# Patient Record
Sex: FEMALE | Race: WHITE | NOT HISPANIC OR LATINO | Employment: OTHER | URBAN - METROPOLITAN AREA
[De-identification: names, ages, dates, MRNs, and addresses within clinical notes are randomized per-mention and may not be internally consistent; named-entity substitution may affect disease eponyms.]

---

## 2023-03-07 ENCOUNTER — TELEPHONE (OUTPATIENT)
Dept: PRIMARY CARE | Facility: CLINIC | Age: 43
End: 2023-03-07
Payer: COMMERCIAL

## 2023-03-07 DIAGNOSIS — R51.9 NONINTRACTABLE HEADACHE, UNSPECIFIED CHRONICITY PATTERN, UNSPECIFIED HEADACHE TYPE: ICD-10-CM

## 2023-03-07 DIAGNOSIS — E78.00 PURE HYPERCHOLESTEROLEMIA: Primary | ICD-10-CM

## 2023-03-07 DIAGNOSIS — E78.00 PURE HYPERCHOLESTEROLEMIA: ICD-10-CM

## 2023-03-07 RX ORDER — ATORVASTATIN CALCIUM 20 MG/1
20 TABLET, FILM COATED ORAL DAILY
COMMUNITY
End: 2023-03-07 | Stop reason: SDUPTHER

## 2023-03-07 RX ORDER — ATORVASTATIN CALCIUM 20 MG/1
20 TABLET, FILM COATED ORAL DAILY
Qty: 30 TABLET | Refills: 5 | Status: SHIPPED | OUTPATIENT
Start: 2023-03-07 | End: 2023-03-07 | Stop reason: SDUPTHER

## 2023-03-07 RX ORDER — BUDESONIDE AND FORMOTEROL FUMARATE DIHYDRATE 160; 4.5 UG/1; UG/1
2 AEROSOL RESPIRATORY (INHALATION)
COMMUNITY
Start: 2022-08-05 | End: 2023-05-01 | Stop reason: ALTCHOICE

## 2023-03-07 RX ORDER — ALBUTEROL SULFATE 90 UG/1
2 AEROSOL, METERED RESPIRATORY (INHALATION)
COMMUNITY
End: 2023-05-08 | Stop reason: SDUPTHER

## 2023-03-07 RX ORDER — ATORVASTATIN CALCIUM 20 MG/1
20 TABLET, FILM COATED ORAL DAILY
Qty: 30 TABLET | Refills: 5 | Status: SHIPPED | OUTPATIENT
Start: 2023-03-07 | End: 2023-09-18 | Stop reason: SDUPTHER

## 2023-03-07 NOTE — TELEPHONE ENCOUNTER
PATIENT REQUESTING REFILL ON ATORVASTATIN 20MG  SEND TO [facility]

## 2023-03-10 DIAGNOSIS — R51.9 NONINTRACTABLE HEADACHE, UNSPECIFIED CHRONICITY PATTERN, UNSPECIFIED HEADACHE TYPE: ICD-10-CM

## 2023-03-10 RX ORDER — TOPIRAMATE 25 MG/1
50 TABLET ORAL DAILY
COMMUNITY
Start: 2020-08-23 | End: 2023-03-10 | Stop reason: SDUPTHER

## 2023-03-10 RX ORDER — TOPIRAMATE 25 MG/1
50 TABLET ORAL DAILY
Qty: 90 TABLET | Refills: 3 | Status: SHIPPED | OUTPATIENT
Start: 2023-03-10 | End: 2023-03-13 | Stop reason: SDUPTHER

## 2023-03-13 RX ORDER — TOPIRAMATE 25 MG/1
50 TABLET ORAL DAILY
Qty: 180 TABLET | Refills: 3 | Status: SHIPPED | OUTPATIENT
Start: 2023-03-13 | End: 2024-01-14 | Stop reason: HOSPADM

## 2023-03-20 ENCOUNTER — TELEPHONE (OUTPATIENT)
Dept: PRIMARY CARE | Facility: CLINIC | Age: 43
End: 2023-03-20
Payer: COMMERCIAL

## 2023-03-20 ENCOUNTER — PATIENT OUTREACH (OUTPATIENT)
Dept: PRIMARY CARE | Facility: CLINIC | Age: 43
End: 2023-03-20
Payer: COMMERCIAL

## 2023-03-20 DIAGNOSIS — J20.9 ACUTE BRONCHITIS, UNSPECIFIED ORGANISM: ICD-10-CM

## 2023-03-20 DIAGNOSIS — J44.1 COPD EXACERBATION (MULTI): ICD-10-CM

## 2023-03-20 DIAGNOSIS — J44.9 CHRONIC OBSTRUCTIVE PULMONARY DISEASE, UNSPECIFIED COPD TYPE (MULTI): ICD-10-CM

## 2023-03-20 DIAGNOSIS — J43.9 PULMONARY EMPHYSEMA, UNSPECIFIED EMPHYSEMA TYPE (MULTI): Primary | ICD-10-CM

## 2023-03-20 DIAGNOSIS — J44.1 COPD EXACERBATION (MULTI): Primary | ICD-10-CM

## 2023-03-20 RX ORDER — IPRATROPIUM BROMIDE AND ALBUTEROL SULFATE 2.5; .5 MG/3ML; MG/3ML
3 SOLUTION RESPIRATORY (INHALATION) EVERY 6 HOURS PRN
COMMUNITY
Start: 2023-03-17 | End: 2023-05-24 | Stop reason: SDUPTHER

## 2023-03-20 RX ORDER — AMOXICILLIN AND CLAVULANATE POTASSIUM 875; 125 MG/1; MG/1
875 TABLET, FILM COATED ORAL 2 TIMES DAILY
Qty: 12 TABLET | Refills: 0 | COMMUNITY
Start: 2023-03-17 | End: 2023-03-23

## 2023-03-20 RX ORDER — BENZONATATE 100 MG/1
CAPSULE ORAL 3 TIMES DAILY PRN
COMMUNITY
Start: 2023-03-17 | End: 2023-04-15

## 2023-03-20 RX ORDER — PREDNISONE 10 MG/1
TABLET ORAL
COMMUNITY
Start: 2023-03-17 | End: 2023-03-28

## 2023-03-20 RX ORDER — DOXYCYCLINE HYCLATE 100 MG
TABLET ORAL EVERY 12 HOURS
COMMUNITY
Start: 2023-03-17 | End: 2023-07-27 | Stop reason: ALTCHOICE

## 2023-03-20 NOTE — TELEPHONE ENCOUNTER
PATIENT CALLED REQUESTING A PULMONOLOGIST REFERRAL.  NEEDS TO BE IN THE [place] AREA.    PLEASE ADVISE

## 2023-03-20 NOTE — PROGRESS NOTES
Discharge Facility[facility]  Discharge Diagnosis: COPD exacerbation  Admission Date: 3/14/23  Discharge Date: 3/17/23    PCP appt: 3/30/23    Engagement  Call Start Time: 1454 (3/20/2023  2:53 PM)    Medications  Medications reviewed with patient/caregiver?: Yes (new meds only) (3/20/2023  2:53 PM)  Is the patient having any side effects they believe may be caused by any medication additions or changes?: (!) Yes (3/20/2023  2:53 PM)  Does the patient have all medications ordered at discharge?: Yes (3/20/2023  2:53 PM)  Care Management Interventions: No intervention needed (3/20/2023  2:53 PM)  Is the patient taking all medications as directed (includes completed medication regime)?: Yes (3/20/2023  2:53 PM)  Care Management Interventions: Nurse provided patient education (3/20/2023  2:53 PM)    Appointments  Care Management Interventions: Verified appointment date/time/provider (3/20/2023  2:53 PM)    Self Management  Has home health visited the patient within 72 hours of discharge?: Not applicable (3/20/2023  2:53 PM)  What Durable Medical Equipment (DME) was ordered?: [employer] (3/20/2023  2:53 PM)  Has all Durable Medical Equipment (DME) been delivered?: Yes (3/20/2023  2:53 PM)    Patient Teaching  Does the patient have access to their discharge instructions?: Yes (3/20/2023  2:53 PM)  Care Management Interventions: Reviewed instructions with patient (3/20/2023  2:53 PM)  What is the patient's perception of their health status since discharge?: Improving (3/20/2023  2:53 PM)    Wrap Up  Call End Time: 1457 (3/20/2023  2:53 PM)

## 2023-03-22 PROBLEM — K43.9 VENTRAL HERNIA WITHOUT OBSTRUCTION OR GANGRENE: Status: ACTIVE | Noted: 2023-03-22

## 2023-03-22 PROBLEM — R06.02 SOB (SHORTNESS OF BREATH) ON EXERTION: Status: ACTIVE | Noted: 2023-03-22

## 2023-03-22 PROBLEM — J44.9 COPD (CHRONIC OBSTRUCTIVE PULMONARY DISEASE) (MULTI): Status: ACTIVE | Noted: 2023-03-22

## 2023-03-22 PROBLEM — E11.9 TYPE 2 DIABETES MELLITUS WITHOUT COMPLICATION, WITHOUT LONG-TERM CURRENT USE OF INSULIN (MULTI): Status: ACTIVE | Noted: 2023-03-22

## 2023-03-22 PROBLEM — R39.9 UTI SYMPTOMS: Status: ACTIVE | Noted: 2023-03-22

## 2023-03-22 PROBLEM — K21.9 ACID REFLUX: Status: ACTIVE | Noted: 2023-03-22

## 2023-03-22 PROBLEM — D17.20 LIPOMA OF FOREARM: Status: ACTIVE | Noted: 2023-03-22

## 2023-03-22 PROBLEM — D25.9 UTERINE FIBROID: Status: ACTIVE | Noted: 2023-03-22

## 2023-03-22 PROBLEM — D27.9 OVARIAN TERATOMA: Status: ACTIVE | Noted: 2023-03-22

## 2023-03-22 PROBLEM — R39.15 URINARY URGENCY: Status: ACTIVE | Noted: 2023-03-22

## 2023-03-22 PROBLEM — T81.49XA POSTOPERATIVE WOUND INFECTION: Status: ACTIVE | Noted: 2023-03-22

## 2023-03-22 PROBLEM — E78.5 HYPERLIPIDEMIA: Status: ACTIVE | Noted: 2023-03-22

## 2023-03-22 PROBLEM — F20.9 SCHIZOPHRENIA (MULTI): Status: ACTIVE | Noted: 2023-03-22

## 2023-03-22 PROBLEM — I10 HYPERTENSION: Status: ACTIVE | Noted: 2023-03-22

## 2023-03-22 PROBLEM — F12.10 MARIJUANA ABUSE: Status: ACTIVE | Noted: 2023-03-22

## 2023-03-22 PROBLEM — E03.9 HYPOTHYROIDISM: Status: ACTIVE | Noted: 2023-03-22

## 2023-03-22 PROBLEM — R09.02 HYPOXIA: Status: ACTIVE | Noted: 2023-03-22

## 2023-03-22 PROBLEM — N20.0 MULTIPLE RENAL CALCULI: Status: ACTIVE | Noted: 2023-03-22

## 2023-03-22 PROBLEM — R51.9 HEADACHE: Status: ACTIVE | Noted: 2023-03-22

## 2023-03-22 PROBLEM — R71.8 ELEVATED MCV: Status: ACTIVE | Noted: 2023-03-22

## 2023-03-22 PROBLEM — R05.9 COUGH: Status: ACTIVE | Noted: 2023-03-22

## 2023-03-22 RX ORDER — PALIPERIDONE PALMITATE 234 MG/1.5ML
INJECTION INTRAMUSCULAR
COMMUNITY
Start: 2021-04-13 | End: 2023-08-18 | Stop reason: ALTCHOICE

## 2023-03-22 RX ORDER — PANTOPRAZOLE SODIUM 40 MG/1
1 TABLET, DELAYED RELEASE ORAL DAILY
COMMUNITY
Start: 2021-07-20 | End: 2023-03-28 | Stop reason: SDUPTHER

## 2023-03-22 RX ORDER — BENZTROPINE MESYLATE 2 MG/1
2 TABLET ORAL DAILY
COMMUNITY
End: 2023-05-01

## 2023-03-22 RX ORDER — BUSPIRONE HYDROCHLORIDE 30 MG/1
30 TABLET ORAL 2 TIMES DAILY
COMMUNITY
End: 2024-01-14 | Stop reason: HOSPADM

## 2023-03-22 RX ORDER — LEVOTHYROXINE SODIUM 150 UG/1
1 TABLET ORAL DAILY
COMMUNITY
Start: 2018-11-09 | End: 2023-03-28 | Stop reason: SDUPTHER

## 2023-03-28 ENCOUNTER — OFFICE VISIT (OUTPATIENT)
Dept: PRIMARY CARE | Facility: CLINIC | Age: 43
End: 2023-03-28
Payer: COMMERCIAL

## 2023-03-28 VITALS
RESPIRATION RATE: 16 BRPM | WEIGHT: 179.6 LBS | DIASTOLIC BLOOD PRESSURE: 62 MMHG | BODY MASS INDEX: 33.05 KG/M2 | HEART RATE: 80 BPM | TEMPERATURE: 98.6 F | HEIGHT: 62 IN | SYSTOLIC BLOOD PRESSURE: 90 MMHG | OXYGEN SATURATION: 94 %

## 2023-03-28 DIAGNOSIS — E11.9 TYPE 2 DIABETES MELLITUS WITHOUT COMPLICATION, WITHOUT LONG-TERM CURRENT USE OF INSULIN (MULTI): ICD-10-CM

## 2023-03-28 DIAGNOSIS — J44.1 CHRONIC OBSTRUCTIVE PULMONARY DISEASE WITH ACUTE EXACERBATION (MULTI): ICD-10-CM

## 2023-03-28 DIAGNOSIS — E03.9 HYPOTHYROIDISM, UNSPECIFIED TYPE: ICD-10-CM

## 2023-03-28 DIAGNOSIS — K21.9 GASTROESOPHAGEAL REFLUX DISEASE, UNSPECIFIED WHETHER ESOPHAGITIS PRESENT: ICD-10-CM

## 2023-03-28 DIAGNOSIS — Z09 HOSPITAL DISCHARGE FOLLOW-UP: Primary | ICD-10-CM

## 2023-03-28 DIAGNOSIS — F20.3 UNDIFFERENTIATED SCHIZOPHRENIA (MULTI): ICD-10-CM

## 2023-03-28 DIAGNOSIS — E66.09 CLASS 1 OBESITY DUE TO EXCESS CALORIES WITHOUT SERIOUS COMORBIDITY WITH BODY MASS INDEX (BMI) OF 32.0 TO 32.9 IN ADULT: ICD-10-CM

## 2023-03-28 DIAGNOSIS — R00.0 TACHYCARDIA: ICD-10-CM

## 2023-03-28 PROBLEM — E66.811 CLASS 1 OBESITY DUE TO EXCESS CALORIES WITHOUT SERIOUS COMORBIDITY WITH BODY MASS INDEX (BMI) OF 32.0 TO 32.9 IN ADULT: Status: ACTIVE | Noted: 2023-03-28

## 2023-03-28 PROCEDURE — 3044F HG A1C LEVEL LT 7.0%: CPT | Performed by: FAMILY MEDICINE

## 2023-03-28 PROCEDURE — 99495 TRANSJ CARE MGMT MOD F2F 14D: CPT | Performed by: FAMILY MEDICINE

## 2023-03-28 PROCEDURE — 3078F DIAST BP <80 MM HG: CPT | Performed by: FAMILY MEDICINE

## 2023-03-28 PROCEDURE — 1036F TOBACCO NON-USER: CPT | Performed by: FAMILY MEDICINE

## 2023-03-28 PROCEDURE — 3074F SYST BP LT 130 MM HG: CPT | Performed by: FAMILY MEDICINE

## 2023-03-28 RX ORDER — METOPROLOL TARTRATE 25 MG/1
25 TABLET, FILM COATED ORAL 2 TIMES DAILY
Qty: 60 TABLET | Refills: 5 | Status: SHIPPED | OUTPATIENT
Start: 2023-03-28 | End: 2023-05-24 | Stop reason: SDUPTHER

## 2023-03-28 RX ORDER — LEVOTHYROXINE SODIUM 150 UG/1
150 TABLET ORAL DAILY
Qty: 90 TABLET | Refills: 1 | Status: SHIPPED | OUTPATIENT
Start: 2023-03-28 | End: 2024-03-27 | Stop reason: SDUPTHER

## 2023-03-28 RX ORDER — PANTOPRAZOLE SODIUM 40 MG/1
40 TABLET, DELAYED RELEASE ORAL DAILY
Qty: 90 TABLET | Refills: 1 | Status: SHIPPED | OUTPATIENT
Start: 2023-03-28 | End: 2023-12-07 | Stop reason: SDUPTHER

## 2023-03-28 ASSESSMENT — PATIENT HEALTH QUESTIONNAIRE - PHQ9
1. LITTLE INTEREST OR PLEASURE IN DOING THINGS: NOT AT ALL
2. FEELING DOWN, DEPRESSED OR HOPELESS: NOT AT ALL
SUM OF ALL RESPONSES TO PHQ9 QUESTIONS 1 & 2: 0

## 2023-03-28 ASSESSMENT — LIFESTYLE VARIABLES
AUDIT-C TOTAL SCORE: 0
HOW OFTEN DO YOU HAVE A DRINK CONTAINING ALCOHOL: NEVER
HOW MANY STANDARD DRINKS CONTAINING ALCOHOL DO YOU HAVE ON A TYPICAL DAY: PATIENT DOES NOT DRINK
HOW OFTEN DO YOU HAVE SIX OR MORE DRINKS ON ONE OCCASION: NEVER
SKIP TO QUESTIONS 9-10: 1

## 2023-03-28 ASSESSMENT — PAIN SCALES - GENERAL: PAINLEVEL: 0-NO PAIN

## 2023-03-28 NOTE — PATIENT INSTRUCTIONS
Patient will continue on a diabetic, low-cholesterol diet and weight reduction. Exercise as tolerated. She will continue medications as prescribed. Follow-up in 3 month(s) otherwise as needed.      Patient was given refill(s) on:   Levothyroxine Sodium 150 mcg, TAKE 1 TAB BY MOUTH DAILY  Pantoprazole Sodium 40 mg, TAKE 1 TAB BY MOUTH DAILY   Metoprolol Tartrate 25 mg, TAKE 1 TAB BY MOUTH TWICE DAILY     Rx(s) sent to pharmacy.     Refer patient to Dr. Winkler for further evaluation and treatment of COPD.     Encouraged patient to drink plenty of fluids.

## 2023-03-28 NOTE — PROGRESS NOTES
Subjective   Patient ID: Stephenie Mccray is a 42 y.o. female who presents for Puncture Wound. I last saw the patient on 8/17/2022.     HPI   Patient was admitted into  from 3/14-3/17 for pneumonia. Patient was put on insulin, antibiotic, and prednisone. She is taking the insulin to keep her BS lower due to the Prednisone. She states that she has not had to inject insulin in the past 2 days.     Patient had a wound where the IV was inserted. She used bacitracin and amoxicillin to help heal it since it was rubbed raw by the adhesive. Denies pain. She did notice drainage before, but it is not currently draining.     She also needs a referral to a pulmonologist to care for her COPD.     Review of Systems  Except positives as noted in the CC & HPI      Constitutional: Denies fevers, chills, night sweats, fatigue, weight changes, change in appetite    Eyes: Denies blurry vision, double vision    ENT: Denies otalgia, trouble hearing, tinnitus, vertigo, nasal congestion, rhinorrhea, sore throat    Neck: Denies swelling, masses    Cardiovascular: Denies chest pain, palpitations, edema, orthopnea, syncope    Respiratory: Denies dyspnea, cough, wheezing, postural nocturnal dyspnea    Gastrointestinal: Denies abdominal pain, nausea, vomiting, diarrhea, constipation, melena, hematochezia    Genitourinary: Denies dysuria, hematuria, frequency, urgency    Musculoskeletal: Denies back pain, neck pain, arthralgias, myalgias    Integumentary: Denies skin rashes, masses    Neurological: Denies dizziness, headaches, confusion, limb weakness, paresthesias, syncope, convulsions    Psychiatric: Denies depression, anxiety, homicidal ideations, suicidal ideations, sleep disturbances    Endocrine: Denies polyphagia, polydipsia, polyuria, weakness, hair thinning, heat intolerance, cold intolerance, weight changes    Heme/Lymph: Denies easy bruising, easy bleeding, swollen glands     Objective   BP 90/62 (BP Location: Right arm, Patient  "Position: Sitting)   Pulse 80   Temp 37 °C (98.6 °F) (Temporal)   Resp 16   Ht 1.575 m (5' 2\")   Wt 81.5 kg (179 lb 9.6 oz)   SpO2 94%   BMI 32.85 kg/m²     Physical Exam  90/62 on recheck of BP in the right arm.     Gen. Appearance - well-developed, well-nourished, 42 y.o., White female in no acute distress.     Skin - warm, pink and dry without rash or concerning lesions. 6 mm area of erythema in the right antecubital fossa without drainage, tenderness, or induration. It appears to be healing.     Mental Status - alert and oriented times 3. Normal mood and affect appropriate to mood.     Neck - supple without lymphadenopathy. Carotid pulses are normal without bruits. Thyroid is normal in midline without nodules.    Chest - lungs are clear to auscultation without rales, rhonchi or wheezes.    Heart - regular, rate, and rhythm without murmurs, rubs or gallops.     Abdomen - soft, obese, protuberant, nontender, nondistended. No masses, hepatomegaly or splenomegaly is noted. No rebound, rigidity or guarding is noted. Bowel sounds are normoactive. Ecchymotic area of the left mid abdomen measuring 6 cm around.     Extremities - no cyanosis, clubbing or edema. Pedal pulses are 2+ normal at the dorsalis pedis and posterior tibial pulses bilaterally.     Neurological - cranial nerves II through XII are grossly intact. Motor strength 5/5 at all fours.     Assessment/Plan   1. Hospital discharge follow-up        2. Type 2 diabetes mellitus without complication, without long-term current use of insulin (CMS/[facility])  Follow Up In [facility] - PCP      3. Hypothyroidism, unspecified type  levothyroxine (Synthroid, Levoxyl) 150 mcg tablet    Follow Up In [facility] - PCP      4. Gastroesophageal reflux disease, unspecified whether esophagitis present  pantoprazole (ProtoNix) 40 mg EC tablet    Follow Up In [facility] - PCP      5. Chronic obstructive pulmonary disease with acute exacerbation " (CMS/[facility])  Follow Up In [facility] - PCP      6. Tachycardia  metoprolol tartrate (Lopressor) 25 mg tablet      7. Class 1 obesity due to excess calories without serious comorbidity with body mass index (BMI) of 32.0 to 32.9 in adult        Patient will continue on a diabetic, low-cholesterol diet and weight reduction. Exercise as tolerated. She will continue medications as prescribed. Follow-up in 3 month(s) otherwise as needed.      Patient was given refill(s) on:   Levothyroxine Sodium 150 mcg, TAKE 1 TAB BY MOUTH DAILY  Pantoprazole Sodium 40 mg, TAKE 1 TAB BY MOUTH DAILY   Metoprolol Tartrate 25 mg, TAKE 1 TAB BY MOUTH TWICE DAILY     Rx(s) sent to pharmacy.     Refer patient to Dr. Winkler for further evaluation and treatment of COPD.     Encouraged patient to drink plenty of fluids.    Scribe Attestation  By signing my name below, IRama Scribe   attest that this documentation has been prepared under the direction and in the presence of Delta Yuen MD.

## 2023-03-30 ENCOUNTER — APPOINTMENT (OUTPATIENT)
Dept: PRIMARY CARE | Facility: CLINIC | Age: 43
End: 2023-03-30
Payer: COMMERCIAL

## 2023-03-31 ENCOUNTER — TELEPHONE (OUTPATIENT)
Dept: PRIMARY CARE | Facility: CLINIC | Age: 43
End: 2023-03-31
Payer: COMMERCIAL

## 2023-03-31 NOTE — TELEPHONE ENCOUNTER
Denise from [facility] called to verify Stephenie as a BB patient and wanted me to leave her contact# [phone] and if BB thinks Stephenie were to need a , dietician and/or behavior health specialist! There are free resources available to her!     MIL

## 2023-04-14 ENCOUNTER — PATIENT OUTREACH (OUTPATIENT)
Dept: PRIMARY CARE | Facility: CLINIC | Age: 43
End: 2023-04-14
Payer: COMMERCIAL

## 2023-04-14 ENCOUNTER — DOCUMENTATION (OUTPATIENT)
Dept: PRIMARY CARE | Facility: CLINIC | Age: 43
End: 2023-04-14
Payer: COMMERCIAL

## 2023-04-14 RX ORDER — CLONAZEPAM 1 MG/1
TABLET ORAL 2 TIMES DAILY PRN
COMMUNITY

## 2023-04-14 RX ORDER — CITALOPRAM 40 MG/1
50 TABLET, FILM COATED ORAL DAILY
COMMUNITY
End: 2024-02-06 | Stop reason: ALTCHOICE

## 2023-04-14 RX ORDER — HYDROXYZINE PAMOATE 50 MG/1
50 CAPSULE ORAL 3 TIMES DAILY PRN
COMMUNITY

## 2023-04-14 RX ORDER — PREDNISONE 20 MG/1
40 TABLET ORAL DAILY
COMMUNITY
Start: 2023-04-13 | End: 2023-08-18 | Stop reason: ALTCHOICE

## 2023-04-14 RX ORDER — INSULIN LISPRO 100 [IU]/ML
4 INJECTION, SOLUTION INTRAVENOUS; SUBCUTANEOUS
COMMUNITY
Start: 2023-04-13 | End: 2024-04-08

## 2023-04-14 NOTE — PROGRESS NOTES
Discharge Facility: [facility]  Discharge Diagnosis: COPD exacerbation, Acute on chronic respiratory failure, acute on chronic respiratory failure with hypoxemia, acute bronchitis, long term current use of insulin  Admission Date: 4/10/2023  Discharge Date: 4/13/2023    PCP appt: 4/26/2023    Engagement  Call Start Time: 1500 (4/14/2023  3:02 PM)    Medications  Medications reviewed with patient/caregiver?: Yes (4/14/2023  3:02 PM)  Is the patient having any side effects they believe may be caused by any medication additions or changes?: No (4/14/2023  3:02 PM)  Does the patient have all medications ordered at discharge?: Yes (4/14/2023  3:02 PM)  Care Management Interventions: No intervention needed (4/14/2023  3:02 PM)  Is the patient taking all medications as directed (includes completed medication regime)?: Yes (4/14/2023  3:02 PM)    Appointments  Does the patient have a primary care provider?: Yes (4/14/2023  3:02 PM)  Care Management Interventions: Verified appointment date/time/provider (Assisted in making a follow up appt with PCP) (4/14/2023  3:02 PM)  Has the patient kept scheduled appointments due by today?: Yes (4/14/2023  3:02 PM)  Care Management Interventions: Advised patient to keep appointment (4/14/2023  3:02 PM)    Self Management  What is the home health agency?: Denies need (4/14/2023  3:02 PM)    Patient Teaching  Does the patient have access to their discharge instructions?: Yes (4/14/2023  3:02 PM)  Care Management Interventions: Reviewed instructions with patient (4/14/2023  3:02 PM)  What is the patient's perception of their health status since discharge?: Improving (4/14/2023  3:02 PM)  Is the patient/caregiver able to teach back the hierarchy of who to call/visit for symptoms/problems? PCP, Specialist, Home Health nurse, Urgent Care, ED, 911: Yes (4/14/2023  3:02 PM)

## 2023-04-23 DIAGNOSIS — J43.1 PANLOBULAR EMPHYSEMA (MULTI): Primary | ICD-10-CM

## 2023-04-28 ENCOUNTER — PATIENT OUTREACH (OUTPATIENT)
Dept: PRIMARY CARE | Facility: CLINIC | Age: 43
End: 2023-04-28
Payer: COMMERCIAL

## 2023-04-28 NOTE — PROGRESS NOTES
Call regarding appt. with PCP on 4/26/2023 after hospitalization.  At time of outreach call the patient feels as if their condition has improved since last visit.  Reviewed the PCP appointment with the pt and addressed any questions or concerns.

## 2023-05-01 ENCOUNTER — TELEMEDICINE (OUTPATIENT)
Dept: PHARMACY | Facility: HOSPITAL | Age: 43
End: 2023-05-01
Payer: COMMERCIAL

## 2023-05-01 DIAGNOSIS — J43.1 PANLOBULAR EMPHYSEMA (MULTI): ICD-10-CM

## 2023-05-01 RX ORDER — FLUTICASONE FUROATE, UMECLIDINIUM BROMIDE AND VILANTEROL TRIFENATATE 100; 62.5; 25 UG/1; UG/1; UG/1
1 POWDER RESPIRATORY (INHALATION) DAILY
Qty: 28 EACH | Refills: 11 | Status: SHIPPED | OUTPATIENT
Start: 2023-05-01 | End: 2023-05-24

## 2023-05-01 RX ORDER — PRAZOSIN HYDROCHLORIDE 2 MG/1
2 CAPSULE ORAL NIGHTLY
Status: ON HOLD | COMMUNITY
End: 2023-12-29 | Stop reason: WASHOUT

## 2023-05-01 NOTE — ASSESSMENT & PLAN NOTE
Plan:  Initiate Trelegy 100-62.5-25 mcg/ inh; 1 puff every day  Discontinue Symbicort 160-4.5 mcg/ing   Continue all other medications as prescribed  Follow up in 1 week after patient has received medication/seen pulminology

## 2023-05-01 NOTE — PROGRESS NOTES
Pharmacy Post-Discharge Visit  Stephenie Mccray is a 42 y.o. female was referred to Clinical Pharmacy Team to complete a post-discharge medication optimization and monitoring visit.  The patient was referred for their COPD medication management.    Admission Date: 4/10/23  Discharge Date: 4/13/23    Referring Provider: Delta Yuen MD    Subjective   Allergies   Allergen Reactions    Sumatriptan Other     Flushing    Levofloxacin Rash       [facility] [place] - [facility] [address]   [address] [place] [address]  Phone: [phone] Fax: [phone]      Social History     Social History Narrative    Not on file        Notable Medication changes following discharge:  Start: Prednisone 5 mg; 4 tabs every day for 3 days, then 2 tabs every day for 3 days, then 1 tab every day for 3 days, then stop  Stop: none  Change: none    COPD ASSESSMENT    Rescue Inhaler Use:  -How many times per week do you use your rescue inhale? Twice daily, uses it scheduled. Did not specify if she needs it as a rescue    Inhaler Technique:  -How do you use your rescue inhaler?  --Patient takes it scheduled    -How do you use your maintenance inhaler?  --Patient takes two puffs BID of her Symbicort     Secondary Prevention (vaccines):  -Influenza: Date [Unknown]  -PCV13: Date [Unknown]  -PPSV23: Date [Unknown]    Sx Management:  -Increased cough? no  -Increased sputum production? no  -Increased SOB? no    Patient claims they are currently stable    Exacerbation Hx:  -When was your last hospitalization for an exacerbation? 4/10/23  -When was the last time you were treated with antibiotics and/or steroids? 4/10/         *Insert smartphrase [copdplat], [strokeplat], and/or [chfplat] if managing instead of HPI form*    Review of Systems    Objective     There were no vitals taken for this visit.     LAB  Lab Results   Component Value Date    BILITOT 0.3 04/10/2023    CALCIUM 8.7 04/11/2023    CO2 31  04/11/2023     04/11/2023    CREATININE 0.70 04/11/2023    GLUCOSE 164 (H) 04/11/2023    ALKPHOS 69 04/10/2023    K 4.5 04/11/2023    PROT 6.2 (L) 04/10/2023     04/11/2023    AST 10 04/10/2023    ALT 14 04/10/2023    BUN 16 04/11/2023    ANIONGAP 9 (L) 04/11/2023    MG 1.80 04/11/2023    PHOS 3.5 04/11/2023    ALBUMIN 3.8 04/11/2023    GFRF >90 04/11/2023     Lab Results   Component Value Date    TRIG 178 (H) 02/14/2023    CHOL 172 02/14/2023    HDL 50.4 02/14/2023     Lab Results   Component Value Date    HGBA1C 6.7 (A) 03/13/2023         Current Outpatient Medications on File Prior to Visit   Medication Sig Dispense Refill    albuterol 90 mcg/actuation inhaler Inhale 2 puffs 4 times a day. Patient taking 2 puffs QAM and 2 puffs at bedtime      busPIRone (Buspar) 30 mg tablet Take 1 tablet (30 mg) by mouth 2 times a day.      citalopram (CeleXA) 40 mg tablet Take by mouth.      clonazePAM (KlonoPIN) 1 mg tablet Take by mouth 2 times a day as needed.      hydrOXYzine pamoate (Vistaril) 50 mg capsule Take by mouth 3 times a day as needed.      insulin lispro (HumaLOG) 100 unit/mL injection 1 Dose.      Invega Sustenna 234 mg/1.5 mL syringe 1.5 milliliter(s) intramuscular once a month      levothyroxine (Synthroid, Levoxyl) 150 mcg tablet Take 1 tablet (150 mcg) by mouth once daily. 90 tablet 1    metoprolol tartrate (Lopressor) 25 mg tablet Take 1 tablet (25 mg) by mouth in the morning and 1 tablet (25 mg) before bedtime. 60 tablet 5    pantoprazole (ProtoNix) 40 mg EC tablet Take 1 tablet (40 mg) by mouth once daily. 90 tablet 1    prazosin (Minipress) 2 mg capsule Take 1 capsule (2 mg) by mouth once daily at bedtime.      Symbicort 160-4.5 mcg/actuation inhaler Inhale 2 puffs 2 times a day. RINSE MOUTH AFTER USE      topiramate (Topamax) 25 mg tablet Take 2 tablets (50 mg) by mouth once daily. 180 tablet 3    atorvastatin (Lipitor) 20 mg tablet Take 1 tablet (20 mg) by mouth once daily. 30 tablet 5     doxycycline (Vibra-Tabs) 100 mg tablet Take by mouth every 12 hours.      ipratropium-albuteroL (Duo-Neb) 0.5-2.5 mg/3 mL nebulizer solution Inhale 3 mL every 6 hours if needed.      predniSONE (Deltasone) 5 mg tablet Take by mouth.      [DISCONTINUED] benztropine (Cogentin) 2 mg tablet Take 1 tablet (2 mg) by mouth once daily.       No current facility-administered medications on file prior to visit.        DRUG INTERATIONS  - None specific to note    Current Medications:  Albuterol 90 mcg/act; 2 puffs Q6h PRN - patient taking 2 puffs BID  Ipratropium/albuterol 0.5-2.5 mg/3 mL; 3 mL Q6h PRN - patient taking 3 mL BID  Budesonide/formoterol 160-4.5 mcg/act; 2 puffs BID    Patient uses oxygen while sleeping. Currently running at 3L.     Patient referred to pharmacy by platinum plan discharge for medication maintenance. Patient has had two recent COPD exacerbations which required hospitalization. No updates to patient's COPD medications were made. Due to persistent exacerbations despite dual long-acting bronchodilator therapy, will initiate Trelegy and discontinue Symbicort. Patient stated they are seeing a pulmonologist in 1 week, but could not remember the date of appointment or the pulmonologist they are seeing. Plan to follow up with patient in 1 week to ensure patient has received medication and is tolerating and to follow up on new pulmonologist appointment, depending on circumstance may offer [facility] if patient needs assistance with copay.       Assessment/Plan   Problem List Items Addressed This Visit          Respiratory    COPD (chronic obstructive pulmonary disease) (CMS/[facility])     Plan:  Initiate Trelegy 100-62.5-25 mcg/ inh; 1 puff every day  Discontinue Symbicort 160-4.5 mcg/ing   Continue all other medications as prescribed  Follow up in 1 week after patient has received medication/seen pulminology              Frank Mclain, PharmD   PGY-1 Pharmacy Resident  [email or web address]   P: [phone]        Verbal consent to manage patient's drug therapy was obtained from the patient. They were informed they may decline to participate or withdraw from participation in pharmacy services at any time.

## 2023-05-08 ENCOUNTER — TELEMEDICINE (OUTPATIENT)
Dept: PHARMACY | Facility: HOSPITAL | Age: 43
End: 2023-05-08
Payer: COMMERCIAL

## 2023-05-08 DIAGNOSIS — J44.1 CHRONIC OBSTRUCTIVE PULMONARY DISEASE WITH ACUTE EXACERBATION (MULTI): Primary | ICD-10-CM

## 2023-05-08 RX ORDER — ALBUTEROL SULFATE 90 UG/1
2 AEROSOL, METERED RESPIRATORY (INHALATION) EVERY 4 HOURS PRN
Qty: 18 G | Refills: 11 | Status: SHIPPED | OUTPATIENT
Start: 2023-05-08 | End: 2023-12-31 | Stop reason: HOSPADM

## 2023-05-08 NOTE — PROGRESS NOTES
I reviewed the progress note and agree with the resident’s findings and plans as written. Case discussed with resident.    Steve Mccullough, JacquelynD

## 2023-05-08 NOTE — ASSESSMENT & PLAN NOTE
Patient referred to pharmacy by platinum plan discharge for medication maintenance. Patient has had two recent COPD exacerbations which required hospitalization. Initiated Trelegy at last telephone pharmacy visit, however patient has not picked up prescription yet due to it being sent to her unpreffered pharmacy, patient will call and have script transferred to preferred pharmacy. Patient also needs refill on her rescue inhaler, will send. Plan to follow up in 1 week to assess patient after she has received and started her new medication.    PLAN  Initiate Trelegy 100-62.5-25 mcg  Continue all other medications as prescribed  Follow up with pharmacy in 1 week to assess efficacy/tolerance.

## 2023-05-08 NOTE — PROGRESS NOTES
Pharmacy Post-Discharge Visit  Stephenie Mccray is a 42 y.o. female was referred to Clinical Pharmacy Team to complete a post-discharge medication optimization and monitoring visit.  The patient was referred for their COPD medication management.    Admission Date: 4/10/23  Discharge Date: 4/13/23    Referring Provider: Delta Yuen MD    Subjective   Allergies   Allergen Reactions    Sumatriptan Other     Flushing    Levofloxacin Rash       [id number] - [place], [place] - [address]  [address]   [facility] [address]  Phone: [phone] Fax: [phone]    [id number] - [place], [place] - [address]  [address]  [facility] [address]  Phone: [phone] Fax: [phone]      Social History     Social History Narrative    Not on file        Notable Medication changes following discharge:  Start: Prednisone 5 mg; 4 tabs every day for 3 days, then 2 tabs every day for 3 days, then 1 tab every day for 3 days, then stop  Stop: none  Change: none    COPD ASSESSMENT    Rescue Inhaler Use:  -How many times per week do you use your rescue inhale? Twice daily, uses it scheduled. Did not specify if she needs it as a rescue    Inhaler Technique:  -How do you use your rescue inhaler?  --Patient takes it scheduled    -How do you use your maintenance inhaler?  --Patient takes two puffs BID of her Symbicort     Secondary Prevention (vaccines):  -Influenza: Date [Unknown]  -PCV13: Date [Unknown]  -PPSV23: Date [Unknown]    Sx Management:  -Increased cough? no  -Increased sputum production? no  -Increased SOB? no    Patient claims they are currently stable    Exacerbation Hx:  -When was your last hospitalization for an exacerbation? 4/10/23  -When was the last time you were treated with antibiotics and/or steroids? 4/10/         *Insert smartphrase [copdplat], [strokeplat], and/or [chfplat] if managing instead of HPI form*    Review of Systems    Objective     There were no vitals taken for  this visit.     LAB  Lab Results   Component Value Date    BILITOT 0.3 04/10/2023    CALCIUM 8.7 04/11/2023    CO2 31 04/11/2023     04/11/2023    CREATININE 0.70 04/11/2023    GLUCOSE 164 (H) 04/11/2023    ALKPHOS 69 04/10/2023    K 4.5 04/11/2023    PROT 6.2 (L) 04/10/2023     04/11/2023    AST 10 04/10/2023    ALT 14 04/10/2023    BUN 16 04/11/2023    ANIONGAP 9 (L) 04/11/2023    MG 1.80 04/11/2023    PHOS 3.5 04/11/2023    ALBUMIN 3.8 04/11/2023    GFRF >90 04/11/2023     Lab Results   Component Value Date    TRIG 178 (H) 02/14/2023    CHOL 172 02/14/2023    HDL 50.4 02/14/2023     Lab Results   Component Value Date    HGBA1C 6.7 (A) 03/13/2023         Current Outpatient Medications on File Prior to Visit   Medication Sig Dispense Refill    albuterol 90 mcg/actuation inhaler Inhale 2 puffs 4 times a day. Patient taking 2 puffs QAM and 2 puffs at bedtime      atorvastatin (Lipitor) 20 mg tablet Take 1 tablet (20 mg) by mouth once daily. 30 tablet 5    busPIRone (Buspar) 30 mg tablet Take 1 tablet (30 mg) by mouth 2 times a day.      citalopram (CeleXA) 40 mg tablet Take by mouth.      clonazePAM (KlonoPIN) 1 mg tablet Take by mouth 2 times a day as needed.      doxycycline (Vibra-Tabs) 100 mg tablet Take by mouth every 12 hours.      fluticasone-umeclidin-vilanter (Trelegy Ellipta) 100-62.5-25 mcg blister with device Inhale 1 puff once daily. 28 each 11    hydrOXYzine pamoate (Vistaril) 50 mg capsule Take by mouth 3 times a day as needed.      insulin lispro (HumaLOG) 100 unit/mL injection 1 Dose.      Invega Sustenna 234 mg/1.5 mL syringe 1.5 milliliter(s) intramuscular once a month      ipratropium-albuteroL (Duo-Neb) 0.5-2.5 mg/3 mL nebulizer solution Inhale 3 mL every 6 hours if needed.      levothyroxine (Synthroid, Levoxyl) 150 mcg tablet Take 1 tablet (150 mcg) by mouth once daily. 90 tablet 1    metoprolol tartrate (Lopressor) 25 mg tablet Take 1 tablet (25 mg) by mouth in the morning and 1  tablet (25 mg) before bedtime. 60 tablet 5    pantoprazole (ProtoNix) 40 mg EC tablet Take 1 tablet (40 mg) by mouth once daily. 90 tablet 1    prazosin (Minipress) 2 mg capsule Take 1 capsule (2 mg) by mouth once daily at bedtime.      predniSONE (Deltasone) 5 mg tablet Take by mouth.      topiramate (Topamax) 25 mg tablet Take 2 tablets (50 mg) by mouth once daily. 180 tablet 3     No current facility-administered medications on file prior to visit.        DRUG INTERATIONS  - None specific to note    Current Medications:  Albuterol 90 mcg/act; 2 puffs Q6h PRN - patient taking 2 puffs BID  Ipratropium/albuterol 0.5-2.5 mg/3 mL; 3 mL Q6h PRN - patient taking 3 mL BID  Budesonide/formoterol 160-4.5 mcg/act; 2 puffs BID    Patient uses oxygen while sleeping. Currently running at 3L.       Assessment/Plan   Problem List Items Addressed This Visit          Respiratory    COPD (chronic obstructive pulmonary disease) (CMS/[facility]) - Primary     Patient referred to pharmacy by platinum plan discharge for medication maintenance. Patient has had two recent COPD exacerbations which required hospitalization. Initiated Trelegy at last telephone pharmacy visit, however patient has not picked up prescription yet due to it being sent to her unpreffered pharmacy, patient will call and have script transferred to preferred pharmacy. Patient also needs refill on her rescue inhaler, will send. Plan to follow up in 1 week to assess patient after she has received and started her new medication.    PLAN  Initiate Trelegy 100-62.5-25 mcg  Continue all other medications as prescribed  Follow up with pharmacy in 1 week to assess efficacy/tolerance.             Frank Mclain, PharmD   PGY-1 Pharmacy Resident  [email or web address]   P: [phone]       Verbal consent to manage patient's drug therapy was obtained from the patient. They were informed they may decline to participate or withdraw from participation in pharmacy services at  any time.

## 2023-05-16 ENCOUNTER — DOCUMENTATION (OUTPATIENT)
Dept: PRIMARY CARE | Facility: CLINIC | Age: 43
End: 2023-05-16
Payer: COMMERCIAL

## 2023-05-16 RX ORDER — FLUTICASONE FUROATE AND VILANTEROL 200; 25 UG/1; UG/1
POWDER RESPIRATORY (INHALATION) 2 TIMES DAILY
COMMUNITY
Start: 2023-05-15 | End: 2023-05-24 | Stop reason: SINTOL

## 2023-05-16 RX ORDER — BUDESONIDE AND FORMOTEROL FUMARATE DIHYDRATE 160; 4.5 UG/1; UG/1
AEROSOL RESPIRATORY (INHALATION) 2 TIMES DAILY
COMMUNITY
Start: 2023-07-07 | End: 2023-07-27 | Stop reason: ALTCHOICE

## 2023-05-16 NOTE — PROGRESS NOTES
Discharge Facility: [facility]  Discharge Diagnosis: Acute on chronic respiratory failure with hypoxemia, COPD (chronic obstructive pulmonary disease), Diabetes mellitus  Admission Date: 12-May-2023  Discharge Date:  15-May-2023    PCP Appointment Date: 5/24/2023  Specialist Appointment Date: TBD  Hospital Encounter and Summary: Linked   See discharge assessment below for further details  Engagement  Call Start Time: 1110 (5/16/2023 11:15 AM)    Medications  Medications reviewed with patient/caregiver?: Yes (5/16/2023 11:15 AM)  Is the patient having any side effects they believe may be caused by any medication additions or changes?: No (5/16/2023 11:15 AM)  Does the patient have all medications ordered at discharge?: Yes (5/16/2023 11:15 AM)  Care Management Interventions: No intervention needed (5/16/2023 11:15 AM)  Prescription Comments: see med list-breo ellipta added (5/16/2023 11:15 AM)  Is the patient taking all medications as directed (includes completed medication regime)?: Yes (5/16/2023 11:15 AM)    Appointments  Does the patient have a primary care provider?: Yes (5/16/2023 11:15 AM)  Care Management Interventions: Verified appointment date/time/provider (5/16/2023 11:15 AM)  Has the patient kept scheduled appointments due by today?: Yes (5/16/2023 11:15 AM)  Care Management Interventions: Advised patient to keep appointment (5/16/2023 11:15 AM)    Self Management  What is the home health agency?: denies need (5/16/2023 11:15 AM)  What Durable Medical Equipment (DME) was ordered?: home oxygen (5/16/2023 11:15 AM)  Has all Durable Medical Equipment (DME) been delivered?: Yes (5/16/2023 11:15 AM)    Patient Teaching  Does the patient have access to their discharge instructions?: Yes (5/16/2023 11:15 AM)  Care Management Interventions: Reviewed instructions with patient (5/16/2023 11:15 AM)  What is the patient's perception of their health status since discharge?: Improving (5/16/2023 11:15  "AM)  Is the patient/caregiver able to teach back the hierarchy of who to call/visit for symptoms/problems? PCP, Specialist, Home Health nurse, Urgent Care, ED, 911: Yes (5/16/2023 11:15 AM)  Patient/Caregiver Education Comments: Patient states her throat is \"so raw\" since her recent hospitalization due to intubation. Patient states she is not having excessive SOB at this opal or wheezling. States she is only hoarse due to throat. (5/16/2023 11:15 AM)        "

## 2023-05-23 NOTE — PROGRESS NOTES
Subjective   Patient ID: Stephenie Mccray is a 43 y.o. female who presents for ER Follow-up. I last saw the patient on 03/28/2023.     HPI   Patient believes that Trelegy made her have an allergic reaction. Patient had some swelling of the face and eyes. Patient was also hypoxic on arrival to ER on 5/12 with O2 sat of 80. Denies swelling of lips, tongue and throat.     She was in the ER on 5/21. Patient states that she feels better now but now she has a blood clot in her right arm and a URI. Patient has 1 more day left of steroids and has finished antibiotics this morning.     Patient would like breathing treatment medicine.    Her BS was 230 yesterday. She cannot remember what it was today. She is using a sliding scale for her insulin lispro with each meal.     Review of Systems  Except positives as noted in the CC & HPI      Constitutional: Denies fevers, chills, night sweats, fatigue, weight changes, change in appetite    Eyes: Denies blurry vision, double vision    ENT: Denies otalgia, trouble hearing, tinnitus, vertigo, nasal congestion, rhinorrhea, sore throat    Neck: Denies swelling, masses    Cardiovascular: Denies chest pain, palpitations, edema, orthopnea, syncope    Respiratory: Denies cough, wheezing, postural nocturnal dyspnea    Gastrointestinal: Denies abdominal pain, nausea, vomiting, diarrhea, constipation, melena, hematochezia    Genitourinary: Denies dysuria, hematuria, frequency, urgency    Musculoskeletal: Denies back pain, neck pain, arthralgias, myalgias    Integumentary: Denies skin lesions, rashes, masses    Neurological: Denies dizziness, headaches, confusion, limb weakness, paresthesias, syncope, convulsions    Psychiatric: Denies depression, anxiety, homicidal ideations, suicidal ideations, sleep disturbances    Endocrine: Denies polyphagia, polydipsia, polyuria, weakness, hair thinning, heat intolerance, cold intolerance, weight changes    Heme/Lymph: Denies easy bruising, easy  "bleeding    Objective   /80 (BP Location: Right arm, Patient Position: Sitting)   Pulse 74   Temp 36.3 °C (97.3 °F) (Temporal)   Resp 16   Ht 1.575 m (5' 2\")   Wt 83.3 kg (183 lb 9.6 oz)   SpO2 96%   BMI 33.58 kg/m²     Physical Exam  138/80 on recheck of BP in the right arm.     Gen. Appearance - well-developed, well-nourished, 43 y.o., White female in no acute distress.     Skin - warm, pink and dry without rash or concerning lesions.     Mental Status - alert and oriented times 3. Normal mood and affect appropriate to mood.     Mouth - pharynx is pink without exudates. Dentition is normal appearing. Tongue and uvula move in the midline.     Neck - supple without lymphadenopathy. Carotid pulses are normal without bruits. Thyroid is normal in midline without nodules.    Chest - lungs are clear to auscultation without rales, rhonchi or wheezes.    Heart - regular, rate, and rhythm without murmurs, rubs or gallops.     Abdomen - soft, obese, protuberant, nontender, nondistended. No masses, hepatomegaly or splenomegaly is noted. No rebound, rigidity or guarding is noted. Bowel sounds are normoactive.     Extremities - no cyanosis, clubbing or edema. Pedal pulses are 2+ normal at the dorsalis pedis and posterior tibial pulses bilaterally. Right arm reveals a serpiginous induration along the medial forearm with minimal tenderness to palpation. No erythema or edema noted.     Neurological - cranial nerves II through XII are grossly intact. Motor strength 5/5 at all fours.     Assessment/Plan   1. Acute respiratory failure with hypoxia (CMS/HCC)        2. Superficial thrombophlebitis of right upper extremity        3. Primary hypertension        4. Type 2 diabetes mellitus without complication, with long-term current use of insulin (CMS/HCC)        5. Chronic obstructive pulmonary disease with acute exacerbation (CMS/HCC)  ipratropium-albuteroL (Duo-Neb) 0.5-2.5 mg/3 mL nebulizer solution    fluticasone " propion-salmeteroL (Advair Diskus) 250-50 mcg/dose diskus inhaler      6. Tachycardia  metoprolol tartrate (Lopressor) 25 mg tablet      7. Class 1 obesity due to excess calories without serious comorbidity with body mass index (BMI) of 33.0 to 33.9 in adult        Patient will continue on a diabetic, low-cholesterol diet and weight reduction. Exercise as tolerated. She will continue medications as prescribed. Follow-up on 6/28/2023, otherwise as needed.      Patient was started on:   Advair 250-50 mcg/dose inhaler, INHALE 1 PUFF TWICE DAILY, RINSE MOUTH WITH WATER AND SPIT INTO SINK AFTER EACH USE.     Patient was given refill(s) on:   Ipratropium-Albuterol 0.5-2.5 mg/3 ml, INHALE CONTENTS OF ONE VIAL VIA NEBULIZER DAILY AS NEEDED  Metoprolol Tartrate 25 mg, TAKE 1 TAB BY MOUTH TWICE DAILY     Rx(s) sent to pharmacy.     Consider starting patient on Lantus depending on her blood sugars and A1c results. Patient reluctant to begin at this time, she is concerned about her BS going too low.       Scribe Attestation  By signing my name below, IRama Scribe   attest that this documentation has been prepared under the direction and in the presence of Delta Yuen MD.

## 2023-05-24 ENCOUNTER — OFFICE VISIT (OUTPATIENT)
Dept: PRIMARY CARE | Facility: CLINIC | Age: 43
End: 2023-05-24
Payer: COMMERCIAL

## 2023-05-24 VITALS
DIASTOLIC BLOOD PRESSURE: 80 MMHG | HEIGHT: 62 IN | BODY MASS INDEX: 33.79 KG/M2 | SYSTOLIC BLOOD PRESSURE: 138 MMHG | OXYGEN SATURATION: 96 % | WEIGHT: 183.6 LBS | TEMPERATURE: 97.3 F | RESPIRATION RATE: 16 BRPM | HEART RATE: 74 BPM

## 2023-05-24 DIAGNOSIS — Z79.4 TYPE 2 DIABETES MELLITUS WITHOUT COMPLICATION, WITH LONG-TERM CURRENT USE OF INSULIN (MULTI): ICD-10-CM

## 2023-05-24 DIAGNOSIS — J44.1 CHRONIC OBSTRUCTIVE PULMONARY DISEASE WITH ACUTE EXACERBATION (MULTI): ICD-10-CM

## 2023-05-24 DIAGNOSIS — J96.01 ACUTE RESPIRATORY FAILURE WITH HYPOXIA (MULTI): Primary | ICD-10-CM

## 2023-05-24 DIAGNOSIS — I10 PRIMARY HYPERTENSION: ICD-10-CM

## 2023-05-24 DIAGNOSIS — E11.9 TYPE 2 DIABETES MELLITUS WITHOUT COMPLICATION, WITH LONG-TERM CURRENT USE OF INSULIN (MULTI): ICD-10-CM

## 2023-05-24 DIAGNOSIS — I80.8 SUPERFICIAL THROMBOPHLEBITIS OF RIGHT UPPER EXTREMITY: ICD-10-CM

## 2023-05-24 DIAGNOSIS — R00.0 TACHYCARDIA: ICD-10-CM

## 2023-05-24 DIAGNOSIS — E66.09 CLASS 1 OBESITY DUE TO EXCESS CALORIES WITHOUT SERIOUS COMORBIDITY WITH BODY MASS INDEX (BMI) OF 33.0 TO 33.9 IN ADULT: ICD-10-CM

## 2023-05-24 PROBLEM — I80.9 SUPERFICIAL THROMBOPHLEBITIS: Status: ACTIVE | Noted: 2023-05-24

## 2023-05-24 PROBLEM — J96.00 RESPIRATORY FAILURE, ACUTE (MULTI): Status: ACTIVE | Noted: 2023-05-24

## 2023-05-24 PROCEDURE — 3079F DIAST BP 80-89 MM HG: CPT | Performed by: FAMILY MEDICINE

## 2023-05-24 PROCEDURE — 3075F SYST BP GE 130 - 139MM HG: CPT | Performed by: FAMILY MEDICINE

## 2023-05-24 PROCEDURE — 99495 TRANSJ CARE MGMT MOD F2F 14D: CPT | Performed by: FAMILY MEDICINE

## 2023-05-24 PROCEDURE — 3051F HG A1C>EQUAL 7.0%<8.0%: CPT | Performed by: FAMILY MEDICINE

## 2023-05-24 PROCEDURE — 1036F TOBACCO NON-USER: CPT | Performed by: FAMILY MEDICINE

## 2023-05-24 RX ORDER — FLUTICASONE PROPIONATE AND SALMETEROL 250; 50 UG/1; UG/1
1 POWDER RESPIRATORY (INHALATION)
Qty: 60 EACH | Refills: 11 | Status: SHIPPED | OUTPATIENT
Start: 2023-05-24 | End: 2023-07-27 | Stop reason: SDUPTHER

## 2023-05-24 RX ORDER — IPRATROPIUM BROMIDE AND ALBUTEROL SULFATE 2.5; .5 MG/3ML; MG/3ML
3 SOLUTION RESPIRATORY (INHALATION) EVERY 6 HOURS PRN
Qty: 180 ML | Refills: 1 | Status: SHIPPED | OUTPATIENT
Start: 2023-05-24 | End: 2024-01-02

## 2023-05-24 RX ORDER — METOPROLOL TARTRATE 25 MG/1
25 TABLET, FILM COATED ORAL 2 TIMES DAILY
Qty: 60 TABLET | Refills: 5 | Status: SHIPPED | OUTPATIENT
Start: 2023-05-24 | End: 2023-08-18 | Stop reason: SDUPTHER

## 2023-05-24 ASSESSMENT — LIFESTYLE VARIABLES
HOW OFTEN DO YOU HAVE SIX OR MORE DRINKS ON ONE OCCASION: NEVER
SKIP TO QUESTIONS 9-10: 1
AUDIT-C TOTAL SCORE: 0
HOW OFTEN DO YOU HAVE A DRINK CONTAINING ALCOHOL: NEVER
HOW MANY STANDARD DRINKS CONTAINING ALCOHOL DO YOU HAVE ON A TYPICAL DAY: PATIENT DOES NOT DRINK

## 2023-05-24 NOTE — PATIENT INSTRUCTIONS
Patient will continue on a diabetic, low-cholesterol diet and weight reduction. Exercise as tolerated. She will continue medications as prescribed. Follow-up on 6/28/2023, otherwise as needed.      Patient was started on:   Advair 250-50 mcg/dose inhaler, INHALE 1 PUFF TWICE DAILY, RINSE MOUTH WITH WATER AND SPIT INTO SINK AFTER EACH USE.     Patient was given refill(s) on:   Ipratropium-Albuterol 0.5-2.5 mg/3 ml, INHALE CONTENTS OF ONE VIAL VIA NEBULIZER DAILY AS NEEDED  Metoprolol Tartrate 25 mg, TAKE 1 TAB BY MOUTH TWICE DAILY     Rx(s) sent to pharmacy.     Consider starting patient on Lantus depending on her blood sugars and A1c results.

## 2023-05-26 ENCOUNTER — PATIENT OUTREACH (OUTPATIENT)
Dept: PRIMARY CARE | Facility: CLINIC | Age: 43
End: 2023-05-26
Payer: COMMERCIAL

## 2023-05-26 NOTE — PROGRESS NOTES
Call regarding appt. with PCP on 5/24/2023 after hospitalization.  At time of outreach call the patient feels as if their condition has returned to baseline since last visit. States she was able to get her medications and has no questions or concerns related to these.  Reviewed the PCP appointment with the pt and addressed any questions or concerns.

## 2023-06-08 ENCOUNTER — TELEPHONE (OUTPATIENT)
Dept: PRIMARY CARE | Facility: CLINIC | Age: 43
End: 2023-06-08
Payer: COMMERCIAL

## 2023-06-08 NOTE — TELEPHONE ENCOUNTER
Stephenie called in to request BB send her in a steroid and antibiotic into [facility] in [facility] for her. stated that her oxygen is in the 80s to 70s.    Please advise

## 2023-06-15 ENCOUNTER — PATIENT OUTREACH (OUTPATIENT)
Dept: PRIMARY CARE | Facility: CLINIC | Age: 43
End: 2023-06-15
Payer: COMMERCIAL

## 2023-06-15 NOTE — PROGRESS NOTES
Discharge Facility:  [facility]  Discharge Diagnosis: Acute respiratory failure with hypoxia and hypercapnia  Admission Date: 6/8/23  Discharge Date: 6/14/23    PCP Appointment Date: 6/28/23  Specialist Appointment Date:   Hospital Encounter and Summary: Linked   See discharge assessment below for further details    2 attempts were made to reach patient to assess needs.   No return call as of this note.

## 2023-06-26 ENCOUNTER — TELEPHONE (OUTPATIENT)
Dept: PRIMARY CARE | Facility: CLINIC | Age: 43
End: 2023-06-26

## 2023-06-26 NOTE — TELEPHONE ENCOUNTER
Patient called stating her blood sugar is 471 and does not know how much insulin to give herself.   Please advise

## 2023-06-27 NOTE — PROGRESS NOTES
"Subjective   Patient ID: Stephenie Mccray is a 43 y.o. female who presents for Follow-up, Hypertension, Hypothyroidism, COPD, Diabetes, GERD, Schizophrenia, and Medicare Annual Wellness Visit Subsequent. I last saw the patient on 5/24/2023.     HPI   Stephenie Mccray is a 43 y.o. female presenting today for follow-up after being discharged from the hospital 14 days ago. The main problem requiring admission was Acute respiratory failure with hypoxia and hypercapnia. The discharge summary and/or Transitional Care Management documentation was reviewed. Medication reconciliation was performed as indicated via the \"[place] as Reviewed\" timestamp.     Stephenie Mccray was contacted by Transitional Care Management services two days after her discharge. This encounter and supporting documentation was reviewed.    The complexity of medical decision making for this patient's transitional care is moderate.    She notes that her BS is all over the place with 400 being the highest. Her BS tends to be higher in the afternoon.     She notes that she has 1 steroid pill left from the hospital. Her breathing has improved since being started on the steroids. She was given a steroid injection into her hand by Dr. Budinsky yesterday, for tendinitis.     Review of Systems  Except positives as noted in the CC & HPI      Constitutional: Denies fevers, chills, night sweats, fatigue, weight changes, change in appetite    Eyes: Denies blurry vision, double vision    ENT: Denies otalgia, trouble hearing, tinnitus, vertigo, nasal congestion, rhinorrhea, sore throat    Neck: Denies swelling, masses    Cardiovascular: Denies chest pain, palpitations, edema, orthopnea, syncope    Respiratory: Denies dyspnea, cough, wheezing, postural nocturnal dyspnea    Gastrointestinal: Denies abdominal pain, nausea, vomiting, diarrhea, constipation, melena, hematochezia    Genitourinary: Denies dysuria, hematuria, frequency, urgency    Musculoskeletal: " "Denies back pain, neck pain, arthralgias, myalgias    Integumentary: Denies skin lesions, rashes, masses    Neurological: Denies dizziness, headaches, confusion, limb weakness, paresthesias, syncope, convulsions    Psychiatric: Denies depression, anxiety, homicidal ideations, suicidal ideations, sleep disturbances    Endocrine: Denies polyphagia, polydipsia, polyuria, weakness, hair thinning, heat intolerance, cold intolerance, weight changes    Heme/Lymph: Denies easy bruising, easy bleeding, swollen glands     Objective   BP 90/58 (BP Location: Right arm, Patient Position: Sitting)   Pulse 94   Temp 37.1 °C (98.7 °F) (Temporal)   Resp 16   Ht 1.575 m (5' 2\")   Wt 78.9 kg (174 lb)   SpO2 93%   BMI 31.83 kg/m²     Physical Exam  90/58 on recheck of BP in the right arm.     Gen. Appearance - well-developed, well-nourished, 43 y.o., White female in no acute distress.     Skin - warm, pink and dry without rash or concerning lesions. Slight erythema of the forehead, below the hairline, right of midline.     Mental Status - alert and oriented times 3. Normal mood and affect appropriate to mood.     Neck - supple without lymphadenopathy. Carotid pulses are normal without bruits. Thyroid is normal in midline without nodules.    Chest - lungs are clear to auscultation without rales, rhonchi or wheezes.    Heart - regular, rate, and rhythm without murmurs, rubs or gallops.     Extremities - no cyanosis, clubbing or edema. Pedal pulses are 2+ normal at the dorsalis pedis and posterior tibial pulses bilaterally. Left hand reveals an area of linear induration and swelling along the ulnar aspect of the dorsum of her hand. It is tender to palpation. She is wearing a wrist brace.     Neurological - cranial nerves II through XII are grossly intact. Motor strength 5/5 at all fours.     Assessment/Plan   1. Hospital discharge follow-up        2. Acute respiratory failure with hypoxia and hypercapnia (CMS/HCC)        3. Type 2 " "diabetes mellitus without complication, with long-term current use of insulin (CMS/[facility])  insulin glargine (Lantus Solostar U-100 Insulin) 100 unit/mL (3 mL) pen    pen needle, diabetic 31 gauge x 5/16\" needle    lancets 30 gauge misc      4. Primary hypertension        5. Superficial burn of forehead, initial encounter        6. Acquired hypothyroidism        7. Chronic obstructive pulmonary disease with acute exacerbation (CMS/[facility])  Follow Up In [facility] - PCP      8. Mixed hyperlipidemia        9. Gastroesophageal reflux disease, unspecified whether esophagitis present  Follow Up In [facility] - PCP      10. Class 1 obesity due to excess calories without serious comorbidity with body mass index (BMI) of 31.0 to 31.9 in adult        Patient will continue on a diabetic, low-cholesterol diet and weight reduction. Exercise as tolerated. She will continue medications as prescribed. Follow-up in 1 month(s) otherwise as needed.      Patient was started on:   Lantus SoloStar Pen 100 unit/ml, INJECT 10 UNITS IN THE MORNING    Patient was given refill(s) on:   Lancets, TEST THREE TIMES DAILY  Pen Needles, USE AS DIRECTED    Rx(s) sent to pharmacy.     Advised patient to bring in her blood sugar log to her next visit.       Scribe Attestation  By signing my name below, IRama Scribe   attest that this documentation has been prepared under the direction and in the presence of Delta Yuen MD.   "

## 2023-06-28 ENCOUNTER — OFFICE VISIT (OUTPATIENT)
Dept: PRIMARY CARE | Facility: CLINIC | Age: 43
End: 2023-06-28
Payer: COMMERCIAL

## 2023-06-28 VITALS
OXYGEN SATURATION: 93 % | TEMPERATURE: 98.7 F | RESPIRATION RATE: 16 BRPM | SYSTOLIC BLOOD PRESSURE: 90 MMHG | WEIGHT: 174 LBS | HEIGHT: 62 IN | HEART RATE: 94 BPM | DIASTOLIC BLOOD PRESSURE: 58 MMHG | BODY MASS INDEX: 32.02 KG/M2

## 2023-06-28 DIAGNOSIS — Z09 HOSPITAL DISCHARGE FOLLOW-UP: Primary | ICD-10-CM

## 2023-06-28 DIAGNOSIS — Z79.4 TYPE 2 DIABETES MELLITUS WITHOUT COMPLICATION, WITH LONG-TERM CURRENT USE OF INSULIN (MULTI): ICD-10-CM

## 2023-06-28 DIAGNOSIS — E03.9 ACQUIRED HYPOTHYROIDISM: ICD-10-CM

## 2023-06-28 DIAGNOSIS — K21.9 GASTROESOPHAGEAL REFLUX DISEASE, UNSPECIFIED WHETHER ESOPHAGITIS PRESENT: ICD-10-CM

## 2023-06-28 DIAGNOSIS — T20.16XA SUPERFICIAL BURN OF FOREHEAD, INITIAL ENCOUNTER: ICD-10-CM

## 2023-06-28 DIAGNOSIS — J96.01 ACUTE RESPIRATORY FAILURE WITH HYPOXIA AND HYPERCAPNIA (MULTI): ICD-10-CM

## 2023-06-28 DIAGNOSIS — J44.1 CHRONIC OBSTRUCTIVE PULMONARY DISEASE WITH ACUTE EXACERBATION (MULTI): ICD-10-CM

## 2023-06-28 DIAGNOSIS — J96.02 ACUTE RESPIRATORY FAILURE WITH HYPOXIA AND HYPERCAPNIA (MULTI): ICD-10-CM

## 2023-06-28 DIAGNOSIS — E66.09 CLASS 1 OBESITY DUE TO EXCESS CALORIES WITHOUT SERIOUS COMORBIDITY WITH BODY MASS INDEX (BMI) OF 31.0 TO 31.9 IN ADULT: ICD-10-CM

## 2023-06-28 DIAGNOSIS — E78.2 MIXED HYPERLIPIDEMIA: ICD-10-CM

## 2023-06-28 DIAGNOSIS — I10 PRIMARY HYPERTENSION: ICD-10-CM

## 2023-06-28 DIAGNOSIS — E11.9 TYPE 2 DIABETES MELLITUS WITHOUT COMPLICATION, WITH LONG-TERM CURRENT USE OF INSULIN (MULTI): ICD-10-CM

## 2023-06-28 PROBLEM — R39.9 UTI SYMPTOMS: Status: RESOLVED | Noted: 2023-03-22 | Resolved: 2023-06-28

## 2023-06-28 PROBLEM — T81.49XA POSTOPERATIVE WOUND INFECTION: Status: RESOLVED | Noted: 2023-03-22 | Resolved: 2023-06-28

## 2023-06-28 PROBLEM — E83.42 HYPOMAGNESEMIA: Status: ACTIVE | Noted: 2023-06-28

## 2023-06-28 PROCEDURE — 1036F TOBACCO NON-USER: CPT | Performed by: FAMILY MEDICINE

## 2023-06-28 PROCEDURE — G0439 PPPS, SUBSEQ VISIT: HCPCS | Performed by: FAMILY MEDICINE

## 2023-06-28 PROCEDURE — 3074F SYST BP LT 130 MM HG: CPT | Performed by: FAMILY MEDICINE

## 2023-06-28 PROCEDURE — 3078F DIAST BP <80 MM HG: CPT | Performed by: FAMILY MEDICINE

## 2023-06-28 PROCEDURE — 3008F BODY MASS INDEX DOCD: CPT | Performed by: FAMILY MEDICINE

## 2023-06-28 PROCEDURE — 99495 TRANSJ CARE MGMT MOD F2F 14D: CPT | Performed by: FAMILY MEDICINE

## 2023-06-28 PROCEDURE — 3051F HG A1C>EQUAL 7.0%<8.0%: CPT | Performed by: FAMILY MEDICINE

## 2023-06-28 RX ORDER — PEN NEEDLE, DIABETIC 30 GX3/16"
NEEDLE, DISPOSABLE MISCELLANEOUS
Qty: 100 EACH | Refills: 11 | Status: SHIPPED | OUTPATIENT
Start: 2023-06-28 | End: 2023-10-16

## 2023-06-28 RX ORDER — INSULIN GLARGINE 100 [IU]/ML
10 INJECTION, SOLUTION SUBCUTANEOUS EVERY MORNING
Qty: 3 ML | Refills: 2 | Status: SHIPPED | OUTPATIENT
Start: 2023-06-28 | End: 2023-09-26

## 2023-06-28 RX ORDER — BLOOD-GLUCOSE CONTROL, NORMAL
100 EACH MISCELLANEOUS 3 TIMES DAILY
Qty: 100 EACH | Refills: 5 | Status: SHIPPED | OUTPATIENT
Start: 2023-06-28 | End: 2023-12-25

## 2023-06-28 ASSESSMENT — PATIENT HEALTH QUESTIONNAIRE - PHQ9
2. FEELING DOWN, DEPRESSED OR HOPELESS: NOT AT ALL
SUM OF ALL RESPONSES TO PHQ9 QUESTIONS 1 AND 2: 0
1. LITTLE INTEREST OR PLEASURE IN DOING THINGS: NOT AT ALL

## 2023-06-28 ASSESSMENT — ACTIVITIES OF DAILY LIVING (ADL)
DRESSING: INDEPENDENT
BATHING: INDEPENDENT
TAKING_MEDICATION: INDEPENDENT
MANAGING_FINANCES: INDEPENDENT
GROCERY_SHOPPING: INDEPENDENT
DOING_HOUSEWORK: INDEPENDENT

## 2023-06-28 NOTE — PATIENT INSTRUCTIONS
Patient will continue on a diabetic, low-cholesterol diet and weight reduction. Exercise as tolerated. She will continue medications as prescribed. Follow-up in 1 month(s) otherwise as needed.      Patient was started on:   Lantus SoloStar Pen 100 unit/ml, INJECT 10 UNITS IN THE MORNING    Patient was given refill(s) on:   Lancets, TEST THREE TIMES DAILY  Pen Needles, USE AS DIRECTED    Rx(s) sent to pharmacy.     Advised patient to bring in her blood sugar log to her next visit.

## 2023-07-05 ENCOUNTER — TELEMEDICINE (OUTPATIENT)
Dept: PHARMACY | Facility: HOSPITAL | Age: 43
End: 2023-07-05
Payer: COMMERCIAL

## 2023-07-05 DIAGNOSIS — J44.1 CHRONIC OBSTRUCTIVE PULMONARY DISEASE WITH ACUTE EXACERBATION (MULTI): Primary | ICD-10-CM

## 2023-07-05 RX ORDER — BUDESONIDE, GLYCOPYRROLATE, AND FORMOTEROL FUMARATE 160; 9; 4.8 UG/1; UG/1; UG/1
2 AEROSOL, METERED RESPIRATORY (INHALATION)
Qty: 10.7 G | Refills: 3 | Status: SHIPPED | OUTPATIENT
Start: 2023-07-05 | End: 2023-07-27 | Stop reason: ALTCHOICE

## 2023-07-05 NOTE — ASSESSMENT & PLAN NOTE
Start Breztri 160-9-4.8 mcg/Act 2 puffs BID. Pt has multiple COPD exacerbation in the past couple of months. A triple therapy combination will benefit the patient's COPD and will reduce COPD exacerbation. Pt had severe headache on Trelegy, will try Breztri  Prescription for Breztri sent to patient's preferred pharmacy  Stop Advair  Continue all other medications:  Albuterol 90 mcg/act; 2 puffs q6h prn  Ipratropium/albuterol 0.5-2.5 mg/3mL; 3 mL q6hrs prn  Educate pt on Breztri use and to call with any questions/concerns.  FUV in 1 month to assess tolerance

## 2023-07-05 NOTE — PROGRESS NOTES
Pharmacy Post-Discharge Visit  Stephenie Mccray is a 43 y.o. female was referred to Clinical Pharmacy Team to complete a post-discharge medication optimization and monitoring visit.  The patient was referred for their No chief complaint on file..    Admission Date: 6/8/2023  Discharge Date: 6/14/2023    Referring Provider: Delta Yuen MD    Subjective   Allergies   Allergen Reactions    Fluticasone-Umeclidin-Vilanter Other    Sumatriptan Other     Flushing    Levofloxacin Rash       [id number] - [place], [place] - [address]  [address]   [facility] [address]  Phone: [phone] Fax: [phone]    [id number] - [place], [place] - [address]  [address]  [facility] [address]  Phone: [phone] Fax: [phone]      Social History     Social History Narrative    Not on file        HPI  COPD ASSESSMENT    Rescue Inhaler Use:  -How many times per week do you use your rescue inhale? 4 days a week, 3 times a day  -Nebulizer: 7 days a week, twice a day    Inhaler Technique:  -How do you use your rescue inhaler?  --Proper technique of inhaler use.    -How do you use your maintenance inhaler?  --Proper technique of inhaler use.     Sx Management:  -Increased cough? yes  -Increased sputum production? no  -Increased SOB? no 4/10    Exacerbation Hx:  -When was your last hospitalization for an exacerbation? 6/14/203  -When was the last time you were treated with antibiotics and/or steroids? Yes    -Pt has bad headache to Trelegy and then stopped    Review of Systems    Objective     There were no vitals taken for this visit.     LAB  Lab Results   Component Value Date    BILITOT 0.1 06/08/2023    CALCIUM 9.5 06/14/2023    CO2 34 (H) 06/14/2023     06/14/2023    CREATININE 0.81 06/14/2023    GLUCOSE 241 (H) 06/14/2023    ALKPHOS 71 06/08/2023    K 3.5 06/14/2023    PROT 6.0 (L) 06/08/2023     06/14/2023    AST 16 06/08/2023    ALT 15 06/08/2023    BUN 28 (H) 06/14/2023     ANIONGAP 9 (L) 06/14/2023    MG 1.98 06/13/2023    PHOS 4.4 06/13/2023    ALBUMIN 3.6 06/08/2023    GFRF >90 06/14/2023     Lab Results   Component Value Date    TRIG 98 06/09/2023    CHOL 166 05/13/2023    HDL 42.2 05/13/2023     Lab Results   Component Value Date    HGBA1C 7.3 (A) 05/13/2023         Current Outpatient Medications on File Prior to Visit   Medication Sig Dispense Refill    albuterol 90 mcg/actuation inhaler Inhale 2 puffs every 4 hours if needed for wheezing or shortness of breath. 18 g 11    atorvastatin (Lipitor) 20 mg tablet Take 1 tablet (20 mg) by mouth once daily. 30 tablet 5    [START ON 7/7/2023] budesonide-formoteroL (Symbicort) 160-4.5 mcg/actuation inhaler Inhale twice a day. Do not start before July 7, 2023.      busPIRone (Buspar) 30 mg tablet Take 1 tablet (30 mg) by mouth 2 times a day.      citalopram (CeleXA) 40 mg tablet Take by mouth.      clonazePAM (KlonoPIN) 1 mg tablet Take by mouth 2 times a day as needed.      doxycycline (Vibra-Tabs) 100 mg tablet Take by mouth every 12 hours.      fluticasone propion-salmeteroL (Advair Diskus) 250-50 mcg/dose diskus inhaler Inhale 1 puff 2 times a day. Rinse mouth with water after use to reduce aftertaste and incidence of candidiasis. Do not swallow. 60 each 11    hydrOXYzine pamoate (Vistaril) 50 mg capsule Take by mouth 3 times a day as needed.      insulin glargine (Lantus Solostar U-100 Insulin) 100 unit/mL (3 mL) pen Inject 10 Units under the skin once daily in the morning. Take as directed per insulin instructions. 3 mL 2    insulin lispro (HumaLOG) 100 unit/mL injection 1 Dose.      Invega Sustenna 234 mg/1.5 mL syringe 1.5 milliliter(s) intramuscular once a month      ipratropium-albuteroL (Duo-Neb) 0.5-2.5 mg/3 mL nebulizer solution Take 3 mL by nebulization every 6 hours if needed for shortness of breath or wheezing. 180 mL 1    lancets 30 gauge misc 100 each 3 times a day. 100 each 5    levothyroxine (Synthroid, Levoxyl) 150  "mcg tablet Take 1 tablet (150 mcg) by mouth once daily. 90 tablet 1    metoprolol tartrate (Lopressor) 25 mg tablet Take 1 tablet (25 mg) by mouth 2 times a day. 60 tablet 5    pantoprazole (ProtoNix) 40 mg EC tablet Take 1 tablet (40 mg) by mouth once daily. 90 tablet 1    pen needle, diabetic 31 gauge x 5/16\" needle Use to inject 4 times daily as directed. 100 each 11    prazosin (Minipress) 2 mg capsule Take 1 capsule (2 mg) by mouth once daily at bedtime.      predniSONE (Deltasone) 5 mg tablet Take by mouth.      topiramate (Topamax) 25 mg tablet Take 2 tablets (50 mg) by mouth once daily. 180 tablet 3     No current facility-administered medications on file prior to visit.        HISTORICAL PHARMACOTHERAPY  -Trelegy - bad headache      Assessment/Plan   Problem List Items Addressed This Visit       COPD (chronic obstructive pulmonary disease) (CMS/[facility]) - Primary     Start Breztri 160-9-4.8 mcg/Act 2 puffs BID. Pt has multiple COPD exacerbation in the past couple of months. A triple therapy combination will benefit the patient's COPD and will reduce COPD exacerbation. Pt had severe headache on Trelegy, will try Breztri  Prescription for Breztri sent to patient's preferred pharmacy  Stop Advair  Continue all other medications:  Albuterol 90 mcg/act; 2 puffs q6h prn  Ipratropium/albuterol 0.5-2.5 mg/3mL; 3 mL q6hrs prn  Educate pt on Breztri use and to call with any questions/concerns.  FUV in 1 month to assess tolerance            Continue all meds under the continuation of care with the referring provider and clinical pharmacy team.    Anshu Vogt PharmD     Verbal consent to manage patient's drug therapy was obtained from [the patient and/or an individual authorized to act on behalf of a patient]. They were informed they may decline to participate or withdraw from participation in pharmacy services at any time.  "

## 2023-07-06 ENCOUNTER — PATIENT OUTREACH (OUTPATIENT)
Dept: PRIMARY CARE | Facility: CLINIC | Age: 43
End: 2023-07-06
Payer: COMMERCIAL

## 2023-07-06 NOTE — PROGRESS NOTES
Unable to reach patient for call back after patient's follow up appointment with PCP.   TRENTM with call back number for patient to call if needed   If no voicemail available call attempts x 2 were made to contact the patient to assist with any questions or concerns patient may have.

## 2023-07-17 ENCOUNTER — PATIENT OUTREACH (OUTPATIENT)
Dept: PRIMARY CARE | Facility: CLINIC | Age: 43
End: 2023-07-17
Payer: COMMERCIAL

## 2023-07-17 RX ORDER — INSULIN ASPART 100 [IU]/ML
INJECTION, SOLUTION INTRAVENOUS; SUBCUTANEOUS
COMMUNITY
Start: 2023-07-15 | End: 2023-07-27 | Stop reason: ALTCHOICE

## 2023-07-17 RX ORDER — BACITRACIN 500 [USP'U]/G
OINTMENT TOPICAL
COMMUNITY
Start: 2023-07-05 | End: 2023-07-27 | Stop reason: ALTCHOICE

## 2023-07-17 RX ORDER — RIVAROXABAN 15 MG-20MG
KIT ORAL 2 TIMES DAILY
COMMUNITY
Start: 2023-07-15 | End: 2023-10-16 | Stop reason: SDUPTHER

## 2023-07-17 NOTE — PROGRESS NOTES
Discharge Facility: [facility]  Discharge Diagnosis: Acute on chronic hypoxic hypercapnic respiratory failure; COPD exacerbation; Acute pulmonary embolism; Recurrent pulmonary embolism  Admission Date: 7/10/2023  Discharge Date: 7/15/2023    PCP Appointment Date: 7/27/2023  Specialist Appointment Date: 7/21 with Dr. Regalado; 7/27 with Dr. Villalba  Hospital Encounter and Summary: not available at this time   See discharge assessment below for further details  Engagement  Call Start Time: 1309 (7/17/2023  1:16 PM)    Medications  Medications reviewed with patient/caregiver?: Yes (new meds/changes discussed) (7/17/2023  1:16 PM)  Is the patient having any side effects they believe may be caused by any medication additions or changes?: No (7/17/2023  1:16 PM)  Does the patient have all medications ordered at discharge?: Yes (7/17/2023  1:16 PM)  Care Management Interventions: No intervention needed (7/17/2023  1:16 PM)  Prescription Comments: see med list (new meds: bacitracin; doxycycline; prednisone; tylenol; novolog; xaralto) (7/17/2023  1:16 PM)  Is the patient taking all medications as directed (includes completed medication regime)?: Yes (7/17/2023  1:16 PM)  Care Management Interventions: Provided patient education (7/17/2023  1:16 PM)    Appointments  Does the patient have a primary care provider?: Yes (7/17/2023  1:16 PM)  Care Management Interventions: Verified appointment date/time/provider (7/17/2023  1:16 PM)  Has the patient kept scheduled appointments due by today?: Yes (7/17/2023  1:16 PM)  Care Management Interventions: Advised patient to keep appointment (7/17/2023  1:16 PM)    Self Management  What is the home health agency?: denies need (7/17/2023  1:16 PM)  What Durable Medical Equipment (DME) was ordered?: Home oxygen 3LNC at night (7/17/2023  1:16 PM)  Has all Durable Medical Equipment (DME) been delivered?: Yes (7/17/2023  1:16 PM)    Patient Teaching  Does the patient have access  "to their discharge instructions?: Yes (7/17/2023  1:16 PM)  Care Management Interventions: Reviewed instructions with patient (7/17/2023  1:16 PM)  What is the patient's perception of their health status since discharge?: Returned to baseline/stable (7/17/2023  1:16 PM)  Is the patient/caregiver able to teach back the hierarchy of who to call/visit for symptoms/problems? PCP, Specialist, Home Health nurse, Urgent Care, ED, 911: Yes (7/17/2023  1:16 PM)  Patient/Caregiver Education Comments: Patient states she is doing \"much better\" and is back to baseline with her breathing. States she was also bit on the thumb recently and is currently using bacitracin to treat topically. Educated on s/sx to watch for for worsening infection. (7/17/2023  1:16 PM)        "

## 2023-07-26 NOTE — PROGRESS NOTES
"Subjective   Patient ID: Stephenie Mccray is a 43 y.o. female who presents for Hospital Follow-up (SANT[facility]). I last saw the patient on 6/28/2023.    HPI   Stephenie Mccray is a 43 y.o. female presenting today for follow-up after being discharged from the hospital 12 days ago. The main problem requiring admission was SOB. The discharge summary and/or Transitional Care Management documentation was reviewed. Medication reconciliation was performed as indicated via the \"Tavares as Reviewed\" timestamp.     Stephenie Mccray was contacted by Transitional Care Management services two days after her discharge. This encounter and supporting documentation was reviewed.    The complexity of medical decision making for this patient's transitional care is moderate.    Patient is scheduled w/ Hematology on August 25th. Patient has no medical complaints today.     She states that she believes something is wrong with her stomach. She has been gaining weight, but not eating very much. She believes she may be pregnant because she felt a movement in her stomach. It has been 10 years since her last menstrual cycle.     Review of Systems  Except positives as noted in the CC & HPI      Constitutional: Denies fevers, chills, night sweats, fatigue, weight changes, change in appetite    Eyes: Denies blurry vision, double vision    ENT: Denies otalgia, trouble hearing, tinnitus, vertigo, nasal congestion, rhinorrhea, sore throat    Neck: Denies swelling, masses    Cardiovascular: Denies chest pain, palpitations, edema, orthopnea, syncope    Respiratory: Denies dyspnea, cough, wheezing, postural nocturnal dyspnea    Gastrointestinal: Denies abdominal pain, nausea, vomiting, diarrhea, constipation, melena, hematochezia    Genitourinary: Denies dysuria, hematuria, frequency, urgency    Musculoskeletal: Denies back pain, neck pain, arthralgias, myalgias    Integumentary: Denies skin lesions, rashes, masses    Neurological: Denies dizziness, " "headaches, confusion, limb weakness, paresthesias, syncope, convulsions    Psychiatric: Denies depression, anxiety, homicidal ideations, suicidal ideations, sleep disturbances    Endocrine: Denies polyphagia, polydipsia, polyuria, weakness, hair thinning, heat intolerance, cold intolerance, weight changes    Heme/Lymph: Denies easy bruising, easy bleeding, swollen glands     Objective   /71 (BP Location: Left arm, Patient Position: Sitting)   Pulse 82   Temp 36.3 °C (97.3 °F) (Temporal)   Resp 16   Ht 1.575 m (5' 2\")   Wt 82.6 kg (182 lb)   SpO2 94%   BMI 33.29 kg/m²     Physical Exam  Gen. Appearance - well-developed, well-nourished, 43 y.o., White female in no acute distress.     Skin - warm, pink and dry without rash or concerning lesions.    Mental Status - alert and oriented times 3. Normal mood and affect appropriate to mood.     Neck - supple without lymphadenopathy. Carotid pulses are normal without bruits. Thyroid is normal in midline without nodules.    Chest - lungs are clear to auscultation without rales, rhonchi or wheezes.    Heart - regular, rate, and rhythm without murmurs, rubs or gallops.     Abdomen - soft, obese, protuberant, nontender, nondistended. No masses, hepatomegaly or splenomegaly is noted. No rebound, rigidity or guarding is noted. Bowel sounds are normoactive. Prominence of intrabdominal fat in the mid abdomen.     Extremities - no cyanosis, clubbing. Trace ankle edema, bilaterally. Pedal pulses are 2+ normal at the dorsalis pedis and posterior tibial pulses bilaterally.     Neurological - cranial nerves II through XII are grossly intact. Motor strength 5/5 at all fours.     Results  Reviewed echocardiogram results from 7/14/23.      Assessment/Plan   1. Hospital discharge follow-up        2. Acute pulmonary embolism, unspecified pulmonary embolism type, unspecified whether acute cor pulmonale present (CMS/HCC)        3. Acute on chronic respiratory failure with hypoxia " and hypercapnia (CMS/HCC)        4. Chronic obstructive pulmonary disease with acute exacerbation (CMS/HCC)  fluticasone propion-salmeteroL (Advair Diskus) 250-50 mcg/dose diskus inhaler    Follow Up In [facility] - PCP - Established      5. Type 2 diabetes mellitus without complication, with long-term current use of insulin (CMS/HCC)  Follow Up In [facility] - PCP - Established    Follow Up In [facility] - PCP - Established      6. Primary hypertension  Follow Up In [facility] PCP - Established    CBC and Auto Differential    Follow Up In [facility] PCP - [facility]      7. Acquired hypothyroidism  TSH with reflex to Free T4 if abnormal    Follow Up In [facility] PCP - [facility]      8. Class 1 obesity due to excess calories with serious comorbidity and body mass index (BMI) of 33.0 to 33.9 in adult        9. Weight gain  HCG, quantitative, pregnancy      10. Elevated MCV  Folate    Vitamin B12      Patient will continue on a diabetic, low-cholesterol diet and weight reduction. Exercise as tolerated. She will continue medications as prescribed. Follow-up in 3 month(s) otherwise as needed.      Will obtain CBC, HCG, TSH quantitative,  today. Will call patient with results when available.     Patient was given refill(s) on:   Advair 250-50 mcg, INHALE 1 PUFF TWICE DAILY. RINSE MOUTH WITH WATER AND SPIT INTO SINK AFTER EACH USE.     Rx(s) sent to pharmacy.     Patient is to follow up with hematology as scheduled on 8/25.     Continue Xarelto per instructions.       Scribe Attestation  By signing my name below, IRama Scribe   attest that this documentation has been prepared under the direction and in the presence of Delta Yuen MD.

## 2023-07-27 ENCOUNTER — OFFICE VISIT (OUTPATIENT)
Dept: PRIMARY CARE | Facility: CLINIC | Age: 43
End: 2023-07-27
Payer: COMMERCIAL

## 2023-07-27 ENCOUNTER — LAB (OUTPATIENT)
Dept: LAB | Facility: LAB | Age: 43
End: 2023-07-27
Payer: COMMERCIAL

## 2023-07-27 ENCOUNTER — TELEPHONE (OUTPATIENT)
Dept: PRIMARY CARE | Facility: CLINIC | Age: 43
End: 2023-07-27

## 2023-07-27 VITALS
SYSTOLIC BLOOD PRESSURE: 109 MMHG | WEIGHT: 182 LBS | TEMPERATURE: 97.3 F | HEIGHT: 62 IN | DIASTOLIC BLOOD PRESSURE: 71 MMHG | RESPIRATION RATE: 16 BRPM | BODY MASS INDEX: 33.49 KG/M2 | HEART RATE: 82 BPM | OXYGEN SATURATION: 94 %

## 2023-07-27 DIAGNOSIS — E11.9 TYPE 2 DIABETES MELLITUS WITHOUT COMPLICATION, WITH LONG-TERM CURRENT USE OF INSULIN (MULTI): ICD-10-CM

## 2023-07-27 DIAGNOSIS — Z79.4 TYPE 2 DIABETES MELLITUS WITHOUT COMPLICATION, WITH LONG-TERM CURRENT USE OF INSULIN (MULTI): ICD-10-CM

## 2023-07-27 DIAGNOSIS — I10 PRIMARY HYPERTENSION: ICD-10-CM

## 2023-07-27 DIAGNOSIS — Z09 HOSPITAL DISCHARGE FOLLOW-UP: Primary | ICD-10-CM

## 2023-07-27 DIAGNOSIS — R71.8 ELEVATED MCV: ICD-10-CM

## 2023-07-27 DIAGNOSIS — E03.9 ACQUIRED HYPOTHYROIDISM: ICD-10-CM

## 2023-07-27 DIAGNOSIS — R63.5 WEIGHT GAIN: ICD-10-CM

## 2023-07-27 DIAGNOSIS — J44.1 CHRONIC OBSTRUCTIVE PULMONARY DISEASE WITH ACUTE EXACERBATION (MULTI): ICD-10-CM

## 2023-07-27 DIAGNOSIS — J96.22 ACUTE ON CHRONIC RESPIRATORY FAILURE WITH HYPOXIA AND HYPERCAPNIA (MULTI): ICD-10-CM

## 2023-07-27 DIAGNOSIS — I26.99 ACUTE PULMONARY EMBOLISM, UNSPECIFIED PULMONARY EMBOLISM TYPE, UNSPECIFIED WHETHER ACUTE COR PULMONALE PRESENT (MULTI): ICD-10-CM

## 2023-07-27 DIAGNOSIS — E66.09 CLASS 1 OBESITY DUE TO EXCESS CALORIES WITH SERIOUS COMORBIDITY AND BODY MASS INDEX (BMI) OF 33.0 TO 33.9 IN ADULT: ICD-10-CM

## 2023-07-27 DIAGNOSIS — J96.21 ACUTE ON CHRONIC RESPIRATORY FAILURE WITH HYPOXIA AND HYPERCAPNIA (MULTI): ICD-10-CM

## 2023-07-27 LAB
BAND NEUTROPHILS (10*3/UL) BLOOD MANUAL COUNT - WAM: 0.33 X10E9/L (ref 0–0.7)
BASOPHILS (10*3/UL) IN BLOOD BY MANUAL COUNT - WAM: 0 X10E9/L (ref 0–0.1)
BASOPHILS/100 LEUKOCYTES IN BLOOD BY MANUAL COUNT - WAM: 0 %
CHORIOGONADOTROPIN (MIU/ML) IN SER/PLAS: <2 MIU/ML
EOSINOPHILS (10*3/UL) IN BLOOD BY MANUAL COUNT - WAM: 0 X10E9/L (ref 0–0.7)
EOSINOPHILS/100 LEUKOCYTES IN BLOOD BY MANUAL COUNT - WAM: 0 % (ref 0–6)
ERYTHROCYTE DISTRIBUTION WIDTH (RATIO) BY AUTOMATED COUNT: 15.2 % (ref 11.5–14.5)
ERYTHROCYTE MEAN CORPUSCULAR HEMOGLOBIN CONCENTRATION (G/DL) BY AUTOMATED: 29.6 G/DL (ref 32–36)
ERYTHROCYTE MEAN CORPUSCULAR VOLUME (FL) BY AUTOMATED COUNT: 108 FL (ref 80–100)
ERYTHROCYTES (10*6/UL) IN BLOOD BY AUTOMATED COUNT: 3.92 X10E12/L (ref 4–5.2)
HEMATOCRIT (%) IN BLOOD BY AUTOMATED COUNT: 42.3 % (ref 36–46)
HEMOGLOBIN (G/DL) IN BLOOD: 12.5 G/DL (ref 12–16)
IMMATURE GRANULOCYTES/100 LEUKOCYTES IN BLOOD BY AUTOMATED COUNT: 5.7 % (ref 0–0.9)
LEUKOCYTES (10*3/UL) IN BLOOD BY AUTOMATED COUNT: 11 X10E9/L (ref 4.4–11.3)
LYMPHOCYTES (10*3/UL) IN BLOOD BY MANUAL COUNT - WAM: 1.65 X10E9/L (ref 1.2–4.8)
LYMPHOCYTES/100 LEUKOCYTES IN BLOOD BY MANUAL COUNT - WAM: 15 % (ref 13–44)
MANUAL DIFFERENTIAL Y/N: ABNORMAL
METAMYELOCYTES (10*3/UL) IN BLOOD BY MANUAL COUNT - WAM: 0.66 X10E9/L (ref 0–0)
METAMYELOCYTES/100 LEUKOCYTES IN BLOOD BY MANUAL COUNT - WAM: 6 % (ref 0–0)
MONOCYTES (10*3/UL) IN BLOOD BY MANUAL COUNT - WAM: 0.11 X10E9/L (ref 0.1–1)
MONOCYTES/100 LEUKOCYTES IN BLOOD BY MANUAL COUNT - WAM: 1 % (ref 2–10)
MYELOCYTES (10*3/UL) IN BLOOD BY MANUAL COUNT - WAM: 0.22 X10E9/L (ref 0–0)
MYELOCYTES/100 LEUKOCYTES IN BLOOD BY MANUAL COUNT - WAM: 2 % (ref 0–0)
NEUTROPHILS (SEGS+BANDS) (10*3/UL) MANUAL COUNT - WAM: 8.36 X10E9/L (ref 1.2–7.7)
NEUTROPHILS BAND FORM/100 LEUKOCYTES IN BLOOD BY MANUAL COUNT - WAM: 3 % (ref 0–5)
NRBC (PER 100 WBCS) BY AUTOMATED COUNT: 0.2 /100 WBC (ref 0–0)
PLATELETS (10*3/UL) IN BLOOD AUTOMATED COUNT: 145 X10E9/L (ref 150–450)
RBC MORPHOLOGY IN BLOOD: NORMAL
SEGMENTED NEUTROPHILS (10*3/UL) BLOOD MANUAL - WAM: 8.03 X10E9/L (ref 1.2–7)
SEGMENTED NEUTROPHILS/100 LEUKOCYTES BY MANUAL COUNT -: 73 % (ref 40–80)
THYROTROPIN (MIU/L) IN SER/PLAS BY DETECTION LIMIT <= 0.05 MIU/L: 0.59 MIU/L (ref 0.44–3.98)

## 2023-07-27 PROCEDURE — 3052F HG A1C>EQUAL 8.0%<EQUAL 9.0%: CPT | Performed by: FAMILY MEDICINE

## 2023-07-27 PROCEDURE — 84702 CHORIONIC GONADOTROPIN TEST: CPT

## 2023-07-27 PROCEDURE — 36415 COLL VENOUS BLD VENIPUNCTURE: CPT

## 2023-07-27 PROCEDURE — 99495 TRANSJ CARE MGMT MOD F2F 14D: CPT | Performed by: FAMILY MEDICINE

## 2023-07-27 PROCEDURE — 3008F BODY MASS INDEX DOCD: CPT | Performed by: FAMILY MEDICINE

## 2023-07-27 PROCEDURE — 3074F SYST BP LT 130 MM HG: CPT | Performed by: FAMILY MEDICINE

## 2023-07-27 PROCEDURE — 85025 COMPLETE CBC W/AUTO DIFF WBC: CPT

## 2023-07-27 PROCEDURE — 1036F TOBACCO NON-USER: CPT | Performed by: FAMILY MEDICINE

## 2023-07-27 PROCEDURE — 84443 ASSAY THYROID STIM HORMONE: CPT

## 2023-07-27 PROCEDURE — 3078F DIAST BP <80 MM HG: CPT | Performed by: FAMILY MEDICINE

## 2023-07-27 RX ORDER — ISOPROPYL ALCOHOL 70 ML/100ML
SWAB TOPICAL
Qty: 100 EACH | Refills: 3 | Status: SHIPPED | OUTPATIENT
Start: 2023-07-27

## 2023-07-27 RX ORDER — BENZTROPINE MESYLATE 2 MG/1
2 TABLET ORAL DAILY
Status: ON HOLD | COMMUNITY
End: 2023-12-19

## 2023-07-27 RX ORDER — FLUTICASONE PROPIONATE AND SALMETEROL 250; 50 UG/1; UG/1
1 POWDER RESPIRATORY (INHALATION)
Qty: 60 EACH | Refills: 11 | Status: SHIPPED | OUTPATIENT
Start: 2023-07-27 | End: 2023-08-18 | Stop reason: ALTCHOICE

## 2023-07-27 NOTE — TELEPHONE ENCOUNTER
Patient was here to see Dr Yuen forgot to mention that she wanted to have Alcohol wipes ordered for her and sent to [facility] on [address].

## 2023-07-27 NOTE — PATIENT INSTRUCTIONS
Patient will continue on a diabetic, low-cholesterol diet and weight reduction. Exercise as tolerated. She will continue medications as prescribed. Follow-up in 3 month(s) otherwise as needed.      Will obtain CBC, HCG, TSH quantitative,  today. Will call patient with results when available.     Patient was given refill(s) on:   Advair 250-50 mcg, INHALE 1 PUFF TWICE DAILY. RINSE MOUTH WITH WATER AND SPIT INTO SINK AFTER EACH USE.     Rx(s) sent to pharmacy.     Patient is to follow up with hematology as scheduled on 8/25.

## 2023-07-28 ENCOUNTER — APPOINTMENT (OUTPATIENT)
Dept: PRIMARY CARE | Facility: CLINIC | Age: 43
End: 2023-07-28
Payer: COMMERCIAL

## 2023-07-28 NOTE — RESULT ENCOUNTER NOTE
Please call the patient regarding her abnormal result.  Red blood cell count is improved.  MCV is high.  Recommend vitamin B12 and folic acid level for further evaluation.  Thyroid function is normal.  Pregnancy test is negative.

## 2023-08-02 ENCOUNTER — PATIENT OUTREACH (OUTPATIENT)
Dept: PRIMARY CARE | Facility: CLINIC | Age: 43
End: 2023-08-02

## 2023-08-02 ENCOUNTER — TELEMEDICINE (OUTPATIENT)
Dept: PHARMACY | Facility: HOSPITAL | Age: 43
End: 2023-08-02
Payer: COMMERCIAL

## 2023-08-02 DIAGNOSIS — J44.1 CHRONIC OBSTRUCTIVE PULMONARY DISEASE WITH ACUTE EXACERBATION (MULTI): Primary | ICD-10-CM

## 2023-08-02 RX ORDER — BUDESONIDE, GLYCOPYRROLATE, AND FORMOTEROL FUMARATE 160; 9; 4.8 UG/1; UG/1; UG/1
AEROSOL, METERED RESPIRATORY (INHALATION) 2 TIMES DAILY
Status: ON HOLD | COMMUNITY
End: 2023-12-29

## 2023-08-02 NOTE — ASSESSMENT & PLAN NOTE
Continue Breztri per patient reported restarted by her pulmonologist.   Pt to call and report should there be any question or concerns regarding her Breztri.  FUV in 4 weeks to assess symptoms and tolerances.

## 2023-08-02 NOTE — PROGRESS NOTES
Pharmacy Post-Discharge Visit  Stephenie Mccray is a 43 y.o. female was referred to Clinical Pharmacy Team to complete a post-discharge medication optimization and monitoring visit.  The patient was referred for their COPD    Admission Date: 7/10/2023  Discharge Date: 7/15/2023    Referring Provider: Delta Yuen MD    Subjective   Allergies   Allergen Reactions    Fluticasone-Umeclidin-Vilanter Other     Headaches    Sumatriptan Other     Flushing    Levofloxacin Rash       [id number] - [place], [place] - [address]  [address]   [facility] [address]  Phone: [phone] Fax: [phone]    [id number] - [facility] [place] - [address]  [address]  [facility] [address]  Phone: [phone] Fax: [phone]      Social History     Social History Narrative    Not on file        Notable Medication changes following discharge:  Start: Xarelto  Stop: none  Change: none    HPI    COPD ASSESSMENT    Rescue Inhaler Use:  -How many times per week do you use your rescue inhale?   Reported needing albuterol and nebulizer every single days and about 2 times a day    Inhaler Technique:  -How do you use your rescue inhaler?  --Proper technique from last visit.    -How do you use your maintenance inhaler?  --Using two times a day daily.     Sx Management:  -Increased cough? yes  -Increased sputum production? no  -Increased SOB? yes 4/10    Exacerbation Hx:  -When was your last hospitalization for an exacerbation? 7/8/23  -When was the last time you were treated with antibiotics and/or steroids? 7/14/23    Pt reported pulmonologist restarted her on Breztri. Pt reported tolerating Breztri with no issue.        Review of Systems    Objective     There were no vitals taken for this visit.     LAB  Lab Results   Component Value Date    BILITOT 0.3 07/10/2023    CALCIUM 9.7 07/15/2023    CO2 38 (H) 07/15/2023     07/15/2023    CREATININE 0.79 07/15/2023    GLUCOSE 175 (H) 07/15/2023     ALKPHOS 76 07/10/2023    K 4.1 07/15/2023    PROT 5.9 (L) 07/10/2023     07/15/2023    AST 36 07/10/2023    ALT 66 (H) 07/10/2023    BUN 21 07/15/2023    ANIONGAP 10 07/15/2023    MG 1.73 07/14/2023    PHOS 4.4 06/13/2023    ALBUMIN 3.8 07/10/2023    GFRF >90 07/15/2023     Lab Results   Component Value Date    TRIG 98 06/09/2023    CHOL 166 05/13/2023    HDL 42.2 05/13/2023     Lab Results   Component Value Date    HGBA1C 8.6 (A) 07/11/2023         Current Outpatient Medications on File Prior to Visit   Medication Sig Dispense Refill    budesonide-glycopyr-formoterol (Breztri Aerosphere) 160-9-4.8 mcg/actuation HFA aerosol inhaler Inhale 2 times a day.      albuterol 90 mcg/actuation inhaler Inhale 2 puffs every 4 hours if needed for wheezing or shortness of breath. 18 g 11    alcohol swabs (Alcohol Prep Pads) pads, medicated Use 3 times daily prn 100 each 3    atorvastatin (Lipitor) 20 mg tablet Take 1 tablet (20 mg) by mouth once daily. 30 tablet 5    benztropine (Cogentin) 2 mg tablet Take 1 tablet (2 mg) by mouth once daily.      busPIRone (Buspar) 30 mg tablet Take 1 tablet (30 mg) by mouth 2 times a day.      citalopram (CeleXA) 40 mg tablet Take by mouth.      clonazePAM (KlonoPIN) 1 mg tablet Take by mouth 2 times a day as needed.      fluticasone propion-salmeteroL (Advair Diskus) 250-50 mcg/dose diskus inhaler Inhale 1 puff 2 times a day. Rinse mouth with water after use to reduce aftertaste and incidence of candidiasis. Do not swallow. (Patient not taking: Reported on 8/2/2023) 60 each 11    hydrOXYzine pamoate (Vistaril) 50 mg capsule Take by mouth 3 times a day as needed.      insulin glargine (Lantus Solostar U-100 Insulin) 100 unit/mL (3 mL) pen Inject 10 Units under the skin once daily in the morning. Take as directed per insulin instructions. 3 mL 2    insulin lispro (HumaLOG) 100 unit/mL injection 1 Dose.      Invega Sustenna 234 mg/1.5 mL syringe 1.5 milliliter(s) intramuscular once a month  "     ipratropium-albuteroL (Duo-Neb) 0.5-2.5 mg/3 mL nebulizer solution Take 3 mL by nebulization every 6 hours if needed for shortness of breath or wheezing. 180 mL 1    lancets 30 gauge misc 100 each 3 times a day. 100 each 5    levothyroxine (Synthroid, Levoxyl) 150 mcg tablet Take 1 tablet (150 mcg) by mouth once daily. 90 tablet 1    metoprolol tartrate (Lopressor) 25 mg tablet Take 1 tablet (25 mg) by mouth 2 times a day. 60 tablet 5    pantoprazole (ProtoNix) 40 mg EC tablet Take 1 tablet (40 mg) by mouth once daily. 90 tablet 1    pen needle, diabetic 31 gauge x 5/16\" needle Use to inject 4 times daily as directed. 100 each 11    prazosin (Minipress) 2 mg capsule Take 1 capsule (2 mg) by mouth once daily at bedtime.      predniSONE (Deltasone) 20 mg tablet Take 2 tablets (40 mg) by mouth once daily. 2 tab(s) orally once a day      rivaroxaban (Xarelto DVT-PE Treat 30d Start) 15 mg (42)- 20 mg (9) tablets,dose pack Take by mouth twice a day. Take 15 mg twice daily for 21 days then 20 mg once daily after that      topiramate (Topamax) 25 mg tablet Take 2 tablets (50 mg) by mouth once daily. 180 tablet 3    [DISCONTINUED] bacitracin 500 unit/gram ointment APPLY OINTMENT EXTERNALLY TO AFFECTED AREA TWICE DAILY      [DISCONTINUED] budesonide-formoteroL (Symbicort) 160-4.5 mcg/actuation inhaler Inhale twice a day. Do not start before July 7, 2023.      [DISCONTINUED] budesonide-glycopyr-formoterol (Breztri Aerosphere) 160-9-4.8 mcg/actuation HFA aerosol inhaler Inhale 2 puffs 2 times a day. 10.7 g 3    [DISCONTINUED] doxycycline (Vibra-Tabs) 100 mg tablet Take by mouth every 12 hours.      [DISCONTINUED] fluticasone propion-salmeteroL (Advair Diskus) 250-50 mcg/dose diskus inhaler Inhale 1 puff 2 times a day. Rinse mouth with water after use to reduce aftertaste and incidence of candidiasis. Do not swallow. 60 each 11    [DISCONTINUED] NovoLOG FlexPen U-100 Insulin 100 unit/mL (3 mL) pen unit(s) injectable 3 times " a day (with meals) -Use as Directed 3 times a day (with meals) if Blood Glucose is between 71 - 150 0 unit(s), 2 unit(s) if 151 - 200, 4 unit(s) if  201 - 250, 6 unit(s) if  251 - 300, 8 unit(s) if  301 - 350, 10  unit(s) if  351 - 400       No current facility-administered medications on file prior to visit.        DRUG INTERATIONS  - No drug interaction was found at this visit.    Assessment/Plan   Problem List Items Addressed This Visit       COPD (chronic obstructive pulmonary disease) (CMS/[facility]) - Primary     Continue Breztri per patient reported restarted by her pulmonologist.   Pt to call and report should there be any question or concerns regarding her Breztri.  FUV in 4 weeks to assess symptoms and tolerances.            Continue all meds under the continuation of care with the referring provider and clinical pharmacy team.    Anshu Vogt PharmD     Verbal consent to manage patient's drug therapy was obtained from [the patient and/or an individual authorized to act on behalf of a patient]. They were informed they may decline to participate or withdraw from participation in pharmacy services at any time.

## 2023-08-11 ENCOUNTER — DOCUMENTATION (OUTPATIENT)
Dept: PRIMARY CARE | Facility: CLINIC | Age: 43
End: 2023-08-11
Payer: COMMERCIAL

## 2023-08-17 NOTE — PROGRESS NOTES
"Subjective   Patient ID: Stephenie Mccray is a 43 y.o. female who presents for Hospital Follow-up. I last saw patient on 7/27/23.     HPI   Stephenie Mccray is a 43 y.o. female presenting today for follow-up after being discharged from the hospital 8 days ago. The main problem requiring admission was Hypoxia. The discharge summary and/or Transitional Care Management documentation was reviewed. Medication reconciliation was performed as indicated via the \"T[place]res as Reviewed\" timestamp.     Stephenie Mccray was contacted by Transitional Care Management services two days after her discharge. This encounter and supporting documentation was reviewed.    ATB Doxycycline 100 mg and Breztri. Patient notes she was not discharged w/ portable O2, but does admit, after further questioning, that she does have portable oxygen at home along with her oxygen concentrator.     Patient states she sleeps with a Bipap machine. She is unaware what pressure her machine is set at.     She admits that has been more fatigued.     Review of Systems  Except positives as noted in the CC & HPI      Constitutional: Denies fevers, chills, night sweats, weight changes, change in appetite    Eyes: Denies blurry vision, double vision    ENT: Denies otalgia, trouble hearing, tinnitus, vertigo, nasal congestion, rhinorrhea, sore throat    Neck: Denies swelling, masses    Cardiovascular: Denies chest pain, palpitations, edema, orthopnea, syncope    Respiratory: Denies cough, wheezing, postural nocturnal dyspnea    Gastrointestinal: Denies abdominal pain, nausea, vomiting, diarrhea, constipation, melena, hematochezia    Genitourinary: Denies dysuria, hematuria, frequency, urgency    Musculoskeletal: Denies back pain, neck pain, arthralgias, myalgias    Integumentary: Denies skin lesions, rashes, masses    Neurological: Denies dizziness, headaches, confusion, limb weakness, paresthesias, syncope, convulsions    Psychiatric: Denies depression, anxiety, " "homicidal ideations, suicidal ideations, sleep disturbances    Endocrine: Denies polyphagia, polydipsia, polyuria, weakness, hair thinning, heat intolerance, cold intolerance, weight changes    Heme/Lymph: Denies easy bruising, easy bleeding, swollen glands     Objective   BP 90/58 (BP Location: Right arm, Patient Position: Sitting)   Pulse 79   Temp 36.3 °C (97.3 °F) (Temporal)   Resp 18   Ht 1.575 m (5' 2\")   Wt 86.2 kg (190 lb)   SpO2 93% Comment: Nasal O2  BMI 34.75 kg/m²     Physical Exam  90/58 on recheck of BP in the right arm. SpO2 recheck was 80%. Patient was placed on oxygen per NC at 3L in the office and SpO2 did increase to 93%. Patient reports that she does have portable oxygen at home.     Gen. Appearance - well-developed, well-nourished, 43 y.o., White female in no acute distress. Patient was mildly lethargic in the office. She admits to not sleeping well last night.     Skin - warm, pink and dry without rash or concerning lesions.    Mental Status - alert and oriented times 3. Normal mood and affect appropriate to mood.     Neck - supple without lymphadenopathy. Carotid pulses are normal without bruits. Thyroid is normal in midline without nodules.    Chest - lungs are clear to auscultation without rales, rhonchi. Faint expiratory wheezes bilaterally.     Heart - regular, rate, and rhythm without murmurs, rubs or gallops.     Abdomen - soft, obese, protuberant, nontender, nondistended. No masses, hepatomegaly or splenomegaly is noted. No rebound, rigidity or guarding is noted. Bowel sounds are normoactive. Prominence of intrabdominal fat in the mid abdomen.     Extremities - no cyanosis, clubbing. 1+ ankle edema, bilaterally. Pedal pulses are 2+ normal at the dorsalis pedis and posterior tibial pulses bilaterally.     Neurological - cranial nerves II through XII are grossly intact. Motor strength 5/5 at all fours.     Assessment/Plan   1. Hospital discharge follow-up        2. Acute " respiratory failure with hypoxia (CMS/HCC)        3. Chronic obstructive pulmonary disease with acute exacerbation (CMS/[facility])        4. Tachycardia  metoprolol tartrate (Lopressor) 25 mg tablet      5. Type 2 diabetes mellitus without complication, with long-term current use of insulin (CMS/[facility])        6. Class 1 obesity due to excess calories with serious comorbidity and body mass index (BMI) of 34.0 to 34.9 in adult        Patient will continue on a diabetic, low-cholesterol diet and weight reduction. Exercise as tolerated. She will continue medications as prescribed. Follow-up on 10/26/2023, otherwise as needed.      Patient was given refill(s) on:   Metoprolol Tartrate 25 mg, TAKE 1 TAB BY MOUTH TWICE DAILY     Rx(s) sent to pharmacy.     Recommend patient go to the ER for oxygen and fluids, she refuses to go. Advised her that she should be on oxygen at all times, due to her low SpO2 level, especially if under 88%. Patient was instructed to go home and start her oxygen at 3L. Discussed the importance of patient having oxygen 24/7.     Patient is to follow up with Dr. Villalba as scheduled.         Scribe Attestation  By signing my name below, IRama Scribe   attest that this documentation has been prepared under the direction and in the presence of Delta Yuen MD.

## 2023-08-18 ENCOUNTER — OFFICE VISIT (OUTPATIENT)
Dept: PRIMARY CARE | Facility: CLINIC | Age: 43
End: 2023-08-18
Payer: COMMERCIAL

## 2023-08-18 VITALS
HEIGHT: 62 IN | RESPIRATION RATE: 18 BRPM | WEIGHT: 190 LBS | BODY MASS INDEX: 34.96 KG/M2 | SYSTOLIC BLOOD PRESSURE: 90 MMHG | TEMPERATURE: 97.3 F | DIASTOLIC BLOOD PRESSURE: 58 MMHG | HEART RATE: 79 BPM | OXYGEN SATURATION: 93 %

## 2023-08-18 DIAGNOSIS — E11.9 TYPE 2 DIABETES MELLITUS WITHOUT COMPLICATION, WITH LONG-TERM CURRENT USE OF INSULIN (MULTI): ICD-10-CM

## 2023-08-18 DIAGNOSIS — J96.01 ACUTE RESPIRATORY FAILURE WITH HYPOXIA (MULTI): ICD-10-CM

## 2023-08-18 DIAGNOSIS — Z79.4 TYPE 2 DIABETES MELLITUS WITHOUT COMPLICATION, WITH LONG-TERM CURRENT USE OF INSULIN (MULTI): ICD-10-CM

## 2023-08-18 DIAGNOSIS — J44.1 CHRONIC OBSTRUCTIVE PULMONARY DISEASE WITH ACUTE EXACERBATION (MULTI): Primary | ICD-10-CM

## 2023-08-18 DIAGNOSIS — J44.1 CHRONIC OBSTRUCTIVE PULMONARY DISEASE WITH ACUTE EXACERBATION (MULTI): ICD-10-CM

## 2023-08-18 DIAGNOSIS — Z09 HOSPITAL DISCHARGE FOLLOW-UP: Primary | ICD-10-CM

## 2023-08-18 DIAGNOSIS — R00.0 TACHYCARDIA: ICD-10-CM

## 2023-08-18 DIAGNOSIS — E66.09 CLASS 1 OBESITY DUE TO EXCESS CALORIES WITH SERIOUS COMORBIDITY AND BODY MASS INDEX (BMI) OF 34.0 TO 34.9 IN ADULT: ICD-10-CM

## 2023-08-18 PROCEDURE — 3078F DIAST BP <80 MM HG: CPT | Performed by: FAMILY MEDICINE

## 2023-08-18 PROCEDURE — 99495 TRANSJ CARE MGMT MOD F2F 14D: CPT | Performed by: FAMILY MEDICINE

## 2023-08-18 PROCEDURE — 3074F SYST BP LT 130 MM HG: CPT | Performed by: FAMILY MEDICINE

## 2023-08-18 PROCEDURE — 3008F BODY MASS INDEX DOCD: CPT | Performed by: FAMILY MEDICINE

## 2023-08-18 PROCEDURE — 1036F TOBACCO NON-USER: CPT | Performed by: FAMILY MEDICINE

## 2023-08-18 PROCEDURE — 3052F HG A1C>EQUAL 8.0%<EQUAL 9.0%: CPT | Performed by: FAMILY MEDICINE

## 2023-08-18 RX ORDER — METOPROLOL TARTRATE 25 MG/1
25 TABLET, FILM COATED ORAL 2 TIMES DAILY
Qty: 60 TABLET | Refills: 5 | Status: SHIPPED | OUTPATIENT
Start: 2023-08-18 | End: 2023-09-15

## 2023-08-18 NOTE — PATIENT INSTRUCTIONS
Patient will continue on a diabetic, low-cholesterol diet and weight reduction. Exercise as tolerated. She will continue medications as prescribed. Follow-up on 10/26/2023, otherwise as needed.      Patient was given refill(s) on:   Metoprolol Tartrate 25 mg, TAKE 1 TAB BY MOUTH TWICE DAILY     Rx(s) sent to pharmacy.     Recommend patient go to the ER for oxygen and fluids. Advised her that she should be on oxygen at all times, due to her low SpO2 level, especially if under 88%.     Patient was placed on oxygen per NC at 2L in the office and SpO2 did increase to 93%.     Patient is to follow up with Dr. Villalba as scheduled.

## 2023-09-12 ENCOUNTER — TELEMEDICINE (OUTPATIENT)
Dept: PHARMACY | Facility: HOSPITAL | Age: 43
End: 2023-09-12
Payer: COMMERCIAL

## 2023-09-12 DIAGNOSIS — Z79.4 TYPE 2 DIABETES MELLITUS WITHOUT COMPLICATION, WITH LONG-TERM CURRENT USE OF INSULIN (MULTI): Primary | ICD-10-CM

## 2023-09-12 DIAGNOSIS — E11.9 TYPE 2 DIABETES MELLITUS WITHOUT COMPLICATION, WITH LONG-TERM CURRENT USE OF INSULIN (MULTI): Primary | ICD-10-CM

## 2023-09-12 DIAGNOSIS — J44.1 CHRONIC OBSTRUCTIVE PULMONARY DISEASE WITH ACUTE EXACERBATION (MULTI): ICD-10-CM

## 2023-09-12 NOTE — ASSESSMENT & PLAN NOTE
Patient's diabetes is currently uncontrolled with most recent A1C of 8.7%. Talk to the patient about starting a GLP-1RA as this would assist in lowering her blood sugars, assist in weigh loss, and possibly relieve her insulin burden. Patient states that her current medications are working and doesn't want to add any new medications. I talked to the patient about her elevated A1C and she states that she wants to test it again because she thought it was elevated due to being on steroids in the past. Patient's last A1C was from July and was on steroids again approximately 1 month ago.

## 2023-09-12 NOTE — ASSESSMENT & PLAN NOTE
"Patient states that her COPD has been better since being discharged from the hospital and on home oxygen. Previous smoker - quit \"awhile\" ago. Patient admits to using her rescue inhaler a couple times per week, mostly have to use it mid-afternoon.   "

## 2023-09-12 NOTE — PROGRESS NOTES
Pharmacy Post-Discharge Visit  Stephenie Mccray is a 43 y.o. female was referred to Clinical Pharmacy Team to complete a post-discharge medication optimization and monitoring visit.  The patient was referred for their Diabetes and COPD.    Admission Date: 8/8/23  Discharge Date: 8/10/23    Referring Provider: Delta Yuen MD    Subjective   Allergies   Allergen Reactions    Fluticasone-Umeclidin-Vilanter Other     Headaches    Sumatriptan Other     Flushing    Levofloxacin Rash       [id number] - [place], [place] - [address]  [address]   [facility] [address]  Phone: [phone] Fax: [phone]    [id number] - [place], [place] - [address]  [address]  [facility] [address]  Phone: [phone] Fax: [phone]      Social History     Social History Narrative    Not on file        Notable Medication changes following discharge:  Start: doxycycline, prednisone  Stop: none  Change: none    Diabetes  She presents for her initial diabetic visit. She has type 2 diabetes mellitus. Her disease course has been fluctuating (Patient states that her a1c should improve from July. However, patient states that it increased because of previous steroids.). There are no hypoglycemic associated symptoms.     COPD ASSESSMENT    Rescue Inhaler Use:  -How many times per week do you use your rescue inhale? Couple times    Inhaler Technique:  -How do you use your rescue inhaler?  --no issues with technique    -How do you use your maintenance inhaler?  --no issues with technique    Sx Management:  -Increased cough? no  -Increased sputum production? no  -Increased SOB? no    Exacerbation Hx:  -When was your last hospitalization for an exacerbation? 8/8/23  -When was the last time you were treated with antibiotics and/or steroids? 8/10/23      Review of Systems   All other systems reviewed and are negative.      Objective     There were no vitals taken for this visit.     LAB  Lab Results    Component Value Date    BILITOT 0.4 08/08/2023    CALCIUM 8.7 08/10/2023    CO2 32 08/10/2023     08/10/2023    CREATININE 0.66 08/10/2023    GLUCOSE 285 (H) 08/10/2023    ALKPHOS 81 08/08/2023    K 4.4 08/10/2023    PROT 6.8 08/08/2023     08/10/2023    AST 22 08/08/2023    ALT 29 08/08/2023    BUN 17 08/10/2023    ANIONGAP 8 (L) 08/10/2023    MG 1.70 08/10/2023    PHOS 4.4 06/13/2023    ALBUMIN 4.2 08/08/2023    GFRF >90 08/10/2023     Lab Results   Component Value Date    TRIG 98 06/09/2023    CHOL 166 05/13/2023    HDL 42.2 05/13/2023     Lab Results   Component Value Date    HGBA1C 8.6 (A) 07/11/2023         Current Outpatient Medications on File Prior to Visit   Medication Sig Dispense Refill    albuterol 90 mcg/actuation inhaler Inhale 2 puffs every 4 hours if needed for wheezing or shortness of breath. 18 g 11    alcohol swabs (Alcohol Prep Pads) pads, medicated Use 3 times daily prn 100 each 3    atorvastatin (Lipitor) 20 mg tablet Take 1 tablet (20 mg) by mouth once daily. 30 tablet 5    benztropine (Cogentin) 2 mg tablet Take 1 tablet (2 mg) by mouth once daily.      budesonide-glycopyr-formoterol (Breztri Aerosphere) 160-9-4.8 mcg/actuation HFA aerosol inhaler Inhale 2 times a day.      busPIRone (Buspar) 30 mg tablet Take 1 tablet (30 mg) by mouth 2 times a day.      citalopram (CeleXA) 40 mg tablet Take by mouth.      clonazePAM (KlonoPIN) 1 mg tablet Take by mouth 2 times a day as needed.      hydrOXYzine pamoate (Vistaril) 50 mg capsule Take by mouth 3 times a day as needed.      insulin glargine (Lantus Solostar U-100 Insulin) 100 unit/mL (3 mL) pen Inject 10 Units under the skin once daily in the morning. Take as directed per insulin instructions. 3 mL 2    insulin lispro (HumaLOG) 100 unit/mL injection 1 Dose.      ipratropium-albuteroL (Duo-Neb) 0.5-2.5 mg/3 mL nebulizer solution Take 3 mL by nebulization every 6 hours if needed for shortness of breath or wheezing. 180 mL 1     "lancets 30 gauge misc 100 each 3 times a day. 100 each 5    levothyroxine (Synthroid, Levoxyl) 150 mcg tablet Take 1 tablet (150 mcg) by mouth once daily. 90 tablet 1    metoprolol tartrate (Lopressor) 25 mg tablet Take 1 tablet (25 mg) by mouth 2 times a day. 60 tablet 5    pantoprazole (ProtoNix) 40 mg EC tablet Take 1 tablet (40 mg) by mouth once daily. 90 tablet 1    pen needle, diabetic 31 gauge x 5/16\" needle Use to inject 4 times daily as directed. 100 each 11    prazosin (Minipress) 2 mg capsule Take 1 capsule (2 mg) by mouth once daily at bedtime.      rivaroxaban (Xarelto DVT-PE Treat 30d Start) 15 mg (42)- 20 mg (9) tablets,dose pack Take by mouth twice a day. Take 15 mg twice daily for 21 days then 20 mg once daily after that      topiramate (Topamax) 25 mg tablet Take 2 tablets (50 mg) by mouth once daily. 180 tablet 3     No current facility-administered medications on file prior to visit.        HISTORICAL PHARMACOTHERAPY  -Trelegy (gave her a headache)    DRUG INTERACTIONS  - No significant drug interactions that would require change in therapy.    Assessment/Plan   Problem List Items Addressed This Visit       COPD (chronic obstructive pulmonary disease) (CMS/[facility])     Patient states that her COPD has been better since being discharged from the hospital and on home oxygen. Previous smoker - quit \"awhile\" ago. Patient admits to using her rescue inhaler a couple times per week, mostly have to use it mid-afternoon.          Type 2 diabetes mellitus without complication, with long-term current use of insulin (CMS/[facility]) - Primary     Patient's diabetes is currently uncontrolled with most recent A1C of 8.7%. Talk to the patient about starting a GLP-1RA as this would assist in lowering her blood sugars, assist in weigh loss, and possibly relieve her insulin burden. Patient states that her current medications are working and doesn't want to add any new medications. I talked to the patient about her elevated " A1C and she states that she wants to test it again because she thought it was elevated due to being on steroids in the past. Patient's last A1C was from July and was on steroids again approximately 1 month ago.         Patient wishes to discontinue seeing the Clinical Pharmacy Team. States that she doesn't want to make any changes to her medications and states that they are working for her. States that she has had 4-5 phone calls with us and that she wishes just to follow with her primary care provider.    Medication recommendations for provider:   - Start Ozempic 0.25mg in patient as she would benefit from the glucose lowering and weight loss benefits.  - Continue current COPD medications as prescribed.     Continue all meds under the continuation of care with the referring provider and clinical pharmacy team.    Belinda Caputo PharmD     Verbal consent to manage patient's drug therapy was obtained from the patient. They were informed they may decline to participate or withdraw from participation in pharmacy services at any time.

## 2023-09-15 DIAGNOSIS — R00.0 TACHYCARDIA: ICD-10-CM

## 2023-09-15 RX ORDER — METOPROLOL TARTRATE 25 MG/1
25 TABLET, FILM COATED ORAL 2 TIMES DAILY
Qty: 60 TABLET | Refills: 2 | Status: SHIPPED | OUTPATIENT
Start: 2023-09-15 | End: 2024-01-14 | Stop reason: HOSPADM

## 2023-09-18 DIAGNOSIS — E78.00 PURE HYPERCHOLESTEROLEMIA: Primary | ICD-10-CM

## 2023-09-18 RX ORDER — ATORVASTATIN CALCIUM 20 MG/1
20 TABLET, FILM COATED ORAL DAILY
Qty: 30 TABLET | Refills: 0 | Status: SHIPPED | OUTPATIENT
Start: 2023-09-18 | End: 2023-12-19

## 2023-09-18 NOTE — TELEPHONE ENCOUNTER
Rx Refill Request Telephone Encounter    Name:  Stephenie Mccray  :  [id number]  Medication Name:  atorvastatin 20mg   Specific Pharmacy location:  [facility]   Date of last appointment:  23  Date of next appointment:  10/26/23  Best number to reach patient:  [phone]

## 2023-10-16 ENCOUNTER — OFFICE VISIT (OUTPATIENT)
Dept: PRIMARY CARE | Facility: CLINIC | Age: 43
End: 2023-10-16
Payer: COMMERCIAL

## 2023-10-16 VITALS
WEIGHT: 177 LBS | HEART RATE: 73 BPM | SYSTOLIC BLOOD PRESSURE: 100 MMHG | OXYGEN SATURATION: 97 % | BODY MASS INDEX: 32.37 KG/M2 | DIASTOLIC BLOOD PRESSURE: 65 MMHG | TEMPERATURE: 97.4 F | RESPIRATION RATE: 16 BRPM

## 2023-10-16 DIAGNOSIS — R19.7 DIARRHEA, UNSPECIFIED TYPE: ICD-10-CM

## 2023-10-16 DIAGNOSIS — R10.12 LEFT UPPER QUADRANT ABDOMINAL PAIN: ICD-10-CM

## 2023-10-16 DIAGNOSIS — E66.9 CLASS 1 OBESITY WITH SERIOUS COMORBIDITY AND BODY MASS INDEX (BMI) OF 32.0 TO 32.9 IN ADULT, UNSPECIFIED OBESITY TYPE: ICD-10-CM

## 2023-10-16 DIAGNOSIS — K52.9 ACUTE GASTROENTERITIS: Primary | ICD-10-CM

## 2023-10-16 DIAGNOSIS — R11.2 NAUSEA AND VOMITING, UNSPECIFIED VOMITING TYPE: ICD-10-CM

## 2023-10-16 PROCEDURE — 3074F SYST BP LT 130 MM HG: CPT | Performed by: NURSE PRACTITIONER

## 2023-10-16 PROCEDURE — 3078F DIAST BP <80 MM HG: CPT | Performed by: NURSE PRACTITIONER

## 2023-10-16 PROCEDURE — 99213 OFFICE O/P EST LOW 20 MIN: CPT | Performed by: NURSE PRACTITIONER

## 2023-10-16 PROCEDURE — 1036F TOBACCO NON-USER: CPT | Performed by: NURSE PRACTITIONER

## 2023-10-16 PROCEDURE — 3008F BODY MASS INDEX DOCD: CPT | Performed by: NURSE PRACTITIONER

## 2023-10-16 PROCEDURE — 3052F HG A1C>EQUAL 8.0%<EQUAL 9.0%: CPT | Performed by: NURSE PRACTITIONER

## 2023-10-16 RX ORDER — LOPERAMIDE HCL 2 MG
2 TABLET ORAL 4 TIMES DAILY PRN
Qty: 20 TABLET | Refills: 0 | Status: SHIPPED | OUTPATIENT
Start: 2023-10-16 | End: 2023-10-21

## 2023-10-16 RX ORDER — BLOOD-GLUCOSE METER
EACH MISCELLANEOUS
COMMUNITY
Start: 2023-10-16

## 2023-10-16 RX ORDER — PALIPERIDONE PALMITATE 234 MG/1.5ML
INJECTION INTRAMUSCULAR
COMMUNITY
Start: 2023-10-09 | End: 2024-03-08 | Stop reason: HOSPADM

## 2023-10-16 RX ORDER — BUPROPION HYDROCHLORIDE 150 MG/1
150 TABLET, EXTENDED RELEASE ORAL DAILY
Status: ON HOLD | COMMUNITY
Start: 2023-08-17 | End: 2023-12-29

## 2023-10-16 RX ORDER — RIVAROXABAN 20 MG/1
TABLET, FILM COATED ORAL
COMMUNITY
Start: 2023-10-16 | End: 2024-01-23 | Stop reason: DRUGHIGH

## 2023-10-16 RX ORDER — ONDANSETRON 8 MG/1
8 TABLET, ORALLY DISINTEGRATING ORAL EVERY 8 HOURS PRN
Qty: 20 TABLET | Refills: 0 | Status: SHIPPED | OUTPATIENT
Start: 2023-10-16 | End: 2023-10-23

## 2023-10-16 RX ORDER — INSULIN ASPART 100 [IU]/ML
14 INJECTION, SOLUTION INTRAVENOUS; SUBCUTANEOUS DAILY
COMMUNITY
End: 2024-01-14 | Stop reason: HOSPADM

## 2023-10-16 RX ORDER — DICYCLOMINE HYDROCHLORIDE 10 MG/1
10 CAPSULE ORAL 4 TIMES DAILY PRN
Qty: 20 CAPSULE | Refills: 0 | Status: SHIPPED | OUTPATIENT
Start: 2023-10-16 | End: 2023-10-21

## 2023-10-16 RX ORDER — PEN NEEDLE, DIABETIC 31 GX5/16"
NEEDLE, DISPOSABLE MISCELLANEOUS
COMMUNITY
Start: 2023-09-10 | End: 2024-02-01 | Stop reason: SDUPTHER

## 2023-10-16 ASSESSMENT — ENCOUNTER SYMPTOMS
OCCASIONAL FEELINGS OF UNSTEADINESS: 0
DEPRESSION: 0
LOSS OF SENSATION IN FEET: 0

## 2023-10-16 ASSESSMENT — PATIENT HEALTH QUESTIONNAIRE - PHQ9
2. FEELING DOWN, DEPRESSED OR HOPELESS: NOT AT ALL
1. LITTLE INTEREST OR PLEASURE IN DOING THINGS: NOT AT ALL
SUM OF ALL RESPONSES TO PHQ9 QUESTIONS 1 AND 2: 0

## 2023-10-16 NOTE — PROGRESS NOTES
Subjective   Patient ID: Stephenie Mccray is a 43 y.o. female who is with complaint of abdominal pain, nausea, vomiting, and diarrhea.     HPI  Patient is a 43 y.o. female who CONSULTED AT [facility] today. Patient is with complaints of abdominal pain, nausea, vomiting, and diarrhea. Patient states condition started about 7 days ago. she described the abdominal pain as crampy, non radiating, located on the left upper and left lower quadrant areas of the abdomen, 5-7 /10, intermittent, no apparent relation to food intake, type of food, nor relieved by food. she states there are no urinary complaints. she also had episodes of vomiting of previously ingested food, 2 x a day for 1 day last week, no blood, no bile, no coffee ground material, with accompanying nausea. she had diarrhea described as watery, 4 x a day for 7 days, brown, no blood, no black tarry material, non foul smelling. she denies any intake of water from pond, stream, nor intake of uncooked food. she denies fever, chills, yellow discoloration of skin / sclerae. she had no recent travel. She mentioned that her mother in law has same symptoms and was taking Flagyl.     Review of Systems  General: no weight loss, generally healthy, no fatigue  Head:  no headaches / sinus pain, no vertigo, no injury  Eyes: no diplopia, no tearing, no pain,   Ears: no change in hearing, no tinnitus, no bleeding, no vertigo  Mouth:  no dental difficulties, no gingival bleeding, no sore throat, no loss of sense of taste  Nose: no congestion, no  discharge, no bleeding, no obstruction, no loss of sense of smell  Neck: no stiffness, no pain, no tenderness, no masses, no bruit  Pulmonary: no dyspnea, no wheezing, no hemoptysis, no cough  Cardiovascular: no chest pain, no palpitations, no syncope, no orthopnea  Gastrointestinal: no change in appetite, no dysphagia, (+) abdominal pains, (+) diarrhea, (+) nausea, (+) emesis, no melena  Genito Urinary: no  dysuria, no urinary urgency, no nocturia, no incontinence, no change in nature of urine  Musculoskeletal: no muscle ache, no joint pain, no limitation of range of motion, no paresthesia, no numbness  Constitutional: no fever, no chills, no night sweats    Objective   Physical Exam  General: ambulatory, in no acute distress  Head: normocephalic, no lesions  Eyes: pink palpebral conjunctiva, anicteric sclerae, PERRLA, EOM's full  Abdomen: flat, NABS, soft, (+) slight direct tenderness on deep palpation of LUQ and LLQ, no rebound tenderness, no mass palpated, SIGNS: no [place], no Rovsings, no Psoas, no Obturator; No CVA tenderness,  Musculoskeletal: no limitation of range of motion, no paralysis, no deformity  Extremities: full and equal peripheral pulses, no edema,    Assessment/Plan   Problem List Items Addressed This Visit    None  Visit Diagnoses         Codes    Acute gastroenteritis    -  Primary K52.9    Relevant Medications    dicyclomine (Bentyl) 10 mg capsule    ondansetron ODT (Zofran-ODT) 8 mg disintegrating tablet    loperamide (Imodium A-D) 2 mg tablet    Other Relevant Orders    Ova/Para + Giardia/Cryptosporidium Antigen    Stool Pathogen Panel, PCR    Occult Blood, Stool    C. difficile, PCR    US abdomen    Diarrhea, unspecified type     R19.7    Relevant Medications    loperamide (Imodium A-D) 2 mg tablet    Other Relevant Orders    Ova/Para + Giardia/Cryptosporidium Antigen    Stool Pathogen Panel, PCR    Occult Blood, Stool    C. difficile, PCR    US abdomen    Left upper quadrant abdominal pain     R10.12    Relevant Medications    dicyclomine (Bentyl) 10 mg capsule    Other Relevant Orders    US abdomen    Nausea and vomiting, unspecified vomiting type     R11.2    Relevant Medications    ondansetron ODT (Zofran-ODT) 8 mg disintegrating tablet    Other Relevant Orders    US abdomen    BMI 32.0-32.9,adult     Z68.32    Class 1 obesity with serious comorbidity and body mass index (BMI) of 32.0 to  32.9 in adult, unspecified obesity type     E66.9, Z68.32        Patient to submit stool exam.  Stool to be examined for:   - ova and parasite   - culture   - clostridium difficile   - occult blood    Instructed patient with regards to freshness of specimen.  Will call patient with results.    patient for ultrasound of abdomen   request given to patient  will call patient with official reading from the radiologist  patient verbalized understanding    DISCHARGE SUMMARY:   Probable diagnosis, differential diagnosis, treatment, treatment options, and probable complications were discussed and explained to patient. Patient to take medication/s associated with this visit. she was educated and advised on low fiber, BRAT diet. Advised to avoid high fiber foods. Advised to increase fluid intake (water and electrolyte drinks) as tolerated, if with no nausea, nor vomiting. Patient to return to clinic if there is worsening or persistence of symptoms. Patient educated on indications for stool examination. Patient verbalized understanding.    Patient to come back in 7 - 10 days if needed for worsening symptoms.

## 2023-10-16 NOTE — PATIENT INSTRUCTIONS
DISCHARGE SUMMARY:   Probable diagnosis, differential diagnosis, treatment, treatment options, and probable complications were discussed and explained to patient. Patient to take medication/s associated with this visit. she was educated and advised on low fiber, BRAT diet. Advised to avoid high fiber foods. Advised to increase fluid intake (water and electrolyte drinks) as tolerated, if with no nausea, nor vomiting. Patient to return to clinic if there is worsening or persistence of symptoms. Patient educated on indications for stool examination. Patient verbalized understanding.    Patient to come back in 7 - 10 days if needed for worsening symptoms.

## 2023-10-16 NOTE — PROGRESS NOTES
Subjective   Patient ID: Stephenie Mccray is a 43 y.o. female who presents for Diarrhea.        Patient's PCP is Delta Yuen MD        Symptoms:  Diarrhea , Vomited 2x--2 days ago, Length of Symptoms: 1 Week   Denies: New medications,    Factors that effect symptoms: Immodium, Pepto Bismol,    --Related Information--    -------------------------------   HPI     Review of Systems    Objective   There were no vitals taken for this visit.    Physical Exam    Assessment/Plan

## 2023-10-17 ENCOUNTER — APPOINTMENT (OUTPATIENT)
Dept: RADIOLOGY | Facility: HOSPITAL | Age: 43
End: 2023-10-17
Payer: COMMERCIAL

## 2023-10-17 ENCOUNTER — LAB (OUTPATIENT)
Dept: LAB | Facility: LAB | Age: 43
End: 2023-10-17
Payer: COMMERCIAL

## 2023-10-17 ENCOUNTER — TELEPHONE (OUTPATIENT)
Dept: PRIMARY CARE | Facility: CLINIC | Age: 43
End: 2023-10-17

## 2023-10-17 DIAGNOSIS — K52.9 ACUTE GASTROENTERITIS: ICD-10-CM

## 2023-10-17 DIAGNOSIS — Z00.00 ROUTINE ADULT HEALTH MAINTENANCE: ICD-10-CM

## 2023-10-17 DIAGNOSIS — R19.7 DIARRHEA, UNSPECIFIED TYPE: ICD-10-CM

## 2023-10-17 DIAGNOSIS — N92.6 IRREGULAR MENSES: Primary | ICD-10-CM

## 2023-10-17 DIAGNOSIS — R71.8 ELEVATED MCV: ICD-10-CM

## 2023-10-17 DIAGNOSIS — N92.6 IRREGULAR MENSES: ICD-10-CM

## 2023-10-17 LAB
B-HCG SERPL-ACNC: <2 MIU/ML
FOLATE SERPL-MCNC: 8.4 NG/ML
VIT B12 SERPL-MCNC: 312 PG/ML (ref 211–911)

## 2023-10-17 PROCEDURE — 82746 ASSAY OF FOLIC ACID SERUM: CPT

## 2023-10-17 PROCEDURE — 36415 COLL VENOUS BLD VENIPUNCTURE: CPT

## 2023-10-17 PROCEDURE — 82607 VITAMIN B-12: CPT

## 2023-10-17 PROCEDURE — 84702 CHORIONIC GONADOTROPIN TEST: CPT

## 2023-10-17 PROCEDURE — 87506 IADNA-DNA/RNA PROBE TQ 6-11: CPT

## 2023-10-17 NOTE — TELEPHONE ENCOUNTER
MONI FROM  CLIENT SERVICES     PT LABS TEST WERE CANCELLED DUE TO NOT ENOUGH STOOL SAMPLES    OCCULT  C.DIFF

## 2023-10-18 LAB

## 2023-10-23 ENCOUNTER — HOSPITAL ENCOUNTER (OUTPATIENT)
Dept: RADIOLOGY | Facility: HOSPITAL | Age: 43
Discharge: HOME | End: 2023-10-23
Payer: COMMERCIAL

## 2023-10-23 DIAGNOSIS — R11.2 NAUSEA AND VOMITING, UNSPECIFIED VOMITING TYPE: ICD-10-CM

## 2023-10-23 DIAGNOSIS — R10.12 LEFT UPPER QUADRANT ABDOMINAL PAIN: ICD-10-CM

## 2023-10-23 DIAGNOSIS — K52.9 ACUTE GASTROENTERITIS: ICD-10-CM

## 2023-10-23 DIAGNOSIS — R19.7 DIARRHEA, UNSPECIFIED TYPE: ICD-10-CM

## 2023-10-23 LAB — O+P STL MICRO: NEGATIVE

## 2023-10-23 PROCEDURE — 76700 US EXAM ABDOM COMPLETE: CPT

## 2023-10-23 PROCEDURE — 76700 US EXAM ABDOM COMPLETE: CPT | Performed by: RADIOLOGY

## 2023-10-24 ENCOUNTER — DOCUMENTATION (OUTPATIENT)
Dept: PRIMARY CARE | Facility: CLINIC | Age: 43
End: 2023-10-24
Payer: COMMERCIAL

## 2023-10-24 NOTE — PROGRESS NOTES
I called and talked to patient. We discussed stool exam results and ultrasound results. Patient states symptoms are getting better. I told her to not miss the appointment with Dr Yuen.

## 2023-10-31 ENCOUNTER — APPOINTMENT (OUTPATIENT)
Dept: RADIOLOGY | Facility: HOSPITAL | Age: 43
End: 2023-10-31
Payer: COMMERCIAL

## 2023-10-31 ENCOUNTER — HOSPITAL ENCOUNTER (EMERGENCY)
Facility: HOSPITAL | Age: 43
Discharge: HOME | End: 2023-10-31
Attending: STUDENT IN AN ORGANIZED HEALTH CARE EDUCATION/TRAINING PROGRAM
Payer: COMMERCIAL

## 2023-10-31 VITALS
WEIGHT: 180 LBS | BODY MASS INDEX: 33.13 KG/M2 | TEMPERATURE: 97.5 F | HEIGHT: 62 IN | RESPIRATION RATE: 18 BRPM | HEART RATE: 92 BPM | OXYGEN SATURATION: 92 % | SYSTOLIC BLOOD PRESSURE: 136 MMHG | DIASTOLIC BLOOD PRESSURE: 79 MMHG

## 2023-10-31 DIAGNOSIS — E87.6 HYPOKALEMIA: ICD-10-CM

## 2023-10-31 DIAGNOSIS — E83.42 HYPOMAGNESEMIA: ICD-10-CM

## 2023-10-31 DIAGNOSIS — U07.1 COVID: ICD-10-CM

## 2023-10-31 DIAGNOSIS — R06.02 SHORTNESS OF BREATH: Primary | ICD-10-CM

## 2023-10-31 LAB
ALBUMIN SERPL BCP-MCNC: 4.1 G/DL (ref 3.4–5)
ALP SERPL-CCNC: 74 U/L (ref 33–110)
ALT SERPL W P-5'-P-CCNC: 10 U/L (ref 7–45)
ANION GAP BLDA CALCULATED.4IONS-SCNC: 4 MMO/L (ref 10–25)
ANION GAP SERPL CALC-SCNC: 12 MMOL/L (ref 10–20)
APPARATUS: ABNORMAL
APTT PPP: 38 SECONDS (ref 27–38)
AST SERPL W P-5'-P-CCNC: 10 U/L (ref 9–39)
B-HCG SERPL-ACNC: 3 MIU/ML
BASE EXCESS BLDA CALC-SCNC: 4.6 MMOL/L (ref -2–3)
BASOPHILS # BLD AUTO: 0.03 X10*3/UL (ref 0–0.1)
BASOPHILS NFR BLD AUTO: 0.6 %
BILIRUB SERPL-MCNC: 0.3 MG/DL (ref 0–1.2)
BNP SERPL-MCNC: 10 PG/ML (ref 0–99)
BODY TEMPERATURE: ABNORMAL
BUN SERPL-MCNC: 13 MG/DL (ref 6–23)
CA-I BLDA-SCNC: 1.14 MMOL/L (ref 1.1–1.33)
CALCIUM SERPL-MCNC: 9.2 MG/DL (ref 8.6–10.3)
CARDIAC TROPONIN I PNL SERPL HS: 3 NG/L (ref 0–13)
CARDIAC TROPONIN I PNL SERPL HS: 3 NG/L (ref 0–13)
CHLORIDE BLDA-SCNC: 106 MMOL/L (ref 98–107)
CHLORIDE SERPL-SCNC: 103 MMOL/L (ref 98–107)
CO2 SERPL-SCNC: 27 MMOL/L (ref 21–32)
CREAT SERPL-MCNC: 1.05 MG/DL (ref 0.5–1.05)
EOSINOPHIL # BLD AUTO: 0 X10*3/UL (ref 0–0.7)
EOSINOPHIL NFR BLD AUTO: 0 %
ERYTHROCYTE [DISTWIDTH] IN BLOOD BY AUTOMATED COUNT: 14.4 % (ref 11.5–14.5)
FLUAV RNA RESP QL NAA+PROBE: NOT DETECTED
FLUBV RNA RESP QL NAA+PROBE: NOT DETECTED
GFR SERPL CREATININE-BSD FRML MDRD: 68 ML/MIN/1.73M*2
GLUCOSE BLDA-MCNC: 132 MG/DL (ref 74–99)
GLUCOSE SERPL-MCNC: 132 MG/DL (ref 74–99)
HCO3 BLDA-SCNC: 29.8 MMOL/L (ref 22–26)
HCT VFR BLD AUTO: 39.6 % (ref 36–46)
HCT VFR BLD EST: 37 % (ref 36–46)
HGB BLD-MCNC: 12.6 G/DL (ref 12–16)
HGB BLDA-MCNC: 12.4 G/DL (ref 12–16)
IMM GRANULOCYTES # BLD AUTO: 0.12 X10*3/UL (ref 0–0.7)
IMM GRANULOCYTES NFR BLD AUTO: 2.3 % (ref 0–0.9)
INHALED O2 CONCENTRATION: 2 %
INR PPP: 1.4 (ref 0.9–1.1)
LACTATE BLDA-SCNC: 0.6 MMOL/L (ref 0.4–2)
LYMPHOCYTES # BLD AUTO: 1.09 X10*3/UL (ref 1.2–4.8)
LYMPHOCYTES NFR BLD AUTO: 21 %
MAGNESIUM SERPL-MCNC: 1.49 MG/DL (ref 1.6–2.4)
MCH RBC QN AUTO: 30.5 PG (ref 26–34)
MCHC RBC AUTO-ENTMCNC: 31.8 G/DL (ref 32–36)
MCV RBC AUTO: 96 FL (ref 80–100)
MONOCYTES # BLD AUTO: 0.48 X10*3/UL (ref 0.1–1)
MONOCYTES NFR BLD AUTO: 9.3 %
NEUTROPHILS # BLD AUTO: 3.46 X10*3/UL (ref 1.2–7.7)
NEUTROPHILS NFR BLD AUTO: 66.8 %
NRBC BLD-RTO: 0 /100 WBCS (ref 0–0)
OXYHGB MFR BLDA: 85.9 % (ref 94–98)
PCO2 BLDA: 46 MM HG (ref 38–42)
PH BLDA: 7.42 PH (ref 7.38–7.42)
PLATELET # BLD AUTO: 137 X10*3/UL (ref 150–450)
PMV BLD AUTO: 9.9 FL (ref 7.5–11.5)
PO2 BLDA: 61 MM HG (ref 85–95)
POTASSIUM BLDA-SCNC: 3 MMOL/L (ref 3.5–5.3)
POTASSIUM SERPL-SCNC: 3.1 MMOL/L (ref 3.5–5.3)
PROT SERPL-MCNC: 7 G/DL (ref 6.4–8.2)
PROTHROMBIN TIME: 15.3 SECONDS (ref 9.8–12.8)
RBC # BLD AUTO: 4.13 X10*6/UL (ref 4–5.2)
SAO2 % BLDA: 94 % (ref 94–100)
SARS-COV-2 RNA RESP QL NAA+PROBE: DETECTED
SODIUM BLDA-SCNC: 137 MMOL/L (ref 136–145)
SODIUM SERPL-SCNC: 139 MMOL/L (ref 136–145)
WBC # BLD AUTO: 5.2 X10*3/UL (ref 4.4–11.3)

## 2023-10-31 PROCEDURE — 2550000001 HC RX 255 CONTRASTS: Performed by: STUDENT IN AN ORGANIZED HEALTH CARE EDUCATION/TRAINING PROGRAM

## 2023-10-31 PROCEDURE — 84702 CHORIONIC GONADOTROPIN TEST: CPT

## 2023-10-31 PROCEDURE — 96361 HYDRATE IV INFUSION ADD-ON: CPT

## 2023-10-31 PROCEDURE — 2500000004 HC RX 250 GENERAL PHARMACY W/ HCPCS (ALT 636 FOR OP/ED)

## 2023-10-31 PROCEDURE — 36600 WITHDRAWAL OF ARTERIAL BLOOD: CPT

## 2023-10-31 PROCEDURE — 99285 EMERGENCY DEPT VISIT HI MDM: CPT | Mod: 25

## 2023-10-31 PROCEDURE — 96375 TX/PRO/DX INJ NEW DRUG ADDON: CPT

## 2023-10-31 PROCEDURE — 96365 THER/PROPH/DIAG IV INF INIT: CPT

## 2023-10-31 PROCEDURE — 36415 COLL VENOUS BLD VENIPUNCTURE: CPT

## 2023-10-31 PROCEDURE — 94640 AIRWAY INHALATION TREATMENT: CPT

## 2023-10-31 PROCEDURE — 85730 THROMBOPLASTIN TIME PARTIAL: CPT

## 2023-10-31 PROCEDURE — 85025 COMPLETE CBC W/AUTO DIFF WBC: CPT

## 2023-10-31 PROCEDURE — 71045 X-RAY EXAM CHEST 1 VIEW: CPT | Mod: FY

## 2023-10-31 PROCEDURE — 71275 CT ANGIOGRAPHY CHEST: CPT | Performed by: STUDENT IN AN ORGANIZED HEALTH CARE EDUCATION/TRAINING PROGRAM

## 2023-10-31 PROCEDURE — 83735 ASSAY OF MAGNESIUM: CPT

## 2023-10-31 PROCEDURE — 71275 CT ANGIOGRAPHY CHEST: CPT | Mod: ME

## 2023-10-31 PROCEDURE — 2500000002 HC RX 250 W HCPCS SELF ADMINISTERED DRUGS (ALT 637 FOR MEDICARE OP, ALT 636 FOR OP/ED)

## 2023-10-31 PROCEDURE — 84484 ASSAY OF TROPONIN QUANT: CPT

## 2023-10-31 PROCEDURE — 87636 SARSCOV2 & INF A&B AMP PRB: CPT | Performed by: STUDENT IN AN ORGANIZED HEALTH CARE EDUCATION/TRAINING PROGRAM

## 2023-10-31 PROCEDURE — 84295 ASSAY OF SERUM SODIUM: CPT

## 2023-10-31 PROCEDURE — 85018 HEMOGLOBIN: CPT

## 2023-10-31 PROCEDURE — 71045 X-RAY EXAM CHEST 1 VIEW: CPT | Performed by: RADIOLOGY

## 2023-10-31 PROCEDURE — 83880 ASSAY OF NATRIURETIC PEPTIDE: CPT

## 2023-10-31 RX ORDER — PREDNISONE 20 MG/1
20 TABLET ORAL 2 TIMES DAILY
Qty: 10 TABLET | Refills: 0 | Status: SHIPPED | OUTPATIENT
Start: 2023-10-31 | End: 2023-11-05

## 2023-10-31 RX ORDER — IPRATROPIUM BROMIDE AND ALBUTEROL SULFATE 2.5; .5 MG/3ML; MG/3ML
3 SOLUTION RESPIRATORY (INHALATION) ONCE
Status: COMPLETED | OUTPATIENT
Start: 2023-10-31 | End: 2023-10-31

## 2023-10-31 RX ORDER — ALBUTEROL SULFATE 0.83 MG/ML
2.5 SOLUTION RESPIRATORY (INHALATION) ONCE
Status: COMPLETED | OUTPATIENT
Start: 2023-10-31 | End: 2023-10-31

## 2023-10-31 RX ORDER — POTASSIUM CHLORIDE 20 MEQ/1
40 TABLET, EXTENDED RELEASE ORAL ONCE
Status: COMPLETED | OUTPATIENT
Start: 2023-10-31 | End: 2023-10-31

## 2023-10-31 RX ORDER — GUAIFENESIN 600 MG/1
1200 TABLET, EXTENDED RELEASE ORAL 2 TIMES DAILY
Qty: 20 TABLET | Refills: 0 | Status: SHIPPED | OUTPATIENT
Start: 2023-10-31 | End: 2023-11-05

## 2023-10-31 RX ORDER — MAGNESIUM SULFATE HEPTAHYDRATE 40 MG/ML
2 INJECTION, SOLUTION INTRAVENOUS ONCE
Status: COMPLETED | OUTPATIENT
Start: 2023-10-31 | End: 2023-10-31

## 2023-10-31 RX ORDER — NIRMATRELVIR AND RITONAVIR 300-100 MG
3 KIT ORAL 2 TIMES DAILY
Qty: 30 TABLET | Refills: 0 | Status: SHIPPED | OUTPATIENT
Start: 2023-10-31 | End: 2023-11-05

## 2023-10-31 RX ORDER — HYDROXYZINE HYDROCHLORIDE 25 MG/1
25 TABLET, FILM COATED ORAL EVERY 6 HOURS PRN
Qty: 12 TABLET | Refills: 0 | Status: SHIPPED | OUTPATIENT
Start: 2023-10-31 | End: 2024-01-14 | Stop reason: HOSPADM

## 2023-10-31 RX ADMIN — IPRATROPIUM BROMIDE AND ALBUTEROL SULFATE 3 ML: 2.5; .5 SOLUTION RESPIRATORY (INHALATION) at 16:16

## 2023-10-31 RX ADMIN — MAGNESIUM SULFATE HEPTAHYDRATE 2 G: 40 INJECTION, SOLUTION INTRAVENOUS at 17:27

## 2023-10-31 RX ADMIN — SODIUM CHLORIDE 1000 ML: 9 INJECTION, SOLUTION INTRAVENOUS at 16:18

## 2023-10-31 RX ADMIN — METHYLPREDNISOLONE SODIUM SUCCINATE 125 MG: 125 INJECTION, POWDER, FOR SOLUTION INTRAMUSCULAR; INTRAVENOUS at 16:16

## 2023-10-31 RX ADMIN — POTASSIUM CHLORIDE 40 MEQ: 1500 TABLET, EXTENDED RELEASE ORAL at 17:06

## 2023-10-31 RX ADMIN — IOHEXOL 75 ML: 350 INJECTION, SOLUTION INTRAVENOUS at 17:37

## 2023-10-31 RX ADMIN — ALBUTEROL SULFATE 2.5 MG: 2.5 SOLUTION RESPIRATORY (INHALATION) at 16:16

## 2023-10-31 ASSESSMENT — PAIN - FUNCTIONAL ASSESSMENT: PAIN_FUNCTIONAL_ASSESSMENT: 0-10

## 2023-10-31 ASSESSMENT — COLUMBIA-SUICIDE SEVERITY RATING SCALE - C-SSRS
6. HAVE YOU EVER DONE ANYTHING, STARTED TO DO ANYTHING, OR PREPARED TO DO ANYTHING TO END YOUR LIFE?: NO
2. HAVE YOU ACTUALLY HAD ANY THOUGHTS OF KILLING YOURSELF?: NO
1. IN THE PAST MONTH, HAVE YOU WISHED YOU WERE DEAD OR WISHED YOU COULD GO TO SLEEP AND NOT WAKE UP?: NO

## 2023-10-31 ASSESSMENT — PAIN SCALES - GENERAL: PAINLEVEL_OUTOF10: 0 - NO PAIN

## 2023-10-31 NOTE — ED PROVIDER NOTES
HPI   Chief Complaint   Patient presents with    Shortness of Breath     SOB since this morning.  States O2 sats 72% this morning.       History provided by: Patient    Limitations to history: None    CC: Shortness of breath and cough    HPI: 43-year-old female presents emergency department to be evaluated for shortness of breath and a cough.  She says that it started this morning.  She said the shortness of breath is seen at all times.  She has a history of COPD and has been taking breathing treatments with little to no relief.  She wears 2 L as needed, was in the 80s on room air.  Also reports a productive yellow/clear cough.  Denies history of heart failure, denies pain or swelling in the extremities.  She was diagnosed with a PE a few months ago and is on Xarelto.  She has been compliant with this.  Denies hemoptysis.  Denies fever and chills.  Denies weakness and fatigue.  States that her  has COVID.  Denies syncopal episodes.  She denies history of ACS, she is not sure when her last stress test was but she is never required stent placement or bypass.  She does continue to smoke a few cigarettes a day.  Denies all other systemic symptoms.    ROS: Negative unless mentioned in HPI    Social Hx: Smoker.  Denies alcohol and drug use.    Physical exam:    Constitutional: Patient is well-nourished and well-developed.  Sitting comfortably in the room and in no distress.  Oriented to person, place, time, and situation.    HEENT: Head is normocephalic, atraumatic. Patient's airway is patent.  Tympanic membranes are clear bilaterally.  Nasal mucosa clear.  Mouth with normal mucosa.  Throat is not erythematous and there are no oropharyngeal exudates, uvula is midline.  No obvious facial deformities.    Eyes: Clear bilaterally.  Pupils are equal round and reactive to light and accommodation.  Extraocular movements intact.      Cardiac: Tachycardic, regular rhythm.  Heart sounds S1, S2.  No murmurs, rubs, or gallops.   PMI nondisplaced.  No JVD.    Respiratory: 88% on room air.  Regular respiratory rate and effort.  Breath sounds are clear and equal bilaterally, no adventitious lung sounds.  Patient is speaking in full sentences and is in no apparent respiratory distress. No use of accessory muscles.      Gastrointestinal: Abdomen is soft, nondistended, and nontender.  There are no obvious deformities.  No rebound tenderness or guarding.  Bowel sounds are normal active.    Genitourinary: No CVA or flank tenderness.    Musculoskeletal: No reproducible tenderness.  No obvious skin or bony deformities.  Patient has equal range of motion in all extremities and no strength deficiencies.  No muscle or joint tenderness. No back or neck tenderness.  Capillary refill less than 3 seconds.  Strong peripheral pulses.  No sensory deficits.    Neurological: Patient is alert and oriented.  No focal deficits.  5/5 strength in all extremities.  Cranial nerves II through XII intact. GCS15.     Skin: Skin is normal color for race and is warm, dry, and intact.  No evidence of trauma.  No lesions, rashes, bruising, jaundice, or masses.    Psych: Appropriate mood and affect.  No apparent risk to self or others.    Heme/lymph: No adenopathy, lymphadenopathy, or splenomegaly    Physical exam is otherwise negative unless stated above or in history of present illness.                          No data recorded                Patient History   Past Medical History:   Diagnosis Date    Encounter for preprocedural cardiovascular examination 11/28/2018    Preop cardiovascular exam    Personal history of other diseases of the nervous system and sense organs     History of sleep apnea    Personal history of other endocrine, nutritional and metabolic disease     History of hypothyroidism    Personal history of other specified conditions 07/28/2021    History of gross hematuria    Personal history of other specified conditions 06/24/2021    History of dysuria     Personal history of other specified conditions     History of tachycardia    Syncope and collapse 2021    Near syncope    Unspecified acute conjunctivitis, right eye 2021    Acute bacterial conjunctivitis of right eye     Past Surgical History:   Procedure Laterality Date    OTHER SURGICAL HISTORY  2018    Ear surgery    OTHER SURGICAL HISTORY  2018     section    OTHER SURGICAL HISTORY  2018    Tonsillectomy    OTHER SURGICAL HISTORY  10/27/2021    Ventral hernia repair    OTHER SURGICAL HISTORY  10/27/2021    Lipoma excision    OTHER SURGICAL HISTORY  10/06/2021    Hernia repair     Family History   Problem Relation Name Age of Onset    Fibromyalgia Father      Hypertension Brother      Thyroid disease Maternal Grandmother      Thyroid disease Paternal Grandmother      Lung cancer Paternal Grandfather      Coronary artery disease Other Grandmother      Social History     Tobacco Use    Smoking status: Never     Passive exposure: Never    Smokeless tobacco: Never   Substance Use Topics    Alcohol use: Never    Drug use: Never       Physical Exam   ED Triage Vitals [10/31/23 1558]   Temp Heart Rate Resp BP   36.4 °C (97.5 °F) (!) 125 20 133/60      SpO2 Temp Source Heart Rate Source Patient Position   (!) 88 % Temporal Monitor --      BP Location FiO2 (%)     -- 21 %       Physical Exam    ED Course & MDM   Diagnoses as of 10/31/23 1842   Shortness of breath   COVID   Hypomagnesemia   Hypokalemia     Patient updated on plan for lab testing, IV insertion, radiology imaging, and medications to be administered while in the ER (if indicated). Patient updated on expected wait times for testing and results. Patient provided my name and told to ask any staff member for questions or concerns if they should arise. Electronic medical record reviewed.     MDM    Upon initial assessment, patient was healthy non-toxic appearing and in no apparent distress.     Patient presented to the  emergency department with the chief complaint of shortness of breath with a cough.breath sounds are clear and equal bilaterally, 88% on room air.  Patient will be placed on 2 L via nasal cannula.  No muffled heart sounds or JVD.  No peripheral edema or erythema, tachycardia.  On arrival to the emergency department, vital signs were significant tachycardia and hypoxia but otherwise unremarkable.  Afebrile.  Blood pressure stable.    Patient given fluids as well as DuoNeb and albuterol breathing treatments.  Work-up initially including EKG, chest x-ray, CTA to rule out PE, COVID PCR, basic blood work, ABG.  Patient's EKG was performed at 1631 interpreted by me.  Left axis deviation.  Sinus tachycardia 103 beats minute.  No ST elevation or depression.  Q waves in lead III.  Nonspecific T wave inversion but no ST elevation or depression.    ABG shows a PCO2 46 and PO2 of 61, pH is neutral.  Original repeat troponin within normal limits.  CBC shows no leukocytosis or anemia.  Patient has a positive for COVID and negative for the flu.  BNP is 10.  CMP shows a hypokalemia 3.4, was given oral potassium.  Magnesium is 1.49, I given IV magnesium supplementation.  Pregnancy test is negative.  CBC shows no leukocytosis or anemia.Patient is a thrombocytopenia its similar to previous.  Chest reveals no acute pulmonic process including pneumonia and CTA reveals no PE.  Patient was able to walk on the ER on her baseline 2 L without becoming hypoxic or visually short of breath.  She feels better and she would like to go home.  I do believe this is reasonable since she is getting better and does not require additional oxygen supplementation and her results and vital signs are stable.  Patient will be discharged with p.o. prednisone and p.o. Mucinex.  I did speak to the pharmacy team and they agree that the patient would be a good candidate for Paxlovid given her complex pulmonary history.  Confirm that she would be able to take this  without given her medication list.  Patient is also requesting something for anxiety, she was sleeping when I went into the room to evaluate her.  I will give her a few doses of p.o. Vistaril to use as needed for anxiety.  She is originally requesting Ativan but I do not feel comfortable with this at this time.  She will follow-up with her primary care provider.  I discussed the importance of rest drinking, drinking plenty of fluids, quarantine.  All questions and concerns addressed.  Reasons to return to ER discussed.  Patient verbalized understanding and agreement with the treatment plan and they remained hemodynamically stable in the ER.    This note was dictated using a speech recognition program.  While an attempt was made at proof-reading to minimize errors, minor errors in transcription may be present    Medical Decision Making      Procedure  Procedures     Larry Kay PA-C  10/31/23 1841       Larry Kay PA-C  10/31/23 1927

## 2023-11-10 ENCOUNTER — HOSPITAL ENCOUNTER (OUTPATIENT)
Dept: CARDIOLOGY | Facility: HOSPITAL | Age: 43
Discharge: HOME | End: 2023-11-10
Payer: COMMERCIAL

## 2023-11-10 PROCEDURE — 93005 ELECTROCARDIOGRAM TRACING: CPT

## 2023-11-21 ENCOUNTER — OFFICE VISIT (OUTPATIENT)
Dept: PRIMARY CARE | Facility: CLINIC | Age: 43
End: 2023-11-21
Payer: COMMERCIAL

## 2023-11-21 ENCOUNTER — LAB (OUTPATIENT)
Dept: LAB | Facility: LAB | Age: 43
End: 2023-11-21
Payer: COMMERCIAL

## 2023-11-21 VITALS
HEART RATE: 92 BPM | DIASTOLIC BLOOD PRESSURE: 76 MMHG | OXYGEN SATURATION: 96 % | RESPIRATION RATE: 16 BRPM | HEIGHT: 62 IN | WEIGHT: 170.4 LBS | SYSTOLIC BLOOD PRESSURE: 111 MMHG | BODY MASS INDEX: 31.36 KG/M2 | TEMPERATURE: 95.7 F

## 2023-11-21 DIAGNOSIS — Z11.59 ENCOUNTER FOR HEPATITIS C SCREENING TEST FOR LOW RISK PATIENT: ICD-10-CM

## 2023-11-21 DIAGNOSIS — E11.9 TYPE 2 DIABETES MELLITUS WITHOUT COMPLICATION, WITH LONG-TERM CURRENT USE OF INSULIN (MULTI): ICD-10-CM

## 2023-11-21 DIAGNOSIS — Z79.4 TYPE 2 DIABETES MELLITUS WITHOUT COMPLICATION, WITH LONG-TERM CURRENT USE OF INSULIN (MULTI): ICD-10-CM

## 2023-11-21 DIAGNOSIS — E03.9 ACQUIRED HYPOTHYROIDISM: ICD-10-CM

## 2023-11-21 DIAGNOSIS — U07.1 COVID-19 VIRUS INFECTION: ICD-10-CM

## 2023-11-21 DIAGNOSIS — I10 PRIMARY HYPERTENSION: ICD-10-CM

## 2023-11-21 DIAGNOSIS — J20.9 ACUTE BRONCHITIS, UNSPECIFIED ORGANISM: Primary | ICD-10-CM

## 2023-11-21 DIAGNOSIS — E66.09 CLASS 1 OBESITY DUE TO EXCESS CALORIES WITH SERIOUS COMORBIDITY AND BODY MASS INDEX (BMI) OF 31.0 TO 31.9 IN ADULT: ICD-10-CM

## 2023-11-21 DIAGNOSIS — E78.2 MIXED HYPERLIPIDEMIA: ICD-10-CM

## 2023-11-21 DIAGNOSIS — J44.1 CHRONIC OBSTRUCTIVE PULMONARY DISEASE WITH ACUTE EXACERBATION (MULTI): ICD-10-CM

## 2023-11-21 LAB
EST. AVERAGE GLUCOSE BLD GHB EST-MCNC: 163 MG/DL
HBA1C MFR BLD: 7.3 %
HCV AB SER QL: NONREACTIVE

## 2023-11-21 PROCEDURE — 3008F BODY MASS INDEX DOCD: CPT | Performed by: FAMILY MEDICINE

## 2023-11-21 PROCEDURE — 86803 HEPATITIS C AB TEST: CPT

## 2023-11-21 PROCEDURE — 83036 HEMOGLOBIN GLYCOSYLATED A1C: CPT

## 2023-11-21 PROCEDURE — 3074F SYST BP LT 130 MM HG: CPT | Performed by: FAMILY MEDICINE

## 2023-11-21 PROCEDURE — 99214 OFFICE O/P EST MOD 30 MIN: CPT | Performed by: FAMILY MEDICINE

## 2023-11-21 PROCEDURE — 3078F DIAST BP <80 MM HG: CPT | Performed by: FAMILY MEDICINE

## 2023-11-21 PROCEDURE — 3052F HG A1C>EQUAL 8.0%<EQUAL 9.0%: CPT | Performed by: FAMILY MEDICINE

## 2023-11-21 PROCEDURE — 36415 COLL VENOUS BLD VENIPUNCTURE: CPT

## 2023-11-21 PROCEDURE — 1036F TOBACCO NON-USER: CPT | Performed by: FAMILY MEDICINE

## 2023-11-21 RX ORDER — DOXYCYCLINE 100 MG/1
100 CAPSULE ORAL 2 TIMES DAILY
Qty: 20 CAPSULE | Refills: 0 | Status: SHIPPED | OUTPATIENT
Start: 2023-11-21 | End: 2023-12-01

## 2023-11-21 RX ORDER — PREDNISONE 20 MG/1
TABLET ORAL
Qty: 15 TABLET | Refills: 0 | Status: SHIPPED | OUTPATIENT
Start: 2023-11-21 | End: 2023-11-30

## 2023-11-21 ASSESSMENT — PATIENT HEALTH QUESTIONNAIRE - PHQ9
1. LITTLE INTEREST OR PLEASURE IN DOING THINGS: NOT AT ALL
10. IF YOU CHECKED OFF ANY PROBLEMS, HOW DIFFICULT HAVE THESE PROBLEMS MADE IT FOR YOU TO DO YOUR WORK, TAKE CARE OF THINGS AT HOME, OR GET ALONG WITH OTHER PEOPLE: VERY DIFFICULT
4. FEELING TIRED OR HAVING LITTLE ENERGY: MORE THAN HALF THE DAYS
5. POOR APPETITE OR OVEREATING: MORE THAN HALF THE DAYS
6. FEELING BAD ABOUT YOURSELF - OR THAT YOU ARE A FAILURE OR HAVE LET YOURSELF OR YOUR FAMILY DOWN: SEVERAL DAYS
2. FEELING DOWN, DEPRESSED OR HOPELESS: NOT AT ALL
7. TROUBLE CONCENTRATING ON THINGS, SUCH AS READING THE NEWSPAPER OR WATCHING TELEVISION: NEARLY EVERY DAY
SUM OF ALL RESPONSES TO PHQ QUESTIONS 1-9: 16
8. MOVING OR SPEAKING SO SLOWLY THAT OTHER PEOPLE COULD HAVE NOTICED. OR THE OPPOSITE, BEING SO FIGETY OR RESTLESS THAT YOU HAVE BEEN MOVING AROUND A LOT MORE THAN USUAL: SEVERAL DAYS
SUM OF ALL RESPONSES TO PHQ9 QUESTIONS 1 & 2: 4
SUM OF ALL RESPONSES TO PHQ9 QUESTIONS 1 & 2: 0
2. FEELING DOWN, DEPRESSED OR HOPELESS: MORE THAN HALF THE DAYS
3. TROUBLE FALLING OR STAYING ASLEEP: NEARLY EVERY DAY
9. THOUGHTS THAT YOU WOULD BE BETTER OFF DEAD, OR OF HURTING YOURSELF: NOT AT ALL
1. LITTLE INTEREST OR PLEASURE IN DOING THINGS: MORE THAN HALF THE DAYS

## 2023-11-21 NOTE — PROGRESS NOTES
"Subjective   Patient ID: Stephenie Mccray is a 43 y.o. female who presents for Hypertension, Diabetes, COPD, and Hypothyroidism.  I last saw the patient on 8/18/2023.     HPI   10/31/23 positive Covid.     Patient states that she still has a cough. She states that she may need antibiotics and steroid pack due to her having COPD. She admits to some phlegm and occasional vomiting.     She has not been wearing her oxygen very often, unless she needs it. The breathing treatments have been working well for her.     She does not check her BS all the time. Her last check was 117.     Review of Systems  Except positives as noted in the CC & HPI      Constitutional: Denies fevers, chills, night sweats, fatigue, weight changes, change in appetite    Eyes: Denies blurry vision, double vision    ENT: Denies otalgia, trouble hearing, tinnitus, vertigo, rhinorrhea, sore throat    Neck: Denies swelling, masses    Cardiovascular: Denies chest pain, palpitations, edema, orthopnea, syncope    Respiratory: Denies dyspnea, wheezing, postural nocturnal dyspnea    Gastrointestinal: Denies abdominal pain, nausea, diarrhea, constipation, melena, hematochezia    Genitourinary: Denies dysuria, hematuria, frequency, urgency    Musculoskeletal: Denies back pain, neck pain, arthralgias, myalgias    Integumentary: Denies skin lesions, rashes, masses    Neurological: Denies dizziness, headaches, confusion, limb weakness, paresthesias, syncope, convulsions    Psychiatric: Denies depression, anxiety, homicidal ideations, suicidal ideations, sleep disturbances    Endocrine: Denies polyphagia, polydipsia, polyuria, weakness, hair thinning, heat intolerance, cold intolerance, weight changes    Heme/Lymph: Denies easy bruising, easy bleeding, swollen glands    Objective   /76   Pulse 92   Temp 35.4 °C (95.7 °F)   Resp 16   Ht 1.575 m (5' 2\")   Wt 77.3 kg (170 lb 6.4 oz)   SpO2 96%   BMI 31.17 kg/m²     Physical Exam  Gen. Appearance - " well-developed, well-nourished, 43 y.o., White female in no acute distress.     Skin - warm, pink and dry without rash or concerning lesions.    Mental Status - alert and oriented times 3. Normal mood and affect appropriate to mood.     Neck - supple without lymphadenopathy. Carotid pulses are normal without bruits. Thyroid is normal in midline without nodules.    Chest - lungs are clear to auscultation without rales, rhonchi or wheezes.     Heart - regular, rate, and rhythm without murmurs, rubs or gallops.     Abdomen - soft, obese, protuberant, nontender, nondistended. No masses, hepatomegaly or splenomegaly is noted. No rebound, rigidity or guarding is noted. Bowel sounds are normoactive. Prominence of intrabdominal fat in the mid abdomen.     Extremities - no cyanosis, clubbing or edema. Pedal pulses are 2+ normal at the dorsalis pedis and posterior tibial pulses bilaterally.     Neurological - cranial nerves II through XII are grossly intact. Motor strength 5/5 at all fours.     Assessment/Plan   1. Acute bronchitis, unspecified organism  doxycycline (Vibramycin) 100 mg capsule      2. COVID-19 virus infection        3. Type 2 diabetes mellitus without complication, with long-term current use of insulin (CMS/HCC)  Follow [facility] - PCP - Established    Hemoglobin A1C    Follow [facility] - PCP - Established      4. Primary hypertension  Follow Up [facility] - PCP - Established    Follow [facility] - PCP - Established      5. Chronic obstructive pulmonary disease with acute exacerbation (CMS/HCC)  Follow [facility] - PCP - Established    predniSONE (Deltasone) 20 mg tablet    Follow [facility] - PCP - Established      6. Acquired hypothyroidism  Follow Up [facility] - PCP - Established    Follow Up [facility] - PCP - Established      7. Mixed hyperlipidemia        8. Class 1 obesity  due to excess calories with serious comorbidity and body mass index (BMI) of 31.0 to 31.9 in adult  Follow Up [facility] - PCP - Established      9. Encounter for hepatitis C screening test for low risk patient  Hepatitis C Antibody      Patient will continue on a diabetic, low-cholesterol diet and weight reduction. Exercise as tolerated. She will continue medications as prescribed. Follow-up in 3 month(s) otherwise as needed.      Will obtain A1c, Hep C today. Will call patient with results when available.     Patient was started on:   Prednisone 20 mg, TAKE 2 TABS IN THE MORNING FOR 5 DAYS THEN 1 TAB IN THE MORNING FOR 5 DAYS   Doxycycline Monohydrate 100 mg, 1 TAB TWICE DAILY FOR 10 DAYS     Rx(s) sent to pharmacy.     Congratulated patient on her weight loss.       Scribe Attestation  By signing my name below, IRama Scribe   attest that this documentation has been prepared under the direction and in the presence of Delta Yuen MD.

## 2023-11-21 NOTE — PATIENT INSTRUCTIONS
Patient will continue on a diabetic, low-cholesterol diet and weight reduction. Exercise as tolerated. She will continue medications as prescribed. Follow-up in 3 month(s) otherwise as needed.      Will obtain A1c, Hep C today. Will call patient with results when available.     Patient was started on:   Prednisone 20 mg, TAKE 2 TABS IN THE MORNING FOR 5 DAYS THEN 1 TAB IN THE MORNING FOR 5 DAYS   Doxycycline Monohydrate 100 mg, 1 TAB TWICE DAILY FOR 10 DAYS     Rx(s) sent to pharmacy.     Congratulated patient on her weight loss.

## 2023-12-07 DIAGNOSIS — K21.9 GASTROESOPHAGEAL REFLUX DISEASE, UNSPECIFIED WHETHER ESOPHAGITIS PRESENT: Primary | ICD-10-CM

## 2023-12-07 RX ORDER — PANTOPRAZOLE SODIUM 40 MG/1
40 TABLET, DELAYED RELEASE ORAL DAILY
Qty: 90 TABLET | Refills: 1 | Status: SHIPPED | OUTPATIENT
Start: 2023-12-07 | End: 2023-12-18 | Stop reason: SDUPTHER

## 2023-12-07 NOTE — TELEPHONE ENCOUNTER
[facility] ambulatory encounter  INTERNAL MEDICINE NURSING HOME VISIT       Jase Anaya is a 63 year old male seen today at [facility] for a routine visit.      ASSESSMENT AND PLAN  (G25.3) Myoclonic jerking  (primary encounter diagnosis)  (R09.02) Hypoxemia  Comment: this has resolved with discontinuation of seroquel and subsequent improvement in patient's level of alertness.  He is no longer needing oxygen and he notes he feels better.  No further notation made of the myoclonic jerking.   Plan: resolved with discontinuation of seroquel and resolution of over sedation    (J44.9) Moderate chronic obstructive pulmonary disease (CMS/[facility])  Comment: clinically stable on maintanence med of anoro ellipta  Plan: continue same    (C34.92) Squamous cell carcinoma of left lung s/p resection 11/2016  Comment: clinically inactive.    Plan: monitored by oncology. Last seen 7/11/2018    (I25.10) Coronary artery disease status post CABG x 1 vessel 3/30/2017  (I10) Essential hypertension  (Z95.2) History of mechanical aortic valve replacement  Comment: stable on warfarin with no bleeding concerns.  BP is improved with lasix and increase in amlodipine.  Now mostly around 120 systolic.  He has no edema.  Feels better overall.  Much less dizzy.  INR today is 1.62 which is decrease from 2.4 on 9/6.  Nursing reports since stopping seroquel he has been more active and eats better.   Plan: continue low dose lasix bid with KCL, amlodipine, metoprolol, ASA, warfarin and statin.  Warfarin dose adjusted.    (N18.4) CKD (chronic kidney disease) stage 4, GFR 15-29 ml/min (CMS/[facility])  Metabolic acidosis  (Q61.3) Polycystic kidney disease  Comment: followed by Dr. Whittington.  Lasix 10 mg bid with low dose kcl added along with bicarb at last visit.  No edema.  Clinically looks better overall.   Labs today show creat stable/improved at 2.57 with normal lytes   Plan:   Continue lasix/kcl, bicarb and f/u with nephrology.     Bipolar Affective  Rx Refill Request Telephone Encounter    Name:  Stephenie Mccray  :  [id number]  Medication Name:  PANTOPRAZOLE 40 MG ; LAST FILL 3/28  Specific Pharmacy location:  [facility]  Date of last appointment:    Date of next appointment:  02/15  Best number to reach patient:  [phone]    PLEASE ADVISE.              disorder  Comment: he is doing better since stopping seroquel.  I did talk with his psychiatrist about concerns re: oversedation and hypoxemia and he agreed with stopping this and he has remained stable from a psych standpoint.  Patient feels he is doing \"better\".  orthostasis symptoms are improved.   Plan: continue current psych meds    (K59.01) Slow transit constipation  Comment: moving bowels fine on current regimen  Plan: continue same    (R41.89) Cognitive impairment  (Z02.71) Issue of incapacity certificate  Comment: stable.   Plan: monitor    He is on flomax and finasteride for BPH and voiding ok.       SUBJECTIVE:  Resident is doing much better since stopping seroquel  More awake, not requiring oxygen, and now he is walking consistently with walker and not using WC any longer.  He feels dizziness if better as well.  He denies pain, dyspnea, constipation, or voiding difficulty.  Nursing and therapy both say he is doing much better as well.       Current Outpatient Prescriptions   Medication Sig Dispense Refill   • docusate sodium-sennosides (SENOKOT S) 50-8.6 MG per tablet Take 1 tablet by mouth 2 times daily.     • amLODIPine (NORVASC) 10 MG tablet Take 1 tablet by mouth daily.     • metoPROLOL tartrate (LOPRESSOR) 100 MG tablet Take 1 tablet by mouth 2 times daily. 60 tablet 5   • sodium bicarbonate 650 MG tablet Take 1 tablet by mouth 3 times daily. 90 tablet 3   • diclofenac (VOLTAREN) 1 % gel Apply topically 2 times daily as needed.     • sorbitol 70 % solution Take 15 mLs by mouth daily as needed.     • polyethylene glycol (GLYCOLAX, MIRALAX) packet Take 17 g by mouth 2 times daily. 14 each 0   • finasteride (PROSCAR) 5 MG tablet Take 1 tablet by mouth daily. 1 tab PO daily 30 tablet 0   • Cholecalciferol 1000 units 126/78   Pulse: 62   Resp: 18   Temp: 98 °F (36.7 °C)        Physical Exam   Constitutional: He is well-developed, well-nourished, and in no distress.   Cardiovascular: Normal rate and regular rhythm.    [place] AVR sound   Pulmonary/Chest: Effort normal and breath sounds normal. He has no wheezes.   Abdominal: Soft.   Musculoskeletal: He exhibits no edema.   Neurological: He is alert.   Skin: Skin is warm and dry.   Psychiatric:   Affect is somewhat flat and he is a little slow to answer questions but this is baseline for him          Hilda Mckeon MD

## 2023-12-12 ENCOUNTER — TELEPHONE (OUTPATIENT)
Dept: PRIMARY CARE | Facility: CLINIC | Age: 43
End: 2023-12-12
Payer: COMMERCIAL

## 2023-12-12 DIAGNOSIS — J44.1 CHRONIC OBSTRUCTIVE PULMONARY DISEASE WITH ACUTE EXACERBATION (MULTI): Primary | ICD-10-CM

## 2023-12-12 DIAGNOSIS — J01.90 ACUTE NON-RECURRENT SINUSITIS, UNSPECIFIED LOCATION: ICD-10-CM

## 2023-12-12 RX ORDER — DOXYCYCLINE 100 MG/1
100 CAPSULE ORAL 2 TIMES DAILY
Qty: 20 CAPSULE | Refills: 0 | Status: SHIPPED | OUTPATIENT
Start: 2023-12-12 | End: 2023-12-31 | Stop reason: HOSPADM

## 2023-12-12 RX ORDER — PREDNISONE 20 MG/1
TABLET ORAL
Qty: 15 TABLET | Refills: 0 | Status: SHIPPED | OUTPATIENT
Start: 2023-12-12 | End: 2023-12-19 | Stop reason: SDUPTHER

## 2023-12-12 NOTE — TELEPHONE ENCOUNTER
PATIENT CALLED STATING SHE IS HAVING A COPD FLARE UP AND IS REQUESTING A STEROID AND ANTIBIOTIC. PATIENT STATED HER OXYGEN KEEPS DIPPING BELOW 76 BUT WILL NOT GO TO THE HOSPITAL.     [facility]/21/[facility]

## 2023-12-13 ENCOUNTER — APPOINTMENT (OUTPATIENT)
Dept: OBSTETRICS AND GYNECOLOGY | Facility: CLINIC | Age: 43
End: 2023-12-13
Payer: COMMERCIAL

## 2023-12-18 DIAGNOSIS — K21.9 GASTROESOPHAGEAL REFLUX DISEASE, UNSPECIFIED WHETHER ESOPHAGITIS PRESENT: ICD-10-CM

## 2023-12-18 RX ORDER — PANTOPRAZOLE SODIUM 40 MG/1
40 TABLET, DELAYED RELEASE ORAL DAILY
Qty: 90 TABLET | Refills: 1 | Status: SHIPPED | OUTPATIENT
Start: 2023-12-18

## 2023-12-19 ENCOUNTER — TELEPHONE (OUTPATIENT)
Dept: PRIMARY CARE | Facility: CLINIC | Age: 43
End: 2023-12-19
Payer: COMMERCIAL

## 2023-12-19 ENCOUNTER — APPOINTMENT (OUTPATIENT)
Dept: CARDIOLOGY | Facility: HOSPITAL | Age: 43
DRG: 189 | End: 2023-12-19
Payer: COMMERCIAL

## 2023-12-19 ENCOUNTER — HOSPITAL ENCOUNTER (INPATIENT)
Facility: HOSPITAL | Age: 43
LOS: 2 days | Discharge: HOME | DRG: 189 | End: 2023-12-21
Attending: EMERGENCY MEDICINE | Admitting: INTERNAL MEDICINE
Payer: COMMERCIAL

## 2023-12-19 ENCOUNTER — APPOINTMENT (OUTPATIENT)
Dept: RADIOLOGY | Facility: HOSPITAL | Age: 43
DRG: 189 | End: 2023-12-19
Payer: COMMERCIAL

## 2023-12-19 DIAGNOSIS — J44.1 CHRONIC OBSTRUCTIVE PULMONARY DISEASE WITH ACUTE EXACERBATION (MULTI): Primary | ICD-10-CM

## 2023-12-19 DIAGNOSIS — J44.1 COPD WITH EXACERBATION (MULTI): ICD-10-CM

## 2023-12-19 DIAGNOSIS — J96.02 ACUTE HYPERCAPNIC RESPIRATORY FAILURE (MULTI): ICD-10-CM

## 2023-12-19 DIAGNOSIS — J44.1 COPD EXACERBATION (MULTI): ICD-10-CM

## 2023-12-19 DIAGNOSIS — J96.21 ACUTE ON CHRONIC RESPIRATORY FAILURE WITH HYPOXIA AND HYPERCAPNIA (MULTI): Primary | ICD-10-CM

## 2023-12-19 DIAGNOSIS — J96.22 ACUTE ON CHRONIC RESPIRATORY FAILURE WITH HYPOXIA AND HYPERCAPNIA (MULTI): Primary | ICD-10-CM

## 2023-12-19 LAB
ALBUMIN SERPL BCP-MCNC: 4.3 G/DL (ref 3.4–5)
ALP SERPL-CCNC: 80 U/L (ref 33–110)
ALT SERPL W P-5'-P-CCNC: 16 U/L (ref 7–45)
ANION GAP BLDA CALCULATED.4IONS-SCNC: 3 MMO/L (ref 10–25)
ANION GAP SERPL CALC-SCNC: 13 MMOL/L (ref 10–20)
APPARATUS: ABNORMAL
APTT PPP: 44 SECONDS (ref 27–38)
ARTERIAL PATENCY WRIST A: POSITIVE
AST SERPL W P-5'-P-CCNC: 14 U/L (ref 9–39)
B-HCG SERPL-ACNC: <2 MIU/ML
BASE EXCESS BLDA CALC-SCNC: 6.7 MMOL/L (ref -2–3)
BASOPHILS # BLD AUTO: 0.06 X10*3/UL (ref 0–0.1)
BASOPHILS NFR BLD AUTO: 0.6 %
BILIRUB SERPL-MCNC: 0.4 MG/DL (ref 0–1.2)
BNP SERPL-MCNC: 17 PG/ML (ref 0–99)
BODY TEMPERATURE: ABNORMAL
BUN SERPL-MCNC: 23 MG/DL (ref 6–23)
CA-I BLDA-SCNC: 1.27 MMOL/L (ref 1.1–1.33)
CALCIUM SERPL-MCNC: 9.4 MG/DL (ref 8.6–10.3)
CARDIAC TROPONIN I PNL SERPL HS: 3 NG/L (ref 0–13)
CARDIAC TROPONIN I PNL SERPL HS: <3 NG/L (ref 0–13)
CHLORIDE BLDA-SCNC: 104 MMOL/L (ref 98–107)
CHLORIDE SERPL-SCNC: 101 MMOL/L (ref 98–107)
CO2 SERPL-SCNC: 30 MMOL/L (ref 21–32)
CREAT SERPL-MCNC: 1.58 MG/DL (ref 0.5–1.05)
EOSINOPHIL # BLD AUTO: 0.02 X10*3/UL (ref 0–0.7)
EOSINOPHIL NFR BLD AUTO: 0.2 %
ERYTHROCYTE [DISTWIDTH] IN BLOOD BY AUTOMATED COUNT: 16.7 % (ref 11.5–14.5)
FLOW: 4 LPM
FLUAV RNA RESP QL NAA+PROBE: NOT DETECTED
FLUBV RNA RESP QL NAA+PROBE: NOT DETECTED
GFR SERPL CREATININE-BSD FRML MDRD: 42 ML/MIN/1.73M*2
GLUCOSE BLDA-MCNC: 100 MG/DL (ref 74–99)
GLUCOSE SERPL-MCNC: 87 MG/DL (ref 74–99)
HCO3 BLDA-SCNC: 34.8 MMOL/L (ref 22–26)
HCT VFR BLD AUTO: 43.8 % (ref 36–46)
HCT VFR BLD EST: 39 % (ref 36–46)
HGB BLD-MCNC: 13.6 G/DL (ref 12–16)
HGB BLDA-MCNC: 13 G/DL (ref 12–16)
HOLD SPECIMEN: NORMAL
IMM GRANULOCYTES # BLD AUTO: 0.29 X10*3/UL (ref 0–0.7)
IMM GRANULOCYTES NFR BLD AUTO: 3.1 % (ref 0–0.9)
INHALED O2 CONCENTRATION: 36 %
INR PPP: 1.7 (ref 0.9–1.1)
LACTATE BLDA-SCNC: 0.6 MMOL/L (ref 0.4–2)
LYMPHOCYTES # BLD AUTO: 3.14 X10*3/UL (ref 1.2–4.8)
LYMPHOCYTES NFR BLD AUTO: 33.8 %
MCH RBC QN AUTO: 30.7 PG (ref 26–34)
MCHC RBC AUTO-ENTMCNC: 31.1 G/DL (ref 32–36)
MCV RBC AUTO: 99 FL (ref 80–100)
MONOCYTES # BLD AUTO: 0.66 X10*3/UL (ref 0.1–1)
MONOCYTES NFR BLD AUTO: 7.1 %
NEUTROPHILS # BLD AUTO: 5.12 X10*3/UL (ref 1.2–7.7)
NEUTROPHILS NFR BLD AUTO: 55.2 %
NRBC BLD-RTO: 0 /100 WBCS (ref 0–0)
OXYHGB MFR BLDA: 89.9 % (ref 94–98)
PCO2 BLDA: 66 MM HG (ref 38–42)
PH BLDA: 7.33 PH (ref 7.38–7.42)
PLATELET # BLD AUTO: 164 X10*3/UL (ref 150–450)
PO2 BLDA: 100 MM HG (ref 85–95)
POTASSIUM BLDA-SCNC: 4.3 MMOL/L (ref 3.5–5.3)
POTASSIUM SERPL-SCNC: 4.6 MMOL/L (ref 3.5–5.3)
PROT SERPL-MCNC: 7 G/DL (ref 6.4–8.2)
PROTHROMBIN TIME: 18.7 SECONDS (ref 9.8–12.8)
RBC # BLD AUTO: 4.43 X10*6/UL (ref 4–5.2)
SAO2 % BLDA: 99 % (ref 94–100)
SARS-COV-2 RNA RESP QL NAA+PROBE: NOT DETECTED
SODIUM BLDA-SCNC: 137 MMOL/L (ref 136–145)
SODIUM SERPL-SCNC: 139 MMOL/L (ref 136–145)
SPECIMEN DRAWN FROM PATIENT: ABNORMAL
WBC # BLD AUTO: 9.3 X10*3/UL (ref 4.4–11.3)

## 2023-12-19 PROCEDURE — 2500000004 HC RX 250 GENERAL PHARMACY W/ HCPCS (ALT 636 FOR OP/ED): Performed by: PHYSICIAN ASSISTANT

## 2023-12-19 PROCEDURE — 85610 PROTHROMBIN TIME: CPT | Performed by: PHYSICIAN ASSISTANT

## 2023-12-19 PROCEDURE — 71045 X-RAY EXAM CHEST 1 VIEW: CPT | Mod: FY

## 2023-12-19 PROCEDURE — 82947 ASSAY GLUCOSE BLOOD QUANT: CPT

## 2023-12-19 PROCEDURE — 93005 ELECTROCARDIOGRAM TRACING: CPT

## 2023-12-19 PROCEDURE — 36600 WITHDRAWAL OF ARTERIAL BLOOD: CPT

## 2023-12-19 PROCEDURE — 94660 CPAP INITIATION&MGMT: CPT

## 2023-12-19 PROCEDURE — 36415 COLL VENOUS BLD VENIPUNCTURE: CPT | Performed by: PHYSICIAN ASSISTANT

## 2023-12-19 PROCEDURE — 83880 ASSAY OF NATRIURETIC PEPTIDE: CPT | Performed by: PHYSICIAN ASSISTANT

## 2023-12-19 PROCEDURE — 84484 ASSAY OF TROPONIN QUANT: CPT | Performed by: PHYSICIAN ASSISTANT

## 2023-12-19 PROCEDURE — 2500000005 HC RX 250 GENERAL PHARMACY W/O HCPCS: Performed by: PHYSICIAN ASSISTANT

## 2023-12-19 PROCEDURE — 85025 COMPLETE CBC W/AUTO DIFF WBC: CPT | Performed by: PHYSICIAN ASSISTANT

## 2023-12-19 PROCEDURE — 84702 CHORIONIC GONADOTROPIN TEST: CPT | Performed by: PHYSICIAN ASSISTANT

## 2023-12-19 PROCEDURE — 87636 SARSCOV2 & INF A&B AMP PRB: CPT | Performed by: PHYSICIAN ASSISTANT

## 2023-12-19 PROCEDURE — 5A09357 ASSISTANCE WITH RESPIRATORY VENTILATION, LESS THAN 24 CONSECUTIVE HOURS, CONTINUOUS POSITIVE AIRWAY PRESSURE: ICD-10-PCS | Performed by: STUDENT IN AN ORGANIZED HEALTH CARE EDUCATION/TRAINING PROGRAM

## 2023-12-19 PROCEDURE — 2020000001 HC ICU ROOM DAILY

## 2023-12-19 PROCEDURE — 80053 COMPREHEN METABOLIC PANEL: CPT | Performed by: PHYSICIAN ASSISTANT

## 2023-12-19 PROCEDURE — 85018 HEMOGLOBIN: CPT

## 2023-12-19 PROCEDURE — 85018 HEMOGLOBIN: CPT | Performed by: PHYSICIAN ASSISTANT

## 2023-12-19 PROCEDURE — 96375 TX/PRO/DX INJ NEW DRUG ADDON: CPT

## 2023-12-19 PROCEDURE — 85730 THROMBOPLASTIN TIME PARTIAL: CPT | Performed by: PHYSICIAN ASSISTANT

## 2023-12-19 PROCEDURE — 99285 EMERGENCY DEPT VISIT HI MDM: CPT | Performed by: EMERGENCY MEDICINE

## 2023-12-19 PROCEDURE — 94760 N-INVAS EAR/PLS OXIMETRY 1: CPT

## 2023-12-19 PROCEDURE — 71045 X-RAY EXAM CHEST 1 VIEW: CPT | Performed by: RADIOLOGY

## 2023-12-19 PROCEDURE — 84132 ASSAY OF SERUM POTASSIUM: CPT | Performed by: PHYSICIAN ASSISTANT

## 2023-12-19 PROCEDURE — 2500000002 HC RX 250 W HCPCS SELF ADMINISTERED DRUGS (ALT 637 FOR MEDICARE OP, ALT 636 FOR OP/ED): Performed by: PHYSICIAN ASSISTANT

## 2023-12-19 PROCEDURE — 94640 AIRWAY INHALATION TREATMENT: CPT

## 2023-12-19 PROCEDURE — 96365 THER/PROPH/DIAG IV INF INIT: CPT

## 2023-12-19 PROCEDURE — 99291 CRITICAL CARE FIRST HOUR: CPT | Mod: MUE | Performed by: PHYSICIAN ASSISTANT

## 2023-12-19 RX ORDER — INSULIN LISPRO 100 [IU]/ML
0-10 INJECTION, SOLUTION INTRAVENOUS; SUBCUTANEOUS
Status: DISCONTINUED | OUTPATIENT
Start: 2023-12-19 | End: 2023-12-21 | Stop reason: HOSPADM

## 2023-12-19 RX ORDER — PANTOPRAZOLE SODIUM 40 MG/1
40 TABLET, DELAYED RELEASE ORAL DAILY
Status: DISCONTINUED | OUTPATIENT
Start: 2023-12-20 | End: 2023-12-21 | Stop reason: HOSPADM

## 2023-12-19 RX ORDER — MAGNESIUM SULFATE HEPTAHYDRATE 40 MG/ML
2 INJECTION, SOLUTION INTRAVENOUS ONCE
Status: COMPLETED | OUTPATIENT
Start: 2023-12-19 | End: 2023-12-19

## 2023-12-19 RX ORDER — DEXTROSE MONOHYDRATE 100 MG/ML
0.3 INJECTION, SOLUTION INTRAVENOUS ONCE AS NEEDED
Status: DISCONTINUED | OUTPATIENT
Start: 2023-12-19 | End: 2023-12-21 | Stop reason: HOSPADM

## 2023-12-19 RX ORDER — BUSPIRONE HYDROCHLORIDE 5 MG/1
30 TABLET ORAL 2 TIMES DAILY
Status: DISCONTINUED | OUTPATIENT
Start: 2023-12-20 | End: 2023-12-21 | Stop reason: HOSPADM

## 2023-12-19 RX ORDER — ATORVASTATIN CALCIUM 20 MG/1
20 TABLET, FILM COATED ORAL NIGHTLY
Status: DISCONTINUED | OUTPATIENT
Start: 2023-12-20 | End: 2023-12-21 | Stop reason: HOSPADM

## 2023-12-19 RX ORDER — PREDNISONE 20 MG/1
40 TABLET ORAL DAILY
Status: DISCONTINUED | OUTPATIENT
Start: 2023-12-21 | End: 2023-12-21 | Stop reason: HOSPADM

## 2023-12-19 RX ORDER — INSULIN GLARGINE 100 [IU]/ML
10 INJECTION, SOLUTION SUBCUTANEOUS EVERY 24 HOURS
Status: DISCONTINUED | OUTPATIENT
Start: 2023-12-19 | End: 2023-12-21 | Stop reason: HOSPADM

## 2023-12-19 RX ORDER — CITALOPRAM 20 MG/1
40 TABLET, FILM COATED ORAL DAILY
Status: DISCONTINUED | OUTPATIENT
Start: 2023-12-20 | End: 2023-12-21 | Stop reason: HOSPADM

## 2023-12-19 RX ORDER — METOPROLOL TARTRATE 25 MG/1
25 TABLET, FILM COATED ORAL 2 TIMES DAILY
Status: DISCONTINUED | OUTPATIENT
Start: 2023-12-20 | End: 2023-12-21 | Stop reason: HOSPADM

## 2023-12-19 RX ORDER — ACETAMINOPHEN 325 MG/1
650 TABLET ORAL EVERY 4 HOURS PRN
Status: DISCONTINUED | OUTPATIENT
Start: 2023-12-19 | End: 2023-12-21 | Stop reason: HOSPADM

## 2023-12-19 RX ORDER — BUPROPION HYDROCHLORIDE 150 MG/1
150 TABLET, EXTENDED RELEASE ORAL DAILY
Status: DISCONTINUED | OUTPATIENT
Start: 2023-12-20 | End: 2023-12-21 | Stop reason: HOSPADM

## 2023-12-19 RX ORDER — IPRATROPIUM BROMIDE AND ALBUTEROL SULFATE 2.5; .5 MG/3ML; MG/3ML
3 SOLUTION RESPIRATORY (INHALATION) ONCE
Status: COMPLETED | OUTPATIENT
Start: 2023-12-19 | End: 2023-12-19

## 2023-12-19 RX ORDER — CLONAZEPAM 1 MG/1
1 TABLET ORAL 2 TIMES DAILY PRN
Status: DISCONTINUED | OUTPATIENT
Start: 2023-12-19 | End: 2023-12-21 | Stop reason: HOSPADM

## 2023-12-19 RX ORDER — FLUTICASONE FUROATE AND VILANTEROL 100; 25 UG/1; UG/1
1 POWDER RESPIRATORY (INHALATION)
Status: DISCONTINUED | OUTPATIENT
Start: 2023-12-20 | End: 2023-12-21 | Stop reason: HOSPADM

## 2023-12-19 RX ORDER — PREDNISONE 20 MG/1
TABLET ORAL
Qty: 15 TABLET | Refills: 0 | Status: SHIPPED | OUTPATIENT
Start: 2023-12-19 | End: 2023-12-21 | Stop reason: HOSPADM

## 2023-12-19 RX ORDER — HYDROXYZINE PAMOATE 25 MG/1
50 CAPSULE ORAL 3 TIMES DAILY PRN
Status: DISCONTINUED | OUTPATIENT
Start: 2023-12-19 | End: 2023-12-21 | Stop reason: HOSPADM

## 2023-12-19 RX ORDER — DOCUSATE SODIUM 100 MG/1
100 CAPSULE, LIQUID FILLED ORAL 2 TIMES DAILY
Status: DISCONTINUED | OUTPATIENT
Start: 2023-12-20 | End: 2023-12-21 | Stop reason: HOSPADM

## 2023-12-19 RX ORDER — TOPIRAMATE 25 MG/1
50 TABLET ORAL DAILY
Status: DISCONTINUED | OUTPATIENT
Start: 2023-12-20 | End: 2023-12-21 | Stop reason: HOSPADM

## 2023-12-19 RX ORDER — BENZTROPINE MESYLATE 1 MG/1
2 TABLET ORAL DAILY
Status: DISCONTINUED | OUTPATIENT
Start: 2023-12-20 | End: 2023-12-21 | Stop reason: HOSPADM

## 2023-12-19 RX ORDER — PREDNISONE 20 MG/1
20 TABLET ORAL DAILY
Status: DISCONTINUED | OUTPATIENT
Start: 2023-12-25 | End: 2023-12-21 | Stop reason: HOSPADM

## 2023-12-19 RX ORDER — LEVOTHYROXINE SODIUM 75 UG/1
150 TABLET ORAL DAILY
Status: DISCONTINUED | OUTPATIENT
Start: 2023-12-20 | End: 2023-12-21 | Stop reason: HOSPADM

## 2023-12-19 RX ORDER — POLYETHYLENE GLYCOL 3350 17 G/17G
17 POWDER, FOR SOLUTION ORAL DAILY
Status: DISCONTINUED | OUTPATIENT
Start: 2023-12-20 | End: 2023-12-21 | Stop reason: HOSPADM

## 2023-12-19 RX ORDER — IPRATROPIUM BROMIDE AND ALBUTEROL SULFATE 2.5; .5 MG/3ML; MG/3ML
3 SOLUTION RESPIRATORY (INHALATION) EVERY 2 HOUR PRN
Status: DISCONTINUED | OUTPATIENT
Start: 2023-12-19 | End: 2023-12-21 | Stop reason: HOSPADM

## 2023-12-19 RX ORDER — IPRATROPIUM BROMIDE AND ALBUTEROL SULFATE 2.5; .5 MG/3ML; MG/3ML
3 SOLUTION RESPIRATORY (INHALATION) EVERY 4 HOURS
Status: DISCONTINUED | OUTPATIENT
Start: 2023-12-19 | End: 2023-12-21 | Stop reason: HOSPADM

## 2023-12-19 RX ORDER — DEXTROSE 50 % IN WATER (D50W) INTRAVENOUS SYRINGE
25
Status: DISCONTINUED | OUTPATIENT
Start: 2023-12-19 | End: 2023-12-21 | Stop reason: HOSPADM

## 2023-12-19 RX ADMIN — MAGNESIUM SULFATE HEPTAHYDRATE 2 G: 40 INJECTION, SOLUTION INTRAVENOUS at 15:15

## 2023-12-19 RX ADMIN — Medication: at 18:02

## 2023-12-19 RX ADMIN — METHYLPREDNISOLONE SODIUM SUCCINATE 125 MG: 125 INJECTION, POWDER, FOR SOLUTION INTRAMUSCULAR; INTRAVENOUS at 16:38

## 2023-12-19 RX ADMIN — Medication: at 17:30

## 2023-12-19 RX ADMIN — IPRATROPIUM BROMIDE AND ALBUTEROL SULFATE 3 ML: 2.5; .5 SOLUTION RESPIRATORY (INHALATION) at 15:15

## 2023-12-19 SDOH — SOCIAL STABILITY: SOCIAL INSECURITY: WERE YOU ABLE TO COMPLETE ALL THE BEHAVIORAL HEALTH SCREENINGS?: YES

## 2023-12-19 SDOH — SOCIAL STABILITY: SOCIAL INSECURITY: DOES ANYONE TRY TO KEEP YOU FROM HAVING/CONTACTING OTHER FRIENDS OR DOING THINGS OUTSIDE YOUR HOME?: NO

## 2023-12-19 SDOH — SOCIAL STABILITY: SOCIAL INSECURITY: HAVE YOU HAD THOUGHTS OF HARMING ANYONE ELSE?: NO

## 2023-12-19 SDOH — SOCIAL STABILITY: SOCIAL INSECURITY: ARE YOU OR HAVE YOU BEEN THREATENED OR ABUSED PHYSICALLY, EMOTIONALLY, OR SEXUALLY BY ANYONE?: NO

## 2023-12-19 SDOH — SOCIAL STABILITY: SOCIAL INSECURITY: HAS ANYONE EVER THREATENED TO HURT YOUR FAMILY OR YOUR PETS?: NO

## 2023-12-19 SDOH — SOCIAL STABILITY: SOCIAL INSECURITY: DO YOU FEEL ANYONE HAS EXPLOITED OR TAKEN ADVANTAGE OF YOU FINANCIALLY OR OF YOUR PERSONAL PROPERTY?: NO

## 2023-12-19 SDOH — SOCIAL STABILITY: SOCIAL INSECURITY: ARE THERE ANY APPARENT SIGNS OF INJURIES/BEHAVIORS THAT COULD BE RELATED TO ABUSE/NEGLECT?: NO

## 2023-12-19 SDOH — SOCIAL STABILITY: SOCIAL INSECURITY: ABUSE: ADULT

## 2023-12-19 SDOH — SOCIAL STABILITY: SOCIAL INSECURITY: DO YOU FEEL UNSAFE GOING BACK TO THE PLACE WHERE YOU ARE LIVING?: NO

## 2023-12-19 ASSESSMENT — ACTIVITIES OF DAILY LIVING (ADL)
LACK_OF_TRANSPORTATION: PATIENT DECLINED
PATIENT'S MEMORY ADEQUATE TO SAFELY COMPLETE DAILY ACTIVITIES?: YES
HEARING - RIGHT EAR: FUNCTIONAL
WALKS IN HOME: INDEPENDENT
HEARING - LEFT EAR: FUNCTIONAL
ADEQUATE_TO_COMPLETE_ADL: YES
TOILETING: NEEDS ASSISTANCE
BATHING: NEEDS ASSISTANCE
GROOMING: INDEPENDENT
JUDGMENT_ADEQUATE_SAFELY_COMPLETE_DAILY_ACTIVITIES: YES
DRESSING YOURSELF: NEEDS ASSISTANCE
FEEDING YOURSELF: INDEPENDENT

## 2023-12-19 ASSESSMENT — COGNITIVE AND FUNCTIONAL STATUS - GENERAL
MOBILITY SCORE: 24
DAILY ACTIVITIY SCORE: 23
PATIENT BASELINE BEDBOUND: NO
TOILETING: A LITTLE

## 2023-12-19 ASSESSMENT — LIFESTYLE VARIABLES
HAVE YOU EVER FELT YOU SHOULD CUT DOWN ON YOUR DRINKING: NO
PRESCIPTION_ABUSE_PAST_12_MONTHS: NO
HOW OFTEN DO YOU HAVE A DRINK CONTAINING ALCOHOL: NEVER
HOW MANY STANDARD DRINKS CONTAINING ALCOHOL DO YOU HAVE ON A TYPICAL DAY: PATIENT DOES NOT DRINK
AUDIT-C TOTAL SCORE: 0
HOW OFTEN DO YOU HAVE A DRINK CONTAINING ALCOHOL: NEVER
SUBSTANCE_ABUSE_PAST_12_MONTHS: YES
HOW MANY STANDARD DRINKS CONTAINING ALCOHOL DO YOU HAVE ON A TYPICAL DAY: PATIENT DOES NOT DRINK
AUDIT-C TOTAL SCORE: 0
SUBSTANCE_ABUSE_PAST_12_MONTHS: YES
HAVE PEOPLE ANNOYED YOU BY CRITICIZING YOUR DRINKING: NO
EVER HAD A DRINK FIRST THING IN THE MORNING TO STEADY YOUR NERVES TO GET RID OF A HANGOVER: NO
AUDIT-C TOTAL SCORE: 0
EVER FELT BAD OR GUILTY ABOUT YOUR DRINKING: NO
HOW MANY STANDARD DRINKS CONTAINING ALCOHOL DO YOU HAVE ON A TYPICAL DAY: PATIENT DOES NOT DRINK
AUDIT-C TOTAL SCORE: 0
HOW OFTEN DO YOU HAVE 6 OR MORE DRINKS ON ONE OCCASION: NEVER
SKIP TO QUESTIONS 9-10: 1
SKIP TO QUESTIONS 9-10: 1
PRESCIPTION_ABUSE_PAST_12_MONTHS: NO
PRESCIPTION_ABUSE_PAST_12_MONTHS: NO
SKIP TO QUESTIONS 9-10: 1
HOW OFTEN DO YOU HAVE A DRINK CONTAINING ALCOHOL: NEVER
AUDIT-C TOTAL SCORE: 0
SUBSTANCE_ABUSE_PAST_12_MONTHS: YES
HOW OFTEN DO YOU HAVE 6 OR MORE DRINKS ON ONE OCCASION: NEVER
AUDIT-C TOTAL SCORE: 0
REASON UNABLE TO ASSESS: NO
HOW OFTEN DO YOU HAVE 6 OR MORE DRINKS ON ONE OCCASION: NEVER

## 2023-12-19 ASSESSMENT — PAIN - FUNCTIONAL ASSESSMENT
PAIN_FUNCTIONAL_ASSESSMENT: 0-10
PAIN_FUNCTIONAL_ASSESSMENT: 0-10

## 2023-12-19 ASSESSMENT — COLUMBIA-SUICIDE SEVERITY RATING SCALE - C-SSRS
6. HAVE YOU EVER DONE ANYTHING, STARTED TO DO ANYTHING, OR PREPARED TO DO ANYTHING TO END YOUR LIFE?: NO
1. IN THE PAST MONTH, HAVE YOU WISHED YOU WERE DEAD OR WISHED YOU COULD GO TO SLEEP AND NOT WAKE UP?: NO
6. HAVE YOU EVER DONE ANYTHING, STARTED TO DO ANYTHING, OR PREPARED TO DO ANYTHING TO END YOUR LIFE?: NO
2. HAVE YOU ACTUALLY HAD ANY THOUGHTS OF KILLING YOURSELF?: NO
2. HAVE YOU ACTUALLY HAD ANY THOUGHTS OF KILLING YOURSELF?: NO
1. IN THE PAST MONTH, HAVE YOU WISHED YOU WERE DEAD OR WISHED YOU COULD GO TO SLEEP AND NOT WAKE UP?: NO

## 2023-12-19 ASSESSMENT — PATIENT HEALTH QUESTIONNAIRE - PHQ9
1. LITTLE INTEREST OR PLEASURE IN DOING THINGS: NOT AT ALL
SUM OF ALL RESPONSES TO PHQ9 QUESTIONS 1 & 2: 0
SUM OF ALL RESPONSES TO PHQ9 QUESTIONS 1 & 2: 0
2. FEELING DOWN, DEPRESSED OR HOPELESS: NOT AT ALL
2. FEELING DOWN, DEPRESSED OR HOPELESS: NOT AT ALL
SUM OF ALL RESPONSES TO PHQ9 QUESTIONS 1 & 2: 0
2. FEELING DOWN, DEPRESSED OR HOPELESS: NOT AT ALL
1. LITTLE INTEREST OR PLEASURE IN DOING THINGS: NOT AT ALL
1. LITTLE INTEREST OR PLEASURE IN DOING THINGS: NOT AT ALL

## 2023-12-19 ASSESSMENT — PAIN DESCRIPTION - LOCATION: LOCATION: SHOULDER

## 2023-12-19 ASSESSMENT — PAIN SCALES - GENERAL
PAINLEVEL_OUTOF10: 7
PAINLEVEL_OUTOF10: 0 - NO PAIN

## 2023-12-19 NOTE — ED PROVIDER NOTES
HPI   Chief Complaint   Patient presents with    Dizziness    Shortness of Breath       43-year-old female presents to the emergency department for complaints of low pulse ox.  Patient states she has a history of COPD and has oxygen at home.  States she is supposed be wearing 4 L at night and as needed during the day.  States she was not really feeling short of breath but has been sick for about a week with cough and cold symptoms.  States she has been bringing up a lot of mucus as well.  She denies any chest pain.  States she has not been using her oxygen because she did not feel that she needed it but she checked her O2 today and it was 79%.  She came to the emergency department at that time.  She has not been on any oxygen yet today.  She states she does smoke cigarettes.  She also takes Xarelto with a history of PEs.  She has not missed any doses.  States when her flulike symptoms began about a week ago, she called her doctor and he called her and steroids and antibiotics which she has now finished.                          No data recorded                Patient History   Past Medical History:   Diagnosis Date    Encounter for preprocedural cardiovascular examination 11/28/2018    Preop cardiovascular exam    Personal history of other diseases of the nervous system and sense organs     History of sleep apnea    Personal history of other endocrine, nutritional and metabolic disease     History of hypothyroidism    Personal history of other specified conditions 07/28/2021    History of gross hematuria    Personal history of other specified conditions 06/24/2021    History of dysuria    Personal history of other specified conditions     History of tachycardia    Syncope and collapse 08/11/2021    Near syncope    Unspecified acute conjunctivitis, right eye 05/12/2021    Acute bacterial conjunctivitis of right eye     Past Surgical History:   Procedure Laterality Date    OTHER SURGICAL HISTORY  11/09/2018    Ear  surgery    OTHER SURGICAL HISTORY  2018     section    OTHER SURGICAL HISTORY  2018    Tonsillectomy    OTHER SURGICAL HISTORY  10/27/2021    Ventral hernia repair    OTHER SURGICAL HISTORY  10/27/2021    Lipoma excision    OTHER SURGICAL HISTORY  10/06/2021    Hernia repair     Family History   Problem Relation Name Age of Onset    Fibromyalgia Father      Hypertension Brother      Thyroid disease Maternal Grandmother      Thyroid disease Paternal Grandmother      Lung cancer Paternal Grandfather      Coronary artery disease Other Grandmother      Social History     Tobacco Use    Smoking status: Never     Passive exposure: Never    Smokeless tobacco: Never   Substance Use Topics    Alcohol use: Never    Drug use: Never       Physical Exam   ED Triage Vitals   Temp Heart Rate Resp BP   23 1417 23 1417 23 1417 23 1417   36.1 °C (97 °F) 93 16 108/56      SpO2 Temp Source Heart Rate Source Patient Position   23 1417 23 1514 23 1436 23 1514   (!) 89 % Temporal Monitor Sitting      BP Location FiO2 (%)     23 1514 --     Right arm        Physical Exam  Vitals and nursing note reviewed.   Constitutional:       General: She is not in acute distress.  HENT:      Head: Atraumatic.      Mouth/Throat:      Mouth: Mucous membranes are moist.      Pharynx: Oropharynx is clear.   Eyes:      Extraocular Movements: Extraocular movements intact.      Conjunctiva/sclera: Conjunctivae normal.      Pupils: Pupils are equal, round, and reactive to light.   Cardiovascular:      Rate and Rhythm: Normal rate and regular rhythm.      Pulses: Normal pulses.   Pulmonary:      Effort: Pulmonary effort is normal. No respiratory distress.      Breath sounds: Decreased breath sounds, wheezing and rhonchi present.      Comments: Bilateral rhonchi and wheezes  Abdominal:      General: There is no distension.      Palpations: Abdomen is soft.      Tenderness: There is no  abdominal tenderness. There is no guarding or rebound.   Musculoskeletal:         General: No deformity.      Cervical back: Neck supple.   Skin:     General: Skin is warm and dry.   Neurological:      Mental Status: She is alert and oriented to person, place, and time. Mental status is at baseline.      Cranial Nerves: No cranial nerve deficit.      Sensory: No sensory deficit.      Motor: No weakness.   Psychiatric:         Mood and Affect: Mood normal.         Behavior: Behavior normal.         ED Course & MDM        Medical Decision Making  43-year-old female with a history of COPD presents to the emergency department for flulike symptoms x 1 week, low pulse ox today and no improvement with antibiotics and steroids at home.  On my exam, patient does not appear to be in acute distress.  Her oxygen is 91% on room air initially.  The nurse put her on 2 L but after some time, patient seemed to decompensate and is now 88% on 2 L.  Instructed the nurse to increase her O2 to 4 L but the ABG will give more information like it if she is hypercapnic.  EKG interpreted by me: Sinus rhythm with a rate of 86 with no ST-T changes  Patient treated with a DuoNeb, Solu-Medrol, magnesium   Labs do not show leukocytosis, left shift, anemia, metabolic disturbance.  Patient has an AILEEN with a creatinine of 1.5 and a GFR 42.  1 month ago, her creatinine was 1.05 and her GFR was 68.  PT/INR is bumped which can occur with Xarelto therapy.  Flu and COVID are negative.  Troponin is not elevated.  Pregnancy test is negative.  BNP is normal.  Chest x-ray shows no acute cardiopulmonary process.  ABG shows a pH of 7.33, pCO2 of 66 and bicarb of 34.8.  This does appear to be worse and decompensated from patient's baseline so we will try BiPAP.   Ariel Whiting will follow.         Procedure  Procedures     Uma Francis PA-C  12/19/23 [id number]

## 2023-12-19 NOTE — PROGRESS NOTES
Emergency Medicine Transition of Care Note.    I received Stephenie Mccray in signout from Uma Francis PA-C.  Please see the previous ED provider note for all HPI, PE and MDM up to the time of signout at 1700. This is in addition to the primary record.    In brief Stephenie Mccray is an 43 y.o. female presenting for   Chief Complaint   Patient presents with    Dizziness    Shortness of Breath     At the time of signout we were awaiting: repeat ABG on BiPAP    Diagnoses as of 12/19/23 1956   COPD exacerbation (CMS/[facility])   Acute hypercapnic respiratory failure (CMS/[facility])       Medical Decision Making  The patient's repeat blood gas shows a pH 7.32 pCO2 of 67 pO2 of 73 which is actually worse from first blood gas which was done at 1620 hrs. pCO2 of 66 pO2 of 100%.  The patient remains on BiPAP.  I paged out to the ICU for admission.  Repeat examination patient remains on BiPAP with diminished breath sounds bilaterally with faint wheezing.  Patient was accepted to the ICU.  Patient remains on BiPAP.  Final diagnoses:   None           Procedure  Procedures    Ariel Whiting PA-C

## 2023-12-19 NOTE — TELEPHONE ENCOUNTER
Patient called in stating she lost her prednisone (Deltasone) 20 mg. She is wondering if Dr. Yuen can resend this to [employer]. Patient stated she is also dizzy, light headed, and her pulseox is at 79. Patient advised to go to ER and she stated OK  Please Advise

## 2023-12-20 LAB
ANION GAP BLDA CALCULATED.4IONS-SCNC: 4 MMO/L (ref 10–25)
ANION GAP SERPL CALC-SCNC: 10 MMOL/L (ref 10–20)
BASE EXCESS BLDA CALC-SCNC: 6.1 MMOL/L (ref -2–3)
BASOPHILS # BLD AUTO: 0.07 X10*3/UL (ref 0–0.1)
BASOPHILS NFR BLD AUTO: 0.5 %
BODY TEMPERATURE: ABNORMAL
BUN SERPL-MCNC: 21 MG/DL (ref 6–23)
CA-I BLDA-SCNC: 1.28 MMOL/L (ref 1.1–1.33)
CALCIUM SERPL-MCNC: 9 MG/DL (ref 8.6–10.3)
CHLORIDE BLDA-SCNC: 102 MMOL/L (ref 98–107)
CHLORIDE SERPL-SCNC: 101 MMOL/L (ref 98–107)
CO2 SERPL-SCNC: 31 MMOL/L (ref 21–32)
CREAT SERPL-MCNC: 0.83 MG/DL (ref 0.5–1.05)
EOSINOPHIL # BLD AUTO: 0.02 X10*3/UL (ref 0–0.7)
EOSINOPHIL NFR BLD AUTO: 0.2 %
ERYTHROCYTE [DISTWIDTH] IN BLOOD BY AUTOMATED COUNT: 16.4 % (ref 11.5–14.5)
GFR SERPL CREATININE-BSD FRML MDRD: 90 ML/MIN/1.73M*2
GLUCOSE BLD MANUAL STRIP-MCNC: 155 MG/DL (ref 74–99)
GLUCOSE BLD MANUAL STRIP-MCNC: 167 MG/DL (ref 74–99)
GLUCOSE BLD MANUAL STRIP-MCNC: 181 MG/DL (ref 74–99)
GLUCOSE BLD MANUAL STRIP-MCNC: 200 MG/DL (ref 74–99)
GLUCOSE BLD MANUAL STRIP-MCNC: 265 MG/DL (ref 74–99)
GLUCOSE BLDA-MCNC: 121 MG/DL (ref 74–99)
GLUCOSE SERPL-MCNC: 179 MG/DL (ref 74–99)
HCO3 BLDA-SCNC: 34.5 MMOL/L (ref 22–26)
HCT VFR BLD AUTO: 42.8 % (ref 36–46)
HCT VFR BLD EST: 40 % (ref 36–46)
HGB BLD-MCNC: 13.2 G/DL (ref 12–16)
HGB BLDA-MCNC: 13.4 G/DL (ref 12–16)
IMM GRANULOCYTES # BLD AUTO: 0.32 X10*3/UL (ref 0–0.7)
IMM GRANULOCYTES NFR BLD AUTO: 2.5 % (ref 0–0.9)
INHALED O2 CONCENTRATION: 60 %
LACTATE BLDA-SCNC: 0.6 MMOL/L (ref 0.4–2)
LYMPHOCYTES # BLD AUTO: 1.11 X10*3/UL (ref 1.2–4.8)
LYMPHOCYTES NFR BLD AUTO: 8.7 %
MAGNESIUM SERPL-MCNC: 1.88 MG/DL (ref 1.6–2.4)
MCH RBC QN AUTO: 30.1 PG (ref 26–34)
MCHC RBC AUTO-ENTMCNC: 30.8 G/DL (ref 32–36)
MCV RBC AUTO: 98 FL (ref 80–100)
MONOCYTES # BLD AUTO: 0.34 X10*3/UL (ref 0.1–1)
MONOCYTES NFR BLD AUTO: 2.7 %
NEUTROPHILS # BLD AUTO: 10.87 X10*3/UL (ref 1.2–7.7)
NEUTROPHILS NFR BLD AUTO: 85.4 %
NRBC BLD-RTO: 0 /100 WBCS (ref 0–0)
OXYHGB MFR BLDA: 89.2 % (ref 94–98)
PCO2 BLDA: 67 MM HG (ref 38–42)
PH BLDA: 7.32 PH (ref 7.38–7.42)
PHOSPHATE SERPL-MCNC: 4.1 MG/DL (ref 2.5–4.9)
PLATELET # BLD AUTO: 160 X10*3/UL (ref 150–450)
PO2 BLDA: 73 MM HG (ref 85–95)
POTASSIUM BLDA-SCNC: 4.9 MMOL/L (ref 3.5–5.3)
POTASSIUM SERPL-SCNC: 4.9 MMOL/L (ref 3.5–5.3)
RBC # BLD AUTO: 4.38 X10*6/UL (ref 4–5.2)
SAO2 % BLDA: 95 % (ref 94–100)
SODIUM BLDA-SCNC: 136 MMOL/L (ref 136–145)
SODIUM SERPL-SCNC: 137 MMOL/L (ref 136–145)
WBC # BLD AUTO: 12.7 X10*3/UL (ref 4.4–11.3)

## 2023-12-20 PROCEDURE — 2500000002 HC RX 250 W HCPCS SELF ADMINISTERED DRUGS (ALT 637 FOR MEDICARE OP, ALT 636 FOR OP/ED)

## 2023-12-20 PROCEDURE — 2500000004 HC RX 250 GENERAL PHARMACY W/ HCPCS (ALT 636 FOR OP/ED)

## 2023-12-20 PROCEDURE — 94640 AIRWAY INHALATION TREATMENT: CPT

## 2023-12-20 PROCEDURE — 99291 CRITICAL CARE FIRST HOUR: CPT

## 2023-12-20 PROCEDURE — 1200000002 HC GENERAL ROOM WITH TELEMETRY DAILY

## 2023-12-20 PROCEDURE — 2500000001 HC RX 250 WO HCPCS SELF ADMINISTERED DRUGS (ALT 637 FOR MEDICARE OP)

## 2023-12-20 PROCEDURE — 80048 BASIC METABOLIC PNL TOTAL CA: CPT

## 2023-12-20 PROCEDURE — 82947 ASSAY GLUCOSE BLOOD QUANT: CPT

## 2023-12-20 PROCEDURE — 36415 COLL VENOUS BLD VENIPUNCTURE: CPT

## 2023-12-20 PROCEDURE — 83735 ASSAY OF MAGNESIUM: CPT

## 2023-12-20 PROCEDURE — 84100 ASSAY OF PHOSPHORUS: CPT

## 2023-12-20 PROCEDURE — 94799 UNLISTED PULMONARY SVC/PX: CPT

## 2023-12-20 PROCEDURE — 85025 COMPLETE CBC W/AUTO DIFF WBC: CPT

## 2023-12-20 PROCEDURE — 94660 CPAP INITIATION&MGMT: CPT

## 2023-12-20 RX ORDER — MAGNESIUM SULFATE HEPTAHYDRATE 40 MG/ML
2 INJECTION, SOLUTION INTRAVENOUS ONCE
Status: COMPLETED | OUTPATIENT
Start: 2023-12-20 | End: 2023-12-20

## 2023-12-20 RX ADMIN — IPRATROPIUM BROMIDE AND ALBUTEROL SULFATE 3 ML: 2.5; .5 SOLUTION RESPIRATORY (INHALATION) at 04:28

## 2023-12-20 RX ADMIN — LEVOTHYROXINE SODIUM 150 MCG: 75 TABLET ORAL at 05:47

## 2023-12-20 RX ADMIN — TOPIRAMATE 50 MG: 25 TABLET ORAL at 08:48

## 2023-12-20 RX ADMIN — INSULIN LISPRO 2 UNITS: 100 INJECTION, SOLUTION INTRAVENOUS; SUBCUTANEOUS at 00:08

## 2023-12-20 RX ADMIN — INSULIN LISPRO 6 UNITS: 100 INJECTION, SOLUTION INTRAVENOUS; SUBCUTANEOUS at 11:41

## 2023-12-20 RX ADMIN — METOPROLOL TARTRATE 25 MG: 25 TABLET, FILM COATED ORAL at 08:48

## 2023-12-20 RX ADMIN — METOPROLOL TARTRATE 25 MG: 25 TABLET, FILM COATED ORAL at 20:03

## 2023-12-20 RX ADMIN — IPRATROPIUM BROMIDE AND ALBUTEROL SULFATE 3 ML: 2.5; .5 SOLUTION RESPIRATORY (INHALATION) at 19:26

## 2023-12-20 RX ADMIN — PANTOPRAZOLE SODIUM 40 MG: 40 TABLET, DELAYED RELEASE ORAL at 08:51

## 2023-12-20 RX ADMIN — CLONAZEPAM 1 MG: 1 TABLET ORAL at 00:08

## 2023-12-20 RX ADMIN — INSULIN LISPRO 2 UNITS: 100 INJECTION, SOLUTION INTRAVENOUS; SUBCUTANEOUS at 20:50

## 2023-12-20 RX ADMIN — MAGNESIUM SULFATE HEPTAHYDRATE 2 G: 40 INJECTION, SOLUTION INTRAVENOUS at 05:49

## 2023-12-20 RX ADMIN — IPRATROPIUM BROMIDE AND ALBUTEROL SULFATE 3 ML: 2.5; .5 SOLUTION RESPIRATORY (INHALATION) at 07:31

## 2023-12-20 RX ADMIN — CLONAZEPAM 1 MG: 1 TABLET ORAL at 20:10

## 2023-12-20 RX ADMIN — INSULIN LISPRO 2 UNITS: 100 INJECTION, SOLUTION INTRAVENOUS; SUBCUTANEOUS at 06:35

## 2023-12-20 RX ADMIN — INSULIN GLARGINE 10 UNITS: 100 INJECTION, SOLUTION SUBCUTANEOUS at 20:50

## 2023-12-20 RX ADMIN — IPRATROPIUM BROMIDE AND ALBUTEROL SULFATE 3 ML: 2.5; .5 SOLUTION RESPIRATORY (INHALATION) at 16:35

## 2023-12-20 RX ADMIN — ATORVASTATIN CALCIUM 20 MG: 20 TABLET, FILM COATED ORAL at 20:03

## 2023-12-20 RX ADMIN — ACETAMINOPHEN 650 MG: 325 TABLET ORAL at 09:04

## 2023-12-20 RX ADMIN — METHYLPREDNISOLONE SODIUM SUCCINATE 40 MG: 40 INJECTION, POWDER, FOR SOLUTION INTRAMUSCULAR; INTRAVENOUS at 05:47

## 2023-12-20 RX ADMIN — IPRATROPIUM BROMIDE AND ALBUTEROL SULFATE 3 ML: 2.5; .5 SOLUTION RESPIRATORY (INHALATION) at 11:46

## 2023-12-20 RX ADMIN — BUSPIRONE HYDROCHLORIDE 30 MG: 5 TABLET ORAL at 20:03

## 2023-12-20 RX ADMIN — HYDROXYZINE PAMOATE 50 MG: 25 CAPSULE ORAL at 17:18

## 2023-12-20 RX ADMIN — CITALOPRAM HYDROBROMIDE 40 MG: 20 TABLET ORAL at 08:48

## 2023-12-20 RX ADMIN — BENZTROPINE MESYLATE 2 MG: 1 TABLET ORAL at 08:47

## 2023-12-20 RX ADMIN — DOCUSATE SODIUM 100 MG: 100 CAPSULE, LIQUID FILLED ORAL at 08:48

## 2023-12-20 RX ADMIN — BUSPIRONE HYDROCHLORIDE 30 MG: 5 TABLET ORAL at 08:47

## 2023-12-20 RX ADMIN — RIVAROXABAN 20 MG: 20 TABLET, FILM COATED ORAL at 17:14

## 2023-12-20 RX ADMIN — SODIUM CHLORIDE, POTASSIUM CHLORIDE, SODIUM LACTATE AND CALCIUM CHLORIDE 1000 ML: 600; 310; 30; 20 INJECTION, SOLUTION INTRAVENOUS at 00:39

## 2023-12-20 RX ADMIN — POLYETHYLENE GLYCOL 3350 17 G: 17 POWDER, FOR SOLUTION ORAL at 08:48

## 2023-12-20 RX ADMIN — DOCUSATE SODIUM 100 MG: 100 CAPSULE, LIQUID FILLED ORAL at 20:03

## 2023-12-20 ASSESSMENT — COGNITIVE AND FUNCTIONAL STATUS - GENERAL
MOBILITY SCORE: 24
DAILY ACTIVITIY SCORE: 24

## 2023-12-20 ASSESSMENT — ENCOUNTER SYMPTOMS
HEMATOLOGIC/LYMPHATIC NEGATIVE: 1
WHEEZING: 1
ALLERGIC/IMMUNOLOGIC NEGATIVE: 1
GASTROINTESTINAL NEGATIVE: 1
WEAKNESS: 1
ACTIVITY CHANGE: 1
CARDIOVASCULAR NEGATIVE: 1
CHEST TIGHTNESS: 1
LIGHT-HEADEDNESS: 1
ARTHRALGIAS: 1
DIZZINESS: 1
ENDOCRINE NEGATIVE: 1
SHORTNESS OF BREATH: 1
FATIGUE: 1
COUGH: 1
EYES NEGATIVE: 1
MYALGIAS: 1

## 2023-12-20 ASSESSMENT — PAIN SCALES - GENERAL
PAINLEVEL_OUTOF10: 3
PAINLEVEL_OUTOF10: 0 - NO PAIN

## 2023-12-20 ASSESSMENT — PAIN DESCRIPTION - LOCATION: LOCATION: HEAD

## 2023-12-20 ASSESSMENT — PAIN - FUNCTIONAL ASSESSMENT
PAIN_FUNCTIONAL_ASSESSMENT: 0-10
PAIN_FUNCTIONAL_ASSESSMENT: 0-10

## 2023-12-20 NOTE — CARE PLAN
The patient's goals for the shift include  breathe better    The clinical goals for the shift include keep Sp02 > 92%    Over the shift, the patient did not make progress toward the following goals. Barriers to progression include patient refusing to wear bipap. . Recommendations to address these barriers include explain benefits of wearing bipap.

## 2023-12-20 NOTE — H&P
[facility] Critical Care Medicine       Date:  12/19/2023  Patient:  Stephenie Mccray  YOB: 1980  MRN:  [id number]   Admit Date:  12/19/2023  ========================================================================================================    Chief Complaint   Patient presents with    Dizziness    Shortness of Breath         History of Present Illness:  Stephenie Mccray is a 43 y.o. year old female patient with Past Medical History of DVT/PE (on Xarelto), COPD (home 4L O2), IDDM 2, hypothyroidism, GERD, hyperlipidemia, obesity,  schizophrenia, uterine fibroid.  Patient is well-known to this facility with frequent admissions for COPD exacerbation and acute on chronic respiratory failure.  Patient presented to [facility] emergency department 12/19 with complaints of shortness of breath and feeling ill for the past week.  Complains of productive cough.  States she has not been using her oxygen at home and continues to smoke heavily.  Oxygen saturation at home was 79%.  Called her PCP last week and was prescribed steroids and antibiotics of which she finished.  Denies any headaches or dizziness.  Denies any chest pain or palpitations.  Denies any nausea, vomiting.     In the emergency department patient continued to be hypoxic on 4 L nasal cannula, ABG was drawn which showed hypercapnia with pCO2 66, pH 7.33.  Patient was given 125 mg Solu-Medrol, DuoNebs, 2 g IV magnesium and placed on BiPAP.  Per the ED provider, no improvement on BiPAP and continued to be hypercapnic with pCO2 67, although no ABG in the chart to show this.  Patient continued to be hypoxic on BiPAP with SpO2 80% on 50% FiO2.  Laboratory workup negative for leukocytosis.  Chest x-ray not concerning for infectious etiology.  Slight AILEEN with creatinine 1.5.  Patient to be admitted to ICU for acute on chronic hypercapnic/hypoxic respiratory failure in the setting of COPD exacerbation.    Interval ICU Events:  12/19: Admitted to ICU for  COPD exacerbation, Acute on Chronic Hypoxic/Hypercapnic Respiratory failure requiring BiPAP.     Medical History:  Past Medical History:   Diagnosis Date    Encounter for preprocedural cardiovascular examination 2018    Preop cardiovascular exam    Personal history of other diseases of the nervous system and sense organs     History of sleep apnea    Personal history of other endocrine, nutritional and metabolic disease     History of hypothyroidism    Personal history of other specified conditions 2021    History of gross hematuria    Personal history of other specified conditions 2021    History of dysuria    Personal history of other specified conditions     History of tachycardia    Syncope and collapse 2021    Near syncope    Unspecified acute conjunctivitis, right eye 2021    Acute bacterial conjunctivitis of right eye     Past Surgical History:   Procedure Laterality Date    OTHER SURGICAL HISTORY  2018    Ear surgery    OTHER SURGICAL HISTORY  2018     section    OTHER SURGICAL HISTORY  2018    Tonsillectomy    OTHER SURGICAL HISTORY  10/27/2021    Ventral hernia repair    OTHER SURGICAL HISTORY  10/27/2021    Lipoma excision    OTHER SURGICAL HISTORY  10/06/2021    Hernia repair     (Not in a hospital admission)    Fluticasone furoate-vilanterol, Fluticasone-umeclidin-vilanter, Sumatriptan, Vortioxetine, and Levofloxacin  Social History     Tobacco Use    Smoking status: Never     Passive exposure: Never    Smokeless tobacco: Never   Substance Use Topics    Alcohol use: Never    Drug use: Never     Family History   Problem Relation Name Age of Onset    Fibromyalgia Father      Hypertension Brother      Thyroid disease Maternal Grandmother      Thyroid disease Paternal Grandmother      Lung cancer Paternal Grandfather      Coronary artery disease Other Grandmother        Hospital Medications:           Current Facility-Administered Medications:      acetaminophen (Tylenol) tablet 650 mg, 650 mg, oral, q4h PRN, [facility]    [START ON 12/20/2023] atorvastatin (Lipitor) tablet 20 mg, 20 mg, oral, Daily, [facility]    [START ON 12/20/2023] benztropine (Cogentin) tablet 2 mg, 2 mg, oral, Daily, Jose  [facility] APRALVARO-CNP    [START ON 12/20/2023] buPROPion SR (Wellbutrin SR) 12 hr tablet 150 mg, 150 mg, oral, Daily, [facility]  [facility]    [START ON 12/20/2023] busPIRone (Buspar) tablet 30 mg, 30 mg, oral, BID, [facility]  [facility]    [START ON 12/20/2023] citalopram (CeleXA) tablet 40 mg, 40 mg, oral, Daily, [facility]    clonazePAM (KlonoPIN) tablet 1 mg, 1 mg, oral, BID PRN, [facility]    dextrose 10 % in water (D10W) infusion, 0.3 g/kg/hr, intravenous, Once PRN, Jose Amaya APRALVARO-CNP    dextrose 50 % injection 25 g, 25 g, intravenous, q15 min PRN, Jose  [facility] APRALVARO-CNP    [START ON 12/20/2023] tiotropium (Spiriva Respimat) 2.5 mcg/actuation inhaler 2 puff, 2 Inhalation, inhalation, Daily **AND** [START ON 12/20/2023] fluticasone furoate-vilanteroL (Breo Ellipta) 100-25 mcg/dose inhaler 1 puff, 1 puff, inhalation, Daily, Jose Amaya APRALVARO-CNP    glucagon (Glucagen) injection 1 mg, 1 mg, intramuscular, q15 min PRN, [facility]    hydrOXYzine pamoate (Vistaril) capsule 50 mg, 50 mg, oral, TID PRN, [facility]    insulin lispro (HumaLOG) injection 0-10 Units, 0-10 Units, subcutaneous, Before meals & nightly, [facility]DE[facility]    ipratropium-albuteroL (Duo-Neb) 0.5-2.5 mg/3 mL nebulizer solution 3 mL, 3 mL, nebulization, q4h, [facility]DE[facility]    ipratropium-albuteroL (Duo-Neb) 0.5-2.5 mg/3 mL nebulizer solution 3 mL, 3 mL, nebulization, q2h PRN, VALDMEAR Servin    lactated Ringer's bolus 1,000 mL, 1,000 mL, intravenous, Once, [facility]    [START ON 12/20/2023] levothyroxine (Synthroid,  "Levoxyl) tablet 150 mcg, 150 mcg, oral, Daily, [facility] APRN-CNP    [START ON 12/20/2023] methylPREDNISolone sod succinate (PF) (SOLU-Medrol) 40 mg/mL injection 40 mg, 40 mg, intravenous, q12h, [facility] APRN-CNP    [START ON 12/20/2023] metoprolol tartrate (Lopressor) tablet 25 mg, 25 mg, oral, BID, Jose M Grothaus, APRN-CNP    oxygen (O2) therapy, , inhalation, Continuous PRN - O2/gases, Uma Francis PA-C, Start at 12/19/23 1802    pantoprazole (ProtoNix) EC tablet 40 mg, 40 mg, oral, Daily, Jose M Grothaus, APRN-CNP    [START ON 12/21/2023] predniSONE (Deltasone) tablet 40 mg, 40 mg, oral, Daily **FOLLOWED BY** [START ON 12/25/2023] predniSONE (Deltasone) tablet 20 mg, 20 mg, oral, Daily, Jose M Grothaus, APRN-CNP    [START ON 12/20/2023] rivaroxaban (Xarelto) tablet 20 mg, 20 mg, oral, Daily with evening meal, [facility]    topiramate (Topamax) tablet 50 mg, 50 mg, oral, Daily, [facility]    Current Outpatient Medications:     albuterol 90 mcg/actuation inhaler, Inhale 2 puffs every 4 hours if needed for wheezing or shortness of breath., Disp: 18 g, Rfl: 11    alcohol swabs (Alcohol Prep Pads) pads, medicated, Use 3 times daily prn, Disp: 100 each, Rfl: 3    atorvastatin (Lipitor) 20 mg tablet, Take 1 tablet (20 mg) by mouth once daily. Dr. Davis covering for Dr. Yuen, Disp: 30 tablet, Rfl: 0    BD Ultra-Fine Mini Pen Needle 31 gauge x 3/16\" needle, USE TO INJECT 4 TIMES DAILY AS DIRECTED, Disp: , Rfl:     benztropine (Cogentin) 2 mg tablet, Take 1 tablet (2 mg) by mouth once daily., Disp: , Rfl:     budesonide-glycopyr-formoterol (Breztri Aerosphere) 160-9-4.8 mcg/actuation HFA aerosol inhaler, Inhale 2 times a day., Disp: , Rfl:     buPROPion SR (Wellbutrin SR) 150 mg 12 hr tablet, Take 1 tablet (150 mg) by mouth once daily., Disp: , Rfl:     busPIRone (Buspar) 30 mg tablet, Take 1 tablet (30 mg) by mouth 2 times a day., Disp: , Rfl:     " citalopram (CeleXA) 40 mg tablet, Take by mouth., Disp: , Rfl:     clonazePAM (KlonoPIN) 1 mg tablet, Take by mouth 2 times a day as needed., Disp: , Rfl:     doxycycline (Vibramycin) 100 mg capsule, Take 1 capsule (100 mg) by mouth 2 times a day for 10 days. Take with at least 8 ounces (large glass) of water, do not lie down for 30 minutes after, Disp: 20 capsule, Rfl: 0    hydrOXYzine HCL (Atarax) 25 mg tablet, Take 1 tablet (25 mg) by mouth every 6 hours if needed for anxiety for up to 3 days., Disp: 12 tablet, Rfl: 0    hydrOXYzine pamoate (Vistaril) 50 mg capsule, Take by mouth 3 times a day as needed., Disp: , Rfl:     insulin aspart (NovoLOG FlexPen U-100 Insulin) 100 unit/mL (3 mL) pen, , Disp: , Rfl:     insulin glargine (Lantus Solostar U-100 Insulin) 100 unit/mL (3 mL) pen, Inject 10 Units under the skin once daily in the morning. Take as directed per insulin instructions., Disp: 3 mL, Rfl: 2    insulin lispro (HumaLOG) 100 unit/mL injection, 1 Dose., Disp: , Rfl:     Invega Sustenna 234 mg/1.5 mL syringe, INJECT 1 syringe INTRAMUSCULARLY EVERY 4 WEEKS, Disp: , Rfl:     ipratropium-albuteroL (Duo-Neb) 0.5-2.5 mg/3 mL nebulizer solution, Take 3 mL by nebulization every 6 hours if needed for shortness of breath or wheezing., Disp: 180 mL, Rfl: 1    lancets 30 gauge misc, 100 each 3 times a day., Disp: 100 each, Rfl: 5    levothyroxine (Synthroid, Levoxyl) 150 mcg tablet, Take 1 tablet (150 mcg) by mouth once daily., Disp: 90 tablet, Rfl: 1    metoprolol tartrate (Lopressor) 25 mg tablet, TAKE 1 TABLET BY MOUTH IN THE MORNING AND 1 AT BEDTIME, Disp: 60 tablet, Rfl: 2    OneTouch Verio test strips strip, , Disp: , Rfl:     pantoprazole (ProtoNix) 40 mg EC tablet, Take 1 tablet (40 mg) by mouth once daily., Disp: 90 tablet, Rfl: 1    prazosin (Minipress) 2 mg capsule, Take 1 capsule (2 mg) by mouth once daily at bedtime., Disp: , Rfl:     predniSONE (Deltasone) 20 mg tablet, Take 2 tablets (40 mg) by mouth  "once daily for 5 days, THEN 1 tablet (20 mg) once daily for 5 days., Disp: 15 tablet, Rfl: 0    topiramate (Topamax) 25 mg tablet, Take 2 tablets (50 mg) by mouth once daily., Disp: 180 tablet, Rfl: 3    Xarelto 20 mg tablet, , Disp: , Rfl:     Review of Systems:  14 point review of systems was completed and negative except for those specially mention in my HPI    Physical Exam:    Heart Rate:  [65-93]   Temp:  [36.1 °C (97 °F)]   Resp:  [16-22]   BP: (108-127)/(56-73)   Height:  [157.5 cm (5' 2\")]   Weight:  [77.1 kg (170 lb)]   SpO2:  [88 %-97 %]     Physical Exam  Constitutional:       General: She is not in acute distress.     Appearance: She is obese. She is not ill-appearing or toxic-appearing.   HENT:      Head: Normocephalic and atraumatic.      Mouth/Throat:      Mouth: Mucous membranes are moist.      Pharynx: Oropharynx is clear.   Eyes:      Extraocular Movements: Extraocular movements intact.      Pupils: Pupils are equal, round, and reactive to light.   Cardiovascular:      Rate and Rhythm: Normal rate and regular rhythm.      Pulses: Normal pulses.      Heart sounds: Normal heart sounds. No murmur heard.     No friction rub. No gallop.   Pulmonary:      Effort: Pulmonary effort is normal.      Breath sounds: Wheezing present.   Abdominal:      General: There is no distension.      Palpations: Abdomen is soft. There is no mass.      Tenderness: There is no abdominal tenderness.   Musculoskeletal:         General: Normal range of motion.      Cervical back: Normal range of motion and neck supple.      Right lower leg: No edema.      Left lower leg: No edema.   Skin:     General: Skin is warm and dry.      Capillary Refill: Capillary refill takes less than 2 seconds.   Neurological:      General: No focal deficit present.      Mental Status: She is alert and oriented to person, place, and time. Mental status is at baseline.         Objective:    I have reviewed all medications, laboratory results, and " imaging pertinent for today's encounter.    FiO2 (%):  [40 %-60 %] 60 %    No intake or output data in the 24 hours ending 12/19/23 2026      Assessment/Plan:    I am currently managing this critically ill patient for the following problems:    Neuro/Psych/Pain Ctrl/Sedation:  #Schizophrenia  #Migraine HA  -Neuro checks, CAM assessment, delirium precautions  -Continue home Citalopram, Klonopin, Cogentin, Topamax, Buspar  -Continue home APAP/butalbita/caffiene as needed for headaches  -PRN Tylenol for headaches    Respiratory/ENT:  #Acute On Chronic Hypercapnic/Hypoxic Respiratory Failure  #COPD with Exacerbation  Follows with Dr. Villalba, non-compliant with 4L O2 at home, continues to smoke, does not wear nocturnal BiPAP consistently  -No signs of infectious etiology, defer antibiotics  -BiPAP support for hypercapnia, ABGs as needed, wean BiPAP for goal pH > 7.3, goal pCO2 < 60, spO2 goal > 90%  -Wean to nasal cannula, will need nocturnal BiPAP  -Duonebs Q4h with PRN dose available  -Solumedrol 40mg BID x 2 doses then transition to prednisone taper:       -Prednisone 40mg x 4 days followed by 20mg x 5 days  -Continue Spiriva and Breo inhalers  -Pulmonary Consult - follows with Dr. Villalba    Cardiovascular:  #HTN, HLD  -Continue home metoprolol  -Continue home statin    #Hx Pulmonary Emboli  Diagnosed July 2023  -Continue home Xarelto    GI:  #GERD  -Continue home PPI    Renal/Volume Status (Intra & Extravascular):  #Acute Kidney Injury  Likely pre-renal in setting of recent illness  -Give 1L LR bolus now  -Strict I/O  -Daily BMP and electrolytes    Endocrine  #IDDM2  Hgb A1c 7.3 on 11/2023  -Hold home 10u lantus tonight, consider resuming tomorrow  -Moderate SSI ACHS    #Hypothyroidism  -Continue home Levothyroxine    Infectious Disease:  No concern for active infection    Heme/Onc:  No active issues  -Daily CBC    OBGYN/MSK:  No active issues, independent at home, no PT/OT needs    Ethics/Code Status:  Full  Code    :  DVT Prophylaxis: Xarelto  GI Prophylaxis: Home PPI  Bowel Regimen: Docusate, Miralax  Diet: Diabetic Diet  CVC: no  Margoth: no  Tran: no  Restraints: no  Dispo: ICU    Critical Care Time:  40    Jose Amaya, APRN-CNP

## 2023-12-20 NOTE — PROGRESS NOTES
[facility] Critical Care Medicine       Date:  12/20/2023  Patient:  Stephenie Mccray  YOB: 1980  MRN:  [id number]   Admit Date:  12/19/2023  ========================================================================================================    Chief Complaint   Patient presents with    Dizziness    Shortness of Breath         History of Present Illness:  Stephenie Mccray is a 43 y.o. year old female patient with Past Medical History of  DVT/PE (on Xarelto), COPD (home 4L O2), IDDM 2, hypothyroidism, GERD, hyperlipidemia, obesity,  schizophrenia, uterine fibroid.  Patient is well-known to this facility with frequent admissions for COPD exacerbation and acute on chronic respiratory failure.  Patient presented to [facility] emergency department 12/19 with complaints of shortness of breath and feeling ill for the past week.  Complains of productive cough.  States she has not been using her oxygen at home and continues to smoke heavily.  Oxygen saturation at home was 79%.  Called her PCP last week and was prescribed steroids. She reports losing the steroids 2-3 days prior to her ed admission as well as not using her home O2.    Patient found to be in acute on chronic mixed hypercapnic, hypoxic respiratory failure. Started on bipap, solumederol, duonebs, and magnesium. Patient continued to be hypoxic on BiPAP with SpO2 80% on 50% FiO2.  Laboratory workup negative for leukocytosis.  Chest x-ray not concerning for infectious etiology.  Slight AILEEN with creatinine 1.5.  Patient to be admitted to ICU for acute on chronic hypercapnic/hypoxic respiratory failure in the setting of COPD exacerbation.       Interval ICU Events:  12/19-Weaned off bipap to 4L NC    Medical History:  Past Medical History:   Diagnosis Date    Arthritis     COPD (chronic obstructive pulmonary disease) (CMS/HCC)     Diabetes mellitus (CMS/HCC)     Disease of thyroid gland     Encounter for preprocedural cardiovascular examination  "2018    Preop cardiovascular exam    History of transfusion     Personal history of other diseases of the nervous system and sense organs     History of sleep apnea    Personal history of other endocrine, nutritional and metabolic disease     History of hypothyroidism    Personal history of other specified conditions 2021    History of gross hematuria    Personal history of other specified conditions 2021    History of dysuria    Personal history of other specified conditions     History of tachycardia    Syncope and collapse 2021    Near syncope    Unspecified acute conjunctivitis, right eye 2021    Acute bacterial conjunctivitis of right eye     Past Surgical History:   Procedure Laterality Date    OTHER SURGICAL HISTORY  2018    Ear surgery    OTHER SURGICAL HISTORY  2018     section    OTHER SURGICAL HISTORY  2018    Tonsillectomy    OTHER SURGICAL HISTORY  10/27/2021    Ventral hernia repair    OTHER SURGICAL HISTORY  10/27/2021    Lipoma excision    OTHER SURGICAL HISTORY  10/06/2021    Hernia repair    TONSILLECTOMY       Medications Prior to Admission   Medication Sig Dispense Refill Last Dose    albuterol 90 mcg/actuation inhaler Inhale 2 puffs every 4 hours if needed for wheezing or shortness of breath. 18 g 11 2023 at 1200    alcohol swabs (Alcohol Prep Pads) pads, medicated Use 3 times daily prn 100 each 3 2023    atorvastatin (Lipitor) 20 mg tablet Take 1 tablet (20 mg) by mouth once daily. Dr. Davis covering for Dr. Yuen 30 tablet 0 2023    BD Ultra-Fine Mini Pen Needle 31 gauge x 3/16\" needle USE TO INJECT 4 TIMES DAILY AS DIRECTED   Unknown    budesonide-glycopyr-formoterol (Breztri Aerosphere) 160-9-4.8 mcg/actuation HFA aerosol inhaler Inhale 2 times a day.       buPROPion SR (Wellbutrin SR) 150 mg 12 hr tablet Take 1 tablet (150 mg) by mouth once daily.       busPIRone (Buspar) 30 mg tablet Take 1 tablet (30 mg) by mouth " 2 times a day.   12/19/2023    citalopram (CeleXA) 40 mg tablet Take by mouth.   12/19/2023    clonazePAM (KlonoPIN) 1 mg tablet Take by mouth 2 times a day as needed.   12/19/2023    doxycycline (Vibramycin) 100 mg capsule Take 1 capsule (100 mg) by mouth 2 times a day for 10 days. Take with at least 8 ounces (large glass) of water, do not lie down for 30 minutes after 20 capsule 0 12/19/2023    hydrOXYzine HCL (Atarax) 25 mg tablet Take 1 tablet (25 mg) by mouth every 6 hours if needed for anxiety for up to 3 days. 12 tablet 0     hydrOXYzine pamoate (Vistaril) 50 mg capsule Take by mouth 3 times a day as needed.   12/19/2023    insulin aspart (NovoLOG FlexPen U-100 Insulin) 100 unit/mL (3 mL) pen    Past Week    insulin glargine (Lantus Solostar U-100 Insulin) 100 unit/mL (3 mL) pen Inject 10 Units under the skin once daily in the morning. Take as directed per insulin instructions. 3 mL 2     insulin lispro (HumaLOG) 100 unit/mL injection 1 Dose.       Invega Sustenna 234 mg/1.5 mL syringe INJECT 1 syringe INTRAMUSCULARLY EVERY 4 WEEKS   Past Month    ipratropium-albuteroL (Duo-Neb) 0.5-2.5 mg/3 mL nebulizer solution Take 3 mL by nebulization every 6 hours if needed for shortness of breath or wheezing. 180 mL 1     lancets 30 gauge misc 100 each 3 times a day. 100 each 5     levothyroxine (Synthroid, Levoxyl) 150 mcg tablet Take 1 tablet (150 mcg) by mouth once daily. 90 tablet 1 12/19/2023    metoprolol tartrate (Lopressor) 25 mg tablet TAKE 1 TABLET BY MOUTH IN THE MORNING AND 1 AT BEDTIME 60 tablet 2 12/19/2023    OneTouch Verio test strips strip        pantoprazole (ProtoNix) 40 mg EC tablet Take 1 tablet (40 mg) by mouth once daily. 90 tablet 1 12/19/2023    prazosin (Minipress) 2 mg capsule Take 1 capsule (2 mg) by mouth once daily at bedtime.   12/18/2023    predniSONE (Deltasone) 20 mg tablet Take 2 tablets (40 mg) by mouth once daily for 5 days, THEN 1 tablet (20 mg) once daily for 5 days. 15 tablet 0  Unknown    topiramate (Topamax) 25 mg tablet Take 2 tablets (50 mg) by mouth once daily. 180 tablet 3     Xarelto 20 mg tablet    12/19/2023     Fluticasone furoate-vilanterol, Fluticasone-umeclidin-vilanter, Sumatriptan, Vortioxetine, and Levofloxacin  Social History     Tobacco Use    Smoking status: Never     Passive exposure: Never    Smokeless tobacco: Never   Substance Use Topics    Alcohol use: Never    Drug use: Never   Patient is a 30 pack year cigarette smoker  Family History   Problem Relation Name Age of Onset    Fibromyalgia Father      Hypertension Brother      Thyroid disease Maternal Grandmother      Thyroid disease Paternal Grandmother      Lung cancer Paternal Grandfather      Coronary artery disease Other Grandmother        Hospital Medications:           Current Facility-Administered Medications:     acetaminophen (Tylenol) tablet 650 mg, 650 mg, oral, q4h PRN, Jose M Grothaus, APRN-CNP, 650 mg at 12/20/23 0904    atorvastatin (Lipitor) tablet 20 mg, 20 mg, oral, Nightly, Jose M Renethaus, APRN-CNP    benztropine (Cogentin) tablet 2 mg, 2 mg, oral, Daily, Jose M Grothaus, APRN-CNP, 2 mg at 12/20/23 0847    buPROPion SR (Wellbutrin SR) 12 hr tablet 150 mg, 150 mg, oral, Daily, Jose M Grothaus, APRN-CNP    busPIRone (Buspar) tablet 30 mg, 30 mg, oral, BID, Jose M Grothaus, APRN-CNP, 30 mg at 12/20/23 0847    citalopram (CeleXA) tablet 40 mg, 40 mg, oral, Daily, Jose M Grothaus, APRN-CNP, 40 mg at 12/20/23 0848    clonazePAM (KlonoPIN) tablet 1 mg, 1 mg, oral, BID PRN, Jose M Grothaus, APRN-CNP, 1 mg at 12/20/23 0008    dextrose 10 % in water (D10W) infusion, 0.3 g/kg/hr, intravenous, Once PRN, Jose Mayaus, APRN-CNP    dextrose 50 % injection 25 g, 25 g, intravenous, q15 min PRN, Jose MOI Mayaus, APRN-CNP    docusate sodium (Colace) capsule 100 mg, 100 mg, oral, BID, Jose Amaya, APRN-CNP, 100 mg at 12/20/23 0848    tiotropium (Spiriva Respimat) 2.5 mcg/actuation inhaler 2  puff, 2 Inhalation, inhalation, Daily **AND** fluticasone furoate-vilanteroL (Breo Ellipta) 100-25 mcg/dose inhaler 1 puff, 1 puff, inhalation, Daily, VALDEMAR Servin    glucagon (Glucagen) injection 1 mg, 1 mg, intramuscular, q15 min PRN, BARTOLOME Servin-CNP    hydrOXYzine pamoate (Vistaril) capsule 50 mg, 50 mg, oral, TID PRN, VALDEMAR Servin    insulin glargine (Lantus) injection 10 Units, 10 Units, subcutaneous, q24h, VALDEMAR Servin    insulin lispro (HumaLOG) injection 0-10 Units, 0-10 Units, subcutaneous, Before meals & nightly, BARTOLOME Servin-CNP, 6 Units at 12/20/23 1141    ipratropium-albuteroL (Duo-Neb) 0.5-2.5 mg/3 mL nebulizer solution 3 mL, 3 mL, nebulization, q4h, BARTOLOME Servin-CNP, 3 mL at 12/20/23 1146    ipratropium-albuteroL (Duo-Neb) 0.5-2.5 mg/3 mL nebulizer solution 3 mL, 3 mL, nebulization, q2h PRN, BARTOLOME Servin-CNP    levothyroxine (Synthroid, Levoxyl) tablet 150 mcg, 150 mcg, oral, Daily, BARTOLOME Servin-CNP, 150 mcg at 12/20/23 0547    metoprolol tartrate (Lopressor) tablet 25 mg, 25 mg, oral, BID, Jose Amaya APRN-CNP, 25 mg at 12/20/23 0848    oxygen (O2) therapy, , inhalation, Continuous PRN - O2/gases, BARTOLOME Servin-CNP, 4 L/min at 12/20/23 1146    pantoprazole (ProtoNix) EC tablet 40 mg, 40 mg, oral, Daily, BARTOLOME Servin-CNP, 40 mg at 12/20/23 0851    polyethylene glycol (Glycolax, Miralax) packet 17 g, 17 g, oral, Daily, VALDEMAR Servin, 17 g at 12/20/23 0848    [START ON 12/21/2023] predniSONE (Deltasone) tablet 40 mg, 40 mg, oral, Daily **FOLLOWED BY** [START ON 12/25/2023] predniSONE (Deltasone) tablet 20 mg, 20 mg, oral, Daily, VALDEMAR Servin    rivaroxaban (Xarelto) tablet 20 mg, 20 mg, oral, Daily with evening meal, VALDEMAR Servin    topiramate (Topamax) tablet 50 mg, 50 mg, oral, Daily, VALDEMAR Servin, 50 mg at 12/20/23  "0848    Review of Systems:  14 point review of systems was completed and negative except for those specially mention in my HPI    Physical Exam:    Heart Rate:  [62-93]   Temp:  [36.1 °C (97 °F)-36.4 °C (97.5 °F)]   Resp:  [9-29]   BP: ()/(54-74)   Height:  [155 cm (5' 1.02\")-157.5 cm (5' 2\")]   Weight:  [77 kg (169 lb 12.1 oz)-77.1 kg (170 lb)]   SpO2:  [88 %-99 %]     Physical Exam  HENT:      Head: Normocephalic and atraumatic.      Nose: Nose normal.      Mouth/Throat:      Mouth: Mucous membranes are moist.      Pharynx: Oropharynx is clear.   Eyes:      Pupils: Pupils are equal, round, and reactive to light.   Cardiovascular:      Rate and Rhythm: Normal rate and regular rhythm.      Pulses: Normal pulses.   Pulmonary:      Effort: Pulmonary effort is normal.   Abdominal:      General: Abdomen is flat.      Palpations: Abdomen is soft.   Musculoskeletal:         General: Normal range of motion.      Cervical back: Normal range of motion.   Skin:     General: Skin is warm and dry.      Capillary Refill: Capillary refill takes less than 2 seconds.   Neurological:      General: No focal deficit present.      Mental Status: She is alert.   Psychiatric:         Mood and Affect: Mood normal.         Objective:    I have reviewed all medications, laboratory results, and imaging pertinent for today's encounter.    FiO2 (%):  [36 %-60 %] 36 %      Intake/Output Summary (Last 24 hours) at 12/20/2023 1213  Last data filed at 12/20/2023 0957  Gross per 24 hour   Intake 1304.17 ml   Output 1325 ml   Net -20.83 ml         Assessment/Plan:    I am currently managing this critically ill patient for the following problems:    Acute on chronic mixed hypoxemic and hypercapnic respiratory failure  Tobacco/Nicotine abuse  COPD exacerbation  Acute Kidney Injury  IDDM2    Neuro/Psych/Pain Ctrl/Sedation:  #Schizophrenia  #Migraine HA  -Neuro checks, CAM assessment, delirium precautions  -Continue home Citalopram, Klonopin, " Cogentin, Topamax, Buspar  -Continue home APAP/butalbita/caffiene as needed for headaches  -PRN Tylenol for headaches    Respiratory/ENT:  #Acute On Chronic Hypercapnic/Hypoxic Respiratory Failure  #COPD with Exacerbation  #Tobacco/Nicotine abuse  Follows with Dr. Villalba, non-compliant with 4L O2 at home, continues to smoke, does not wear nocturnal BiPAP consistently  -No signs of infectious etiology, defer antibiotics  -On home baseline O2 of 4L  -Wean to nasal cannula, will need nocturnal BiPAP  -Duonebs Q4h with PRN dose available  -Solumedrol 40mg BID x 2 doses then transition to prednisone taper:       -Prednisone 40mg x 4 days followed by 20mg x 5 days  -Continue Spiriva and Breo inhalers  -Pulmonary Consult - follows with Dr. Villalba  -Patient teaching provided about necessity for stopping smoking as it is the cause of her repeated admission    Cardiovascular:  #HTN, HLD  -Continue home metoprolol  -Continue home statin    #Hx Pulmonary Emboli  Diagnosed July 2023  -Continue home Xarelto    GI:  #GERD  -Continue home PPI    Renal/Volume Status (Intra & Extravascular):  #Acute Kidney Injury  Likely pre-renal in setting of recent illness  -Given 1L on admission to ICU  -Strict I/O  -Daily BMP and electrolytes    Endocrine  #IDDM2  Hgb A1c 7.3 on 11/2023  -Home dose 10 of lantus at bedtime  -Moderate SSI ACHS    #Hypothyroidism  -Continue home Levothyroxine    Infectious Disease:  No concern for active infection     Heme/Onc:  No active issues  -Daily CBC    OBGYN/MSK:  No active issues, independent at home, no PT/OT needs     Ethics/Code Status:  Full Code     :  DVT Prophylaxis: xeralto  GI Prophylaxis: home ppi  Bowel Regimen: colace miralax  Diet: DM diet  CVC: none  [place]: none  Tran: none  Restraints: none  Dispo: Patient will be transferred to the floor    35 minutes spent in preparing to see patient (I.e. review of medical records), evaluation of diagnostics (I.e. labs, imaging, etc.),  documentation, discussing plan of care with patient/ family/ caregiver, and/ or coordination of care with multidisciplinary team. Time does not include completion of procedure time.      Josesito Cody, APRN-CNP

## 2023-12-20 NOTE — CARE PLAN
The patient's goals for the shift include patient wants to be out of the ICU today.    The clinical goals for the shift include maintain SPO2 >92% throughout shift    Patient is progressing at all goals at this time.

## 2023-12-20 NOTE — CONSULTS
Reason For Consult  Short of breath.  Acute on chronic hypoxic and hypercapnic respiratory failure.    History Of Present Illness  Stephenie Mccray is a 43 y.o. female presenting with shortness of breath cough wheezing chest tightness congestion yellow sputum production about 2 to 3 days duration.  Patient admitted to ICU with acute on chronic hypoxic and hypercapnic respiratory failure treated with BiPAP and 50% oxygen weaned off to 4 L nasal cannula oxygen transferred to the floor..     Past Medical History  She has a past medical history of Arthritis, COPD (chronic obstructive pulmonary disease) (CMS/[facility]), Diabetes mellitus (CMS/[facility]), Disease of thyroid gland, Encounter for preprocedural cardiovascular examination (11/28/2018), History of transfusion, Personal history of other diseases of the nervous system and sense organs, Personal history of other endocrine, nutritional and metabolic disease, Personal history of other specified conditions (07/28/2021), Personal history of other specified conditions (06/24/2021), Personal history of other specified conditions, Syncope and collapse (08/11/2021), and Unspecified acute conjunctivitis, right eye (05/12/2021).  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  Hypothyroidism.  GERD.  Obesity.  Smoking history.  Diabetes.   Surgical History  She has a past surgical history that includes Other surgical history (11/09/2018); Other surgical history (11/09/2018); Other surgical history (11/09/2018); Other surgical history (10/27/2021); Other surgical history (10/27/2021); Other surgical history (10/06/2021); and Tonsillectomy.     Social History  Patient with history of smoking no drug or alcohol abuse.    Family History  Family History   Problem Relation Name Age of Onset    Fibromyalgia Father      Hypertension Brother      Thyroid disease Maternal Grandmother      Thyroid disease Paternal  Grandmother      Lung cancer Paternal Grandfather      Coronary artery disease Other Grandmother         Allergies  Fluticasone furoate-vilanterol, Fluticasone-umeclidin-vilanter, Sumatriptan, Vortioxetine, and Levofloxacin    Review of Systems   Constitutional:  Positive for activity change and fatigue.   HENT:  Positive for congestion.    Eyes: Negative.    Respiratory:  Positive for cough, chest tightness, shortness of breath and wheezing.    Cardiovascular: Negative.    Gastrointestinal: Negative.    Endocrine: Negative.    Genitourinary: Negative.    Musculoskeletal:  Positive for arthralgias and myalgias.   Skin: Negative.    Allergic/Immunologic: Negative.    Neurological:  Positive for dizziness, weakness and light-headedness.   Hematological: Negative.    All other systems reviewed and are negative.        Physical Exam.  Physical Exam  Vitals reviewed.   Constitutional:       Appearance: She is obese.   HENT:      Head: Normocephalic and atraumatic.      Right Ear: Ear canal and external ear normal.      Left Ear: Ear canal and external ear normal.      Nose: Congestion present.      Mouth/Throat:      Mouth: Mucous membranes are dry.   Eyes:      Extraocular Movements: Extraocular movements intact.      Conjunctiva/sclera: Conjunctivae normal.      Pupils: Pupils are equal, round, and reactive to light.   Cardiovascular:      Rate and Rhythm: Normal rate and regular rhythm.      Pulses: Normal pulses.      Heart sounds: Normal heart sounds.   Pulmonary:      Breath sounds: Wheezing, rhonchi and rales present.   Abdominal:      General: Bowel sounds are normal.      Palpations: Abdomen is soft.   Musculoskeletal:      Cervical back: Normal range of motion and neck supple.   Skin:     General: Skin is dry.      Capillary Refill: Capillary refill takes 2 to 3 seconds.   Neurological:      General: No focal deficit present.      Mental Status: She is alert and oriented to person, place, and time. Mental  "status is at baseline.   Psychiatric:         Mood and Affect: Mood normal.         Behavior: Behavior normal.           Last Recorded Vitals  Blood pressure 107/54, pulse 74, temperature 36.4 °C (97.5 °F), resp. rate (!) 28, height 1.55 m (5' 1.02\"), weight 77 kg (169 lb 12.1 oz), SpO2 94 %.    Relevant Results.  Results for orders placed or performed during the hospital encounter of 12/19/23 (from the past 96 hour(s))   CBC and Auto Differential   Result Value Ref Range    WBC 9.3 4.4 - 11.3 x10*3/uL    nRBC 0.0 0.0 - 0.0 /100 WBCs    RBC 4.43 4.00 - 5.20 x10*6/uL    Hemoglobin 13.6 12.0 - 16.0 g/dL    Hematocrit 43.8 36.0 - 46.0 %    MCV 99 80 - 100 fL    MCH 30.7 26.0 - 34.0 pg    MCHC 31.1 (L) 32.0 - 36.0 g/dL    RDW 16.7 (H) 11.5 - 14.5 %    Platelets 164 150 - 450 x10*3/uL    Neutrophils % 55.2 40.0 - 80.0 %    Immature Granulocytes %, Automated 3.1 (H) 0.0 - 0.9 %    Lymphocytes % 33.8 13.0 - 44.0 %    Monocytes % 7.1 2.0 - 10.0 %    Eosinophils % 0.2 0.0 - 6.0 %    Basophils % 0.6 0.0 - 2.0 %    Neutrophils Absolute 5.12 1.20 - 7.70 x10*3/uL    Immature Granulocytes Absolute, Automated 0.29 0.00 - 0.70 x10*3/uL    Lymphocytes Absolute 3.14 1.20 - 4.80 x10*3/uL    Monocytes Absolute 0.66 0.10 - 1.00 x10*3/uL    Eosinophils Absolute 0.02 0.00 - 0.70 x10*3/uL    Basophils Absolute 0.06 0.00 - 0.10 x10*3/uL   Comprehensive Metabolic Panel   Result Value Ref Range    Glucose 87 74 - 99 mg/dL    Sodium 139 136 - 145 mmol/L    Potassium 4.6 3.5 - 5.3 mmol/L    Chloride 101 98 - 107 mmol/L    Bicarbonate 30 21 - 32 mmol/L    Anion Gap 13 10 - 20 mmol/L    Urea Nitrogen 23 6 - 23 mg/dL    Creatinine 1.58 (H) 0.50 - 1.05 mg/dL    eGFR 42 (L) >60 mL/min/1.73m*2    Calcium 9.4 8.6 - 10.3 mg/dL    Albumin 4.3 3.4 - 5.0 g/dL    Alkaline Phosphatase 80 33 - 110 U/L    Total Protein 7.0 6.4 - 8.2 g/dL    AST 14 9 - 39 U/L    Bilirubin, Total 0.4 0.0 - 1.2 mg/dL    ALT 16 7 - 45 U/L   B-Type Natriuretic Peptide   Result " Value Ref Range    BNP 17 0 - 99 pg/mL   hCG, quantitative, pregnancy   Result Value Ref Range    HCG, Beta-Quantitative <2 <5 mIU/mL   Troponin I, High Sensitivity, Initial   Result Value Ref Range    Troponin I, High Sensitivity 3 0 - 13 ng/L   Sars-CoV-2 PCR, Symptomatic   Result Value Ref Range    Coronavirus 2019, PCR Not Detected Not Detected   Influenza A, and B PCR   Result Value Ref Range    Flu A Result Not Detected Not Detected    Flu B Result Not Detected Not Detected   ECG 12 lead   Result Value Ref Range    Ventricular Rate 86 BPM    Atrial Rate 86 BPM    NC Interval 110 ms    QRS Duration 90 ms    QT Interval 354 ms    QTC Calculation(Bazett) 423 ms    P Axis 65 degrees    R Axis -13 degrees    T Axis 13 degrees    QRS Count 14 beats    Q Onset 204 ms    P Onset 149 ms    P Offset 189 ms    T Offset 381 ms    QTC Fredericia 399 ms   aPTT   Result Value Ref Range    aPTT 44 (H) 27 - 38 seconds   Protime-INR   Result Value Ref Range    Protime 18.7 (H) 9.8 - 12.8 seconds    INR 1.7 (H) 0.9 - 1.1   SST TOP   Result Value Ref Range    Extra Tube Hold for add-ons.    BLOOD GAS ARTERIAL FULL PANEL   Result Value Ref Range    POCT pH, Arterial 7.33 (L) 7.38 - 7.42 pH    POCT pCO2, Arterial 66 (H) 38 - 42 mm Hg    POCT pO2, Arterial 100 (H) 85 - 95 mm Hg    POCT SO2, Arterial 99 94 - 100 %    POCT Oxy Hemoglobin, Arterial 89.9 (L) 94.0 - 98.0 %    POCT Hematocrit Calculated, Arterial 39.0 36.0 - 46.0 %    POCT Sodium, Arterial 137 136 - 145 mmol/L    POCT Potassium, Arterial 4.3 3.5 - 5.3 mmol/L    POCT Chloride, Arterial 104 98 - 107 mmol/L    POCT Ionized Calcium, Arterial 1.27 1.10 - 1.33 mmol/L    POCT Glucose, Arterial 100 (H) 74 - 99 mg/dL    POCT Lactate, Arterial 0.6 0.4 - 2.0 mmol/L    POCT Base Excess, Arterial 6.7 (H) -2.0 - 3.0 mmol/L    POCT HCO3 Calculated, Arterial 34.8 (H) 22.0 - 26.0 mmol/L    POCT Hemoglobin, Arterial 13.0 12.0 - 16.0 g/dL    POCT Anion Gap, Arterial 3 (L) 10 - 25 mmo/L     Patient Temperature      FiO2 36 %    Apparatus CANNULA     Flow 4.0 LPM    Site of Arterial Puncture Radial Left     Andrew's Test Positive    Troponin, High Sensitivity, 1 Hour   Result Value Ref Range    Troponin I, High Sensitivity <3 0 - 13 ng/L   BLOOD GAS ARTERIAL FULL PANEL   Result Value Ref Range    POCT pH, Arterial 7.32 (L) 7.38 - 7.42 pH    POCT pCO2, Arterial 67 (H) 38 - 42 mm Hg    POCT pO2, Arterial 73 (L) 85 - 95 mm Hg    POCT SO2, Arterial 95 94 - 100 %    POCT Oxy Hemoglobin, Arterial 89.2 (L) 94.0 - 98.0 %    POCT Hematocrit Calculated, Arterial 40.0 36.0 - 46.0 %    POCT Sodium, Arterial 136 136 - 145 mmol/L    POCT Potassium, Arterial 4.9 3.5 - 5.3 mmol/L    POCT Chloride, Arterial 102 98 - 107 mmol/L    POCT Ionized Calcium, Arterial 1.28 1.10 - 1.33 mmol/L    POCT Glucose, Arterial 121 (H) 74 - 99 mg/dL    POCT Lactate, Arterial 0.6 0.4 - 2.0 mmol/L    POCT Base Excess, Arterial 6.1 (H) -2.0 - 3.0 mmol/L    POCT HCO3 Calculated, Arterial 34.5 (H) 22.0 - 26.0 mmol/L    POCT Hemoglobin, Arterial 13.4 12.0 - 16.0 g/dL    POCT Anion Gap, Arterial 4 (L) 10 - 25 mmo/L    Patient Temperature      FiO2 60 %   POCT GLUCOSE   Result Value Ref Range    POCT Glucose 155 (H) 74 - 99 mg/dL   CBC and Auto Differential   Result Value Ref Range    WBC 12.7 (H) 4.4 - 11.3 x10*3/uL    nRBC 0.0 0.0 - 0.0 /100 WBCs    RBC 4.38 4.00 - 5.20 x10*6/uL    Hemoglobin 13.2 12.0 - 16.0 g/dL    Hematocrit 42.8 36.0 - 46.0 %    MCV 98 80 - 100 fL    MCH 30.1 26.0 - 34.0 pg    MCHC 30.8 (L) 32.0 - 36.0 g/dL    RDW 16.4 (H) 11.5 - 14.5 %    Platelets 160 150 - 450 x10*3/uL    Neutrophils % 85.4 40.0 - 80.0 %    Immature Granulocytes %, Automated 2.5 (H) 0.0 - 0.9 %    Lymphocytes % 8.7 13.0 - 44.0 %    Monocytes % 2.7 2.0 - 10.0 %    Eosinophils % 0.2 0.0 - 6.0 %    Basophils % 0.5 0.0 - 2.0 %    Neutrophils Absolute 10.87 (H) 1.20 - 7.70 x10*3/uL    Immature Granulocytes Absolute, Automated 0.32 0.00 - 0.70 x10*3/uL     Lymphocytes Absolute 1.11 (L) 1.20 - 4.80 x10*3/uL    Monocytes Absolute 0.34 0.10 - 1.00 x10*3/uL    Eosinophils Absolute 0.02 0.00 - 0.70 x10*3/uL    Basophils Absolute 0.07 0.00 - 0.10 x10*3/uL   Basic Metabolic Panel   Result Value Ref Range    Glucose 179 (H) 74 - 99 mg/dL    Sodium 137 136 - 145 mmol/L    Potassium 4.9 3.5 - 5.3 mmol/L    Chloride 101 98 - 107 mmol/L    Bicarbonate 31 21 - 32 mmol/L    Anion Gap 10 10 - 20 mmol/L    Urea Nitrogen 21 6 - 23 mg/dL    Creatinine 0.83 0.50 - 1.05 mg/dL    eGFR 90 >60 mL/min/1.73m*2    Calcium 9.0 8.6 - 10.3 mg/dL   Magnesium   Result Value Ref Range    Magnesium 1.88 1.60 - 2.40 mg/dL   Phosphorus   Result Value Ref Range    Phosphorus 4.1 2.5 - 4.9 mg/dL   POCT GLUCOSE   Result Value Ref Range    POCT Glucose 181 (H) 74 - 99 mg/dL   POCT GLUCOSE   Result Value Ref Range    POCT Glucose 265 (H) 74 - 99 mg/dL   POCT GLUCOSE   Result Value Ref Range    POCT Glucose 167 (H) 74 - 99 mg/dL    XR chest 1 view    Result Date: 12/19/2023  Interpreted By:  Niels Caicedo, STUDY: Chest dated  12/19/2023.   INDICATION: Signs/Symptoms:sob   COMPARISON: Chest dated 10/31/2023.   ACCESSION NUMBER(S): [id number]   ORDERING CLINICIAN: ASHLEY REINA   TECHNIQUE: One view of the chest.   FINDINGS: The lungs are clear.  No pneumothorax or effusion is evident. The cardiomediastinal silhouette is  prominent but similar to the prior exam.The soft tissues are grossly unremarkable.       No acute cardiopulmonary process is evident.   MACRO: None   Signed by: Niels Caicedo 12/19/2023 3:17 PM Dictation workstation:   [id number]    ECG 12 lead    Result Date: 12/19/2023  Sinus rhythm with short FL Possible Inferior infarct (cited on or before 19-DEC-2023) Abnormal ECG When compared with ECG of 19-DEC-2023 14:56, (unconfirmed) QT has shortened See ED provider note for full interpretation and clinical correlation     XR chest 1 view    Result Date: 12/19/2023  Interpreted By:  Marifer  Niels, STUDY: Chest dated  12/19/2023.   INDICATION: Signs/Symptoms:sob   COMPARISON: Chest dated 10/31/2023.   ACCESSION NUMBER(S): [id number]   ORDERING CLINICIAN: ASHLEY REINA   TECHNIQUE: One view of the chest.   FINDINGS: The lungs are clear.  No pneumothorax or effusion is evident. The cardiomediastinal silhouette is  prominent but similar to the prior exam.The soft tissues are grossly unremarkable.       No acute cardiopulmonary process is evident.   MACRO: None   Signed by: Niels Caicedo 12/19/2023 3:17 PM Dictation workstation:   [id number]    ECG 12 lead    Result Date: 12/19/2023  Sinus rhythm with short [place] Possible Inferior infarct (cited on or before 19-DEC-2023) Abnormal ECG When compared with ECG of 19-DEC-2023 14:56, (unconfirmed) QT has shortened See ED provider note for full interpretation and clinical correlation      Current Facility-Administered Medications:     acetaminophen (Tylenol) tablet 650 mg, 650 mg, oral, q4h PRN, Josesito R Kiss, APRN-CNP, 650 mg at 12/20/23 0904    atorvastatin (Lipitor) tablet 20 mg, 20 mg, oral, Nightly, Josesito R Kiss, APRN-CNP    benztropine (Cogentin) tablet 2 mg, 2 mg, oral, Daily, Josesito R Kiss, APRN-CNP, 2 mg at 12/20/23 0847    buPROPion SR (Wellbutrin SR) 12 hr tablet 150 mg, 150 mg, oral, Daily, Josesito R Kiss, APRN-CNP    busPIRone (Buspar) tablet 30 mg, 30 mg, oral, BID, Josesito R Kiss, APRN-CNP, 30 mg at 12/20/23 0847    citalopram (CeleXA) tablet 40 mg, 40 mg, oral, Daily, Josesito R Kiss, APRN-CNP, 40 mg at 12/20/23 0848    clonazePAM (KlonoPIN) tablet 1 mg, 1 mg, oral, BID PRN, Josesito R Kiss, APRN-CNP, 1 mg at 12/20/23 0008    dextrose 10 % in water (D10W) infusion, 0.3 g/kg/hr, intravenous, Once PRN, Josesito R Kiss, APRN-CNP    dextrose 50 % injection 25 g, 25 g, intravenous, q15 min PRN, Josesito Cody APRN-CNP    docusate sodium (Colace) capsule 100 mg, 100 mg, oral, BID, Josesito Cody APRN-CNP, 100 mg at 12/20/23 0848    tiotropium  (Spiriva Respimat) 2.5 mcg/actuation inhaler 2 puff, 2 Inhalation, inhalation, Daily **AND** fluticasone furoate-vilanteroL (Breo Ellipta) 100-25 mcg/dose inhaler 1 puff, 1 puff, inhalation, Daily, Josesito Cody APRN-CNP    glucagon (Glucagen) injection 1 mg, 1 mg, intramuscular, q15 min PRN, Josesito Cody APRN-CNP    hydrOXYzine pamoate (Vistaril) capsule 50 mg, 50 mg, oral, TID PRN, Josesito Cody, APRN-CNP, 50 mg at 12/20/23 1718    insulin glargine (Lantus) injection 10 Units, 10 Units, subcutaneous, q24h, BARTOLOME Morel-CNP    insulin lispro (HumaLOG) injection 0-10 Units, 0-10 Units, subcutaneous, Before meals & nightly, Josesito Cody APRN-CNP, 6 Units at 12/20/23 1141    ipratropium-albuteroL (Duo-Neb) 0.5-2.5 mg/3 mL nebulizer solution 3 mL, 3 mL, nebulization, q4h, Josesito Cody, APRN-CNP, 3 mL at 12/20/23 1635    ipratropium-albuteroL (Duo-Neb) 0.5-2.5 mg/3 mL nebulizer solution 3 mL, 3 mL, nebulization, q2h PRN, Josesito Cody, APRN-CNP    levothyroxine (Synthroid, Levoxyl) tablet 150 mcg, 150 mcg, oral, Daily, Josesito Cody, APRN-CNP, 150 mcg at 12/20/23 0547    metoprolol tartrate (Lopressor) tablet 25 mg, 25 mg, oral, BID, Josesito Cody, APRN-CNP, 25 mg at 12/20/23 0848    oxygen (O2) therapy, , inhalation, Continuous PRN - O2/gases, Josesito Cody, APRN-CNP, 4 L/min at 12/20/23 1633    pantoprazole (ProtoNix) EC tablet 40 mg, 40 mg, oral, Daily, Josesito Cody, APRN-CNP, 40 mg at 12/20/23 0851    polyethylene glycol (Glycolax, Miralax) packet 17 g, 17 g, oral, Daily, Josesito Cody, APRN-CNP, 17 g at 12/20/23 0848    [START ON 12/21/2023] predniSONE (Deltasone) tablet 40 mg, 40 mg, oral, Daily **FOLLOWED BY** [START ON 12/25/2023] predniSONE (Deltasone) tablet 20 mg, 20 mg, oral, Daily, BARTOLOME Morel-CNP    rivaroxaban (Xarelto) tablet 20 mg, 20 mg, oral, Daily with evening meal, BARTOLOME Morel-CNP, 20 mg at 12/20/23 1714    topiramate (Topamax) tablet 50 mg, 50 mg, oral,  Daily, BARTOLOME Morel-CNP, 50 mg at 12/20/23 0848   Assessment/Plan   Acute on chronic hypoxic and hypercapnic respiratory failure patient treated with BiPAP and 50% FiO2 in the ICU transferred back to the floor with oxygen weaned down to 4 L nasal cannula.  COPD exacerbation continue bronchodilators and steroids..  Sleep apnea continue CPAP/BiPAP.  Obesity patient advised reduce weight by diet and exercise.  Smoking cessation discussed with the patient.  History of pulm embolism and DVT patient with Xarelto.  Hypertension.  Dyslipidemia.  Diabetes.  GERD.  Hypothyroid negative.  Anxiety depression.  Schizophrenia.  Acute kidney injury.  DVT prophylaxis patient Xarelto already.  PT OT.  P.o. diet.    Afshin Villalba MD

## 2023-12-20 NOTE — NURSING NOTE
1400 - report called to Gifty GONZALEZ on 11S all questions answered at this time.    1410 - Patient informed of transfer, belongings gathered, all questions answered. Will continue to monitor patient until transport arrives. Patient will be going to [facility]. Safety maintained at this time.

## 2023-12-21 VITALS
DIASTOLIC BLOOD PRESSURE: 57 MMHG | RESPIRATION RATE: 18 BRPM | SYSTOLIC BLOOD PRESSURE: 108 MMHG | HEIGHT: 61 IN | TEMPERATURE: 97 F | BODY MASS INDEX: 31.68 KG/M2 | HEART RATE: 71 BPM | OXYGEN SATURATION: 96 % | WEIGHT: 167.77 LBS

## 2023-12-21 LAB
ANION GAP SERPL CALC-SCNC: 10 MMOL/L (ref 10–20)
BASOPHILS # BLD AUTO: 0.07 X10*3/UL (ref 0–0.1)
BASOPHILS NFR BLD AUTO: 0.6 %
BUN SERPL-MCNC: 19 MG/DL (ref 6–23)
CALCIUM SERPL-MCNC: 9.6 MG/DL (ref 8.6–10.3)
CHLORIDE SERPL-SCNC: 101 MMOL/L (ref 98–107)
CO2 SERPL-SCNC: 34 MMOL/L (ref 21–32)
CREAT SERPL-MCNC: 0.96 MG/DL (ref 0.5–1.05)
EOSINOPHIL # BLD AUTO: 0.02 X10*3/UL (ref 0–0.7)
EOSINOPHIL NFR BLD AUTO: 0.2 %
ERYTHROCYTE [DISTWIDTH] IN BLOOD BY AUTOMATED COUNT: 16.4 % (ref 11.5–14.5)
GFR SERPL CREATININE-BSD FRML MDRD: 76 ML/MIN/1.73M*2
GLUCOSE BLD MANUAL STRIP-MCNC: 110 MG/DL (ref 74–99)
GLUCOSE SERPL-MCNC: 120 MG/DL (ref 74–99)
HCT VFR BLD AUTO: 42.5 % (ref 36–46)
HGB BLD-MCNC: 12.9 G/DL (ref 12–16)
HOLD SPECIMEN: NORMAL
IMM GRANULOCYTES # BLD AUTO: 0.32 X10*3/UL (ref 0–0.7)
IMM GRANULOCYTES NFR BLD AUTO: 2.8 % (ref 0–0.9)
LYMPHOCYTES # BLD AUTO: 3.66 X10*3/UL (ref 1.2–4.8)
LYMPHOCYTES NFR BLD AUTO: 32.2 %
MAGNESIUM SERPL-MCNC: 1.8 MG/DL (ref 1.6–2.4)
MCH RBC QN AUTO: 30.3 PG (ref 26–34)
MCHC RBC AUTO-ENTMCNC: 30.4 G/DL (ref 32–36)
MCV RBC AUTO: 100 FL (ref 80–100)
MONOCYTES # BLD AUTO: 0.78 X10*3/UL (ref 0.1–1)
MONOCYTES NFR BLD AUTO: 6.9 %
NEUTROPHILS # BLD AUTO: 6.5 X10*3/UL (ref 1.2–7.7)
NEUTROPHILS NFR BLD AUTO: 57.3 %
NRBC BLD-RTO: 0 /100 WBCS (ref 0–0)
PHOSPHATE SERPL-MCNC: 3.2 MG/DL (ref 2.5–4.9)
PLATELET # BLD AUTO: 157 X10*3/UL (ref 150–450)
POTASSIUM SERPL-SCNC: 4.3 MMOL/L (ref 3.5–5.3)
RBC # BLD AUTO: 4.26 X10*6/UL (ref 4–5.2)
SODIUM SERPL-SCNC: 141 MMOL/L (ref 136–145)
WBC # BLD AUTO: 11.4 X10*3/UL (ref 4.4–11.3)

## 2023-12-21 PROCEDURE — 94640 AIRWAY INHALATION TREATMENT: CPT

## 2023-12-21 PROCEDURE — 2500000002 HC RX 250 W HCPCS SELF ADMINISTERED DRUGS (ALT 637 FOR MEDICARE OP, ALT 636 FOR OP/ED)

## 2023-12-21 PROCEDURE — 2500000004 HC RX 250 GENERAL PHARMACY W/ HCPCS (ALT 636 FOR OP/ED)

## 2023-12-21 PROCEDURE — 85025 COMPLETE CBC W/AUTO DIFF WBC: CPT

## 2023-12-21 PROCEDURE — 83735 ASSAY OF MAGNESIUM: CPT

## 2023-12-21 PROCEDURE — 84100 ASSAY OF PHOSPHORUS: CPT

## 2023-12-21 PROCEDURE — 82947 ASSAY GLUCOSE BLOOD QUANT: CPT

## 2023-12-21 PROCEDURE — 99239 HOSP IP/OBS DSCHRG MGMT >30: CPT | Performed by: STUDENT IN AN ORGANIZED HEALTH CARE EDUCATION/TRAINING PROGRAM

## 2023-12-21 PROCEDURE — 80048 BASIC METABOLIC PNL TOTAL CA: CPT

## 2023-12-21 PROCEDURE — 2500000001 HC RX 250 WO HCPCS SELF ADMINISTERED DRUGS (ALT 637 FOR MEDICARE OP)

## 2023-12-21 PROCEDURE — 36415 COLL VENOUS BLD VENIPUNCTURE: CPT

## 2023-12-21 RX ORDER — PREDNISONE 10 MG/1
TABLET ORAL
Qty: 30 TABLET | Refills: 0 | Status: SHIPPED | OUTPATIENT
Start: 2023-12-21 | End: 2023-12-31 | Stop reason: HOSPADM

## 2023-12-21 RX ADMIN — CLONAZEPAM 1 MG: 1 TABLET ORAL at 07:57

## 2023-12-21 RX ADMIN — TOPIRAMATE 50 MG: 25 TABLET ORAL at 08:42

## 2023-12-21 RX ADMIN — BENZTROPINE MESYLATE 2 MG: 1 TABLET ORAL at 08:41

## 2023-12-21 RX ADMIN — METOPROLOL TARTRATE 25 MG: 25 TABLET, FILM COATED ORAL at 08:41

## 2023-12-21 RX ADMIN — BUSPIRONE HYDROCHLORIDE 30 MG: 5 TABLET ORAL at 08:42

## 2023-12-21 RX ADMIN — DOCUSATE SODIUM 100 MG: 100 CAPSULE, LIQUID FILLED ORAL at 08:41

## 2023-12-21 RX ADMIN — HYDROXYZINE PAMOATE 50 MG: 25 CAPSULE ORAL at 05:05

## 2023-12-21 RX ADMIN — CITALOPRAM HYDROBROMIDE 40 MG: 20 TABLET ORAL at 08:41

## 2023-12-21 RX ADMIN — IPRATROPIUM BROMIDE AND ALBUTEROL SULFATE 3 ML: 2.5; .5 SOLUTION RESPIRATORY (INHALATION) at 04:09

## 2023-12-21 RX ADMIN — TIOTROPIUM BROMIDE INHALATION SPRAY 2 PUFF: 3.12 SPRAY, METERED RESPIRATORY (INHALATION) at 07:43

## 2023-12-21 RX ADMIN — PREDNISONE 40 MG: 20 TABLET ORAL at 08:41

## 2023-12-21 RX ADMIN — LEVOTHYROXINE SODIUM 150 MCG: 75 TABLET ORAL at 05:05

## 2023-12-21 RX ADMIN — PANTOPRAZOLE SODIUM 40 MG: 40 TABLET, DELAYED RELEASE ORAL at 08:42

## 2023-12-21 RX ADMIN — IPRATROPIUM BROMIDE AND ALBUTEROL SULFATE 3 ML: 2.5; .5 SOLUTION RESPIRATORY (INHALATION) at 07:05

## 2023-12-21 ASSESSMENT — ACTIVITIES OF DAILY LIVING (ADL): LACK_OF_TRANSPORTATION: NO

## 2023-12-21 ASSESSMENT — PAIN SCALES - GENERAL: PAINLEVEL_OUTOF10: 0 - NO PAIN

## 2023-12-21 NOTE — DISCHARGE SUMMARY
"Discharge Diagnosis  COPD with exacerbation (CMS/[facility])    Issues Requiring Follow-Up      Test Results Pending At Discharge  Pending Labs       No current pending labs.            Hospital Course   43-year-old female with past medical history of COPD with chronic hypoxemic respiratory failure with noncompliance, continues to smoke, does not wear nocturnal BiPAP was admitted with acute on chronic hypercapnic and hypoxemic respiratory failure.  Initially she was put on BiPAP and treated in the ICU had been weaned down to base home O2 of 4 L.  With nocturnal BiPAP.  Was treated with systemic steroids, nebulizer breathing treatments, inhalers, pulmonary was consulted.  It seems like she is back to her baseline, nontoxic-appearing, appears comfortable, there was no evidence of pneumonia.  Medically ready to be discharged.  Patient was counseled regarding smoking and being compliant with her BiPAP and oxygen.  She will be discharged home on a tapering course of steroids.    Pertinent Physical Exam At Time of Discharge  Physical Exam  Constitutional:       General: She is not in acute distress.     Appearance: She is not ill-appearing or toxic-appearing.   HENT:      Head: Normocephalic and atraumatic.   Cardiovascular:      Rate and Rhythm: Normal rate and regular rhythm.   Pulmonary:      Effort: Pulmonary effort is normal. No respiratory distress.      Breath sounds: No wheezing.   Abdominal:      General: There is no distension.      Palpations: Abdomen is soft.   Musculoskeletal:         General: No deformity.   Skin:     General: Skin is warm.   Neurological:      General: No focal deficit present.      Mental Status: She is alert.   Psychiatric:         Mood and Affect: Mood normal.         Home Medications     Medication List      CONTINUE taking these medications     alcohol swabs pads, medicated; Commonly known as: Alcohol Prep Pads; Use   3 times daily prn   BD Ultra-Fine Mini Pen Needle 31 gauge x 3/16\" needle; " Generic drug: pen   needle, diabetic   lancets 30 gauge misc; 100 each 3 times a day.     ASK your doctor about these medications     albuterol 90 mcg/actuation inhaler; Inhale 2 puffs every 4 hours if   needed for wheezing or shortness of breath.   atorvastatin 20 mg tablet; Commonly known as: Lipitor; Take 1 tablet (20   mg) by mouth once daily. Dr. Davis covering for Dr. Alpa Sanchez Aerosphere 160-9-4.8 mcg/actuation HFA aerosol inhaler; Generic   drug: budesonide-glycopyr-formoterol   buPROPion  mg 12 hr tablet; Commonly known as: Wellbutrin SR   busPIRone 30 mg tablet; Commonly known as: Buspar   citalopram 40 mg tablet; Commonly known as: CeleXA   clonazePAM 1 mg tablet; Commonly known as: KlonoPIN   doxycycline 100 mg capsule; Commonly known as: Vibramycin; Take 1   capsule (100 mg) by mouth 2 times a day for 10 days. Take with at least 8   ounces (large glass) of water, do not lie down for 30 minutes after   hydrOXYzine HCL 25 mg tablet; Commonly known as: Atarax; Take 1 tablet   (25 mg) by mouth every 6 hours if needed for anxiety for up to 3 days.   hydrOXYzine pamoate 50 mg capsule; Commonly known as: Vistaril   insulin glargine 100 unit/mL (3 mL) pen; Commonly known as: Lantus   Solostar U-100 Insulin; Inject 10 Units under the skin once daily in the   morning. Take as directed per insulin instructions.   insulin lispro 100 unit/mL injection; Commonly known as: HumaLOG   Invega Sustenna 234 mg/1.5 mL syringe; Generic drug: paliperidone   palmitate ER   ipratropium-albuteroL 0.5-2.5 mg/3 mL nebulizer solution; Commonly known   as: Duo-Neb; Take 3 mL by nebulization every 6 hours if needed for   shortness of breath or wheezing.   levothyroxine 150 mcg tablet; Commonly known as: Synthroid, Levoxyl;   Take 1 tablet (150 mcg) by mouth once daily.   metoprolol tartrate 25 mg tablet; Commonly known as: Lopressor; TAKE 1   TABLET BY MOUTH IN THE MORNING AND 1 AT BEDTIME   NovoLOG Flexpen U-100  Insulin 100 unit/mL (3 mL) pen; Generic drug:   insulin aspart   OneTouch Verio test strips strip; Generic drug: blood sugar diagnostic   pantoprazole 40 mg EC tablet; Commonly known as: ProtoNix; Take 1 tablet   (40 mg) by mouth once daily.   prazosin 2 mg capsule; Commonly known as: Minipress   predniSONE 20 mg tablet; Commonly known as: Deltasone; Take 2 tablets   (40 mg) by mouth once daily for 5 days, THEN 1 tablet (20 mg) once daily   for 5 days.; Start taking on: December 19, 2023; Ask about: Which   instructions should I use?   topiramate 25 mg tablet; Commonly known as: Topamax; Take 2 tablets (50   mg) by mouth once daily.   Xarelto 20 mg tablet; Generic drug: rivaroxaban       Outpatient Follow-Up  Future Appointments   Date Time Provider Department Center   1/3/2024  1:30 PM Jesse Jones DO [id number]   2/15/2024 10:45 AM Delta Yuen MD [id number]       Stephen Abdi MD

## 2023-12-21 NOTE — CARE PLAN
Problem: Skin  Goal: Decreased wound size/increased tissue granulation at next dressing change  Outcome: Met  Goal: Participates in plan/prevention/treatment measures  Outcome: Met  Goal: Prevent/manage excess moisture  Outcome: Met  Goal: Prevent/minimize sheer/friction injuries  Outcome: Met  Goal: Promote/optimize nutrition  Outcome: Met  Goal: Promote skin healing  Outcome: Met     Problem: Diabetes  Goal: Achieve decreasing blood glucose levels by end of shift  Outcome: Met  Goal: Increase stability of blood glucose readings by end of shift  Outcome: Met  Goal: Decrease in ketones present in urine by end of shift  Outcome: Met  Goal: Maintain electrolyte levels within acceptable range throughout shift  Outcome: Met  Goal: Maintain glucose levels >70mg/dl to <250mg/dl throughout shift  Outcome: Met  Goal: No changes in neurological exam by end of shift  Outcome: Met  Goal: Learn about and adhere to nutrition recommendations by end of shift  Outcome: Met  Goal: Vital signs within normal range for age by end of shift  Outcome: Met  Goal: Increase self care and/or family involovement by end of shift  Outcome: Met  Goal: Receive DSME education by end of shift  Outcome: Met     Problem: Respiratory  Goal: Clear secretions with interventions this shift  Outcome: Met  Goal: Minimize anxiety/maximize coping throughout shift  Outcome: Met  Goal: Minimal/no exertional discomfort or dyspnea this shift  Outcome: Met  Goal: No signs of respiratory distress (eg. Use of accessory muscles. Peds grunting)  Outcome: Met  Goal: Patent airway maintained this shift  Outcome: Met  Goal: Tolerate pulmonary toileting this shift  Outcome: Met  Goal: Verbalize decreased shortness of breath this shift  Outcome: Met  Goal: Wean oxygen to maintain O2 saturation per order/standard this shift  Outcome: Met  Goal: Increase self care and/or family involvement in next 24 hours  Outcome: Met     Problem: Pain - Adult  Goal: Verbalizes/displays  adequate comfort level or baseline comfort level  Outcome: Met     Problem: Safety - Adult  Goal: Free from fall injury  Outcome: Met     Problem: Discharge Planning  Goal: Discharge to home or other facility with appropriate resources  Outcome: Met     Problem: Chronic Conditions and Co-morbidities  Goal: Patient's chronic conditions and co-morbidity symptoms are monitored and maintained or improved  Outcome: Met     Problem: Fall/Injury  Goal: Not fall by end of shift  Outcome: Met  Goal: Be free from injury by end of the shift  Outcome: Met  Goal: Verbalize understanding of personal risk factors for fall in the hospital  Outcome: Met  Goal: Verbalize understanding of risk factor reduction measures to prevent injury from fall in the home  Outcome: Met  Goal: Use assistive devices by end of the shift  Outcome: Met  Goal: Pace activities to prevent fatigue by end of the shift  Outcome: Met   The patient's goals for the shift include      The clinical goals for the shift include Patients spo2 will be greater than 92% by end of shift.

## 2023-12-21 NOTE — PROGRESS NOTES
12/21/23 0932   Discharge Planning   Living Arrangements Spouse/significant other;Family members   Support Systems Spouse/significant other;Family members   Assistance Needed denies needs   Type of Residence Private residence   Who is requesting discharge planning? Provider   Home or Post Acute Services In home services   Type of Home Care Services DME or oxygen  (oxygen 4L @ Home/ [facility])   Patient expects to be discharged to: Home   Does the patient need discharge transport arranged? No  (family to transport)   Housing Stability   In the last 12 months, was there a time when you were not able to pay the mortgage or rent on time? Pt Declined   Transportation Needs   In the past 12 months, has lack of transportation kept you from medical appointments or from getting medications? no   In the past 12 months, has lack of transportation kept you from meetings, work, or from getting things needed for daily living? No   Patient Choice   Patient / Family choosing to utilize agency / facility established prior to hospitalization Yes     PCP Dr Yuen. Met with pt , transfer from critical care. Denies dc needs. Plan for dc home today. Has DME needed.

## 2023-12-22 ENCOUNTER — PATIENT OUTREACH (OUTPATIENT)
Dept: PRIMARY CARE | Facility: CLINIC | Age: 43
End: 2023-12-22
Payer: COMMERCIAL

## 2023-12-22 ENCOUNTER — TELEPHONE (OUTPATIENT)
Dept: PRIMARY CARE | Facility: CLINIC | Age: 43
End: 2023-12-22
Payer: COMMERCIAL

## 2023-12-22 NOTE — TELEPHONE ENCOUNTER
We received a request for prior authorization on the patient's   pantoprazole (ProtoNix) 40 mg EC tablet  from their pharmacy. Prior authorization was submitted to insurance today. We will await their determination.

## 2023-12-22 NOTE — PROGRESS NOTES
Discharge Facility: [facility]  Discharge Diagnosis: COPD with exacerbation   Admission Date: 12/19/23  Discharge Date: 12/21/23    PCP Appointment Date: TBD - task to office  Specialist Appointment Date:   - Afshin Villalba MD (Pulmonary Disease): D  Hospital Encounter and Summary: Linked   See discharge assessment below for further details    Medications  Medications reviewed with patient/caregiver?: Yes (new/changes only) (12/22/2023  2:55 PM)  Is the patient having any side effects they believe may be caused by any medication additions or changes?: No (12/22/2023  2:55 PM)  Does the patient have all medications ordered at discharge?: Yes (12/22/2023  2:55 PM)  Care Management Interventions: No intervention needed (12/22/2023  2:55 PM)  Prescription Comments: Finish prednisone taper (12/22/2023  2:55 PM)  Is the patient taking all medications as directed (includes completed medication regime)?: Yes (12/22/2023  2:55 PM)  Care Management Interventions: Provided patient education (12/22/2023  2:55 PM)    Appointments  Does the patient have a primary care provider?: Yes (12/22/2023  2:55 PM)  Care Management Interventions: Advised patient to make appointment (12/22/2023  2:55 PM)    Self Management  Has home health visited the patient within 72 hours of discharge?: Not applicable (12/22/2023  2:55 PM)  What Durable Medical Equipment (DME) was ordered?: oxygen- had prior to admission (12/22/2023  2:55 PM)  Has all Durable Medical Equipment (DME) been delivered?: Yes (12/22/2023  2:55 PM)    Patient Teaching  Does the patient have access to their discharge instructions?: Yes (12/22/2023  2:55 PM)  Care Management Interventions: Reviewed instructions with patient (12/22/2023  2:55 PM)  What is the patient's perception of their health status since discharge?: Improving (12/22/2023  2:55 PM)  Is the patient/caregiver able to teach back the hierarchy of who to call/visit for symptoms/problems? PCP, Specialist, Home Health  nurse, Urgent Care, ED, 911: Yes (12/22/2023  2:55 PM)  Patient/Caregiver Education Comments: Spoke with julieth who states she is doing better. States she still has a cough but her breathing is better. Denies questions or concerns at this time. (12/22/2023  2:55 PM)

## 2023-12-27 ENCOUNTER — HOSPITAL ENCOUNTER (EMERGENCY)
Facility: HOSPITAL | Age: 43
Discharge: HOME | End: 2023-12-27
Attending: STUDENT IN AN ORGANIZED HEALTH CARE EDUCATION/TRAINING PROGRAM
Payer: COMMERCIAL

## 2023-12-27 ENCOUNTER — APPOINTMENT (OUTPATIENT)
Dept: CARDIOLOGY | Facility: HOSPITAL | Age: 43
End: 2023-12-27
Payer: COMMERCIAL

## 2023-12-27 ENCOUNTER — APPOINTMENT (OUTPATIENT)
Dept: RADIOLOGY | Facility: HOSPITAL | Age: 43
End: 2023-12-27
Payer: COMMERCIAL

## 2023-12-27 VITALS
WEIGHT: 167 LBS | BODY MASS INDEX: 30.73 KG/M2 | DIASTOLIC BLOOD PRESSURE: 93 MMHG | TEMPERATURE: 96.8 F | HEIGHT: 62 IN | SYSTOLIC BLOOD PRESSURE: 165 MMHG | RESPIRATION RATE: 18 BRPM | HEART RATE: 85 BPM | OXYGEN SATURATION: 95 %

## 2023-12-27 DIAGNOSIS — J10.1 INFLUENZA A: ICD-10-CM

## 2023-12-27 DIAGNOSIS — J44.1 COPD EXACERBATION (MULTI): Primary | ICD-10-CM

## 2023-12-27 LAB
ALBUMIN SERPL BCP-MCNC: 4.4 G/DL (ref 3.4–5)
ALP SERPL-CCNC: 81 U/L (ref 33–110)
ALT SERPL W P-5'-P-CCNC: 13 U/L (ref 7–45)
ANION GAP BLDA CALCULATED.4IONS-SCNC: 6 MMO/L (ref 10–25)
ANION GAP SERPL CALC-SCNC: 17 MMOL/L (ref 10–20)
APPARATUS: ABNORMAL
AST SERPL W P-5'-P-CCNC: 12 U/L (ref 9–39)
BASE EXCESS BLDA CALC-SCNC: 0.5 MMOL/L (ref -2–3)
BASOPHILS # BLD MANUAL: 0 X10*3/UL (ref 0–0.1)
BASOPHILS NFR BLD MANUAL: 0 %
BILIRUB SERPL-MCNC: 0.4 MG/DL (ref 0–1.2)
BNP SERPL-MCNC: 19 PG/ML (ref 0–99)
BODY TEMPERATURE: ABNORMAL
BUN SERPL-MCNC: 29 MG/DL (ref 6–23)
CA-I BLDA-SCNC: 1.27 MMOL/L (ref 1.1–1.33)
CALCIUM SERPL-MCNC: 9.5 MG/DL (ref 8.6–10.3)
CARDIAC TROPONIN I PNL SERPL HS: 3 NG/L (ref 0–13)
CARDIAC TROPONIN I PNL SERPL HS: 3 NG/L (ref 0–13)
CHLORIDE BLDA-SCNC: 104 MMOL/L (ref 98–107)
CHLORIDE SERPL-SCNC: 101 MMOL/L (ref 98–107)
CO2 SERPL-SCNC: 25 MMOL/L (ref 21–32)
CREAT SERPL-MCNC: 1.01 MG/DL (ref 0.5–1.05)
EOSINOPHIL # BLD MANUAL: 0 X10*3/UL (ref 0–0.7)
EOSINOPHIL NFR BLD MANUAL: 0 %
ERYTHROCYTE [DISTWIDTH] IN BLOOD BY AUTOMATED COUNT: 15.9 % (ref 11.5–14.5)
FLUAV RNA RESP QL NAA+PROBE: DETECTED
FLUBV RNA RESP QL NAA+PROBE: NOT DETECTED
GFR SERPL CREATININE-BSD FRML MDRD: 71 ML/MIN/1.73M*2
GLUCOSE BLDA-MCNC: 273 MG/DL (ref 74–99)
GLUCOSE SERPL-MCNC: 232 MG/DL (ref 74–99)
HCO3 BLDA-SCNC: 27 MMOL/L (ref 22–26)
HCT VFR BLD AUTO: 44.5 % (ref 36–46)
HCT VFR BLD EST: 41 % (ref 36–46)
HGB BLD-MCNC: 14 G/DL (ref 12–16)
HGB BLDA-MCNC: 13.7 G/DL (ref 12–16)
HOLD SPECIMEN: NORMAL
IMM GRANULOCYTES # BLD AUTO: 0.61 X10*3/UL (ref 0–0.7)
IMM GRANULOCYTES NFR BLD AUTO: 5.6 % (ref 0–0.9)
INHALED O2 CONCENTRATION: 36 %
LACTATE BLDA-SCNC: 0.7 MMOL/L (ref 0.4–2)
LACTATE SERPL-SCNC: 1.1 MMOL/L (ref 0.4–2)
LYMPHOCYTES # BLD MANUAL: 1.08 X10*3/UL (ref 1.2–4.8)
LYMPHOCYTES NFR BLD MANUAL: 10 %
MCH RBC QN AUTO: 30.2 PG (ref 26–34)
MCHC RBC AUTO-ENTMCNC: 31.5 G/DL (ref 32–36)
MCV RBC AUTO: 96 FL (ref 80–100)
METAMYELOCYTES # BLD MANUAL: 0.11 X10*3/UL
METAMYELOCYTES NFR BLD MANUAL: 1 %
MONOCYTES # BLD MANUAL: 0.43 X10*3/UL (ref 0.1–1)
MONOCYTES NFR BLD MANUAL: 4 %
MYELOCYTES # BLD MANUAL: 0.65 X10*3/UL
MYELOCYTES NFR BLD MANUAL: 6 %
NEUTROPHILS # BLD MANUAL: 8.53 X10*3/UL (ref 1.2–7.7)
NEUTS BAND # BLD MANUAL: 0.43 X10*3/UL (ref 0–0.7)
NEUTS BAND NFR BLD MANUAL: 4 %
NEUTS SEG # BLD MANUAL: 8.1 X10*3/UL (ref 1.2–7)
NEUTS SEG NFR BLD MANUAL: 75 %
NEUTS VAC BLD QL SMEAR: PRESENT
NRBC BLD-RTO: 0 /100 WBCS (ref 0–0)
OXYHGB MFR BLDA: 86 % (ref 94–98)
PCO2 BLDA: 50 MM HG (ref 38–42)
PH BLDA: 7.34 PH (ref 7.38–7.42)
PLATELET # BLD AUTO: 155 X10*3/UL (ref 150–450)
PO2 BLDA: 60 MM HG (ref 85–95)
POTASSIUM BLDA-SCNC: 4.3 MMOL/L (ref 3.5–5.3)
POTASSIUM SERPL-SCNC: 4 MMOL/L (ref 3.5–5.3)
PROT SERPL-MCNC: 7.5 G/DL (ref 6.4–8.2)
RBC # BLD AUTO: 4.63 X10*6/UL (ref 4–5.2)
RBC MORPH BLD: ABNORMAL
RSV RNA RESP QL NAA+PROBE: NOT DETECTED
SAO2 % BLDA: 92 % (ref 94–100)
SARS-COV-2 RNA RESP QL NAA+PROBE: NOT DETECTED
SODIUM BLDA-SCNC: 133 MMOL/L (ref 136–145)
SODIUM SERPL-SCNC: 139 MMOL/L (ref 136–145)
TOTAL CELLS COUNTED BLD: 100
WBC # BLD AUTO: 10.8 X10*3/UL (ref 4.4–11.3)

## 2023-12-27 PROCEDURE — 99285 EMERGENCY DEPT VISIT HI MDM: CPT | Mod: 25

## 2023-12-27 PROCEDURE — 85027 COMPLETE CBC AUTOMATED: CPT | Performed by: PHYSICIAN ASSISTANT

## 2023-12-27 PROCEDURE — 93005 ELECTROCARDIOGRAM TRACING: CPT

## 2023-12-27 PROCEDURE — 80053 COMPREHEN METABOLIC PANEL: CPT | Performed by: PHYSICIAN ASSISTANT

## 2023-12-27 PROCEDURE — 2550000001 HC RX 255 CONTRASTS: Performed by: STUDENT IN AN ORGANIZED HEALTH CARE EDUCATION/TRAINING PROGRAM

## 2023-12-27 PROCEDURE — 84132 ASSAY OF SERUM POTASSIUM: CPT | Performed by: PHYSICIAN ASSISTANT

## 2023-12-27 PROCEDURE — 96375 TX/PRO/DX INJ NEW DRUG ADDON: CPT

## 2023-12-27 PROCEDURE — 36415 COLL VENOUS BLD VENIPUNCTURE: CPT | Performed by: PHYSICIAN ASSISTANT

## 2023-12-27 PROCEDURE — 71045 X-RAY EXAM CHEST 1 VIEW: CPT | Mod: FOREIGN READ | Performed by: RADIOLOGY

## 2023-12-27 PROCEDURE — 83880 ASSAY OF NATRIURETIC PEPTIDE: CPT | Performed by: PHYSICIAN ASSISTANT

## 2023-12-27 PROCEDURE — 87637 SARSCOV2&INF A&B&RSV AMP PRB: CPT | Performed by: PHYSICIAN ASSISTANT

## 2023-12-27 PROCEDURE — 99284 EMERGENCY DEPT VISIT MOD MDM: CPT | Performed by: STUDENT IN AN ORGANIZED HEALTH CARE EDUCATION/TRAINING PROGRAM

## 2023-12-27 PROCEDURE — 2500000004 HC RX 250 GENERAL PHARMACY W/ HCPCS (ALT 636 FOR OP/ED): Performed by: PHYSICIAN ASSISTANT

## 2023-12-27 PROCEDURE — 96365 THER/PROPH/DIAG IV INF INIT: CPT | Mod: 59

## 2023-12-27 PROCEDURE — 2500000002 HC RX 250 W HCPCS SELF ADMINISTERED DRUGS (ALT 637 FOR MEDICARE OP, ALT 636 FOR OP/ED): Performed by: PHYSICIAN ASSISTANT

## 2023-12-27 PROCEDURE — 71045 X-RAY EXAM CHEST 1 VIEW: CPT

## 2023-12-27 PROCEDURE — 71275 CT ANGIOGRAPHY CHEST: CPT | Performed by: RADIOLOGY

## 2023-12-27 PROCEDURE — 85007 BL SMEAR W/DIFF WBC COUNT: CPT | Performed by: PHYSICIAN ASSISTANT

## 2023-12-27 PROCEDURE — 71275 CT ANGIOGRAPHY CHEST: CPT

## 2023-12-27 PROCEDURE — 36600 WITHDRAWAL OF ARTERIAL BLOOD: CPT

## 2023-12-27 PROCEDURE — 83605 ASSAY OF LACTIC ACID: CPT | Performed by: PHYSICIAN ASSISTANT

## 2023-12-27 PROCEDURE — 84484 ASSAY OF TROPONIN QUANT: CPT | Performed by: PHYSICIAN ASSISTANT

## 2023-12-27 RX ORDER — AZITHROMYCIN 250 MG/1
250 TABLET, FILM COATED ORAL DAILY
Qty: 6 TABLET | Refills: 0 | Status: SHIPPED | OUTPATIENT
Start: 2023-12-27 | End: 2024-01-14 | Stop reason: HOSPADM

## 2023-12-27 RX ORDER — MAGNESIUM SULFATE HEPTAHYDRATE 40 MG/ML
2 INJECTION, SOLUTION INTRAVENOUS ONCE
Status: COMPLETED | OUTPATIENT
Start: 2023-12-27 | End: 2023-12-27

## 2023-12-27 RX ORDER — OSELTAMIVIR PHOSPHATE 75 MG/1
75 CAPSULE ORAL ONCE
Status: COMPLETED | OUTPATIENT
Start: 2023-12-27 | End: 2023-12-27

## 2023-12-27 RX ORDER — OSELTAMIVIR PHOSPHATE 75 MG/1
75 CAPSULE ORAL EVERY 12 HOURS
Qty: 10 CAPSULE | Refills: 0 | Status: SHIPPED | OUTPATIENT
Start: 2023-12-27 | End: 2024-01-01

## 2023-12-27 RX ORDER — PREDNISONE 50 MG/1
50 TABLET ORAL DAILY
Qty: 5 TABLET | Refills: 0 | Status: SHIPPED | OUTPATIENT
Start: 2023-12-27 | End: 2024-01-01

## 2023-12-27 RX ORDER — AZITHROMYCIN 250 MG/1
500 TABLET, FILM COATED ORAL ONCE
Status: COMPLETED | OUTPATIENT
Start: 2023-12-27 | End: 2023-12-27

## 2023-12-27 RX ADMIN — AZITHROMYCIN 500 MG: 250 TABLET, FILM COATED ORAL at 22:01

## 2023-12-27 RX ADMIN — METHYLPREDNISOLONE SODIUM SUCCINATE 125 MG: 125 INJECTION, POWDER, FOR SOLUTION INTRAMUSCULAR; INTRAVENOUS at 20:23

## 2023-12-27 RX ADMIN — OSELTAMIVIR PHOSPHATE 75 MG: 75 CAPSULE ORAL at 22:01

## 2023-12-27 RX ADMIN — MAGNESIUM SULFATE HEPTAHYDRATE 2 G: 40 INJECTION, SOLUTION INTRAVENOUS at 20:26

## 2023-12-27 RX ADMIN — IOHEXOL 75 ML: 350 INJECTION, SOLUTION INTRAVENOUS at 21:18

## 2023-12-27 ASSESSMENT — PAIN SCALES - GENERAL
PAINLEVEL_OUTOF10: 0 - NO PAIN

## 2023-12-27 ASSESSMENT — COLUMBIA-SUICIDE SEVERITY RATING SCALE - C-SSRS
1. IN THE PAST MONTH, HAVE YOU WISHED YOU WERE DEAD OR WISHED YOU COULD GO TO SLEEP AND NOT WAKE UP?: NO
2. HAVE YOU ACTUALLY HAD ANY THOUGHTS OF KILLING YOURSELF?: NO
6. HAVE YOU EVER DONE ANYTHING, STARTED TO DO ANYTHING, OR PREPARED TO DO ANYTHING TO END YOUR LIFE?: NO

## 2023-12-27 ASSESSMENT — PAIN - FUNCTIONAL ASSESSMENT: PAIN_FUNCTIONAL_ASSESSMENT: 0-10

## 2023-12-27 ASSESSMENT — LIFESTYLE VARIABLES
EVER FELT BAD OR GUILTY ABOUT YOUR DRINKING: NO
HAVE YOU EVER FELT YOU SHOULD CUT DOWN ON YOUR DRINKING: NO
EVER HAD A DRINK FIRST THING IN THE MORNING TO STEADY YOUR NERVES TO GET RID OF A HANGOVER: NO
REASON UNABLE TO ASSESS: YES
HAVE PEOPLE ANNOYED YOU BY CRITICIZING YOUR DRINKING: NO

## 2023-12-27 NOTE — Clinical Note
Stephenie Mccray was seen and treated in our emergency department on 12/27/2023.  She may return to work on 12/31/2023.       If you have any questions or concerns, please don't hesitate to call.      Tunde Duncan PA-C

## 2023-12-28 NOTE — ED PROVIDER NOTES
HPI   Chief Complaint   Patient presents with    Shortness of Breath     STARTED TODAY, TOOK X1 BREATHING TREATMENT W/O RELIEF.       A 43-year-old female patient comes in the emergency department today with complaints of shortness of breath.  States she has history of COPD.  States for the past 2 days she has had cough, congestion, chills, body aches.  States this preceded the shortness of breath.  States she does have oxygen at home.  Patient took a DuoNeb and an hour prior to arrival.  For this purpose comes in the emergency department today further evaluation.  Denies any associated chest pain.  Denies any sick contacts that she is aware of.                          [place] Coma Scale Score: 15                  Patient History   Past Medical History:   Diagnosis Date    Arthritis     COPD (chronic obstructive pulmonary disease) (CMS/[facility])     Diabetes mellitus (CMS/[facility])     Disease of thyroid gland     Encounter for preprocedural cardiovascular examination 2018    Preop cardiovascular exam    History of transfusion     Personal history of other diseases of the nervous system and sense organs     History of sleep apnea    Personal history of other endocrine, nutritional and metabolic disease     History of hypothyroidism    Personal history of other specified conditions 2021    History of gross hematuria    Personal history of other specified conditions 2021    History of dysuria    Personal history of other specified conditions     History of tachycardia    Syncope and collapse 2021    Near syncope    Unspecified acute conjunctivitis, right eye 2021    Acute bacterial conjunctivitis of right eye     Past Surgical History:   Procedure Laterality Date    OTHER SURGICAL HISTORY  2018    Ear surgery    OTHER SURGICAL HISTORY  2018     section    OTHER SURGICAL HISTORY  2018    Tonsillectomy    OTHER SURGICAL HISTORY  10/27/2021    Ventral hernia repair    OTHER  SURGICAL HISTORY  10/27/2021    Lipoma excision    OTHER SURGICAL HISTORY  10/06/2021    Hernia repair    TONSILLECTOMY       Family History   Problem Relation Name Age of Onset    Fibromyalgia Father      Hypertension Brother      Thyroid disease Maternal Grandmother      Thyroid disease Paternal Grandmother      Lung cancer Paternal Grandfather      Coronary artery disease Other Grandmother      Social History     Tobacco Use    Smoking status: Every Day     Packs/day: 1     Types: Cigarettes     Passive exposure: Never    Smokeless tobacco: Never   Substance Use Topics    Alcohol use: Never    Drug use: Never       Physical Exam   ED Triage Vitals [12/27/23 1944]   Temp Heart Rate Resp BP   36 °C (96.8 °F) (!) 120 20 (!) 142/91      SpO2 Temp Source Heart Rate Source Patient Position   (!) 82 % Temporal Monitor Sitting      BP Location FiO2 (%)     Left arm --       Physical Exam    ED Course & MDM   Diagnoses as of 12/27/23 2154   COPD exacerbation (CMS/HCC)   Influenza A       Medical Decision Making  A 43-year-old female patient comes in the emergency department today with complaints of shortness of breath.  States she has history of COPD.  States for the past 2 days she has had cough, congestion, chills, body aches.  States this preceded the shortness of breath.  States she does have oxygen at home.  Patient took a DuoNeb and an hour prior to arrival.  For this purpose comes in the emergency department today further evaluation.  Denies any associated chest pain.  Denies any sick contacts that she is aware of.    CT PE study ordered, chest x-ray, laboratory studies, EKG.  Ordered patient IV Solu-Medrol IV magnesium.  Patient declined nebulized treatments that she just took some prior to coming to the emergency department.  Rule pulmonary emboli, pneumonia, COVID-19 influenza RSV.  Rule out ACS, arrhythmias, electrolyte abnormalities, significant leukocytosis.    Patient has negative troponin does not have  elevated white blood cell count.  Tested positive for influenza A here in the emergency department negative for COVID-19 RSV.  Patient's CMP relatively within normal limits negative BNP negative lactate.  CT PE study is consistent with groundglass opacities concerning for possible pneumonia but no PE.    Will give patient p.o. Tamiflu, p.o. azithromycin here in the emergency department as well as the same for home as well as p.o. prednisone.  Patient feeling much better.  Will discharge patient home this time.  Patient agrees with this plan expressed full verbal understanding.  All questions were answered.    Historians patient    Diagnosis: Influenza A, COPD exacerbation      Labs Reviewed   CBC WITH AUTO DIFFERENTIAL - Abnormal       Result Value    WBC 10.8      nRBC 0.0      RBC 4.63      Hemoglobin 14.0      Hematocrit 44.5      MCV 96      MCH 30.2      MCHC 31.5 (*)     RDW 15.9 (*)     Platelets 155      Immature Granulocytes %, Automated 5.6 (*)     Immature Granulocytes Absolute, Automated 0.61      Narrative:     The previously reported component Neutrophils % is no longer being reported.  The previously reported component Lymphocytes % is no longer being reported.  The previously reported component Monocytes % is no longer being reported.  The previously   reported component Eosinophils % is no longer being reported.  The previously reported component Basophils % is no longer being reported.  The previously reported component Absolute Neutrophils is no longer being reported.  The previously reported   component Absolute Lymphocytes is no longer being reported.  The previously reported component Absolute Monocytes is no longer being reported.  The previously reported component Absolute Eosinophils is no longer being reported.  The previously reported   component Absolute Basophils is no longer being reported.   COMPREHENSIVE METABOLIC PANEL - Abnormal    Glucose 232 (*)     Sodium 139      Potassium 4.0       Chloride 101      Bicarbonate 25      Anion Gap 17      Urea Nitrogen 29 (*)     Creatinine 1.01      eGFR 71      Calcium 9.5      Albumin 4.4      Alkaline Phosphatase 81      Total Protein 7.5      AST 12      Bilirubin, Total 0.4      ALT 13     SARS-COV-2 AND INFLUENZA A/B PCR - Abnormal    Flu A Result Detected (*)     Flu B Result Not Detected      Coronavirus 2019, PCR Not Detected      Narrative:     This assay has received FDA Emergency Use Authorization (EUA) and  is only authorized for the duration of time that circumstances exist to justify the authorization of the emergency use of in vitro diagnostic tests for the detection of SARS-CoV-2 virus and/or diagnosis of COVID-19 infection under section 564(b)(1) of the Act, 21 U.S.C. 360bbb-3(b)(1). Testing for SARS-CoV-2 is only recommended for patients who meet current clinical and/or epidemiological criteria as defined by federal, state, or local public health directives. This assay is an in vitro diagnostic nucleic acid amplification test for the qualitative detection of SARS-CoV-2, Influenza A, and Influenza B from nasopharyngeal specimens and has been validated for use at [facility]. Negative results do not preclude COVID-19 infections or Influenza A/B infections, and should not be used as the sole basis for diagnosis, treatment, or other management decisions. If Influenza A/B and RSV PCR results are negative, testing for Parainfluenza virus, Adenovirus and Metapneumovirus is routinely performed for [facility] pediatric oncology and intensive care inpatients, and is available on other patients by placing an add-on request.    BLOOD GAS ARTERIAL FULL PANEL - Abnormal    POCT pH, Arterial 7.34 (*)     POCT pCO2, Arterial 50 (*)     POCT pO2, Arterial 60 (*)     POCT SO2, Arterial 92 (*)     POCT Oxy Hemoglobin, Arterial 86.0 (*)     POCT Hematocrit Calculated, Arterial 41.0      POCT Sodium, Arterial 133 (*)     POCT Potassium,  Arterial 4.3      POCT Chloride, Arterial 104      POCT Ionized Calcium, Arterial 1.27      POCT Glucose, Arterial 273 (*)     POCT Lactate, Arterial 0.7      POCT Base Excess, Arterial 0.5      POCT HCO3 Calculated, Arterial 27.0 (*)     POCT Hemoglobin, Arterial 13.7      POCT Anion Gap, Arterial 6 (*)     Patient Temperature        FiO2 36      Apparatus CANNULA     MANUAL DIFFERENTIAL - Abnormal    Neutrophils %, Manual 75.0      Bands %, Manual 4.0      Lymphocytes %, Manual 10.0      Monocytes %, Manual 4.0      Eosinophils %, Manual 0.0      Basophils %, Manual 0.0      Metamyelocytes %, Manual 1.0      Myelocytes %, Manual 6.0      Seg Neutrophils Absolute, Manual 8.10 (*)     Bands Absolute, Manual 0.43      Lymphocytes Absolute, Manual 1.08 (*)     Monocytes Absolute, Manual 0.43      Eosinophils Absolute, Manual 0.00      Basophils Absolute, Manual 0.00      Metamyelocytes Absolute, Manual 0.11      Myelocytes Absolute, Manual 0.65      Total Cells Counted 100      Neutrophils Absolute, Manual 8.53 (*)     RBC Morphology See Below      Vacuolated Neutrophils Present     LACTATE - Normal    Lactate 1.1      Narrative:     Venipuncture immediately after or during the administration of Metamizole may lead to falsely low results. Testing should be performed immediately  prior to Metamizole dosing.   B-TYPE NATRIURETIC PEPTIDE - Normal    BNP 19      Narrative:        <100 pg/mL - Heart failure unlikely  100-299 pg/mL - Intermediate probability of acute heart                  failure exacerbation. Correlate with clinical                  context and patient history.    >=300 pg/mL - Heart Failure likely. Correlate with clinical                  context and patient history.    BNP testing is performed using different testing methodology at [facility] than at other [facility] hospitals. Direct result comparisons should only be made within the same method.      RSV PCR - Normal    RSV PCR Not Detected       Narrative:     This assay is an FDA-cleared, in vitro diagnostic nucleic acid amplification test for the detection of RSV from nasopharyngeal specimens, and has been validated for use at [facility]. Negative results do not preclude RSV infections, and should not be used as the sole basis for diagnosis, treatment, or other management decisions. If Influenza A/B and RSV PCR results are negative, testing for Parainfluenza virus, Adenovirus and Metapneumovirus is routinely performed for pediatric oncology and intensive care inpatients at [facility], and is available on other patients by placing an add-on request.       SERIAL TROPONIN-INITIAL - Normal    Troponin I, High Sensitivity 3      Narrative:     Less than 99th percentile of normal range cutoff-  Female and children under 18 years old <14 ng/L; Male <21 ng/L: Negative  Repeat testing should be performed if clinically indicated.     Female and children under 18 years old 14-50 ng/L; Male 21-50 ng/L:  Consistent with possible cardiac damage and possible increased clinical   risk. Serial measurements may help to assess extent of myocardial damage.     >50 ng/L: Consistent with cardiac damage, increased clinical risk and  myocardial infarction. Serial measurements may help assess extent of   myocardial damage.      NOTE: Children less than 1 year old may have higher baseline troponin   levels and results should be interpreted in conjunction with the overall   clinical context.     NOTE: Troponin I testing is performed using a different   testing methodology at [facility] than at other   [facility]. Direct result comparisons should only   be made within the same method.   SERIAL TROPONIN, 1 HOUR - Normal    Troponin I, High Sensitivity 3      Narrative:     Less than 99th percentile of normal range cutoff-  Female and children under 18 years old <14 ng/L; Male <21 ng/L: Negative  Repeat testing should be performed if clinically  indicated.     Female and children under 18 years old 14-50 ng/L; Male 21-50 ng/L:  Consistent with possible cardiac damage and possible increased clinical   risk. Serial measurements may help to assess extent of myocardial damage.     >50 ng/L: Consistent with cardiac damage, increased clinical risk and  myocardial infarction. Serial measurements may help assess extent of   myocardial damage.      NOTE: Children less than 1 year old may have higher baseline troponin   levels and results should be interpreted in conjunction with the overall   clinical context.     NOTE: Troponin I testing is performed using a different   testing methodology at [facility] than at other   [facility]. Direct result comparisons should only   be made within the same method.   TROPONIN SERIES- (INITIAL, 1 HR)    Narrative:     The following orders were created for panel order Troponin I Series, High Sensitivity (0, 1 HR).  Procedure                               Abnormality         Status                     ---------                               -----------         ------                     Troponin I, High Sensiti...[[id number]]  Normal              Final result               Troponin, High Sensitivi...[[id number]]  Normal              Final result                 Please view results for these tests on the individual orders.        CT angio chest for pulmonary embolism   Final Result   1. No evidence of acute pulmonary embolus.   2. Mild patchy ground-glass airspace consolidations in the upper   lobes bilaterally, concerning for developing pneumonia. Clinical   correlation and continued follow-up is recommended.        MACRO:   None.             Signed by: Sin Benítez 12/27/2023 9:33 PM   Dictation workstation:   [id number]      XR chest 1 view   Final Result   Subtle right basilar opacity concerning for pneumonia.  This appears   slightly progressed since prior study.  Cardiomegaly and emphysematous   changes.    Signed by Jairo Velazquez,           Procedure  ECG 12 lead    Performed by: Tunde Duncan PA-C  Authorized by: Tunde Duncan PA-C    Interpretation:     Interpretation: normal      Details:  My EKG interpretation  Rate:     ECG rate:  98    ECG rate assessment: normal    Rhythm:     Rhythm: sinus rhythm    ST segments:     ST segments:  Normal  T waves:     T waves: normal    ECG 12 lead    Performed by: Tunde Duncan PA-C  Authorized by: Tunde Duncan PA-C    Interpretation:     Interpretation: normal      Details:  My EKG interpretation  Rate:     ECG rate:  90  Rhythm:     Rhythm: sinus rhythm    QRS:     QRS axis:  Left  ST segments:     ST segments:  Normal  T waves:     T waves: normal         Tunde Duncan PA-C  12/27/23 2152       Tunde Duncan PA-C  12/27/23 2154       Tunde Duncan PA-C  12/27/23 2154

## 2023-12-28 NOTE — ED TRIAGE NOTES
PT. ARRIVED VIA PRIVATE CAR, RIDE PROVIDED, TO ED FROM HOME FOR SHORTNESS OF BREATH. PT. STATES FLU LIKE SYMPTOMS X1WK. PT. C/O RUNNY NOSE, COUGH X1WK. PT. STATES SOB STARTED TODAY, B/L LUNG SOUNDS CLEAR, PULSE OX READING 82% ON RA, W/ NON PRODUCTIVE COUGH. PT. DOES HAVE HOME OXYGEN BUT ONLY WEARS IT WHEN NEEDED.

## 2023-12-29 ENCOUNTER — APPOINTMENT (OUTPATIENT)
Dept: CARDIOLOGY | Facility: HOSPITAL | Age: 43
DRG: 193 | End: 2023-12-29
Payer: COMMERCIAL

## 2023-12-29 ENCOUNTER — APPOINTMENT (OUTPATIENT)
Dept: RADIOLOGY | Facility: HOSPITAL | Age: 43
DRG: 193 | End: 2023-12-29
Payer: COMMERCIAL

## 2023-12-29 ENCOUNTER — HOSPITAL ENCOUNTER (INPATIENT)
Facility: HOSPITAL | Age: 43
LOS: 2 days | Discharge: HOME | DRG: 193 | End: 2023-12-31
Attending: EMERGENCY MEDICINE | Admitting: INTERNAL MEDICINE
Payer: COMMERCIAL

## 2023-12-29 DIAGNOSIS — J44.1 COPD WITH EXACERBATION (MULTI): ICD-10-CM

## 2023-12-29 DIAGNOSIS — J01.90 ACUTE NON-RECURRENT SINUSITIS, UNSPECIFIED LOCATION: ICD-10-CM

## 2023-12-29 DIAGNOSIS — R06.02 SHORTNESS OF BREATH: ICD-10-CM

## 2023-12-29 DIAGNOSIS — J18.9 PNEUMONIA OF RIGHT UPPER LOBE DUE TO INFECTIOUS ORGANISM: ICD-10-CM

## 2023-12-29 DIAGNOSIS — J11.1 FLU: ICD-10-CM

## 2023-12-29 DIAGNOSIS — J96.21 ACUTE ON CHRONIC HYPOXIC RESPIRATORY FAILURE (MULTI): Primary | ICD-10-CM

## 2023-12-29 DIAGNOSIS — J44.1 CHRONIC OBSTRUCTIVE PULMONARY DISEASE WITH ACUTE EXACERBATION (MULTI): ICD-10-CM

## 2023-12-29 LAB
ALBUMIN SERPL BCP-MCNC: 4.2 G/DL (ref 3.4–5)
ALP SERPL-CCNC: 78 U/L (ref 33–110)
ALT SERPL W P-5'-P-CCNC: 16 U/L (ref 7–45)
ANION GAP SERPL CALC-SCNC: 12 MMOL/L (ref 10–20)
AST SERPL W P-5'-P-CCNC: 12 U/L (ref 9–39)
ATRIAL RATE: 115 BPM
ATRIAL RATE: 90 BPM
ATRIAL RATE: 98 BPM
BASOPHILS # BLD AUTO: 0.07 X10*3/UL (ref 0–0.1)
BASOPHILS NFR BLD AUTO: 0.5 %
BILIRUB SERPL-MCNC: 0.5 MG/DL (ref 0–1.2)
BNP SERPL-MCNC: 26 PG/ML (ref 0–99)
BUN SERPL-MCNC: 24 MG/DL (ref 6–23)
CALCIUM SERPL-MCNC: 10 MG/DL (ref 8.6–10.3)
CARDIAC TROPONIN I PNL SERPL HS: 3 NG/L (ref 0–13)
CARDIAC TROPONIN I PNL SERPL HS: 5 NG/L (ref 0–13)
CHLORIDE SERPL-SCNC: 100 MMOL/L (ref 98–107)
CO2 SERPL-SCNC: 32 MMOL/L (ref 21–32)
CREAT SERPL-MCNC: 0.98 MG/DL (ref 0.5–1.05)
EOSINOPHIL # BLD AUTO: 0 X10*3/UL (ref 0–0.7)
EOSINOPHIL NFR BLD AUTO: 0 %
ERYTHROCYTE [DISTWIDTH] IN BLOOD BY AUTOMATED COUNT: 15.8 % (ref 11.5–14.5)
GFR SERPL CREATININE-BSD FRML MDRD: 74 ML/MIN/1.73M*2
GLUCOSE BLD MANUAL STRIP-MCNC: 333 MG/DL (ref 74–99)
GLUCOSE SERPL-MCNC: 198 MG/DL (ref 74–99)
HCT VFR BLD AUTO: 42.7 % (ref 36–46)
HGB BLD-MCNC: 13.6 G/DL (ref 12–16)
IMM GRANULOCYTES # BLD AUTO: 0.47 X10*3/UL (ref 0–0.7)
IMM GRANULOCYTES NFR BLD AUTO: 3.6 % (ref 0–0.9)
LACTATE SERPL-SCNC: 1.8 MMOL/L (ref 0.4–2)
LYMPHOCYTES # BLD AUTO: 3.58 X10*3/UL (ref 1.2–4.8)
LYMPHOCYTES NFR BLD AUTO: 27.4 %
MAGNESIUM SERPL-MCNC: 1.57 MG/DL (ref 1.6–2.4)
MCH RBC QN AUTO: 30 PG (ref 26–34)
MCHC RBC AUTO-ENTMCNC: 31.9 G/DL (ref 32–36)
MCV RBC AUTO: 94 FL (ref 80–100)
MONOCYTES # BLD AUTO: 0.64 X10*3/UL (ref 0.1–1)
MONOCYTES NFR BLD AUTO: 4.9 %
MRSA DNA SPEC QL NAA+PROBE: NOT DETECTED
NEUTROPHILS # BLD AUTO: 8.29 X10*3/UL (ref 1.2–7.7)
NEUTROPHILS NFR BLD AUTO: 63.6 %
NRBC BLD-RTO: 0 /100 WBCS (ref 0–0)
P AXIS: 53 DEGREES
P AXIS: 56 DEGREES
P AXIS: 62 DEGREES
P OFFSET: 197 MS
P OFFSET: 207 MS
P OFFSET: 219 MS
P ONSET: 146 MS
P ONSET: 160 MS
P ONSET: 166 MS
PLATELET # BLD AUTO: 150 X10*3/UL (ref 150–450)
POTASSIUM SERPL-SCNC: 3.5 MMOL/L (ref 3.5–5.3)
PR INTERVAL: 120 MS
PR INTERVAL: 128 MS
PR INTERVAL: 130 MS
PROT SERPL-MCNC: 7.5 G/DL (ref 6.4–8.2)
Q ONSET: 211 MS
Q ONSET: 224 MS
Q ONSET: 226 MS
QRS COUNT: 15 BEATS
QRS COUNT: 16 BEATS
QRS COUNT: 19 BEATS
QRS DURATION: 78 MS
QRS DURATION: 84 MS
QRS DURATION: 90 MS
QT INTERVAL: 314 MS
QT INTERVAL: 354 MS
QT INTERVAL: 394 MS
QTC CALCULATION(BAZETT): 434 MS
QTC CALCULATION(BAZETT): 451 MS
QTC CALCULATION(BAZETT): 481 MS
QTC FREDERICIA: 389 MS
QTC FREDERICIA: 417 MS
QTC FREDERICIA: 451 MS
R AXIS: -23 DEGREES
R AXIS: -27 DEGREES
R AXIS: -30 DEGREES
RBC # BLD AUTO: 4.53 X10*6/UL (ref 4–5.2)
SODIUM SERPL-SCNC: 140 MMOL/L (ref 136–145)
T AXIS: -3 DEGREES
T AXIS: 28 DEGREES
T AXIS: 36 DEGREES
T OFFSET: 368 MS
T OFFSET: 403 MS
T OFFSET: 421 MS
VENTRICULAR RATE: 115 BPM
VENTRICULAR RATE: 90 BPM
VENTRICULAR RATE: 98 BPM
WBC # BLD AUTO: 13.1 X10*3/UL (ref 4.4–11.3)

## 2023-12-29 PROCEDURE — 1200000002 HC GENERAL ROOM WITH TELEMETRY DAILY

## 2023-12-29 PROCEDURE — 2500000001 HC RX 250 WO HCPCS SELF ADMINISTERED DRUGS (ALT 637 FOR MEDICARE OP): Performed by: HOME HEALTH

## 2023-12-29 PROCEDURE — 2500000004 HC RX 250 GENERAL PHARMACY W/ HCPCS (ALT 636 FOR OP/ED): Performed by: INTERNAL MEDICINE

## 2023-12-29 PROCEDURE — 36415 COLL VENOUS BLD VENIPUNCTURE: CPT | Performed by: HOME HEALTH

## 2023-12-29 PROCEDURE — 99223 1ST HOSP IP/OBS HIGH 75: CPT | Performed by: INTERNAL MEDICINE

## 2023-12-29 PROCEDURE — 2500000004 HC RX 250 GENERAL PHARMACY W/ HCPCS (ALT 636 FOR OP/ED): Performed by: HOME HEALTH

## 2023-12-29 PROCEDURE — 2500000001 HC RX 250 WO HCPCS SELF ADMINISTERED DRUGS (ALT 637 FOR MEDICARE OP): Performed by: INTERNAL MEDICINE

## 2023-12-29 PROCEDURE — 2500000002 HC RX 250 W HCPCS SELF ADMINISTERED DRUGS (ALT 637 FOR MEDICARE OP, ALT 636 FOR OP/ED): Performed by: HOME HEALTH

## 2023-12-29 PROCEDURE — 99285 EMERGENCY DEPT VISIT HI MDM: CPT | Performed by: EMERGENCY MEDICINE

## 2023-12-29 PROCEDURE — 87040 BLOOD CULTURE FOR BACTERIA: CPT | Mod: ELYLAB | Performed by: HOME HEALTH

## 2023-12-29 PROCEDURE — 82947 ASSAY GLUCOSE BLOOD QUANT: CPT

## 2023-12-29 PROCEDURE — 83880 ASSAY OF NATRIURETIC PEPTIDE: CPT | Performed by: EMERGENCY MEDICINE

## 2023-12-29 PROCEDURE — 83735 ASSAY OF MAGNESIUM: CPT | Performed by: HOME HEALTH

## 2023-12-29 PROCEDURE — 93005 ELECTROCARDIOGRAM TRACING: CPT

## 2023-12-29 PROCEDURE — 84484 ASSAY OF TROPONIN QUANT: CPT | Performed by: HOME HEALTH

## 2023-12-29 PROCEDURE — 96365 THER/PROPH/DIAG IV INF INIT: CPT

## 2023-12-29 PROCEDURE — 94640 AIRWAY INHALATION TREATMENT: CPT

## 2023-12-29 PROCEDURE — 80053 COMPREHEN METABOLIC PANEL: CPT | Performed by: HOME HEALTH

## 2023-12-29 PROCEDURE — 71045 X-RAY EXAM CHEST 1 VIEW: CPT | Mod: FR

## 2023-12-29 PROCEDURE — 71045 X-RAY EXAM CHEST 1 VIEW: CPT | Mod: FOREIGN READ | Performed by: RADIOLOGY

## 2023-12-29 PROCEDURE — 83605 ASSAY OF LACTIC ACID: CPT | Performed by: HOME HEALTH

## 2023-12-29 PROCEDURE — 87641 MR-STAPH DNA AMP PROBE: CPT | Performed by: HOME HEALTH

## 2023-12-29 PROCEDURE — 96375 TX/PRO/DX INJ NEW DRUG ADDON: CPT

## 2023-12-29 PROCEDURE — 85025 COMPLETE CBC W/AUTO DIFF WBC: CPT | Performed by: HOME HEALTH

## 2023-12-29 PROCEDURE — 96361 HYDRATE IV INFUSION ADD-ON: CPT

## 2023-12-29 RX ORDER — MAGNESIUM SULFATE HEPTAHYDRATE 40 MG/ML
2 INJECTION, SOLUTION INTRAVENOUS ONCE
Status: COMPLETED | OUTPATIENT
Start: 2023-12-29 | End: 2023-12-29

## 2023-12-29 RX ORDER — DEXTROSE 50 % IN WATER (D50W) INTRAVENOUS SYRINGE
25
Status: DISCONTINUED | OUTPATIENT
Start: 2023-12-29 | End: 2023-12-31 | Stop reason: HOSPADM

## 2023-12-29 RX ORDER — METOPROLOL TARTRATE 25 MG/1
25 TABLET, FILM COATED ORAL 2 TIMES DAILY
Status: DISCONTINUED | OUTPATIENT
Start: 2023-12-29 | End: 2023-12-31 | Stop reason: HOSPADM

## 2023-12-29 RX ORDER — CLONAZEPAM 1 MG/1
1 TABLET ORAL 2 TIMES DAILY PRN
Status: DISCONTINUED | OUTPATIENT
Start: 2023-12-29 | End: 2023-12-31 | Stop reason: HOSPADM

## 2023-12-29 RX ORDER — LEVOTHYROXINE SODIUM 75 UG/1
150 TABLET ORAL DAILY
Status: DISCONTINUED | OUTPATIENT
Start: 2023-12-30 | End: 2023-12-31 | Stop reason: HOSPADM

## 2023-12-29 RX ORDER — ALBUTEROL SULFATE 0.83 MG/ML
2.5 SOLUTION RESPIRATORY (INHALATION) EVERY 20 MIN
Status: COMPLETED | OUTPATIENT
Start: 2023-12-29 | End: 2023-12-29

## 2023-12-29 RX ORDER — IPRATROPIUM BROMIDE AND ALBUTEROL SULFATE 2.5; .5 MG/3ML; MG/3ML
3 SOLUTION RESPIRATORY (INHALATION)
Status: DISCONTINUED | OUTPATIENT
Start: 2023-12-29 | End: 2023-12-29

## 2023-12-29 RX ORDER — OSELTAMIVIR PHOSPHATE 75 MG/1
75 CAPSULE ORAL EVERY 12 HOURS
Status: DISCONTINUED | OUTPATIENT
Start: 2023-12-29 | End: 2023-12-31 | Stop reason: HOSPADM

## 2023-12-29 RX ORDER — METOPROLOL TARTRATE 25 MG/1
25 TABLET, FILM COATED ORAL ONCE
Status: COMPLETED | OUTPATIENT
Start: 2023-12-29 | End: 2023-12-29

## 2023-12-29 RX ORDER — INSULIN LISPRO 100 [IU]/ML
0-10 INJECTION, SOLUTION INTRAVENOUS; SUBCUTANEOUS
Status: DISCONTINUED | OUTPATIENT
Start: 2023-12-30 | End: 2023-12-31 | Stop reason: HOSPADM

## 2023-12-29 RX ORDER — DEXTROSE MONOHYDRATE 100 MG/ML
0.3 INJECTION, SOLUTION INTRAVENOUS ONCE AS NEEDED
Status: DISCONTINUED | OUTPATIENT
Start: 2023-12-29 | End: 2023-12-31 | Stop reason: HOSPADM

## 2023-12-29 RX ORDER — BUPROPION HYDROCHLORIDE 150 MG/1
150 TABLET, EXTENDED RELEASE ORAL DAILY
Status: DISCONTINUED | OUTPATIENT
Start: 2023-12-30 | End: 2023-12-29

## 2023-12-29 RX ORDER — HYDROXYZINE HYDROCHLORIDE 25 MG/1
50 TABLET, FILM COATED ORAL ONCE
Status: COMPLETED | OUTPATIENT
Start: 2023-12-29 | End: 2023-12-29

## 2023-12-29 RX ORDER — TOPIRAMATE 25 MG/1
50 TABLET ORAL DAILY
Status: DISCONTINUED | OUTPATIENT
Start: 2023-12-30 | End: 2023-12-31 | Stop reason: HOSPADM

## 2023-12-29 RX ORDER — BUSPIRONE HYDROCHLORIDE 5 MG/1
30 TABLET ORAL 2 TIMES DAILY
Status: DISCONTINUED | OUTPATIENT
Start: 2023-12-29 | End: 2023-12-31 | Stop reason: HOSPADM

## 2023-12-29 RX ORDER — CITALOPRAM 20 MG/1
40 TABLET, FILM COATED ORAL DAILY
Status: DISCONTINUED | OUTPATIENT
Start: 2023-12-30 | End: 2023-12-31 | Stop reason: HOSPADM

## 2023-12-29 RX ORDER — INSULIN GLARGINE 100 [IU]/ML
10 INJECTION, SOLUTION SUBCUTANEOUS NIGHTLY
Status: DISCONTINUED | OUTPATIENT
Start: 2023-12-29 | End: 2023-12-31 | Stop reason: HOSPADM

## 2023-12-29 RX ORDER — PANTOPRAZOLE SODIUM 40 MG/1
40 TABLET, DELAYED RELEASE ORAL DAILY
Status: DISCONTINUED | OUTPATIENT
Start: 2023-12-30 | End: 2023-12-31 | Stop reason: HOSPADM

## 2023-12-29 RX ORDER — SODIUM CHLORIDE 9 MG/ML
10 INJECTION, SOLUTION INTRAVENOUS EVERY 8 HOURS
Status: DISCONTINUED | OUTPATIENT
Start: 2023-12-30 | End: 2023-12-31 | Stop reason: HOSPADM

## 2023-12-29 RX ORDER — IPRATROPIUM BROMIDE AND ALBUTEROL SULFATE 2.5; .5 MG/3ML; MG/3ML
3 SOLUTION RESPIRATORY (INHALATION) ONCE
Status: DISCONTINUED | OUTPATIENT
Start: 2023-12-29 | End: 2023-12-29

## 2023-12-29 RX ORDER — SENNOSIDES 8.6 MG/1
2 TABLET ORAL 2 TIMES DAILY
Status: DISCONTINUED | OUTPATIENT
Start: 2023-12-29 | End: 2023-12-31 | Stop reason: HOSPADM

## 2023-12-29 RX ADMIN — CLONAZEPAM 1 MG: 1 TABLET ORAL at 23:27

## 2023-12-29 RX ADMIN — AZITHROMYCIN MONOHYDRATE 500 MG: 500 INJECTION, POWDER, LYOPHILIZED, FOR SOLUTION INTRAVENOUS at 21:29

## 2023-12-29 RX ADMIN — SODIUM CHLORIDE 500 ML: 9 INJECTION, SOLUTION INTRAVENOUS at 19:08

## 2023-12-29 RX ADMIN — ALBUTEROL SULFATE 2.5 MG: 2.5 SOLUTION RESPIRATORY (INHALATION) at 18:21

## 2023-12-29 RX ADMIN — PIPERACILLIN AND TAZOBACTAM 4.5 G: 4; .5 INJECTION, POWDER, FOR SOLUTION INTRAVENOUS at 20:44

## 2023-12-29 RX ADMIN — METHYLPREDNISOLONE SODIUM SUCCINATE 125 MG: 125 INJECTION, POWDER, FOR SOLUTION INTRAMUSCULAR; INTRAVENOUS at 19:07

## 2023-12-29 RX ADMIN — METOPROLOL TARTRATE 25 MG: 25 TABLET, FILM COATED ORAL at 21:58

## 2023-12-29 RX ADMIN — VANCOMYCIN HYDROCHLORIDE 2 G: 10 INJECTION, POWDER, LYOPHILIZED, FOR SOLUTION INTRAVENOUS at 23:27

## 2023-12-29 RX ADMIN — IPRATROPIUM BROMIDE AND ALBUTEROL SULFATE 3 ML: 2.5; .5 SOLUTION RESPIRATORY (INHALATION) at 18:29

## 2023-12-29 RX ADMIN — OSELTAMIVIR PHOSPHATE 75 MG: 75 CAPSULE ORAL at 23:27

## 2023-12-29 RX ADMIN — ALBUTEROL SULFATE 2.5 MG: 2.5 SOLUTION RESPIRATORY (INHALATION) at 18:24

## 2023-12-29 RX ADMIN — ALBUTEROL SULFATE 2.5 MG: 2.5 SOLUTION RESPIRATORY (INHALATION) at 18:22

## 2023-12-29 RX ADMIN — HYDROXYZINE HYDROCHLORIDE 50 MG: 25 TABLET ORAL at 19:06

## 2023-12-29 RX ADMIN — MAGNESIUM SULFATE HEPTAHYDRATE 2 G: 40 INJECTION, SOLUTION INTRAVENOUS at 19:08

## 2023-12-29 RX ADMIN — BUSPIRONE HYDROCHLORIDE 30 MG: 5 TABLET ORAL at 23:27

## 2023-12-29 ASSESSMENT — PAIN - FUNCTIONAL ASSESSMENT: PAIN_FUNCTIONAL_ASSESSMENT: 0-10

## 2023-12-29 ASSESSMENT — PAIN DESCRIPTION - PAIN TYPE: TYPE: ACUTE PAIN

## 2023-12-29 ASSESSMENT — PAIN DESCRIPTION - LOCATION: LOCATION: BACK

## 2023-12-29 ASSESSMENT — PAIN DESCRIPTION - DESCRIPTORS: DESCRIPTORS: ACHING

## 2023-12-29 ASSESSMENT — PAIN DESCRIPTION - FREQUENCY: FREQUENCY: CONSTANT/CONTINUOUS

## 2023-12-29 ASSESSMENT — LIFESTYLE VARIABLES
EVER HAD A DRINK FIRST THING IN THE MORNING TO STEADY YOUR NERVES TO GET RID OF A HANGOVER: NO
HAVE PEOPLE ANNOYED YOU BY CRITICIZING YOUR DRINKING: NO
REASON UNABLE TO ASSESS: NO
EVER FELT BAD OR GUILTY ABOUT YOUR DRINKING: NO
HAVE YOU EVER FELT YOU SHOULD CUT DOWN ON YOUR DRINKING: NO

## 2023-12-29 ASSESSMENT — PAIN SCALES - GENERAL: PAINLEVEL_OUTOF10: 9

## 2023-12-29 NOTE — ED PROVIDER NOTES
HPI   Chief Complaint   Patient presents with    Shortness of Breath     Pt. Diagnosed with flu 2 days ago and now states its getting worse.  States she has COPD and her sats have been in the 70's at home for hours.       Patient is a 43-year-old female presenting to the ED with shortness of breath.  Past medical history significant for COPD, hyperlipidemia, hypertension, pulmonary embolism on Xarelto and history of acute respiratory failure.  Patient states that she was recently seen in the department for shortness of breath and when she was diagnosed with the flu and pneumonia.  States that she is taken 1 day of her antibiotics as well as steroids with no improvement of her symptoms.  States that she feels like she cannot breathe and her chest is extremely tight.  States that she is using her breathing treatments every 5 hours with minimal improvement of her symptoms.  States that she can hear himself wheezing.  No history of MI.  States that she has not missed any doses of her Xarelto recently.  No fever or chills.  No chest pain.  No abdominal pain, nausea, vomiting or diarrhea.  No lower extremity edema.                          No data recorded                Patient History   Past Medical History:   Diagnosis Date    Arthritis     COPD (chronic obstructive pulmonary disease) (CMS/[facility])     Diabetes mellitus (CMS/[facility])     Disease of thyroid gland     Encounter for preprocedural cardiovascular examination 11/28/2018    Preop cardiovascular exam    History of transfusion     Personal history of other diseases of the nervous system and sense organs     History of sleep apnea    Personal history of other endocrine, nutritional and metabolic disease     History of hypothyroidism    Personal history of other specified conditions 07/28/2021    History of gross hematuria    Personal history of other specified conditions 06/24/2021    History of dysuria    Personal history of other specified conditions     History of  tachycardia    Syncope and collapse 2021    Near syncope    Unspecified acute conjunctivitis, right eye 2021    Acute bacterial conjunctivitis of right eye     Past Surgical History:   Procedure Laterality Date    OTHER SURGICAL HISTORY  2018    Ear surgery    OTHER SURGICAL HISTORY  2018     section    OTHER SURGICAL HISTORY  2018    Tonsillectomy    OTHER SURGICAL HISTORY  10/27/2021    Ventral hernia repair    OTHER SURGICAL HISTORY  10/27/2021    Lipoma excision    OTHER SURGICAL HISTORY  10/06/2021    Hernia repair    TONSILLECTOMY       Family History   Problem Relation Name Age of Onset    Fibromyalgia Father      Hypertension Brother      Thyroid disease Maternal Grandmother      Thyroid disease Paternal Grandmother      Lung cancer Paternal Grandfather      Coronary artery disease Other Grandmother      Social History     Tobacco Use    Smoking status: Every Day     Packs/day: 1     Types: Cigarettes     Passive exposure: Never    Smokeless tobacco: Never   Substance Use Topics    Alcohol use: Never    Drug use: Never       Physical Exam   ED Triage Vitals [23 1747]   Temp Heart Rate Resp BP   36 °C (96.8 °F) (!) 140 22 150/82      SpO2 Temp Source Heart Rate Source Patient Position   (!) 81 % Temporal Monitor --      BP Location FiO2 (%)     -- --       Physical Exam  Vitals reviewed.   Constitutional:       Appearance: She is well-developed.      Interventions: She is not intubated.  HENT:      Head: Normocephalic.      Mouth/Throat:      Mouth: Mucous membranes are moist.      Pharynx: Oropharynx is clear.   Eyes:      Extraocular Movements: Extraocular movements intact.      Pupils: Pupils are equal, round, and reactive to light.   Cardiovascular:      Rate and Rhythm: Regular rhythm. Tachycardia present.      Pulses: Normal pulses.      Heart sounds: Normal heart sounds.   Pulmonary:      Effort: Pulmonary effort is normal. No tachypnea or respiratory  distress. She is not intubated.      Breath sounds: No stridor. Decreased breath sounds and wheezing present. No rhonchi or rales.   Musculoskeletal:         General: Normal range of motion.      Cervical back: Normal range of motion.      Right lower leg: No edema.      Left lower leg: No edema.   Skin:     General: Skin is warm.   Neurological:      General: No focal deficit present.      Mental Status: She is alert.   Psychiatric:         Mood and Affect: Mood normal.         Behavior: Behavior normal.       Labs Reviewed   CBC WITH AUTO DIFFERENTIAL - Abnormal       Result Value    WBC 13.1 (*)     nRBC 0.0      RBC 4.53      Hemoglobin 13.6      Hematocrit 42.7      MCV 94      MCH 30.0      MCHC 31.9 (*)     RDW 15.8 (*)     Platelets 150      Neutrophils % 63.6      Immature Granulocytes %, Automated 3.6 (*)     Lymphocytes % 27.4      Monocytes % 4.9      Eosinophils % 0.0      Basophils % 0.5      Neutrophils Absolute 8.29 (*)     Immature Granulocytes Absolute, Automated 0.47      Lymphocytes Absolute 3.58      Monocytes Absolute 0.64      Eosinophils Absolute 0.00      Basophils Absolute 0.07     COMPREHENSIVE METABOLIC PANEL - Abnormal    Glucose 198 (*)     Sodium 140      Potassium 3.5      Chloride 100      Bicarbonate 32      Anion Gap 12      Urea Nitrogen 24 (*)     Creatinine 0.98      eGFR 74      Calcium 10.0      Albumin 4.2      Alkaline Phosphatase 78      Total Protein 7.5      AST 12      Bilirubin, Total 0.5      ALT 16     MAGNESIUM - Abnormal    Magnesium 1.57 (*)    LACTATE - Normal    Lactate 1.8      Narrative:     Venipuncture immediately after or during the administration of Metamizole may lead to falsely low results. Testing should be performed immediately  prior to Metamizole dosing.   SERIAL TROPONIN-INITIAL - Normal    Troponin I, High Sensitivity 5      Narrative:     Less than 99th percentile of normal range cutoff-  Female and children under 18 years old <14 ng/L; Male <21  ng/L: Negative  Repeat testing should be performed if clinically indicated.     Female and children under 18 years old 14-50 ng/L; Male 21-50 ng/L:  Consistent with possible cardiac damage and possible increased clinical   risk. Serial measurements may help to assess extent of myocardial damage.     >50 ng/L: Consistent with cardiac damage, increased clinical risk and  myocardial infarction. Serial measurements may help assess extent of   myocardial damage.      NOTE: Children less than 1 year old may have higher baseline troponin   levels and results should be interpreted in conjunction with the overall   clinical context.     NOTE: Troponin I testing is performed using a different   testing methodology at [facility] than at other   [facility]. Direct result comparisons should only   be made within the same method.   SERIAL TROPONIN, 1 HOUR - Normal    Troponin I, High Sensitivity 3      Narrative:     Less than 99th percentile of normal range cutoff-  Female and children under 18 years old <14 ng/L; Male <21 ng/L: Negative  Repeat testing should be performed if clinically indicated.     Female and children under 18 years old 14-50 ng/L; Male 21-50 ng/L:  Consistent with possible cardiac damage and possible increased clinical   risk. Serial measurements may help to assess extent of myocardial damage.     >50 ng/L: Consistent with cardiac damage, increased clinical risk and  myocardial infarction. Serial measurements may help assess extent of   myocardial damage.      NOTE: Children less than 1 year old may have higher baseline troponin   levels and results should be interpreted in conjunction with the overall   clinical context.     NOTE: Troponin I testing is performed using a different   testing methodology at [facility] than at other   [facility]. Direct result comparisons should only   be made within the same method.   B-TYPE NATRIURETIC PEPTIDE - Normal    BNP 26       Narrative:        <100 pg/mL - Heart failure unlikely  100-299 pg/mL - Intermediate probability of acute heart                  failure exacerbation. Correlate with clinical                  context and patient history.    >=300 pg/mL - Heart Failure likely. Correlate with clinical                  context and patient history.    BNP testing is performed using different testing methodology at [facility] than at other [facility]. Direct result comparisons should only be made within the same method.      BLOOD CULTURE   BLOOD CULTURE   MRSA SURVEILLANCE FOR VANCOMYCIN DE-ESCALATION, PCR   TROPONIN SERIES- (INITIAL, 1 HR)    Narrative:     The following orders were created for panel order Troponin Series, (0, 1 HR).  Procedure                               Abnormality         Status                     ---------                               -----------         ------                     Troponin I, High Sensiti...[[id number]]  Normal              Final result               Troponin, High Sensitivi...[[id number]]  Normal              Final result                 Please view results for these tests on the individual orders.   BLOOD GAS ARTERIAL     XR chest 1 view   Final Result   Mild cardiomegaly with mild pulmonary vascular congestion.   Emphysema.   Signed by Bean Zaidi MD          ED Course & MDM   Diagnoses as of 12/29/23 2203   Acute on chronic hypoxic respiratory failure (CMS/HCC)   Pneumonia of right upper lobe due to infectious organism   Shortness of breath   Flu       Medical Decision Making  independent Historians:  patient    MDM:   43-year-old female presenting to the ED with shortness of breath.  Patient has a known history of COPD, PEs on Xarelto, hyperlipidemia, hypertension and history of acute respiratory failure.  Patient was admitted approximately 2 weeks ago for acute respiratory failure and discharged home.  Patient has been wearing 4 L of oxygen since being discharged.  2  days ago patient was seen in her department due to worsening shortness of breath and states that her breathing treatments were improving.  She was then diagnosed with flu and pneumonia in which she has been taking antibiotics for.  States that her shortness of breath progressively worsening.  States that her oxygen levels were around 70% at home.  Patient states that her chest feels very tight.  She has a nonproductive cough.  States that she also feels very anxious because she feels like she cannot breathe.  No lower extremity edema.  No abdominal pain, nausea, vomiting or diarrhea.  On exam patient is on 4 L of oxygen with a pulse ox of 94%.  She has tachycardia with a heart rate of 128 and hypertension.  Afebrile.  Patient does not appear to be in acute respiratory distress.  Breath sounds auscultated bilateral with adventitious sounds consistent with wheezing and reduced breath sounds.  No lower extremity edema.  No calf pain or calf swelling.  Patient was given Solu-Medrol IV, breathing treatments as well as IV fluids.  Patient is 4 L of nasal cannula was switched to a nonrebreather.  Labs and imaging ordered to further evaluate patient's symptoms.    My interpretation of EKG performed at 1828 suggest sinus tachycardia with a ventricular rate of 115.  T wave inversions in leads V3 V4, V4, V5 and V6.  No ST elevations.  Repeat EKG performed at 2135 suggests sinus tachycardia with a ventricular rate of 119.   no ST elevations.    My interpretation labs show CBC with mild leukocytosis with a white blood cell count 13.1.  Likely elevated due to viral infection versus recent steroid use.  No anemia.  CMP suggest no electrolyte abnormalities, AILEEN or liver injury.  Magnesium is low at 1.57.  Patient is given 2 g of magnesium IV.  Lactate unremarkable.  Troponin x 2 both within normal limits reduce suspicion of ACS.  BMP unremarkable do suspicion of CHF.  Blood cultures pending at this time.  ABG suggest no acute  abnormalities appear to be compensated. Chest x-ray interpreted by radiologist suggest mild cardiomegaly with mild pulmonary vascular congestion.  Emphysema.  I did review the patient's previous CT scan performed on 1227 which showed right upper lobe pneumonia but no PE making PE less likely at this time given that patient is on anticoagulation.  Due to patient's recent hospitalization and worsening respiratory status I will start her on broad-spectrum antibiotics covering for hospital-acquired pneumonia IV vancomycin, IV Zosyn and IV azithromycin.    Patient was reassessed and resting comfortably.  Patient's pulse ox remained elevated on the nonrebreather.  Her heart rate did continue to downtrend but not completely resolved.  Due to patient's worsening respiratory status I discussed that she would benefit hospitalization which she understands agrees this plan.  Patient hospitalist spoke with Dr. Lopez Coats regarding patient's symptoms of diagnostic workup.  Patient was accepted for admission.  Patient will be admitted with a diagnosis of acute on chronic hypoxemia respiratory failure, pneumonia of the right upper lobe, shortness of breath and flu.    DDx/Differential:  COPD, pneumonia, ACS, CHF, PE    Diagnosis: acute on chronic hypoxic respiratory failure, pneumonia of the right upper lobe, shortness of breath, flu        Dictation Disclaimer: Due to voice recognition software, sound alike and misspelled words may be contained in documentation. If you have any questions please contact me.            Procedure  Procedures     Lance Ortega PA-C  12/29/23 [id number]

## 2023-12-30 LAB
ANION GAP SERPL CALC-SCNC: 11 MMOL/L (ref 10–20)
ATRIAL RATE: 119 BPM
BUN SERPL-MCNC: 18 MG/DL (ref 6–23)
CALCIUM SERPL-MCNC: 9.2 MG/DL (ref 8.6–10.3)
CHLORIDE SERPL-SCNC: 102 MMOL/L (ref 98–107)
CO2 SERPL-SCNC: 30 MMOL/L (ref 21–32)
CREAT SERPL-MCNC: 0.75 MG/DL (ref 0.5–1.05)
ERYTHROCYTE [DISTWIDTH] IN BLOOD BY AUTOMATED COUNT: 15.8 % (ref 11.5–14.5)
GFR SERPL CREATININE-BSD FRML MDRD: >90 ML/MIN/1.73M*2
GLUCOSE BLD MANUAL STRIP-MCNC: 152 MG/DL (ref 74–99)
GLUCOSE BLD MANUAL STRIP-MCNC: 183 MG/DL (ref 74–99)
GLUCOSE BLD MANUAL STRIP-MCNC: 228 MG/DL (ref 74–99)
GLUCOSE BLD MANUAL STRIP-MCNC: 238 MG/DL (ref 74–99)
GLUCOSE SERPL-MCNC: 212 MG/DL (ref 74–99)
HCT VFR BLD AUTO: 40.7 % (ref 36–46)
HGB BLD-MCNC: 12.9 G/DL (ref 12–16)
HOLD SPECIMEN: NORMAL
LEGIONELLA AG UR QL: NEGATIVE
MCH RBC QN AUTO: 30.5 PG (ref 26–34)
MCHC RBC AUTO-ENTMCNC: 31.7 G/DL (ref 32–36)
MCV RBC AUTO: 96 FL (ref 80–100)
NRBC BLD-RTO: 0 /100 WBCS (ref 0–0)
P AXIS: 59 DEGREES
P OFFSET: 198 MS
P ONSET: 125 MS
PLATELET # BLD AUTO: 153 X10*3/UL (ref 150–450)
POTASSIUM SERPL-SCNC: 4.3 MMOL/L (ref 3.5–5.3)
PR INTERVAL: 168 MS
PROCALCITONIN SERPL-MCNC: 0.11 NG/ML
Q ONSET: 209 MS
QRS COUNT: 20 BEATS
QRS DURATION: 82 MS
QT INTERVAL: 324 MS
QTC CALCULATION(BAZETT): 455 MS
QTC FREDERICIA: 407 MS
R AXIS: -23 DEGREES
RBC # BLD AUTO: 4.23 X10*6/UL (ref 4–5.2)
S PNEUM AG UR QL: NEGATIVE
SODIUM SERPL-SCNC: 139 MMOL/L (ref 136–145)
T AXIS: 40 DEGREES
T OFFSET: 371 MS
VANCOMYCIN SERPL-MCNC: 18 UG/ML (ref 5–20)
VANCOMYCIN SERPL-MCNC: 35.4 UG/ML (ref 5–20)
VENTRICULAR RATE: 119 BPM
WBC # BLD AUTO: 8.8 X10*3/UL (ref 4.4–11.3)

## 2023-12-30 PROCEDURE — 36415 COLL VENOUS BLD VENIPUNCTURE: CPT | Performed by: STUDENT IN AN ORGANIZED HEALTH CARE EDUCATION/TRAINING PROGRAM

## 2023-12-30 PROCEDURE — 82947 ASSAY GLUCOSE BLOOD QUANT: CPT

## 2023-12-30 PROCEDURE — 94660 CPAP INITIATION&MGMT: CPT

## 2023-12-30 PROCEDURE — 2500000004 HC RX 250 GENERAL PHARMACY W/ HCPCS (ALT 636 FOR OP/ED): Performed by: INTERNAL MEDICINE

## 2023-12-30 PROCEDURE — 99232 SBSQ HOSP IP/OBS MODERATE 35: CPT | Performed by: STUDENT IN AN ORGANIZED HEALTH CARE EDUCATION/TRAINING PROGRAM

## 2023-12-30 PROCEDURE — 84145 PROCALCITONIN (PCT): CPT | Mod: ELYLAB | Performed by: INTERNAL MEDICINE

## 2023-12-30 PROCEDURE — 94640 AIRWAY INHALATION TREATMENT: CPT

## 2023-12-30 PROCEDURE — 36415 COLL VENOUS BLD VENIPUNCTURE: CPT | Performed by: PHARMACIST

## 2023-12-30 PROCEDURE — 85027 COMPLETE CBC AUTOMATED: CPT | Performed by: STUDENT IN AN ORGANIZED HEALTH CARE EDUCATION/TRAINING PROGRAM

## 2023-12-30 PROCEDURE — 87449 NOS EACH ORGANISM AG IA: CPT | Mod: ELYLAB | Performed by: INTERNAL MEDICINE

## 2023-12-30 PROCEDURE — 80202 ASSAY OF VANCOMYCIN: CPT | Performed by: PHARMACIST

## 2023-12-30 PROCEDURE — 80048 BASIC METABOLIC PNL TOTAL CA: CPT | Performed by: STUDENT IN AN ORGANIZED HEALTH CARE EDUCATION/TRAINING PROGRAM

## 2023-12-30 PROCEDURE — 87899 AGENT NOS ASSAY W/OPTIC: CPT | Mod: ELYLAB | Performed by: INTERNAL MEDICINE

## 2023-12-30 PROCEDURE — 2500000001 HC RX 250 WO HCPCS SELF ADMINISTERED DRUGS (ALT 637 FOR MEDICARE OP): Performed by: INTERNAL MEDICINE

## 2023-12-30 PROCEDURE — 2500000002 HC RX 250 W HCPCS SELF ADMINISTERED DRUGS (ALT 637 FOR MEDICARE OP, ALT 636 FOR OP/ED): Performed by: INTERNAL MEDICINE

## 2023-12-30 PROCEDURE — 1200000002 HC GENERAL ROOM WITH TELEMETRY DAILY

## 2023-12-30 RX ORDER — IPRATROPIUM BROMIDE AND ALBUTEROL SULFATE 2.5; .5 MG/3ML; MG/3ML
3 SOLUTION RESPIRATORY (INHALATION) EVERY 4 HOURS PRN
Status: DISCONTINUED | OUTPATIENT
Start: 2023-12-30 | End: 2023-12-31 | Stop reason: HOSPADM

## 2023-12-30 RX ADMIN — PIPERACILLIN SODIUM AND TAZOBACTAM SODIUM 3.38 G: 3; .375 INJECTION, SOLUTION INTRAVENOUS at 02:13

## 2023-12-30 RX ADMIN — BUSPIRONE HYDROCHLORIDE 30 MG: 5 TABLET ORAL at 21:56

## 2023-12-30 RX ADMIN — TOPIRAMATE 50 MG: 25 TABLET ORAL at 08:52

## 2023-12-30 RX ADMIN — INSULIN GLARGINE 10 UNITS: 100 INJECTION, SOLUTION SUBCUTANEOUS at 01:05

## 2023-12-30 RX ADMIN — SODIUM CHLORIDE 10 ML/HR: 9 INJECTION, SOLUTION INTRAVENOUS at 02:14

## 2023-12-30 RX ADMIN — METHYLPREDNISOLONE SODIUM SUCCINATE 40 MG: 40 INJECTION, POWDER, FOR SOLUTION INTRAMUSCULAR; INTRAVENOUS at 18:08

## 2023-12-30 RX ADMIN — STANDARDIZED SENNA CONCENTRATE 17.2 MG: 8.6 TABLET ORAL at 08:52

## 2023-12-30 RX ADMIN — CLONAZEPAM 1 MG: 1 TABLET ORAL at 08:46

## 2023-12-30 RX ADMIN — LEVOTHYROXINE SODIUM 150 MCG: 75 TABLET ORAL at 06:16

## 2023-12-30 RX ADMIN — OSELTAMIVIR PHOSPHATE 75 MG: 75 CAPSULE ORAL at 08:55

## 2023-12-30 RX ADMIN — IPRATROPIUM BROMIDE AND ALBUTEROL SULFATE 3 ML: 2.5; .5 SOLUTION RESPIRATORY (INHALATION) at 08:13

## 2023-12-30 RX ADMIN — PIPERACILLIN SODIUM AND TAZOBACTAM SODIUM 3.38 G: 3; .375 INJECTION, SOLUTION INTRAVENOUS at 08:56

## 2023-12-30 RX ADMIN — RIVAROXABAN 20 MG: 20 TABLET, FILM COATED ORAL at 18:08

## 2023-12-30 RX ADMIN — PIPERACILLIN SODIUM AND TAZOBACTAM SODIUM 3.38 G: 3; .375 INJECTION, SOLUTION INTRAVENOUS at 18:08

## 2023-12-30 RX ADMIN — BUSPIRONE HYDROCHLORIDE 30 MG: 5 TABLET ORAL at 08:52

## 2023-12-30 RX ADMIN — METHYLPREDNISOLONE SODIUM SUCCINATE 40 MG: 40 INJECTION, POWDER, FOR SOLUTION INTRAMUSCULAR; INTRAVENOUS at 06:16

## 2023-12-30 RX ADMIN — STANDARDIZED SENNA CONCENTRATE 17.2 MG: 8.6 TABLET ORAL at 21:45

## 2023-12-30 RX ADMIN — CLONAZEPAM 1 MG: 1 TABLET ORAL at 22:00

## 2023-12-30 RX ADMIN — METOPROLOL TARTRATE 25 MG: 25 TABLET, FILM COATED ORAL at 21:45

## 2023-12-30 RX ADMIN — METOPROLOL TARTRATE 25 MG: 25 TABLET, FILM COATED ORAL at 08:52

## 2023-12-30 RX ADMIN — IPRATROPIUM BROMIDE AND ALBUTEROL SULFATE 3 ML: 2.5; .5 SOLUTION RESPIRATORY (INHALATION) at 16:24

## 2023-12-30 RX ADMIN — INSULIN LISPRO 4 UNITS: 100 INJECTION, SOLUTION INTRAVENOUS; SUBCUTANEOUS at 08:48

## 2023-12-30 RX ADMIN — CITALOPRAM HYDROBROMIDE 40 MG: 20 TABLET ORAL at 08:51

## 2023-12-30 RX ADMIN — OSELTAMIVIR PHOSPHATE 75 MG: 75 CAPSULE ORAL at 21:47

## 2023-12-30 RX ADMIN — INSULIN LISPRO 2 UNITS: 100 INJECTION, SOLUTION INTRAVENOUS; SUBCUTANEOUS at 18:08

## 2023-12-30 RX ADMIN — PANTOPRAZOLE SODIUM 40 MG: 40 TABLET, DELAYED RELEASE ORAL at 06:16

## 2023-12-30 RX ADMIN — INSULIN GLARGINE 10 UNITS: 100 INJECTION, SOLUTION SUBCUTANEOUS at 21:44

## 2023-12-30 RX ADMIN — INSULIN LISPRO 2 UNITS: 100 INJECTION, SOLUTION INTRAVENOUS; SUBCUTANEOUS at 12:41

## 2023-12-30 ASSESSMENT — PAIN - FUNCTIONAL ASSESSMENT: PAIN_FUNCTIONAL_ASSESSMENT: 0-10

## 2023-12-30 ASSESSMENT — COGNITIVE AND FUNCTIONAL STATUS - GENERAL
MOBILITY SCORE: 24
PATIENT BASELINE BEDBOUND: NO
DAILY ACTIVITIY SCORE: 24

## 2023-12-30 ASSESSMENT — ACTIVITIES OF DAILY LIVING (ADL)
HEARING - LEFT EAR: FUNCTIONAL
DRESSING YOURSELF: INDEPENDENT
JUDGMENT_ADEQUATE_SAFELY_COMPLETE_DAILY_ACTIVITIES: YES
HEARING - RIGHT EAR: FUNCTIONAL
PATIENT'S MEMORY ADEQUATE TO SAFELY COMPLETE DAILY ACTIVITIES?: YES
WALKS IN HOME: INDEPENDENT
TOILETING: INDEPENDENT
ADEQUATE_TO_COMPLETE_ADL: YES
GROOMING: INDEPENDENT
FEEDING YOURSELF: INDEPENDENT
BATHING: INDEPENDENT

## 2023-12-30 ASSESSMENT — PAIN SCALES - GENERAL: PAINLEVEL_OUTOF10: 0 - NO PAIN

## 2023-12-30 NOTE — PROGRESS NOTES
"Vancomycin Dosing by Pharmacy- INITIAL    Stephenie Mccray is a 43 y.o. year old female who Pharmacy has been consulted for vancomycin dosing for pneumonia. Based on the patient's indication and renal status this patient will be dosed based on a goal AUC of 400-600.     Renal function is currently stable.    Visit Vitals  /70   Pulse (!) 116   Temp 36.5 °C (97.7 °F)   Resp 26        Lab Results   Component Value Date    CREATININE 0.98 12/29/2023    CREATININE 1.01 12/27/2023    CREATININE 0.96 12/21/2023    CREATININE 0.83 12/20/2023        Patient weight is No results found for: \"PTWEIGHT\"    No results found for: \"CULTURE\"     No intake/output data recorded.  [unfilled]    Lab Results   Component Value Date    PATIENTTEMP  12/27/2023      Comment:      NOTE: Patient Results are Not Corrected for Temperature    PATIENTTEMP  12/19/2023      Comment:      NOTE: Patient Results are Not Corrected for Temperature    PATIENTTEMP  12/19/2023      Comment:      NOTE: Patient Results are Not Corrected for Temperature          Assessment/Plan     Patient has already been given a loading dose of 2000 mg at 2327 on 12/29/23.  Will initiate vancomycin maintenance,  1500 mg every 24 hours, beginning at 2300 on 12/30/23.    This dosing regimen is predicted by [employer] to result in the following pharmacokinetic parameters:  Loading dose: 2000 mg IV 23:27 on 12/29/2023  Regimen: 1500 mg IV every 24 hours.  Start time: 23:00 on 12/30/2023  Exposure target: AUC24 (range)400-600 mg/L.hr   AUC24,ss: 436 mg/L.hr  Probability of AUC24 > 400: 59 %  Ctrough,ss: 11.3 mg/L  Probability of Ctrough,ss > 20: 13 %  Probability of nephrotoxicity (Lodise VITO 2009): 7 %    Follow-up level will be ordered on 12/30/23 with the 1st AM Labs and a second level at 9am unless clinically indicated sooner.  Will continue to monitor renal function daily while on vancomycin and order serum creatinine at least every 48 hours if not already " ordered.  Follow for continued vancomycin needs, clinical response, and signs/symptoms of toxicity.       Brody Moreno, PharmD

## 2023-12-30 NOTE — H&P
Medical Group History and Physical    ASSESSMENT/PLAN:     Acute on chronic respiratory failure with hypoxia  Influenza A  Concern for possible superimposed pneumonia  COPD with acute exacerbation  -Recently seen in ED, diagonsed with flu A and discharged on tamiflu  -worsening symptoms prompting return today  -CXR with some possible vascular congestion, otherwise no consolidation  -given fevers, and general worsening will cover with broad spectrum abx for now, particularly give predilection for MRSA w/ flu  -check procal, antigens; MRSA swab; narrow abx accordingly  -cont with steroids and bronchodilators for her underlying COPD    Sinus tachycardia  -presumably 2/2 hypoxia  -she is already on xarelto and had a negative CTA 2 days ago; suspect PE less likely but if persistent tachycardia or otherwise worsens will consider repeating  -lactated was negative  -hold off on aggressive IVF as CXR already showing some possible fluid    IDDM2  -cont correctional insulin scale  -resume lantus  -follow sugars on higher doses of steroids    HTN  Hypothyroidism  -resume home meds    Hx VTE  -cont xarelto    VTE Prophylaxis: xarelto      HISTORY OF PRESENT ILLNESS:   Chief Complaint: Shortness of breath    History Of Present Illness:    Stephenie Mccray is a 43 y.o. 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  She was recently admitted on the 19th through the 21st for similar and then was seen in the emergency department on the 27th where she was diagnosed with influenza A.  She was discharged from the ED with a course of Tamiflu and azithromycin.  Says that she did not improve and feels like she has been more short of breath  since going home.  She has a productive cough.  Denies any fevers or chills.  Only wearing her home O2 as needed.  She does wear her BiPAP but sometimes at night.  Denies any chest pain.  No palpitations.  Intake has been down because she is been feeling so poorly.  Otherwise denies any specific complaints.       Review of systems: 10 point review of systems is otherwise negative except as mentioned above.    PAST HISTORIES:       Past Medical History:  She has a past medical history of Arthritis, COPD (chronic obstructive pulmonary disease) (CMS/[facility]), Diabetes mellitus (CMS/[facility]), Disease of thyroid gland, Encounter for preprocedural cardiovascular examination (11/28/2018), History of transfusion, Personal history of other diseases of the nervous system and sense organs, Personal history of other endocrine, nutritional and metabolic disease, Personal history of other specified conditions (07/28/2021), Personal history of other specified conditions (06/24/2021), Personal history of other specified conditions, Syncope and collapse (08/11/2021), and Unspecified acute conjunctivitis, right eye (05/12/2021).    Past Surgical History:  She has a past surgical history that includes Other surgical history (11/09/2018); Other surgical history (11/09/2018); Other surgical history (11/09/2018); Other surgical history (10/27/2021); Other surgical history (10/27/2021); Other surgical history (10/06/2021); and Tonsillectomy.      Social History:  She reports that she has been smoking cigarettes. She has been smoking an average of 1 pack per day. She has never been exposed to tobacco smoke. She has never used smokeless tobacco. She reports that she does not drink alcohol and does not use drugs.    Family History:  Family History   Problem Relation Name Age of Onset   • Fibromyalgia Father     • Hypertension Brother     • Thyroid disease Maternal Grandmother     • Thyroid disease Paternal Grandmother     • Lung cancer Paternal  "Grandfather     • Coronary artery disease Other Grandmother         Allergies:  Fluticasone furoate-vilanterol, Fluticasone-umeclidin-vilanter, Sumatriptan, Vortioxetine, and Levofloxacin      OBJECTIVE:       Last Recorded Vitals:  Vitals:    12/29/23 1747 12/29/23 1758 12/29/23 1940 12/29/23 2047   BP: 150/82 128/80 140/82 158/88   BP Location:   Left arm Right arm   Patient Position:    Lying   Pulse: (!) 140 (!) 128 (!) 128 (!) 120   Resp: 22 24 20 18   Temp: 36 °C (96.8 °F)      TempSrc: Temporal      SpO2: (!) 81% 94% (!) 89% 98%   Weight: 75.8 kg (167 lb)      Height: 1.575 m (5' 2\")          Last I/O:  No intake/output data recorded.    Physical Exam  Vitals reviewed.   Constitutional:       Appearance: Normal appearance.   HENT:      Head: Normocephalic and atraumatic.      Nose: Nose normal.      Mouth/Throat:      Mouth: Mucous membranes are moist.      Pharynx: Oropharynx is clear.   Eyes:      Extraocular Movements: Extraocular movements intact.      Conjunctiva/sclera: Conjunctivae normal.      Pupils: Pupils are equal, round, and reactive to light.   Cardiovascular:      Rate and Rhythm: Regular rhythm.      Heart sounds: No murmur heard.     No gallop.   Pulmonary:      Breath sounds: Wheezing and rhonchi present. No rales.      Comments: Tight bilaterally with scant expiratory wheeze; scattered rhonchi  Abdominal:      General: Abdomen is flat. Bowel sounds are normal. There is no distension.      Palpations: Abdomen is soft.      Tenderness: There is no abdominal tenderness. There is no guarding or rebound.   Musculoskeletal:         General: Normal range of motion.   Skin:     General: Skin is warm and dry.   Neurological:      General: No focal deficit present.      Mental Status: She is alert and oriented to person, place, and time.      Cranial Nerves: No cranial nerve deficit.      Sensory: No sensory deficit.      Motor: No weakness.   Psychiatric:         Mood and Affect: Mood normal.        " " Behavior: Behavior normal.           Inpatient Medications:  azithromycin, 500 mg, intravenous, Once  vancomycin, 2 g, intravenous, Once          Outpatient Medications:  Prior to Admission medications    Medication Sig Start Date End Date Taking? Authorizing Provider   albuterol 90 mcg/actuation inhaler Inhale 2 puffs every 4 hours if needed for wheezing or shortness of breath. 5/8/23   Delta Yuen MD   alcohol swabs (Alcohol Prep Pads) pads, medicated Use 3 times daily prn 7/27/23   Delta Yuen MD   atorvastatin (Lipitor) 20 mg tablet Take 1 tablet (20 mg) by mouth once daily. Dr. Davis covering for Dr. Yuen 9/18/23 12/19/23  Eleno Davis MD   azithromycin (Zithromax Z-Navin) 250 mg tablet Take 1 tablet (250 mg) by mouth once daily. Follow Z-navin instructions: 500mg on day #1, then 250mg daily for days #2, 3, 4, and 5. 12/27/23   Tunde Duncan PA-C   BD Ultra-Fine Mini Pen Needle 31 gauge x 3/16\" needle USE TO INJECT 4 TIMES DAILY AS DIRECTED 9/10/23   Historical Provider, MD   budesonide-glycopyr-formoterol (Breztri Aerosphere) 160-9-4.8 mcg/actuation HFA aerosol inhaler Inhale 2 times a day.    Historical Provider, MD   buPROPion SR (Wellbutrin SR) 150 mg 12 hr tablet Take 1 tablet (150 mg) by mouth once daily. 8/17/23   Historical Provider, MD   busPIRone (Buspar) 30 mg tablet Take 1 tablet (30 mg) by mouth 2 times a day.    Historical Provider, MD   citalopram (CeleXA) 40 mg tablet Take by mouth.    Historical Provider, MD   clonazePAM (KlonoPIN) 1 mg tablet Take by mouth 2 times a day as needed.    Historical Provider, MD   doxycycline (Vibramycin) 100 mg capsule Take 1 capsule (100 mg) by mouth 2 times a day for 10 days. Take with at least 8 ounces (large glass) of water, do not lie down for 30 minutes after 12/12/23 12/22/23  Delta Yuen MD   hydrOXYzine HCL (Atarax) 25 mg tablet Take 1 tablet (25 mg) by mouth every 6 hours if needed for anxiety for up to 3 days. 10/31/23 11/3/23  " Larry Kay PA-C   hydrOXYzine pamoate (Vistaril) 50 mg capsule Take by mouth 3 times a day as needed.    Historical Provider, MD   insulin aspart (NovoLOG FlexPen U-100 Insulin) 100 unit/mL (3 mL) pen     Historical Provider, MD   insulin glargine (Lantus Solostar U-100 Insulin) 100 unit/mL (3 mL) pen Inject 10 Units under the skin once daily in the morning. Take as directed per insulin instructions. 6/28/23 9/26/23  Delta Yuen MD   insulin lispro (HumaLOG) 100 unit/mL injection 1 Dose. 4/13/23 7/27/23  Historical Provider, MD   Invega Sustenna 234 mg/1.5 mL syringe INJECT 1 syringe INTRAMUSCULARLY EVERY 4 WEEKS 10/9/23   Historical Provider, MD   ipratropium-albuteroL (Duo-Neb) 0.5-2.5 mg/3 mL nebulizer solution Take 3 mL by nebulization every 6 hours if needed for shortness of breath or wheezing. 5/24/23 7/27/23  Delta Yuen MD   lancets 30 gauge misc 100 each 3 times a day. 6/28/23 12/25/23  Delta Yuen MD   levothyroxine (Synthroid, Levoxyl) 150 mcg tablet Take 1 tablet (150 mcg) by mouth once daily. 3/28/23   Delta Yuen MD   metoprolol tartrate (Lopressor) 25 mg tablet TAKE 1 TABLET BY MOUTH IN THE MORNING AND 1 AT BEDTIME 9/15/23   Delta Yuen MD   OneTouch Verio test strips strip  10/16/23   Historical Provider, MD   oseltamivir (Tamiflu) 75 mg capsule Take 1 capsule (75 mg) by mouth every 12 hours for 5 days. 12/27/23 1/1/24  Tunde Duncan PA-C   pantoprazole (ProtoNix) 40 mg EC tablet Take 1 tablet (40 mg) by mouth once daily. 12/18/23   Delta Yuen MD   prazosin (Minipress) 2 mg capsule Take 1 capsule (2 mg) by mouth once daily at bedtime.    Historical Provider, MD   predniSONE (Deltasone) 10 mg tablet Take 5 tablets (50 mg) by mouth once daily for 2 days, THEN 4 tablets (40 mg) once daily for 2 days, THEN 3 tablets (30 mg) once daily for 2 days, THEN 2 tablets (20 mg) once daily for 2 days, THEN 1 tablet (10 mg) once daily for 2 days. 12/21/23 12/31/23  Stephen  QUINTEN Abdi MD   predniSONE (Deltasone) 50 mg tablet Take 1 tablet (50 mg) by mouth once daily for 5 days. 12/27/23 1/1/24  Tunde Duncan PA-C   topiramate (Topamax) 25 mg tablet Take 2 tablets (50 mg) by mouth once daily. 3/13/23 3/12/24  Delta Yuen MD   Xarelto 20 mg tablet  10/16/23   Historical MD Nellie       LABS AND IMAGING:     Last Labs:  CBC - 12/29/2023:  6:26 PM  13.1 13.6 150    42.7      CMP - 12/29/2023:  6:26 PM  10.0 7.5 12 --- 0.5   3.2 4.2 16 78      PTT - 12/19/2023:  3:49 PM  1.7   18.7 44     Troponin I, High Sensitivity   Date/Time Value Ref Range Status   12/29/2023 08:39 PM 3 0 - 13 ng/L Final   12/29/2023 06:26 PM 5 0 - 13 ng/L Final   12/27/2023 09:02 PM 3 0 - 13 ng/L Final     BNP   Date/Time Value Ref Range Status   12/29/2023 06:26 PM 26 0 - 99 pg/mL Final   12/27/2023 08:07 PM 19 0 - 99 pg/mL Final     Hemoglobin A1C   Date/Time Value Ref Range Status   11/21/2023 12:15 PM 7.3 (H) see below % Final   07/11/2023 03:13 AM 8.6 (A) % Final     Comment:          Diagnosis of Diabetes-Adults   Non-Diabetic: < or = 5.6%   Increased risk for developing diabetes: 5.7-6.4%   Diagnostic of diabetes: > or = 6.5%  .       Monitoring of Diabetes                Age (y)     Therapeutic Goal (%)   Adults:          >18           <7.0   Pediatrics:    13-18           <7.5                   7-12           <8.0                   0- 6            7.5-8.5   American Diabetes Association. Diabetes Care 33(S1), Jan 2010.     05/13/2023 03:26 AM 7.3 (A) % Final     Comment:          Diagnosis of Diabetes-Adults   Non-Diabetic: < or = 5.6%   Increased risk for developing diabetes: 5.7-6.4%   Diagnostic of diabetes: > or = 6.5%  .       Monitoring of Diabetes                Age (y)     Therapeutic Goal (%)   Adults:          >18           <7.0   Pediatrics:    13-18           <7.5                   7-12           <8.0                   0- 6            7.5-8.5   American Diabetes Association. Diabetes  Care 33(S1), Jan 2010.       VLDL   Date/Time Value Ref Range Status   05/13/2023 03:26 AM 26 0 - 40 mg/dL Final   02/14/2023 11:04 AM 36 0 - 40 mg/dL Final   10/26/2022 02:51 PM 13 0 - 40 mg/dL Final

## 2023-12-30 NOTE — PROGRESS NOTES
Stephenie Mccray is a 43 y.o. female on day 1 of admission presenting with Acute on chronic hypoxic respiratory failure (CMS/HCC).      Subjective   Patient is a stable, in no acute distress, no complaint, no SOB  Anticipate discharge in next 24 hours if hemodynamically stable    Objective     Last Recorded Vitals  /87   Pulse 73   Temp 36.7 °C (98.1 °F)   Resp 16   Wt 73.3 kg (161 lb 9.6 oz)   SpO2 91%   Intake/Output last 3 Shifts:    Intake/Output Summary (Last 24 hours) at 12/30/2023 1058  Last data filed at 12/30/2023 0400  Gross per 24 hour   Intake 1090 ml   Output 800 ml   Net 290 ml       Admission Weight  Weight: 75.8 kg (167 lb) (12/29/23 1747)    Daily Weight  12/29/23 : 73.3 kg (161 lb 9.6 oz)    Image Results  ECG 12 lead  Sinus tachycardia  Possible Left atrial enlargement  Inferior infarct (cited on or before 29-DEC-2023)  Abnormal ECG  When compared with ECG of 29-DEC-2023 18:28,  Nonspecific T wave abnormality has replaced inverted T waves in Lateral leads    See ED provider note for full interpretation and clinical correlation      Physical Exam    General: Well-developed obese female, in no acute distress, on oxygen via nasal cannula  HEENT: AT, NC, no JVD, no lymphadenopathy, neck supple  Lungs: Scattered wheezing  Cardiac: Normal S1-S2, no murmur, no gallop  Abdomen: Soft, nontender, no distention, positive bowel sound  Extremities: No deformity, no edema, pulses intact, ROM intact  Neurological: Alert awake oriented x3, sensation intact, clear speech      Assessment/Plan   This patient currently has cardiac telemetry ordered; if you would like to modify or discontinue the telemetry order, click here to go to the orders activity to modify/discontinue the order.    Principal Problem:    Acute on chronic hypoxic respiratory failure (CMS/HCC)    Stephenie Mccray is a 43 y.o. female with a significant past medical history of VTE on Xarelto, chronic respiratory failure with hypoxemia  on 3 L of home O2 (though she only wears as needed), COPD, insulin-dependent type 2 diabetes, hypertension, GERD, hyperlipidemia, obesity, schizophrenia, who was admitted for management of COPD exacerbation and acute hypoxic respiratory failure in the setting of RSV URI.     Concern for possible superimposed pneumonia  Cardiac monitor  Fall precaution  Continue with oxygen  Continue with IV antibiotic and breathing treatment  Follow-up Pro-Michael, MRSA swab  Continue with steroid and bronchodilator  Tachycardia resolved, likely in the setting of COPD exacerbation  Continue with home meds further comorbidities  ISS, hypoglycemia protocol, POCT glucose, DM diet  DVT prophylaxis: Xarelto  Disposition: Home once hemodynamically stable within the next 24 hours    Apoorva Bynum MD

## 2023-12-30 NOTE — CONSULTS
Vancomycin Dosing by Pharmacy- Cessation of Therapy    Consult to pharmacy for vancomycin dosing has been discontinued by the prescriber, pharmacy will sign off at this time.    Please call pharmacy if there are further questions or re-enter a consult if vancomycin is resumed.     Nereyda Mendez, PharmD

## 2023-12-31 VITALS
DIASTOLIC BLOOD PRESSURE: 82 MMHG | HEIGHT: 62 IN | TEMPERATURE: 97.5 F | WEIGHT: 161.6 LBS | OXYGEN SATURATION: 90 % | HEART RATE: 67 BPM | RESPIRATION RATE: 20 BRPM | SYSTOLIC BLOOD PRESSURE: 139 MMHG | BODY MASS INDEX: 29.74 KG/M2

## 2023-12-31 LAB
ANION GAP SERPL CALC-SCNC: 12 MMOL/L (ref 10–20)
BUN SERPL-MCNC: 25 MG/DL (ref 6–23)
CALCIUM SERPL-MCNC: 9.5 MG/DL (ref 8.6–10.3)
CHLORIDE SERPL-SCNC: 105 MMOL/L (ref 98–107)
CO2 SERPL-SCNC: 27 MMOL/L (ref 21–32)
CREAT SERPL-MCNC: 0.98 MG/DL (ref 0.5–1.05)
ERYTHROCYTE [DISTWIDTH] IN BLOOD BY AUTOMATED COUNT: 15.9 % (ref 11.5–14.5)
GFR SERPL CREATININE-BSD FRML MDRD: 74 ML/MIN/1.73M*2
GLUCOSE BLD MANUAL STRIP-MCNC: 122 MG/DL (ref 74–99)
GLUCOSE BLD MANUAL STRIP-MCNC: 170 MG/DL (ref 74–99)
GLUCOSE BLD MANUAL STRIP-MCNC: 250 MG/DL (ref 74–99)
GLUCOSE SERPL-MCNC: 248 MG/DL (ref 74–99)
HCT VFR BLD AUTO: 43.7 % (ref 36–46)
HGB BLD-MCNC: 13.3 G/DL (ref 12–16)
MCH RBC QN AUTO: 30 PG (ref 26–34)
MCHC RBC AUTO-ENTMCNC: 30.4 G/DL (ref 32–36)
MCV RBC AUTO: 99 FL (ref 80–100)
NRBC BLD-RTO: 0 /100 WBCS (ref 0–0)
PLATELET # BLD AUTO: 168 X10*3/UL (ref 150–450)
POTASSIUM SERPL-SCNC: 5.2 MMOL/L (ref 3.5–5.3)
RBC # BLD AUTO: 4.43 X10*6/UL (ref 4–5.2)
SODIUM SERPL-SCNC: 139 MMOL/L (ref 136–145)
VANCOMYCIN SERPL-MCNC: 4.1 UG/ML (ref 5–20)
WBC # BLD AUTO: 14.4 X10*3/UL (ref 4.4–11.3)

## 2023-12-31 PROCEDURE — 2500000004 HC RX 250 GENERAL PHARMACY W/ HCPCS (ALT 636 FOR OP/ED): Performed by: INTERNAL MEDICINE

## 2023-12-31 PROCEDURE — 36415 COLL VENOUS BLD VENIPUNCTURE: CPT | Performed by: STUDENT IN AN ORGANIZED HEALTH CARE EDUCATION/TRAINING PROGRAM

## 2023-12-31 PROCEDURE — 85027 COMPLETE CBC AUTOMATED: CPT | Performed by: STUDENT IN AN ORGANIZED HEALTH CARE EDUCATION/TRAINING PROGRAM

## 2023-12-31 PROCEDURE — 2500000002 HC RX 250 W HCPCS SELF ADMINISTERED DRUGS (ALT 637 FOR MEDICARE OP, ALT 636 FOR OP/ED): Performed by: INTERNAL MEDICINE

## 2023-12-31 PROCEDURE — 94640 AIRWAY INHALATION TREATMENT: CPT

## 2023-12-31 PROCEDURE — 2500000001 HC RX 250 WO HCPCS SELF ADMINISTERED DRUGS (ALT 637 FOR MEDICARE OP)

## 2023-12-31 PROCEDURE — 82947 ASSAY GLUCOSE BLOOD QUANT: CPT

## 2023-12-31 PROCEDURE — 80048 BASIC METABOLIC PNL TOTAL CA: CPT | Performed by: STUDENT IN AN ORGANIZED HEALTH CARE EDUCATION/TRAINING PROGRAM

## 2023-12-31 PROCEDURE — 80202 ASSAY OF VANCOMYCIN: CPT

## 2023-12-31 PROCEDURE — 2500000001 HC RX 250 WO HCPCS SELF ADMINISTERED DRUGS (ALT 637 FOR MEDICARE OP): Performed by: STUDENT IN AN ORGANIZED HEALTH CARE EDUCATION/TRAINING PROGRAM

## 2023-12-31 PROCEDURE — 2500000001 HC RX 250 WO HCPCS SELF ADMINISTERED DRUGS (ALT 637 FOR MEDICARE OP): Performed by: INTERNAL MEDICINE

## 2023-12-31 PROCEDURE — 99239 HOSP IP/OBS DSCHRG MGMT >30: CPT | Performed by: STUDENT IN AN ORGANIZED HEALTH CARE EDUCATION/TRAINING PROGRAM

## 2023-12-31 RX ORDER — PALIPERIDONE 1.5 MG/1
1.5 TABLET, EXTENDED RELEASE ORAL DAILY
COMMUNITY
End: 2024-01-14 | Stop reason: HOSPADM

## 2023-12-31 RX ORDER — BUTALBITAL, ACETAMINOPHEN AND CAFFEINE 50; 325; 40 MG/1; MG/1; MG/1
1 TABLET ORAL EVERY 4 HOURS PRN
Status: DISCONTINUED | OUTPATIENT
Start: 2023-12-31 | End: 2023-12-31 | Stop reason: HOSPADM

## 2023-12-31 RX ORDER — RISPERIDONE 0.25 MG/1
0.25 TABLET ORAL DAILY
Status: DISCONTINUED | OUTPATIENT
Start: 2023-12-31 | End: 2023-12-31 | Stop reason: HOSPADM

## 2023-12-31 RX ORDER — AMOXICILLIN AND CLAVULANATE POTASSIUM 875; 125 MG/1; MG/1
1 TABLET, FILM COATED ORAL 2 TIMES DAILY
Qty: 10 TABLET | Refills: 0 | Status: SHIPPED | OUTPATIENT
Start: 2023-12-31 | End: 2024-01-14 | Stop reason: HOSPADM

## 2023-12-31 RX ORDER — PREDNISONE 20 MG/1
20 TABLET ORAL DAILY
Qty: 5 TABLET | Refills: 0 | Status: SHIPPED | OUTPATIENT
Start: 2023-12-31 | End: 2024-01-14 | Stop reason: HOSPADM

## 2023-12-31 RX ADMIN — CLONAZEPAM 1 MG: 1 TABLET ORAL at 09:03

## 2023-12-31 RX ADMIN — BUTALBITAL, ACETAMINOPHEN AND CAFFEINE 1 TABLET: 50; 325; 40 TABLET ORAL at 16:40

## 2023-12-31 RX ADMIN — CITALOPRAM HYDROBROMIDE 40 MG: 20 TABLET ORAL at 08:52

## 2023-12-31 RX ADMIN — RISPERIDONE 0.25 MG: 0.25 TABLET ORAL at 09:47

## 2023-12-31 RX ADMIN — IPRATROPIUM BROMIDE AND ALBUTEROL SULFATE 3 ML: 2.5; .5 SOLUTION RESPIRATORY (INHALATION) at 04:34

## 2023-12-31 RX ADMIN — SODIUM CHLORIDE 10 ML/HR: 9 INJECTION, SOLUTION INTRAVENOUS at 02:41

## 2023-12-31 RX ADMIN — RIVAROXABAN 20 MG: 20 TABLET, FILM COATED ORAL at 16:40

## 2023-12-31 RX ADMIN — TOPIRAMATE 50 MG: 25 TABLET ORAL at 08:52

## 2023-12-31 RX ADMIN — METHYLPREDNISOLONE SODIUM SUCCINATE 40 MG: 40 INJECTION, POWDER, FOR SOLUTION INTRAMUSCULAR; INTRAVENOUS at 06:17

## 2023-12-31 RX ADMIN — METOPROLOL TARTRATE 25 MG: 25 TABLET, FILM COATED ORAL at 08:52

## 2023-12-31 RX ADMIN — OSELTAMIVIR PHOSPHATE 75 MG: 75 CAPSULE ORAL at 09:02

## 2023-12-31 RX ADMIN — PANTOPRAZOLE SODIUM 40 MG: 40 TABLET, DELAYED RELEASE ORAL at 06:17

## 2023-12-31 RX ADMIN — PIPERACILLIN SODIUM AND TAZOBACTAM SODIUM 3.38 G: 3; .375 INJECTION, SOLUTION INTRAVENOUS at 09:02

## 2023-12-31 RX ADMIN — LEVOTHYROXINE SODIUM 150 MCG: 75 TABLET ORAL at 06:17

## 2023-12-31 RX ADMIN — PIPERACILLIN SODIUM AND TAZOBACTAM SODIUM 3.38 G: 3; .375 INJECTION, SOLUTION INTRAVENOUS at 02:43

## 2023-12-31 RX ADMIN — INSULIN LISPRO 4 UNITS: 100 INJECTION, SOLUTION INTRAVENOUS; SUBCUTANEOUS at 13:14

## 2023-12-31 RX ADMIN — BUTALBITAL, ACETAMINOPHEN AND CAFFEINE 1 TABLET: 50; 325; 40 TABLET ORAL at 10:49

## 2023-12-31 RX ADMIN — INSULIN LISPRO 2 UNITS: 100 INJECTION, SOLUTION INTRAVENOUS; SUBCUTANEOUS at 10:46

## 2023-12-31 RX ADMIN — BUSPIRONE HYDROCHLORIDE 30 MG: 5 TABLET ORAL at 08:53

## 2023-12-31 ASSESSMENT — PAIN SCALES - GENERAL: PAINLEVEL_OUTOF10: 4

## 2023-12-31 NOTE — CARE PLAN
The patient's goals for the shift include Breathing better    The clinical goals for the shift include Patient will have decreased O2 demands throughout shift

## 2023-12-31 NOTE — PROGRESS NOTES
Stephenie Mccray is a 43 y.o. female on day 2 of admission presenting with Acute on chronic hypoxic respiratory failure (CMS/HCC).      Subjective   Patient is complaining of headache, on Oxymizer 10 L, she is on oxygen at home 4 L  We need to improve oxygenation back to baseline which is 4 L at home, we will decrease Oxymizer to 5-6 and evaluate  Anticipate discharge in next 24 hours if hemodynamically stable    Objective     Last Recorded Vitals  /80 (BP Location: Left arm, Patient Position: Lying)   Pulse 67   Temp 36.3 °C (97.3 °F)   Resp 20   Wt 73.3 kg (161 lb 9.6 oz)   SpO2 95%   Intake/Output last 3 Shifts:    Intake/Output Summary (Last 24 hours) at 12/31/2023 1114  Last data filed at 12/31/2023 0454  Gross per 24 hour   Intake 329.17 ml   Output --   Net 329.17 ml       Admission Weight  Weight: 75.8 kg (167 lb) (12/29/23 1747)    Daily Weight  12/29/23 : 73.3 kg (161 lb 9.6 oz)    Image Results  ECG 12 lead  Sinus tachycardia  Possible Left atrial enlargement  Inferior infarct (cited on or before 29-DEC-2023)  Abnormal ECG  When compared with ECG of 29-DEC-2023 18:28,  Nonspecific T wave abnormality has replaced inverted T waves in Lateral leads    See ED provider note for full interpretation and clinical correlation  Confirmed by Cheyanne Burger ([id number]) on 12/30/2023 7:28:58 PM      Physical Exam    General: Well-developed obese female, in no acute distress, on Oxymizer 10 L  HEENT: AT, NC, no JVD, no lymphadenopathy, neck supple  Lungs: Scattered wheezing  Cardiac: Normal S1-S2, no murmur, no gallop  Abdomen: Soft, nontender, no distention, positive bowel sound  Extremities: No deformity, no edema, pulses intact, ROM intact  Neurological: Alert awake oriented x3, sensation intact, clear speech        Assessment/Plan   This patient currently has cardiac telemetry ordered; if you would like to modify or discontinue the telemetry order, click here to go to the orders activity to  modify/discontinue the order.    Principal Problem:    Acute on chronic hypoxic respiratory failure (CMS/[facility])    Stephenie Mccray is a 43 y.o. 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      Concern for possible superimposed pneumonia, procal 0.11  MRSA negative, cw Zosyn, steroid, bronchodilator  Cardiac monitor  Fall precaution  Continue with oxygen therapy  Continue with home meds further comorbidities  ISS, hypoglycemia protocol, POCT glucose, DM diet  DVT prophylaxis: Xarelto  Disposition: Home once hemodynamically stable within the next 24 hours, pt needs to be back to baseline oxygen 4L  No need for a.m. labs    Apoorva Bynum MD

## 2024-01-01 NOTE — DISCHARGE SUMMARY
"Discharge Diagnosis  Acute on chronic hypoxic respiratory failure (CMS/[facility])    Issues Requiring Follow-Up  COPD, outpatient follow up with pulm and PCP     Discharge Meds     Your medication list        START taking these medications        Instructions Last Dose Given Next Dose Due   amoxicillin-pot clavulanate 875-125 mg tablet  Commonly known as: Augmentin      Take 1 tablet (875 mg) by mouth 2 times a day for 5 days.              CHANGE how you take these medications        Instructions Last Dose Given Next Dose Due   ipratropium-albuteroL 0.5-2.5 mg/3 mL nebulizer solution  Commonly known as: Duo-Neb  What changed:   when to take this  additional instructions      Take 3 mL by nebulization every 6 hours if needed for shortness of breath or wheezing.       predniSONE 50 mg tablet  Commonly known as: Deltasone  What changed: Another medication with the same name was changed. Make sure you understand how and when to take each.      Take 1 tablet (50 mg) by mouth once daily for 5 days.       predniSONE 20 mg tablet  Commonly known as: Deltasone  What changed:   medication strength  See the new instructions.      Take 1 tablet (20 mg) by mouth once daily for 5 days.              CONTINUE taking these medications        Instructions Last Dose Given Next Dose Due   alcohol swabs pads, medicated  Commonly known as: Alcohol Prep Pads      Use 3 times daily prn       atorvastatin 20 mg tablet  Commonly known as: Lipitor      Take 1 tablet (20 mg) by mouth once daily. Dr. Davis covering for Dr. Yuen       azithromycin 250 mg tablet  Commonly known as: Zithromax Z-Navin      Take 1 tablet (250 mg) by mouth once daily. Follow Z-navin instructions: 500mg on day #1, then 250mg daily for days #2, 3, 4, and 5.       BD Ultra-Fine Mini Pen Needle 31 gauge x 3/16\" needle  Generic drug: pen needle, diabetic           busPIRone 30 mg tablet  Commonly known as: Buspar           citalopram 40 mg tablet  Commonly known as: CeleXA       "     clonazePAM 1 mg tablet  Commonly known as: KlonoPIN           hydrOXYzine HCL 25 mg tablet  Commonly known as: Atarax      Take 1 tablet (25 mg) by mouth every 6 hours if needed for anxiety for up to 3 days.       hydrOXYzine pamoate 50 mg capsule  Commonly known as: Vistaril           insulin glargine 100 unit/mL (3 mL) pen  Commonly known as: Lantus Solostar U-100 Insulin      Inject 10 Units under the skin once daily in the morning. Take as directed per insulin instructions.       insulin lispro 100 unit/mL injection  Commonly known as: HumaLOG           Invega Sustenna 234 mg/1.5 mL syringe  Generic drug: paliperidone palmitate ER           levothyroxine 150 mcg tablet  Commonly known as: Synthroid, Levoxyl      Take 1 tablet (150 mcg) by mouth once daily.       metoprolol tartrate 25 mg tablet  Commonly known as: Lopressor      TAKE 1 TABLET BY MOUTH IN THE MORNING AND 1 AT BEDTIME       NovoLOG Flexpen U-100 Insulin 100 unit/mL (3 mL) pen  Generic drug: insulin aspart           OneTouch Verio test strips strip  Generic drug: blood sugar diagnostic           oseltamivir 75 mg capsule  Commonly known as: Tamiflu      Take 1 capsule (75 mg) by mouth every 12 hours for 5 days.       paliperidone 1.5 mg 24 hr tablet  Commonly known as: Invega  Notes to patient: Continue home regimen           pantoprazole 40 mg EC tablet  Commonly known as: ProtoNix      Take 1 tablet (40 mg) by mouth once daily.       topiramate 25 mg tablet  Commonly known as: Topamax      Take 2 tablets (50 mg) by mouth once daily.       Xarelto 20 mg tablet  Generic drug: rivaroxaban                  STOP taking these medications      albuterol 90 mcg/actuation inhaler        doxycycline 100 mg capsule  Commonly known as: Vibramycin               ASK your doctor about these medications        Instructions Last Dose Given Next Dose Due   lancets 30 gauge misc  Ask about: Should I take this medication?      100 each 3 times a day.                  Where to Get Your Medications        These medications were sent to [id number] - [place], [place] - [address]  [address] [place] [address]      Phone: [phone]   amoxicillin-pot clavulanate 875-125 mg tablet  predniSONE 20 mg tablet         Test Results Pending At Discharge  Pending Labs       Order Current Status    Blood Culture Preliminary result    Blood Culture Preliminary result            Hospital Course    Stephenie Mccray is a 43 y.o. 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      Concern for possible superimposed pneumonia, procal 0.11  Initially started on Zosyn and vancomycin, MRSA negative, vancomycin was discontinued and was continued on Zosyn, steroid, bronchodilators, cardiac monitor, fall precaution  To that she was placed on Oxymizer 10 L via nasal cannula and slowly she was weaned off to oxygen therapy via nasal cannula and back to baseline which is 4 L at home.  She was saturating 97% on 4 L oxygen via nasal cannula.  Also continued on her home medication for other comorbidities.  ISS, hypoglycemia protocol, POCT glucose, DM diet was also considered.  DVT prophylaxis was provided with Xarelto.    Within the past 24 hours patient has been hemodynamically and medically stable and ready for discharge to home with oxygen 4 L at home.  She will be discharged on oral Augmentin for few more days to complete antibiotic management for possible superimposed pneumonia.  She will continue on the rest of her home medication.  She will also follow-up as outpatient with her primary care and pulmonologist for further recommendation  and medication adjustment.    Pertinent Physical Exam At Time of Discharge  Physical Exam    General: Well-developed obese female, in no acute distress, on oxygen via nasal cannula back to baseline 4 L  HEENT: AT, NC, no JVD, no lymphadenopathy, neck supple  Lungs: Scattered wheezing  Cardiac: Normal S1-S2, no murmur, no gallop  Abdomen: Soft, nontender, no distention, positive bowel sound  Extremities: No deformity, no edema, pulses intact, ROM intact  Neurological: Alert awake oriented x3, sensation intact, clear speech    Outpatient Follow-Up  Future Appointments   Date Time Provider Department Center   1/3/2024  1:30 PM Jesse Jones DO [id number] [place]   1/10/2024  3:45 PM Delta Yuen MD [id number] [place]   2/15/2024 10:45 AM Delta Yuen MD [id number] [place]       Time spent 40 minutes   Apoorva Bynum MD

## 2024-01-02 ENCOUNTER — PATIENT OUTREACH (OUTPATIENT)
Dept: PRIMARY CARE | Facility: CLINIC | Age: 44
End: 2024-01-02
Payer: COMMERCIAL

## 2024-01-02 NOTE — PROGRESS NOTES
Discharge Facility: [facility]  Discharge Diagnosis: Acute on chronic hypoxic respiratory failure   Admission Date: 12/29/23  Discharge Date: 12/31/23    PCP Appointment Date: 1/10/24  Specialist Appointment Date:   - Dr. Villalba: Pulmonology: D  Hospital Encounter and Summary: Linked   See discharge assessment below for further details    Medications  Medications reviewed with patient/caregiver?: Yes (1/2/2024 12:52 PM)  Is the patient having any side effects they believe may be caused by any medication additions or changes?: No (1/2/2024 12:52 PM)  Does the patient have all medications ordered at discharge?: Yes (1/2/2024 12:52 PM)  Care Management Interventions: No intervention needed (1/2/2024 12:52 PM)  Prescription Comments: START: augmentin  CHANGE: duoneb, prednisone  STOP: albuterol, vibramycin (1/2/2024 12:52 PM)  Is the patient taking all medications as directed (includes completed medication regime)?: Yes (1/2/2024 12:52 PM)  Care Management Interventions: Provided patient education (1/2/2024 12:52 PM)    Appointments  Does the patient have a primary care provider?: Yes (1/2/2024 12:52 PM)  Care Management Interventions: Verified appointment date/time/provider (1/2/2024 12:52 PM)  Care Management Interventions: Advised patient to keep appointment (1/2/2024 12:52 PM)    Self Management  Has home health visited the patient within 72 hours of discharge?: Not applicable (1/2/2024 12:52 PM)  What Durable Medical Equipment (DME) was ordered?: oxygen- had prior to admission (12/22/2023  2:55 PM)  Has all Durable Medical Equipment (DME) been delivered?: Yes (12/22/2023  2:55 PM)    Patient Teaching  Does the patient have access to their discharge instructions?: Yes (1/2/2024 12:52 PM)  Care Management Interventions: Reviewed instructions with patient (1/2/2024 12:52 PM)  What is the patient's perception of their health status since discharge?: Improving (1/2/2024 12:52 PM)  Is the patient/caregiver able to teach  back the hierarchy of who to call/visit for symptoms/problems? PCP, Specialist, Home Health nurse, Urgent Care, ED, 911: Yes (1/2/2024 12:52 PM)  Patient/Caregiver Education Comments: Spoke with patient who states she is doing a little better. States she is tired and has RSV but is okay. Denies questions or concerns at this time. Aware to contact Dr. Villalba for follow up. (1/2/2024 12:52 PM)

## 2024-01-03 LAB
BACTERIA BLD CULT: NORMAL
BACTERIA BLD CULT: NORMAL

## 2024-01-04 NOTE — ED PROCEDURE NOTE
Procedure  Critical Care    Performed by: Uma Francis PA-C  Authorized by: Joaquin Kitchen DO    Critical care provider statement:     Critical care time (minutes):  45    Critical care time was exclusive of:  Separately billable procedures and treating other patients and teaching time    Critical care was necessary to treat or prevent imminent or life-threatening deterioration of the following conditions:  Respiratory failure    Critical care was time spent personally by me on the following activities:  Blood draw for specimens, development of treatment plan with patient or surrogate, discussions with consultants, evaluation of patient's response to treatment, examination of patient, pulse oximetry, ordering and review of radiographic studies, ordering and review of laboratory studies, ordering and performing treatments and interventions and re-evaluation of patient's condition    I assumed direction of critical care for this patient from another provider in my specialty: no      Care discussed with: admitting provider                 Uma Francis PA-C  01/04/24 0913

## 2024-01-08 ENCOUNTER — TELEPHONE (OUTPATIENT)
Dept: PRIMARY CARE | Facility: CLINIC | Age: 44
End: 2024-01-08
Payer: COMMERCIAL

## 2024-01-08 DIAGNOSIS — J44.1 CHRONIC OBSTRUCTIVE PULMONARY DISEASE WITH ACUTE EXACERBATION (MULTI): Primary | ICD-10-CM

## 2024-01-08 NOTE — TELEPHONE ENCOUNTER
Stephenie said she's called dr. Kan office four times since August and never hears back from them and she's having a problem with her oxygen and she's been in the hospital for obstructive breathing and wanted to know if she could have a referral to a different pulmonologist since dr. Kan office is unresponsive. Please advise

## 2024-01-09 ENCOUNTER — APPOINTMENT (OUTPATIENT)
Dept: CARDIOLOGY | Facility: HOSPITAL | Age: 44
DRG: 190 | End: 2024-01-09
Payer: COMMERCIAL

## 2024-01-09 ENCOUNTER — HOSPITAL ENCOUNTER (INPATIENT)
Facility: HOSPITAL | Age: 44
LOS: 4 days | Discharge: HOME | DRG: 190 | End: 2024-01-14
Attending: EMERGENCY MEDICINE | Admitting: STUDENT IN AN ORGANIZED HEALTH CARE EDUCATION/TRAINING PROGRAM
Payer: COMMERCIAL

## 2024-01-09 ENCOUNTER — APPOINTMENT (OUTPATIENT)
Dept: RADIOLOGY | Facility: HOSPITAL | Age: 44
DRG: 190 | End: 2024-01-09
Payer: COMMERCIAL

## 2024-01-09 DIAGNOSIS — J96.20 ACUTE ON CHRONIC RESPIRATORY FAILURE, UNSPECIFIED WHETHER WITH HYPOXIA OR HYPERCAPNIA (MULTI): Primary | ICD-10-CM

## 2024-01-09 DIAGNOSIS — J96.01 ACUTE RESPIRATORY FAILURE WITH HYPOXIA AND HYPERCAPNIA (MULTI): ICD-10-CM

## 2024-01-09 DIAGNOSIS — J44.1 COPD WITH EXACERBATION (MULTI): ICD-10-CM

## 2024-01-09 DIAGNOSIS — J44.1 CHRONIC OBSTRUCTIVE PULMONARY DISEASE WITH ACUTE EXACERBATION (MULTI): ICD-10-CM

## 2024-01-09 DIAGNOSIS — J96.21 ACUTE ON CHRONIC HYPOXIC RESPIRATORY FAILURE (MULTI): ICD-10-CM

## 2024-01-09 DIAGNOSIS — J96.21 ACUTE ON CHRONIC RESPIRATORY FAILURE WITH HYPOXIA AND HYPERCAPNIA (MULTI): ICD-10-CM

## 2024-01-09 DIAGNOSIS — Z72.0 NICOTINE ABUSE: ICD-10-CM

## 2024-01-09 DIAGNOSIS — J96.01 ACUTE RESPIRATORY FAILURE WITH HYPOXIA (MULTI): ICD-10-CM

## 2024-01-09 DIAGNOSIS — F20.3 UNDIFFERENTIATED SCHIZOPHRENIA (MULTI): ICD-10-CM

## 2024-01-09 DIAGNOSIS — J96.02 ACUTE RESPIRATORY FAILURE WITH HYPOXIA AND HYPERCAPNIA (MULTI): ICD-10-CM

## 2024-01-09 DIAGNOSIS — I10 PRIMARY HYPERTENSION: ICD-10-CM

## 2024-01-09 DIAGNOSIS — J96.22 ACUTE ON CHRONIC RESPIRATORY FAILURE WITH HYPOXIA AND HYPERCAPNIA (MULTI): ICD-10-CM

## 2024-01-09 LAB
ALBUMIN SERPL BCP-MCNC: 3.6 G/DL (ref 3.4–5)
ALP SERPL-CCNC: 78 U/L (ref 33–110)
ALT SERPL W P-5'-P-CCNC: 18 U/L (ref 7–45)
ANION GAP BLDA CALCULATED.4IONS-SCNC: 4 MMO/L (ref 10–25)
ANION GAP SERPL CALC-SCNC: 11 MMOL/L (ref 10–20)
APPARATUS: ABNORMAL
AST SERPL W P-5'-P-CCNC: 10 U/L (ref 9–39)
B-HCG SERPL-ACNC: <2 MIU/ML
BASE EXCESS BLDA CALC-SCNC: 7.2 MMOL/L (ref -2–3)
BASOPHILS # BLD MANUAL: 0.09 X10*3/UL (ref 0–0.1)
BASOPHILS NFR BLD MANUAL: 1 %
BILIRUB SERPL-MCNC: 0.3 MG/DL (ref 0–1.2)
BODY TEMPERATURE: ABNORMAL
BUN SERPL-MCNC: 22 MG/DL (ref 6–23)
CA-I BLDA-SCNC: 1.26 MMOL/L (ref 1.1–1.33)
CALCIUM SERPL-MCNC: 8.8 MG/DL (ref 8.6–10.3)
CARDIAC TROPONIN I PNL SERPL HS: 3 NG/L (ref 0–13)
CHLORIDE BLDA-SCNC: 104 MMOL/L (ref 98–107)
CHLORIDE SERPL-SCNC: 103 MMOL/L (ref 98–107)
CO2 SERPL-SCNC: 31 MMOL/L (ref 21–32)
CREAT SERPL-MCNC: 0.98 MG/DL (ref 0.5–1.05)
CRITICAL CALL TIME: 2306
CRITICAL CALLED BY: ABNORMAL
CRITICAL CALLED TO: ABNORMAL
CRITICAL READ BACK: ABNORMAL
DACRYOCYTES BLD QL SMEAR: ABNORMAL
EGFRCR SERPLBLD CKD-EPI 2021: 74 ML/MIN/1.73M*2
EOSINOPHIL # BLD MANUAL: 0 X10*3/UL (ref 0–0.7)
EOSINOPHIL NFR BLD MANUAL: 0 %
ERYTHROCYTE [DISTWIDTH] IN BLOOD BY AUTOMATED COUNT: 16 % (ref 11.5–14.5)
FLUAV RNA RESP QL NAA+PROBE: NOT DETECTED
FLUBV RNA RESP QL NAA+PROBE: NOT DETECTED
GLUCOSE BLDA-MCNC: 148 MG/DL (ref 74–99)
GLUCOSE SERPL-MCNC: 149 MG/DL (ref 74–99)
HCO3 BLDA-SCNC: 35.7 MMOL/L (ref 22–26)
HCT VFR BLD AUTO: 39.3 % (ref 36–46)
HCT VFR BLD EST: 36 % (ref 36–46)
HGB BLD-MCNC: 12 G/DL (ref 12–16)
HGB BLDA-MCNC: 12.1 G/DL (ref 12–16)
IMM GRANULOCYTES # BLD AUTO: 0.55 X10*3/UL (ref 0–0.7)
IMM GRANULOCYTES NFR BLD AUTO: 6 % (ref 0–0.9)
INHALED O2 CONCENTRATION: 36 %
LACTATE BLDA-SCNC: 0.7 MMOL/L (ref 0.4–2)
LYMPHOCYTES # BLD MANUAL: 5.06 X10*3/UL (ref 1.2–4.8)
LYMPHOCYTES NFR BLD MANUAL: 55 %
MAGNESIUM SERPL-MCNC: 1.66 MG/DL (ref 1.6–2.4)
MCH RBC QN AUTO: 30.9 PG (ref 26–34)
MCHC RBC AUTO-ENTMCNC: 30.5 G/DL (ref 32–36)
MCV RBC AUTO: 101 FL (ref 80–100)
MONOCYTES # BLD MANUAL: 0.28 X10*3/UL (ref 0.1–1)
MONOCYTES NFR BLD MANUAL: 3 %
NEUTROPHILS # BLD MANUAL: 3.58 X10*3/UL (ref 1.2–7.7)
NEUTS BAND # BLD MANUAL: 0.18 X10*3/UL (ref 0–0.7)
NEUTS BAND NFR BLD MANUAL: 2 %
NEUTS SEG # BLD MANUAL: 3.4 X10*3/UL (ref 1.2–7)
NEUTS SEG NFR BLD MANUAL: 37 %
NRBC BLD-RTO: 1.2 /100 WBCS (ref 0–0)
OVALOCYTES BLD QL SMEAR: ABNORMAL
OXYHGB MFR BLDA: 83.2 % (ref 94–98)
PCO2 BLDA: 71 MM HG (ref 38–42)
PH BLDA: 7.31 PH (ref 7.38–7.42)
PLATELET # BLD AUTO: 150 X10*3/UL (ref 150–450)
PO2 BLDA: 66 MM HG (ref 85–95)
POLYCHROMASIA BLD QL SMEAR: ABNORMAL
POTASSIUM BLDA-SCNC: 4.5 MMOL/L (ref 3.5–5.3)
POTASSIUM SERPL-SCNC: 4.4 MMOL/L (ref 3.5–5.3)
PROT SERPL-MCNC: 6 G/DL (ref 6.4–8.2)
RBC # BLD AUTO: 3.88 X10*6/UL (ref 4–5.2)
RBC MORPH BLD: ABNORMAL
SAO2 % BLDA: 94 % (ref 94–100)
SARS-COV-2 RNA RESP QL NAA+PROBE: NOT DETECTED
SODIUM BLDA-SCNC: 139 MMOL/L (ref 136–145)
SODIUM SERPL-SCNC: 141 MMOL/L (ref 136–145)
TOTAL CELLS COUNTED BLD: 100
VARIANT LYMPHS # BLD MANUAL: 0.18 X10*3/UL (ref 0–0.5)
VARIANT LYMPHS NFR BLD: 2 %
WBC # BLD AUTO: 9.2 X10*3/UL (ref 4.4–11.3)

## 2024-01-09 PROCEDURE — 83880 ASSAY OF NATRIURETIC PEPTIDE: CPT | Performed by: EMERGENCY MEDICINE

## 2024-01-09 PROCEDURE — 71045 X-RAY EXAM CHEST 1 VIEW: CPT | Mod: FOREIGN READ | Performed by: RADIOLOGY

## 2024-01-09 PROCEDURE — 85027 COMPLETE CBC AUTOMATED: CPT | Performed by: STUDENT IN AN ORGANIZED HEALTH CARE EDUCATION/TRAINING PROGRAM

## 2024-01-09 PROCEDURE — 85007 BL SMEAR W/DIFF WBC COUNT: CPT | Performed by: STUDENT IN AN ORGANIZED HEALTH CARE EDUCATION/TRAINING PROGRAM

## 2024-01-09 PROCEDURE — 84702 CHORIONIC GONADOTROPIN TEST: CPT | Performed by: STUDENT IN AN ORGANIZED HEALTH CARE EDUCATION/TRAINING PROGRAM

## 2024-01-09 PROCEDURE — 71045 X-RAY EXAM CHEST 1 VIEW: CPT

## 2024-01-09 PROCEDURE — 2500000004 HC RX 250 GENERAL PHARMACY W/ HCPCS (ALT 636 FOR OP/ED): Performed by: EMERGENCY MEDICINE

## 2024-01-09 PROCEDURE — 96375 TX/PRO/DX INJ NEW DRUG ADDON: CPT

## 2024-01-09 PROCEDURE — 83735 ASSAY OF MAGNESIUM: CPT | Performed by: EMERGENCY MEDICINE

## 2024-01-09 PROCEDURE — 94640 AIRWAY INHALATION TREATMENT: CPT

## 2024-01-09 PROCEDURE — 93005 ELECTROCARDIOGRAM TRACING: CPT

## 2024-01-09 PROCEDURE — 36600 WITHDRAWAL OF ARTERIAL BLOOD: CPT

## 2024-01-09 PROCEDURE — 96365 THER/PROPH/DIAG IV INF INIT: CPT

## 2024-01-09 PROCEDURE — 36415 COLL VENOUS BLD VENIPUNCTURE: CPT | Performed by: STUDENT IN AN ORGANIZED HEALTH CARE EDUCATION/TRAINING PROGRAM

## 2024-01-09 PROCEDURE — 84132 ASSAY OF SERUM POTASSIUM: CPT | Performed by: STUDENT IN AN ORGANIZED HEALTH CARE EDUCATION/TRAINING PROGRAM

## 2024-01-09 PROCEDURE — 84484 ASSAY OF TROPONIN QUANT: CPT | Performed by: STUDENT IN AN ORGANIZED HEALTH CARE EDUCATION/TRAINING PROGRAM

## 2024-01-09 PROCEDURE — 87634 RSV DNA/RNA AMP PROBE: CPT

## 2024-01-09 PROCEDURE — 94660 CPAP INITIATION&MGMT: CPT

## 2024-01-09 PROCEDURE — 84132 ASSAY OF SERUM POTASSIUM: CPT | Performed by: EMERGENCY MEDICINE

## 2024-01-09 PROCEDURE — 2500000002 HC RX 250 W HCPCS SELF ADMINISTERED DRUGS (ALT 637 FOR MEDICARE OP, ALT 636 FOR OP/ED): Performed by: EMERGENCY MEDICINE

## 2024-01-09 PROCEDURE — 87636 SARSCOV2 & INF A&B AMP PRB: CPT | Performed by: EMERGENCY MEDICINE

## 2024-01-09 PROCEDURE — 99285 EMERGENCY DEPT VISIT HI MDM: CPT | Performed by: EMERGENCY MEDICINE

## 2024-01-09 PROCEDURE — 93010 ELECTROCARDIOGRAM REPORT: CPT | Performed by: PHYSICIAN ASSISTANT

## 2024-01-09 RX ORDER — MAGNESIUM SULFATE HEPTAHYDRATE 40 MG/ML
2 INJECTION, SOLUTION INTRAVENOUS ONCE
Status: COMPLETED | OUTPATIENT
Start: 2024-01-09 | End: 2024-01-09

## 2024-01-09 RX ORDER — ALBUTEROL SULFATE 0.83 MG/ML
2.5 SOLUTION RESPIRATORY (INHALATION) ONCE
Status: COMPLETED | OUTPATIENT
Start: 2024-01-09 | End: 2024-01-09

## 2024-01-09 RX ORDER — IPRATROPIUM BROMIDE AND ALBUTEROL SULFATE 2.5; .5 MG/3ML; MG/3ML
3 SOLUTION RESPIRATORY (INHALATION) ONCE
Status: COMPLETED | OUTPATIENT
Start: 2024-01-09 | End: 2024-01-09

## 2024-01-09 RX ADMIN — MAGNESIUM SULFATE HEPTAHYDRATE 2 G: 40 INJECTION, SOLUTION INTRAVENOUS at 22:56

## 2024-01-09 RX ADMIN — ALBUTEROL SULFATE 2.5 MG: 2.5 SOLUTION RESPIRATORY (INHALATION) at 22:54

## 2024-01-09 RX ADMIN — IPRATROPIUM BROMIDE AND ALBUTEROL SULFATE 3 ML: 2.5; .5 SOLUTION RESPIRATORY (INHALATION) at 22:53

## 2024-01-09 RX ADMIN — ALBUTEROL SULFATE 2.5 MG: 2.5 SOLUTION RESPIRATORY (INHALATION) at 22:53

## 2024-01-09 ASSESSMENT — PAIN - FUNCTIONAL ASSESSMENT: PAIN_FUNCTIONAL_ASSESSMENT: 0-10

## 2024-01-09 ASSESSMENT — COLUMBIA-SUICIDE SEVERITY RATING SCALE - C-SSRS
1. IN THE PAST MONTH, HAVE YOU WISHED YOU WERE DEAD OR WISHED YOU COULD GO TO SLEEP AND NOT WAKE UP?: NO
6. HAVE YOU EVER DONE ANYTHING, STARTED TO DO ANYTHING, OR PREPARED TO DO ANYTHING TO END YOUR LIFE?: NO
2. HAVE YOU ACTUALLY HAD ANY THOUGHTS OF KILLING YOURSELF?: NO

## 2024-01-09 ASSESSMENT — LIFESTYLE VARIABLES
HAVE YOU EVER FELT YOU SHOULD CUT DOWN ON YOUR DRINKING: NO
EVER FELT BAD OR GUILTY ABOUT YOUR DRINKING: NO
REASON UNABLE TO ASSESS: NO
EVER HAD A DRINK FIRST THING IN THE MORNING TO STEADY YOUR NERVES TO GET RID OF A HANGOVER: NO
HAVE PEOPLE ANNOYED YOU BY CRITICIZING YOUR DRINKING: NO

## 2024-01-09 ASSESSMENT — PAIN DESCRIPTION - PAIN TYPE: TYPE: ACUTE PAIN

## 2024-01-09 ASSESSMENT — PAIN SCALES - GENERAL: PAINLEVEL_OUTOF10: 0 - NO PAIN

## 2024-01-10 ENCOUNTER — APPOINTMENT (OUTPATIENT)
Dept: RADIOLOGY | Facility: HOSPITAL | Age: 44
DRG: 190 | End: 2024-01-10
Payer: COMMERCIAL

## 2024-01-10 ENCOUNTER — APPOINTMENT (OUTPATIENT)
Dept: PRIMARY CARE | Facility: CLINIC | Age: 44
End: 2024-01-10
Payer: COMMERCIAL

## 2024-01-10 PROBLEM — J96.20 ACUTE ON CHRONIC RESPIRATORY FAILURE, UNSPECIFIED WHETHER WITH HYPOXIA OR HYPERCAPNIA (MULTI): Status: ACTIVE | Noted: 2024-01-10

## 2024-01-10 LAB
ALBUMIN SERPL BCP-MCNC: 3.8 G/DL (ref 3.4–5)
ALP SERPL-CCNC: 88 U/L (ref 33–110)
ALT SERPL W P-5'-P-CCNC: 27 U/L (ref 7–45)
AMPHETAMINES UR QL SCN: ABNORMAL
ANION GAP BLDA CALCULATED.4IONS-SCNC: 1 MMO/L (ref 10–25)
ANION GAP BLDA CALCULATED.4IONS-SCNC: 3 MMO/L (ref 10–25)
ANION GAP BLDA CALCULATED.4IONS-SCNC: 4 MMO/L (ref 10–25)
ANION GAP SERPL CALC-SCNC: 12 MMOL/L (ref 10–20)
APAP SERPL-MCNC: <10 UG/ML
APPEARANCE UR: CLEAR
ARTERIAL PATENCY WRIST A: ABNORMAL
AST SERPL W P-5'-P-CCNC: 16 U/L (ref 9–39)
ATRIAL RATE: 107 BPM
ATRIAL RATE: 86 BPM
BARBITURATES UR QL SCN: ABNORMAL
BASE EXCESS BLDA CALC-SCNC: 10.2 MMOL/L (ref -2–3)
BASE EXCESS BLDA CALC-SCNC: 6.4 MMOL/L (ref -2–3)
BASE EXCESS BLDA CALC-SCNC: 9.8 MMOL/L (ref -2–3)
BASOPHILS # BLD MANUAL: 0 X10*3/UL (ref 0–0.1)
BASOPHILS NFR BLD MANUAL: 0 %
BENZODIAZ UR QL SCN: ABNORMAL
BILIRUB SERPL-MCNC: 0.3 MG/DL (ref 0–1.2)
BILIRUB UR STRIP.AUTO-MCNC: NEGATIVE MG/DL
BNP SERPL-MCNC: 13 PG/ML (ref 0–99)
BODY TEMPERATURE: ABNORMAL
BUN SERPL-MCNC: 19 MG/DL (ref 6–23)
BZE UR QL SCN: ABNORMAL
CA-I BLDA-SCNC: 1.22 MMOL/L (ref 1.1–1.33)
CA-I BLDA-SCNC: 1.23 MMOL/L (ref 1.1–1.33)
CA-I BLDA-SCNC: 1.28 MMOL/L (ref 1.1–1.33)
CALCIUM SERPL-MCNC: 9.2 MG/DL (ref 8.6–10.3)
CANNABINOIDS UR QL SCN: ABNORMAL
CARDIAC TROPONIN I PNL SERPL HS: 4 NG/L (ref 0–13)
CHLORIDE BLDA-SCNC: 100 MMOL/L (ref 98–107)
CHLORIDE BLDA-SCNC: 104 MMOL/L (ref 98–107)
CHLORIDE BLDA-SCNC: 99 MMOL/L (ref 98–107)
CHLORIDE SERPL-SCNC: 100 MMOL/L (ref 98–107)
CHOLEST SERPL-MCNC: 227 MG/DL (ref 0–199)
CHOLESTEROL/HDL RATIO: 5.1
CO2 SERPL-SCNC: 33 MMOL/L (ref 21–32)
COLOR UR: NORMAL
CREAT SERPL-MCNC: 0.95 MG/DL (ref 0.5–1.05)
CRITICAL CALL TIME: 113
CRITICAL CALL TIME: 557
CRITICAL CALLED BY: ABNORMAL
CRITICAL CALLED BY: ABNORMAL
CRITICAL CALLED TO: ABNORMAL
CRITICAL CALLED TO: ABNORMAL
CRITICAL READ BACK: ABNORMAL
CRITICAL READ BACK: ABNORMAL
EGFRCR SERPLBLD CKD-EPI 2021: 77 ML/MIN/1.73M*2
EOSINOPHIL # BLD MANUAL: 0 X10*3/UL (ref 0–0.7)
EOSINOPHIL NFR BLD MANUAL: 0 %
EPAP CMH2O: 5 CM H2O
ERYTHROCYTE [DISTWIDTH] IN BLOOD BY AUTOMATED COUNT: 16.1 % (ref 11.5–14.5)
ETHANOL SERPL-MCNC: <10 MG/DL
FENTANYL+NORFENTANYL UR QL SCN: ABNORMAL
FREQUENCY (BPM): 24 BPM
GLUCOSE BLD MANUAL STRIP-MCNC: 166 MG/DL (ref 74–99)
GLUCOSE BLD MANUAL STRIP-MCNC: 190 MG/DL (ref 74–99)
GLUCOSE BLD MANUAL STRIP-MCNC: 203 MG/DL (ref 74–99)
GLUCOSE BLD MANUAL STRIP-MCNC: 232 MG/DL (ref 74–99)
GLUCOSE BLD MANUAL STRIP-MCNC: 255 MG/DL (ref 74–99)
GLUCOSE BLDA-MCNC: 159 MG/DL (ref 74–99)
GLUCOSE BLDA-MCNC: 248 MG/DL (ref 74–99)
GLUCOSE BLDA-MCNC: 269 MG/DL (ref 74–99)
GLUCOSE SERPL-MCNC: 181 MG/DL (ref 74–99)
GLUCOSE UR STRIP.AUTO-MCNC: NEGATIVE MG/DL
HCO3 BLDA-SCNC: 35.9 MMOL/L (ref 22–26)
HCO3 BLDA-SCNC: 38.1 MMOL/L (ref 22–26)
HCO3 BLDA-SCNC: 38.8 MMOL/L (ref 22–26)
HCT VFR BLD AUTO: 41.8 % (ref 36–46)
HCT VFR BLD EST: 35 % (ref 36–46)
HCT VFR BLD EST: 39 % (ref 36–46)
HCT VFR BLD EST: 39 % (ref 36–46)
HDLC SERPL-MCNC: 44.3 MG/DL
HGB BLD-MCNC: 12.7 G/DL (ref 12–16)
HGB BLDA-MCNC: 11.8 G/DL (ref 12–16)
HGB BLDA-MCNC: 12.9 G/DL (ref 12–16)
HGB BLDA-MCNC: 12.9 G/DL (ref 12–16)
IMM GRANULOCYTES # BLD AUTO: 0.49 X10*3/UL (ref 0–0.7)
IMM GRANULOCYTES NFR BLD AUTO: 5.8 % (ref 0–0.9)
INHALED O2 CONCENTRATION: 65 %
INSPIRATORY TIME: 0.9
IPAP CMH2O: 15 CM H2O
IPAP CMH2O: 20 CM H2O
IPAP CMH2O: 20 CM H2O
KETONES UR STRIP.AUTO-MCNC: NEGATIVE MG/DL
LACTATE BLDA-SCNC: 0.6 MMOL/L (ref 0.4–2)
LACTATE BLDA-SCNC: 1.6 MMOL/L (ref 0.4–2)
LACTATE BLDA-SCNC: 1.7 MMOL/L (ref 0.4–2)
LDLC SERPL CALC-MCNC: 138 MG/DL
LEUKOCYTE ESTERASE UR QL STRIP.AUTO: NEGATIVE
LYMPHOCYTES # BLD MANUAL: 1.93 X10*3/UL (ref 1.2–4.8)
LYMPHOCYTES NFR BLD MANUAL: 23 %
MAGNESIUM SERPL-MCNC: 2.21 MG/DL (ref 1.6–2.4)
MCH RBC QN AUTO: 31.1 PG (ref 26–34)
MCHC RBC AUTO-ENTMCNC: 30.4 G/DL (ref 32–36)
MCV RBC AUTO: 103 FL (ref 80–100)
MONOCYTES # BLD MANUAL: 0.08 X10*3/UL (ref 0.1–1)
MONOCYTES NFR BLD MANUAL: 1 %
MYELOCYTES # BLD MANUAL: 0.25 X10*3/UL
MYELOCYTES NFR BLD MANUAL: 3 %
NEUTROPHILS # BLD MANUAL: 6.05 X10*3/UL (ref 1.2–7.7)
NEUTS BAND # BLD MANUAL: 0.34 X10*3/UL (ref 0–0.7)
NEUTS BAND NFR BLD MANUAL: 4 %
NEUTS SEG # BLD MANUAL: 5.71 X10*3/UL (ref 1.2–7)
NEUTS SEG NFR BLD MANUAL: 68 %
NITRITE UR QL STRIP.AUTO: NEGATIVE
NON HDL CHOLESTEROL: 183 MG/DL (ref 0–149)
NRBC BLD-RTO: 1.1 /100 WBCS (ref 0–0)
OPIATES UR QL SCN: ABNORMAL
OXYCODONE+OXYMORPHONE UR QL SCN: ABNORMAL
OXYHGB MFR BLDA: 87.5 % (ref 94–98)
OXYHGB MFR BLDA: 90 % (ref 94–98)
OXYHGB MFR BLDA: 91.8 % (ref 94–98)
P AXIS: 43 DEGREES
P AXIS: 65 DEGREES
P OFFSET: 189 MS
P OFFSET: 210 MS
P ONSET: 149 MS
P ONSET: 161 MS
PCO2 BLDA: 69 MM HG (ref 38–42)
PCO2 BLDA: 72 MM HG (ref 38–42)
PCO2 BLDA: 80 MM HG (ref 38–42)
PCP UR QL SCN: ABNORMAL
PH BLDA: 7.26 PH (ref 7.38–7.42)
PH BLDA: 7.34 PH (ref 7.38–7.42)
PH BLDA: 7.35 PH (ref 7.38–7.42)
PH UR STRIP.AUTO: 5 [PH]
PHOSPHATE SERPL-MCNC: 4 MG/DL (ref 2.5–4.9)
PLATELET # BLD AUTO: 141 X10*3/UL (ref 150–450)
PO2 BLDA: 69 MM HG (ref 85–95)
PO2 BLDA: 73 MM HG (ref 85–95)
PO2 BLDA: 76 MM HG (ref 85–95)
POLYCHROMASIA BLD QL SMEAR: ABNORMAL
POTASSIUM BLDA-SCNC: 4.2 MMOL/L (ref 3.5–5.3)
POTASSIUM BLDA-SCNC: 4.5 MMOL/L (ref 3.5–5.3)
POTASSIUM BLDA-SCNC: 4.5 MMOL/L (ref 3.5–5.3)
POTASSIUM SERPL-SCNC: 4.1 MMOL/L (ref 3.5–5.3)
PR INTERVAL: 110 MS
PR INTERVAL: 128 MS
PROT SERPL-MCNC: 6.6 G/DL (ref 6.4–8.2)
PROT UR STRIP.AUTO-MCNC: NEGATIVE MG/DL
Q ONSET: 204 MS
Q ONSET: 225 MS
QRS COUNT: 14 BEATS
QRS COUNT: 18 BEATS
QRS DURATION: 88 MS
QRS DURATION: 90 MS
QT INTERVAL: 284 MS
QT INTERVAL: 354 MS
QTC CALCULATION(BAZETT): 379 MS
QTC CALCULATION(BAZETT): 423 MS
QTC FREDERICIA: 344 MS
QTC FREDERICIA: 399 MS
R AXIS: -13 DEGREES
R AXIS: -21 DEGREES
RBC # BLD AUTO: 4.08 X10*6/UL (ref 4–5.2)
RBC # UR STRIP.AUTO: NEGATIVE /UL
RBC MORPH BLD: ABNORMAL
RSV RNA RESP QL NAA+PROBE: NOT DETECTED
SALICYLATES SERPL-MCNC: <3 MG/DL
SAO2 % BLDA: 94 % (ref 94–100)
SAO2 % BLDA: 96 % (ref 94–100)
SAO2 % BLDA: 96 % (ref 94–100)
SODIUM BLDA-SCNC: 136 MMOL/L (ref 136–145)
SODIUM BLDA-SCNC: 137 MMOL/L (ref 136–145)
SODIUM BLDA-SCNC: 138 MMOL/L (ref 136–145)
SODIUM SERPL-SCNC: 141 MMOL/L (ref 136–145)
SP GR UR STRIP.AUTO: 1.01
SPECIMEN DRAWN FROM PATIENT: ABNORMAL
SPONTANEOUS TIDAL VOLUME: 458 ML
T AXIS: 13 DEGREES
T AXIS: 20 DEGREES
T OFFSET: 367 MS
T OFFSET: 381 MS
TOTAL CELLS COUNTED BLD: 100
TOTAL MINUTE VOLUME: 11.3 LITER
TRIGL SERPL-MCNC: 225 MG/DL (ref 0–149)
UROBILINOGEN UR STRIP.AUTO-MCNC: <2 MG/DL
VARIANT LYMPHS # BLD MANUAL: 0.08 X10*3/UL (ref 0–0.5)
VARIANT LYMPHS NFR BLD: 1 %
VENTILATOR MODE: ABNORMAL
VENTRICULAR RATE: 107 BPM
VENTRICULAR RATE: 86 BPM
VLDL: 45 MG/DL (ref 0–40)
WBC # BLD AUTO: 8.4 X10*3/UL (ref 4.4–11.3)

## 2024-01-10 PROCEDURE — 82947 ASSAY GLUCOSE BLOOD QUANT: CPT

## 2024-01-10 PROCEDURE — 2500000001 HC RX 250 WO HCPCS SELF ADMINISTERED DRUGS (ALT 637 FOR MEDICARE OP)

## 2024-01-10 PROCEDURE — 84100 ASSAY OF PHOSPHORUS: CPT

## 2024-01-10 PROCEDURE — 94681 O2 UPTK CO2 OUTP % O2 XTRC: CPT

## 2024-01-10 PROCEDURE — 84132 ASSAY OF SERUM POTASSIUM: CPT

## 2024-01-10 PROCEDURE — 85027 COMPLETE CBC AUTOMATED: CPT

## 2024-01-10 PROCEDURE — 2500000005 HC RX 250 GENERAL PHARMACY W/O HCPCS

## 2024-01-10 PROCEDURE — S4991 NICOTINE PATCH NONLEGEND: HCPCS

## 2024-01-10 PROCEDURE — 80061 LIPID PANEL: CPT

## 2024-01-10 PROCEDURE — 84132 ASSAY OF SERUM POTASSIUM: CPT | Performed by: EMERGENCY MEDICINE

## 2024-01-10 PROCEDURE — 99291 CRITICAL CARE FIRST HOUR: CPT

## 2024-01-10 PROCEDURE — 85007 BL SMEAR W/DIFF WBC COUNT: CPT

## 2024-01-10 PROCEDURE — 2500000004 HC RX 250 GENERAL PHARMACY W/ HCPCS (ALT 636 FOR OP/ED)

## 2024-01-10 PROCEDURE — 94640 AIRWAY INHALATION TREATMENT: CPT

## 2024-01-10 PROCEDURE — 71045 X-RAY EXAM CHEST 1 VIEW: CPT

## 2024-01-10 PROCEDURE — 2500000002 HC RX 250 W HCPCS SELF ADMINISTERED DRUGS (ALT 637 FOR MEDICARE OP, ALT 636 FOR OP/ED)

## 2024-01-10 PROCEDURE — 83735 ASSAY OF MAGNESIUM: CPT

## 2024-01-10 PROCEDURE — 80143 DRUG ASSAY ACETAMINOPHEN: CPT

## 2024-01-10 PROCEDURE — 94660 CPAP INITIATION&MGMT: CPT

## 2024-01-10 PROCEDURE — 71045 X-RAY EXAM CHEST 1 VIEW: CPT | Performed by: RADIOLOGY

## 2024-01-10 PROCEDURE — 81003 URINALYSIS AUTO W/O SCOPE: CPT

## 2024-01-10 PROCEDURE — 2500000002 HC RX 250 W HCPCS SELF ADMINISTERED DRUGS (ALT 637 FOR MEDICARE OP, ALT 636 FOR OP/ED): Performed by: NURSE PRACTITIONER

## 2024-01-10 PROCEDURE — 2500000001 HC RX 250 WO HCPCS SELF ADMINISTERED DRUGS (ALT 637 FOR MEDICARE OP): Performed by: SPECIALIST

## 2024-01-10 PROCEDURE — 36600 WITHDRAWAL OF ARTERIAL BLOOD: CPT

## 2024-01-10 PROCEDURE — 2020000001 HC ICU ROOM DAILY

## 2024-01-10 PROCEDURE — 2500000004 HC RX 250 GENERAL PHARMACY W/ HCPCS (ALT 636 FOR OP/ED): Performed by: EMERGENCY MEDICINE

## 2024-01-10 PROCEDURE — 2500000004 HC RX 250 GENERAL PHARMACY W/ HCPCS (ALT 636 FOR OP/ED): Performed by: NURSE PRACTITIONER

## 2024-01-10 PROCEDURE — 80307 DRUG TEST PRSMV CHEM ANLYZR: CPT

## 2024-01-10 PROCEDURE — 5A09357 ASSISTANCE WITH RESPIRATORY VENTILATION, LESS THAN 24 CONSECUTIVE HOURS, CONTINUOUS POSITIVE AIRWAY PRESSURE: ICD-10-PCS

## 2024-01-10 PROCEDURE — 36415 COLL VENOUS BLD VENIPUNCTURE: CPT

## 2024-01-10 RX ORDER — INSULIN GLARGINE 100 [IU]/ML
10 INJECTION, SOLUTION SUBCUTANEOUS EVERY 24 HOURS
Status: DISCONTINUED | OUTPATIENT
Start: 2024-01-10 | End: 2024-01-14 | Stop reason: HOSPADM

## 2024-01-10 RX ORDER — DOXYCYCLINE HYCLATE 100 MG
100 TABLET ORAL EVERY 12 HOURS SCHEDULED
Status: DISCONTINUED | OUTPATIENT
Start: 2024-01-10 | End: 2024-01-14 | Stop reason: HOSPADM

## 2024-01-10 RX ORDER — FUROSEMIDE 10 MG/ML
40 INJECTION INTRAMUSCULAR; INTRAVENOUS ONCE
Status: COMPLETED | OUTPATIENT
Start: 2024-01-10 | End: 2024-01-10

## 2024-01-10 RX ORDER — CLONAZEPAM 1 MG/1
1 TABLET ORAL 2 TIMES DAILY PRN
Status: DISCONTINUED | OUTPATIENT
Start: 2024-01-10 | End: 2024-01-11

## 2024-01-10 RX ORDER — LEVOTHYROXINE SODIUM 75 UG/1
150 TABLET ORAL DAILY
Status: DISCONTINUED | OUTPATIENT
Start: 2024-01-10 | End: 2024-01-14 | Stop reason: HOSPADM

## 2024-01-10 RX ORDER — LEVOTHYROXINE SODIUM 75 UG/1
150 TABLET ORAL DAILY
Status: DISCONTINUED | OUTPATIENT
Start: 2024-01-10 | End: 2024-01-10

## 2024-01-10 RX ORDER — NICOTINE 7MG/24HR
1 PATCH, TRANSDERMAL 24 HOURS TRANSDERMAL DAILY
Status: DISCONTINUED | OUTPATIENT
Start: 2024-03-06 | End: 2024-01-14 | Stop reason: HOSPADM

## 2024-01-10 RX ORDER — RISPERIDONE 0.25 MG/1
0.25 TABLET ORAL DAILY
Status: DISCONTINUED | OUTPATIENT
Start: 2024-01-10 | End: 2024-01-12

## 2024-01-10 RX ORDER — DEXTROSE MONOHYDRATE 100 MG/ML
0.3 INJECTION, SOLUTION INTRAVENOUS ONCE AS NEEDED
Status: DISCONTINUED | OUTPATIENT
Start: 2024-01-10 | End: 2024-01-14 | Stop reason: HOSPADM

## 2024-01-10 RX ORDER — IBUPROFEN 200 MG
1 TABLET ORAL DAILY
Status: DISCONTINUED | OUTPATIENT
Start: 2024-01-10 | End: 2024-01-14 | Stop reason: HOSPADM

## 2024-01-10 RX ORDER — IBUPROFEN 200 MG
1 TABLET ORAL DAILY
Status: DISCONTINUED | OUTPATIENT
Start: 2024-02-21 | End: 2024-01-14 | Stop reason: HOSPADM

## 2024-01-10 RX ORDER — MAGNESIUM SULFATE HEPTAHYDRATE 40 MG/ML
2 INJECTION, SOLUTION INTRAVENOUS ONCE
Status: COMPLETED | OUTPATIENT
Start: 2024-01-10 | End: 2024-01-10

## 2024-01-10 RX ORDER — BUSPIRONE HYDROCHLORIDE 5 MG/1
30 TABLET ORAL 2 TIMES DAILY
Status: DISCONTINUED | OUTPATIENT
Start: 2024-01-10 | End: 2024-01-11

## 2024-01-10 RX ORDER — FUROSEMIDE 10 MG/ML
20 INJECTION INTRAMUSCULAR; INTRAVENOUS ONCE
Status: COMPLETED | OUTPATIENT
Start: 2024-01-10 | End: 2024-01-10

## 2024-01-10 RX ORDER — DEXTROSE 50 % IN WATER (D50W) INTRAVENOUS SYRINGE
25
Status: DISCONTINUED | OUTPATIENT
Start: 2024-01-10 | End: 2024-01-14 | Stop reason: HOSPADM

## 2024-01-10 RX ORDER — CITALOPRAM 20 MG/1
40 TABLET, FILM COATED ORAL DAILY
Status: DISCONTINUED | OUTPATIENT
Start: 2024-01-10 | End: 2024-01-14 | Stop reason: HOSPADM

## 2024-01-10 RX ORDER — HYDROXYZINE PAMOATE 25 MG/1
25 CAPSULE ORAL 3 TIMES DAILY PRN
Status: DISCONTINUED | OUTPATIENT
Start: 2024-01-10 | End: 2024-01-14 | Stop reason: HOSPADM

## 2024-01-10 RX ORDER — IPRATROPIUM BROMIDE AND ALBUTEROL SULFATE 2.5; .5 MG/3ML; MG/3ML
3 SOLUTION RESPIRATORY (INHALATION) EVERY 4 HOURS PRN
Status: DISCONTINUED | OUTPATIENT
Start: 2024-01-10 | End: 2024-01-14 | Stop reason: HOSPADM

## 2024-01-10 RX ORDER — INSULIN LISPRO 100 [IU]/ML
0-5 INJECTION, SOLUTION INTRAVENOUS; SUBCUTANEOUS EVERY 4 HOURS
Status: DISCONTINUED | OUTPATIENT
Start: 2024-01-10 | End: 2024-01-11

## 2024-01-10 RX ORDER — PANTOPRAZOLE SODIUM 40 MG/1
40 TABLET, DELAYED RELEASE ORAL
Status: DISCONTINUED | OUTPATIENT
Start: 2024-01-11 | End: 2024-01-14 | Stop reason: HOSPADM

## 2024-01-10 RX ORDER — METOPROLOL TARTRATE 25 MG/1
25 TABLET, FILM COATED ORAL 2 TIMES DAILY
Status: DISCONTINUED | OUTPATIENT
Start: 2024-01-10 | End: 2024-01-14 | Stop reason: HOSPADM

## 2024-01-10 RX ORDER — IPRATROPIUM BROMIDE AND ALBUTEROL SULFATE 2.5; .5 MG/3ML; MG/3ML
3 SOLUTION RESPIRATORY (INHALATION)
Status: DISCONTINUED | OUTPATIENT
Start: 2024-01-10 | End: 2024-01-14 | Stop reason: HOSPADM

## 2024-01-10 RX ADMIN — INSULIN GLARGINE 10 UNITS: 100 INJECTION, SOLUTION SUBCUTANEOUS at 10:35

## 2024-01-10 RX ADMIN — RISPERIDONE 0.25 MG: 0.25 TABLET ORAL at 08:23

## 2024-01-10 RX ADMIN — MAGNESIUM SULFATE HEPTAHYDRATE 2 G: 40 INJECTION, SOLUTION INTRAVENOUS at 03:53

## 2024-01-10 RX ADMIN — DOXYCYCLINE HYCLATE 100 MG: 100 TABLET, FILM COATED ORAL at 21:20

## 2024-01-10 RX ADMIN — IPRATROPIUM BROMIDE AND ALBUTEROL SULFATE 3 ML: 2.5; .5 SOLUTION RESPIRATORY (INHALATION) at 03:36

## 2024-01-10 RX ADMIN — INSULIN LISPRO 2 UNITS: 100 INJECTION, SOLUTION INTRAVENOUS; SUBCUTANEOUS at 17:36

## 2024-01-10 RX ADMIN — BUSPIRONE HYDROCHLORIDE 30 MG: 5 TABLET ORAL at 21:20

## 2024-01-10 RX ADMIN — Medication: at 03:37

## 2024-01-10 RX ADMIN — METOPROLOL TARTRATE 25 MG: 25 TABLET, FILM COATED ORAL at 08:23

## 2024-01-10 RX ADMIN — IPRATROPIUM BROMIDE AND ALBUTEROL SULFATE 3 ML: 2.5; .5 SOLUTION RESPIRATORY (INHALATION) at 11:16

## 2024-01-10 RX ADMIN — METHYLPREDNISOLONE SODIUM SUCCINATE 40 MG: 40 INJECTION, POWDER, FOR SOLUTION INTRAMUSCULAR; INTRAVENOUS at 06:50

## 2024-01-10 RX ADMIN — IPRATROPIUM BROMIDE AND ALBUTEROL SULFATE 3 ML: 2.5; .5 SOLUTION RESPIRATORY (INHALATION) at 21:01

## 2024-01-10 RX ADMIN — FUROSEMIDE 40 MG: 10 INJECTION, SOLUTION INTRAMUSCULAR; INTRAVENOUS at 10:35

## 2024-01-10 RX ADMIN — INSULIN LISPRO 3 UNITS: 100 INJECTION, SOLUTION INTRAVENOUS; SUBCUTANEOUS at 11:53

## 2024-01-10 RX ADMIN — IPRATROPIUM BROMIDE AND ALBUTEROL SULFATE 3 ML: 2.5; .5 SOLUTION RESPIRATORY (INHALATION) at 16:24

## 2024-01-10 RX ADMIN — METHYLPREDNISOLONE SODIUM SUCCINATE 125 MG: 125 INJECTION, POWDER, FOR SOLUTION INTRAMUSCULAR; INTRAVENOUS at 02:40

## 2024-01-10 RX ADMIN — INSULIN LISPRO 1 UNITS: 100 INJECTION, SOLUTION INTRAVENOUS; SUBCUTANEOUS at 21:21

## 2024-01-10 RX ADMIN — BUSPIRONE HYDROCHLORIDE 30 MG: 5 TABLET ORAL at 08:23

## 2024-01-10 RX ADMIN — INSULIN LISPRO 1 UNITS: 100 INJECTION, SOLUTION INTRAVENOUS; SUBCUTANEOUS at 04:08

## 2024-01-10 RX ADMIN — RIVAROXABAN 20 MG: 20 TABLET, FILM COATED ORAL at 17:36

## 2024-01-10 RX ADMIN — LEVOTHYROXINE SODIUM 150 MCG: 75 TABLET ORAL at 08:23

## 2024-01-10 RX ADMIN — NICOTINE 1 PATCH: 21 PATCH, EXTENDED RELEASE TRANSDERMAL at 08:24

## 2024-01-10 RX ADMIN — FUROSEMIDE 20 MG: 10 INJECTION, SOLUTION INTRAMUSCULAR; INTRAVENOUS at 02:00

## 2024-01-10 RX ADMIN — CITALOPRAM HYDROBROMIDE 40 MG: 20 TABLET ORAL at 08:23

## 2024-01-10 RX ADMIN — IPRATROPIUM BROMIDE AND ALBUTEROL SULFATE 3 ML: 2.5; .5 SOLUTION RESPIRATORY (INHALATION) at 07:59

## 2024-01-10 RX ADMIN — INSULIN LISPRO 2 UNITS: 100 INJECTION, SOLUTION INTRAVENOUS; SUBCUTANEOUS at 08:23

## 2024-01-10 SDOH — SOCIAL STABILITY: SOCIAL INSECURITY: ABUSE: ADULT

## 2024-01-10 SDOH — SOCIAL STABILITY: SOCIAL INSECURITY: HAS ANYONE EVER THREATENED TO HURT YOUR FAMILY OR YOUR PETS?: NO

## 2024-01-10 SDOH — SOCIAL STABILITY: SOCIAL INSECURITY: HAVE YOU HAD THOUGHTS OF HARMING ANYONE ELSE?: NO

## 2024-01-10 SDOH — SOCIAL STABILITY: SOCIAL INSECURITY: DO YOU FEEL ANYONE HAS EXPLOITED OR TAKEN ADVANTAGE OF YOU FINANCIALLY OR OF YOUR PERSONAL PROPERTY?: NO

## 2024-01-10 SDOH — SOCIAL STABILITY: SOCIAL INSECURITY: DO YOU FEEL UNSAFE GOING BACK TO THE PLACE WHERE YOU ARE LIVING?: NO

## 2024-01-10 SDOH — SOCIAL STABILITY: SOCIAL INSECURITY: WERE YOU ABLE TO COMPLETE ALL THE BEHAVIORAL HEALTH SCREENINGS?: YES

## 2024-01-10 SDOH — SOCIAL STABILITY: SOCIAL INSECURITY: DOES ANYONE TRY TO KEEP YOU FROM HAVING/CONTACTING OTHER FRIENDS OR DOING THINGS OUTSIDE YOUR HOME?: NO

## 2024-01-10 SDOH — SOCIAL STABILITY: SOCIAL INSECURITY: ARE THERE ANY APPARENT SIGNS OF INJURIES/BEHAVIORS THAT COULD BE RELATED TO ABUSE/NEGLECT?: NO

## 2024-01-10 SDOH — SOCIAL STABILITY: SOCIAL INSECURITY: ARE YOU OR HAVE YOU BEEN THREATENED OR ABUSED PHYSICALLY, EMOTIONALLY, OR SEXUALLY BY ANYONE?: NO

## 2024-01-10 ASSESSMENT — ACTIVITIES OF DAILY LIVING (ADL)
BATHING: INDEPENDENT
HEARING - LEFT EAR: FUNCTIONAL
TOILETING: INDEPENDENT
LACK_OF_TRANSPORTATION: NO
JUDGMENT_ADEQUATE_SAFELY_COMPLETE_DAILY_ACTIVITIES: YES
GROOMING: INDEPENDENT
DRESSING YOURSELF: INDEPENDENT
HEARING - RIGHT EAR: FUNCTIONAL
ADEQUATE_TO_COMPLETE_ADL: YES
PATIENT'S MEMORY ADEQUATE TO SAFELY COMPLETE DAILY ACTIVITIES?: YES
FEEDING YOURSELF: INDEPENDENT
WALKS IN HOME: INDEPENDENT

## 2024-01-10 ASSESSMENT — LIFESTYLE VARIABLES
HOW MANY STANDARD DRINKS CONTAINING ALCOHOL DO YOU HAVE ON A TYPICAL DAY: PATIENT DOES NOT DRINK
HOW OFTEN DO YOU HAVE A DRINK CONTAINING ALCOHOL: NEVER
AUDIT-C TOTAL SCORE: 0
AUDIT-C TOTAL SCORE: 0
HOW OFTEN DO YOU HAVE 6 OR MORE DRINKS ON ONE OCCASION: NEVER
SKIP TO QUESTIONS 9-10: 1

## 2024-01-10 ASSESSMENT — PATIENT HEALTH QUESTIONNAIRE - PHQ9
2. FEELING DOWN, DEPRESSED OR HOPELESS: NOT AT ALL
SUM OF ALL RESPONSES TO PHQ9 QUESTIONS 1 & 2: 0
1. LITTLE INTEREST OR PLEASURE IN DOING THINGS: NOT AT ALL

## 2024-01-10 ASSESSMENT — ENCOUNTER SYMPTOMS
HEADACHES: 1
WEAKNESS: 1
ACTIVITY CHANGE: 1
HEMATOLOGIC/LYMPHATIC NEGATIVE: 1
ENDOCRINE NEGATIVE: 1
CONFUSION: 1
ALLERGIC/IMMUNOLOGIC NEGATIVE: 1
MYALGIAS: 1
GASTROINTESTINAL NEGATIVE: 1
APPETITE CHANGE: 1
EYES NEGATIVE: 1
DIZZINESS: 1
LIGHT-HEADEDNESS: 1
FATIGUE: 1
ARTHRALGIAS: 1
SHORTNESS OF BREATH: 1
COUGH: 1
UNEXPECTED WEIGHT CHANGE: 1
SLEEP DISTURBANCE: 1
WHEEZING: 1

## 2024-01-10 ASSESSMENT — COGNITIVE AND FUNCTIONAL STATUS - GENERAL
DAILY ACTIVITIY SCORE: 24
CLIMB 3 TO 5 STEPS WITH RAILING: A LITTLE
WALKING IN HOSPITAL ROOM: A LITTLE
PATIENT BASELINE BEDBOUND: NO
MOBILITY SCORE: 22

## 2024-01-10 ASSESSMENT — PAIN SCALES - GENERAL
PAINLEVEL_OUTOF10: 0 - NO PAIN
PAINLEVEL_OUTOF10: 0 - NO PAIN

## 2024-01-10 ASSESSMENT — PAIN - FUNCTIONAL ASSESSMENT
PAIN_FUNCTIONAL_ASSESSMENT: 0-10
PAIN_FUNCTIONAL_ASSESSMENT: 0-10

## 2024-01-10 NOTE — PROGRESS NOTES
"[facility] Critical Care Medicine       Date:  1/10/2024  Patient:  Stephenie Mccray  YOB: 1980  MRN:  [id number]   Admit Date:  2024  ========================================================================================================    Chief Complaint   Patient presents with    Shortness of Breath         History of Present Illness:  Stephenie Mccray is a 43 y.o. year old female patient with Past Medical History of  COPD (4L@home), DVT/PE (on Xarelto), schizophrenia, migraine headaches, HTN, HLD, current smoker, GERD, IDDM, and hypothyroidism.  She has had frequent admissions for this, the last being . She presented to ED  via EMS for shortness of breath that started to days ago.  It worsened today and she called EMS.  She denies any fevers, chills, or change in sputum. She denies chest pain.     ED COURSE: Vitals: T36.8, , RR18, /67, SpO2 90%. CXR showed \"prominence of pulmonary interstitium similar as compared previous  exam.\" Covid/flu/RSV negative. EKG showed sinus tachycardia with a rate of 107. Troponin, BNP, and lactate WNL. CMP unremarkable. Glucose 149. WBC  Initial ABG on NC showed 7.31/71/66/35.7.  Patient was given 3 duonebs, 2gm IV Mg, placed on BIPAP 15/5 FiO2 65%.  Repeat ABG showed 7.26/80/76/35.9.  IPAP increased to 20.  125mg solumedrol IV and 20mg IV lasix given.  Patient was admitted to ICU for further care.       Interval ICU Events:  1/10: Admitted to ICU on BIPAP.  Repeat ABG   1/10: alert/ oriented, drowsy but easily arousable to minimal stimulation. ABG improved this am.     Medical History:  Past Medical History:   Diagnosis Date    Arthritis     COPD (chronic obstructive pulmonary disease) (CMS/HCC)     Diabetes mellitus (CMS/HCC)     History of transfusion     Hypothyroidism     CHARLIE (obstructive sleep apnea)      Past Surgical History:   Procedure Laterality Date     SECTION, LOW TRANSVERSE      EYE SURGERY      HERNIA REPAIR      " "TONSILLECTOMY      VENTRAL HERNIA REPAIR       Medications Prior to Admission   Medication Sig Dispense Refill Last Dose    alcohol swabs (Alcohol Prep Pads) pads, medicated Use 3 times daily prn 100 each 3 2024    [] amoxicillin-pot clavulanate (Augmentin) 875-125 mg tablet Take 1 tablet (875 mg) by mouth 2 times a day for 5 days. 10 tablet 0     atorvastatin (Lipitor) 20 mg tablet Take 1 tablet (20 mg) by mouth once daily. Dr. Davis covering for Dr. Yuen 30 tablet 0     azithromycin (Zithromax Z-Navin) 250 mg tablet Take 1 tablet (250 mg) by mouth once daily. Follow Z-navin instructions: 500mg on day #1, then 250mg daily for days #2, 3, 4, and 5. 6 tablet 0     BD Ultra-Fine Mini Pen Needle 31 gauge x 3/16\" needle USE TO INJECT 4 TIMES DAILY AS DIRECTED   2024    busPIRone (Buspar) 30 mg tablet Take 1 tablet (30 mg) by mouth 2 times a day.   1/10/2024    citalopram (CeleXA) 40 mg tablet Take 50 mg by mouth once daily. Patient said dose was recently increase   1/10/2024    clonazePAM (KlonoPIN) 1 mg tablet Take by mouth 2 times a day as needed.   1/10/2024    hydrOXYzine HCL (Atarax) 25 mg tablet Take 1 tablet (25 mg) by mouth every 6 hours if needed for anxiety for up to 3 days. (Patient taking differently: Take 1 tablet (25 mg) by mouth every 6 hours if needed for anxiety.) 12 tablet 0     hydrOXYzine pamoate (Vistaril) 50 mg capsule Take by mouth 3 times a day as needed.   Unknown    insulin aspart (NovoLOG FlexPen U-100 Insulin) 100 unit/mL (3 mL) pen 14 Units once daily.   2024    insulin glargine (Lantus Solostar U-100 Insulin) 100 unit/mL (3 mL) pen Inject 10 Units under the skin once daily in the morning. Take as directed per insulin instructions. 3 mL 2     insulin lispro (HumaLOG) 100 unit/mL injection 1 Dose 3 times a day with meals.       Invega Sustenna 234 mg/1.5 mL syringe INJECT 1 syringe INTRAMUSCULARLY EVERY 4 WEEKS   Unknown    ipratropium-albuteroL (Duo-Neb) 0.5-2.5 mg/3 mL " nebulizer solution Take 3 mL by nebulization every 6 hours if needed for shortness of breath or wheezing. (Patient taking differently: Take 3 mL by nebulization every 4 hours. As needed) 180 mL 1     levothyroxine (Synthroid, Levoxyl) 150 mcg tablet Take 1 tablet (150 mcg) by mouth once daily. 90 tablet 1 2024    metoprolol tartrate (Lopressor) 25 mg tablet TAKE 1 TABLET BY MOUTH IN THE MORNING AND 1 AT BEDTIME 60 tablet 2 2024    OneTouch Verio test strips strip    2024    paliperidone (Invega) 1.5 mg 24 hr tablet Take 1 tablet (1.5 mg) by mouth once daily. Do not crush, chew, or split.   2024    pantoprazole (ProtoNix) 40 mg EC tablet Take 1 tablet (40 mg) by mouth once daily. 90 tablet 1 2024    [] predniSONE (Deltasone) 20 mg tablet Take 1 tablet (20 mg) by mouth once daily for 5 days. 5 tablet 0     topiramate (Topamax) 25 mg tablet Take 2 tablets (50 mg) by mouth once daily. 180 tablet 3     Xarelto 20 mg tablet    2024     Fluticasone furoate-vilanterol, Fluticasone-umeclidin-vilanter, Sumatriptan, Vortioxetine, and Levofloxacin  Social History     Tobacco Use    Smoking status: Every Day     Packs/day: 1     Types: Cigarettes     Passive exposure: Never    Smokeless tobacco: Never   Substance Use Topics    Alcohol use: Never    Drug use: Never     Family History   Problem Relation Name Age of Onset    Fibromyalgia Father      Hypertension Brother      Thyroid disease Maternal Grandmother      Thyroid disease Paternal Grandmother      Lung cancer Paternal Grandfather      Coronary artery disease Other Grandmother        Hospital Medications:           Current Facility-Administered Medications:     busPIRone (Buspar) tablet 30 mg, 30 mg, oral, BID, VALDEMAR Garcia, 30 mg at 01/10/24 0823    citalopram (CeleXA) tablet 40 mg, 40 mg, oral, Daily, VALDEMAR Garcia, 40 mg at 01/10/24 0823    clonazePAM (KlonoPIN) tablet 1 mg, 1 mg, oral, BID PRN, Aissatou Gamboa  APRN-CNP    dextrose 10 % in water (D10W) infusion, 0.3 g/kg/hr, intravenous, Once PRN, VALDEMAR Garcia    dextrose 50 % injection 25 g, 25 g, intravenous, q15 min PRN, VALDEMAR Garcia    glucagon (Glucagen) injection 1 mg, 1 mg, intramuscular, q15 min PRN, VALDEMAR Garcia    hydrOXYzine pamoate (Vistaril) capsule 25 mg, 25 mg, oral, TID PRN, VALDEMAR Garcia    insulin lispro (HumaLOG) injection 0-5 Units, 0-5 Units, subcutaneous, q4h, VALDEMAR Garcia, 2 Units at 01/10/24 0823    ipratropium-albuteroL (Duo-Neb) 0.5-2.5 mg/3 mL nebulizer solution 3 mL, 3 mL, nebulization, q4h, VALDEMAR Garcia, 3 mL at 01/10/24 0759    ipratropium-albuteroL (Duo-Neb) 0.5-2.5 mg/3 mL nebulizer solution 3 mL, 3 mL, nebulization, q4h PRN, VALDEMAR Garcia    levothyroxine (Synthroid, Levoxyl) tablet 150 mcg, 150 mcg, oral, Daily, VALDEMAR Garcia, 150 mcg at 01/10/24 0823    methylPREDNISolone sod succinate (PF) (SOLU-Medrol) 40 mg/mL injection 40 mg, 40 mg, intravenous, q8h BLUE, VALDEMAR Garcia, 40 mg at 01/10/24 0650    metoprolol tartrate (Lopressor) tablet 25 mg, 25 mg, oral, BID, BARTOLOME Garcia-CNP, 25 mg at 01/10/24 0823    nicotine (Nicoderm CQ) 21 mg/24 hr patch 1 patch, 1 patch, transdermal, Daily, 1 patch at 01/10/24 0824 **FOLLOWED BY** [START ON 2/21/2024] nicotine (Nicoderm CQ) 14 mg/24 hr patch 1 patch, 1 patch, transdermal, Daily **FOLLOWED BY** [START ON 3/6/2024] nicotine (Nicoderm CQ) 7 mg/24 hr patch 1 patch, 1 patch, transdermal, Daily, VALDEMAR Garcia    oxygen (O2) therapy, , inhalation, Continuous PRN - O2/gases, VALDEMAR Garcia, Start at 01/10/24 0337    risperiDONE (RisperDAL) tablet 0.25 mg, 0.25 mg, oral, Daily, VALDEMAR Garcia, 0.25 mg at 01/10/24 0823    rivaroxaban (Xarelto) tablet 20 mg, 20 mg, oral, Daily with evening meal, VALDEMAR Garcia    Review of Systems:  14 point  "review of systems was completed and negative except for those specially mention in my HPI    Physical Exam:    Heart Rate:  []   Temp:  [36 °C (96.8 °F)-37.2 °C (99 °F)]   Resp:  [14-24]   BP: (106-180)/()   Height:  [157.5 cm (5' 2\")]   Weight:  [72.6 kg (160 lb)-77.8 kg (171 lb 8.3 oz)]   SpO2:  [89 %-97 %]     Physical Exam  Constitutional:       Comments: drowsy, responsive to verbal/ tactile stimulation   HENT:      Head: Normocephalic.      Mouth/Throat:      Mouth: Mucous membranes are moist.   Eyes:      Extraocular Movements: Extraocular movements intact.      Conjunctiva/sclera: Conjunctivae normal.   Cardiovascular:      Rate and Rhythm: Normal rate and regular rhythm.      Pulses: Normal pulses.      Heart sounds: Normal heart sounds.   Pulmonary:      Breath sounds: Examination of the right-middle field reveals decreased breath sounds. Examination of the right-lower field reveals decreased breath sounds. Decreased breath sounds present.      Comments: Mild scattered wheeze t/o  Musculoskeletal:         General: Normal range of motion.      Cervical back: Normal range of motion.   Skin:     General: Skin is warm and dry.      Capillary Refill: Capillary refill takes less than 2 seconds.   Neurological:      General: No focal deficit present.         Objective:    I have reviewed all medications, laboratory results, and imaging pertinent for today's encounter.    FiO2 (%):  [36 %-65 %] 65 %  S RR:  [20-24] 24      Intake/Output Summary (Last 24 hours) at 1/10/2024 0854  Last data filed at 1/10/2024 0600  Gross per 24 hour   Intake 102.92 ml   Output 700 ml   Net -597.08 ml       Recent Imaging  XR chest 1 view   Final Result   Prominence of pulmonary interstitium similar as compared previous   exam.   Signed by Niels Perez DO      XR chest 1 view    (Results Pending)       No echocardiogram results found for the past 14 days    Assessment/Plan:    I am currently managing this critically " ill patient for the following problems:  Acute on chronic hypercapnic respiratory failure  COPD with acute exacerbation  HLD  HTN  IDDM  Hypothyroid  GERD    Neuro/Psych/Pain Ctrl/Sedation:  #Hx Schizophrenia  #Hx Migraine headaches  -Neuro checks per protocol  -CAM ICU assessment  -Delirium precautions  -hold sedating medications for now in setting of drowsiness  -toxicoloy     Respiratory/ENT:  #Acute on chronic hypercapnic respiratory failure  #COPD with acute exacerbation (on 4L/nc at home and supposed to wear BIPAP QHS)  #CHARLIE  #Nicotine dependence   -BIPAP, wean as able, continue at night and as needed  -HFNC  -Duonebs Q4 and PRN  -Solumedrol   -Pulmonology consult to Dr Villalba (patient follows with him)  -Smoking cessation education  -low threshold for initiating antibiotic therapy, monitor for now     Cardiovascular:  #Hx HLD  #Hx HTN  -metoprolol, hold in setting of ?asthma/ COPD  -monitor BP and consider change of antihypertensive medication  -diuresis     GI:  #Hx GERD  -PPI  -if tolerates HFNC may have diabetic diet     Renal/Volume Status (Intra & Extravascular):  -No active issues  -Trend BMP  -Strict I/O       Endocrine  #Hx IDDM  #Hx Hypothyroid  -Q4 accuchecks with SSI while NPO  -lantus  -Hypoglycemia protocol  -levothyroxine     Infectious Disease:  -No concern for infectious process at this time. Pt afebrile, WBC WNL, lactate WNL  -Daily CBC  -Monitor SIRS, initiate antibiotic therapy as needed  -Covid/flu/RSV negative     Heme/Onc:  #Hx DVT/PE  -xarelto     OBGYN/MSK:  -PT/OT eval when able     Ethics/Code Status:  -Full code     :  DVT Prophylaxis: xarelto  GI Prophylaxis: Protonix   Bowel Regimen: none  Diet: NPO while on BiPAP  CVC: no  Marg[place]: no  Tran: no  Restraints: no  Dispo: ICU    Critical Care Time:  45 minutes spent in preparing to see patient (I.e. review of medical records), evaluation of diagnostics (I.e. labs, imaging, etc.), documentation, discussing plan of care  with patient/ family/ caregiver, and/ or coordination of care with multidisciplinary team. Time does not include completion of procedure time.     Plan discussed with Dr. Kina Portillo, APRN-CNP

## 2024-01-10 NOTE — ED PROVIDER NOTES
HPI   Chief Complaint   Patient presents with    Shortness of Breath       Chief complaint shortness of breath  History of present illness this is a 43-year-old  female who has a history of COPD diabetes hypothyroidism obstructive sleep apnea is here with shortness of breath started 2 days ago progressively got worse brought here by squad triaged to room 1 with O2 saturation 93% blood pressure 106/58 pulse 96 temperature 36 8 respiratory rate 17                          No data recorded                Patient History   Past Medical History:   Diagnosis Date    Arthritis     COPD (chronic obstructive pulmonary disease) (CMS/[facility])     Diabetes mellitus (CMS/[facility])     History of transfusion     Hypothyroidism     CHARLIE (obstructive sleep apnea)      Past Surgical History:   Procedure Laterality Date     SECTION, LOW TRANSVERSE      EYE SURGERY      HERNIA REPAIR      TONSILLECTOMY      VENTRAL HERNIA REPAIR       Family History   Problem Relation Name Age of Onset    Fibromyalgia Father      Hypertension Brother      Thyroid disease Maternal Grandmother      Thyroid disease Paternal Grandmother      Lung cancer Paternal Grandfather      Coronary artery disease Other Grandmother      Social History     Tobacco Use    Smoking status: Every Day     Packs/day: 1     Types: Cigarettes     Passive exposure: Never    Smokeless tobacco: Never   Substance Use Topics    Alcohol use: Never    Drug use: Never       Physical Exam   ED Triage Vitals [24 2224]   Temp Heart Rate Resp BP   36.8 °C (98.2 °F) 102 18 116/67      SpO2 Temp src Heart Rate Source Patient Position   90 % -- -- --      BP Location FiO2 (%)     -- --       Physical Exam  Vitals and nursing note reviewed.   Constitutional:       Appearance: She is ill-appearing.   HENT:      Head: Normocephalic and atraumatic.      Mouth/Throat:      Pharynx: Oropharynx is clear.   Eyes:      Extraocular Movements: Extraocular movements intact.      Pupils:  Pupils are equal, round, and reactive to light.   Cardiovascular:      Rate and Rhythm: Normal rate and regular rhythm.   Pulmonary:      Effort: Respiratory distress present.      Breath sounds: Examination of the right-upper field reveals decreased breath sounds and rhonchi. Examination of the left-upper field reveals rhonchi. Examination of the right-middle field reveals decreased breath sounds and rhonchi. Examination of the left-middle field reveals decreased breath sounds and rhonchi. Examination of the right-lower field reveals decreased breath sounds and rhonchi. Examination of the left-lower field reveals decreased breath sounds and rhonchi. Decreased breath sounds and rhonchi present.   Musculoskeletal:      Cervical back: Neck supple.   Skin:     General: Skin is dry.      Capillary Refill: Capillary refill takes 2 to 3 seconds.   Neurological:      General: No focal deficit present.      Mental Status: She is alert.         ED Course & MDM   Diagnoses as of 01/10/24 0205   Acute on chronic respiratory failure, unspecified whether with hypoxia or hypercapnia (CMS/HCC)       Medical Decision Making  Differential diagnosis  COPD exacerbation  CHF  Hypercapnia hypoxia  Acute coronary syndrome  Arrhythmia  COVID infection  Pneumonia  Influenza influenza B  Considering the above differential diagnosis following tests and treatments were considered in order with shared decision making  Cardiac monitor IV BiPAP 2 arterial blood gases  EKG chest x-ray CBC electrolytes BMP troponin    Amount and/or Complexity of Data Reviewed  Labs: ordered. Decision-making details documented in ED Course.     Details: Arterial blood gas #1 pH 7.31 pCO2 of 71 pO2 of 66 on oxygen  Arterial blood gas #2 pH of 7.26 pCO2 of 80 and a pO2 of 76 on BiPAP    White count 9 2 hemoglobin 12 hematocrit 39 platelet count of 156 glucose 149 sodium 141 potassium 4 4 chloride 103 bicarb 31 BUN 22 creatinine 0.98, troponin of 3  Radiology: ordered  and independent interpretation performed.     Details: chest x-ray prominent interstitial findings  ECG/medicine tests: ordered and independent interpretation performed.     Details: EKG revealed normal sinus rhythm with a rate of 107 who is a  Discussion of management or test interpretation with external provider(s): Case discussed with intensivist will be admitted to the ICU spoke to Yoana critical care nurse practitioner    Risk  Decision regarding hospitalization.  Risk Details: Critical care of 30 minutes was spent on this patient      Labs Reviewed   CBC WITH AUTO DIFFERENTIAL - Abnormal       Result Value    WBC 9.2      nRBC 1.2 (*)     RBC 3.88 (*)     Hemoglobin 12.0      Hematocrit 39.3       (*)     MCH 30.9      MCHC 30.5 (*)     RDW 16.0 (*)     Platelets 150      Immature Granulocytes %, Automated 6.0 (*)     Immature Granulocytes Absolute, Automated 0.55      Narrative:     The previously reported component Neutrophils % is no longer being reported.  The previously reported component Lymphocytes % is no longer being reported.  The previously reported component Monocytes % is no longer being reported.  The previously   reported component Eosinophils % is no longer being reported.  The previously reported component Basophils % is no longer being reported.  The previously reported component Absolute Neutrophils is no longer being reported.  The previously reported   component Absolute Lymphocytes is no longer being reported.  The previously reported component Absolute Monocytes is no longer being reported.  The previously reported component Absolute Eosinophils is no longer being reported.  The previously reported   component Absolute Basophils is no longer being reported.   COMPREHENSIVE METABOLIC PANEL - Abnormal    Glucose 149 (*)     Sodium 141      Potassium 4.4      Chloride 103      Bicarbonate 31      Anion Gap 11      Urea Nitrogen 22      Creatinine 0.98      eGFR 74      Calcium 8.8       Albumin 3.6      Alkaline Phosphatase 78      Total Protein 6.0 (*)     AST 10      Bilirubin, Total 0.3      ALT 18     BLOOD GAS ARTERIAL FULL PANEL - Abnormal    POCT pH, Arterial 7.31 (*)     POCT pCO2, Arterial 71 (*)     POCT pO2, Arterial 66 (*)     POCT SO2, Arterial 94      POCT Oxy Hemoglobin, Arterial 83.2 (*)     POCT Hematocrit Calculated, Arterial 36.0      POCT Sodium, Arterial 139      POCT Potassium, Arterial 4.5      POCT Chloride, Arterial 104      POCT Ionized Calcium, Arterial 1.26      POCT Glucose, Arterial 148 (*)     POCT Lactate, Arterial 0.7      POCT Base Excess, Arterial 7.2 (*)     POCT HCO3 Calculated, Arterial 35.7 (*)     POCT Hemoglobin, Arterial 12.1      POCT Anion Gap, Arterial 4 (*)     Patient Temperature        FiO2 36      Apparatus CANNULA      Critical Called By ISAK RRT      Critical Called To DR CORLEY      Critical Call Time 2306.0000      Critical Read Back Y     BLOOD GAS ARTERIAL FULL PANEL - Abnormal    POCT pH, Arterial 7.26 (*)     POCT pCO2, Arterial 80 (*)     POCT pO2, Arterial 76 (*)     POCT SO2, Arterial 96      POCT Oxy Hemoglobin, Arterial 87.5 (*)     POCT Hematocrit Calculated, Arterial 35.0 (*)     POCT Sodium, Arterial 138      POCT Potassium, Arterial 4.5      POCT Chloride, Arterial 104      POCT Ionized Calcium, Arterial 1.28      POCT Glucose, Arterial 159 (*)     POCT Lactate, Arterial 0.6      POCT Base Excess, Arterial 6.4 (*)     POCT HCO3 Calculated, Arterial 35.9 (*)     POCT Hemoglobin, Arterial 11.8 (*)     POCT Anion Gap, Arterial 3 (*)     Patient Temperature        FiO2 65      Ipap CMH2O 15.0      Epap CMH2O 5.0      Critical Called By ISAK RRT      Critical Called To DR CORLEY      Critical Call Time 113.0000      Critical Read Back Y     MANUAL DIFFERENTIAL - Abnormal    Neutrophils %, Manual 37.0      Bands %, Manual 2.0      Lymphocytes %, Manual 55.0      Monocytes %, Manual 3.0      Eosinophils %, Manual 0.0      Basophils %,  Manual 1.0      Atypical Lymphocytes %, Manual 2.0      Seg Neutrophils Absolute, Manual 3.40      Bands Absolute, Manual 0.18      Lymphocytes Absolute, Manual 5.06 (*)     Monocytes Absolute, Manual 0.28      Eosinophils Absolute, Manual 0.00      Basophils Absolute, Manual 0.09      Atypical Lymphs Absolute, Manual 0.18      Total Cells Counted 100      Neutrophils Absolute, Manual 3.58      RBC Morphology See Below      Polychromasia Mild      Ovalocytes Few      Teardrop Cells Few     HUMAN CHORIONIC GONADOTROPIN, SERUM QUANTITATIVE - Normal    HCG, Beta-Quantitative <2      Narrative:      Total HCG measurement is performed using the [employer] Cell[employer] Access   Immunoassay which detects intact HCG and free beta HCG subunit.    This test is not indicated for use as a tumor marker.   HCG testing is performed using a different test methodology at [facility] than other [facility]. Direct result comparison   should only be made within the same method.       SERIAL TROPONIN-INITIAL - Normal    Troponin I, High Sensitivity 3      Narrative:     Less than 99th percentile of normal range cutoff-  Female and children under 18 years old <14 ng/L; Male <21 ng/L: Negative  Repeat testing should be performed if clinically indicated.     Female and children under 18 years old 14-50 ng/L; Male 21-50 ng/L:  Consistent with possible cardiac damage and possible increased clinical   risk. Serial measurements may help to assess extent of myocardial damage.     >50 ng/L: Consistent with cardiac damage, increased clinical risk and  myocardial infarction. Serial measurements may help assess extent of   myocardial damage.      NOTE: Children less than 1 year old may have higher baseline troponin   levels and results should be interpreted in conjunction with the overall   clinical context.     NOTE: Troponin I testing is performed using a different   testing methodology at [facility] than at other    [facility]. Direct result comparisons should only   be made within the same method.   INFLUENZA A AND B PCR - Normal    Flu A Result Not Detected      Flu B Result Not Detected      Narrative:     This assay is an in vitro diagnostic multiplex nucleic acid amplification test for the detection and discrimination of Influenza A & B from nasopharyngeal specimens, and has been validated for use at [facility]. Negative results do not preclude Influenza A/B infections, and should not be used as the sole basis for diagnosis, treatment, or other management decisions. If Influenza A/B and RSV PCR results are negative, testing for Parainfluenza virus, Adenovirus and Metapneumovirus is routinely performed for [facility] pediatric oncology and intensive care inpatients, and is available on other patients by placing an add-on request.   SARS-COV-2 PCR, SYMPTOMATIC - Normal    Coronavirus 2019, PCR Not Detected      Narrative:     This assay has received FDA Emergency Use Authorization (EUA) and is only authorized for the duration of time that circumstances exist to justify the authorization of the emergency use of in vitro diagnostic tests for the detection of SARS-CoV-2 virus and/or diagnosis of COVID-19 infection under section 564(b)(1) of the Act, 21 U.S.C. 360bbb-3(b)(1). This assay is an in vitro diagnostic nucleic acid amplification test for the qualitative detection of SARS-CoV-2 from nasopharyngeal specimens and has been validated for use at [facility]. Negative results do not preclude COVID-19 infections and should not be used as the sole basis for diagnosis, treatment, or other management decisions.     MAGNESIUM - Normal    Magnesium 1.66     SERIAL TROPONIN, 1 HOUR - Normal    Troponin I, High Sensitivity 4      Narrative:     Less than 99th percentile of normal range cutoff-  Female and children under 18 years old <14 ng/L; Male <21 ng/L: Negative  Repeat testing  should be performed if clinically indicated.     Female and children under 18 years old 14-50 ng/L; Male 21-50 ng/L:  Consistent with possible cardiac damage and possible increased clinical   risk. Serial measurements may help to assess extent of myocardial damage.     >50 ng/L: Consistent with cardiac damage, increased clinical risk and  myocardial infarction. Serial measurements may help assess extent of   myocardial damage.      NOTE: Children less than 1 year old may have higher baseline troponin   levels and results should be interpreted in conjunction with the overall   clinical context.     NOTE: Troponin I testing is performed using a different   testing methodology at [facility] than at other   [facility]. Direct result comparisons should only   be made within the same method.   B-TYPE NATRIURETIC PEPTIDE - Normal    BNP 13      Narrative:        <100 pg/mL - Heart failure unlikely  100-299 pg/mL - Intermediate probability of acute heart                  failure exacerbation. Correlate with clinical                  context and patient history.    >=300 pg/mL - Heart Failure likely. Correlate with clinical                  context and patient history.    BNP testing is performed using different testing methodology at [facility] than at other [facility]. Direct result comparisons should only be made within the same method.      TROPONIN SERIES- (INITIAL, 1 HR)    Narrative:     The following orders were created for panel order Troponin I Series, High Sensitivity (0, 1 HR).  Procedure                               Abnormality         Status                     ---------                               -----------         ------                     Troponin I, High Sensiti...[[id number]]  Normal              Final result               Troponin, High Sensitivi...[[id number]]  Normal              Final result                 Please view results for these tests on the  individual orders.        XR chest 1 view   Final Result   Prominence of pulmonary interstitium similar as compared previous   exam.   Signed by Niels Perez DO             Procedure  ECG 12 lead    Performed by: Yahaira Osorio MD  Authorized by: Yahaira Osorio MD    ECG interpreted by ED Physician in the absence of a cardiologist: yes    Interpretation:     Interpretation: normal    Rate:     ECG rate:  107    ECG rate assessment: tachycardic    Rhythm:     Rhythm: sinus tachycardia    QRS:     QRS axis:  Normal  ST segments:     ST segments:  Normal  T waves:     T waves: normal    Q waves:     Abnormal Q-waves: not present         Yahaira Osorio MD  01/10/24 0205

## 2024-01-10 NOTE — CARE PLAN
Problem: Skin  Goal: Decreased wound size/increased tissue granulation at next dressing change  Outcome: Progressing  Goal: Participates in plan/prevention/treatment measures  Outcome: Progressing  Goal: Prevent/manage excess moisture  Outcome: Progressing  Goal: Prevent/minimize sheer/friction injuries  Outcome: Progressing  Goal: Promote/optimize nutrition  Outcome: Progressing  Goal: Promote skin healing  Outcome: Progressing     Problem: Respiratory  Goal: Clear secretions with interventions this shift  Outcome: Progressing  Goal: Minimize anxiety/maximize coping throughout shift  Outcome: Progressing  Goal: Minimal/no exertional discomfort or dyspnea this shift  Outcome: Progressing  Goal: No signs of respiratory distress (eg. Use of accessory muscles. Peds grunting)  Outcome: Progressing  Goal: Patent airway maintained this shift  Outcome: Progressing  Goal: Tolerate mechanical ventilation evidenced by VS/agitation level this shift  Outcome: Progressing  Goal: Tolerate pulmonary toileting this shift  Outcome: Progressing  Goal: Verbalize decreased shortness of breath this shift  Outcome: Progressing  Goal: Wean oxygen to maintain O2 saturation per order/standard this shift  Outcome: Progressing  Goal: Increase self care and/or family involvement in next 24 hours  Outcome: Progressing

## 2024-01-10 NOTE — SIGNIFICANT EVENT
Notified NP of spo2 staying in 86-87% range, new order to increase bpap to 80% and keep spo2 >88%.

## 2024-01-10 NOTE — H&P
"[facility]       Date:  1/10/2024  Patient:  Stephenie Mccray  YOB: 1980  MRN:  [id number]   Admit Date:  2024  ========================================================================================================    Chief Complaint   Patient presents with    Shortness of Breath         History of Present Illness:  Stephenie Mccray is a 43 y.o. year old female patient with Past Medical History of  COPD (4L@home), DVT/PE (on Xarelto), schizophrenia, migraine headaches, HTN, HLD, current smoker, GERD, IDDM, and hypothyroidism.  She has had frequent admissions for this, the last being . She presented to ED  via EMS for shortness of breath that started to days ago.  It worsened today and she called EMS.  She denies any fevers, chills, or change in sputum. She denies chest pain.    ED COURSE: Vitals: T36.8, , RR18, /67, SpO2 90%. CXR showed \"prominence of pulmonary interstitium similar as compared previous  exam.\" Covid/flu/RSV negative. EKG showed sinus tachycardia with a rate of 107. Troponin, BNP, and lactate WNL. CMP unremarkable. Glucose 149. WBC  Initial ABG on NC showed 7.31/71/66/35.7.  Patient was given 3 duonebs, 2gm IV Mg, placed on BIPAP 15/5 FiO2 65%.  Repeat ABG showed 7.26/80/76/35.9.  IPAP increased to 20.  125mg solumedrol IV and 20mg IV lasix given.  Patient was admitted to ICU for further care.      Interval ICU Events:  1/10: Admitted to ICU on BIPAP.  Repeat ABG     Medical History:  Past Medical History:   Diagnosis Date    Arthritis     COPD (chronic obstructive pulmonary disease) (CMS/HCC)     Diabetes mellitus (CMS/HCC)     History of transfusion     Hypothyroidism     CHARLIE (obstructive sleep apnea)      Past Surgical History:   Procedure Laterality Date     SECTION, LOW TRANSVERSE      EYE SURGERY      HERNIA REPAIR      TONSILLECTOMY      VENTRAL HERNIA REPAIR       (Not in a hospital admission)    Fluticasone " "furoate-vilanterol, Fluticasone-umeclidin-vilanter, Sumatriptan, Vortioxetine, and Levofloxacin  Social History     Tobacco Use    Smoking status: Every Day     Packs/day: 1     Types: Cigarettes     Passive exposure: Never    Smokeless tobacco: Never   Substance Use Topics    Alcohol use: Never    Drug use: Never     Family History   Problem Relation Name Age of Onset    Fibromyalgia Father      Hypertension Brother      Thyroid disease Maternal Grandmother      Thyroid disease Paternal Grandmother      Lung cancer Paternal Grandfather      Coronary artery disease Other Grandmother        Hospital Medications:           Current Facility-Administered Medications:     methylPREDNISolone sod succinate (SOLU-Medrol) injection 125 mg, 125 mg, intravenous, Once, Aissatou Gamboa, APRN-CNP    oxygen (O2) therapy, , inhalation, Continuous PRN - O2/gases, Yahaira Osorio MD    Current Outpatient Medications:     alcohol swabs (Alcohol Prep Pads) pads, medicated, Use 3 times daily prn, Disp: 100 each, Rfl: 3    atorvastatin (Lipitor) 20 mg tablet, Take 1 tablet (20 mg) by mouth once daily. Dr. Davis covering for Dr. Yuen, Disp: 30 tablet, Rfl: 0    azithromycin (Zithromax Z-Navin) 250 mg tablet, Take 1 tablet (250 mg) by mouth once daily. Follow Z-navin instructions: 500mg on day #1, then 250mg daily for days #2, 3, 4, and 5., Disp: 6 tablet, Rfl: 0    BD Ultra-Fine Mini Pen Needle 31 gauge x 3/16\" needle, USE TO INJECT 4 TIMES DAILY AS DIRECTED, Disp: , Rfl:     busPIRone (Buspar) 30 mg tablet, Take 1 tablet (30 mg) by mouth 2 times a day., Disp: , Rfl:     citalopram (CeleXA) 40 mg tablet, Take 50 mg by mouth once daily. Patient said dose was recently increase, Disp: , Rfl:     clonazePAM (KlonoPIN) 1 mg tablet, Take by mouth 2 times a day as needed., Disp: , Rfl:     hydrOXYzine HCL (Atarax) 25 mg tablet, Take 1 tablet (25 mg) by mouth every 6 hours if needed for anxiety for up to 3 days. (Patient taking differently: " "Take 1 tablet (25 mg) by mouth every 6 hours if needed for anxiety.), Disp: 12 tablet, Rfl: 0    hydrOXYzine pamoate (Vistaril) 50 mg capsule, Take by mouth 3 times a day as needed., Disp: , Rfl:     insulin aspart (NovoLOG FlexPen U-100 Insulin) 100 unit/mL (3 mL) pen, 14 Units once daily., Disp: , Rfl:     insulin glargine (Lantus Solostar U-100 Insulin) 100 unit/mL (3 mL) pen, Inject 10 Units under the skin once daily in the morning. Take as directed per insulin instructions., Disp: 3 mL, Rfl: 2    insulin lispro (HumaLOG) 100 unit/mL injection, 1 Dose 3 times a day with meals., Disp: , Rfl:     Invega Sustenna 234 mg/1.5 mL syringe, INJECT 1 syringe INTRAMUSCULARLY EVERY 4 WEEKS, Disp: , Rfl:     ipratropium-albuteroL (Duo-Neb) 0.5-2.5 mg/3 mL nebulizer solution, Take 3 mL by nebulization every 6 hours if needed for shortness of breath or wheezing. (Patient taking differently: Take 3 mL by nebulization every 4 hours. As needed), Disp: 180 mL, Rfl: 1    levothyroxine (Synthroid, Levoxyl) 150 mcg tablet, Take 1 tablet (150 mcg) by mouth once daily., Disp: 90 tablet, Rfl: 1    metoprolol tartrate (Lopressor) 25 mg tablet, TAKE 1 TABLET BY MOUTH IN THE MORNING AND 1 AT BEDTIME, Disp: 60 tablet, Rfl: 2    OneTouch Verio test strips strip, , Disp: , Rfl:     paliperidone (Invega) 1.5 mg 24 hr tablet, Take 1 tablet (1.5 mg) by mouth once daily. Do not crush, chew, or split., Disp: , Rfl:     pantoprazole (ProtoNix) 40 mg EC tablet, Take 1 tablet (40 mg) by mouth once daily., Disp: 90 tablet, Rfl: 1    topiramate (Topamax) 25 mg tablet, Take 2 tablets (50 mg) by mouth once daily., Disp: 180 tablet, Rfl: 3    Xarelto 20 mg tablet, , Disp: , Rfl:     Review of Systems:  14 point review of systems was completed and negative except for those specially mention in my HPI    Physical Exam:    Heart Rate:  []   Temp:  [36.8 °C (98.2 °F)]   Resp:  [17-20]   BP: (106-125)/(58-72)   Height:  [157.5 cm (5' 2\")]   Weight:  " [72.6 kg (160 lb)]   SpO2:  [90 %-96 %]     Physical Exam  HENT:      Head: Normocephalic and atraumatic.   Eyes:      Extraocular Movements: Extraocular movements intact.      Pupils: Pupils are equal, round, and reactive to light.   Cardiovascular:      Rate and Rhythm: Regular rhythm. Tachycardia present.      Pulses:           Radial pulses are 2+ on the right side and 2+ on the left side.        Dorsalis pedis pulses are 2+ on the right side and 2+ on the left side.      Heart sounds: S1 normal and S2 normal.   Pulmonary:      Breath sounds: Examination of the right-upper field reveals wheezing. Examination of the left-upper field reveals wheezing. Decreased breath sounds and wheezing present.   Abdominal:      General: Bowel sounds are normal. There is distension.      Palpations: Abdomen is soft.      Tenderness: There is no abdominal tenderness.   Musculoskeletal:      Cervical back: Normal range of motion.      Right lower le+ Pitting Edema present.      Left lower le+ Pitting Edema present.   Skin:     General: Skin is warm and dry.      Capillary Refill: Capillary refill takes 2 to 3 seconds.   Neurological:      Mental Status: She is lethargic.   Psychiatric:         Mood and Affect: Mood normal.         Objective:    I have reviewed all medications, laboratory results, and imaging pertinent for today's encounter.    FiO2 (%):  [36 %-65 %] 65 %  S RR:  [20] 20      Intake/Output Summary (Last 24 hours) at 1/10/2024 0230  Last data filed at 2024 2356  Gross per 24 hour   Intake 50 ml   Output --   Net 50 ml         Assessment/Plan:    I am currently managing this critically ill patient for the following problems:    Neuro/Psych/Pain Ctrl/Sedation:  #Hx Schizophrenia  #Hx Migraine headaches  -Neuro checks per protocol  -CAM ICU assessment  -Delirium precautions  -Holding home medications pending swallow eval    Respiratory/ENT:  #Acute on chronic hypercapnic respiratory failure  #COPD  exacerbation (on 4L/nc at home and supposed to wear BIPAP QHS)  #CHARLIE  #Nicotine dependence   -Continue BIPAP  -Recheck ABGs  -Duonebs Q4 and PRN  -Solumedrol 40mg IV daily  -Pulmonology consult to Dr Villalba (patient follows with him)  -Smoking cessation education    Cardiovascular:  #Hx HLD  #Hx HTN  -Holding home metoprolol pending swallow eval  -PRN IV metoprolol if needed, vitals stable currently, patient in sinus rhythm  -States she does not take statin anymore  -Lipid panel pending    GI:  #Hx GERD  -On protonix at home, IV Protonix while NPO    Renal/Volume Status (Intra & Extravascular):  -No active issues  -Trend BMP  -Strict I/O      Endocrine  #Hx IDDM  #Hx Hypothyroid  -HA1C 11/23 7.3  -Q4 accuchecks with SSI while NPO  -Hold home lantus  -Hypoglycemia protocol  -Home synthroid pending swallow eval    Infectious Disease:  -No concern for infectious process at this time. Pt afebrile, WBC WNL, lactate WNL  -Daily CBC  -Covid/flu/RSV negative    Heme/Onc:  #Hx DVT/PE  -On home xarelto, continue pending swallow eval    OBGYN/MSK:  -PT/OT eval when able    Ethics/Code Status:  -Full code    :  DVT Prophylaxis: Home xarelto if passes swallow eval  GI Prophylaxis: IV Protonix   Bowel Regimen: none  Diet: NPO  CVC: no  [place]: no  Tran: no  Restraints: no  Dispo: ICU    Critical Care Time:  60    BARTOLOME Garcia-CNP

## 2024-01-11 ENCOUNTER — APPOINTMENT (OUTPATIENT)
Dept: RADIOLOGY | Facility: HOSPITAL | Age: 44
DRG: 190 | End: 2024-01-11
Payer: COMMERCIAL

## 2024-01-11 ENCOUNTER — APPOINTMENT (OUTPATIENT)
Dept: CARDIOLOGY | Facility: HOSPITAL | Age: 44
DRG: 190 | End: 2024-01-11
Payer: COMMERCIAL

## 2024-01-11 LAB
ALBUMIN SERPL BCP-MCNC: 3.6 G/DL (ref 3.4–5)
ALP SERPL-CCNC: 80 U/L (ref 33–110)
ALT SERPL W P-5'-P-CCNC: 33 U/L (ref 7–45)
ANION GAP SERPL CALC-SCNC: 12 MMOL/L (ref 10–20)
AORTIC VALVE MEAN GRADIENT: 5
AORTIC VALVE PEAK VELOCITY: 1.49
ARTERIAL PATENCY WRIST A: POSITIVE
AST SERPL W P-5'-P-CCNC: 16 U/L (ref 9–39)
AV PEAK GRADIENT: 8.9
AVA (PEAK VEL): 2.42
AVA (VTI): 2.75
BASOPHILS # BLD AUTO: 0.04 X10*3/UL (ref 0–0.1)
BASOPHILS NFR BLD AUTO: 0.2 %
BILIRUB SERPL-MCNC: 0.4 MG/DL (ref 0–1.2)
BUN SERPL-MCNC: 20 MG/DL (ref 6–23)
CALCIUM SERPL-MCNC: 9.2 MG/DL (ref 8.6–10.3)
CHLORIDE SERPL-SCNC: 99 MMOL/L (ref 98–107)
CO2 SERPL-SCNC: 36 MMOL/L (ref 21–32)
COHGB MFR BLDA: 1.3 %
CREAT SERPL-MCNC: 0.74 MG/DL (ref 0.5–1.05)
DO-HGB MFR BLDA: 0.2 % (ref 0–5)
EGFRCR SERPLBLD CKD-EPI 2021: >90 ML/MIN/1.73M*2
EJECTION FRACTION APICAL 4 CHAMBER: 54.4
EJECTION FRACTION: 53
EOSINOPHIL # BLD AUTO: 0 X10*3/UL (ref 0–0.7)
EOSINOPHIL NFR BLD AUTO: 0 %
ERYTHROCYTE [DISTWIDTH] IN BLOOD BY AUTOMATED COUNT: 16.7 % (ref 11.5–14.5)
GLUCOSE BLD MANUAL STRIP-MCNC: 139 MG/DL (ref 74–99)
GLUCOSE BLD MANUAL STRIP-MCNC: 140 MG/DL (ref 74–99)
GLUCOSE BLD MANUAL STRIP-MCNC: 154 MG/DL (ref 74–99)
GLUCOSE BLD MANUAL STRIP-MCNC: 160 MG/DL (ref 74–99)
GLUCOSE BLD MANUAL STRIP-MCNC: 183 MG/DL (ref 74–99)
GLUCOSE BLD MANUAL STRIP-MCNC: 223 MG/DL (ref 74–99)
GLUCOSE SERPL-MCNC: 116 MG/DL (ref 74–99)
HCT VFR BLD AUTO: 39.4 % (ref 36–46)
HGB BLD-MCNC: 12.1 G/DL (ref 12–16)
HGB BLDA-MCNC: 12.1 G/DL (ref 12–16)
IMM GRANULOCYTES # BLD AUTO: 0.3 X10*3/UL (ref 0–0.7)
IMM GRANULOCYTES NFR BLD AUTO: 1.8 % (ref 0–0.9)
LEFT ATRIUM VOLUME AREA LENGTH INDEX BSA: 14.7
LEFT VENTRICLE INTERNAL DIMENSION DIASTOLE: 4.76 (ref 3.5–6)
LEFT VENTRICULAR OUTFLOW TRACT DIAMETER: 2
LYMPHOCYTES # BLD AUTO: 2.61 X10*3/UL (ref 1.2–4.8)
LYMPHOCYTES NFR BLD AUTO: 16.1 %
MAGNESIUM SERPL-MCNC: 1.95 MG/DL (ref 1.6–2.4)
MCH RBC QN AUTO: 30.9 PG (ref 26–34)
MCHC RBC AUTO-ENTMCNC: 30.7 G/DL (ref 32–36)
MCV RBC AUTO: 101 FL (ref 80–100)
METHGB MFR BLDA: 0.9 % (ref 0–1.5)
MITRAL VALVE E/A RATIO: 1.29
MITRAL VALVE E/E' RATIO: 5.93
MONOCYTES # BLD AUTO: 0.87 X10*3/UL (ref 0.1–1)
MONOCYTES NFR BLD AUTO: 5.4 %
NEUTROPHILS # BLD AUTO: 12.4 X10*3/UL (ref 1.2–7.7)
NEUTROPHILS NFR BLD AUTO: 76.5 %
NRBC BLD-RTO: 0.2 /100 WBCS (ref 0–0)
OXYHGB MFR BLDA: 97.6 % (ref 94–98)
PHOSPHATE SERPL-MCNC: 3.3 MG/DL (ref 2.5–4.9)
PLATELET # BLD AUTO: 147 X10*3/UL (ref 150–450)
POTASSIUM SERPL-SCNC: 4.2 MMOL/L (ref 3.5–5.3)
PROT SERPL-MCNC: 6.1 G/DL (ref 6.4–8.2)
RBC # BLD AUTO: 3.92 X10*6/UL (ref 4–5.2)
RIGHT VENTRICLE FREE WALL PEAK S': 11.4
RIGHT VENTRICLE PEAK SYSTOLIC PRESSURE: 11.4
SODIUM SERPL-SCNC: 143 MMOL/L (ref 136–145)
SPECIMEN DRAWN FROM PATIENT: NORMAL
TRICUSPID ANNULAR PLANE SYSTOLIC EXCURSION: 2.2
WBC # BLD AUTO: 16.2 X10*3/UL (ref 4.4–11.3)

## 2024-01-11 PROCEDURE — 83050 HGB METHEMOGLOBIN QUAN: CPT | Performed by: NURSE PRACTITIONER

## 2024-01-11 PROCEDURE — 94660 CPAP INITIATION&MGMT: CPT

## 2024-01-11 PROCEDURE — 2500000001 HC RX 250 WO HCPCS SELF ADMINISTERED DRUGS (ALT 637 FOR MEDICARE OP): Performed by: SPECIALIST

## 2024-01-11 PROCEDURE — 93306 TTE W/DOPPLER COMPLETE: CPT | Performed by: INTERNAL MEDICINE

## 2024-01-11 PROCEDURE — 84100 ASSAY OF PHOSPHORUS: CPT

## 2024-01-11 PROCEDURE — 99291 CRITICAL CARE FIRST HOUR: CPT | Performed by: NURSE PRACTITIONER

## 2024-01-11 PROCEDURE — 94640 AIRWAY INHALATION TREATMENT: CPT

## 2024-01-11 PROCEDURE — 83735 ASSAY OF MAGNESIUM: CPT | Performed by: NURSE PRACTITIONER

## 2024-01-11 PROCEDURE — 2500000005 HC RX 250 GENERAL PHARMACY W/O HCPCS

## 2024-01-11 PROCEDURE — 2500000001 HC RX 250 WO HCPCS SELF ADMINISTERED DRUGS (ALT 637 FOR MEDICARE OP)

## 2024-01-11 PROCEDURE — 2500000001 HC RX 250 WO HCPCS SELF ADMINISTERED DRUGS (ALT 637 FOR MEDICARE OP): Performed by: NURSE PRACTITIONER

## 2024-01-11 PROCEDURE — 2500000004 HC RX 250 GENERAL PHARMACY W/ HCPCS (ALT 636 FOR OP/ED)

## 2024-01-11 PROCEDURE — 82947 ASSAY GLUCOSE BLOOD QUANT: CPT

## 2024-01-11 PROCEDURE — 2500000001 HC RX 250 WO HCPCS SELF ADMINISTERED DRUGS (ALT 637 FOR MEDICARE OP): Performed by: PSYCHIATRY & NEUROLOGY

## 2024-01-11 PROCEDURE — 2500000002 HC RX 250 W HCPCS SELF ADMINISTERED DRUGS (ALT 637 FOR MEDICARE OP, ALT 636 FOR OP/ED)

## 2024-01-11 PROCEDURE — 2500000004 HC RX 250 GENERAL PHARMACY W/ HCPCS (ALT 636 FOR OP/ED): Performed by: NURSE PRACTITIONER

## 2024-01-11 PROCEDURE — 99221 1ST HOSP IP/OBS SF/LOW 40: CPT | Performed by: PSYCHIATRY & NEUROLOGY

## 2024-01-11 PROCEDURE — 71045 X-RAY EXAM CHEST 1 VIEW: CPT

## 2024-01-11 PROCEDURE — 80053 COMPREHEN METABOLIC PANEL: CPT | Performed by: NURSE PRACTITIONER

## 2024-01-11 PROCEDURE — 36415 COLL VENOUS BLD VENIPUNCTURE: CPT | Performed by: NURSE PRACTITIONER

## 2024-01-11 PROCEDURE — 71045 X-RAY EXAM CHEST 1 VIEW: CPT | Performed by: RADIOLOGY

## 2024-01-11 PROCEDURE — 2500000002 HC RX 250 W HCPCS SELF ADMINISTERED DRUGS (ALT 637 FOR MEDICARE OP, ALT 636 FOR OP/ED): Performed by: NURSE PRACTITIONER

## 2024-01-11 PROCEDURE — 85025 COMPLETE CBC W/AUTO DIFF WBC: CPT | Performed by: NURSE PRACTITIONER

## 2024-01-11 PROCEDURE — S4991 NICOTINE PATCH NONLEGEND: HCPCS

## 2024-01-11 PROCEDURE — 93306 TTE W/DOPPLER COMPLETE: CPT

## 2024-01-11 PROCEDURE — 2020000001 HC ICU ROOM DAILY

## 2024-01-11 PROCEDURE — 2500000001 HC RX 250 WO HCPCS SELF ADMINISTERED DRUGS (ALT 637 FOR MEDICARE OP): Performed by: HOSPITALIST

## 2024-01-11 RX ORDER — ACETAMINOPHEN 500 MG
5 TABLET ORAL NIGHTLY PRN
Status: DISCONTINUED | OUTPATIENT
Start: 2024-01-11 | End: 2024-01-11

## 2024-01-11 RX ORDER — BUSPIRONE HYDROCHLORIDE 5 MG/1
20 TABLET ORAL 3 TIMES DAILY
Status: DISCONTINUED | OUTPATIENT
Start: 2024-01-11 | End: 2024-01-14 | Stop reason: HOSPADM

## 2024-01-11 RX ORDER — CLONAZEPAM 0.5 MG/1
0.5 TABLET ORAL 2 TIMES DAILY PRN
Status: DISCONTINUED | OUTPATIENT
Start: 2024-01-11 | End: 2024-01-14 | Stop reason: HOSPADM

## 2024-01-11 RX ORDER — CLONAZEPAM 0.5 MG/1
0.5 TABLET ORAL 2 TIMES DAILY PRN
Status: DISCONTINUED | OUTPATIENT
Start: 2024-01-11 | End: 2024-01-11

## 2024-01-11 RX ORDER — FUROSEMIDE 10 MG/ML
40 INJECTION INTRAMUSCULAR; INTRAVENOUS ONCE
Status: COMPLETED | OUTPATIENT
Start: 2024-01-11 | End: 2024-01-11

## 2024-01-11 RX ORDER — INSULIN LISPRO 100 [IU]/ML
0-5 INJECTION, SOLUTION INTRAVENOUS; SUBCUTANEOUS
Status: DISCONTINUED | OUTPATIENT
Start: 2024-01-11 | End: 2024-01-14 | Stop reason: HOSPADM

## 2024-01-11 RX ORDER — INSULIN LISPRO 100 [IU]/ML
4 INJECTION, SOLUTION INTRAVENOUS; SUBCUTANEOUS ONCE
Status: COMPLETED | OUTPATIENT
Start: 2024-01-11 | End: 2024-01-11

## 2024-01-11 RX ORDER — PRAZOSIN HYDROCHLORIDE 1 MG/1
2 CAPSULE ORAL NIGHTLY
Status: DISCONTINUED | OUTPATIENT
Start: 2024-01-11 | End: 2024-01-14 | Stop reason: HOSPADM

## 2024-01-11 RX ORDER — CLONAZEPAM 0.5 MG/1
0.5 TABLET ORAL ONCE
Status: COMPLETED | OUTPATIENT
Start: 2024-01-11 | End: 2024-01-11

## 2024-01-11 RX ADMIN — LEVOTHYROXINE SODIUM 150 MCG: 75 TABLET ORAL at 06:12

## 2024-01-11 RX ADMIN — IPRATROPIUM BROMIDE AND ALBUTEROL SULFATE 3 ML: 2.5; .5 SOLUTION RESPIRATORY (INHALATION) at 20:30

## 2024-01-11 RX ADMIN — IPRATROPIUM BROMIDE AND ALBUTEROL SULFATE 3 ML: 2.5; .5 SOLUTION RESPIRATORY (INHALATION) at 07:38

## 2024-01-11 RX ADMIN — PRAZOSIN HYDROCHLORIDE 2 MG: 1 CAPSULE ORAL at 21:04

## 2024-01-11 RX ADMIN — IPRATROPIUM BROMIDE AND ALBUTEROL SULFATE 3 ML: 2.5; .5 SOLUTION RESPIRATORY (INHALATION) at 23:30

## 2024-01-11 RX ADMIN — BUSPIRONE HYDROCHLORIDE 20 MG: 5 TABLET ORAL at 15:14

## 2024-01-11 RX ADMIN — METHYLPREDNISOLONE SODIUM SUCCINATE 40 MG: 40 INJECTION, POWDER, FOR SOLUTION INTRAMUSCULAR; INTRAVENOUS at 06:12

## 2024-01-11 RX ADMIN — HYDROXYZINE PAMOATE 25 MG: 25 CAPSULE ORAL at 11:53

## 2024-01-11 RX ADMIN — DOXYCYCLINE HYCLATE 100 MG: 100 TABLET, FILM COATED ORAL at 08:20

## 2024-01-11 RX ADMIN — Medication 60 L/MIN: at 16:22

## 2024-01-11 RX ADMIN — IPRATROPIUM BROMIDE AND ALBUTEROL SULFATE 3 ML: 2.5; .5 SOLUTION RESPIRATORY (INHALATION) at 16:22

## 2024-01-11 RX ADMIN — PANTOPRAZOLE SODIUM 40 MG: 40 TABLET, DELAYED RELEASE ORAL at 06:12

## 2024-01-11 RX ADMIN — BUSPIRONE HYDROCHLORIDE 20 MG: 5 TABLET ORAL at 20:22

## 2024-01-11 RX ADMIN — IPRATROPIUM BROMIDE AND ALBUTEROL SULFATE 3 ML: 2.5; .5 SOLUTION RESPIRATORY (INHALATION) at 11:30

## 2024-01-11 RX ADMIN — BUSPIRONE HYDROCHLORIDE 30 MG: 5 TABLET ORAL at 08:20

## 2024-01-11 RX ADMIN — CLONAZEPAM 0.5 MG: 0.5 TABLET ORAL at 21:04

## 2024-01-11 RX ADMIN — IPRATROPIUM BROMIDE AND ALBUTEROL SULFATE 3 ML: 2.5; .5 SOLUTION RESPIRATORY (INHALATION) at 04:20

## 2024-01-11 RX ADMIN — INSULIN LISPRO 1 UNITS: 100 INJECTION, SOLUTION INTRAVENOUS; SUBCUTANEOUS at 16:49

## 2024-01-11 RX ADMIN — INSULIN LISPRO 4 UNITS: 100 INJECTION, SOLUTION INTRAVENOUS; SUBCUTANEOUS at 12:17

## 2024-01-11 RX ADMIN — NICOTINE 1 PATCH: 21 PATCH, EXTENDED RELEASE TRANSDERMAL at 08:20

## 2024-01-11 RX ADMIN — INSULIN LISPRO 1 UNITS: 100 INJECTION, SOLUTION INTRAVENOUS; SUBCUTANEOUS at 08:18

## 2024-01-11 RX ADMIN — INSULIN LISPRO 1 UNITS: 100 INJECTION, SOLUTION INTRAVENOUS; SUBCUTANEOUS at 04:31

## 2024-01-11 RX ADMIN — IPRATROPIUM BROMIDE AND ALBUTEROL SULFATE 3 ML: 2.5; .5 SOLUTION RESPIRATORY (INHALATION) at 00:32

## 2024-01-11 RX ADMIN — DOXYCYCLINE HYCLATE 100 MG: 100 TABLET, FILM COATED ORAL at 20:23

## 2024-01-11 RX ADMIN — CITALOPRAM HYDROBROMIDE 40 MG: 20 TABLET ORAL at 08:20

## 2024-01-11 RX ADMIN — RIVAROXABAN 20 MG: 20 TABLET, FILM COATED ORAL at 16:49

## 2024-01-11 RX ADMIN — FUROSEMIDE 40 MG: 10 INJECTION, SOLUTION INTRAMUSCULAR; INTRAVENOUS at 10:38

## 2024-01-11 RX ADMIN — INSULIN GLARGINE 10 UNITS: 100 INJECTION, SOLUTION SUBCUTANEOUS at 08:19

## 2024-01-11 RX ADMIN — CLONAZEPAM 0.5 MG: 0.5 TABLET ORAL at 13:50

## 2024-01-11 ASSESSMENT — PAIN SCALES - GENERAL
PAINLEVEL_OUTOF10: 0 - NO PAIN

## 2024-01-11 ASSESSMENT — PAIN - FUNCTIONAL ASSESSMENT
PAIN_FUNCTIONAL_ASSESSMENT: 0-10

## 2024-01-11 NOTE — CONSULTS
History Of Present Illness  Stephenie Mccray is a 43 y.o. female presenting with history of schizophrenia general anxiety disorder who has been on clonazepam 1 mg twice a day for several years and takes it as prescribed is currently admitted to ICU with respiratory failure and because of the concern of sedation and respiratory suppression sedative medications were put on hold.  Psychiatry was consulted to manage    Stephenie on assessment this afternoon appeared quite anxious and distressed because of her anxiety.  She has been feeling tremulous shaky although denied any auditory or visual hallucinations.  She she denied any significant preoccupation with any paranoia or delusions and did not endorse any delusions.  She is prescribed BuSpar for anxiety along with hydroxyzine but despite these medications she has needed over the years Klonopin 1 mg twice a day.  She did not report any history of seizures or have ever gone through benzodiazepine withdrawal but with her chills and sweating last night and her anxiety which is so high along with her tremulousness and the fact that she is fully alert and oriented the other concern of holding Klonopin would be concern for benzodiazepine withdrawal related seizure.  The risk continue to increase from third day to the seventh day since the last dose of clonazepam.  As stated above on assessment this afternoon patient denied any auditory hallucinations, visual hallucinations, did not endorse any delusions and no objective signs of psychosis evident. No signs of disorganized behavior, disorganized speech. Patient denied any racing thoughts, flight of ideas, severe sleeplessness, unusually elevated or angry mood, unusual impulsivity, unusual spending or impulsive physical relationships. No objective signs of mike or hypomania noticed.       Substance abuse: Patient denied any current alcohol or illicit drug use or abuse.       Medical symptoms: Patient denied any history of  seizures, fainting, dizziness, abnormal thyroid symptoms like unusual weight gain or loss, ambient temperature intolerance.       Past Psych: History of schizophrenia and      Past Substance: Patient denied any drug abuse, treatment  or rehab.       Family Psych: Denied any history of suicide in the family. Family history not pertinent to presenting problem or chief complaint     MSE: Patient was alert, oriented to time, place, person and situation. Patient appears well groomed and clad in climate appropriate clothes. Patient is resigned and guarded on approach. Recent and remote memory within normal limits. Memory registration and recall within normal limits. Attention and concentration within normal limits. Speech normal in rate, rhythm and volume. Good eye contact. Thought process Linear. Intact associations. Good fund of knowledge. Mood upset and affect tensed. Patient did not endorse any delusions. Patient denied any auditory visual hallucinations. Patient has active suicidal  ideations and is not future oriented.  Patient has fair insight, fair judgment and good impulse control.     Musculoskeletal: Normal gait, no Parkinsonism, no Dystonia, no Akathisia, no TD. Psychomotor activity within normal limits.     Diagnosis: Schizophrenia, general anxiety disorder    Assessment and Plan:     Patient is not at imminent risk of harm to self or others and does not need inpatient psychiatric care  One-time dose of clonazepam 0.5 mg and seeing its impact on her breathing and sedation level may be a very viable option as risk of seizures will only go up over the next 4 to 5 days based on 2 mg/day of clonazepam for several years.  Patient did not appear to be in respiratory distress and her vitals were stable  BuSpar has been switched to 20 mg 3 times a day instead of 30 mg twice a day for throughout the day coverage.      .     Past Medical History  Past Medical History:   Diagnosis Date    Arthritis     COPD (chronic  "obstructive pulmonary disease) (CMS/[facility])     Diabetes mellitus (CMS/[facility])     History of transfusion     Hypothyroidism     CHARLIE (obstructive sleep apnea)        Surgical History  Past Surgical History:   Procedure Laterality Date     SECTION, LOW TRANSVERSE      EYE SURGERY      HERNIA REPAIR      TONSILLECTOMY      VENTRAL HERNIA REPAIR          Social History  She reports that she has been smoking cigarettes. She has been smoking an average of 1 pack per day. She has never been exposed to tobacco smoke. She has never used smokeless tobacco. She reports that she does not drink alcohol and does not use drugs.    Family History  Family History   Problem Relation Name Age of Onset    Fibromyalgia Father      Hypertension Brother      Thyroid disease Maternal Grandmother      Thyroid disease Paternal Grandmother      Lung cancer Paternal Grandfather      Coronary artery disease Other Grandmother         Allergies  Fluticasone furoate-vilanterol, Fluticasone-umeclidin-vilanter, Sumatriptan, Vortioxetine, and Levofloxacin     Last Recorded Vitals  Blood pressure 110/68, pulse 110, temperature 36.4 °C (97.5 °F), temperature source Temporal, resp. rate (!) 28, height 1.575 m (5' 2\"), weight 77.8 kg (171 lb 8.3 oz), SpO2 100 %.    Relevant Results  Recent Results (from the past 48 hour(s))   Influenza A, and B PCR    Collection Time: 24 10:37 PM   Result Value Ref Range    Flu A Result Not Detected Not Detected    Flu B Result Not Detected Not Detected   SARS-CoV-2 RT PCR    Collection Time: 24 10:37 PM   Result Value Ref Range    Coronavirus 2019, PCR Not Detected Not Detected   RSV PCR    Collection Time: 24 10:37 PM   Result Value Ref Range    RSV PCR Not Detected Not Detected   CBC and Auto Differential    Collection Time: 24 10:47 PM   Result Value Ref Range    WBC 9.2 4.4 - 11.3 x10*3/uL    nRBC 1.2 (H) 0.0 - 0.0 /100 WBCs    RBC 3.88 (L) 4.00 - 5.20 x10*6/uL    Hemoglobin 12.0 12.0 - " 16.0 g/dL    Hematocrit 39.3 36.0 - 46.0 %     (H) 80 - 100 fL    MCH 30.9 26.0 - 34.0 pg    MCHC 30.5 (L) 32.0 - 36.0 g/dL    RDW 16.0 (H) 11.5 - 14.5 %    Platelets 150 150 - 450 x10*3/uL    Immature Granulocytes %, Automated 6.0 (H) 0.0 - 0.9 %    Immature Granulocytes Absolute, Automated 0.55 0.00 - 0.70 x10*3/uL   Comprehensive Metabolic Panel    Collection Time: 01/09/24 10:47 PM   Result Value Ref Range    Glucose 149 (H) 74 - 99 mg/dL    Sodium 141 136 - 145 mmol/L    Potassium 4.4 3.5 - 5.3 mmol/L    Chloride 103 98 - 107 mmol/L    Bicarbonate 31 21 - 32 mmol/L    Anion Gap 11 10 - 20 mmol/L    Urea Nitrogen 22 6 - 23 mg/dL    Creatinine 0.98 0.50 - 1.05 mg/dL    eGFR 74 >60 mL/min/1.73m*2    Calcium 8.8 8.6 - 10.3 mg/dL    Albumin 3.6 3.4 - 5.0 g/dL    Alkaline Phosphatase 78 33 - 110 U/L    Total Protein 6.0 (L) 6.4 - 8.2 g/dL    AST 10 9 - 39 U/L    Bilirubin, Total 0.3 0.0 - 1.2 mg/dL    ALT 18 7 - 45 U/L   hCG, quantitative, pregnancy    Collection Time: 01/09/24 10:47 PM   Result Value Ref Range    HCG, Beta-Quantitative <2 <5 mIU/mL   Troponin I, High Sensitivity, Initial    Collection Time: 01/09/24 10:47 PM   Result Value Ref Range    Troponin I, High Sensitivity 3 0 - 13 ng/L   Magnesium    Collection Time: 01/09/24 10:47 PM   Result Value Ref Range    Magnesium 1.66 1.60 - 2.40 mg/dL   Manual Differential    Collection Time: 01/09/24 10:47 PM   Result Value Ref Range    Neutrophils %, Manual 37.0 40.0 - 80.0 %    Bands %, Manual 2.0 0.0 - 5.0 %    Lymphocytes %, Manual 55.0 13.0 - 44.0 %    Monocytes %, Manual 3.0 2.0 - 10.0 %    Eosinophils %, Manual 0.0 0.0 - 6.0 %    Basophils %, Manual 1.0 0.0 - 2.0 %    Atypical Lymphocytes %, Manual 2.0 0.0 - 2.0 %    Seg Neutrophils Absolute, Manual 3.40 1.20 - 7.00 x10*3/uL    Bands Absolute, Manual 0.18 0.00 - 0.70 x10*3/uL    Lymphocytes Absolute, Manual 5.06 (H) 1.20 - 4.80 x10*3/uL    Monocytes Absolute, Manual 0.28 0.10 - 1.00 x10*3/uL     Eosinophils Absolute, Manual 0.00 0.00 - 0.70 x10*3/uL    Basophils Absolute, Manual 0.09 0.00 - 0.10 x10*3/uL    Atypical Lymphs Absolute, Manual 0.18 0.00 - 0.50 x10*3/uL    Total Cells Counted 100     Neutrophils Absolute, Manual 3.58 1.20 - 7.70 x10*3/uL    RBC Morphology See Below     Polychromasia Mild     Ovalocytes Few     Teardrop Cells Few    B-type natriuretic peptide    Collection Time: 01/09/24 10:47 PM   Result Value Ref Range    BNP 13 0 - 99 pg/mL   Blood Gas Arterial Full Panel    Collection Time: 01/09/24 10:52 PM   Result Value Ref Range    POCT pH, Arterial 7.31 (L) 7.38 - 7.42 pH    POCT pCO2, Arterial 71 (HH) 38 - 42 mm Hg    POCT pO2, Arterial 66 (L) 85 - 95 mm Hg    POCT SO2, Arterial 94 94 - 100 %    POCT Oxy Hemoglobin, Arterial 83.2 (L) 94.0 - 98.0 %    POCT Hematocrit Calculated, Arterial 36.0 36.0 - 46.0 %    POCT Sodium, Arterial 139 136 - 145 mmol/L    POCT Potassium, Arterial 4.5 3.5 - 5.3 mmol/L    POCT Chloride, Arterial 104 98 - 107 mmol/L    POCT Ionized Calcium, Arterial 1.26 1.10 - 1.33 mmol/L    POCT Glucose, Arterial 148 (H) 74 - 99 mg/dL    POCT Lactate, Arterial 0.7 0.4 - 2.0 mmol/L    POCT Base Excess, Arterial 7.2 (H) -2.0 - 3.0 mmol/L    POCT HCO3 Calculated, Arterial 35.7 (H) 22.0 - 26.0 mmol/L    POCT Hemoglobin, Arterial 12.1 12.0 - 16.0 g/dL    POCT Anion Gap, Arterial 4 (L) 10 - 25 mmo/L    Patient Temperature      FiO2 36 %    Apparatus CANNULA     Critical Called By DFOX RRT     Critical Called To DR CORLEY     Critical Call Time 2306.0000     Critical Read Back Y    ECG 12 lead    Collection Time: 01/09/24 11:10 PM   Result Value Ref Range    Ventricular Rate 107 BPM    Atrial Rate 107 BPM    MT Interval 128 ms    QRS Duration 88 ms    QT Interval 284 ms    QTC Calculation(Bazett) 379 ms    P Axis 43 degrees    R Axis -21 degrees    T Axis 20 degrees    QRS Count 18 beats    Q Onset 225 ms    P Onset 161 ms    P Offset 210 ms    T Offset 367 ms    QTC Fredericia  344 ms   Troponin, High Sensitivity, 1 Hour    Collection Time: 01/09/24 11:47 PM   Result Value Ref Range    Troponin I, High Sensitivity 4 0 - 13 ng/L   Blood Gas Arterial Full Panel    Collection Time: 01/10/24  1:05 AM   Result Value Ref Range    POCT pH, Arterial 7.26 (L) 7.38 - 7.42 pH    POCT pCO2, Arterial 80 (HH) 38 - 42 mm Hg    POCT pO2, Arterial 76 (L) 85 - 95 mm Hg    POCT SO2, Arterial 96 94 - 100 %    POCT Oxy Hemoglobin, Arterial 87.5 (L) 94.0 - 98.0 %    POCT Hematocrit Calculated, Arterial 35.0 (L) 36.0 - 46.0 %    POCT Sodium, Arterial 138 136 - 145 mmol/L    POCT Potassium, Arterial 4.5 3.5 - 5.3 mmol/L    POCT Chloride, Arterial 104 98 - 107 mmol/L    POCT Ionized Calcium, Arterial 1.28 1.10 - 1.33 mmol/L    POCT Glucose, Arterial 159 (H) 74 - 99 mg/dL    POCT Lactate, Arterial 0.6 0.4 - 2.0 mmol/L    POCT Base Excess, Arterial 6.4 (H) -2.0 - 3.0 mmol/L    POCT HCO3 Calculated, Arterial 35.9 (H) 22.0 - 26.0 mmol/L    POCT Hemoglobin, Arterial 11.8 (L) 12.0 - 16.0 g/dL    POCT Anion Gap, Arterial 3 (L) 10 - 25 mmo/L    Patient Temperature      FiO2 65 %    Ipap CMH2O 15.0 cm H2O    Epap CMH2O 5.0 cm H2O    Critical Called By DFOX RRT     Critical Called To DR CORLEY     Critical Call Time 113.0000     Critical Read Back Y    POCT GLUCOSE    Collection Time: 01/10/24  3:52 AM   Result Value Ref Range    POCT Glucose 190 (H) 74 - 99 mg/dL   Magnesium    Collection Time: 01/10/24  4:02 AM   Result Value Ref Range    Magnesium 2.21 1.60 - 2.40 mg/dL   Phosphorus    Collection Time: 01/10/24  4:02 AM   Result Value Ref Range    Phosphorus 4.0 2.5 - 4.9 mg/dL   CBC and Auto Differential    Collection Time: 01/10/24  4:02 AM   Result Value Ref Range    WBC 8.4 4.4 - 11.3 x10*3/uL    nRBC 1.1 (H) 0.0 - 0.0 /100 WBCs    RBC 4.08 4.00 - 5.20 x10*6/uL    Hemoglobin 12.7 12.0 - 16.0 g/dL    Hematocrit 41.8 36.0 - 46.0 %     (H) 80 - 100 fL    MCH 31.1 26.0 - 34.0 pg    MCHC 30.4 (L) 32.0 - 36.0 g/dL     RDW 16.1 (H) 11.5 - 14.5 %    Platelets 141 (L) 150 - 450 x10*3/uL    Immature Granulocytes %, Automated 5.8 (H) 0.0 - 0.9 %    Immature Granulocytes Absolute, Automated 0.49 0.00 - 0.70 x10*3/uL   Comprehensive metabolic panel    Collection Time: 01/10/24  4:02 AM   Result Value Ref Range    Glucose 181 (H) 74 - 99 mg/dL    Sodium 141 136 - 145 mmol/L    Potassium 4.1 3.5 - 5.3 mmol/L    Chloride 100 98 - 107 mmol/L    Bicarbonate 33 (H) 21 - 32 mmol/L    Anion Gap 12 10 - 20 mmol/L    Urea Nitrogen 19 6 - 23 mg/dL    Creatinine 0.95 0.50 - 1.05 mg/dL    eGFR 77 >60 mL/min/1.73m*2    Calcium 9.2 8.6 - 10.3 mg/dL    Albumin 3.8 3.4 - 5.0 g/dL    Alkaline Phosphatase 88 33 - 110 U/L    Total Protein 6.6 6.4 - 8.2 g/dL    AST 16 9 - 39 U/L    Bilirubin, Total 0.3 0.0 - 1.2 mg/dL    ALT 27 7 - 45 U/L   Lipid panel    Collection Time: 01/10/24  4:02 AM   Result Value Ref Range    Cholesterol 227 (H) 0 - 199 mg/dL    HDL-Cholesterol 44.3 mg/dL    Cholesterol/HDL Ratio 5.1     LDL Calculated 138 (H) <=99 mg/dL    VLDL 45 (H) 0 - 40 mg/dL    Triglycerides 225 (H) 0 - 149 mg/dL    Non HDL Cholesterol 183 (H) 0 - 149 mg/dL   Manual Differential    Collection Time: 01/10/24  4:02 AM   Result Value Ref Range    Neutrophils %, Manual 68.0 40.0 - 80.0 %    Bands %, Manual 4.0 0.0 - 5.0 %    Lymphocytes %, Manual 23.0 13.0 - 44.0 %    Monocytes %, Manual 1.0 2.0 - 10.0 %    Eosinophils %, Manual 0.0 0.0 - 6.0 %    Basophils %, Manual 0.0 0.0 - 2.0 %    Atypical Lymphocytes %, Manual 1.0 0.0 - 2.0 %    Myelocytes %, Manual 3.0 0.0 - 0.0 %    Seg Neutrophils Absolute, Manual 5.71 1.20 - 7.00 x10*3/uL    Bands Absolute, Manual 0.34 0.00 - 0.70 x10*3/uL    Lymphocytes Absolute, Manual 1.93 1.20 - 4.80 x10*3/uL    Monocytes Absolute, Manual 0.08 (L) 0.10 - 1.00 x10*3/uL    Eosinophils Absolute, Manual 0.00 0.00 - 0.70 x10*3/uL    Basophils Absolute, Manual 0.00 0.00 - 0.10 x10*3/uL    Atypical Lymphs Absolute, Manual 0.08 0.00 -  0.50 x10*3/uL    Myelocytes Absolute, Manual 0.25 0.00 - 0.00 x10*3/uL    Total Cells Counted 100     Neutrophils Absolute, Manual 6.05 1.20 - 7.70 x10*3/uL    RBC Morphology See Below     Polychromasia Mild    Acute Toxicology Panel, Blood    Collection Time: 01/10/24  4:02 AM   Result Value Ref Range    Acetaminophen <10.0 10.0 - 30.0 ug/mL    Salicylate  <3 4 - 20 mg/dL    Alcohol <10 <=10 mg/dL   Blood Gas Arterial Full Panel    Collection Time: 01/10/24  5:54 AM   Result Value Ref Range    POCT pH, Arterial 7.34 (L) 7.38 - 7.42 pH    POCT pCO2, Arterial 72 (HH) 38 - 42 mm Hg    POCT pO2, Arterial 69 (L) 85 - 95 mm Hg    POCT SO2, Arterial 94 94 - 100 %    POCT Oxy Hemoglobin, Arterial 90.0 (L) 94.0 - 98.0 %    POCT Hematocrit Calculated, Arterial 39.0 36.0 - 46.0 %    POCT Sodium, Arterial 136 136 - 145 mmol/L    POCT Potassium, Arterial 4.2 3.5 - 5.3 mmol/L    POCT Chloride, Arterial 100 98 - 107 mmol/L    POCT Ionized Calcium, Arterial 1.22 1.10 - 1.33 mmol/L    POCT Glucose, Arterial 248 (H) 74 - 99 mg/dL    POCT Lactate, Arterial 1.6 0.4 - 2.0 mmol/L    POCT Base Excess, Arterial 10.2 (H) -2.0 - 3.0 mmol/L    POCT HCO3 Calculated, Arterial 38.8 (H) 22.0 - 26.0 mmol/L    POCT Hemoglobin, Arterial 12.9 12.0 - 16.0 g/dL    POCT Anion Gap, Arterial 1 (L) 10 - 25 mmo/L    Patient Temperature      FiO2 65 %    Ventilator Mode BiPAP     Spontaneous Tidal Volume 458 mL    Total Minute Volume 11.3 Liter    Frequency (BPM) 24 bpm    Inspiratory Time 0.9     Ipap CMH2O 20.0 cm H2O    Epap CMH2O 5.0 cm H2O    Critical Called By DAVID CASTELLON     Critical Called To CARLOS CONTRERAS     Critical Call Time 557.0000     Critical Read Back Y     Site of Arterial Puncture Brachial Left     Andrew's Test     Blood Gas Arterial Full Panel    Collection Time: 01/10/24  8:14 AM   Result Value Ref Range    POCT pH, Arterial 7.35 (L) 7.38 - 7.42 pH    POCT pCO2, Arterial 69 (H) 38 - 42 mm Hg    POCT pO2, Arterial 73 (L) 85 - 95 mm Hg     POCT SO2, Arterial 96 94 - 100 %    POCT Oxy Hemoglobin, Arterial 91.8 (L) 94.0 - 98.0 %    POCT Hematocrit Calculated, Arterial 39.0 36.0 - 46.0 %    POCT Sodium, Arterial 137 136 - 145 mmol/L    POCT Potassium, Arterial 4.5 3.5 - 5.3 mmol/L    POCT Chloride, Arterial 99 98 - 107 mmol/L    POCT Ionized Calcium, Arterial 1.23 1.10 - 1.33 mmol/L    POCT Glucose, Arterial 269 (H) 74 - 99 mg/dL    POCT Lactate, Arterial 1.7 0.4 - 2.0 mmol/L    POCT Base Excess, Arterial 9.8 (H) -2.0 - 3.0 mmol/L    POCT HCO3 Calculated, Arterial 38.1 (H) 22.0 - 26.0 mmol/L    POCT Hemoglobin, Arterial 12.9 12.0 - 16.0 g/dL    POCT Anion Gap, Arterial 4 (L) 10 - 25 mmo/L    Patient Temperature      FiO2 65 %    Ipap CMH2O 20.0 cm H2O    Epap CMH2O 5.0 cm H2O   POCT GLUCOSE    Collection Time: 01/10/24  8:21 AM   Result Value Ref Range    POCT Glucose 232 (H) 74 - 99 mg/dL   POCT GLUCOSE    Collection Time: 01/10/24 10:53 AM   Result Value Ref Range    POCT Glucose 255 (H) 74 - 99 mg/dL   Urinalysis with Reflex Microscopic    Collection Time: 01/10/24 11:55 AM   Result Value Ref Range    Color, Urine Straw Straw, Yellow    Appearance, Urine Clear Clear    Specific Gravity, Urine 1.008 1.005 - 1.035    pH, Urine 5.0 5.0, 5.5, 6.0, 6.5, 7.0, 7.5, 8.0    Protein, Urine NEGATIVE NEGATIVE mg/dL    Glucose, Urine NEGATIVE NEGATIVE mg/dL    Blood, Urine NEGATIVE NEGATIVE    Ketones, Urine NEGATIVE NEGATIVE mg/dL    Bilirubin, Urine NEGATIVE NEGATIVE    Urobilinogen, Urine <2.0 <2.0 mg/dL    Nitrite, Urine NEGATIVE NEGATIVE    Leukocyte Esterase, Urine NEGATIVE NEGATIVE   Drug Screen, Urine    Collection Time: 01/10/24 11:55 AM   Result Value Ref Range    Amphetamine Screen, Urine Presumptive Negative Presumptive Negative    Barbiturate Screen, Urine Presumptive Negative Presumptive Negative    Benzodiazepines Screen, Urine Presumptive Negative Presumptive Negative    Cannabinoid Screen, Urine Presumptive Positive (A) Presumptive Negative     Cocaine Metabolite Screen, Urine Presumptive Negative Presumptive Negative    Fentanyl Screen, Urine Presumptive Negative Presumptive Negative    Opiate Screen, Urine Presumptive Negative Presumptive Negative    Oxycodone Screen, Urine Presumptive Negative Presumptive Negative    PCP Screen, Urine Presumptive Negative Presumptive Negative   POCT GLUCOSE    Collection Time: 01/10/24  3:28 PM   Result Value Ref Range    POCT Glucose 203 (H) 74 - 99 mg/dL   POCT GLUCOSE    Collection Time: 01/10/24  9:09 PM   Result Value Ref Range    POCT Glucose 166 (H) 74 - 99 mg/dL   POCT GLUCOSE    Collection Time: 01/11/24  1:17 AM   Result Value Ref Range    POCT Glucose 139 (H) 74 - 99 mg/dL   CBC and Auto Differential    Collection Time: 01/11/24  4:03 AM   Result Value Ref Range    WBC 16.2 (H) 4.4 - 11.3 x10*3/uL    nRBC 0.2 (H) 0.0 - 0.0 /100 WBCs    RBC 3.92 (L) 4.00 - 5.20 x10*6/uL    Hemoglobin 12.1 12.0 - 16.0 g/dL    Hematocrit 39.4 36.0 - 46.0 %     (H) 80 - 100 fL    MCH 30.9 26.0 - 34.0 pg    MCHC 30.7 (L) 32.0 - 36.0 g/dL    RDW 16.7 (H) 11.5 - 14.5 %    Platelets 147 (L) 150 - 450 x10*3/uL    Neutrophils % 76.5 40.0 - 80.0 %    Immature Granulocytes %, Automated 1.8 (H) 0.0 - 0.9 %    Lymphocytes % 16.1 13.0 - 44.0 %    Monocytes % 5.4 2.0 - 10.0 %    Eosinophils % 0.0 0.0 - 6.0 %    Basophils % 0.2 0.0 - 2.0 %    Neutrophils Absolute 12.40 (H) 1.20 - 7.70 x10*3/uL    Immature Granulocytes Absolute, Automated 0.30 0.00 - 0.70 x10*3/uL    Lymphocytes Absolute 2.61 1.20 - 4.80 x10*3/uL    Monocytes Absolute 0.87 0.10 - 1.00 x10*3/uL    Eosinophils Absolute 0.00 0.00 - 0.70 x10*3/uL    Basophils Absolute 0.04 0.00 - 0.10 x10*3/uL   Comprehensive metabolic panel    Collection Time: 01/11/24  4:03 AM   Result Value Ref Range    Glucose 116 (H) 74 - 99 mg/dL    Sodium 143 136 - 145 mmol/L    Potassium 4.2 3.5 - 5.3 mmol/L    Chloride 99 98 - 107 mmol/L    Bicarbonate 36 (H) 21 - 32 mmol/L    Anion Gap 12 10 - 20  mmol/L    Urea Nitrogen 20 6 - 23 mg/dL    Creatinine 0.74 0.50 - 1.05 mg/dL    eGFR >90 >60 mL/min/1.73m*2    Calcium 9.2 8.6 - 10.3 mg/dL    Albumin 3.6 3.4 - 5.0 g/dL    Alkaline Phosphatase 80 33 - 110 U/L    Total Protein 6.1 (L) 6.4 - 8.2 g/dL    AST 16 9 - 39 U/L    Bilirubin, Total 0.4 0.0 - 1.2 mg/dL    ALT 33 7 - 45 U/L   Magnesium    Collection Time: 01/11/24  4:03 AM   Result Value Ref Range    Magnesium 1.95 1.60 - 2.40 mg/dL   Phosphorus    Collection Time: 01/11/24  4:03 AM   Result Value Ref Range    Phosphorus 3.3 2.5 - 4.9 mg/dL   POCT GLUCOSE    Collection Time: 01/11/24  4:29 AM   Result Value Ref Range    POCT Glucose 154 (H) 74 - 99 mg/dL   POCT GLUCOSE    Collection Time: 01/11/24  8:13 AM   Result Value Ref Range    POCT Glucose 160 (H) 74 - 99 mg/dL   COOX PANEL, ARTERIAL    Collection Time: 01/11/24 10:28 AM   Result Value Ref Range    POCT Hemoglobin, Arterial 12.1 12.0 - 16.0 g/dL    POCT Oxy Hemoglobin, Arterial 97.6 94.0 - 98.0 %    POCT Carboxyhemoglobin, Arterial 1.3 %    POCT Methemoglobin, Arterial 0.9 0.0 - 1.5 %    POCT Deoxy Hemoglobin, Arterial 0.2 0.0 - 5.0 %    Site of Arterial Puncture Radial Left     Andrew's Test Positive    POCT GLUCOSE    Collection Time: 01/11/24 11:52 AM   Result Value Ref Range    POCT Glucose 223 (H) 74 - 99 mg/dL   Transthoracic Echo (TTE) Complete    Collection Time: 01/11/24  1:11 PM   Result Value Ref Range    BSA 1.84 m2        Current Facility-Administered Medications:     busPIRone (Buspar) tablet 20 mg, 20 mg, oral, TID, Sebastian Dimas MD    citalopram (CeleXA) tablet 40 mg, 40 mg, oral, Daily, BARTOLOME Garcia-CNP, 40 mg at 01/11/24 0820    [Held by provider] clonazePAM (KlonoPIN) tablet 1 mg, 1 mg, oral, BID PRN, VALDEMAR Garcia    dextrose 10 % in water (D10W) infusion, 0.3 g/kg/hr, intravenous, Once PRN, VALDEMAR Garcia    dextrose 50 % injection 25 g, 25 g, intravenous, q15 min PRN, VALDEMAR Garcia     doxycycline (Vibra-Tabs) tablet 100 mg, 100 mg, oral, q12h BLUE, VALDEMAR Aedn, 100 mg at 01/11/24 0820    glucagon (Glucagen) injection 1 mg, 1 mg, intramuscular, q15 min PRN, VALDEMAR Garcia    hydrOXYzine pamoate (Vistaril) capsule 25 mg, 25 mg, oral, TID PRN, VALDEMAR Garcia, 25 mg at 01/11/24 1153    insulin glargine (Lantus) injection 10 Units, 10 Units, subcutaneous, q24h, VALDEMAR Aden, 10 Units at 01/11/24 0819    insulin lispro (HumaLOG) injection 0-5 Units, 0-5 Units, subcutaneous, Before meals & nightly, VALDEMAR Aden    ipratropium-albuteroL (Duo-Neb) 0.5-2.5 mg/3 mL nebulizer solution 3 mL, 3 mL, nebulization, q4h, VALDEMAR Garcia, 3 mL at 01/11/24 1130    ipratropium-albuteroL (Duo-Neb) 0.5-2.5 mg/3 mL nebulizer solution 3 mL, 3 mL, nebulization, q4h PRN, VALDEMAR Garcia    levothyroxine (Synthroid, Levoxyl) tablet 150 mcg, 150 mcg, oral, Daily, VALDEMAR Garcia, 150 mcg at 01/11/24 0612    methylPREDNISolone sod succinate (PF) (SOLU-Medrol) 40 mg/mL injection 40 mg, 40 mg, intravenous, q24h, VALDEMAR Aden, 40 mg at 01/11/24 0612    [Held by provider] metoprolol tartrate (Lopressor) tablet 25 mg, 25 mg, oral, BID, VALDEMAR Garcia, 25 mg at 01/10/24 0823    nicotine (Nicoderm CQ) 21 mg/24 hr patch 1 patch, 1 patch, transdermal, Daily, 1 patch at 01/11/24 0820 **FOLLOWED BY** [START ON 2/21/2024] nicotine (Nicoderm CQ) 14 mg/24 hr patch 1 patch, 1 patch, transdermal, Daily **FOLLOWED BY** [START ON 3/6/2024] nicotine (Nicoderm CQ) 7 mg/24 hr patch 1 patch, 1 patch, transdermal, Daily, BARTOLOME Garcia-CNP    oxygen (O2) therapy, , inhalation, Continuous PRN - O2/gases, Renzo Castanon MD, Rate Verify at 01/11/24 0700    oxygen (O2) therapy, , inhalation, Continuous PRN - O2/gases, BARTOLOME Garcia-CNP, Rate Verify at 01/11/24 1100    pantoprazole (ProtoNix) EC tablet 40  mg, 40 mg, oral, Daily before breakfast, VALDEMAR Aden, 40 mg at 01/11/24 0612    perflutren lipid microspheres (Definity) injection 0.5-10 mL of dilution, 0.5-10 mL of dilution, intravenous, Once in imaging, VALDEMAR Aden    [Held by provider] risperiDONE (RisperDAL) tablet 0.25 mg, 0.25 mg, oral, Daily, VALDEMAR Garcia, 0.25 mg at 01/10/24 0823    rivaroxaban (Xarelto) tablet 20 mg, 20 mg, oral, Daily with evening meal, VALDEMAR Garcia, 20 mg at 01/10/24 1736     Psychiatric Risk Assessment  Violence Risk Assessment: none  Acute Risk of Harm to Others is Considered: low   Suicide Risk Assessment: chronic medical illness and severe anxiety  Protective Factors against Suicide: fear of suicide, moral objections to suicide, sense of responsibility toward family, and social support/connectedness  Acute Risk of Harm to Self is Considered: low    Medication Consent: risks, benefits, side effects reviewed for all ordered meds    Sebastian Dimas MDInpatient consult to Psychiatry  Consult performed by: Sebastian Dimas MD  Consult ordered by: VALDEMAR Aden

## 2024-01-11 NOTE — NURSING NOTE
Pt  at bedside, RN noticed bag at bedside.  closed curtain,  RN investigates situation and found pt trying to take own medications from home that  had brought in.  Pt puts medication back in bottle and into bag of multiple medication bottles before RN could identify which medication she was trying to take.  Pt asking to speak with someone regarding medications.  Jose WHITE called to bedside.

## 2024-01-11 NOTE — CONSULTS
"Social Work Note  The LSW participated in the interdisciplinary rounds this date. The critical CNP reports that the her insurance change has effected her O2 supply company who no longer can provide the O2 to the home. The LSW met with the respiratory therapist who contacted the liaison from Medical Services. The LSW and Liaison from [facility] met with the pt. The pt states that her O2 provider for \"a long time\" has been [facility]. The pt does not know if her [employer] or [employer] is now her primary insurance. The pt states that [employer] can no longer provide the O2. The [facility] and [employer] does not accept [employer] if it is the primary insurance. Then [employer] [employer] company which accepts [employer] was contacted at [phone]. The pt agreed to [employer] company verifying her primary insurances as either [employer] or [employer]. Awaiting [employer] [employer] primary insurance verification. A new home O2 company referral would need to be handled as new O2 with needed qualifying testing prior to discharge home.    "

## 2024-01-11 NOTE — CONSULTS
Reason For Consult  Short of breath, respiratory, sleep apnea.    History Of Present Illness  Stephenie Mccray is a 43 y.o. female presenting with shortness of breath cough wheezing chest tightness and congestion orthopnea and PND.  Patient had recurrent admission with similar complaint last admission ..     Past Medical History  She has a past medical history of Arthritis, COPD (chronic obstructive pulmonary disease) (CMS/[facility]), Diabetes mellitus (CMS/[facility]), History of transfusion, Hypothyroidism, and CHARLIE (obstructive sleep apnea).  Hypertension, dyslipidemia, diabetes, congestive require, schizophrenia, history of DVT and PE, migraine..    Surgical History  She has a past surgical history that includes Tonsillectomy; Eye surgery;  section, low transverse; Hernia repair; and Ventral hernia repair.     Social History  She reports that she has been smoking cigarettes. She has been smoking an average of 1 pack per day. She has never been exposed to tobacco smoke. She has never used smokeless tobacco. She reports that she does not drink alcohol and does not use drugs.    Family History  Family History   Problem Relation Name Age of Onset    Fibromyalgia Father      Hypertension Brother      Thyroid disease Maternal Grandmother      Thyroid disease Paternal Grandmother      Lung cancer Paternal Grandfather      Coronary artery disease Other Grandmother         Allergies  Fluticasone furoate-vilanterol, Fluticasone-umeclidin-vilanter, Sumatriptan, Vortioxetine, and Levofloxacin    Review of Systems  Review of Systems   Constitutional:  Positive for activity change, appetite change, fatigue and unexpected weight change.   HENT:  Positive for congestion.    Eyes: Negative.    Respiratory:  Positive for cough, shortness of breath and wheezing.    Gastrointestinal: Negative.    Endocrine: Negative.    Genitourinary: Negative.    Musculoskeletal:  Positive for arthralgias and myalgias.   Skin: Negative.   "  Allergic/Immunologic: Negative.    Neurological:  Positive for dizziness, weakness, light-headedness and headaches.   Hematological: Negative.    Psychiatric/Behavioral:  Positive for confusion and sleep disturbance.    All other systems reviewed and are negative.        Physical Exam  Physical Exam  Vitals reviewed.   Constitutional:       General: She is in acute distress.      Appearance: She is obese.   HENT:      Head: Normocephalic and atraumatic.      Right Ear: External ear normal.      Left Ear: External ear normal.      Mouth/Throat:      Mouth: Mucous membranes are dry.   Eyes:      Extraocular Movements: Extraocular movements intact.      Conjunctiva/sclera: Conjunctivae normal.      Pupils: Pupils are equal, round, and reactive to light.   Cardiovascular:      Rate and Rhythm: Regular rhythm.      Pulses: Normal pulses.      Heart sounds: Normal heart sounds.   Pulmonary:      Effort: Respiratory distress present.      Breath sounds: Wheezing, rhonchi and rales present.   Abdominal:      General: Bowel sounds are normal.      Palpations: Abdomen is soft.   Musculoskeletal:         General: Normal range of motion.      Cervical back: Normal range of motion and neck supple.   Skin:     General: Skin is warm.      Capillary Refill: Capillary refill takes 2 to 3 seconds.   Neurological:      General: No focal deficit present.   Psychiatric:         Mood and Affect: Mood normal.         Behavior: Behavior normal.         Thought Content: Thought content normal.           Last Recorded Vitals  Blood pressure 108/60, pulse 72, temperature 35.5 °C (95.9 °F), temperature source Temporal, resp. rate 23, height 1.575 m (5' 2\"), weight 77.8 kg (171 lb 8.3 oz), SpO2 92 %.    Relevant Results  Results for orders placed or performed during the hospital encounter of 01/09/24 (from the past 96 hour(s))   Influenza A, and B PCR   Result Value Ref Range    Flu A Result Not Detected Not Detected    Flu B Result Not " Detected Not Detected   SARS-CoV-2 RT PCR   Result Value Ref Range    Coronavirus 2019, PCR Not Detected Not Detected   RSV PCR   Result Value Ref Range    RSV PCR Not Detected Not Detected   CBC and Auto Differential   Result Value Ref Range    WBC 9.2 4.4 - 11.3 x10*3/uL    nRBC 1.2 (H) 0.0 - 0.0 /100 WBCs    RBC 3.88 (L) 4.00 - 5.20 x10*6/uL    Hemoglobin 12.0 12.0 - 16.0 g/dL    Hematocrit 39.3 36.0 - 46.0 %     (H) 80 - 100 fL    MCH 30.9 26.0 - 34.0 pg    MCHC 30.5 (L) 32.0 - 36.0 g/dL    RDW 16.0 (H) 11.5 - 14.5 %    Platelets 150 150 - 450 x10*3/uL    Immature Granulocytes %, Automated 6.0 (H) 0.0 - 0.9 %    Immature Granulocytes Absolute, Automated 0.55 0.00 - 0.70 x10*3/uL   Comprehensive Metabolic Panel   Result Value Ref Range    Glucose 149 (H) 74 - 99 mg/dL    Sodium 141 136 - 145 mmol/L    Potassium 4.4 3.5 - 5.3 mmol/L    Chloride 103 98 - 107 mmol/L    Bicarbonate 31 21 - 32 mmol/L    Anion Gap 11 10 - 20 mmol/L    Urea Nitrogen 22 6 - 23 mg/dL    Creatinine 0.98 0.50 - 1.05 mg/dL    eGFR 74 >60 mL/min/1.73m*2    Calcium 8.8 8.6 - 10.3 mg/dL    Albumin 3.6 3.4 - 5.0 g/dL    Alkaline Phosphatase 78 33 - 110 U/L    Total Protein 6.0 (L) 6.4 - 8.2 g/dL    AST 10 9 - 39 U/L    Bilirubin, Total 0.3 0.0 - 1.2 mg/dL    ALT 18 7 - 45 U/L   hCG, quantitative, pregnancy   Result Value Ref Range    HCG, Beta-Quantitative <2 <5 mIU/mL   Troponin I, High Sensitivity, Initial   Result Value Ref Range    Troponin I, High Sensitivity 3 0 - 13 ng/L   Magnesium   Result Value Ref Range    Magnesium 1.66 1.60 - 2.40 mg/dL   Manual Differential   Result Value Ref Range    Neutrophils %, Manual 37.0 40.0 - 80.0 %    Bands %, Manual 2.0 0.0 - 5.0 %    Lymphocytes %, Manual 55.0 13.0 - 44.0 %    Monocytes %, Manual 3.0 2.0 - 10.0 %    Eosinophils %, Manual 0.0 0.0 - 6.0 %    Basophils %, Manual 1.0 0.0 - 2.0 %    Atypical Lymphocytes %, Manual 2.0 0.0 - 2.0 %    Seg Neutrophils Absolute, Manual 3.40 1.20 - 7.00  x10*3/uL    Bands Absolute, Manual 0.18 0.00 - 0.70 x10*3/uL    Lymphocytes Absolute, Manual 5.06 (H) 1.20 - 4.80 x10*3/uL    Monocytes Absolute, Manual 0.28 0.10 - 1.00 x10*3/uL    Eosinophils Absolute, Manual 0.00 0.00 - 0.70 x10*3/uL    Basophils Absolute, Manual 0.09 0.00 - 0.10 x10*3/uL    Atypical Lymphs Absolute, Manual 0.18 0.00 - 0.50 x10*3/uL    Total Cells Counted 100     Neutrophils Absolute, Manual 3.58 1.20 - 7.70 x10*3/uL    RBC Morphology See Below     Polychromasia Mild     Ovalocytes Few     Teardrop Cells Few    B-type natriuretic peptide   Result Value Ref Range    BNP 13 0 - 99 pg/mL   Blood Gas Arterial Full Panel   Result Value Ref Range    POCT pH, Arterial 7.31 (L) 7.38 - 7.42 pH    POCT pCO2, Arterial 71 (HH) 38 - 42 mm Hg    POCT pO2, Arterial 66 (L) 85 - 95 mm Hg    POCT SO2, Arterial 94 94 - 100 %    POCT Oxy Hemoglobin, Arterial 83.2 (L) 94.0 - 98.0 %    POCT Hematocrit Calculated, Arterial 36.0 36.0 - 46.0 %    POCT Sodium, Arterial 139 136 - 145 mmol/L    POCT Potassium, Arterial 4.5 3.5 - 5.3 mmol/L    POCT Chloride, Arterial 104 98 - 107 mmol/L    POCT Ionized Calcium, Arterial 1.26 1.10 - 1.33 mmol/L    POCT Glucose, Arterial 148 (H) 74 - 99 mg/dL    POCT Lactate, Arterial 0.7 0.4 - 2.0 mmol/L    POCT Base Excess, Arterial 7.2 (H) -2.0 - 3.0 mmol/L    POCT HCO3 Calculated, Arterial 35.7 (H) 22.0 - 26.0 mmol/L    POCT Hemoglobin, Arterial 12.1 12.0 - 16.0 g/dL    POCT Anion Gap, Arterial 4 (L) 10 - 25 mmo/L    Patient Temperature      FiO2 36 %    Apparatus CANNULA     Critical Called By DFOX RRT     Critical Called To DR CORLEY     Critical Call Time 2306.0000     Critical Read Back Y    ECG 12 lead   Result Value Ref Range    Ventricular Rate 107 BPM    Atrial Rate 107 BPM    UT Interval 128 ms    QRS Duration 88 ms    QT Interval 284 ms    QTC Calculation(Bazett) 379 ms    P Axis 43 degrees    R Axis -21 degrees    T Axis 20 degrees    QRS Count 18 beats    Q Onset 225 ms    P  Onset 161 ms    P Offset 210 ms    T Offset 367 ms    QTC Fredericia 344 ms   Troponin, High Sensitivity, 1 Hour   Result Value Ref Range    Troponin I, High Sensitivity 4 0 - 13 ng/L   Blood Gas Arterial Full Panel   Result Value Ref Range    POCT pH, Arterial 7.26 (L) 7.38 - 7.42 pH    POCT pCO2, Arterial 80 (HH) 38 - 42 mm Hg    POCT pO2, Arterial 76 (L) 85 - 95 mm Hg    POCT SO2, Arterial 96 94 - 100 %    POCT Oxy Hemoglobin, Arterial 87.5 (L) 94.0 - 98.0 %    POCT Hematocrit Calculated, Arterial 35.0 (L) 36.0 - 46.0 %    POCT Sodium, Arterial 138 136 - 145 mmol/L    POCT Potassium, Arterial 4.5 3.5 - 5.3 mmol/L    POCT Chloride, Arterial 104 98 - 107 mmol/L    POCT Ionized Calcium, Arterial 1.28 1.10 - 1.33 mmol/L    POCT Glucose, Arterial 159 (H) 74 - 99 mg/dL    POCT Lactate, Arterial 0.6 0.4 - 2.0 mmol/L    POCT Base Excess, Arterial 6.4 (H) -2.0 - 3.0 mmol/L    POCT HCO3 Calculated, Arterial 35.9 (H) 22.0 - 26.0 mmol/L    POCT Hemoglobin, Arterial 11.8 (L) 12.0 - 16.0 g/dL    POCT Anion Gap, Arterial 3 (L) 10 - 25 mmo/L    Patient Temperature      FiO2 65 %    Ipap CMH2O 15.0 cm H2O    Epap CMH2O 5.0 cm H2O    Critical Called By DFOX RRT     Critical Called To DR CORLEY     Critical Call Time 113.0000     Critical Read Back Y    POCT GLUCOSE   Result Value Ref Range    POCT Glucose 190 (H) 74 - 99 mg/dL   Magnesium   Result Value Ref Range    Magnesium 2.21 1.60 - 2.40 mg/dL   Phosphorus   Result Value Ref Range    Phosphorus 4.0 2.5 - 4.9 mg/dL   CBC and Auto Differential   Result Value Ref Range    WBC 8.4 4.4 - 11.3 x10*3/uL    nRBC 1.1 (H) 0.0 - 0.0 /100 WBCs    RBC 4.08 4.00 - 5.20 x10*6/uL    Hemoglobin 12.7 12.0 - 16.0 g/dL    Hematocrit 41.8 36.0 - 46.0 %     (H) 80 - 100 fL    MCH 31.1 26.0 - 34.0 pg    MCHC 30.4 (L) 32.0 - 36.0 g/dL    RDW 16.1 (H) 11.5 - 14.5 %    Platelets 141 (L) 150 - 450 x10*3/uL    Immature Granulocytes %, Automated 5.8 (H) 0.0 - 0.9 %    Immature Granulocytes  Absolute, Automated 0.49 0.00 - 0.70 x10*3/uL   Comprehensive metabolic panel   Result Value Ref Range    Glucose 181 (H) 74 - 99 mg/dL    Sodium 141 136 - 145 mmol/L    Potassium 4.1 3.5 - 5.3 mmol/L    Chloride 100 98 - 107 mmol/L    Bicarbonate 33 (H) 21 - 32 mmol/L    Anion Gap 12 10 - 20 mmol/L    Urea Nitrogen 19 6 - 23 mg/dL    Creatinine 0.95 0.50 - 1.05 mg/dL    eGFR 77 >60 mL/min/1.73m*2    Calcium 9.2 8.6 - 10.3 mg/dL    Albumin 3.8 3.4 - 5.0 g/dL    Alkaline Phosphatase 88 33 - 110 U/L    Total Protein 6.6 6.4 - 8.2 g/dL    AST 16 9 - 39 U/L    Bilirubin, Total 0.3 0.0 - 1.2 mg/dL    ALT 27 7 - 45 U/L   Lipid panel   Result Value Ref Range    Cholesterol 227 (H) 0 - 199 mg/dL    HDL-Cholesterol 44.3 mg/dL    Cholesterol/HDL Ratio 5.1     LDL Calculated 138 (H) <=99 mg/dL    VLDL 45 (H) 0 - 40 mg/dL    Triglycerides 225 (H) 0 - 149 mg/dL    Non HDL Cholesterol 183 (H) 0 - 149 mg/dL   Manual Differential   Result Value Ref Range    Neutrophils %, Manual 68.0 40.0 - 80.0 %    Bands %, Manual 4.0 0.0 - 5.0 %    Lymphocytes %, Manual 23.0 13.0 - 44.0 %    Monocytes %, Manual 1.0 2.0 - 10.0 %    Eosinophils %, Manual 0.0 0.0 - 6.0 %    Basophils %, Manual 0.0 0.0 - 2.0 %    Atypical Lymphocytes %, Manual 1.0 0.0 - 2.0 %    Myelocytes %, Manual 3.0 0.0 - 0.0 %    Seg Neutrophils Absolute, Manual 5.71 1.20 - 7.00 x10*3/uL    Bands Absolute, Manual 0.34 0.00 - 0.70 x10*3/uL    Lymphocytes Absolute, Manual 1.93 1.20 - 4.80 x10*3/uL    Monocytes Absolute, Manual 0.08 (L) 0.10 - 1.00 x10*3/uL    Eosinophils Absolute, Manual 0.00 0.00 - 0.70 x10*3/uL    Basophils Absolute, Manual 0.00 0.00 - 0.10 x10*3/uL    Atypical Lymphs Absolute, Manual 0.08 0.00 - 0.50 x10*3/uL    Myelocytes Absolute, Manual 0.25 0.00 - 0.00 x10*3/uL    Total Cells Counted 100     Neutrophils Absolute, Manual 6.05 1.20 - 7.70 x10*3/uL    RBC Morphology See Below     Polychromasia Mild    Acute Toxicology Panel, Blood   Result Value Ref Range     Acetaminophen <10.0 10.0 - 30.0 ug/mL    Salicylate  <3 4 - 20 mg/dL    Alcohol <10 <=10 mg/dL   Blood Gas Arterial Full Panel   Result Value Ref Range    POCT pH, Arterial 7.34 (L) 7.38 - 7.42 pH    POCT pCO2, Arterial 72 (HH) 38 - 42 mm Hg    POCT pO2, Arterial 69 (L) 85 - 95 mm Hg    POCT SO2, Arterial 94 94 - 100 %    POCT Oxy Hemoglobin, Arterial 90.0 (L) 94.0 - 98.0 %    POCT Hematocrit Calculated, Arterial 39.0 36.0 - 46.0 %    POCT Sodium, Arterial 136 136 - 145 mmol/L    POCT Potassium, Arterial 4.2 3.5 - 5.3 mmol/L    POCT Chloride, Arterial 100 98 - 107 mmol/L    POCT Ionized Calcium, Arterial 1.22 1.10 - 1.33 mmol/L    POCT Glucose, Arterial 248 (H) 74 - 99 mg/dL    POCT Lactate, Arterial 1.6 0.4 - 2.0 mmol/L    POCT Base Excess, Arterial 10.2 (H) -2.0 - 3.0 mmol/L    POCT HCO3 Calculated, Arterial 38.8 (H) 22.0 - 26.0 mmol/L    POCT Hemoglobin, Arterial 12.9 12.0 - 16.0 g/dL    POCT Anion Gap, Arterial 1 (L) 10 - 25 mmo/L    Patient Temperature      FiO2 65 %    Ventilator Mode BiPAP     Spontaneous Tidal Volume 458 mL    Total Minute Volume 11.3 Liter    Frequency (BPM) 24 bpm    Inspiratory Time 0.9     Ipap CMH2O 20.0 cm H2O    Epap CMH2O 5.0 cm H2O    Critical Called By DAVID CASTELLON     Critical Called To CARLOS CONTRERAS     Critical Call Time 557.0000     Critical Read Back Y     Site of Arterial Puncture Brachial Left     Andrew's Test     Blood Gas Arterial Full Panel   Result Value Ref Range    POCT pH, Arterial 7.35 (L) 7.38 - 7.42 pH    POCT pCO2, Arterial 69 (H) 38 - 42 mm Hg    POCT pO2, Arterial 73 (L) 85 - 95 mm Hg    POCT SO2, Arterial 96 94 - 100 %    POCT Oxy Hemoglobin, Arterial 91.8 (L) 94.0 - 98.0 %    POCT Hematocrit Calculated, Arterial 39.0 36.0 - 46.0 %    POCT Sodium, Arterial 137 136 - 145 mmol/L    POCT Potassium, Arterial 4.5 3.5 - 5.3 mmol/L    POCT Chloride, Arterial 99 98 - 107 mmol/L    POCT Ionized Calcium, Arterial 1.23 1.10 - 1.33 mmol/L    POCT Glucose, Arterial 269 (H) 74  - 99 mg/dL    POCT Lactate, Arterial 1.7 0.4 - 2.0 mmol/L    POCT Base Excess, Arterial 9.8 (H) -2.0 - 3.0 mmol/L    POCT HCO3 Calculated, Arterial 38.1 (H) 22.0 - 26.0 mmol/L    POCT Hemoglobin, Arterial 12.9 12.0 - 16.0 g/dL    POCT Anion Gap, Arterial 4 (L) 10 - 25 mmo/L    Patient Temperature      FiO2 65 %    Ipap CMH2O 20.0 cm H2O    Epap CMH2O 5.0 cm H2O   POCT GLUCOSE   Result Value Ref Range    POCT Glucose 232 (H) 74 - 99 mg/dL   POCT GLUCOSE   Result Value Ref Range    POCT Glucose 255 (H) 74 - 99 mg/dL   Urinalysis with Reflex Microscopic   Result Value Ref Range    Color, Urine Straw Straw, Yellow    Appearance, Urine Clear Clear    Specific Gravity, Urine 1.008 1.005 - 1.035    pH, Urine 5.0 5.0, 5.5, 6.0, 6.5, 7.0, 7.5, 8.0    Protein, Urine NEGATIVE NEGATIVE mg/dL    Glucose, Urine NEGATIVE NEGATIVE mg/dL    Blood, Urine NEGATIVE NEGATIVE    Ketones, Urine NEGATIVE NEGATIVE mg/dL    Bilirubin, Urine NEGATIVE NEGATIVE    Urobilinogen, Urine <2.0 <2.0 mg/dL    Nitrite, Urine NEGATIVE NEGATIVE    Leukocyte Esterase, Urine NEGATIVE NEGATIVE   Drug Screen, Urine   Result Value Ref Range    Amphetamine Screen, Urine Presumptive Negative Presumptive Negative    Barbiturate Screen, Urine Presumptive Negative Presumptive Negative    Benzodiazepines Screen, Urine Presumptive Negative Presumptive Negative    Cannabinoid Screen, Urine Presumptive Positive (A) Presumptive Negative    Cocaine Metabolite Screen, Urine Presumptive Negative Presumptive Negative    Fentanyl Screen, Urine Presumptive Negative Presumptive Negative    Opiate Screen, Urine Presumptive Negative Presumptive Negative    Oxycodone Screen, Urine Presumptive Negative Presumptive Negative    PCP Screen, Urine Presumptive Negative Presumptive Negative   POCT GLUCOSE   Result Value Ref Range    POCT Glucose 203 (H) 74 - 99 mg/dL    ECG 12 lead    Result Date: 1/10/2024  Sinus rhythm with short FL Possible Inferior infarct (cited on or before  19-DEC-2023) Abnormal ECG When compared with ECG of 19-DEC-2023 14:56, (unconfirmed) QT has shortened See ED provider note for full interpretation and clinical correlation Confirmed by Kim Wall ([id number]) on 1/10/2024 12:36:15 PM    ECG 12 lead    Result Date: 1/10/2024  Sinus tachycardia Inferior infarct (cited on or before 29-DEC-2023) Abnormal ECG When compared with ECG of 29-DEC-2023 21:35, Nonspecific T wave abnormality, improved in Lateral leads See ED provider note for full interpretation and clinical correlation Confirmed by Kim Wall ([id number]) on 1/10/2024 12:25:13 PM    XR chest 1 view    Result Date: 1/10/2024  Interpreted By:  Tavares Ellis, STUDY: XR CHEST 1 VIEW;  1/10/2024 5:38 am   INDICATION: Signs/Symptoms:resp distress.   COMPARISON: Portable chest, 9 January 2024; CT chest with contrast 27 December 2023   ACCESSION NUMBER(S): [id number]   ORDERING CLINICIAN: DERRICK CHAMBERS   TECHNIQUE: Single frontal view of the chest; Portable technique   FINDINGS: Different patient positioning from yesterday   The cardiomediastinal silhouette is unchanged   Right basilar atelectasis   Opacification at left lung base is at least in part due to a prominent cardiophrenic fat pad and suboptimal patient positioning. Difficult to entirely exclude left basilar pneumonia and/or atelectasis   No pneumothorax       As above   MACRO: None   Signed by: Tavares Ellis 1/10/2024 8:54 AM Dictation workstation:   [id number]    XR chest 1 view    Result Date: 1/9/2024  STUDY: Chest Radiograph;  1/9/2024 10:55 PM INDICATION: Shortness of breath. COMPARISON: 12/29/2023 XR chest ACCESSION NUMBER(S): [id number] ORDERING CLINICIAN: PAUL VELÁSQUEZ TECHNIQUE:  Frontal chest was obtained at 22:55 hours. FINDINGS: CARDIOMEDIASTINAL SILHOUETTE: Cardiomediastinal silhouette enlarged..  LUNGS: Lungs reveals prominence of the central pulmonary vasculature and mild interstitial prominence. ABDOMEN: No remarkable upper abdominal  findings.  BONES: No acute osseous changes.    Prominence of pulmonary interstitium similar as compared previous exam. Signed by Niels Perez DO    ECG 12 lead    Result Date: 12/30/2023  Sinus tachycardia Possible Left atrial enlargement Inferior infarct (cited on or before 29-DEC-2023) Abnormal ECG When compared with ECG of 29-DEC-2023 18:28, Nonspecific T wave abnormality has replaced inverted T waves in Lateral leads See ED provider note for full interpretation and clinical correlation Confirmed by Cheyanne Burger ([id number]) on 12/30/2023 7:28:58 PM    ECG 12 lead    Result Date: 12/29/2023  Normal sinus rhythm Possible Left atrial enlargement Borderline ECG When compared with ECG of 19-DEC-2023 14:56, (unconfirmed) Borderline criteria for Inferior infarct are no longer Present See ED provider note for full interpretation and clinical correlation Confirmed by Cheyanne Burger ([id number]) on 12/29/2023 11:25:36 PM    ECG 12 lead    Result Date: 12/29/2023  Normal sinus rhythm Possible Left atrial enlargement Left axis deviation Prolonged QT Abnormal ECG When compared with ECG of 27-DEC-2023 20:16, (unconfirmed) No significant change was found See ED provider note for full interpretation and clinical correlation Confirmed by Cheyanne Burger ([id number]) on 12/29/2023 11:25:05 PM    XR chest 1 view    Result Date: 12/29/2023  STUDY: Chest Radiograph;  12/29/2023 6:26 PM INDICATION: Shortness of breath.  COMPARISON: XR chest 12/27/2023, 12/19/2023.  ACCESSION NUMBER(S): [id number] ORDERING CLINICIAN: RAHUL LUCAS TECHNIQUE:  Frontal chest was obtained at 1825 hours. FINDINGS: CARDIOMEDIASTINAL SILHOUETTE: Heart is mildly enlarged with mild pulmonary vascular congestion.  LUNGS: Lungs are clear. Emphysematous changes noted.  ABDOMEN: No remarkable upper abdominal findings.  BONES: No acute osseous changes.    Mild cardiomegaly with mild pulmonary vascular congestion. Emphysema. Signed by Bean Zaidi MD    ECG 12  lead    Result Date: 12/29/2023  Sinus tachycardia Possible Inferior infarct , age undetermined T wave abnormality, consider lateral ischemia Abnormal ECG When compared with ECG of 27-DEC-2023 21:22, (unconfirmed) Nonspecific T wave abnormality, worse in Inferior leads T wave inversion now evident in Lateral leads See ED provider note for full interpretation and clinical correlation Confirmed by Cheyanne Burger ([id number]) on 12/29/2023 6:38:15 PM    CT angio chest for pulmonary embolism    Result Date: 12/27/2023  Interpreted By:  Sin Benítez, STUDY: CT ANGIO CHEST FOR PULMONARY EMBOLISM; 12/27/2023 9:18 pm   INDICATION: Signs/Symptoms:Shortness of breath, hypoxic, tachycardic.   COMPARISON: CT chest dated 10/31/2023.   ACCESSION NUMBER(S): [id number]   ORDERING CLINICIAN: BROOKE TRACEY   TECHNIQUE: Contiguous axial CT images were obtained through the chest at 2 mm slice thickness following administration of intravenous contrast utilizing pulmonary artery bolus tracking. 75 cc of Omnipaque 350 was administered. The images were then reconstructed in the coronal and sagittal planes. MIP images were created and reviewed.   FINDINGS: POTENTIAL LIMITATIONS OF THE STUDY: None   HEART and VESSELS: There is dense opacification of the pulmonary arterial system. No intraluminal filling defect is seen within the pulmonary arteries to suggest acute pulmonary embolus.   The heart is within normal limits for size. No pericardial effusion is identified.   The thoracic aorta is within normal limits for course and caliber, without evidence of aneurysm.   MEDIASTINUM and RICHARD, LOWER NECK AND AXILLA: Evaluation of the visualized neck base demonstrates no gross mass or lymphadenopathy.   There is no gross axillary, hilar or mediastinal lymphadenopathy identified.   LUNGS and AIRWAYS: The trachea and central airways are grossly patent without evidence of endobronchial lesion.   Mild patchy ground-glass airspace consolidations are seen  in the upper lobes bilaterally, new relative to the prior study, concerning for developing pneumonia. There is no pleural effusion or pneumothorax identified. No discrete pulmonary nodules or masses are seen.   UPPER ABDOMEN: Evaluation of the visualized upper abdomen demonstrates no discrete mass or lymphadenopathy.   CHEST WALL and OSSEOUS STRUCTURES: There is no evidence of acute fracture identified.       1. No evidence of acute pulmonary embolus. 2. Mild patchy ground-glass airspace consolidations in the upper lobes bilaterally, concerning for developing pneumonia. Clinical correlation and continued follow-up is recommended.   MACRO: None.     Signed by: Sin Benítez 12/27/2023 9:33 PM Dictation workstation:   [id number]    XR chest 1 view    Result Date: 12/27/2023  STUDY: Chest Radiograph;  12/27/2023, 8:22PM INDICATION: Shortness of breath. COMPARISON: 12/19/2023 XR Chest ACCESSION NUMBER(S): [id number] ORDERING CLINICIAN: BROOKE TRACEY TECHNIQUE:  Frontal chest was obtained at 20:22 hours. FINDINGS: CARDIOMEDIASTINAL SILHOUETTE: Heart size is mildly enlarged. The mediastinal contours appear stable.  LUNGS: Lungs demonstrate emphysematous changes.  There is prominence of pulmonary interstitium.  There is subtle right basilar opacity.  ABDOMEN: No remarkable upper abdominal findings.  BONES: No acute osseous changes.    Subtle right basilar opacity concerning for pneumonia.  This appears slightly progressed since prior study.  Cardiomegaly and emphysematous changes. Signed by Jairo Velazquez DO    XR chest 1 view    Result Date: 12/19/2023  Interpreted By:  Niels Caicedo, STUDY: Chest dated  12/19/2023.   INDICATION: Signs/Symptoms:sob   COMPARISON: Chest dated 10/31/2023.   ACCESSION NUMBER(S): [id number]   ORDERING CLINICIAN: ASHLEY REINA   TECHNIQUE: One view of the chest.   FINDINGS: The lungs are clear.  No pneumothorax or effusion is evident. The cardiomediastinal silhouette is  prominent  but similar to the prior exam.The soft tissues are grossly unremarkable.       No acute cardiopulmonary process is evident.   MACRO: None   Signed by: Niels Caicedo 12/19/2023 3:17 PM Dictation workstation:   [id number]       Current Facility-Administered Medications:     busPIRone (Buspar) tablet 30 mg, 30 mg, oral, BID, BARTOLOME Garcia-CNP, 30 mg at 01/10/24 0823    citalopram (CeleXA) tablet 40 mg, 40 mg, oral, Daily, BARTOLOME Garcia-CNP, 40 mg at 01/10/24 0823    [Held by provider] clonazePAM (KlonoPIN) tablet 1 mg, 1 mg, oral, BID PRN, ABRTOLOME Garcia-CNP    dextrose 10 % in water (D10W) infusion, 0.3 g/kg/hr, intravenous, Once PRN, BARTOLOME Garcia-CNP    dextrose 50 % injection 25 g, 25 g, intravenous, q15 min PRN, VALDEMAR Garcia    glucagon (Glucagen) injection 1 mg, 1 mg, intramuscular, q15 min PRN, BARTOLOME Garcia-CNP    [Held by provider] hydrOXYzine pamoate (Vistaril) capsule 25 mg, 25 mg, oral, TID PRN, BARTOLOME Garcia-CNP    insulin glargine (Lantus) injection 10 Units, 10 Units, subcutaneous, q24h, Jazmin Portillo, BARTOLOME-CNP, 10 Units at 01/10/24 1035    insulin lispro (HumaLOG) injection 0-5 Units, 0-5 Units, subcutaneous, q4h, BARTOLOME Garcia-CNP, 2 Units at 01/10/24 1736    ipratropium-albuteroL (Duo-Neb) 0.5-2.5 mg/3 mL nebulizer solution 3 mL, 3 mL, nebulization, q4h, BARTOLOME Garcia-CNP, 3 mL at 01/10/24 1624    ipratropium-albuteroL (Duo-Neb) 0.5-2.5 mg/3 mL nebulizer solution 3 mL, 3 mL, nebulization, q4h PRN, VALDEMAR Garcia    levothyroxine (Synthroid, Levoxyl) tablet 150 mcg, 150 mcg, oral, Daily, VALDEMAR Garcia, 150 mcg at 01/10/24 0823    [START ON 1/11/2024] methylPREDNISolone sod succinate (PF) (SOLU-Medrol) 40 mg/mL injection 40 mg, 40 mg, intravenous, q24h, VALDEMAR Aden    [Held by provider] metoprolol tartrate (Lopressor) tablet 25 mg, 25 mg, oral, BID, VALDEMAR Garcia, 25  mg at 01/10/24 0823    nicotine (Nicoderm CQ) 21 mg/24 hr patch 1 patch, 1 patch, transdermal, Daily, 1 patch at 01/10/24 0824 **FOLLOWED BY** [START ON 2/21/2024] nicotine (Nicoderm CQ) 14 mg/24 hr patch 1 patch, 1 patch, transdermal, Daily **FOLLOWED BY** [START ON 3/6/2024] nicotine (Nicoderm CQ) 7 mg/24 hr patch 1 patch, 1 patch, transdermal, Daily, VALDEMAR Garcia    oxygen (O2) therapy, , inhalation, Continuous PRN - O2/gases, VALDEMAR Garcia, Rate Verify at 01/10/24 1400    oxygen (O2) therapy, , inhalation, Continuous PRN - O2/gases, VALDEMAR Aden, Rate Verify at 01/10/24 1800    [START ON 1/11/2024] pantoprazole (ProtoNix) EC tablet 40 mg, 40 mg, oral, Daily before breakfast, VALDEMAR Aden    [Held by provider] risperiDONE (RisperDAL) tablet 0.25 mg, 0.25 mg, oral, Daily, VALDEMAR Garcia, 0.25 mg at 01/10/24 0823    rivaroxaban (Xarelto) tablet 20 mg, 20 mg, oral, Daily with evening meal, VALDEMAR Garcia, 20 mg at 01/10/24 1736   Assessment/Plan   Acute on chronic hypoxic and hypercapnic respiratory failure continue BiPAP and oxygen keeping saturation more than 90%.  COPD exacerbation continue bronchodilators and steroids.  History of sleep apnea patient on BiPAP now.  Obesity patient advised reduce weight by diet and exercise.  Acute bronchitis continue doxycycline.  Smoking cessation discussed with the patient according patient started  History of DVT and PE.  Hypertension.  Dyslipidemia.  History of diabetes.  Anxiety disorder.  Depression and schizophrenia.  Hypothyroid.  GERD.  Hypothyroidism.  Migraine.  DVT prophylaxis.  PT OT.  P.o. diet.                                                            Afshin Villalba MD

## 2024-01-11 NOTE — PROGRESS NOTES
01/11/24 4482   Discharge Planning   Living Arrangements Spouse/significant other   Support Systems Spouse/significant other   Type of Residence Private residence   Home or Post Acute Services In home services   Type of Home Care Services DME or oxygen   Patient expects to be discharged to: HOME   Does the patient need discharge transport arranged? Yes   RoundTrip coordination needed? Yes   Has discharge transport been arranged? No     Pt currently requiring HFNC,  Social work has assisted with speaking to O2 company/ medical services.  Pt will need new o2 eval prior to dc home for new home oxygen set up .  Pt prefers to be discharged home when medically able.

## 2024-01-11 NOTE — PROGRESS NOTES
"[facility] Critical Care Medicine       Date:  1/11/2024  Patient:  Stephenie Mccray  YOB: 1980  MRN:  [id number]   Admit Date:  1/9/2024  ========================================================================================================    Chief Complaint   Patient presents with    Shortness of Breath         History of Present Illness:  Stephenie Mccray is a 43 y.o. year old female patient with Past Medical History of  COPD (4L@home), DVT/PE (on Xarelto), schizophrenia, migraine headaches, HTN, HLD, current smoker, GERD, IDDM, and hypothyroidism.  She has had frequent admissions for this, the last being 12/23. She presented to ED 1/8 via EMS for shortness of breath that started to days ago.  It worsened today and she called EMS.  She denies any fevers, chills, or change in sputum. She denies chest pain.     ED COURSE: Vitals: T36.8, , RR18, /67, SpO2 90%. CXR showed \"prominence of pulmonary interstitium similar as compared previous  exam.\" Covid/flu/RSV negative. EKG showed sinus tachycardia with a rate of 107. Troponin, BNP, and lactate WNL. CMP unremarkable. Glucose 149. WBC  Initial ABG on NC showed 7.31/71/66/35.7.  Patient was given 3 duonebs, 2gm IV Mg, placed on BIPAP 15/5 FiO2 65%.  Repeat ABG showed 7.26/80/76/35.9.  IPAP increased to 20.  125mg solumedrol IV and 20mg IV lasix given.  Patient was admitted to ICU for further care.       Interval ICU Events:  1/10: Admitted to ICU on BIPAP.  Repeat ABG   1/10: alert/ oriented, drowsy but easily arousable to minimal stimulation. ABG improved this am.   1/11: No acute events overnight. Improvement in mentation this am, awake and alert, no drowsiness. On HFNC 60L/90% maintaining SPO2 86-88%, BiPAP 20/5, FiO2 80% with SPO2 96-99%. VSS. Sedative medications were held yesterday. Reports was unable to wear BiPAP for a few days prior to hospitalization because dog chewed cords. With further questioning sounds as though chewed through " "oxygen tubing. Discussed issues with medical compliance with appointments/ calling medical companies for replacement.     Medical History:  Past Medical History:   Diagnosis Date    Arthritis     COPD (chronic obstructive pulmonary disease) (CMS/[facility])     Diabetes mellitus (CMS/[facility])     History of transfusion     Hypothyroidism     CHARLEI (obstructive sleep apnea)      Past Surgical History:   Procedure Laterality Date     SECTION, LOW TRANSVERSE      EYE SURGERY      HERNIA REPAIR      TONSILLECTOMY      VENTRAL HERNIA REPAIR       Medications Prior to Admission   Medication Sig Dispense Refill Last Dose    alcohol swabs (Alcohol Prep Pads) pads, medicated Use 3 times daily prn 100 each 3 2024    [] amoxicillin-pot clavulanate (Augmentin) 875-125 mg tablet Take 1 tablet (875 mg) by mouth 2 times a day for 5 days. 10 tablet 0     atorvastatin (Lipitor) 20 mg tablet Take 1 tablet (20 mg) by mouth once daily. Dr. Davis covering for Dr. Yuen 30 tablet 0     azithromycin (Zithromax Z-Navin) 250 mg tablet Take 1 tablet (250 mg) by mouth once daily. Follow Z-navin instructions: 500mg on day #1, then 250mg daily for days #2, 3, 4, and 5. 6 tablet 0     BD Ultra-Fine Mini Pen Needle 31 gauge x 3/16\" needle USE TO INJECT 4 TIMES DAILY AS DIRECTED   2024    busPIRone (Buspar) 30 mg tablet Take 1 tablet (30 mg) by mouth 2 times a day.   1/10/2024    citalopram (CeleXA) 40 mg tablet Take 50 mg by mouth once daily. Patient said dose was recently increase   1/10/2024    clonazePAM (KlonoPIN) 1 mg tablet Take by mouth 2 times a day as needed.   1/10/2024    hydrOXYzine HCL (Atarax) 25 mg tablet Take 1 tablet (25 mg) by mouth every 6 hours if needed for anxiety for up to 3 days. (Patient taking differently: Take 1 tablet (25 mg) by mouth every 6 hours if needed for anxiety.) 12 tablet 0     hydrOXYzine pamoate (Vistaril) 50 mg capsule Take by mouth 3 times a day as needed.   Unknown    insulin aspart (NovoLOG " FlexPen U-100 Insulin) 100 unit/mL (3 mL) pen 14 Units once daily.   2024    insulin glargine (Lantus Solostar U-100 Insulin) 100 unit/mL (3 mL) pen Inject 10 Units under the skin once daily in the morning. Take as directed per insulin instructions. 3 mL 2     insulin lispro (HumaLOG) 100 unit/mL injection 1 Dose 3 times a day with meals.       Invega Sustenna 234 mg/1.5 mL syringe INJECT 1 syringe INTRAMUSCULARLY EVERY 4 WEEKS   Unknown    ipratropium-albuteroL (Duo-Neb) 0.5-2.5 mg/3 mL nebulizer solution Take 3 mL by nebulization every 6 hours if needed for shortness of breath or wheezing. (Patient taking differently: Take 3 mL by nebulization every 4 hours. As needed) 180 mL 1     levothyroxine (Synthroid, Levoxyl) 150 mcg tablet Take 1 tablet (150 mcg) by mouth once daily. 90 tablet 1 2024    metoprolol tartrate (Lopressor) 25 mg tablet TAKE 1 TABLET BY MOUTH IN THE MORNING AND 1 AT BEDTIME 60 tablet 2 2024    OneTouch Verio test strips strip    2024    paliperidone (Invega) 1.5 mg 24 hr tablet Take 1 tablet (1.5 mg) by mouth once daily. Do not crush, chew, or split.   2024    pantoprazole (ProtoNix) 40 mg EC tablet Take 1 tablet (40 mg) by mouth once daily. 90 tablet 1 2024    [] predniSONE (Deltasone) 20 mg tablet Take 1 tablet (20 mg) by mouth once daily for 5 days. 5 tablet 0     topiramate (Topamax) 25 mg tablet Take 2 tablets (50 mg) by mouth once daily. 180 tablet 3     Xarelto 20 mg tablet    2024     Fluticasone furoate-vilanterol, Fluticasone-umeclidin-vilanter, Sumatriptan, Vortioxetine, and Levofloxacin  Social History     Tobacco Use    Smoking status: Every Day     Packs/day: 1     Types: Cigarettes     Passive exposure: Never    Smokeless tobacco: Never   Substance Use Topics    Alcohol use: Never    Drug use: Never     Family History   Problem Relation Name Age of Onset    Fibromyalgia Father      Hypertension Brother      Thyroid disease Maternal Grandmother       Thyroid disease Paternal Grandmother      Lung cancer Paternal Grandfather      Coronary artery disease Other Grandmother        Hospital Medications:           Current Facility-Administered Medications:     busPIRone (Buspar) tablet 30 mg, 30 mg, oral, BID, VALDEMAR Garcia, 30 mg at 01/11/24 0820    citalopram (CeleXA) tablet 40 mg, 40 mg, oral, Daily, VALDEMAR Garcia, 40 mg at 01/11/24 0820    [Held by provider] clonazePAM (KlonoPIN) tablet 1 mg, 1 mg, oral, BID PRN, VALDEMAR Garcia    dextrose 10 % in water (D10W) infusion, 0.3 g/kg/hr, intravenous, Once PRN, VALDEMAR Garcia    dextrose 50 % injection 25 g, 25 g, intravenous, q15 min PRN, VALDEMAR Garcia    doxycycline (Vibra-Tabs) tablet 100 mg, 100 mg, oral, q12h BLUE, Afshin MARTINS MD, 100 mg at 01/11/24 0820    glucagon (Glucagen) injection 1 mg, 1 mg, intramuscular, q15 min PRN, VALDEMAR Garcia    [Held by provider] hydrOXYzine pamoate (Vistaril) capsule 25 mg, 25 mg, oral, TID PRN, VALDEMAR Garcia    insulin glargine (Lantus) injection 10 Units, 10 Units, subcutaneous, q24h, VALDEMAR Aden, 10 Units at 01/11/24 0819    insulin lispro (HumaLOG) injection 0-5 Units, 0-5 Units, subcutaneous, q4h, VALDEMAR Garcia, 1 Units at 01/11/24 0818    ipratropium-albuteroL (Duo-Neb) 0.5-2.5 mg/3 mL nebulizer solution 3 mL, 3 mL, nebulization, q4h, VALDEMAR Garcia, 3 mL at 01/11/24 0738    ipratropium-albuteroL (Duo-Neb) 0.5-2.5 mg/3 mL nebulizer solution 3 mL, 3 mL, nebulization, q4h PRN, VALDEMAR Garcia    levothyroxine (Synthroid, Levoxyl) tablet 150 mcg, 150 mcg, oral, Daily, VALDEMAR Garcia, 150 mcg at 01/11/24 0612    methylPREDNISolone sod succinate (PF) (SOLU-Medrol) 40 mg/mL injection 40 mg, 40 mg, intravenous, q24h, VALDEMAR Aden, 40 mg at 01/11/24 0612    [Held by provider] metoprolol tartrate (Lopressor) tablet 25  mg, 25 mg, oral, BID, LyricBARTOLOME Garces-CNP, 25 mg at 01/10/24 0823    nicotine (Nicoderm CQ) 21 mg/24 hr patch 1 patch, 1 patch, transdermal, Daily, 1 patch at 01/11/24 0820 **FOLLOWED BY** [START ON 2/21/2024] nicotine (Nicoderm CQ) 14 mg/24 hr patch 1 patch, 1 patch, transdermal, Daily **FOLLOWED BY** [START ON 3/6/2024] nicotine (Nicoderm CQ) 7 mg/24 hr patch 1 patch, 1 patch, transdermal, Daily, VALDEMAR Garcia    oxygen (O2) therapy, , inhalation, Continuous PRN - O2/gases, Renzo Castanon MD, Rate Verify at 01/11/24 0700    oxygen (O2) therapy, , inhalation, Continuous PRN - O2/gases, VALDEMAR Garcia    pantoprazole (ProtoNix) EC tablet 40 mg, 40 mg, oral, Daily before breakfast, Jazmin Portillo, BARTOLOME-CNP, 40 mg at 01/11/24 0612    [Held by provider] risperiDONE (RisperDAL) tablet 0.25 mg, 0.25 mg, oral, Daily, BARTOLOME Garcia-CNP, 0.25 mg at 01/10/24 0823    rivaroxaban (Xarelto) tablet 20 mg, 20 mg, oral, Daily with evening meal, BARTOLOME Garcia-CNP, 20 mg at 01/10/24 1736    Review of Systems:  14 point review of systems was completed and negative except for those specially mention in my HPI    Physical Exam:    Heart Rate:  [68-94]   Temp:  [35.5 °C (95.9 °F)-36.7 °C (98 °F)]   Resp:  [20-37]   BP: ()/(45-70)   Weight:  [77.8 kg (171 lb 8.3 oz)]   SpO2:  [88 %-98 %]     Physical Exam  HENT:      Head: Normocephalic.      Mouth/Throat:      Mouth: Mucous membranes are moist.   Eyes:      Extraocular Movements: Extraocular movements intact.      Conjunctiva/sclera: Conjunctivae normal.   Cardiovascular:      Rate and Rhythm: Normal rate and regular rhythm.      Pulses: Normal pulses.      Heart sounds: Normal heart sounds.   Pulmonary:      Breath sounds: Examination of the right-middle field reveals decreased breath sounds. Examination of the right-lower field reveals decreased breath sounds. Decreased breath sounds and wheezing present.   Abdominal:       General: Bowel sounds are normal.      Palpations: Abdomen is soft.   Musculoskeletal:         General: Normal range of motion.      Cervical back: Normal range of motion.   Skin:     General: Skin is warm and dry.      Capillary Refill: Capillary refill takes less than 2 seconds.   Neurological:      General: No focal deficit present.      Mental Status: She is alert and oriented to person, place, and time.   Psychiatric:         Mood and Affect: Mood normal.         Behavior: Behavior normal.         Objective:    I have reviewed all medications, laboratory results, and imaging pertinent for today's encounter.    FiO2 (%):  [60 %-80 %] 80 %  S RR:  [24] 24      Intake/Output Summary (Last 24 hours) at 1/11/2024 0940  Last data filed at 1/10/2024 2200  Gross per 24 hour   Intake --   Output 725 ml   Net -725 ml         Recent Imaging  XR chest 1 view   Final Result   As above        MACRO:   None        Signed by: Tavares Ellis 1/10/2024 8:54 AM   Dictation workstation:   [id number]      XR chest 1 view   Final Result   Prominence of pulmonary interstitium similar as compared previous   exam.   Signed by Niels Perez, DO          No echocardiogram results found for the past 14 days    Assessment/Plan:    I am currently managing this critically ill patient for the following problems:  Acute on chronic hypercapnic respiratory failure  COPD with acute exacerbation  HLD  HTN  IDDM  Hypothyroid  GERD    Neuro/Psych/Pain Ctrl/Sedation:  #Hx Schizophrenia  #Hx Migraine headaches  -Neuro checks per protocol  -CAM ICU assessment  -Delirium precautions  -hold sedating medications for now in setting of drowsiness, hypercapnia with refractory hypoxia  -consult psychiatry to assist with medication management, current regimen causing increased drowsiness worsening respiratory status  -toxicoloy-+cannabinoids  -vistaril as needed for anxiety  -Ativan as needed for withdrawal symptoms     Respiratory/ENT:  #Acute on chronic  hypercapnic and hypoxic respiratory failure  #COPD with acute exacerbation (on 4L/nc at home and supposed to wear BIPAP QHS)  #CHARLIE  #Nicotine dependence   -BIPAP, wean as able, continue at night and as needed  -HFNC  -Duonebs Q4 and PRN  -Solumedrol   -Pulmonology following  -Smoking cessation education  -doxycycline  -co-ox ABG  -CXR     Cardiovascular:  #Hx HLD  #Hx HTN  -metoprolol, hold in setting of ?asthma/ COPD  -monitor BP and consider change of antihypertensive medication  -diuresis  -Echocardiogram in setting of refractory hypoxia     GI:  #Hx GERD  -PPI  -diabetic diet     Renal/Volume Status (Intra & Extravascular):  -No active issues  -Trend BMP  -Strict I/O     Endocrine  #Hx IDDM  #Hx Hypothyroid  -Q4 accuchecks with SSI while NPO  -lantus  -Hypoglycemia protocol  -levothyroxine     Infectious Disease:  Leukocytosis, ?2/2 steroids  -doxycycline  -Daily CBC  -Monitor SIRS     Heme/Onc:  #Hx DVT/PE  -xarelto     OBGYN/MSK:  -PT/OT eval when able     Ethics/Code Status:  -Full code     :  DVT Prophylaxis: xarelto  GI Prophylaxis: Protonix   Bowel Regimen: none  Diet: diabetic   CVC: no  Margoth: no  Tran: no  Restraints: no  Dispo: ICU    Critical Care Time:  45 minutes spent in preparing to see patient (I.e. review of medical records), evaluation of diagnostics (I.e. labs, imaging, etc.), documentation, discussing plan of care with patient/ family/ caregiver, and/ or coordination of care with multidisciplinary team. Time does not include completion of procedure time.     Plan discussed with Dr. Kina Portillo, APRN-CNP

## 2024-01-11 NOTE — PROGRESS NOTES
"Stephenie Mccray is a 43 y.o. female on day 1 of admission presenting with Acute on chronic respiratory failure, unspecified whether with hypoxia or hypercapnia (CMS/HCC).  presenting with shortness of breath cough wheezing chest tightness and congestion orthopnea and PND.  Patient had recurrent admission with similar complaint last admission 12/23..   Subjective   Patient states she is feeling better.     Objective ..   Patient out of bed on chair on Airvo, high flow oxygen.  Slowly improving.  ROS  Review of Systems.  Constitutional:  Positive for activity change, appetite change, fatigue and unexpected weight change.   HENT:  Positive for congestion.    Eyes: Negative.    Respiratory:  Positive for cough, shortness of breath and wheezing.    Gastrointestinal: Negative.    Endocrine: Negative.    Genitourinary: Negative.    Musculoskeletal:  Positive for arthralgias and myalgias.   Skin: Negative.    Allergic/Immunologic: Negative.    Neurological:  Positive for dizziness, weakness, light-headedness and headaches.   Hematological: Negative.    Psychiatric/Behavioral:  Positive for confusion and sleep disturbance.    All other systems reviewed and are negative.     Vital Signs  Blood pressure 121/58, pulse 96, temperature 35.9 °C (96.6 °F), temperature source Skin, resp. rate 18, height 1.575 m (5' 2\"), weight 77.8 kg (171 lb 8.3 oz), SpO2 95 %.    Physical Exam   Vitals reviewed.   Constitutional:       General: She is in acute distress.      Appearance: She is obese.   HENT:      Head: Normocephalic and atraumatic.      Right Ear: External ear normal.      Left Ear: External ear normal.      Mouth/Throat:      Mouth: Mucous membranes are dry.   Eyes:      Extraocular Movements: Extraocular movements intact.      Conjunctiva/sclera: Conjunctivae normal.      Pupils: Pupils are equal, round, and reactive to light.   Cardiovascular:      Rate and Rhythm: Regular rhythm.      Pulses: Normal pulses.      Heart " sounds: Normal heart sounds.   Pulmonary:      Effort: Respiratory distress present.      Breath sounds: Wheezing, rhonchi and rales present.   Abdominal:      General: Bowel sounds are normal.      Palpations: Abdomen is soft.   Musculoskeletal:         General: Normal range of motion.      Cervical back: Normal range of motion and neck supple.   Skin:     General: Skin is warm.      Capillary Refill: Capillary refill takes 2 to 3 seconds.   Neurological:      General: No focal deficit present.   Psychiatric:         Mood and Affect: Mood normal.         Behavior: Behavior normal.         Thought Content: Thought content normal.         Oxygen Therapy  SpO2: 95 %  Medical Gas Therapy: Supplemental oxygen  O2 Delivery Method: High flow nasal cannula  Vent Mode: Volume control/assist control  FiO2 (%):  [80 %-93 %] 93 %  S RR:  [24] 24    Intake/Output  I/O last 3 completed shifts:  In: 102.9 (1.3 mL/kg) [I.V.:102.9 (1.3 mL/kg)]  Out: 1425 (18.3 mL/kg) [Urine:1425 (0.5 mL/kg/hr)]  Weight: 77.8 kg     Lines and Tubes:  Peripheral IV 01/09/24 20 G Right Antecubital (Active)   Placement Date/Time: 01/09/24 2237   Size (Gauge): 20 G  Orientation: Right  Location: Antecubital  Site Prep: Alcohol  Technique: Anatomical landmarks  Insertion attempts: 2  Patient Tolerance: Age appropriate   Number of days: 1       Peripheral IV 01/10/24 18 G Right;Upper;Ventral Arm (Active)   Placement Date/Time: 01/10/24 1047   Hand Hygiene Completed: Yes  Size (Gauge): 18 G  Orientation: Right;Upper;Ventral  Location: Arm  Site Prep: Alcohol  Insertion attempts: 1  Patient Tolerance: Tolerated well   Number of days: 1       Results for orders placed or performed during the hospital encounter of 01/09/24 (from the past 96 hour(s))   Influenza A, and B PCR   Result Value Ref Range    Flu A Result Not Detected Not Detected    Flu B Result Not Detected Not Detected   SARS-CoV-2 RT PCR   Result Value Ref Range    Coronavirus 2019, PCR Not  Detected Not Detected   RSV PCR   Result Value Ref Range    RSV PCR Not Detected Not Detected   CBC and Auto Differential   Result Value Ref Range    WBC 9.2 4.4 - 11.3 x10*3/uL    nRBC 1.2 (H) 0.0 - 0.0 /100 WBCs    RBC 3.88 (L) 4.00 - 5.20 x10*6/uL    Hemoglobin 12.0 12.0 - 16.0 g/dL    Hematocrit 39.3 36.0 - 46.0 %     (H) 80 - 100 fL    MCH 30.9 26.0 - 34.0 pg    MCHC 30.5 (L) 32.0 - 36.0 g/dL    RDW 16.0 (H) 11.5 - 14.5 %    Platelets 150 150 - 450 x10*3/uL    Immature Granulocytes %, Automated 6.0 (H) 0.0 - 0.9 %    Immature Granulocytes Absolute, Automated 0.55 0.00 - 0.70 x10*3/uL   Comprehensive Metabolic Panel   Result Value Ref Range    Glucose 149 (H) 74 - 99 mg/dL    Sodium 141 136 - 145 mmol/L    Potassium 4.4 3.5 - 5.3 mmol/L    Chloride 103 98 - 107 mmol/L    Bicarbonate 31 21 - 32 mmol/L    Anion Gap 11 10 - 20 mmol/L    Urea Nitrogen 22 6 - 23 mg/dL    Creatinine 0.98 0.50 - 1.05 mg/dL    eGFR 74 >60 mL/min/1.73m*2    Calcium 8.8 8.6 - 10.3 mg/dL    Albumin 3.6 3.4 - 5.0 g/dL    Alkaline Phosphatase 78 33 - 110 U/L    Total Protein 6.0 (L) 6.4 - 8.2 g/dL    AST 10 9 - 39 U/L    Bilirubin, Total 0.3 0.0 - 1.2 mg/dL    ALT 18 7 - 45 U/L   hCG, quantitative, pregnancy   Result Value Ref Range    HCG, Beta-Quantitative <2 <5 mIU/mL   Troponin I, High Sensitivity, Initial   Result Value Ref Range    Troponin I, High Sensitivity 3 0 - 13 ng/L   Magnesium   Result Value Ref Range    Magnesium 1.66 1.60 - 2.40 mg/dL   Manual Differential   Result Value Ref Range    Neutrophils %, Manual 37.0 40.0 - 80.0 %    Bands %, Manual 2.0 0.0 - 5.0 %    Lymphocytes %, Manual 55.0 13.0 - 44.0 %    Monocytes %, Manual 3.0 2.0 - 10.0 %    Eosinophils %, Manual 0.0 0.0 - 6.0 %    Basophils %, Manual 1.0 0.0 - 2.0 %    Atypical Lymphocytes %, Manual 2.0 0.0 - 2.0 %    Seg Neutrophils Absolute, Manual 3.40 1.20 - 7.00 x10*3/uL    Bands Absolute, Manual 0.18 0.00 - 0.70 x10*3/uL    Lymphocytes Absolute, Manual 5.06  (H) 1.20 - 4.80 x10*3/uL    Monocytes Absolute, Manual 0.28 0.10 - 1.00 x10*3/uL    Eosinophils Absolute, Manual 0.00 0.00 - 0.70 x10*3/uL    Basophils Absolute, Manual 0.09 0.00 - 0.10 x10*3/uL    Atypical Lymphs Absolute, Manual 0.18 0.00 - 0.50 x10*3/uL    Total Cells Counted 100     Neutrophils Absolute, Manual 3.58 1.20 - 7.70 x10*3/uL    RBC Morphology See Below     Polychromasia Mild     Ovalocytes Few     Teardrop Cells Few    B-type natriuretic peptide   Result Value Ref Range    BNP 13 0 - 99 pg/mL   Blood Gas Arterial Full Panel   Result Value Ref Range    POCT pH, Arterial 7.31 (L) 7.38 - 7.42 pH    POCT pCO2, Arterial 71 (HH) 38 - 42 mm Hg    POCT pO2, Arterial 66 (L) 85 - 95 mm Hg    POCT SO2, Arterial 94 94 - 100 %    POCT Oxy Hemoglobin, Arterial 83.2 (L) 94.0 - 98.0 %    POCT Hematocrit Calculated, Arterial 36.0 36.0 - 46.0 %    POCT Sodium, Arterial 139 136 - 145 mmol/L    POCT Potassium, Arterial 4.5 3.5 - 5.3 mmol/L    POCT Chloride, Arterial 104 98 - 107 mmol/L    POCT Ionized Calcium, Arterial 1.26 1.10 - 1.33 mmol/L    POCT Glucose, Arterial 148 (H) 74 - 99 mg/dL    POCT Lactate, Arterial 0.7 0.4 - 2.0 mmol/L    POCT Base Excess, Arterial 7.2 (H) -2.0 - 3.0 mmol/L    POCT HCO3 Calculated, Arterial 35.7 (H) 22.0 - 26.0 mmol/L    POCT Hemoglobin, Arterial 12.1 12.0 - 16.0 g/dL    POCT Anion Gap, Arterial 4 (L) 10 - 25 mmo/L    Patient Temperature      FiO2 36 %    Apparatus CANNULA     Critical Called By DFOX RRT     Critical Called To DR CORLEY     Critical Call Time 2306.0000     Critical Read Back Y    ECG 12 lead   Result Value Ref Range    Ventricular Rate 107 BPM    Atrial Rate 107 BPM    DC Interval 128 ms    QRS Duration 88 ms    QT Interval 284 ms    QTC Calculation(Bazett) 379 ms    P Axis 43 degrees    R Axis -21 degrees    T Axis 20 degrees    QRS Count 18 beats    Q Onset 225 ms    P Onset 161 ms    P Offset 210 ms    T Offset 367 ms    QTC Fredericia 344 ms   Troponin, High  Sensitivity, 1 Hour   Result Value Ref Range    Troponin I, High Sensitivity 4 0 - 13 ng/L   Blood Gas Arterial Full Panel   Result Value Ref Range    POCT pH, Arterial 7.26 (L) 7.38 - 7.42 pH    POCT pCO2, Arterial 80 (HH) 38 - 42 mm Hg    POCT pO2, Arterial 76 (L) 85 - 95 mm Hg    POCT SO2, Arterial 96 94 - 100 %    POCT Oxy Hemoglobin, Arterial 87.5 (L) 94.0 - 98.0 %    POCT Hematocrit Calculated, Arterial 35.0 (L) 36.0 - 46.0 %    POCT Sodium, Arterial 138 136 - 145 mmol/L    POCT Potassium, Arterial 4.5 3.5 - 5.3 mmol/L    POCT Chloride, Arterial 104 98 - 107 mmol/L    POCT Ionized Calcium, Arterial 1.28 1.10 - 1.33 mmol/L    POCT Glucose, Arterial 159 (H) 74 - 99 mg/dL    POCT Lactate, Arterial 0.6 0.4 - 2.0 mmol/L    POCT Base Excess, Arterial 6.4 (H) -2.0 - 3.0 mmol/L    POCT HCO3 Calculated, Arterial 35.9 (H) 22.0 - 26.0 mmol/L    POCT Hemoglobin, Arterial 11.8 (L) 12.0 - 16.0 g/dL    POCT Anion Gap, Arterial 3 (L) 10 - 25 mmo/L    Patient Temperature      FiO2 65 %    Ipap CMH2O 15.0 cm H2O    Epap CMH2O 5.0 cm H2O    Critical Called By DFOX RRT     Critical Called To DR CORLEY     Critical Call Time 113.0000     Critical Read Back Y    POCT GLUCOSE   Result Value Ref Range    POCT Glucose 190 (H) 74 - 99 mg/dL   Magnesium   Result Value Ref Range    Magnesium 2.21 1.60 - 2.40 mg/dL   Phosphorus   Result Value Ref Range    Phosphorus 4.0 2.5 - 4.9 mg/dL   CBC and Auto Differential   Result Value Ref Range    WBC 8.4 4.4 - 11.3 x10*3/uL    nRBC 1.1 (H) 0.0 - 0.0 /100 WBCs    RBC 4.08 4.00 - 5.20 x10*6/uL    Hemoglobin 12.7 12.0 - 16.0 g/dL    Hematocrit 41.8 36.0 - 46.0 %     (H) 80 - 100 fL    MCH 31.1 26.0 - 34.0 pg    MCHC 30.4 (L) 32.0 - 36.0 g/dL    RDW 16.1 (H) 11.5 - 14.5 %    Platelets 141 (L) 150 - 450 x10*3/uL    Immature Granulocytes %, Automated 5.8 (H) 0.0 - 0.9 %    Immature Granulocytes Absolute, Automated 0.49 0.00 - 0.70 x10*3/uL   Comprehensive metabolic panel   Result Value Ref  Range    Glucose 181 (H) 74 - 99 mg/dL    Sodium 141 136 - 145 mmol/L    Potassium 4.1 3.5 - 5.3 mmol/L    Chloride 100 98 - 107 mmol/L    Bicarbonate 33 (H) 21 - 32 mmol/L    Anion Gap 12 10 - 20 mmol/L    Urea Nitrogen 19 6 - 23 mg/dL    Creatinine 0.95 0.50 - 1.05 mg/dL    eGFR 77 >60 mL/min/1.73m*2    Calcium 9.2 8.6 - 10.3 mg/dL    Albumin 3.8 3.4 - 5.0 g/dL    Alkaline Phosphatase 88 33 - 110 U/L    Total Protein 6.6 6.4 - 8.2 g/dL    AST 16 9 - 39 U/L    Bilirubin, Total 0.3 0.0 - 1.2 mg/dL    ALT 27 7 - 45 U/L   Lipid panel   Result Value Ref Range    Cholesterol 227 (H) 0 - 199 mg/dL    HDL-Cholesterol 44.3 mg/dL    Cholesterol/HDL Ratio 5.1     LDL Calculated 138 (H) <=99 mg/dL    VLDL 45 (H) 0 - 40 mg/dL    Triglycerides 225 (H) 0 - 149 mg/dL    Non HDL Cholesterol 183 (H) 0 - 149 mg/dL   Manual Differential   Result Value Ref Range    Neutrophils %, Manual 68.0 40.0 - 80.0 %    Bands %, Manual 4.0 0.0 - 5.0 %    Lymphocytes %, Manual 23.0 13.0 - 44.0 %    Monocytes %, Manual 1.0 2.0 - 10.0 %    Eosinophils %, Manual 0.0 0.0 - 6.0 %    Basophils %, Manual 0.0 0.0 - 2.0 %    Atypical Lymphocytes %, Manual 1.0 0.0 - 2.0 %    Myelocytes %, Manual 3.0 0.0 - 0.0 %    Seg Neutrophils Absolute, Manual 5.71 1.20 - 7.00 x10*3/uL    Bands Absolute, Manual 0.34 0.00 - 0.70 x10*3/uL    Lymphocytes Absolute, Manual 1.93 1.20 - 4.80 x10*3/uL    Monocytes Absolute, Manual 0.08 (L) 0.10 - 1.00 x10*3/uL    Eosinophils Absolute, Manual 0.00 0.00 - 0.70 x10*3/uL    Basophils Absolute, Manual 0.00 0.00 - 0.10 x10*3/uL    Atypical Lymphs Absolute, Manual 0.08 0.00 - 0.50 x10*3/uL    Myelocytes Absolute, Manual 0.25 0.00 - 0.00 x10*3/uL    Total Cells Counted 100     Neutrophils Absolute, Manual 6.05 1.20 - 7.70 x10*3/uL    RBC Morphology See Below     Polychromasia Mild    Acute Toxicology Panel, Blood   Result Value Ref Range    Acetaminophen <10.0 10.0 - 30.0 ug/mL    Salicylate  <3 4 - 20 mg/dL    Alcohol <10 <=10 mg/dL    Blood Gas Arterial Full Panel   Result Value Ref Range    POCT pH, Arterial 7.34 (L) 7.38 - 7.42 pH    POCT pCO2, Arterial 72 (HH) 38 - 42 mm Hg    POCT pO2, Arterial 69 (L) 85 - 95 mm Hg    POCT SO2, Arterial 94 94 - 100 %    POCT Oxy Hemoglobin, Arterial 90.0 (L) 94.0 - 98.0 %    POCT Hematocrit Calculated, Arterial 39.0 36.0 - 46.0 %    POCT Sodium, Arterial 136 136 - 145 mmol/L    POCT Potassium, Arterial 4.2 3.5 - 5.3 mmol/L    POCT Chloride, Arterial 100 98 - 107 mmol/L    POCT Ionized Calcium, Arterial 1.22 1.10 - 1.33 mmol/L    POCT Glucose, Arterial 248 (H) 74 - 99 mg/dL    POCT Lactate, Arterial 1.6 0.4 - 2.0 mmol/L    POCT Base Excess, Arterial 10.2 (H) -2.0 - 3.0 mmol/L    POCT HCO3 Calculated, Arterial 38.8 (H) 22.0 - 26.0 mmol/L    POCT Hemoglobin, Arterial 12.9 12.0 - 16.0 g/dL    POCT Anion Gap, Arterial 1 (L) 10 - 25 mmo/L    Patient Temperature      FiO2 65 %    Ventilator Mode BiPAP     Spontaneous Tidal Volume 458 mL    Total Minute Volume 11.3 Liter    Frequency (BPM) 24 bpm    Inspiratory Time 0.9     Ipap CMH2O 20.0 cm H2O    Epap CMH2O 5.0 cm H2O    Critical Called By DAVID CASTELLON     Critical Called To CARLOS CONTRERAS     Critical Call Time 557.0000     Critical Read Back Y     Site of Arterial Puncture Brachial Left     Andrew's Test     Blood Gas Arterial Full Panel   Result Value Ref Range    POCT pH, Arterial 7.35 (L) 7.38 - 7.42 pH    POCT pCO2, Arterial 69 (H) 38 - 42 mm Hg    POCT pO2, Arterial 73 (L) 85 - 95 mm Hg    POCT SO2, Arterial 96 94 - 100 %    POCT Oxy Hemoglobin, Arterial 91.8 (L) 94.0 - 98.0 %    POCT Hematocrit Calculated, Arterial 39.0 36.0 - 46.0 %    POCT Sodium, Arterial 137 136 - 145 mmol/L    POCT Potassium, Arterial 4.5 3.5 - 5.3 mmol/L    POCT Chloride, Arterial 99 98 - 107 mmol/L    POCT Ionized Calcium, Arterial 1.23 1.10 - 1.33 mmol/L    POCT Glucose, Arterial 269 (H) 74 - 99 mg/dL    POCT Lactate, Arterial 1.7 0.4 - 2.0 mmol/L    POCT Base Excess, Arterial 9.8  (H) -2.0 - 3.0 mmol/L    POCT HCO3 Calculated, Arterial 38.1 (H) 22.0 - 26.0 mmol/L    POCT Hemoglobin, Arterial 12.9 12.0 - 16.0 g/dL    POCT Anion Gap, Arterial 4 (L) 10 - 25 mmo/L    Patient Temperature      FiO2 65 %    Ipap CMH2O 20.0 cm H2O    Epap CMH2O 5.0 cm H2O   POCT GLUCOSE   Result Value Ref Range    POCT Glucose 232 (H) 74 - 99 mg/dL   POCT GLUCOSE   Result Value Ref Range    POCT Glucose 255 (H) 74 - 99 mg/dL   Urinalysis with Reflex Microscopic   Result Value Ref Range    Color, Urine Straw Straw, Yellow    Appearance, Urine Clear Clear    Specific Gravity, Urine 1.008 1.005 - 1.035    pH, Urine 5.0 5.0, 5.5, 6.0, 6.5, 7.0, 7.5, 8.0    Protein, Urine NEGATIVE NEGATIVE mg/dL    Glucose, Urine NEGATIVE NEGATIVE mg/dL    Blood, Urine NEGATIVE NEGATIVE    Ketones, Urine NEGATIVE NEGATIVE mg/dL    Bilirubin, Urine NEGATIVE NEGATIVE    Urobilinogen, Urine <2.0 <2.0 mg/dL    Nitrite, Urine NEGATIVE NEGATIVE    Leukocyte Esterase, Urine NEGATIVE NEGATIVE   Drug Screen, Urine   Result Value Ref Range    Amphetamine Screen, Urine Presumptive Negative Presumptive Negative    Barbiturate Screen, Urine Presumptive Negative Presumptive Negative    Benzodiazepines Screen, Urine Presumptive Negative Presumptive Negative    Cannabinoid Screen, Urine Presumptive Positive (A) Presumptive Negative    Cocaine Metabolite Screen, Urine Presumptive Negative Presumptive Negative    Fentanyl Screen, Urine Presumptive Negative Presumptive Negative    Opiate Screen, Urine Presumptive Negative Presumptive Negative    Oxycodone Screen, Urine Presumptive Negative Presumptive Negative    PCP Screen, Urine Presumptive Negative Presumptive Negative   POCT GLUCOSE   Result Value Ref Range    POCT Glucose 203 (H) 74 - 99 mg/dL   POCT GLUCOSE   Result Value Ref Range    POCT Glucose 166 (H) 74 - 99 mg/dL   POCT GLUCOSE   Result Value Ref Range    POCT Glucose 139 (H) 74 - 99 mg/dL   CBC and Auto Differential   Result Value Ref Range     WBC 16.2 (H) 4.4 - 11.3 x10*3/uL    nRBC 0.2 (H) 0.0 - 0.0 /100 WBCs    RBC 3.92 (L) 4.00 - 5.20 x10*6/uL    Hemoglobin 12.1 12.0 - 16.0 g/dL    Hematocrit 39.4 36.0 - 46.0 %     (H) 80 - 100 fL    MCH 30.9 26.0 - 34.0 pg    MCHC 30.7 (L) 32.0 - 36.0 g/dL    RDW 16.7 (H) 11.5 - 14.5 %    Platelets 147 (L) 150 - 450 x10*3/uL    Neutrophils % 76.5 40.0 - 80.0 %    Immature Granulocytes %, Automated 1.8 (H) 0.0 - 0.9 %    Lymphocytes % 16.1 13.0 - 44.0 %    Monocytes % 5.4 2.0 - 10.0 %    Eosinophils % 0.0 0.0 - 6.0 %    Basophils % 0.2 0.0 - 2.0 %    Neutrophils Absolute 12.40 (H) 1.20 - 7.70 x10*3/uL    Immature Granulocytes Absolute, Automated 0.30 0.00 - 0.70 x10*3/uL    Lymphocytes Absolute 2.61 1.20 - 4.80 x10*3/uL    Monocytes Absolute 0.87 0.10 - 1.00 x10*3/uL    Eosinophils Absolute 0.00 0.00 - 0.70 x10*3/uL    Basophils Absolute 0.04 0.00 - 0.10 x10*3/uL   Comprehensive metabolic panel   Result Value Ref Range    Glucose 116 (H) 74 - 99 mg/dL    Sodium 143 136 - 145 mmol/L    Potassium 4.2 3.5 - 5.3 mmol/L    Chloride 99 98 - 107 mmol/L    Bicarbonate 36 (H) 21 - 32 mmol/L    Anion Gap 12 10 - 20 mmol/L    Urea Nitrogen 20 6 - 23 mg/dL    Creatinine 0.74 0.50 - 1.05 mg/dL    eGFR >90 >60 mL/min/1.73m*2    Calcium 9.2 8.6 - 10.3 mg/dL    Albumin 3.6 3.4 - 5.0 g/dL    Alkaline Phosphatase 80 33 - 110 U/L    Total Protein 6.1 (L) 6.4 - 8.2 g/dL    AST 16 9 - 39 U/L    Bilirubin, Total 0.4 0.0 - 1.2 mg/dL    ALT 33 7 - 45 U/L   Magnesium   Result Value Ref Range    Magnesium 1.95 1.60 - 2.40 mg/dL   Phosphorus   Result Value Ref Range    Phosphorus 3.3 2.5 - 4.9 mg/dL   POCT GLUCOSE   Result Value Ref Range    POCT Glucose 154 (H) 74 - 99 mg/dL   POCT GLUCOSE   Result Value Ref Range    POCT Glucose 160 (H) 74 - 99 mg/dL   COOX PANEL, ARTERIAL   Result Value Ref Range    POCT Hemoglobin, Arterial 12.1 12.0 - 16.0 g/dL    POCT Oxy Hemoglobin, Arterial 97.6 94.0 - 98.0 %    POCT Carboxyhemoglobin, Arterial  1.3 %    POCT Methemoglobin, Arterial 0.9 0.0 - 1.5 %    POCT Deoxy Hemoglobin, Arterial 0.2 0.0 - 5.0 %    Site of Arterial Puncture Radial Left     Andrew's Test Positive    POCT GLUCOSE   Result Value Ref Range    POCT Glucose 223 (H) 74 - 99 mg/dL   Transthoracic Echo (TTE) Complete   Result Value Ref Range    AV mn grad 5.0     AV pk tom 1.49     LV biplane EF 53     LVOT diam 2.00     MV E/A ratio 1.29     Tricuspid annular plane systolic excursion 2.2     MV avg E/e' ratio 5.93     LA vol index A/L 14.7     RV free wall pk S' 11.40     RVSP 11.4     LVIDd 4.76     Aortic Valve Area by Continuity of Peak Velocity 2.42     AV pk grad 8.9     Aortic Valve Area by Continuity of VTI 2.75     LV A4C EF 54.4    POCT GLUCOSE   Result Value Ref Range    POCT Glucose 183 (H) 74 - 99 mg/dL      Relevant Results  Recent Results (from the past 24 hour(s))   POCT GLUCOSE    Collection Time: 01/10/24  9:09 PM   Result Value Ref Range    POCT Glucose 166 (H) 74 - 99 mg/dL   POCT GLUCOSE    Collection Time: 01/11/24  1:17 AM   Result Value Ref Range    POCT Glucose 139 (H) 74 - 99 mg/dL   CBC and Auto Differential    Collection Time: 01/11/24  4:03 AM   Result Value Ref Range    WBC 16.2 (H) 4.4 - 11.3 x10*3/uL    nRBC 0.2 (H) 0.0 - 0.0 /100 WBCs    RBC 3.92 (L) 4.00 - 5.20 x10*6/uL    Hemoglobin 12.1 12.0 - 16.0 g/dL    Hematocrit 39.4 36.0 - 46.0 %     (H) 80 - 100 fL    MCH 30.9 26.0 - 34.0 pg    MCHC 30.7 (L) 32.0 - 36.0 g/dL    RDW 16.7 (H) 11.5 - 14.5 %    Platelets 147 (L) 150 - 450 x10*3/uL    Neutrophils % 76.5 40.0 - 80.0 %    Immature Granulocytes %, Automated 1.8 (H) 0.0 - 0.9 %    Lymphocytes % 16.1 13.0 - 44.0 %    Monocytes % 5.4 2.0 - 10.0 %    Eosinophils % 0.0 0.0 - 6.0 %    Basophils % 0.2 0.0 - 2.0 %    Neutrophils Absolute 12.40 (H) 1.20 - 7.70 x10*3/uL    Immature Granulocytes Absolute, Automated 0.30 0.00 - 0.70 x10*3/uL    Lymphocytes Absolute 2.61 1.20 - 4.80 x10*3/uL    Monocytes Absolute 0.87  0.10 - 1.00 x10*3/uL    Eosinophils Absolute 0.00 0.00 - 0.70 x10*3/uL    Basophils Absolute 0.04 0.00 - 0.10 x10*3/uL   Comprehensive metabolic panel    Collection Time: 01/11/24  4:03 AM   Result Value Ref Range    Glucose 116 (H) 74 - 99 mg/dL    Sodium 143 136 - 145 mmol/L    Potassium 4.2 3.5 - 5.3 mmol/L    Chloride 99 98 - 107 mmol/L    Bicarbonate 36 (H) 21 - 32 mmol/L    Anion Gap 12 10 - 20 mmol/L    Urea Nitrogen 20 6 - 23 mg/dL    Creatinine 0.74 0.50 - 1.05 mg/dL    eGFR >90 >60 mL/min/1.73m*2    Calcium 9.2 8.6 - 10.3 mg/dL    Albumin 3.6 3.4 - 5.0 g/dL    Alkaline Phosphatase 80 33 - 110 U/L    Total Protein 6.1 (L) 6.4 - 8.2 g/dL    AST 16 9 - 39 U/L    Bilirubin, Total 0.4 0.0 - 1.2 mg/dL    ALT 33 7 - 45 U/L   Magnesium    Collection Time: 01/11/24  4:03 AM   Result Value Ref Range    Magnesium 1.95 1.60 - 2.40 mg/dL   Phosphorus    Collection Time: 01/11/24  4:03 AM   Result Value Ref Range    Phosphorus 3.3 2.5 - 4.9 mg/dL   POCT GLUCOSE    Collection Time: 01/11/24  4:29 AM   Result Value Ref Range    POCT Glucose 154 (H) 74 - 99 mg/dL   POCT GLUCOSE    Collection Time: 01/11/24  8:13 AM   Result Value Ref Range    POCT Glucose 160 (H) 74 - 99 mg/dL   COOX PANEL, ARTERIAL    Collection Time: 01/11/24 10:28 AM   Result Value Ref Range    POCT Hemoglobin, Arterial 12.1 12.0 - 16.0 g/dL    POCT Oxy Hemoglobin, Arterial 97.6 94.0 - 98.0 %    POCT Carboxyhemoglobin, Arterial 1.3 %    POCT Methemoglobin, Arterial 0.9 0.0 - 1.5 %    POCT Deoxy Hemoglobin, Arterial 0.2 0.0 - 5.0 %    Site of Arterial Puncture Radial Left     Andrew's Test Positive    POCT GLUCOSE    Collection Time: 01/11/24 11:52 AM   Result Value Ref Range    POCT Glucose 223 (H) 74 - 99 mg/dL   Transthoracic Echo (TTE) Complete    Collection Time: 01/11/24  1:11 PM   Result Value Ref Range    AV mn grad 5.0     AV pk tom 1.49     LV biplane EF 53     LVOT diam 2.00     MV E/A ratio 1.29     Tricuspid annular plane systolic excursion  2.2     MV avg E/e' ratio 5.93     LA vol index A/L 14.7     RV free wall pk S' 11.40     RVSP 11.4     LVIDd 4.76     Aortic Valve Area by Continuity of Peak Velocity 2.42     AV pk grad 8.9     Aortic Valve Area by Continuity of VTI 2.75     LV A4C EF 54.4    POCT GLUCOSE    Collection Time: 01/11/24  4:47 PM   Result Value Ref Range    POCT Glucose 183 (H) 74 - 99 mg/dL      Transthoracic Echo (TTE) Complete    Result Date: 1/11/2024          [place] [id number]  Tel [phone] Fax [phone] TRANSTHORACIC ECHOCARDIOGRAM REPORT  Patient Name:      JACOB Nichols Physician:    [id number] Steve Guerin DO Study Date:        1/11/2024            Ordering Provider:    [id number] VINNIE GASCA MRN/PID:           [id number]             Fellow: Accession#:        [id number]         Nurse: Date of Birth/Age: 1980 / 43 years Sonographer:          Lesli Hinson RDCS Gender:            F                    Additional Staff: Height:            157.48 cm            Admit Date:           1/9/2024 Weight:            77.57 kg             Admission Status:     Inpatient -                                                               Routine BSA:               1.79 m2              Department Location:  [facility] Blood Pressure: 113 /55 mmHg Study Type:    TRANSTHORACIC ECHO (TTE) COMPLETE Diagnosis/ICD: Acute respiratory failure with hypoxia-J96.01 Indication:    Dyspnea CPT Codes:     Echo Complete w Full [id number] Patient History: Smoker:            Current. Diabetes:          Yes Pertinent History: HTN, Hyperlipidemia, Chronic Lung Disease, COPD, PE, Previous                    DVT and Dyspnea. Study Detail: The following Echo studies were performed: M-Mode, 2D, Doppler  and               color flow. Technically challenging study due to body habitus.  PHYSICIAN INTERPRETATION: Left Ventricle: Left ventricular systolic function is normal, with an estimated ejection fraction of 55%. There are no regional wall motion abnormalities. The left ventricular cavity size is normal. Spectral Doppler shows a normal pattern of left ventricular diastolic filling. LV Wall Scoring: All segments are normal. Left Atrium: The left atrium is normal in size. Right Ventricle: The right ventricle is normal in size. There is normal right ventricular global systolic function. Right Atrium: The right atrium is normal in size. Aortic Valve: The aortic valve appears structurally normal. The aortic valve appears tricuspid. There is no evidence of aortic valve stenosis. There is no evidence of aortic valve regurgitation. The peak instantaneous gradient of the aortic valve is 8.9 mmHg. The mean gradient of the aortic valve is 5.0 mmHg. Mitral Valve: The mitral valve is normal in structure. There is no evidence of mitral valve stenosis. There is normal mitral valve leaflet mobility. There is trace mitral valve regurgitation. Tricuspid Valve: The tricuspid valve is structurally normal. There is normal tricuspid valve leaflet mobility. There is trace to mild tricuspid regurgitation. Pulmonic Valve: The pulmonic valve is structurally normal. There is no indication of pulmonic valve regurgitation. Pericardium: There is no pericardial effusion noted. Aorta: The aortic root is normal. Pulmonary Artery: The main pulmonary artery is normal in size, and position, with normal bifurcation into the left and right pulmonary arteries. Systemic Veins: The inferior vena cava appears to be of normal size. In comparison to the previous echocardiogram(s): The left ventricular function is unchanged. The left ventricular diastolic function is normal.  CONCLUSIONS:  1. Left ventricular systolic function is normal with a 55% estimated  ejection fraction.  2. There is no evidence of mitral valve stenosis.  3. Trace mitral valve regurgitation.  4. Trace to mild tricuspid regurgitation.  5. Aortic valve stenosis is not present.  6. The main pulmonary artery is normal in size, and position, with normal bifurcation into the left and right pulmonary arteries. QUANTITATIVE DATA SUMMARY: 2D MEASUREMENTS:                          Normal Ranges: Ao Root d:     2.30 cm   (2.0-3.7cm) LAs:           3.40 cm   (2.7-4.0cm) IVSd:          0.87 cm   (0.6-1.1cm) LVPWd:         0.89 cm   (0.6-1.1cm) LVIDd:         4.76 cm   (3.9-5.9cm) LVIDs:         3.13 cm LV Mass Index: 79.0 g/m2 LV % FS        34.2 % LA VOLUME:                               Normal Ranges: LA Vol A4C:        24.0 ml    (22+/-6mL/m2) LA Vol A2C:        28.9 ml LA Vol BP:         26.3 ml LA Vol Index A4C:  13.4ml/m2 LA Vol Index A2C:  16.1 ml/m2 LA Vol Index BP:   14.7 ml/m2 LA Area A4C:       11.4 cm2 LA Area A2C:       12.5 cm2 LA Major Axis A4C: 4.6 cm LA Major Axis A2C: 4.6 cm LA Volume Index:   13.9 ml/m2 RA VOLUME BY A/L METHOD:                               Normal Ranges: RA Vol A4C:        19.8 ml    (8.3-19.5ml) RA Vol Index A4C:  11.1 ml/m2 RA Area A4C:       9.9 cm2 RA Major Axis A4C: 4.2 cm LV SYSTOLIC FUNCTION BY 2D PLANIMETRY (MOD):                     Normal Ranges: EF-A4C View: 54.4 % (>=55%) EF-A2C View: 54.2 % EF-Biplane:  53.4 % LV DIASTOLIC FUNCTION:                        Normal Ranges: MV Peak E:    0.82 m/s (0.7-1.2 m/s) MV Peak A:    0.63 m/s (0.42-0.7 m/s) E/A Ratio:    1.29     (1.0-2.2) MV e'         0.14 m/s (>8.0) MV lateral e' 0.14 m/s MV medial e'  0.09 m/s E/e' Ratio:   5.93     (<8.0) MITRAL VALVE:                 Normal Ranges: MV DT: 127 msec (150-240msec) AORTIC VALVE:                                   Normal Ranges: AoV Vmax:                1.49 m/s (<=1.7m/s) AoV Peak P.9 mmHg (<20mmHg) AoV Mean P.0 mmHg (1.7-11.5mmHg) LVOT Max  Mak:            1.15 m/s (<=1.1m/s) AoV VTI:                 24.80 cm (18-25cm) LVOT VTI:                21.70 cm LVOT Diameter:           2.00 cm  (1.8-2.4cm) AoV Area, VTI:           2.75 cm2 (2.5-5.5cm2) AoV Area,Vmax:           2.42 cm2 (2.5-4.5cm2) AoV Dimensionless Index: 0.87  RIGHT VENTRICLE: RV Basal 3.18 cm RV Mid   2.74 cm RV Major 6.8 cm TAPSE:   21.8 mm RV s'    0.11 m/s TRICUSPID VALVE/RVSP:                             Normal Ranges: Peak TR Velocity: 1.45 m/s RV Syst Pressure: 11.4 mmHg (< 30mmHg) PULMONIC VALVE:                         Normal Ranges: PV Accel Time: 177 msec (>120ms) PV Max Mak:    1.2 m/s  (0.6-0.9m/s) PV Max P.6 mmHg  [id number] Guerin DO Electronically signed on 2024 at 3:08:46 PM  Wall Scoring  ** Final **     XR chest 1 view    Result Date: 2024  Interpreted By:  Tavares Ellis, STUDY: XR CHEST 1 VIEW;  2024 11:30 am   INDICATION: Signs/Symptoms:hypoxia.   COMPARISON: CT chest with contrast 2023; intervening chest radiographs since that time   ACCESSION NUMBER(S): [id number]   ORDERING CLINICIAN: VINNIE GASCA   TECHNIQUE: Single frontal view of the chest; Portable technique   FINDINGS:   The cardiomediastinal silhouette is unchanged   Platelike atelectasis versus potentially true developing infiltrate in the peripheral right mid to basilar lung   Prominent streaky retrocardiac densities at left lung base perhaps slightly increasing over the past few days   Upper lungs clear   No large effusion or pneumothorax       Possibility of developing pneumonias   MACRO: None   Signed by: Tavares Ellis 2024 11:34 AM Dictation workstation:   [id number]    ECG 12 lead    Result Date: 1/10/2024  Sinus rhythm with short ME Possible Inferior infarct (cited on or before 19-DEC-2023) Abnormal ECG When compared with ECG of 19-DEC-2023 14:56, (unconfirmed) QT has shortened See ED provider note for full interpretation and clinical correlation Confirmed  by Kim Wall ([id number]) on 1/10/2024 12:36:15 PM    ECG 12 lead    Result Date: 1/10/2024  Sinus tachycardia Inferior infarct (cited on or before 29-DEC-2023) Abnormal ECG When compared with ECG of 29-DEC-2023 21:35, Nonspecific T wave abnormality, improved in Lateral leads See ED provider note for full interpretation and clinical correlation Confirmed by Kim Wall ([id number]) on 1/10/2024 12:25:13 PM    XR chest 1 view    Result Date: 1/10/2024  Interpreted By:  Tavares Ellis, STUDY: XR CHEST 1 VIEW;  1/10/2024 5:38 am   INDICATION: Signs/Symptoms:resp distress.   COMPARISON: Portable chest, 9 January 2024; CT chest with contrast 27 December 2023   ACCESSION NUMBER(S): [id number]   ORDERING CLINICIAN: DERRICK CHAMBERS   TECHNIQUE: Single frontal view of the chest; Portable technique   FINDINGS: Different patient positioning from yesterday   The cardiomediastinal silhouette is unchanged   Right basilar atelectasis   Opacification at left lung base is at least in part due to a prominent cardiophrenic fat pad and suboptimal patient positioning. Difficult to entirely exclude left basilar pneumonia and/or atelectasis   No pneumothorax       As above   MACRO: None   Signed by: Tavares Ellis 1/10/2024 8:54 AM Dictation workstation:   [id number]    XR chest 1 view    Result Date: 1/9/2024  STUDY: Chest Radiograph;  1/9/2024 10:55 PM INDICATION: Shortness of breath. COMPARISON: 12/29/2023 XR chest ACCESSION NUMBER(S): [id number] ORDERING CLINICIAN: PAUL VELÁSQUEZ TECHNIQUE:  Frontal chest was obtained at 22:55 hours. FINDINGS: CARDIOMEDIASTINAL SILHOUETTE: Cardiomediastinal silhouette enlarged..  LUNGS: Lungs reveals prominence of the central pulmonary vasculature and mild interstitial prominence. ABDOMEN: No remarkable upper abdominal findings.  BONES: No acute osseous changes.    Prominence of pulmonary interstitium similar as compared previous exam. Signed by Niels Perez DO    ECG 12 lead    Result Date:  12/30/2023  Sinus tachycardia Possible Left atrial enlargement Inferior infarct (cited on or before 29-DEC-2023) Abnormal ECG When compared with ECG of 29-DEC-2023 18:28, Nonspecific T wave abnormality has replaced inverted T waves in Lateral leads See ED provider note for full interpretation and clinical correlation Confirmed by Cheyanne Burger ([id number]) on 12/30/2023 7:28:58 PM    ECG 12 lead    Result Date: 12/29/2023  Normal sinus rhythm Possible Left atrial enlargement Borderline ECG When compared with ECG of 19-DEC-2023 14:56, (unconfirmed) Borderline criteria for Inferior infarct are no longer Present See ED provider note for full interpretation and clinical correlation Confirmed by Cheyanne Burger ([id number]) on 12/29/2023 11:25:36 PM    ECG 12 lead    Result Date: 12/29/2023  Normal sinus rhythm Possible Left atrial enlargement Left axis deviation Prolonged QT Abnormal ECG When compared with ECG of 27-DEC-2023 20:16, (unconfirmed) No significant change was found See ED provider note for full interpretation and clinical correlation Confirmed by Cheyanne Burger ([id number]) on 12/29/2023 11:25:05 PM    XR chest 1 view    Result Date: 12/29/2023  STUDY: Chest Radiograph;  12/29/2023 6:26 PM INDICATION: Shortness of breath.  COMPARISON: XR chest 12/27/2023, 12/19/2023.  ACCESSION NUMBER(S): [id number] ORDERING CLINICIAN: RAHUL LUCAS TECHNIQUE:  Frontal chest was obtained at 1825 hours. FINDINGS: CARDIOMEDIASTINAL SILHOUETTE: Heart is mildly enlarged with mild pulmonary vascular congestion.  LUNGS: Lungs are clear. Emphysematous changes noted.  ABDOMEN: No remarkable upper abdominal findings.  BONES: No acute osseous changes.    Mild cardiomegaly with mild pulmonary vascular congestion. Emphysema. Signed by Bean Zaidi MD    ECG 12 lead    Result Date: 12/29/2023  Sinus tachycardia Possible Inferior infarct , age undetermined T wave abnormality, consider lateral ischemia Abnormal ECG When compared with ECG of  27-DEC-2023 21:22, (unconfirmed) Nonspecific T wave abnormality, worse in Inferior leads T wave inversion now evident in Lateral leads See ED provider note for full interpretation and clinical correlation Confirmed by Cheyanne Burger ([id number]) on 12/29/2023 6:38:15 PM    CT angio chest for pulmonary embolism    Result Date: 12/27/2023  Interpreted By:  Sin Benítez, STUDY: CT ANGIO CHEST FOR PULMONARY EMBOLISM; 12/27/2023 9:18 pm   INDICATION: Signs/Symptoms:Shortness of breath, hypoxic, tachycardic.   COMPARISON: CT chest dated 10/31/2023.   ACCESSION NUMBER(S): [id number]   ORDERING CLINICIAN: BROOKE TRACEY   TECHNIQUE: Contiguous axial CT images were obtained through the chest at 2 mm slice thickness following administration of intravenous contrast utilizing pulmonary artery bolus tracking. 75 cc of Omnipaque 350 was administered. The images were then reconstructed in the coronal and sagittal planes. MIP images were created and reviewed.   FINDINGS: POTENTIAL LIMITATIONS OF THE STUDY: None   HEART and VESSELS: There is dense opacification of the pulmonary arterial system. No intraluminal filling defect is seen within the pulmonary arteries to suggest acute pulmonary embolus.   The heart is within normal limits for size. No pericardial effusion is identified.   The thoracic aorta is within normal limits for course and caliber, without evidence of aneurysm.   MEDIASTINUM and RICHARD, LOWER NECK AND AXILLA: Evaluation of the visualized neck base demonstrates no gross mass or lymphadenopathy.   There is no gross axillary, hilar or mediastinal lymphadenopathy identified.   LUNGS and AIRWAYS: The trachea and central airways are grossly patent without evidence of endobronchial lesion.   Mild patchy ground-glass airspace consolidations are seen in the upper lobes bilaterally, new relative to the prior study, concerning for developing pneumonia. There is no pleural effusion or pneumothorax identified. No discrete pulmonary  nodules or masses are seen.   UPPER ABDOMEN: Evaluation of the visualized upper abdomen demonstrates no discrete mass or lymphadenopathy.   CHEST WALL and OSSEOUS STRUCTURES: There is no evidence of acute fracture identified.       1. No evidence of acute pulmonary embolus. 2. Mild patchy ground-glass airspace consolidations in the upper lobes bilaterally, concerning for developing pneumonia. Clinical correlation and continued follow-up is recommended.   MACRO: None.     Signed by: Sin Benítez 12/27/2023 9:33 PM Dictation workstation:   [id number]    XR chest 1 view    Result Date: 12/27/2023  STUDY: Chest Radiograph;  12/27/2023, 8:22PM INDICATION: Shortness of breath. COMPARISON: 12/19/2023 XR Chest ACCESSION NUMBER(S): [id number] ORDERING CLINICIAN: BROOKE TRACEY TECHNIQUE:  Frontal chest was obtained at 20:22 hours. FINDINGS: CARDIOMEDIASTINAL SILHOUETTE: Heart size is mildly enlarged. The mediastinal contours appear stable.  LUNGS: Lungs demonstrate emphysematous changes.  There is prominence of pulmonary interstitium.  There is subtle right basilar opacity.  ABDOMEN: No remarkable upper abdominal findings.  BONES: No acute osseous changes.    Subtle right basilar opacity concerning for pneumonia.  This appears slightly progressed since prior study.  Cardiomegaly and emphysematous changes. Signed by Jairo Velazquez,     XR chest 1 view    Result Date: 12/19/2023  Interpreted By:  Niels Caicedo, STUDY: Chest dated  12/19/2023.   INDICATION: Signs/Symptoms:sob   COMPARISON: Chest dated 10/31/2023.   ACCESSION NUMBER(S): [id number]   ORDERING CLINICIAN: ASHLEY REINA   TECHNIQUE: One view of the chest.   FINDINGS: The lungs are clear.  No pneumothorax or effusion is evident. The cardiomediastinal silhouette is  prominent but similar to the prior exam.The soft tissues are grossly unremarkable.       No acute cardiopulmonary process is evident.   MACRO: None   Signed by: Niels Caicedo 12/19/2023 3:17  PM Dictation workstation:   [id number]    Radiology:  ECG 12 lead    Result Date: 1/10/2024  Sinus tachycardia Inferior infarct (cited on or before 29-DEC-2023) Abnormal ECG When compared with ECG of 29-DEC-2023 21:35, Nonspecific T wave abnormality, improved in Lateral leads See ED provider note for full interpretation and clinical correlation Confirmed by Kim Wall ([id number]) on 1/10/2024 12:25:13 PM    XR chest 1 view    Result Date: 1/10/2024  Interpreted By:  Tavares Ellis, STUDY: XR CHEST 1 VIEW;  1/10/2024 5:38 am   INDICATION: Signs/Symptoms:resp distress.   COMPARISON: Portable chest, 9 January 2024; CT chest with contrast 27 December 2023   ACCESSION NUMBER(S): [id number]   ORDERING CLINICIAN: DERRICK CHAMBERS   TECHNIQUE: Single frontal view of the chest; Portable technique   FINDINGS: Different patient positioning from yesterday   The cardiomediastinal silhouette is unchanged   Right basilar atelectasis   Opacification at left lung base is at least in part due to a prominent cardiophrenic fat pad and suboptimal patient positioning. Difficult to entirely exclude left basilar pneumonia and/or atelectasis   No pneumothorax       As above   MACRO: None   Signed by: Tavares Ellis 1/10/2024 8:54 AM Dictation workstation:   [id number]    XR chest 1 view    Result Date: 1/9/2024  STUDY: Chest Radiograph;  1/9/2024 10:55 PM INDICATION: Shortness of breath. COMPARISON: 12/29/2023 XR chest ACCESSION NUMBER(S): [id number] ORDERING CLINICIAN: PAUL VELÁSQUEZ TECHNIQUE:  Frontal chest was obtained at 22:55 hours. FINDINGS: CARDIOMEDIASTINAL SILHOUETTE: Cardiomediastinal silhouette enlarged..  LUNGS: Lungs reveals prominence of the central pulmonary vasculature and mild interstitial prominence. ABDOMEN: No remarkable upper abdominal findings.  BONES: No acute osseous changes.    Prominence of pulmonary interstitium similar as compared previous exam. Signed by Niels Perez DO     Current  Facility-Administered Medications:     busPIRone (Buspar) tablet 20 mg, 20 mg, oral, TID, Sebastian Dimas MD, 20 mg at 01/11/24 1514    citalopram (CeleXA) tablet 40 mg, 40 mg, oral, Daily, VALDEMAR Garcia, 40 mg at 01/11/24 0820    [Held by provider] clonazePAM (KlonoPIN) tablet 1 mg, 1 mg, oral, BID PRN, VALDEMAR Garcia    dextrose 10 % in water (D10W) infusion, 0.3 g/kg/hr, intravenous, Once PRN, VALDEMAR Garcia    dextrose 50 % injection 25 g, 25 g, intravenous, q15 min PRN, VALDEMAR Garcia    doxycycline (Vibra-Tabs) tablet 100 mg, 100 mg, oral, q12h BLUE, VALDEMAR Aden, 100 mg at 01/11/24 0820    glucagon (Glucagen) injection 1 mg, 1 mg, intramuscular, q15 min PRN, VALDEMAR Garcia    hydrOXYzine pamoate (Vistaril) capsule 25 mg, 25 mg, oral, TID PRN, VALDEMAR Garcia, 25 mg at 01/11/24 1153    insulin glargine (Lantus) injection 10 Units, 10 Units, subcutaneous, q24h, VALDEMAR Aden, 10 Units at 01/11/24 0819    insulin lispro (HumaLOG) injection 0-5 Units, 0-5 Units, subcutaneous, Before meals & nightly, VALDEMRA Aden, 1 Units at 01/11/24 1649    ipratropium-albuteroL (Duo-Neb) 0.5-2.5 mg/3 mL nebulizer solution 3 mL, 3 mL, nebulization, q4h, VALDEMAR Garcia, 3 mL at 01/11/24 1622    ipratropium-albuteroL (Duo-Neb) 0.5-2.5 mg/3 mL nebulizer solution 3 mL, 3 mL, nebulization, q4h PRN, VALDEMAR Garcia    levothyroxine (Synthroid, Levoxyl) tablet 150 mcg, 150 mcg, oral, Daily, VALDEMAR Garcia, 150 mcg at 01/11/24 0612    methylPREDNISolone sod succinate (PF) (SOLU-Medrol) 40 mg/mL injection 40 mg, 40 mg, intravenous, q24h, VALDEMAR Aden, 40 mg at 01/11/24 0612    [Held by provider] metoprolol tartrate (Lopressor) tablet 25 mg, 25 mg, oral, BID, VALDEMAR Garcia, 25 mg at 01/10/24 0823    nicotine (Nicoderm CQ) 21 mg/24 hr patch 1 patch, 1 patch,  transdermal, Daily, 1 patch at 01/11/24 0820 **FOLLOWED BY** [START ON 2/21/2024] nicotine (Nicoderm CQ) 14 mg/24 hr patch 1 patch, 1 patch, transdermal, Daily **FOLLOWED BY** [START ON 3/6/2024] nicotine (Nicoderm CQ) 7 mg/24 hr patch 1 patch, 1 patch, transdermal, Daily, VALDEMAR Garcia    oxygen (O2) therapy, , inhalation, Continuous PRN - O2/gases, Renzo Castanon MD, Rate Verify at 01/11/24 0700    oxygen (O2) therapy, , inhalation, Continuous PRN - O2/gases, VALDEMAR Garcia, 60 L/min at 01/11/24 1622    pantoprazole (ProtoNix) EC tablet 40 mg, 40 mg, oral, Daily before breakfast, VALDEMAR Aden, 40 mg at 01/11/24 0612    perflutren lipid microspheres (Definity) injection 0.5-10 mL of dilution, 0.5-10 mL of dilution, intravenous, Once in imaging, VALDEMAR Aden    [Held by provider] risperiDONE (RisperDAL) tablet 0.25 mg, 0.25 mg, oral, Daily, VALDEMAR Garcia, 0.25 mg at 01/10/24 0823    rivaroxaban (Xarelto) tablet 20 mg, 20 mg, oral, Daily with evening meal, VALDEMAR Garcia, 20 mg at 01/11/24 1649   Principal Problem:    Acute on chronic respiratory failure, unspecified whether with hypoxia or hypercapnia (CMS/[facility])      Assessment/Plan    Acute on chronic hypoxic and hypercapnic respiratory failure continue BiPAP and oxygen keeping saturation more than 90%.  COPD exacerbation continue bronchodilators and steroids.  History of sleep apnea patient on BiPAP now.  Obesity patient advised reduce weight by diet and exercise.  Acute bronchitis continue doxycycline.  Smoking cessation discussed with the patient according patient started  History of DVT and PE.  Hypertension.  Dyslipidemia.  History of diabetes.  Anxiety disorder.  Depression and schizophrenia.  Psych consult was done.  Hypothyroid.  GERD.  Hypothyroidism.  Migraine.  DVT prophylaxis.  PT OT.  P.o. diet.               1/11/2023 patient on high flow oxygen out of bed to chair is  slowly improving.    Afshin Villalba MD

## 2024-01-12 ENCOUNTER — APPOINTMENT (OUTPATIENT)
Dept: CARDIOLOGY | Facility: HOSPITAL | Age: 44
DRG: 190 | End: 2024-01-12
Payer: COMMERCIAL

## 2024-01-12 LAB
ALBUMIN SERPL BCP-MCNC: 3.6 G/DL (ref 3.4–5)
ALP SERPL-CCNC: 73 U/L (ref 33–110)
ALT SERPL W P-5'-P-CCNC: 25 U/L (ref 7–45)
ANION GAP SERPL CALC-SCNC: 10 MMOL/L (ref 10–20)
AST SERPL W P-5'-P-CCNC: 9 U/L (ref 9–39)
BASOPHILS # BLD AUTO: 0.03 X10*3/UL (ref 0–0.1)
BASOPHILS NFR BLD AUTO: 0.3 %
BILIRUB SERPL-MCNC: 0.3 MG/DL (ref 0–1.2)
BUN SERPL-MCNC: 19 MG/DL (ref 6–23)
CALCIUM SERPL-MCNC: 9.4 MG/DL (ref 8.6–10.3)
CHLORIDE SERPL-SCNC: 99 MMOL/L (ref 98–107)
CO2 SERPL-SCNC: 35 MMOL/L (ref 21–32)
CREAT SERPL-MCNC: 0.81 MG/DL (ref 0.5–1.05)
EGFRCR SERPLBLD CKD-EPI 2021: >90 ML/MIN/1.73M*2
EOSINOPHIL # BLD AUTO: 0 X10*3/UL (ref 0–0.7)
EOSINOPHIL NFR BLD AUTO: 0 %
ERYTHROCYTE [DISTWIDTH] IN BLOOD BY AUTOMATED COUNT: 17 % (ref 11.5–14.5)
GLUCOSE BLD MANUAL STRIP-MCNC: 161 MG/DL (ref 74–99)
GLUCOSE BLD MANUAL STRIP-MCNC: 239 MG/DL (ref 74–99)
GLUCOSE BLD MANUAL STRIP-MCNC: 297 MG/DL (ref 74–99)
GLUCOSE SERPL-MCNC: 243 MG/DL (ref 74–99)
HCT VFR BLD AUTO: 38.3 % (ref 36–46)
HGB BLD-MCNC: 11.6 G/DL (ref 12–16)
IMM GRANULOCYTES # BLD AUTO: 0.24 X10*3/UL (ref 0–0.7)
IMM GRANULOCYTES NFR BLD AUTO: 2.8 % (ref 0–0.9)
LYMPHOCYTES # BLD AUTO: 2.33 X10*3/UL (ref 1.2–4.8)
LYMPHOCYTES NFR BLD AUTO: 26.8 %
MAGNESIUM SERPL-MCNC: 1.68 MG/DL (ref 1.6–2.4)
MCH RBC QN AUTO: 30.7 PG (ref 26–34)
MCHC RBC AUTO-ENTMCNC: 30.3 G/DL (ref 32–36)
MCV RBC AUTO: 101 FL (ref 80–100)
MONOCYTES # BLD AUTO: 0.55 X10*3/UL (ref 0.1–1)
MONOCYTES NFR BLD AUTO: 6.3 %
NEUTROPHILS # BLD AUTO: 5.54 X10*3/UL (ref 1.2–7.7)
NEUTROPHILS NFR BLD AUTO: 63.8 %
NRBC BLD-RTO: 0 /100 WBCS (ref 0–0)
PHOSPHATE SERPL-MCNC: 3.2 MG/DL (ref 2.5–4.9)
PLATELET # BLD AUTO: 144 X10*3/UL (ref 150–450)
POTASSIUM SERPL-SCNC: 4.3 MMOL/L (ref 3.5–5.3)
PROT SERPL-MCNC: 6.2 G/DL (ref 6.4–8.2)
RBC # BLD AUTO: 3.78 X10*6/UL (ref 4–5.2)
SODIUM SERPL-SCNC: 140 MMOL/L (ref 136–145)
WBC # BLD AUTO: 8.7 X10*3/UL (ref 4.4–11.3)

## 2024-01-12 PROCEDURE — 82947 ASSAY GLUCOSE BLOOD QUANT: CPT

## 2024-01-12 PROCEDURE — 99232 SBSQ HOSP IP/OBS MODERATE 35: CPT | Performed by: PSYCHIATRY & NEUROLOGY

## 2024-01-12 PROCEDURE — 94640 AIRWAY INHALATION TREATMENT: CPT

## 2024-01-12 PROCEDURE — 93308 TTE F-UP OR LMTD: CPT | Performed by: INTERNAL MEDICINE

## 2024-01-12 PROCEDURE — 99291 CRITICAL CARE FIRST HOUR: CPT | Performed by: NURSE PRACTITIONER

## 2024-01-12 PROCEDURE — 2500000001 HC RX 250 WO HCPCS SELF ADMINISTERED DRUGS (ALT 637 FOR MEDICARE OP): Performed by: NURSE PRACTITIONER

## 2024-01-12 PROCEDURE — 2500000005 HC RX 250 GENERAL PHARMACY W/O HCPCS

## 2024-01-12 PROCEDURE — 2500000002 HC RX 250 W HCPCS SELF ADMINISTERED DRUGS (ALT 637 FOR MEDICARE OP, ALT 636 FOR OP/ED): Performed by: NURSE PRACTITIONER

## 2024-01-12 PROCEDURE — 84100 ASSAY OF PHOSPHORUS: CPT

## 2024-01-12 PROCEDURE — 2500000004 HC RX 250 GENERAL PHARMACY W/ HCPCS (ALT 636 FOR OP/ED): Performed by: NURSE PRACTITIONER

## 2024-01-12 PROCEDURE — 36415 COLL VENOUS BLD VENIPUNCTURE: CPT | Performed by: NURSE PRACTITIONER

## 2024-01-12 PROCEDURE — 2500000004 HC RX 250 GENERAL PHARMACY W/ HCPCS (ALT 636 FOR OP/ED)

## 2024-01-12 PROCEDURE — 85025 COMPLETE CBC W/AUTO DIFF WBC: CPT | Performed by: NURSE PRACTITIONER

## 2024-01-12 PROCEDURE — 2500000001 HC RX 250 WO HCPCS SELF ADMINISTERED DRUGS (ALT 637 FOR MEDICARE OP): Performed by: PSYCHIATRY & NEUROLOGY

## 2024-01-12 PROCEDURE — 2500000001 HC RX 250 WO HCPCS SELF ADMINISTERED DRUGS (ALT 637 FOR MEDICARE OP)

## 2024-01-12 PROCEDURE — 93308 TTE F-UP OR LMTD: CPT

## 2024-01-12 PROCEDURE — 80053 COMPREHEN METABOLIC PANEL: CPT | Performed by: NURSE PRACTITIONER

## 2024-01-12 PROCEDURE — 83735 ASSAY OF MAGNESIUM: CPT | Performed by: NURSE PRACTITIONER

## 2024-01-12 PROCEDURE — 1210000001 HC SEMI-PRIVATE ROOM DAILY

## 2024-01-12 PROCEDURE — 2500000002 HC RX 250 W HCPCS SELF ADMINISTERED DRUGS (ALT 637 FOR MEDICARE OP, ALT 636 FOR OP/ED)

## 2024-01-12 PROCEDURE — S4991 NICOTINE PATCH NONLEGEND: HCPCS

## 2024-01-12 PROCEDURE — 2500000004 HC RX 250 GENERAL PHARMACY W/ HCPCS (ALT 636 FOR OP/ED): Performed by: PSYCHIATRY & NEUROLOGY

## 2024-01-12 RX ORDER — FUROSEMIDE 10 MG/ML
40 INJECTION INTRAMUSCULAR; INTRAVENOUS ONCE
Status: COMPLETED | OUTPATIENT
Start: 2024-01-12 | End: 2024-01-12

## 2024-01-12 RX ORDER — PREDNISONE 20 MG/1
40 TABLET ORAL DAILY
Status: DISCONTINUED | OUTPATIENT
Start: 2024-01-13 | End: 2024-01-14 | Stop reason: HOSPADM

## 2024-01-12 RX ORDER — MAGNESIUM SULFATE HEPTAHYDRATE 40 MG/ML
2 INJECTION, SOLUTION INTRAVENOUS ONCE
Status: COMPLETED | OUTPATIENT
Start: 2024-01-12 | End: 2024-01-12

## 2024-01-12 RX ORDER — PALIPERIDONE 3 MG/1
3 TABLET, EXTENDED RELEASE ORAL DAILY
Status: DISCONTINUED | OUTPATIENT
Start: 2024-01-12 | End: 2024-01-14 | Stop reason: HOSPADM

## 2024-01-12 RX ORDER — CLONAZEPAM 0.5 MG/1
0.5 TABLET ORAL ONCE
Status: COMPLETED | OUTPATIENT
Start: 2024-01-12 | End: 2024-01-12

## 2024-01-12 RX ADMIN — BUSPIRONE HYDROCHLORIDE 20 MG: 5 TABLET ORAL at 20:21

## 2024-01-12 RX ADMIN — BUSPIRONE HYDROCHLORIDE 20 MG: 5 TABLET ORAL at 08:39

## 2024-01-12 RX ADMIN — METHYLPREDNISOLONE SODIUM SUCCINATE 40 MG: 40 INJECTION, POWDER, FOR SOLUTION INTRAMUSCULAR; INTRAVENOUS at 08:47

## 2024-01-12 RX ADMIN — IPRATROPIUM BROMIDE AND ALBUTEROL SULFATE 3 ML: 2.5; .5 SOLUTION RESPIRATORY (INHALATION) at 23:49

## 2024-01-12 RX ADMIN — DOXYCYCLINE HYCLATE 100 MG: 100 TABLET, FILM COATED ORAL at 08:39

## 2024-01-12 RX ADMIN — HYDROXYZINE PAMOATE 25 MG: 25 CAPSULE ORAL at 11:19

## 2024-01-12 RX ADMIN — IPRATROPIUM BROMIDE AND ALBUTEROL SULFATE 3 ML: 2.5; .5 SOLUTION RESPIRATORY (INHALATION) at 03:26

## 2024-01-12 RX ADMIN — LEVOTHYROXINE SODIUM 150 MCG: 75 TABLET ORAL at 07:06

## 2024-01-12 RX ADMIN — Medication 60 L/MIN: at 07:41

## 2024-01-12 RX ADMIN — INSULIN LISPRO 3 UNITS: 100 INJECTION, SOLUTION INTRAVENOUS; SUBCUTANEOUS at 17:04

## 2024-01-12 RX ADMIN — RIVAROXABAN 20 MG: 20 TABLET, FILM COATED ORAL at 17:03

## 2024-01-12 RX ADMIN — MAGNESIUM SULFATE HEPTAHYDRATE 2 G: 40 INJECTION, SOLUTION INTRAVENOUS at 08:46

## 2024-01-12 RX ADMIN — CLONAZEPAM 0.5 MG: 0.5 TABLET ORAL at 08:44

## 2024-01-12 RX ADMIN — INSULIN GLARGINE 10 UNITS: 100 INJECTION, SOLUTION SUBCUTANEOUS at 08:41

## 2024-01-12 RX ADMIN — Medication 45 L/MIN: at 15:46

## 2024-01-12 RX ADMIN — FUROSEMIDE 40 MG: 10 INJECTION, SOLUTION INTRAMUSCULAR; INTRAVENOUS at 11:18

## 2024-01-12 RX ADMIN — IPRATROPIUM BROMIDE AND ALBUTEROL SULFATE 3 ML: 2.5; .5 SOLUTION RESPIRATORY (INHALATION) at 20:46

## 2024-01-12 RX ADMIN — PRAZOSIN HYDROCHLORIDE 2 MG: 1 CAPSULE ORAL at 20:21

## 2024-01-12 RX ADMIN — INSULIN LISPRO 2 UNITS: 100 INJECTION, SOLUTION INTRAVENOUS; SUBCUTANEOUS at 08:41

## 2024-01-12 RX ADMIN — CLONAZEPAM 0.5 MG: 0.5 TABLET ORAL at 20:28

## 2024-01-12 RX ADMIN — PANTOPRAZOLE SODIUM 40 MG: 40 TABLET, DELAYED RELEASE ORAL at 08:47

## 2024-01-12 RX ADMIN — INSULIN LISPRO 2 UNITS: 100 INJECTION, SOLUTION INTRAVENOUS; SUBCUTANEOUS at 20:21

## 2024-01-12 RX ADMIN — Medication 60 L/MIN: at 11:25

## 2024-01-12 RX ADMIN — NICOTINE 1 PATCH: 21 PATCH, EXTENDED RELEASE TRANSDERMAL at 08:39

## 2024-01-12 RX ADMIN — IPRATROPIUM BROMIDE AND ALBUTEROL SULFATE 3 ML: 2.5; .5 SOLUTION RESPIRATORY (INHALATION) at 15:46

## 2024-01-12 RX ADMIN — INSULIN LISPRO 1 UNITS: 100 INJECTION, SOLUTION INTRAVENOUS; SUBCUTANEOUS at 11:54

## 2024-01-12 RX ADMIN — IPRATROPIUM BROMIDE AND ALBUTEROL SULFATE 3 ML: 2.5; .5 SOLUTION RESPIRATORY (INHALATION) at 11:25

## 2024-01-12 RX ADMIN — BUSPIRONE HYDROCHLORIDE 20 MG: 5 TABLET ORAL at 17:03

## 2024-01-12 RX ADMIN — CITALOPRAM HYDROBROMIDE 40 MG: 20 TABLET ORAL at 08:40

## 2024-01-12 RX ADMIN — DOXYCYCLINE HYCLATE 100 MG: 100 TABLET, FILM COATED ORAL at 20:21

## 2024-01-12 RX ADMIN — IPRATROPIUM BROMIDE AND ALBUTEROL SULFATE 3 ML: 2.5; .5 SOLUTION RESPIRATORY (INHALATION) at 07:41

## 2024-01-12 RX ADMIN — PALIPERIDONE 3 MG: 3 TABLET, EXTENDED RELEASE ORAL at 11:16

## 2024-01-12 ASSESSMENT — COGNITIVE AND FUNCTIONAL STATUS - GENERAL
DAILY ACTIVITIY SCORE: 24
DAILY ACTIVITIY SCORE: 24
MOBILITY SCORE: 24

## 2024-01-12 ASSESSMENT — PAIN SCALES - GENERAL
PAINLEVEL_OUTOF10: 0 - NO PAIN

## 2024-01-12 ASSESSMENT — PAIN - FUNCTIONAL ASSESSMENT
PAIN_FUNCTIONAL_ASSESSMENT: 0-10

## 2024-01-12 NOTE — PROGRESS NOTES
Stephenie Mccray is a 43 y.o. female on day 2 of admission has been followed by psychiatry described her mood as better today.  She wants to get back on her Invega and the oral dose that she takes 3 mg.  Will need to find out her last administered dose of Invega Sustenna and it strength.  She is tolerating the clonazepam 0.5 mg without any sedation and is also not experiencing any withdrawal symptoms.  No hallucinations noted.  Patient is tolerating medications well.   Labs Reviewed.  Vitals Reviewed.   Nursing Notes Reviewed.   No EPS, TD, vitals stable.     Yesterday's event was discussed with her and I provided psychoeducation but also validated her emotional state from yesterday and she does not realize that it would not have been safe for her to do what happened yesterday with her home medications being brought in.     MSE: Patient was alert, oriented to time, place, person and situation. Patient appears well groomed and clad in climate appropriate clothes. Patient is cooperative on approach. Recent and remote memory within normal limits. Memory registration and recall within normal limits. Attention and concentration within normal limits. Speech normal in rate, rhythm and volume. Good eye contact. Thought process concrete. Intact associations. Good fund of knowledge. Mood ok and affect constricted. Patient did not endorse any delusions. Patient denied any auditory visual hallucinations. Patient has denied any active suicidal or homicidal ideations and is future oriented.  Patient has fair insight, fair judgment and good impulse control.     Musculoskeletal: Normal gait, no Parkinsonism, no Dystonia, no Akathisia, no TD. Psychomotor activity within normal limits.     Diagnosis: Schizophrenia    Assessment and Plan:     Patient is not at imminent risk of harm to self or others and does not need inpatient psychiatric care.  One-time dose of Klonopin 0.5 mg to be offered and 12 hours of gap is preferably  "provided between 2 doses unless she is experiencing benzodiazepine withdrawal again.  Primary team initiated 0.5 of Klonopin twice daily as needed which would be useful to control such symptoms.  Invega 3 mg initiated and primary team to find out from [facility] when her next Invega Sustenna and what strength is due    Continue with current treatment and medication adjustments. Patient is agreeable with continued medication management and adjustments discussed.         Last Recorded Vitals  /58   Pulse 90   Temp 36 °C (96.8 °F) (Skin)   Resp 19   Ht 1.575 m (5' 2\")   Wt 77.8 kg (171 lb 8.3 oz)   LMP  (LMP Unknown)   SpO2 92%   BMI 31.37 kg/m²      Recent Results (from the past 24 hour(s))   POCT GLUCOSE    Collection Time: 01/11/24  8:13 AM   Result Value Ref Range    POCT Glucose 160 (H) 74 - 99 mg/dL   COOX PANEL, ARTERIAL    Collection Time: 01/11/24 10:28 AM   Result Value Ref Range    POCT Hemoglobin, Arterial 12.1 12.0 - 16.0 g/dL    POCT Oxy Hemoglobin, Arterial 97.6 94.0 - 98.0 %    POCT Carboxyhemoglobin, Arterial 1.3 %    POCT Methemoglobin, Arterial 0.9 0.0 - 1.5 %    POCT Deoxy Hemoglobin, Arterial 0.2 0.0 - 5.0 %    Site of Arterial Puncture Radial Left     Andrew's Test Positive    POCT GLUCOSE    Collection Time: 01/11/24 11:52 AM   Result Value Ref Range    POCT Glucose 223 (H) 74 - 99 mg/dL   Transthoracic Echo (TTE) Complete    Collection Time: 01/11/24  1:11 PM   Result Value Ref Range    AV mn grad 5.0     AV pk tom 1.49     LV biplane EF 53     LVOT diam 2.00     MV E/A ratio 1.29     Tricuspid annular plane systolic excursion 2.2     MV avg E/e' ratio 5.93     LA vol index A/L 14.7     RV free wall pk S' 11.40     RVSP 11.4     LVIDd 4.76     Aortic Valve Area by Continuity of Peak Velocity 2.42     AV pk grad 8.9     Aortic Valve Area by Continuity of VTI 2.75     LV A4C EF 54.4    POCT GLUCOSE    Collection Time: 01/11/24  4:47 PM   Result Value Ref Range    POCT Glucose 183 (H) " 74 - 99 mg/dL   POCT GLUCOSE    Collection Time: 01/11/24  8:21 PM   Result Value Ref Range    POCT Glucose 140 (H) 74 - 99 mg/dL   CBC and Auto Differential    Collection Time: 01/12/24  4:41 AM   Result Value Ref Range    WBC 8.7 4.4 - 11.3 x10*3/uL    nRBC 0.0 0.0 - 0.0 /100 WBCs    RBC 3.78 (L) 4.00 - 5.20 x10*6/uL    Hemoglobin 11.6 (L) 12.0 - 16.0 g/dL    Hematocrit 38.3 36.0 - 46.0 %     (H) 80 - 100 fL    MCH 30.7 26.0 - 34.0 pg    MCHC 30.3 (L) 32.0 - 36.0 g/dL    RDW 17.0 (H) 11.5 - 14.5 %    Platelets 144 (L) 150 - 450 x10*3/uL    Neutrophils % 63.8 40.0 - 80.0 %    Immature Granulocytes %, Automated 2.8 (H) 0.0 - 0.9 %    Lymphocytes % 26.8 13.0 - 44.0 %    Monocytes % 6.3 2.0 - 10.0 %    Eosinophils % 0.0 0.0 - 6.0 %    Basophils % 0.3 0.0 - 2.0 %    Neutrophils Absolute 5.54 1.20 - 7.70 x10*3/uL    Immature Granulocytes Absolute, Automated 0.24 0.00 - 0.70 x10*3/uL    Lymphocytes Absolute 2.33 1.20 - 4.80 x10*3/uL    Monocytes Absolute 0.55 0.10 - 1.00 x10*3/uL    Eosinophils Absolute 0.00 0.00 - 0.70 x10*3/uL    Basophils Absolute 0.03 0.00 - 0.10 x10*3/uL   Comprehensive metabolic panel    Collection Time: 01/12/24  4:41 AM   Result Value Ref Range    Glucose 243 (H) 74 - 99 mg/dL    Sodium 140 136 - 145 mmol/L    Potassium 4.3 3.5 - 5.3 mmol/L    Chloride 99 98 - 107 mmol/L    Bicarbonate 35 (H) 21 - 32 mmol/L    Anion Gap 10 10 - 20 mmol/L    Urea Nitrogen 19 6 - 23 mg/dL    Creatinine 0.81 0.50 - 1.05 mg/dL    eGFR >90 >60 mL/min/1.73m*2    Calcium 9.4 8.6 - 10.3 mg/dL    Albumin 3.6 3.4 - 5.0 g/dL    Alkaline Phosphatase 73 33 - 110 U/L    Total Protein 6.2 (L) 6.4 - 8.2 g/dL    AST 9 9 - 39 U/L    Bilirubin, Total 0.3 0.0 - 1.2 mg/dL    ALT 25 7 - 45 U/L   Magnesium    Collection Time: 01/12/24  4:41 AM   Result Value Ref Range    Magnesium 1.68 1.60 - 2.40 mg/dL   Phosphorus    Collection Time: 01/12/24  4:41 AM   Result Value Ref Range    Phosphorus 3.2 2.5 - 4.9 mg/dL   POCT GLUCOSE     Collection Time: 01/12/24  7:56 AM   Result Value Ref Range    POCT Glucose 161 (H) 74 - 99 mg/dL          Current Facility-Administered Medications:     busPIRone (Buspar) tablet 20 mg, 20 mg, oral, TID, Sebastian Dimas MD, 20 mg at 01/11/24 2022    citalopram (CeleXA) tablet 40 mg, 40 mg, oral, Daily, VALDEMAR Garcia, 40 mg at 01/11/24 0820    clonazePAM (KlonoPIN) tablet 0.5 mg, 0.5 mg, oral, BID PRN, Yoana Madrigal, BARTOLOME-CNP, 0.5 mg at 01/11/24 2104    clonazePAM (KlonoPIN) tablet 0.5 mg, 0.5 mg, oral, Once, Sebastian Dimas MD    dextrose 10 % in water (D10W) infusion, 0.3 g/kg/hr, intravenous, Once PRN, VALDEMAR Garcia    dextrose 50 % injection 25 g, 25 g, intravenous, q15 min PRN, VALDEMAR Garcia    doxycycline (Vibra-Tabs) tablet 100 mg, 100 mg, oral, q12h BLUE, VALDEMAR Aden, 100 mg at 01/11/24 2023    glucagon (Glucagen) injection 1 mg, 1 mg, intramuscular, q15 min PRN, VALDEMAR Garcia    hydrOXYzine pamoate (Vistaril) capsule 25 mg, 25 mg, oral, TID PRN, VALDEMAR Garcia, 25 mg at 01/11/24 1153    insulin glargine (Lantus) injection 10 Units, 10 Units, subcutaneous, q24h, AVLDEMAR Aden, 10 Units at 01/11/24 0819    insulin lispro (HumaLOG) injection 0-5 Units, 0-5 Units, subcutaneous, Before meals & nightly, VALDEMAR Aden, 1 Units at 01/11/24 1649    ipratropium-albuteroL (Duo-Neb) 0.5-2.5 mg/3 mL nebulizer solution 3 mL, 3 mL, nebulization, q4h, VALDEMAR Garcia, 3 mL at 01/12/24 0741    ipratropium-albuteroL (Duo-Neb) 0.5-2.5 mg/3 mL nebulizer solution 3 mL, 3 mL, nebulization, q4h PRN, VALDEMAR Garcia    levothyroxine (Synthroid, Levoxyl) tablet 150 mcg, 150 mcg, oral, Daily, VALDEMAR Garcia, 150 mcg at 01/12/24 0706    magnesium sulfate IV 2 g, 2 g, intravenous, Once, VALDEMAR Aden    methylPREDNISolone sod succinate (PF) (SOLU-Medrol) 40 mg/mL injection 40 mg, 40  mg, intravenous, q24h, BARTOLOME Aden-CNP, 40 mg at 01/11/24 0612    [Held by provider] metoprolol tartrate (Lopressor) tablet 25 mg, 25 mg, oral, BID, VALDEMAR Garcia, 25 mg at 01/10/24 0823    nicotine (Nicoderm CQ) 21 mg/24 hr patch 1 patch, 1 patch, transdermal, Daily, 1 patch at 01/11/24 0820 **FOLLOWED BY** [START ON 2/21/2024] nicotine (Nicoderm CQ) 14 mg/24 hr patch 1 patch, 1 patch, transdermal, Daily **FOLLOWED BY** [START ON 3/6/2024] nicotine (Nicoderm CQ) 7 mg/24 hr patch 1 patch, 1 patch, transdermal, Daily, VALDEMAR Garcia    oxygen (O2) therapy, , inhalation, Continuous PRN - O2/gases, VALDEMAR Garcia, 60 L/min at 01/12/24 0741    paliperidone (Invega) 24 hr tablet 3 mg, 3 mg, oral, Daily, Sebastian Dimas MD    pantoprazole (ProtoNix) EC tablet 40 mg, 40 mg, oral, Daily before breakfast, BARTOLOME Aden-CNP, 40 mg at 01/11/24 0612    perflutren lipid microspheres (Definity) injection 0.5-10 mL of dilution, 0.5-10 mL of dilution, intravenous, Once in imaging, VALDEMAR Aden    prazosin (Minipress) capsule 2 mg, 2 mg, oral, Nightly, Jose Amaya, BARTOLOME-CNP, 2 mg at 01/11/24 2104    [Held by provider] risperiDONE (RisperDAL) tablet 0.25 mg, 0.25 mg, oral, Daily, VALDEMAR Garcia, 0.25 mg at 01/10/24 0823    rivaroxaban (Xarelto) tablet 20 mg, 20 mg, oral, Daily with evening meal, VALDEMAR Garcia, 20 mg at 01/11/24 1649       Sebastian Dimas MD

## 2024-01-12 NOTE — PROGRESS NOTES
"Stephenie Mccray is a 43 y.o. female on day 2 of admission presenting with Acute on chronic respiratory failure, unspecified whether with hypoxia or hypercapnia (CMS/HCC).presenting with shortness of breath cough wheezing chest tightness and congestion orthopnea and PND.  Patient had recurrent admission with similar complaint last admission 12/23..  Patient on high flow oxygen Airvo and improving and is being transferred to the floor from ICU.    Subjective   Patient states she is feeling better.     Objective   Patient at rest on Airvo stable. on exertion complaining of short of breath.  ROS  Review of Systems   Constitutional:  Positive for activity change, appetite change, fatigue and unexpected weight change.   HENT:  Positive for congestion.    Eyes: Negative.    Respiratory:  Positive for cough, shortness of breath and wheezing.    Gastrointestinal: Negative.    Endocrine: Negative.    Genitourinary: Negative.    Musculoskeletal:  Positive for arthralgias and myalgias.   Skin: Negative.    Allergic/Immunologic: Negative.    Neurological:  Positive for dizziness, weakness, light-headedness and headaches.   Hematological: Negative.    Psychiatric/Behavioral:  Positive for confusion and sleep disturbance.    All other systems reviewed and are negative.  Vital Signs  Blood pressure 139/75, pulse 90, temperature 36.6 °C (97.9 °F), resp. rate 15, height 1.575 m (5' 2\"), weight 77.8 kg (171 lb 8.3 oz), SpO2 93 %.    Physical Exam   Vitals reviewed.   Constitutional:       General: She is in acute distress.      Appearance: She is obese.   HENT:      Head: Normocephalic and atraumatic.      Right Ear: External ear normal.      Left Ear: External ear normal.      Mouth/Throat:      Mouth: Mucous membranes are dry.   Eyes:      Extraocular Movements: Extraocular movements intact.      Conjunctiva/sclera: Conjunctivae normal.      Pupils: Pupils are equal, round, and reactive to light.   Cardiovascular:      Rate and " Rhythm: Regular rhythm.      Pulses: Normal pulses.      Heart sounds: Normal heart sounds.   Pulmonary:      Effort: Respiratory distress present.      Breath sounds: Wheezing, rhonchi and rales present.   Abdominal:      General: Bowel sounds are normal.      Palpations: Abdomen is soft.   Musculoskeletal:         General: Normal range of motion.      Cervical back: Normal range of motion and neck supple.   Skin:     General: Skin is warm.      Capillary Refill: Capillary refill takes 2 to 3 seconds.   Neurological:      General: No focal deficit present.   Psychiatric:         Mood and Affect: Mood normal.         Behavior: Behavior normal.         Thought Content: Thought content normal.   Oxygen Therapy  SpO2: 93 %  Medical Gas Therapy: Supplemental oxygen  O2 Delivery Method: High flow nasal cannula  FiO2 (%):  [50 %-93 %] 50 %  S RR:  [24] 24    Intake/Output  I/O last 3 completed shifts:  In: 690 (8.9 mL/kg) [P.O.:690]  Out: 2985 (38.4 mL/kg) [Urine:2985 (1.1 mL/kg/hr)]  Weight: 77.8 kg     Lines and Tubes:  Peripheral IV 01/09/24 20 G Right Antecubital (Active)   Placement Date/Time: 01/09/24 2237   Size (Gauge): 20 G  Orientation: Right  Location: Antecubital  Site Prep: Alcohol  Technique: Anatomical landmarks  Insertion attempts: 2  Patient Tolerance: Age appropriate   Number of days: 2       Peripheral IV 01/10/24 18 G Right;Upper;Ventral Arm (Active)   Placement Date/Time: 01/10/24 1047   Hand Hygiene Completed: Yes  Size (Gauge): 18 G  Orientation: Right;Upper;Ventral  Location: Arm  Site Prep: Alcohol  Insertion attempts: 1  Patient Tolerance: Tolerated well   Number of days: 2       Results for orders placed or performed during the hospital encounter of 01/09/24 (from the past 96 hour(s))   Influenza A, and B PCR   Result Value Ref Range    Flu A Result Not Detected Not Detected    Flu B Result Not Detected Not Detected   SARS-CoV-2 RT PCR   Result Value Ref Range    Coronavirus 2019, PCR Not  Detected Not Detected   RSV PCR   Result Value Ref Range    RSV PCR Not Detected Not Detected   CBC and Auto Differential   Result Value Ref Range    WBC 9.2 4.4 - 11.3 x10*3/uL    nRBC 1.2 (H) 0.0 - 0.0 /100 WBCs    RBC 3.88 (L) 4.00 - 5.20 x10*6/uL    Hemoglobin 12.0 12.0 - 16.0 g/dL    Hematocrit 39.3 36.0 - 46.0 %     (H) 80 - 100 fL    MCH 30.9 26.0 - 34.0 pg    MCHC 30.5 (L) 32.0 - 36.0 g/dL    RDW 16.0 (H) 11.5 - 14.5 %    Platelets 150 150 - 450 x10*3/uL    Immature Granulocytes %, Automated 6.0 (H) 0.0 - 0.9 %    Immature Granulocytes Absolute, Automated 0.55 0.00 - 0.70 x10*3/uL   Comprehensive Metabolic Panel   Result Value Ref Range    Glucose 149 (H) 74 - 99 mg/dL    Sodium 141 136 - 145 mmol/L    Potassium 4.4 3.5 - 5.3 mmol/L    Chloride 103 98 - 107 mmol/L    Bicarbonate 31 21 - 32 mmol/L    Anion Gap 11 10 - 20 mmol/L    Urea Nitrogen 22 6 - 23 mg/dL    Creatinine 0.98 0.50 - 1.05 mg/dL    eGFR 74 >60 mL/min/1.73m*2    Calcium 8.8 8.6 - 10.3 mg/dL    Albumin 3.6 3.4 - 5.0 g/dL    Alkaline Phosphatase 78 33 - 110 U/L    Total Protein 6.0 (L) 6.4 - 8.2 g/dL    AST 10 9 - 39 U/L    Bilirubin, Total 0.3 0.0 - 1.2 mg/dL    ALT 18 7 - 45 U/L   hCG, quantitative, pregnancy   Result Value Ref Range    HCG, Beta-Quantitative <2 <5 mIU/mL   Troponin I, High Sensitivity, Initial   Result Value Ref Range    Troponin I, High Sensitivity 3 0 - 13 ng/L   Magnesium   Result Value Ref Range    Magnesium 1.66 1.60 - 2.40 mg/dL   Manual Differential   Result Value Ref Range    Neutrophils %, Manual 37.0 40.0 - 80.0 %    Bands %, Manual 2.0 0.0 - 5.0 %    Lymphocytes %, Manual 55.0 13.0 - 44.0 %    Monocytes %, Manual 3.0 2.0 - 10.0 %    Eosinophils %, Manual 0.0 0.0 - 6.0 %    Basophils %, Manual 1.0 0.0 - 2.0 %    Atypical Lymphocytes %, Manual 2.0 0.0 - 2.0 %    Seg Neutrophils Absolute, Manual 3.40 1.20 - 7.00 x10*3/uL    Bands Absolute, Manual 0.18 0.00 - 0.70 x10*3/uL    Lymphocytes Absolute, Manual 5.06  (H) 1.20 - 4.80 x10*3/uL    Monocytes Absolute, Manual 0.28 0.10 - 1.00 x10*3/uL    Eosinophils Absolute, Manual 0.00 0.00 - 0.70 x10*3/uL    Basophils Absolute, Manual 0.09 0.00 - 0.10 x10*3/uL    Atypical Lymphs Absolute, Manual 0.18 0.00 - 0.50 x10*3/uL    Total Cells Counted 100     Neutrophils Absolute, Manual 3.58 1.20 - 7.70 x10*3/uL    RBC Morphology See Below     Polychromasia Mild     Ovalocytes Few     Teardrop Cells Few    B-type natriuretic peptide   Result Value Ref Range    BNP 13 0 - 99 pg/mL   Blood Gas Arterial Full Panel   Result Value Ref Range    POCT pH, Arterial 7.31 (L) 7.38 - 7.42 pH    POCT pCO2, Arterial 71 (HH) 38 - 42 mm Hg    POCT pO2, Arterial 66 (L) 85 - 95 mm Hg    POCT SO2, Arterial 94 94 - 100 %    POCT Oxy Hemoglobin, Arterial 83.2 (L) 94.0 - 98.0 %    POCT Hematocrit Calculated, Arterial 36.0 36.0 - 46.0 %    POCT Sodium, Arterial 139 136 - 145 mmol/L    POCT Potassium, Arterial 4.5 3.5 - 5.3 mmol/L    POCT Chloride, Arterial 104 98 - 107 mmol/L    POCT Ionized Calcium, Arterial 1.26 1.10 - 1.33 mmol/L    POCT Glucose, Arterial 148 (H) 74 - 99 mg/dL    POCT Lactate, Arterial 0.7 0.4 - 2.0 mmol/L    POCT Base Excess, Arterial 7.2 (H) -2.0 - 3.0 mmol/L    POCT HCO3 Calculated, Arterial 35.7 (H) 22.0 - 26.0 mmol/L    POCT Hemoglobin, Arterial 12.1 12.0 - 16.0 g/dL    POCT Anion Gap, Arterial 4 (L) 10 - 25 mmo/L    Patient Temperature      FiO2 36 %    Apparatus CANNULA     Critical Called By DFOX RRT     Critical Called To DR CORLEY     Critical Call Time 2306.0000     Critical Read Back Y    ECG 12 lead   Result Value Ref Range    Ventricular Rate 107 BPM    Atrial Rate 107 BPM    NV Interval 128 ms    QRS Duration 88 ms    QT Interval 284 ms    QTC Calculation(Bazett) 379 ms    P Axis 43 degrees    R Axis -21 degrees    T Axis 20 degrees    QRS Count 18 beats    Q Onset 225 ms    P Onset 161 ms    P Offset 210 ms    T Offset 367 ms    QTC Fredericia 344 ms   Troponin, High  Sensitivity, 1 Hour   Result Value Ref Range    Troponin I, High Sensitivity 4 0 - 13 ng/L   Blood Gas Arterial Full Panel   Result Value Ref Range    POCT pH, Arterial 7.26 (L) 7.38 - 7.42 pH    POCT pCO2, Arterial 80 (HH) 38 - 42 mm Hg    POCT pO2, Arterial 76 (L) 85 - 95 mm Hg    POCT SO2, Arterial 96 94 - 100 %    POCT Oxy Hemoglobin, Arterial 87.5 (L) 94.0 - 98.0 %    POCT Hematocrit Calculated, Arterial 35.0 (L) 36.0 - 46.0 %    POCT Sodium, Arterial 138 136 - 145 mmol/L    POCT Potassium, Arterial 4.5 3.5 - 5.3 mmol/L    POCT Chloride, Arterial 104 98 - 107 mmol/L    POCT Ionized Calcium, Arterial 1.28 1.10 - 1.33 mmol/L    POCT Glucose, Arterial 159 (H) 74 - 99 mg/dL    POCT Lactate, Arterial 0.6 0.4 - 2.0 mmol/L    POCT Base Excess, Arterial 6.4 (H) -2.0 - 3.0 mmol/L    POCT HCO3 Calculated, Arterial 35.9 (H) 22.0 - 26.0 mmol/L    POCT Hemoglobin, Arterial 11.8 (L) 12.0 - 16.0 g/dL    POCT Anion Gap, Arterial 3 (L) 10 - 25 mmo/L    Patient Temperature      FiO2 65 %    Ipap CMH2O 15.0 cm H2O    Epap CMH2O 5.0 cm H2O    Critical Called By DFOX RRT     Critical Called To DR CORLEY     Critical Call Time 113.0000     Critical Read Back Y    POCT GLUCOSE   Result Value Ref Range    POCT Glucose 190 (H) 74 - 99 mg/dL   Magnesium   Result Value Ref Range    Magnesium 2.21 1.60 - 2.40 mg/dL   Phosphorus   Result Value Ref Range    Phosphorus 4.0 2.5 - 4.9 mg/dL   CBC and Auto Differential   Result Value Ref Range    WBC 8.4 4.4 - 11.3 x10*3/uL    nRBC 1.1 (H) 0.0 - 0.0 /100 WBCs    RBC 4.08 4.00 - 5.20 x10*6/uL    Hemoglobin 12.7 12.0 - 16.0 g/dL    Hematocrit 41.8 36.0 - 46.0 %     (H) 80 - 100 fL    MCH 31.1 26.0 - 34.0 pg    MCHC 30.4 (L) 32.0 - 36.0 g/dL    RDW 16.1 (H) 11.5 - 14.5 %    Platelets 141 (L) 150 - 450 x10*3/uL    Immature Granulocytes %, Automated 5.8 (H) 0.0 - 0.9 %    Immature Granulocytes Absolute, Automated 0.49 0.00 - 0.70 x10*3/uL   Comprehensive metabolic panel   Result Value Ref  Range    Glucose 181 (H) 74 - 99 mg/dL    Sodium 141 136 - 145 mmol/L    Potassium 4.1 3.5 - 5.3 mmol/L    Chloride 100 98 - 107 mmol/L    Bicarbonate 33 (H) 21 - 32 mmol/L    Anion Gap 12 10 - 20 mmol/L    Urea Nitrogen 19 6 - 23 mg/dL    Creatinine 0.95 0.50 - 1.05 mg/dL    eGFR 77 >60 mL/min/1.73m*2    Calcium 9.2 8.6 - 10.3 mg/dL    Albumin 3.8 3.4 - 5.0 g/dL    Alkaline Phosphatase 88 33 - 110 U/L    Total Protein 6.6 6.4 - 8.2 g/dL    AST 16 9 - 39 U/L    Bilirubin, Total 0.3 0.0 - 1.2 mg/dL    ALT 27 7 - 45 U/L   Lipid panel   Result Value Ref Range    Cholesterol 227 (H) 0 - 199 mg/dL    HDL-Cholesterol 44.3 mg/dL    Cholesterol/HDL Ratio 5.1     LDL Calculated 138 (H) <=99 mg/dL    VLDL 45 (H) 0 - 40 mg/dL    Triglycerides 225 (H) 0 - 149 mg/dL    Non HDL Cholesterol 183 (H) 0 - 149 mg/dL   Manual Differential   Result Value Ref Range    Neutrophils %, Manual 68.0 40.0 - 80.0 %    Bands %, Manual 4.0 0.0 - 5.0 %    Lymphocytes %, Manual 23.0 13.0 - 44.0 %    Monocytes %, Manual 1.0 2.0 - 10.0 %    Eosinophils %, Manual 0.0 0.0 - 6.0 %    Basophils %, Manual 0.0 0.0 - 2.0 %    Atypical Lymphocytes %, Manual 1.0 0.0 - 2.0 %    Myelocytes %, Manual 3.0 0.0 - 0.0 %    Seg Neutrophils Absolute, Manual 5.71 1.20 - 7.00 x10*3/uL    Bands Absolute, Manual 0.34 0.00 - 0.70 x10*3/uL    Lymphocytes Absolute, Manual 1.93 1.20 - 4.80 x10*3/uL    Monocytes Absolute, Manual 0.08 (L) 0.10 - 1.00 x10*3/uL    Eosinophils Absolute, Manual 0.00 0.00 - 0.70 x10*3/uL    Basophils Absolute, Manual 0.00 0.00 - 0.10 x10*3/uL    Atypical Lymphs Absolute, Manual 0.08 0.00 - 0.50 x10*3/uL    Myelocytes Absolute, Manual 0.25 0.00 - 0.00 x10*3/uL    Total Cells Counted 100     Neutrophils Absolute, Manual 6.05 1.20 - 7.70 x10*3/uL    RBC Morphology See Below     Polychromasia Mild    Acute Toxicology Panel, Blood   Result Value Ref Range    Acetaminophen <10.0 10.0 - 30.0 ug/mL    Salicylate  <3 4 - 20 mg/dL    Alcohol <10 <=10 mg/dL    Blood Gas Arterial Full Panel   Result Value Ref Range    POCT pH, Arterial 7.34 (L) 7.38 - 7.42 pH    POCT pCO2, Arterial 72 (HH) 38 - 42 mm Hg    POCT pO2, Arterial 69 (L) 85 - 95 mm Hg    POCT SO2, Arterial 94 94 - 100 %    POCT Oxy Hemoglobin, Arterial 90.0 (L) 94.0 - 98.0 %    POCT Hematocrit Calculated, Arterial 39.0 36.0 - 46.0 %    POCT Sodium, Arterial 136 136 - 145 mmol/L    POCT Potassium, Arterial 4.2 3.5 - 5.3 mmol/L    POCT Chloride, Arterial 100 98 - 107 mmol/L    POCT Ionized Calcium, Arterial 1.22 1.10 - 1.33 mmol/L    POCT Glucose, Arterial 248 (H) 74 - 99 mg/dL    POCT Lactate, Arterial 1.6 0.4 - 2.0 mmol/L    POCT Base Excess, Arterial 10.2 (H) -2.0 - 3.0 mmol/L    POCT HCO3 Calculated, Arterial 38.8 (H) 22.0 - 26.0 mmol/L    POCT Hemoglobin, Arterial 12.9 12.0 - 16.0 g/dL    POCT Anion Gap, Arterial 1 (L) 10 - 25 mmo/L    Patient Temperature      FiO2 65 %    Ventilator Mode BiPAP     Spontaneous Tidal Volume 458 mL    Total Minute Volume 11.3 Liter    Frequency (BPM) 24 bpm    Inspiratory Time 0.9     Ipap CMH2O 20.0 cm H2O    Epap CMH2O 5.0 cm H2O    Critical Called By DAVID CASTELLON     Critical Called To CARLOS CONTRERAS     Critical Call Time 557.0000     Critical Read Back Y     Site of Arterial Puncture Brachial Left     Andrew's Test     Blood Gas Arterial Full Panel   Result Value Ref Range    POCT pH, Arterial 7.35 (L) 7.38 - 7.42 pH    POCT pCO2, Arterial 69 (H) 38 - 42 mm Hg    POCT pO2, Arterial 73 (L) 85 - 95 mm Hg    POCT SO2, Arterial 96 94 - 100 %    POCT Oxy Hemoglobin, Arterial 91.8 (L) 94.0 - 98.0 %    POCT Hematocrit Calculated, Arterial 39.0 36.0 - 46.0 %    POCT Sodium, Arterial 137 136 - 145 mmol/L    POCT Potassium, Arterial 4.5 3.5 - 5.3 mmol/L    POCT Chloride, Arterial 99 98 - 107 mmol/L    POCT Ionized Calcium, Arterial 1.23 1.10 - 1.33 mmol/L    POCT Glucose, Arterial 269 (H) 74 - 99 mg/dL    POCT Lactate, Arterial 1.7 0.4 - 2.0 mmol/L    POCT Base Excess, Arterial 9.8  (H) -2.0 - 3.0 mmol/L    POCT HCO3 Calculated, Arterial 38.1 (H) 22.0 - 26.0 mmol/L    POCT Hemoglobin, Arterial 12.9 12.0 - 16.0 g/dL    POCT Anion Gap, Arterial 4 (L) 10 - 25 mmo/L    Patient Temperature      FiO2 65 %    Ipap CMH2O 20.0 cm H2O    Epap CMH2O 5.0 cm H2O   POCT GLUCOSE   Result Value Ref Range    POCT Glucose 232 (H) 74 - 99 mg/dL   POCT GLUCOSE   Result Value Ref Range    POCT Glucose 255 (H) 74 - 99 mg/dL   Urinalysis with Reflex Microscopic   Result Value Ref Range    Color, Urine Straw Straw, Yellow    Appearance, Urine Clear Clear    Specific Gravity, Urine 1.008 1.005 - 1.035    pH, Urine 5.0 5.0, 5.5, 6.0, 6.5, 7.0, 7.5, 8.0    Protein, Urine NEGATIVE NEGATIVE mg/dL    Glucose, Urine NEGATIVE NEGATIVE mg/dL    Blood, Urine NEGATIVE NEGATIVE    Ketones, Urine NEGATIVE NEGATIVE mg/dL    Bilirubin, Urine NEGATIVE NEGATIVE    Urobilinogen, Urine <2.0 <2.0 mg/dL    Nitrite, Urine NEGATIVE NEGATIVE    Leukocyte Esterase, Urine NEGATIVE NEGATIVE   Drug Screen, Urine   Result Value Ref Range    Amphetamine Screen, Urine Presumptive Negative Presumptive Negative    Barbiturate Screen, Urine Presumptive Negative Presumptive Negative    Benzodiazepines Screen, Urine Presumptive Negative Presumptive Negative    Cannabinoid Screen, Urine Presumptive Positive (A) Presumptive Negative    Cocaine Metabolite Screen, Urine Presumptive Negative Presumptive Negative    Fentanyl Screen, Urine Presumptive Negative Presumptive Negative    Opiate Screen, Urine Presumptive Negative Presumptive Negative    Oxycodone Screen, Urine Presumptive Negative Presumptive Negative    PCP Screen, Urine Presumptive Negative Presumptive Negative   POCT GLUCOSE   Result Value Ref Range    POCT Glucose 203 (H) 74 - 99 mg/dL   POCT GLUCOSE   Result Value Ref Range    POCT Glucose 166 (H) 74 - 99 mg/dL   POCT GLUCOSE   Result Value Ref Range    POCT Glucose 139 (H) 74 - 99 mg/dL   CBC and Auto Differential   Result Value Ref Range     WBC 16.2 (H) 4.4 - 11.3 x10*3/uL    nRBC 0.2 (H) 0.0 - 0.0 /100 WBCs    RBC 3.92 (L) 4.00 - 5.20 x10*6/uL    Hemoglobin 12.1 12.0 - 16.0 g/dL    Hematocrit 39.4 36.0 - 46.0 %     (H) 80 - 100 fL    MCH 30.9 26.0 - 34.0 pg    MCHC 30.7 (L) 32.0 - 36.0 g/dL    RDW 16.7 (H) 11.5 - 14.5 %    Platelets 147 (L) 150 - 450 x10*3/uL    Neutrophils % 76.5 40.0 - 80.0 %    Immature Granulocytes %, Automated 1.8 (H) 0.0 - 0.9 %    Lymphocytes % 16.1 13.0 - 44.0 %    Monocytes % 5.4 2.0 - 10.0 %    Eosinophils % 0.0 0.0 - 6.0 %    Basophils % 0.2 0.0 - 2.0 %    Neutrophils Absolute 12.40 (H) 1.20 - 7.70 x10*3/uL    Immature Granulocytes Absolute, Automated 0.30 0.00 - 0.70 x10*3/uL    Lymphocytes Absolute 2.61 1.20 - 4.80 x10*3/uL    Monocytes Absolute 0.87 0.10 - 1.00 x10*3/uL    Eosinophils Absolute 0.00 0.00 - 0.70 x10*3/uL    Basophils Absolute 0.04 0.00 - 0.10 x10*3/uL   Comprehensive metabolic panel   Result Value Ref Range    Glucose 116 (H) 74 - 99 mg/dL    Sodium 143 136 - 145 mmol/L    Potassium 4.2 3.5 - 5.3 mmol/L    Chloride 99 98 - 107 mmol/L    Bicarbonate 36 (H) 21 - 32 mmol/L    Anion Gap 12 10 - 20 mmol/L    Urea Nitrogen 20 6 - 23 mg/dL    Creatinine 0.74 0.50 - 1.05 mg/dL    eGFR >90 >60 mL/min/1.73m*2    Calcium 9.2 8.6 - 10.3 mg/dL    Albumin 3.6 3.4 - 5.0 g/dL    Alkaline Phosphatase 80 33 - 110 U/L    Total Protein 6.1 (L) 6.4 - 8.2 g/dL    AST 16 9 - 39 U/L    Bilirubin, Total 0.4 0.0 - 1.2 mg/dL    ALT 33 7 - 45 U/L   Magnesium   Result Value Ref Range    Magnesium 1.95 1.60 - 2.40 mg/dL   Phosphorus   Result Value Ref Range    Phosphorus 3.3 2.5 - 4.9 mg/dL   POCT GLUCOSE   Result Value Ref Range    POCT Glucose 154 (H) 74 - 99 mg/dL   POCT GLUCOSE   Result Value Ref Range    POCT Glucose 160 (H) 74 - 99 mg/dL   COOX PANEL, ARTERIAL   Result Value Ref Range    POCT Hemoglobin, Arterial 12.1 12.0 - 16.0 g/dL    POCT Oxy Hemoglobin, Arterial 97.6 94.0 - 98.0 %    POCT Carboxyhemoglobin, Arterial  1.3 %    POCT Methemoglobin, Arterial 0.9 0.0 - 1.5 %    POCT Deoxy Hemoglobin, Arterial 0.2 0.0 - 5.0 %    Site of Arterial Puncture Radial Left     Andrew's Test Positive    POCT GLUCOSE   Result Value Ref Range    POCT Glucose 223 (H) 74 - 99 mg/dL   Transthoracic Echo (TTE) Complete   Result Value Ref Range    AV mn grad 5.0     AV pk tom 1.49     LV biplane EF 53     LVOT diam 2.00     MV E/A ratio 1.29     Tricuspid annular plane systolic excursion 2.2     MV avg E/e' ratio 5.93     LA vol index A/L 14.7     RV free wall pk S' 11.40     RVSP 11.4     LVIDd 4.76     Aortic Valve Area by Continuity of Peak Velocity 2.42     AV pk grad 8.9     Aortic Valve Area by Continuity of VTI 2.75     LV A4C EF 54.4    POCT GLUCOSE   Result Value Ref Range    POCT Glucose 183 (H) 74 - 99 mg/dL   POCT GLUCOSE   Result Value Ref Range    POCT Glucose 140 (H) 74 - 99 mg/dL   CBC and Auto Differential   Result Value Ref Range    WBC 8.7 4.4 - 11.3 x10*3/uL    nRBC 0.0 0.0 - 0.0 /100 WBCs    RBC 3.78 (L) 4.00 - 5.20 x10*6/uL    Hemoglobin 11.6 (L) 12.0 - 16.0 g/dL    Hematocrit 38.3 36.0 - 46.0 %     (H) 80 - 100 fL    MCH 30.7 26.0 - 34.0 pg    MCHC 30.3 (L) 32.0 - 36.0 g/dL    RDW 17.0 (H) 11.5 - 14.5 %    Platelets 144 (L) 150 - 450 x10*3/uL    Neutrophils % 63.8 40.0 - 80.0 %    Immature Granulocytes %, Automated 2.8 (H) 0.0 - 0.9 %    Lymphocytes % 26.8 13.0 - 44.0 %    Monocytes % 6.3 2.0 - 10.0 %    Eosinophils % 0.0 0.0 - 6.0 %    Basophils % 0.3 0.0 - 2.0 %    Neutrophils Absolute 5.54 1.20 - 7.70 x10*3/uL    Immature Granulocytes Absolute, Automated 0.24 0.00 - 0.70 x10*3/uL    Lymphocytes Absolute 2.33 1.20 - 4.80 x10*3/uL    Monocytes Absolute 0.55 0.10 - 1.00 x10*3/uL    Eosinophils Absolute 0.00 0.00 - 0.70 x10*3/uL    Basophils Absolute 0.03 0.00 - 0.10 x10*3/uL   Comprehensive metabolic panel   Result Value Ref Range    Glucose 243 (H) 74 - 99 mg/dL    Sodium 140 136 - 145 mmol/L    Potassium 4.3 3.5 - 5.3  mmol/L    Chloride 99 98 - 107 mmol/L    Bicarbonate 35 (H) 21 - 32 mmol/L    Anion Gap 10 10 - 20 mmol/L    Urea Nitrogen 19 6 - 23 mg/dL    Creatinine 0.81 0.50 - 1.05 mg/dL    eGFR >90 >60 mL/min/1.73m*2    Calcium 9.4 8.6 - 10.3 mg/dL    Albumin 3.6 3.4 - 5.0 g/dL    Alkaline Phosphatase 73 33 - 110 U/L    Total Protein 6.2 (L) 6.4 - 8.2 g/dL    AST 9 9 - 39 U/L    Bilirubin, Total 0.3 0.0 - 1.2 mg/dL    ALT 25 7 - 45 U/L   Magnesium   Result Value Ref Range    Magnesium 1.68 1.60 - 2.40 mg/dL   Phosphorus   Result Value Ref Range    Phosphorus 3.2 2.5 - 4.9 mg/dL   POCT GLUCOSE   Result Value Ref Range    POCT Glucose 161 (H) 74 - 99 mg/dL   POCT GLUCOSE   Result Value Ref Range    POCT Glucose 297 (H) 74 - 99 mg/dL      Relevant Results  Recent Results (from the past 24 hour(s))   POCT GLUCOSE    Collection Time: 01/11/24  8:21 PM   Result Value Ref Range    POCT Glucose 140 (H) 74 - 99 mg/dL   CBC and Auto Differential    Collection Time: 01/12/24  4:41 AM   Result Value Ref Range    WBC 8.7 4.4 - 11.3 x10*3/uL    nRBC 0.0 0.0 - 0.0 /100 WBCs    RBC 3.78 (L) 4.00 - 5.20 x10*6/uL    Hemoglobin 11.6 (L) 12.0 - 16.0 g/dL    Hematocrit 38.3 36.0 - 46.0 %     (H) 80 - 100 fL    MCH 30.7 26.0 - 34.0 pg    MCHC 30.3 (L) 32.0 - 36.0 g/dL    RDW 17.0 (H) 11.5 - 14.5 %    Platelets 144 (L) 150 - 450 x10*3/uL    Neutrophils % 63.8 40.0 - 80.0 %    Immature Granulocytes %, Automated 2.8 (H) 0.0 - 0.9 %    Lymphocytes % 26.8 13.0 - 44.0 %    Monocytes % 6.3 2.0 - 10.0 %    Eosinophils % 0.0 0.0 - 6.0 %    Basophils % 0.3 0.0 - 2.0 %    Neutrophils Absolute 5.54 1.20 - 7.70 x10*3/uL    Immature Granulocytes Absolute, Automated 0.24 0.00 - 0.70 x10*3/uL    Lymphocytes Absolute 2.33 1.20 - 4.80 x10*3/uL    Monocytes Absolute 0.55 0.10 - 1.00 x10*3/uL    Eosinophils Absolute 0.00 0.00 - 0.70 x10*3/uL    Basophils Absolute 0.03 0.00 - 0.10 x10*3/uL   Comprehensive metabolic panel    Collection Time: 01/12/24  4:41 AM    Result Value Ref Range    Glucose 243 (H) 74 - 99 mg/dL    Sodium 140 136 - 145 mmol/L    Potassium 4.3 3.5 - 5.3 mmol/L    Chloride 99 98 - 107 mmol/L    Bicarbonate 35 (H) 21 - 32 mmol/L    Anion Gap 10 10 - 20 mmol/L    Urea Nitrogen 19 6 - 23 mg/dL    Creatinine 0.81 0.50 - 1.05 mg/dL    eGFR >90 >60 mL/min/1.73m*2    Calcium 9.4 8.6 - 10.3 mg/dL    Albumin 3.6 3.4 - 5.0 g/dL    Alkaline Phosphatase 73 33 - 110 U/L    Total Protein 6.2 (L) 6.4 - 8.2 g/dL    AST 9 9 - 39 U/L    Bilirubin, Total 0.3 0.0 - 1.2 mg/dL    ALT 25 7 - 45 U/L   Magnesium    Collection Time: 01/12/24  4:41 AM   Result Value Ref Range    Magnesium 1.68 1.60 - 2.40 mg/dL   Phosphorus    Collection Time: 01/12/24  4:41 AM   Result Value Ref Range    Phosphorus 3.2 2.5 - 4.9 mg/dL   POCT GLUCOSE    Collection Time: 01/12/24  7:56 AM   Result Value Ref Range    POCT Glucose 161 (H) 74 - 99 mg/dL   POCT GLUCOSE    Collection Time: 01/12/24  5:00 PM   Result Value Ref Range    POCT Glucose 297 (H) 74 - 99 mg/dL      Transthoracic Echo (TTE) Limited    Result Date: 1/12/2024          [place] [id number]  Tel [phone] Fax [phone] TRANSTHORACIC ECHOCARDIOGRAM REPORT  Patient Name:      JACOB Nichols Physician:    [id number] Jairo Urbina MD, [facility] Study Date:        1/12/2024            Ordering Provider:    [id number] VINNIE GASCA MRN/PID:           [id number]             Fellow: Accession#:        [id number]         Nurse: Date of Birth/Age: 1980 / 43 years Sonographer:          Cathy Last                                                               [facility] Gender:            F                    Additional Staff: Height:            157.48 cm            Admit Date:           1/9/2024 Weight:            77.57 kg             Admission Status:     Inpatient -                                                                Routine BSA:               1.79 m2              [facility] Pressure: 119 /60 mmHg Study Type:    TRANSTHORACIC ECHO (TTE) LIMITED Diagnosis/ICD: Acute and chronic respiratory failure with hypercapnia-J96.22 Indication:    Respiratory failure CPT Codes:     Echo Limited-71498; Color Doppler-49930 Patient History: Smoker:            Current. Diabetes:          Yes Pertinent History: HTN, Hyperlipidemia, Chronic Lung Disease, COPD, PE, Previous                    DVT and Dyspnea. Study Detail: The following Echo studies were performed: 2D and color flow.               Agitated saline used as a contrast agent for intraseptal flow               evaluation. The patient was awake.  PHYSICIAN INTERPRETATION: Left Ventricle: Left ventricular systolic function is normal, with an estimated ejection fraction of 60%. There are no regional wall motion abnormalities. The left ventricular cavity size is normal. Left ventricular diastolic filling was not assessed. Left Atrium: The left atrium is normal in size. Right Ventricle: The right ventricle is normal in size. There is normal right ventricular global systolic function. Right Atrium: The right atrium is normal in size. Aortic Valve: The aortic valve was not well visualized. Aortic valve regurgitation was not assessed. Mitral Valve: The mitral valve is normal in structure. Mitral valve regurgitation was not assessed. Tricuspid Valve: The tricuspid valve is structurally normal. Tricuspid regurgitation was not assessed. Pulmonic Valve: The pulmonic valve is structurally normal. The pulmonic valve regurgitation was not assessed. Pericardium: There is no pericardial effusion noted. Aorta: The aortic root was not assessed.  CONCLUSIONS:  1. Left ventricular systolic function is normal with a 60% estimated ejection fraction.  2. No evidence right to left shunting on agitated saline bubble contrast  study. QUANTITATIVE DATA SUMMARY:  [id number] Jairo Urbina MD, [facility] Electronically signed on 1/12/2024 at 1:29:09 PM  ** Final **     Transthoracic Echo (TTE) Complete    Result Date: 1/11/2024          [place] [id number]  Tel [phone] Fax [phone] TRANSTHORACIC ECHOCARDIOGRAM REPORT  Patient Name:      JACOB ROWE PATRICIO   Reading Physician:    [id number] Steve Guerin DO Study Date:        1/11/2024            Ordering Provider:    [id number] VINNIE GASCA MRN/PID:           [id number]             Fellow: Accession#:        [id number]         Nurse: Date of Birth/Age: 1980 / 43 years Sonographer:          Lesli Hinson RDCS Gender:            F                    Additional Staff: Height:            157.48 cm            Admit Date:           1/9/2024 Weight:            77.57 kg             Admission Status:     Inpatient -                                                               Routine BSA:               1.79 m2              Department Location:  [facility] Blood Pressure: 113 /55 mmHg Study Type:    TRANSTHORACIC ECHO (TTE) COMPLETE Diagnosis/ICD: Acute respiratory failure with hypoxia-J96.01 Indication:    Dyspnea CPT Codes:     Echo Complete w Full [id number] Patient History: Smoker:            Current. Diabetes:          Yes Pertinent History: HTN, Hyperlipidemia, Chronic Lung Disease, COPD, PE, Previous                    DVT and Dyspnea. Study Detail: The following Echo studies were performed: M-Mode, 2D, Doppler and               color flow. Technically challenging study due to body habitus.  PHYSICIAN INTERPRETATION: Left Ventricle: Left ventricular systolic function is normal, with an estimated ejection fraction of 55%. There are no regional wall motion  abnormalities. The left ventricular cavity size is normal. Spectral Doppler shows a normal pattern of left ventricular diastolic filling. LV Wall Scoring: All segments are normal. Left Atrium: The left atrium is normal in size. Right Ventricle: The right ventricle is normal in size. There is normal right ventricular global systolic function. Right Atrium: The right atrium is normal in size. Aortic Valve: The aortic valve appears structurally normal. The aortic valve appears tricuspid. There is no evidence of aortic valve stenosis. There is no evidence of aortic valve regurgitation. The peak instantaneous gradient of the aortic valve is 8.9 mmHg. The mean gradient of the aortic valve is 5.0 mmHg. Mitral Valve: The mitral valve is normal in structure. There is no evidence of mitral valve stenosis. There is normal mitral valve leaflet mobility. There is trace mitral valve regurgitation. Tricuspid Valve: The tricuspid valve is structurally normal. There is normal tricuspid valve leaflet mobility. There is trace to mild tricuspid regurgitation. Pulmonic Valve: The pulmonic valve is structurally normal. There is no indication of pulmonic valve regurgitation. Pericardium: There is no pericardial effusion noted. Aorta: The aortic root is normal. Pulmonary Artery: The main pulmonary artery is normal in size, and position, with normal bifurcation into the left and right pulmonary arteries. Systemic Veins: The inferior vena cava appears to be of normal size. In comparison to the previous echocardiogram(s): The left ventricular function is unchanged. The left ventricular diastolic function is normal.  CONCLUSIONS:  1. Left ventricular systolic function is normal with a 55% estimated ejection fraction.  2. There is no evidence of mitral valve stenosis.  3. Trace mitral valve regurgitation.  4. Trace to mild tricuspid regurgitation.  5. Aortic valve stenosis is not present.  6. The main pulmonary artery is normal in size, and  position, with normal bifurcation into the left and right pulmonary arteries. QUANTITATIVE DATA SUMMARY: 2D MEASUREMENTS:                          Normal Ranges: Ao Root d:     2.30 cm   (2.0-3.7cm) LAs:           3.40 cm   (2.7-4.0cm) IVSd:          0.87 cm   (0.6-1.1cm) LVPWd:         0.89 cm   (0.6-1.1cm) LVIDd:         4.76 cm   (3.9-5.9cm) LVIDs:         3.13 cm LV Mass Index: 79.0 g/m2 LV % FS        34.2 % LA VOLUME:                               Normal Ranges: LA Vol A4C:        24.0 ml    (22+/-6mL/m2) LA Vol A2C:        28.9 ml LA Vol BP:         26.3 ml LA Vol Index A4C:  13.4ml/m2 LA Vol Index A2C:  16.1 ml/m2 LA Vol Index BP:   14.7 ml/m2 LA Area A4C:       11.4 cm2 LA Area A2C:       12.5 cm2 LA Major Axis A4C: 4.6 cm LA Major Axis A2C: 4.6 cm LA Volume Index:   13.9 ml/m2 RA VOLUME BY A/L METHOD:                               Normal Ranges: RA Vol A4C:        19.8 ml    (8.3-19.5ml) RA Vol Index A4C:  11.1 ml/m2 RA Area A4C:       9.9 cm2 RA Major Axis A4C: 4.2 cm LV SYSTOLIC FUNCTION BY 2D PLANIMETRY (MOD):                     Normal Ranges: EF-A4C View: 54.4 % (>=55%) EF-A2C View: 54.2 % EF-Biplane:  53.4 % LV DIASTOLIC FUNCTION:                        Normal Ranges: MV Peak E:    0.82 m/s (0.7-1.2 m/s) MV Peak A:    0.63 m/s (0.42-0.7 m/s) E/A Ratio:    1.29     (1.0-2.2) MV e'         0.14 m/s (>8.0) MV lateral e' 0.14 m/s MV medial e'  0.09 m/s E/e' Ratio:   5.93     (<8.0) MITRAL VALVE:                 Normal Ranges: MV DT: 127 msec (150-240msec) AORTIC VALVE:                                   Normal Ranges: AoV Vmax:                1.49 m/s (<=1.7m/s) AoV Peak P.9 mmHg (<20mmHg) AoV Mean P.0 mmHg (1.7-11.5mmHg) LVOT Max Mak:            1.15 m/s (<=1.1m/s) AoV VTI:                 24.80 cm (18-25cm) LVOT VTI:                21.70 cm LVOT Diameter:           2.00 cm  (1.8-2.4cm) AoV Area, VTI:           2.75 cm2 (2.5-5.5cm2) AoV Area,Vmax:           2.42 cm2  (2.5-4.5cm2) AoV Dimensionless Index: 0.87  RIGHT VENTRICLE: RV Basal 3.18 cm RV Mid   2.74 cm RV Major 6.8 cm TAPSE:   21.8 mm RV s'    0.11 m/s TRICUSPID VALVE/RVSP:                             Normal Ranges: Peak TR Velocity: 1.45 m/s RV Syst Pressure: 11.4 mmHg (< 30mmHg) PULMONIC VALVE:                         Normal Ranges: PV Accel Time: 177 msec (>120ms) PV Max Mak:    1.2 m/s  (0.6-0.9m/s) PV Max P.6 mmHg  [id number] Wilson Truong ONEAL Electronically signed on 2024 at 3:08:46 PM  Wall Scoring  ** Final **     XR chest 1 view    Result Date: 2024  Interpreted By:  Tavares Ellis, STUDY: XR CHEST 1 VIEW;  2024 11:30 am   INDICATION: Signs/Symptoms:hypoxia.   COMPARISON: CT chest with contrast 2023; intervening chest radiographs since that time   ACCESSION NUMBER(S): [id number]   ORDERING CLINICIAN: VINNIE GASCA   TECHNIQUE: Single frontal view of the chest; Portable technique   FINDINGS:   The cardiomediastinal silhouette is unchanged   Platelike atelectasis versus potentially true developing infiltrate in the peripheral right mid to basilar lung   Prominent streaky retrocardiac densities at left lung base perhaps slightly increasing over the past few days   Upper lungs clear   No large effusion or pneumothorax       Possibility of developing pneumonias   MACRO: None   Signed by: Tavares Ellis 2024 11:34 AM Dictation workstation:   [id number]    ECG 12 lead    Result Date: 1/10/2024  Sinus rhythm with short DE Possible Inferior infarct (cited on or before 19-DEC-2023) Abnormal ECG When compared with ECG of 19-DEC-2023 14:56, (unconfirmed) QT has shortened See ED provider note for full interpretation and clinical correlation Confirmed by Kim Wall ([id number]) on 1/10/2024 12:36:15 PM    ECG 12 lead    Result Date: 1/10/2024  Sinus tachycardia Inferior infarct (cited on or before 29-DEC-2023) Abnormal ECG When compared with ECG of 29-DEC-2023 21:35, Nonspecific T wave  abnormality, improved in Lateral leads See ED provider note for full interpretation and clinical correlation Confirmed by Kim Wall ([id number]) on 1/10/2024 12:25:13 PM    XR chest 1 view    Result Date: 1/10/2024  Interpreted By:  Tavares Ellis, STUDY: XR CHEST 1 VIEW;  1/10/2024 5:38 am   INDICATION: Signs/Symptoms:resp distress.   COMPARISON: Portable chest, 9 January 2024; CT chest with contrast 27 December 2023   ACCESSION NUMBER(S): [id number]   ORDERING CLINICIAN: DERRICK CHAMBERS   TECHNIQUE: Single frontal view of the chest; Portable technique   FINDINGS: Different patient positioning from yesterday   The cardiomediastinal silhouette is unchanged   Right basilar atelectasis   Opacification at left lung base is at least in part due to a prominent cardiophrenic fat pad and suboptimal patient positioning. Difficult to entirely exclude left basilar pneumonia and/or atelectasis   No pneumothorax       As above   MACRO: None   Signed by: Tavares Ellis 1/10/2024 8:54 AM Dictation workstation:   [id number]    XR chest 1 view    Result Date: 1/9/2024  STUDY: Chest Radiograph;  1/9/2024 10:55 PM INDICATION: Shortness of breath. COMPARISON: 12/29/2023 XR chest ACCESSION NUMBER(S): [id number] ORDERING CLINICIAN: PAUL VELÁSQUEZ TECHNIQUE:  Frontal chest was obtained at 22:55 hours. FINDINGS: CARDIOMEDIASTINAL SILHOUETTE: Cardiomediastinal silhouette enlarged..  LUNGS: Lungs reveals prominence of the central pulmonary vasculature and mild interstitial prominence. ABDOMEN: No remarkable upper abdominal findings.  BONES: No acute osseous changes.    Prominence of pulmonary interstitium similar as compared previous exam. Signed by Niels Perez DO    ECG 12 lead    Result Date: 12/30/2023  Sinus tachycardia Possible Left atrial enlargement Inferior infarct (cited on or before 29-DEC-2023) Abnormal ECG When compared with ECG of 29-DEC-2023 18:28, Nonspecific T wave abnormality has replaced inverted T waves in  Lateral leads See ED provider note for full interpretation and clinical correlation Confirmed by Cheyanne Burger ([id number]) on 12/30/2023 7:28:58 PM    ECG 12 lead    Result Date: 12/29/2023  Normal sinus rhythm Possible Left atrial enlargement Borderline ECG When compared with ECG of 19-DEC-2023 14:56, (unconfirmed) Borderline criteria for Inferior infarct are no longer Present See ED provider note for full interpretation and clinical correlation Confirmed by Cheyanne Burger ([id number]) on 12/29/2023 11:25:36 PM    ECG 12 lead    Result Date: 12/29/2023  Normal sinus rhythm Possible Left atrial enlargement Left axis deviation Prolonged QT Abnormal ECG When compared with ECG of 27-DEC-2023 20:16, (unconfirmed) No significant change was found See ED provider note for full interpretation and clinical correlation Confirmed by Cheyanne Burger ([id number]) on 12/29/2023 11:25:05 PM    XR chest 1 view    Result Date: 12/29/2023  STUDY: Chest Radiograph;  12/29/2023 6:26 PM INDICATION: Shortness of breath.  COMPARISON: XR chest 12/27/2023, 12/19/2023.  ACCESSION NUMBER(S): [id number] ORDERING CLINICIAN: RAHUL LUCAS TECHNIQUE:  Frontal chest was obtained at 1825 hours. FINDINGS: CARDIOMEDIASTINAL SILHOUETTE: Heart is mildly enlarged with mild pulmonary vascular congestion.  LUNGS: Lungs are clear. Emphysematous changes noted.  ABDOMEN: No remarkable upper abdominal findings.  BONES: No acute osseous changes.    Mild cardiomegaly with mild pulmonary vascular congestion. Emphysema. Signed by Bean Zaidi MD    ECG 12 lead    Result Date: 12/29/2023  Sinus tachycardia Possible Inferior infarct , age undetermined T wave abnormality, consider lateral ischemia Abnormal ECG When compared with ECG of 27-DEC-2023 21:22, (unconfirmed) Nonspecific T wave abnormality, worse in Inferior leads T wave inversion now evident in Lateral leads See ED provider note for full interpretation and clinical correlation Confirmed by Cheyanne Burger  ([id number]) on 12/29/2023 6:38:15 PM    CT angio chest for pulmonary embolism    Result Date: 12/27/2023  Interpreted By:  Sin Benítez, STUDY: CT ANGIO CHEST FOR PULMONARY EMBOLISM; 12/27/2023 9:18 pm   INDICATION: Signs/Symptoms:Shortness of breath, hypoxic, tachycardic.   COMPARISON: CT chest dated 10/31/2023.   ACCESSION NUMBER(S): [id number]   ORDERING CLINICIAN: BROOKE TRACEY   TECHNIQUE: Contiguous axial CT images were obtained through the chest at 2 mm slice thickness following administration of intravenous contrast utilizing pulmonary artery bolus tracking. 75 cc of Omnipaque 350 was administered. The images were then reconstructed in the coronal and sagittal planes. MIP images were created and reviewed.   FINDINGS: POTENTIAL LIMITATIONS OF THE STUDY: None   HEART and VESSELS: There is dense opacification of the pulmonary arterial system. No intraluminal filling defect is seen within the pulmonary arteries to suggest acute pulmonary embolus.   The heart is within normal limits for size. No pericardial effusion is identified.   The thoracic aorta is within normal limits for course and caliber, without evidence of aneurysm.   MEDIASTINUM and RICHARD, LOWER NECK AND AXILLA: Evaluation of the visualized neck base demonstrates no gross mass or lymphadenopathy.   There is no gross axillary, hilar or mediastinal lymphadenopathy identified.   LUNGS and AIRWAYS: The trachea and central airways are grossly patent without evidence of endobronchial lesion.   Mild patchy ground-glass airspace consolidations are seen in the upper lobes bilaterally, new relative to the prior study, concerning for developing pneumonia. There is no pleural effusion or pneumothorax identified. No discrete pulmonary nodules or masses are seen.   UPPER ABDOMEN: Evaluation of the visualized upper abdomen demonstrates no discrete mass or lymphadenopathy.   CHEST WALL and OSSEOUS STRUCTURES: There is no evidence of acute fracture identified.       1.  No evidence of acute pulmonary embolus. 2. Mild patchy ground-glass airspace consolidations in the upper lobes bilaterally, concerning for developing pneumonia. Clinical correlation and continued follow-up is recommended.   MACRO: None.     Signed by: Sin Benítez 12/27/2023 9:33 PM Dictation workstation:   [id number]    XR chest 1 view    Result Date: 12/27/2023  STUDY: Chest Radiograph;  12/27/2023, 8:22PM INDICATION: Shortness of breath. COMPARISON: 12/19/2023 XR Chest ACCESSION NUMBER(S): [id number] ORDERING CLINICIAN: BROOKE TRACEY TECHNIQUE:  Frontal chest was obtained at 20:22 hours. FINDINGS: CARDIOMEDIASTINAL SILHOUETTE: Heart size is mildly enlarged. The mediastinal contours appear stable.  LUNGS: Lungs demonstrate emphysematous changes.  There is prominence of pulmonary interstitium.  There is subtle right basilar opacity.  ABDOMEN: No remarkable upper abdominal findings.  BONES: No acute osseous changes.    Subtle right basilar opacity concerning for pneumonia.  This appears slightly progressed since prior study.  Cardiomegaly and emphysematous changes. Signed by Jairo Velazquez,     XR chest 1 view    Result Date: 12/19/2023  Interpreted By:  Niels Caicedo, STUDY: Chest dated  12/19/2023.   INDICATION: Signs/Symptoms:sob   COMPARISON: Chest dated 10/31/2023.   ACCESSION NUMBER(S): [id number]   ORDERING CLINICIAN: ASHLEY REINA   TECHNIQUE: One view of the chest.   FINDINGS: The lungs are clear.  No pneumothorax or effusion is evident. The cardiomediastinal silhouette is  prominent but similar to the prior exam.The soft tissues are grossly unremarkable.       No acute cardiopulmonary process is evident.   MACRO: None   Signed by: Niels Caicedo 12/19/2023 3:17 PM Dictation workstation:   [id number]    Radiology:  ECG 12 lead    Result Date: 1/10/2024  Sinus tachycardia Inferior infarct (cited on or before 29-DEC-2023) Abnormal ECG When compared with ECG of 29-DEC-2023 21:35, Nonspecific T  wave abnormality, improved in Lateral leads See ED provider note for full interpretation and clinical correlation Confirmed by Kim Wall ([id number]) on 1/10/2024 12:25:13 PM    XR chest 1 view    Result Date: 1/10/2024  Interpreted By:  Tavares Ellis, STUDY: XR CHEST 1 VIEW;  1/10/2024 5:38 am   INDICATION: Signs/Symptoms:resp distress.   COMPARISON: Portable chest, 9 January 2024; CT chest with contrast 27 December 2023   ACCESSION NUMBER(S): [id number]   ORDERING CLINICIAN: DERRICK CHAMBERS   TECHNIQUE: Single frontal view of the chest; Portable technique   FINDINGS: Different patient positioning from yesterday   The cardiomediastinal silhouette is unchanged   Right basilar atelectasis   Opacification at left lung base is at least in part due to a prominent cardiophrenic fat pad and suboptimal patient positioning. Difficult to entirely exclude left basilar pneumonia and/or atelectasis   No pneumothorax       As above   MACRO: None   Signed by: Tavares Ellis 1/10/2024 8:54 AM Dictation workstation:   [id number]    XR chest 1 view    Result Date: 1/9/2024  STUDY: Chest Radiograph;  1/9/2024 10:55 PM INDICATION: Shortness of breath. COMPARISON: 12/29/2023 XR chest ACCESSION NUMBER(S): [id number] ORDERING CLINICIAN: PAUL VELÁSQUEZ TECHNIQUE:  Frontal chest was obtained at 22:55 hours. FINDINGS: CARDIOMEDIASTINAL SILHOUETTE: Cardiomediastinal silhouette enlarged..  LUNGS: Lungs reveals prominence of the central pulmonary vasculature and mild interstitial prominence. ABDOMEN: No remarkable upper abdominal findings.  BONES: No acute osseous changes.    Prominence of pulmonary interstitium similar as compared previous exam. Signed by Niels Perez DO     Current Facility-Administered Medications:     busPIRone (Buspar) tablet 20 mg, 20 mg, oral, TID, Sebastian Dimas MD, 20 mg at 01/12/24 1703    citalopram (CeleXA) tablet 40 mg, 40 mg, oral, Daily, BARTOLOME Garcia-CNP, 40 mg at 01/12/24 0840    clonazePAM  (KlonoPIN) tablet 0.5 mg, 0.5 mg, oral, BID PRN, Yoana Madrigal, BARTOLOME-CNP, 0.5 mg at 01/11/24 2104    dextrose 10 % in water (D10W) infusion, 0.3 g/kg/hr, intravenous, Once PRN, VALDEMAR Garcia    dextrose 50 % injection 25 g, 25 g, intravenous, q15 min PRN, VALDEMAR Garcia    doxycycline (Vibra-Tabs) tablet 100 mg, 100 mg, oral, q12h BLUE, BARTOLOME Aden-CNP, 100 mg at 01/12/24 0839    glucagon (Glucagen) injection 1 mg, 1 mg, intramuscular, q15 min PRN, VALDEMAR Garcia    hydrOXYzine pamoate (Vistaril) capsule 25 mg, 25 mg, oral, TID PRN, BARTOLOME Garcia-CNP, 25 mg at 01/12/24 1119    insulin glargine (Lantus) injection 10 Units, 10 Units, subcutaneous, q24h, BARTOLOME Aden-CNP, 10 Units at 01/12/24 0841    insulin lispro (HumaLOG) injection 0-5 Units, 0-5 Units, subcutaneous, Before meals & nightly, BARTOLOME Aden-CNP, 3 Units at 01/12/24 1704    ipratropium-albuteroL (Duo-Neb) 0.5-2.5 mg/3 mL nebulizer solution 3 mL, 3 mL, nebulization, q4h, BARTOLOME Garcia-CNP, 3 mL at 01/12/24 1546    ipratropium-albuteroL (Duo-Neb) 0.5-2.5 mg/3 mL nebulizer solution 3 mL, 3 mL, nebulization, q4h PRN, VALDEMAR Garcia    levothyroxine (Synthroid, Levoxyl) tablet 150 mcg, 150 mcg, oral, Daily, VALDEMAR Garcia, 150 mcg at 01/12/24 0706    [Held by provider] metoprolol tartrate (Lopressor) tablet 25 mg, 25 mg, oral, BID, VALDEMAR Garcia, 25 mg at 01/10/24 0823    nicotine (Nicoderm CQ) 21 mg/24 hr patch 1 patch, 1 patch, transdermal, Daily, 1 patch at 01/12/24 0839 **FOLLOWED BY** [START ON 2/21/2024] nicotine (Nicoderm CQ) 14 mg/24 hr patch 1 patch, 1 patch, transdermal, Daily **FOLLOWED BY** [START ON 3/6/2024] nicotine (Nicoderm CQ) 7 mg/24 hr patch 1 patch, 1 patch, transdermal, Daily, VALDEMAR Garcia    oxygen (O2) therapy, , inhalation, Continuous PRN - O2/gases, VALDEMAR Servin, 45 L/min at  01/12/24 1546    paliperidone (Invega) 24 hr tablet 3 mg, 3 mg, oral, Daily, Sebastian Dimas MD, 3 mg at 01/12/24 1116    pantoprazole (ProtoNix) EC tablet 40 mg, 40 mg, oral, Daily before breakfast, BARTOLOME Aden-CNP, 40 mg at 01/12/24 0847    perflutren lipid microspheres (Definity) injection 0.5-10 mL of dilution, 0.5-10 mL of dilution, intravenous, Once in imaging, BARTOLOME Aden-CNP    perflutren lipid microspheres (Definity) injection 0.5-10 mL of dilution, 0.5-10 mL of dilution, intravenous, Once in imaging, VALDEMAR Aden    prazosin (Minipress) capsule 2 mg, 2 mg, oral, Nightly, Jose Amaya, BARTOLOME-CNP, 2 mg at 01/11/24 2104    [START ON 1/13/2024] predniSONE (Deltasone) tablet 40 mg, 40 mg, oral, Daily, BARTOLOME Aden-CNP    [Held by provider] risperiDONE (RisperDAL) tablet 0.25 mg, 0.25 mg, oral, Daily, BARTOLOME Garcia-CNP, 0.25 mg at 01/10/24 0823    rivaroxaban (Xarelto) tablet 20 mg, 20 mg, oral, Daily with evening meal, BARTOLOME Garcia-CNP, 20 mg at 01/12/24 1703   Principal Problem:    Acute on chronic respiratory failure, unspecified whether with hypoxia or hypercapnia (CMS/[facility])      Assessment/Plan    Acute on chronic hypoxic and hypercapnic respiratory failure continue BiPAP and oxygen keeping saturation more than 90%.  COPD exacerbation continue bronchodilators and steroids.  History of sleep apnea patient on BiPAP now.  Obesity patient advised reduce weight by diet and exercise.  Acute bronchitis continue doxycycline.  Smoking cessation discussed with the patient according patient started  History of DVT and PE.  Hypertension.  Dyslipidemia.  History of diabetes.  Anxiety disorder.  Depression and schizophrenia.  Psych consult was done.  Hypothyroid.  GERD.  Hypothyroidism.  Migraine.  DVT prophylaxis.  PT OT.  P.o. diet.               1/11/2023 patient on high flow oxygen out of bed to chair is slowly improving.  1/12/2023 patient  clinically improving, patient being transferred to telemetry floor from ICU.    Afshin Villalba MD

## 2024-01-12 NOTE — CARE PLAN
The patient's goals for the shift include      The clinical goals for the shift include Patient will have lower oxygen demands today      Problem: Respiratory  Goal: Clear secretions with interventions this shift  Outcome: Progressing  Goal: Minimize anxiety/maximize coping throughout shift  Outcome: Progressing  Goal: Minimal/no exertional discomfort or dyspnea this shift  Outcome: Progressing  Goal: No signs of respiratory distress (eg. Use of accessory muscles. Peds grunting)  Outcome: Progressing  Goal: Patent airway maintained this shift  Outcome: Progressing  Goal: Tolerate mechanical ventilation evidenced by VS/agitation level this shift  Outcome: Progressing  Goal: Tolerate pulmonary toileting this shift  Outcome: Progressing  Goal: Verbalize decreased shortness of breath this shift  Outcome: Progressing  Goal: Wean oxygen to maintain O2 saturation per order/standard this shift  Outcome: Progressing  Goal: Increase self care and/or family involvement in next 24 hours  Outcome: Progressing     Problem: Skin  Goal: Decreased wound size/increased tissue granulation at next dressing change  Outcome: Progressing  Goal: Participates in plan/prevention/treatment measures  Outcome: Progressing  Goal: Prevent/manage excess moisture  Outcome: Progressing  Goal: Prevent/minimize sheer/friction injuries  Outcome: Progressing  Goal: Promote/optimize nutrition  Outcome: Progressing  Goal: Promote skin healing  Outcome: Progressing

## 2024-01-12 NOTE — PROGRESS NOTES
"[facility] Critical Care Medicine       Date:  1/12/2024  Patient:  Stephenie Mccray  YOB: 1980  MRN:  [id number]   Admit Date:  1/9/2024  ========================================================================================================    Chief Complaint   Patient presents with    Shortness of Breath         History of Present Illness:  Stephenie Mccray is a 43 y.o. year old female patient with Past Medical History of  COPD (4L@home), DVT/PE (on Xarelto), schizophrenia, migraine headaches, HTN, HLD, current smoker, GERD, IDDM, and hypothyroidism.  She has had frequent admissions for this, the last being 12/23. She presented to ED 1/8 via EMS for shortness of breath that started to days ago.  It worsened today and she called EMS.  She denies any fevers, chills, or change in sputum. She denies chest pain.     ED COURSE: Vitals: T36.8, , RR18, /67, SpO2 90%. CXR showed \"prominence of pulmonary interstitium similar as compared previous  exam.\" Covid/flu/RSV negative. EKG showed sinus tachycardia with a rate of 107. Troponin, BNP, and lactate WNL. CMP unremarkable. Glucose 149. WBC  Initial ABG on NC showed 7.31/71/66/35.7.  Patient was given 3 duonebs, 2gm IV Mg, placed on BIPAP 15/5 FiO2 65%.  Repeat ABG showed 7.26/80/76/35.9.  IPAP increased to 20.  125mg solumedrol IV and 20mg IV lasix given.  Patient was admitted to ICU for further care.       Interval ICU Events:  1/10: Admitted to ICU on BIPAP.  Repeat ABG   1/10: alert/ oriented, drowsy but easily arousable to minimal stimulation. ABG improved this am.   1/11: No acute events overnight. Improvement in mentation this am, awake and alert, no drowsiness. On HFNC 60L/90% maintaining SPO2 86-88%, BiPAP 20/5, FiO2 80% with SPO2 96-99%. VSS. Sedative medications were held yesterday. Reports was unable to wear BiPAP for a few days prior to hospitalization because dog chewed cords. With further questioning sounds as though chewed through " "oxygen tubing. Discussed issues with medical compliance with appointments/ calling medical companies for replacement.   : No acute events overnight. VSS, NSR with HR 80-90s. Maintained on HFNC 60L/90% with SPO2 88-92%. States slept well. Endorses improvement in respiratory status. Reports anxiety and need for increased dose of klonopin along with concern about beta blocker being held. Discussed with patient regarding excessive drowsiness with current regimen and klonopin dosing. Informed that excessive drowsiness and non-compliance with BiPAP/ oxygen therapy is worsening respiratory status and discussed long term consequences on cardio-pulmonary status. Discussed beta blocker therapy in setting of asthma/ COPD and possible increase in bronchospasm. Continued to be upset regarding medication adjustments stating \"I have been dealing with this since I was 9 years old.... I need my medications\". Informed patient that long term management could be discussed with Dr. Villalba and [facility] but while in the hospital would not be initiating current klonopin regimen and the goal is to wean off of benzodiazepine therapy.   Echocardiogram: EF 55%, bubble study not completed initially and pending completion.     Medical History:  Past Medical History:   Diagnosis Date    Arthritis     COPD (chronic obstructive pulmonary disease) (CMS/formerly [facility])     Diabetes mellitus (CMS/formerly [facility])     History of transfusion     Hypothyroidism     CHARLIE (obstructive sleep apnea)      Past Surgical History:   Procedure Laterality Date     SECTION, LOW TRANSVERSE      EYE SURGERY      HERNIA REPAIR      TONSILLECTOMY      VENTRAL HERNIA REPAIR       Medications Prior to Admission   Medication Sig Dispense Refill Last Dose    paliperidone (Invega) 1.5 mg 24 hr tablet Take 1 tablet (1.5 mg) by mouth once daily. Do not crush, chew, or split.   2024    alcohol swabs (Alcohol Prep Pads) pads, medicated Use 3 times daily prn 100 each 3 2024    " "[] amoxicillin-pot clavulanate (Augmentin) 875-125 mg tablet Take 1 tablet (875 mg) by mouth 2 times a day for 5 days. 10 tablet 0     atorvastatin (Lipitor) 20 mg tablet Take 1 tablet (20 mg) by mouth once daily. Dr. Davis covering for Dr. Yuen 30 tablet 0     azithromycin (Zithromax Z-Navin) 250 mg tablet Take 1 tablet (250 mg) by mouth once daily. Follow Z-navin instructions: 500mg on day #1, then 250mg daily for days #2, 3, 4, and 5. 6 tablet 0     BD Ultra-Fine Mini Pen Needle 31 gauge x 3/16\" needle USE TO INJECT 4 TIMES DAILY AS DIRECTED   2024    busPIRone (Buspar) 30 mg tablet Take 1 tablet (30 mg) by mouth 2 times a day.   1/10/2024    citalopram (CeleXA) 40 mg tablet Take 50 mg by mouth once daily. Patient said dose was recently increase   1/10/2024    clonazePAM (KlonoPIN) 1 mg tablet Take by mouth 2 times a day as needed.   1/10/2024    hydrOXYzine HCL (Atarax) 25 mg tablet Take 1 tablet (25 mg) by mouth every 6 hours if needed for anxiety for up to 3 days. (Patient taking differently: Take 1 tablet (25 mg) by mouth every 6 hours if needed for anxiety.) 12 tablet 0     hydrOXYzine pamoate (Vistaril) 50 mg capsule Take by mouth 3 times a day as needed.   Unknown    insulin aspart (NovoLOG FlexPen U-100 Insulin) 100 unit/mL (3 mL) pen 14 Units once daily.   2024    insulin glargine (Lantus Solostar U-100 Insulin) 100 unit/mL (3 mL) pen Inject 10 Units under the skin once daily in the morning. Take as directed per insulin instructions. 3 mL 2     insulin lispro (HumaLOG) 100 unit/mL injection 1 Dose 3 times a day with meals.       Invega Sustenna 234 mg/1.5 mL syringe INJECT 1 syringe INTRAMUSCULARLY EVERY 4 WEEKS   Unknown    ipratropium-albuteroL (Duo-Neb) 0.5-2.5 mg/3 mL nebulizer solution Take 3 mL by nebulization every 6 hours if needed for shortness of breath or wheezing. (Patient taking differently: Take 3 mL by nebulization every 4 hours. As needed) 180 mL 1     levothyroxine " (Synthroid, Levoxyl) 150 mcg tablet Take 1 tablet (150 mcg) by mouth once daily. 90 tablet 1 2024    metoprolol tartrate (Lopressor) 25 mg tablet TAKE 1 TABLET BY MOUTH IN THE MORNING AND 1 AT BEDTIME 60 tablet 2 2024    OneTouch Verio test strips strip    2024    pantoprazole (ProtoNix) 40 mg EC tablet Take 1 tablet (40 mg) by mouth once daily. 90 tablet 1 2024    [] predniSONE (Deltasone) 20 mg tablet Take 1 tablet (20 mg) by mouth once daily for 5 days. 5 tablet 0     topiramate (Topamax) 25 mg tablet Take 2 tablets (50 mg) by mouth once daily. 180 tablet 3     Xarelto 20 mg tablet    2024     Fluticasone furoate-vilanterol, Fluticasone-umeclidin-vilanter, Sumatriptan, Vortioxetine, and Levofloxacin  Social History     Tobacco Use    Smoking status: Every Day     Packs/day: 1     Types: Cigarettes     Passive exposure: Never    Smokeless tobacco: Never   Substance Use Topics    Alcohol use: Never    Drug use: Never     Family History   Problem Relation Name Age of Onset    Fibromyalgia Father      Hypertension Brother      Thyroid disease Maternal Grandmother      Thyroid disease Paternal Grandmother      Lung cancer Paternal Grandfather      Coronary artery disease Other Grandmother        Hospital Medications:           Current Facility-Administered Medications:     busPIRone (Buspar) tablet 20 mg, 20 mg, oral, TID, Sebastian Dimas MD, 20 mg at 24    citalopram (CeleXA) tablet 40 mg, 40 mg, oral, Daily, VALDEMAR Garcia, 40 mg at 24    clonazePAM (KlonoPIN) tablet 0.5 mg, 0.5 mg, oral, BID PRN, VALDEMAR Leo, 0.5 mg at 24    clonazePAM (KlonoPIN) tablet 0.5 mg, 0.5 mg, oral, Once, Sebastian Dimas MD    dextrose 10 % in water (D10W) infusion, 0.3 g/kg/hr, intravenous, Once PRN, VALDEMAR Garcia    dextrose 50 % injection 25 g, 25 g, intravenous, q15 min PRN, Aissatou Gamboa, APRN-CNP    doxycycline (Vibra-Tabs) tablet 100 mg,  100 mg, oral, q12h BLUE, VALDEMAR Aden, 100 mg at 01/11/24 2023    glucagon (Glucagen) injection 1 mg, 1 mg, intramuscular, q15 min PRN, VALDEMAR Garcia    hydrOXYzine pamoate (Vistaril) capsule 25 mg, 25 mg, oral, TID PRN, VALDEMAR Garcia, 25 mg at 01/11/24 1153    insulin glargine (Lantus) injection 10 Units, 10 Units, subcutaneous, q24h, VALDEMAR Aden, 10 Units at 01/11/24 0819    insulin lispro (HumaLOG) injection 0-5 Units, 0-5 Units, subcutaneous, Before meals & nightly, VALDEMAR Aden, 1 Units at 01/11/24 1649    ipratropium-albuteroL (Duo-Neb) 0.5-2.5 mg/3 mL nebulizer solution 3 mL, 3 mL, nebulization, q4h, VALDEMAR Garcia, 3 mL at 01/12/24 0741    ipratropium-albuteroL (Duo-Neb) 0.5-2.5 mg/3 mL nebulizer solution 3 mL, 3 mL, nebulization, q4h PRN, VALDEMAR Garcia    levothyroxine (Synthroid, Levoxyl) tablet 150 mcg, 150 mcg, oral, Daily, VALDEMAR Garcia, 150 mcg at 01/12/24 0706    magnesium sulfate IV 2 g, 2 g, intravenous, Once, VALDEMAR Adne    methylPREDNISolone sod succinate (PF) (SOLU-Medrol) 40 mg/mL injection 40 mg, 40 mg, intravenous, q24h, VALDEMAR Aden, 40 mg at 01/11/24 0612    [Held by provider] metoprolol tartrate (Lopressor) tablet 25 mg, 25 mg, oral, BID, VALDEMAR Garcia, 25 mg at 01/10/24 0823    nicotine (Nicoderm CQ) 21 mg/24 hr patch 1 patch, 1 patch, transdermal, Daily, 1 patch at 01/11/24 0820 **FOLLOWED BY** [START ON 2/21/2024] nicotine (Nicoderm CQ) 14 mg/24 hr patch 1 patch, 1 patch, transdermal, Daily **FOLLOWED BY** [START ON 3/6/2024] nicotine (Nicoderm CQ) 7 mg/24 hr patch 1 patch, 1 patch, transdermal, Daily, VALDEMAR Garcia    oxygen (O2) therapy, , inhalation, Continuous PRN - O2/gases, VALDEMAR Garcia, 60 L/min at 01/12/24 0741    paliperidone (Invega) 24 hr tablet 3 mg, 3 mg, oral, Daily, Sebastian Dimas MD     pantoprazole (ProtoNix) EC tablet 40 mg, 40 mg, oral, Daily before breakfast, Jamzin RaglandnitzaBARTOLOME-CNP, 40 mg at 01/11/24 0612    perflutren lipid microspheres (Definity) injection 0.5-10 mL of dilution, 0.5-10 mL of dilution, intravenous, Once in imaging, Jazmin ARMSTRONG Moon, APRN-CNP    prazosin (Minipress) capsule 2 mg, 2 mg, oral, Nightly, Jose Amaya, BARTOLOME-CNP, 2 mg at 01/11/24 2104    [Held by provider] risperiDONE (RisperDAL) tablet 0.25 mg, 0.25 mg, oral, Daily, Aissatou LEBLANC BARTOLOME Gamboa-CNP, 0.25 mg at 01/10/24 0823    rivaroxaban (Xarelto) tablet 20 mg, 20 mg, oral, Daily with evening meal, Aissatou LEBLANC BARTOLOME Gamboa-CNP, 20 mg at 01/11/24 1649    Review of Systems:  14 point review of systems was completed and negative except for those specially mention in my HPI    Physical Exam:    Heart Rate:  []   Temp:  [35.9 °C (96.6 °F)-36 °C (96.8 °F)]   Resp:  [14-39]   BP: (104-156)/(48-85)   SpO2:  [87 %-100 %]     Physical Exam  HENT:      Head: Normocephalic.      Mouth/Throat:      Mouth: Mucous membranes are moist.   Eyes:      Extraocular Movements: Extraocular movements intact.      Conjunctiva/sclera: Conjunctivae normal.   Cardiovascular:      Rate and Rhythm: Normal rate and regular rhythm.      Pulses: Normal pulses.      Heart sounds: Normal heart sounds.   Pulmonary:      Breath sounds: Examination of the left-lower field reveals rhonchi. Rhonchi present.      Comments: Improved air exchange  Abdominal:      General: Bowel sounds are normal.      Palpations: Abdomen is soft.   Musculoskeletal:         General: Normal range of motion.      Cervical back: Normal range of motion.   Skin:     General: Skin is warm and dry.      Capillary Refill: Capillary refill takes less than 2 seconds.   Neurological:      General: No focal deficit present.      Mental Status: She is alert and oriented to person, place, and time.   Psychiatric:         Mood and Affect: Mood normal.         Behavior: Behavior  normal.         Objective:    I have reviewed all medications, laboratory results, and imaging pertinent for today's encounter.    Vent Mode: Volume control/assist control  FiO2 (%):  [70 %-93 %] 91 %  S RR:  [24] 24      Intake/Output Summary (Last 24 hours) at 1/12/2024 0835  Last data filed at 1/12/2024 0500  Gross per 24 hour   Intake 450 ml   Output 2360 ml   Net -1910 ml         Recent Imaging  Transthoracic Echo (TTE) Complete   Final Result      XR chest 1 view   Final Result   Possibility of developing pneumonias        MACRO:   None        Signed by: Tavares Ellis 1/11/2024 11:34 AM   Dictation workstation:   [id number]      XR chest 1 view   Final Result   As above        MACRO:   None        Signed by: Tavares Ellis 1/10/2024 8:54 AM   Dictation workstation:   [id number]      XR chest 1 view   Final Result   Prominence of pulmonary interstitium similar as compared previous   exam.   Signed by Niels Perez DO          Transthoracic Echo (TTE) Complete    Result Date: 1/11/2024          [place] [id number]  Tel [phone] Fax [phone] TRANSTHORACIC ECHOCARDIOGRAM REPORT  Patient Name:      JACOB Nichols Physician:    [id number] Steve Guerin DO Study Date:        1/11/2024            Ordering Provider:    [id number] VINNIE GASCA MRN/PID:           [id number]             Fellow: Accession#:        [id number]         Nurse: Date of Birth/Age: 1980 / 43 years Sonographer:          Lesli Hinson RDCS Gender:            F                    Additional Staff: Height:            157.48 cm            Admit Date:           1/9/2024 Weight:            77.57 kg             Admission Status:     Inpatient -                                                                Routine BSA:               1.79 m2              [facility] Pressure: 113 /55 mmHg Study Type:    TRANSTHORACIC ECHO (TTE) COMPLETE Diagnosis/ICD: Acute respiratory failure with hypoxia-J96.01 Indication:    Dyspnea CPT Codes:     Echo Complete w Full [id number] Patient History: Smoker:            Current. Diabetes:          Yes Pertinent History: HTN, Hyperlipidemia, Chronic Lung Disease, COPD, PE, Previous                    DVT and Dyspnea. Study Detail: The following Echo studies were performed: M-Mode, 2D, Doppler and               color flow. Technically challenging study due to body habitus.  PHYSICIAN INTERPRETATION: Left Ventricle: Left ventricular systolic function is normal, with an estimated ejection fraction of 55%. There are no regional wall motion abnormalities. The left ventricular cavity size is normal. Spectral Doppler shows a normal pattern of left ventricular diastolic filling. LV Wall Scoring: All segments are normal. Left Atrium: The left atrium is normal in size. Right Ventricle: The right ventricle is normal in size. There is normal right ventricular global systolic function. Right Atrium: The right atrium is normal in size. Aortic Valve: The aortic valve appears structurally normal. The aortic valve appears tricuspid. There is no evidence of aortic valve stenosis. There is no evidence of aortic valve regurgitation. The peak instantaneous gradient of the aortic valve is 8.9 mmHg. The mean gradient of the aortic valve is 5.0 mmHg. Mitral Valve: The mitral valve is normal in structure. There is no evidence of mitral valve stenosis. There is normal mitral valve leaflet mobility. There is trace mitral valve regurgitation. Tricuspid Valve: The tricuspid valve is structurally normal. There is normal tricuspid valve leaflet mobility. There is trace to mild tricuspid regurgitation. Pulmonic Valve: The pulmonic valve is structurally normal. There is no indication  of pulmonic valve regurgitation. Pericardium: There is no pericardial effusion noted. Aorta: The aortic root is normal. Pulmonary Artery: The main pulmonary artery is normal in size, and position, with normal bifurcation into the left and right pulmonary arteries. Systemic Veins: The inferior vena cava appears to be of normal size. In comparison to the previous echocardiogram(s): The left ventricular function is unchanged. The left ventricular diastolic function is normal.  CONCLUSIONS:  1. Left ventricular systolic function is normal with a 55% estimated ejection fraction.  2. There is no evidence of mitral valve stenosis.  3. Trace mitral valve regurgitation.  4. Trace to mild tricuspid regurgitation.  5. Aortic valve stenosis is not present.  6. The main pulmonary artery is normal in size, and position, with normal bifurcation into the left and right pulmonary arteries. QUANTITATIVE DATA SUMMARY: 2D MEASUREMENTS:                          Normal Ranges: Ao Root d:     2.30 cm   (2.0-3.7cm) LAs:           3.40 cm   (2.7-4.0cm) IVSd:          0.87 cm   (0.6-1.1cm) LVPWd:         0.89 cm   (0.6-1.1cm) LVIDd:         4.76 cm   (3.9-5.9cm) LVIDs:         3.13 cm LV Mass Index: 79.0 g/m2 LV % FS        34.2 % LA VOLUME:                               Normal Ranges: LA Vol A4C:        24.0 ml    (22+/-6mL/m2) LA Vol A2C:        28.9 ml LA Vol BP:         26.3 ml LA Vol Index A4C:  13.4ml/m2 LA Vol Index A2C:  16.1 ml/m2 LA Vol Index BP:   14.7 ml/m2 LA Area A4C:       11.4 cm2 LA Area A2C:       12.5 cm2 LA Major Axis A4C: 4.6 cm LA Major Axis A2C: 4.6 cm LA Volume Index:   13.9 ml/m2 RA VOLUME BY A/L METHOD:                               Normal Ranges: RA Vol A4C:        19.8 ml    (8.3-19.5ml) RA Vol Index A4C:  11.1 ml/m2 RA Area A4C:       9.9 cm2 RA Major Axis A4C: 4.2 cm LV SYSTOLIC FUNCTION BY 2D PLANIMETRY (MOD):                     Normal Ranges: EF-A4C View: 54.4 % (>=55%) EF-A2C View: 54.2 % EF-Biplane:  53.4 %  LV DIASTOLIC FUNCTION:                        Normal Ranges: MV Peak E:    0.82 m/s (0.7-1.2 m/s) MV Peak A:    0.63 m/s (0.42-0.7 m/s) E/A Ratio:    1.29     (1.0-2.2) MV e'         0.14 m/s (>8.0) MV lateral e' 0.14 m/s MV medial e'  0.09 m/s E/e' Ratio:   5.93     (<8.0) MITRAL VALVE:                 Normal Ranges: MV DT: 127 msec (150-240msec) AORTIC VALVE:                                   Normal Ranges: AoV Vmax:                1.49 m/s (<=1.7m/s) AoV Peak P.9 mmHg (<20mmHg) AoV Mean P.0 mmHg (1.7-11.5mmHg) LVOT Max Mak:            1.15 m/s (<=1.1m/s) AoV VTI:                 24.80 cm (18-25cm) LVOT VTI:                21.70 cm LVOT Diameter:           2.00 cm  (1.8-2.4cm) AoV Area, VTI:           2.75 cm2 (2.5-5.5cm2) AoV Area,Vmax:           2.42 cm2 (2.5-4.5cm2) AoV Dimensionless Index: 0.87  RIGHT VENTRICLE: RV Basal 3.18 cm RV Mid   2.74 cm RV Major 6.8 cm TAPSE:   21.8 mm RV s'    0.11 m/s TRICUSPID VALVE/RVSP:                             Normal Ranges: Peak TR Velocity: 1.45 m/s RV Syst Pressure: 11.4 mmHg (< 30mmHg) PULMONIC VALVE:                         Normal Ranges: PV Accel Time: 177 msec (>120ms) PV Max Mak:    1.2 m/s  (0.6-0.9m/s) PV Max P.6 mmHg  [id number] Steve Guerin DO Electronically signed on 2024 at 3:08:46 PM  Wall Scoring  ** Final **      Assessment/Plan:    I am currently managing this critically ill patient for the following problems:  Acute on chronic hypercapnic respiratory failure  COPD with acute exacerbation  HLD  HTN  IDDM  Hypothyroid  GERD    Neuro/Psych/Pain Ctrl/Sedation:  #Hx Schizophrenia, anxiety, PTSD  #Hx Migraine headaches  -Neuro checks per protocol  -CAM ICU assessment  -Delirium precautions  -psychiatry following, appreciate recommendations  -[facility] stated did not show for  appointment for Invega injection, will order  -klonopin twice daily as needed, plan for weaning off of medication. Excessive  drowsiness with administration  -vistaril as needed for anxiety  -buspar, celexa, prazosin  -invega daily     Respiratory/ENT:  #Acute on chronic hypercapnic and hypoxic respiratory failure  #COPD with acute exacerbation (on 4L/nc at home and supposed to wear BIPAP QHS)  #CHARLIE  #Nicotine dependence   -BIPAP, wean as able, continue at night and as needed  -HFNC, wean as able  -Duonebs Q4 and PRN  -Solumedrol   -Pulmonology following  -Smoking cessation education  -doxycycline     Cardiovascular:  #Hx HLD  #Hx HTN  -metoprolol, hold in setting of ?asthma/ COPD  -monitor BP and consider change of antihypertensive medication, as needed  -diuresis  -Echocardiogram unremarkable, EF 55%. Bubble study not completed limited ordered to complete, pending     GI:  #Hx GERD  -PPI  -diabetic diet     Renal/Volume Status (Intra & Extravascular):  -No active issues  -Trend BMP  -Strict I/O     Endocrine  #Hx IDDM  #Hx Hypothyroid  -accu check with SSI  -lantus  -Hypoglycemia protocol  -levothyroxine     Infectious Disease:  Leukocytosis, ?2/2 steroids  -doxycycline  -Daily CBC  -Monitor SIRS     Heme/Onc:  #Hx DVT/PE  -xarelto     OBGYN/MSK:  -PT/OT      Ethics/Code Status:  -Full code     :  DVT Prophylaxis: xarelto  GI Prophylaxis: Protonix   Bowel Regimen: none  Diet: diabetic   CVC: no  Wysox: no  Tran: no  Restraints: no  Dispo: ICU    Critical Care Time:  45 minutes spent in preparing to see patient (I.e. review of medical records), evaluation of diagnostics (I.e. labs, imaging, etc.), documentation, discussing plan of care with patient/ family/ caregiver, and/ or coordination of care with multidisciplinary team. Time does not include completion of procedure time.     Plan discussed with Dr. Kina Portillo, APRN-CNP

## 2024-01-12 NOTE — PROGRESS NOTES
Patient desires [facility]. DME following. Confirmed primary insurance as being [employer] for DME and respiratory therapy to follow for home going needs.     UPDATE:    DME now advising [facility] that correct primary insurance is [employer], and [employer] is secondary. DME here advising that  [facility] contact is >Niels at [phone]  he will be contact for home going O2 equipment.. Initial information for [facility] was sent to: Fax# [phone].

## 2024-01-12 NOTE — NURSING NOTE
Per Jacqueline, patient's last Invega shot December 13. Patient did not show up for [facility] appointment. Shot is same as in med rec,    234/1.5 ml IM Q4 week.

## 2024-01-13 LAB
ALBUMIN SERPL BCP-MCNC: 3.8 G/DL (ref 3.4–5)
ALP SERPL-CCNC: 74 U/L (ref 33–110)
ALT SERPL W P-5'-P-CCNC: 20 U/L (ref 7–45)
ANION GAP SERPL CALC-SCNC: 11 MMOL/L (ref 10–20)
AST SERPL W P-5'-P-CCNC: 7 U/L (ref 9–39)
BASOPHILS # BLD AUTO: 0.05 X10*3/UL (ref 0–0.1)
BASOPHILS NFR BLD AUTO: 0.5 %
BILIRUB SERPL-MCNC: 0.4 MG/DL (ref 0–1.2)
BUN SERPL-MCNC: 30 MG/DL (ref 6–23)
CALCIUM SERPL-MCNC: 10 MG/DL (ref 8.6–10.3)
CHLORIDE SERPL-SCNC: 100 MMOL/L (ref 98–107)
CO2 SERPL-SCNC: 33 MMOL/L (ref 21–32)
CREAT SERPL-MCNC: 0.83 MG/DL (ref 0.5–1.05)
EGFRCR SERPLBLD CKD-EPI 2021: 90 ML/MIN/1.73M*2
EOSINOPHIL # BLD AUTO: 0.01 X10*3/UL (ref 0–0.7)
EOSINOPHIL NFR BLD AUTO: 0.1 %
ERYTHROCYTE [DISTWIDTH] IN BLOOD BY AUTOMATED COUNT: 16.8 % (ref 11.5–14.5)
GLUCOSE BLD MANUAL STRIP-MCNC: 190 MG/DL (ref 74–99)
GLUCOSE BLD MANUAL STRIP-MCNC: 193 MG/DL (ref 74–99)
GLUCOSE BLD MANUAL STRIP-MCNC: 325 MG/DL (ref 74–99)
GLUCOSE BLD MANUAL STRIP-MCNC: 378 MG/DL (ref 74–99)
GLUCOSE SERPL-MCNC: 191 MG/DL (ref 74–99)
HCT VFR BLD AUTO: 39.2 % (ref 36–46)
HGB BLD-MCNC: 11.9 G/DL (ref 12–16)
HOLD SPECIMEN: NORMAL
IMM GRANULOCYTES # BLD AUTO: 0.24 X10*3/UL (ref 0–0.7)
IMM GRANULOCYTES NFR BLD AUTO: 2.2 % (ref 0–0.9)
LYMPHOCYTES # BLD AUTO: 3.47 X10*3/UL (ref 1.2–4.8)
LYMPHOCYTES NFR BLD AUTO: 31.2 %
MAGNESIUM SERPL-MCNC: 1.61 MG/DL (ref 1.6–2.4)
MCH RBC QN AUTO: 30.3 PG (ref 26–34)
MCHC RBC AUTO-ENTMCNC: 30.4 G/DL (ref 32–36)
MCV RBC AUTO: 100 FL (ref 80–100)
MONOCYTES # BLD AUTO: 0.69 X10*3/UL (ref 0.1–1)
MONOCYTES NFR BLD AUTO: 6.2 %
NEUTROPHILS # BLD AUTO: 6.65 X10*3/UL (ref 1.2–7.7)
NEUTROPHILS NFR BLD AUTO: 59.8 %
NRBC BLD-RTO: 0 /100 WBCS (ref 0–0)
PLATELET # BLD AUTO: 144 X10*3/UL (ref 150–450)
POTASSIUM SERPL-SCNC: 3.6 MMOL/L (ref 3.5–5.3)
PROT SERPL-MCNC: 6.6 G/DL (ref 6.4–8.2)
RBC # BLD AUTO: 3.93 X10*6/UL (ref 4–5.2)
SODIUM SERPL-SCNC: 140 MMOL/L (ref 136–145)
WBC # BLD AUTO: 11.1 X10*3/UL (ref 4.4–11.3)

## 2024-01-13 PROCEDURE — 2500000004 HC RX 250 GENERAL PHARMACY W/ HCPCS (ALT 636 FOR OP/ED): Performed by: NURSE PRACTITIONER

## 2024-01-13 PROCEDURE — 2500000001 HC RX 250 WO HCPCS SELF ADMINISTERED DRUGS (ALT 637 FOR MEDICARE OP): Performed by: NURSE PRACTITIONER

## 2024-01-13 PROCEDURE — 94640 AIRWAY INHALATION TREATMENT: CPT

## 2024-01-13 PROCEDURE — 36415 COLL VENOUS BLD VENIPUNCTURE: CPT | Performed by: NURSE PRACTITIONER

## 2024-01-13 PROCEDURE — 2500000002 HC RX 250 W HCPCS SELF ADMINISTERED DRUGS (ALT 637 FOR MEDICARE OP, ALT 636 FOR OP/ED): Performed by: NURSE PRACTITIONER

## 2024-01-13 PROCEDURE — 83735 ASSAY OF MAGNESIUM: CPT | Performed by: NURSE PRACTITIONER

## 2024-01-13 PROCEDURE — 2500000005 HC RX 250 GENERAL PHARMACY W/O HCPCS: Performed by: NURSE PRACTITIONER

## 2024-01-13 PROCEDURE — S4991 NICOTINE PATCH NONLEGEND: HCPCS | Performed by: NURSE PRACTITIONER

## 2024-01-13 PROCEDURE — 82947 ASSAY GLUCOSE BLOOD QUANT: CPT

## 2024-01-13 PROCEDURE — 80053 COMPREHEN METABOLIC PANEL: CPT | Performed by: NURSE PRACTITIONER

## 2024-01-13 PROCEDURE — 94660 CPAP INITIATION&MGMT: CPT

## 2024-01-13 PROCEDURE — 85025 COMPLETE CBC W/AUTO DIFF WBC: CPT | Performed by: NURSE PRACTITIONER

## 2024-01-13 PROCEDURE — 99232 SBSQ HOSP IP/OBS MODERATE 35: CPT | Performed by: STUDENT IN AN ORGANIZED HEALTH CARE EDUCATION/TRAINING PROGRAM

## 2024-01-13 PROCEDURE — 1210000001 HC SEMI-PRIVATE ROOM DAILY

## 2024-01-13 RX ADMIN — PRAZOSIN HYDROCHLORIDE 2 MG: 1 CAPSULE ORAL at 21:42

## 2024-01-13 RX ADMIN — IPRATROPIUM BROMIDE AND ALBUTEROL SULFATE 3 ML: 2.5; .5 SOLUTION RESPIRATORY (INHALATION) at 16:02

## 2024-01-13 RX ADMIN — IPRATROPIUM BROMIDE AND ALBUTEROL SULFATE 3 ML: 2.5; .5 SOLUTION RESPIRATORY (INHALATION) at 03:45

## 2024-01-13 RX ADMIN — PANTOPRAZOLE SODIUM 40 MG: 40 TABLET, DELAYED RELEASE ORAL at 05:02

## 2024-01-13 RX ADMIN — CITALOPRAM HYDROBROMIDE 40 MG: 20 TABLET ORAL at 08:11

## 2024-01-13 RX ADMIN — DOXYCYCLINE HYCLATE 100 MG: 100 TABLET, FILM COATED ORAL at 08:11

## 2024-01-13 RX ADMIN — Medication: at 23:00

## 2024-01-13 RX ADMIN — IPRATROPIUM BROMIDE AND ALBUTEROL SULFATE 3 ML: 2.5; .5 SOLUTION RESPIRATORY (INHALATION) at 11:34

## 2024-01-13 RX ADMIN — INSULIN GLARGINE 10 UNITS: 100 INJECTION, SOLUTION SUBCUTANEOUS at 09:15

## 2024-01-13 RX ADMIN — IPRATROPIUM BROMIDE AND ALBUTEROL SULFATE 3 ML: 2.5; .5 SOLUTION RESPIRATORY (INHALATION) at 23:50

## 2024-01-13 RX ADMIN — DOXYCYCLINE HYCLATE 100 MG: 100 TABLET, FILM COATED ORAL at 21:42

## 2024-01-13 RX ADMIN — PALIPERIDONE 3 MG: 3 TABLET, EXTENDED RELEASE ORAL at 08:10

## 2024-01-13 RX ADMIN — PREDNISONE 40 MG: 20 TABLET ORAL at 08:11

## 2024-01-13 RX ADMIN — INSULIN LISPRO 1 UNITS: 100 INJECTION, SOLUTION INTRAVENOUS; SUBCUTANEOUS at 08:11

## 2024-01-13 RX ADMIN — INSULIN LISPRO 1 UNITS: 100 INJECTION, SOLUTION INTRAVENOUS; SUBCUTANEOUS at 11:20

## 2024-01-13 RX ADMIN — BUSPIRONE HYDROCHLORIDE 20 MG: 5 TABLET ORAL at 08:11

## 2024-01-13 RX ADMIN — HYDROXYZINE PAMOATE 25 MG: 25 CAPSULE ORAL at 05:02

## 2024-01-13 RX ADMIN — IPRATROPIUM BROMIDE AND ALBUTEROL SULFATE 3 ML: 2.5; .5 SOLUTION RESPIRATORY (INHALATION) at 07:19

## 2024-01-13 RX ADMIN — CLONAZEPAM 0.5 MG: 0.5 TABLET ORAL at 21:42

## 2024-01-13 RX ADMIN — INSULIN LISPRO 4 UNITS: 100 INJECTION, SOLUTION INTRAVENOUS; SUBCUTANEOUS at 21:45

## 2024-01-13 RX ADMIN — IPRATROPIUM BROMIDE AND ALBUTEROL SULFATE 3 ML: 2.5; .5 SOLUTION RESPIRATORY (INHALATION) at 20:03

## 2024-01-13 RX ADMIN — BUSPIRONE HYDROCHLORIDE 20 MG: 5 TABLET ORAL at 21:42

## 2024-01-13 RX ADMIN — Medication 4 L/MIN: at 20:05

## 2024-01-13 RX ADMIN — BUSPIRONE HYDROCHLORIDE 20 MG: 5 TABLET ORAL at 15:49

## 2024-01-13 RX ADMIN — RIVAROXABAN 20 MG: 20 TABLET, FILM COATED ORAL at 15:50

## 2024-01-13 RX ADMIN — INSULIN LISPRO 5 UNITS: 100 INJECTION, SOLUTION INTRAVENOUS; SUBCUTANEOUS at 15:50

## 2024-01-13 RX ADMIN — HYDROXYZINE PAMOATE 25 MG: 25 CAPSULE ORAL at 12:20

## 2024-01-13 RX ADMIN — LEVOTHYROXINE SODIUM 150 MCG: 75 TABLET ORAL at 05:02

## 2024-01-13 RX ADMIN — CLONAZEPAM 0.5 MG: 0.5 TABLET ORAL at 08:11

## 2024-01-13 RX ADMIN — NICOTINE 1 PATCH: 21 PATCH, EXTENDED RELEASE TRANSDERMAL at 08:11

## 2024-01-13 ASSESSMENT — COGNITIVE AND FUNCTIONAL STATUS - GENERAL
MOBILITY SCORE: 24
MOBILITY SCORE: 24
DAILY ACTIVITIY SCORE: 24
DAILY ACTIVITIY SCORE: 24

## 2024-01-13 ASSESSMENT — PAIN SCALES - GENERAL: PAINLEVEL_OUTOF10: 0 - NO PAIN

## 2024-01-13 NOTE — PROGRESS NOTES
"Stephenie Mccray is a 43 y.o. female on day 3 of admission presenting with Acute on chronic respiratory failure, unspecified whether with hypoxia or hypercapnia (CMS/HCC).presenting with shortness of breath cough wheezing chest tightness and congestion orthopnea and PND.  Patient had recurrent admission with similar complaint last admission 12/23..  Now improved on 4 L nasal cannula oxygen.       Subjective   Patient states she feeling better.     Objective   Patient clinically improving plan to discharge tomorrow follow-up in the office..  ROS  Review of Systems   Constitutional:  Positive for activity change, appetite change, fatigue and unexpected weight change.   HENT:  Positive for congestion.    Eyes: Negative.    Respiratory:  Positive for cough, shortness of breath and wheezing.    Gastrointestinal: Negative.    Endocrine: Negative.    Genitourinary: Negative.    Musculoskeletal:  Positive for arthralgias and myalgias.   Skin: Negative.    Allergic/Immunologic: Negative.    Neurological:  Positive for dizziness, weakness, light-headedness and headaches.   Hematological: Negative.    Psychiatric/Behavioral:  Positive for confusion and sleep disturbance.    All other systems reviewed and are negative.     Vital Signs  Blood pressure 122/77, pulse 106, temperature 37.1 °C (98.8 °F), temperature source Temporal, resp. rate 17, height 1.575 m (5' 2\"), weight 76.1 kg (167 lb 12.3 oz), SpO2 97 %.    Physical Exam   Vitals reviewed.   Constitutional:       General: She is in acute distress.      Appearance: She is obese.   HENT:      Head: Normocephalic and atraumatic.      Right Ear: External ear normal.      Left Ear: External ear normal.      Mouth/Throat:      Mouth: Mucous membranes are dry.   Eyes:      Extraocular Movements: Extraocular movements intact.      Conjunctiva/sclera: Conjunctivae normal.      Pupils: Pupils are equal, round, and reactive to light.   Cardiovascular:      Rate and Rhythm: Regular " rhythm.      Pulses: Normal pulses.      Heart sounds: Normal heart sounds.   Pulmonary:      Effort: Respiratory distress present.      Breath sounds: Wheezing, rhonchi and rales present.   Abdominal:      General: Bowel sounds are normal.      Palpations: Abdomen is soft.   Musculoskeletal:         General: Normal range of motion.      Cervical back: Normal range of motion and neck supple.   Skin:     General: Skin is warm.      Capillary Refill: Capillary refill takes 2 to 3 seconds.   Neurological:      General: No focal deficit present.   Psychiatric:         Mood and Affect: Mood normal.         Behavior: Behavior normal.         Thought Content: Thought content normal.         Oxygen Therapy  SpO2: 97 %  Medical Gas Therapy: Supplemental oxygen  O2 Delivery Method: Nasal cannula  FiO2 (%):  [36 %-70 %] 36 %  S RR:  [20] 20    Intake/Output  I/O last 3 completed shifts:  In: 1200 (15.8 mL/kg) [P.O.:1200]  Out: 2550 (33.5 mL/kg) [Urine:2550 (0.9 mL/kg/hr)]  Weight: 76.1 kg     Lines and Tubes:  Peripheral IV 01/09/24 20 G Right Antecubital (Active)   Placement Date/Time: 01/09/24 2237   Size (Gauge): 20 G  Orientation: Right  Location: Antecubital  Site Prep: Alcohol  Technique: Anatomical landmarks  Insertion attempts: 2  Patient Tolerance: Age appropriate   Number of days: 3       Peripheral IV 01/10/24 18 G Right;Upper;Ventral Arm (Active)   Placement Date/Time: 01/10/24 1047   Hand Hygiene Completed: Yes  Size (Gauge): 18 G  Orientation: Right;Upper;Ventral  Location: Arm  Site Prep: Alcohol  Insertion attempts: 1  Patient Tolerance: Tolerated well   Number of days: 3       Results for orders placed or performed during the hospital encounter of 01/09/24 (from the past 96 hour(s))   Influenza A, and B PCR   Result Value Ref Range    Flu A Result Not Detected Not Detected    Flu B Result Not Detected Not Detected   SARS-CoV-2 RT PCR   Result Value Ref Range    Coronavirus 2019, PCR Not Detected Not Detected    RSV PCR   Result Value Ref Range    RSV PCR Not Detected Not Detected   CBC and Auto Differential   Result Value Ref Range    WBC 9.2 4.4 - 11.3 x10*3/uL    nRBC 1.2 (H) 0.0 - 0.0 /100 WBCs    RBC 3.88 (L) 4.00 - 5.20 x10*6/uL    Hemoglobin 12.0 12.0 - 16.0 g/dL    Hematocrit 39.3 36.0 - 46.0 %     (H) 80 - 100 fL    MCH 30.9 26.0 - 34.0 pg    MCHC 30.5 (L) 32.0 - 36.0 g/dL    RDW 16.0 (H) 11.5 - 14.5 %    Platelets 150 150 - 450 x10*3/uL    Immature Granulocytes %, Automated 6.0 (H) 0.0 - 0.9 %    Immature Granulocytes Absolute, Automated 0.55 0.00 - 0.70 x10*3/uL   Comprehensive Metabolic Panel   Result Value Ref Range    Glucose 149 (H) 74 - 99 mg/dL    Sodium 141 136 - 145 mmol/L    Potassium 4.4 3.5 - 5.3 mmol/L    Chloride 103 98 - 107 mmol/L    Bicarbonate 31 21 - 32 mmol/L    Anion Gap 11 10 - 20 mmol/L    Urea Nitrogen 22 6 - 23 mg/dL    Creatinine 0.98 0.50 - 1.05 mg/dL    eGFR 74 >60 mL/min/1.73m*2    Calcium 8.8 8.6 - 10.3 mg/dL    Albumin 3.6 3.4 - 5.0 g/dL    Alkaline Phosphatase 78 33 - 110 U/L    Total Protein 6.0 (L) 6.4 - 8.2 g/dL    AST 10 9 - 39 U/L    Bilirubin, Total 0.3 0.0 - 1.2 mg/dL    ALT 18 7 - 45 U/L   hCG, quantitative, pregnancy   Result Value Ref Range    HCG, Beta-Quantitative <2 <5 mIU/mL   Troponin I, High Sensitivity, Initial   Result Value Ref Range    Troponin I, High Sensitivity 3 0 - 13 ng/L   Magnesium   Result Value Ref Range    Magnesium 1.66 1.60 - 2.40 mg/dL   Manual Differential   Result Value Ref Range    Neutrophils %, Manual 37.0 40.0 - 80.0 %    Bands %, Manual 2.0 0.0 - 5.0 %    Lymphocytes %, Manual 55.0 13.0 - 44.0 %    Monocytes %, Manual 3.0 2.0 - 10.0 %    Eosinophils %, Manual 0.0 0.0 - 6.0 %    Basophils %, Manual 1.0 0.0 - 2.0 %    Atypical Lymphocytes %, Manual 2.0 0.0 - 2.0 %    Seg Neutrophils Absolute, Manual 3.40 1.20 - 7.00 x10*3/uL    Bands Absolute, Manual 0.18 0.00 - 0.70 x10*3/uL    Lymphocytes Absolute, Manual 5.06 (H) 1.20 - 4.80  x10*3/uL    Monocytes Absolute, Manual 0.28 0.10 - 1.00 x10*3/uL    Eosinophils Absolute, Manual 0.00 0.00 - 0.70 x10*3/uL    Basophils Absolute, Manual 0.09 0.00 - 0.10 x10*3/uL    Atypical Lymphs Absolute, Manual 0.18 0.00 - 0.50 x10*3/uL    Total Cells Counted 100     Neutrophils Absolute, Manual 3.58 1.20 - 7.70 x10*3/uL    RBC Morphology See Below     Polychromasia Mild     Ovalocytes Few     Teardrop Cells Few    B-type natriuretic peptide   Result Value Ref Range    BNP 13 0 - 99 pg/mL   Blood Gas Arterial Full Panel   Result Value Ref Range    POCT pH, Arterial 7.31 (L) 7.38 - 7.42 pH    POCT pCO2, Arterial 71 (HH) 38 - 42 mm Hg    POCT pO2, Arterial 66 (L) 85 - 95 mm Hg    POCT SO2, Arterial 94 94 - 100 %    POCT Oxy Hemoglobin, Arterial 83.2 (L) 94.0 - 98.0 %    POCT Hematocrit Calculated, Arterial 36.0 36.0 - 46.0 %    POCT Sodium, Arterial 139 136 - 145 mmol/L    POCT Potassium, Arterial 4.5 3.5 - 5.3 mmol/L    POCT Chloride, Arterial 104 98 - 107 mmol/L    POCT Ionized Calcium, Arterial 1.26 1.10 - 1.33 mmol/L    POCT Glucose, Arterial 148 (H) 74 - 99 mg/dL    POCT Lactate, Arterial 0.7 0.4 - 2.0 mmol/L    POCT Base Excess, Arterial 7.2 (H) -2.0 - 3.0 mmol/L    POCT HCO3 Calculated, Arterial 35.7 (H) 22.0 - 26.0 mmol/L    POCT Hemoglobin, Arterial 12.1 12.0 - 16.0 g/dL    POCT Anion Gap, Arterial 4 (L) 10 - 25 mmo/L    Patient Temperature      FiO2 36 %    Apparatus CANNULA     Critical Called By DFOX RRT     Critical Called To DR CORLEY     Critical Call Time 2306.0000     Critical Read Back Y    ECG 12 lead   Result Value Ref Range    Ventricular Rate 107 BPM    Atrial Rate 107 BPM    RI Interval 128 ms    QRS Duration 88 ms    QT Interval 284 ms    QTC Calculation(Bazett) 379 ms    P Axis 43 degrees    R Axis -21 degrees    T Axis 20 degrees    QRS Count 18 beats    Q Onset 225 ms    P Onset 161 ms    P Offset 210 ms    T Offset 367 ms    QTC Fredericia 344 ms   Troponin, High Sensitivity, 1 Hour    Result Value Ref Range    Troponin I, High Sensitivity 4 0 - 13 ng/L   Blood Gas Arterial Full Panel   Result Value Ref Range    POCT pH, Arterial 7.26 (L) 7.38 - 7.42 pH    POCT pCO2, Arterial 80 (HH) 38 - 42 mm Hg    POCT pO2, Arterial 76 (L) 85 - 95 mm Hg    POCT SO2, Arterial 96 94 - 100 %    POCT Oxy Hemoglobin, Arterial 87.5 (L) 94.0 - 98.0 %    POCT Hematocrit Calculated, Arterial 35.0 (L) 36.0 - 46.0 %    POCT Sodium, Arterial 138 136 - 145 mmol/L    POCT Potassium, Arterial 4.5 3.5 - 5.3 mmol/L    POCT Chloride, Arterial 104 98 - 107 mmol/L    POCT Ionized Calcium, Arterial 1.28 1.10 - 1.33 mmol/L    POCT Glucose, Arterial 159 (H) 74 - 99 mg/dL    POCT Lactate, Arterial 0.6 0.4 - 2.0 mmol/L    POCT Base Excess, Arterial 6.4 (H) -2.0 - 3.0 mmol/L    POCT HCO3 Calculated, Arterial 35.9 (H) 22.0 - 26.0 mmol/L    POCT Hemoglobin, Arterial 11.8 (L) 12.0 - 16.0 g/dL    POCT Anion Gap, Arterial 3 (L) 10 - 25 mmo/L    Patient Temperature      FiO2 65 %    Ipap CMH2O 15.0 cm H2O    Epap CMH2O 5.0 cm H2O    Critical Called By DFOX RRT     Critical Called To DR CORLEY     Critical Call Time 113.0000     Critical Read Back Y    POCT GLUCOSE   Result Value Ref Range    POCT Glucose 190 (H) 74 - 99 mg/dL   Magnesium   Result Value Ref Range    Magnesium 2.21 1.60 - 2.40 mg/dL   Phosphorus   Result Value Ref Range    Phosphorus 4.0 2.5 - 4.9 mg/dL   CBC and Auto Differential   Result Value Ref Range    WBC 8.4 4.4 - 11.3 x10*3/uL    nRBC 1.1 (H) 0.0 - 0.0 /100 WBCs    RBC 4.08 4.00 - 5.20 x10*6/uL    Hemoglobin 12.7 12.0 - 16.0 g/dL    Hematocrit 41.8 36.0 - 46.0 %     (H) 80 - 100 fL    MCH 31.1 26.0 - 34.0 pg    MCHC 30.4 (L) 32.0 - 36.0 g/dL    RDW 16.1 (H) 11.5 - 14.5 %    Platelets 141 (L) 150 - 450 x10*3/uL    Immature Granulocytes %, Automated 5.8 (H) 0.0 - 0.9 %    Immature Granulocytes Absolute, Automated 0.49 0.00 - 0.70 x10*3/uL   Comprehensive metabolic panel   Result Value Ref Range    Glucose 181  (H) 74 - 99 mg/dL    Sodium 141 136 - 145 mmol/L    Potassium 4.1 3.5 - 5.3 mmol/L    Chloride 100 98 - 107 mmol/L    Bicarbonate 33 (H) 21 - 32 mmol/L    Anion Gap 12 10 - 20 mmol/L    Urea Nitrogen 19 6 - 23 mg/dL    Creatinine 0.95 0.50 - 1.05 mg/dL    eGFR 77 >60 mL/min/1.73m*2    Calcium 9.2 8.6 - 10.3 mg/dL    Albumin 3.8 3.4 - 5.0 g/dL    Alkaline Phosphatase 88 33 - 110 U/L    Total Protein 6.6 6.4 - 8.2 g/dL    AST 16 9 - 39 U/L    Bilirubin, Total 0.3 0.0 - 1.2 mg/dL    ALT 27 7 - 45 U/L   Lipid panel   Result Value Ref Range    Cholesterol 227 (H) 0 - 199 mg/dL    HDL-Cholesterol 44.3 mg/dL    Cholesterol/HDL Ratio 5.1     LDL Calculated 138 (H) <=99 mg/dL    VLDL 45 (H) 0 - 40 mg/dL    Triglycerides 225 (H) 0 - 149 mg/dL    Non HDL Cholesterol 183 (H) 0 - 149 mg/dL   Manual Differential   Result Value Ref Range    Neutrophils %, Manual 68.0 40.0 - 80.0 %    Bands %, Manual 4.0 0.0 - 5.0 %    Lymphocytes %, Manual 23.0 13.0 - 44.0 %    Monocytes %, Manual 1.0 2.0 - 10.0 %    Eosinophils %, Manual 0.0 0.0 - 6.0 %    Basophils %, Manual 0.0 0.0 - 2.0 %    Atypical Lymphocytes %, Manual 1.0 0.0 - 2.0 %    Myelocytes %, Manual 3.0 0.0 - 0.0 %    Seg Neutrophils Absolute, Manual 5.71 1.20 - 7.00 x10*3/uL    Bands Absolute, Manual 0.34 0.00 - 0.70 x10*3/uL    Lymphocytes Absolute, Manual 1.93 1.20 - 4.80 x10*3/uL    Monocytes Absolute, Manual 0.08 (L) 0.10 - 1.00 x10*3/uL    Eosinophils Absolute, Manual 0.00 0.00 - 0.70 x10*3/uL    Basophils Absolute, Manual 0.00 0.00 - 0.10 x10*3/uL    Atypical Lymphs Absolute, Manual 0.08 0.00 - 0.50 x10*3/uL    Myelocytes Absolute, Manual 0.25 0.00 - 0.00 x10*3/uL    Total Cells Counted 100     Neutrophils Absolute, Manual 6.05 1.20 - 7.70 x10*3/uL    RBC Morphology See Below     Polychromasia Mild    Acute Toxicology Panel, Blood   Result Value Ref Range    Acetaminophen <10.0 10.0 - 30.0 ug/mL    Salicylate  <3 4 - 20 mg/dL    Alcohol <10 <=10 mg/dL   Blood Gas Arterial  Full Panel   Result Value Ref Range    POCT pH, Arterial 7.34 (L) 7.38 - 7.42 pH    POCT pCO2, Arterial 72 (HH) 38 - 42 mm Hg    POCT pO2, Arterial 69 (L) 85 - 95 mm Hg    POCT SO2, Arterial 94 94 - 100 %    POCT Oxy Hemoglobin, Arterial 90.0 (L) 94.0 - 98.0 %    POCT Hematocrit Calculated, Arterial 39.0 36.0 - 46.0 %    POCT Sodium, Arterial 136 136 - 145 mmol/L    POCT Potassium, Arterial 4.2 3.5 - 5.3 mmol/L    POCT Chloride, Arterial 100 98 - 107 mmol/L    POCT Ionized Calcium, Arterial 1.22 1.10 - 1.33 mmol/L    POCT Glucose, Arterial 248 (H) 74 - 99 mg/dL    POCT Lactate, Arterial 1.6 0.4 - 2.0 mmol/L    POCT Base Excess, Arterial 10.2 (H) -2.0 - 3.0 mmol/L    POCT HCO3 Calculated, Arterial 38.8 (H) 22.0 - 26.0 mmol/L    POCT Hemoglobin, Arterial 12.9 12.0 - 16.0 g/dL    POCT Anion Gap, Arterial 1 (L) 10 - 25 mmo/L    Patient Temperature      FiO2 65 %    Ventilator Mode BiPAP     Spontaneous Tidal Volume 458 mL    Total Minute Volume 11.3 Liter    Frequency (BPM) 24 bpm    Inspiratory Time 0.9     Ipap CMH2O 20.0 cm H2O    Epap CMH2O 5.0 cm H2O    Critical Called By DAVID CASTELLON     Critical Called To CARLOS CONTRERAS     Critical Call Time 557.0000     Critical Read Back Y     Site of Arterial Puncture Brachial Left     Andrew's Test     Blood Gas Arterial Full Panel   Result Value Ref Range    POCT pH, Arterial 7.35 (L) 7.38 - 7.42 pH    POCT pCO2, Arterial 69 (H) 38 - 42 mm Hg    POCT pO2, Arterial 73 (L) 85 - 95 mm Hg    POCT SO2, Arterial 96 94 - 100 %    POCT Oxy Hemoglobin, Arterial 91.8 (L) 94.0 - 98.0 %    POCT Hematocrit Calculated, Arterial 39.0 36.0 - 46.0 %    POCT Sodium, Arterial 137 136 - 145 mmol/L    POCT Potassium, Arterial 4.5 3.5 - 5.3 mmol/L    POCT Chloride, Arterial 99 98 - 107 mmol/L    POCT Ionized Calcium, Arterial 1.23 1.10 - 1.33 mmol/L    POCT Glucose, Arterial 269 (H) 74 - 99 mg/dL    POCT Lactate, Arterial 1.7 0.4 - 2.0 mmol/L    POCT Base Excess, Arterial 9.8 (H) -2.0 - 3.0 mmol/L     POCT HCO3 Calculated, Arterial 38.1 (H) 22.0 - 26.0 mmol/L    POCT Hemoglobin, Arterial 12.9 12.0 - 16.0 g/dL    POCT Anion Gap, Arterial 4 (L) 10 - 25 mmo/L    Patient Temperature      FiO2 65 %    Ipap CMH2O 20.0 cm H2O    Epap CMH2O 5.0 cm H2O   POCT GLUCOSE   Result Value Ref Range    POCT Glucose 232 (H) 74 - 99 mg/dL   POCT GLUCOSE   Result Value Ref Range    POCT Glucose 255 (H) 74 - 99 mg/dL   Urinalysis with Reflex Microscopic   Result Value Ref Range    Color, Urine Straw Straw, Yellow    Appearance, Urine Clear Clear    Specific Gravity, Urine 1.008 1.005 - 1.035    pH, Urine 5.0 5.0, 5.5, 6.0, 6.5, 7.0, 7.5, 8.0    Protein, Urine NEGATIVE NEGATIVE mg/dL    Glucose, Urine NEGATIVE NEGATIVE mg/dL    Blood, Urine NEGATIVE NEGATIVE    Ketones, Urine NEGATIVE NEGATIVE mg/dL    Bilirubin, Urine NEGATIVE NEGATIVE    Urobilinogen, Urine <2.0 <2.0 mg/dL    Nitrite, Urine NEGATIVE NEGATIVE    Leukocyte Esterase, Urine NEGATIVE NEGATIVE   Drug Screen, Urine   Result Value Ref Range    Amphetamine Screen, Urine Presumptive Negative Presumptive Negative    Barbiturate Screen, Urine Presumptive Negative Presumptive Negative    Benzodiazepines Screen, Urine Presumptive Negative Presumptive Negative    Cannabinoid Screen, Urine Presumptive Positive (A) Presumptive Negative    Cocaine Metabolite Screen, Urine Presumptive Negative Presumptive Negative    Fentanyl Screen, Urine Presumptive Negative Presumptive Negative    Opiate Screen, Urine Presumptive Negative Presumptive Negative    Oxycodone Screen, Urine Presumptive Negative Presumptive Negative    PCP Screen, Urine Presumptive Negative Presumptive Negative   POCT GLUCOSE   Result Value Ref Range    POCT Glucose 203 (H) 74 - 99 mg/dL   POCT GLUCOSE   Result Value Ref Range    POCT Glucose 166 (H) 74 - 99 mg/dL   POCT GLUCOSE   Result Value Ref Range    POCT Glucose 139 (H) 74 - 99 mg/dL   CBC and Auto Differential   Result Value Ref Range    WBC 16.2 (H) 4.4 -  11.3 x10*3/uL    nRBC 0.2 (H) 0.0 - 0.0 /100 WBCs    RBC 3.92 (L) 4.00 - 5.20 x10*6/uL    Hemoglobin 12.1 12.0 - 16.0 g/dL    Hematocrit 39.4 36.0 - 46.0 %     (H) 80 - 100 fL    MCH 30.9 26.0 - 34.0 pg    MCHC 30.7 (L) 32.0 - 36.0 g/dL    RDW 16.7 (H) 11.5 - 14.5 %    Platelets 147 (L) 150 - 450 x10*3/uL    Neutrophils % 76.5 40.0 - 80.0 %    Immature Granulocytes %, Automated 1.8 (H) 0.0 - 0.9 %    Lymphocytes % 16.1 13.0 - 44.0 %    Monocytes % 5.4 2.0 - 10.0 %    Eosinophils % 0.0 0.0 - 6.0 %    Basophils % 0.2 0.0 - 2.0 %    Neutrophils Absolute 12.40 (H) 1.20 - 7.70 x10*3/uL    Immature Granulocytes Absolute, Automated 0.30 0.00 - 0.70 x10*3/uL    Lymphocytes Absolute 2.61 1.20 - 4.80 x10*3/uL    Monocytes Absolute 0.87 0.10 - 1.00 x10*3/uL    Eosinophils Absolute 0.00 0.00 - 0.70 x10*3/uL    Basophils Absolute 0.04 0.00 - 0.10 x10*3/uL   Comprehensive metabolic panel   Result Value Ref Range    Glucose 116 (H) 74 - 99 mg/dL    Sodium 143 136 - 145 mmol/L    Potassium 4.2 3.5 - 5.3 mmol/L    Chloride 99 98 - 107 mmol/L    Bicarbonate 36 (H) 21 - 32 mmol/L    Anion Gap 12 10 - 20 mmol/L    Urea Nitrogen 20 6 - 23 mg/dL    Creatinine 0.74 0.50 - 1.05 mg/dL    eGFR >90 >60 mL/min/1.73m*2    Calcium 9.2 8.6 - 10.3 mg/dL    Albumin 3.6 3.4 - 5.0 g/dL    Alkaline Phosphatase 80 33 - 110 U/L    Total Protein 6.1 (L) 6.4 - 8.2 g/dL    AST 16 9 - 39 U/L    Bilirubin, Total 0.4 0.0 - 1.2 mg/dL    ALT 33 7 - 45 U/L   Magnesium   Result Value Ref Range    Magnesium 1.95 1.60 - 2.40 mg/dL   Phosphorus   Result Value Ref Range    Phosphorus 3.3 2.5 - 4.9 mg/dL   POCT GLUCOSE   Result Value Ref Range    POCT Glucose 154 (H) 74 - 99 mg/dL   POCT GLUCOSE   Result Value Ref Range    POCT Glucose 160 (H) 74 - 99 mg/dL   COOX PANEL, ARTERIAL   Result Value Ref Range    POCT Hemoglobin, Arterial 12.1 12.0 - 16.0 g/dL    POCT Oxy Hemoglobin, Arterial 97.6 94.0 - 98.0 %    POCT Carboxyhemoglobin, Arterial 1.3 %    POCT  Methemoglobin, Arterial 0.9 0.0 - 1.5 %    POCT Deoxy Hemoglobin, Arterial 0.2 0.0 - 5.0 %    Site of Arterial Puncture Radial Left     Andrew's Test Positive    POCT GLUCOSE   Result Value Ref Range    POCT Glucose 223 (H) 74 - 99 mg/dL   Transthoracic Echo (TTE) Complete   Result Value Ref Range    AV mn grad 5.0     AV pk tom 1.49     LV biplane EF 53     LVOT diam 2.00     MV E/A ratio 1.29     Tricuspid annular plane systolic excursion 2.2     MV avg E/e' ratio 5.93     LA vol index A/L 14.7     RV free wall pk S' 11.40     RVSP 11.4     LVIDd 4.76     Aortic Valve Area by Continuity of Peak Velocity 2.42     AV pk grad 8.9     Aortic Valve Area by Continuity of VTI 2.75     LV A4C EF 54.4    POCT GLUCOSE   Result Value Ref Range    POCT Glucose 183 (H) 74 - 99 mg/dL   POCT GLUCOSE   Result Value Ref Range    POCT Glucose 140 (H) 74 - 99 mg/dL   CBC and Auto Differential   Result Value Ref Range    WBC 8.7 4.4 - 11.3 x10*3/uL    nRBC 0.0 0.0 - 0.0 /100 WBCs    RBC 3.78 (L) 4.00 - 5.20 x10*6/uL    Hemoglobin 11.6 (L) 12.0 - 16.0 g/dL    Hematocrit 38.3 36.0 - 46.0 %     (H) 80 - 100 fL    MCH 30.7 26.0 - 34.0 pg    MCHC 30.3 (L) 32.0 - 36.0 g/dL    RDW 17.0 (H) 11.5 - 14.5 %    Platelets 144 (L) 150 - 450 x10*3/uL    Neutrophils % 63.8 40.0 - 80.0 %    Immature Granulocytes %, Automated 2.8 (H) 0.0 - 0.9 %    Lymphocytes % 26.8 13.0 - 44.0 %    Monocytes % 6.3 2.0 - 10.0 %    Eosinophils % 0.0 0.0 - 6.0 %    Basophils % 0.3 0.0 - 2.0 %    Neutrophils Absolute 5.54 1.20 - 7.70 x10*3/uL    Immature Granulocytes Absolute, Automated 0.24 0.00 - 0.70 x10*3/uL    Lymphocytes Absolute 2.33 1.20 - 4.80 x10*3/uL    Monocytes Absolute 0.55 0.10 - 1.00 x10*3/uL    Eosinophils Absolute 0.00 0.00 - 0.70 x10*3/uL    Basophils Absolute 0.03 0.00 - 0.10 x10*3/uL   Comprehensive metabolic panel   Result Value Ref Range    Glucose 243 (H) 74 - 99 mg/dL    Sodium 140 136 - 145 mmol/L    Potassium 4.3 3.5 - 5.3 mmol/L     Chloride 99 98 - 107 mmol/L    Bicarbonate 35 (H) 21 - 32 mmol/L    Anion Gap 10 10 - 20 mmol/L    Urea Nitrogen 19 6 - 23 mg/dL    Creatinine 0.81 0.50 - 1.05 mg/dL    eGFR >90 >60 mL/min/1.73m*2    Calcium 9.4 8.6 - 10.3 mg/dL    Albumin 3.6 3.4 - 5.0 g/dL    Alkaline Phosphatase 73 33 - 110 U/L    Total Protein 6.2 (L) 6.4 - 8.2 g/dL    AST 9 9 - 39 U/L    Bilirubin, Total 0.3 0.0 - 1.2 mg/dL    ALT 25 7 - 45 U/L   Magnesium   Result Value Ref Range    Magnesium 1.68 1.60 - 2.40 mg/dL   Phosphorus   Result Value Ref Range    Phosphorus 3.2 2.5 - 4.9 mg/dL   POCT GLUCOSE   Result Value Ref Range    POCT Glucose 161 (H) 74 - 99 mg/dL   POCT GLUCOSE   Result Value Ref Range    POCT Glucose 297 (H) 74 - 99 mg/dL   POCT GLUCOSE   Result Value Ref Range    POCT Glucose 239 (H) 74 - 99 mg/dL   CBC and Auto Differential   Result Value Ref Range    WBC 11.1 4.4 - 11.3 x10*3/uL    nRBC 0.0 0.0 - 0.0 /100 WBCs    RBC 3.93 (L) 4.00 - 5.20 x10*6/uL    Hemoglobin 11.9 (L) 12.0 - 16.0 g/dL    Hematocrit 39.2 36.0 - 46.0 %     80 - 100 fL    MCH 30.3 26.0 - 34.0 pg    MCHC 30.4 (L) 32.0 - 36.0 g/dL    RDW 16.8 (H) 11.5 - 14.5 %    Platelets 144 (L) 150 - 450 x10*3/uL    Neutrophils % 59.8 40.0 - 80.0 %    Immature Granulocytes %, Automated 2.2 (H) 0.0 - 0.9 %    Lymphocytes % 31.2 13.0 - 44.0 %    Monocytes % 6.2 2.0 - 10.0 %    Eosinophils % 0.1 0.0 - 6.0 %    Basophils % 0.5 0.0 - 2.0 %    Neutrophils Absolute 6.65 1.20 - 7.70 x10*3/uL    Immature Granulocytes Absolute, Automated 0.24 0.00 - 0.70 x10*3/uL    Lymphocytes Absolute 3.47 1.20 - 4.80 x10*3/uL    Monocytes Absolute 0.69 0.10 - 1.00 x10*3/uL    Eosinophils Absolute 0.01 0.00 - 0.70 x10*3/uL    Basophils Absolute 0.05 0.00 - 0.10 x10*3/uL   Comprehensive metabolic panel   Result Value Ref Range    Glucose 191 (H) 74 - 99 mg/dL    Sodium 140 136 - 145 mmol/L    Potassium 3.6 3.5 - 5.3 mmol/L    Chloride 100 98 - 107 mmol/L    Bicarbonate 33 (H) 21 - 32 mmol/L     Anion Gap 11 10 - 20 mmol/L    Urea Nitrogen 30 (H) 6 - 23 mg/dL    Creatinine 0.83 0.50 - 1.05 mg/dL    eGFR 90 >60 mL/min/1.73m*2    Calcium 10.0 8.6 - 10.3 mg/dL    Albumin 3.8 3.4 - 5.0 g/dL    Alkaline Phosphatase 74 33 - 110 U/L    Total Protein 6.6 6.4 - 8.2 g/dL    AST 7 (L) 9 - 39 U/L    Bilirubin, Total 0.4 0.0 - 1.2 mg/dL    ALT 20 7 - 45 U/L   Magnesium   Result Value Ref Range    Magnesium 1.61 1.60 - 2.40 mg/dL   SST TOP   Result Value Ref Range    Extra Tube Hold for add-ons.    POCT GLUCOSE   Result Value Ref Range    POCT Glucose 190 (H) 74 - 99 mg/dL   POCT GLUCOSE   Result Value Ref Range    POCT Glucose 193 (H) 74 - 99 mg/dL   POCT GLUCOSE   Result Value Ref Range    POCT Glucose 378 (H) 74 - 99 mg/dL      Relevant Results  Recent Results (from the past 24 hour(s))   POCT GLUCOSE    Collection Time: 01/12/24  7:54 PM   Result Value Ref Range    POCT Glucose 239 (H) 74 - 99 mg/dL   CBC and Auto Differential    Collection Time: 01/13/24  6:33 AM   Result Value Ref Range    WBC 11.1 4.4 - 11.3 x10*3/uL    nRBC 0.0 0.0 - 0.0 /100 WBCs    RBC 3.93 (L) 4.00 - 5.20 x10*6/uL    Hemoglobin 11.9 (L) 12.0 - 16.0 g/dL    Hematocrit 39.2 36.0 - 46.0 %     80 - 100 fL    MCH 30.3 26.0 - 34.0 pg    MCHC 30.4 (L) 32.0 - 36.0 g/dL    RDW 16.8 (H) 11.5 - 14.5 %    Platelets 144 (L) 150 - 450 x10*3/uL    Neutrophils % 59.8 40.0 - 80.0 %    Immature Granulocytes %, Automated 2.2 (H) 0.0 - 0.9 %    Lymphocytes % 31.2 13.0 - 44.0 %    Monocytes % 6.2 2.0 - 10.0 %    Eosinophils % 0.1 0.0 - 6.0 %    Basophils % 0.5 0.0 - 2.0 %    Neutrophils Absolute 6.65 1.20 - 7.70 x10*3/uL    Immature Granulocytes Absolute, Automated 0.24 0.00 - 0.70 x10*3/uL    Lymphocytes Absolute 3.47 1.20 - 4.80 x10*3/uL    Monocytes Absolute 0.69 0.10 - 1.00 x10*3/uL    Eosinophils Absolute 0.01 0.00 - 0.70 x10*3/uL    Basophils Absolute 0.05 0.00 - 0.10 x10*3/uL   Comprehensive metabolic panel    Collection Time: 01/13/24  6:33 AM    Result Value Ref Range    Glucose 191 (H) 74 - 99 mg/dL    Sodium 140 136 - 145 mmol/L    Potassium 3.6 3.5 - 5.3 mmol/L    Chloride 100 98 - 107 mmol/L    Bicarbonate 33 (H) 21 - 32 mmol/L    Anion Gap 11 10 - 20 mmol/L    Urea Nitrogen 30 (H) 6 - 23 mg/dL    Creatinine 0.83 0.50 - 1.05 mg/dL    eGFR 90 >60 mL/min/1.73m*2    Calcium 10.0 8.6 - 10.3 mg/dL    Albumin 3.8 3.4 - 5.0 g/dL    Alkaline Phosphatase 74 33 - 110 U/L    Total Protein 6.6 6.4 - 8.2 g/dL    AST 7 (L) 9 - 39 U/L    Bilirubin, Total 0.4 0.0 - 1.2 mg/dL    ALT 20 7 - 45 U/L   Magnesium    Collection Time: 01/13/24  6:33 AM   Result Value Ref Range    Magnesium 1.61 1.60 - 2.40 mg/dL   SST TOP    Collection Time: 01/13/24  6:33 AM   Result Value Ref Range    Extra Tube Hold for add-ons.    POCT GLUCOSE    Collection Time: 01/13/24  7:21 AM   Result Value Ref Range    POCT Glucose 190 (H) 74 - 99 mg/dL   POCT GLUCOSE    Collection Time: 01/13/24 11:07 AM   Result Value Ref Range    POCT Glucose 193 (H) 74 - 99 mg/dL   POCT GLUCOSE    Collection Time: 01/13/24  3:50 PM   Result Value Ref Range    POCT Glucose 378 (H) 74 - 99 mg/dL      Transthoracic Echo (TTE) Limited    Result Date: 1/12/2024          [place] [address]  Tel [phone] Fax [phone] TRANSTHORACIC ECHOCARDIOGRAM REPORT  Patient Name:      JACOB Nichols Physician:    [id number] Jairo Urbina MD, [facility] Study Date:        1/12/2024            Ordering Provider:    [id number] VINNIE GASCA MRN/PID:           [id number]             Fellow: Accession#:        [id number]         Nurse: Date of Birth/Age: 1980 / 43 years Sonographer:          Cathy Last RDCS Gender:            F                    Additional Staff: Height:            157.48 cm             Admit Date:           1/9/2024 Weight:            77.57 kg             Admission Status:     Inpatient -                                                               Routine BSA:               1.79 m2              [facility] Blood Pressure: 119 /60 mmHg Study Type:    TRANSTHORACIC ECHO (TTE) LIMITED Diagnosis/ICD: Acute and chronic respiratory failure with hypercapnia-J96.22 Indication:    Respiratory failure CPT Codes:     Echo Limited-05537; Color Doppler-02053 Patient History: Smoker:            Current. Diabetes:          Yes Pertinent History: HTN, Hyperlipidemia, Chronic Lung Disease, COPD, PE, Previous                    DVT and Dyspnea. Study Detail: The following Echo studies were performed: 2D and color flow.               Agitated saline used as a contrast agent for intraseptal flow               evaluation. The patient was awake.  PHYSICIAN INTERPRETATION: Left Ventricle: Left ventricular systolic function is normal, with an estimated ejection fraction of 60%. There are no regional wall motion abnormalities. The left ventricular cavity size is normal. Left ventricular diastolic filling was not assessed. Left Atrium: The left atrium is normal in size. Right Ventricle: The right ventricle is normal in size. There is normal right ventricular global systolic function. Right Atrium: The right atrium is normal in size. Aortic Valve: The aortic valve was not well visualized. Aortic valve regurgitation was not assessed. Mitral Valve: The mitral valve is normal in structure. Mitral valve regurgitation was not assessed. Tricuspid Valve: The tricuspid valve is structurally normal. Tricuspid regurgitation was not assessed. Pulmonic Valve: The pulmonic valve is structurally normal. The pulmonic valve regurgitation was not assessed. Pericardium: There is no pericardial effusion noted. Aorta: The aortic root was not assessed.  CONCLUSIONS:  1. Left ventricular systolic function is  normal with a 60% estimated ejection fraction.  2. No evidence right to left shunting on agitated saline bubble contrast study. QUANTITATIVE DATA SUMMARY:  [id number] Jairo Urbina MD, [facility] Electronically signed on 1/12/2024 at 1:29:09 PM  ** Final **     Transthoracic Echo (TTE) Complete    Result Date: 1/11/2024          [place] [address]  Tel [phone] Fax [phone] TRANSTHORACIC ECHOCARDIOGRAM REPORT  Patient Name:      JACOB Nichols Physician:    [id number] Steve Guerin DO Study Date:        1/11/2024            Ordering Provider:    [id number] VINNIE GASCA MRN/PID:           [id number]             Fellow: Accession#:        [id number]         Nurse: Date of Birth/Age: 1980 / 43 years Sonographer:          Lesli Hinson RDCS Gender:            F                    Additional Staff: Height:            157.48 cm            Admit Date:           1/9/2024 Weight:            77.57 kg             Admission Status:     Inpatient -                                                               Routine BSA:               1.79 m2              Department Location:  [facility] Blood Pressure: 113 /55 mmHg Study Type:    TRANSTHORACIC ECHO (TTE) COMPLETE Diagnosis/ICD: Acute respiratory failure with hypoxia-J96.01 Indication:    Dyspnea CPT Codes:     Echo Complete w Full Doppler-59627 Patient History: Smoker:            Current. Diabetes:          Yes Pertinent History: HTN, Hyperlipidemia, Chronic Lung Disease, COPD, PE, Previous                    DVT and Dyspnea. Study Detail: The following Echo studies were performed: M-Mode, 2D, Doppler and               color flow. Technically challenging study due to body habitus.  PHYSICIAN INTERPRETATION: Left Ventricle: Left  ventricular systolic function is normal, with an estimated ejection fraction of 55%. There are no regional wall motion abnormalities. The left ventricular cavity size is normal. Spectral Doppler shows a normal pattern of left ventricular diastolic filling. LV Wall Scoring: All segments are normal. Left Atrium: The left atrium is normal in size. Right Ventricle: The right ventricle is normal in size. There is normal right ventricular global systolic function. Right Atrium: The right atrium is normal in size. Aortic Valve: The aortic valve appears structurally normal. The aortic valve appears tricuspid. There is no evidence of aortic valve stenosis. There is no evidence of aortic valve regurgitation. The peak instantaneous gradient of the aortic valve is 8.9 mmHg. The mean gradient of the aortic valve is 5.0 mmHg. Mitral Valve: The mitral valve is normal in structure. There is no evidence of mitral valve stenosis. There is normal mitral valve leaflet mobility. There is trace mitral valve regurgitation. Tricuspid Valve: The tricuspid valve is structurally normal. There is normal tricuspid valve leaflet mobility. There is trace to mild tricuspid regurgitation. Pulmonic Valve: The pulmonic valve is structurally normal. There is no indication of pulmonic valve regurgitation. Pericardium: There is no pericardial effusion noted. Aorta: The aortic root is normal. Pulmonary Artery: The main pulmonary artery is normal in size, and position, with normal bifurcation into the left and right pulmonary arteries. Systemic Veins: The inferior vena cava appears to be of normal size. In comparison to the previous echocardiogram(s): The left ventricular function is unchanged. The left ventricular diastolic function is normal.  CONCLUSIONS:  1. Left ventricular systolic function is normal with a 55% estimated ejection fraction.  2. There is no evidence of mitral valve stenosis.  3. Trace mitral valve regurgitation.  4. Trace to mild  tricuspid regurgitation.  5. Aortic valve stenosis is not present.  6. The main pulmonary artery is normal in size, and position, with normal bifurcation into the left and right pulmonary arteries. QUANTITATIVE DATA SUMMARY: 2D MEASUREMENTS:                          Normal Ranges: Ao Root d:     2.30 cm   (2.0-3.7cm) LAs:           3.40 cm   (2.7-4.0cm) IVSd:          0.87 cm   (0.6-1.1cm) LVPWd:         0.89 cm   (0.6-1.1cm) LVIDd:         4.76 cm   (3.9-5.9cm) LVIDs:         3.13 cm LV Mass Index: 79.0 g/m2 LV % FS        34.2 % LA VOLUME:                               Normal Ranges: LA Vol A4C:        24.0 ml    (22+/-6mL/m2) LA Vol A2C:        28.9 ml LA Vol BP:         26.3 ml LA Vol Index A4C:  13.4ml/m2 LA Vol Index A2C:  16.1 ml/m2 LA Vol Index BP:   14.7 ml/m2 LA Area A4C:       11.4 cm2 LA Area A2C:       12.5 cm2 LA Major Axis A4C: 4.6 cm LA Major Axis A2C: 4.6 cm LA Volume Index:   13.9 ml/m2 RA VOLUME BY A/L METHOD:                               Normal Ranges: RA Vol A4C:        19.8 ml    (8.3-19.5ml) RA Vol Index A4C:  11.1 ml/m2 RA Area A4C:       9.9 cm2 RA Major Axis A4C: 4.2 cm LV SYSTOLIC FUNCTION BY 2D PLANIMETRY (MOD):                     Normal Ranges: EF-A4C View: 54.4 % (>=55%) EF-A2C View: 54.2 % EF-Biplane:  53.4 % LV DIASTOLIC FUNCTION:                        Normal Ranges: MV Peak E:    0.82 m/s (0.7-1.2 m/s) MV Peak A:    0.63 m/s (0.42-0.7 m/s) E/A Ratio:    1.29     (1.0-2.2) MV e'         0.14 m/s (>8.0) MV lateral e' 0.14 m/s MV medial e'  0.09 m/s E/e' Ratio:   5.93     (<8.0) MITRAL VALVE:                 Normal Ranges: MV DT: 127 msec (150-240msec) AORTIC VALVE:                                   Normal Ranges: AoV Vmax:                1.49 m/s (<=1.7m/s) AoV Peak P.9 mmHg (<20mmHg) AoV Mean P.0 mmHg (1.7-11.5mmHg) LVOT Max Mak:            1.15 m/s (<=1.1m/s) AoV VTI:                 24.80 cm (18-25cm) LVOT VTI:                21.70 cm LVOT  Diameter:           2.00 cm  (1.8-2.4cm) AoV Area, VTI:           2.75 cm2 (2.5-5.5cm2) AoV Area,Vmax:           2.42 cm2 (2.5-4.5cm2) AoV Dimensionless Index: 0.87  RIGHT VENTRICLE: RV Basal 3.18 cm RV Mid   2.74 cm RV Major 6.8 cm TAPSE:   21.8 mm RV s'    0.11 m/s TRICUSPID VALVE/RVSP:                             Normal Ranges: Peak TR Velocity: 1.45 m/s RV Syst Pressure: 11.4 mmHg (< 30mmHg) PULMONIC VALVE:                         Normal Ranges: PV Accel Time: 177 msec (>120ms) PV Max Mak:    1.2 m/s  (0.6-0.9m/s) PV Max P.6 mmHg  [id number] Steve Guerin DO Electronically signed on 2024 at 3:08:46 PM  Wall Scoring  ** Final **     XR chest 1 view    Result Date: 2024  Interpreted By:  Tavares Ellis, STUDY: XR CHEST 1 VIEW;  2024 11:30 am   INDICATION: Signs/Symptoms:hypoxia.   COMPARISON: CT chest with contrast 2023; intervening chest radiographs since that time   ACCESSION NUMBER(S): [id number]   ORDERING CLINICIAN: VINNIE GASCA   TECHNIQUE: Single frontal view of the chest; Portable technique   FINDINGS:   The cardiomediastinal silhouette is unchanged   Platelike atelectasis versus potentially true developing infiltrate in the peripheral right mid to basilar lung   Prominent streaky retrocardiac densities at left lung base perhaps slightly increasing over the past few days   Upper lungs clear   No large effusion or pneumothorax       Possibility of developing pneumonias   MACRO: None   Signed by: Tavares Ellis 2024 11:34 AM Dictation workstation:   [id number]    ECG 12 lead    Result Date: 1/10/2024  Sinus rhythm with short RI Possible Inferior infarct (cited on or before 19-DEC-2023) Abnormal ECG When compared with ECG of 19-DEC-2023 14:56, (unconfirmed) QT has shortened See ED provider note for full interpretation and clinical correlation Confirmed by Kim Wall ([id number]) on 1/10/2024 12:36:15 PM    ECG 12 lead    Result Date: 1/10/2024  Sinus tachycardia  Inferior infarct (cited on or before 29-DEC-2023) Abnormal ECG When compared with ECG of 29-DEC-2023 21:35, Nonspecific T wave abnormality, improved in Lateral leads See ED provider note for full interpretation and clinical correlation Confirmed by Kim Wall ([id number]) on 1/10/2024 12:25:13 PM    XR chest 1 view    Result Date: 1/10/2024  Interpreted By:  Tavares Ellis, STUDY: XR CHEST 1 VIEW;  1/10/2024 5:38 am   INDICATION: Signs/Symptoms:resp distress.   COMPARISON: Portable chest, 9 January 2024; CT chest with contrast 27 December 2023   ACCESSION NUMBER(S): [id number]   ORDERING CLINICIAN: DERRICK CHAMBERS   TECHNIQUE: Single frontal view of the chest; Portable technique   FINDINGS: Different patient positioning from yesterday   The cardiomediastinal silhouette is unchanged   Right basilar atelectasis   Opacification at left lung base is at least in part due to a prominent cardiophrenic fat pad and suboptimal patient positioning. Difficult to entirely exclude left basilar pneumonia and/or atelectasis   No pneumothorax       As above   MACRO: None   Signed by: Tavares Ellis 1/10/2024 8:54 AM Dictation workstation:   [id number]    XR chest 1 view    Result Date: 1/9/2024  STUDY: Chest Radiograph;  1/9/2024 10:55 PM INDICATION: Shortness of breath. COMPARISON: 12/29/2023 XR chest ACCESSION NUMBER(S): [id number] ORDERING CLINICIAN: PAUL VELÁSQUEZ TECHNIQUE:  Frontal chest was obtained at 22:55 hours. FINDINGS: CARDIOMEDIASTINAL SILHOUETTE: Cardiomediastinal silhouette enlarged..  LUNGS: Lungs reveals prominence of the central pulmonary vasculature and mild interstitial prominence. ABDOMEN: No remarkable upper abdominal findings.  BONES: No acute osseous changes.    Prominence of pulmonary interstitium similar as compared previous exam. Signed by iNels Perez DO    ECG 12 lead    Result Date: 12/30/2023  Sinus tachycardia Possible Left atrial enlargement Inferior infarct (cited on or before 29-DEC-2023)  Abnormal ECG When compared with ECG of 29-DEC-2023 18:28, Nonspecific T wave abnormality has replaced inverted T waves in Lateral leads See ED provider note for full interpretation and clinical correlation Confirmed by Cheyanne Burger ([id number]) on 12/30/2023 7:28:58 PM    ECG 12 lead    Result Date: 12/29/2023  Normal sinus rhythm Possible Left atrial enlargement Borderline ECG When compared with ECG of 19-DEC-2023 14:56, (unconfirmed) Borderline criteria for Inferior infarct are no longer Present See ED provider note for full interpretation and clinical correlation Confirmed by Cheyanne Burger ([id number]) on 12/29/2023 11:25:36 PM    ECG 12 lead    Result Date: 12/29/2023  Normal sinus rhythm Possible Left atrial enlargement Left axis deviation Prolonged QT Abnormal ECG When compared with ECG of 27-DEC-2023 20:16, (unconfirmed) No significant change was found See ED provider note for full interpretation and clinical correlation Confirmed by Cheyanne Burger ([id number]) on 12/29/2023 11:25:05 PM    XR chest 1 view    Result Date: 12/29/2023  STUDY: Chest Radiograph;  12/29/2023 6:26 PM INDICATION: Shortness of breath.  COMPARISON: XR chest 12/27/2023, 12/19/2023.  ACCESSION NUMBER(S): [id number] ORDERING CLINICIAN: RAHUL LUCAS TECHNIQUE:  Frontal chest was obtained at 1825 hours. FINDINGS: CARDIOMEDIASTINAL SILHOUETTE: Heart is mildly enlarged with mild pulmonary vascular congestion.  LUNGS: Lungs are clear. Emphysematous changes noted.  ABDOMEN: No remarkable upper abdominal findings.  BONES: No acute osseous changes.    Mild cardiomegaly with mild pulmonary vascular congestion. Emphysema. Signed by Bean Zaidi MD    ECG 12 lead    Result Date: 12/29/2023  Sinus tachycardia Possible Inferior infarct , age undetermined T wave abnormality, consider lateral ischemia Abnormal ECG When compared with ECG of 27-DEC-2023 21:22, (unconfirmed) Nonspecific T wave abnormality, worse in Inferior leads T wave inversion now evident  in Lateral leads See ED provider note for full interpretation and clinical correlation Confirmed by Cheyanne Burger ([id number]) on 12/29/2023 6:38:15 PM    CT angio chest for pulmonary embolism    Result Date: 12/27/2023  Interpreted By:  Sin Benítez, STUDY: CT ANGIO CHEST FOR PULMONARY EMBOLISM; 12/27/2023 9:18 pm   INDICATION: Signs/Symptoms:Shortness of breath, hypoxic, tachycardic.   COMPARISON: CT chest dated 10/31/2023.   ACCESSION NUMBER(S): [id number]   ORDERING CLINICIAN: BROOKE TRACEY   TECHNIQUE: Contiguous axial CT images were obtained through the chest at 2 mm slice thickness following administration of intravenous contrast utilizing pulmonary artery bolus tracking. 75 cc of Omnipaque 350 was administered. The images were then reconstructed in the coronal and sagittal planes. MIP images were created and reviewed.   FINDINGS: POTENTIAL LIMITATIONS OF THE STUDY: None   HEART and VESSELS: There is dense opacification of the pulmonary arterial system. No intraluminal filling defect is seen within the pulmonary arteries to suggest acute pulmonary embolus.   The heart is within normal limits for size. No pericardial effusion is identified.   The thoracic aorta is within normal limits for course and caliber, without evidence of aneurysm.   MEDIASTINUM and RICHARD, LOWER NECK AND AXILLA: Evaluation of the visualized neck base demonstrates no gross mass or lymphadenopathy.   There is no gross axillary, hilar or mediastinal lymphadenopathy identified.   LUNGS and AIRWAYS: The trachea and central airways are grossly patent without evidence of endobronchial lesion.   Mild patchy ground-glass airspace consolidations are seen in the upper lobes bilaterally, new relative to the prior study, concerning for developing pneumonia. There is no pleural effusion or pneumothorax identified. No discrete pulmonary nodules or masses are seen.   UPPER ABDOMEN: Evaluation of the visualized upper abdomen demonstrates no discrete mass  or lymphadenopathy.   CHEST WALL and OSSEOUS STRUCTURES: There is no evidence of acute fracture identified.       1. No evidence of acute pulmonary embolus. 2. Mild patchy ground-glass airspace consolidations in the upper lobes bilaterally, concerning for developing pneumonia. Clinical correlation and continued follow-up is recommended.   MACRO: None.     Signed by: Sin Benítez 12/27/2023 9:33 PM Dictation workstation:   [id number]    XR chest 1 view    Result Date: 12/27/2023  STUDY: Chest Radiograph;  12/27/2023, 8:22PM INDICATION: Shortness of breath. COMPARISON: 12/19/2023 XR Chest ACCESSION NUMBER(S): [id number] ORDERING CLINICIAN: BROOKE TRACEY TECHNIQUE:  Frontal chest was obtained at 20:22 hours. FINDINGS: CARDIOMEDIASTINAL SILHOUETTE: Heart size is mildly enlarged. The mediastinal contours appear stable.  LUNGS: Lungs demonstrate emphysematous changes.  There is prominence of pulmonary interstitium.  There is subtle right basilar opacity.  ABDOMEN: No remarkable upper abdominal findings.  BONES: No acute osseous changes.    Subtle right basilar opacity concerning for pneumonia.  This appears slightly progressed since prior study.  Cardiomegaly and emphysematous changes. Signed by Jairo Velazquez, DO    XR chest 1 view    Result Date: 12/19/2023  Interpreted By:  Niels Caicedo, STUDY: Chest dated  12/19/2023.   INDICATION: Signs/Symptoms:sob   COMPARISON: Chest dated 10/31/2023.   ACCESSION NUMBER(S): [id number]   ORDERING CLINICIAN: ASHLEY REINA   TECHNIQUE: One view of the chest.   FINDINGS: The lungs are clear.  No pneumothorax or effusion is evident. The cardiomediastinal silhouette is  prominent but similar to the prior exam.The soft tissues are grossly unremarkable.       No acute cardiopulmonary process is evident.   MACRO: None   Signed by: Niels Caicedo 12/19/2023 3:17 PM Dictation workstation:   [id number]    Radiology:  ECG 12 lead    Result Date: 1/10/2024  Sinus tachycardia  Inferior infarct (cited on or before 29-DEC-2023) Abnormal ECG When compared with ECG of 29-DEC-2023 21:35, Nonspecific T wave abnormality, improved in Lateral leads See ED provider note for full interpretation and clinical correlation Confirmed by Kim Wall ([id number]) on 1/10/2024 12:25:13 PM    XR chest 1 view    Result Date: 1/10/2024  Interpreted By:  Tavares Ellis, STUDY: XR CHEST 1 VIEW;  1/10/2024 5:38 am   INDICATION: Signs/Symptoms:resp distress.   COMPARISON: Portable chest, 9 January 2024; CT chest with contrast 27 December 2023   ACCESSION NUMBER(S): [id number]   ORDERING CLINICIAN: DERRICK CHAMBERS   TECHNIQUE: Single frontal view of the chest; Portable technique   FINDINGS: Different patient positioning from yesterday   The cardiomediastinal silhouette is unchanged   Right basilar atelectasis   Opacification at left lung base is at least in part due to a prominent cardiophrenic fat pad and suboptimal patient positioning. Difficult to entirely exclude left basilar pneumonia and/or atelectasis   No pneumothorax       As above   MACRO: None   Signed by: Tavares Ellis 1/10/2024 8:54 AM Dictation workstation:   [id number]    XR chest 1 view    Result Date: 1/9/2024  STUDY: Chest Radiograph;  1/9/2024 10:55 PM INDICATION: Shortness of breath. COMPARISON: 12/29/2023 XR chest ACCESSION NUMBER(S): [id number] ORDERING CLINICIAN: PAUL VELÁSQUEZ TECHNIQUE:  Frontal chest was obtained at 22:55 hours. FINDINGS: CARDIOMEDIASTINAL SILHOUETTE: Cardiomediastinal silhouette enlarged..  LUNGS: Lungs reveals prominence of the central pulmonary vasculature and mild interstitial prominence. ABDOMEN: No remarkable upper abdominal findings.  BONES: No acute osseous changes.    Prominence of pulmonary interstitium similar as compared previous exam. Signed by Niels Perez DO     Current Facility-Administered Medications:     busPIRone (Buspar) tablet 20 mg, 20 mg, oral, TID, Jazmin Portillo, APRN-CNP, 20 mg at  01/13/24 1549    citalopram (CeleXA) tablet 40 mg, 40 mg, oral, Daily, BARTOLOME Aden-CNP, 40 mg at 01/13/24 0811    clonazePAM (KlonoPIN) tablet 0.5 mg, 0.5 mg, oral, BID PRN, BARTOLOME Aden-CNP, 0.5 mg at 01/13/24 0811    dextrose 10 % in water (D10W) infusion, 0.3 g/kg/hr, intravenous, Once PRN, Jazmin Portillo APRN-CNP    dextrose 50 % injection 25 g, 25 g, intravenous, q15 min PRN, BARTOLOME Aden-CNP    doxycycline (Vibra-Tabs) tablet 100 mg, 100 mg, oral, q12h BLUE, BARTOLOME Aden-CNP, 100 mg at 01/13/24 0811    glucagon (Glucagen) injection 1 mg, 1 mg, intramuscular, q15 min PRN, Jazmin Portillo APRN-CNP    hydrOXYzine pamoate (Vistaril) capsule 25 mg, 25 mg, oral, TID PRN, BARTOLOME Aden-CNP, 25 mg at 01/13/24 1220    insulin glargine (Lantus) injection 10 Units, 10 Units, subcutaneous, q24h, BARTOLOME Aden-CNP, 10 Units at 01/13/24 0915    insulin lispro (HumaLOG) injection 0-5 Units, 0-5 Units, subcutaneous, Before meals & nightly, BARTOLOME Aden-CNP, 5 Units at 01/13/24 1550    ipratropium-albuteroL (Duo-Neb) 0.5-2.5 mg/3 mL nebulizer solution 3 mL, 3 mL, nebulization, q4h, BARTOLOME Aden-CNP, 3 mL at 01/13/24 1602    ipratropium-albuteroL (Duo-Neb) 0.5-2.5 mg/3 mL nebulizer solution 3 mL, 3 mL, nebulization, q4h PRN, Jazmin Portillo APRN-CNP    levothyroxine (Synthroid, Levoxyl) tablet 150 mcg, 150 mcg, oral, Daily, BARTOLOME Aden-CNP, 150 mcg at 01/13/24 0502    [Held by provider] metoprolol tartrate (Lopressor) tablet 25 mg, 25 mg, oral, BID, BARTOLOME Aden-CNP, 25 mg at 01/10/24 0823    nicotine (Nicoderm CQ) 21 mg/24 hr patch 1 patch, 1 patch, transdermal, Daily, 1 patch at 01/13/24 0811 **FOLLOWED BY** [START ON 2/21/2024] nicotine (Nicoderm CQ) 14 mg/24 hr patch 1 patch, 1 patch, transdermal, Daily **FOLLOWED BY** [START ON 3/6/2024] nicotine (Nicoderm CQ) 7 mg/24 hr patch 1  patch, 1 patch, transdermal, Daily, VALDEMAR Aden    oxygen (O2) therapy, , inhalation, Continuous PRN - O2/gases, BARTOLOME Aden-CNP, 4 L/min at 01/13/24 1602    paliperidone (Invega) 24 hr tablet 3 mg, 3 mg, oral, Daily, BARTOLOME Aden-CNP, 3 mg at 01/13/24 0810    pantoprazole (ProtoNix) EC tablet 40 mg, 40 mg, oral, Daily before breakfast, BARTOLOME Aden-CNP, 40 mg at 01/13/24 0502    perflutren lipid microspheres (Definity) injection 0.5-10 mL of dilution, 0.5-10 mL of dilution, intravenous, Once in imaging, VALDEMAR Aden    perflutren lipid microspheres (Definity) injection 0.5-10 mL of dilution, 0.5-10 mL of dilution, intravenous, Once in imaging, VALDEMAR Aden    prazosin (Minipress) capsule 2 mg, 2 mg, oral, Nightly, BARTOLOME Aden-CNP, 2 mg at 01/12/24 2021    predniSONE (Deltasone) tablet 40 mg, 40 mg, oral, Daily, BARTOLOME Aden-CNP, 40 mg at 01/13/24 0811    rivaroxaban (Xarelto) tablet 20 mg, 20 mg, oral, Daily with evening meal, BARTOLOME Aden-CNP, 20 mg at 01/13/24 1550   Principal Problem:    Acute on chronic respiratory failure, unspecified whether with hypoxia or hypercapnia (CMS/[facility])      Assessment/Plan    Acute on chronic hypoxic and hypercapnic respiratory failure continue BiPAP and oxygen keeping saturation more than 90%.  COPD exacerbation continue bronchodilators and steroids.  History of sleep apnea patient on BiPAP now.  Obesity patient advised reduce weight by diet and exercise.  Acute bronchitis continue doxycycline.  Smoking cessation discussed with the patient according patient started  History of DVT and PE.  Hypertension.  Dyslipidemia.  History of diabetes.  Anxiety disorder.  Depression and schizophrenia.  Psych consult was done.  Hypothyroid.  GERD.  Hypothyroidism.  Migraine.  DVT prophylaxis.  PT OT.  P.o. diet.               1/11/2024, 1/12/2024 patient on high  flow oxygen out of bed to chair is slowly improving.  1/13/2024 patient clinically improved now on 4 L nasal cannula and stable plan to discharge tomorrow follow-up in the office.       Afshin Villalba MD

## 2024-01-13 NOTE — PROGRESS NOTES
ASSESSMENT & PLAN:         Stephenie Mccray is a 43 y.o. year old female patient with Past Medical History of  COPD (4L@home), DVT/PE (on Xarelto), schizophrenia, migraine headaches, HTN, HLD, current smoker, GERD, IDDM, and hypothyroidism who presented to hospital due to concerns of difficulty breathing.  Was found to be in acute exacerbation of COPD and admitted to ICU for treatment with BiPAP.  Patient was started on scheduled nebs, IV Solu-Medrol, and doxycycline with improvement in symptoms.  Transferred to medical floor once weaned to high flow nasal cannula.    Acute on chronic hypercapnic respiratory failure  COPD with acute exacerbation  HLD  HTN  IDDM  Hypothyroid  GERD    1/13/24  -Patient continued on high flow nasal cannula and BiPAP nightly.  Nursing reports desaturations on attempts to wean.  Will continue to wean as tolerated.  -Blood sugars continued on current regimen of glargine 10 units nightly and Humalog sliding scale.  Will continue to monitor.  -Concern for benzodiazepine dependence raised by ICU/psychiatry.  Patient continued on Klonopin 0.5 mg twice daily.  Will continue to wean as tolerated.  -Continued on Celexa and Invega for management of schizophrenia.  Psychiatry following, recs appreciated  -Continue nicotine patches while in hospital.  Smoking cessation strongly encouraged  -TTE pending for evaluation of cardiac function.  Will review once resulted.      VTE Prophylaxis: On Xarelto      Pete Trevizo MD    SUBJECTIVE     Patient transferred out of ICU to medical floor overnight.  Feels like breathing is improving.  Eager to return home.  Denies any fevers chills or chest pain.  Further ROS was unremarkable.      OBJECTIVE:       Last Recorded Vitals:  Vitals:    01/13/24 0403 01/13/24 0500 01/13/24 0719 01/13/24 0723   BP: 127/74   121/77   BP Location:    Left arm   Patient Position:    Lying   Pulse: 100   100   Resp: 17      Temp: 36.5 °C (97.7 °F)   36.8 °C (98.2 °F)    TempSrc:    Temporal   SpO2: 95%  94% 92%   Weight:  76.1 kg (167 lb 12.3 oz)     Height:           Last I/O:  I/O last 3 completed shifts:  In: 1200 (15.8 mL/kg) [P.O.:1200]  Out: 2550 (33.5 mL/kg) [Urine:2550 (0.9 mL/kg/hr)]  Weight: 76.1 kg     Physical Exam:  General: Well-developed adult female in mild distress  HEENT: Clear sclera, EOMI, trachea midline, moist mucous membranes  Respiratory: Mild diffuse wheezing, equal chest rise no retractions, on high flow nasal cannula  Cardiovascular: S1 and S2 auscultated, no murmurs clicks or rubs  Abdomen: Soft, nontender, nondistended  Extremities: No cyanosis or clubbing appreciated  Neurological: Spontaneously moves all extremities, no dysarthria, cranial nerves grossly intact  Psychiatric: Appropriate mood and affect  Skin: Warm, dry    Inpatient Medications:  busPIRone, 20 mg, oral, TID  citalopram, 40 mg, oral, Daily  doxycycline, 100 mg, oral, q12h BLUE  insulin glargine, 10 Units, subcutaneous, q24h  insulin lispro, 0-5 Units, subcutaneous, Before meals & nightly  ipratropium-albuteroL, 3 mL, nebulization, q4h  levothyroxine, 150 mcg, oral, Daily  [Held by provider] metoprolol tartrate, 25 mg, oral, BID  nicotine, 1 patch, transdermal, Daily   Followed by  [START ON 2/21/2024] nicotine, 1 patch, transdermal, Daily   Followed by  [START ON 3/6/2024] nicotine, 1 patch, transdermal, Daily  paliperidone, 3 mg, oral, Daily  pantoprazole, 40 mg, oral, Daily before breakfast  perflutren lipid microspheres, 0.5-10 mL of dilution, intravenous, Once in imaging  perflutren lipid microspheres, 0.5-10 mL of dilution, intravenous, Once in imaging  prazosin, 2 mg, oral, Nightly  predniSONE, 40 mg, oral, Daily  rivaroxaban, 20 mg, oral, Daily with evening meal        PRN Medications  PRN medications: clonazePAM, dextrose 10 % in water (D10W), dextrose, glucagon, hydrOXYzine pamoate, ipratropium-albuteroL, oxygen    Continuous Medications:         LABS AND IMAGING:      Labs:  Results for orders placed or performed during the hospital encounter of 01/09/24 (from the past 24 hour(s))   POCT GLUCOSE   Result Value Ref Range    POCT Glucose 297 (H) 74 - 99 mg/dL   POCT GLUCOSE   Result Value Ref Range    POCT Glucose 239 (H) 74 - 99 mg/dL   CBC and Auto Differential   Result Value Ref Range    WBC 11.1 4.4 - 11.3 x10*3/uL    nRBC 0.0 0.0 - 0.0 /100 WBCs    RBC 3.93 (L) 4.00 - 5.20 x10*6/uL    Hemoglobin 11.9 (L) 12.0 - 16.0 g/dL    Hematocrit 39.2 36.0 - 46.0 %     80 - 100 fL    MCH 30.3 26.0 - 34.0 pg    MCHC 30.4 (L) 32.0 - 36.0 g/dL    RDW 16.8 (H) 11.5 - 14.5 %    Platelets 144 (L) 150 - 450 x10*3/uL    Neutrophils % 59.8 40.0 - 80.0 %    Immature Granulocytes %, Automated 2.2 (H) 0.0 - 0.9 %    Lymphocytes % 31.2 13.0 - 44.0 %    Monocytes % 6.2 2.0 - 10.0 %    Eosinophils % 0.1 0.0 - 6.0 %    Basophils % 0.5 0.0 - 2.0 %    Neutrophils Absolute 6.65 1.20 - 7.70 x10*3/uL    Immature Granulocytes Absolute, Automated 0.24 0.00 - 0.70 x10*3/uL    Lymphocytes Absolute 3.47 1.20 - 4.80 x10*3/uL    Monocytes Absolute 0.69 0.10 - 1.00 x10*3/uL    Eosinophils Absolute 0.01 0.00 - 0.70 x10*3/uL    Basophils Absolute 0.05 0.00 - 0.10 x10*3/uL   Comprehensive metabolic panel   Result Value Ref Range    Glucose 191 (H) 74 - 99 mg/dL    Sodium 140 136 - 145 mmol/L    Potassium 3.6 3.5 - 5.3 mmol/L    Chloride 100 98 - 107 mmol/L    Bicarbonate 33 (H) 21 - 32 mmol/L    Anion Gap 11 10 - 20 mmol/L    Urea Nitrogen 30 (H) 6 - 23 mg/dL    Creatinine 0.83 0.50 - 1.05 mg/dL    eGFR 90 >60 mL/min/1.73m*2    Calcium 10.0 8.6 - 10.3 mg/dL    Albumin 3.8 3.4 - 5.0 g/dL    Alkaline Phosphatase 74 33 - 110 U/L    Total Protein 6.6 6.4 - 8.2 g/dL    AST 7 (L) 9 - 39 U/L    Bilirubin, Total 0.4 0.0 - 1.2 mg/dL    ALT 20 7 - 45 U/L   Magnesium   Result Value Ref Range    Magnesium 1.61 1.60 - 2.40 mg/dL   SST TOP   Result Value Ref Range    Extra Tube Hold for add-ons.    POCT GLUCOSE   Result  Value Ref Range    POCT Glucose 190 (H) 74 - 99 mg/dL        Imaging:  Transthoracic Echo (TTE) Limited            [place] [id number]   Tel [phone] Fax [phone]    TRANSTHORACIC ECHOCARDIOGRAM REPORT       Patient Name:      JACOB CURRY   Reading Physician:    [id number] Jairo Urbina MD, [facility]  Study Date:        1/12/2024            Ordering Provider:    [id number] VINNIE GASCA  MRN/PID:           [id number]             Fellow:  Accession#:        [id number]         Nurse:  Date of Birth/Age: 1980 / 43 years Sonographer:          Cathy Last RDCS  Gender:            F                    Additional Staff:  Height:            157.48 cm            Admit Date:           1/9/2024  Weight:            77.57 kg             Admission Status:     Inpatient -                                                                Routine  BSA:               1.79 m2              Department Location:  [place] [facility]  Blood Pressure: 119 /60 mmHg    Study Type:    TRANSTHORACIC ECHO (TTE) LIMITED  Diagnosis/ICD: Acute and chronic respiratory failure with hypercapnia-J96.22  Indication:    Respiratory failure  CPT Codes:     Echo Limited-00779; Color Doppler-85166    Patient History:  Smoker:            Current.  Diabetes:          Yes  Pertinent History: HTN, Hyperlipidemia, Chronic Lung Disease, COPD, PE, Previous                     DVT and Dyspnea.    Study Detail: The following Echo studies were performed: 2D and color flow.                Agitated saline used as a contrast agent for intraseptal flow                evaluation. The patient was awake.       PHYSICIAN INTERPRETATION:  Left Ventricle: Left ventricular systolic function is normal, with an estimated ejection fraction of 60%.  There are no regional wall motion abnormalities. The left ventricular cavity size is normal. Left ventricular diastolic filling was not assessed.  Left Atrium: The left atrium is normal in size.  Right Ventricle: The right ventricle is normal in size. There is normal right ventricular global systolic function.  Right Atrium: The right atrium is normal in size.  Aortic Valve: The aortic valve was not well visualized. Aortic valve regurgitation was not assessed.  Mitral Valve: The mitral valve is normal in structure. Mitral valve regurgitation was not assessed.  Tricuspid Valve: The tricuspid valve is structurally normal. Tricuspid regurgitation was not assessed.  Pulmonic Valve: The pulmonic valve is structurally normal. The pulmonic valve regurgitation was not assessed.  Pericardium: There is no pericardial effusion noted.  Aorta: The aortic root was not assessed.       CONCLUSIONS:   1. Left ventricular systolic function is normal with a 60% estimated ejection fraction.   2. No evidence right to left shunting on agitated saline bubble contrast study.    QUANTITATIVE DATA SUMMARY:     [id number] Jairo Urbina [facility], [facility]  Electronically signed on 1/12/2024 at 1:29:09 PM       ** Final **

## 2024-01-14 VITALS
RESPIRATION RATE: 20 BRPM | OXYGEN SATURATION: 96 % | TEMPERATURE: 97.3 F | HEIGHT: 62 IN | SYSTOLIC BLOOD PRESSURE: 126 MMHG | BODY MASS INDEX: 31 KG/M2 | HEART RATE: 99 BPM | DIASTOLIC BLOOD PRESSURE: 78 MMHG | WEIGHT: 168.43 LBS

## 2024-01-14 LAB
ALBUMIN SERPL BCP-MCNC: 3.8 G/DL (ref 3.4–5)
ALP SERPL-CCNC: 71 U/L (ref 33–110)
ALT SERPL W P-5'-P-CCNC: 15 U/L (ref 7–45)
ANION GAP SERPL CALC-SCNC: 12 MMOL/L (ref 10–20)
AST SERPL W P-5'-P-CCNC: 8 U/L (ref 9–39)
BASOPHILS # BLD AUTO: 0.04 X10*3/UL (ref 0–0.1)
BASOPHILS NFR BLD AUTO: 0.3 %
BILIRUB SERPL-MCNC: 0.4 MG/DL (ref 0–1.2)
BUN SERPL-MCNC: 26 MG/DL (ref 6–23)
CALCIUM SERPL-MCNC: 9.6 MG/DL (ref 8.6–10.3)
CHLORIDE SERPL-SCNC: 101 MMOL/L (ref 98–107)
CO2 SERPL-SCNC: 32 MMOL/L (ref 21–32)
CREAT SERPL-MCNC: 0.79 MG/DL (ref 0.5–1.05)
EGFRCR SERPLBLD CKD-EPI 2021: >90 ML/MIN/1.73M*2
EOSINOPHIL # BLD AUTO: 0.02 X10*3/UL (ref 0–0.7)
EOSINOPHIL NFR BLD AUTO: 0.2 %
ERYTHROCYTE [DISTWIDTH] IN BLOOD BY AUTOMATED COUNT: 16.8 % (ref 11.5–14.5)
GLUCOSE BLD MANUAL STRIP-MCNC: 130 MG/DL (ref 74–99)
GLUCOSE BLD MANUAL STRIP-MCNC: 189 MG/DL (ref 74–99)
GLUCOSE SERPL-MCNC: 150 MG/DL (ref 74–99)
HCT VFR BLD AUTO: 38.7 % (ref 36–46)
HGB BLD-MCNC: 11.6 G/DL (ref 12–16)
HOLD SPECIMEN: NORMAL
IMM GRANULOCYTES # BLD AUTO: 0.22 X10*3/UL (ref 0–0.7)
IMM GRANULOCYTES NFR BLD AUTO: 1.9 % (ref 0–0.9)
LYMPHOCYTES # BLD AUTO: 2.97 X10*3/UL (ref 1.2–4.8)
LYMPHOCYTES NFR BLD AUTO: 25 %
MAGNESIUM SERPL-MCNC: 1.6 MG/DL (ref 1.6–2.4)
MCH RBC QN AUTO: 30.1 PG (ref 26–34)
MCHC RBC AUTO-ENTMCNC: 30 G/DL (ref 32–36)
MCV RBC AUTO: 100 FL (ref 80–100)
MONOCYTES # BLD AUTO: 0.7 X10*3/UL (ref 0.1–1)
MONOCYTES NFR BLD AUTO: 5.9 %
NEUTROPHILS # BLD AUTO: 7.94 X10*3/UL (ref 1.2–7.7)
NEUTROPHILS NFR BLD AUTO: 66.7 %
NRBC BLD-RTO: 0 /100 WBCS (ref 0–0)
PLATELET # BLD AUTO: 142 X10*3/UL (ref 150–450)
POTASSIUM SERPL-SCNC: 3.6 MMOL/L (ref 3.5–5.3)
PROT SERPL-MCNC: 6.5 G/DL (ref 6.4–8.2)
RBC # BLD AUTO: 3.86 X10*6/UL (ref 4–5.2)
SODIUM SERPL-SCNC: 141 MMOL/L (ref 136–145)
WBC # BLD AUTO: 11.9 X10*3/UL (ref 4.4–11.3)

## 2024-01-14 PROCEDURE — 2500000002 HC RX 250 W HCPCS SELF ADMINISTERED DRUGS (ALT 637 FOR MEDICARE OP, ALT 636 FOR OP/ED): Performed by: NURSE PRACTITIONER

## 2024-01-14 PROCEDURE — 2500000004 HC RX 250 GENERAL PHARMACY W/ HCPCS (ALT 636 FOR OP/ED): Performed by: NURSE PRACTITIONER

## 2024-01-14 PROCEDURE — 84075 ASSAY ALKALINE PHOSPHATASE: CPT | Performed by: NURSE PRACTITIONER

## 2024-01-14 PROCEDURE — 2500000001 HC RX 250 WO HCPCS SELF ADMINISTERED DRUGS (ALT 637 FOR MEDICARE OP): Performed by: NURSE PRACTITIONER

## 2024-01-14 PROCEDURE — 36415 COLL VENOUS BLD VENIPUNCTURE: CPT | Performed by: NURSE PRACTITIONER

## 2024-01-14 PROCEDURE — S4991 NICOTINE PATCH NONLEGEND: HCPCS | Performed by: NURSE PRACTITIONER

## 2024-01-14 PROCEDURE — 82947 ASSAY GLUCOSE BLOOD QUANT: CPT

## 2024-01-14 PROCEDURE — 94660 CPAP INITIATION&MGMT: CPT

## 2024-01-14 PROCEDURE — 99239 HOSP IP/OBS DSCHRG MGMT >30: CPT | Performed by: STUDENT IN AN ORGANIZED HEALTH CARE EDUCATION/TRAINING PROGRAM

## 2024-01-14 PROCEDURE — 83735 ASSAY OF MAGNESIUM: CPT | Performed by: NURSE PRACTITIONER

## 2024-01-14 PROCEDURE — 94640 AIRWAY INHALATION TREATMENT: CPT

## 2024-01-14 PROCEDURE — 85025 COMPLETE CBC W/AUTO DIFF WBC: CPT | Performed by: NURSE PRACTITIONER

## 2024-01-14 RX ORDER — BUSPIRONE HYDROCHLORIDE 10 MG/1
20 TABLET ORAL 3 TIMES DAILY
Qty: 180 TABLET | Refills: 0 | Status: SHIPPED | OUTPATIENT
Start: 2024-01-14 | End: 2024-01-30 | Stop reason: DRUGHIGH

## 2024-01-14 RX ORDER — IBUPROFEN 200 MG
1 TABLET ORAL DAILY
Qty: 30 PATCH | Refills: 1 | Status: SHIPPED | OUTPATIENT
Start: 2024-01-15 | End: 2024-05-19 | Stop reason: HOSPADM

## 2024-01-14 RX ORDER — NICOTINE 7MG/24HR
1 PATCH, TRANSDERMAL 24 HOURS TRANSDERMAL DAILY
Qty: 14 PATCH | Refills: 0 | Status: SHIPPED | OUTPATIENT
Start: 2024-03-06 | End: 2024-04-10 | Stop reason: WASHOUT

## 2024-01-14 RX ORDER — PALIPERIDONE 3 MG/1
3 TABLET, EXTENDED RELEASE ORAL DAILY
Qty: 30 TABLET | Refills: 0 | Status: SHIPPED | OUTPATIENT
Start: 2024-01-15 | End: 2024-04-08

## 2024-01-14 RX ORDER — IBUPROFEN 200 MG
1 TABLET ORAL DAILY
Qty: 14 PATCH | Refills: 0 | Status: SHIPPED | OUTPATIENT
Start: 2024-02-21 | End: 2024-05-19 | Stop reason: HOSPADM

## 2024-01-14 RX ADMIN — HYDROXYZINE PAMOATE 25 MG: 25 CAPSULE ORAL at 05:01

## 2024-01-14 RX ADMIN — PANTOPRAZOLE SODIUM 40 MG: 40 TABLET, DELAYED RELEASE ORAL at 05:01

## 2024-01-14 RX ADMIN — INSULIN GLARGINE 10 UNITS: 100 INJECTION, SOLUTION SUBCUTANEOUS at 09:47

## 2024-01-14 RX ADMIN — PALIPERIDONE 3 MG: 3 TABLET, EXTENDED RELEASE ORAL at 09:24

## 2024-01-14 RX ADMIN — IPRATROPIUM BROMIDE AND ALBUTEROL SULFATE 3 ML: 2.5; .5 SOLUTION RESPIRATORY (INHALATION) at 11:06

## 2024-01-14 RX ADMIN — LEVOTHYROXINE SODIUM 150 MCG: 75 TABLET ORAL at 05:01

## 2024-01-14 RX ADMIN — PREDNISONE 40 MG: 20 TABLET ORAL at 09:25

## 2024-01-14 RX ADMIN — NICOTINE 1 PATCH: 21 PATCH, EXTENDED RELEASE TRANSDERMAL at 09:26

## 2024-01-14 RX ADMIN — CLONAZEPAM 0.5 MG: 0.5 TABLET ORAL at 09:26

## 2024-01-14 RX ADMIN — BUSPIRONE HYDROCHLORIDE 20 MG: 5 TABLET ORAL at 09:25

## 2024-01-14 RX ADMIN — IPRATROPIUM BROMIDE AND ALBUTEROL SULFATE 3 ML: 2.5; .5 SOLUTION RESPIRATORY (INHALATION) at 07:07

## 2024-01-14 RX ADMIN — CITALOPRAM HYDROBROMIDE 40 MG: 20 TABLET ORAL at 09:25

## 2024-01-14 RX ADMIN — IPRATROPIUM BROMIDE AND ALBUTEROL SULFATE 3 ML: 2.5; .5 SOLUTION RESPIRATORY (INHALATION) at 04:26

## 2024-01-14 RX ADMIN — DOXYCYCLINE HYCLATE 100 MG: 100 TABLET, FILM COATED ORAL at 09:24

## 2024-01-14 ASSESSMENT — PAIN SCALES - GENERAL: PAINLEVEL_OUTOF10: 0 - NO PAIN

## 2024-01-14 NOTE — DISCHARGE SUMMARY
Medical Group Discharge Summary  DISCHARGE DIAGNOSIS     Acute on chronic hypercapnic respiratory failure  COPD with acute exacerbation  HLD  HTN  IDDM  Hypothyroidism  GERD    HOSPITAL COURSE AND DETAILS     Stephenie Mccray is a 43 y.o. year old female patient with Past Medical History of  COPD (4L@home), DVT/PE (on Xarelto), schizophrenia, migraine headaches, HTN, HLD, current smoker, GERD, IDDM, and hypothyroidism who presented to hospital due to concerns of difficulty breathing.  Was found to be in acute exacerbation of COPD and admitted to ICU for treatment with BiPAP.  Patient was started on scheduled nebs, IV Solu-Medrol, and doxycycline with improvement in symptoms.  Transferred to medical floor once weaned to high flow nasal cannula. Oxygen was continually weened to home oxygen level of approx 4L under the guidance of pulmonology. Psychiatry evaluted patient while in hospital and adjusted medications to better control schizophrenia and anxiety. Prescriptions for changes provided at time of discharge and patient instructed to follow-up at the [facility] for further medication management. Patient completed 5 day courses of antibiotics/steroids for COPD exacerbation while in hospital so no prescriptions were provided. TTE was obtained while in ICU due to concerns of cardiac etiology of dyspnea, however, revealed preserved systolic function.  Patient was in stable condition on day of discharge with no acute concerns.  Patient is to follow-up with her primary care provider, pulmonology, and the [facility] postdischarge.    35 minutes spent on discharge. Time calculated includes outpatient care coordination, bedside education, and counselling.     **Of note, this documentation is completed using the Dragon Dictation system (voice recognition software). There may be spelling and/or grammatical errors that were not corrected prior to final submission.**    Pete Trevizo MD    DISCHARGE PHYSICAL EXAM      Last Recorded Vitals:  Vitals:    01/14/24 0426 01/14/24 0503 01/14/24 0737 01/14/24 1212   BP:   133/82 126/78   BP Location:   Left arm    Patient Position:   Lying    Pulse:   106 99   Resp:   20    Temp:   36.4 °C (97.5 °F) 36.3 °C (97.3 °F)   TempSrc:   Temporal    SpO2: 97%  96% 96%   Weight:  76.4 kg (168 lb 6.9 oz)     Height:           Physical Exam  General: Well-developed adult female in mild distress  HEENT: Clear sclera, EOMI, trachea midline, moist mucous membranes  Respiratory: Mild diffuse wheezing, equal chest rise no retractions, on nasal cannula  Cardiovascular: S1 and S2 auscultated, no murmurs clicks or rubs  Abdomen: Soft, nontender, nondistended  Extremities: No cyanosis or clubbing appreciated  Neurological: Spontaneously moves all extremities, no dysarthria, cranial nerves grossly intact  Psychiatric: Appropriate mood and affect  Skin: Warm, dry    DISCHARGE MEDICATIONS        Your medication list        START taking these medications        Instructions Last Dose Given Next Dose Due   nicotine 21 mg/24 hr patch  Commonly known as: Nicoderm CQ  Start taking on: January 15, 2024      Place 1 patch over 24 hours on the skin once daily for 37 doses. Do not start before January 15, 2024.       nicotine 14 mg/24 hr patch  Commonly known as: Nicoderm CQ  Start taking on: February 21, 2024      Place 1 patch over 24 hours on the skin once daily for 14 doses. Do not start before February 21, 2024.       nicotine 7 mg/24 hr patch  Commonly known as: Nicoderm CQ  Start taking on: March 6, 2024      Place 1 patch over 24 hours on the skin once daily for 14 doses. Do not start before March 6, 2024.              CHANGE how you take these medications        Instructions Last Dose Given Next Dose Due   busPIRone 10 mg tablet  Commonly known as: Buspar  What changed:   medication strength  how much to take  when to take this      Take 2 tablets (20 mg) by mouth 3 times a day.       ipratropium-albuteroL  "0.5-2.5 mg/3 mL nebulizer solution  Commonly known as: Duo-Neb  What changed:   when to take this  additional instructions      Take 3 mL by nebulization every 6 hours if needed for shortness of breath or wheezing.       paliperidone 3 mg 24 hr tablet  Commonly known as: Invega  Start taking on: January 15, 2024  What changed:   medication strength  how much to take      Take 1 tablet (3 mg) by mouth once daily. Do not crush, chew, or split. Do not start before January 15, 2024.              CONTINUE taking these medications        Instructions Last Dose Given Next Dose Due   alcohol swabs pads, medicated  Commonly known as: Alcohol Prep Pads      Use 3 times daily prn       atorvastatin 20 mg tablet  Commonly known as: Lipitor      Take 1 tablet (20 mg) by mouth once daily. Dr. Davis covering for Dr. Yuen       BD Ultra-Fine Mini Pen Needle 31 gauge x 3/16\" needle  Generic drug: pen needle, diabetic           citalopram 40 mg tablet  Commonly known as: CeleXA           clonazePAM 1 mg tablet  Commonly known as: KlonoPIN           hydrOXYzine pamoate 50 mg capsule  Commonly known as: Vistaril           insulin glargine 100 unit/mL (3 mL) pen  Commonly known as: Lantus Solostar U-100 Insulin      Inject 10 Units under the skin once daily in the morning. Take as directed per insulin instructions.       insulin lispro 100 unit/mL injection  Commonly known as: HumaLOG           Invega Sustenna 234 mg/1.5 mL syringe  Generic drug: paliperidone palmitate ER           levothyroxine 150 mcg tablet  Commonly known as: Synthroid, Levoxyl      Take 1 tablet (150 mcg) by mouth once daily.       OneTouch Verio test strips strip  Generic drug: blood sugar diagnostic           pantoprazole 40 mg EC tablet  Commonly known as: ProtoNix      Take 1 tablet (40 mg) by mouth once daily.       Xarelto 20 mg tablet  Generic drug: rivaroxaban                  STOP taking these medications      amoxicillin-pot clavulanate 875-125 mg " tablet  Commonly known as: Augmentin        azithromycin 250 mg tablet  Commonly known as: Zithromax Z-Navin        hydrOXYzine HCL 25 mg tablet  Commonly known as: Atarax        metoprolol tartrate 25 mg tablet  Commonly known as: Lopressor        NovoLOG Flexpen U-100 Insulin 100 unit/mL (3 mL) pen  Generic drug: insulin aspart        topiramate 25 mg tablet  Commonly known as: Topamax               ASK your doctor about these medications        Instructions Last Dose Given Next Dose Due   predniSONE 20 mg tablet  Commonly known as: Deltasone  Ask about: Should I take this medication?      Take 1 tablet (20 mg) by mouth once daily for 5 days.                 Where to Get Your Medications        These medications were sent to [facility] [address] [place] [address] - [address]; to (do) Centers [address], [facility] [id number]      Phone: [phone]   busPIRone 10 mg tablet  nicotine 14 mg/24 hr patch  nicotine 21 mg/24 hr patch  nicotine 7 mg/24 hr patch  paliperidone 3 mg 24 hr tablet           OUTPATIENT FOLLOW-UP     Future Appointments   Date Time Provider Department Center   1/24/2024  1:30 PM Jesse Jones DO [id number] [place]   2/15/2024 10:45 AM Delta Yuen MD [id number] [place]

## 2024-01-15 ENCOUNTER — PATIENT OUTREACH (OUTPATIENT)
Dept: HOME HEALTH SERVICES | Age: 44
End: 2024-01-15
Payer: COMMERCIAL

## 2024-01-15 SDOH — HEALTH STABILITY: MENTAL HEALTH
STRESS IS WHEN SOMEONE FEELS TENSE, NERVOUS, ANXIOUS, OR CAN'T SLEEP AT NIGHT BECAUSE THEIR MIND IS TROUBLED. HOW STRESSED ARE YOU?: NOT AT ALL

## 2024-01-15 SDOH — ECONOMIC STABILITY: FOOD INSECURITY: WITHIN THE PAST 12 MONTHS, THE FOOD YOU BOUGHT JUST DIDN'T LAST AND YOU DIDN'T HAVE MONEY TO GET MORE.: NEVER TRUE

## 2024-01-15 SDOH — HEALTH STABILITY: MENTAL HEALTH: HOW OFTEN DO YOU HAVE A DRINK CONTAINING ALCOHOL?: NEVER

## 2024-01-15 SDOH — ECONOMIC STABILITY: FOOD INSECURITY: WITHIN THE PAST 12 MONTHS, YOU WORRIED THAT YOUR FOOD WOULD RUN OUT BEFORE YOU GOT MONEY TO BUY MORE.: NEVER TRUE

## 2024-01-15 SDOH — ECONOMIC STABILITY: INCOME INSECURITY: IN THE LAST 12 MONTHS, WAS THERE A TIME WHEN YOU WERE NOT ABLE TO PAY THE MORTGAGE OR RENT ON TIME?: NO

## 2024-01-15 SDOH — ECONOMIC STABILITY: INCOME INSECURITY: IN THE PAST 12 MONTHS, HAS THE ELECTRIC, GAS, OIL, OR WATER COMPANY THREATENED TO SHUT OFF SERVICE IN YOUR HOME?: NO

## 2024-01-15 SDOH — HEALTH STABILITY: PHYSICAL HEALTH: ON AVERAGE, HOW MANY DAYS PER WEEK DO YOU ENGAGE IN MODERATE TO STRENUOUS EXERCISE (LIKE A BRISK WALK)?: 5 DAYS

## 2024-01-15 SDOH — ECONOMIC STABILITY: INCOME INSECURITY: HOW HARD IS IT FOR YOU TO PAY FOR THE VERY BASICS LIKE FOOD, HOUSING, MEDICAL CARE, AND HEATING?: NOT VERY HARD

## 2024-01-15 SDOH — ECONOMIC STABILITY: TRANSPORTATION INSECURITY
IN THE PAST 12 MONTHS, HAS LACK OF TRANSPORTATION KEPT YOU FROM MEETINGS, WORK, OR FROM GETTING THINGS NEEDED FOR DAILY LIVING?: NO

## 2024-01-15 SDOH — HEALTH STABILITY: MENTAL HEALTH: HOW OFTEN DO YOU HAVE 6 OR MORE DRINKS ON ONE OCCASION?: NEVER

## 2024-01-15 SDOH — ECONOMIC STABILITY: TRANSPORTATION INSECURITY
IN THE PAST 12 MONTHS, HAS THE LACK OF TRANSPORTATION KEPT YOU FROM MEDICAL APPOINTMENTS OR FROM GETTING MEDICATIONS?: NO

## 2024-01-15 SDOH — ECONOMIC STABILITY: HOUSING INSECURITY
IN THE LAST 12 MONTHS, WAS THERE A TIME WHEN YOU DID NOT HAVE A STEADY PLACE TO SLEEP OR SLEPT IN A SHELTER (INCLUDING NOW)?: NO

## 2024-01-15 SDOH — HEALTH STABILITY: MENTAL HEALTH: HOW MANY STANDARD DRINKS CONTAINING ALCOHOL DO YOU HAVE ON A TYPICAL DAY?: PATIENT DOES NOT DRINK

## 2024-01-15 SDOH — HEALTH STABILITY: PHYSICAL HEALTH: ON AVERAGE, HOW MANY MINUTES DO YOU ENGAGE IN EXERCISE AT THIS LEVEL?: 20 MIN

## 2024-01-15 ASSESSMENT — LIFESTYLE VARIABLES
SKIP TO QUESTIONS 9-10: 1
AUDIT-C TOTAL SCORE: 0

## 2024-01-15 NOTE — PROGRESS NOTES
Enrollment call complete.  Pt w recent admission for COPD, on oxygen at 4L NC this is baseline oxygen requirement.  Pt states she does wear Bi-pap at night , but needs new mask.  Initial [facility] visit scheduled for  1/16/24 @ 1430

## 2024-01-16 ENCOUNTER — PATIENT OUTREACH (OUTPATIENT)
Dept: HOME HEALTH SERVICES | Age: 44
End: 2024-01-16

## 2024-01-16 ENCOUNTER — PATIENT OUTREACH (OUTPATIENT)
Dept: PRIMARY CARE | Facility: CLINIC | Age: 44
End: 2024-01-16

## 2024-01-16 ENCOUNTER — TELEMEDICINE CLINICAL SUPPORT (OUTPATIENT)
Dept: CARE COORDINATION | Age: 44
End: 2024-01-16
Payer: COMMERCIAL

## 2024-01-16 DIAGNOSIS — E66.09 CLASS 1 OBESITY DUE TO EXCESS CALORIES WITH SERIOUS COMORBIDITY AND BODY MASS INDEX (BMI) OF 31.0 TO 31.9 IN ADULT: ICD-10-CM

## 2024-01-16 DIAGNOSIS — Z79.4 TYPE 2 DIABETES MELLITUS WITHOUT COMPLICATION, WITH LONG-TERM CURRENT USE OF INSULIN (MULTI): ICD-10-CM

## 2024-01-16 DIAGNOSIS — Z72.0 TOBACCO ABUSE: ICD-10-CM

## 2024-01-16 DIAGNOSIS — J44.1 CHRONIC OBSTRUCTIVE PULMONARY DISEASE WITH ACUTE EXACERBATION (MULTI): Primary | ICD-10-CM

## 2024-01-16 DIAGNOSIS — E11.9 TYPE 2 DIABETES MELLITUS WITHOUT COMPLICATION, WITH LONG-TERM CURRENT USE OF INSULIN (MULTI): ICD-10-CM

## 2024-01-16 DIAGNOSIS — F20.3 UNDIFFERENTIATED SCHIZOPHRENIA (MULTI): ICD-10-CM

## 2024-01-16 NOTE — PROGRESS NOTES
Brief summary of hospitalization or reason for referral  Admission Dx and Associated Referral Dx: COPD with frequent exacerbation episodes    Stephenie Mccray is a 43 y.o. year old female patient with Past Medical History of  COPD (4L@home), DVT/PE (on Xarelto), schizophrenia, migraine headaches, HTN, HLD, current smoker, GERD, IDDM, and hypothyroidism who presented to hospital due to concerns of difficulty breathing.  She was hospitalized from 1/9 to 1/14 at [facility]. Was found to be in acute exacerbation of COPD and admitted to ICU for treatment with BiPAP.  Patient was started on scheduled nebs, IV Solu-Medrol, and doxycycline with improvement in symptoms.  Transferred to medical floor once weaned to high flow nasal cannula. Oxygen was continually weened to home oxygen level of approx 4L under the guidance of pulmonology. Psychiatry evaluted patient while in hospital and adjusted medications to better control schizophrenia and anxiety. Prescriptions for changes provided at time of discharge and patient instructed to follow-up at the [facility] for further medication management. Patient completed 5 day courses of antibiotics/steroids for COPD exacerbation while in hospital so no prescriptions were provided. TTE was obtained while in ICU due to concerns of cardiac etiology of dyspnea, however, revealed preserved systolic function.  Patient was in stable condition on day of discharge with no acute concerns.  Patient is to follow-up with her primary care provider, pulmonology, and the [facility] postdischarge.     Interval Subjective: patient states she has been doing well after discharge from hospital. Denies any sob, occasionally have nonproductive cough. Denies any nausea/vomiting. Today during visit she was off of her baseline 4L NC, maintaining saturation 89% without feeling SOB. Patient states she was sent with nicotine patch to help with tobacco abuse, but has not yet picked it up. She  continued to smoke after discharged from the hospital, avg 1ppd.     Masimo: No  Oxygen: Yes     CPAP/BIPAP: No    Wt Readings from Last 3 Encounters:   01/14/24 76.4 kg (168 lb 6.9 oz)   12/29/23 73.3 kg (161 lb 9.6 oz)   12/27/23 75.8 kg (167 lb)       Medications Issues/Refill need  Medication Follow up action needed: none    Requested Prescriptions      No prescriptions requested or ordered in this encounter       Upcoming Visits:  Recent Visits  No visits were found meeting these conditions.  Showing recent visits within past 30 days and meeting all other requirements  Today's Visits  Date Type Provider Dept   01/16/24 Appointment [facility]   Showing today's visits and meeting all other requirements  Future Appointments  Date Type Provider Dept   02/15/24 Appointment Delta Yuen MD Do [id number]   Showing future appointments within next 90 days and meeting all other requirements      Interval or Pertinent Labs/Testing:  Lab Review:   Hemoglobin A1C   Date/Time Value Ref Range Status   11/21/2023 12:15 PM 7.3 (H) see below % Final   07/11/2023 03:13 AM 8.6 (A) % Final     Comment:          Diagnosis of Diabetes-Adults   Non-Diabetic: < or = 5.6%   Increased risk for developing diabetes: 5.7-6.4%   Diagnostic of diabetes: > or = 6.5%  .       Monitoring of Diabetes                Age (y)     Therapeutic Goal (%)   Adults:          >18           <7.0   Pediatrics:    13-18           <7.5                   7-12           <8.0                   0- 6            7.5-8.5   American Diabetes Association. Diabetes Care 33(S1), Jan 2010.     05/13/2023 03:26 AM 7.3 (A) % Final     Comment:          Diagnosis of Diabetes-Adults   Non-Diabetic: < or = 5.6%   Increased risk for developing diabetes: 5.7-6.4%   Diagnostic of diabetes: > or = 6.5%  .       Monitoring of Diabetes                Age (y)     Therapeutic Goal (%)   Adults:          >18           <7.0   Pediatrics:     13-18           <7.5                   7-12           <8.0                   0- 6            7.5-8.5   American Diabetes Association. Diabetes Care 33(S1), Jan 2010.     03/13/2023 02:00 PM 6.7 (A) % Final     Comment:          Diagnosis of Diabetes-Adults   Non-Diabetic: < or = 5.6%   Increased risk for developing diabetes: 5.7-6.4%   Diagnostic of diabetes: > or = 6.5%  .       Monitoring of Diabetes                Age (y)     Therapeutic Goal (%)   Adults:          >18           <7.0   Pediatrics:    13-18           <7.5                   7-12           <8.0                   0- 6            7.5-8.5   American Diabetes Association. Diabetes Care 33(S1), Jan 2010.         Lab Results   Component Value Date     01/14/2024    K 3.6 01/14/2024     01/14/2024    CO2 32 01/14/2024    BUN 26 (H) 01/14/2024    CREATININE 0.79 01/14/2024    GLUCOSE 150 (H) 01/14/2024    CALCIUM 9.6 01/14/2024     Lab Results   Component Value Date    WBC 11.9 (H) 01/14/2024    HGB 11.6 (L) 01/14/2024    HCT 38.7 01/14/2024     01/14/2024     (L) 01/14/2024        SDOH Concerns & Interventions: none    Assessment/Plan  COPD with acute exacerbation  Schizophrenia   Tobacco abuse  Hx of DVT/PE  IDDM      Plan  -patient's respiratory status has been stable since discharge on 1/14. Maintaining 89% saturation on RA during visit (baseline is 4L NC)  -her primary pulmonologist is Dr. Afshin Villalba, she is currently trying to office to set up appointment  -continue PRN duoneb breathing treatments  -patient is on Invega for Schizophrenia, she is supposed to follow up with [facility], currently has not yet made appointment  -stressed importance of tobacco cessation, patient states she will try, she has nicotine patch that was sent on discharge but has not yet picked them up  -continue Xarelto  -continue insulin (currently lantus 14U + SSI coverage), last A1c was 7.3 on 11/21/23  -all questions answered, patient has no  other concerns or complains at this time    Telephone conference done together with CARLOS Mcqueen and pharmacist Fercho Lea

## 2024-01-16 NOTE — PROGRESS NOTES
[facility]  Received call from patient stating that her puppy chewed all of her O2 tubing and she is needing replacements. Patient stated that her insurance no longer covers her current equipment provider. Reached out to [facility] @ [phone], spoke with Stephenie. She advised that patient is still showing active and that they will be sending out new tubing that should arrive 1/17. Patient currently on 1-4L O2 PRN, SpO2 currently 94% RA and has no complaints at this time. Patient updated and advised to reach out with any concerns.  Will reach out to [facility] to see if they can assist. Patient reminded of her [facility] appointment later today.     1430: [facility] call completed with patient, MD and pharmacy. Patient states feeling good today. She denies CP, SOB, fever, chills. States intermittent dry cough and congestion. Patient currently on RA, SpO2 89%, states 88% @ baseline. Patient endorses that she smokes 1 pack/day and is trying to quit. Patient states has nicotine patches at home.  Patient has requested Pulmonary appointment and is waiting for call back and will schedule follow up mental health appointment. [facility] program reviewed with patient, questions answered. She states no needs at this time. Next [facility] scheduled for 1/23 @ 0900. O2 tubing dropped off earlier today by  [facility].   Patient's Address:   [address] [place] [id number]  **  If this is not the address patient will receive services - alert team and address in EMR**       Patient Contacts:  Extended Emergency Contact Information  Primary Emergency Contact: Kofi Mccray  Home Phone: [phone]  Relation: Spouse  Secondary Emergency Contact: Jamie Mendoza  Home Phone: [phone]  Relation: None                                Patient's Preferred Phone: [phone]  Patient's E-mail: [email or web address]

## 2024-01-16 NOTE — PROGRESS NOTES
Discharge Facility: [facility]  Discharge Diagnosis: Acute on chronic respiratory failure, unspecified whether with hypoxia or hypercapnia   Admission Date: 1/10/2024  Discharge Date: 1/14/2024    PCP Appointment Date: TBD-office tasked to arrange follow up with PCP as needed  [facility]: [facility] and [facility]    Unable to reach patient during two business days after discharge despite at least two attempts made.

## 2024-01-16 NOTE — PROGRESS NOTES
Today was our initial visit with Ms. Mccray who has been recently enrolled in our [facility] program. They were referred to the clinic for their COPD and diabetes management after recently being in the hospital from 1/9-1/14. Her baseline O2 is usually 4L and she is a current smoker. She is supposed to wear bipap during the night but needs a new mask and today her dog chewed up her tubing so EMS needed to go and take her new tubing for the oxygen.  Since being home, she is still smoking 1 pack per day but she does want to try and quit - she needs to go and  the nicotine patches and start using. No SOB today and only coughing slightly but not bringing anything up with it. During our visit, she was not wearing any oxygen at all and her saturation was 89% (her goal is to be above 88%), but told to put on 2L if starting to feel SOB or when she exerts herself.     Medications changes/concerns:  -discharge medications:  Buspirone 20mg three times daily (dose change)  Paliperidone ER 3mg daily (dose change)  Nicotine 21mg patch daily     -confirmed all other home medications and med rec is most updated now after initial visit   -she checks her sugars about 3-4x daily with her meals   -says she does 14 units of lantus daily too     Financial concerns/PAP eligibility:  -no referral needed     Plan/Recommendations:  -will need to schedule her follow up with psych   -needs follow up with pulm still   -nursing to continue daily calls for first couple of weeks     Refills/follow-up:  -no other refills needed today  -will follow up next week for next [facility] visit     [facility] with [facility] and [facility] via video call: 20 minutes

## 2024-01-17 ENCOUNTER — PATIENT OUTREACH (OUTPATIENT)
Dept: HOME HEALTH SERVICES | Age: 44
End: 2024-01-17
Payer: COMMERCIAL

## 2024-01-17 NOTE — PROGRESS NOTES
Daily call completed patient did get the replacement tubing that she needed she had a PCP appointment today they did a walking POX and she was 92-95% on RA patient is currently on RA she has a portable POX that she is checking regularly advised to place o2 on @ 1l-2l nc to keep sats above 90% no medication needs at this time questions and concerns addressed     Patient's Address:   [address] [place] [id number]  **  If this is not the address patient will receive services - alert team and address in EMR**       Patient Contacts:  Extended Emergency Contact Information  Primary Emergency Contact: MahendraKofi  Home Phone: [phone]  Relation: Spouse  Secondary Emergency Contact: Jamie Mendoza  Home Phone: [phone]  Relation: None                                Patient's Preferred Phone: [phone]  Patient's E-mail: [email or web address]

## 2024-01-18 ENCOUNTER — PATIENT OUTREACH (OUTPATIENT)
Dept: HOME HEALTH SERVICES | Age: 44
End: 2024-01-18
Payer: COMMERCIAL

## 2024-01-18 NOTE — PROGRESS NOTES
"Daily Call Note: Daily call complete.  Pt reports she is feeling well.  Continues w home oxygen.  Pox 90-91 %.  Denies cough/ SOB.   Pt continues smoking one pack per day.  Is motivated to quit, does to want to use patch.  Wants to quit \" cold turkey\" All concerns addressed.  Verified upcoming [facility] appt.      Pt Education:   Barriers:   Topics for Daily Review:   Pt demonstrates clear understanding: Yes    Daily Weight:  There were no vitals filed for this visit.   Last 3 Weights:  Wt Readings from Last 7 Encounters:   01/14/24 76.4 kg (168 lb 6.9 oz)   12/29/23 73.3 kg (161 lb 9.6 oz)   12/27/23 75.8 kg (167 lb)   12/21/23 76.1 kg (167 lb 12.3 oz)   11/21/23 77.3 kg (170 lb 6.4 oz)   10/31/23 81.6 kg (180 lb)   10/16/23 80.3 kg (177 lb)       Masimo Device: No   Masimo Clinical Impression:     Virtual Visits--Scheduled (Most Recent Date at Top)  Follow up Appointments  Recent Visits  No visits were found meeting these conditions.  Showing recent visits within past 30 days and meeting all other requirements  Future Appointments  Date Type Provider Dept   02/15/24 Appointment Delta Yuen MD Do [id number] [facility]   Showing future appointments within next 90 days and meeting all other requirements       Frequency of RN Calls & Virtual Visits per Team Agreement: [facility] at Home Frequency: Daily    Medication issues Addressed (what was done):     Follow up appointments scheduled    [facility] Staff:   Referrals made by [facility] staff:      [facility] Transitions Assessment    Patient's Address:   [address] [place] [id number]  **  If this is not the address patient will receive services - alert team and address in EMR**       Patient Contacts:  Extended Emergency Contact Information  Primary Emergency Contact: Kofi cMcray  Home Phone: [phone]  Relation: Spouse  Secondary Emergency Contact: Jamie Mendoza  Home Phone: [phone]  Relation: None                                Patient's " Preferred Phone: [phone]  Patient's E-mail: [email or web address]

## 2024-01-19 ENCOUNTER — PATIENT OUTREACH (OUTPATIENT)
Dept: HOME HEALTH SERVICES | Age: 44
End: 2024-01-19
Payer: COMMERCIAL

## 2024-01-19 DIAGNOSIS — Z79.4 TYPE 2 DIABETES MELLITUS WITHOUT COMPLICATION, WITH LONG-TERM CURRENT USE OF INSULIN (MULTI): ICD-10-CM

## 2024-01-19 DIAGNOSIS — J44.1 CHRONIC OBSTRUCTIVE PULMONARY DISEASE WITH ACUTE EXACERBATION (MULTI): Primary | ICD-10-CM

## 2024-01-19 DIAGNOSIS — E11.9 TYPE 2 DIABETES MELLITUS WITHOUT COMPLICATION, WITH LONG-TERM CURRENT USE OF INSULIN (MULTI): ICD-10-CM

## 2024-01-19 NOTE — PROGRESS NOTES
Daily Call Note: Daily  call complete.  Pt reports she is feeing fine.  State she is quitting smoking today.  Does not want Nicotine patch/ gum.  No other concern voiced.  Pt did not check POX today, but states she will.      Pt Education:   Barriers:   Topics for Daily Review:   Pt demonstrates clear understanding: Yes    Daily Weight:  There were no vitals filed for this visit.   Last 3 Weights:  Wt Readings from Last 7 Encounters:   01/14/24 76.4 kg (168 lb 6.9 oz)   12/29/23 73.3 kg (161 lb 9.6 oz)   12/27/23 75.8 kg (167 lb)   12/21/23 76.1 kg (167 lb 12.3 oz)   11/21/23 77.3 kg (170 lb 6.4 oz)   10/31/23 81.6 kg (180 lb)   10/16/23 80.3 kg (177 lb)       Masimo Device: No   Masimo Clinical Impression:     Virtual Visits--Scheduled (Most Recent Date at Top)  Follow up Appointments  Recent Visits  No visits were found meeting these conditions.  Showing recent visits within past 30 days and meeting all other requirements  Future Appointments  Date Type Provider Dept   02/15/24 Appointment Delta Yuen MD Do [id number] [facility]   Showing future appointments within next 90 days and meeting all other requirements       Frequency of RN Calls & Virtual Visits per Team Agreement: Healthy at Home Frequency: Daily    Medication issues Addressed (what was done):     Follow up appointments scheduled by [facility] Staff:   Referrals made by [facility] staff:

## 2024-01-20 ENCOUNTER — PATIENT OUTREACH (OUTPATIENT)
Dept: HOME HEALTH SERVICES | Age: 44
End: 2024-01-20
Payer: COMMERCIAL

## 2024-01-20 VITALS — OXYGEN SATURATION: 90 % | HEART RATE: 96 BPM

## 2024-01-20 NOTE — PROGRESS NOTES
Daily call completed. Pt states overall she is doing well. Received her BiPAP mask today. Pt interested in information on smoking cessation. States she would like to quit cold turkey. Pt asked if she would be interested in nicotine patches- said that she was on her way out to get some. Pt denying any other needs/questions/concerns. Verbalized that she would call back later if any new needs arise. Aware of all upcoming appointments. Pt does not have BP cuff. O2 ranging 90-95%. HR 96.     Patient's Address:   [address] [place] [id number]  **  If this is not the address patient will receive services - alert team and address in EMR**       Patient Contacts:  Extended Emergency Contact Information  Primary Emergency Contact: Kofi Mccray  Home Phone: [phone]  Relation: Spouse  Secondary Emergency Contact: Jamie Mendoza  Home Phone: [phone]  Relation: None                                Patient's Preferred Phone: [phone]  Patient's E-mail: [email or web address]

## 2024-01-22 ENCOUNTER — PATIENT OUTREACH (OUTPATIENT)
Dept: HOME HEALTH SERVICES | Age: 44
End: 2024-01-22
Payer: COMMERCIAL

## 2024-01-22 VITALS — OXYGEN SATURATION: 92 % | HEART RATE: 98 BPM

## 2024-01-22 NOTE — PROGRESS NOTES
Daily call completed. Pt states she is doing well. HR 98, O2 92% RA (uses 4L NC only PRN). Pt states she got an appt with Dr. Villalba for pulm FUV 2/15/24. Pt still needs pulm rehab referral. Pt started nicotine patches last night. States it is difficult but she plans on sticking with it. BGT 99 this AM prior to breakfast. Pt received a BiPAP mask. Aware of all upcoming appts. Denies any further questions/concerns/needs. Next [facility] 1/23 at 0900.    Patient's Address:   [address] [place] [id number]  **  If this is not the address patient will receive services - alert team and address in EMR**       Patient Contacts:  Extended Emergency Contact Information  Primary Emergency Contact: Kofi Mccray  Home Phone: [phone]  Relation: Spouse  Secondary Emergency Contact: Jamie Mendoza  Home Phone: [phone]  Relation: None                                Patient's Preferred Phone: [phone]  Patient's E-mail: [email or web address]

## 2024-01-23 ENCOUNTER — PATIENT OUTREACH (OUTPATIENT)
Dept: HOME HEALTH SERVICES | Age: 44
End: 2024-01-23

## 2024-01-23 ENCOUNTER — TELEMEDICINE (OUTPATIENT)
Dept: CARE COORDINATION | Age: 44
End: 2024-01-23
Payer: COMMERCIAL

## 2024-01-23 VITALS — OXYGEN SATURATION: 92 % | HEART RATE: 82 BPM

## 2024-01-23 DIAGNOSIS — E11.9 TYPE 2 DIABETES MELLITUS WITHOUT COMPLICATION, WITH LONG-TERM CURRENT USE OF INSULIN (MULTI): Primary | ICD-10-CM

## 2024-01-23 DIAGNOSIS — J44.1 CHRONIC OBSTRUCTIVE PULMONARY DISEASE WITH ACUTE EXACERBATION (MULTI): ICD-10-CM

## 2024-01-23 DIAGNOSIS — Z79.4 TYPE 2 DIABETES MELLITUS WITHOUT COMPLICATION, WITH LONG-TERM CURRENT USE OF INSULIN (MULTI): Primary | ICD-10-CM

## 2024-01-23 DIAGNOSIS — F20.3 UNDIFFERENTIATED SCHIZOPHRENIA (MULTI): ICD-10-CM

## 2024-01-23 RX ORDER — PRAZOSIN HYDROCHLORIDE 2 MG/1
2 CAPSULE ORAL 2 TIMES DAILY
COMMUNITY
Start: 2024-01-18 | End: 2024-01-30 | Stop reason: DRUGHIGH

## 2024-01-23 RX ORDER — RIVAROXABAN 10 MG/1
10 TABLET, FILM COATED ORAL DAILY
COMMUNITY
Start: 2024-01-17

## 2024-01-23 NOTE — PROGRESS NOTES
Weekly [facility] call completed with Dr Leigh and Fercho from Pharmacy patient is doing well she has been doing well with stopping smoking she is using a patch to help patients glucose today 107 HR 82 pox 92% on RA  patient confirms that she did get her Bipap he  is going to get it set up discussed using o2 if her sats are under 88% patient dis a walking pox during our video call she dropped to 87% she did recover quickly to 91% on RA patient does Duo Nebs as needed no medication needs questions and concerns addressed patients next [facility] scheduled for 1/30/24 @ 0900    Patient's Address:   [address] [place] [address]  **  If this is not the address patient will receive services - alert team and address in EMR**       Patient Contacts:  Extended Emergency Contact Information  Primary Emergency Contact: Kofi Mccray  Home Phone: [phone]  Relation: Spouse  Secondary Emergency Contact: Jamie Mendoza  Home Phone: [phone]  Relation: None                                Patient's Preferred Phone: [phone]  Patient's E-mail: [email or web address]

## 2024-01-23 NOTE — PROGRESS NOTES
Today was the second visit for Ms. Mccray in our [facility] program. We have been following them for their COPD and diabetes management. Since the last visit, she has committed to stop smoking and started using the nicotine patches. She also received her new bipap mask but has not started using yet. Today, she is not using any oxygen while on visit and she is satting around 91% with HR of 82. Her fasting sugar today is also in goal, so overall she is doing very well and has no complaints today either. She did ask about also getting set up with pulmonary rehab and was told to ask her pulmonologist to put an order in at her follow up with them.       Medications Changes/Concerns:  -finishing her 20mg tabs of xarelto then will start 10mg indefinitely   -only doing about 1 duo neb treatment per day  -confirmed she is doing 4 units of her meal time insulin with sliding scale       Refills Needed:  -none      Plan/To Do:  -encouraged to continue to not smoke and wear the patches   -pulm follow up   -continue monitoring pulse ox and sugars daily and keep log of them   -will follow up next week for next [facility] visit       [facility] PAP Status:  -n/a      Time Spent with Patient and [facility] Team via video call: 20 minutes

## 2024-01-23 NOTE — PROGRESS NOTES
[facility] Transitions Assessment    Patient's Address:   [address] [place] [address] [place] [address]  **  If this is not the address patient will receive services - alert team and address in EMR**       Patient Contacts:  Extended Emergency Contact Information  Primary Emergency Contact: Kofi Mccray  Home Phone: [phone]  Relation: Spouse  Secondary Emergency Contact: Jamie Mendoza  [place] Phone: [phone]  Relation: None                                Patient's Preferred Phone: [phone]  Patient's E-mail: [email or web address]

## 2024-01-23 NOTE — PROGRESS NOTES
Brief summary of hospitalization or reason for referral  Admission Dx and Associated Referral Dx: COPD with frequent exacerbation episodes    Stephenie Mccray is a 43 y.o. year old female patient with Past Medical History of  COPD (4L@home), DVT/PE (on Xarelto), schizophrenia, migraine headaches, HTN, HLD, current smoker, GERD, IDDM, and hypothyroidism admitted for shortness of breath, treated for acute COPD exacerbation, required MICU stay and BiPAP.  Discharged home with O2, completed course of antibiotics and steroids.     Interval Subjective: Spoke to the patient today, video telehealth visit.  She reported to doing well, denied having any shortness of breath or cough, noted to be walking around the house, no visible distress.  Has not been using oxygen, O2 sat around 88 to 89% on room air, no tachycardia.  Has been doing well using her patches, able to stay away from smoking.  No trouble with her medications.  Has not been able to check her weight.    Masimo: No  Oxygen: Yes, as needed.  Oxygen (lpm): 0 (uses PRN 2l NC) (1/23/2024  9:10 AM)  CPAP/BIPAP: Yes, received supplies.    Wt Readings from Last 3 Encounters:   01/14/24 76.4 kg (168 lb 6.9 oz)   12/29/23 73.3 kg (161 lb 9.6 oz)   12/27/23 75.8 kg (167 lb)       Medications Issues/Refill need  Medication Follow up action needed: none    Requested Prescriptions      No prescriptions requested or ordered in this encounter       Upcoming Visits:  Recent Visits  No visits were found meeting these conditions.  Showing recent visits within past 30 days and meeting all other requirements  Future Appointments  Date Type Provider Dept   02/15/24 Appointment Delta Yuen MD Do [id number] [facility]   Showing future appointments within next 90 days and meeting all other requirements      Interval or Pertinent Labs/Testing:  Lab Review:   Hemoglobin A1C   Date/Time Value Ref Range Status   11/21/2023 12:15 PM 7.3 (H) see below % Final   07/11/2023 03:13 AM 8.6  (A) % Final     Comment:          Diagnosis of Diabetes-Adults   Non-Diabetic: < or = 5.6%   Increased risk for developing diabetes: 5.7-6.4%   Diagnostic of diabetes: > or = 6.5%  .       Monitoring of Diabetes                Age (y)     Therapeutic Goal (%)   Adults:          >18           <7.0   Pediatrics:    13-18           <7.5                   7-12           <8.0                   0- 6            7.5-8.5   American Diabetes Association. Diabetes Care 33(S1), Jan 2010.     05/13/2023 03:26 AM 7.3 (A) % Final     Comment:          Diagnosis of Diabetes-Adults   Non-Diabetic: < or = 5.6%   Increased risk for developing diabetes: 5.7-6.4%   Diagnostic of diabetes: > or = 6.5%  .       Monitoring of Diabetes                Age (y)     Therapeutic Goal (%)   Adults:          >18           <7.0   Pediatrics:    13-18           <7.5                   7-12           <8.0                   0- 6            7.5-8.5   American Diabetes Association. Diabetes Care 33(S1), Jan 2010.     03/13/2023 02:00 PM 6.7 (A) % Final     Comment:          Diagnosis of Diabetes-Adults   Non-Diabetic: < or = 5.6%   Increased risk for developing diabetes: 5.7-6.4%   Diagnostic of diabetes: > or = 6.5%  .       Monitoring of Diabetes                Age (y)     Therapeutic Goal (%)   Adults:          >18           <7.0   Pediatrics:    13-18           <7.5                   7-12           <8.0                   0- 6            7.5-8.5   American Diabetes Association. Diabetes Care 33(S1), Jan 2010.         Lab Results   Component Value Date     01/14/2024    K 3.6 01/14/2024     01/14/2024    CO2 32 01/14/2024    BUN 26 (H) 01/14/2024    CREATININE 0.79 01/14/2024    GLUCOSE 150 (H) 01/14/2024    CALCIUM 9.6 01/14/2024     Lab Results   Component Value Date    WBC 11.9 (H) 01/14/2024    HGB 11.6 (L) 01/14/2024    HCT 38.7 01/14/2024     01/14/2024     (L) 01/14/2024        SDOH Concerns & Interventions:  none    Assessment/Plan  COPD with acute exacerbation  Doing well, still off oxygen, saturating high 80s.  Continue to use inhalers and nebulizer as needed.  Will discuss with her pulmonologist, regarding pulmonary rehab.  Encouraged to continue smoking cessation and use nicotine patches as needed.    Schizophrenia  No acute issues, to follow-up with [facility].    Hx of DVT/PE: Currently on Xarelto, plan to complete current course with 20 mg and to be transition to 10 mg indefinitely per her PCP.    Diabetes type 2, insulin-dependent: Blood sugars fairly well-controlled, continue insulin.    Follow-up appointment with PCP on 2/15/2024.      Telephone conference done together with CARLOS Mauricio and pharmacist Fercho Lea

## 2024-01-24 ENCOUNTER — PATIENT OUTREACH (OUTPATIENT)
Dept: HOME HEALTH SERVICES | Age: 44
End: 2024-01-24

## 2024-01-24 ENCOUNTER — APPOINTMENT (OUTPATIENT)
Dept: OBSTETRICS AND GYNECOLOGY | Facility: CLINIC | Age: 44
End: 2024-01-24
Payer: COMMERCIAL

## 2024-01-24 NOTE — PROGRESS NOTES
Daily Call Note: call completed patient is doing ok still has not gotten bipap set up patient has not needed to use her o2 since out last conversation POX this am 93% HR 87 glucose 100 patient has not had breakfast yet no medication needs questions and concerns addressed     Pt Education: meds bipap   Barriers: None  Topics for Daily Review: Meds glucose   Pt demonstrates clear understanding: Yes    Daily Weight:  There were no vitals filed for this visit.   Last 3 Weights:  Wt Readings from Last 7 Encounters:   01/14/24 76.4 kg (168 lb 6.9 oz)   12/29/23 73.3 kg (161 lb 9.6 oz)   12/27/23 75.8 kg (167 lb)   12/21/23 76.1 kg (167 lb 12.3 oz)   11/21/23 77.3 kg (170 lb 6.4 oz)   10/31/23 81.6 kg (180 lb)   10/16/23 80.3 kg (177 lb)       Masimo Device: No   Masimo Clinical Impression: NA    Virtual Visits--Scheduled (Most Recent Date at Top)  Follow up Appointments  Recent Visits  No visits were found meeting these conditions.  Showing recent visits within past 30 days and meeting all other requirements  Future Appointments  Date Type Provider Dept   02/15/24 Appointment Dleta Yuen MD Do [id number] [facility]   Showing future appointments within next 90 days and meeting all other requirements       Frequency of RN Calls & Virtual Visits per Team Agreement: Healthy at Home Frequency: Daily    Medication issues Addressed (what was done): Reviewed     Follow up appointments scheduled by [facility] Staff: NA  Referrals made by [facility] staff: NA      [facility] Transitions Assessment    Patient's Address:   [address] [place] [id number]  **  If this is not the address patient will receive services - alert team and address in EMR**       Patient Contacts:  Extended Emergency Contact Information  Primary Emergency Contact: Kofi Mccray  Home Phone: [phone]  Relation: Spouse  Secondary Emergency Contact: Jamie Mendoza  Home Phone: [phone]  Relation: None                                 Patient's Preferred Phone: [phone]  Patient's E-mail: [email or web address]

## 2024-01-25 NOTE — PROGRESS NOTES
Daily Call Note: Daily call completed states doing ok, states did use Bi-pap last night slept only a few hours but feels ok today, states has been doing well with no smoking, taking only a few puffs a day. Has pulmonary appt on 2/19 .   Pulse ox @ 91 @ rest without  O2  Hr  101  rbs 125. Confirmed [facility] appt  1/30 @ 0900    Pt Education: check pulse ox intermittently     Barriers: n  Topics for Daily Review: O2 sats  Pt demonstrates clear understanding: Yes    Daily Weight:  There were no vitals filed for this visit.   Last 3 Weights:  Wt Readings from Last 7 Encounters:   01/14/24 76.4 kg (168 lb 6.9 oz)   12/29/23 73.3 kg (161 lb 9.6 oz)   12/27/23 75.8 kg (167 lb)   12/21/23 76.1 kg (167 lb 12.3 oz)   11/21/23 77.3 kg (170 lb 6.4 oz)   10/31/23 81.6 kg (180 lb)   10/16/23 80.3 kg (177 lb)       Masimo Device: No   Masimo Clinical Impression: n/a    Virtual Visits--Scheduled (Most Recent Date at Top)  Follow up Appointments  Recent Visits  No visits were found meeting these conditions.  Showing recent visits within past 30 days and meeting all other requirements  Future Appointments  Date Type Provider Dept   02/15/24 Appointment Delta Yuen MD Do [id number] [facility]   Showing future appointments within next 90 days and meeting all other requirements       Frequency of RN Calls & Virtual Visits per Team Agreement: Healthy at Home Frequency: Daily    Medication issues Addressed (what was done):     Follow up appointments scheduled by [facility] Staff:   Referrals made by [facility] staff:

## 2024-01-26 ENCOUNTER — PATIENT OUTREACH (OUTPATIENT)
Dept: HOME HEALTH SERVICES | Age: 44
End: 2024-01-26
Payer: COMMERCIAL

## 2024-01-26 VITALS — OXYGEN SATURATION: 95 %

## 2024-01-29 ENCOUNTER — PATIENT OUTREACH (OUTPATIENT)
Dept: HOME HEALTH SERVICES | Age: 44
End: 2024-01-29
Payer: COMMERCIAL

## 2024-01-29 VITALS — HEART RATE: 106 BPM | OXYGEN SATURATION: 97 %

## 2024-01-29 RX ORDER — PRAZOSIN HYDROCHLORIDE 5 MG/1
5 CAPSULE ORAL NIGHTLY
COMMUNITY
Start: 2024-01-23

## 2024-01-29 RX ORDER — BUSPIRONE HYDROCHLORIDE 30 MG/1
30 TABLET ORAL 2 TIMES DAILY
COMMUNITY
Start: 2024-01-23

## 2024-01-29 NOTE — PROGRESS NOTES
"Daily Call Note:   Pt. Had AM blood sugar of 111. Pt. did before breakfast. Pulse Ox. 97% on RA. Denies any SOB, or cough. Using BiPap daily at HS. Pt is still using nicotine patch, but stated that it \"never helped.\" Pt stated she is \"still smoking 4-5 cigarettes a day.\" No other questions or concerns. [facility] appt discussed, 1/30 @ 0900.      Topics for Daily Review: Blood sugar, Pulse Ox, Nicotine patch.  Pt demonstrates clear understanding: Yes  VS:  Pulse 106   SpO2 97%           Last 3 Weights:  Wt Readings from Last 7 Encounters:   01/14/24 76.4 kg (168 lb 6.9 oz)   12/29/23 73.3 kg (161 lb 9.6 oz)   12/27/23 75.8 kg (167 lb)   12/21/23 76.1 kg (167 lb 12.3 oz)   11/21/23 77.3 kg (170 lb 6.4 oz)   10/31/23 81.6 kg (180 lb)   10/16/23 80.3 kg (177 lb)       Masimo Device: No     Virtual Visits--Scheduled (Most Recent Date at Top)  Follow up Appointments  Recent Visits  No visits were found meeting these conditions.  Showing recent visits within past 30 days and meeting all other requirements  Future Appointments  Date Type Provider Dept   02/15/24 Appointment Delta Yuen MD Do [id number] [facility]   Showing future appointments within next 90 days and meeting all other requirements       Frequency of RN Calls & Virtual Visits per Team Agreement: Healthy at Home Frequency: Daily    Medication issues Addressed (what was done): Will discuss effectiveness at [facility] meeting on 1/30. Pt stated today that \"it has never helped.\"  (nicotine (Nicoderm CQ) 14 mg/24 hr patch)             "

## 2024-01-30 ENCOUNTER — PATIENT OUTREACH (OUTPATIENT)
Dept: HOME HEALTH SERVICES | Age: 44
End: 2024-01-30

## 2024-01-30 ENCOUNTER — TELEMEDICINE (OUTPATIENT)
Dept: CARE COORDINATION | Age: 44
End: 2024-01-30
Payer: COMMERCIAL

## 2024-01-30 VITALS — HEART RATE: 99 BPM

## 2024-01-30 DIAGNOSIS — Z72.0 TOBACCO ABUSE: Primary | ICD-10-CM

## 2024-01-30 RX ORDER — ASPIRIN/CALCIUM CARB/MAGNESIUM 325 MG
4 TABLET ORAL EVERY 2 HOUR PRN
Qty: 100 LOZENGE | Refills: 0 | Status: SHIPPED | OUTPATIENT
Start: 2024-01-30 | End: 2024-05-19 | Stop reason: HOSPADM

## 2024-01-30 NOTE — PROGRESS NOTES
Today was the third visit for Ms. Mccray in our [facility] program. We have been following them for their COPD and diabetes management. Since the last visit, she has still been smoking (about 4-5 cigarettes a day) and does not think the patches are helping very much. Today, she is doing pretty good. She has been more compliant with her bipap during the night and not having to use any oxygen during the day still. Her sugars have been very well controlled the past week. We are going to try adding the lozenges with the patches to help with smoking cessation too.       Medications Changes/Concerns:  -start 4mg lozenges every 2 hours as needed   -also continue to wear the 21mg patch daily (remove old patch before applying new one)  -has tried wellbutrin and chantix in the past but both gave her terrible nightmares   -confirmed her dosing for her buspar and prazosin and now updated on med rec       Refills Needed:  -none      Plan/To Do:  -send Rx for lozenges to her [facility] pharmacy   -nursing to change frequency of calls to [facility]  -[facility] visit next week        PAP Status:  -n/a       [facility] via phone call: 20 minutes

## 2024-01-30 NOTE — PROGRESS NOTES
Brief summary of hospitalization or reason for referral  Admission Dx and Associated Referral Dx: COPD with frequent exacerbation episodes     43 y.o. year old female patient with Past Medical History of  COPD (4L@home), DVT/PE (on Xarelto), schizophrenia, migraine headaches, HTN, HLD, current smoker, GERD, IDDM, and hypothyroidism admitted for shortness of breath, treated for acute COPD exacerbation, required MICU stay and BiPAP.  Discharged home with O2, completed course of antibiotics and steroids.     Interval Subjective: Patient states her breathing has improved and has not needed to use home supplemental oxygen. Her pulse ox has been 93-95% throughout this time. She is working on smoking cessation but states that the nicotine patches are not working for her.    Masimo: No  Oxygen: Yes, as needed  Oxygen (lpm): 0 (uses PRN 2l NC) (1/23/2024  9:10 AM)  CPAP/BIPAP: Yes    Wt Readings from Last 3 Encounters:   01/14/24 76.4 kg (168 lb 6.9 oz)   12/29/23 73.3 kg (161 lb 9.6 oz)   12/27/23 75.8 kg (167 lb)       Medications Issues/Refill need  Medication Follow up action needed: None    Requested Prescriptions     Signed Prescriptions Disp Refills    nicotine polacrilex (Commit) 4 mg lozenge 100 lozenge 0     Sig: Dissolve 1 lozenge (4 mg) in the mouth every 2 hours if needed for smoking cessation.       Upcoming Visits:  Recent Visits  Date Type Provider Dept   01/23/24 Telemedicine Regan Lucia MD MPH [facility]   Showing recent visits within past 30 days and meeting all other requirements  Future Appointments  Date Type Provider Dept   02/15/24 Appointment Delta Yuen MD Do [id number] [facility]   Showing future appointments within next 90 days and meeting all other requirements      Interval or Pertinent Labs/Testing:  Lab Review:   Hemoglobin A1C   Date/Time Value Ref Range Status   11/21/2023 12:15 PM 7.3 (H) see below % Final   07/11/2023 03:13 AM 8.6 (A) % Final     Comment:           Diagnosis of Diabetes-Adults   Non-Diabetic: < or = 5.6%   Increased risk for developing diabetes: 5.7-6.4%   Diagnostic of diabetes: > or = 6.5%  .       Monitoring of Diabetes                Age (y)     Therapeutic Goal (%)   Adults:          >18           <7.0   Pediatrics:    13-18           <7.5                   7-12           <8.0                   0- 6            7.5-8.5   American Diabetes Association. Diabetes Care 33(S1), Jan 2010.     05/13/2023 03:26 AM 7.3 (A) % Final     Comment:          Diagnosis of Diabetes-Adults   Non-Diabetic: < or = 5.6%   Increased risk for developing diabetes: 5.7-6.4%   Diagnostic of diabetes: > or = 6.5%  .       Monitoring of Diabetes                Age (y)     Therapeutic Goal (%)   Adults:          >18           <7.0   Pediatrics:    13-18           <7.5                   7-12           <8.0                   0- 6            7.5-8.5   American Diabetes Association. Diabetes Care 33(S1), Jan 2010.     03/13/2023 02:00 PM 6.7 (A) % Final     Comment:          Diagnosis of Diabetes-Adults   Non-Diabetic: < or = 5.6%   Increased risk for developing diabetes: 5.7-6.4%   Diagnostic of diabetes: > or = 6.5%  .       Monitoring of Diabetes                Age (y)     Therapeutic Goal (%)   Adults:          >18           <7.0   Pediatrics:    13-18           <7.5                   7-12           <8.0                   0- 6            7.5-8.5   American Diabetes Association. Diabetes Care 33(S1), Jan 2010.         not applicable     SDOH Concerns & Interventions: None    Assessment/Plan  COPD with acute exacerbation  Doing well, still off oxygen, saturating high 80s.  Continue to use inhalers and nebulizer as needed.  Will discuss with her pulmonologist, regarding pulmonary rehab.     Schizophrenia  No acute issues, to follow-up with [facility].     Hx of DVT/PE: Currently on Xarelto, plan to complete current course with 20 mg and to be transition to 10 mg indefinitely per her  PCP.     Diabetes type 2, insulin-dependent: Blood sugars fairly well-controlled, continue insulin.  Tobacco Dependence: Advised to start nicotine gum AND patches since she did not notice improvement on nicotine patch alone     Follow-up appointment with PCP on 2/15/2024.    Teleconference done with Luly and pharmacist Fercho Lea.

## 2024-01-30 NOTE — PROGRESS NOTES
Weekly [facility] call completed with Dr Perry and Fercho from pharmacy patients Glucose 125 this am HR +99 92% poc on RA patient is using her Bipap at night patient has had some challenges with quitting smoking we are going to send a script for the logenzes to see if that helps patient had recent medication changes Fercho updated her med rec with changes patient does no need any refills at this time questions and concerns addressed upcoming appointments reviewed next [facility] scheduled for 2/6/24 @ 0900 plan to graduate     Patient's Address:   [address] [place] [id number]  **  If this is not the address patient will receive services - alert team and address in EMR**       Patient Contacts:  Extended Emergency Contact Information  Primary Emergency Contact: Kofi Mccray  Home Phone: [phone]  Relation: Spouse  Secondary Emergency Contact: Jamie Mendoza  Home Phone: [phone]  Relation: None                                Patient's Preferred Phone: [phone]  Patient's E-mail: [email or web address]

## 2024-01-31 ENCOUNTER — PATIENT OUTREACH (OUTPATIENT)
Dept: HOME HEALTH SERVICES | Age: 44
End: 2024-01-31
Payer: COMMERCIAL

## 2024-01-31 VITALS — OXYGEN SATURATION: 93 % | HEART RATE: 115 BPM

## 2024-01-31 NOTE — PROGRESS NOTES
Daily call completed. Pt states she is doing okay but struggling without smoking. Pt has been using the patches but lozenges are not ready from the pharmacy yet (hopefully by tomorrow). Alternate methods to help reduce cravings discussed with pt, verbalizes understanding. Denies any further questions/concerns/needs at this time. Aware of upcoming appts. [facility] 2/6 at 0900.      O2 93% RA      Pt Education: y  Barriers: n   Topics for Daily Review: smoking cessation, vs, O2 needs  Pt demonstrates clear understanding: Yes    Daily Weight:  There were no vitals filed for this visit.   Last 3 Weights:  Wt Readings from Last 7 Encounters:   01/14/24 76.4 kg (168 lb 6.9 oz)   12/29/23 73.3 kg (161 lb 9.6 oz)   12/27/23 75.8 kg (167 lb)   12/21/23 76.1 kg (167 lb 12.3 oz)   11/21/23 77.3 kg (170 lb 6.4 oz)   10/31/23 81.6 kg (180 lb)   10/16/23 80.3 kg (177 lb)       Masimo Device: No   Masimo Clinical Impression: na    Virtual Visits--Scheduled (Most Recent Date at Top)  Follow up Appointments  Recent Visits  No visits were found meeting these conditions.  Showing recent visits within past 30 days and meeting all other requirements  Future Appointments  Date Type Provider Dept   02/15/24 Appointment Delta Yuen MD Do [id number] [facility]   Showing future appointments within next 90 days and meeting all other requirements       Frequency of RN Calls & Virtual Visits per Team Agreement: [facility] Frequency: Bi-Weekly    Medication issues Addressed (what was done): na    Follow up appointments scheduled by [facility] Staff: na  Referrals made by [facility] staff: na      [facility] Care Transitions Assessment    Patient's Address:   [address] [place] [id number]  **  If this is not the address patient will receive services - alert team and address in EMR**       Patient Contacts:  Extended Emergency Contact Information  Primary Emergency Contact: Kofi Mccray  Home Phone:  [phone]  Relation: Spouse  Secondary Emergency Contact: Jamie Mendoza  [place] Phone: [phone]  Relation: None                                Patient's Preferred Phone: [phone]  Patient's E-mail: [email or web address]

## 2024-02-01 DIAGNOSIS — Z79.4 TYPE 2 DIABETES MELLITUS WITHOUT COMPLICATION, WITH LONG-TERM CURRENT USE OF INSULIN (MULTI): Primary | ICD-10-CM

## 2024-02-01 DIAGNOSIS — E11.9 TYPE 2 DIABETES MELLITUS WITHOUT COMPLICATION, WITH LONG-TERM CURRENT USE OF INSULIN (MULTI): Primary | ICD-10-CM

## 2024-02-01 RX ORDER — PEN NEEDLE, DIABETIC 31 GX5/16"
NEEDLE, DISPOSABLE MISCELLANEOUS
Qty: 400 EACH | Refills: 3 | Status: SHIPPED | OUTPATIENT
Start: 2024-02-01

## 2024-02-01 NOTE — TELEPHONE ENCOUNTER
"Rx Refill Request Telephone Encounter    Name:  Stephenie Mccray  :  [id number]  Medication Name:  BD Ultra-Fine Mini Pen Needle 31 gauge x 3/16\" needle     Specific Pharmacy location:  [facility]  Date of last appointment:  24  Date of next appointment:  2/15/24  Best number to reach patient:  [phone]            "

## 2024-02-02 ENCOUNTER — PATIENT OUTREACH (OUTPATIENT)
Dept: HOME HEALTH SERVICES | Age: 44
End: 2024-02-02
Payer: COMMERCIAL

## 2024-02-02 NOTE — PROGRESS NOTES
Daily Call Note:   Ms. Mccray states she feels fine today.  Patient states she did not  the lozenges yet because they are not ready.  Blood Sugar 108.  Oxygen 95% RA.  Patient states she did not take her BP today.  Ms. Mccray has no questions or concerns today.    Pt Education:   Barriers:   Topics for Daily Review:   Pt demonstrates clear understanding:     Daily Weight:  There were no vitals filed for this visit.   Last 3 Weights:  Wt Readings from Last 7 Encounters:   01/14/24 76.4 kg (168 lb 6.9 oz)   12/29/23 73.3 kg (161 lb 9.6 oz)   12/27/23 75.8 kg (167 lb)   12/21/23 76.1 kg (167 lb 12.3 oz)   11/21/23 77.3 kg (170 lb 6.4 oz)   10/31/23 81.6 kg (180 lb)   10/16/23 80.3 kg (177 lb)       Masimo Device:    Masimo Clinical Impression:     Virtual Visits--Scheduled (Most Recent Date at Top)  Follow up Appointments  Recent Visits  No visits were found meeting these conditions.  Showing recent visits within past 30 days and meeting all other requirements  Future Appointments  Date Type Provider Dept   02/15/24 Appointment Delta Yuen MD Do [id number] [facility]   Showing future appointments within next 90 days and meeting all other requirements       Frequency of RN Calls & Virtual Visits per Team Agreement:     Medication issues Addressed (what was done):     Follow up appointments scheduled by [facility] Staff:   Referrals made by [facility] staff:       [facility] Care Transitions Assessment    Patient's Address:   [address] [place] [id number]  **  If this is not the address patient will receive services - alert team and address in EMR**       Patient Contacts:  Extended Emergency Contact Information  Primary Emergency Contact: Kofi Mccray  Home Phone: [phone]  Relation: Spouse  Secondary Emergency Contact: Jamie Mendoza  Home Phone: [phone]  Relation: None                                Patient's Preferred Phone: [phone]  Patient's E-mail: [email or web address]

## 2024-02-05 ENCOUNTER — PATIENT OUTREACH (OUTPATIENT)
Dept: HOME HEALTH SERVICES | Age: 44
End: 2024-02-05
Payer: COMMERCIAL

## 2024-02-05 VITALS — HEART RATE: 128 BPM | OXYGEN SATURATION: 97 %

## 2024-02-05 RX ORDER — ESCITALOPRAM OXALATE 10 MG/1
10 TABLET ORAL DAILY
Status: ON HOLD | COMMUNITY
Start: 2024-02-01 | End: 2024-04-08 | Stop reason: ENTERED-IN-ERROR

## 2024-02-05 NOTE — PROGRESS NOTES
Daily Call Note:   Daily call, pt states she is fine, denies any questions or concerns. Reports her pulse ox and . States her breathing is fine. States she hasn't taken BG yet today. Patient states she is still smoking and has not tried lozenges yet. Reinforced smoking cessation, reviewed next [facility].    Pt Education: POC  Barriers: desire to quit smoking  Topics for Daily Review: na  Pt demonstrates clear understanding: Yes    Daily Weight:  There were no vitals filed for this visit.   Last 3 Weights:  Wt Readings from Last 7 Encounters:   01/14/24 76.4 kg (168 lb 6.9 oz)   12/29/23 73.3 kg (161 lb 9.6 oz)   12/27/23 75.8 kg (167 lb)   12/21/23 76.1 kg (167 lb 12.3 oz)   11/21/23 77.3 kg (170 lb 6.4 oz)   10/31/23 81.6 kg (180 lb)   10/16/23 80.3 kg (177 lb)       Masimo Device: No   Masimo Clinical Impression: na    Virtual Visits--Scheduled (Most Recent Date at Top)  Follow up Appointments  Recent Visits  No visits were found meeting these conditions.  Showing recent visits within past 30 days and meeting all other requirements  Future Appointments  Date Type Provider Dept   02/15/24 Appointment Delta Yuen MD Do [id number] [facility]   Showing future appointments within next 90 days and meeting all other requirements       Frequency of RN Calls & Virtual Visits per Team Agreement: [facility] Frequency: Bi-Weekly    Medication issues Addressed (what was done): na    Follow up appointments scheduled by [facility] Staff: na  Referrals made by [facility] staff: na      [facility] Care Transitions Assessment    Patient's Address:   [address] [place] [id number]  **  If this is not the address patient will receive services - alert team and address in EMR**       Patient Contacts:  Extended Emergency Contact Information  Primary Emergency Contact: MahendraKofi  Home Phone: [phone]  Relation: Spouse  Secondary Emergency Contact: Jamie Mendoza  Home Phone: [phone]  Relation: None                                 Patient's Preferred Phone: [phone]  Patient's E-mail: [email or web address]                                       [FreeTextEntry1] : Heartburn, hiccups

## 2024-02-06 ENCOUNTER — PATIENT OUTREACH (OUTPATIENT)
Dept: CARE COORDINATION | Age: 44
End: 2024-02-06

## 2024-02-06 VITALS — OXYGEN SATURATION: 91 % | HEART RATE: 121 BPM

## 2024-02-06 NOTE — PROGRESS NOTES
Daily Call Note:   Weekly [facility] call, pt states she did not sleep well last night, she isnt sure why. States she wears bipap at , reinforced by provider.   BG this am 138.  Pulse ox 91% , on room air, discussed maintaining taking metoprolol everyday, pt voices she does. She states she states only uses O2 when <95%, but not using now.   Declines smoking cessation methods, states she is still smoking. Wheezy dry cough noted on call.  States she has Pulm apt 2/19 to discuss Pulm rehab.   (May not show in Epic).  Next [facility] scheduled.     Pt Education: Smoking cessation  Barriers: smoking  Topics for Daily Review: POC  Pt demonstrates clear understanding: Yes    Daily Weight:  There were no vitals filed for this visit.   Last 3 Weights:  Wt Readings from Last 7 Encounters:   01/14/24 76.4 kg (168 lb 6.9 oz)   12/29/23 73.3 kg (161 lb 9.6 oz)   12/27/23 75.8 kg (167 lb)   12/21/23 76.1 kg (167 lb 12.3 oz)   11/21/23 77.3 kg (170 lb 6.4 oz)   10/31/23 81.6 kg (180 lb)   10/16/23 80.3 kg (177 lb)       Masimo Device: No   Masimo Clinical Impression: na    Virtual Visits--Scheduled (Most Recent Date at Top)  Follow up Appointments  Recent Visits  Date Type Provider Dept   01/30/24 Telemedicine César Perry, DO [facility]   01/23/24 Telemedicine Regan Lucia MD MPH [facility]   Showing recent visits within past 30 days and meeting all other requirements  Today's Visits  Date Type Provider Dept   02/06/24 Appointment [facility]   Showing today's visits and meeting all other requirements  Future Appointments  Date Type Provider Dept   02/15/24 Appointment Delta Yuen MD Do [id number] [facility]   Showing future appointments within next 90 days and meeting all other requirements       Frequency of RN Calls & Virtual Visits per Team Agreement: [facility] Frequency: Bi-Weekly    Medication issues Addressed (what was done): na    Follow up appointments scheduled by  [facility] Staff: prashanth  Referrals made by [facility] staff: prashanth      [facility] Care Transitions Assessment    Patient's Address:   [address] [place] [address]  [place] [address]  **  If this is not the address patient will receive services - alert team and address in EMR**       Patient Contacts:  Extended Emergency Contact Information  Primary Emergency Contact: Kofi Mccray  [place] Phone: [phone]  Relation: Spouse  Secondary Emergency Contact: Jamie Mendoza  [place] Phone: [phone]  Relation: None                                Patient's Preferred Phone: [phone]  Patient's E-mail: [email or web address]

## 2024-02-09 ENCOUNTER — APPOINTMENT (OUTPATIENT)
Dept: CARDIOLOGY | Facility: HOSPITAL | Age: 44
End: 2024-02-09
Payer: COMMERCIAL

## 2024-02-09 ENCOUNTER — HOSPITAL ENCOUNTER (EMERGENCY)
Facility: HOSPITAL | Age: 44
Discharge: HOME | End: 2024-02-09
Attending: EMERGENCY MEDICINE
Payer: COMMERCIAL

## 2024-02-09 ENCOUNTER — APPOINTMENT (OUTPATIENT)
Dept: RADIOLOGY | Facility: HOSPITAL | Age: 44
End: 2024-02-09
Payer: COMMERCIAL

## 2024-02-09 ENCOUNTER — PATIENT OUTREACH (OUTPATIENT)
Dept: HOME HEALTH SERVICES | Age: 44
End: 2024-02-09
Payer: COMMERCIAL

## 2024-02-09 VITALS
TEMPERATURE: 98.2 F | BODY MASS INDEX: 27.31 KG/M2 | HEART RATE: 89 BPM | OXYGEN SATURATION: 99 % | HEIGHT: 64 IN | DIASTOLIC BLOOD PRESSURE: 64 MMHG | WEIGHT: 160 LBS | SYSTOLIC BLOOD PRESSURE: 132 MMHG | RESPIRATION RATE: 16 BRPM

## 2024-02-09 VITALS — HEART RATE: 145 BPM | OXYGEN SATURATION: 90 %

## 2024-02-09 DIAGNOSIS — R42 VERTIGO: Primary | ICD-10-CM

## 2024-02-09 LAB
ALBUMIN SERPL BCP-MCNC: 3.7 G/DL (ref 3.4–5)
ALP SERPL-CCNC: 75 U/L (ref 33–110)
ALT SERPL W P-5'-P-CCNC: 11 U/L (ref 7–45)
ANION GAP SERPL CALC-SCNC: 11 MMOL/L (ref 10–20)
APPEARANCE UR: CLEAR
AST SERPL W P-5'-P-CCNC: 9 U/L (ref 9–39)
ATRIAL RATE: 66 BPM
ATRIAL RATE: 67 BPM
B-HCG SERPL-ACNC: <2 MIU/ML
BASOPHILS # BLD MANUAL: 0.08 X10*3/UL (ref 0–0.1)
BASOPHILS NFR BLD MANUAL: 1 %
BILIRUB SERPL-MCNC: 0.3 MG/DL (ref 0–1.2)
BILIRUB UR STRIP.AUTO-MCNC: NEGATIVE MG/DL
BNP SERPL-MCNC: 12 PG/ML (ref 0–99)
BUN SERPL-MCNC: 13 MG/DL (ref 6–23)
CALCIUM SERPL-MCNC: 9 MG/DL (ref 8.6–10.3)
CARDIAC TROPONIN I PNL SERPL HS: 3 NG/L (ref 0–13)
CARDIAC TROPONIN I PNL SERPL HS: 4 NG/L (ref 0–13)
CHLORIDE SERPL-SCNC: 104 MMOL/L (ref 98–107)
CO2 SERPL-SCNC: 32 MMOL/L (ref 21–32)
COLOR UR: NORMAL
CREAT SERPL-MCNC: 0.91 MG/DL (ref 0.5–1.05)
EGFRCR SERPLBLD CKD-EPI 2021: 81 ML/MIN/1.73M*2
EOSINOPHIL # BLD MANUAL: 0.08 X10*3/UL (ref 0–0.7)
EOSINOPHIL NFR BLD MANUAL: 1 %
ERYTHROCYTE [DISTWIDTH] IN BLOOD BY AUTOMATED COUNT: 17.2 % (ref 11.5–14.5)
FLUAV RNA RESP QL NAA+PROBE: NOT DETECTED
FLUBV RNA RESP QL NAA+PROBE: NOT DETECTED
GLUCOSE SERPL-MCNC: 149 MG/DL (ref 74–99)
GLUCOSE UR STRIP.AUTO-MCNC: NEGATIVE MG/DL
HCT VFR BLD AUTO: 40.4 % (ref 36–46)
HGB BLD-MCNC: 12.3 G/DL (ref 12–16)
HOLD SPECIMEN: NORMAL
HOLD SPECIMEN: NORMAL
IMM GRANULOCYTES # BLD AUTO: 0.45 X10*3/UL (ref 0–0.7)
IMM GRANULOCYTES NFR BLD AUTO: 5.6 % (ref 0–0.9)
INR PPP: 1 (ref 0.9–1.1)
KETONES UR STRIP.AUTO-MCNC: NEGATIVE MG/DL
LEUKOCYTE ESTERASE UR QL STRIP.AUTO: NEGATIVE
LYMPHOCYTES # BLD MANUAL: 3.68 X10*3/UL (ref 1.2–4.8)
LYMPHOCYTES NFR BLD MANUAL: 46 %
MCH RBC QN AUTO: 30.6 PG (ref 26–34)
MCHC RBC AUTO-ENTMCNC: 30.4 G/DL (ref 32–36)
MCV RBC AUTO: 101 FL (ref 80–100)
MONOCYTES # BLD MANUAL: 0.72 X10*3/UL (ref 0.1–1)
MONOCYTES NFR BLD MANUAL: 9 %
NEUTROPHILS # BLD MANUAL: 3.36 X10*3/UL (ref 1.2–7.7)
NEUTS BAND # BLD MANUAL: 0.08 X10*3/UL (ref 0–0.7)
NEUTS BAND NFR BLD MANUAL: 1 %
NEUTS SEG # BLD MANUAL: 3.28 X10*3/UL (ref 1.2–7)
NEUTS SEG NFR BLD MANUAL: 41 %
NITRITE UR QL STRIP.AUTO: NEGATIVE
NRBC BLD-RTO: 0 /100 WBCS (ref 0–0)
P AXIS: 34 DEGREES
P AXIS: 37 DEGREES
P OFFSET: 189 MS
P OFFSET: 206 MS
P ONSET: 145 MS
P ONSET: 158 MS
PH UR STRIP.AUTO: 7 [PH]
PLATELET # BLD AUTO: 138 X10*3/UL (ref 150–450)
POTASSIUM SERPL-SCNC: 3.8 MMOL/L (ref 3.5–5.3)
PR INTERVAL: 128 MS
PR INTERVAL: 144 MS
PROT SERPL-MCNC: 6 G/DL (ref 6.4–8.2)
PROT UR STRIP.AUTO-MCNC: NEGATIVE MG/DL
PROTHROMBIN TIME: 11.1 SECONDS (ref 9.8–12.8)
Q ONSET: 217 MS
Q ONSET: 222 MS
QRS COUNT: 11 BEATS
QRS COUNT: 11 BEATS
QRS DURATION: 64 MS
QRS DURATION: 84 MS
QT INTERVAL: 396 MS
QT INTERVAL: 414 MS
QTC CALCULATION(BAZETT): 418 MS
QTC CALCULATION(BAZETT): 434 MS
QTC FREDERICIA: 411 MS
QTC FREDERICIA: 427 MS
R AXIS: -20 DEGREES
R AXIS: -21 DEGREES
RBC # BLD AUTO: 4.02 X10*6/UL (ref 4–5.2)
RBC # UR STRIP.AUTO: NEGATIVE /UL
RBC MORPH BLD: NORMAL
SARS-COV-2 RNA RESP QL NAA+PROBE: NOT DETECTED
SODIUM SERPL-SCNC: 143 MMOL/L (ref 136–145)
SP GR UR STRIP.AUTO: 1.01
T AXIS: 21 DEGREES
T AXIS: 52 DEGREES
T OFFSET: 415 MS
T OFFSET: 429 MS
TOTAL CELLS COUNTED BLD: 100
UROBILINOGEN UR STRIP.AUTO-MCNC: <2 MG/DL
VARIANT LYMPHS # BLD MANUAL: 0.08 X10*3/UL (ref 0–0.5)
VARIANT LYMPHS NFR BLD: 1 %
VENTRICULAR RATE: 66 BPM
VENTRICULAR RATE: 67 BPM
WBC # BLD AUTO: 8 X10*3/UL (ref 4.4–11.3)

## 2024-02-09 PROCEDURE — 85610 PROTHROMBIN TIME: CPT | Performed by: PHYSICIAN ASSISTANT

## 2024-02-09 PROCEDURE — 2500000001 HC RX 250 WO HCPCS SELF ADMINISTERED DRUGS (ALT 637 FOR MEDICARE OP): Performed by: PHYSICIAN ASSISTANT

## 2024-02-09 PROCEDURE — 93005 ELECTROCARDIOGRAM TRACING: CPT

## 2024-02-09 PROCEDURE — 81003 URINALYSIS AUTO W/O SCOPE: CPT | Performed by: PHYSICIAN ASSISTANT

## 2024-02-09 PROCEDURE — 96374 THER/PROPH/DIAG INJ IV PUSH: CPT

## 2024-02-09 PROCEDURE — 36415 COLL VENOUS BLD VENIPUNCTURE: CPT | Performed by: PHYSICIAN ASSISTANT

## 2024-02-09 PROCEDURE — 2500000004 HC RX 250 GENERAL PHARMACY W/ HCPCS (ALT 636 FOR OP/ED): Performed by: PHYSICIAN ASSISTANT

## 2024-02-09 PROCEDURE — 99285 EMERGENCY DEPT VISIT HI MDM: CPT | Mod: 25

## 2024-02-09 PROCEDURE — 85007 BL SMEAR W/DIFF WBC COUNT: CPT | Performed by: PHYSICIAN ASSISTANT

## 2024-02-09 PROCEDURE — 84702 CHORIONIC GONADOTROPIN TEST: CPT | Performed by: PHYSICIAN ASSISTANT

## 2024-02-09 PROCEDURE — 96361 HYDRATE IV INFUSION ADD-ON: CPT

## 2024-02-09 PROCEDURE — 71045 X-RAY EXAM CHEST 1 VIEW: CPT

## 2024-02-09 PROCEDURE — 70450 CT HEAD/BRAIN W/O DYE: CPT | Performed by: STUDENT IN AN ORGANIZED HEALTH CARE EDUCATION/TRAINING PROGRAM

## 2024-02-09 PROCEDURE — 99284 EMERGENCY DEPT VISIT MOD MDM: CPT | Mod: 25 | Performed by: EMERGENCY MEDICINE

## 2024-02-09 PROCEDURE — 85027 COMPLETE CBC AUTOMATED: CPT | Performed by: PHYSICIAN ASSISTANT

## 2024-02-09 PROCEDURE — 87636 SARSCOV2 & INF A&B AMP PRB: CPT | Performed by: PHYSICIAN ASSISTANT

## 2024-02-09 PROCEDURE — 70450 CT HEAD/BRAIN W/O DYE: CPT

## 2024-02-09 PROCEDURE — 71045 X-RAY EXAM CHEST 1 VIEW: CPT | Mod: FOREIGN READ | Performed by: RADIOLOGY

## 2024-02-09 PROCEDURE — 80053 COMPREHEN METABOLIC PANEL: CPT | Performed by: PHYSICIAN ASSISTANT

## 2024-02-09 PROCEDURE — 84484 ASSAY OF TROPONIN QUANT: CPT | Performed by: PHYSICIAN ASSISTANT

## 2024-02-09 PROCEDURE — 83880 ASSAY OF NATRIURETIC PEPTIDE: CPT | Performed by: PHYSICIAN ASSISTANT

## 2024-02-09 RX ORDER — MECLIZINE HYDROCHLORIDE 25 MG/1
25 TABLET ORAL 4 TIMES DAILY
Qty: 56 TABLET | Refills: 0 | Status: SHIPPED | OUTPATIENT
Start: 2024-02-09 | End: 2024-02-23

## 2024-02-09 RX ORDER — MECLIZINE HCL 12.5 MG 12.5 MG/1
25 TABLET ORAL ONCE
Status: COMPLETED | OUTPATIENT
Start: 2024-02-09 | End: 2024-02-09

## 2024-02-09 RX ORDER — ONDANSETRON 4 MG/1
4 TABLET, ORALLY DISINTEGRATING ORAL EVERY 8 HOURS PRN
Qty: 15 TABLET | Refills: 0 | Status: SHIPPED | OUTPATIENT
Start: 2024-02-09 | End: 2024-02-14

## 2024-02-09 RX ORDER — DIAZEPAM 5 MG/ML
5 INJECTION, SOLUTION INTRAMUSCULAR; INTRAVENOUS ONCE
Status: COMPLETED | OUTPATIENT
Start: 2024-02-09 | End: 2024-02-09

## 2024-02-09 RX ADMIN — MECLIZINE HCL 12.5 MG 25 MG: 12.5 TABLET ORAL at 16:28

## 2024-02-09 RX ADMIN — Medication 10 DROP: at 14:45

## 2024-02-09 RX ADMIN — SODIUM CHLORIDE 500 ML: 9 INJECTION, SOLUTION INTRAVENOUS at 15:20

## 2024-02-09 RX ADMIN — DIAZEPAM 5 MG: 10 INJECTION, SOLUTION INTRAMUSCULAR; INTRAVENOUS at 15:20

## 2024-02-09 ASSESSMENT — LIFESTYLE VARIABLES
EVER FELT BAD OR GUILTY ABOUT YOUR DRINKING: NO
HAVE PEOPLE ANNOYED YOU BY CRITICIZING YOUR DRINKING: NO
HAVE YOU EVER FELT YOU SHOULD CUT DOWN ON YOUR DRINKING: NO
EVER HAD A DRINK FIRST THING IN THE MORNING TO STEADY YOUR NERVES TO GET RID OF A HANGOVER: NO

## 2024-02-09 ASSESSMENT — PAIN - FUNCTIONAL ASSESSMENT: PAIN_FUNCTIONAL_ASSESSMENT: 0-10

## 2024-02-09 ASSESSMENT — PAIN SCALES - GENERAL
PAINLEVEL_OUTOF10: 0 - NO PAIN

## 2024-02-09 ASSESSMENT — COLUMBIA-SUICIDE SEVERITY RATING SCALE - C-SSRS
2. HAVE YOU ACTUALLY HAD ANY THOUGHTS OF KILLING YOURSELF?: NO
6. HAVE YOU EVER DONE ANYTHING, STARTED TO DO ANYTHING, OR PREPARED TO DO ANYTHING TO END YOUR LIFE?: NO
1. IN THE PAST MONTH, HAVE YOU WISHED YOU WERE DEAD OR WISHED YOU COULD GO TO SLEEP AND NOT WAKE UP?: NO

## 2024-02-09 NOTE — PROGRESS NOTES
Daily Call Note: Outreach call complete. Patient states she is doing good. She denies chest pain and SOB. She reports she is still smoking and not using nicotine patches at this time. She states she is on room air at the time of this call, SpO2 90%. She reports her HR as 145 today and 133 yesterday. She states she has been taking metoprolol 25mg BID since discharge from the hospital, even though it was discontinued in the hospital due to low BP. She states she does not have a BP cuff to check BP. Notified of next [facility] on 2/13/24 at 0930, verbalized understanding. I sent message to Dr. MARJORIE Koenig and Fercho Lea, PharmD with update on BP and metoprolol. Dr. MARJORIE Koenig recommends patient be evaluated.     1205- Return call to patient to notify that Dr. MARJORIE Koenig recommend she be evaluated and she states she is not home at this time and will recheck her HR when she gets home and report back to [facility].     1250- Return call from patient, she reports HR as 97 now and SpO2 90%. She denies pain and SOB and states she does not have a ride to the hospital. Instructed to continue to spot check with the pulse ox and to report HR if >100, verbalized understanding. Update provided to Dr. MARJORIE Koenig.    1335-Return call from patient, she is dizzy and . She agrees to have RN dispatch medic so that she can be evaluated. Called 911, connected to [facility] and ambulance dispatched to patient home address.     1903-Patient called to report that she is home from the hospital, they said she has vertigo and gave her zofran and something for dizziness. She states her  will  prescriptions in the morning. She states they checked her heart and did blood work.      Pt Education:   Barriers:   Topics for Daily Review: Heart rate  Pt demonstrates clear understanding: Yes  Pulse (!) 145        Last 3 Weights:  Wt Readings from Last 7 Encounters:   01/14/24 76.4 kg (168 lb 6.9 oz)   12/29/23 73.3 kg (161 lb 9.6 oz)    12/27/23 75.8 kg (167 lb)   12/21/23 76.1 kg (167 lb 12.3 oz)   11/21/23 77.3 kg (170 lb 6.4 oz)   10/31/23 81.6 kg (180 lb)   10/16/23 80.3 kg (177 lb)       Masimo Device: No   Masimo Clinical Impression: N/A    Virtual Visits--Scheduled (Most Recent Date at Top)  Follow up Appointments  Recent Visits  No visits were found meeting these conditions.  Showing recent visits within past 30 days and meeting all other requirements  Future Appointments  Date Type Provider Dept   02/15/24 Appointment Delta Yuen MD Do [id number] [facility]   Showing future appointments within next 90 days and meeting all other requirements       Frequency of RN Calls & Virtual Visits per Team Agreement: M/W/F    Medication issues Addressed (what was done): yes    Follow up appointments scheduled by [facility] Staff: none  Referrals made by [facility] staff: none      [facility] Care Transitions Assessment    Patient's Address:   [address] [place] [id number]  **  If this is not the address patient will receive services - alert team and address in EMR**       Patient Contacts:  Extended Emergency Contact Information  Primary Emergency Contact: MahendraKofi  Home Phone: [phone]  Relation: Spouse  Secondary Emergency Contact: Jamie Mendoza  Home Phone: [phone]  Relation: None                                Patient's Preferred Phone: [phone]  Patient's E-mail: [email or web address]

## 2024-02-09 NOTE — ED PROVIDER NOTES
HPI   Chief Complaint   Patient presents with    Weakness, Gen     Pt states last couple days dizzy, decreased eat and drink.        A 43-year-old female patient with history of COPD, diabetes, hypothyroid comes in the emergency department today complaints of dizziness over the last 2 days.  She denies any vision changes, chest pain, shortness of breath.  Described as a spinning.  States is worse with head movement or movement of her eyes.  States she has chronic mild headache in the posterior aspect of her head but this is there every day and it is constant nothing new for her.  It is unchanged since this dizziness started.  She otherwise has no other complaints this present time for this purpose comes in the emergency department today for further evaluation.                          [place] Coma Scale Score: 15                     Patient History   Past Medical History:   Diagnosis Date    Arthritis     COPD (chronic obstructive pulmonary disease) (CMS/[facility])     Diabetes mellitus (CMS/HCC)     History of transfusion     Hypothyroidism     CHARLIE (obstructive sleep apnea)      Past Surgical History:   Procedure Laterality Date     SECTION, LOW TRANSVERSE      EYE SURGERY      HERNIA REPAIR      TONSILLECTOMY      VENTRAL HERNIA REPAIR       Family History   Problem Relation Name Age of Onset    Fibromyalgia Father      Hypertension Brother      Thyroid disease Maternal Grandmother      Thyroid disease Paternal Grandmother      Lung cancer Paternal Grandfather      Coronary artery disease Other Grandmother      Social History     Tobacco Use    Smoking status: Every Day     Packs/day: 1     Types: Cigarettes     Passive exposure: Never    Smokeless tobacco: Never   Substance Use Topics    Alcohol use: Never    Drug use: Never       Physical Exam   ED Triage Vitals [24 1434]   Temperature Heart Rate Respirations BP   36.8 °C (98.2 °F) 96 16 130/80      Pulse Ox Temp Source Heart Rate Source Patient Position    96 % Temporal Monitor Lying      BP Location FiO2 (%)     Right arm --       Physical Exam  Constitutional:       Appearance: Normal appearance.   HENT:      Head: Normocephalic and atraumatic.      Nose: Nose normal.   Eyes:      Extraocular Movements: Extraocular movements intact.      Conjunctiva/sclera: Conjunctivae normal.      Pupils: Pupils are equal, round, and reactive to light.      Comments: No nystagmus.  Patient states it is worse with eye movement   Cardiovascular:      Rate and Rhythm: Normal rate and regular rhythm.   Pulmonary:      Effort: Pulmonary effort is normal. No respiratory distress.      Breath sounds: Normal breath sounds. No stridor. No wheezing.   Abdominal:      General: Abdomen is flat.      Palpations: Abdomen is soft.   Musculoskeletal:         General: Normal range of motion.      Cervical back: Normal range of motion.   Skin:     General: Skin is warm and dry.   Neurological:      General: No focal deficit present.      Mental Status: She is alert and oriented to person, place, and time. Mental status is at baseline.      Comments: NIH: 1   Psychiatric:         Mood and Affect: Mood normal.         ED Course & MDM   Diagnoses as of 02/09/24 1628   Vertigo       Medical Decision Making  A 43-year-old female patient with history of COPD, diabetes, hypothyroid comes in the emergency department today complaints of dizziness over the last 2 days.  She denies any vision changes, chest pain, shortness of breath.  Described as a spinning.  States is worse with head movement or movement of her eyes.  States she has chronic mild headache in the posterior aspect of her head but this is there every day and it is constant nothing new for her.  It is unchanged since this dizziness started.  She otherwise has no other complaints this present time for this purpose comes in the emergency department today for further evaluation.    CT head, EKG, laboratory studies, chest x-ray ordered to rule out  acute intracranial mass, hemorrhage, brain edema.  Rule ACS, arrhythmias, electrolyte abnormalities, leukocytosis or left shift.  Right pneumonia, pneumothorax.  IV Valium ordered for the patient's dizziness as well as IV fluids.  Debrox since cerumen disimpaction ordered the patient's right ear she has impacted cerumen.    Patient has negative acute findings of CT of the head, chest x-ray.  Patient does not have elevation of white blood cell count.  Patient's urinalysis negative for infection negative for pregnancy.  Negative troponin negative EKG findings negative BNP.  No acute renal injury.  Negative for influenza COVID-19.    Patient is feeling a bit better after IV Valium.  But still having some mild dizziness.  Will give patient p.o. meclizine discharged home on the same have patient follow-up with the ENT.  We discussed close return precautions to the emergency department.  Patient agrees with this plan expressed full verbal understanding.  All questions were answered.    Historians patient    Diagnosis: Vertigo      Labs Reviewed   CBC WITH AUTO DIFFERENTIAL - Abnormal       Result Value    WBC 8.0      nRBC 0.0      RBC 4.02      Hemoglobin 12.3      Hematocrit 40.4       (*)     MCH 30.6      MCHC 30.4 (*)     RDW 17.2 (*)     Platelets 138 (*)     Immature Granulocytes %, Automated 5.6 (*)     Immature Granulocytes Absolute, Automated 0.45      Narrative:     The previously reported component Neutrophils % is no longer being reported.  The previously reported component Lymphocytes % is no longer being reported.  The previously reported component Monocytes % is no longer being reported.  The previously   reported component Eosinophils % is no longer being reported.  The previously reported component Basophils % is no longer being reported.  The previously reported component Absolute Neutrophils is no longer being reported.  The previously reported   component Absolute Lymphocytes is no longer being  reported.  The previously reported component Absolute Monocytes is no longer being reported.  The previously reported component Absolute Eosinophils is no longer being reported.  The previously reported   component Absolute Basophils is no longer being reported.   COMPREHENSIVE METABOLIC PANEL - Abnormal    Glucose 149 (*)     Sodium 143      Potassium 3.8      Chloride 104      Bicarbonate 32      Anion Gap 11      Urea Nitrogen 13      Creatinine 0.91      eGFR 81      Calcium 9.0      Albumin 3.7      Alkaline Phosphatase 75      Total Protein 6.0 (*)     AST 9      Bilirubin, Total 0.3      ALT 11     PROTIME-INR - Normal    Protime 11.1      INR 1.0     B-TYPE NATRIURETIC PEPTIDE - Normal    BNP 12      Narrative:        <100 pg/mL - Heart failure unlikely  100-299 pg/mL - Intermediate probability of acute heart                  failure exacerbation. Correlate with clinical                  context and patient history.    >=300 pg/mL - Heart Failure likely. Correlate with clinical                  context and patient history.    BNP testing is performed using different testing methodology at [facility] than at other [facility]. Direct result comparisons should only be made within the same method.      HUMAN CHORIONIC GONADOTROPIN, SERUM QUANTITATIVE - Normal    HCG, Beta-Quantitative <2      Narrative:      Total HCG measurement is performed using the [employer]   Immunoassay which detects intact HCG and free beta HCG subunit.    This test is not indicated for use as a tumor marker.   HCG testing is performed using a different test methodology at [facility] than other [facility]. Direct result comparison   should only be made within the same method.       SARS-COV-2 AND INFLUENZA A/B PCR - Normal    Flu A Result Not Detected      Flu B Result Not Detected      Coronavirus 2019, PCR Not Detected      Narrative:     This assay has received FDA Emergency Use  Authorization (EUA) and  is only authorized for the duration of time that circumstances exist to justify the authorization of the emergency use of in vitro diagnostic tests for the detection of SARS-CoV-2 virus and/or diagnosis of COVID-19 infection under section 564(b)(1) of the Act, 21 U.S.C. 360bbb-3(b)(1). Testing for SARS-CoV-2 is only recommended for patients who meet current clinical and/or epidemiological criteria as defined by federal, state, or local public health directives. This assay is an in vitro diagnostic nucleic acid amplification test for the qualitative detection of SARS-CoV-2, Influenza A, and Influenza B from nasopharyngeal specimens and has been validated for use at [facility]. Negative results do not preclude COVID-19 infections or Influenza A/B infections, and should not be used as the sole basis for diagnosis, treatment, or other management decisions. If Influenza A/B and RSV PCR results are negative, testing for Parainfluenza virus, Adenovirus and Metapneumovirus is routinely performed for [facility] and intensive care inpatients, and is available on other patients by placing an add-on request.    SERIAL TROPONIN-INITIAL - Normal    Troponin I, High Sensitivity 3      Narrative:     Less than 99th percentile of normal range cutoff-  Female and children under 18 years old <14 ng/L; Male <21 ng/L: Negative  Repeat testing should be performed if clinically indicated.     Female and children under 18 years old 14-50 ng/L; Male 21-50 ng/L:  Consistent with possible cardiac damage and possible increased clinical   risk. Serial measurements may help to assess extent of myocardial damage.     >50 ng/L: Consistent with cardiac damage, increased clinical risk and  myocardial infarction. Serial measurements may help assess extent of   myocardial damage.      NOTE: Children less than 1 year old may have higher baseline troponin   levels and results should be  interpreted in conjunction with the overall   clinical context.     NOTE: Troponin I testing is performed using a different   testing methodology at [facility] than at other   [facility]. Direct result comparisons should only   be made within the same method.   URINALYSIS WITH REFLEX CULTURE AND MICROSCOPIC - Normal    Color, Urine Straw      Appearance, Urine Clear      Specific Gravity, Urine 1.010      pH, Urine 7.0      Protein, Urine NEGATIVE      Glucose, Urine NEGATIVE      Blood, Urine NEGATIVE      Ketones, Urine NEGATIVE      Bilirubin, Urine NEGATIVE      Urobilinogen, Urine <2.0      Nitrite, Urine NEGATIVE      Leukocyte Esterase, Urine NEGATIVE     TROPONIN SERIES- (INITIAL, 1 HR)    Narrative:     The following orders were created for panel order Troponin I Series, High Sensitivity (0, 1 HR).  Procedure                               Abnormality         Status                     ---------                               -----------         ------                     Troponin I, High Sensiti...[[id number]]  Normal              Final result               Troponin, High Sensitivi...[[id number]]                      In process                   Please view results for these tests on the individual orders.   URINALYSIS WITH REFLEX CULTURE AND MICROSCOPIC    Narrative:     The following orders were created for panel order Urinalysis with Reflex Culture and Microscopic.  Procedure                               Abnormality         Status                     ---------                               -----------         ------                     Urinalysis with Reflex C...[[id number]]  Normal              Final result               Extra Urine Gray Tube[[id number]]                            In process                   Please view results for these tests on the individual orders.   EXTRA URINE GRAY TUBE   SERIAL TROPONIN, 1 HOUR   GRAY TOP   MANUAL DIFFERENTIAL    Neutrophils %, Manual 41.0       Bands %, Manual 1.0      Lymphocytes %, Manual 46.0      Monocytes %, Manual 9.0      Eosinophils %, Manual 1.0      Basophils %, Manual 1.0      Atypical Lymphocytes %, Manual 1.0      Seg Neutrophils Absolute, Manual 3.28      Bands Absolute, Manual 0.08      Lymphocytes Absolute, Manual 3.68      Monocytes Absolute, Manual 0.72      Eosinophils Absolute, Manual 0.08      Basophils Absolute, Manual 0.08      Atypical Lymphs Absolute, Manual 0.08      Total Cells Counted 100      Neutrophils Absolute, Manual 3.36      RBC Morphology No significant RBC morphology present          CT head wo IV contrast   Final Result   1.  No evidence of hemorrhage, CT apparent transcortical infarct, or   other acute intracranial abnormality.   2. Surgical changes of right wall down mastoidectomy, with small   amount of nonspecific soft tissue attenuation present in the external   auditory canal/mastoidectomy bed.        MACRO:   None        Signed by: Anaya Still 2/9/2024 4:18 PM   Dictation workstation:   [id number]      XR chest 1 view   Final Result   No regions of airspace consolidation.   Signed by Tavares Townsend MD                Shared ASH Attestation:    This patient was seen by the advanced practice provider.  I personally saw the patient and made/approved the management plan and take responsibility for the patient management.    History: 43-year-old female presents with dizziness.    Exam: Regular rate and rhythm cardiac exam with clear breath sounds bilaterally.  Abdomen is soft and nontender.  Neurological exam is grossly intact.  NIH stroke scale is 0.  Negative Homans' sign bilaterally.    MDM: Stroke, intracranial bleed, ACS, arrhythmia, electrolyte abnormality    I have seen and examined the patient, agree with the workup, evaluation, medical decision making, management and diagnosis.  The care plan has been discussed.    Niels Cervantes MD    Procedure  ECG 12 lead    Performed by: Tunde Duncan,  SUBHA  Authorized by: Tunde Duncan PA-C    Interpretation:     Details:  My EKG interpretation  Rate:     ECG rate:  66  Rhythm:     Rhythm: sinus rhythm    ST segments:     ST segments:  Normal  T waves:     T waves: flattening         Tunde Duncan PA-C  02/09/24 1627

## 2024-02-10 LAB — HOLD SPECIMEN: NORMAL

## 2024-02-12 ENCOUNTER — PATIENT OUTREACH (OUTPATIENT)
Dept: CARE COORDINATION | Age: 44
End: 2024-02-12
Payer: COMMERCIAL

## 2024-02-12 VITALS — OXYGEN SATURATION: 93 % | HEART RATE: 91 BPM

## 2024-02-12 NOTE — PROGRESS NOTES
Daily Call Note: Daily call completed with patient, states she feels good ,appetite good, continue to use 4L O2 @ hs with c-pap, denies any further concerns with dizziness. States is working on smoking cessation, spoke in detail on the benefits of quitting, tips on how to slowly decrease habit, confirms understanding. Reminded of graduation from program [facility] call 2/13 @ 0930a Pulse 91   SpO2 93%  rbs 127      Pt Education: smoking cessation  Barriers: willingness  Topics for Daily Review: smoking cessation  Pt demonstrates clear understanding: Yes    Daily Weight:  There were no vitals filed for this visit.   Last 3 Weights:  Wt Readings from Last 7 Encounters:   02/09/24 72.6 kg (160 lb)   01/14/24 76.4 kg (168 lb 6.9 oz)   12/29/23 73.3 kg (161 lb 9.6 oz)   12/27/23 75.8 kg (167 lb)   12/21/23 76.1 kg (167 lb 12.3 oz)   11/21/23 77.3 kg (170 lb 6.4 oz)   10/31/23 81.6 kg (180 lb)       Masimo Device: No   Masimo Clinical Impression: n/.a    Virtual Visits--Scheduled (Most Recent Date at Top)  Follow up Appointments  Recent Visits  Date Type Provider Dept   01/30/24 Telemedicine César Perry DO [facility]   01/23/24 Telemedicine Regan Lucia MD MPH [facility]   Showing recent visits within past 30 days and meeting all other requirements  Future Appointments  Date Type Provider Dept   02/13/24 Appointment [facility]   02/15/24 Appointment Delta Yuen MD [id number]   Showing future appointments within next 90 days and meeting all other requirements       Frequency of RN Calls & Virtual Visits per Team Agreement: [facility] Frequency: MWF    Medication issues Addressed (what was done):     Follow up appointments scheduled by [facility] Staff:   Referrals made by [facility] staff:       [facility] Transitions Assessment    Patient's Address:   [address] [place] [address] [place] [address]  **  If this is not the address patient will receive  services - alert team and address in EMR**       Patient Contacts:  Extended Emergency Contact Information  Primary Emergency Contact: Kofi Mccray  Home Phone: [phone]  Relation: Spouse  Secondary Emergency Contact: RejiJamie  Home Phone: [phone]  Relation: Mother                                Patient's Preferred Phone: [phone]  Patient's E-mail: [email or web address]

## 2024-02-13 ENCOUNTER — PATIENT OUTREACH (OUTPATIENT)
Dept: HOME HEALTH SERVICES | Age: 44
End: 2024-02-13

## 2024-02-13 ENCOUNTER — APPOINTMENT (OUTPATIENT)
Dept: CARE COORDINATION | Age: 44
End: 2024-02-13
Payer: COMMERCIAL

## 2024-02-13 VITALS — OXYGEN SATURATION: 94 % | HEART RATE: 78 BPM

## 2024-02-13 PROBLEM — J44.1 COPD WITH EXACERBATION (MULTI): Status: RESOLVED | Noted: 2023-12-19 | Resolved: 2024-02-13

## 2024-02-13 PROBLEM — R06.02 SOB (SHORTNESS OF BREATH) ON EXERTION: Status: RESOLVED | Noted: 2023-03-22 | Resolved: 2024-02-13

## 2024-02-13 PROBLEM — J96.21 ACUTE ON CHRONIC HYPOXIC RESPIRATORY FAILURE (MULTI): Status: RESOLVED | Noted: 2023-12-29 | Resolved: 2024-02-13

## 2024-02-13 PROBLEM — E66.09 CLASS 1 OBESITY DUE TO EXCESS CALORIES WITH SERIOUS COMORBIDITY AND BODY MASS INDEX (BMI) OF 31.0 TO 31.9 IN ADULT: Status: RESOLVED | Noted: 2023-03-28 | Resolved: 2024-02-13

## 2024-02-13 PROBLEM — E66.811 CLASS 1 OBESITY DUE TO EXCESS CALORIES WITH SERIOUS COMORBIDITY AND BODY MASS INDEX (BMI) OF 31.0 TO 31.9 IN ADULT: Status: RESOLVED | Noted: 2023-03-28 | Resolved: 2024-02-13

## 2024-02-13 PROBLEM — J96.00 RESPIRATORY FAILURE, ACUTE (MULTI): Status: RESOLVED | Noted: 2023-05-24 | Resolved: 2024-02-13

## 2024-02-13 PROBLEM — R51.9 HEADACHE: Status: RESOLVED | Noted: 2023-03-22 | Resolved: 2024-02-13

## 2024-02-13 PROBLEM — E83.42 HYPOMAGNESEMIA: Status: RESOLVED | Noted: 2023-06-28 | Resolved: 2024-02-13

## 2024-02-13 PROBLEM — R39.15 URINARY URGENCY: Status: RESOLVED | Noted: 2023-03-22 | Resolved: 2024-02-13

## 2024-02-13 PROBLEM — J96.21 ACUTE ON CHRONIC RESPIRATORY FAILURE WITH HYPOXIA AND HYPERCAPNIA (MULTI): Status: RESOLVED | Noted: 2023-12-19 | Resolved: 2024-02-13

## 2024-02-13 PROBLEM — R05.9 COUGH: Status: RESOLVED | Noted: 2023-03-22 | Resolved: 2024-02-13

## 2024-02-13 PROBLEM — R71.8 ELEVATED MCV: Status: RESOLVED | Noted: 2023-03-22 | Resolved: 2024-02-13

## 2024-02-13 PROBLEM — J96.20 ACUTE ON CHRONIC RESPIRATORY FAILURE, UNSPECIFIED WHETHER WITH HYPOXIA OR HYPERCAPNIA (MULTI): Status: RESOLVED | Noted: 2024-01-10 | Resolved: 2024-02-13

## 2024-02-13 PROBLEM — J96.22 ACUTE ON CHRONIC RESPIRATORY FAILURE WITH HYPOXIA AND HYPERCAPNIA (MULTI): Status: RESOLVED | Noted: 2023-12-19 | Resolved: 2024-02-13

## 2024-02-14 NOTE — PROGRESS NOTES
[facility] conference call completed Attendees Dr. Best, Nurse Mere and the Patient.  Reports no SOB and feeling much better.  Did have an Emergency visit last week due to dizziness and diagnosed with vertigo.  Currently reports feeling better and denies any current symptoms.  Uses BIPAP at night with 4 liters oxygen.  Currently has the desire to quit smoking but finds it very hard to stop, reports that she has cut back.  , HR 78, O2 94%.  All questions answered and upcoming appointments reviewed.  Patient has been on [facility] for 30+ days and graduated from the program.    Patient's Address:   [address] [place] [id number]  **  If this is not the address patient will receive services - alert team and address in EMR**       Patient Contacts:  Extended Emergency Contact Information  Primary Emergency Contact: Kofi Mccray  Home Phone: [phone]  Relation: Spouse  Secondary Emergency Contact: Jamie Mendoza  Home Phone: [phone]  Relation: Mother     Patient's Preferred Phone: [phone]  Patient's E-mail: [email or web address]

## 2024-02-15 ENCOUNTER — OFFICE VISIT (OUTPATIENT)
Dept: PRIMARY CARE | Facility: CLINIC | Age: 44
End: 2024-02-15
Payer: COMMERCIAL

## 2024-02-15 VITALS
HEART RATE: 63 BPM | SYSTOLIC BLOOD PRESSURE: 128 MMHG | RESPIRATION RATE: 16 BRPM | OXYGEN SATURATION: 95 % | HEIGHT: 64 IN | BODY MASS INDEX: 29.16 KG/M2 | TEMPERATURE: 97.4 F | DIASTOLIC BLOOD PRESSURE: 78 MMHG | WEIGHT: 170.8 LBS

## 2024-02-15 DIAGNOSIS — E03.9 ACQUIRED HYPOTHYROIDISM: ICD-10-CM

## 2024-02-15 DIAGNOSIS — R42 VERTIGO: ICD-10-CM

## 2024-02-15 DIAGNOSIS — H61.21 IMPACTED CERUMEN OF RIGHT EAR: ICD-10-CM

## 2024-02-15 DIAGNOSIS — I10 PRIMARY HYPERTENSION: ICD-10-CM

## 2024-02-15 DIAGNOSIS — E11.9 TYPE 2 DIABETES MELLITUS WITHOUT COMPLICATION, WITH LONG-TERM CURRENT USE OF INSULIN (MULTI): ICD-10-CM

## 2024-02-15 DIAGNOSIS — E66.09 CLASS 1 OBESITY DUE TO EXCESS CALORIES WITH SERIOUS COMORBIDITY AND BODY MASS INDEX (BMI) OF 31.0 TO 31.9 IN ADULT: ICD-10-CM

## 2024-02-15 DIAGNOSIS — J44.1 CHRONIC OBSTRUCTIVE PULMONARY DISEASE WITH ACUTE EXACERBATION (MULTI): ICD-10-CM

## 2024-02-15 DIAGNOSIS — Z79.4 TYPE 2 DIABETES MELLITUS WITHOUT COMPLICATION, WITH LONG-TERM CURRENT USE OF INSULIN (MULTI): ICD-10-CM

## 2024-02-15 PROCEDURE — 3008F BODY MASS INDEX DOCD: CPT | Performed by: FAMILY MEDICINE

## 2024-02-15 PROCEDURE — 3074F SYST BP LT 130 MM HG: CPT | Performed by: FAMILY MEDICINE

## 2024-02-15 PROCEDURE — 99213 OFFICE O/P EST LOW 20 MIN: CPT | Performed by: FAMILY MEDICINE

## 2024-02-15 PROCEDURE — 4004F PT TOBACCO SCREEN RCVD TLK: CPT | Performed by: FAMILY MEDICINE

## 2024-02-15 PROCEDURE — 3050F LDL-C >= 130 MG/DL: CPT | Performed by: FAMILY MEDICINE

## 2024-02-15 PROCEDURE — 69210 REMOVE IMPACTED EAR WAX UNI: CPT | Performed by: FAMILY MEDICINE

## 2024-02-15 PROCEDURE — 3078F DIAST BP <80 MM HG: CPT | Performed by: FAMILY MEDICINE

## 2024-02-15 ASSESSMENT — PATIENT HEALTH QUESTIONNAIRE - PHQ9
2. FEELING DOWN, DEPRESSED OR HOPELESS: SEVERAL DAYS
1. LITTLE INTEREST OR PLEASURE IN DOING THINGS: SEVERAL DAYS
SUM OF ALL RESPONSES TO PHQ9 QUESTIONS 1 & 2: 2

## 2024-02-15 NOTE — PROGRESS NOTES
Subjective   Patient ID: Stephenie Mccray is a 43 y.o. female who presents for Follow-up.  I last saw the patient on 8/18/2023. Her boyfriend is with her today.    HPI   Patient reports her dizziness is reducing and denies taking any medications for it at the moment. She notes she is seeing a psychiatrist and admits that she was placed on Lexapro with significant response.    Patient reports she had right ear surgery at ages 6 and 19. She denies ear pain.    She denies shortness of breath and is trying to quit smoking.    Patient was recently seen at [facility] ER on 2/9 due to vertigo. Patient was given Meclizine 25 mg and Zofran 4 mg. Patient was advised to follow up with ENT.     Patient denies any SOB, lightheadedness, dizziness    Patient is now on Lexapro 10 mg and states that this is working much better for her.      Review of Systems  Except positives as noted in the CC & HPI      Constitutional: Denies fevers, chills, night sweats, fatigue, weight changes, change in appetite    Eyes: Denies blurry vision, double vision    ENT: Denies otalgia, trouble hearing, tinnitus, vertigo, nasal congestion, rhinorrhea, sore throat   Neck: Denies swelling, masses    Cardiovascular: Denies chest pain, palpitations, edema, orthopnea, syncope    Respiratory: Denies dyspnea, wheezing, postural nocturnal dyspnea    Gastrointestinal: Denies abdominal pain, nausea, diarrhea, constipation, melena, hematochezia    Genitourinary: Denies dysuria, hematuria, frequency, urgency    Musculoskeletal: Denies back pain, neck pain, arthralgias, myalgias    Integumentary: Denies skin lesions, rashes, masses    Neurological: Denies dizziness, headaches, confusion, limb weakness, paresthesias, syncope, convulsions    Psychiatric: Denies depression, anxiety, homicidal ideations, suicidal ideations, sleep disturbances    Endocrine: Denies polyphagia, polydipsia, polyuria, weakness, hair thinning, heat intolerance, cold intolerance, weight changes   "  Heme/Lymph: Denies easy bruising, easy bleeding, swollen glands    Objective   /78   Pulse 63   Temp 36.3 °C (97.4 °F)   Resp 16   Ht 1.626 m (5' 4\")   Wt 77.5 kg (170 lb 12.8 oz)   SpO2 95%   BMI 29.32 kg/m²     Physical Exam  Gen. Appearance - well-developed, well-nourished, 43 y.o., White female in no acute distress.     Skin - warm, pink and dry without rash or concerning lesions.    Mental Status - alert and oriented times 3. Normal mood and affect appropriate to mood.     ENT: Left ear canal is clear, presence of cerumen impaction in the right ear, removed with an ear loop. No trauma to the ear canal. TM shiny and moves well with insufflation. Pharynx pink without exudates.    Neck - supple without lymphadenopathy. Carotid pulses are normal without bruits. Thyroid is normal in midline without nodules.    Chest - lungs are clear to auscultation without rales, rhonchi or wheezes. Diminished breath sounds at the bases bilaterally.    Heart - regular, rate, and rhythm without murmurs, rubs or gallops.     Extremities - no cyanosis, clubbing or edema. Pedal pulses are 2+ normal at the dorsalis pedis and posterior tibial pulses bilaterally.     Neurological - cranial nerves II through XII are grossly intact. Motor strength 5/5 at all fours.     Assessment/Plan   1. Type 2 diabetes mellitus without complication, with long-term current use of insulin (CMS/HCC)  Follow Up In [facility] - PCP - Established      2. Primary hypertension  Follow Up In [facility] - PCP - Established      3. Impacted cerumen of right ear        4. Vertigo  Referral to ENT      5. Chronic obstructive pulmonary disease with acute exacerbation (CMS/HCC)  Follow Up In [facility] - PCP - Established      6. Acquired hypothyroidism  Follow Up In [facility] - PCP - Established      7. Class 1 obesity due to excess calories with serious comorbidity and body mass index (BMI) of 31.0 to 31.9 " in adult  Follow Up In [facility] - PCP - Established      Patient will continue on a diabetic, low-cholesterol diet and weight reduction. Exercise as tolerated. She will continue medications as prescribed. Referral to ENT, Dr Valdes, for possible cholesteatoma. Recommended smoking cessation. Follow-up in 3 months otherwise as needed.        Scribe Attestation  By signing my name below, SÁNCHEZ Meglara Ohara, Scribe   attest that this documentation has been prepared under the direction and in the presence of Delta Yuen MD.

## 2024-02-21 ENCOUNTER — PATIENT OUTREACH (OUTPATIENT)
Dept: PRIMARY CARE | Facility: CLINIC | Age: 44
End: 2024-02-21
Payer: COMMERCIAL

## 2024-02-21 NOTE — PROGRESS NOTES
Call placed regarding two month post discharge follow up call.  At time of outreach call the patient feels as if their condition has (improved) since initial visit with PCP or specialist. States she still plans to follow up with ENT/audiology doctor soon.   Reviewed any PCP or specialists progress notes/labs/radiology reports if applicable and addressed any questions or concerns.

## 2024-03-05 ENCOUNTER — APPOINTMENT (OUTPATIENT)
Dept: RADIOLOGY | Facility: HOSPITAL | Age: 44
DRG: 208 | End: 2024-03-05
Payer: COMMERCIAL

## 2024-03-05 ENCOUNTER — HOSPITAL ENCOUNTER (INPATIENT)
Facility: HOSPITAL | Age: 44
LOS: 3 days | Discharge: HOME | DRG: 208 | End: 2024-03-08
Attending: STUDENT IN AN ORGANIZED HEALTH CARE EDUCATION/TRAINING PROGRAM | Admitting: EMERGENCY MEDICINE
Payer: COMMERCIAL

## 2024-03-05 ENCOUNTER — APPOINTMENT (OUTPATIENT)
Dept: CARDIOLOGY | Facility: HOSPITAL | Age: 44
DRG: 208 | End: 2024-03-05
Payer: COMMERCIAL

## 2024-03-05 DIAGNOSIS — J44.1 ACUTE EXACERBATION OF CHRONIC OBSTRUCTIVE PULMONARY DISEASE (COPD) (MULTI): Primary | ICD-10-CM

## 2024-03-05 DIAGNOSIS — R09.02 HYPOXIA: ICD-10-CM

## 2024-03-05 DIAGNOSIS — Z72.0 NICOTINE ABUSE: ICD-10-CM

## 2024-03-05 DIAGNOSIS — J42 CHRONIC BRONCHITIS, UNSPECIFIED CHRONIC BRONCHITIS TYPE (MULTI): ICD-10-CM

## 2024-03-05 LAB
ALBUMIN SERPL BCP-MCNC: 4.3 G/DL (ref 3.4–5)
ALP SERPL-CCNC: 87 U/L (ref 33–110)
ALT SERPL W P-5'-P-CCNC: 9 U/L (ref 7–45)
AMPHETAMINES UR QL SCN: ABNORMAL
ANION GAP BLDA CALCULATED.4IONS-SCNC: 6 MMO/L (ref 10–25)
ANION GAP BLDA CALCULATED.4IONS-SCNC: 6 MMO/L (ref 10–25)
ANION GAP BLDA CALCULATED.4IONS-SCNC: 9 MMO/L (ref 10–25)
ANION GAP BLDA CALCULATED.4IONS-SCNC: ABNORMAL MMOL/L
ANION GAP SERPL CALC-SCNC: 10 MMOL/L (ref 10–20)
APAP SERPL-MCNC: <10 UG/ML
APPARATUS: ABNORMAL
APPARATUS: ABNORMAL
ARTERIAL PATENCY WRIST A: NEGATIVE
ARTERIAL PATENCY WRIST A: POSITIVE
AST SERPL W P-5'-P-CCNC: 14 U/L (ref 9–39)
BARBITURATES UR QL SCN: ABNORMAL
BASE EXCESS BLDA CALC-SCNC: 1.8 MMOL/L (ref -2–3)
BASE EXCESS BLDA CALC-SCNC: 2.5 MMOL/L (ref -2–3)
BASE EXCESS BLDA CALC-SCNC: 3 MMOL/L (ref -2–3)
BASE EXCESS BLDA CALC-SCNC: 5.3 MMOL/L (ref -2–3)
BASOPHILS # BLD AUTO: 0.05 X10*3/UL (ref 0–0.1)
BASOPHILS NFR BLD AUTO: 0.5 %
BENZODIAZ UR QL SCN: ABNORMAL
BILIRUB SERPL-MCNC: 0.3 MG/DL (ref 0–1.2)
BNP SERPL-MCNC: 19 PG/ML (ref 0–99)
BODY TEMPERATURE: ABNORMAL
BUN SERPL-MCNC: 18 MG/DL (ref 6–23)
BZE UR QL SCN: ABNORMAL
CA-I BLDA-SCNC: 1.15 MMOL/L (ref 1.1–1.33)
CA-I BLDA-SCNC: 1.18 MMOL/L (ref 1.1–1.33)
CA-I BLDA-SCNC: 1.24 MMOL/L (ref 1.1–1.33)
CA-I BLDA-SCNC: 1.24 MMOL/L (ref 1.1–1.33)
CALCIUM SERPL-MCNC: 9.4 MG/DL (ref 8.6–10.3)
CANNABINOIDS UR QL SCN: ABNORMAL
CARDIAC TROPONIN I PNL SERPL HS: 4 NG/L (ref 0–13)
CARDIAC TROPONIN I PNL SERPL HS: <3 NG/L (ref 0–13)
CHLORIDE BLDA-SCNC: 102 MMOL/L (ref 98–107)
CHLORIDE BLDA-SCNC: 103 MMOL/L (ref 98–107)
CHLORIDE BLDA-SCNC: 105 MMOL/L (ref 98–107)
CHLORIDE BLDA-SCNC: ABNORMAL MMOL/L
CHLORIDE SERPL-SCNC: 103 MMOL/L (ref 98–107)
CO2 SERPL-SCNC: 32 MMOL/L (ref 21–32)
CREAT SERPL-MCNC: 0.92 MG/DL (ref 0.5–1.05)
CRITICAL CALL TIME: 315
CRITICAL CALL TIME: 827
CRITICAL CALLED BY: ABNORMAL
CRITICAL CALLED BY: ABNORMAL
CRITICAL CALLED TO: ABNORMAL
CRITICAL CALLED TO: ABNORMAL
CRITICAL NOTE: ABNORMAL
CRITICAL READ BACK: ABNORMAL
CRITICAL READ BACK: ABNORMAL
EGFRCR SERPLBLD CKD-EPI 2021: 80 ML/MIN/1.73M*2
EOSINOPHIL # BLD AUTO: 0.04 X10*3/UL (ref 0–0.7)
EOSINOPHIL NFR BLD AUTO: 0.4 %
ERYTHROCYTE [DISTWIDTH] IN BLOOD BY AUTOMATED COUNT: 14.7 % (ref 11.5–14.5)
ETHANOL SERPL-MCNC: <10 MG/DL
FENTANYL+NORFENTANYL UR QL SCN: ABNORMAL
FLUAV RNA RESP QL NAA+PROBE: NOT DETECTED
FLUBV RNA RESP QL NAA+PROBE: NOT DETECTED
GLUCOSE BLD MANUAL STRIP-MCNC: 152 MG/DL (ref 74–99)
GLUCOSE BLD MANUAL STRIP-MCNC: 170 MG/DL (ref 74–99)
GLUCOSE BLD MANUAL STRIP-MCNC: 227 MG/DL (ref 74–99)
GLUCOSE BLD MANUAL STRIP-MCNC: 237 MG/DL (ref 74–99)
GLUCOSE BLD MANUAL STRIP-MCNC: 288 MG/DL (ref 74–99)
GLUCOSE BLDA-MCNC: 102 MG/DL (ref 74–99)
GLUCOSE BLDA-MCNC: 179 MG/DL (ref 74–99)
GLUCOSE BLDA-MCNC: 255 MG/DL (ref 74–99)
GLUCOSE BLDA-MCNC: 294 MG/DL (ref 74–99)
GLUCOSE SERPL-MCNC: 111 MG/DL (ref 74–99)
HCO3 BLDA-SCNC: 28.2 MMOL/L (ref 22–26)
HCO3 BLDA-SCNC: 31 MMOL/L (ref 22–26)
HCO3 BLDA-SCNC: 31 MMOL/L (ref 22–26)
HCO3 BLDA-SCNC: 33.2 MMOL/L (ref 22–26)
HCT VFR BLD AUTO: 42.9 % (ref 36–46)
HCT VFR BLD EST: 35 % (ref 36–46)
HCT VFR BLD EST: 38 % (ref 36–46)
HCT VFR BLD EST: 39 % (ref 36–46)
HCT VFR BLD EST: 40 % (ref 36–46)
HGB BLD-MCNC: 13.9 G/DL (ref 12–16)
HGB BLDA-MCNC: 11.7 G/DL (ref 12–16)
HGB BLDA-MCNC: 12.8 G/DL (ref 12–16)
HGB BLDA-MCNC: 12.9 G/DL (ref 12–16)
HGB BLDA-MCNC: 13.4 G/DL (ref 12–16)
HOLD SPECIMEN: NORMAL
IMM GRANULOCYTES # BLD AUTO: 0.15 X10*3/UL (ref 0–0.7)
IMM GRANULOCYTES NFR BLD AUTO: 1.5 % (ref 0–0.9)
INHALED O2 CONCENTRATION: 100 %
INHALED O2 CONCENTRATION: 40 %
INHALED O2 CONCENTRATION: 50 %
INHALED O2 CONCENTRATION: 50 %
LACTATE BLDA-SCNC: 0.8 MMOL/L (ref 0.4–2)
LACTATE BLDA-SCNC: 0.9 MMOL/L (ref 0.4–2)
LACTATE BLDA-SCNC: 1.2 MMOL/L (ref 0.4–2)
LACTATE BLDA-SCNC: 2.7 MMOL/L (ref 0.4–2)
LACTATE SERPL-SCNC: 0.9 MMOL/L (ref 0.4–2)
LYMPHOCYTES # BLD AUTO: 2.47 X10*3/UL (ref 1.2–4.8)
LYMPHOCYTES NFR BLD AUTO: 24.8 %
MCH RBC QN AUTO: 31.9 PG (ref 26–34)
MCHC RBC AUTO-ENTMCNC: 32.4 G/DL (ref 32–36)
MCV RBC AUTO: 98 FL (ref 80–100)
METHADONE UR QL SCN: ABNORMAL
MONOCYTES # BLD AUTO: 0.72 X10*3/UL (ref 0.1–1)
MONOCYTES NFR BLD AUTO: 7.2 %
NEUTROPHILS # BLD AUTO: 6.54 X10*3/UL (ref 1.2–7.7)
NEUTROPHILS NFR BLD AUTO: 65.6 %
NRBC BLD-RTO: 0 /100 WBCS (ref 0–0)
OPIATES UR QL SCN: ABNORMAL
OXYCODONE+OXYMORPHONE UR QL SCN: ABNORMAL
OXYHGB MFR BLDA: 47.1 % (ref 94–98)
OXYHGB MFR BLDA: 88.4 % (ref 94–98)
OXYHGB MFR BLDA: 89.2 % (ref 94–98)
OXYHGB MFR BLDA: 91.5 % (ref 94–98)
PCO2 BLDA: 51 MM HG (ref 38–42)
PCO2 BLDA: 63 MM HG (ref 38–42)
PCO2 BLDA: 63 MM HG (ref 38–42)
PCO2 BLDA: 66 MM HG (ref 38–42)
PCP UR QL SCN: ABNORMAL
PEEP CMH2O: 5 CM H2O
PH BLDA: 7.28 PH (ref 7.38–7.42)
PH BLDA: 7.3 PH (ref 7.38–7.42)
PH BLDA: 7.33 PH (ref 7.38–7.42)
PH BLDA: 7.35 PH (ref 7.38–7.42)
PLATELET # BLD AUTO: 138 X10*3/UL (ref 150–450)
PO2 BLDA: 28 MM HG (ref 85–95)
PO2 BLDA: 64 MM HG (ref 85–95)
PO2 BLDA: 68 MM HG (ref 85–95)
PO2 BLDA: 69 MM HG (ref 85–95)
POTASSIUM BLDA-SCNC: 3.8 MMOL/L (ref 3.5–5.3)
POTASSIUM BLDA-SCNC: 4.1 MMOL/L (ref 3.5–5.3)
POTASSIUM BLDA-SCNC: 4.3 MMOL/L (ref 3.5–5.3)
POTASSIUM BLDA-SCNC: 4.4 MMOL/L (ref 3.5–5.3)
POTASSIUM SERPL-SCNC: 4.1 MMOL/L (ref 3.5–5.3)
PROT SERPL-MCNC: 7.1 G/DL (ref 6.4–8.2)
RBC # BLD AUTO: 4.36 X10*6/UL (ref 4–5.2)
RSV RNA RESP QL NAA+PROBE: NOT DETECTED
SALICYLATES SERPL-MCNC: <3 MG/DL
SAO2 % BLDA: 50 % (ref 94–100)
SAO2 % BLDA: 92 % (ref 94–100)
SAO2 % BLDA: 95 % (ref 94–100)
SAO2 % BLDA: 96 % (ref 94–100)
SARS-COV-2 RNA RESP QL NAA+PROBE: NOT DETECTED
SODIUM BLDA-SCNC: 135 MMOL/L (ref 136–145)
SODIUM BLDA-SCNC: 136 MMOL/L (ref 136–145)
SODIUM BLDA-SCNC: 138 MMOL/L (ref 136–145)
SODIUM BLDA-SCNC: 140 MMOL/L (ref 136–145)
SODIUM SERPL-SCNC: 141 MMOL/L (ref 136–145)
SPECIMEN DRAWN FROM PATIENT: ABNORMAL
TIDAL VOLUME: 400 ML
VENTILATOR MODE: ABNORMAL
VENTILATOR RATE: 18 BPM
WBC # BLD AUTO: 10 X10*3/UL (ref 4.4–11.3)

## 2024-03-05 PROCEDURE — 80143 DRUG ASSAY ACETAMINOPHEN: CPT

## 2024-03-05 PROCEDURE — 2500000002 HC RX 250 W HCPCS SELF ADMINISTERED DRUGS (ALT 637 FOR MEDICARE OP, ALT 636 FOR OP/ED): Performed by: HOSPITALIST

## 2024-03-05 PROCEDURE — 2500000001 HC RX 250 WO HCPCS SELF ADMINISTERED DRUGS (ALT 637 FOR MEDICARE OP): Performed by: HOSPITALIST

## 2024-03-05 PROCEDURE — 71045 X-RAY EXAM CHEST 1 VIEW: CPT

## 2024-03-05 PROCEDURE — 99291 CRITICAL CARE FIRST HOUR: CPT

## 2024-03-05 PROCEDURE — 94640 AIRWAY INHALATION TREATMENT: CPT

## 2024-03-05 PROCEDURE — 5A1945Z RESPIRATORY VENTILATION, 24-96 CONSECUTIVE HOURS: ICD-10-PCS | Performed by: HOSPITALIST

## 2024-03-05 PROCEDURE — 2500000005 HC RX 250 GENERAL PHARMACY W/O HCPCS: Performed by: HOSPITALIST

## 2024-03-05 PROCEDURE — 2500000002 HC RX 250 W HCPCS SELF ADMINISTERED DRUGS (ALT 637 FOR MEDICARE OP, ALT 636 FOR OP/ED): Performed by: PHYSICIAN ASSISTANT

## 2024-03-05 PROCEDURE — 2500000004 HC RX 250 GENERAL PHARMACY W/ HCPCS (ALT 636 FOR OP/ED)

## 2024-03-05 PROCEDURE — 87637 SARSCOV2&INF A&B&RSV AMP PRB: CPT | Performed by: PHYSICIAN ASSISTANT

## 2024-03-05 PROCEDURE — 84484 ASSAY OF TROPONIN QUANT: CPT | Performed by: PHYSICIAN ASSISTANT

## 2024-03-05 PROCEDURE — 2500000001 HC RX 250 WO HCPCS SELF ADMINISTERED DRUGS (ALT 637 FOR MEDICARE OP)

## 2024-03-05 PROCEDURE — 31500 INSERT EMERGENCY AIRWAY: CPT | Performed by: HOSPITALIST

## 2024-03-05 PROCEDURE — 96361 HYDRATE IV INFUSION ADD-ON: CPT | Mod: 59

## 2024-03-05 PROCEDURE — 36600 WITHDRAWAL OF ARTERIAL BLOOD: CPT

## 2024-03-05 PROCEDURE — 2500000004 HC RX 250 GENERAL PHARMACY W/ HCPCS (ALT 636 FOR OP/ED): Performed by: PHYSICIAN ASSISTANT

## 2024-03-05 PROCEDURE — 85025 COMPLETE CBC W/AUTO DIFF WBC: CPT | Performed by: PHYSICIAN ASSISTANT

## 2024-03-05 PROCEDURE — 84132 ASSAY OF SERUM POTASSIUM: CPT | Performed by: PHYSICIAN ASSISTANT

## 2024-03-05 PROCEDURE — 94660 CPAP INITIATION&MGMT: CPT

## 2024-03-05 PROCEDURE — 0BH17EZ INSERTION OF ENDOTRACHEAL AIRWAY INTO TRACHEA, VIA NATURAL OR ARTIFICIAL OPENING: ICD-10-PCS | Performed by: HOSPITALIST

## 2024-03-05 PROCEDURE — 2500000001 HC RX 250 WO HCPCS SELF ADMINISTERED DRUGS (ALT 637 FOR MEDICARE OP): Performed by: PHYSICIAN ASSISTANT

## 2024-03-05 PROCEDURE — 36415 COLL VENOUS BLD VENIPUNCTURE: CPT | Performed by: PHYSICIAN ASSISTANT

## 2024-03-05 PROCEDURE — 82947 ASSAY GLUCOSE BLOOD QUANT: CPT

## 2024-03-05 PROCEDURE — C9113 INJ PANTOPRAZOLE SODIUM, VIA: HCPCS

## 2024-03-05 PROCEDURE — 2500000005 HC RX 250 GENERAL PHARMACY W/O HCPCS

## 2024-03-05 PROCEDURE — 96365 THER/PROPH/DIAG IV INF INIT: CPT | Mod: 59

## 2024-03-05 PROCEDURE — 71045 X-RAY EXAM CHEST 1 VIEW: CPT | Performed by: RADIOLOGY

## 2024-03-05 PROCEDURE — 2550000001 HC RX 255 CONTRASTS: Performed by: STUDENT IN AN ORGANIZED HEALTH CARE EDUCATION/TRAINING PROGRAM

## 2024-03-05 PROCEDURE — 2020000001 HC ICU ROOM DAILY

## 2024-03-05 PROCEDURE — 93005 ELECTROCARDIOGRAM TRACING: CPT

## 2024-03-05 PROCEDURE — 71045 X-RAY EXAM CHEST 1 VIEW: CPT | Mod: FOREIGN READ | Performed by: RADIOLOGY

## 2024-03-05 PROCEDURE — 2500000002 HC RX 250 W HCPCS SELF ADMINISTERED DRUGS (ALT 637 FOR MEDICARE OP, ALT 636 FOR OP/ED)

## 2024-03-05 PROCEDURE — 71275 CT ANGIOGRAPHY CHEST: CPT

## 2024-03-05 PROCEDURE — 84132 ASSAY OF SERUM POTASSIUM: CPT | Performed by: HOSPITALIST

## 2024-03-05 PROCEDURE — 83880 ASSAY OF NATRIURETIC PEPTIDE: CPT | Performed by: PHYSICIAN ASSISTANT

## 2024-03-05 PROCEDURE — 71275 CT ANGIOGRAPHY CHEST: CPT | Mod: FOREIGN READ | Performed by: RADIOLOGY

## 2024-03-05 PROCEDURE — 83605 ASSAY OF LACTIC ACID: CPT | Performed by: PHYSICIAN ASSISTANT

## 2024-03-05 PROCEDURE — 2500000004 HC RX 250 GENERAL PHARMACY W/ HCPCS (ALT 636 FOR OP/ED): Performed by: HOSPITALIST

## 2024-03-05 PROCEDURE — 80307 DRUG TEST PRSMV CHEM ANLYZR: CPT | Performed by: HOSPITALIST

## 2024-03-05 PROCEDURE — 94002 VENT MGMT INPAT INIT DAY: CPT

## 2024-03-05 PROCEDURE — 96375 TX/PRO/DX INJ NEW DRUG ADDON: CPT | Mod: 59

## 2024-03-05 PROCEDURE — 99292 CRITICAL CARE ADDL 30 MIN: CPT | Performed by: STUDENT IN AN ORGANIZED HEALTH CARE EDUCATION/TRAINING PROGRAM

## 2024-03-05 RX ORDER — PROPOFOL 10 MG/ML
5-50 INJECTION, EMULSION INTRAVENOUS CONTINUOUS
Status: DISCONTINUED | OUTPATIENT
Start: 2024-03-05 | End: 2024-03-06

## 2024-03-05 RX ORDER — SODIUM CHLORIDE 9 MG/ML
125 INJECTION, SOLUTION INTRAVENOUS CONTINUOUS
Status: DISCONTINUED | OUTPATIENT
Start: 2024-03-05 | End: 2024-03-05

## 2024-03-05 RX ORDER — ACETAMINOPHEN 325 MG/1
650 TABLET ORAL EVERY 4 HOURS PRN
Status: DISCONTINUED | OUTPATIENT
Start: 2024-03-05 | End: 2024-03-08 | Stop reason: HOSPADM

## 2024-03-05 RX ORDER — PRAZOSIN HYDROCHLORIDE 5 MG/1
5 CAPSULE ORAL NIGHTLY
Status: DISCONTINUED | OUTPATIENT
Start: 2024-03-05 | End: 2024-03-08 | Stop reason: HOSPADM

## 2024-03-05 RX ORDER — PANTOPRAZOLE SODIUM 40 MG/10ML
40 INJECTION, POWDER, LYOPHILIZED, FOR SOLUTION INTRAVENOUS DAILY
Status: DISCONTINUED | OUTPATIENT
Start: 2024-03-05 | End: 2024-03-06

## 2024-03-05 RX ORDER — FUROSEMIDE 10 MG/ML
40 INJECTION INTRAMUSCULAR; INTRAVENOUS ONCE
Status: COMPLETED | OUTPATIENT
Start: 2024-03-05 | End: 2024-03-05

## 2024-03-05 RX ORDER — LEVOTHYROXINE SODIUM 75 UG/1
150 TABLET ORAL DAILY
Status: DISCONTINUED | OUTPATIENT
Start: 2024-03-05 | End: 2024-03-08 | Stop reason: HOSPADM

## 2024-03-05 RX ORDER — MAGNESIUM SULFATE HEPTAHYDRATE 40 MG/ML
2 INJECTION, SOLUTION INTRAVENOUS ONCE
Status: COMPLETED | OUTPATIENT
Start: 2024-03-05 | End: 2024-03-05

## 2024-03-05 RX ORDER — DEXMEDETOMIDINE HYDROCHLORIDE 4 UG/ML
INJECTION, SOLUTION INTRAVENOUS
Status: COMPLETED
Start: 2024-03-05 | End: 2024-03-05

## 2024-03-05 RX ORDER — POLYETHYLENE GLYCOL 3350 17 G/17G
17 POWDER, FOR SOLUTION ORAL DAILY
Status: DISCONTINUED | OUTPATIENT
Start: 2024-03-05 | End: 2024-03-08 | Stop reason: HOSPADM

## 2024-03-05 RX ORDER — DEXTROSE MONOHYDRATE 100 MG/ML
0.3 INJECTION, SOLUTION INTRAVENOUS ONCE AS NEEDED
Status: DISCONTINUED | OUTPATIENT
Start: 2024-03-05 | End: 2024-03-08 | Stop reason: HOSPADM

## 2024-03-05 RX ORDER — IPRATROPIUM BROMIDE AND ALBUTEROL SULFATE 2.5; .5 MG/3ML; MG/3ML
3 SOLUTION RESPIRATORY (INHALATION) EVERY 20 MIN
Status: COMPLETED | OUTPATIENT
Start: 2024-03-05 | End: 2024-03-05

## 2024-03-05 RX ORDER — INSULIN LISPRO 100 [IU]/ML
0-10 INJECTION, SOLUTION INTRAVENOUS; SUBCUTANEOUS EVERY 4 HOURS
Status: DISCONTINUED | OUTPATIENT
Start: 2024-03-05 | End: 2024-03-07

## 2024-03-05 RX ORDER — LEVALBUTEROL 1.25 MG/.5ML
1.25 SOLUTION, CONCENTRATE RESPIRATORY (INHALATION)
Status: DISCONTINUED | OUTPATIENT
Start: 2024-03-05 | End: 2024-03-07

## 2024-03-05 RX ORDER — FENTANYL CITRATE 50 UG/ML
INJECTION, SOLUTION INTRAMUSCULAR; INTRAVENOUS
Status: COMPLETED
Start: 2024-03-05 | End: 2024-03-06

## 2024-03-05 RX ORDER — FENTANYL CITRATE 50 UG/ML
100 INJECTION, SOLUTION INTRAMUSCULAR; INTRAVENOUS ONCE
Status: COMPLETED | OUTPATIENT
Start: 2024-03-05 | End: 2024-03-06

## 2024-03-05 RX ORDER — RISPERIDONE 0.25 MG/1
0.5 TABLET ORAL 2 TIMES DAILY
Status: DISCONTINUED | OUTPATIENT
Start: 2024-03-05 | End: 2024-03-08 | Stop reason: HOSPADM

## 2024-03-05 RX ORDER — HYDROXYZINE PAMOATE 25 MG/1
50 CAPSULE ORAL 4 TIMES DAILY PRN
Status: DISCONTINUED | OUTPATIENT
Start: 2024-03-05 | End: 2024-03-08 | Stop reason: HOSPADM

## 2024-03-05 RX ORDER — ACETAMINOPHEN 160 MG/5ML
650 SOLUTION ORAL EVERY 4 HOURS PRN
Status: DISCONTINUED | OUTPATIENT
Start: 2024-03-05 | End: 2024-03-08 | Stop reason: HOSPADM

## 2024-03-05 RX ORDER — CHLORHEXIDINE GLUCONATE ORAL RINSE 1.2 MG/ML
15 SOLUTION DENTAL 2 TIMES DAILY
Status: DISCONTINUED | OUTPATIENT
Start: 2024-03-05 | End: 2024-03-06

## 2024-03-05 RX ORDER — SUCCINYLCHOLINE CHLORIDE 20 MG/ML
80 INJECTION INTRAMUSCULAR; INTRAVENOUS ONCE
Status: COMPLETED | OUTPATIENT
Start: 2024-03-05 | End: 2024-03-05

## 2024-03-05 RX ORDER — SUCCINYLCHOLINE CHLORIDE 20 MG/ML
INJECTION INTRAMUSCULAR; INTRAVENOUS
Status: COMPLETED
Start: 2024-03-05 | End: 2024-03-05

## 2024-03-05 RX ORDER — ETOMIDATE 2 MG/ML
INJECTION INTRAVENOUS
Status: COMPLETED
Start: 2024-03-05 | End: 2024-03-05

## 2024-03-05 RX ORDER — CLONAZEPAM 1 MG/1
1 TABLET ORAL 2 TIMES DAILY
Status: DISCONTINUED | OUTPATIENT
Start: 2024-03-05 | End: 2024-03-08 | Stop reason: HOSPADM

## 2024-03-05 RX ORDER — DEXTROSE 50 % IN WATER (D50W) INTRAVENOUS SYRINGE
25
Status: DISCONTINUED | OUTPATIENT
Start: 2024-03-05 | End: 2024-03-08 | Stop reason: HOSPADM

## 2024-03-05 RX ORDER — ETOMIDATE 2 MG/ML
20 INJECTION INTRAVENOUS ONCE
Status: COMPLETED | OUTPATIENT
Start: 2024-03-05 | End: 2024-03-05

## 2024-03-05 RX ORDER — BUSPIRONE HYDROCHLORIDE 5 MG/1
30 TABLET ORAL 2 TIMES DAILY
Status: DISCONTINUED | OUTPATIENT
Start: 2024-03-05 | End: 2024-03-08 | Stop reason: HOSPADM

## 2024-03-05 RX ORDER — ATORVASTATIN CALCIUM 20 MG/1
20 TABLET, FILM COATED ORAL NIGHTLY
Status: DISCONTINUED | OUTPATIENT
Start: 2024-03-05 | End: 2024-03-08 | Stop reason: HOSPADM

## 2024-03-05 RX ORDER — ESCITALOPRAM OXALATE 10 MG/1
10 TABLET ORAL DAILY
Status: DISCONTINUED | OUTPATIENT
Start: 2024-03-05 | End: 2024-03-08 | Stop reason: HOSPADM

## 2024-03-05 RX ORDER — DEXMEDETOMIDINE HYDROCHLORIDE 4 UG/ML
.1-1.5 INJECTION, SOLUTION INTRAVENOUS CONTINUOUS
Status: DISCONTINUED | OUTPATIENT
Start: 2024-03-05 | End: 2024-03-06

## 2024-03-05 RX ORDER — BUTALBITAL, ACETAMINOPHEN AND CAFFEINE 50; 325; 40 MG/1; MG/1; MG/1
1 TABLET ORAL ONCE
Status: COMPLETED | OUTPATIENT
Start: 2024-03-05 | End: 2024-03-05

## 2024-03-05 RX ADMIN — RIVAROXABAN 10 MG: 10 TABLET, FILM COATED ORAL at 09:21

## 2024-03-05 RX ADMIN — FUROSEMIDE 40 MG: 10 INJECTION, SOLUTION INTRAMUSCULAR; INTRAVENOUS at 19:29

## 2024-03-05 RX ADMIN — LEVALBUTEROL 1.25 MG: 1.25 SOLUTION, CONCENTRATE RESPIRATORY (INHALATION) at 07:56

## 2024-03-05 RX ADMIN — BUSPIRONE HYDROCHLORIDE 30 MG: 5 TABLET ORAL at 08:53

## 2024-03-05 RX ADMIN — LEVOTHYROXINE SODIUM 150 MCG: 75 TABLET ORAL at 09:21

## 2024-03-05 RX ADMIN — PROPOFOL 40 MCG/KG/MIN: 10 INJECTION, EMULSION INTRAVENOUS at 15:30

## 2024-03-05 RX ADMIN — ETOMIDATE 20 MG: 20 INJECTION, SOLUTION INTRAVENOUS at 06:45

## 2024-03-05 RX ADMIN — MAGNESIUM SULFATE HEPTAHYDRATE 2 G: 40 INJECTION, SOLUTION INTRAVENOUS at 01:25

## 2024-03-05 RX ADMIN — CHLORHEXIDINE GLUCONATE 15 ML: 1.2 RINSE ORAL at 22:00

## 2024-03-05 RX ADMIN — SUCCINYLCHOLINE CHLORIDE 80 MG: 20 INJECTION INTRAMUSCULAR; INTRAVENOUS at 06:43

## 2024-03-05 RX ADMIN — BUTALBITAL, ACETAMINOPHEN, AND CAFFEINE 1 TABLET: 50; 325; 40 TABLET, COATED ORAL at 03:57

## 2024-03-05 RX ADMIN — DEXMEDETOMIDINE HYDROCHLORIDE 0.2 MCG/KG/HR: 400 INJECTION INTRAVENOUS at 22:15

## 2024-03-05 RX ADMIN — LEVALBUTEROL 1.25 MG: 1.25 SOLUTION, CONCENTRATE RESPIRATORY (INHALATION) at 13:33

## 2024-03-05 RX ADMIN — INSULIN LISPRO 2 UNITS: 100 INJECTION, SOLUTION INTRAVENOUS; SUBCUTANEOUS at 18:19

## 2024-03-05 RX ADMIN — PROPOFOL 50 MCG/KG/MIN: 10 INJECTION, EMULSION INTRAVENOUS at 21:36

## 2024-03-05 RX ADMIN — INSULIN LISPRO 2 UNITS: 100 INJECTION, SOLUTION INTRAVENOUS; SUBCUTANEOUS at 22:26

## 2024-03-05 RX ADMIN — PROPOFOL 40 MCG/KG/MIN: 10 INJECTION, EMULSION INTRAVENOUS at 09:49

## 2024-03-05 RX ADMIN — FUROSEMIDE 40 MG: 10 INJECTION, SOLUTION INTRAMUSCULAR; INTRAVENOUS at 08:52

## 2024-03-05 RX ADMIN — ATORVASTATIN CALCIUM 20 MG: 20 TABLET, FILM COATED ORAL at 21:34

## 2024-03-05 RX ADMIN — SODIUM CHLORIDE 125 ML/HR: 9 INJECTION, SOLUTION INTRAVENOUS at 01:25

## 2024-03-05 RX ADMIN — ESCITALOPRAM OXALATE 10 MG: 10 TABLET, FILM COATED ORAL at 09:20

## 2024-03-05 RX ADMIN — IPRATROPIUM BROMIDE AND ALBUTEROL SULFATE 3 ML: 2.5; .5 SOLUTION RESPIRATORY (INHALATION) at 01:41

## 2024-03-05 RX ADMIN — RISPERIDONE 0.5 MG: 0.25 TABLET ORAL at 21:34

## 2024-03-05 RX ADMIN — BUSPIRONE HYDROCHLORIDE 30 MG: 5 TABLET ORAL at 21:51

## 2024-03-05 RX ADMIN — CLONAZEPAM 1 MG: 1 TABLET ORAL at 08:53

## 2024-03-05 RX ADMIN — INSULIN LISPRO 4 UNITS: 100 INJECTION, SOLUTION INTRAVENOUS; SUBCUTANEOUS at 15:30

## 2024-03-05 RX ADMIN — INSULIN LISPRO 4 UNITS: 100 INJECTION, SOLUTION INTRAVENOUS; SUBCUTANEOUS at 11:32

## 2024-03-05 RX ADMIN — DEXMEDETOMIDINE HYDROCHLORIDE 0.2 MCG/KG/HR: 400 INJECTION INTRAVENOUS at 22:11

## 2024-03-05 RX ADMIN — IPRATROPIUM BROMIDE AND ALBUTEROL SULFATE 3 ML: 2.5; .5 SOLUTION RESPIRATORY (INHALATION) at 01:46

## 2024-03-05 RX ADMIN — IPRATROPIUM BROMIDE AND ALBUTEROL SULFATE 3 ML: 2.5; .5 SOLUTION RESPIRATORY (INHALATION) at 03:38

## 2024-03-05 RX ADMIN — IPRATROPIUM BROMIDE AND ALBUTEROL SULFATE 3 ML: 2.5; .5 SOLUTION RESPIRATORY (INHALATION) at 01:43

## 2024-03-05 RX ADMIN — PANTOPRAZOLE SODIUM 40 MG: 40 INJECTION, POWDER, FOR SOLUTION INTRAVENOUS at 09:20

## 2024-03-05 RX ADMIN — INSULIN LISPRO 6 UNITS: 100 INJECTION, SOLUTION INTRAVENOUS; SUBCUTANEOUS at 08:54

## 2024-03-05 RX ADMIN — POLYETHYLENE GLYCOL 3350 17 G: 17 POWDER, FOR SOLUTION ORAL at 08:52

## 2024-03-05 RX ADMIN — METHYLPREDNISOLONE SODIUM SUCCINATE 125 MG: 125 INJECTION, POWDER, FOR SOLUTION INTRAMUSCULAR; INTRAVENOUS at 01:25

## 2024-03-05 RX ADMIN — Medication: at 20:01

## 2024-03-05 RX ADMIN — IPRATROPIUM BROMIDE AND ALBUTEROL SULFATE 3 ML: 2.5; .5 SOLUTION RESPIRATORY (INHALATION) at 03:32

## 2024-03-05 RX ADMIN — PROPOFOL 5 MCG/KG/MIN: 10 INJECTION, EMULSION INTRAVENOUS at 06:15

## 2024-03-05 RX ADMIN — SUCCINYLCHOLINE CHLORIDE 80 MG: 20 INJECTION, SOLUTION INTRAMUSCULAR; INTRAVENOUS at 06:43

## 2024-03-05 RX ADMIN — FENTANYL CITRATE 100 MCG: 50 INJECTION, SOLUTION INTRAMUSCULAR; INTRAVENOUS at 22:20

## 2024-03-05 RX ADMIN — METHYLPREDNISOLONE SODIUM SUCCINATE 40 MG: 40 INJECTION, POWDER, FOR SOLUTION INTRAMUSCULAR; INTRAVENOUS at 08:52

## 2024-03-05 RX ADMIN — SODIUM CHLORIDE, POTASSIUM CHLORIDE, SODIUM LACTATE AND CALCIUM CHLORIDE 1000 ML: 600; 310; 30; 20 INJECTION, SOLUTION INTRAVENOUS at 03:24

## 2024-03-05 RX ADMIN — PRAZOSIN HYDROCHLORIDE 5 MG: 5 CAPSULE ORAL at 21:34

## 2024-03-05 RX ADMIN — DEXMEDETOMIDINE HYDROCHLORIDE 0.2 MCG/KG/HR: 4 INJECTION, SOLUTION INTRAVENOUS at 22:15

## 2024-03-05 RX ADMIN — IOHEXOL 75 ML: 350 INJECTION, SOLUTION INTRAVENOUS at 03:30

## 2024-03-05 RX ADMIN — CLONAZEPAM 1 MG: 1 TABLET ORAL at 21:34

## 2024-03-05 RX ADMIN — ETOMIDATE 20 MG: 2 INJECTION INTRAVENOUS at 06:45

## 2024-03-05 RX ADMIN — LEVALBUTEROL 1.25 MG: 1.25 SOLUTION, CONCENTRATE RESPIRATORY (INHALATION) at 20:00

## 2024-03-05 RX ADMIN — RISPERIDONE 0.5 MG: 0.25 TABLET ORAL at 09:21

## 2024-03-05 RX ADMIN — IPRATROPIUM BROMIDE AND ALBUTEROL SULFATE 3 ML: 2.5; .5 SOLUTION RESPIRATORY (INHALATION) at 03:37

## 2024-03-05 RX ADMIN — Medication 100 PERCENT: at 16:10

## 2024-03-05 SDOH — SOCIAL STABILITY: SOCIAL INSECURITY: DO YOU FEEL UNSAFE GOING BACK TO THE PLACE WHERE YOU ARE LIVING?: NO

## 2024-03-05 SDOH — SOCIAL STABILITY: SOCIAL INSECURITY: WERE YOU ABLE TO COMPLETE ALL THE BEHAVIORAL HEALTH SCREENINGS?: YES

## 2024-03-05 SDOH — SOCIAL STABILITY: SOCIAL INSECURITY: HAS ANYONE EVER THREATENED TO HURT YOUR FAMILY OR YOUR PETS?: NO

## 2024-03-05 SDOH — SOCIAL STABILITY: SOCIAL INSECURITY: HAVE YOU HAD THOUGHTS OF HARMING ANYONE ELSE?: NO

## 2024-03-05 SDOH — SOCIAL STABILITY: SOCIAL INSECURITY: DO YOU FEEL ANYONE HAS EXPLOITED OR TAKEN ADVANTAGE OF YOU FINANCIALLY OR OF YOUR PERSONAL PROPERTY?: NO

## 2024-03-05 SDOH — SOCIAL STABILITY: SOCIAL INSECURITY: DOES ANYONE TRY TO KEEP YOU FROM HAVING/CONTACTING OTHER FRIENDS OR DOING THINGS OUTSIDE YOUR HOME?: NO

## 2024-03-05 SDOH — SOCIAL STABILITY: SOCIAL INSECURITY: ARE YOU OR HAVE YOU BEEN THREATENED OR ABUSED PHYSICALLY, EMOTIONALLY, OR SEXUALLY BY ANYONE?: NO

## 2024-03-05 SDOH — SOCIAL STABILITY: SOCIAL INSECURITY: ARE THERE ANY APPARENT SIGNS OF INJURIES/BEHAVIORS THAT COULD BE RELATED TO ABUSE/NEGLECT?: NO

## 2024-03-05 SDOH — SOCIAL STABILITY: SOCIAL INSECURITY: ABUSE: ADULT

## 2024-03-05 ASSESSMENT — LIFESTYLE VARIABLES
AUDIT-C TOTAL SCORE: 0
HOW MANY STANDARD DRINKS CONTAINING ALCOHOL DO YOU HAVE ON A TYPICAL DAY: PATIENT DOES NOT DRINK
SKIP TO QUESTIONS 9-10: 1
HAVE YOU EVER FELT YOU SHOULD CUT DOWN ON YOUR DRINKING: NO
EVER HAD A DRINK FIRST THING IN THE MORNING TO STEADY YOUR NERVES TO GET RID OF A HANGOVER: NO
HOW OFTEN DO YOU HAVE 6 OR MORE DRINKS ON ONE OCCASION: NEVER
SUBSTANCE_ABUSE_PAST_12_MONTHS: NO
HOW OFTEN DO YOU HAVE A DRINK CONTAINING ALCOHOL: NEVER
PRESCIPTION_ABUSE_PAST_12_MONTHS: NO
EVER FELT BAD OR GUILTY ABOUT YOUR DRINKING: NO
AUDIT-C TOTAL SCORE: 0
HAVE PEOPLE ANNOYED YOU BY CRITICIZING YOUR DRINKING: NO

## 2024-03-05 ASSESSMENT — PATIENT HEALTH QUESTIONNAIRE - PHQ9
2. FEELING DOWN, DEPRESSED OR HOPELESS: SEVERAL DAYS
SUM OF ALL RESPONSES TO PHQ9 QUESTIONS 1 & 2: 2
1. LITTLE INTEREST OR PLEASURE IN DOING THINGS: SEVERAL DAYS

## 2024-03-05 ASSESSMENT — PAIN SCALES - GENERAL
PAINLEVEL_OUTOF10: 5 - MODERATE PAIN
PAINLEVEL_OUTOF10: 0 - NO PAIN

## 2024-03-05 ASSESSMENT — COGNITIVE AND FUNCTIONAL STATUS - GENERAL
EATING MEALS: TOTAL
TOILETING: TOTAL
STANDING UP FROM CHAIR USING ARMS: TOTAL
TURNING FROM BACK TO SIDE WHILE IN FLAT BAD: TOTAL
CLIMB 3 TO 5 STEPS WITH RAILING: TOTAL
DRESSING REGULAR UPPER BODY CLOTHING: TOTAL
HELP NEEDED FOR BATHING: TOTAL
MOBILITY SCORE: 24
DAILY ACTIVITIY SCORE: 6
DRESSING REGULAR LOWER BODY CLOTHING: TOTAL
DAILY ACTIVITIY SCORE: 24
MOBILITY SCORE: 6
PERSONAL GROOMING: TOTAL
WALKING IN HOSPITAL ROOM: TOTAL
PATIENT BASELINE BEDBOUND: NO
MOVING FROM LYING ON BACK TO SITTING ON SIDE OF FLAT BED WITH BEDRAILS: TOTAL
MOVING TO AND FROM BED TO CHAIR: TOTAL

## 2024-03-05 ASSESSMENT — ACTIVITIES OF DAILY LIVING (ADL)
ADEQUATE_TO_COMPLETE_ADL: YES
GROOMING: INDEPENDENT
HEARING - RIGHT EAR: FUNCTIONAL
LACK_OF_TRANSPORTATION: NO
PATIENT'S MEMORY ADEQUATE TO SAFELY COMPLETE DAILY ACTIVITIES?: YES
JUDGMENT_ADEQUATE_SAFELY_COMPLETE_DAILY_ACTIVITIES: YES
BATHING: INDEPENDENT
WALKS IN HOME: INDEPENDENT
DRESSING YOURSELF: INDEPENDENT
TOILETING: INDEPENDENT
HEARING - LEFT EAR: FUNCTIONAL
FEEDING YOURSELF: INDEPENDENT
LACK_OF_TRANSPORTATION: PATIENT UNABLE TO ANSWER

## 2024-03-05 ASSESSMENT — PAIN DESCRIPTION - PROGRESSION: CLINICAL_PROGRESSION: RESOLVED

## 2024-03-05 ASSESSMENT — PAIN - FUNCTIONAL ASSESSMENT
PAIN_FUNCTIONAL_ASSESSMENT: CPOT (CRITICAL CARE PAIN OBSERVATION TOOL)
PAIN_FUNCTIONAL_ASSESSMENT: 0-10
PAIN_FUNCTIONAL_ASSESSMENT: 0-10
PAIN_FUNCTIONAL_ASSESSMENT: CPOT (CRITICAL CARE PAIN OBSERVATION TOOL)
PAIN_FUNCTIONAL_ASSESSMENT: CPOT (CRITICAL CARE PAIN OBSERVATION TOOL)

## 2024-03-05 NOTE — PROGRESS NOTES
03/05/24 1714   Discharge Planning   Living Arrangements Spouse/significant other   Support Systems Spouse/significant other   Assistance Needed TBD   Type of Residence Private residence   Who is requesting discharge planning? Provider   Home or Post Acute Services In home services   Type of Home Care Services DME or oxygen   Patient expects to be discharged to: [facility]/ tbd at present time   Financial Resource Strain   How hard is it for you to pay for the very basics like food, housing, medical care, and heating? Pt Unable   Housing Stability   In the last 12 months, was there a time when you were not able to pay the mortgage or rent on time? Pt Unable   In the last 12 months, was there a time when you did not have a steady place to sleep or slept in a shelter (including now)? Pt Unable   Transportation Needs   In the past 12 months, has lack of transportation kept you from medical appointments or from getting medications? Pt Unable   In the past 12 months, has lack of transportation kept you from meetings, work, or from getting things needed for daily living? Pt Unable     Pt required intubation for acute respiratory failure .  Continued ventilator support.   Pt will need therapy evaluations once medically able to assist with determining dc needs.   TBD at present time.

## 2024-03-05 NOTE — H&P
[facility] Critical Care Medicine       Date:  3/5/2024  Patient:  Stephenie Mccray  YOB: 1980  MRN:  [id number]   Admit Date:  3/5/2024  ========================================================================================================    Chief Complaint   Patient presents with    Shortness of Breath     Pt states SOB and congestion. Hx of COPD. 89% RA, walked to ED. Placed on 2 L NC in triage, 96%         History of Present Illness:  Stephenie Mccray is a 43 y.o. year old female patient with Past Medical History of  COPD (4L@home), DVT/PE (on Xarelto), schizophrenia, migraine headaches, HTN, HLD, current smoker, GERD, IDDM, and hypothyroidism. She is well known to this facility with frequent admissions for acute on chronic hypoxic/hypercapnic respiratory failure.  She presented to [facility] emergency department 3/5 with complaints of generalized weakness, fevers, chills, shortness of breath, cough.  For the past couple days she had increasing shortness of breath on exertion as well as productive cough with yellow/green phlegm.  She states she continues to smoke a lot.  Denies any chest pain, palpitations, hemoptysis, night sweats, abdominal pain, vomiting, diarrhea.    In the emergency department patient was placed on BiPAP with VBG of 7.28, pCO2 66.  She was given 1L LR.  She was given 125 mg Solu-Medrol as well as 2 g magnesium sulfate.  Laboratory workup was unremarkable.  CT PE study showed small groundglass foci of airspace disease likely representing alveolar edema.  She was admitted to the ICU.  Upon arrival to ICU patient extremely lethargic, concern for airway protection, therefore she was intubated and placed on mechanical ventilation.      Interval ICU Events:  3/5: Admitted to ICU.  Intubated.  Mechanically ventilated.  Diuresed with 40 mg IV Lasix.    Medical History:  Past Medical History:   Diagnosis Date    Arthritis     COPD (chronic obstructive pulmonary disease) (CMS/[facility])      "Diabetes mellitus (CMS/[facility])     History of transfusion     Hypothyroidism     CHARLIE (obstructive sleep apnea)      Past Surgical History:   Procedure Laterality Date     SECTION, LOW TRANSVERSE      EYE SURGERY      HERNIA REPAIR      TONSILLECTOMY      VENTRAL HERNIA REPAIR       Medications Prior to Admission   Medication Sig Dispense Refill Last Dose    alcohol swabs (Alcohol Prep Pads) pads, medicated Use 3 times daily prn 100 each 3     atorvastatin (Lipitor) 20 mg tablet Take 1 tablet (20 mg) by mouth once daily. Dr. Davis covering for Dr. Yuen 30 tablet 0     BD Ultra-Fine Mini Pen Needle 31 gauge x 3/16\" needle USE TO INJECT 4 TIMES DAILY AS DIRECTED 400 each 3     busPIRone (Buspar) 30 mg tablet Take 1 tablet (30 mg) by mouth 2 times a day.       clonazePAM (KlonoPIN) 1 mg tablet Take by mouth 2 times a day as needed.       escitalopram (Lexapro) 10 mg tablet Take 1 tablet (10 mg) by mouth once daily.       hydrOXYzine pamoate (Vistaril) 50 mg capsule Take 1 capsule (50 mg) by mouth 4 times a day as needed for anxiety.       insulin glargine (Lantus Solostar U-100 Insulin) 100 unit/mL (3 mL) pen Inject 10 Units under the skin once daily in the morning. Take as directed per insulin instructions. 3 mL 2     insulin lispro (HumaLOG) 100 unit/mL injection Inject 0.04 mL (4 Units) under the skin 3 times a day with meals. Plus sliding scale       Invega Sustenna 234 mg/1.5 mL syringe INJECT 1 syringe INTRAMUSCULARLY EVERY 4 WEEKS       ipratropium-albuteroL (Duo-Neb) 0.5-2.5 mg/3 mL nebulizer solution Take 3 mL by nebulization every 6 hours if needed for shortness of breath or wheezing. (Patient taking differently: Take 3 mL by nebulization every 4 hours. As needed) 180 mL 1     levothyroxine (Synthroid, Levoxyl) 150 mcg tablet Take 1 tablet (150 mcg) by mouth once daily. 90 tablet 1     nicotine (Nicoderm CQ) 14 mg/24 hr patch Place 1 patch over 24 hours on the skin once daily for 14 doses. Do not start " before February 21, 2024. 14 patch 0     nicotine (Nicoderm CQ) 21 mg/24 hr patch Place 1 patch over 24 hours on the skin once daily for 37 doses. Do not start before January 15, 2024. 30 patch 1     [START ON 3/6/2024] nicotine (Nicoderm CQ) 7 mg/24 hr patch Place 1 patch over 24 hours on the skin once daily for 14 doses. Do not start before March 6, 2024. 14 patch 0     nicotine polacrilex (Commit) 4 mg lozenge Dissolve 1 lozenge (4 mg) in the mouth every 2 hours if needed for smoking cessation. 100 lozenge 0     OneTouch Verio test strips strip        paliperidone (Invega) 3 mg 24 hr tablet Take 1 tablet (3 mg) by mouth once daily. Do not crush, chew, or split. Do not start before January 15, 2024. 30 tablet 0     pantoprazole (ProtoNix) 40 mg EC tablet Take 1 tablet (40 mg) by mouth once daily. 90 tablet 1     prazosin (Minipress) 5 mg capsule Take 1 capsule (5 mg) by mouth once daily at bedtime.       Xarelto 10 mg tablet Take 1 tablet (10 mg) by mouth once daily.        Fluticasone furoate-vilanterol, Fluticasone-umeclidin-vilanter, Sumatriptan, Vortioxetine, and Levofloxacin  Social History     Tobacco Use    Smoking status: Every Day     Packs/day: 1     Types: Cigarettes     Passive exposure: Never    Smokeless tobacco: Never   Substance Use Topics    Alcohol use: Never    Drug use: Never     Family History   Problem Relation Name Age of Onset    Fibromyalgia Father      Hypertension Brother      Thyroid disease Maternal Grandmother      Thyroid disease Paternal Grandmother      Lung cancer Paternal Grandfather      Coronary artery disease Other Grandmother        Hospital Medications:    propofol, 5-50 mcg/kg/min, Last Rate: 40 mcg/kg/min (03/05/24 1306)          Current Facility-Administered Medications:     atorvastatin (Lipitor) tablet 20 mg, 20 mg, oral, Nightly, Jose MOI Amaya APRN-CNP    busPIRone (Buspar) tablet 30 mg, 30 mg, oral, BID, Jose MOI Amaya APRN-CNP, 30 mg at 03/05/24 0822     clonazePAM (KlonoPIN) tablet 1 mg, 1 mg, oral, BID, Jose MOI Jose Luis, APRN-CNP, 1 mg at 03/05/24 0853    dextrose 10 % in water (D10W) infusion, 0.3 g/kg/hr, intravenous, Once PRN, Yoana Madrigal, APRN-CNP    dextrose 50 % injection 25 g, 25 g, intravenous, q15 min PRN, Yoana Madrigal, APRN-CNP    escitalopram (Lexapro) tablet 10 mg, 10 mg, oral, Daily, Jose MOI Jose Luis, APRN-CNP, 10 mg at 03/05/24 0920    glucagon (Glucagen) injection 1 mg, 1 mg, intramuscular, q15 min PRN, Yoana Rodriguezito, APRN-CNP    hydrOXYzine pamoate (Vistaril) capsule 50 mg, 50 mg, oral, 4x daily PRN, Jose Amaya APRN-CNP    insulin lispro (HumaLOG) injection 0-10 Units, 0-10 Units, subcutaneous, q4h, Yoana Madrigal, APRN-CNP, 4 Units at 03/05/24 1132    levalbuterol (Xopenex) 1.25 mg/0.5 mL nebulizer solution 1.25 mg, 1.25 mg, nebulization, TID, Yoana Rodriguezito, APRN-CNP, 1.25 mg at 03/05/24 0756    levothyroxine (Synthroid, Levoxyl) tablet 150 mcg, 150 mcg, oral, Daily, BARTOLOME Servin-CNP, 150 mcg at 03/05/24 0921    methylPREDNISolone sod succinate (PF) (SOLU-Medrol) 40 mg/mL injection 40 mg, 40 mg, intravenous, q24h, Yoana Rodriguezito, APRN-CNP, 40 mg at 03/05/24 0852    oxygen (O2) therapy, , inhalation, Continuous PRN - O2/gases, Yoana Rodriguezito, APRN-CNP, Rate Verify at 03/05/24 1000    pantoprazole (ProtoNix) injection 40 mg, 40 mg, intravenous, Daily, Jose Amaya APRN-CNP, 40 mg at 03/05/24 0920    polyethylene glycol (Glycolax, Miralax) packet 17 g, 17 g, oral, Daily, BARTOLOME Leo-CNP, 17 g at 03/05/24 0852    prazosin (Minipress) capsule 5 mg, 5 mg, oral, Nightly, VALDEMAR Servin    propofol (Diprivan) infusion, 5-50 mcg/kg/min, intravenous, Continuous, BARTOLOME Leo-CNP, Last Rate: 18.07 mL/hr at 03/05/24 1306, 40 mcg/kg/min at 03/05/24 1306    risperiDONE (RisperDAL) tablet 0.5 mg, 0.5 mg, oral, BID, BARTOLOME Servin-CNP, 0.5 mg at 03/05/24 0921    rivaroxaban  "(Xarelto) tablet 10 mg, 10 mg, oral, Daily, Jose Amaya, APRN-CNP, 10 mg at 03/05/24 0959    Review of Systems:  14 point review of systems was completed and negative except for those specially mention in my HPI    Physical Exam:    Heart Rate:  []   Temp:  [36.3 °C (97.3 °F)-36.8 °C (98.2 °F)]   Resp:  [17-31]   BP: ()/()   Height:  [157.5 cm (5' 2\")]   Weight:  [75.2 kg (165 lb 12.6 oz)-75.3 kg (166 lb)]   SpO2:  [83 %-99 %]     Physical Exam  Constitutional:       Comments: - Intubated, sedated   HENT:      Head: Normocephalic and atraumatic.      Mouth/Throat:      Mouth: Mucous membranes are dry.      Comments: -ETT in place  Eyes:      General: No scleral icterus.     Extraocular Movements: Extraocular movements intact.      Pupils: Pupils are equal, round, and reactive to light.   Cardiovascular:      Rate and Rhythm: Normal rate and regular rhythm.      Pulses: Normal pulses.      Heart sounds: Normal heart sounds. No murmur heard.     No friction rub. No gallop.   Pulmonary:      Comments: -Intubated, mechanically ventilated, mild wheezing  Abdominal:      General: There is no distension.      Palpations: Abdomen is soft.      Tenderness: There is no abdominal tenderness. There is no guarding.   Musculoskeletal:         General: Normal range of motion.      Cervical back: Normal range of motion and neck supple.      Right lower leg: No edema.      Left lower leg: No edema.   Skin:     General: Skin is warm and dry.      Capillary Refill: Capillary refill takes less than 2 seconds.      Coloration: Skin is pale.   Neurological:      Comments: -Sedated, with sedation paused patient grimaces to pain and moves all extremities, does not follow commands         Objective:    I have reviewed all medications, laboratory results, and imaging pertinent for today's encounter.    Vent Mode: Volume control/assist control  FiO2 (%):  [40 %-100 %] 100 %  S RR:  [18] 18  S VT:  [400 mL] 400 " mL  PEEP/CPAP (cm H2O):  [5 cm H20] 5 cm H20  MAP (cm H2O):  [9.8] 9.8      Intake/Output Summary (Last 24 hours) at 3/5/2024 1312  Last data filed at 3/5/2024 1306  Gross per 24 hour   Intake 1415.16 ml   Output --   Net 1415.16 ml         Assessment/Plan:    I am currently managing this critically ill patient for the following problems:    Neuro/Psych/Pain Ctrl/Sedation:  #Metabolic Encephalopathy - Likely 2/2 CO2 narcosis  #Hx Schizophrenia  #Hx Migraine headaches  -- Neuro checks, CAM assessment, delirium precautions  -- Sedation with propofol, RASS goal -1 to 0  -- Continue home Buspirone, Lexapro, Prazosin, Clonazepam     Respiratory/ENT:  #Acute on chronic Hypoxic/Hypercapnic Respiratory Failure - Home O2 of 4L  #COPD exacerbation  #CHARLIE  #Nicotine dependence   -- Follows with Dr. Villalab of Pul  -- Intubated 3/5   -- Solumedrol 40mg daily  -- Levalbuterol every 4 hours  -- CT PE with pulmonary edema, Lasix 40mg for goal -1 to -2L daily  -- Smoking cessation education  -- Wean mechanical ventilation, daily SAT/SBT when appropriate      Cardiovascular:  #Hx HLD  #Hx HTN  -- Continue home atorvastatin  -- Hold antihypertensives, labile BP  -- Keep MAP > 60    GI:  #Hx GERD  -- IV PPI while intubated then convert to oral     Renal/Volume Status (Intra & Extravascular):  No active issues  -- Strict I/O  -- Daily BMP and electrolyte repletion    Endocrine  #Hx IDDM - A1c 11/2023 of 7.3  #Hx Hypothyroid  -- Hold home Lantus 10u daily while NPO  -- Mild SSI every 4 hours while NPO  -- Continue home Synthroid     Infectious Disease:  No concern for infectious process at this time. Pt afebrile, WBC WNL, lactate WNL  -- Daily CBC  -- Covid/flu/RSV negative  -- Continue to monitor off abx     Heme/Onc:  #Hx DVT/PE  -- Continue home Xarelto     OBGYN/MSK:  -- PT/OT eval when able     Ethics/Code Status:  Full code    :  DVT Prophylaxis: Xarelto  GI Prophylaxis: IV PPI  Bowel Regimen: Miralax  Diet: NPO  CVC:  No  Margoth: No  Tran: No  Restraints: Yes  Dispo: ICU    Critical Care Time:  55 minutes spent in preparing to see patient (I.e. review of medical records), evaluation of diagnostics (I.e. labs, imaging, etc.), documentation, discussing plan of care with patient/ family/ caregiver, and/ or coordination of care with multidisciplinary team. Time does not include completion of procedure time.       Jose Amaya, APRN-CNP  Pulmonary & Critical Care Medicine  [facility]

## 2024-03-05 NOTE — ED PROCEDURE NOTE
Procedure  Critical Care    Performed by: Katelynn Ponce MD  Authorized by: Katelynn Ponce MD    Critical care provider statement:     Critical care time (minutes):  75    Critical care was necessary to treat or prevent imminent or life-threatening deterioration of the following conditions:  CNS failure or compromise and respiratory failure    Critical care was time spent personally by me on the following activities:  Ordering and review of laboratory studies, ordering and review of radiographic studies and discussions with consultants    Care discussed with: admitting provider                 Katelynn Ponce MD  03/05/24 0644

## 2024-03-05 NOTE — PROCEDURES
INTUBATION      Indication(s):  -Hypercapnic respiratory failure  -Hypoxemic respiratory failure  -Unresponsive     Induction agents:  -Etomidate 20 mg  -Succinylcholine 80 mg     Procedure:  Pt was on Bipap with 50% FiO2. RSI performed with mild cricoid pressure. Bipap removed and BMV performed with 100% O2 by ambu.  Induction agents as above. DL with Mac 3, grade I view. Atraumatic intubation with #7.5 ETT @ 22 cm @ lips. +EtCO2 color change. Breath sound equal bilaterally. Patient tolerated well. No complications.     CXR showed ETT too deep into right mainstem causing lack of oxygen flow to left lung causing white out appearance. I re-examined tube post CXR, it had moved to 25-26 cm at lip, ETT pulled back to 22 cm at lip, repeat CXR pending, OG in appropriate position    BARTOLOME Avila-CNP  Critical Care Medicine

## 2024-03-05 NOTE — ED PROVIDER NOTES
"HPI   Chief Complaint   Patient presents with    Shortness of Breath     Pt states SOB and congestion. Hx of COPD. 89% RA, walked to ED. Placed on 2 L NC in triage, 96%       43-year-old female presents emergency room with chief complaint generalized weakness with, fever, chills, shortness of breath with cough and bodyaches.  The patient has a history of DVT/PE on Xarelto, schizophrenia, migraine headaches, hypertension, hyperlipidemia, nicotine abuse, GERD, IDDM, hypothyroidism ,COPD and occasionally wears oxygen at home.  Patient states that she has not been using her nebulizer machine or wearing her oxygen at home.  For the past couple days she has noticed increased shortness of breath with minimal exertion as well as generalized malaise.  She does have a productive cough with yellow-green phlegm.  Patient states that she smokes \"a lot\".  She denies any chest pain, palpitations, hemoptysis, night sweats, abdominal pain, vomiting, diarrhea.  She denies any other complaints.  Patient states she has been admitted in the past for COPD exacerbation.      History provided by:  Patient and medical records                      Mott Coma Scale Score: 15                     Patient History   Past Medical History:   Diagnosis Date    Arthritis     COPD (chronic obstructive pulmonary disease) (CMS/HCC)     Diabetes mellitus (CMS/[facility])     History of transfusion     Hypothyroidism     CHARLIE (obstructive sleep apnea)      Past Surgical History:   Procedure Laterality Date     SECTION, LOW TRANSVERSE      EYE SURGERY      HERNIA REPAIR      TONSILLECTOMY      VENTRAL HERNIA REPAIR       Family History   Problem Relation Name Age of Onset    Fibromyalgia Father      Hypertension Brother      Thyroid disease Maternal Grandmother      Thyroid disease Paternal Grandmother      Lung cancer Paternal Grandfather      Coronary artery disease Other Grandmother      Social History     Tobacco Use    Smoking status: Every Day     " Packs/day: 1     Types: Cigarettes     Passive exposure: Never    Smokeless tobacco: Never   Substance Use Topics    Alcohol use: Never    Drug use: Never       Physical Exam   ED Triage Vitals [03/05/24 0033]   Temperature Heart Rate Respirations BP   36.3 °C (97.3 °F) (!) 122 20 (!) 164/107      Pulse Ox Temp Source Heart Rate Source Patient Position   (!) 89 % Tympanic Monitor Sitting      BP Location FiO2 (%)     Right arm --       Physical Exam  Vitals and nursing note reviewed. Exam conducted with a chaperone present.   Constitutional:       General: She is awake. She is not in acute distress.     Appearance: Normal appearance. She is overweight. She is ill-appearing. She is not toxic-appearing or diaphoretic.   HENT:      Head: Normocephalic and atraumatic.      Right Ear: Tympanic membrane, ear canal and external ear normal.      Left Ear: Tympanic membrane, ear canal and external ear normal.      Nose: Nose normal.      Mouth/Throat:      Mouth: Mucous membranes are moist.      Pharynx: Oropharynx is clear.   Eyes:      Extraocular Movements: Extraocular movements intact.      Conjunctiva/sclera: Conjunctivae normal.      Pupils: Pupils are equal, round, and reactive to light.   Cardiovascular:      Rate and Rhythm: Regular rhythm. Tachycardia present.      Pulses: Normal pulses.      Heart sounds: Normal heart sounds.   Pulmonary:      Effort: Pulmonary effort is normal.      Breath sounds: Examination of the right-upper field reveals decreased breath sounds and wheezing. Examination of the left-upper field reveals decreased breath sounds and wheezing. Examination of the right-middle field reveals decreased breath sounds and wheezing. Examination of the left-middle field reveals decreased breath sounds and wheezing. Examination of the right-lower field reveals decreased breath sounds and wheezing. Examination of the left-lower field reveals decreased breath sounds and wheezing. Decreased breath sounds and  wheezing present. No rales.   Abdominal:      General: Abdomen is flat. Bowel sounds are normal.      Palpations: Abdomen is soft. There is no mass.      Tenderness: There is no abdominal tenderness. There is no guarding.   Musculoskeletal:         General: No swelling or tenderness. Normal range of motion.      Cervical back: Normal range of motion and neck supple.   Skin:     General: Skin is warm and dry.      Capillary Refill: Capillary refill takes less than 2 seconds.      Findings: No rash.   Neurological:      General: No focal deficit present.      Mental Status: She is alert and oriented to person, place, and time. Mental status is at baseline.   Psychiatric:         Mood and Affect: Mood normal.         Behavior: Behavior normal. Behavior is cooperative.         Thought Content: Thought content normal.         Judgment: Judgment normal.         ED Course & MDM   Diagnoses as of 03/05/24 0339   Acute exacerbation of chronic obstructive pulmonary disease (COPD) (CMS/[facility])   Hypoxia   Nicotine abuse       Medical Decision Making  Initial vital signs /107, heart rate 122, respirations 20, pulse ox was 89% on room air she is afebrile 36.3 initial EKG interpreted by me shows sinus tachycardia rate 104, , QRS was 82  QTc was 473.  The patient was placed on oxygen in the monitor, she was given 3 albuterol aerosol treatments, Solu-Medrol 125 mg IV push, 2 g of magnesium sulfate IV piggyback and normal saline at 125 MLS per hour.    CBC white count 10 hemoglobin 13.9 hematocrit 42.9 platelet count was 138, chemistry panel glucose 111 otherwise unremarkable, opponent levels were 4 and then 3, flu negative COVID-negative lactic was 0.9, RSV negative BNP 19.  Blood gas pH 7.33 pCO2 of 63 pO2 of 69 patient's chest x-ray shows no acute cardiopulmonary disease.  I rounded on the patient she is she remains on oxygen, repeat lung sounds still show diminished breath sounds with some slight wheezing.  She  has become more somnolent.  I have ordered BiPAP, repeat arterial blood gas, repeat aerosol treatments and a liter of LR wide open.  Patient's repeat blood gas which is venous pH 7.28 pCO2 of 66 pO2 of 28.  Patient remains on BiPAP.  The patient's repeat troponin is less than 3.  CT scan of the chest shows cardiomegaly with mild pulmonary edema suggesting CHF small groundglass foci of airspace disease these may represent alveolar edema although low-grade developing infectious inflammatory process may be consideration.  No evidence of a PE.  The patient remains on BiPAP due to her fatigue and somnolence and the fact that she remains hypoxic despite high flow oxygen.  I paged out to the ICU    Accepted to the ICU    Procedure  Procedures     Ariel Whiting PA-C  03/05/24 6567

## 2024-03-06 ENCOUNTER — APPOINTMENT (OUTPATIENT)
Dept: OBSTETRICS AND GYNECOLOGY | Facility: CLINIC | Age: 44
End: 2024-03-06
Payer: COMMERCIAL

## 2024-03-06 LAB
ANION GAP BLDA CALCULATED.4IONS-SCNC: ABNORMAL MMOL/L
ANION GAP BLDA CALCULATED.4IONS-SCNC: ABNORMAL MMOL/L
ANION GAP SERPL CALC-SCNC: 12 MMOL/L (ref 10–20)
ARTERIAL PATENCY WRIST A: POSITIVE
BASE EXCESS BLDA CALC-SCNC: 5.6 MMOL/L (ref -2–3)
BASE EXCESS BLDA CALC-SCNC: 6.2 MMOL/L (ref -2–3)
BASOPHILS # BLD AUTO: 0.05 X10*3/UL (ref 0–0.1)
BASOPHILS NFR BLD AUTO: 0.4 %
BODY TEMPERATURE: ABNORMAL
BODY TEMPERATURE: ABNORMAL
BUN SERPL-MCNC: 22 MG/DL (ref 6–23)
CA-I BLDA-SCNC: 1.2 MMOL/L (ref 1.1–1.33)
CA-I BLDA-SCNC: 1.21 MMOL/L (ref 1.1–1.33)
CALCIUM SERPL-MCNC: 9.6 MG/DL (ref 8.6–10.3)
CHLORIDE BLDA-SCNC: ABNORMAL MMOL/L
CHLORIDE BLDA-SCNC: ABNORMAL MMOL/L
CHLORIDE SERPL-SCNC: 102 MMOL/L (ref 98–107)
CO2 SERPL-SCNC: 32 MMOL/L (ref 21–32)
CREAT SERPL-MCNC: 0.82 MG/DL (ref 0.5–1.05)
CRITICAL CALL TIME: 2101
CRITICAL CALLED BY: ABNORMAL
CRITICAL CALLED TO: ABNORMAL
CRITICAL READ BACK: ABNORMAL
EGFRCR SERPLBLD CKD-EPI 2021: >90 ML/MIN/1.73M*2
EOSINOPHIL # BLD AUTO: 0.01 X10*3/UL (ref 0–0.7)
EOSINOPHIL NFR BLD AUTO: 0.1 %
ERYTHROCYTE [DISTWIDTH] IN BLOOD BY AUTOMATED COUNT: 14.9 % (ref 11.5–14.5)
FLOW: 65 LPM
FREQUENCY (BPM): 18 BPM
GLUCOSE BLD MANUAL STRIP-MCNC: 146 MG/DL (ref 74–99)
GLUCOSE BLD MANUAL STRIP-MCNC: 148 MG/DL (ref 74–99)
GLUCOSE BLD MANUAL STRIP-MCNC: 151 MG/DL (ref 74–99)
GLUCOSE BLD MANUAL STRIP-MCNC: 161 MG/DL (ref 74–99)
GLUCOSE BLD MANUAL STRIP-MCNC: 232 MG/DL (ref 74–99)
GLUCOSE BLDA-MCNC: 144 MG/DL (ref 74–99)
GLUCOSE BLDA-MCNC: 158 MG/DL (ref 74–99)
GLUCOSE SERPL-MCNC: 136 MG/DL (ref 74–99)
HCO3 BLDA-SCNC: 31.8 MMOL/L (ref 22–26)
HCO3 BLDA-SCNC: 32.8 MMOL/L (ref 22–26)
HCT VFR BLD AUTO: 40.6 % (ref 36–46)
HCT VFR BLD EST: 38 % (ref 36–46)
HCT VFR BLD EST: 40 % (ref 36–46)
HGB BLD-MCNC: 13.1 G/DL (ref 12–16)
HGB BLDA-MCNC: 12.7 G/DL (ref 12–16)
HGB BLDA-MCNC: 13.4 G/DL (ref 12–16)
IMM GRANULOCYTES # BLD AUTO: 0.14 X10*3/UL (ref 0–0.7)
IMM GRANULOCYTES NFR BLD AUTO: 1.3 % (ref 0–0.9)
INHALED O2 CONCENTRATION: 100 %
INHALED O2 CONCENTRATION: 100 %
LACTATE BLDA-SCNC: 1.5 MMOL/L (ref 0.4–2)
LACTATE BLDA-SCNC: 3 MMOL/L (ref 0.4–2)
LACTATE SERPL-SCNC: 1.4 MMOL/L (ref 0.4–2)
LYMPHOCYTES # BLD AUTO: 1.87 X10*3/UL (ref 1.2–4.8)
LYMPHOCYTES NFR BLD AUTO: 16.7 %
MAGNESIUM SERPL-MCNC: 1.78 MG/DL (ref 1.6–2.4)
MCH RBC QN AUTO: 31.1 PG (ref 26–34)
MCHC RBC AUTO-ENTMCNC: 32.3 G/DL (ref 32–36)
MCV RBC AUTO: 96 FL (ref 80–100)
MONOCYTES # BLD AUTO: 0.79 X10*3/UL (ref 0.1–1)
MONOCYTES NFR BLD AUTO: 7.1 %
NEUTROPHILS # BLD AUTO: 8.34 X10*3/UL (ref 1.2–7.7)
NEUTROPHILS NFR BLD AUTO: 74.4 %
NRBC BLD-RTO: 0 /100 WBCS (ref 0–0)
OXYHGB MFR BLDA: 79.4 % (ref 94–98)
OXYHGB MFR BLDA: 85.7 % (ref 94–98)
PCO2 BLDA: 49 MM HG (ref 38–42)
PCO2 BLDA: 58 MM HG (ref 38–42)
PEEP CMH2O: 5 CM H2O
PH BLDA: 7.36 PH (ref 7.38–7.42)
PH BLDA: 7.42 PH (ref 7.38–7.42)
PHOSPHATE SERPL-MCNC: 4 MG/DL (ref 2.5–4.9)
PLATELET # BLD AUTO: 138 X10*3/UL (ref 150–450)
PO2 BLDA: 48 MM HG (ref 85–95)
PO2 BLDA: 54 MM HG (ref 85–95)
POTASSIUM BLDA-SCNC: 3.5 MMOL/L (ref 3.5–5.3)
POTASSIUM BLDA-SCNC: 4 MMOL/L (ref 3.5–5.3)
POTASSIUM SERPL-SCNC: 3.8 MMOL/L (ref 3.5–5.3)
RBC # BLD AUTO: 4.21 X10*6/UL (ref 4–5.2)
SAO2 % BLDA: 81 % (ref 94–100)
SAO2 % BLDA: 88 % (ref 94–100)
SODIUM BLDA-SCNC: 138 MMOL/L (ref 136–145)
SODIUM BLDA-SCNC: 140 MMOL/L (ref 136–145)
SODIUM SERPL-SCNC: 142 MMOL/L (ref 136–145)
SPECIMEN DRAWN FROM PATIENT: ABNORMAL
TIDAL VOLUME: 400 ML
TIDAL VOLUME: 400 ML
TOTAL MINUTE VOLUME: 16 LITER
VENTILATOR MODE: ABNORMAL
VENTILATOR RATE: 18 BPM
VENTILATOR RATE: 18 BPM
WBC # BLD AUTO: 11.2 X10*3/UL (ref 4.4–11.3)

## 2024-03-06 PROCEDURE — 82947 ASSAY GLUCOSE BLOOD QUANT: CPT

## 2024-03-06 PROCEDURE — 2500000001 HC RX 250 WO HCPCS SELF ADMINISTERED DRUGS (ALT 637 FOR MEDICARE OP): Performed by: NURSE PRACTITIONER

## 2024-03-06 PROCEDURE — 36415 COLL VENOUS BLD VENIPUNCTURE: CPT | Performed by: HOSPITALIST

## 2024-03-06 PROCEDURE — 2500000002 HC RX 250 W HCPCS SELF ADMINISTERED DRUGS (ALT 637 FOR MEDICARE OP, ALT 636 FOR OP/ED): Performed by: HOSPITALIST

## 2024-03-06 PROCEDURE — 2500000001 HC RX 250 WO HCPCS SELF ADMINISTERED DRUGS (ALT 637 FOR MEDICARE OP): Performed by: HOSPITALIST

## 2024-03-06 PROCEDURE — 2500000004 HC RX 250 GENERAL PHARMACY W/ HCPCS (ALT 636 FOR OP/ED)

## 2024-03-06 PROCEDURE — 82435 ASSAY OF BLOOD CHLORIDE: CPT | Performed by: HOSPITALIST

## 2024-03-06 PROCEDURE — 83605 ASSAY OF LACTIC ACID: CPT | Performed by: HOSPITALIST

## 2024-03-06 PROCEDURE — 2500000004 HC RX 250 GENERAL PHARMACY W/ HCPCS (ALT 636 FOR OP/ED): Performed by: NURSE PRACTITIONER

## 2024-03-06 PROCEDURE — 94640 AIRWAY INHALATION TREATMENT: CPT | Performed by: EMERGENCY MEDICINE

## 2024-03-06 PROCEDURE — 94003 VENT MGMT INPAT SUBQ DAY: CPT

## 2024-03-06 PROCEDURE — 99291 CRITICAL CARE FIRST HOUR: CPT | Performed by: NURSE PRACTITIONER

## 2024-03-06 PROCEDURE — 2500000005 HC RX 250 GENERAL PHARMACY W/O HCPCS: Performed by: EMERGENCY MEDICINE

## 2024-03-06 PROCEDURE — 2500000001 HC RX 250 WO HCPCS SELF ADMINISTERED DRUGS (ALT 637 FOR MEDICARE OP)

## 2024-03-06 PROCEDURE — 2500000002 HC RX 250 W HCPCS SELF ADMINISTERED DRUGS (ALT 637 FOR MEDICARE OP, ALT 636 FOR OP/ED)

## 2024-03-06 PROCEDURE — 84132 ASSAY OF SERUM POTASSIUM: CPT | Performed by: HOSPITALIST

## 2024-03-06 PROCEDURE — 83735 ASSAY OF MAGNESIUM: CPT | Performed by: HOSPITALIST

## 2024-03-06 PROCEDURE — 2020000001 HC ICU ROOM DAILY

## 2024-03-06 PROCEDURE — 94640 AIRWAY INHALATION TREATMENT: CPT

## 2024-03-06 PROCEDURE — 2500000004 HC RX 250 GENERAL PHARMACY W/ HCPCS (ALT 636 FOR OP/ED): Performed by: HOSPITALIST

## 2024-03-06 PROCEDURE — C9113 INJ PANTOPRAZOLE SODIUM, VIA: HCPCS

## 2024-03-06 PROCEDURE — 84100 ASSAY OF PHOSPHORUS: CPT | Performed by: HOSPITALIST

## 2024-03-06 PROCEDURE — 85025 COMPLETE CBC W/AUTO DIFF WBC: CPT | Performed by: HOSPITALIST

## 2024-03-06 RX ORDER — MAGNESIUM SULFATE HEPTAHYDRATE 40 MG/ML
2 INJECTION, SOLUTION INTRAVENOUS ONCE
Status: COMPLETED | OUTPATIENT
Start: 2024-03-06 | End: 2024-03-06

## 2024-03-06 RX ORDER — FUROSEMIDE 10 MG/ML
40 INJECTION INTRAMUSCULAR; INTRAVENOUS ONCE
Status: COMPLETED | OUTPATIENT
Start: 2024-03-06 | End: 2024-03-06

## 2024-03-06 RX ORDER — KETOROLAC TROMETHAMINE 30 MG/ML
30 INJECTION, SOLUTION INTRAMUSCULAR; INTRAVENOUS ONCE
Status: COMPLETED | OUTPATIENT
Start: 2024-03-06 | End: 2024-03-06

## 2024-03-06 RX ORDER — BUTALBITAL, ACETAMINOPHEN AND CAFFEINE 50; 325; 40 MG/1; MG/1; MG/1
1 TABLET ORAL ONCE
Status: COMPLETED | OUTPATIENT
Start: 2024-03-06 | End: 2024-03-06

## 2024-03-06 RX ORDER — DIPHENHYDRAMINE HYDROCHLORIDE 50 MG/ML
25 INJECTION INTRAMUSCULAR; INTRAVENOUS ONCE
Status: COMPLETED | OUTPATIENT
Start: 2024-03-06 | End: 2024-03-06

## 2024-03-06 RX ORDER — POTASSIUM CHLORIDE 1.5 G/1.58G
20 POWDER, FOR SOLUTION ORAL ONCE
Status: COMPLETED | OUTPATIENT
Start: 2024-03-06 | End: 2024-03-06

## 2024-03-06 RX ADMIN — CLONAZEPAM 1 MG: 1 TABLET ORAL at 21:15

## 2024-03-06 RX ADMIN — BUSPIRONE HYDROCHLORIDE 30 MG: 5 TABLET ORAL at 09:19

## 2024-03-06 RX ADMIN — MAGNESIUM SULFATE HEPTAHYDRATE 2 G: 40 INJECTION, SOLUTION INTRAVENOUS at 22:56

## 2024-03-06 RX ADMIN — CLONAZEPAM 1 MG: 1 TABLET ORAL at 09:19

## 2024-03-06 RX ADMIN — FENTANYL CITRATE 100 MCG: 50 INJECTION, SOLUTION INTRAMUSCULAR; INTRAVENOUS at 00:08

## 2024-03-06 RX ADMIN — LEVALBUTEROL 1.25 MG: 1.25 SOLUTION, CONCENTRATE RESPIRATORY (INHALATION) at 07:03

## 2024-03-06 RX ADMIN — DIPHENHYDRAMINE HYDROCHLORIDE 25 MG: 50 INJECTION, SOLUTION INTRAMUSCULAR; INTRAVENOUS at 22:56

## 2024-03-06 RX ADMIN — LEVOTHYROXINE SODIUM 150 MCG: 75 TABLET ORAL at 09:20

## 2024-03-06 RX ADMIN — RIVAROXABAN 10 MG: 10 TABLET, FILM COATED ORAL at 09:20

## 2024-03-06 RX ADMIN — LEVALBUTEROL 1.25 MG: 1.25 SOLUTION, CONCENTRATE RESPIRATORY (INHALATION) at 12:58

## 2024-03-06 RX ADMIN — PROPOFOL 50 MCG/KG/MIN: 10 INJECTION, EMULSION INTRAVENOUS at 02:45

## 2024-03-06 RX ADMIN — POLYETHYLENE GLYCOL 3350 17 G: 17 POWDER, FOR SOLUTION ORAL at 09:00

## 2024-03-06 RX ADMIN — INSULIN LISPRO 2 UNITS: 100 INJECTION, SOLUTION INTRAVENOUS; SUBCUTANEOUS at 06:24

## 2024-03-06 RX ADMIN — Medication: at 05:35

## 2024-03-06 RX ADMIN — LEVALBUTEROL 1.25 MG: 1.25 SOLUTION, CONCENTRATE RESPIRATORY (INHALATION) at 20:02

## 2024-03-06 RX ADMIN — BUTALBITAL, ACETAMINOPHEN, AND CAFFEINE 1 TABLET: 50; 325; 40 TABLET, COATED ORAL at 13:32

## 2024-03-06 RX ADMIN — INSULIN LISPRO 2 UNITS: 100 INJECTION, SOLUTION INTRAVENOUS; SUBCUTANEOUS at 21:46

## 2024-03-06 RX ADMIN — BUSPIRONE HYDROCHLORIDE 30 MG: 5 TABLET ORAL at 21:14

## 2024-03-06 RX ADMIN — FUROSEMIDE 40 MG: 10 INJECTION, SOLUTION INTRAMUSCULAR; INTRAVENOUS at 13:33

## 2024-03-06 RX ADMIN — INSULIN LISPRO 4 UNITS: 100 INJECTION, SOLUTION INTRAVENOUS; SUBCUTANEOUS at 10:30

## 2024-03-06 RX ADMIN — POTASSIUM CHLORIDE 20 MEQ: 1.5 POWDER, FOR SOLUTION ORAL at 09:20

## 2024-03-06 RX ADMIN — RISPERIDONE 0.5 MG: 0.25 TABLET ORAL at 21:15

## 2024-03-06 RX ADMIN — METHYLPREDNISOLONE SODIUM SUCCINATE 40 MG: 40 INJECTION, POWDER, FOR SOLUTION INTRAMUSCULAR; INTRAVENOUS at 06:24

## 2024-03-06 RX ADMIN — KETOROLAC TROMETHAMINE 30 MG: 30 INJECTION, SOLUTION INTRAMUSCULAR at 22:56

## 2024-03-06 RX ADMIN — ATORVASTATIN CALCIUM 20 MG: 20 TABLET, FILM COATED ORAL at 21:14

## 2024-03-06 RX ADMIN — MAGNESIUM SULFATE HEPTAHYDRATE 2 G: 40 INJECTION, SOLUTION INTRAVENOUS at 09:18

## 2024-03-06 RX ADMIN — ESCITALOPRAM OXALATE 10 MG: 10 TABLET, FILM COATED ORAL at 09:20

## 2024-03-06 RX ADMIN — PRAZOSIN HYDROCHLORIDE 5 MG: 5 CAPSULE ORAL at 21:15

## 2024-03-06 RX ADMIN — RISPERIDONE 0.5 MG: 0.25 TABLET ORAL at 09:20

## 2024-03-06 RX ADMIN — PANTOPRAZOLE SODIUM 40 MG: 40 INJECTION, POWDER, FOR SOLUTION INTRAVENOUS at 09:20

## 2024-03-06 RX ADMIN — PROPOFOL 35 MCG/KG/MIN: 10 INJECTION, EMULSION INTRAVENOUS at 07:16

## 2024-03-06 ASSESSMENT — PAIN SCALES - GENERAL
PAINLEVEL_OUTOF10: 0 - NO PAIN
PAINLEVEL_OUTOF10: 10 - WORST POSSIBLE PAIN

## 2024-03-06 ASSESSMENT — ENCOUNTER SYMPTOMS
COUGH: 1
FATIGUE: 1
EYES NEGATIVE: 1
DIAPHORESIS: 1
FEVER: 1
ENDOCRINE NEGATIVE: 1
CHILLS: 1
SHORTNESS OF BREATH: 1
NERVOUS/ANXIOUS: 1
HEMATOLOGIC/LYMPHATIC NEGATIVE: 1
WEAKNESS: 1
ALLERGIC/IMMUNOLOGIC NEGATIVE: 1
MYALGIAS: 1
CHEST TIGHTNESS: 1
APPETITE CHANGE: 1
GASTROINTESTINAL NEGATIVE: 1
SLEEP DISTURBANCE: 1
ACTIVITY CHANGE: 1
ARTHRALGIAS: 1
WHEEZING: 1

## 2024-03-06 ASSESSMENT — COGNITIVE AND FUNCTIONAL STATUS - GENERAL
WALKING IN HOSPITAL ROOM: A LOT
MOVING FROM LYING ON BACK TO SITTING ON SIDE OF FLAT BED WITH BEDRAILS: A LITTLE
DRESSING REGULAR LOWER BODY CLOTHING: A LITTLE
CLIMB 3 TO 5 STEPS WITH RAILING: A LOT
MOBILITY SCORE: 16
DAILY ACTIVITIY SCORE: 19
TURNING FROM BACK TO SIDE WHILE IN FLAT BAD: A LITTLE
STANDING UP FROM CHAIR USING ARMS: A LITTLE
TOILETING: A LITTLE
DRESSING REGULAR UPPER BODY CLOTHING: A LITTLE
HELP NEEDED FOR BATHING: A LITTLE
PERSONAL GROOMING: A LITTLE
MOVING TO AND FROM BED TO CHAIR: A LITTLE

## 2024-03-06 ASSESSMENT — PAIN - FUNCTIONAL ASSESSMENT
PAIN_FUNCTIONAL_ASSESSMENT: CPOT (CRITICAL CARE PAIN OBSERVATION TOOL)
PAIN_FUNCTIONAL_ASSESSMENT: 0-10

## 2024-03-06 NOTE — TREATMENT PLAN
Placed pt on SBT PS5P5 at 0924. Pt tolerating at this time.    NIF -50      NIF -50  Positive air leak    RN aware

## 2024-03-06 NOTE — PROGRESS NOTES
Pts sats were dipping to mid 80s. RN called for an oxymizer. Placed pt on 10L and was 90%. Now on 15L sating 94-95%. Pt tolerating at this time. RN aware

## 2024-03-06 NOTE — PROGRESS NOTES
[facility]       Date:  3/6/2024  Patient:  Stephenie Mccray  YOB: 1980  MRN:  [id number]   Admit Date:  3/5/2024  ========================================================================================================    Chief Complaint   Patient presents with    Shortness of Breath     Pt states SOB and congestion. Hx of COPD. 89% RA, walked to ED. Placed on 2 L NC in triage, 96%         History of Present Illness:  Stephenie Mccray is a 43 y.o. year old female patient with Past Medical History of  COPD (4L@home), DVT/PE (on Xarelto), schizophrenia, migraine headaches, HTN, HLD, current smoker, GERD, IDDM, and hypothyroidism. She is well known to this facility with frequent admissions for acute on chronic hypoxic/hypercapnic respiratory failure.  She presented to [facility] emergency department 3/5 with complaints of generalized weakness, fevers, chills, shortness of breath, cough.  For the past couple days she had increasing shortness of breath on exertion as well as productive cough with yellow/green phlegm.  She states she continues to smoke a lot.  Denies any chest pain, palpitations, hemoptysis, night sweats, abdominal pain, vomiting, diarrhea.     In the emergency department patient was placed on BiPAP with VBG of 7.28, pCO2 66.  She was given 1L LR.  She was given 125 mg Solu-Medrol as well as 2 g magnesium sulfate.  Laboratory workup was unremarkable.  CT PE study showed small groundglass foci of airspace disease likely representing alveolar edema.  She was admitted to the ICU.  Upon arrival to ICU patient extremely lethargic, concern for airway protection, therefore she was intubated and placed on mechanical ventilation.       Interval ICU Events:  3/5: Admitted to ICU.  Intubated.  Mechanically ventilated.  Diuresed with 40 mg IV Lasix.   3/6: Intubated and sedated with propofol and precedex. Alert and following commands, writing on notepad for staff. FB (-)  "500s  Update @ 1000: extubated to 6L NC with adequate saturation.     Medical History:  Past Medical History:   Diagnosis Date    Arthritis     COPD (chronic obstructive pulmonary disease) (CMS/[facility]ly [facility])     Diabetes mellitus (CMS/Formerly [facility])     History of transfusion     Hypothyroidism     CHARLIE (obstructive sleep apnea)      Past Surgical History:   Procedure Laterality Date     SECTION, LOW TRANSVERSE      EYE SURGERY      HERNIA REPAIR      TONSILLECTOMY      VENTRAL HERNIA REPAIR       Medications Prior to Admission   Medication Sig Dispense Refill Last Dose    alcohol swabs (Alcohol Prep Pads) pads, medicated Use 3 times daily prn 100 each 3     atorvastatin (Lipitor) 20 mg tablet Take 1 tablet (20 mg) by mouth once daily. Dr. Davis covering for Dr. Yuen 30 tablet 0     BD Ultra-Fine Mini Pen Needle 31 gauge x 3/16\" needle USE TO INJECT 4 TIMES DAILY AS DIRECTED 400 each 3     busPIRone (Buspar) 30 mg tablet Take 1 tablet (30 mg) by mouth 2 times a day.       clonazePAM (KlonoPIN) 1 mg tablet Take by mouth 2 times a day as needed.       escitalopram (Lexapro) 10 mg tablet Take 1 tablet (10 mg) by mouth once daily.       hydrOXYzine pamoate (Vistaril) 50 mg capsule Take 1 capsule (50 mg) by mouth 4 times a day as needed for anxiety.       insulin glargine (Lantus Solostar U-100 Insulin) 100 unit/mL (3 mL) pen Inject 10 Units under the skin once daily in the morning. Take as directed per insulin instructions. 3 mL 2     insulin lispro (HumaLOG) 100 unit/mL injection Inject 0.04 mL (4 Units) under the skin 3 times a day with meals. Plus sliding scale       Invega Sustenna 234 mg/1.5 mL syringe INJECT 1 syringe INTRAMUSCULARLY EVERY 4 WEEKS       ipratropium-albuteroL (Duo-Neb) 0.5-2.5 mg/3 mL nebulizer solution Take 3 mL by nebulization every 6 hours if needed for shortness of breath or wheezing. (Patient taking differently: Take 3 mL by nebulization every 4 hours. As needed) 180 mL 1     levothyroxine (Synthroid, " Levoxyl) 150 mcg tablet Take 1 tablet (150 mcg) by mouth once daily. 90 tablet 1     nicotine (Nicoderm CQ) 14 mg/24 hr patch Place 1 patch over 24 hours on the skin once daily for 14 doses. Do not start before February 21, 2024. 14 patch 0     nicotine (Nicoderm CQ) 21 mg/24 hr patch Place 1 patch over 24 hours on the skin once daily for 37 doses. Do not start before January 15, 2024. 30 patch 1     nicotine (Nicoderm CQ) 7 mg/24 hr patch Place 1 patch over 24 hours on the skin once daily for 14 doses. Do not start before March 6, 2024. 14 patch 0     nicotine polacrilex (Commit) 4 mg lozenge Dissolve 1 lozenge (4 mg) in the mouth every 2 hours if needed for smoking cessation. 100 lozenge 0     OneTouch Verio test strips strip        paliperidone (Invega) 3 mg 24 hr tablet Take 1 tablet (3 mg) by mouth once daily. Do not crush, chew, or split. Do not start before January 15, 2024. 30 tablet 0     pantoprazole (ProtoNix) 40 mg EC tablet Take 1 tablet (40 mg) by mouth once daily. 90 tablet 1     prazosin (Minipress) 5 mg capsule Take 1 capsule (5 mg) by mouth once daily at bedtime.       Xarelto 10 mg tablet Take 1 tablet (10 mg) by mouth once daily.        Fluticasone furoate-vilanterol, Fluticasone-umeclidin-vilanter, Sumatriptan, Vortioxetine, and Levofloxacin  Social History     Tobacco Use    Smoking status: Every Day     Packs/day: 1     Types: Cigarettes     Passive exposure: Never    Smokeless tobacco: Never   Substance Use Topics    Alcohol use: Never    Drug use: Never     Family History   Problem Relation Name Age of Onset    Fibromyalgia Father      Hypertension Brother      Thyroid disease Maternal Grandmother      Thyroid disease Paternal Grandmother      Lung cancer Paternal Grandfather      Coronary artery disease Other Grandmother        Hospital Medications:    dexmedeTOMIDine, 0.1-1.5 mcg/kg/hr, Last Rate: 0.31 mcg/kg/hr (03/06/24 0600)  propofol, 5-50 mcg/kg/min, Last Rate: 35 mcg/kg/min (03/06/24  0716)          Current Facility-Administered Medications:     acetaminophen (Tylenol) oral liquid 650 mg, 650 mg, oral, q4h PRN **OR** acetaminophen (Tylenol) tablet 650 mg, 650 mg, oral, q4h PRN, Yoana E Pentito, APRN-CNP    atorvastatin (Lipitor) tablet 20 mg, 20 mg, oral, Nightly, Jose M Grothaus, APRN-CNP, 20 mg at 03/05/24 2134    busPIRone (Buspar) tablet 30 mg, 30 mg, oral, BID, Jose M Grothaus, APRN-CNP, 30 mg at 03/05/24 2151    chlorhexidine (Peridex) 0.12 % solution 15 mL, 15 mL, Mouth/Throat, BID, Yoana E Pentito, APRN-CNP, 15 mL at 03/05/24 2200    clonazePAM (KlonoPIN) tablet 1 mg, 1 mg, oral, BID, Jose M Grothaus, APRN-CNP, 1 mg at 03/05/24 2134    dexmedeTOMIDine in NS (Precedex) 400 mcg in 100 mL (4 mcg/mL) infusion, 0.1-1.5 mcg/kg/hr, intravenous, Continuous, Yoana E Pentito, APRN-CNP, Last Rate: 5.83 mL/hr at 03/06/24 0600, 0.31 mcg/kg/hr at 03/06/24 0600    dextrose 10 % in water (D10W) infusion, 0.3 g/kg/hr, intravenous, Once PRN, Yoana E Pentito, APRN-CNP    dextrose 50 % injection 25 g, 25 g, intravenous, q15 min PRN, Yoana E Pentito, APRN-CNP    escitalopram (Lexapro) tablet 10 mg, 10 mg, oral, Daily, Jose M Grothaus, APRN-CNP, 10 mg at 03/05/24 0920    glucagon (Glucagen) injection 1 mg, 1 mg, intramuscular, q15 min PRN, Yoana E Pentito, APRN-CNP    hydrOXYzine pamoate (Vistaril) capsule 50 mg, 50 mg, oral, 4x daily PRN, Jose M Grothaus, APRN-CNP    insulin lispro (HumaLOG) injection 0-10 Units, 0-10 Units, subcutaneous, q4h, Yoana Madrigal, APRN-CNP, 2 Units at 03/06/24 0624    levalbuterol (Xopenex) 1.25 mg/0.5 mL nebulizer solution 1.25 mg, 1.25 mg, nebulization, TID, Yoana Madrigal, APRN-CNP, 1.25 mg at 03/06/24 0703    levothyroxine (Synthroid, Levoxyl) tablet 150 mcg, 150 mcg, oral, Daily, Jose Amaya, APRN-CNP, 150 mcg at 03/05/24 0921    magnesium sulfate IV 2 g, 2 g, intravenous, Once, Yoana Madrigal, APRN-CNP    methylPREDNISolone sod succinate (PF)  (SOLU-Medrol) 40 mg/mL injection 40 mg, 40 mg, intravenous, q24h, Yoana E Pentito, APRN-CNP, 40 mg at 03/06/24 0624    oxygen (O2) therapy, , inhalation, Continuous PRN - O2/gases, Jaylen Frey, DO, Rate Change at 03/06/24 0705    pantoprazole (ProtoNix) injection 40 mg, 40 mg, intravenous, Daily, Jose M Grothaus, APRN-CNP, 40 mg at 03/05/24 0920    polyethylene glycol (Glycolax, Miralax) packet 17 g, 17 g, oral, Daily, Yoana E Pentito, APRN-CNP, 17 g at 03/05/24 0852    potassium chloride (Klor-Con) packet 20 mEq, 20 mEq, oral, Once, Yoana E Pentito, APRN-CNP    prazosin (Minipress) capsule 5 mg, 5 mg, oral, Nightly, Jose M Grothaus, APRN-CNP, 5 mg at 03/05/24 2134    propofol (Diprivan) infusion, 5-50 mcg/kg/min, intravenous, Continuous, Yoana E Pentito, APRN-CNP, Last Rate: 15.81 mL/hr at 03/06/24 0716, 35 mcg/kg/min at 03/06/24 0716    risperiDONE (RisperDAL) tablet 0.5 mg, 0.5 mg, oral, BID, Jose M Grothaus, APRN-CNP, 0.5 mg at 03/05/24 2134    rivaroxaban (Xarelto) tablet 10 mg, 10 mg, oral, Daily, Jose M Grothaus, APRN-CNP, 10 mg at 03/05/24 0921    Review of Systems:  14 point review of systems was completed and negative except for those specially mention in my HPI    Physical Exam:    Heart Rate:  []   Temp:  [36.4 °C (97.6 °F)-36.8 °C (98.2 °F)]   Resp:  [17-27]   BP: ()/()   Weight:  [70.9 kg (156 lb 4.9 oz)]   SpO2:  [93 %-100 %]     Physical Exam  HENT:      Head: Normocephalic.      Mouth/Throat:      Mouth: Mucous membranes are moist.   Eyes:      Extraocular Movements: Extraocular movements intact.      Conjunctiva/sclera: Conjunctivae normal.   Cardiovascular:      Rate and Rhythm: Normal rate and regular rhythm.      Pulses: Normal pulses.      Heart sounds: Normal heart sounds.   Pulmonary:      Effort: Pulmonary effort is normal.      Breath sounds: Wheezing present.   Abdominal:      General: Bowel sounds are normal.      Palpations: Abdomen is soft.   Skin:      General: Skin is warm and dry.      Capillary Refill: Capillary refill takes less than 2 seconds.   Neurological:      General: No focal deficit present.      Mental Status: She is alert.   Psychiatric:         Mood and Affect: Mood normal.         Behavior: Behavior normal.         Objective:    I have reviewed all medications, laboratory results, and imaging pertinent for today's encounter.    Vent Mode: Volume control/assist control  FiO2 (%):  [80 %-100 %] 80 %  S RR:  [] 18  S VT:  [400 mL] 400 mL  PEEP/CPAP (cm H2O):  [5 cm H20] 5 cm H20  MAP (cm H2O):  [9.8-17] 12      Intake/Output Summary (Last 24 hours) at 3/6/2024 0900  Last data filed at 3/6/2024 0600  Gross per 24 hour   Intake 571.46 ml   Output 1225 ml   Net -653.54 ml       Recent Imaging  XR chest 1 view   Final Result   Left lung reinflated after successful repositioning of endotracheal   tube        MACRO:   None        Signed by: Tavares Ellis 3/5/2024 7:50 AM   Dictation workstation:   [id number]      XR chest 1 view   Final Result   Endotracheal tube is in the right mainstem bronchus with consequent   new left lung collapse. The tube has already been repositioned and a   follow-up radiograph is pending. See above        MACRO:   None        Signed by: Tavares Ellis 3/5/2024 7:14 AM   Dictation workstation:   [id number]      CT angio chest for pulmonary embolism   Final Result   Cardiomegaly with mild pulmonary edema suggesting CHF.  Small   groundglass foci of airspace disease may represent alveolar edema   although low-grade developing infectious or inflammatory process may   be a consideration in the appropriate clinical setting.  No evidence   of large central pulmonary embolus..   Signed by Zeferino Clemens      XR chest 1 view   Final Result   No acute cardiopulmonary disease.   Signed by Zeferino Clemens          No echocardiogram results found for the past 14 days    Assessment/Plan:    I am currently managing this critically ill patient  for the following problems:    Neuro/Psych/Pain Ctrl/Sedation:  #Metabolic Encephalopathy - Likely 2/2 CO2 narcosis  #Hx Schizophrenia  #Hx Migraine headaches  -CAM-ICU  -delirium precautions  -home Buspirone, Lexapro, Prazosin, Clonazepam     Respiratory/ENT:  #Acute on chronic Hypoxic/Hypercapnic Respiratory Failure - Home O2 of 4L  #COPD exacerbation  #CHARLIE  #Nicotine dependence   -Consult pulmonology, follows with Dr. Villalba  -Solumedrol 40mg daily  -Levalbuterol  -CT PE with pulmonary edema, Lasix 40mg for goal -1 to -2L daily  -Smoking cessation education  -extubated, BiPAP at night and as needed     Cardiovascular:  #Hx HLD  #Hx HTN  -atorvastatin  -prazosin  -gentle diuresis     GI:  #Hx GERD  -PPI     Renal/Volume Status (Intra & Extravascular):  No active issues  -Strict I/O  -Daily BMP and electrolyte repletion  -diuresis, goal net negative -1-2 L daily     Endocrine  #Hx IDDM - A1c 11/2023 of 7.3  #Hx Hypothyroid  -Hold home Lantus 10u daily while NPO, resume when tolerating diet  -accu check with SSI  -Synthroid     Infectious Disease:  No concern for infectious process at this time. Pt afebrile, WBC WNL, lactate WNL  -trend CBC  -Covid/flu/RSV negative  -Continue to monitor off abx     Heme/Onc:  #Hx DVT/PE  -home Xarelto     OBGYN/MSK:  -PT/OT eval when able     Ethics/Code Status:  Full code     :  DVT Prophylaxis: Xarelto  GI Prophylaxis: IV PPI  Bowel Regimen: Miralax  Diet: NPO  CVC: No  Moyers: No  Tran: No  Restraints: none  Dispo: ICU    Critical Care Time:  45 minutes spent in preparing to see patient (I.e. review of medical records), evaluation of diagnostics (I.e. labs, imaging, etc.), documentation, discussing plan of care with patient/ family/ caregiver, and/ or coordination of care with multidisciplinary team. Time does not include completion of procedure time.       Jazmin Portillo, APRN-CNP

## 2024-03-06 NOTE — CONSULTS
Reason For Consult  Short of breath and acute on chronic hypoxic and hypercapnic respiratory failure.    History Of Present Illness  Stephenie Mccray is a 43 y.o. female presenting with shortness of breath wheezing cough chest tightness congestion sputum production feeling tired fatigue.  Patient recurrent admission for acute on chronic hypoxic and hypercapnic respiratory failure.  Patient continues to smoke..n the emergency department patient was placed on BiPAP with VBG of 7.28, pCO2 66.  She was given 1L LR.  She was given 125 mg Solu-Medrol as well as 2 g magnesium sulfate.  Laboratory workup was unremarkable.  CT PE study showed small groundglass foci of airspace disease likely representing alveolar edema.  She was admitted to the ICU.  Upon arrival to ICU patient extremely lethargic, concern for airway protection, therefore she was intubated and placed on mechanical ventilation.         Interval ICU Events:  3/5: Admitted to ICU.  Intubated.  Mechanically ventilated.  Diuresed with 40 mg IV Lasix.   3/6: Intubated and sedated with propofol and precedex. Alert and following commands, writing on notepad for staff. FB (-) 500s  Update @ 1000: extubated to 6L NC with adequate saturation.      Past Medical History  She has a past medical history of Arthritis, COPD (chronic obstructive pulmonary disease) (CMS/[facility]), Diabetes mellitus (CMS/[facility]), History of transfusion, Hypothyroidism, and CHARLIE (obstructive sleep apnea).    Surgical History  She has a past surgical history that includes Tonsillectomy; Eye surgery;  section, low transverse; Hernia repair; and Ventral hernia repair.     Social History  She reports that she has been smoking cigarettes. She has been smoking an average of 1 pack per day. She has never been exposed to tobacco smoke. She has never used smokeless tobacco. She reports that she does not drink alcohol and does not use drugs.    Family History  Family History   Problem Relation Name Age  of Onset    Fibromyalgia Father      Hypertension Brother      Thyroid disease Maternal Grandmother      Thyroid disease Paternal Grandmother      Lung cancer Paternal Grandfather      Coronary artery disease Other Grandmother         Allergies  Fluticasone furoate-vilanterol, Fluticasone-umeclidin-vilanter, Sumatriptan, Vortioxetine, and Levofloxacin    Review of Systems  Review of Systems   Constitutional:  Positive for activity change, appetite change, chills, diaphoresis, fatigue and fever.   HENT:  Positive for congestion.    Eyes: Negative.    Respiratory:  Positive for cough, chest tightness, shortness of breath and wheezing.    Gastrointestinal: Negative.    Endocrine: Negative.    Genitourinary: Negative.    Musculoskeletal:  Positive for arthralgias and myalgias.   Skin: Negative.    Allergic/Immunologic: Negative.    Neurological:  Positive for weakness.   Hematological: Negative.    Psychiatric/Behavioral:  Positive for sleep disturbance. The patient is nervous/anxious.    All other systems reviewed and are negative.        Physical Exam  Physical Exam  Vitals reviewed.   Constitutional:       Appearance: Normal appearance. She is obese.   HENT:      Head: Normocephalic and atraumatic.      Right Ear: External ear normal.      Left Ear: External ear normal.      Nose: Congestion present.      Mouth/Throat:      Mouth: Mucous membranes are moist.   Eyes:      Extraocular Movements: Extraocular movements intact.      Conjunctiva/sclera: Conjunctivae normal.      Pupils: Pupils are equal, round, and reactive to light.   Cardiovascular:      Rate and Rhythm: Normal rate and regular rhythm.      Pulses: Normal pulses.      Heart sounds: Normal heart sounds.   Pulmonary:      Effort: Respiratory distress present.      Breath sounds: Wheezing, rhonchi and rales present.   Abdominal:      General: Bowel sounds are normal.      Palpations: Abdomen is soft.   Musculoskeletal:         General: Normal range of  "motion.      Cervical back: Normal range of motion and neck supple.   Skin:     General: Skin is warm and dry.      Capillary Refill: Capillary refill takes 2 to 3 seconds.   Neurological:      General: No focal deficit present.      Mental Status: She is alert and oriented to person, place, and time.   Psychiatric:         Mood and Affect: Mood normal.         Behavior: Behavior normal.         Thought Content: Thought content normal.         Judgment: Judgment normal.           Last Recorded Vitals  Blood pressure 139/80, pulse 108, temperature 36.5 °C (97.7 °F), temperature source Temporal, resp. rate (!) 28, height 1.575 m (5' 2\"), weight 70.9 kg (156 lb 4.9 oz), SpO2 92 %.    Relevant Results  Results for orders placed or performed during the hospital encounter of 03/05/24 (from the past 96 hour(s))   CBC and Auto Differential   Result Value Ref Range    WBC 10.0 4.4 - 11.3 x10*3/uL    nRBC 0.0 0.0 - 0.0 /100 WBCs    RBC 4.36 4.00 - 5.20 x10*6/uL    Hemoglobin 13.9 12.0 - 16.0 g/dL    Hematocrit 42.9 36.0 - 46.0 %    MCV 98 80 - 100 fL    MCH 31.9 26.0 - 34.0 pg    MCHC 32.4 32.0 - 36.0 g/dL    RDW 14.7 (H) 11.5 - 14.5 %    Platelets 138 (L) 150 - 450 x10*3/uL    Neutrophils % 65.6 40.0 - 80.0 %    Immature Granulocytes %, Automated 1.5 (H) 0.0 - 0.9 %    Lymphocytes % 24.8 13.0 - 44.0 %    Monocytes % 7.2 2.0 - 10.0 %    Eosinophils % 0.4 0.0 - 6.0 %    Basophils % 0.5 0.0 - 2.0 %    Neutrophils Absolute 6.54 1.20 - 7.70 x10*3/uL    Immature Granulocytes Absolute, Automated 0.15 0.00 - 0.70 x10*3/uL    Lymphocytes Absolute 2.47 1.20 - 4.80 x10*3/uL    Monocytes Absolute 0.72 0.10 - 1.00 x10*3/uL    Eosinophils Absolute 0.04 0.00 - 0.70 x10*3/uL    Basophils Absolute 0.05 0.00 - 0.10 x10*3/uL   Comprehensive metabolic panel   Result Value Ref Range    Glucose 111 (H) 74 - 99 mg/dL    Sodium 141 136 - 145 mmol/L    Potassium 4.1 3.5 - 5.3 mmol/L    Chloride 103 98 - 107 mmol/L    Bicarbonate 32 21 - 32 mmol/L    " Anion Gap 10 10 - 20 mmol/L    Urea Nitrogen 18 6 - 23 mg/dL    Creatinine 0.92 0.50 - 1.05 mg/dL    eGFR 80 >60 mL/min/1.73m*2    Calcium 9.4 8.6 - 10.3 mg/dL    Albumin 4.3 3.4 - 5.0 g/dL    Alkaline Phosphatase 87 33 - 110 U/L    Total Protein 7.1 6.4 - 8.2 g/dL    AST 14 9 - 39 U/L    Bilirubin, Total 0.3 0.0 - 1.2 mg/dL    ALT 9 7 - 45 U/L   Lactate   Result Value Ref Range    Lactate 0.9 0.4 - 2.0 mmol/L   B-Type Natriuretic Peptide   Result Value Ref Range    BNP 19 0 - 99 pg/mL   Troponin I, High Sensitivity, Initial   Result Value Ref Range    Troponin I, High Sensitivity 4 0 - 13 ng/L   Acute Toxicology Panel, Blood   Result Value Ref Range    Acetaminophen <10.0 10.0 - 30.0 ug/mL    Salicylate  <3 4 - 20 mg/dL    Alcohol <10 <=10 mg/dL   Sars-CoV-2 and Influenza A/B PCR   Result Value Ref Range    Flu A Result Not Detected Not Detected    Flu B Result Not Detected Not Detected    Coronavirus 2019, PCR Not Detected Not Detected   RSV PCR   Result Value Ref Range    RSV PCR Not Detected Not Detected   Light Blue Top   Result Value Ref Range    Extra Tube Hold for add-ons.    ECG 12 lead   Result Value Ref Range    Ventricular Rate 104 BPM    Atrial Rate 104 BPM    LA Interval 128 ms    QRS Duration 82 ms    QT Interval 360 ms    QTC Calculation(Bazett) 473 ms    P Axis 53 degrees    R Axis -14 degrees    T Axis 56 degrees    QRS Count 17 beats    Q Onset 224 ms    P Onset 160 ms    P Offset 207 ms    T Offset 404 ms    QTC Fredericia 432 ms   BLOOD GAS ARTERIAL FULL PANEL   Result Value Ref Range    POCT pH, Arterial 7.33 (L) 7.38 - 7.42 pH    POCT pCO2, Arterial 63 (H) 38 - 42 mm Hg    POCT pO2, Arterial 69 (L) 85 - 95 mm Hg    POCT SO2, Arterial 96 94 - 100 %    POCT Oxy Hemoglobin, Arterial 88.4 (L) 94.0 - 98.0 %    POCT Hematocrit Calculated, Arterial 40.0 36.0 - 46.0 %    POCT Sodium, Arterial 140 136 - 145 mmol/L    POCT Potassium, Arterial 4.1 3.5 - 5.3 mmol/L    POCT Chloride, Arterial 105 98 - 107  mmol/L    POCT Ionized Calcium, Arterial 1.24 1.10 - 1.33 mmol/L    POCT Glucose, Arterial 102 (H) 74 - 99 mg/dL    POCT Lactate, Arterial 0.8 0.4 - 2.0 mmol/L    POCT Base Excess, Arterial 5.3 (H) -2.0 - 3.0 mmol/L    POCT HCO3 Calculated, Arterial 33.2 (H) 22.0 - 26.0 mmol/L    POCT Hemoglobin, Arterial 13.4 12.0 - 16.0 g/dL    POCT Anion Gap, Arterial 6 (L) 10 - 25 mmo/L    Patient Temperature      FiO2 40 %    Apparatus CANNULA     Site of Arterial Puncture Radial Right     Andrew's Test Positive    Troponin, High Sensitivity, 1 Hour   Result Value Ref Range    Troponin I, High Sensitivity <3 0 - 13 ng/L   BLOOD GAS ARTERIAL FULL PANEL   Result Value Ref Range    POCT pH, Arterial 7.28 (L) 7.38 - 7.42 pH    POCT pCO2, Arterial 66 (H) 38 - 42 mm Hg    POCT pO2, Arterial 28 (LL) 85 - 95 mm Hg    POCT SO2, Arterial 50 (L) 94 - 100 %    POCT Oxy Hemoglobin, Arterial 47.1 (L) 94.0 - 98.0 %    POCT Hematocrit Calculated, Arterial 39.0 36.0 - 46.0 %    POCT Sodium, Arterial 138 136 - 145 mmol/L    POCT Potassium, Arterial 3.8 3.5 - 5.3 mmol/L    POCT Chloride, Arterial 102 98 - 107 mmol/L    POCT Ionized Calcium, Arterial 1.24 1.10 - 1.33 mmol/L    POCT Glucose, Arterial 179 (H) 74 - 99 mg/dL    POCT Lactate, Arterial 1.2 0.4 - 2.0 mmol/L    POCT Base Excess, Arterial 2.5 -2.0 - 3.0 mmol/L    POCT HCO3 Calculated, Arterial 31.0 (H) 22.0 - 26.0 mmol/L    POCT Hemoglobin, Arterial 12.9 12.0 - 16.0 g/dL    POCT Anion Gap, Arterial 9 (L) 10 - 25 mmo/L    Patient Temperature      FiO2 50 %    Apparatus VENTI MASK     Critical Called By RAJI RRT     Critical Called To MARQUITA REED     Critical Call Time 315.0000     Critical Read Back Y     Critical Note LIKELY VENOUS SAMPLE     Site of Arterial Puncture Radial Right     Andrew's Test Positive    Blood Gas Arterial Full Panel   Result Value Ref Range    POCT pH, Arterial 7.30 (L) 7.38 - 7.42 pH    POCT pCO2, Arterial 63 (H) 38 - 42 mm Hg    POCT pO2, Arterial 64 (L) 85 - 95 mm Hg     POCT SO2, Arterial 95 94 - 100 %    POCT Oxy Hemoglobin, Arterial 89.2 (L) 94.0 - 98.0 %    POCT Hematocrit Calculated, Arterial 38.0 36.0 - 46.0 %    POCT Sodium, Arterial 136 136 - 145 mmol/L    POCT Potassium, Arterial 4.3 3.5 - 5.3 mmol/L    POCT Chloride, Arterial 103 98 - 107 mmol/L    POCT Ionized Calcium, Arterial 1.15 1.10 - 1.33 mmol/L    POCT Glucose, Arterial 255 (H) 74 - 99 mg/dL    POCT Lactate, Arterial 0.9 0.4 - 2.0 mmol/L    POCT Base Excess, Arterial 3.0 -2.0 - 3.0 mmol/L    POCT HCO3 Calculated, Arterial 31.0 (H) 22.0 - 26.0 mmol/L    POCT Hemoglobin, Arterial 12.8 12.0 - 16.0 g/dL    POCT Anion Gap, Arterial 6 (L) 10 - 25 mmo/L    Patient Temperature      FiO2 50 %    Site of Arterial Puncture Radial Right     Andrew's Test Positive    POCT GLUCOSE   Result Value Ref Range    POCT Glucose 288 (H) 74 - 99 mg/dL   Blood Gas Arterial Full Panel   Result Value Ref Range    POCT pH, Arterial 7.35 (L) 7.38 - 7.42 pH    POCT pCO2, Arterial 51 (H) 38 - 42 mm Hg    POCT pO2, Arterial 68 (L) 85 - 95 mm Hg    POCT SO2, Arterial 92 (L) 94 - 100 %    POCT Oxy Hemoglobin, Arterial 91.5 (L) 94.0 - 98.0 %    POCT Hematocrit Calculated, Arterial 35.0 (L) 36.0 - 46.0 %    POCT Sodium, Arterial 135 (L) 136 - 145 mmol/L    POCT Potassium, Arterial 4.4 3.5 - 5.3 mmol/L    POCT Chloride, Arterial      POCT Ionized Calcium, Arterial 1.18 1.10 - 1.33 mmol/L    POCT Glucose, Arterial 294 (H) 74 - 99 mg/dL    POCT Lactate, Arterial 2.7 (H) 0.4 - 2.0 mmol/L    POCT Base Excess, Arterial 1.8 -2.0 - 3.0 mmol/L    POCT HCO3 Calculated, Arterial 28.2 (H) 22.0 - 26.0 mmol/L    POCT Hemoglobin, Arterial 11.7 (L) 12.0 - 16.0 g/dL    POCT Anion Gap, Arterial      Patient Temperature      FiO2 100 %    Ventilator Mode A/C     Ventilator Rate 18 bpm    Tidal Volume 400 mL    Peep CHM2O 5.0 cm H2O    Critical Called By APARNA EDWARDS     Critical Called To DR. SEQUEIRA     Critical Call Time 827.0000     Critical Read Back Y     Site of  Arterial Puncture Radial Right     Andrew's Test Negative    POCT GLUCOSE   Result Value Ref Range    POCT Glucose 237 (H) 74 - 99 mg/dL   POCT GLUCOSE   Result Value Ref Range    POCT Glucose 227 (H) 74 - 99 mg/dL   POCT GLUCOSE   Result Value Ref Range    POCT Glucose 170 (H) 74 - 99 mg/dL   Drug Screen, Urine   Result Value Ref Range    Amphetamine Screen, Urine Presumptive Negative Presumptive Negative    Barbiturate Screen, Urine Presumptive Positive (A) Presumptive Negative    Benzodiazepines Screen, Urine Presumptive Positive (A) Presumptive Negative    Cannabinoid Screen, Urine Presumptive Positive (A) Presumptive Negative    Cocaine Metabolite Screen, Urine Presumptive Negative Presumptive Negative    Fentanyl Screen, Urine Presumptive Negative Presumptive Negative    Opiate Screen, Urine Presumptive Negative Presumptive Negative    Oxycodone Screen, Urine Presumptive Negative Presumptive Negative    PCP Screen, Urine Presumptive Negative Presumptive Negative    Methadone Screen, Urine Presumptive Negative Presumptive Negative   Blood Gas Arterial Full Panel   Result Value Ref Range    POCT pH, Arterial 7.36 (L) 7.38 - 7.42 pH    POCT pCO2, Arterial 58 (H) 38 - 42 mm Hg    POCT pO2, Arterial 48 (L) 85 - 95 mm Hg    POCT SO2, Arterial 81 (L) 94 - 100 %    POCT Oxy Hemoglobin, Arterial 79.4 (L) 94.0 - 98.0 %    POCT Hematocrit Calculated, Arterial 40.0 36.0 - 46.0 %    POCT Sodium, Arterial 140 136 - 145 mmol/L    POCT Potassium, Arterial 4.0 3.5 - 5.3 mmol/L    POCT Chloride, Arterial      POCT Ionized Calcium, Arterial 1.20 1.10 - 1.33 mmol/L    POCT Glucose, Arterial 158 (H) 74 - 99 mg/dL    POCT Lactate, Arterial 3.0 (H) 0.4 - 2.0 mmol/L    POCT Base Excess, Arterial 5.6 (H) -2.0 - 3.0 mmol/L    POCT HCO3 Calculated, Arterial 32.8 (H) 22.0 - 26.0 mmol/L    POCT Hemoglobin, Arterial 13.4 12.0 - 16.0 g/dL    POCT Anion Gap, Arterial      Patient Temperature      FiO2 100 %    Ventilator Rate 18 bpm    Tidal  Volume 400 mL    Total Minute Volume 16.0 Liter    Peep CHM2O 5.0 cm H2O    Frequency (BPM) 18 bpm    Flow 65.0 LPM    Critical Called By DAVID CASTELLON     Critical Called To CARLOS SPEAR     Critical Call Time 2101.0000     Critical Read Back Y     Site of Arterial Puncture Radial Left     Andrew's Test Positive    POCT GLUCOSE   Result Value Ref Range    POCT Glucose 152 (H) 74 - 99 mg/dL   Phosphorus   Result Value Ref Range    Phosphorus 4.0 2.5 - 4.9 mg/dL   Magnesium   Result Value Ref Range    Magnesium 1.78 1.60 - 2.40 mg/dL   CBC and Auto Differential   Result Value Ref Range    WBC 11.2 4.4 - 11.3 x10*3/uL    nRBC 0.0 0.0 - 0.0 /100 WBCs    RBC 4.21 4.00 - 5.20 x10*6/uL    Hemoglobin 13.1 12.0 - 16.0 g/dL    Hematocrit 40.6 36.0 - 46.0 %    MCV 96 80 - 100 fL    MCH 31.1 26.0 - 34.0 pg    MCHC 32.3 32.0 - 36.0 g/dL    RDW 14.9 (H) 11.5 - 14.5 %    Platelets 138 (L) 150 - 450 x10*3/uL    Neutrophils % 74.4 40.0 - 80.0 %    Immature Granulocytes %, Automated 1.3 (H) 0.0 - 0.9 %    Lymphocytes % 16.7 13.0 - 44.0 %    Monocytes % 7.1 2.0 - 10.0 %    Eosinophils % 0.1 0.0 - 6.0 %    Basophils % 0.4 0.0 - 2.0 %    Neutrophils Absolute 8.34 (H) 1.20 - 7.70 x10*3/uL    Immature Granulocytes Absolute, Automated 0.14 0.00 - 0.70 x10*3/uL    Lymphocytes Absolute 1.87 1.20 - 4.80 x10*3/uL    Monocytes Absolute 0.79 0.10 - 1.00 x10*3/uL    Eosinophils Absolute 0.01 0.00 - 0.70 x10*3/uL    Basophils Absolute 0.05 0.00 - 0.10 x10*3/uL   Basic Metabolic Panel   Result Value Ref Range    Glucose 136 (H) 74 - 99 mg/dL    Sodium 142 136 - 145 mmol/L    Potassium 3.8 3.5 - 5.3 mmol/L    Chloride 102 98 - 107 mmol/L    Bicarbonate 32 21 - 32 mmol/L    Anion Gap 12 10 - 20 mmol/L    Urea Nitrogen 22 6 - 23 mg/dL    Creatinine 0.82 0.50 - 1.05 mg/dL    eGFR >90 >60 mL/min/1.73m*2    Calcium 9.6 8.6 - 10.3 mg/dL   Lactate   Result Value Ref Range    Lactate 1.4 0.4 - 2.0 mmol/L   Blood Gas Arterial Full Panel   Result Value Ref Range     POCT pH, Arterial 7.42 7.38 - 7.42 pH    POCT pCO2, Arterial 49 (H) 38 - 42 mm Hg    POCT pO2, Arterial 54 (L) 85 - 95 mm Hg    POCT SO2, Arterial 88 (L) 94 - 100 %    POCT Oxy Hemoglobin, Arterial 85.7 (L) 94.0 - 98.0 %    POCT Hematocrit Calculated, Arterial 38.0 36.0 - 46.0 %    POCT Sodium, Arterial 138 136 - 145 mmol/L    POCT Potassium, Arterial 3.5 3.5 - 5.3 mmol/L    POCT Chloride, Arterial      POCT Ionized Calcium, Arterial 1.21 1.10 - 1.33 mmol/L    POCT Glucose, Arterial 144 (H) 74 - 99 mg/dL    POCT Lactate, Arterial 1.5 0.4 - 2.0 mmol/L    POCT Base Excess, Arterial 6.2 (H) -2.0 - 3.0 mmol/L    POCT HCO3 Calculated, Arterial 31.8 (H) 22.0 - 26.0 mmol/L    POCT Hemoglobin, Arterial 12.7 12.0 - 16.0 g/dL    POCT Anion Gap, Arterial      Patient Temperature      FiO2 100 %    Ventilator Mode A/C     Ventilator Rate 18 bpm    Tidal Volume 400 mL   POCT GLUCOSE   Result Value Ref Range    POCT Glucose 161 (H) 74 - 99 mg/dL   POCT GLUCOSE   Result Value Ref Range    POCT Glucose 232 (H) 74 - 99 mg/dL    XR chest 1 view    Result Date: 3/5/2024  Interpreted By:  Tavares Ellis, STUDY: XR CHEST 1 VIEW;  3/5/2024 7:43 am   INDICATION: Signs/Symptoms:ETT reposition.   COMPARISON: CT chest earlier same day 5 March 2024 at 0321 hours; portable chest earlier same morning 5 March 2024 at 0647 hours   ACCESSION NUMBER(S): [id number]   ORDERING CLINICIAN: BENEDICTO COLLADO   TECHNIQUE: Single frontal view of the chest; Portable technique   FINDINGS: The endotracheal tube has been appropriately repositioned; tip now projects 2.8 cm above the daron   The enteric tube courses below the diaphragm, but the distal tip is cut off the bottom of the radiograph   The cardiomediastinal silhouette is unchanged   Left lung has reinflated with some residual minimal bands of atelectasis. Minimal right basilar atelectasis   No consolidative opacity, pleural effusion or pneumothorax       Left lung reinflated after successful  repositioning of endotracheal tube   MACRO: None   Signed by: Tavares Ellis 3/5/2024 7:50 AM Dictation workstation:   [id number]    XR chest 1 view    Result Date: 3/5/2024  Interpreted By:  Tavares Ellis, STUDY: XR CHEST 1 VIEW;  3/5/2024 6:54 am   INDICATION: Signs/Symptoms:ETT/OG placement.   COMPARISON: CT chest earlier same morning 5 March 2024 at 0321 hours   ACCESSION NUMBER(S): [id number]   ORDERING CLINICIAN: BENEDICTO COLLADO   TECHNIQUE: Single frontal view of the chest; Portable technique   FINDINGS: New endotracheal tube tip projects about 1-2 cm into the right mainstem bronchus, needs retraction by about   The left lung is collapsed due to the endotracheal tube placement   By the time of my review this radiograph, this patient's nurse, the very helpful Mr. Harper has informed me that the endotracheal tube has already been repositioned and a follow-up radiograph is already pending   Right lung clear of any acute findings such as edema, consolidation, pneumothorax or effusion       Endotracheal tube is in the right mainstem bronchus with consequent new left lung collapse. The tube has already been repositioned and a follow-up radiograph is pending. See above   MACRO: None   Signed by: Tavares Ellis 3/5/2024 7:14 AM Dictation workstation:   [id number]    ECG 12 lead    Result Date: 3/5/2024  Sinus tachycardia Possible Left atrial enlargement Possible Inferior infarct (cited on or before 05-MAR-2024) Abnormal ECG When compared with ECG of 05-MAR-2024 01:13, (unconfirmed) No significant change was found    CT angio chest for pulmonary embolism    Result Date: 3/5/2024  STUDY: CT Angiogram of the Chest; 3/5/2024 at 3:30 AM INDICATION: Chest pain, shortness of breath and hypoxia.  History of PE. COMPARISON: CTA chest 12/27/2023, CTA chest 10/31/2023, CTA chest 7/10/2023. ACCESSION NUMBER(S): [id number] ORDERING CLINICIAN: YESENIA JUÁREZ TECHNIQUE:  CTA of the chest was performed with intravenous contrast.  Images are reviewed and processed at a workstation according to the CT angiogram protocol with 3-D and/or MIP post processing imaging generated.  Omnipaque 350 75 mL was administered intravenously. Automated mA/kV exposure control was utilized and patient examination was performed in strict accordance with principles of ALARA. FINDINGS: Pulmonary arteries are adequately opacified without large central acute or chronic filling defects.  Respiratory motion slightly limits evaluation of the smaller pulmonary arteries.  The thoracic aorta is normal in course and caliber without dissection or aneurysm.  Common origin of the right brachiocephalic artery and left common carotid artery is seen from the aortic arch, a normal variant. The heart is upper normal in size without pericardial effusion. Thoracic lymph nodes are not enlarged. There is no pleural effusion, pleural thickening, or pneumothorax. The airways are patent. Lungs are adequately expanded without interstitial disease or suspicious pulmonary nodules.  Calcified granulomas are noted. Atelectasis is present in both lungs with areas of mild groundglass opacity along the bronchovascular bundles suggesting mild pulmonary edema.  Small foci of groundglass airspace disease are seen bilaterally.  Respiratory motion limits pulmonary evaluation. Upper abdomen demonstrates no acute pathology. There are no acute fractures.  No suspicious bony lesions.    Cardiomegaly with mild pulmonary edema suggesting CHF.  Small groundglass foci of airspace disease may represent alveolar edema although low-grade developing infectious or inflammatory process may be a consideration in the appropriate clinical setting.  No evidence of large central pulmonary embolus.. Signed by Zeferino Clemens    XR chest 1 view    Result Date: 3/5/2024  STUDY: Chest Radiograph;  3/5/2024 INDICATION: COPD. COMPARISON: 2/9/2024 XR Chest ACCESSION NUMBER(S): [id number] ORDERING CLINICIAN: YESENIA JUÁREZ  TECHNIQUE:  Frontal chest was obtained at 01:10 hours. FINDINGS: CARDIOMEDIASTINAL SILHOUETTE: Cardiomediastinal silhouette is normal in size and configuration.  LUNGS: Lungs are clear.  ABDOMEN: No remarkable upper abdominal findings.  BONES: No acute osseous changes.    No acute cardiopulmonary disease. Signed by Zeferino Clemens    ECG 12 lead    Result Date: 2/9/2024  Sinus rhythm with marked sinus arrhythmia Low voltage QRS Cannot rule out Anterior infarct (cited on or before 09-JAN-2024) Abnormal ECG When compared with ECG of 09-FEB-2024 14:33, (unconfirmed) No significant change was found See ED provider note for full interpretation and clinical correlation Confirmed by Jagjit Nichole ([id number]) on 2/9/2024 6:03:40 PM    ECG 12 lead    Result Date: 2/9/2024  Sinus rhythm with marked sinus arrhythmia Low voltage QRS Cannot rule out Anterior infarct , age undetermined Abnormal ECG When compared with ECG of 09-JAN-2024 22:41, Vent. rate has decreased BY  41 BPM Minimal criteria for Anterior infarct are now Present Criteria for Inferior infarct are no longer Present See ED provider note for full interpretation and clinical correlation Confirmed by Jagjit Nichole ([id number]) on 2/9/2024 6:02:19 PM    CT head wo IV contrast    Result Date: 2/9/2024  Interpreted By:  Anaya Still, STUDY: CT HEAD WO IV CONTRAST;  2/9/2024 4:08 pm   INDICATION: Signs/Symptoms:Dizziness.   COMPARISON: CT head dated 04/10/2023   ACCESSION NUMBER(S): [id number]   ORDERING CLINICIAN: BROOKE TRACEY   TECHNIQUE: Noncontrast axial CT scan of head was performed. Angled reformats in brain and bone windows were generated. The images were reviewed in bone, brain, blood and soft tissue windows.   FINDINGS: No hyperdense intracranial hemorrhage is evident. There is no mass effect or midline shift.   Gray-white differentiation is intact, without evidence of CT apparent transcortical infarct.   No ventricular dilatation is present. Basal  cisterns are patent. No extra-axial fluid collections are evident.   Postsurgical changes of right wall down mastoidectomy are present, with some nonspecific soft tissue attenuation noted in the external auditory canal.   Mucosal thickening is present in the left maxillary sinus.       1.  No evidence of hemorrhage, CT apparent transcortical infarct, or other acute intracranial abnormality. 2. Surgical changes of right wall down mastoidectomy, with small amount of nonspecific soft tissue attenuation present in the external auditory canal/mastoidectomy bed.   MACRO: None   Signed by: Anaya Still 2/9/2024 4:18 PM Dictation workstation:   [id number]    XR chest 1 view    Result Date: 2/9/2024  STUDY: Chest Radiograph;  2/9/2024 3:24 PM INDICATION: Dizziness. COMPARISON: 1/11/2024, 1/10/2024 XR chest. ACCESSION NUMBER(S): [id number] ORDERING CLINICIAN: BROOKE TRACEY TECHNIQUE:  Frontal chest was obtained at 15:24 hours. FINDINGS: CARDIOMEDIASTINAL SILHOUETTE: Cardiomediastinal silhouette is normal in size and configuration.  LUNGS: Lungs are clear.  ABDOMEN: No remarkable upper abdominal findings.  BONES: No acute osseous changes.    No regions of airspace consolidation. Signed by Tavares Townsend MD       Current Facility-Administered Medications:     acetaminophen (Tylenol) oral liquid 650 mg, 650 mg, oral, q4h PRN **OR** acetaminophen (Tylenol) tablet 650 mg, 650 mg, oral, q4h PRN, Yoana E Pentito, APRN-CNP    atorvastatin (Lipitor) tablet 20 mg, 20 mg, oral, Nightly, Jose M Grothaus, APRN-CNP, 20 mg at 03/05/24 2134    busPIRone (Buspar) tablet 30 mg, 30 mg, oral, BID, Jose M Grothaus, APRN-CNP, 30 mg at 03/06/24 0919    chlorhexidine (Peridex) 0.12 % solution 15 mL, 15 mL, Mouth/Throat, BID, Yoana E Pentito, APRN-CNP, 15 mL at 03/05/24 2200    clonazePAM (KlonoPIN) tablet 1 mg, 1 mg, oral, BID, Jose M Grothaus, APRN-CNP, 1 mg at 03/06/24 0919    dextrose 10 % in water (D10W) infusion, 0.3  g/kg/hr, intravenous, Once PRN, BARTOLOME Leo-CNP    dextrose 50 % injection 25 g, 25 g, intravenous, q15 min PRN, Yoana Madrigal APRN-CNP    escitalopram (Lexapro) tablet 10 mg, 10 mg, oral, Daily, BARTOLOME Servin-CNP, 10 mg at 03/06/24 0920    furosemide (Lasix) injection 40 mg, 40 mg, intravenous, Once, VALDEMAR Aden    glucagon (Glucagen) injection 1 mg, 1 mg, intramuscular, q15 min PRN, BARTOLOME Leo-CNP    hydrOXYzine pamoate (Vistaril) capsule 50 mg, 50 mg, oral, 4x daily PRN, VALDEMAR Servin    insulin lispro (HumaLOG) injection 0-10 Units, 0-10 Units, subcutaneous, q4h, BARTOLOME Leo-CNP, 2 Units at 03/06/24 0624    levalbuterol (Xopenex) 1.25 mg/0.5 mL nebulizer solution 1.25 mg, 1.25 mg, nebulization, TID, BARTOLOME Leo-CNP, 1.25 mg at 03/06/24 1258    levothyroxine (Synthroid, Levoxyl) tablet 150 mcg, 150 mcg, oral, Daily, BARTOLOME Servin-CNP, 150 mcg at 03/06/24 0920    methylPREDNISolone sod succinate (PF) (SOLU-Medrol) 40 mg/mL injection 40 mg, 40 mg, intravenous, q24h, VALDEMAR Aden    oxygen (O2) therapy, , inhalation, Continuous PRN - O2/gases, Jaylen Frey, , Rate Change at 03/06/24 0924    oxygen (O2) therapy, , inhalation, Continuous PRN - O2/gases, VALDEMAR Aden    polyethylene glycol (Glycolax, Miralax) packet 17 g, 17 g, oral, Daily, Yoana Madrigal APRN-CNP, 17 g at 03/06/24 0900    prazosin (Minipress) capsule 5 mg, 5 mg, oral, Nightly, VALDEMAR Servin, 5 mg at 03/05/24 2134    risperiDONE (RisperDAL) tablet 0.5 mg, 0.5 mg, oral, BID, VALDEMAR Servin, 0.5 mg at 03/06/24 0920    rivaroxaban (Xarelto) tablet 10 mg, 10 mg, oral, Daily, VALDEMAR Servin, 10 mg at 03/06/24 0920   Assessment/Plan   Acute on chronic hypoxic and hypercapnic respiratory failure patient extubated this morning.  On 6 L nasal cannula.  O2 saturation mid  90%.  COPD exacerbation continue bronchodilator and steroids.  Smoking cessation discussed with the patient.  CHF patient responded well to Lasix.  Obstructive sleep apnea patient on noninvasive ventilation at home.  Morbid obesity/obesity-hypoventilation syndrome.  History of DVT and PE patient on Xarelto.  Anxiety and depression.  Hypertension.  Diabetes.  Hypothyroidism.  . GERD.  DVT prophylaxis.  PT OT for deconditioning.    Afshin Villalba MD

## 2024-03-06 NOTE — NURSING NOTE
ABG attempted, probable venous sample obtained 7.42, pCO2 49, PO2 54, HCO3- 31.8, SO2 88, SpO2 has been 98-99%.

## 2024-03-06 NOTE — NURSING NOTE
RESPIRATORY NOTE:  ABG RESULTS HAVE NOT CROSSED OVER  (VENOUS)  7.36, pCO2 58, pO2 48, HC03 33 , so2 81

## 2024-03-07 LAB
ANION GAP SERPL CALC-SCNC: 13 MMOL/L (ref 10–20)
BASOPHILS # BLD AUTO: 0.06 X10*3/UL (ref 0–0.1)
BASOPHILS NFR BLD AUTO: 0.7 %
BUN SERPL-MCNC: 30 MG/DL (ref 6–23)
CALCIUM SERPL-MCNC: 9.7 MG/DL (ref 8.6–10.3)
CHLORIDE SERPL-SCNC: 98 MMOL/L (ref 98–107)
CO2 SERPL-SCNC: 34 MMOL/L (ref 21–32)
CREAT SERPL-MCNC: 1.24 MG/DL (ref 0.5–1.05)
EGFRCR SERPLBLD CKD-EPI 2021: 56 ML/MIN/1.73M*2
EOSINOPHIL # BLD AUTO: 0.01 X10*3/UL (ref 0–0.7)
EOSINOPHIL NFR BLD AUTO: 0.1 %
ERYTHROCYTE [DISTWIDTH] IN BLOOD BY AUTOMATED COUNT: 14.9 % (ref 11.5–14.5)
GLUCOSE BLD MANUAL STRIP-MCNC: 131 MG/DL (ref 74–99)
GLUCOSE BLD MANUAL STRIP-MCNC: 136 MG/DL (ref 74–99)
GLUCOSE BLD MANUAL STRIP-MCNC: 163 MG/DL (ref 74–99)
GLUCOSE BLD MANUAL STRIP-MCNC: 169 MG/DL (ref 74–99)
GLUCOSE BLD MANUAL STRIP-MCNC: 223 MG/DL (ref 74–99)
GLUCOSE BLD MANUAL STRIP-MCNC: 237 MG/DL (ref 74–99)
GLUCOSE SERPL-MCNC: 172 MG/DL (ref 74–99)
HCT VFR BLD AUTO: 41.5 % (ref 36–46)
HGB BLD-MCNC: 13 G/DL (ref 12–16)
IMM GRANULOCYTES # BLD AUTO: 0.2 X10*3/UL (ref 0–0.7)
IMM GRANULOCYTES NFR BLD AUTO: 2.3 % (ref 0–0.9)
LYMPHOCYTES # BLD AUTO: 2.07 X10*3/UL (ref 1.2–4.8)
LYMPHOCYTES NFR BLD AUTO: 23.6 %
MAGNESIUM SERPL-MCNC: 3.05 MG/DL (ref 1.6–2.4)
MCH RBC QN AUTO: 31.2 PG (ref 26–34)
MCHC RBC AUTO-ENTMCNC: 31.3 G/DL (ref 32–36)
MCV RBC AUTO: 100 FL (ref 80–100)
MONOCYTES # BLD AUTO: 0.72 X10*3/UL (ref 0.1–1)
MONOCYTES NFR BLD AUTO: 8.2 %
NEUTROPHILS # BLD AUTO: 5.72 X10*3/UL (ref 1.2–7.7)
NEUTROPHILS NFR BLD AUTO: 65.1 %
NRBC BLD-RTO: 0 /100 WBCS (ref 0–0)
PHOSPHATE SERPL-MCNC: 5.8 MG/DL (ref 2.5–4.9)
PLATELET # BLD AUTO: 142 X10*3/UL (ref 150–450)
POTASSIUM SERPL-SCNC: 4 MMOL/L (ref 3.5–5.3)
RBC # BLD AUTO: 4.17 X10*6/UL (ref 4–5.2)
SODIUM SERPL-SCNC: 141 MMOL/L (ref 136–145)
WBC # BLD AUTO: 8.8 X10*3/UL (ref 4.4–11.3)

## 2024-03-07 PROCEDURE — 2500000001 HC RX 250 WO HCPCS SELF ADMINISTERED DRUGS (ALT 637 FOR MEDICARE OP)

## 2024-03-07 PROCEDURE — 94660 CPAP INITIATION&MGMT: CPT

## 2024-03-07 PROCEDURE — 82947 ASSAY GLUCOSE BLOOD QUANT: CPT

## 2024-03-07 PROCEDURE — 80048 BASIC METABOLIC PNL TOTAL CA: CPT | Performed by: HOSPITALIST

## 2024-03-07 PROCEDURE — 2500000001 HC RX 250 WO HCPCS SELF ADMINISTERED DRUGS (ALT 637 FOR MEDICARE OP): Performed by: NURSE PRACTITIONER

## 2024-03-07 PROCEDURE — 84100 ASSAY OF PHOSPHORUS: CPT | Performed by: HOSPITALIST

## 2024-03-07 PROCEDURE — 99291 CRITICAL CARE FIRST HOUR: CPT | Performed by: NURSE PRACTITIONER

## 2024-03-07 PROCEDURE — 2500000004 HC RX 250 GENERAL PHARMACY W/ HCPCS (ALT 636 FOR OP/ED): Performed by: NURSE PRACTITIONER

## 2024-03-07 PROCEDURE — 1200000002 HC GENERAL ROOM WITH TELEMETRY DAILY

## 2024-03-07 PROCEDURE — 85025 COMPLETE CBC W/AUTO DIFF WBC: CPT | Performed by: HOSPITALIST

## 2024-03-07 PROCEDURE — 2500000002 HC RX 250 W HCPCS SELF ADMINISTERED DRUGS (ALT 637 FOR MEDICARE OP, ALT 636 FOR OP/ED): Performed by: NURSE PRACTITIONER

## 2024-03-07 PROCEDURE — 2500000001 HC RX 250 WO HCPCS SELF ADMINISTERED DRUGS (ALT 637 FOR MEDICARE OP): Performed by: HOSPITALIST

## 2024-03-07 PROCEDURE — 2500000002 HC RX 250 W HCPCS SELF ADMINISTERED DRUGS (ALT 637 FOR MEDICARE OP, ALT 636 FOR OP/ED)

## 2024-03-07 PROCEDURE — 94640 AIRWAY INHALATION TREATMENT: CPT

## 2024-03-07 PROCEDURE — 2500000002 HC RX 250 W HCPCS SELF ADMINISTERED DRUGS (ALT 637 FOR MEDICARE OP, ALT 636 FOR OP/ED): Performed by: HOSPITALIST

## 2024-03-07 PROCEDURE — 83735 ASSAY OF MAGNESIUM: CPT | Performed by: HOSPITALIST

## 2024-03-07 PROCEDURE — 36415 COLL VENOUS BLD VENIPUNCTURE: CPT | Performed by: HOSPITALIST

## 2024-03-07 RX ORDER — PANTOPRAZOLE SODIUM 40 MG/1
40 TABLET, DELAYED RELEASE ORAL DAILY
Status: DISCONTINUED | OUTPATIENT
Start: 2024-03-08 | End: 2024-03-08

## 2024-03-07 RX ORDER — FLUTICASONE FUROATE AND VILANTEROL 200; 25 UG/1; UG/1
1 POWDER RESPIRATORY (INHALATION)
Status: DISCONTINUED | OUTPATIENT
Start: 2024-03-07 | End: 2024-03-08 | Stop reason: HOSPADM

## 2024-03-07 RX ORDER — INSULIN LISPRO 100 [IU]/ML
0-10 INJECTION, SOLUTION INTRAVENOUS; SUBCUTANEOUS
Status: DISCONTINUED | OUTPATIENT
Start: 2024-03-07 | End: 2024-03-08 | Stop reason: HOSPADM

## 2024-03-07 RX ORDER — INSULIN GLARGINE 100 [IU]/ML
10 INJECTION, SOLUTION SUBCUTANEOUS EVERY 24 HOURS
Status: DISCONTINUED | OUTPATIENT
Start: 2024-03-07 | End: 2024-03-08 | Stop reason: HOSPADM

## 2024-03-07 RX ORDER — METOPROLOL TARTRATE 25 MG/1
25 TABLET, FILM COATED ORAL 2 TIMES DAILY
Status: DISCONTINUED | OUTPATIENT
Start: 2024-03-07 | End: 2024-03-08 | Stop reason: HOSPADM

## 2024-03-07 RX ADMIN — ACETAMINOPHEN, CAFFEINE 2 TABLET: 500; 65 TABLET, FILM COATED ORAL at 10:43

## 2024-03-07 RX ADMIN — CLONAZEPAM 1 MG: 1 TABLET ORAL at 20:42

## 2024-03-07 RX ADMIN — CLONAZEPAM 1 MG: 1 TABLET ORAL at 08:12

## 2024-03-07 RX ADMIN — TIOTROPIUM BROMIDE INHALATION SPRAY 2 PUFF: 3.12 SPRAY, METERED RESPIRATORY (INHALATION) at 11:32

## 2024-03-07 RX ADMIN — ESCITALOPRAM OXALATE 10 MG: 10 TABLET, FILM COATED ORAL at 08:09

## 2024-03-07 RX ADMIN — LEVALBUTEROL 1.25 MG: 1.25 SOLUTION, CONCENTRATE RESPIRATORY (INHALATION) at 12:52

## 2024-03-07 RX ADMIN — INSULIN LISPRO 2 UNITS: 100 INJECTION, SOLUTION INTRAVENOUS; SUBCUTANEOUS at 05:48

## 2024-03-07 RX ADMIN — BUSPIRONE HYDROCHLORIDE 30 MG: 5 TABLET ORAL at 20:42

## 2024-03-07 RX ADMIN — INSULIN LISPRO 2 UNITS: 100 INJECTION, SOLUTION INTRAVENOUS; SUBCUTANEOUS at 10:50

## 2024-03-07 RX ADMIN — INSULIN LISPRO 4 UNITS: 100 INJECTION, SOLUTION INTRAVENOUS; SUBCUTANEOUS at 20:42

## 2024-03-07 RX ADMIN — METHYLPREDNISOLONE SODIUM SUCCINATE 40 MG: 40 INJECTION, POWDER, FOR SOLUTION INTRAMUSCULAR; INTRAVENOUS at 10:42

## 2024-03-07 RX ADMIN — ATORVASTATIN CALCIUM 20 MG: 20 TABLET, FILM COATED ORAL at 20:42

## 2024-03-07 RX ADMIN — LEVALBUTEROL 1.25 MG: 1.25 SOLUTION, CONCENTRATE RESPIRATORY (INHALATION) at 19:47

## 2024-03-07 RX ADMIN — LEVALBUTEROL 1.25 MG: 1.25 SOLUTION, CONCENTRATE RESPIRATORY (INHALATION) at 07:14

## 2024-03-07 RX ADMIN — BUSPIRONE HYDROCHLORIDE 30 MG: 5 TABLET ORAL at 08:09

## 2024-03-07 RX ADMIN — RIVAROXABAN 10 MG: 10 TABLET, FILM COATED ORAL at 08:09

## 2024-03-07 RX ADMIN — METOPROLOL TARTRATE 25 MG: 25 TABLET, FILM COATED ORAL at 08:09

## 2024-03-07 RX ADMIN — HYDROXYZINE PAMOATE 50 MG: 25 CAPSULE ORAL at 15:25

## 2024-03-07 RX ADMIN — METOPROLOL TARTRATE 25 MG: 25 TABLET, FILM COATED ORAL at 00:24

## 2024-03-07 RX ADMIN — LEVOTHYROXINE SODIUM 150 MCG: 75 TABLET ORAL at 08:09

## 2024-03-07 RX ADMIN — INSULIN LISPRO 4 UNITS: 100 INJECTION, SOLUTION INTRAVENOUS; SUBCUTANEOUS at 16:50

## 2024-03-07 RX ADMIN — PRAZOSIN HYDROCHLORIDE 5 MG: 5 CAPSULE ORAL at 22:02

## 2024-03-07 RX ADMIN — INSULIN GLARGINE 10 UNITS: 100 INJECTION, SOLUTION SUBCUTANEOUS at 20:41

## 2024-03-07 RX ADMIN — METOPROLOL TARTRATE 25 MG: 25 TABLET, FILM COATED ORAL at 20:42

## 2024-03-07 ASSESSMENT — PAIN SCALES - GENERAL
PAINLEVEL_OUTOF10: 0 - NO PAIN
PAINLEVEL_OUTOF10: 2
PAINLEVEL_OUTOF10: 5 - MODERATE PAIN
PAINLEVEL_OUTOF10: 0 - NO PAIN
PAINLEVEL_OUTOF10: 0 - NO PAIN

## 2024-03-07 ASSESSMENT — COGNITIVE AND FUNCTIONAL STATUS - GENERAL
TOILETING: A LITTLE
STANDING UP FROM CHAIR USING ARMS: A LITTLE
DAILY ACTIVITIY SCORE: 24
HELP NEEDED FOR BATHING: A LITTLE
TURNING FROM BACK TO SIDE WHILE IN FLAT BAD: A LITTLE
DRESSING REGULAR LOWER BODY CLOTHING: A LITTLE
WALKING IN HOSPITAL ROOM: A LITTLE
MOBILITY SCORE: 23
DRESSING REGULAR UPPER BODY CLOTHING: A LITTLE
MOVING TO AND FROM BED TO CHAIR: A LITTLE
CLIMB 3 TO 5 STEPS WITH RAILING: A LITTLE
CLIMB 3 TO 5 STEPS WITH RAILING: A LOT
MOBILITY SCORE: 18
DAILY ACTIVITIY SCORE: 20

## 2024-03-07 ASSESSMENT — PAIN - FUNCTIONAL ASSESSMENT
PAIN_FUNCTIONAL_ASSESSMENT: 0-10

## 2024-03-07 ASSESSMENT — PAIN DESCRIPTION - DESCRIPTORS: DESCRIPTORS: ACHING

## 2024-03-07 NOTE — NURSING NOTE
1820 - Report called to Jocelyne GONZALEZ on 11S. Patient to transfer to [facility] pending transport. Patient educated on transfer, all questions answered. Belongings gathered. NO signs of distress at this time.

## 2024-03-07 NOTE — PROGRESS NOTES
[facility] Critical Care Medicine       Date:  3/7/2024  Patient:  Stephenie Mccray  YOB: 1980  MRN:  [id number]   Admit Date:  3/5/2024  ========================================================================================================    Chief Complaint   Patient presents with    Shortness of Breath     Pt states SOB and congestion. Hx of COPD. 89% RA, walked to ED. Placed on 2 L NC in triage, 96%         History of Present Illness:  Stephenie Mccray is a 43 y.o. year old female patient with Past Medical History of  COPD (4L@home), DVT/PE (on Xarelto), schizophrenia, migraine headaches, HTN, HLD, current smoker, GERD, IDDM, and hypothyroidism. She is well known to this facility with frequent admissions for acute on chronic hypoxic/hypercapnic respiratory failure.  She presented to [facility] emergency department 3/5 with complaints of generalized weakness, fevers, chills, shortness of breath, cough.  For the past couple days she had increasing shortness of breath on exertion as well as productive cough with yellow/green phlegm.  She states she continues to smoke a lot.  Denies any chest pain, palpitations, hemoptysis, night sweats, abdominal pain, vomiting, diarrhea.     In the emergency department patient was placed on BiPAP with VBG of 7.28, pCO2 66.  She was given 1L LR.  She was given 125 mg Solu-Medrol as well as 2 g magnesium sulfate.  Laboratory workup was unremarkable.  CT PE study showed small groundglass foci of airspace disease likely representing alveolar edema.  She was admitted to the ICU.  Upon arrival to ICU patient extremely lethargic, concern for airway protection, therefore she was intubated and placed on mechanical ventilation.       Interval ICU Events:  3/5: Admitted to ICU.  Intubated.  Mechanically ventilated.  Diuresed with 40 mg IV Lasix.   3/6: Intubated and sedated with propofol and precedex. Alert and following commands, writing on notepad for staff. FB (-)  "500s  Update @ 1000: extubated to 6L NC with adequate saturation.   3/7: On 15 L oxymizer this am SPO2 %, wore BiPAP overnight. This am mild increased work of breathing but endorses improvement in respiratory status. Continues to report headache. VSS, NSR to ST with -110s. Mild bump in Creat 1.24, FB (-) 1.4 L    Medical History:  Past Medical History:   Diagnosis Date    Arthritis     COPD (chronic obstructive pulmonary disease) (CMS/[facility])     Diabetes mellitus (CMS/[facility])     History of transfusion     Hypothyroidism     CHARLIE (obstructive sleep apnea)      Past Surgical History:   Procedure Laterality Date     SECTION, LOW TRANSVERSE      EYE SURGERY      HERNIA REPAIR      TONSILLECTOMY      VENTRAL HERNIA REPAIR       Medications Prior to Admission   Medication Sig Dispense Refill Last Dose    alcohol swabs (Alcohol Prep Pads) pads, medicated Use 3 times daily prn 100 each 3     atorvastatin (Lipitor) 20 mg tablet Take 1 tablet (20 mg) by mouth once daily. Dr. Davis covering for Dr. Yuen 30 tablet 0     BD Ultra-Fine Mini Pen Needle 31 gauge x 3/16\" needle USE TO INJECT 4 TIMES DAILY AS DIRECTED 400 each 3     busPIRone (Buspar) 30 mg tablet Take 1 tablet (30 mg) by mouth 2 times a day.       clonazePAM (KlonoPIN) 1 mg tablet Take by mouth 2 times a day as needed.       escitalopram (Lexapro) 10 mg tablet Take 1 tablet (10 mg) by mouth once daily.       hydrOXYzine pamoate (Vistaril) 50 mg capsule Take 1 capsule (50 mg) by mouth 4 times a day as needed for anxiety.       insulin glargine (Lantus Solostar U-100 Insulin) 100 unit/mL (3 mL) pen Inject 10 Units under the skin once daily in the morning. Take as directed per insulin instructions. 3 mL 2     insulin lispro (HumaLOG) 100 unit/mL injection Inject 0.04 mL (4 Units) under the skin 3 times a day with meals. Plus sliding scale       Invega Sustenna 234 mg/1.5 mL syringe INJECT 1 syringe INTRAMUSCULARLY EVERY 4 WEEKS       " ipratropium-albuteroL (Duo-Neb) 0.5-2.5 mg/3 mL nebulizer solution Take 3 mL by nebulization every 6 hours if needed for shortness of breath or wheezing. (Patient taking differently: Take 3 mL by nebulization every 4 hours. As needed) 180 mL 1     levothyroxine (Synthroid, Levoxyl) 150 mcg tablet Take 1 tablet (150 mcg) by mouth once daily. 90 tablet 1     nicotine (Nicoderm CQ) 14 mg/24 hr patch Place 1 patch over 24 hours on the skin once daily for 14 doses. Do not start before February 21, 2024. 14 patch 0     nicotine (Nicoderm CQ) 21 mg/24 hr patch Place 1 patch over 24 hours on the skin once daily for 37 doses. Do not start before January 15, 2024. 30 patch 1     nicotine (Nicoderm CQ) 7 mg/24 hr patch Place 1 patch over 24 hours on the skin once daily for 14 doses. Do not start before March 6, 2024. 14 patch 0     nicotine polacrilex (Commit) 4 mg lozenge Dissolve 1 lozenge (4 mg) in the mouth every 2 hours if needed for smoking cessation. 100 lozenge 0     OneTouch Verio test strips strip        paliperidone (Invega) 3 mg 24 hr tablet Take 1 tablet (3 mg) by mouth once daily. Do not crush, chew, or split. Do not start before January 15, 2024. 30 tablet 0     pantoprazole (ProtoNix) 40 mg EC tablet Take 1 tablet (40 mg) by mouth once daily. 90 tablet 1     prazosin (Minipress) 5 mg capsule Take 1 capsule (5 mg) by mouth once daily at bedtime.       Xarelto 10 mg tablet Take 1 tablet (10 mg) by mouth once daily.        Fluticasone furoate-vilanterol, Fluticasone-umeclidin-vilanter, Sumatriptan, Vortioxetine, and Levofloxacin  Social History     Tobacco Use    Smoking status: Every Day     Packs/day: 1     Types: Cigarettes     Passive exposure: Never    Smokeless tobacco: Never   Substance Use Topics    Alcohol use: Never    Drug use: Never     Family History   Problem Relation Name Age of Onset    Fibromyalgia Father      Hypertension Brother      Thyroid disease Maternal Grandmother      Thyroid disease  Paternal Grandmother      Lung cancer Paternal Grandfather      Coronary artery disease Other Grandmother        [facility] Medications:             Current Facility-Administered Medications:     acetaminophen (Tylenol) oral liquid 650 mg, 650 mg, oral, q4h PRN **OR** acetaminophen (Tylenol) tablet 650 mg, 650 mg, oral, q4h PRN, Yoana E Jenniferito, APRN-CNP    atorvastatin (Lipitor) tablet 20 mg, 20 mg, oral, Nightly, Jose  Jose Luis, APRN-CNP, 20 mg at 03/06/24 2114    busPIRone (Buspar) tablet 30 mg, 30 mg, oral, BID, Jose  Jose Luis, APRN-CNP, 30 mg at 03/06/24 2114    clonazePAM (KlonoPIN) tablet 1 mg, 1 mg, oral, BID, Jose  Jose Luis, APRN-CNP, 1 mg at 03/06/24 2115    dextrose 10 % in water (D10W) infusion, 0.3 g/kg/hr, intravenous, Once PRN, Yoana VIOLET Rodriguezito, APRN-CNP    dextrose 50 % injection 25 g, 25 g, intravenous, q15 min PRN, Yoana VIOLET Rodriguezito, APRN-CNP    escitalopram (Lexapro) tablet 10 mg, 10 mg, oral, Daily, Jose Amaya APRN-CNP, 10 mg at 03/06/24 0920    glucagon (Glucagen) injection 1 mg, 1 mg, intramuscular, q15 min PRN, Yoana E Jenniferito, APRN-CNP    hydrOXYzine pamoate (Vistaril) capsule 50 mg, 50 mg, oral, 4x daily PRN, Jose Amaya, APRN-CNP    insulin lispro (HumaLOG) injection 0-10 Units, 0-10 Units, subcutaneous, q4h, Yoana VIOLET Rodriguezito, APRN-CNP, 2 Units at 03/07/24 0548    levalbuterol (Xopenex) 1.25 mg/0.5 mL nebulizer solution 1.25 mg, 1.25 mg, nebulization, TID, Yoana VIOLET Rodriguezito, APRN-CNP, 1.25 mg at 03/07/24 0714    levothyroxine (Synthroid, Levoxyl) tablet 150 mcg, 150 mcg, oral, Daily, Jose Amaya APRN-CNP, 150 mcg at 03/06/24 0920    methylPREDNISolone sod succinate (PF) (SOLU-Medrol) 40 mg/mL injection 40 mg, 40 mg, intravenous, q24h, Jazmin Portillo, BARTOLOME-CNP    metoprolol tartrate (Lopressor) tablet 25 mg, 25 mg, oral, BID, Yoana Madrigal, APRN-CNP, 25 mg at 03/07/24 0024    oxygen (O2) therapy, , inhalation, Continuous PRN - O2/gases, Jazmin ARMSTRONG  Gayheart, APRN-CNP, 15 L/min at 03/06/24 1438    polyethylene glycol (Glycolax, Miralax) packet 17 g, 17 g, oral, Daily, Yoana Madrigal, APRN-CNP, 17 g at 03/06/24 0900    prazosin (Minipress) capsule 5 mg, 5 mg, oral, Nightly, Jose Amaya, APRN-CNP, 5 mg at 03/06/24 2115    risperiDONE (RisperDAL) tablet 0.5 mg, 0.5 mg, oral, BID, Jose Amaya, APRN-CNP, 0.5 mg at 03/06/24 2115    rivaroxaban (Xarelto) tablet 10 mg, 10 mg, oral, Daily, Jose Amaya, APRN-CNP, 10 mg at 03/06/24 0920    Review of Systems:  14 point review of systems was completed and negative except for those specially mention in my HPI    Physical Exam:    Heart Rate:  []   Temp:  [35.7 °C (96.3 °F)-37 °C (98.6 °F)]   Resp:  [13-35]   BP: ()/(59-94)   Weight:  [70.8 kg (156 lb 1.4 oz)]   SpO2:  [82 %-99 %]     Physical Exam  HENT:      Head: Normocephalic.      Mouth/Throat:      Mouth: Mucous membranes are moist.   Eyes:      Extraocular Movements: Extraocular movements intact.      Conjunctiva/sclera: Conjunctivae normal.   Cardiovascular:      Rate and Rhythm: Normal rate and regular rhythm.      Pulses: Normal pulses.      Heart sounds: Normal heart sounds.   Pulmonary:      Effort: Pulmonary effort is normal.      Breath sounds: Wheezing present.   Abdominal:      General: Bowel sounds are normal.      Palpations: Abdomen is soft.   Skin:     General: Skin is warm and dry.      Capillary Refill: Capillary refill takes less than 2 seconds.   Neurological:      General: No focal deficit present.      Mental Status: She is alert.   Psychiatric:         Mood and Affect: Mood normal.         Behavior: Behavior normal.         Objective:    I have reviewed all medications, laboratory results, and imaging pertinent for today's encounter.    Vent Mode: Pressure support  FiO2 (%):  [50 %] 50 %  S RR:  [20] 20  PEEP/CPAP (cm H2O):  [5 cm H20] 5 cm H20  DC SUP:  [5 cm H20] 5 cm H20  MAP (cm H2O):  [7.2] 7.2      Intake/Output  Summary (Last 24 hours) at 3/7/2024 0800  Last data filed at 3/7/2024 0600  Gross per 24 hour   Intake 50 ml   Output 1050 ml   Net -1000 ml         Recent Imaging  XR chest 1 view   Final Result   Left lung reinflated after successful repositioning of endotracheal   tube        MACRO:   None        Signed by: Tavares Ellis 3/5/2024 7:50 AM   Dictation workstation:   [id number]      XR chest 1 view   Final Result   Endotracheal tube is in the right mainstem bronchus with consequent   new left lung collapse. The tube has already been repositioned and a   follow-up radiograph is pending. See above        MACRO:   None        Signed by: Tavares Ellis 3/5/2024 7:14 AM   Dictation workstation:   [id number]      CT angio chest for pulmonary embolism   Final Result   Cardiomegaly with mild pulmonary edema suggesting CHF.  Small   groundglass foci of airspace disease may represent alveolar edema   although low-grade developing infectious or inflammatory process may   be a consideration in the appropriate clinical setting.  No evidence   of large central pulmonary embolus..   Signed by Zeferino Clemens      XR chest 1 view   Final Result   No acute cardiopulmonary disease.   Signed by Zeferino Clemens          No echocardiogram results found for the past 14 days    Assessment/Plan:    I am currently managing this critically ill patient for the following problems:    Neuro/Psych/Pain Ctrl/Sedation:  #Metabolic Encephalopathy - Likely 2/2 CO2 narcosis  #Hx Schizophrenia  #Hx Migraine headaches  -CAM-ICU  -delirium precautions  -home Buspirone, Lexapro, Prazosin, Clonazepam     Respiratory/ENT:  #Acute on chronic Hypoxic/Hypercapnic Respiratory Failure - Home O2 of 4L  #COPD exacerbation  #CHARLIE  #Nicotine dependence   -Pulmonology following, appreciate recommendations  -Solumedrol 40mg daily  -Levalbuterol  -Smoking cessation education  -extubated 3/6, BiPAP at night and as needed  -symbicort     Cardiovascular:  #Hx HLD  #Hx  HTN  -atorvastatin  -prazosin  -gentle diuresis     GI:  #Hx GERD  -PPI     Renal/Volume Status (Intra & Extravascular):  Mild bump in Creat 0.8-->1.24  -Strict I/O  -Daily BMP and electrolyte repletion  -hold on diuresis for today and montior output  -hold on further administration of toradol    Endocrine  #Hx IDDM - A1c 11/2023 of 7.3  #Hx Hypothyroid  -accu check with SSI  -Synthroid  -monitor blood glucose and initiate long acting as needed     Infectious Disease:  No concern for infectious process at this time. Pt afebrile, WBC WNL, lactate WNL  -trend CBC  -Covid/flu/RSV negative  -Continue to monitor off abx     Heme/Onc:  #Hx DVT/PE  -home Xarelto     OBGYN/MSK:  -PT/OT eval when able     Ethics/Code Status:  Full code     :  DVT Prophylaxis: Xarelto  GI Prophylaxis: IV PPI  Bowel Regimen: Miralax  Diet: NPO  CVC: No  [place]: No  Tran: No  Restraints: none  Dispo: ICU    Plan discussed with Dr. Tk Portillo, APRN-CNP

## 2024-03-07 NOTE — NURSING NOTE
Pt asking for medication to help with migraine. Yoana Madrigal NP at bedside to discuss options for medications with pt. Pt sleeping and upon awakening was unwilling at the time to have a conversation with NP. Pt frequently ringing asking about migraine medication. Yoana Madrigal NP back at bedside to discuss options with pt. Pt willing to have conversation at this time.

## 2024-03-07 NOTE — PROGRESS NOTES
Pt was extubated later in the day yesterday. Currently on continuous BiPAP 50%, 15L. Wears 4L NC at home baseline.

## 2024-03-07 NOTE — DOCUMENTATION CLARIFICATION NOTE
"    PATIENT:               JACOB CURRY  ACCT #:                  [id number]  MRN:                       [id number]  :                       1980  ADMIT DATE:       3/5/2024 12:44 AM  DISCH DATE:  RESPONDING PROVIDER #:        [id number]          PROVIDER RESPONSE TEXT:    Acute on Chronic Diastolic Congestive Heart Failure    CDI QUERY TEXT:    [facility]_CHF    Instruction:    Based on your assessment of the patient and the clinical information, please provide the requested documentation by clicking on the appropriate radio button and enter any additional information if prompted.    Question: Please further clarify the type and acuity of congestive heart failure    When answering this query, please exercise your independent professional judgment. The fact that a question is being asked, does not imply that any particular answer is desired or expected.    The patient's clinical indicators include:  Clinical Information:  The patient is a 43 year old female who presented to the ED with SOB.  She was found to be in acute on chronic respiratory failure with a COPD exacerbation and CHF.  Her PMH includes HTN, HLD, DM, hypothyroidism, COPD on 4L at home.    Clinical Indicators:    ED Note 3/5 \"Examination of the right-upper field reveals decreased breath sounds and wheezing. Examination of the left-upper field reveals decreased breath sounds and wheezing. Examination of the right-middle field reveals decreased breath sounds and wheezing. Examination of the left-middle field reveals decreased breath sounds and wheezing. Examination of the right-lower field reveals decreased breath sounds and wheezing. Examination of the left-lower field reveals decreased breath sounds and wheezing. Decreased breath sounds and wheezing present. No rales.\"    Pulmonary Consult 3/6 \"  CT PE study showed small groundglass foci of airspace disease likely representing alveolar edema.\"  \"Respiratory distress present.\"  \"Wheezing, rhonchi and rales " "present.\"  \"CHF patient responded well to Lasix.\"    CTA PE 3/5 \"Cardiomegaly with mild pulmonary edema suggesting CHF.  Small groundglass foci of airspace disease may represent alveolar edema although low-grade developing infectious or inflammatory process may be a consideration in the appropriate clinical setting.\"    Transthoracic Echo 1/12/24 at [facility]  Left Ventricle: Left ventricular systolic function is normal, with an estimated ejection fraction of 60 percent. There are no regional wall motion abnormalities. The left ventricular cavity size is normal. Left ventricular diastolic filling was not assessed.  Left Atrium: The left atrium is normal in size.  Right Ventricle: The right ventricle is normal in size. There is normal right ventricular global systolic function.  Right Atrium: The right atrium is normal in size.\"    Treatment:  IV Lasix 40mg x2 on 3/5 and x1 on 3/6    Risk Factors:  HTN, smoker, DM  Options provided:  -- Acute Diastolic Congestive Heart Failure  -- Acute on Chronic Diastolic Congestive Heart Failure  -- CHF ruled out, patient has acute pulmonary edema unrelated to CHF  -- Other - I will add my own diagnosis  -- Refer to Clinical Documentation Reviewer    Query created by: Cathy Ramos on 3/7/2024 1:31 PM      Electronically signed by:  VINNIE MANCUSO-CNP 3/7/2024 2:14 PM          "

## 2024-03-08 VITALS
HEIGHT: 62 IN | WEIGHT: 156.09 LBS | SYSTOLIC BLOOD PRESSURE: 133 MMHG | BODY MASS INDEX: 28.72 KG/M2 | DIASTOLIC BLOOD PRESSURE: 79 MMHG | TEMPERATURE: 97.5 F | OXYGEN SATURATION: 94 % | RESPIRATION RATE: 16 BRPM | HEART RATE: 97 BPM

## 2024-03-08 LAB
ANION GAP SERPL CALC-SCNC: 10 MMOL/L (ref 10–20)
ATRIAL RATE: 104 BPM
BUN SERPL-MCNC: 35 MG/DL (ref 6–23)
CALCIUM SERPL-MCNC: 9.9 MG/DL (ref 8.6–10.3)
CHLORIDE SERPL-SCNC: 99 MMOL/L (ref 98–107)
CO2 SERPL-SCNC: 35 MMOL/L (ref 21–32)
CREAT SERPL-MCNC: 0.75 MG/DL (ref 0.5–1.05)
EGFRCR SERPLBLD CKD-EPI 2021: >90 ML/MIN/1.73M*2
ERYTHROCYTE [DISTWIDTH] IN BLOOD BY AUTOMATED COUNT: 14.6 % (ref 11.5–14.5)
GLUCOSE BLD MANUAL STRIP-MCNC: 100 MG/DL (ref 74–99)
GLUCOSE BLD MANUAL STRIP-MCNC: 161 MG/DL (ref 74–99)
GLUCOSE BLD MANUAL STRIP-MCNC: 215 MG/DL (ref 74–99)
GLUCOSE SERPL-MCNC: 171 MG/DL (ref 74–99)
HCT VFR BLD AUTO: 41.9 % (ref 36–46)
HGB BLD-MCNC: 12.9 G/DL (ref 12–16)
MAGNESIUM SERPL-MCNC: 1.9 MG/DL (ref 1.6–2.4)
MCH RBC QN AUTO: 31 PG (ref 26–34)
MCHC RBC AUTO-ENTMCNC: 30.8 G/DL (ref 32–36)
MCV RBC AUTO: 101 FL (ref 80–100)
NRBC BLD-RTO: 0 /100 WBCS (ref 0–0)
P AXIS: 53 DEGREES
P OFFSET: 207 MS
P ONSET: 160 MS
PLATELET # BLD AUTO: 132 X10*3/UL (ref 150–450)
POTASSIUM SERPL-SCNC: 4.2 MMOL/L (ref 3.5–5.3)
PR INTERVAL: 128 MS
Q ONSET: 224 MS
QRS COUNT: 17 BEATS
QRS DURATION: 82 MS
QT INTERVAL: 360 MS
QTC CALCULATION(BAZETT): 473 MS
QTC FREDERICIA: 432 MS
R AXIS: -14 DEGREES
RBC # BLD AUTO: 4.16 X10*6/UL (ref 4–5.2)
SODIUM SERPL-SCNC: 140 MMOL/L (ref 136–145)
T AXIS: 56 DEGREES
T OFFSET: 404 MS
VENTRICULAR RATE: 104 BPM
WBC # BLD AUTO: 8.5 X10*3/UL (ref 4.4–11.3)

## 2024-03-08 PROCEDURE — 94660 CPAP INITIATION&MGMT: CPT

## 2024-03-08 PROCEDURE — 80048 BASIC METABOLIC PNL TOTAL CA: CPT | Performed by: NURSE PRACTITIONER

## 2024-03-08 PROCEDURE — 82947 ASSAY GLUCOSE BLOOD QUANT: CPT

## 2024-03-08 PROCEDURE — 85027 COMPLETE CBC AUTOMATED: CPT | Performed by: NURSE PRACTITIONER

## 2024-03-08 PROCEDURE — 36415 COLL VENOUS BLD VENIPUNCTURE: CPT | Performed by: NURSE PRACTITIONER

## 2024-03-08 PROCEDURE — 2500000001 HC RX 250 WO HCPCS SELF ADMINISTERED DRUGS (ALT 637 FOR MEDICARE OP): Performed by: INTERNAL MEDICINE

## 2024-03-08 PROCEDURE — 2500000002 HC RX 250 W HCPCS SELF ADMINISTERED DRUGS (ALT 637 FOR MEDICARE OP, ALT 636 FOR OP/ED): Performed by: NURSE PRACTITIONER

## 2024-03-08 PROCEDURE — 2500000004 HC RX 250 GENERAL PHARMACY W/ HCPCS (ALT 636 FOR OP/ED): Performed by: NURSE PRACTITIONER

## 2024-03-08 PROCEDURE — 2500000001 HC RX 250 WO HCPCS SELF ADMINISTERED DRUGS (ALT 637 FOR MEDICARE OP): Performed by: NURSE PRACTITIONER

## 2024-03-08 PROCEDURE — 2500000002 HC RX 250 W HCPCS SELF ADMINISTERED DRUGS (ALT 637 FOR MEDICARE OP, ALT 636 FOR OP/ED): Performed by: INTERNAL MEDICINE

## 2024-03-08 PROCEDURE — 94640 AIRWAY INHALATION TREATMENT: CPT

## 2024-03-08 PROCEDURE — 2500000004 HC RX 250 GENERAL PHARMACY W/ HCPCS (ALT 636 FOR OP/ED): Performed by: INTERNAL MEDICINE

## 2024-03-08 PROCEDURE — 99239 HOSP IP/OBS DSCHRG MGMT >30: CPT | Performed by: INTERNAL MEDICINE

## 2024-03-08 PROCEDURE — 83735 ASSAY OF MAGNESIUM: CPT | Performed by: NURSE PRACTITIONER

## 2024-03-08 RX ORDER — PREDNISONE 20 MG/1
TABLET ORAL
Qty: 10 TABLET | Refills: 0 | Status: SHIPPED | OUTPATIENT
Start: 2024-03-08 | End: 2024-03-15

## 2024-03-08 RX ORDER — IPRATROPIUM BROMIDE AND ALBUTEROL SULFATE 2.5; .5 MG/3ML; MG/3ML
3 SOLUTION RESPIRATORY (INHALATION) 3 TIMES DAILY
Status: DISCONTINUED | OUTPATIENT
Start: 2024-03-08 | End: 2024-03-08 | Stop reason: HOSPADM

## 2024-03-08 RX ORDER — PANTOPRAZOLE SODIUM 40 MG/1
40 TABLET, DELAYED RELEASE ORAL
Status: DISCONTINUED | OUTPATIENT
Start: 2024-03-08 | End: 2024-03-08 | Stop reason: HOSPADM

## 2024-03-08 RX ADMIN — ESCITALOPRAM OXALATE 10 MG: 10 TABLET, FILM COATED ORAL at 07:57

## 2024-03-08 RX ADMIN — IPRATROPIUM BROMIDE AND ALBUTEROL SULFATE 3 ML: 2.5; .5 SOLUTION RESPIRATORY (INHALATION) at 11:57

## 2024-03-08 RX ADMIN — ACETAMINOPHEN, CAFFEINE 2 TABLET: 500; 65 TABLET, FILM COATED ORAL at 15:56

## 2024-03-08 RX ADMIN — FLUTICASONE FUROATE AND VILANTEROL TRIFENATATE 1 PUFF: 200; 25 POWDER RESPIRATORY (INHALATION) at 06:03

## 2024-03-08 RX ADMIN — POLYETHYLENE GLYCOL 3350 17 G: 17 POWDER, FOR SOLUTION ORAL at 07:57

## 2024-03-08 RX ADMIN — LEVOTHYROXINE SODIUM 150 MCG: 75 TABLET ORAL at 07:57

## 2024-03-08 RX ADMIN — RISPERIDONE 0.5 MG: 0.25 TABLET ORAL at 07:57

## 2024-03-08 RX ADMIN — TIOTROPIUM BROMIDE INHALATION SPRAY 2 PUFF: 3.12 SPRAY, METERED RESPIRATORY (INHALATION) at 06:03

## 2024-03-08 RX ADMIN — HYDROXYZINE PAMOATE 50 MG: 25 CAPSULE ORAL at 13:28

## 2024-03-08 RX ADMIN — INSULIN LISPRO 2 UNITS: 100 INJECTION, SOLUTION INTRAVENOUS; SUBCUTANEOUS at 07:58

## 2024-03-08 RX ADMIN — METOPROLOL TARTRATE 25 MG: 25 TABLET, FILM COATED ORAL at 07:57

## 2024-03-08 RX ADMIN — IPRATROPIUM BROMIDE AND ALBUTEROL SULFATE 3 ML: 2.5; .5 SOLUTION RESPIRATORY (INHALATION) at 14:02

## 2024-03-08 RX ADMIN — ACETAMINOPHEN, CAFFEINE 2 TABLET: 500; 65 TABLET, FILM COATED ORAL at 08:49

## 2024-03-08 RX ADMIN — RIVAROXABAN 10 MG: 10 TABLET, FILM COATED ORAL at 07:57

## 2024-03-08 RX ADMIN — PANTOPRAZOLE SODIUM 40 MG: 40 TABLET, DELAYED RELEASE ORAL at 08:49

## 2024-03-08 RX ADMIN — METHYLPREDNISOLONE SODIUM SUCCINATE 40 MG: 40 INJECTION, POWDER, FOR SOLUTION INTRAMUSCULAR; INTRAVENOUS at 11:41

## 2024-03-08 RX ADMIN — BUSPIRONE HYDROCHLORIDE 30 MG: 5 TABLET ORAL at 07:58

## 2024-03-08 RX ADMIN — CLONAZEPAM 1 MG: 1 TABLET ORAL at 07:57

## 2024-03-08 ASSESSMENT — PAIN SCALES - GENERAL
PAINLEVEL_OUTOF10: 7
PAINLEVEL_OUTOF10: 5 - MODERATE PAIN

## 2024-03-08 NOTE — PROGRESS NOTES
ASSESSMENT & PLAN:     Acute on chronic hypoxemic and hypercapnic resp failure, wears 4L O2 cont at baseline  Hypercapnic encephalopathy, resolved  COPD exacerbation  CHARLIE/OHS  Chronic compensatory metabolic alkalosis  -hx of frequent admissions for respiratory failure  -this time p/w generalized weakness, fevers, chills, sob, productive cough  -was found to be in acute on chronic hypercapnic resp failure, VBG 7.28/pCO2 66, CT PE neg for PE, was lethargic, not protecting airway, intubated in ED and admitted to ICU  -eventually extubated on 3/6 to nasal cannula, and transferred to [facility]  -covid/flu/rsv neg  Plan:  -pulm following  -IV steroid  -home inhalers  -scheduled nebs  -cont to wean O2 to baseline 4L continuous via NC  -cont PTA BiPAP at bedtime and PRN  -will do formal home O2 eval today to see if needs are higher than normal and if O2 Rx needs to be adjusted    AILEEN  -resolved    HTN  DLD  -home meds    IDDM2  -home insulin regimen    Tobacco use disorder  -nrt with patch    Schizophrenia  MDD  Anxiety  -home meds    DVT/PE on AC  -home Xarelto    GERD  -home ppi    Hypothyroidism  -home synthroid    Vte ppx already on xarelto  Dispo: home today vs tomorrow    Edwardo Acosta MD    SUBJECTIVE     Transferred out of ICU yesterday. Dyspnea improving. She is on 5L NC currently maintaining sats >88%.       OBJECTIVE:       Last Recorded Vitals:  Vitals:    03/08/24 0300 03/08/24 0426 03/08/24 0427 03/08/24 0523   BP:    138/87   BP Location:    Right arm   Patient Position:       Pulse: 97   89   Resp: 25 26  25   Temp:    36.3 °C (97.3 °F)   TempSrc:    Temporal   SpO2: 94%  95% 92%   Weight:       Height:           Last I/O:  I/O last 3 completed shifts:  In: 50 (0.7 mL/kg) [I.V.:50 (0.7 mL/kg)]  Out: 700 (9.9 mL/kg) [Urine:700 (0.3 mL/kg/hr)]  Weight: 70.8 kg     Physical Exam:  GEN: ill appearing, appears stated age, NAD  HEENT: NCAT  CV: RRR, no m/r/g, no LE edema  LUNGS: CTAB, no w/r/c  ABD: soft, NT, ND,  NBS  SKIN: no rashes  MSK; no gross deformities, normal joints  NEURO: A+Ox3, no FND  PSYCH: appropriate mood, affect    Inpatient Medications:  atorvastatin, 20 mg, oral, Nightly  busPIRone, 30 mg, oral, BID  clonazePAM, 1 mg, oral, BID  escitalopram, 10 mg, oral, Daily  fluticasone furoate-vilanteroL, 1 puff, inhalation, Daily  insulin glargine, 10 Units, subcutaneous, q24h  insulin lispro, 0-10 Units, subcutaneous, Before meals & nightly  levothyroxine, 150 mcg, oral, Daily  methylPREDNISolone sodium succinate (PF), 40 mg, intravenous, q24h  metoprolol tartrate, 25 mg, oral, BID  pantoprazole, 40 mg, oral, Daily before breakfast  polyethylene glycol, 17 g, oral, Daily  prazosin, 5 mg, oral, Nightly  risperiDONE, 0.5 mg, oral, BID  rivaroxaban, 10 mg, oral, Daily  tiotropium, 2 puff, inhalation, Daily        PRN Medications  PRN medications: acetaminophen **OR** acetaminophen, acetaminophen-caffeine, dextrose 10 % in water (D10W), dextrose, glucagon, hydrOXYzine pamoate, oxygen    Continuous Medications:         LABS AND IMAGING:     Labs:  Results for orders placed or performed during the hospital encounter of 03/05/24 (from the past 24 hour(s))   POCT GLUCOSE   Result Value Ref Range    POCT Glucose 163 (H) 74 - 99 mg/dL   POCT GLUCOSE   Result Value Ref Range    POCT Glucose 237 (H) 74 - 99 mg/dL   POCT GLUCOSE   Result Value Ref Range    POCT Glucose 223 (H) 74 - 99 mg/dL   Basic Metabolic Panel   Result Value Ref Range    Glucose 171 (H) 74 - 99 mg/dL    Sodium 140 136 - 145 mmol/L    Potassium 4.2 3.5 - 5.3 mmol/L    Chloride 99 98 - 107 mmol/L    Bicarbonate 35 (H) 21 - 32 mmol/L    Anion Gap 10 10 - 20 mmol/L    Urea Nitrogen 35 (H) 6 - 23 mg/dL    Creatinine 0.75 0.50 - 1.05 mg/dL    eGFR >90 >60 mL/min/1.73m*2    Calcium 9.9 8.6 - 10.3 mg/dL   Magnesium   Result Value Ref Range    Magnesium 1.90 1.60 - 2.40 mg/dL   CBC   Result Value Ref Range    WBC 8.5 4.4 - 11.3 x10*3/uL    nRBC 0.0 0.0 - 0.0 /100  WBCs    RBC 4.16 4.00 - 5.20 x10*6/uL    Hemoglobin 12.9 12.0 - 16.0 g/dL    Hematocrit 41.9 36.0 - 46.0 %     (H) 80 - 100 fL    MCH 31.0 26.0 - 34.0 pg    MCHC 30.8 (L) 32.0 - 36.0 g/dL    RDW 14.6 (H) 11.5 - 14.5 %    Platelets 132 (L) 150 - 450 x10*3/uL   POCT GLUCOSE   Result Value Ref Range    POCT Glucose 161 (H) 74 - 99 mg/dL        Imaging:  XR chest 1 view  Narrative: Interpreted By:  Tavares Ellis,   STUDY:  XR CHEST 1 VIEW;  3/5/2024 7:43 am      INDICATION:  Signs/Symptoms:ETT reposition.      COMPARISON:  CT chest earlier same day 5 March 2024 at 0321 hours; portable chest  earlier same morning 5 March 2024 at 0647 hours      ACCESSION NUMBER(S):  [id number]      ORDERING CLINICIAN:  BENEDICTO COLLADO      TECHNIQUE:  Single frontal view of the chest; Portable technique      FINDINGS:  The endotracheal tube has been appropriately repositioned; tip now  projects 2.8 cm above the daron      The enteric tube courses below the diaphragm, but the distal tip is  cut off the bottom of the radiograph      The cardiomediastinal silhouette is unchanged      Left lung has reinflated with some residual minimal bands of  atelectasis. Minimal right basilar atelectasis      No consolidative opacity, pleural effusion or pneumothorax      Impression: Left lung reinflated after successful repositioning of endotracheal  tube      MACRO:  None      Signed by: Tavares Ellis 3/5/2024 7:50 AM  Dictation workstation:   [id number]  XR chest 1 view  Narrative: Interpreted By:  Tavares Ellis,   STUDY:  XR CHEST 1 VIEW;  3/5/2024 6:54 am      INDICATION:  Signs/Symptoms:ETT/OG placement.      COMPARISON:  CT chest earlier same morning 5 March 2024 at 0321 hours      ACCESSION NUMBER(S):  [id number]      ORDERING CLINICIAN:  BENEDICTO COLLADO      TECHNIQUE:  Single frontal view of the chest; Portable technique      FINDINGS:  New endotracheal tube tip projects about 1-2 cm into the right  mainstem bronchus, needs retraction  by about      The left lung is collapsed due to the endotracheal tube placement      By the time of my review this radiograph, this patient's nurse, the  very helpful Mr. Harper has informed me that the endotracheal tube  has already been repositioned and a follow-up radiograph is already  pending      Right lung clear of any acute findings such as edema, consolidation,  pneumothorax or effusion      Impression: Endotracheal tube is in the right mainstem bronchus with consequent  new left lung collapse. The tube has already been repositioned and a  follow-up radiograph is pending. See above      MACRO:  None      Signed by: Tavares Ellis 3/5/2024 7:14 AM  Dictation workstation:   [id number]  ECG 12 lead  Sinus tachycardia  Possible Left atrial enlargement  Possible Inferior infarct (cited on or before 05-MAR-2024)  Abnormal ECG  When compared with ECG of 05-MAR-2024 01:13, (unconfirmed)  No significant change was found  CT angio chest for pulmonary embolism  Narrative: STUDY:  CT Angiogram of the Chest; 3/5/2024 at 3:30 AM  INDICATION:  Chest pain, shortness of breath and hypoxia.  History of PE.  COMPARISON:  CTA chest 12/27/2023, CTA chest 10/31/2023, CTA chest 7/10/2023.  ACCESSION NUMBER(S):  [id number]  ORDERING CLINICIAN:  YESENIA JUÁREZ  TECHNIQUE:  CTA of the chest was performed with intravenous contrast.   Images are reviewed and processed at a workstation according to the CT  angiogram protocol with 3-D and/or MIP post processing imaging  generated.  Omnipaque 350 75 mL was administered intravenously.  Automated mA/kV exposure control was utilized and patient examination  was performed in strict accordance with principles of ALARA.  FINDINGS:  Pulmonary arteries are adequately opacified without large central  acute or chronic filling defects.  Respiratory motion slightly limits  evaluation of the smaller pulmonary arteries.  The thoracic aorta is  normal in course and caliber without dissection or  aneurysm.  Common  origin of the right brachiocephalic artery and left common carotid  artery is seen from the aortic arch, a normal variant.  The heart is upper normal in size without pericardial effusion.   Thoracic lymph nodes are not enlarged.  There is no pleural effusion, pleural thickening, or pneumothorax.   The airways are patent.  Lungs are adequately expanded without interstitial disease or  suspicious pulmonary nodules.  Calcified granulomas are noted.   Atelectasis is present in both lungs with areas of mild groundglass  opacity along the bronchovascular bundles suggesting mild pulmonary  edema.  Small foci of groundglass airspace disease are seen  bilaterally.  Respiratory motion limits pulmonary evaluation.  Upper abdomen demonstrates no acute pathology.  There are no acute fractures.  No suspicious bony lesions.  Impression: Cardiomegaly with mild pulmonary edema suggesting CHF.  Small  groundglass foci of airspace disease may represent alveolar edema  although low-grade developing infectious or inflammatory process may  be a consideration in the appropriate clinical setting.  No evidence  of large central pulmonary embolus..  Signed by Zeferino Clemens  XR chest 1 view  Narrative: STUDY:  Chest Radiograph;  3/5/2024  INDICATION:  COPD.  COMPARISON:  2/9/2024 XR Chest  ACCESSION NUMBER(S):  [id number]  ORDERING CLINICIAN:  YESENIA JUÁREZ  TECHNIQUE:  Frontal chest was obtained at 01:10 hours.  FINDINGS:  CARDIOMEDIASTINAL SILHOUETTE:  Cardiomediastinal silhouette is normal in size and configuration.     LUNGS:  Lungs are clear.     ABDOMEN:  No remarkable upper abdominal findings.     BONES:  No acute osseous changes.  Impression: No acute cardiopulmonary disease.  Signed by Zeferino Clemens

## 2024-03-08 NOTE — PROGRESS NOTES
"Stephenie Mccray is a 43 y.o. female on day 3 of admission presenting with Acute exacerbation of chronic obstructive pulmonary disease (COPD) (CMS/[facility]). S/p vent support and ICU care now transferred to the floor.  On Oxymizer.3/5: Admitted to ICU.  Intubated.  Mechanically ventilated.  Diuresed with 40 mg IV Lasix.   3/6: Intubated and sedated with propofol and precedex. Alert and following commands, writing on notepad for staff. FB (-) 500s  Update @ 1000: extubated to 6L NC with adequate saturation    Subjective   Patient states she feeling better and wants to go home.     Objective   Patient stable and being discharged on 5 L nasal cannula she is on 4 L at home.  ROS  Review of Systems   Constitutional:  Positive for activity change, appetite change, chills, diaphoresis, fatigue and fever.   HENT:  Positive for congestion.    Eyes: Negative.    Respiratory:  Positive for cough, chest tightness, shortness of breath and wheezing.    Gastrointestinal: Negative.    Endocrine: Negative.    Genitourinary: Negative.    Musculoskeletal:  Positive for arthralgias and myalgias.   Skin: Negative.    Allergic/Immunologic: Negative.    Neurological:  Positive for weakness.   Hematological: Negative.    Psychiatric/Behavioral:  Positive for sleep disturbance. The patient is nervous/anxious.    All other systems reviewed and are negative  Vital Signs  Blood pressure 133/79, pulse 97, temperature 36.4 °C (97.5 °F), temperature source Temporal, resp. rate 16, height 1.575 m (5' 2\"), weight 70.8 kg (156 lb 1.4 oz), SpO2 94 %.    Physical Exam   Vitals reviewed.   Constitutional:       Appearance: Normal appearance. She is obese.   HENT:      Head: Normocephalic and atraumatic.      Right Ear: External ear normal.      Left Ear: External ear normal.      Nose: Congestion present.      Mouth/Throat:      Mouth: Mucous membranes are moist.   Eyes:      Extraocular Movements: Extraocular movements intact.      Conjunctiva/sclera: " Conjunctivae normal.      Pupils: Pupils are equal, round, and reactive to light.   Cardiovascular:      Rate and Rhythm: Normal rate and regular rhythm.      Pulses: Normal pulses.      Heart sounds: Normal heart sounds.   Pulmonary:      Effort: Respiratory distress present.      Breath sounds: Wheezing, rhonchi and rales present.   Abdominal:      General: Bowel sounds are normal.      Palpations: Abdomen is soft.   Musculoskeletal:         General: Normal range of motion.      Cervical back: Normal range of motion and neck supple.   Skin:     General: Skin is warm and dry.      Capillary Refill: Capillary refill takes 2 to 3 seconds.   Neurological:      General: No focal deficit present.      Mental Status: She is alert and oriented to person, place, and time.   Psychiatric:         Mood and Affect: Mood normal.         Behavior: Behavior normal.         Thought Content: Thought content normal.         Judgment: Judgment normal.         Oxygen Therapy  SpO2: 94 %  Medical Gas Therapy: Supplemental oxygen  O2 Delivery Method: Nasal cannula  FiO2 (%):  [40 %] 40 %    Intake/Output  I/O last 3 completed shifts:  In: 50 (0.7 mL/kg) [I.V.:50 (0.7 mL/kg)]  Out: 700 (9.9 mL/kg) [Urine:700 (0.3 mL/kg/hr)]  Weight: 70.8 kg     Lines and Tubes:  Peripheral IV 03/05/24 20 G Distal;Right;Upper Arm (Active)   Placement Date/Time: 03/05/24 0104   Placed by External Staff?: Clinic  Hand Hygiene Completed: Yes  Size (Gauge): 20 G  Orientation: Distal;Right;Upper  Location: Arm   Number of days: 3       Peripheral IV 03/05/24 20 G Left;Upper Arm (Active)   Placement Date/Time: 03/05/24 0543   Placed by External Staff?: Clinic  Hand Hygiene Completed: Yes  Size (Gauge): 20 G  Orientation: Left;Upper  Location: Arm   Number of days: 3       Results for orders placed or performed during the hospital encounter of 03/05/24 (from the past 96 hour(s))   CBC and Auto Differential   Result Value Ref Range    WBC 10.0 4.4 - 11.3 x10*3/uL     nRBC 0.0 0.0 - 0.0 /100 WBCs    RBC 4.36 4.00 - 5.20 x10*6/uL    Hemoglobin 13.9 12.0 - 16.0 g/dL    Hematocrit 42.9 36.0 - 46.0 %    MCV 98 80 - 100 fL    MCH 31.9 26.0 - 34.0 pg    MCHC 32.4 32.0 - 36.0 g/dL    RDW 14.7 (H) 11.5 - 14.5 %    Platelets 138 (L) 150 - 450 x10*3/uL    Neutrophils % 65.6 40.0 - 80.0 %    Immature Granulocytes %, Automated 1.5 (H) 0.0 - 0.9 %    Lymphocytes % 24.8 13.0 - 44.0 %    Monocytes % 7.2 2.0 - 10.0 %    Eosinophils % 0.4 0.0 - 6.0 %    Basophils % 0.5 0.0 - 2.0 %    Neutrophils Absolute 6.54 1.20 - 7.70 x10*3/uL    Immature Granulocytes Absolute, Automated 0.15 0.00 - 0.70 x10*3/uL    Lymphocytes Absolute 2.47 1.20 - 4.80 x10*3/uL    Monocytes Absolute 0.72 0.10 - 1.00 x10*3/uL    Eosinophils Absolute 0.04 0.00 - 0.70 x10*3/uL    Basophils Absolute 0.05 0.00 - 0.10 x10*3/uL   Comprehensive metabolic panel   Result Value Ref Range    Glucose 111 (H) 74 - 99 mg/dL    Sodium 141 136 - 145 mmol/L    Potassium 4.1 3.5 - 5.3 mmol/L    Chloride 103 98 - 107 mmol/L    Bicarbonate 32 21 - 32 mmol/L    Anion Gap 10 10 - 20 mmol/L    Urea Nitrogen 18 6 - 23 mg/dL    Creatinine 0.92 0.50 - 1.05 mg/dL    eGFR 80 >60 mL/min/1.73m*2    Calcium 9.4 8.6 - 10.3 mg/dL    Albumin 4.3 3.4 - 5.0 g/dL    Alkaline Phosphatase 87 33 - 110 U/L    Total Protein 7.1 6.4 - 8.2 g/dL    AST 14 9 - 39 U/L    Bilirubin, Total 0.3 0.0 - 1.2 mg/dL    ALT 9 7 - 45 U/L   Lactate   Result Value Ref Range    Lactate 0.9 0.4 - 2.0 mmol/L   B-Type Natriuretic Peptide   Result Value Ref Range    BNP 19 0 - 99 pg/mL   Troponin I, High Sensitivity, Initial   Result Value Ref Range    Troponin I, High Sensitivity 4 0 - 13 ng/L   Acute Toxicology Panel, Blood   Result Value Ref Range    Acetaminophen <10.0 10.0 - 30.0 ug/mL    Salicylate  <3 4 - 20 mg/dL    Alcohol <10 <=10 mg/dL   Sars-CoV-2 and Influenza A/B PCR   Result Value Ref Range    Flu A Result Not Detected Not Detected    Flu B Result Not Detected Not Detected     Coronavirus 2019, PCR Not Detected Not Detected   RSV PCR   Result Value Ref Range    RSV PCR Not Detected Not Detected   Light Blue Top   Result Value Ref Range    Extra Tube Hold for add-ons.    ECG 12 lead   Result Value Ref Range    Ventricular Rate 104 BPM    Atrial Rate 104 BPM    NM Interval 128 ms    QRS Duration 82 ms    QT Interval 360 ms    QTC Calculation(Bazett) 473 ms    P Axis 53 degrees    R Axis -14 degrees    T Axis 56 degrees    QRS Count 17 beats    Q Onset 224 ms    P Onset 160 ms    P Offset 207 ms    T Offset 404 ms    QTC Fredericia 432 ms   BLOOD GAS ARTERIAL FULL PANEL   Result Value Ref Range    POCT pH, Arterial 7.33 (L) 7.38 - 7.42 pH    POCT pCO2, Arterial 63 (H) 38 - 42 mm Hg    POCT pO2, Arterial 69 (L) 85 - 95 mm Hg    POCT SO2, Arterial 96 94 - 100 %    POCT Oxy Hemoglobin, Arterial 88.4 (L) 94.0 - 98.0 %    POCT Hematocrit Calculated, Arterial 40.0 36.0 - 46.0 %    POCT Sodium, Arterial 140 136 - 145 mmol/L    POCT Potassium, Arterial 4.1 3.5 - 5.3 mmol/L    POCT Chloride, Arterial 105 98 - 107 mmol/L    POCT Ionized Calcium, Arterial 1.24 1.10 - 1.33 mmol/L    POCT Glucose, Arterial 102 (H) 74 - 99 mg/dL    POCT Lactate, Arterial 0.8 0.4 - 2.0 mmol/L    POCT Base Excess, Arterial 5.3 (H) -2.0 - 3.0 mmol/L    POCT HCO3 Calculated, Arterial 33.2 (H) 22.0 - 26.0 mmol/L    POCT Hemoglobin, Arterial 13.4 12.0 - 16.0 g/dL    POCT Anion Gap, Arterial 6 (L) 10 - 25 mmo/L    Patient Temperature      FiO2 40 %    Apparatus CANNULA     Site of Arterial Puncture Radial Right     Andrew's Test Positive    Troponin, High Sensitivity, 1 Hour   Result Value Ref Range    Troponin I, High Sensitivity <3 0 - 13 ng/L   BLOOD GAS ARTERIAL FULL PANEL   Result Value Ref Range    POCT pH, Arterial 7.28 (L) 7.38 - 7.42 pH    POCT pCO2, Arterial 66 (H) 38 - 42 mm Hg    POCT pO2, Arterial 28 (LL) 85 - 95 mm Hg    POCT SO2, Arterial 50 (L) 94 - 100 %    POCT Oxy Hemoglobin, Arterial 47.1 (L) 94.0 - 98.0 %     POCT Hematocrit Calculated, Arterial 39.0 36.0 - 46.0 %    POCT Sodium, Arterial 138 136 - 145 mmol/L    POCT Potassium, Arterial 3.8 3.5 - 5.3 mmol/L    POCT Chloride, Arterial 102 98 - 107 mmol/L    POCT Ionized Calcium, Arterial 1.24 1.10 - 1.33 mmol/L    POCT Glucose, Arterial 179 (H) 74 - 99 mg/dL    POCT Lactate, Arterial 1.2 0.4 - 2.0 mmol/L    POCT Base Excess, Arterial 2.5 -2.0 - 3.0 mmol/L    POCT HCO3 Calculated, Arterial 31.0 (H) 22.0 - 26.0 mmol/L    POCT Hemoglobin, Arterial 12.9 12.0 - 16.0 g/dL    POCT Anion Gap, Arterial 9 (L) 10 - 25 mmo/L    Patient Temperature      FiO2 50 %    Apparatus VENTI MASK     Critical Called By RAJI RRT     Critical Called To MARQUITA REED     Critical Call Time 315.0000     Critical Read Back Y     Critical Note LIKELY VENOUS SAMPLE     Site of Arterial Puncture Radial Right     Andrew's Test Positive    Blood Gas Arterial Full Panel   Result Value Ref Range    POCT pH, Arterial 7.30 (L) 7.38 - 7.42 pH    POCT pCO2, Arterial 63 (H) 38 - 42 mm Hg    POCT pO2, Arterial 64 (L) 85 - 95 mm Hg    POCT SO2, Arterial 95 94 - 100 %    POCT Oxy Hemoglobin, Arterial 89.2 (L) 94.0 - 98.0 %    POCT Hematocrit Calculated, Arterial 38.0 36.0 - 46.0 %    POCT Sodium, Arterial 136 136 - 145 mmol/L    POCT Potassium, Arterial 4.3 3.5 - 5.3 mmol/L    POCT Chloride, Arterial 103 98 - 107 mmol/L    POCT Ionized Calcium, Arterial 1.15 1.10 - 1.33 mmol/L    POCT Glucose, Arterial 255 (H) 74 - 99 mg/dL    POCT Lactate, Arterial 0.9 0.4 - 2.0 mmol/L    POCT Base Excess, Arterial 3.0 -2.0 - 3.0 mmol/L    POCT HCO3 Calculated, Arterial 31.0 (H) 22.0 - 26.0 mmol/L    POCT Hemoglobin, Arterial 12.8 12.0 - 16.0 g/dL    POCT Anion Gap, Arterial 6 (L) 10 - 25 mmo/L    Patient Temperature      FiO2 50 %    Site of Arterial Puncture Radial Right     Andrew's Test Positive    POCT GLUCOSE   Result Value Ref Range    POCT Glucose 288 (H) 74 - 99 mg/dL   Blood Gas Arterial Full Panel   Result Value Ref Range     POCT pH, Arterial 7.35 (L) 7.38 - 7.42 pH    POCT pCO2, Arterial 51 (H) 38 - 42 mm Hg    POCT pO2, Arterial 68 (L) 85 - 95 mm Hg    POCT SO2, Arterial 92 (L) 94 - 100 %    POCT Oxy Hemoglobin, Arterial 91.5 (L) 94.0 - 98.0 %    POCT Hematocrit Calculated, Arterial 35.0 (L) 36.0 - 46.0 %    POCT Sodium, Arterial 135 (L) 136 - 145 mmol/L    POCT Potassium, Arterial 4.4 3.5 - 5.3 mmol/L    POCT Chloride, Arterial      POCT Ionized Calcium, Arterial 1.18 1.10 - 1.33 mmol/L    POCT Glucose, Arterial 294 (H) 74 - 99 mg/dL    POCT Lactate, Arterial 2.7 (H) 0.4 - 2.0 mmol/L    POCT Base Excess, Arterial 1.8 -2.0 - 3.0 mmol/L    POCT HCO3 Calculated, Arterial 28.2 (H) 22.0 - 26.0 mmol/L    POCT Hemoglobin, Arterial 11.7 (L) 12.0 - 16.0 g/dL    POCT Anion Gap, Arterial      Patient Temperature      FiO2 100 %    Ventilator Mode A/C     Ventilator Rate 18 bpm    Tidal Volume 400 mL    Peep CHM2O 5.0 cm H2O    Critical Called By APARNA EDWARDS     Critical Called To DR. SEQUEIRA     Critical Call Time 827.0000     Critical Read Back Y     Site of Arterial Puncture Radial Right     Andrew's Test Negative    POCT GLUCOSE   Result Value Ref Range    POCT Glucose 237 (H) 74 - 99 mg/dL   POCT GLUCOSE   Result Value Ref Range    POCT Glucose 227 (H) 74 - 99 mg/dL   POCT GLUCOSE   Result Value Ref Range    POCT Glucose 170 (H) 74 - 99 mg/dL   Drug Screen, Urine   Result Value Ref Range    Amphetamine Screen, Urine Presumptive Negative Presumptive Negative    Barbiturate Screen, Urine Presumptive Positive (A) Presumptive Negative    Benzodiazepines Screen, Urine Presumptive Positive (A) Presumptive Negative    Cannabinoid Screen, Urine Presumptive Positive (A) Presumptive Negative    Cocaine Metabolite Screen, Urine Presumptive Negative Presumptive Negative    Fentanyl Screen, Urine Presumptive Negative Presumptive Negative    Opiate Screen, Urine Presumptive Negative Presumptive Negative    Oxycodone Screen, Urine Presumptive Negative  Presumptive Negative    PCP Screen, Urine Presumptive Negative Presumptive Negative    Methadone Screen, Urine Presumptive Negative Presumptive Negative   Blood Gas Arterial Full Panel   Result Value Ref Range    POCT pH, Arterial 7.36 (L) 7.38 - 7.42 pH    POCT pCO2, Arterial 58 (H) 38 - 42 mm Hg    POCT pO2, Arterial 48 (L) 85 - 95 mm Hg    POCT SO2, Arterial 81 (L) 94 - 100 %    POCT Oxy Hemoglobin, Arterial 79.4 (L) 94.0 - 98.0 %    POCT Hematocrit Calculated, Arterial 40.0 36.0 - 46.0 %    POCT Sodium, Arterial 140 136 - 145 mmol/L    POCT Potassium, Arterial 4.0 3.5 - 5.3 mmol/L    POCT Chloride, Arterial      POCT Ionized Calcium, Arterial 1.20 1.10 - 1.33 mmol/L    POCT Glucose, Arterial 158 (H) 74 - 99 mg/dL    POCT Lactate, Arterial 3.0 (H) 0.4 - 2.0 mmol/L    POCT Base Excess, Arterial 5.6 (H) -2.0 - 3.0 mmol/L    POCT HCO3 Calculated, Arterial 32.8 (H) 22.0 - 26.0 mmol/L    POCT Hemoglobin, Arterial 13.4 12.0 - 16.0 g/dL    POCT Anion Gap, Arterial      Patient Temperature      FiO2 100 %    Ventilator Rate 18 bpm    Tidal Volume 400 mL    Total Minute Volume 16.0 Liter    Peep CHM2O 5.0 cm H2O    Frequency (BPM) 18 bpm    Flow 65.0 LPM    Critical Called By DAVID CASTELLON     Critical Called To CARLOS SPEAR     Critical Call Time 2101.0000     Critical Read Back Y     Site of Arterial Puncture Radial Left     Andrew's Test Positive    POCT GLUCOSE   Result Value Ref Range    POCT Glucose 152 (H) 74 - 99 mg/dL   Phosphorus   Result Value Ref Range    Phosphorus 4.0 2.5 - 4.9 mg/dL   Magnesium   Result Value Ref Range    Magnesium 1.78 1.60 - 2.40 mg/dL   CBC and Auto Differential   Result Value Ref Range    WBC 11.2 4.4 - 11.3 x10*3/uL    nRBC 0.0 0.0 - 0.0 /100 WBCs    RBC 4.21 4.00 - 5.20 x10*6/uL    Hemoglobin 13.1 12.0 - 16.0 g/dL    Hematocrit 40.6 36.0 - 46.0 %    MCV 96 80 - 100 fL    MCH 31.1 26.0 - 34.0 pg    MCHC 32.3 32.0 - 36.0 g/dL    RDW 14.9 (H) 11.5 - 14.5 %    Platelets 138 (L) 150 - 450  x10*3/uL    Neutrophils % 74.4 40.0 - 80.0 %    Immature Granulocytes %, Automated 1.3 (H) 0.0 - 0.9 %    Lymphocytes % 16.7 13.0 - 44.0 %    Monocytes % 7.1 2.0 - 10.0 %    Eosinophils % 0.1 0.0 - 6.0 %    Basophils % 0.4 0.0 - 2.0 %    Neutrophils Absolute 8.34 (H) 1.20 - 7.70 x10*3/uL    Immature Granulocytes Absolute, Automated 0.14 0.00 - 0.70 x10*3/uL    Lymphocytes Absolute 1.87 1.20 - 4.80 x10*3/uL    Monocytes Absolute 0.79 0.10 - 1.00 x10*3/uL    Eosinophils Absolute 0.01 0.00 - 0.70 x10*3/uL    Basophils Absolute 0.05 0.00 - 0.10 x10*3/uL   Basic Metabolic Panel   Result Value Ref Range    Glucose 136 (H) 74 - 99 mg/dL    Sodium 142 136 - 145 mmol/L    Potassium 3.8 3.5 - 5.3 mmol/L    Chloride 102 98 - 107 mmol/L    Bicarbonate 32 21 - 32 mmol/L    Anion Gap 12 10 - 20 mmol/L    Urea Nitrogen 22 6 - 23 mg/dL    Creatinine 0.82 0.50 - 1.05 mg/dL    eGFR >90 >60 mL/min/1.73m*2    Calcium 9.6 8.6 - 10.3 mg/dL   Lactate   Result Value Ref Range    Lactate 1.4 0.4 - 2.0 mmol/L   Blood Gas Arterial Full Panel   Result Value Ref Range    POCT pH, Arterial 7.42 7.38 - 7.42 pH    POCT pCO2, Arterial 49 (H) 38 - 42 mm Hg    POCT pO2, Arterial 54 (L) 85 - 95 mm Hg    POCT SO2, Arterial 88 (L) 94 - 100 %    POCT Oxy Hemoglobin, Arterial 85.7 (L) 94.0 - 98.0 %    POCT Hematocrit Calculated, Arterial 38.0 36.0 - 46.0 %    POCT Sodium, Arterial 138 136 - 145 mmol/L    POCT Potassium, Arterial 3.5 3.5 - 5.3 mmol/L    POCT Chloride, Arterial      POCT Ionized Calcium, Arterial 1.21 1.10 - 1.33 mmol/L    POCT Glucose, Arterial 144 (H) 74 - 99 mg/dL    POCT Lactate, Arterial 1.5 0.4 - 2.0 mmol/L    POCT Base Excess, Arterial 6.2 (H) -2.0 - 3.0 mmol/L    POCT HCO3 Calculated, Arterial 31.8 (H) 22.0 - 26.0 mmol/L    POCT Hemoglobin, Arterial 12.7 12.0 - 16.0 g/dL    POCT Anion Gap, Arterial      Patient Temperature      FiO2 100 %    Ventilator Mode A/C     Ventilator Rate 18 bpm    Tidal Volume 400 mL   POCT GLUCOSE    Result Value Ref Range    POCT Glucose 161 (H) 74 - 99 mg/dL   POCT GLUCOSE   Result Value Ref Range    POCT Glucose 232 (H) 74 - 99 mg/dL   POCT GLUCOSE   Result Value Ref Range    POCT Glucose 148 (H) 74 - 99 mg/dL   POCT GLUCOSE   Result Value Ref Range    POCT Glucose 146 (H) 74 - 99 mg/dL   POCT GLUCOSE   Result Value Ref Range    POCT Glucose 151 (H) 74 - 99 mg/dL   POCT GLUCOSE   Result Value Ref Range    POCT Glucose 136 (H) 74 - 99 mg/dL   Phosphorus   Result Value Ref Range    Phosphorus 5.8 (H) 2.5 - 4.9 mg/dL   Magnesium   Result Value Ref Range    Magnesium 3.05 (H) 1.60 - 2.40 mg/dL   CBC and Auto Differential   Result Value Ref Range    WBC 8.8 4.4 - 11.3 x10*3/uL    nRBC 0.0 0.0 - 0.0 /100 WBCs    RBC 4.17 4.00 - 5.20 x10*6/uL    Hemoglobin 13.0 12.0 - 16.0 g/dL    Hematocrit 41.5 36.0 - 46.0 %     80 - 100 fL    MCH 31.2 26.0 - 34.0 pg    MCHC 31.3 (L) 32.0 - 36.0 g/dL    RDW 14.9 (H) 11.5 - 14.5 %    Platelets 142 (L) 150 - 450 x10*3/uL    Neutrophils % 65.1 40.0 - 80.0 %    Immature Granulocytes %, Automated 2.3 (H) 0.0 - 0.9 %    Lymphocytes % 23.6 13.0 - 44.0 %    Monocytes % 8.2 2.0 - 10.0 %    Eosinophils % 0.1 0.0 - 6.0 %    Basophils % 0.7 0.0 - 2.0 %    Neutrophils Absolute 5.72 1.20 - 7.70 x10*3/uL    Immature Granulocytes Absolute, Automated 0.20 0.00 - 0.70 x10*3/uL    Lymphocytes Absolute 2.07 1.20 - 4.80 x10*3/uL    Monocytes Absolute 0.72 0.10 - 1.00 x10*3/uL    Eosinophils Absolute 0.01 0.00 - 0.70 x10*3/uL    Basophils Absolute 0.06 0.00 - 0.10 x10*3/uL   Basic Metabolic Panel   Result Value Ref Range    Glucose 172 (H) 74 - 99 mg/dL    Sodium 141 136 - 145 mmol/L    Potassium 4.0 3.5 - 5.3 mmol/L    Chloride 98 98 - 107 mmol/L    Bicarbonate 34 (H) 21 - 32 mmol/L    Anion Gap 13 10 - 20 mmol/L    Urea Nitrogen 30 (H) 6 - 23 mg/dL    Creatinine 1.24 (H) 0.50 - 1.05 mg/dL    eGFR 56 (L) >60 mL/min/1.73m*2    Calcium 9.7 8.6 - 10.3 mg/dL   POCT GLUCOSE   Result Value Ref  Range    POCT Glucose 169 (H) 74 - 99 mg/dL   POCT GLUCOSE   Result Value Ref Range    POCT Glucose 131 (H) 74 - 99 mg/dL   POCT GLUCOSE   Result Value Ref Range    POCT Glucose 163 (H) 74 - 99 mg/dL   POCT GLUCOSE   Result Value Ref Range    POCT Glucose 237 (H) 74 - 99 mg/dL   POCT GLUCOSE   Result Value Ref Range    POCT Glucose 223 (H) 74 - 99 mg/dL   Basic Metabolic Panel   Result Value Ref Range    Glucose 171 (H) 74 - 99 mg/dL    Sodium 140 136 - 145 mmol/L    Potassium 4.2 3.5 - 5.3 mmol/L    Chloride 99 98 - 107 mmol/L    Bicarbonate 35 (H) 21 - 32 mmol/L    Anion Gap 10 10 - 20 mmol/L    Urea Nitrogen 35 (H) 6 - 23 mg/dL    Creatinine 0.75 0.50 - 1.05 mg/dL    eGFR >90 >60 mL/min/1.73m*2    Calcium 9.9 8.6 - 10.3 mg/dL   Magnesium   Result Value Ref Range    Magnesium 1.90 1.60 - 2.40 mg/dL   CBC   Result Value Ref Range    WBC 8.5 4.4 - 11.3 x10*3/uL    nRBC 0.0 0.0 - 0.0 /100 WBCs    RBC 4.16 4.00 - 5.20 x10*6/uL    Hemoglobin 12.9 12.0 - 16.0 g/dL    Hematocrit 41.9 36.0 - 46.0 %     (H) 80 - 100 fL    MCH 31.0 26.0 - 34.0 pg    MCHC 30.8 (L) 32.0 - 36.0 g/dL    RDW 14.6 (H) 11.5 - 14.5 %    Platelets 132 (L) 150 - 450 x10*3/uL   POCT GLUCOSE   Result Value Ref Range    POCT Glucose 161 (H) 74 - 99 mg/dL   POCT GLUCOSE   Result Value Ref Range    POCT Glucose 100 (H) 74 - 99 mg/dL   POCT GLUCOSE   Result Value Ref Range    POCT Glucose 215 (H) 74 - 99 mg/dL      Relevant Results  Recent Results (from the past 24 hour(s))   POCT GLUCOSE    Collection Time: 03/07/24  8:00 PM   Result Value Ref Range    POCT Glucose 223 (H) 74 - 99 mg/dL   Basic Metabolic Panel    Collection Time: 03/08/24  7:13 AM   Result Value Ref Range    Glucose 171 (H) 74 - 99 mg/dL    Sodium 140 136 - 145 mmol/L    Potassium 4.2 3.5 - 5.3 mmol/L    Chloride 99 98 - 107 mmol/L    Bicarbonate 35 (H) 21 - 32 mmol/L    Anion Gap 10 10 - 20 mmol/L    Urea Nitrogen 35 (H) 6 - 23 mg/dL    Creatinine 0.75 0.50 - 1.05 mg/dL    eGFR  >90 >60 mL/min/1.73m*2    Calcium 9.9 8.6 - 10.3 mg/dL   Magnesium    Collection Time: 03/08/24  7:13 AM   Result Value Ref Range    Magnesium 1.90 1.60 - 2.40 mg/dL   CBC    Collection Time: 03/08/24  7:13 AM   Result Value Ref Range    WBC 8.5 4.4 - 11.3 x10*3/uL    nRBC 0.0 0.0 - 0.0 /100 WBCs    RBC 4.16 4.00 - 5.20 x10*6/uL    Hemoglobin 12.9 12.0 - 16.0 g/dL    Hematocrit 41.9 36.0 - 46.0 %     (H) 80 - 100 fL    MCH 31.0 26.0 - 34.0 pg    MCHC 30.8 (L) 32.0 - 36.0 g/dL    RDW 14.6 (H) 11.5 - 14.5 %    Platelets 132 (L) 150 - 450 x10*3/uL   POCT GLUCOSE    Collection Time: 03/08/24  7:29 AM   Result Value Ref Range    POCT Glucose 161 (H) 74 - 99 mg/dL   POCT GLUCOSE    Collection Time: 03/08/24 11:17 AM   Result Value Ref Range    POCT Glucose 100 (H) 74 - 99 mg/dL   POCT GLUCOSE    Collection Time: 03/08/24  4:00 PM   Result Value Ref Range    POCT Glucose 215 (H) 74 - 99 mg/dL      ECG 12 lead    Result Date: 3/8/2024  Sinus tachycardia Possible Left atrial enlargement Possible Inferior infarct (cited on or before 05-MAR-2024) Abnormal ECG When compared with ECG of 05-MAR-2024 01:13, (unconfirmed) No significant change was found See ED provider note for full interpretation and clinical correlation Confirmed by Oneyda Frye ([id number]) on 3/8/2024 1:08:48 PM    XR chest 1 view    Result Date: 3/5/2024  Interpreted By:  Tavares Ellis, STUDY: XR CHEST 1 VIEW;  3/5/2024 7:43 am   INDICATION: Signs/Symptoms:ETT reposition.   COMPARISON: CT chest earlier same day 5 March 2024 at 0321 hours; portable chest earlier same morning 5 March 2024 at 0647 hours   ACCESSION NUMBER(S): [id number]   ORDERING CLINICIAN: BENEDICTO COLLADO   TECHNIQUE: Single frontal view of the chest; Portable technique   FINDINGS: The endotracheal tube has been appropriately repositioned; tip now projects 2.8 cm above the daron   The enteric tube courses below the diaphragm, but the distal tip is cut off the bottom of the radiograph   The  cardiomediastinal silhouette is unchanged   Left lung has reinflated with some residual minimal bands of atelectasis. Minimal right basilar atelectasis   No consolidative opacity, pleural effusion or pneumothorax       Left lung reinflated after successful repositioning of endotracheal tube   MACRO: None   Signed by: Tavares Ellis 3/5/2024 7:50 AM Dictation workstation:   [id number]    XR chest 1 view    Result Date: 3/5/2024  Interpreted By:  Tavares Ellis, STUDY: XR CHEST 1 VIEW;  3/5/2024 6:54 am   INDICATION: Signs/Symptoms:ETT/OG placement.   COMPARISON: CT chest earlier same morning 5 March 2024 at 0321 hours   ACCESSION NUMBER(S): [id number]   ORDERING CLINICIAN: BENEDICTO COLLADO   TECHNIQUE: Single frontal view of the chest; Portable technique   FINDINGS: New endotracheal tube tip projects about 1-2 cm into the right mainstem bronchus, needs retraction by about   The left lung is collapsed due to the endotracheal tube placement   By the time of my review this radiograph, this patient's nurse, the very helpful Sebastien Leroy has informed me that the endotracheal tube has already been repositioned and a follow-up radiograph is already pending   Right lung clear of any acute findings such as edema, consolidation, pneumothorax or effusion       Endotracheal tube is in the right mainstem bronchus with consequent new left lung collapse. The tube has already been repositioned and a follow-up radiograph is pending. See above   MACRO: None   Signed by: Tavares Ellis 3/5/2024 7:14 AM Dictation workstation:   [id number]    CT angio chest for pulmonary embolism    Result Date: 3/5/2024  STUDY: CT Angiogram of the Chest; 3/5/2024 at 3:30 AM INDICATION: Chest pain, shortness of breath and hypoxia.  History of PE. COMPARISON: CTA chest 12/27/2023, CTA chest 10/31/2023, CTA chest 7/10/2023. ACCESSION NUMBER(S): [id number] ORDERING CLINICIAN: YESENIA JUÁREZ TECHNIQUE:  CTA of the chest was performed with intravenous  contrast. Images are reviewed and processed at a workstation according to the CT angiogram protocol with 3-D and/or MIP post processing imaging generated.  Omnipaque 350 75 mL was administered intravenously. Automated mA/kV exposure control was utilized and patient examination was performed in strict accordance with principles of ALARA. FINDINGS: Pulmonary arteries are adequately opacified without large central acute or chronic filling defects.  Respiratory motion slightly limits evaluation of the smaller pulmonary arteries.  The thoracic aorta is normal in course and caliber without dissection or aneurysm.  Common origin of the right brachiocephalic artery and left common carotid artery is seen from the aortic arch, a normal variant. The heart is upper normal in size without pericardial effusion. Thoracic lymph nodes are not enlarged. There is no pleural effusion, pleural thickening, or pneumothorax. The airways are patent. Lungs are adequately expanded without interstitial disease or suspicious pulmonary nodules.  Calcified granulomas are noted. Atelectasis is present in both lungs with areas of mild groundglass opacity along the bronchovascular bundles suggesting mild pulmonary edema.  Small foci of groundglass airspace disease are seen bilaterally.  Respiratory motion limits pulmonary evaluation. Upper abdomen demonstrates no acute pathology. There are no acute fractures.  No suspicious bony lesions.    Cardiomegaly with mild pulmonary edema suggesting CHF.  Small groundglass foci of airspace disease may represent alveolar edema although low-grade developing infectious or inflammatory process may be a consideration in the appropriate clinical setting.  No evidence of large central pulmonary embolus.. Signed by Zeferino Clemens    XR chest 1 view    Result Date: 3/5/2024  STUDY: Chest Radiograph;  3/5/2024 INDICATION: COPD. COMPARISON: 2/9/2024 XR Chest ACCESSION NUMBER(S): [id number] ORDERING CLINICIAN: YESENIA MONTANO  SPEICHER TECHNIQUE:  Frontal chest was obtained at 01:10 hours. FINDINGS: CARDIOMEDIASTINAL SILHOUETTE: Cardiomediastinal silhouette is normal in size and configuration.  LUNGS: Lungs are clear.  ABDOMEN: No remarkable upper abdominal findings.  BONES: No acute osseous changes.    No acute cardiopulmonary disease. Signed by Zeferino Clemens    ECG 12 lead    Result Date: 2/9/2024  Sinus rhythm with marked sinus arrhythmia Low voltage QRS Cannot rule out Anterior infarct (cited on or before 09-JAN-2024) Abnormal ECG When compared with ECG of 09-FEB-2024 14:33, (unconfirmed) No significant change was found See ED provider note for full interpretation and clinical correlation Confirmed by Jagjit Nichole ([id number]) on 2/9/2024 6:03:40 PM    ECG 12 lead    Result Date: 2/9/2024  Sinus rhythm with marked sinus arrhythmia Low voltage QRS Cannot rule out Anterior infarct , age undetermined Abnormal ECG When compared with ECG of 09-JAN-2024 22:41, Vent. rate has decreased BY  41 BPM Minimal criteria for Anterior infarct are now Present Criteria for Inferior infarct are no longer Present See ED provider note for full interpretation and clinical correlation Confirmed by Jagjit Nichole ([id number]) on 2/9/2024 6:02:19 PM    CT head wo IV contrast    Result Date: 2/9/2024  Interpreted By:  Anaya Still, STUDY: CT HEAD WO IV CONTRAST;  2/9/2024 4:08 pm   INDICATION: Signs/Symptoms:Dizziness.   COMPARISON: CT head dated 04/10/2023   ACCESSION NUMBER(S): [id number]   ORDERING CLINICIAN: BROOKE TRACEY   TECHNIQUE: Noncontrast axial CT scan of head was performed. Angled reformats in brain and bone windows were generated. The images were reviewed in bone, brain, blood and soft tissue windows.   FINDINGS: No hyperdense intracranial hemorrhage is evident. There is no mass effect or midline shift.   Gray-white differentiation is intact, without evidence of CT apparent transcortical infarct.   No ventricular dilatation is present.  Basal cisterns are patent. No extra-axial fluid collections are evident.   Postsurgical changes of right wall down mastoidectomy are present, with some nonspecific soft tissue attenuation noted in the external auditory canal.   Mucosal thickening is present in the left maxillary sinus.       1.  No evidence of hemorrhage, CT apparent transcortical infarct, or other acute intracranial abnormality. 2. Surgical changes of right wall down mastoidectomy, with small amount of nonspecific soft tissue attenuation present in the external auditory canal/mastoidectomy bed.   MACRO: None   Signed by: Anaya Still 2/9/2024 4:18 PM Dictation workstation:   [id number]    XR chest 1 view    Result Date: 2/9/2024  STUDY: Chest Radiograph;  2/9/2024 3:24 PM INDICATION: Dizziness. COMPARISON: 1/11/2024, 1/10/2024 XR chest. ACCESSION NUMBER(S): [id number] ORDERING CLINICIAN: BROOKE TRACEY TECHNIQUE:  Frontal chest was obtained at 15:24 hours. FINDINGS: CARDIOMEDIASTINAL SILHOUETTE: Cardiomediastinal silhouette is normal in size and configuration.  LUNGS: Lungs are clear.  ABDOMEN: No remarkable upper abdominal findings.  BONES: No acute osseous changes.    No regions of airspace consolidation. Signed by Tavares Townsend MD    Radiology:  ECG 12 lead    Result Date: 3/8/2024  Sinus tachycardia Possible Left atrial enlargement Possible Inferior infarct (cited on or before 05-MAR-2024) Abnormal ECG When compared with ECG of 05-MAR-2024 01:13, (unconfirmed) No significant change was found See ED provider note for full interpretation and clinical correlation Confirmed by Oneyda Frye ([id number]) on 3/8/2024 1:08:48 PM    XR chest 1 view    Result Date: 3/5/2024  Interpreted By:  Tavares Ellis, STUDY: XR CHEST 1 VIEW;  3/5/2024 7:43 am   INDICATION: Signs/Symptoms:ETT reposition.   COMPARISON: CT chest earlier same day 5 March 2024 at 0321 hours; portable chest earlier same morning 5 March 2024 at 0647 hours   ACCESSION NUMBER(S):  [id number]   ORDERING CLINICIAN: BENEDICTO COLLADO   TECHNIQUE: Single frontal view of the chest; Portable technique   FINDINGS: The endotracheal tube has been appropriately repositioned; tip now projects 2.8 cm above the daron   The enteric tube courses below the diaphragm, but the distal tip is cut off the bottom of the radiograph   The cardiomediastinal silhouette is unchanged   Left lung has reinflated with some residual minimal bands of atelectasis. Minimal right basilar atelectasis   No consolidative opacity, pleural effusion or pneumothorax       Left lung reinflated after successful repositioning of endotracheal tube   MACRO: None   Signed by: Tavares Ellis 3/5/2024 7:50 AM Dictation workstation:   [id number]    XR chest 1 view    Result Date: 3/5/2024  Interpreted By:  Tavares Ellis, STUDY: XR CHEST 1 VIEW;  3/5/2024 6:54 am   INDICATION: Signs/Symptoms:ETT/OG placement.   COMPARISON: CT chest earlier same morning 5 March 2024 at 0321 hours   ACCESSION NUMBER(S): [id number]   ORDERING CLINICIAN: BENEDICTO COLLADO   TECHNIQUE: Single frontal view of the chest; Portable technique   FINDINGS: New endotracheal tube tip projects about 1-2 cm into the right mainstem bronchus, needs retraction by about   The left lung is collapsed due to the endotracheal tube placement   By the time of my review this radiograph, this patient's nurse, the very helpful  Leroy has informed me that the endotracheal tube has already been repositioned and a follow-up radiograph is already pending   Right lung clear of any acute findings such as edema, consolidation, pneumothorax or effusion       Endotracheal tube is in the right mainstem bronchus with consequent new left lung collapse. The tube has already been repositioned and a follow-up radiograph is pending. See above   MACRO: None   Signed by: Tavares Ellis 3/5/2024 7:14 AM Dictation workstation:   [id number]    CT angio chest for pulmonary embolism    Result Date:  3/5/2024  STUDY: CT Angiogram of the Chest; 3/5/2024 at 3:30 AM INDICATION: Chest pain, shortness of breath and hypoxia.  History of PE. COMPARISON: CTA chest 12/27/2023, CTA chest 10/31/2023, CTA chest 7/10/2023. ACCESSION NUMBER(S): [id number] ORDERING CLINICIAN: YESENIA JUÁREZ TECHNIQUE:  CTA of the chest was performed with intravenous contrast. Images are reviewed and processed at a workstation according to the CT angiogram protocol with 3-D and/or MIP post processing imaging generated.  Omnipaque 350 75 mL was administered intravenously. Automated mA/kV exposure control was utilized and patient examination was performed in strict accordance with principles of ALARA. FINDINGS: Pulmonary arteries are adequately opacified without large central acute or chronic filling defects.  Respiratory motion slightly limits evaluation of the smaller pulmonary arteries.  The thoracic aorta is normal in course and caliber without dissection or aneurysm.  Common origin of the right brachiocephalic artery and left common carotid artery is seen from the aortic arch, a normal variant. The heart is upper normal in size without pericardial effusion. Thoracic lymph nodes are not enlarged. There is no pleural effusion, pleural thickening, or pneumothorax. The airways are patent. Lungs are adequately expanded without interstitial disease or suspicious pulmonary nodules.  Calcified granulomas are noted. Atelectasis is present in both lungs with areas of mild groundglass opacity along the bronchovascular bundles suggesting mild pulmonary edema.  Small foci of groundglass airspace disease are seen bilaterally.  Respiratory motion limits pulmonary evaluation. Upper abdomen demonstrates no acute pathology. There are no acute fractures.  No suspicious bony lesions.    Cardiomegaly with mild pulmonary edema suggesting CHF.  Small groundglass foci of airspace disease may represent alveolar edema although low-grade developing infectious or  inflammatory process may be a consideration in the appropriate clinical setting.  No evidence of large central pulmonary embolus.. Signed by Zeferino Clemens    XR chest 1 view    Result Date: 3/5/2024  STUDY: Chest Radiograph;  3/5/2024 INDICATION: COPD. COMPARISON: 2/9/2024 XR Chest ACCESSION NUMBER(S): [id number] ORDERING CLINICIAN: YESENIA JUÁREZ TECHNIQUE:  Frontal chest was obtained at 01:10 hours. FINDINGS: CARDIOMEDIASTINAL SILHOUETTE: Cardiomediastinal silhouette is normal in size and configuration.  LUNGS: Lungs are clear.  ABDOMEN: No remarkable upper abdominal findings.  BONES: No acute osseous changes.    No acute cardiopulmonary disease. Signed by Zeferino Clemens     Current Facility-Administered Medications:     acetaminophen (Tylenol) oral liquid 650 mg, 650 mg, oral, q4h PRN **OR** acetaminophen (Tylenol) tablet 650 mg, 650 mg, oral, q4h PRN, BARTOLOME Aden-CNP    acetaminophen-caffeine 500-65 mg tablet 2 tablet, 2 tablet, oral, q6h PRN, Georgina Lo MD, 2 tablet at 03/08/24 1556    atorvastatin (Lipitor) tablet 20 mg, 20 mg, oral, Nightly, BARTOLOME Aden-CNP, 20 mg at 03/07/24 2042    busPIRone (Buspar) tablet 30 mg, 30 mg, oral, BID, BARTOLOME Aden-CNP, 30 mg at 03/08/24 0758    clonazePAM (KlonoPIN) tablet 1 mg, 1 mg, oral, BID, BARTOLOME Aden-CNP, 1 mg at 03/08/24 0757    dextrose 10 % in water (D10W) infusion, 0.3 g/kg/hr, intravenous, Once PRN, BARTOLOME Aden-CNP    dextrose 50 % injection 25 g, 25 g, intravenous, q15 min PRN, BARTOLOME Aden-CNP    escitalopram (Lexapro) tablet 10 mg, 10 mg, oral, Daily, BARTOLOME Aden-CNP, 10 mg at 03/08/24 0757    fluticasone furoate-vilanteroL (Breo Ellipta) 200-25 mcg/dose inhaler 1 puff, 1 puff, inhalation, Daily, Jazmin Portillo, APRN-CNP, 1 puff at 03/08/24 0603    glucagon (Glucagen) injection 1 mg, 1 mg, intramuscular, q15 min PRN, Jazmin Portillo, APRN-CNP     hydrOXYzine pamoate (Vistaril) capsule 50 mg, 50 mg, oral, 4x daily PRN, VALDEMAR Aden, 50 mg at 03/08/24 1328    insulin glargine (Lantus) injection 10 Units, 10 Units, subcutaneous, q24h, VALDEMAR Aden, 10 Units at 03/07/24 2041    insulin lispro (HumaLOG) injection 0-10 Units, 0-10 Units, subcutaneous, Before meals & nightly, VALDEMAR Aden, 2 Units at 03/08/24 0758    ipratropium-albuteroL (Duo-Neb) 0.5-2.5 mg/3 mL nebulizer solution 3 mL, 3 mL, nebulization, TID, Edwardo Acosta MD, 3 mL at 03/08/24 1402    levothyroxine (Synthroid, Levoxyl) tablet 150 mcg, 150 mcg, oral, Daily, VALDEMAR Aden, 150 mcg at 03/08/24 0757    methylPREDNISolone sod succinate (PF) (SOLU-Medrol) 40 mg/mL injection 40 mg, 40 mg, intravenous, q24h, Edwardo Acosta MD, 40 mg at 03/08/24 1141    metoprolol tartrate (Lopressor) tablet 25 mg, 25 mg, oral, BID, VALDEMAR Aden, 25 mg at 03/08/24 0757    oxygen (O2) therapy, , inhalation, Continuous PRN - O2/gases, VALDEMAR Aden, 5 L/min at 03/08/24 1402    pantoprazole (ProtoNix) EC tablet 40 mg, 40 mg, oral, Daily before breakfast, Edwardo Acosta MD, 40 mg at 03/08/24 0849    polyethylene glycol (Glycolax, Miralax) packet 17 g, 17 g, oral, Daily, VALDEMAR Aden, 17 g at 03/08/24 0757    prazosin (Minipress) capsule 5 mg, 5 mg, oral, Nightly, VALDEMAR Aden, 5 mg at 03/07/24 2202    risperiDONE (RisperDAL) tablet 0.5 mg, 0.5 mg, oral, BID, VALDEMAR Aden, 0.5 mg at 03/08/24 0757    rivaroxaban (Xarelto) tablet 10 mg, 10 mg, oral, Daily, VALDEMAR Aden, 10 mg at 03/08/24 0757    tiotropium (Spiriva Respimat) 2.5 mcg/actuation inhaler 2 puff, 2 puff, inhalation, Daily, VALDEMAR Aden, 2 puff at 03/08/24 0603   Principal Problem:    Acute exacerbation of chronic obstructive pulmonary disease (COPD) (CMS/[facility])      Assessment/Plan     Acute on chronic hypoxic and hypercapnic respiratory failure patient extubated this morning.  On 6 L nasal cannula.  O2 saturation mid 90%.  COPD exacerbation continue bronchodilator and steroids.  Smoking cessation discussed with the patient.  CHF patient responded well to Lasix.  Obstructive sleep apnea patient on noninvasive ventilation at home.  Morbid obesity/obesity-hypoventilation syndrome.  History of DVT and PE patient on Xarelto.  Anxiety and depression.  Hypertension.  Diabetes.  Hypothyroidism.  . GERD.  DVT prophylaxis.  PT OT for deconditioning.  Patient clinically improving, transferred to the floor from ICU on 6 to 8 L Oxymizer.  3/8/2024 patient improved clinically on 5 L nasal cannula at home patient on 4 L nasal cannula patient is stable to be discharged home follow-up in the office.  Case discussed with patient bedside nurse and hospitalist    Afshin Villalba MD

## 2024-03-08 NOTE — TREATMENT PLAN
Pulse Oximetry on room air at rest: 87    2.   Pulse Oximetry on room air during ambulation: 85    3.   Pulse Oximetry on oxygen during ambulation: 89    4.   Amount of oxygen during ambulation: 5L    5.   Pulse oximetry during recovery: 89    6.   Amount of oxygen during recovery: 5L    Does this patient qualify for home O2? Yes    What is the patient's Pulmonary Diagnosis: COPD    What is the patient's DME company: [employer]    Comments:

## 2024-03-08 NOTE — CARE PLAN
The patient's goals for the shift include  reduce pain    The clinical goals for the shift include wean 02

## 2024-03-08 NOTE — CARE PLAN
The patient's goals for the shift include      The clinical goals for the shift include wean 02    Problem: Pain - Adult  Goal: Verbalizes/displays adequate comfort level or baseline comfort level  Outcome: Progressing     Problem: Safety - Adult  Goal: Free from fall injury  Outcome: Progressing     Problem: Discharge Planning  Goal: Discharge to home or other facility with appropriate resources  Outcome: Progressing     Problem: Chronic Conditions and Co-morbidities  Goal: Patient's chronic conditions and co-morbidity symptoms are monitored and maintained or improved  Outcome: Progressing     Problem: Diabetes  Goal: Achieve decreasing blood glucose levels by end of shift  Outcome: Progressing  Goal: Increase stability of blood glucose readings by end of shift  Outcome: Progressing  Goal: Maintain electrolyte levels within acceptable range throughout shift  Outcome: Progressing  Goal: Maintain glucose levels >70mg/dl to <250mg/dl throughout shift  Outcome: Progressing  Goal: No changes in neurological exam by end of shift  Outcome: Progressing  Goal: Learn about and adhere to nutrition recommendations by end of shift  Outcome: Progressing  Goal: Vital signs within normal range for age by end of shift  Outcome: Progressing  Goal: Increase self care and/or family involovement by end of shift  Outcome: Progressing  Goal: Receive DSME education by end of shift  Outcome: Progressing     Problem: Pain  Goal: Takes deep breaths with improved pain control throughout the shift  Outcome: Progressing  Goal: Turns in bed with improved pain control throughout the shift  Outcome: Progressing  Goal: Walks with improved pain control throughout the shift  Outcome: Progressing  Goal: Performs ADL's with improved pain control throughout shift  Outcome: Progressing  Goal: Participates in PT with improved pain control throughout the shift  Outcome: Progressing  Goal: Free from opioid side effects throughout the shift  Outcome:  Progressing  Goal: Free from acute confusion related to pain meds throughout the shift  Outcome: Progressing     Problem: Skin  Goal: Decreased wound size/increased tissue granulation at next dressing change  Outcome: Progressing  Goal: Participates in plan/prevention/treatment measures  Outcome: Progressing  Goal: Prevent/manage excess moisture  Outcome: Progressing  Goal: Prevent/minimize sheer/friction injuries  Outcome: Progressing  Goal: Promote/optimize nutrition  Outcome: Progressing  Goal: Promote skin healing  Outcome: Progressing

## 2024-03-08 NOTE — DISCHARGE SUMMARY
DISCHARGE DIAGNOSIS     Acute on chronic hypoxemic and hypercapnic resp failure, wears 4L O2 cont at baseline  Hypercapnic encephalopathy, resolved  COPD exacerbation  AILEEN    HOSPITAL COURSE AND DETAILS     Acute on chronic hypoxemic and hypercapnic resp failure, wears 4L O2 cont at baseline  Hypercapnic encephalopathy, resolved  COPD exacerbation  CHARLIE/[facility]  Chronic compensatory metabolic alkalosis  -hx of frequent admissions for respiratory failure  -this time p/w generalized weakness, fevers, chills, sob, productive cough  -was found to be in acute on chronic hypercapnic resp failure, VBG 7.28/pCO2 66, CT PE neg for PE, was lethargic, not protecting airway, intubated in ED and admitted to ICU  -eventually extubated on 3/6 to nasal cannula, and transferred to [facility]  -covid/flu/rsv neg  Plan:  -completed x5d IV steroid here in hospital, will send with PO pred taper  -sent Spiriva  -cont home nebs PRN  -was weaned to nasal cannula, repeat home O2 eval showed need for 5L continuous (was previously 4L)  -cont PTA BiPAP at bedtime and PRN  -pulm saw here, fu as outpt  -fu with PCP     AILEEN  -resolved     HTN  DLD  -home meds     IDDM2  -home insulin regimen     Tobacco use disorder  -nrt with patch     Schizophrenia  MDD  Anxiety  -home meds     DVT/PE on AC  -home Xarelto     GERD  -home ppi     Hypothyroidism  -home synthroid    Will dc home today. Total time spent on discharge services 34 minutes.        DISCHARGE PHYSICAL EXAM     Last Recorded Vitals:  Vitals:    03/08/24 0523 03/08/24 1157 03/08/24 1326 03/08/24 1402   BP: 138/87  133/79    BP Location: Right arm  Right arm    Patient Position:   Sitting    Pulse: 89  97    Resp: 25  16    Temp: 36.3 °C (97.3 °F)  36.4 °C (97.5 °F)    TempSrc: Temporal  Temporal    SpO2: 92% 92% 93% 94%   Weight:       Height:           Physical Exam:  GEN: ill appearing, appears stated age, NAD  HEENT: NCAT  CV: RRR, no m/r/g, no LE edema  LUNGS: scattered exp wheezing b/l, no  acute resp distress, on o2 via nc  ABD: soft, NT, ND, NBS  SKIN: no rashes  MSK; no gross deformities, normal joints  NEURO: A+Ox3, no FND  PSYCH: appropriate mood, affect      PERTINENT LABS AND IMAGING     Results for orders placed or performed during the hospital encounter of 03/05/24 (from the past 96 hour(s))   CBC and Auto Differential   Result Value Ref Range    WBC 10.0 4.4 - 11.3 x10*3/uL    nRBC 0.0 0.0 - 0.0 /100 WBCs    RBC 4.36 4.00 - 5.20 x10*6/uL    Hemoglobin 13.9 12.0 - 16.0 g/dL    Hematocrit 42.9 36.0 - 46.0 %    MCV 98 80 - 100 fL    MCH 31.9 26.0 - 34.0 pg    MCHC 32.4 32.0 - 36.0 g/dL    RDW 14.7 (H) 11.5 - 14.5 %    Platelets 138 (L) 150 - 450 x10*3/uL    Neutrophils % 65.6 40.0 - 80.0 %    Immature Granulocytes %, Automated 1.5 (H) 0.0 - 0.9 %    Lymphocytes % 24.8 13.0 - 44.0 %    Monocytes % 7.2 2.0 - 10.0 %    Eosinophils % 0.4 0.0 - 6.0 %    Basophils % 0.5 0.0 - 2.0 %    Neutrophils Absolute 6.54 1.20 - 7.70 x10*3/uL    Immature Granulocytes Absolute, Automated 0.15 0.00 - 0.70 x10*3/uL    Lymphocytes Absolute 2.47 1.20 - 4.80 x10*3/uL    Monocytes Absolute 0.72 0.10 - 1.00 x10*3/uL    Eosinophils Absolute 0.04 0.00 - 0.70 x10*3/uL    Basophils Absolute 0.05 0.00 - 0.10 x10*3/uL   Comprehensive metabolic panel   Result Value Ref Range    Glucose 111 (H) 74 - 99 mg/dL    Sodium 141 136 - 145 mmol/L    Potassium 4.1 3.5 - 5.3 mmol/L    Chloride 103 98 - 107 mmol/L    Bicarbonate 32 21 - 32 mmol/L    Anion Gap 10 10 - 20 mmol/L    Urea Nitrogen 18 6 - 23 mg/dL    Creatinine 0.92 0.50 - 1.05 mg/dL    eGFR 80 >60 mL/min/1.73m*2    Calcium 9.4 8.6 - 10.3 mg/dL    Albumin 4.3 3.4 - 5.0 g/dL    Alkaline Phosphatase 87 33 - 110 U/L    Total Protein 7.1 6.4 - 8.2 g/dL    AST 14 9 - 39 U/L    Bilirubin, Total 0.3 0.0 - 1.2 mg/dL    ALT 9 7 - 45 U/L   Lactate   Result Value Ref Range    Lactate 0.9 0.4 - 2.0 mmol/L   B-Type Natriuretic Peptide   Result Value Ref Range    BNP 19 0 - 99 pg/mL    Troponin I, High Sensitivity, Initial   Result Value Ref Range    Troponin I, High Sensitivity 4 0 - 13 ng/L   Acute Toxicology Panel, Blood   Result Value Ref Range    Acetaminophen <10.0 10.0 - 30.0 ug/mL    Salicylate  <3 4 - 20 mg/dL    Alcohol <10 <=10 mg/dL   Sars-CoV-2 and Influenza A/B PCR   Result Value Ref Range    Flu A Result Not Detected Not Detected    Flu B Result Not Detected Not Detected    Coronavirus 2019, PCR Not Detected Not Detected   RSV PCR   Result Value Ref Range    RSV PCR Not Detected Not Detected   Light Blue Top   Result Value Ref Range    Extra Tube Hold for add-ons.    ECG 12 lead   Result Value Ref Range    Ventricular Rate 104 BPM    Atrial Rate 104 BPM    NH Interval 128 ms    QRS Duration 82 ms    QT Interval 360 ms    QTC Calculation(Bazett) 473 ms    P Axis 53 degrees    R Axis -14 degrees    T Axis 56 degrees    QRS Count 17 beats    Q Onset 224 ms    P Onset 160 ms    P Offset 207 ms    T Offset 404 ms    QTC Fredericia 432 ms   BLOOD GAS ARTERIAL FULL PANEL   Result Value Ref Range    POCT pH, Arterial 7.33 (L) 7.38 - 7.42 pH    POCT pCO2, Arterial 63 (H) 38 - 42 mm Hg    POCT pO2, Arterial 69 (L) 85 - 95 mm Hg    POCT SO2, Arterial 96 94 - 100 %    POCT Oxy Hemoglobin, Arterial 88.4 (L) 94.0 - 98.0 %    POCT Hematocrit Calculated, Arterial 40.0 36.0 - 46.0 %    POCT Sodium, Arterial 140 136 - 145 mmol/L    POCT Potassium, Arterial 4.1 3.5 - 5.3 mmol/L    POCT Chloride, Arterial 105 98 - 107 mmol/L    POCT Ionized Calcium, Arterial 1.24 1.10 - 1.33 mmol/L    POCT Glucose, Arterial 102 (H) 74 - 99 mg/dL    POCT Lactate, Arterial 0.8 0.4 - 2.0 mmol/L    POCT Base Excess, Arterial 5.3 (H) -2.0 - 3.0 mmol/L    POCT HCO3 Calculated, Arterial 33.2 (H) 22.0 - 26.0 mmol/L    POCT Hemoglobin, Arterial 13.4 12.0 - 16.0 g/dL    POCT Anion Gap, Arterial 6 (L) 10 - 25 mmo/L    Patient Temperature      FiO2 40 %    Apparatus CANNULA     Site of Arterial Puncture Radial Right     Andrew's  Test Positive    Troponin, High Sensitivity, 1 Hour   Result Value Ref Range    Troponin I, High Sensitivity <3 0 - 13 ng/L   BLOOD GAS ARTERIAL FULL PANEL   Result Value Ref Range    POCT pH, Arterial 7.28 (L) 7.38 - 7.42 pH    POCT pCO2, Arterial 66 (H) 38 - 42 mm Hg    POCT pO2, Arterial 28 (LL) 85 - 95 mm Hg    POCT SO2, Arterial 50 (L) 94 - 100 %    POCT Oxy Hemoglobin, Arterial 47.1 (L) 94.0 - 98.0 %    POCT Hematocrit Calculated, Arterial 39.0 36.0 - 46.0 %    POCT Sodium, Arterial 138 136 - 145 mmol/L    POCT Potassium, Arterial 3.8 3.5 - 5.3 mmol/L    POCT Chloride, Arterial 102 98 - 107 mmol/L    POCT Ionized Calcium, Arterial 1.24 1.10 - 1.33 mmol/L    POCT Glucose, Arterial 179 (H) 74 - 99 mg/dL    POCT Lactate, Arterial 1.2 0.4 - 2.0 mmol/L    POCT Base Excess, Arterial 2.5 -2.0 - 3.0 mmol/L    POCT HCO3 Calculated, Arterial 31.0 (H) 22.0 - 26.0 mmol/L    POCT Hemoglobin, Arterial 12.9 12.0 - 16.0 g/dL    POCT Anion Gap, Arterial 9 (L) 10 - 25 mmo/L    Patient Temperature      FiO2 50 %    Apparatus VENTI MASK     Critical Called By RAJI RRT     Critical Called To MARQUITA REED     Critical Call Time 315.0000     Critical Read Back Y     Critical Note LIKELY VENOUS SAMPLE     Site of Arterial Puncture Radial Right     Andrew's Test Positive    Blood Gas Arterial Full Panel   Result Value Ref Range    POCT pH, Arterial 7.30 (L) 7.38 - 7.42 pH    POCT pCO2, Arterial 63 (H) 38 - 42 mm Hg    POCT pO2, Arterial 64 (L) 85 - 95 mm Hg    POCT SO2, Arterial 95 94 - 100 %    POCT Oxy Hemoglobin, Arterial 89.2 (L) 94.0 - 98.0 %    POCT Hematocrit Calculated, Arterial 38.0 36.0 - 46.0 %    POCT Sodium, Arterial 136 136 - 145 mmol/L    POCT Potassium, Arterial 4.3 3.5 - 5.3 mmol/L    POCT Chloride, Arterial 103 98 - 107 mmol/L    POCT Ionized Calcium, Arterial 1.15 1.10 - 1.33 mmol/L    POCT Glucose, Arterial 255 (H) 74 - 99 mg/dL    POCT Lactate, Arterial 0.9 0.4 - 2.0 mmol/L    POCT Base Excess, Arterial 3.0 -2.0 - 3.0  mmol/L    POCT HCO3 Calculated, Arterial 31.0 (H) 22.0 - 26.0 mmol/L    POCT Hemoglobin, Arterial 12.8 12.0 - 16.0 g/dL    POCT Anion Gap, Arterial 6 (L) 10 - 25 mmo/L    Patient Temperature      FiO2 50 %    Site of Arterial Puncture Radial Right     Andrew's Test Positive    POCT GLUCOSE   Result Value Ref Range    POCT Glucose 288 (H) 74 - 99 mg/dL   Blood Gas Arterial Full Panel   Result Value Ref Range    POCT pH, Arterial 7.35 (L) 7.38 - 7.42 pH    POCT pCO2, Arterial 51 (H) 38 - 42 mm Hg    POCT pO2, Arterial 68 (L) 85 - 95 mm Hg    POCT SO2, Arterial 92 (L) 94 - 100 %    POCT Oxy Hemoglobin, Arterial 91.5 (L) 94.0 - 98.0 %    POCT Hematocrit Calculated, Arterial 35.0 (L) 36.0 - 46.0 %    POCT Sodium, Arterial 135 (L) 136 - 145 mmol/L    POCT Potassium, Arterial 4.4 3.5 - 5.3 mmol/L    POCT Chloride, Arterial      POCT Ionized Calcium, Arterial 1.18 1.10 - 1.33 mmol/L    POCT Glucose, Arterial 294 (H) 74 - 99 mg/dL    POCT Lactate, Arterial 2.7 (H) 0.4 - 2.0 mmol/L    POCT Base Excess, Arterial 1.8 -2.0 - 3.0 mmol/L    POCT HCO3 Calculated, Arterial 28.2 (H) 22.0 - 26.0 mmol/L    POCT Hemoglobin, Arterial 11.7 (L) 12.0 - 16.0 g/dL    POCT Anion Gap, Arterial      Patient Temperature      FiO2 100 %    Ventilator Mode A/C     Ventilator Rate 18 bpm    Tidal Volume 400 mL    Peep CHM2O 5.0 cm H2O    Critical Called By APARNA EDWARDS     Critical Called To DR. SEQUEIRA     Critical Call Time 827.0000     Critical Read Back Y     Site of Arterial Puncture Radial Right     Andrew's Test Negative    POCT GLUCOSE   Result Value Ref Range    POCT Glucose 237 (H) 74 - 99 mg/dL   POCT GLUCOSE   Result Value Ref Range    POCT Glucose 227 (H) 74 - 99 mg/dL   POCT GLUCOSE   Result Value Ref Range    POCT Glucose 170 (H) 74 - 99 mg/dL   Drug Screen, Urine   Result Value Ref Range    Amphetamine Screen, Urine Presumptive Negative Presumptive Negative    Barbiturate Screen, Urine Presumptive Positive (A) Presumptive Negative     Benzodiazepines Screen, Urine Presumptive Positive (A) Presumptive Negative    Cannabinoid Screen, Urine Presumptive Positive (A) Presumptive Negative    Cocaine Metabolite Screen, Urine Presumptive Negative Presumptive Negative    Fentanyl Screen, Urine Presumptive Negative Presumptive Negative    Opiate Screen, Urine Presumptive Negative Presumptive Negative    Oxycodone Screen, Urine Presumptive Negative Presumptive Negative    PCP Screen, Urine Presumptive Negative Presumptive Negative    Methadone Screen, Urine Presumptive Negative Presumptive Negative   Blood Gas Arterial Full Panel   Result Value Ref Range    POCT pH, Arterial 7.36 (L) 7.38 - 7.42 pH    POCT pCO2, Arterial 58 (H) 38 - 42 mm Hg    POCT pO2, Arterial 48 (L) 85 - 95 mm Hg    POCT SO2, Arterial 81 (L) 94 - 100 %    POCT Oxy Hemoglobin, Arterial 79.4 (L) 94.0 - 98.0 %    POCT Hematocrit Calculated, Arterial 40.0 36.0 - 46.0 %    POCT Sodium, Arterial 140 136 - 145 mmol/L    POCT Potassium, Arterial 4.0 3.5 - 5.3 mmol/L    POCT Chloride, Arterial      POCT Ionized Calcium, Arterial 1.20 1.10 - 1.33 mmol/L    POCT Glucose, Arterial 158 (H) 74 - 99 mg/dL    POCT Lactate, Arterial 3.0 (H) 0.4 - 2.0 mmol/L    POCT Base Excess, Arterial 5.6 (H) -2.0 - 3.0 mmol/L    POCT HCO3 Calculated, Arterial 32.8 (H) 22.0 - 26.0 mmol/L    POCT Hemoglobin, Arterial 13.4 12.0 - 16.0 g/dL    POCT Anion Gap, Arterial      Patient Temperature      FiO2 100 %    Ventilator Rate 18 bpm    Tidal Volume 400 mL    Total Minute Volume 16.0 Liter    Peep CHM2O 5.0 cm H2O    Frequency (BPM) 18 bpm    Flow 65.0 LPM    Critical Called By DAVID CASTELLON     Critical Called To CARLOS SPEAR     Critical Call Time 2101.0000     Critical Read Back Y     Site of Arterial Puncture Radial Left     Andrew's Test Positive    POCT GLUCOSE   Result Value Ref Range    POCT Glucose 152 (H) 74 - 99 mg/dL   Phosphorus   Result Value Ref Range    Phosphorus 4.0 2.5 - 4.9 mg/dL   Magnesium   Result  Value Ref Range    Magnesium 1.78 1.60 - 2.40 mg/dL   CBC and Auto Differential   Result Value Ref Range    WBC 11.2 4.4 - 11.3 x10*3/uL    nRBC 0.0 0.0 - 0.0 /100 WBCs    RBC 4.21 4.00 - 5.20 x10*6/uL    Hemoglobin 13.1 12.0 - 16.0 g/dL    Hematocrit 40.6 36.0 - 46.0 %    MCV 96 80 - 100 fL    MCH 31.1 26.0 - 34.0 pg    MCHC 32.3 32.0 - 36.0 g/dL    RDW 14.9 (H) 11.5 - 14.5 %    Platelets 138 (L) 150 - 450 x10*3/uL    Neutrophils % 74.4 40.0 - 80.0 %    Immature Granulocytes %, Automated 1.3 (H) 0.0 - 0.9 %    Lymphocytes % 16.7 13.0 - 44.0 %    Monocytes % 7.1 2.0 - 10.0 %    Eosinophils % 0.1 0.0 - 6.0 %    Basophils % 0.4 0.0 - 2.0 %    Neutrophils Absolute 8.34 (H) 1.20 - 7.70 x10*3/uL    Immature Granulocytes Absolute, Automated 0.14 0.00 - 0.70 x10*3/uL    Lymphocytes Absolute 1.87 1.20 - 4.80 x10*3/uL    Monocytes Absolute 0.79 0.10 - 1.00 x10*3/uL    Eosinophils Absolute 0.01 0.00 - 0.70 x10*3/uL    Basophils Absolute 0.05 0.00 - 0.10 x10*3/uL   Basic Metabolic Panel   Result Value Ref Range    Glucose 136 (H) 74 - 99 mg/dL    Sodium 142 136 - 145 mmol/L    Potassium 3.8 3.5 - 5.3 mmol/L    Chloride 102 98 - 107 mmol/L    Bicarbonate 32 21 - 32 mmol/L    Anion Gap 12 10 - 20 mmol/L    Urea Nitrogen 22 6 - 23 mg/dL    Creatinine 0.82 0.50 - 1.05 mg/dL    eGFR >90 >60 mL/min/1.73m*2    Calcium 9.6 8.6 - 10.3 mg/dL   Lactate   Result Value Ref Range    Lactate 1.4 0.4 - 2.0 mmol/L   Blood Gas Arterial Full Panel   Result Value Ref Range    POCT pH, Arterial 7.42 7.38 - 7.42 pH    POCT pCO2, Arterial 49 (H) 38 - 42 mm Hg    POCT pO2, Arterial 54 (L) 85 - 95 mm Hg    POCT SO2, Arterial 88 (L) 94 - 100 %    POCT Oxy Hemoglobin, Arterial 85.7 (L) 94.0 - 98.0 %    POCT Hematocrit Calculated, Arterial 38.0 36.0 - 46.0 %    POCT Sodium, Arterial 138 136 - 145 mmol/L    POCT Potassium, Arterial 3.5 3.5 - 5.3 mmol/L    POCT Chloride, Arterial      POCT Ionized Calcium, Arterial 1.21 1.10 - 1.33 mmol/L    POCT Glucose,  Arterial 144 (H) 74 - 99 mg/dL    POCT Lactate, Arterial 1.5 0.4 - 2.0 mmol/L    POCT Base Excess, Arterial 6.2 (H) -2.0 - 3.0 mmol/L    POCT HCO3 Calculated, Arterial 31.8 (H) 22.0 - 26.0 mmol/L    POCT Hemoglobin, Arterial 12.7 12.0 - 16.0 g/dL    POCT Anion Gap, Arterial      Patient Temperature      FiO2 100 %    Ventilator Mode A/C     Ventilator Rate 18 bpm    Tidal Volume 400 mL   POCT GLUCOSE   Result Value Ref Range    POCT Glucose 161 (H) 74 - 99 mg/dL   POCT GLUCOSE   Result Value Ref Range    POCT Glucose 232 (H) 74 - 99 mg/dL   POCT GLUCOSE   Result Value Ref Range    POCT Glucose 148 (H) 74 - 99 mg/dL   POCT GLUCOSE   Result Value Ref Range    POCT Glucose 146 (H) 74 - 99 mg/dL   POCT GLUCOSE   Result Value Ref Range    POCT Glucose 151 (H) 74 - 99 mg/dL   POCT GLUCOSE   Result Value Ref Range    POCT Glucose 136 (H) 74 - 99 mg/dL   Phosphorus   Result Value Ref Range    Phosphorus 5.8 (H) 2.5 - 4.9 mg/dL   Magnesium   Result Value Ref Range    Magnesium 3.05 (H) 1.60 - 2.40 mg/dL   CBC and Auto Differential   Result Value Ref Range    WBC 8.8 4.4 - 11.3 x10*3/uL    nRBC 0.0 0.0 - 0.0 /100 WBCs    RBC 4.17 4.00 - 5.20 x10*6/uL    Hemoglobin 13.0 12.0 - 16.0 g/dL    Hematocrit 41.5 36.0 - 46.0 %     80 - 100 fL    MCH 31.2 26.0 - 34.0 pg    MCHC 31.3 (L) 32.0 - 36.0 g/dL    RDW 14.9 (H) 11.5 - 14.5 %    Platelets 142 (L) 150 - 450 x10*3/uL    Neutrophils % 65.1 40.0 - 80.0 %    Immature Granulocytes %, Automated 2.3 (H) 0.0 - 0.9 %    Lymphocytes % 23.6 13.0 - 44.0 %    Monocytes % 8.2 2.0 - 10.0 %    Eosinophils % 0.1 0.0 - 6.0 %    Basophils % 0.7 0.0 - 2.0 %    Neutrophils Absolute 5.72 1.20 - 7.70 x10*3/uL    Immature Granulocytes Absolute, Automated 0.20 0.00 - 0.70 x10*3/uL    Lymphocytes Absolute 2.07 1.20 - 4.80 x10*3/uL    Monocytes Absolute 0.72 0.10 - 1.00 x10*3/uL    Eosinophils Absolute 0.01 0.00 - 0.70 x10*3/uL    Basophils Absolute 0.06 0.00 - 0.10 x10*3/uL   Basic Metabolic Panel    Result Value Ref Range    Glucose 172 (H) 74 - 99 mg/dL    Sodium 141 136 - 145 mmol/L    Potassium 4.0 3.5 - 5.3 mmol/L    Chloride 98 98 - 107 mmol/L    Bicarbonate 34 (H) 21 - 32 mmol/L    Anion Gap 13 10 - 20 mmol/L    Urea Nitrogen 30 (H) 6 - 23 mg/dL    Creatinine 1.24 (H) 0.50 - 1.05 mg/dL    eGFR 56 (L) >60 mL/min/1.73m*2    Calcium 9.7 8.6 - 10.3 mg/dL   POCT GLUCOSE   Result Value Ref Range    POCT Glucose 169 (H) 74 - 99 mg/dL   POCT GLUCOSE   Result Value Ref Range    POCT Glucose 131 (H) 74 - 99 mg/dL   POCT GLUCOSE   Result Value Ref Range    POCT Glucose 163 (H) 74 - 99 mg/dL   POCT GLUCOSE   Result Value Ref Range    POCT Glucose 237 (H) 74 - 99 mg/dL   POCT GLUCOSE   Result Value Ref Range    POCT Glucose 223 (H) 74 - 99 mg/dL   Basic Metabolic Panel   Result Value Ref Range    Glucose 171 (H) 74 - 99 mg/dL    Sodium 140 136 - 145 mmol/L    Potassium 4.2 3.5 - 5.3 mmol/L    Chloride 99 98 - 107 mmol/L    Bicarbonate 35 (H) 21 - 32 mmol/L    Anion Gap 10 10 - 20 mmol/L    Urea Nitrogen 35 (H) 6 - 23 mg/dL    Creatinine 0.75 0.50 - 1.05 mg/dL    eGFR >90 >60 mL/min/1.73m*2    Calcium 9.9 8.6 - 10.3 mg/dL   Magnesium   Result Value Ref Range    Magnesium 1.90 1.60 - 2.40 mg/dL   CBC   Result Value Ref Range    WBC 8.5 4.4 - 11.3 x10*3/uL    nRBC 0.0 0.0 - 0.0 /100 WBCs    RBC 4.16 4.00 - 5.20 x10*6/uL    Hemoglobin 12.9 12.0 - 16.0 g/dL    Hematocrit 41.9 36.0 - 46.0 %     (H) 80 - 100 fL    MCH 31.0 26.0 - 34.0 pg    MCHC 30.8 (L) 32.0 - 36.0 g/dL    RDW 14.6 (H) 11.5 - 14.5 %    Platelets 132 (L) 150 - 450 x10*3/uL   POCT GLUCOSE   Result Value Ref Range    POCT Glucose 161 (H) 74 - 99 mg/dL   POCT GLUCOSE   Result Value Ref Range    POCT Glucose 100 (H) 74 - 99 mg/dL        XR chest 1 view   Final Result   Left lung reinflated after successful repositioning of endotracheal   tube        MACRO:   None        Signed by: Tavares Ellis 3/5/2024 7:50 AM   Dictation workstation:   [id number]       XR chest 1 view   Final Result   Endotracheal tube is in the right mainstem bronchus with consequent   new left lung collapse. The tube has already been repositioned and a   follow-up radiograph is pending. See above        MACRO:   None        Signed by: Tavares Ellis 3/5/2024 7:14 AM   Dictation workstation:   [id number]      CT angio chest for pulmonary embolism   Final Result   Cardiomegaly with mild pulmonary edema suggesting CHF.  Small   groundglass foci of airspace disease may represent alveolar edema   although low-grade developing infectious or inflammatory process may   be a consideration in the appropriate clinical setting.  No evidence   of large central pulmonary embolus..   Signed by Zeferino Clemens      XR chest 1 view   Final Result   No acute cardiopulmonary disease.   Signed by Zeferino Clemens          No echocardiogram results found for the past 14 days    DISCHARGE MEDICATIONS        Your medication list        START taking these medications        Instructions Last Dose Given Next Dose Due   predniSONE 20 mg tablet  Commonly known as: Deltasone  Start taking on: March 8, 2024      Take 2 tablets (40 mg) by mouth once daily for 2 days, THEN 1.5 tablets (30 mg) once daily for 2 days, THEN 1 tablet (20 mg) once daily for 2 days, THEN 0.5 tablets (10 mg) once daily for 1 day.       tiotropium 2.5 mcg/actuation inhaler  Commonly known as: Spiriva Respimat  Start taking on: March 9, 2024      Inhale 2 puffs once daily. Do not start before March 9, 2024.              CHANGE how you take these medications        Instructions Last Dose Given Next Dose Due   ipratropium-albuteroL 0.5-2.5 mg/3 mL nebulizer solution  Commonly known as: Duo-Neb  What changed:   when to take this  additional instructions      Take 3 mL by nebulization every 6 hours if needed for shortness of breath or wheezing.              CONTINUE taking these medications        Instructions Last Dose Given Next Dose Due   alcohol swabs pads,  "medicated  Commonly known as: Alcohol Prep Pads      Use 3 times daily prn       atorvastatin 20 mg tablet  Commonly known as: Lipitor      Take 1 tablet (20 mg) by mouth once daily. Dr. Davis covering for Dr. Yuen       BD Ultra-Fine Mini Pen Needle 31 gauge x 3/16\" needle  Generic drug: pen needle, diabetic      USE TO INJECT 4 TIMES DAILY AS DIRECTED       busPIRone 30 mg tablet  Commonly known as: Buspar           clonazePAM 1 mg tablet  Commonly known as: KlonoPIN           escitalopram 10 mg tablet  Commonly known as: Lexapro           hydrOXYzine pamoate 50 mg capsule  Commonly known as: Vistaril           insulin glargine 100 unit/mL (3 mL) pen  Commonly known as: Lantus Solostar U-100 Insulin      Inject 10 Units under the skin once daily in the morning. Take as directed per insulin instructions.       insulin lispro 100 unit/mL injection  Commonly known as: HumaLOG           levothyroxine 150 mcg tablet  Commonly known as: Synthroid, Levoxyl      Take 1 tablet (150 mcg) by mouth once daily.       nicotine 21 mg/24 hr patch  Commonly known as: Nicoderm CQ      Place 1 patch over 24 hours on the skin once daily for 37 doses. Do not start before January 15, 2024.       nicotine 14 mg/24 hr patch  Commonly known as: Nicoderm CQ      Place 1 patch over 24 hours on the skin once daily for 14 doses. Do not start before February 21, 2024.       nicotine 7 mg/24 hr patch  Commonly known as: Nicoderm CQ      Place 1 patch over 24 hours on the skin once daily for 14 doses. Do not start before March 6, 2024.       nicotine polacrilex 4 mg lozenge  Commonly known as: Commit      Dissolve 1 lozenge (4 mg) in the mouth every 2 hours if needed for smoking cessation.       OneTouch Verio test strips strip  Generic drug: blood sugar diagnostic           paliperidone 3 mg 24 hr tablet  Commonly known as: Invega      Take 1 tablet (3 mg) by mouth once daily. Do not crush, chew, or split. Do not start before January 15, " 2024.       pantoprazole 40 mg EC tablet  Commonly known as: ProtoNix      Take 1 tablet (40 mg) by mouth once daily.       prazosin 5 mg capsule  Commonly known as: Minipress           Xarelto 10 mg tablet  Generic drug: rivaroxaban                     Where to Get Your Medications        These medications were sent to [id number] - [place], [place] - [address]  [address] [place] , [facility] [address]      Phone: [phone]   predniSONE 20 mg tablet  tiotropium 2.5 mcg/actuation inhaler         OUTPATIENT FOLLOW-UP     Future Appointments   Date Time Provider Department Center   3/13/2024  1:00 PM ARNULFO Linda, CCC-A [id number] [place]   3/13/2024  1:30 PM Tyson Valdes DO [id number] [place]   4/1/2024  1:15 PM Jesse Jones DO [id number] [place]   5/13/2024 10:45 AM Delta Yuen MD [id number] [place]

## 2024-03-08 NOTE — PROGRESS NOTES
"Stephenie Mccray is a 43 y.o. female on day 2 of admission presenting with Acute exacerbation of chronic obstructive pulmonary disease (COPD) (CMS/Formerly [facility]).  S/p vent support and ICU care now transferred to the floor.  On Oxymizer.3/5: Admitted to ICU.  Intubated.  Mechanically ventilated.  Diuresed with 40 mg IV Lasix.   3/6: Intubated and sedated with propofol and precedex. Alert and following commands, writing on notepad for staff. FB (-) 500s  Update @ 1000: extubated to 6L NC with adequate saturation.        Subjective   Patient states she is slowly improving getting better     Objective   Patient is stable on Oxymizer 6 L.  ROS  Review of Systems   Constitutional:  Positive for activity change, appetite change, chills, diaphoresis, fatigue and fever.   HENT:  Positive for congestion.    Eyes: Negative.    Respiratory:  Positive for cough, chest tightness, shortness of breath and wheezing.    Gastrointestinal: Negative.    Endocrine: Negative.    Genitourinary: Negative.    Musculoskeletal:  Positive for arthralgias and myalgias.   Skin: Negative.    Allergic/Immunologic: Negative.    Neurological:  Positive for weakness.   Hematological: Negative.    Psychiatric/Behavioral:  Positive for sleep disturbance. The patient is nervous/anxious.    All other systems reviewed and are negative.     Vital Signs  Blood pressure 137/87, pulse 110, temperature 36.7 °C (98.1 °F), temperature source Temporal, resp. rate 25, height 1.575 m (5' 2\"), weight 70.8 kg (156 lb 1.4 oz), SpO2 90 %.    Physical Exam   Vitals reviewed.   Constitutional:       Appearance: Normal appearance. She is obese.   HENT:      Head: Normocephalic and atraumatic.      Right Ear: External ear normal.      Left Ear: External ear normal.      Nose: Congestion present.      Mouth/Throat:      Mouth: Mucous membranes are moist.   Eyes:      Extraocular Movements: Extraocular movements intact.      Conjunctiva/sclera: Conjunctivae normal.      Pupils: " Pupils are equal, round, and reactive to light.   Cardiovascular:      Rate and Rhythm: Normal rate and regular rhythm.      Pulses: Normal pulses.      Heart sounds: Normal heart sounds.   Pulmonary:      Effort: Respiratory distress present.      Breath sounds: Wheezing, rhonchi and rales present.   Abdominal:      General: Bowel sounds are normal.      Palpations: Abdomen is soft.   Musculoskeletal:         General: Normal range of motion.      Cervical back: Normal range of motion and neck supple.   Skin:     General: Skin is warm and dry.      Capillary Refill: Capillary refill takes 2 to 3 seconds.   Neurological:      General: No focal deficit present.      Mental Status: She is alert and oriented to person, place, and time.   Psychiatric:         Mood and Affect: Mood normal.         Behavior: Behavior normal.         Thought Content: Thought content normal.         Judgment: Judgment normal.         Oxygen Therapy  SpO2: 90 %  Medical Gas Therapy: Supplemental oxygen  O2 Delivery Method: High flow nasal cannula  FiO2 (%):  [50 %] 50 %  S RR:  [20] 20    Intake/Output  I/O last 3 completed shifts:  In: 50 (0.7 mL/kg) [I.V.:50 (0.7 mL/kg)]  Out: 2150 (30.4 mL/kg) [Urine:2150 (0.8 mL/kg/hr)]  Weight: 70.8 kg     Lines and Tubes:  Peripheral IV 03/05/24 20 G Distal;Right;Upper Arm (Active)   Placement Date/Time: 03/05/24 0104   Placed by External Staff?: Clinic  Hand Hygiene Completed: Yes  Size (Gauge): 20 G  Orientation: Distal;Right;Upper  Location: Arm   Number of days: 2       Peripheral IV 03/05/24 20 G Left;Upper Arm (Active)   Placement Date/Time: 03/05/24 0543   Placed by External Staff?: Clinic  Hand Hygiene Completed: Yes  Size (Gauge): 20 G  Orientation: Left;Upper  Location: Arm   Number of days: 2       Results for orders placed or performed during the hospital encounter of 03/05/24 (from the past 96 hour(s))   CBC and Auto Differential   Result Value Ref Range    WBC 10.0 4.4 - 11.3 x10*3/uL     nRBC 0.0 0.0 - 0.0 /100 WBCs    RBC 4.36 4.00 - 5.20 x10*6/uL    Hemoglobin 13.9 12.0 - 16.0 g/dL    Hematocrit 42.9 36.0 - 46.0 %    MCV 98 80 - 100 fL    MCH 31.9 26.0 - 34.0 pg    MCHC 32.4 32.0 - 36.0 g/dL    RDW 14.7 (H) 11.5 - 14.5 %    Platelets 138 (L) 150 - 450 x10*3/uL    Neutrophils % 65.6 40.0 - 80.0 %    Immature Granulocytes %, Automated 1.5 (H) 0.0 - 0.9 %    Lymphocytes % 24.8 13.0 - 44.0 %    Monocytes % 7.2 2.0 - 10.0 %    Eosinophils % 0.4 0.0 - 6.0 %    Basophils % 0.5 0.0 - 2.0 %    Neutrophils Absolute 6.54 1.20 - 7.70 x10*3/uL    Immature Granulocytes Absolute, Automated 0.15 0.00 - 0.70 x10*3/uL    Lymphocytes Absolute 2.47 1.20 - 4.80 x10*3/uL    Monocytes Absolute 0.72 0.10 - 1.00 x10*3/uL    Eosinophils Absolute 0.04 0.00 - 0.70 x10*3/uL    Basophils Absolute 0.05 0.00 - 0.10 x10*3/uL   Comprehensive metabolic panel   Result Value Ref Range    Glucose 111 (H) 74 - 99 mg/dL    Sodium 141 136 - 145 mmol/L    Potassium 4.1 3.5 - 5.3 mmol/L    Chloride 103 98 - 107 mmol/L    Bicarbonate 32 21 - 32 mmol/L    Anion Gap 10 10 - 20 mmol/L    Urea Nitrogen 18 6 - 23 mg/dL    Creatinine 0.92 0.50 - 1.05 mg/dL    eGFR 80 >60 mL/min/1.73m*2    Calcium 9.4 8.6 - 10.3 mg/dL    Albumin 4.3 3.4 - 5.0 g/dL    Alkaline Phosphatase 87 33 - 110 U/L    Total Protein 7.1 6.4 - 8.2 g/dL    AST 14 9 - 39 U/L    Bilirubin, Total 0.3 0.0 - 1.2 mg/dL    ALT 9 7 - 45 U/L   Lactate   Result Value Ref Range    Lactate 0.9 0.4 - 2.0 mmol/L   B-Type Natriuretic Peptide   Result Value Ref Range    BNP 19 0 - 99 pg/mL   Troponin I, High Sensitivity, Initial   Result Value Ref Range    Troponin I, High Sensitivity 4 0 - 13 ng/L   Acute Toxicology Panel, Blood   Result Value Ref Range    Acetaminophen <10.0 10.0 - 30.0 ug/mL    Salicylate  <3 4 - 20 mg/dL    Alcohol <10 <=10 mg/dL   Sars-CoV-2 and Influenza A/B PCR   Result Value Ref Range    Flu A Result Not Detected Not Detected    Flu B Result Not Detected Not Detected     Coronavirus 2019, PCR Not Detected Not Detected   RSV PCR   Result Value Ref Range    RSV PCR Not Detected Not Detected   Light Blue Top   Result Value Ref Range    Extra Tube Hold for add-ons.    ECG 12 lead   Result Value Ref Range    Ventricular Rate 104 BPM    Atrial Rate 104 BPM    IA Interval 128 ms    QRS Duration 82 ms    QT Interval 360 ms    QTC Calculation(Bazett) 473 ms    P Axis 53 degrees    R Axis -14 degrees    T Axis 56 degrees    QRS Count 17 beats    Q Onset 224 ms    P Onset 160 ms    P Offset 207 ms    T Offset 404 ms    QTC Fredericia 432 ms   BLOOD GAS ARTERIAL FULL PANEL   Result Value Ref Range    POCT pH, Arterial 7.33 (L) 7.38 - 7.42 pH    POCT pCO2, Arterial 63 (H) 38 - 42 mm Hg    POCT pO2, Arterial 69 (L) 85 - 95 mm Hg    POCT SO2, Arterial 96 94 - 100 %    POCT Oxy Hemoglobin, Arterial 88.4 (L) 94.0 - 98.0 %    POCT Hematocrit Calculated, Arterial 40.0 36.0 - 46.0 %    POCT Sodium, Arterial 140 136 - 145 mmol/L    POCT Potassium, Arterial 4.1 3.5 - 5.3 mmol/L    POCT Chloride, Arterial 105 98 - 107 mmol/L    POCT Ionized Calcium, Arterial 1.24 1.10 - 1.33 mmol/L    POCT Glucose, Arterial 102 (H) 74 - 99 mg/dL    POCT Lactate, Arterial 0.8 0.4 - 2.0 mmol/L    POCT Base Excess, Arterial 5.3 (H) -2.0 - 3.0 mmol/L    POCT HCO3 Calculated, Arterial 33.2 (H) 22.0 - 26.0 mmol/L    POCT Hemoglobin, Arterial 13.4 12.0 - 16.0 g/dL    POCT Anion Gap, Arterial 6 (L) 10 - 25 mmo/L    Patient Temperature      FiO2 40 %    Apparatus CANNULA     Site of Arterial Puncture Radial Right     Andrew's Test Positive    Troponin, High Sensitivity, 1 Hour   Result Value Ref Range    Troponin I, High Sensitivity <3 0 - 13 ng/L   BLOOD GAS ARTERIAL FULL PANEL   Result Value Ref Range    POCT pH, Arterial 7.28 (L) 7.38 - 7.42 pH    POCT pCO2, Arterial 66 (H) 38 - 42 mm Hg    POCT pO2, Arterial 28 (LL) 85 - 95 mm Hg    POCT SO2, Arterial 50 (L) 94 - 100 %    POCT Oxy Hemoglobin, Arterial 47.1 (L) 94.0 - 98.0 %     POCT Hematocrit Calculated, Arterial 39.0 36.0 - 46.0 %    POCT Sodium, Arterial 138 136 - 145 mmol/L    POCT Potassium, Arterial 3.8 3.5 - 5.3 mmol/L    POCT Chloride, Arterial 102 98 - 107 mmol/L    POCT Ionized Calcium, Arterial 1.24 1.10 - 1.33 mmol/L    POCT Glucose, Arterial 179 (H) 74 - 99 mg/dL    POCT Lactate, Arterial 1.2 0.4 - 2.0 mmol/L    POCT Base Excess, Arterial 2.5 -2.0 - 3.0 mmol/L    POCT HCO3 Calculated, Arterial 31.0 (H) 22.0 - 26.0 mmol/L    POCT Hemoglobin, Arterial 12.9 12.0 - 16.0 g/dL    POCT Anion Gap, Arterial 9 (L) 10 - 25 mmo/L    Patient Temperature      FiO2 50 %    Apparatus VENTI MASK     Critical Called By RAJI RRT     Critical Called To MARQUITA REED     Critical Call Time 315.0000     Critical Read Back Y     Critical Note LIKELY VENOUS SAMPLE     Site of Arterial Puncture Radial Right     Andrew's Test Positive    Blood Gas Arterial Full Panel   Result Value Ref Range    POCT pH, Arterial 7.30 (L) 7.38 - 7.42 pH    POCT pCO2, Arterial 63 (H) 38 - 42 mm Hg    POCT pO2, Arterial 64 (L) 85 - 95 mm Hg    POCT SO2, Arterial 95 94 - 100 %    POCT Oxy Hemoglobin, Arterial 89.2 (L) 94.0 - 98.0 %    POCT Hematocrit Calculated, Arterial 38.0 36.0 - 46.0 %    POCT Sodium, Arterial 136 136 - 145 mmol/L    POCT Potassium, Arterial 4.3 3.5 - 5.3 mmol/L    POCT Chloride, Arterial 103 98 - 107 mmol/L    POCT Ionized Calcium, Arterial 1.15 1.10 - 1.33 mmol/L    POCT Glucose, Arterial 255 (H) 74 - 99 mg/dL    POCT Lactate, Arterial 0.9 0.4 - 2.0 mmol/L    POCT Base Excess, Arterial 3.0 -2.0 - 3.0 mmol/L    POCT HCO3 Calculated, Arterial 31.0 (H) 22.0 - 26.0 mmol/L    POCT Hemoglobin, Arterial 12.8 12.0 - 16.0 g/dL    POCT Anion Gap, Arterial 6 (L) 10 - 25 mmo/L    Patient Temperature      FiO2 50 %    Site of Arterial Puncture Radial Right     Andrew's Test Positive    POCT GLUCOSE   Result Value Ref Range    POCT Glucose 288 (H) 74 - 99 mg/dL   Blood Gas Arterial Full Panel   Result Value Ref Range     POCT pH, Arterial 7.35 (L) 7.38 - 7.42 pH    POCT pCO2, Arterial 51 (H) 38 - 42 mm Hg    POCT pO2, Arterial 68 (L) 85 - 95 mm Hg    POCT SO2, Arterial 92 (L) 94 - 100 %    POCT Oxy Hemoglobin, Arterial 91.5 (L) 94.0 - 98.0 %    POCT Hematocrit Calculated, Arterial 35.0 (L) 36.0 - 46.0 %    POCT Sodium, Arterial 135 (L) 136 - 145 mmol/L    POCT Potassium, Arterial 4.4 3.5 - 5.3 mmol/L    POCT Chloride, Arterial      POCT Ionized Calcium, Arterial 1.18 1.10 - 1.33 mmol/L    POCT Glucose, Arterial 294 (H) 74 - 99 mg/dL    POCT Lactate, Arterial 2.7 (H) 0.4 - 2.0 mmol/L    POCT Base Excess, Arterial 1.8 -2.0 - 3.0 mmol/L    POCT HCO3 Calculated, Arterial 28.2 (H) 22.0 - 26.0 mmol/L    POCT Hemoglobin, Arterial 11.7 (L) 12.0 - 16.0 g/dL    POCT Anion Gap, Arterial      Patient Temperature      FiO2 100 %    Ventilator Mode A/C     Ventilator Rate 18 bpm    Tidal Volume 400 mL    Peep CHM2O 5.0 cm H2O    Critical Called By APARNA EDWARDS     Critical Called To DR. SEQUEIRA     Critical Call Time 827.0000     Critical Read Back Y     Site of Arterial Puncture Radial Right     Andrew's Test Negative    POCT GLUCOSE   Result Value Ref Range    POCT Glucose 237 (H) 74 - 99 mg/dL   POCT GLUCOSE   Result Value Ref Range    POCT Glucose 227 (H) 74 - 99 mg/dL   POCT GLUCOSE   Result Value Ref Range    POCT Glucose 170 (H) 74 - 99 mg/dL   Drug Screen, Urine   Result Value Ref Range    Amphetamine Screen, Urine Presumptive Negative Presumptive Negative    Barbiturate Screen, Urine Presumptive Positive (A) Presumptive Negative    Benzodiazepines Screen, Urine Presumptive Positive (A) Presumptive Negative    Cannabinoid Screen, Urine Presumptive Positive (A) Presumptive Negative    Cocaine Metabolite Screen, Urine Presumptive Negative Presumptive Negative    Fentanyl Screen, Urine Presumptive Negative Presumptive Negative    Opiate Screen, Urine Presumptive Negative Presumptive Negative    Oxycodone Screen, Urine Presumptive Negative  Presumptive Negative    PCP Screen, Urine Presumptive Negative Presumptive Negative    Methadone Screen, Urine Presumptive Negative Presumptive Negative   Blood Gas Arterial Full Panel   Result Value Ref Range    POCT pH, Arterial 7.36 (L) 7.38 - 7.42 pH    POCT pCO2, Arterial 58 (H) 38 - 42 mm Hg    POCT pO2, Arterial 48 (L) 85 - 95 mm Hg    POCT SO2, Arterial 81 (L) 94 - 100 %    POCT Oxy Hemoglobin, Arterial 79.4 (L) 94.0 - 98.0 %    POCT Hematocrit Calculated, Arterial 40.0 36.0 - 46.0 %    POCT Sodium, Arterial 140 136 - 145 mmol/L    POCT Potassium, Arterial 4.0 3.5 - 5.3 mmol/L    POCT Chloride, Arterial      POCT Ionized Calcium, Arterial 1.20 1.10 - 1.33 mmol/L    POCT Glucose, Arterial 158 (H) 74 - 99 mg/dL    POCT Lactate, Arterial 3.0 (H) 0.4 - 2.0 mmol/L    POCT Base Excess, Arterial 5.6 (H) -2.0 - 3.0 mmol/L    POCT HCO3 Calculated, Arterial 32.8 (H) 22.0 - 26.0 mmol/L    POCT Hemoglobin, Arterial 13.4 12.0 - 16.0 g/dL    POCT Anion Gap, Arterial      Patient Temperature      FiO2 100 %    Ventilator Rate 18 bpm    Tidal Volume 400 mL    Total Minute Volume 16.0 Liter    Peep CHM2O 5.0 cm H2O    Frequency (BPM) 18 bpm    Flow 65.0 LPM    Critical Called By DAVID CASTELLON     Critical Called To CARLOS SPEAR     Critical Call Time 2101.0000     Critical Read Back Y     Site of Arterial Puncture Radial Left     Andrew's Test Positive    POCT GLUCOSE   Result Value Ref Range    POCT Glucose 152 (H) 74 - 99 mg/dL   Phosphorus   Result Value Ref Range    Phosphorus 4.0 2.5 - 4.9 mg/dL   Magnesium   Result Value Ref Range    Magnesium 1.78 1.60 - 2.40 mg/dL   CBC and Auto Differential   Result Value Ref Range    WBC 11.2 4.4 - 11.3 x10*3/uL    nRBC 0.0 0.0 - 0.0 /100 WBCs    RBC 4.21 4.00 - 5.20 x10*6/uL    Hemoglobin 13.1 12.0 - 16.0 g/dL    Hematocrit 40.6 36.0 - 46.0 %    MCV 96 80 - 100 fL    MCH 31.1 26.0 - 34.0 pg    MCHC 32.3 32.0 - 36.0 g/dL    RDW 14.9 (H) 11.5 - 14.5 %    Platelets 138 (L) 150 - 450  x10*3/uL    Neutrophils % 74.4 40.0 - 80.0 %    Immature Granulocytes %, Automated 1.3 (H) 0.0 - 0.9 %    Lymphocytes % 16.7 13.0 - 44.0 %    Monocytes % 7.1 2.0 - 10.0 %    Eosinophils % 0.1 0.0 - 6.0 %    Basophils % 0.4 0.0 - 2.0 %    Neutrophils Absolute 8.34 (H) 1.20 - 7.70 x10*3/uL    Immature Granulocytes Absolute, Automated 0.14 0.00 - 0.70 x10*3/uL    Lymphocytes Absolute 1.87 1.20 - 4.80 x10*3/uL    Monocytes Absolute 0.79 0.10 - 1.00 x10*3/uL    Eosinophils Absolute 0.01 0.00 - 0.70 x10*3/uL    Basophils Absolute 0.05 0.00 - 0.10 x10*3/uL   Basic Metabolic Panel   Result Value Ref Range    Glucose 136 (H) 74 - 99 mg/dL    Sodium 142 136 - 145 mmol/L    Potassium 3.8 3.5 - 5.3 mmol/L    Chloride 102 98 - 107 mmol/L    Bicarbonate 32 21 - 32 mmol/L    Anion Gap 12 10 - 20 mmol/L    Urea Nitrogen 22 6 - 23 mg/dL    Creatinine 0.82 0.50 - 1.05 mg/dL    eGFR >90 >60 mL/min/1.73m*2    Calcium 9.6 8.6 - 10.3 mg/dL   Lactate   Result Value Ref Range    Lactate 1.4 0.4 - 2.0 mmol/L   Blood Gas Arterial Full Panel   Result Value Ref Range    POCT pH, Arterial 7.42 7.38 - 7.42 pH    POCT pCO2, Arterial 49 (H) 38 - 42 mm Hg    POCT pO2, Arterial 54 (L) 85 - 95 mm Hg    POCT SO2, Arterial 88 (L) 94 - 100 %    POCT Oxy Hemoglobin, Arterial 85.7 (L) 94.0 - 98.0 %    POCT Hematocrit Calculated, Arterial 38.0 36.0 - 46.0 %    POCT Sodium, Arterial 138 136 - 145 mmol/L    POCT Potassium, Arterial 3.5 3.5 - 5.3 mmol/L    POCT Chloride, Arterial      POCT Ionized Calcium, Arterial 1.21 1.10 - 1.33 mmol/L    POCT Glucose, Arterial 144 (H) 74 - 99 mg/dL    POCT Lactate, Arterial 1.5 0.4 - 2.0 mmol/L    POCT Base Excess, Arterial 6.2 (H) -2.0 - 3.0 mmol/L    POCT HCO3 Calculated, Arterial 31.8 (H) 22.0 - 26.0 mmol/L    POCT Hemoglobin, Arterial 12.7 12.0 - 16.0 g/dL    POCT Anion Gap, Arterial      Patient Temperature      FiO2 100 %    Ventilator Mode A/C     Ventilator Rate 18 bpm    Tidal Volume 400 mL   POCT GLUCOSE    Result Value Ref Range    POCT Glucose 161 (H) 74 - 99 mg/dL   POCT GLUCOSE   Result Value Ref Range    POCT Glucose 232 (H) 74 - 99 mg/dL   POCT GLUCOSE   Result Value Ref Range    POCT Glucose 148 (H) 74 - 99 mg/dL   POCT GLUCOSE   Result Value Ref Range    POCT Glucose 146 (H) 74 - 99 mg/dL   POCT GLUCOSE   Result Value Ref Range    POCT Glucose 151 (H) 74 - 99 mg/dL   POCT GLUCOSE   Result Value Ref Range    POCT Glucose 136 (H) 74 - 99 mg/dL   Phosphorus   Result Value Ref Range    Phosphorus 5.8 (H) 2.5 - 4.9 mg/dL   Magnesium   Result Value Ref Range    Magnesium 3.05 (H) 1.60 - 2.40 mg/dL   CBC and Auto Differential   Result Value Ref Range    WBC 8.8 4.4 - 11.3 x10*3/uL    nRBC 0.0 0.0 - 0.0 /100 WBCs    RBC 4.17 4.00 - 5.20 x10*6/uL    Hemoglobin 13.0 12.0 - 16.0 g/dL    Hematocrit 41.5 36.0 - 46.0 %     80 - 100 fL    MCH 31.2 26.0 - 34.0 pg    MCHC 31.3 (L) 32.0 - 36.0 g/dL    RDW 14.9 (H) 11.5 - 14.5 %    Platelets 142 (L) 150 - 450 x10*3/uL    Neutrophils % 65.1 40.0 - 80.0 %    Immature Granulocytes %, Automated 2.3 (H) 0.0 - 0.9 %    Lymphocytes % 23.6 13.0 - 44.0 %    Monocytes % 8.2 2.0 - 10.0 %    Eosinophils % 0.1 0.0 - 6.0 %    Basophils % 0.7 0.0 - 2.0 %    Neutrophils Absolute 5.72 1.20 - 7.70 x10*3/uL    Immature Granulocytes Absolute, Automated 0.20 0.00 - 0.70 x10*3/uL    Lymphocytes Absolute 2.07 1.20 - 4.80 x10*3/uL    Monocytes Absolute 0.72 0.10 - 1.00 x10*3/uL    Eosinophils Absolute 0.01 0.00 - 0.70 x10*3/uL    Basophils Absolute 0.06 0.00 - 0.10 x10*3/uL   Basic Metabolic Panel   Result Value Ref Range    Glucose 172 (H) 74 - 99 mg/dL    Sodium 141 136 - 145 mmol/L    Potassium 4.0 3.5 - 5.3 mmol/L    Chloride 98 98 - 107 mmol/L    Bicarbonate 34 (H) 21 - 32 mmol/L    Anion Gap 13 10 - 20 mmol/L    Urea Nitrogen 30 (H) 6 - 23 mg/dL    Creatinine 1.24 (H) 0.50 - 1.05 mg/dL    eGFR 56 (L) >60 mL/min/1.73m*2    Calcium 9.7 8.6 - 10.3 mg/dL   POCT GLUCOSE   Result Value Ref  Range    POCT Glucose 169 (H) 74 - 99 mg/dL   POCT GLUCOSE   Result Value Ref Range    POCT Glucose 131 (H) 74 - 99 mg/dL   POCT GLUCOSE   Result Value Ref Range    POCT Glucose 163 (H) 74 - 99 mg/dL   POCT GLUCOSE   Result Value Ref Range    POCT Glucose 237 (H) 74 - 99 mg/dL   POCT GLUCOSE   Result Value Ref Range    POCT Glucose 223 (H) 74 - 99 mg/dL      Relevant Results  Recent Results (from the past 24 hour(s))   POCT GLUCOSE    Collection Time: 03/06/24  9:18 PM   Result Value Ref Range    POCT Glucose 151 (H) 74 - 99 mg/dL   POCT GLUCOSE    Collection Time: 03/07/24  2:09 AM   Result Value Ref Range    POCT Glucose 136 (H) 74 - 99 mg/dL   Phosphorus    Collection Time: 03/07/24  3:55 AM   Result Value Ref Range    Phosphorus 5.8 (H) 2.5 - 4.9 mg/dL   Magnesium    Collection Time: 03/07/24  3:55 AM   Result Value Ref Range    Magnesium 3.05 (H) 1.60 - 2.40 mg/dL   CBC and Auto Differential    Collection Time: 03/07/24  3:55 AM   Result Value Ref Range    WBC 8.8 4.4 - 11.3 x10*3/uL    nRBC 0.0 0.0 - 0.0 /100 WBCs    RBC 4.17 4.00 - 5.20 x10*6/uL    Hemoglobin 13.0 12.0 - 16.0 g/dL    Hematocrit 41.5 36.0 - 46.0 %     80 - 100 fL    MCH 31.2 26.0 - 34.0 pg    MCHC 31.3 (L) 32.0 - 36.0 g/dL    RDW 14.9 (H) 11.5 - 14.5 %    Platelets 142 (L) 150 - 450 x10*3/uL    Neutrophils % 65.1 40.0 - 80.0 %    Immature Granulocytes %, Automated 2.3 (H) 0.0 - 0.9 %    Lymphocytes % 23.6 13.0 - 44.0 %    Monocytes % 8.2 2.0 - 10.0 %    Eosinophils % 0.1 0.0 - 6.0 %    Basophils % 0.7 0.0 - 2.0 %    Neutrophils Absolute 5.72 1.20 - 7.70 x10*3/uL    Immature Granulocytes Absolute, Automated 0.20 0.00 - 0.70 x10*3/uL    Lymphocytes Absolute 2.07 1.20 - 4.80 x10*3/uL    Monocytes Absolute 0.72 0.10 - 1.00 x10*3/uL    Eosinophils Absolute 0.01 0.00 - 0.70 x10*3/uL    Basophils Absolute 0.06 0.00 - 0.10 x10*3/uL   Basic Metabolic Panel    Collection Time: 03/07/24  3:55 AM   Result Value Ref Range    Glucose 172 (H) 74 - 99  mg/dL    Sodium 141 136 - 145 mmol/L    Potassium 4.0 3.5 - 5.3 mmol/L    Chloride 98 98 - 107 mmol/L    Bicarbonate 34 (H) 21 - 32 mmol/L    Anion Gap 13 10 - 20 mmol/L    Urea Nitrogen 30 (H) 6 - 23 mg/dL    Creatinine 1.24 (H) 0.50 - 1.05 mg/dL    eGFR 56 (L) >60 mL/min/1.73m*2    Calcium 9.7 8.6 - 10.3 mg/dL   POCT GLUCOSE    Collection Time: 03/07/24  5:11 AM   Result Value Ref Range    POCT Glucose 169 (H) 74 - 99 mg/dL   POCT GLUCOSE    Collection Time: 03/07/24  8:07 AM   Result Value Ref Range    POCT Glucose 131 (H) 74 - 99 mg/dL   POCT GLUCOSE    Collection Time: 03/07/24 10:50 AM   Result Value Ref Range    POCT Glucose 163 (H) 74 - 99 mg/dL   POCT GLUCOSE    Collection Time: 03/07/24  4:02 PM   Result Value Ref Range    POCT Glucose 237 (H) 74 - 99 mg/dL   POCT GLUCOSE    Collection Time: 03/07/24  8:00 PM   Result Value Ref Range    POCT Glucose 223 (H) 74 - 99 mg/dL      XR chest 1 view    Result Date: 3/5/2024  Interpreted By:  Tavares Ellis, STUDY: XR CHEST 1 VIEW;  3/5/2024 7:43 am   INDICATION: Signs/Symptoms:ETT reposition.   COMPARISON: CT chest earlier same day 5 March 2024 at 0321 hours; portable chest earlier same morning 5 March 2024 at 0647 hours   ACCESSION NUMBER(S): [id number]   ORDERING CLINICIAN: BENEDICTO COLLADO   TECHNIQUE: Single frontal view of the chest; Portable technique   FINDINGS: The endotracheal tube has been appropriately repositioned; tip now projects 2.8 cm above the daron   The enteric tube courses below the diaphragm, but the distal tip is cut off the bottom of the radiograph   The cardiomediastinal silhouette is unchanged   Left lung has reinflated with some residual minimal bands of atelectasis. Minimal right basilar atelectasis   No consolidative opacity, pleural effusion or pneumothorax       Left lung reinflated after successful repositioning of endotracheal tube   MACRO: None   Signed by: Tavares Ellis 3/5/2024 7:50 AM Dictation workstation:   [id number]    XR  chest 1 view    Result Date: 3/5/2024  Interpreted By:  Tavares Ellis, STUDY: XR CHEST 1 VIEW;  3/5/2024 6:54 am   INDICATION: Signs/Symptoms:ETT/OG placement.   COMPARISON: CT chest earlier same morning 5 March 2024 at 0321 hours   ACCESSION NUMBER(S): [id number]   ORDERING CLINICIAN: BENEDICTO COLLADO   TECHNIQUE: Single frontal view of the chest; Portable technique   FINDINGS: New endotracheal tube tip projects about 1-2 cm into the right mainstem bronchus, needs retraction by about   The left lung is collapsed due to the endotracheal tube placement   By the time of my review this radiograph, this patient's nurse, the very helpful Mr. Harper has informed me that the endotracheal tube has already been repositioned and a follow-up radiograph is already pending   Right lung clear of any acute findings such as edema, consolidation, pneumothorax or effusion       Endotracheal tube is in the right mainstem bronchus with consequent new left lung collapse. The tube has already been repositioned and a follow-up radiograph is pending. See above   MACRO: None   Signed by: Tavares Ellis 3/5/2024 7:14 AM Dictation workstation:   [id number]    ECG 12 lead    Result Date: 3/5/2024  Sinus tachycardia Possible Left atrial enlargement Possible Inferior infarct (cited on or before 05-MAR-2024) Abnormal ECG When compared with ECG of 05-MAR-2024 01:13, (unconfirmed) No significant change was found    CT angio chest for pulmonary embolism    Result Date: 3/5/2024  STUDY: CT Angiogram of the Chest; 3/5/2024 at 3:30 AM INDICATION: Chest pain, shortness of breath and hypoxia.  History of PE. COMPARISON: CTA chest 12/27/2023, CTA chest 10/31/2023, CTA chest 7/10/2023. ACCESSION NUMBER(S): [id number] ORDERING CLINICIAN: YESENIA JUÁREZ TECHNIQUE:  CTA of the chest was performed with intravenous contrast. Images are reviewed and processed at a workstation according to the CT angiogram protocol with 3-D and/or MIP post processing imaging  generated.  Omnipaque 350 75 mL was administered intravenously. Automated mA/kV exposure control was utilized and patient examination was performed in strict accordance with principles of ALARA. FINDINGS: Pulmonary arteries are adequately opacified without large central acute or chronic filling defects.  Respiratory motion slightly limits evaluation of the smaller pulmonary arteries.  The thoracic aorta is normal in course and caliber without dissection or aneurysm.  Common origin of the right brachiocephalic artery and left common carotid artery is seen from the aortic arch, a normal variant. The heart is upper normal in size without pericardial effusion. Thoracic lymph nodes are not enlarged. There is no pleural effusion, pleural thickening, or pneumothorax. The airways are patent. Lungs are adequately expanded without interstitial disease or suspicious pulmonary nodules.  Calcified granulomas are noted. Atelectasis is present in both lungs with areas of mild groundglass opacity along the bronchovascular bundles suggesting mild pulmonary edema.  Small foci of groundglass airspace disease are seen bilaterally.  Respiratory motion limits pulmonary evaluation. Upper abdomen demonstrates no acute pathology. There are no acute fractures.  No suspicious bony lesions.    Cardiomegaly with mild pulmonary edema suggesting CHF.  Small groundglass foci of airspace disease may represent alveolar edema although low-grade developing infectious or inflammatory process may be a consideration in the appropriate clinical setting.  No evidence of large central pulmonary embolus.. Signed by Zeferino Clemens    XR chest 1 view    Result Date: 3/5/2024  STUDY: Chest Radiograph;  3/5/2024 INDICATION: COPD. COMPARISON: 2/9/2024 XR Chest ACCESSION NUMBER(S): [id number] ORDERING CLINICIAN: YESENIA JUÁREZ TECHNIQUE:  Frontal chest was obtained at 01:10 hours. FINDINGS: CARDIOMEDIASTINAL SILHOUETTE: Cardiomediastinal silhouette is normal in  size and configuration.  LUNGS: Lungs are clear.  ABDOMEN: No remarkable upper abdominal findings.  BONES: No acute osseous changes.    No acute cardiopulmonary disease. Signed by Zeferino Clemens    ECG 12 lead    Result Date: 2/9/2024  Sinus rhythm with marked sinus arrhythmia Low voltage QRS Cannot rule out Anterior infarct (cited on or before 09-JAN-2024) Abnormal ECG When compared with ECG of 09-FEB-2024 14:33, (unconfirmed) No significant change was found See ED provider note for full interpretation and clinical correlation Confirmed by Jagjit Nichole ([id number]) on 2/9/2024 6:03:40 PM    ECG 12 lead    Result Date: 2/9/2024  Sinus rhythm with marked sinus arrhythmia Low voltage QRS Cannot rule out Anterior infarct , age undetermined Abnormal ECG When compared with ECG of 09-JAN-2024 22:41, Vent. rate has decreased BY  41 BPM Minimal criteria for Anterior infarct are now Present Criteria for Inferior infarct are no longer Present See ED provider note for full interpretation and clinical correlation Confirmed by Jagjit Nichole ([id number]) on 2/9/2024 6:02:19 PM    CT head wo IV contrast    Result Date: 2/9/2024  Interpreted By:  Anaya Still, STUDY: CT HEAD WO IV CONTRAST;  2/9/2024 4:08 pm   INDICATION: Signs/Symptoms:Dizziness.   COMPARISON: CT head dated 04/10/2023   ACCESSION NUMBER(S): [id number]   ORDERING CLINICIAN: BROOKE TRACEY   TECHNIQUE: Noncontrast axial CT scan of head was performed. Angled reformats in brain and bone windows were generated. The images were reviewed in bone, brain, blood and soft tissue windows.   FINDINGS: No hyperdense intracranial hemorrhage is evident. There is no mass effect or midline shift.   Gray-white differentiation is intact, without evidence of CT apparent transcortical infarct.   No ventricular dilatation is present. Basal cisterns are patent. No extra-axial fluid collections are evident.   Postsurgical changes of right wall down mastoidectomy are present, with  some nonspecific soft tissue attenuation noted in the external auditory canal.   Mucosal thickening is present in the left maxillary sinus.       1.  No evidence of hemorrhage, CT apparent transcortical infarct, or other acute intracranial abnormality. 2. Surgical changes of right wall down mastoidectomy, with small amount of nonspecific soft tissue attenuation present in the external auditory canal/mastoidectomy bed.   MACRO: None   Signed by: Anaya Still 2/9/2024 4:18 PM Dictation workstation:   [id number]    XR chest 1 view    Result Date: 2/9/2024  STUDY: Chest Radiograph;  2/9/2024 3:24 PM INDICATION: Dizziness. COMPARISON: 1/11/2024, 1/10/2024 XR chest. ACCESSION NUMBER(S): [id number] ORDERING CLINICIAN: BROOKE TRACEY TECHNIQUE:  Frontal chest was obtained at 15:24 hours. FINDINGS: CARDIOMEDIASTINAL SILHOUETTE: Cardiomediastinal silhouette is normal in size and configuration.  LUNGS: Lungs are clear.  ABDOMEN: No remarkable upper abdominal findings.  BONES: No acute osseous changes.    No regions of airspace consolidation. Signed by Tavares Townsend MD    Radiology:  XR chest 1 view    Result Date: 3/5/2024  Interpreted By:  Tavares Ellis, STUDY: XR CHEST 1 VIEW;  3/5/2024 7:43 am   INDICATION: Signs/Symptoms:ETT reposition.   COMPARISON: CT chest earlier same day 5 March 2024 at 0321 hours; portable chest earlier same morning 5 March 2024 at 0647 hours   ACCESSION NUMBER(S): [id number]   ORDERING CLINICIAN: BENEDICTO COLLADO   TECHNIQUE: Single frontal view of the chest; Portable technique   FINDINGS: The endotracheal tube has been appropriately repositioned; tip now projects 2.8 cm above the daron   The enteric tube courses below the diaphragm, but the distal tip is cut off the bottom of the radiograph   The cardiomediastinal silhouette is unchanged   Left lung has reinflated with some residual minimal bands of atelectasis. Minimal right basilar atelectasis   No consolidative opacity, pleural  effusion or pneumothorax       Left lung reinflated after successful repositioning of endotracheal tube   MACRO: None   Signed by: Tavares Ellis 3/5/2024 7:50 AM Dictation workstation:   [id number]    XR chest 1 view    Result Date: 3/5/2024  Interpreted By:  Tavares Ellis, STUDY: XR CHEST 1 VIEW;  3/5/2024 6:54 am   INDICATION: Signs/Symptoms:ETT/OG placement.   COMPARISON: CT chest earlier same morning 5 March 2024 at 0321 hours   ACCESSION NUMBER(S): [id number]   ORDERING CLINICIAN: BENEDICTO COLLADO   TECHNIQUE: Single frontal view of the chest; Portable technique   FINDINGS: New endotracheal tube tip projects about 1-2 cm into the right mainstem bronchus, needs retraction by about   The left lung is collapsed due to the endotracheal tube placement   By the time of my review this radiograph, this patient's nurse, the very helpful Mr. Harper has informed me that the endotracheal tube has already been repositioned and a follow-up radiograph is already pending   Right lung clear of any acute findings such as edema, consolidation, pneumothorax or effusion       Endotracheal tube is in the right mainstem bronchus with consequent new left lung collapse. The tube has already been repositioned and a follow-up radiograph is pending. See above   MACRO: None   Signed by: Tavares Ellis 3/5/2024 7:14 AM Dictation workstation:   [id number]    ECG 12 lead    Result Date: 3/5/2024  Sinus tachycardia Possible Left atrial enlargement Possible Inferior infarct (cited on or before 05-MAR-2024) Abnormal ECG When compared with ECG of 05-MAR-2024 01:13, (unconfirmed) No significant change was found    CT angio chest for pulmonary embolism    Result Date: 3/5/2024  STUDY: CT Angiogram of the Chest; 3/5/2024 at 3:30 AM INDICATION: Chest pain, shortness of breath and hypoxia.  History of PE. COMPARISON: CTA chest 12/27/2023, CTA chest 10/31/2023, CTA chest 7/10/2023. ACCESSION NUMBER(S): [id number] ORDERING CLINICIAN: YESENIA JUÁREZ  TECHNIQUE:  CTA of the chest was performed with intravenous contrast. Images are reviewed and processed at a workstation according to the CT angiogram protocol with 3-D and/or MIP post processing imaging generated.  Omnipaque 350 75 mL was administered intravenously. Automated mA/kV exposure control was utilized and patient examination was performed in strict accordance with principles of ALARA. FINDINGS: Pulmonary arteries are adequately opacified without large central acute or chronic filling defects.  Respiratory motion slightly limits evaluation of the smaller pulmonary arteries.  The thoracic aorta is normal in course and caliber without dissection or aneurysm.  Common origin of the right brachiocephalic artery and left common carotid artery is seen from the aortic arch, a normal variant. The heart is upper normal in size without pericardial effusion. Thoracic lymph nodes are not enlarged. There is no pleural effusion, pleural thickening, or pneumothorax. The airways are patent. Lungs are adequately expanded without interstitial disease or suspicious pulmonary nodules.  Calcified granulomas are noted. Atelectasis is present in both lungs with areas of mild groundglass opacity along the bronchovascular bundles suggesting mild pulmonary edema.  Small foci of groundglass airspace disease are seen bilaterally.  Respiratory motion limits pulmonary evaluation. Upper abdomen demonstrates no acute pathology. There are no acute fractures.  No suspicious bony lesions.    Cardiomegaly with mild pulmonary edema suggesting CHF.  Small groundglass foci of airspace disease may represent alveolar edema although low-grade developing infectious or inflammatory process may be a consideration in the appropriate clinical setting.  No evidence of large central pulmonary embolus.. Signed by Zeferino Clemens    XR chest 1 view    Result Date: 3/5/2024  STUDY: Chest Radiograph;  3/5/2024 INDICATION: COPD. COMPARISON: 2/9/2024 XR Chest  ACCESSION NUMBER(S): [id number] ORDERING CLINICIAN: YESENIA JUÁREZ TECHNIQUE:  Frontal chest was obtained at 01:10 hours. FINDINGS: CARDIOMEDIASTINAL SILHOUETTE: Cardiomediastinal silhouette is normal in size and configuration.  LUNGS: Lungs are clear.  ABDOMEN: No remarkable upper abdominal findings.  BONES: No acute osseous changes.    No acute cardiopulmonary disease. Signed by Zeferino Clemens     Current Facility-Administered Medications:     acetaminophen (Tylenol) oral liquid 650 mg, 650 mg, oral, q4h PRN **OR** acetaminophen (Tylenol) tablet 650 mg, 650 mg, oral, q4h PRN, VALDEMAR Aden    atorvastatin (Lipitor) tablet 20 mg, 20 mg, oral, Nightly, BARTOLOME Aden-CNP, 20 mg at 03/06/24 2114    busPIRone (Buspar) tablet 30 mg, 30 mg, oral, BID, VALDEMAR Aden, 30 mg at 03/07/24 0809    clonazePAM (KlonoPIN) tablet 1 mg, 1 mg, oral, BID, VALDEMAR Aden, 1 mg at 03/07/24 0812    dextrose 10 % in water (D10W) infusion, 0.3 g/kg/hr, intravenous, Once PRN, VALDEMAR Aden    dextrose 50 % injection 25 g, 25 g, intravenous, q15 min PRN, VALDEMAR Aden    escitalopram (Lexapro) tablet 10 mg, 10 mg, oral, Daily, VALDEMAR Aden, 10 mg at 03/07/24 0809    fluticasone furoate-vilanteroL (Breo Ellipta) 200-25 mcg/dose inhaler 1 puff, 1 puff, inhalation, Daily, VALDEMAR Aden    glucagon (Glucagen) injection 1 mg, 1 mg, intramuscular, q15 min PRN, VALDEMAR Aden    hydrOXYzine pamoate (Vistaril) capsule 50 mg, 50 mg, oral, 4x daily PRN, VALDEMAR Aden, 50 mg at 03/07/24 1525    insulin glargine (Lantus) injection 10 Units, 10 Units, subcutaneous, q24h, BARTOLOME Aden-CNP    insulin lispro (HumaLOG) injection 0-10 Units, 0-10 Units, subcutaneous, Before meals & nightly, VALDEMAR Aden, 4 Units at 03/07/24 1650    levothyroxine (Synthroid, Levoxyl) tablet  150 mcg, 150 mcg, oral, Daily, VALDEMAR Aden, 150 mcg at 03/07/24 0809    methylPREDNISolone sod succinate (PF) (SOLU-Medrol) 40 mg/mL injection 40 mg, 40 mg, intravenous, q24h, VALDEMAR Aden, 40 mg at 03/07/24 1042    metoprolol tartrate (Lopressor) tablet 25 mg, 25 mg, oral, BID, VALDEMAR Aden, 25 mg at 03/07/24 0809    oxygen (O2) therapy, , inhalation, Continuous PRN - O2/gases, VALDEMAR Aden, 5 L/min at 03/07/24 1947    [MAR Hold] pantoprazole (ProtoNix) EC tablet 40 mg, 40 mg, oral, Daily, Edwardo Acosta MD    polyethylene glycol (Glycolax, Miralax) packet 17 g, 17 g, oral, Daily, VALDEMAR Aden, 17 g at 03/06/24 0900    prazosin (Minipress) capsule 5 mg, 5 mg, oral, Nightly, VALDEMAR Aden, 5 mg at 03/06/24 2115    risperiDONE (RisperDAL) tablet 0.5 mg, 0.5 mg, oral, BID, VALDEMAR Aden, 0.5 mg at 03/06/24 2115    rivaroxaban (Xarelto) tablet 10 mg, 10 mg, oral, Daily, VALDEMAR Aden, 10 mg at 03/07/24 0809    tiotropium (Spiriva Respimat) 2.5 mcg/actuation inhaler 2 puff, 2 puff, inhalation, Daily, VALDEMAR Aden, 2 puff at 03/07/24 1132   Principal Problem:    Acute exacerbation of chronic obstructive pulmonary disease (COPD) (CMS/[facility])      Assessment/Plan    Acute on chronic hypoxic and hypercapnic respiratory failure patient extubated this morning.  On 6 L nasal cannula.  O2 saturation mid 90%.  COPD exacerbation continue bronchodilator and steroids.  Smoking cessation discussed with the patient.  CHF patient responded well to Lasix.  Obstructive sleep apnea patient on noninvasive ventilation at home.  Morbid obesity/obesity-hypoventilation syndrome.  History of DVT and PE patient on Xarelto.  Anxiety and depression.  Hypertension.  Diabetes.  Hypothyroidism.  . GERD.  DVT prophylaxis.  PT OT for deconditioning.  Patient clinically improving, transferred to the floor  from ICU on 6 to 8 L Oxymizer.     Afshin Villalba MD

## 2024-03-11 ENCOUNTER — APPOINTMENT (OUTPATIENT)
Dept: RADIOLOGY | Facility: HOSPITAL | Age: 44
End: 2024-03-11
Payer: COMMERCIAL

## 2024-03-11 ENCOUNTER — HOSPITAL ENCOUNTER (EMERGENCY)
Facility: HOSPITAL | Age: 44
Discharge: HOME | End: 2024-03-11
Payer: COMMERCIAL

## 2024-03-11 ENCOUNTER — DOCUMENTATION (OUTPATIENT)
Dept: PRIMARY CARE | Facility: CLINIC | Age: 44
End: 2024-03-11
Payer: COMMERCIAL

## 2024-03-11 ENCOUNTER — APPOINTMENT (OUTPATIENT)
Dept: CARDIOLOGY | Facility: HOSPITAL | Age: 44
End: 2024-03-11
Payer: COMMERCIAL

## 2024-03-11 VITALS
WEIGHT: 160 LBS | HEIGHT: 62 IN | HEART RATE: 98 BPM | BODY MASS INDEX: 29.44 KG/M2 | OXYGEN SATURATION: 95 % | SYSTOLIC BLOOD PRESSURE: 118 MMHG | DIASTOLIC BLOOD PRESSURE: 68 MMHG | RESPIRATION RATE: 18 BRPM | TEMPERATURE: 97.3 F

## 2024-03-11 DIAGNOSIS — R30.0 DYSURIA: Primary | ICD-10-CM

## 2024-03-11 LAB
ALBUMIN SERPL BCP-MCNC: 4 G/DL (ref 3.4–5)
ALP SERPL-CCNC: 76 U/L (ref 33–110)
ALT SERPL W P-5'-P-CCNC: 15 U/L (ref 7–45)
ANION GAP SERPL CALC-SCNC: 12 MMOL/L (ref 10–20)
APPEARANCE UR: CLEAR
AST SERPL W P-5'-P-CCNC: 15 U/L (ref 9–39)
BASOPHILS # BLD MANUAL: 0 X10*3/UL (ref 0–0.1)
BASOPHILS NFR BLD MANUAL: 0 %
BILIRUB SERPL-MCNC: 0.4 MG/DL (ref 0–1.2)
BILIRUB UR STRIP.AUTO-MCNC: NEGATIVE MG/DL
BNP SERPL-MCNC: 17 PG/ML (ref 0–99)
BUN SERPL-MCNC: 29 MG/DL (ref 6–23)
CALCIUM SERPL-MCNC: 9.6 MG/DL (ref 8.6–10.3)
CARDIAC TROPONIN I PNL SERPL HS: 4 NG/L (ref 0–13)
CHLORIDE SERPL-SCNC: 101 MMOL/L (ref 98–107)
CO2 SERPL-SCNC: 32 MMOL/L (ref 21–32)
COLOR UR: YELLOW
CREAT SERPL-MCNC: 0.9 MG/DL (ref 0.5–1.05)
EGFRCR SERPLBLD CKD-EPI 2021: 82 ML/MIN/1.73M*2
EOSINOPHIL # BLD MANUAL: 0.11 X10*3/UL (ref 0–0.7)
EOSINOPHIL NFR BLD MANUAL: 1 %
ERYTHROCYTE [DISTWIDTH] IN BLOOD BY AUTOMATED COUNT: 14.5 % (ref 11.5–14.5)
GLUCOSE SERPL-MCNC: 154 MG/DL (ref 74–99)
GLUCOSE UR STRIP.AUTO-MCNC: NEGATIVE MG/DL
HCT VFR BLD AUTO: 43.5 % (ref 36–46)
HGB BLD-MCNC: 13.9 G/DL (ref 12–16)
IMM GRANULOCYTES # BLD AUTO: 1.58 X10*3/UL (ref 0–0.7)
IMM GRANULOCYTES NFR BLD AUTO: 14.9 % (ref 0–0.9)
KETONES UR STRIP.AUTO-MCNC: NEGATIVE MG/DL
LACTATE SERPL-SCNC: 1.3 MMOL/L (ref 0.4–2)
LEUKOCYTE ESTERASE UR QL STRIP.AUTO: NEGATIVE
LYMPHOCYTES # BLD MANUAL: 4.88 X10*3/UL (ref 1.2–4.8)
LYMPHOCYTES NFR BLD MANUAL: 46 %
MCH RBC QN AUTO: 31 PG (ref 26–34)
MCHC RBC AUTO-ENTMCNC: 32 G/DL (ref 32–36)
MCV RBC AUTO: 97 FL (ref 80–100)
METAMYELOCYTES # BLD MANUAL: 0.21 X10*3/UL
METAMYELOCYTES NFR BLD MANUAL: 2 %
MONOCYTES # BLD MANUAL: 0.85 X10*3/UL (ref 0.1–1)
MONOCYTES NFR BLD MANUAL: 8 %
NEUTROPHILS # BLD MANUAL: 4.56 X10*3/UL (ref 1.2–7.7)
NEUTS BAND # BLD MANUAL: 0.21 X10*3/UL (ref 0–0.7)
NEUTS BAND NFR BLD MANUAL: 2 %
NEUTS SEG # BLD MANUAL: 4.35 X10*3/UL (ref 1.2–7)
NEUTS SEG NFR BLD MANUAL: 41 %
NITRITE UR QL STRIP.AUTO: NEGATIVE
NRBC BLD-RTO: 0.7 /100 WBCS (ref 0–0)
PH UR STRIP.AUTO: 6 [PH]
PLATELET # BLD AUTO: 159 X10*3/UL (ref 150–450)
POTASSIUM SERPL-SCNC: 4.3 MMOL/L (ref 3.5–5.3)
PROT SERPL-MCNC: 7.3 G/DL (ref 6.4–8.2)
PROT UR STRIP.AUTO-MCNC: NEGATIVE MG/DL
RBC # BLD AUTO: 4.48 X10*6/UL (ref 4–5.2)
RBC # UR STRIP.AUTO: NEGATIVE /UL
RBC MORPH BLD: ABNORMAL
SODIUM SERPL-SCNC: 141 MMOL/L (ref 136–145)
SP GR UR STRIP.AUTO: 1.02
TOTAL CELLS COUNTED BLD: 100
UROBILINOGEN UR STRIP.AUTO-MCNC: <2 MG/DL
WBC # BLD AUTO: 10.6 X10*3/UL (ref 4.4–11.3)

## 2024-03-11 PROCEDURE — 83880 ASSAY OF NATRIURETIC PEPTIDE: CPT | Performed by: NURSE PRACTITIONER

## 2024-03-11 PROCEDURE — 96360 HYDRATION IV INFUSION INIT: CPT

## 2024-03-11 PROCEDURE — 85027 COMPLETE CBC AUTOMATED: CPT | Performed by: NURSE PRACTITIONER

## 2024-03-11 PROCEDURE — 85007 BL SMEAR W/DIFF WBC COUNT: CPT | Performed by: NURSE PRACTITIONER

## 2024-03-11 PROCEDURE — 71045 X-RAY EXAM CHEST 1 VIEW: CPT | Performed by: RADIOLOGY

## 2024-03-11 PROCEDURE — 36415 COLL VENOUS BLD VENIPUNCTURE: CPT | Performed by: NURSE PRACTITIONER

## 2024-03-11 PROCEDURE — 93005 ELECTROCARDIOGRAM TRACING: CPT

## 2024-03-11 PROCEDURE — 83605 ASSAY OF LACTIC ACID: CPT | Performed by: NURSE PRACTITIONER

## 2024-03-11 PROCEDURE — 99283 EMERGENCY DEPT VISIT LOW MDM: CPT | Mod: 25

## 2024-03-11 PROCEDURE — 80053 COMPREHEN METABOLIC PANEL: CPT | Performed by: NURSE PRACTITIONER

## 2024-03-11 PROCEDURE — 81003 URINALYSIS AUTO W/O SCOPE: CPT | Performed by: NURSE PRACTITIONER

## 2024-03-11 PROCEDURE — 71045 X-RAY EXAM CHEST 1 VIEW: CPT

## 2024-03-11 PROCEDURE — 84484 ASSAY OF TROPONIN QUANT: CPT | Performed by: NURSE PRACTITIONER

## 2024-03-11 PROCEDURE — 2500000004 HC RX 250 GENERAL PHARMACY W/ HCPCS (ALT 636 FOR OP/ED): Performed by: NURSE PRACTITIONER

## 2024-03-11 RX ORDER — NITROFURANTOIN 25; 75 MG/1; MG/1
100 CAPSULE ORAL 2 TIMES DAILY
Qty: 14 CAPSULE | Refills: 0 | Status: SHIPPED | OUTPATIENT
Start: 2024-03-11 | End: 2024-03-18

## 2024-03-11 RX ORDER — PHENAZOPYRIDINE HYDROCHLORIDE 200 MG/1
200 TABLET, FILM COATED ORAL 3 TIMES DAILY
Qty: 6 TABLET | Refills: 0 | Status: SHIPPED | OUTPATIENT
Start: 2024-03-11 | End: 2024-03-13

## 2024-03-11 RX ADMIN — SODIUM CHLORIDE 1000 ML: 9 INJECTION, SOLUTION INTRAVENOUS at 14:26

## 2024-03-11 ASSESSMENT — LIFESTYLE VARIABLES
EVER HAD A DRINK FIRST THING IN THE MORNING TO STEADY YOUR NERVES TO GET RID OF A HANGOVER: NO
HAVE PEOPLE ANNOYED YOU BY CRITICIZING YOUR DRINKING: NO
HAVE YOU EVER FELT YOU SHOULD CUT DOWN ON YOUR DRINKING: NO
EVER FELT BAD OR GUILTY ABOUT YOUR DRINKING: NO

## 2024-03-11 ASSESSMENT — PAIN SCALES - GENERAL: PAINLEVEL_OUTOF10: 7

## 2024-03-11 ASSESSMENT — PAIN DESCRIPTION - DESCRIPTORS: DESCRIPTORS: ACHING

## 2024-03-11 ASSESSMENT — PAIN DESCRIPTION - LOCATION: LOCATION: ABDOMEN

## 2024-03-11 ASSESSMENT — PAIN - FUNCTIONAL ASSESSMENT: PAIN_FUNCTIONAL_ASSESSMENT: 0-10

## 2024-03-11 ASSESSMENT — PAIN DESCRIPTION - PAIN TYPE: TYPE: ACUTE PAIN

## 2024-03-11 NOTE — ED PROVIDER NOTES
HPI   Chief Complaint   Patient presents with    Difficulty Urinating     Pt presents to the ED stating that she can't urinate since Thursday. Only little bits come out . Constantly feels like she has to pee.        43-year-old female presents emergency department, patient states she has been having pain in her bladder, dysuria since Thursday.  States because of this she is not drinking much fluid, as it is so uncomfortable to urinate.  Patient states she called her doctor who is worried she might be septic so sent her to the ER.  She denies fevers or chills, vomiting but states she has been slightly nauseated.      History provided by:  Patient   used: No                        [place] Coma Scale Score: 15                     Patient History   Past Medical History:   Diagnosis Date    Arthritis     COPD (chronic obstructive pulmonary disease) (CMS/[facility])     Diabetes mellitus (CMS/[facility])     History of transfusion     Hypothyroidism     CHARLIE (obstructive sleep apnea)      Past Surgical History:   Procedure Laterality Date     SECTION, LOW TRANSVERSE      EYE SURGERY      HERNIA REPAIR      TONSILLECTOMY      VENTRAL HERNIA REPAIR       Family History   Problem Relation Name Age of Onset    Fibromyalgia Father      Hypertension Brother      Thyroid disease Maternal Grandmother      Thyroid disease Paternal Grandmother      Lung cancer Paternal Grandfather      Coronary artery disease Other Grandmother      Social History     Tobacco Use    Smoking status: Every Day     Packs/day: 1     Types: Cigarettes     Passive exposure: Never    Smokeless tobacco: Never   Substance Use Topics    Alcohol use: Never    Drug use: Never       Physical Exam   ED Triage Vitals [24 1313]   Temperature Heart Rate Respirations BP   36.3 °C (97.3 °F) (!) 126 18 117/66      Pulse Ox Temp Source Heart Rate Source Patient Position   (!) 88 % Temporal Monitor Sitting      BP Location FiO2 (%)     Right arm --        Physical Exam  Physical Exam:  Constitutional: Vitals noted, no distress. Afebrile.   Cardiovascular: Regular, rate, rhythm, no murmur.   Pulmonary: Lungs clear bilaterally with good aeration. No adventitious breath sounds.   Gastrointestinal: Soft, nonsurgical. Nontender. No peritoneal signs. Normoactive bowel sounds.   Musculoskeletal: No peripheral edema. Negative Homans bilaterally, no cords.   Skin: No rash.   Neuro: No focal neurologic deficits, NIH score of 0.    ED Course & MDM   Diagnoses as of 03/11/24 1506   Dysuria     Labs Reviewed   CBC WITH AUTO DIFFERENTIAL - Abnormal       Result Value    WBC 10.6      nRBC 0.7 (*)     RBC 4.48      Hemoglobin 13.9      Hematocrit 43.5      MCV 97      MCH 31.0      MCHC 32.0      RDW 14.5      Platelets 159      Immature Granulocytes %, Automated 14.9 (*)     Immature Granulocytes Absolute, Automated 1.58 (*)     Narrative:     The previously reported component Neutrophils % is no longer being reported.  The previously reported component Lymphocytes % is no longer being reported.  The previously reported component Monocytes % is no longer being reported.  The previously   reported component Eosinophils % is no longer being reported.  The previously reported component Basophils % is no longer being reported.  The previously reported component Absolute Neutrophils is no longer being reported.  The previously reported   component Absolute Lymphocytes is no longer being reported.  The previously reported component Absolute Monocytes is no longer being reported.  The previously reported component Absolute Eosinophils is no longer being reported.  The previously reported   component Absolute Basophils is no longer being reported.   COMPREHENSIVE METABOLIC PANEL - Abnormal    Glucose 154 (*)     Sodium 141      Potassium 4.3      Chloride 101      Bicarbonate 32      Anion Gap 12      Urea Nitrogen 29 (*)     Creatinine 0.90      eGFR 82      Calcium 9.6      Albumin  4.0      Alkaline Phosphatase 76      Total Protein 7.3      AST 15      Bilirubin, Total 0.4      ALT 15     MANUAL DIFFERENTIAL - Abnormal    Neutrophils %, Manual 41.0      Bands %, Manual 2.0      Lymphocytes %, Manual 46.0      Monocytes %, Manual 8.0      Eosinophils %, Manual 1.0      Basophils %, Manual 0.0      Metamyelocytes %, Manual 2.0      Seg Neutrophils Absolute, Manual 4.35      Bands Absolute, Manual 0.21      Lymphocytes Absolute, Manual 4.88 (*)     Monocytes Absolute, Manual 0.85      Eosinophils Absolute, Manual 0.11      Basophils Absolute, Manual 0.00      Metamyelocytes Absolute, Manual 0.21      Total Cells Counted 100      Neutrophils Absolute, Manual 4.56      RBC Morphology No significant RBC morphology present     LACTATE - Normal    Lactate 1.3      Narrative:     Venipuncture immediately after or during the administration of Metamizole may lead to falsely low results. Testing should be performed immediately  prior to Metamizole dosing.   URINALYSIS WITH REFLEX CULTURE AND MICROSCOPIC - Normal    Color, Urine Yellow      Appearance, Urine Clear      Specific Gravity, Urine 1.021      pH, Urine 6.0      Protein, Urine NEGATIVE      Glucose, Urine NEGATIVE      Blood, Urine NEGATIVE      Ketones, Urine NEGATIVE      Bilirubin, Urine NEGATIVE      Urobilinogen, Urine <2.0      Nitrite, Urine NEGATIVE      Leukocyte Esterase, Urine NEGATIVE     TROPONIN I, HIGH SENSITIVITY - Normal    Troponin I, High Sensitivity 4      Narrative:     Less than 99th percentile of normal range cutoff-  Female and children under 18 years old <14 ng/L; Male <21 ng/L: Negative  Repeat testing should be performed if clinically indicated.     Female and children under 18 years old 14-50 ng/L; Male 21-50 ng/L:  Consistent with possible cardiac damage and possible increased clinical   risk. Serial measurements may help to assess extent of myocardial damage.     >50 ng/L: Consistent with cardiac damage, increased  clinical risk and  myocardial infarction. Serial measurements may help assess extent of   myocardial damage.      NOTE: Children less than 1 year old may have higher baseline troponin   levels and results should be interpreted in conjunction with the overall   clinical context.     NOTE: Troponin I testing is performed using a different   testing methodology at [facility] than at other   [facility]. Direct result comparisons should only   be made within the same method.   B-TYPE NATRIURETIC PEPTIDE - Normal    BNP 17      Narrative:        <100 pg/mL - Heart failure unlikely  100-299 pg/mL - Intermediate probability of acute heart                  failure exacerbation. Correlate with clinical                  context and patient history.    >=300 pg/mL - Heart Failure likely. Correlate with clinical                  context and patient history.    BNP testing is performed using different testing methodology at [facility] than at other [facility]. Direct result comparisons should only be made within the same method.      URINALYSIS WITH REFLEX CULTURE AND MICROSCOPIC    Narrative:     The following orders were created for panel order Urinalysis with Reflex Culture and Microscopic.  Procedure                               Abnormality         Status                     ---------                               -----------         ------                     Urinalysis with Reflex C...[[id number]]  Normal              Final result               Extra Urine Gray Tube[[id number]]                            In process                   Please view results for these tests on the individual orders.   EXTRA URINE GRAY TUBE        XR chest 1 view   Final Result   1.  Improved aeration of the left lung base with near-total   resolution of the left basilar effusion and infiltrate.        MACRO:   None        Signed by: Aleida Barragan 3/11/2024 2:30 PM   Dictation workstation:   [id number]      EKG  obtained at 1323 with ventricular rate of 126, as interpreted by me, shows sinus tachycardia with normal axis and interval, nonspecific ST and T wave abnormalities, no evidence of acute ischemia or other acute findings.          Medical Decision Making  Patient presents complaining of decreased urination and urinary symptoms.    Discussed workup, CBC and metabolic panels obtained, normal kidney function and unremarkable metabolic panel.  CBC showed normal white cell count, she did have findings on her manual differential, although this is relatively chronic and consistent with her baseline, likely from her chronic steroid use.    Patient was adamant that she would not able to urinate, demanded straight cath.  Straight cath was performed by the bedside nurse and ultimately Unremarkable.  Given her symptoms discussed Pyridium and Macrobid.    She did multiple times request something for pain and anxiety here but given her history of respiratory depression I was hesitant providing narcotics or benzodiazepines during this visit.    Ultimately discharged home, discussed follow-up with primary care, should return with any worsening symptoms or any additional concerns.    Procedure  Procedures     VALDEMAR Bennett  03/11/24 1509       VALDEMAR Bennett  03/11/24 [id number]

## 2024-03-11 NOTE — PROGRESS NOTES
Discharge Facility: [facility]  Discharge Diagnosis: Acute on chronic hypoxemic and hypercapnic resp failure; Hypercapnic encephalopathy; COPD exacerbation; AILEEN  Admission Date: 3/5/2024  Discharge Date: 3/8/2024    PCP Appointment Date: TBD-office tasked to arrange fup with PCP as needed  Specialist Appointment Date: TBD with Dr. Villalba, pulmonology  Hospital Encounter and Summary: Linked      Unable to reach patient during two business days after discharge despite at least two attempts made.

## 2024-03-12 LAB
ATRIAL RATE: 126 BPM
HOLD SPECIMEN: NORMAL
P AXIS: 66 DEGREES
P OFFSET: 198 MS
P ONSET: 155 MS
PR INTERVAL: 112 MS
Q ONSET: 211 MS
QRS COUNT: 21 BEATS
QRS DURATION: 76 MS
QT INTERVAL: 314 MS
QTC CALCULATION(BAZETT): 454 MS
QTC FREDERICIA: 402 MS
R AXIS: -14 DEGREES
T AXIS: 25 DEGREES
T OFFSET: 368 MS
VENTRICULAR RATE: 126 BPM

## 2024-03-13 ENCOUNTER — CLINICAL SUPPORT (OUTPATIENT)
Dept: AUDIOLOGY | Facility: CLINIC | Age: 44
End: 2024-03-13
Payer: COMMERCIAL

## 2024-03-13 ENCOUNTER — OFFICE VISIT (OUTPATIENT)
Dept: OTOLARYNGOLOGY | Facility: CLINIC | Age: 44
End: 2024-03-13
Payer: COMMERCIAL

## 2024-03-13 VITALS
TEMPERATURE: 97.6 F | SYSTOLIC BLOOD PRESSURE: 100 MMHG | DIASTOLIC BLOOD PRESSURE: 67 MMHG | HEIGHT: 62 IN | BODY MASS INDEX: 29.44 KG/M2 | WEIGHT: 160 LBS

## 2024-03-13 DIAGNOSIS — R42 VERTIGO: Primary | ICD-10-CM

## 2024-03-13 DIAGNOSIS — H90.A31 MIXED CONDUCTIVE AND SENSORINEURAL HEARING LOSS OF RIGHT EAR WITH RESTRICTED HEARING OF LEFT EAR: ICD-10-CM

## 2024-03-13 DIAGNOSIS — H61.21 IMPACTED CERUMEN OF RIGHT EAR: Primary | ICD-10-CM

## 2024-03-13 DIAGNOSIS — H90.3 BILATERAL HIGH FREQUENCY SENSORINEURAL HEARING LOSS: ICD-10-CM

## 2024-03-13 DIAGNOSIS — H66.90 CHRONIC OTITIS MEDIA, UNSPECIFIED OTITIS MEDIA TYPE: ICD-10-CM

## 2024-03-13 PROCEDURE — 3074F SYST BP LT 130 MM HG: CPT | Performed by: OTOLARYNGOLOGY

## 2024-03-13 PROCEDURE — 4004F PT TOBACCO SCREEN RCVD TLK: CPT | Performed by: OTOLARYNGOLOGY

## 2024-03-13 PROCEDURE — 99204 OFFICE O/P NEW MOD 45 MIN: CPT | Performed by: OTOLARYNGOLOGY

## 2024-03-13 PROCEDURE — 3050F LDL-C >= 130 MG/DL: CPT | Performed by: OTOLARYNGOLOGY

## 2024-03-13 PROCEDURE — 69222 CLEAN OUT MASTOID CAVITY: CPT | Performed by: OTOLARYNGOLOGY

## 2024-03-13 PROCEDURE — 3078F DIAST BP <80 MM HG: CPT | Performed by: OTOLARYNGOLOGY

## 2024-03-13 PROCEDURE — 3008F BODY MASS INDEX DOCD: CPT | Performed by: OTOLARYNGOLOGY

## 2024-03-13 PROCEDURE — 92550 TYMPANOMETRY & REFLEX THRESH: CPT | Performed by: AUDIOLOGIST

## 2024-03-13 PROCEDURE — 92557 COMPREHENSIVE HEARING TEST: CPT | Performed by: AUDIOLOGIST

## 2024-03-13 ASSESSMENT — PAIN SCALES - GENERAL: PAINLEVEL_OUTOF10: 6

## 2024-03-13 ASSESSMENT — ENCOUNTER SYMPTOMS: OCCASIONAL FEELINGS OF UNSTEADINESS: 1

## 2024-03-13 NOTE — PROGRESS NOTES
AUDIOLOGY ADULT AUDIOMETRIC EVALUATION      Name:  Stephenie Mccray  :  1980  Age:  43 y.o.  Date of Evaluation: 24    History:  Reason for visit:  Ms. Stephenie Mccray was seen today as part of the visit with Tyson Valdes D.O. for an evaluation of hearing.   Chief Complaint   Patient presents with    Dizziness     vertigo    Hearing Loss     Stated she has a longstanding history of right sided ear problems. Stated she has experienced two surgeries (mastoidectomy procedures) on the right side and has experienced hearing loss for over twenty years.   Stated she has some current right sided otalgia (6/10) and a sensation of ear pressure.   Mentioned she has noticed some random recurrent episodes of vertigo and dizziness recently, which she believes may have been due to the surgical procedures.   Stated she has noticed hearing loss on the right ear for many years, however, believes she has been able to hear well from the left side.   Denied any recent ear/sinus infections, significant noise exposure, sinus/throat concerns, or sudden hearing loss.    EVALUATION      See Audiogram    RESULTS:    Otoscopic Evaluation:   Right Ear: Otoscopy revealed deep cerumen with a surgical ear canal and a surgical tympanic membrane was visualized.   Left Ear: Otoscopy revealed a clear healthy canal and a healthy tympanic membrane was visualized.     Immittance:  Immittance Measures: 226 Hz   Right Ear: Tympanometric testing revealed a normal type A tympanogram with normal middle ear pressure and normal static compliance. Note wide rounded peak.   Left Ear: Tympanometric testing revealed a normal type A tympanogram with normal middle ear pressure and normal static compliance.    Right Ear: Ipsilateral acoustic reflexes were absent at, 500-4,000 Hz. Results are consistent with the test results.   Left Ear: Ipsilateral acoustic reflexes were absent at, 500-4,000 Hz. Results are consistent with the test results.     Test  technique:  Pure Tone Audiometry via TDH headphones    Reliability:   excellent    Pure Tone Audiometry:    Right Ear: Audiometric testing indicated a moderate conductive hearing loss through 1,000 Hz, sloping to a moderately severe mixed hearing loss at 2,000 Hz, sloping to a profound mixed hearing loss above.  Left Ear:   Audiometric testing indicated normal peripheral hearing sensitivity through 2,000 Hz, gradually sloping to a mild sensorineural hearing loss through 4,000 Hz, sloping to a moderate sensorineural hearing loss above.      Speech Audiometry:   Right Ear:  Speech Reception Threshold (SRT) was obtained at 55 dBHL                  Word Recognition scores were fair (76%) in quiet when words were presented at 85 dBHL  Left Ear:  Speech Reception Threshold (SRT) was obtained at  25 dBHL                  Word Recognition scores were excellent (100%) in quiet when words were presented at 65 dBHL  Testing was performed with recorded NU-6 speech words in quiet. Speech thresholds were in good agreement with the pure tone averages in each ear.       IMPRESSIONS:  Today's test results are hearing loss requiring medical/otologic and audiologic follow-up.  The patient was counseled with regard to the findings.    Amplification needs:  Hearing aids were recommended due to the hearing loss and the patient's concerns for hearing difficulties.     RECOMMENDATIONS:  * Continue medical follow up with Tyson Valdes D.O.  * Retest as medically indicated, or sooner if a change in hearing sensitivity or vertigo is noticed.   * Wear hearing protection while in the presence of loud sounds.   * Pending medical management and patient desire, consider a hearing aid evaluation to discuss amplification options and communication needs. The patient was instructed to call their insurance to check for any hearing aid benefits and to determine if [facility] was in network for hearing aids.   * Use effective communication  strategies such as asking the speaker to gain attention prior to speaking, speaking in the same room, repeating words that were heard, etc.    PATIENT EDUCATION:   Discussed results and recommendations with the patient and a copy of the hearing test was provided.  Questions were addressed and the patient was encouraged to contact our department should concerns arise.  The patient was seen from  1:00-1:30 pm.

## 2024-03-13 NOTE — PROGRESS NOTES
Impression:  1. Impacted cerumen of right ear        2. Chronic otitis media, unspecified otitis media type  Referral to ENT      3. Mixed conductive and sensorineural hearing loss of right ear with restricted hearing of left ear        4. Bilateral high frequency sensorineural hearing loss             RECOMMENDATIONS/PLAN :  Using the operative microscope I was able to debride her mastoid today and she was feeling better.  She must keep all water out of her ear.  More than likely she would benefit from a hearing aid on that right side.  We also discussed the fact that she absolutely must quit smoking sooner than later.  I advised her to have her mastoid cleaned out at least once a year.      **This electronic medical record note was created with the use of voice recognition software.  Despite proofreading, typographical or grammatical errors may be present that could affect meaning of content **    Subjective   Patient ID:     Stephenie Mccray is a 43 y.o. female who presents to the office today with a longstanding history of chronic ear problems.  The patient has had at least 2 middle ear surgeries with a mastoidectomy on that right side almost 20 some years ago.  She feels like she has fullness and pressure in that right ear.  No recent drainage from the right ear.  She states that she may have had a cholesteatoma at some point.  Currently she denies any vertigo or neurologic complaints.  No problems in the left ear.  Unfortunately she is still smoking    ROS:  A detailed 12 system review of systems is noted on the intake form has been reviewed with the patient with details noted in the HPI and scanned into the patient's medical record.    Objective     Past Medical History:   Diagnosis Date    Arthritis     COPD (chronic obstructive pulmonary disease) (CMS/[facility])     Diabetes mellitus (CMS/[facility])     History of transfusion     Hypothyroidism     CHARLIE (obstructive sleep apnea)         Past Surgical History:    Procedure Laterality Date     SECTION, LOW TRANSVERSE      EYE SURGERY      HERNIA REPAIR      TONSILLECTOMY      VENTRAL HERNIA REPAIR          Allergies   Allergen Reactions    Fluticasone Furoate-Vilanterol Unknown    Fluticasone-Umeclidin-Vilanter Other     Headaches    Sumatriptan Other and Unknown     Flushing    Vortioxetine Unknown    Levofloxacin Rash          Current Outpatient Medications:     busPIRone (Buspar) 30 mg tablet, Take 1 tablet (30 mg) by mouth 2 times a day., Disp: , Rfl:     clonazePAM (KlonoPIN) 1 mg tablet, Take by mouth 2 times a day as needed., Disp: , Rfl:     escitalopram (Lexapro) 10 mg tablet, Take 1 tablet (10 mg) by mouth once daily., Disp: , Rfl:     hydrOXYzine pamoate (Vistaril) 50 mg capsule, Take 1 capsule (50 mg) by mouth 4 times a day as needed for anxiety., Disp: , Rfl:     levothyroxine (Synthroid, Levoxyl) 150 mcg tablet, Take 1 tablet (150 mcg) by mouth once daily., Disp: 90 tablet, Rfl: 1    OneTouch Verio test strips strip, , Disp: , Rfl:     phenazopyridine (Pyridium) 200 mg tablet, Take 1 tablet (200 mg) by mouth 3 times a day for 2 days., Disp: 6 tablet, Rfl: 0    prazosin (Minipress) 5 mg capsule, Take 1 capsule (5 mg) by mouth once daily at bedtime., Disp: , Rfl:     predniSONE (Deltasone) 20 mg tablet, Take 2 tablets (40 mg) by mouth once daily for 2 days, THEN 1.5 tablets (30 mg) once daily for 2 days, THEN 1 tablet (20 mg) once daily for 2 days, THEN 0.5 tablets (10 mg) once daily for 1 day., Disp: 10 tablet, Rfl: 0    tiotropium (Spiriva Respimat) 2.5 mcg/actuation inhaler, Inhale 2 puffs once daily. Do not start before 2024., Disp: 8 g, Rfl: 11    Xarelto 10 mg tablet, Take 1 tablet (10 mg) by mouth once daily., Disp: , Rfl:     alcohol swabs (Alcohol Prep Pads) pads, medicated, Use 3 times daily prn, Disp: 100 each, Rfl: 3    atorvastatin (Lipitor) 20 mg tablet, Take 1 tablet (20 mg) by mouth once daily. Dr. Davis covering for Dr. Yuen,  "Disp: 30 tablet, Rfl: 0    BD Ultra-Fine Mini Pen Needle 31 gauge x 3/16\" needle, USE TO INJECT 4 TIMES DAILY AS DIRECTED, Disp: 400 each, Rfl: 3    insulin glargine (Lantus Solostar U-100 Insulin) 100 unit/mL (3 mL) pen, Inject 10 Units under the skin once daily in the morning. Take as directed per insulin instructions., Disp: 3 mL, Rfl: 2    insulin lispro (HumaLOG) 100 unit/mL injection, Inject 0.04 mL (4 Units) under the skin 3 times a day with meals. Plus sliding scale, Disp: , Rfl:     ipratropium-albuteroL (Duo-Neb) 0.5-2.5 mg/3 mL nebulizer solution, Take 3 mL by nebulization every 6 hours if needed for shortness of breath or wheezing. (Patient taking differently: Take 3 mL by nebulization every 4 hours. As needed), Disp: 180 mL, Rfl: 1    nicotine (Nicoderm CQ) 14 mg/24 hr patch, Place 1 patch over 24 hours on the skin once daily for 14 doses. Do not start before February 21, 2024., Disp: 14 patch, Rfl: 0    nicotine (Nicoderm CQ) 21 mg/24 hr patch, Place 1 patch over 24 hours on the skin once daily for 37 doses. Do not start before January 15, 2024., Disp: 30 patch, Rfl: 1    nicotine (Nicoderm CQ) 7 mg/24 hr patch, Place 1 patch over 24 hours on the skin once daily for 14 doses. Do not start before March 6, 2024. (Patient not taking: Reported on 3/13/2024), Disp: 14 patch, Rfl: 0    nicotine polacrilex (Commit) 4 mg lozenge, Dissolve 1 lozenge (4 mg) in the mouth every 2 hours if needed for smoking cessation., Disp: 100 lozenge, Rfl: 0    nitrofurantoin, macrocrystal-monohydrate, (Macrobid) 100 mg capsule, Take 1 capsule (100 mg) by mouth 2 times a day for 7 days. (Patient not taking: Reported on 3/13/2024), Disp: 14 capsule, Rfl: 0    paliperidone (Invega) 3 mg 24 hr tablet, Take 1 tablet (3 mg) by mouth once daily. Do not crush, chew, or split. Do not start before January 15, 2024., Disp: 30 tablet, Rfl: 0    pantoprazole (ProtoNix) 40 mg EC tablet, Take 1 tablet (40 mg) by mouth once daily., Disp: 90 " "tablet, Rfl: 1     Tobacco Use: High Risk (3/13/2024)    Patient History     Smoking Tobacco Use: Every Day     Smokeless Tobacco Use: Never     Passive Exposure: Never        Alcohol Use: Not At Risk (3/5/2024)    AUDIT-C     Frequency of Alcohol Consumption: Never     Average Number of Drinks: Patient does not drink     Frequency of Binge Drinking: Never        Social History     Substance and Sexual Activity   Drug Use Never        Physical Exam:  Visit Vitals  /67   Temp 36.4 °C (97.6 °F) (Temporal)   Ht 1.575 m (5' 2\")   Wt 72.6 kg (160 lb)   BMI 29.26 kg/m²   OB Status Menopausal   Smoking Status Every Day   BSA 1.78 m²      General: Patient is alert, oriented, cooperative in no apparent distress.  Head: Normocephalic, atraumatic.  Eyes: PERRL, EOMI, Conjunctiva is clear. No nystagmus.  Ears: Right Ear-- Pinna is normal.  External auditory canal is occluded with cerumen and she has extensive squamous debris and cerumen impaction within her right mastoid that has had previous surgery.  Using the operative microscope I was able to debride her mastoid today and she was feeling better.. Tympanic membrane is intact and retracted with significant scar tissue from chronic infection.  No acute middle ear effusion.  No obvious visible cholesteatoma..  Mastoid is nontender.  Left ear-- Pinna is normal.  External auditory canal is patent. Tympanic membrane is [intact, translucent and has good mobility with my pneumatic otoscope.  No effusion].  Mastoid is nontender.  Nose: Septum is straight.  No septal perforation or lesions. No septal hematoma/ seroma.  No signs of bleeding.  Inferior turbinates are normal.   No evidence of intranasal polyps.  No infectious drainage.  Throat:  Floor of mouth is clear, no masses.  Tongue appears normal, no lesions or masses. Gums, gingiva, buccal mucosa appear pink and moist, no lesions.  Edentulous upper and lower.  No lesions.  Peritonsillar regions appear symmetric without " swelling.  Hard and soft palate appear normal, no obvious cleft. Uvula is midline.  Oropharynx: No lesions. Retropharyngeal wall is flat.  No active postnasal drip.  Neck: Supple,  no lymphadenopathy.  No masses.  Salivary Glands: Symmetric bilaterally.  No palpable masses.  No evidence of acute infection or salivary stones  Neurologic: Cranial Nerves 2-12 are grossly intact without focal deficits. Cerebellar function testing is normal.     Results:   []    Procedure:   Pre OP Diagnosis: Squamous debris with cerumen impaction right mastoid  Post OP Diagnosis: Same  Procedure: Complex debridement of right mastoid  Surgeon: Lucio ONEAL  Assist: None  Anesthesia: None required  EBL: None  Complications: None  Disposition: Patient tolerated the procedure well and was discharged home in stable condition.  Procedure: After informed consent was obtained, the patient was laid back in the ENT chair and the operative microscope was brought to the right ear.  A speculum was placed.  A large amount of cerumen was removed and the patient had significant squamous debris involving her mastoid mixed with cerumen.  Using an alligator forcep and suction I was able to remove all of this and debride her mastoid and she was feeling much better.  No sign of inflammation or infection.  The patient tolerated the procedure well there were no complications.      DO Tyson Medina DO

## 2024-03-22 ENCOUNTER — PATIENT OUTREACH (OUTPATIENT)
Dept: PRIMARY CARE | Facility: CLINIC | Age: 44
End: 2024-03-22
Payer: COMMERCIAL

## 2024-03-22 NOTE — PROGRESS NOTES
Call regarding after hospitalization.  At time of outreach call the patient feels as if their condition has (returned to baseline) since last visit. Patient states due to a petty she had while in the hospital she had a UTI but symptoms of this have cleared since antibiotics were given. Overall patient states she is doing well during outreach call.

## 2024-03-26 ENCOUNTER — APPOINTMENT (OUTPATIENT)
Dept: PRIMARY CARE | Facility: CLINIC | Age: 44
End: 2024-03-26
Payer: COMMERCIAL

## 2024-03-27 DIAGNOSIS — E03.9 HYPOTHYROIDISM, UNSPECIFIED TYPE: ICD-10-CM

## 2024-03-27 RX ORDER — LEVOTHYROXINE SODIUM 150 UG/1
150 TABLET ORAL DAILY
Qty: 90 TABLET | Refills: 1 | Status: SHIPPED | OUTPATIENT
Start: 2024-03-27

## 2024-03-27 NOTE — TELEPHONE ENCOUNTER
Rx Refill Request Telephone Encounter    Name:  Stephenie Mccray  :  [id number]  Medication Name:    metoprolol tartrate (Lopressor) tablet 25 mg   levothyroxine (Synthroid, Levoxyl) 150 mcg tablet     Specific Pharmacy location:  [facility]  Date of last appointment:  2/15/24  Date of next appointment:  4/10/24  Best number to reach patient:  [phone]

## 2024-04-01 ENCOUNTER — APPOINTMENT (OUTPATIENT)
Dept: OBSTETRICS AND GYNECOLOGY | Facility: CLINIC | Age: 44
End: 2024-04-01
Payer: COMMERCIAL

## 2024-04-08 ENCOUNTER — APPOINTMENT (OUTPATIENT)
Dept: RADIOLOGY | Facility: HOSPITAL | Age: 44
DRG: 189 | End: 2024-04-08
Payer: COMMERCIAL

## 2024-04-08 ENCOUNTER — HOSPITAL ENCOUNTER (INPATIENT)
Facility: HOSPITAL | Age: 44
LOS: 1 days | Discharge: LEFT BEFORE DISCHARGE | DRG: 189 | End: 2024-04-08
Attending: STUDENT IN AN ORGANIZED HEALTH CARE EDUCATION/TRAINING PROGRAM | Admitting: HOSPITALIST
Payer: COMMERCIAL

## 2024-04-08 ENCOUNTER — APPOINTMENT (OUTPATIENT)
Dept: CARDIOLOGY | Facility: HOSPITAL | Age: 44
DRG: 189 | End: 2024-04-08
Payer: COMMERCIAL

## 2024-04-08 VITALS
WEIGHT: 169.09 LBS | HEART RATE: 92 BPM | RESPIRATION RATE: 31 BRPM | DIASTOLIC BLOOD PRESSURE: 77 MMHG | TEMPERATURE: 97.9 F | OXYGEN SATURATION: 91 % | HEIGHT: 62 IN | SYSTOLIC BLOOD PRESSURE: 115 MMHG | BODY MASS INDEX: 31.12 KG/M2

## 2024-04-08 DIAGNOSIS — J96.22 ACUTE ON CHRONIC RESPIRATORY FAILURE WITH HYPERCAPNIA (MULTI): Primary | ICD-10-CM

## 2024-04-08 LAB
ALBUMIN SERPL BCP-MCNC: 4 G/DL (ref 3.4–5)
ALP SERPL-CCNC: 74 U/L (ref 33–110)
ALT SERPL W P-5'-P-CCNC: 14 U/L (ref 7–45)
AMPHETAMINES UR QL SCN: ABNORMAL
ANION GAP BLDA CALCULATED.4IONS-SCNC: 5 MMO/L (ref 10–25)
ANION GAP BLDA CALCULATED.4IONS-SCNC: 6 MMO/L (ref 10–25)
ANION GAP BLDV CALCULATED.4IONS-SCNC: 5 MMOL/L (ref 10–25)
ANION GAP SERPL CALC-SCNC: 9 MMOL/L (ref 10–20)
APAP SERPL-MCNC: <10 UG/ML
APPEARANCE UR: CLEAR
AST SERPL W P-5'-P-CCNC: 10 U/L (ref 9–39)
ATRIAL RATE: 65 BPM
BARBITURATES UR QL SCN: ABNORMAL
BASE EXCESS BLDA CALC-SCNC: 6.8 MMOL/L (ref -2–3)
BASE EXCESS BLDA CALC-SCNC: 7.5 MMOL/L (ref -2–3)
BASE EXCESS BLDV CALC-SCNC: 7.9 MMOL/L (ref -2–3)
BASOPHILS # BLD MANUAL: 0 X10*3/UL (ref 0–0.1)
BASOPHILS NFR BLD MANUAL: 0 %
BENZODIAZ UR QL SCN: ABNORMAL
BILIRUB DIRECT SERPL-MCNC: 0 MG/DL (ref 0–0.3)
BILIRUB SERPL-MCNC: 0.3 MG/DL (ref 0–1.2)
BILIRUB UR STRIP.AUTO-MCNC: NEGATIVE MG/DL
BNP SERPL-MCNC: 97 PG/ML (ref 0–99)
BODY TEMPERATURE: ABNORMAL
BUN SERPL-MCNC: 22 MG/DL (ref 6–23)
BZE UR QL SCN: ABNORMAL
CA-I BLDA-SCNC: 1.26 MMOL/L (ref 1.1–1.33)
CA-I BLDA-SCNC: 1.27 MMOL/L (ref 1.1–1.33)
CA-I BLDV-SCNC: 1.24 MMOL/L (ref 1.1–1.33)
CALCIUM SERPL-MCNC: 9.1 MG/DL (ref 8.6–10.3)
CANNABINOIDS UR QL SCN: ABNORMAL
CARDIAC TROPONIN I PNL SERPL HS: 3 NG/L (ref 0–13)
CARDIAC TROPONIN I PNL SERPL HS: <3 NG/L (ref 0–13)
CHLORIDE BLDA-SCNC: 102 MMOL/L (ref 98–107)
CHLORIDE BLDA-SCNC: 103 MMOL/L (ref 98–107)
CHLORIDE BLDV-SCNC: 103 MMOL/L (ref 98–107)
CHLORIDE SERPL-SCNC: 103 MMOL/L (ref 98–107)
CO2 SERPL-SCNC: 33 MMOL/L (ref 21–32)
COHGB MFR BLDA: 8.2 %
COLOR UR: YELLOW
CREAT SERPL-MCNC: 0.87 MG/DL (ref 0.5–1.05)
CRITICAL CALL TIME: 307
CRITICAL CALLED BY: ABNORMAL
CRITICAL CALLED TO: ABNORMAL
CRITICAL READ BACK: ABNORMAL
DO-HGB MFR BLDA: 22.5 % (ref 0–5)
EGFRCR SERPLBLD CKD-EPI 2021: 85 ML/MIN/1.73M*2
EOSINOPHIL # BLD MANUAL: 0 X10*3/UL (ref 0–0.7)
EOSINOPHIL NFR BLD MANUAL: 0 %
ERYTHROCYTE [DISTWIDTH] IN BLOOD BY AUTOMATED COUNT: 15.4 % (ref 11.5–14.5)
ETHANOL SERPL-MCNC: <10 MG/DL
FENTANYL+NORFENTANYL UR QL SCN: ABNORMAL
FLUAV RNA RESP QL NAA+PROBE: NOT DETECTED
FLUBV RNA RESP QL NAA+PROBE: NOT DETECTED
GLUCOSE BLD MANUAL STRIP-MCNC: 221 MG/DL (ref 74–99)
GLUCOSE BLD MANUAL STRIP-MCNC: 221 MG/DL (ref 74–99)
GLUCOSE BLDA-MCNC: 200 MG/DL (ref 74–99)
GLUCOSE BLDA-MCNC: 237 MG/DL (ref 74–99)
GLUCOSE BLDV-MCNC: 146 MG/DL (ref 74–99)
GLUCOSE SERPL-MCNC: 149 MG/DL (ref 74–99)
GLUCOSE UR STRIP.AUTO-MCNC: NEGATIVE MG/DL
HCO3 BLDA-SCNC: 34.8 MMOL/L (ref 22–26)
HCO3 BLDA-SCNC: 35.3 MMOL/L (ref 22–26)
HCO3 BLDV-SCNC: 34.9 MMOL/L (ref 22–26)
HCT VFR BLD AUTO: 39.9 % (ref 36–46)
HCT VFR BLD EST: 37 % (ref 36–46)
HCT VFR BLD EST: 38 % (ref 36–46)
HCT VFR BLD EST: 40 % (ref 36–46)
HGB BLD-MCNC: 12.3 G/DL (ref 12–16)
HGB BLDA-MCNC: 12.2 G/DL (ref 12–16)
HGB BLDA-MCNC: 13.2 G/DL (ref 12–16)
HGB BLDA-MCNC: 13.3 G/DL (ref 12–16)
HGB BLDV-MCNC: 12.6 G/DL (ref 12–16)
IMM GRANULOCYTES # BLD AUTO: 0.62 X10*3/UL (ref 0–0.7)
IMM GRANULOCYTES NFR BLD AUTO: 5.2 % (ref 0–0.9)
INHALED O2 CONCENTRATION: 100 %
INHALED O2 CONCENTRATION: 100 %
INHALED O2 CONCENTRATION: 36 %
KETONES UR STRIP.AUTO-MCNC: NEGATIVE MG/DL
LACTATE BLDA-SCNC: 1.5 MMOL/L (ref 0.4–2)
LACTATE BLDA-SCNC: 1.6 MMOL/L (ref 0.4–2)
LACTATE BLDV-SCNC: 1.3 MMOL/L (ref 0.4–2)
LEUKOCYTE ESTERASE UR QL STRIP.AUTO: NEGATIVE
LYMPHOCYTES # BLD MANUAL: 4.52 X10*3/UL (ref 1.2–4.8)
LYMPHOCYTES NFR BLD MANUAL: 38 %
MCH RBC QN AUTO: 31 PG (ref 26–34)
MCHC RBC AUTO-ENTMCNC: 30.8 G/DL (ref 32–36)
MCV RBC AUTO: 101 FL (ref 80–100)
METHADONE UR QL SCN: ABNORMAL
METHGB MFR BLDA: 0.7 % (ref 0–1.5)
MONOCYTES # BLD MANUAL: 0.71 X10*3/UL (ref 0.1–1)
MONOCYTES NFR BLD MANUAL: 6 %
NEUTROPHILS # BLD MANUAL: 6.67 X10*3/UL (ref 1.2–7.7)
NEUTS BAND # BLD MANUAL: 0.48 X10*3/UL (ref 0–0.7)
NEUTS BAND NFR BLD MANUAL: 4 %
NEUTS SEG # BLD MANUAL: 6.19 X10*3/UL (ref 1.2–7)
NEUTS SEG NFR BLD MANUAL: 52 %
NEUTS VAC BLD QL SMEAR: PRESENT
NITRITE UR QL STRIP.AUTO: NEGATIVE
NRBC BLD-RTO: 0.4 /100 WBCS (ref 0–0)
OPIATES UR QL SCN: ABNORMAL
OXYCODONE+OXYMORPHONE UR QL SCN: ABNORMAL
OXYHGB MFR BLDA: 68.6 % (ref 94–98)
OXYHGB MFR BLDA: 68.8 % (ref 94–98)
OXYHGB MFR BLDA: 89.8 % (ref 94–98)
OXYHGB MFR BLDV: 69.3 % (ref 45–75)
P AXIS: 33 DEGREES
P OFFSET: 200 MS
P ONSET: 148 MS
PCO2 BLDA: 64 MM HG (ref 38–42)
PCO2 BLDA: 66 MM HG (ref 38–42)
PCO2 BLDV: 59 MM HG (ref 41–51)
PCP UR QL SCN: ABNORMAL
PH BLDA: 7.33 PH (ref 7.38–7.42)
PH BLDA: 7.35 PH (ref 7.38–7.42)
PH BLDV: 7.38 PH (ref 7.33–7.43)
PH UR STRIP.AUTO: 6 [PH]
PLATELET # BLD AUTO: 149 X10*3/UL (ref 150–450)
PO2 BLDA: 43 MM HG (ref 85–95)
PO2 BLDA: 82 MM HG (ref 85–95)
PO2 BLDV: 43 MM HG (ref 35–45)
POTASSIUM BLDA-SCNC: 4 MMOL/L (ref 3.5–5.3)
POTASSIUM BLDA-SCNC: 4.3 MMOL/L (ref 3.5–5.3)
POTASSIUM BLDV-SCNC: 3.8 MMOL/L (ref 3.5–5.3)
POTASSIUM SERPL-SCNC: 4.1 MMOL/L (ref 3.5–5.3)
PR INTERVAL: 112 MS
PROT SERPL-MCNC: 6.6 G/DL (ref 6.4–8.2)
PROT UR STRIP.AUTO-MCNC: NEGATIVE MG/DL
Q ONSET: 204 MS
QRS COUNT: 10 BEATS
QRS DURATION: 112 MS
QT INTERVAL: 452 MS
QTC CALCULATION(BAZETT): 470 MS
QTC FREDERICIA: 464 MS
R AXIS: -25 DEGREES
RBC # BLD AUTO: 3.97 X10*6/UL (ref 4–5.2)
RBC # UR STRIP.AUTO: ABNORMAL /UL
RBC #/AREA URNS AUTO: ABNORMAL /HPF
RBC MORPH BLD: NORMAL
RSV RNA RESP QL NAA+PROBE: NOT DETECTED
SALICYLATES SERPL-MCNC: <3 MG/DL
SAO2 % BLDA: 76 % (ref 94–100)
SAO2 % BLDA: 93 % (ref 94–100)
SAO2 % BLDV: 78 % (ref 45–75)
SARS-COV-2 RNA RESP QL NAA+PROBE: NOT DETECTED
SODIUM BLDA-SCNC: 138 MMOL/L (ref 136–145)
SODIUM BLDA-SCNC: 139 MMOL/L (ref 136–145)
SODIUM BLDV-SCNC: 139 MMOL/L (ref 136–145)
SODIUM SERPL-SCNC: 141 MMOL/L (ref 136–145)
SP GR UR STRIP.AUTO: 1.02
T AXIS: 130 DEGREES
T OFFSET: 430 MS
TOTAL CELLS COUNTED BLD: 100
UROBILINOGEN UR STRIP.AUTO-MCNC: <2 MG/DL
VENTRICULAR RATE: 65 BPM
WBC # BLD AUTO: 11.9 X10*3/UL (ref 4.4–11.3)
WBC #/AREA URNS AUTO: ABNORMAL /HPF

## 2024-04-08 PROCEDURE — 99291 CRITICAL CARE FIRST HOUR: CPT

## 2024-04-08 PROCEDURE — 87634 RSV DNA/RNA AMP PROBE: CPT | Performed by: STUDENT IN AN ORGANIZED HEALTH CARE EDUCATION/TRAINING PROGRAM

## 2024-04-08 PROCEDURE — 93005 ELECTROCARDIOGRAM TRACING: CPT

## 2024-04-08 PROCEDURE — 71045 X-RAY EXAM CHEST 1 VIEW: CPT

## 2024-04-08 PROCEDURE — 36415 COLL VENOUS BLD VENIPUNCTURE: CPT | Performed by: STUDENT IN AN ORGANIZED HEALTH CARE EDUCATION/TRAINING PROGRAM

## 2024-04-08 PROCEDURE — 82947 ASSAY GLUCOSE BLOOD QUANT: CPT

## 2024-04-08 PROCEDURE — 71045 X-RAY EXAM CHEST 1 VIEW: CPT | Performed by: RADIOLOGY

## 2024-04-08 PROCEDURE — 2500000004 HC RX 250 GENERAL PHARMACY W/ HCPCS (ALT 636 FOR OP/ED)

## 2024-04-08 PROCEDURE — 94660 CPAP INITIATION&MGMT: CPT

## 2024-04-08 PROCEDURE — 85027 COMPLETE CBC AUTOMATED: CPT | Performed by: STUDENT IN AN ORGANIZED HEALTH CARE EDUCATION/TRAINING PROGRAM

## 2024-04-08 PROCEDURE — 96375 TX/PRO/DX INJ NEW DRUG ADDON: CPT

## 2024-04-08 PROCEDURE — 36600 WITHDRAWAL OF ARTERIAL BLOOD: CPT

## 2024-04-08 PROCEDURE — 85007 BL SMEAR W/DIFF WBC COUNT: CPT | Performed by: STUDENT IN AN ORGANIZED HEALTH CARE EDUCATION/TRAINING PROGRAM

## 2024-04-08 PROCEDURE — 80143 DRUG ASSAY ACETAMINOPHEN: CPT | Performed by: STUDENT IN AN ORGANIZED HEALTH CARE EDUCATION/TRAINING PROGRAM

## 2024-04-08 PROCEDURE — 94640 AIRWAY INHALATION TREATMENT: CPT

## 2024-04-08 PROCEDURE — 82375 ASSAY CARBOXYHB QUANT: CPT | Performed by: STUDENT IN AN ORGANIZED HEALTH CARE EDUCATION/TRAINING PROGRAM

## 2024-04-08 PROCEDURE — 82248 BILIRUBIN DIRECT: CPT

## 2024-04-08 PROCEDURE — 80053 COMPREHEN METABOLIC PANEL: CPT | Performed by: STUDENT IN AN ORGANIZED HEALTH CARE EDUCATION/TRAINING PROGRAM

## 2024-04-08 PROCEDURE — 96374 THER/PROPH/DIAG INJ IV PUSH: CPT

## 2024-04-08 PROCEDURE — 84484 ASSAY OF TROPONIN QUANT: CPT | Performed by: STUDENT IN AN ORGANIZED HEALTH CARE EDUCATION/TRAINING PROGRAM

## 2024-04-08 PROCEDURE — 99238 HOSP IP/OBS DSCHRG MGMT 30/<: CPT

## 2024-04-08 PROCEDURE — 84132 ASSAY OF SERUM POTASSIUM: CPT | Performed by: STUDENT IN AN ORGANIZED HEALTH CARE EDUCATION/TRAINING PROGRAM

## 2024-04-08 PROCEDURE — 81001 URINALYSIS AUTO W/SCOPE: CPT | Performed by: STUDENT IN AN ORGANIZED HEALTH CARE EDUCATION/TRAINING PROGRAM

## 2024-04-08 PROCEDURE — 80307 DRUG TEST PRSMV CHEM ANLYZR: CPT | Performed by: STUDENT IN AN ORGANIZED HEALTH CARE EDUCATION/TRAINING PROGRAM

## 2024-04-08 PROCEDURE — 2500000001 HC RX 250 WO HCPCS SELF ADMINISTERED DRUGS (ALT 637 FOR MEDICARE OP): Performed by: HOSPITALIST

## 2024-04-08 PROCEDURE — 83880 ASSAY OF NATRIURETIC PEPTIDE: CPT

## 2024-04-08 PROCEDURE — 2020000001 HC ICU ROOM DAILY

## 2024-04-08 PROCEDURE — 2500000002 HC RX 250 W HCPCS SELF ADMINISTERED DRUGS (ALT 637 FOR MEDICARE OP, ALT 636 FOR OP/ED): Performed by: STUDENT IN AN ORGANIZED HEALTH CARE EDUCATION/TRAINING PROGRAM

## 2024-04-08 PROCEDURE — 99291 CRITICAL CARE FIRST HOUR: CPT | Mod: 25 | Performed by: STUDENT IN AN ORGANIZED HEALTH CARE EDUCATION/TRAINING PROGRAM

## 2024-04-08 PROCEDURE — 5A09357 ASSISTANCE WITH RESPIRATORY VENTILATION, LESS THAN 24 CONSECUTIVE HOURS, CONTINUOUS POSITIVE AIRWAY PRESSURE: ICD-10-PCS | Performed by: STUDENT IN AN ORGANIZED HEALTH CARE EDUCATION/TRAINING PROGRAM

## 2024-04-08 PROCEDURE — 2500000004 HC RX 250 GENERAL PHARMACY W/ HCPCS (ALT 636 FOR OP/ED): Performed by: STUDENT IN AN ORGANIZED HEALTH CARE EDUCATION/TRAINING PROGRAM

## 2024-04-08 PROCEDURE — 2500000005 HC RX 250 GENERAL PHARMACY W/O HCPCS: Performed by: STUDENT IN AN ORGANIZED HEALTH CARE EDUCATION/TRAINING PROGRAM

## 2024-04-08 PROCEDURE — 2500000002 HC RX 250 W HCPCS SELF ADMINISTERED DRUGS (ALT 637 FOR MEDICARE OP, ALT 636 FOR OP/ED): Performed by: HOSPITALIST

## 2024-04-08 RX ORDER — IPRATROPIUM BROMIDE AND ALBUTEROL SULFATE 2.5; .5 MG/3ML; MG/3ML
3 SOLUTION RESPIRATORY (INHALATION) ONCE
Status: COMPLETED | OUTPATIENT
Start: 2024-04-08 | End: 2024-04-08

## 2024-04-08 RX ORDER — CLONIDINE HYDROCHLORIDE 0.1 MG/1
0.1 TABLET ORAL 2 TIMES DAILY PRN
COMMUNITY

## 2024-04-08 RX ORDER — DEXTROSE 50 % IN WATER (D50W) INTRAVENOUS SYRINGE
12.5
Status: DISCONTINUED | OUTPATIENT
Start: 2024-04-08 | End: 2024-04-08 | Stop reason: HOSPADM

## 2024-04-08 RX ORDER — IPRATROPIUM BROMIDE AND ALBUTEROL SULFATE 2.5; .5 MG/3ML; MG/3ML
3 SOLUTION RESPIRATORY (INHALATION) EVERY 4 HOURS PRN
Status: DISCONTINUED | OUTPATIENT
Start: 2024-04-08 | End: 2024-04-08 | Stop reason: HOSPADM

## 2024-04-08 RX ORDER — PANTOPRAZOLE SODIUM 40 MG/1
40 TABLET, DELAYED RELEASE ORAL DAILY
Status: DISCONTINUED | OUTPATIENT
Start: 2024-04-08 | End: 2024-04-08 | Stop reason: HOSPADM

## 2024-04-08 RX ORDER — NALOXONE HYDROCHLORIDE 1 MG/ML
2 INJECTION INTRAMUSCULAR; INTRAVENOUS; SUBCUTANEOUS ONCE
Status: COMPLETED | OUTPATIENT
Start: 2024-04-08 | End: 2024-04-08

## 2024-04-08 RX ORDER — RISPERIDONE 0.25 MG/1
0.5 TABLET ORAL 2 TIMES DAILY
Status: DISCONTINUED | OUTPATIENT
Start: 2024-04-08 | End: 2024-04-08 | Stop reason: HOSPADM

## 2024-04-08 RX ORDER — INSULIN LISPRO 100 [IU]/ML
4 INJECTION, SOLUTION INTRAVENOUS; SUBCUTANEOUS
Status: DISCONTINUED | OUTPATIENT
Start: 2024-04-08 | End: 2024-04-08 | Stop reason: HOSPADM

## 2024-04-08 RX ORDER — PALIPERIDONE PALMITATE 234 MG/1.5ML
234 INJECTION INTRAMUSCULAR
COMMUNITY

## 2024-04-08 RX ORDER — ATORVASTATIN CALCIUM 20 MG/1
20 TABLET, FILM COATED ORAL NIGHTLY
Status: DISCONTINUED | OUTPATIENT
Start: 2024-04-08 | End: 2024-04-08 | Stop reason: HOSPADM

## 2024-04-08 RX ORDER — CLONAZEPAM 1 MG/1
1 TABLET ORAL 2 TIMES DAILY PRN
Status: DISCONTINUED | OUTPATIENT
Start: 2024-04-08 | End: 2024-04-08 | Stop reason: HOSPADM

## 2024-04-08 RX ORDER — INSULIN GLARGINE 100 [IU]/ML
10 INJECTION, SOLUTION SUBCUTANEOUS EVERY 24 HOURS
Status: DISCONTINUED | OUTPATIENT
Start: 2024-04-08 | End: 2024-04-08 | Stop reason: HOSPADM

## 2024-04-08 RX ORDER — FUROSEMIDE 10 MG/ML
40 INJECTION INTRAMUSCULAR; INTRAVENOUS ONCE
Status: COMPLETED | OUTPATIENT
Start: 2024-04-08 | End: 2024-04-08

## 2024-04-08 RX ORDER — PRAZOSIN HYDROCHLORIDE 5 MG/1
5 CAPSULE ORAL NIGHTLY
Status: DISCONTINUED | OUTPATIENT
Start: 2024-04-08 | End: 2024-04-08 | Stop reason: HOSPADM

## 2024-04-08 RX ORDER — ESCITALOPRAM OXALATE 10 MG/1
10 TABLET ORAL DAILY
Status: DISCONTINUED | OUTPATIENT
Start: 2024-04-08 | End: 2024-04-08 | Stop reason: HOSPADM

## 2024-04-08 RX ORDER — ALBUTEROL SULFATE 0.83 MG/ML
2.5 SOLUTION RESPIRATORY (INHALATION) ONCE
Status: COMPLETED | OUTPATIENT
Start: 2024-04-08 | End: 2024-04-08

## 2024-04-08 RX ORDER — CLONIDINE HYDROCHLORIDE 0.1 MG/1
0.1 TABLET ORAL 2 TIMES DAILY
Status: DISCONTINUED | OUTPATIENT
Start: 2024-04-08 | End: 2024-04-08 | Stop reason: HOSPADM

## 2024-04-08 RX ORDER — ONDANSETRON 4 MG/1
4 TABLET, ORALLY DISINTEGRATING ORAL EVERY 8 HOURS PRN
COMMUNITY

## 2024-04-08 RX ORDER — DEXTROSE 50 % IN WATER (D50W) INTRAVENOUS SYRINGE
25
Status: DISCONTINUED | OUTPATIENT
Start: 2024-04-08 | End: 2024-04-08 | Stop reason: HOSPADM

## 2024-04-08 RX ORDER — INSULIN LISPRO 100 [IU]/ML
0-10 INJECTION, SOLUTION INTRAVENOUS; SUBCUTANEOUS
Status: DISCONTINUED | OUTPATIENT
Start: 2024-04-08 | End: 2024-04-08 | Stop reason: HOSPADM

## 2024-04-08 RX ORDER — METOPROLOL TARTRATE 25 MG/1
25 TABLET, FILM COATED ORAL 2 TIMES DAILY
COMMUNITY

## 2024-04-08 RX ORDER — LEVOTHYROXINE SODIUM 75 UG/1
150 TABLET ORAL DAILY
Status: DISCONTINUED | OUTPATIENT
Start: 2024-04-08 | End: 2024-04-08 | Stop reason: HOSPADM

## 2024-04-08 RX ORDER — BUSPIRONE HYDROCHLORIDE 5 MG/1
30 TABLET ORAL 2 TIMES DAILY
Status: DISCONTINUED | OUTPATIENT
Start: 2024-04-08 | End: 2024-04-08 | Stop reason: HOSPADM

## 2024-04-08 RX ORDER — IPRATROPIUM BROMIDE AND ALBUTEROL SULFATE 2.5; .5 MG/3ML; MG/3ML
3 SOLUTION RESPIRATORY (INHALATION)
Status: DISCONTINUED | OUTPATIENT
Start: 2024-04-08 | End: 2024-04-08 | Stop reason: HOSPADM

## 2024-04-08 RX ORDER — ESCITALOPRAM OXALATE 20 MG/1
20 TABLET ORAL EVERY MORNING
COMMUNITY

## 2024-04-08 RX ADMIN — LEVOTHYROXINE SODIUM 150 MCG: 75 TABLET ORAL at 08:46

## 2024-04-08 RX ADMIN — INSULIN GLARGINE 10 UNITS: 100 INJECTION, SOLUTION SUBCUTANEOUS at 08:37

## 2024-04-08 RX ADMIN — IPRATROPIUM BROMIDE AND ALBUTEROL SULFATE 3 ML: 2.5; .5 SOLUTION RESPIRATORY (INHALATION) at 00:56

## 2024-04-08 RX ADMIN — Medication 80 PERCENT: at 07:28

## 2024-04-08 RX ADMIN — METHYLPREDNISOLONE SODIUM SUCCINATE 125 MG: 125 INJECTION, POWDER, FOR SOLUTION INTRAMUSCULAR; INTRAVENOUS at 00:50

## 2024-04-08 RX ADMIN — INSULIN LISPRO 4 UNITS: 100 INJECTION, SOLUTION INTRAVENOUS; SUBCUTANEOUS at 08:39

## 2024-04-08 RX ADMIN — NALOXONE HYDROCHLORIDE 2 MG: 1 INJECTION PARENTERAL at 03:23

## 2024-04-08 RX ADMIN — RIVAROXABAN 10 MG: 10 TABLET, FILM COATED ORAL at 08:45

## 2024-04-08 RX ADMIN — INSULIN LISPRO 4 UNITS: 100 INJECTION, SOLUTION INTRAVENOUS; SUBCUTANEOUS at 08:38

## 2024-04-08 RX ADMIN — CLONIDINE HYDROCHLORIDE 0.1 MG: 0.1 TABLET ORAL at 08:45

## 2024-04-08 RX ADMIN — Medication 100 PERCENT: at 03:30

## 2024-04-08 RX ADMIN — PANTOPRAZOLE SODIUM 40 MG: 40 TABLET, DELAYED RELEASE ORAL at 08:48

## 2024-04-08 RX ADMIN — IPRATROPIUM BROMIDE AND ALBUTEROL SULFATE 3 ML: 2.5; .5 SOLUTION RESPIRATORY (INHALATION) at 00:53

## 2024-04-08 RX ADMIN — ALBUTEROL SULFATE 2.5 MG: 2.5 SOLUTION RESPIRATORY (INHALATION) at 02:54

## 2024-04-08 RX ADMIN — IPRATROPIUM BROMIDE AND ALBUTEROL SULFATE 3 ML: 2.5; .5 SOLUTION RESPIRATORY (INHALATION) at 07:28

## 2024-04-08 RX ADMIN — NALOXONE HYDROCHLORIDE 2 MG: 1 INJECTION PARENTERAL at 02:51

## 2024-04-08 RX ADMIN — FUROSEMIDE 40 MG: 10 INJECTION, SOLUTION INTRAVENOUS at 08:45

## 2024-04-08 SDOH — SOCIAL STABILITY: SOCIAL NETWORK: ARE YOU MARRIED, WIDOWED, DIVORCED, SEPARATED, NEVER MARRIED, OR LIVING WITH A PARTNER?: LIVING WITH PARTNER

## 2024-04-08 SDOH — SOCIAL STABILITY: SOCIAL INSECURITY: ARE THERE ANY APPARENT SIGNS OF INJURIES/BEHAVIORS THAT COULD BE RELATED TO ABUSE/NEGLECT?: NO

## 2024-04-08 SDOH — ECONOMIC STABILITY: FOOD INSECURITY: WITHIN THE PAST 12 MONTHS, YOU WORRIED THAT YOUR FOOD WOULD RUN OUT BEFORE YOU GOT MONEY TO BUY MORE.: NEVER TRUE

## 2024-04-08 SDOH — SOCIAL STABILITY: SOCIAL NETWORK: IN A TYPICAL WEEK, HOW MANY TIMES DO YOU TALK ON THE PHONE WITH FAMILY, FRIENDS, OR NEIGHBORS?: NEVER

## 2024-04-08 SDOH — SOCIAL STABILITY: SOCIAL INSECURITY: DO YOU FEEL ANYONE HAS EXPLOITED OR TAKEN ADVANTAGE OF YOU FINANCIALLY OR OF YOUR PERSONAL PROPERTY?: NO

## 2024-04-08 SDOH — SOCIAL STABILITY: SOCIAL INSECURITY: DOES ANYONE TRY TO KEEP YOU FROM HAVING/CONTACTING OTHER FRIENDS OR DOING THINGS OUTSIDE YOUR HOME?: NO

## 2024-04-08 SDOH — ECONOMIC STABILITY: FOOD INSECURITY: WITHIN THE PAST 12 MONTHS, THE FOOD YOU BOUGHT JUST DIDN'T LAST AND YOU DIDN'T HAVE MONEY TO GET MORE.: NEVER TRUE

## 2024-04-08 SDOH — SOCIAL STABILITY: SOCIAL NETWORK: HOW OFTEN DO YOU GET TOGETHER WITH FRIENDS OR RELATIVES?: NEVER

## 2024-04-08 SDOH — SOCIAL STABILITY: SOCIAL NETWORK: HOW OFTEN DO YOU ATTEND CHURCH OR RELIGIOUS SERVICES?: NEVER

## 2024-04-08 SDOH — SOCIAL STABILITY: SOCIAL INSECURITY: DO YOU FEEL UNSAFE GOING BACK TO THE PLACE WHERE YOU ARE LIVING?: NO

## 2024-04-08 SDOH — SOCIAL STABILITY: SOCIAL INSECURITY: HAVE YOU HAD THOUGHTS OF HARMING ANYONE ELSE?: NO

## 2024-04-08 SDOH — SOCIAL STABILITY: SOCIAL INSECURITY: ARE YOU OR HAVE YOU BEEN THREATENED OR ABUSED PHYSICALLY, EMOTIONALLY, OR SEXUALLY BY ANYONE?: NO

## 2024-04-08 SDOH — HEALTH STABILITY: PHYSICAL HEALTH: ON AVERAGE, HOW MANY MINUTES DO YOU ENGAGE IN EXERCISE AT THIS LEVEL?: 0 MIN

## 2024-04-08 SDOH — SOCIAL STABILITY: SOCIAL INSECURITY: WERE YOU ABLE TO COMPLETE ALL THE BEHAVIORAL HEALTH SCREENINGS?: YES

## 2024-04-08 SDOH — HEALTH STABILITY: PHYSICAL HEALTH: ON AVERAGE, HOW MANY DAYS PER WEEK DO YOU ENGAGE IN MODERATE TO STRENUOUS EXERCISE (LIKE A BRISK WALK)?: 0 DAYS

## 2024-04-08 SDOH — SOCIAL STABILITY: SOCIAL NETWORK
DO YOU BELONG TO ANY CLUBS OR ORGANIZATIONS SUCH AS CHURCH GROUPS UNIONS, FRATERNAL OR ATHLETIC GROUPS, OR SCHOOL GROUPS?: NO

## 2024-04-08 SDOH — SOCIAL STABILITY: SOCIAL NETWORK: HOW OFTEN DO YOU ATTENT MEETINGS OF THE CLUB OR ORGANIZATION YOU BELONG TO?: NEVER

## 2024-04-08 SDOH — SOCIAL STABILITY: SOCIAL INSECURITY: ABUSE: ADULT

## 2024-04-08 SDOH — SOCIAL STABILITY: SOCIAL INSECURITY: HAS ANYONE EVER THREATENED TO HURT YOUR FAMILY OR YOUR PETS?: NO

## 2024-04-08 ASSESSMENT — ACTIVITIES OF DAILY LIVING (ADL)
DRESSING YOURSELF: INDEPENDENT
HEARING - RIGHT EAR: FUNCTIONAL
GROOMING: INDEPENDENT
BATHING: INDEPENDENT
LACK_OF_TRANSPORTATION: NO
FEEDING YOURSELF: INDEPENDENT
HEARING - LEFT EAR: FUNCTIONAL
JUDGMENT_ADEQUATE_SAFELY_COMPLETE_DAILY_ACTIVITIES: YES
PATIENT'S MEMORY ADEQUATE TO SAFELY COMPLETE DAILY ACTIVITIES?: YES
WALKS IN HOME: INDEPENDENT
ADEQUATE_TO_COMPLETE_ADL: YES
TOILETING: INDEPENDENT

## 2024-04-08 ASSESSMENT — LIFESTYLE VARIABLES
PRESCIPTION_ABUSE_PAST_12_MONTHS: NO
AUDIT-C TOTAL SCORE: 0
HAVE YOU EVER FELT YOU SHOULD CUT DOWN ON YOUR DRINKING: NO
HOW OFTEN DO YOU HAVE 6 OR MORE DRINKS ON ONE OCCASION: NEVER
SUBSTANCE_ABUSE_PAST_12_MONTHS: NO
TOTAL SCORE: 0
HAVE PEOPLE ANNOYED YOU BY CRITICIZING YOUR DRINKING: NO
SKIP TO QUESTIONS 9-10: 1
HOW MANY STANDARD DRINKS CONTAINING ALCOHOL DO YOU HAVE ON A TYPICAL DAY: PATIENT DOES NOT DRINK
HOW OFTEN DO YOU HAVE A DRINK CONTAINING ALCOHOL: NEVER
EVER HAD A DRINK FIRST THING IN THE MORNING TO STEADY YOUR NERVES TO GET RID OF A HANGOVER: NO
EVER FELT BAD OR GUILTY ABOUT YOUR DRINKING: NO
AUDIT-C TOTAL SCORE: 0

## 2024-04-08 ASSESSMENT — COGNITIVE AND FUNCTIONAL STATUS - GENERAL
PATIENT BASELINE BEDBOUND: NO
DAILY ACTIVITIY SCORE: 24
MOBILITY SCORE: 24

## 2024-04-08 ASSESSMENT — PATIENT HEALTH QUESTIONNAIRE - PHQ9
2. FEELING DOWN, DEPRESSED OR HOPELESS: NOT AT ALL
1. LITTLE INTEREST OR PLEASURE IN DOING THINGS: NOT AT ALL
SUM OF ALL RESPONSES TO PHQ9 QUESTIONS 1 & 2: 0

## 2024-04-08 ASSESSMENT — PAIN - FUNCTIONAL ASSESSMENT
PAIN_FUNCTIONAL_ASSESSMENT: 0-10
PAIN_FUNCTIONAL_ASSESSMENT: 0-10

## 2024-04-08 ASSESSMENT — PAIN SCALES - GENERAL
PAINLEVEL_OUTOF10: 0 - NO PAIN

## 2024-04-08 NOTE — DISCHARGE SUMMARY
Discharge Diagnosis  Acute on chronic respiratory failure with hypercapnia (CMS/HCC)    Issues Requiring Follow-Up  Follow up with Pulmonology  Follow up with PCP    Test Results Pending At Discharge  Pending Labs       No current pending labs.            Hospital Course   Stephenie Mccray is a 43 y.o. year old female patient with Past Medical History of  COPD (4L@home), DVT/PE (on Xarelto), schizophrenia, migraine headaches, HTN, HLD, current smoker, GERD, IDDM, and hypothyroidism. She is well known to this facility with frequent admissions for acute on chronic hypoxic/hypercapnic respiratory failure.  She presented to [facility] emergency department 4/8 with complaints of shortness of breath from home, stated her symptoms started approximately 4 PM 4/7.  States that she has been compliant with her home CPAP and inhalers.  She is a current every day smoker.     ED course  In the ED she was afebrile.  Heart rate 71.  Normotensive with /86, oxygen saturations low 80% on room air, placed on nonrebreather.  ABG was done which showed pH 7.35, pCO2 64, pO2 43, oxygen saturation 76%.  Patient then placed on NIMV with 100% FiO2.  Repeat ABG with pH 7.33, pCO2 66, pO2 82, oxygen saturation 93%.  Other laboratory workup showed slight leukocytosis with WBC 11.9, normal renal function, normal hepatic function.  Patient remains significantly lethargic and therefore was given 4 g intravenous Narcan without significant response.  She was also given 2x DuoNeb treatment, 125 mg Solu-Medrol.  She was admitted to the ICU for further care.    During ICU admission patient was able to be weaned off BiPAP, placed on 6 L nasal cannula.  Oxygen saturation 92%.  Patient mentation improved to baseline.  Patient was requesting to be discharged to home.  Discussed with patient and that she is still requiring increased oxygen from baseline.  Patient has medical capacity to make all decisions and understands the risks of ongoing hypoxia.   She states that she will increase her oxygen to 6 L and wean back to her home 4 L as tolerated.  She states that she will follow-up with pulmonology as recommended.  At this time they are shared decision making for patient to be discharged home.  Recommend return to the emergency department should she have increased shortness of breath or hypoxia.    Pertinent Physical Exam At Time of Discharge  Physical Exam  Constitutional:       General: She is not in acute distress.     Appearance: She is not ill-appearing or toxic-appearing.   HENT:      Head: Normocephalic and atraumatic.      Mouth/Throat:      Mouth: Mucous membranes are moist.      Pharynx: Oropharynx is clear. No oropharyngeal exudate.   Eyes:      General: No scleral icterus.     Extraocular Movements: Extraocular movements intact.      Pupils: Pupils are equal, round, and reactive to light.   Cardiovascular:      Rate and Rhythm: Normal rate and regular rhythm.      Pulses: Normal pulses.      Heart sounds: Normal heart sounds. No murmur heard.     No friction rub. No gallop.   Pulmonary:      Effort: Pulmonary effort is normal. No respiratory distress.      Breath sounds: Normal breath sounds. No stridor. No wheezing or rales.   Abdominal:      General: There is no distension.      Palpations: Abdomen is soft.      Tenderness: There is no abdominal tenderness.   Musculoskeletal:         General: Normal range of motion.      Cervical back: Normal range of motion and neck supple.   Skin:     General: Skin is warm and dry.      Capillary Refill: Capillary refill takes less than 2 seconds.      Coloration: Skin is pale.   Neurological:      General: No focal deficit present.      Mental Status: She is alert and oriented to person, place, and time. Mental status is at baseline.         Home Medications     Medication List      CHANGE how you take these medications     insulin glargine 100 unit/mL (3 mL) pen; Commonly known as: Lantus   Solostar U-100  "Insulin; Inject 10 Units under the skin once daily in the   morning. Take as directed per insulin instructions.; What changed: how   much to take   ipratropium-albuteroL 0.5-2.5 mg/3 mL nebulizer solution; Commonly known   as: Duo-Neb; Take 3 mL by nebulization every 6 hours if needed for   shortness of breath or wheezing.; What changed: when to take this,   additional instructions     CONTINUE taking these medications     alcohol swabs pads, medicated; Commonly known as: Alcohol Prep Pads; Use   3 times daily prn   atorvastatin 20 mg tablet; Commonly known as: Lipitor; Take 1 tablet (20   mg) by mouth once daily. Dr. Davis covering for Dr. Yuen   BD Ultra-Fine Mini Pen Needle 31 gauge x 3/16\" needle; Generic drug: pen   needle, diabetic; USE TO INJECT 4 TIMES DAILY AS DIRECTED   busPIRone 30 mg tablet; Commonly known as: Buspar   clonazePAM 1 mg tablet; Commonly known as: KlonoPIN   cloNIDine 0.1 mg tablet; Commonly known as: Catapres   escitalopram 20 mg tablet; Commonly known as: Lexapro   hydrOXYzine pamoate 50 mg capsule; Commonly known as: Vistaril   insulin lispro 100 unit/mL injection; Commonly known as: HumaLOG   Invega Sustenna 234 mg/1.5 mL syringe; Generic drug: paliperidone   palmitate ER   levothyroxine 150 mcg tablet; Commonly known as: Synthroid, Levoxyl;   Take 1 tablet (150 mcg) by mouth once daily.   metoprolol tartrate 25 mg tablet; Commonly known as: Lopressor   * nicotine 21 mg/24 hr patch; Commonly known as: Nicoderm CQ; Place 1   patch over 24 hours on the skin once daily for 37 doses. Do not start   before January 15, 2024.   * nicotine 14 mg/24 hr patch; Commonly known as: Nicoderm CQ; Place 1   patch over 24 hours on the skin once daily for 14 doses. Do not start   before February 21, 2024.   * nicotine 7 mg/24 hr patch; Commonly known as: Nicoderm CQ; Place 1   patch over 24 hours on the skin once daily for 14 doses. Do not start   before March 6, 2024.   nicotine polacrilex 4 mg " lozenge; Commonly known as: Commit; Dissolve 1   lozenge (4 mg) in the mouth every 2 hours if needed for smoking cessation.   ondansetron ODT 4 mg disintegrating tablet; Commonly known as:   Zofran-ODT   OneTouch Verio test strips strip; Generic drug: blood sugar diagnostic   paliperidone 3 mg 24 hr tablet; Commonly known as: Invega; Take 1 tablet   (3 mg) by mouth once daily. Do not crush, chew, or split. Do not start   before January 15, 2024.   pantoprazole 40 mg EC tablet; Commonly known as: ProtoNix; Take 1 tablet   (40 mg) by mouth once daily.   prazosin 5 mg capsule; Commonly known as: Minipress   tiotropium 2.5 mcg/actuation inhaler; Commonly known as: Spiriva   Respimat; Inhale 2 puffs once daily. Do not start before March 9, 2024.   Xarelto 10 mg tablet; Generic drug: rivaroxaban  * This list has 3 medication(s) that are the same as other medications   prescribed for you. Read the directions carefully, and ask your doctor or   other care provider to review them with you.       Outpatient Follow-Up  Future Appointments   Date Time Provider Department Center   4/10/2024  9:30 AM Delta Yuen MD [id number] [place]   4/17/2024  8:45 AM Jesse Jones DO [id number] [place]   5/13/2024 10:45 AM Delta Yuen MD [id number] [place]   3/12/2025  1:00 PM Tyson Valdes DO [id number] [place]       BARTOLOME Servin-CNP

## 2024-04-08 NOTE — NURSING NOTE
"Pt calling this rn into room, tearful: \"I'm not sick, can I please go home now\". Np jamal came to bedside and spoke w/ pt. Pt agreeing to leave against medical advice. AMA form signed by pt and left in pt's chart. Pt's Ivs removed prior to her leaving. Pt ambulated without difficulty to elevator. No apparent respiratory distress upon leaving the unit.   "

## 2024-04-08 NOTE — ED PROVIDER NOTES
HPI   Chief Complaint   Patient presents with    Shortness of Breath     Pt states SOB started around 4pm last night. Pt states she wears O2 at home as needed, not wearing any upon arrival to ED.       Patient presents to the emergency department for evaluation of shortness of breath.  She tells me that her symptoms started at approximately 4 PM tonight.  She wears 4 L nasal cannula as needed and states that she normally wears it at nighttime.  She also tells me that she has a CPAP machine at home.  She states she has been compliant with her therapies.  Is a current smoker.  Denies a history of coronary disease or stenting.  Denies a history of DVT or PE.  Denies any new leg swelling.                          Ezequiel Coma Scale Score: 15         NIH Stroke Scale: 0             Patient History   Past Medical History:   Diagnosis Date    Arthritis     COPD (chronic obstructive pulmonary disease) (CMS/HCC)     Diabetes mellitus (CMS/[facility])     History of transfusion     Hypothyroidism     CHARLIE (obstructive sleep apnea)      Past Surgical History:   Procedure Laterality Date     SECTION, LOW TRANSVERSE      EYE SURGERY      HERNIA REPAIR      TONSILLECTOMY      VENTRAL HERNIA REPAIR       Family History   Problem Relation Name Age of Onset    Fibromyalgia Father      Hypertension Brother      Thyroid disease Maternal Grandmother      Thyroid disease Paternal Grandmother      Lung cancer Paternal Grandfather      Coronary artery disease Other Grandmother      Social History     Tobacco Use    Smoking status: Every Day     Packs/day: 1     Types: Cigarettes     Passive exposure: Never    Smokeless tobacco: Never   Substance Use Topics    Alcohol use: Never    Drug use: Never       Physical Exam   ED Triage Vitals   Temperature Heart Rate Respirations BP   24 0026 24 0026 24 0026 24 0026   35.8 °C (96.4 °F) 69 (!) 24 124/71      Pulse Ox Temp Source Heart Rate Source Patient Position   24  0026 04/08/24 0026 04/08/24 0026 04/08/24 0026   (!) 89 % Temporal Monitor Sitting      BP Location FiO2 (%)     04/08/24 0026 04/08/24 0330     Right arm 100 %       Physical Exam  Constitutional:       Comments: Somnolent but arousable.   HENT:      Head: Normocephalic and atraumatic.      Right Ear: External ear normal.      Left Ear: External ear normal.   Eyes:      Extraocular Movements: Extraocular movements intact.      Pupils: Pupils are equal, round, and reactive to light.      Comments: Pupils are 5 mm bilaterally and responsive to light    Cardiovascular:      Rate and Rhythm: Normal rate and regular rhythm.      Pulses: Normal pulses.      Heart sounds: Normal heart sounds.   Pulmonary:      Breath sounds: Wheezing present. No rales.      Comments: Globally diminished breath sounds bilaterally.  Saturating at 89% on room air  Abdominal:      Tenderness: There is no abdominal tenderness. There is no guarding.   Musculoskeletal:      Comments: Trace edema limited to the distal aspects of bilateral lower extremities   Skin:     Capillary Refill: Capillary refill takes less than 2 seconds.   Neurological:      General: No focal deficit present.      Mental Status: She is alert.         ED Course & MDM        Medical Decision Making  Patient noted to be somnolent on arrival but was arousable which is a verbal stimulus.    Attempted to obtain additional VBG by respiratory therapy on arrival but I suspect this is a venous sample.  The pH was 7.38 with pCO2 59 and pO2 43.    Patient did have wheezing on exam.  Started on DuoNeb x 2 in addition to Solu-Medrol.  Throughout the patient's encounter, she started getting more somnolent and hypoxic.  She was saturating at approximately 89% on arrival was placed on 4 L nasal cannula with upper rise to the low 90s.  However, when her mentation worsened, she became hypoxic to the low 80s.  Patient was saturating at 80% when I placed her on a nonrebreather and repeated a  blood gas at that time.  I do suspect that again this is a venous sample but overall patient is getting slightly more acidotic with pH of 7.35, pCO2 64 and pO2 43.    Chart review shows that patient had recent hospitalization with similar characteristics approxi-1 month ago.  She was getting hypercapnic respiratory failure with somnolence and was subsequently intubated after being transferred to the ICU.  I do see that patient is on multiple sedative medications including BuSpar, clonidine and Klonopin.  I asked her specifically about opioids and she declines.  She has no pinpoint pupils and her pupils in fact are actually about 4 to 5 mm and responsive bilaterally.  Attempted Narcan at this time but the patient's IV had infiltrated.  New IV was placed by RN and given additional 2 mg Narcan without any change in mentation.    Patient was placed on BiPAP at this time and I also rechecked the ICU colleagues who are very familiar with the patient.  I was informed by the ICU colleagues that the patient usually has quick turnaround on BiPAP.  They would like us to keep the patient on BiPAP for at least 1 hour and repeat arterial blood gas to then make decision on whether to continue BiPAP versus intubation.    Given additional albuterol at this time as well from a COPD exacerbation point of view.  White blood cell count remains borderline at 11.9.  Baseline borderline anemia.  Renal function unremarkable with chronic bicarb elevation of approximate 33 today.  Delta troponins remain unremarkable.  No acute process noted in the chest x-ray.    EKG interpreted by myself shows a sinus rhythm with heart rate 65, ,  and QTc 470.  Shows left axis deviation.  Occasional PVC noted.  Poor R wave progression.  No STEMI criteria noted.    Subtle improvements in terms of mentation now that she's been on BiPAP for 90 mins. I repeated the ABG using US to ensure it was purely arterial sample. Does appears minimally  worsening pH and CO2 compared to gases from one month ago when she was deescalated off the ventilator. Will remain on bipap and admit to the ICU for the morning. Remains HDS.     Procedure  Critical Care    Performed by: Katelynn Ponce MD  Authorized by: Katelynn Ponce MD    Critical care provider statement:     Critical care time (minutes):  75    Critical care was necessary to treat or prevent imminent or life-threatening deterioration of the following conditions:  CNS failure or compromise and respiratory failure    Critical care was time spent personally by me on the following activities:  Ordering and review of laboratory studies, ordering and review of radiographic studies and discussions with consultants    Care discussed with: admitting provider         Katelynn Ponce MD  04/08/24 0621

## 2024-04-08 NOTE — H&P
[facility] Critical Care Medicine       Date:  4/8/2024  Patient:  Stephenie Mccray  YOB: 1980  MRN:  [id number]   Admit Date:  4/8/2024  ========================================================================================================    Chief Complaint   Patient presents with    Shortness of Breath     Pt states SOB started around 4pm last night. Pt states she wears O2 at home as needed, not wearing any upon arrival to ED.         History of Present Illness:  Stephenie Mccray is a 43 y.o. year old female patient with Past Medical History of  COPD (4L@home), DVT/PE (on Xarelto), schizophrenia, migraine headaches, HTN, HLD, current smoker, GERD, IDDM, and hypothyroidism. She is well known to this facility with frequent admissions for acute on chronic hypoxic/hypercapnic respiratory failure.  She presented to [facility] emergency department 4/8 with complaints of shortness of breath from home, stated her symptoms started approximately 4 PM 4/7.  States that she has been compliant with her home CPAP and inhalers.  She is a current every day smoker.    ED course  In the ED she was afebrile.  Heart rate 71.  Normotensive with /86, oxygen saturations low 80% on room air, placed on nonrebreather.  ABG was done which showed pH 7.35, pCO2 64, pO2 43, oxygen saturation 76%.  Patient then placed on NIMV with 100% FiO2.  Repeat ABG with pH 7.33, pCO2 66, pO2 82, oxygen saturation 93%.  Other laboratory workup showed slight leukocytosis with WBC 11.9, normal renal function, normal hepatic function.  Patient remains significantly lethargic and therefore was given 4 g intravenous Narcan without significant response.  She was also given 2x DuoNeb treatment, 125 mg Solu-Medrol.  She was admitted to the ICU for further care.      Interval ICU Events:  4/8: Admitted to the ICU for altered mental status, acute on chronic hypoxemic/hypercapnic respiratory failure.  Lethargic but awakens to follow commands and  "answer all questions appropriately.  On BiPAP 18/, 80% FiO2.    Medical History:  Past Medical History:   Diagnosis Date    Arthritis     COPD (chronic obstructive pulmonary disease) (CMS/[facility])     Diabetes mellitus (CMS/[facility])     History of transfusion     Hypothyroidism     CHARLIE (obstructive sleep apnea)      Past Surgical History:   Procedure Laterality Date     SECTION, LOW TRANSVERSE      EYE SURGERY      HERNIA REPAIR      TONSILLECTOMY      VENTRAL HERNIA REPAIR       Medications Prior to Admission   Medication Sig Dispense Refill Last Dose    alcohol swabs (Alcohol Prep Pads) pads, medicated Use 3 times daily prn 100 each 3     atorvastatin (Lipitor) 20 mg tablet Take 1 tablet (20 mg) by mouth once daily. Dr. Davis covering for Dr. Yuen 30 tablet 0     BD Ultra-Fine Mini Pen Needle 31 gauge x 3/16\" needle USE TO INJECT 4 TIMES DAILY AS DIRECTED 400 each 3     busPIRone (Buspar) 30 mg tablet Take 1 tablet (30 mg) by mouth 2 times a day.       clonazePAM (KlonoPIN) 1 mg tablet Take by mouth 2 times a day as needed.       cloNIDine (Catapres) 0.1 mg tablet Take 1 tablet (0.1 mg) by mouth 2 times a day.       escitalopram (Lexapro) 10 mg tablet Take 1 tablet (10 mg) by mouth once daily.       hydrOXYzine pamoate (Vistaril) 50 mg capsule Take 1 capsule (50 mg) by mouth 4 times a day as needed for anxiety.       insulin glargine (Lantus Solostar U-100 Insulin) 100 unit/mL (3 mL) pen Inject 10 Units under the skin once daily in the morning. Take as directed per insulin instructions. 3 mL 2     insulin lispro (HumaLOG) 100 unit/mL injection Inject 0.04 mL (4 Units) under the skin 3 times a day with meals. Plus sliding scale       ipratropium-albuteroL (Duo-Neb) 0.5-2.5 mg/3 mL nebulizer solution Take 3 mL by nebulization every 6 hours if needed for shortness of breath or wheezing. (Patient taking differently: Take 3 mL by nebulization every 4 hours. As needed) 180 mL 1     levothyroxine (Synthroid, Levoxyl) " 150 mcg tablet Take 1 tablet (150 mcg) by mouth once daily. 90 tablet 1     nicotine (Nicoderm CQ) 14 mg/24 hr patch Place 1 patch over 24 hours on the skin once daily for 14 doses. Do not start before February 21, 2024. 14 patch 0     nicotine (Nicoderm CQ) 21 mg/24 hr patch Place 1 patch over 24 hours on the skin once daily for 37 doses. Do not start before January 15, 2024. 30 patch 1     nicotine (Nicoderm CQ) 7 mg/24 hr patch Place 1 patch over 24 hours on the skin once daily for 14 doses. Do not start before March 6, 2024. (Patient not taking: Reported on 3/13/2024) 14 patch 0     nicotine polacrilex (Commit) 4 mg lozenge Dissolve 1 lozenge (4 mg) in the mouth every 2 hours if needed for smoking cessation. 100 lozenge 0     OneTouch Verio test strips strip        paliperidone (Invega) 3 mg 24 hr tablet Take 1 tablet (3 mg) by mouth once daily. Do not crush, chew, or split. Do not start before January 15, 2024. 30 tablet 0     pantoprazole (ProtoNix) 40 mg EC tablet Take 1 tablet (40 mg) by mouth once daily. 90 tablet 1     prazosin (Minipress) 5 mg capsule Take 1 capsule (5 mg) by mouth once daily at bedtime.       tiotropium (Spiriva Respimat) 2.5 mcg/actuation inhaler Inhale 2 puffs once daily. Do not start before March 9, 2024. 8 g 11     Xarelto 10 mg tablet Take 1 tablet (10 mg) by mouth once daily.        Fluticasone furoate-vilanterol, Fluticasone-umeclidin-vilanter, Sumatriptan, Vortioxetine, and Levofloxacin  Social History     Tobacco Use    Smoking status: Every Day     Packs/day: 1     Types: Cigarettes     Passive exposure: Never    Smokeless tobacco: Never   Substance Use Topics    Alcohol use: Never    Drug use: Never     Family History   Problem Relation Name Age of Onset    Fibromyalgia Father      Hypertension Brother      Thyroid disease Maternal Grandmother      Thyroid disease Paternal Grandmother      Lung cancer Paternal Grandfather      Coronary artery disease Other Grandmother         Hospital Medications:           Current Facility-Administered Medications:     atorvastatin (Lipitor) tablet 20 mg, 20 mg, oral, Nightly, Yoana E Pentito, APRN-CNP    [Held by provider] busPIRone (Buspar) tablet 30 mg, 30 mg, oral, BID, Yoana E Pentito, APRN-CNP    [Held by provider] clonazePAM (KlonoPIN) tablet 1 mg, 1 mg, oral, BID PRN, Yoana E Pentito, APRN-CNP    cloNIDine (Catapres) tablet 0.1 mg, 0.1 mg, oral, BID, Yoana E Pentito, APRN-CNP    dextrose 50 % injection 12.5 g, 12.5 g, intravenous, q15 min PRN, Yoana E Pentito, APRN-CNP    dextrose 50 % injection 25 g, 25 g, intravenous, q15 min PRN, Yoana E Pentito, APRN-CNP    escitalopram (Lexapro) tablet 10 mg, 10 mg, oral, Daily, Yoana E Jenniferito, APRN-CNP    glucagon (Glucagen) injection 1 mg, 1 mg, intramuscular, q15 min PRN, Yoana E Jenniferito, APRN-CNP    insulin glargine (Lantus) injection 10 Units, 10 Units, subcutaneous, q24h, Yoana E Jenniferito, APRN-CNP    insulin lispro (HumaLOG) injection 0-10 Units, 0-10 Units, subcutaneous, TID with meals, Yoana E Jenniferito, APRN-CNP    insulin lispro (HumaLOG) injection 4 Units, 4 Units, subcutaneous, TID with meals, Yoana Rodriguezito, APRN-CNP    ipratropium-albuteroL (Duo-Neb) 0.5-2.5 mg/3 mL nebulizer solution 3 mL, 3 mL, nebulization, q6h, Yoana E Pentito, APRN-CNP, 3 mL at 04/08/24 0728    ipratropium-albuteroL (Duo-Neb) 0.5-2.5 mg/3 mL nebulizer solution 3 mL, 3 mL, nebulization, q4h PRN, Yoana E Jenniferito, APRN-CNP    levothyroxine (Synthroid, Levoxyl) tablet 150 mcg, 150 mcg, oral, Daily, Yoana E Jenniferito, APRN-CNP    oxygen (O2) therapy, , inhalation, Continuous - Inhalation, Katelynn Ponce MD, 80 percent at 04/08/24 0728    pantoprazole (ProtoNix) EC tablet 40 mg, 40 mg, oral, Daily, BARTOLOME Loe-CNP    prazosin (Minipress) capsule 5 mg, 5 mg, oral, Nightly, BARTOLOME Leo-CNP    risperiDONE (RisperDAL) tablet 0.5 mg, 0.5 mg, oral, BID, BARTOLOME Leo-ASIYA    rivaroxaban  "(Xarelto) tablet 10 mg, 10 mg, oral, Daily, BARTOLOME Leo-CNP    tiotropium (Spiriva Respimat) 2.5 mcg/actuation inhaler 2 puff, 2 puff, inhalation, Daily, BARTOLOME Leo-CNP    Review of Systems:  14 point review of systems was completed and negative except for those specially mention in my HPI    Physical Exam:    Heart Rate:  [59-76]   Temp:  [35.8 °C (96.4 °F)-36.6 °C (97.9 °F)]   Resp:  [16-24]   BP: (108-155)/(69-88)   Height:  [157.5 cm (5' 2\")-157.5 cm (5' 2.01\")]   Weight:  [76.7 kg (169 lb 1.5 oz)-77.1 kg (170 lb)]   SpO2:  [87 %-97 %]     Physical Exam  Constitutional:       Appearance: She is obese.      Comments: Lethargic   HENT:      Head: Normocephalic and atraumatic.      Mouth/Throat:      Mouth: Mucous membranes are moist.      Pharynx: Oropharynx is clear. No oropharyngeal exudate.   Eyes:      General: No scleral icterus.     Extraocular Movements: Extraocular movements intact.      Pupils: Pupils are equal, round, and reactive to light.   Cardiovascular:      Rate and Rhythm: Normal rate and regular rhythm.      Pulses: Normal pulses.      Heart sounds: Normal heart sounds. No murmur heard.     No friction rub. No gallop.   Pulmonary:      Effort: Pulmonary effort is normal. No respiratory distress.      Breath sounds: Normal breath sounds. No stridor. No wheezing, rhonchi or rales.   Chest:      Chest wall: No tenderness.   Abdominal:      General: There is no distension.      Palpations: Abdomen is soft.   Musculoskeletal:         General: Normal range of motion.      Cervical back: Normal range of motion and neck supple.      Right lower leg: No edema.      Left lower leg: No edema.   Skin:     General: Skin is warm and dry.      Capillary Refill: Capillary refill takes less than 2 seconds.      Coloration: Skin is pale.   Neurological:      Mental Status: She is oriented to person, place, and time.      Comments: Lethargic, arouses to repeated stimulation to follow commands " and answer questions appropriately         Objective:    I have reviewed all medications, laboratory results, and imaging pertinent for today's encounter.    FiO2 (%):  [80 %-100 %] 80 %  S RR:  [16-18] 18    No intake or output data in the 24 hours ending 04/08/24 0817      Assessment/Plan:    I am currently managing this critically ill patient for the following problems:    Neuro/Psych/Pain Ctrl/Sedation:  #Metabolic Encephalopathy  #Hx Schizophrenia  #Hx Migraine headaches  Metabolic encephalopathy likely secondary to polypharmacy from home antipsychotic and benzodiazepine use, less likely CO2 narcosis suspicions hypercapnia is near baseline  -- Neuro checks, CAM assessment, delirium precautions  -- Hold home BuSpar, Lexapro, prazosin, or risperidone, clonazepam until mental status improves  -- As with prior admissions, patient will greatly benefit from de-escalation of antipsychotics and weaning from benzodiazepine use, however has remained reluctant for medication changes and has refused weaning from benzodiazepines     Respiratory/ENT:  #Acute on chronic Hypoxic/Hypercapnic Respiratory Failure (Home O2 4L)  #COPD Without Acute Exacerbation  #CHARLIE  #Nicotine dependence   Patient with significant hypoxia on arrival -has received workup in the past, CT PE study was  negative, no signs of interstitial lung disease or intrapulmonary shunt.  Echocardiogram with bubble study January 2024 without right to left intracardiac shunt.  Likely chronic hypoxia due to ongoing smoking as well as noncompliance with home O2  -- No signs of current COPD exacerbation therefore no current need for steroid administration  -- DuoNebs every 6 hours  -- Can attempt gentle diuresis with 40 mg Lasix as signs of enlarged cardiac silhouette, goal net -1 L  -- Smoking cessation education  -- Currently on BiPAP 18/6, 80%, wean NIMV and supplemental O2 for SpO2 goal >88%  -- Encourage compliance with home O2 upon discharge. Continue to follow  with pulmonology (Dr. Villalba)     Cardiovascular:  #Hx HTN, HLD, Remote PE  -- Continue home atorvastatin, clonidine  --Continue home Xarelto for therapeutic anticoagulation for PE  -- Keep MAP > 60     GI:  #Hx GERD  -- Oral PPI  -- Diabetic Diet when off BiPAP     Renal/Volume Status (Intra & Extravascular):  No active issues  -- Diuresis with 40 mg Lasix today, goal net -1 L  -- Strict I/O  -- Daily BMP and electrolyte ablation, keep K >4, Mg >2    Endocrine  #Hx IDDM - A1c 11/2023 of 7.3  #Hx Hypothyroid  -- Continue home Lantus 10 units nightly, 4 units lispro with each meal, mild SSI every 4 hours  -- Continue home Synthroid     Infectious Disease:  No signs of systemic infection, monitor off antibiotics     Heme/Onc:  #Hx DVT/PE  -- Continue home Xarelto     OBGYN/MSK:  -- PT/OT eval when able     Ethics/Code Status:  Full code    :  DVT Prophylaxis: Xarelto  GI Prophylaxis: Home PPI  Bowel Regimen: PRN  Diet: NPO  CVC: No  [place]: No  Tran: No  Restraints: No  Dispo: ICU    Critical Care Time:  40 minutes spent in preparing to see patient (I.e. review of medical records), evaluation of diagnostics (I.e. labs, imaging, etc.), documentation, discussing plan of care with patient/ family/ caregiver, and/ or coordination of care with multidisciplinary team. Time does not include completion of procedure time.       Jose Amaya, APRN-CNP  Pulmonary & Critical Care Medicine  [facility]

## 2024-04-09 ENCOUNTER — DOCUMENTATION (OUTPATIENT)
Dept: PRIMARY CARE | Facility: CLINIC | Age: 44
End: 2024-04-09
Payer: COMMERCIAL

## 2024-04-09 NOTE — PROGRESS NOTES
Discharge Facility: [facility]  Discharge Diagnosis: Acute on chronic respiratory failure with hypercapnia   Admission Date: 4/8/2024  Discharge Date: 4/8/2024    PCP Appointment Date: 4/10/2024  Specialist Appointment Date: TBD with pulmonology  Hospital Encounter and Summary: Linked  This patient has a follow up scheduled with PCP within 2 business days of DC on 4/10/2024. This visit qualifies for TCM billing.

## 2024-04-10 ENCOUNTER — OFFICE VISIT (OUTPATIENT)
Dept: PRIMARY CARE | Facility: CLINIC | Age: 44
End: 2024-04-10
Payer: COMMERCIAL

## 2024-04-10 ENCOUNTER — LAB (OUTPATIENT)
Dept: LAB | Facility: LAB | Age: 44
End: 2024-04-10
Payer: COMMERCIAL

## 2024-04-10 VITALS
BODY MASS INDEX: 30.9 KG/M2 | WEIGHT: 169 LBS | SYSTOLIC BLOOD PRESSURE: 76 MMHG | DIASTOLIC BLOOD PRESSURE: 56 MMHG | RESPIRATION RATE: 16 BRPM | TEMPERATURE: 97.9 F | HEART RATE: 92 BPM

## 2024-04-10 DIAGNOSIS — E66.09 CLASS 1 OBESITY DUE TO EXCESS CALORIES WITH SERIOUS COMORBIDITY AND BODY MASS INDEX (BMI) OF 30.0 TO 30.9 IN ADULT: ICD-10-CM

## 2024-04-10 DIAGNOSIS — I95.89 HYPOTENSION DUE TO HYPOVOLEMIA: ICD-10-CM

## 2024-04-10 DIAGNOSIS — I10 PRIMARY HYPERTENSION: ICD-10-CM

## 2024-04-10 DIAGNOSIS — E78.2 MIXED HYPERLIPIDEMIA: ICD-10-CM

## 2024-04-10 DIAGNOSIS — E11.9 TYPE 2 DIABETES MELLITUS WITHOUT COMPLICATION, WITH LONG-TERM CURRENT USE OF INSULIN (MULTI): ICD-10-CM

## 2024-04-10 DIAGNOSIS — Z09 HOSPITAL DISCHARGE FOLLOW-UP: Primary | ICD-10-CM

## 2024-04-10 DIAGNOSIS — Z79.4 TYPE 2 DIABETES MELLITUS WITHOUT COMPLICATION, WITH LONG-TERM CURRENT USE OF INSULIN (MULTI): ICD-10-CM

## 2024-04-10 DIAGNOSIS — J96.22 ACUTE ON CHRONIC RESPIRATORY FAILURE WITH HYPERCAPNIA (MULTI): ICD-10-CM

## 2024-04-10 DIAGNOSIS — E86.1 HYPOTENSION DUE TO HYPOVOLEMIA: ICD-10-CM

## 2024-04-10 DIAGNOSIS — R71.8 ELEVATED MCV: ICD-10-CM

## 2024-04-10 DIAGNOSIS — E03.9 ACQUIRED HYPOTHYROIDISM: ICD-10-CM

## 2024-04-10 LAB
ALBUMIN SERPL BCP-MCNC: 3.8 G/DL (ref 3.4–5)
ALP SERPL-CCNC: 66 U/L (ref 33–110)
ALT SERPL W P-5'-P-CCNC: 14 U/L (ref 7–45)
ANION GAP SERPL CALC-SCNC: 9 MMOL/L (ref 10–20)
AST SERPL W P-5'-P-CCNC: 8 U/L (ref 9–39)
BASOPHILS # BLD AUTO: 0.1 X10*3/UL (ref 0–0.1)
BASOPHILS NFR BLD AUTO: 0.9 %
BILIRUB SERPL-MCNC: 0.4 MG/DL (ref 0–1.2)
BUN SERPL-MCNC: 37 MG/DL (ref 6–23)
CALCIUM SERPL-MCNC: 9 MG/DL (ref 8.6–10.3)
CHLORIDE SERPL-SCNC: 101 MMOL/L (ref 98–107)
CO2 SERPL-SCNC: 34 MMOL/L (ref 21–32)
CREAT SERPL-MCNC: 0.98 MG/DL (ref 0.5–1.05)
EGFRCR SERPLBLD CKD-EPI 2021: 74 ML/MIN/1.73M*2
EOSINOPHIL # BLD AUTO: 0.02 X10*3/UL (ref 0–0.7)
EOSINOPHIL NFR BLD AUTO: 0.2 %
ERYTHROCYTE [DISTWIDTH] IN BLOOD BY AUTOMATED COUNT: 15.6 % (ref 11.5–14.5)
EST. AVERAGE GLUCOSE BLD GHB EST-MCNC: 157 MG/DL
FERRITIN SERPL-MCNC: 19 NG/ML (ref 8–150)
FOLATE SERPL-MCNC: 7.1 NG/ML
GLUCOSE SERPL-MCNC: 108 MG/DL (ref 74–99)
HBA1C MFR BLD: 7.1 %
HCT VFR BLD AUTO: 41.4 % (ref 36–46)
HGB BLD-MCNC: 12.3 G/DL (ref 12–16)
HGB RETIC QN: 31 PG (ref 28–38)
IMM GRANULOCYTES # BLD AUTO: 0.48 X10*3/UL (ref 0–0.7)
IMM GRANULOCYTES NFR BLD AUTO: 4.1 % (ref 0–0.9)
IMMATURE RETIC FRACTION: 27 %
IRON SATN MFR SERPL: 13 % (ref 25–45)
IRON SERPL-MCNC: 44 UG/DL (ref 35–150)
LYMPHOCYTES # BLD AUTO: 3.69 X10*3/UL (ref 1.2–4.8)
LYMPHOCYTES NFR BLD AUTO: 31.9 %
MCH RBC QN AUTO: 31 PG (ref 26–34)
MCHC RBC AUTO-ENTMCNC: 29.7 G/DL (ref 32–36)
MCV RBC AUTO: 104 FL (ref 80–100)
MONOCYTES # BLD AUTO: 0.88 X10*3/UL (ref 0.1–1)
MONOCYTES NFR BLD AUTO: 7.6 %
NEUTROPHILS # BLD AUTO: 6.4 X10*3/UL (ref 1.2–7.7)
NEUTROPHILS NFR BLD AUTO: 55.3 %
NRBC BLD-RTO: 0.2 /100 WBCS (ref 0–0)
PLATELET # BLD AUTO: 162 X10*3/UL (ref 150–450)
POTASSIUM SERPL-SCNC: 4.4 MMOL/L (ref 3.5–5.3)
PROT SERPL-MCNC: 6 G/DL (ref 6.4–8.2)
RBC # BLD AUTO: 3.97 X10*6/UL (ref 4–5.2)
RETICS #: 0.13 X10*6/UL (ref 0.02–0.08)
RETICS/RBC NFR AUTO: 3.2 % (ref 0.5–2)
SODIUM SERPL-SCNC: 140 MMOL/L (ref 136–145)
TIBC SERPL-MCNC: 351 UG/DL (ref 240–445)
TSH SERPL-ACNC: 2.49 MIU/L (ref 0.44–3.98)
UIBC SERPL-MCNC: 307 UG/DL (ref 110–370)
VIT B12 SERPL-MCNC: 275 PG/ML (ref 211–911)
WBC # BLD AUTO: 11.6 X10*3/UL (ref 4.4–11.3)

## 2024-04-10 PROCEDURE — 3078F DIAST BP <80 MM HG: CPT | Performed by: FAMILY MEDICINE

## 2024-04-10 PROCEDURE — 82746 ASSAY OF FOLIC ACID SERUM: CPT

## 2024-04-10 PROCEDURE — 83550 IRON BINDING TEST: CPT

## 2024-04-10 PROCEDURE — 3008F BODY MASS INDEX DOCD: CPT | Performed by: FAMILY MEDICINE

## 2024-04-10 PROCEDURE — 99496 TRANSJ CARE MGMT HIGH F2F 7D: CPT | Performed by: FAMILY MEDICINE

## 2024-04-10 PROCEDURE — 84443 ASSAY THYROID STIM HORMONE: CPT

## 2024-04-10 PROCEDURE — 3074F SYST BP LT 130 MM HG: CPT | Performed by: FAMILY MEDICINE

## 2024-04-10 PROCEDURE — 82728 ASSAY OF FERRITIN: CPT

## 2024-04-10 PROCEDURE — 80053 COMPREHEN METABOLIC PANEL: CPT

## 2024-04-10 PROCEDURE — 3051F HG A1C>EQUAL 7.0%<8.0%: CPT | Performed by: FAMILY MEDICINE

## 2024-04-10 PROCEDURE — 85045 AUTOMATED RETICULOCYTE COUNT: CPT

## 2024-04-10 PROCEDURE — 85025 COMPLETE CBC W/AUTO DIFF WBC: CPT

## 2024-04-10 PROCEDURE — 84466 ASSAY OF TRANSFERRIN: CPT

## 2024-04-10 PROCEDURE — 4004F PT TOBACCO SCREEN RCVD TLK: CPT | Performed by: FAMILY MEDICINE

## 2024-04-10 PROCEDURE — 36415 COLL VENOUS BLD VENIPUNCTURE: CPT

## 2024-04-10 PROCEDURE — 83540 ASSAY OF IRON: CPT

## 2024-04-10 PROCEDURE — 83036 HEMOGLOBIN GLYCOSYLATED A1C: CPT

## 2024-04-10 PROCEDURE — 3050F LDL-C >= 130 MG/DL: CPT | Performed by: FAMILY MEDICINE

## 2024-04-10 PROCEDURE — 82607 VITAMIN B-12: CPT

## 2024-04-10 NOTE — PATIENT INSTRUCTIONS
Patient will continue on a diabetic, low-cholesterol diet and weight reduction. Exercise as tolerated. She will continue medications as prescribed. Follow-up on 5/13/2024, otherwise as needed.      Will obtain CBC, CMP, A1c, TSH today. Will call patient with results when available.

## 2024-04-10 NOTE — PROGRESS NOTES
"Subjective   Patient ID: Stephenie Mccray is a 43 y.o. female who presents for Hospital Follow-up. I last saw the patient 2/15/2024. Patient's  is with her today.     HPI   Stephenie Mccray is a 43 y.o. female presenting today for follow-up after being discharged from the hospital 2 days ago. The main problem requiring admission was Acute on chronic respiratory failure with hypercapnia. The discharge summary and/or Transitional Care Management documentation was reviewed. Medication reconciliation was performed as indicated via the \"[place] as Reviewed\" timestamp.     Pt went to hospital 4/8/2024 and was diagnosed with Acute on chronic respiratory failure with hypercapnia.     She has no medical concerns for rooming MA. She was up at 5:30 am this morning and is tired.    Review of Systems  Except positives as noted in the CC & HPI      Constitutional: Denies fevers, chills, night sweats, fatigue, weight changes, change in appetite    Eyes: Denies blurry vision, double vision    ENT: Denies otalgia, trouble hearing, tinnitus, vertigo, nasal congestion, rhinorrhea, sore throat    Neck: Denies swelling, masses    Cardiovascular: Denies chest pain, palpitations, edema, orthopnea, syncope    Respiratory: Denies dyspnea, cough, wheezing, postural nocturnal dyspnea    Gastrointestinal: Denies abdominal pain, nausea, vomiting, diarrhea, constipation, melena, hematochezia    Genitourinary: Denies dysuria, hematuria, frequency, urgency    Musculoskeletal: Denies back pain, neck pain, arthralgias, myalgias    Integumentary: Denies skin lesions, rashes, masses    Neurological: Denies dizziness, headaches, confusion, limb weakness, paresthesias, syncope, convulsions    Psychiatric: Denies depression, anxiety, homicidal ideations, suicidal ideations, sleep disturbances    Endocrine: Denies polyphagia, polydipsia, polyuria, weakness, hair thinning, heat intolerance, cold intolerance, weight changes    Heme/Lymph: Denies " easy bruising, easy bleeding, swollen glands    Objective   BP 76/56 (BP Location: Right arm)   Pulse 92   Temp 36.6 °C (97.9 °F)   Resp 16   Wt 76.7 kg (169 lb)   BMI 30.90 kg/m²     Physical Exam  62/38 on recheck of BP in the right arm. Patient was given 3 cups of water in the office without significant improvement in BP.     Gen. Appearance - well-developed, well-nourished, 43 y.o., White female in no acute distress. Patient appears very lethargic today.  She does tend to fall asleep easily in the office today.    Skin - warm, pink and dry without rash or concerning lesions.    Mental Status - alert and oriented times 3. Normal mood and affect appropriate to mood.     Neck - supple without lymphadenopathy. Carotid pulses are normal without bruits. Thyroid is normal in midline without nodules.    Chest - lungs are clear to auscultation without rales, rhonchi. Diminished breath sounds at the bases bilaterally. Scattered expiratory wheezes bilaterally.     Heart - regular, rate, and rhythm without murmurs, rubs or gallops.     Abdomen - soft, obese, protuberant, nontender, nondistended. No masses, hepatomegaly or splenomegaly is noted. No rebound, rigidity or guarding is noted. Bowel sounds are normoactive.     Extremities - no cyanosis, clubbing. Trace edema above the sock line, bilaterally. Pedal pulses are 2+ normal at the dorsalis pedis and posterior tibial pulses bilaterally.     Neurological - cranial nerves II through XII are grossly intact. Motor strength 5/5 at all fours.     Assessment/Plan   1. Hospital discharge follow-up        2. Acute on chronic respiratory failure with hypercapnia (CMS/HCC)        3. Hypotension due to hypovolemia        4. Type 2 diabetes mellitus without complication, with long-term current use of insulin (CMS/HCC)  Hemoglobin A1C      5. Primary hypertension  CBC and Auto Differential    Comprehensive Metabolic Panel      6. Mixed hyperlipidemia        7. Acquired  hypothyroidism  TSH with reflex to Free T4 if abnormal      8. Class 1 obesity due to excess calories with serious comorbidity and body mass index (BMI) of 30.0 to 30.9 in adult        Patient will continue on a diabetic, low-cholesterol diet and weight reduction. Exercise as tolerated. She will continue medications as prescribed. Follow-up on 5/13/2024, otherwise as needed.      Will obtain CBC, CMP, A1c, TSH today. Will call patient with results when available.     Patient strongly encouraged to go to the ER for further evaluation and IV fluids to help with her BP. Discussed possibilities of passing out and death if not treated aggressively. Patient declines at this time and wishes to go home and drink more fluids.         Scribe Attestation  By signing my name below, IRama Scribe   attest that this documentation has been prepared under the direction and in the presence of Delta Yuen MD.

## 2024-04-11 LAB — TRANSFERRIN SERPL-MCNC: 270 MG/DL (ref 200–360)

## 2024-04-12 ENCOUNTER — PATIENT OUTREACH (OUTPATIENT)
Dept: PRIMARY CARE | Facility: CLINIC | Age: 44
End: 2024-04-12
Payer: COMMERCIAL

## 2024-04-17 ENCOUNTER — HOSPITAL ENCOUNTER (OUTPATIENT)
Facility: HOSPITAL | Age: 44
Setting detail: OUTPATIENT SURGERY
End: 2024-04-17
Attending: OBSTETRICS & GYNECOLOGY | Admitting: OBSTETRICS & GYNECOLOGY
Payer: COMMERCIAL

## 2024-04-17 ENCOUNTER — OFFICE VISIT (OUTPATIENT)
Dept: OBSTETRICS AND GYNECOLOGY | Facility: CLINIC | Age: 44
End: 2024-04-17
Payer: COMMERCIAL

## 2024-04-17 ENCOUNTER — PREP FOR PROCEDURE (OUTPATIENT)
Dept: OBSTETRICS AND GYNECOLOGY | Facility: CLINIC | Age: 44
End: 2024-04-17

## 2024-04-17 VITALS
HEIGHT: 62 IN | WEIGHT: 163.9 LBS | BODY MASS INDEX: 30.16 KG/M2 | DIASTOLIC BLOOD PRESSURE: 70 MMHG | SYSTOLIC BLOOD PRESSURE: 110 MMHG

## 2024-04-17 DIAGNOSIS — Z01.818 PREOP TESTING: ICD-10-CM

## 2024-04-17 DIAGNOSIS — Z98.890 HISTORY OF HERNIA REPAIR: ICD-10-CM

## 2024-04-17 DIAGNOSIS — Z30.2 REQUEST FOR STERILIZATION: Primary | ICD-10-CM

## 2024-04-17 DIAGNOSIS — F17.200 SMOKER: ICD-10-CM

## 2024-04-17 DIAGNOSIS — Z30.2 STERILIZATION: Primary | ICD-10-CM

## 2024-04-17 DIAGNOSIS — Z87.19 HISTORY OF HERNIA REPAIR: ICD-10-CM

## 2024-04-17 DIAGNOSIS — Z30.012 COUNSELING FOR BIRTH CONTROL, EMERGENCY CONTRACEPTIVE PRESCRIPTION: ICD-10-CM

## 2024-04-17 DIAGNOSIS — E66.3 OVERWEIGHT (BMI 25.0-29.9): ICD-10-CM

## 2024-04-17 PROBLEM — D25.9 UTERINE FIBROID: Status: RESOLVED | Noted: 2023-03-22 | Resolved: 2024-04-17

## 2024-04-17 PROBLEM — K43.9 VENTRAL HERNIA WITHOUT OBSTRUCTION OR GANGRENE: Status: RESOLVED | Noted: 2023-03-22 | Resolved: 2024-04-17

## 2024-04-17 PROCEDURE — 3074F SYST BP LT 130 MM HG: CPT | Performed by: OBSTETRICS & GYNECOLOGY

## 2024-04-17 PROCEDURE — 3008F BODY MASS INDEX DOCD: CPT | Performed by: OBSTETRICS & GYNECOLOGY

## 2024-04-17 PROCEDURE — 99214 OFFICE O/P EST MOD 30 MIN: CPT | Performed by: OBSTETRICS & GYNECOLOGY

## 2024-04-17 PROCEDURE — 4004F PT TOBACCO SCREEN RCVD TLK: CPT | Performed by: OBSTETRICS & GYNECOLOGY

## 2024-04-17 PROCEDURE — 3051F HG A1C>EQUAL 7.0%<8.0%: CPT | Performed by: OBSTETRICS & GYNECOLOGY

## 2024-04-17 PROCEDURE — 3078F DIAST BP <80 MM HG: CPT | Performed by: OBSTETRICS & GYNECOLOGY

## 2024-04-17 PROCEDURE — 3050F LDL-C >= 130 MG/DL: CPT | Performed by: OBSTETRICS & GYNECOLOGY

## 2024-04-17 RX ORDER — SODIUM CHLORIDE, SODIUM LACTATE, POTASSIUM CHLORIDE, CALCIUM CHLORIDE 600; 310; 30; 20 MG/100ML; MG/100ML; MG/100ML; MG/100ML
100 INJECTION, SOLUTION INTRAVENOUS CONTINUOUS
Status: CANCELLED | OUTPATIENT
Start: 2024-04-17

## 2024-04-17 RX ORDER — ACETAMINOPHEN 325 MG/1
975 TABLET ORAL ONCE
Status: CANCELLED | OUTPATIENT
Start: 2024-04-17 | End: 2024-04-17

## 2024-04-17 RX ORDER — GABAPENTIN 600 MG/1
600 TABLET ORAL ONCE
Status: CANCELLED | OUTPATIENT
Start: 2024-04-17 | End: 2024-04-17

## 2024-04-17 RX ORDER — CELECOXIB 400 MG/1
400 CAPSULE ORAL ONCE
Status: CANCELLED | OUTPATIENT
Start: 2024-04-17 | End: 2024-04-17

## 2024-04-17 RX ORDER — TRANEXAMIC ACID 650 MG/1
1300 TABLET ORAL ONCE
Status: CANCELLED | OUTPATIENT
Start: 2024-04-17 | End: 2024-04-17

## 2024-04-17 NOTE — PROGRESS NOTES
"GYNECOLOGY PROGRESS NOTE          CC:     Chief Complaint   Patient presents with    Consult     Est patient - would like to discuss Tubal  G 2 P1 -  delivery  BC - condoms  Chaperone partner in room with patient.        HPI:  Stephenie Mccray is scheduled today for office visit for sterilization consultation.     Childbearing completed.  No GYN c/o today.  Patient 100% certain about decision for sterilization.  No issues previously with surgery or anesthesia.  NOT breastfeeding.  Current BC is condoms.    Hx of VTE and cannot take estrogen-based BC.  Type 2 DM and on insulin.  Smoker.  Prior surgeries include 1  along with hernia repairs for umbilical and ventral (above umbilicus).        ROS:  GEN - no fevers or chills  RESP - no SOB or cough  URO - normal voids  GI - normal BMs  GYN - see HPI  PSYCH - no issues      HISTORY:  Past Medical History:  No date: Arthritis  No date: COPD (chronic obstructive pulmonary disease) (Multi)  No date: Diabetes mellitus (Multi)      Comment:  type 2 IDDM  No date: History of transfusion  No date: History of venous thromboembolism  No date: Hypothyroidism  No date: CHARLIE (obstructive sleep apnea)   Past Surgical History:   Procedure Laterality Date     SECTION, LOW TRANSVERSE      x 1    EYE SURGERY      TONSILLECTOMY      UMBILICAL HERNIA REPAIR      VENTRAL HERNIA REPAIR       Social History     Tobacco Use    Smoking status: Every Day     Current packs/day: 1.00     Types: Cigarettes     Passive exposure: Never    Smokeless tobacco: Never   Substance Use Topics    Alcohol use: Never    Drug use: Never          PHYSICAL EXAM:  /70 (BP Location: Left arm, Patient Position: Sitting)   Ht 1.575 m (5' 2.01\")   Wt 74.3 kg (163 lb 14.4 oz)   BMI 29.97 kg/m²    GEN:  A&O, NAD  ABD:  + hernia repair scars (horizontal at base of umbilicus, midline several cm long starting ~3 cm above superior edge of umbilicus)  PSYCH:  normal affect, " non-anxious      IMPRESSION/PLAN:    Problem List Items Addressed This Visit    None  Visit Diagnoses       Request for sterilization    -  Primary    Overweight (BMI 25.0-29.9)        History of hernia repair        Smoker        Counseling for birth control, emergency contraceptive prescription              Sterilization consultation:  All alternatives to sterilization reviewed, including hormonal birth control and LARCs and vasectomy. Patient 100% certain about her decision to proceed. Surgical options for sterilization reviewed--including Filshie clips, bilateral partial salpingectomy, and complete salpingectomy. Decrease in future ovarian cancer risk with complete salpingectomy reviewed with patient, patient elects to proceed with this surgical option.  Reversal options reviewed (and usually self-pay, none and need for IVF if complete salpingectomy).  [facility] sterilization papers n/a as does not have any Medicaid-based insurance.   Surgical consent signed today, surgery risks reviewed (bleeding, infection, anesthesia complications, sterilization regret, increased ectopic pregnancy risk, injury to other tissues and organs or vessels, adhesions, need for re-operation).  Preoperative and postoperative ERAS protocol and Rx reviewed with the patient and use/AE reviewed.  Surgery comorbidities include overweight, IDDM (last A1c was 7.1% this month), Xarelto use for Hx of DVT/PE, HTN, COPD, smoking, and prior abdominal surgeries.  Preop labs ordered.  COVID and surgery counseling done.  Would accept a blood transfusion.  Instructed patient to contact ENDO regarding recommendations for insulin dosing prior to surgery, needs to take inhalers and beta-blocker the day of surgery, needs to hold blood thinners for 2 days pre-op and 1 day postop.    OTC Plan B use for condom failure discussed, mechanism of action, efficacy, and use/AE reviewed, use < 72 hours after unprotected sex for most effectiveness.      Jesse Jones,  DO

## 2024-04-22 PROBLEM — Z30.2 STERILIZATION: Status: ACTIVE | Noted: 2024-04-17

## 2024-05-13 ENCOUNTER — LAB (OUTPATIENT)
Dept: LAB | Facility: HOSPITAL | Age: 44
End: 2024-05-13
Payer: COMMERCIAL

## 2024-05-13 DIAGNOSIS — Z30.2 STERILIZATION: ICD-10-CM

## 2024-05-13 DIAGNOSIS — Z01.818 PREOP TESTING: ICD-10-CM

## 2024-05-13 LAB
ABO GROUP (TYPE) IN BLOOD: NORMAL
ANION GAP SERPL CALC-SCNC: 10 MMOL/L (ref 10–20)
ANTIBODY SCREEN: NORMAL
BUN SERPL-MCNC: 15 MG/DL (ref 6–23)
CALCIUM SERPL-MCNC: 8.9 MG/DL (ref 8.6–10.3)
CHLORIDE SERPL-SCNC: 108 MMOL/L (ref 98–107)
CO2 SERPL-SCNC: 32 MMOL/L (ref 21–32)
CREAT SERPL-MCNC: 0.8 MG/DL (ref 0.5–1.05)
EGFRCR SERPLBLD CKD-EPI 2021: >90 ML/MIN/1.73M*2
ERYTHROCYTE [DISTWIDTH] IN BLOOD BY AUTOMATED COUNT: 15.8 % (ref 11.5–14.5)
GLUCOSE SERPL-MCNC: 153 MG/DL (ref 74–99)
HCT VFR BLD AUTO: 41.6 % (ref 36–46)
HGB BLD-MCNC: 12.4 G/DL (ref 12–16)
MCH RBC QN AUTO: 30.3 PG (ref 26–34)
MCHC RBC AUTO-ENTMCNC: 29.8 G/DL (ref 32–36)
MCV RBC AUTO: 102 FL (ref 80–100)
NRBC BLD-RTO: 0.4 /100 WBCS (ref 0–0)
PLATELET # BLD AUTO: 182 X10*3/UL (ref 150–450)
POTASSIUM SERPL-SCNC: 4.2 MMOL/L (ref 3.5–5.3)
RBC # BLD AUTO: 4.09 X10*6/UL (ref 4–5.2)
RH FACTOR (ANTIGEN D): NORMAL
SODIUM SERPL-SCNC: 146 MMOL/L (ref 136–145)
WBC # BLD AUTO: 7.9 X10*3/UL (ref 4.4–11.3)

## 2024-05-13 PROCEDURE — 86901 BLOOD TYPING SEROLOGIC RH(D): CPT

## 2024-05-13 PROCEDURE — 85027 COMPLETE CBC AUTOMATED: CPT

## 2024-05-13 PROCEDURE — 36415 COLL VENOUS BLD VENIPUNCTURE: CPT

## 2024-05-13 PROCEDURE — 80048 BASIC METABOLIC PNL TOTAL CA: CPT

## 2024-05-15 ENCOUNTER — HOSPITAL ENCOUNTER (INPATIENT)
Facility: HOSPITAL | Age: 44
LOS: 3 days | Discharge: HOME | DRG: 208 | End: 2024-05-19
Attending: STUDENT IN AN ORGANIZED HEALTH CARE EDUCATION/TRAINING PROGRAM | Admitting: STUDENT IN AN ORGANIZED HEALTH CARE EDUCATION/TRAINING PROGRAM
Payer: COMMERCIAL

## 2024-05-15 ENCOUNTER — APPOINTMENT (OUTPATIENT)
Dept: RADIOLOGY | Facility: HOSPITAL | Age: 44
DRG: 208 | End: 2024-05-15
Payer: COMMERCIAL

## 2024-05-15 ENCOUNTER — APPOINTMENT (OUTPATIENT)
Dept: CARDIOLOGY | Facility: HOSPITAL | Age: 44
DRG: 208 | End: 2024-05-15
Payer: COMMERCIAL

## 2024-05-15 DIAGNOSIS — R41.82 ALTERED MENTAL STATUS, UNSPECIFIED ALTERED MENTAL STATUS TYPE: ICD-10-CM

## 2024-05-15 DIAGNOSIS — J96.02 ACUTE RESPIRATORY FAILURE WITH HYPERCAPNIA (MULTI): ICD-10-CM

## 2024-05-15 DIAGNOSIS — R06.02 SHORTNESS OF BREATH: Primary | ICD-10-CM

## 2024-05-15 DIAGNOSIS — J96.22 ACUTE ON CHRONIC RESPIRATORY FAILURE WITH HYPERCAPNIA (MULTI): ICD-10-CM

## 2024-05-15 LAB
ALBUMIN SERPL BCP-MCNC: 3.9 G/DL (ref 3.4–5)
ALP SERPL-CCNC: 65 U/L (ref 33–110)
ALT SERPL W P-5'-P-CCNC: 13 U/L (ref 7–45)
ANION GAP BLDV CALCULATED.4IONS-SCNC: -1 MMOL/L (ref 10–25)
ANION GAP BLDV CALCULATED.4IONS-SCNC: 0 MMOL/L (ref 10–25)
ANION GAP BLDV CALCULATED.4IONS-SCNC: 1 MMOL/L (ref 10–25)
ANION GAP SERPL CALC-SCNC: 8 MMOL/L (ref 10–20)
APPEARANCE UR: ABNORMAL
AST SERPL W P-5'-P-CCNC: 15 U/L (ref 9–39)
B-HCG SERPL-ACNC: <2 MIU/ML
BACTERIA #/AREA URNS AUTO: ABNORMAL /HPF
BASE EXCESS BLDV CALC-SCNC: 10.7 MMOL/L (ref -2–3)
BASE EXCESS BLDV CALC-SCNC: 12.1 MMOL/L (ref -2–3)
BASE EXCESS BLDV CALC-SCNC: 9.2 MMOL/L (ref -2–3)
BASOPHILS # BLD AUTO: 0.07 X10*3/UL (ref 0–0.1)
BASOPHILS NFR BLD AUTO: 0.8 %
BILIRUB SERPL-MCNC: 0.4 MG/DL (ref 0–1.2)
BILIRUB UR STRIP.AUTO-MCNC: NEGATIVE MG/DL
BNP SERPL-MCNC: 13 PG/ML (ref 0–99)
BODY TEMPERATURE: ABNORMAL
BUN SERPL-MCNC: 17 MG/DL (ref 6–23)
CA-I BLDV-SCNC: 1.24 MMOL/L (ref 1.1–1.33)
CA-I BLDV-SCNC: 1.26 MMOL/L (ref 1.1–1.33)
CA-I BLDV-SCNC: 1.26 MMOL/L (ref 1.1–1.33)
CALCIUM SERPL-MCNC: 9 MG/DL (ref 8.6–10.3)
CARDIAC TROPONIN I PNL SERPL HS: 5 NG/L (ref 0–13)
CARDIAC TROPONIN I PNL SERPL HS: 5 NG/L (ref 0–13)
CHLORIDE BLDV-SCNC: 102 MMOL/L (ref 98–107)
CHLORIDE BLDV-SCNC: 102 MMOL/L (ref 98–107)
CHLORIDE BLDV-SCNC: 103 MMOL/L (ref 98–107)
CHLORIDE SERPL-SCNC: 102 MMOL/L (ref 98–107)
CO2 SERPL-SCNC: 36 MMOL/L (ref 21–32)
COLOR UR: YELLOW
CREAT SERPL-MCNC: 0.76 MG/DL (ref 0.5–1.05)
CRITICAL CALL TIME: 2112
CRITICAL CALL TIME: 2204
CRITICAL CALLED BY: ABNORMAL
CRITICAL CALLED BY: ABNORMAL
CRITICAL CALLED TO: ABNORMAL
CRITICAL CALLED TO: ABNORMAL
CRITICAL READ BACK: ABNORMAL
CRITICAL READ BACK: ABNORMAL
EGFRCR SERPLBLD CKD-EPI 2021: >90 ML/MIN/1.73M*2
EOSINOPHIL # BLD AUTO: 0.04 X10*3/UL (ref 0–0.7)
EOSINOPHIL NFR BLD AUTO: 0.5 %
ERYTHROCYTE [DISTWIDTH] IN BLOOD BY AUTOMATED COUNT: 16.3 % (ref 11.5–14.5)
GLUCOSE BLDV-MCNC: 128 MG/DL (ref 74–99)
GLUCOSE BLDV-MCNC: 169 MG/DL (ref 74–99)
GLUCOSE BLDV-MCNC: 268 MG/DL (ref 74–99)
GLUCOSE SERPL-MCNC: 123 MG/DL (ref 74–99)
GLUCOSE UR STRIP.AUTO-MCNC: NEGATIVE MG/DL
HCO3 BLDV-SCNC: 37 MMOL/L (ref 22–26)
HCO3 BLDV-SCNC: 41.1 MMOL/L (ref 22–26)
HCO3 BLDV-SCNC: 42 MMOL/L (ref 22–26)
HCT VFR BLD AUTO: 40.5 % (ref 36–46)
HCT VFR BLD EST: 35 % (ref 36–46)
HCT VFR BLD EST: 37 % (ref 36–46)
HCT VFR BLD EST: 39 % (ref 36–46)
HGB BLD-MCNC: 12.3 G/DL (ref 12–16)
HGB BLDV-MCNC: 11.8 G/DL (ref 12–16)
HGB BLDV-MCNC: 12.2 G/DL (ref 12–16)
HGB BLDV-MCNC: 12.9 G/DL (ref 12–16)
IMM GRANULOCYTES # BLD AUTO: 0.2 X10*3/UL (ref 0–0.7)
IMM GRANULOCYTES NFR BLD AUTO: 2.3 % (ref 0–0.9)
INHALED O2 CONCENTRATION: 100 %
INHALED O2 CONCENTRATION: 100 %
INHALED O2 CONCENTRATION: 60 %
KETONES UR STRIP.AUTO-MCNC: NEGATIVE MG/DL
LACTATE BLDV-SCNC: 0.7 MMOL/L (ref 0.4–2)
LACTATE BLDV-SCNC: 0.8 MMOL/L (ref 0.4–2)
LACTATE BLDV-SCNC: 1.1 MMOL/L (ref 0.4–2)
LACTATE SERPL-SCNC: 1 MMOL/L (ref 0.4–2)
LEUKOCYTE ESTERASE UR QL STRIP.AUTO: ABNORMAL
LIPASE SERPL-CCNC: 10 U/L (ref 9–82)
LYMPHOCYTES # BLD AUTO: 3.18 X10*3/UL (ref 1.2–4.8)
LYMPHOCYTES NFR BLD AUTO: 36.4 %
MCH RBC QN AUTO: 30.7 PG (ref 26–34)
MCHC RBC AUTO-ENTMCNC: 30.4 G/DL (ref 32–36)
MCV RBC AUTO: 101 FL (ref 80–100)
MONOCYTES # BLD AUTO: 0.58 X10*3/UL (ref 0.1–1)
MONOCYTES NFR BLD AUTO: 6.6 %
NEUTROPHILS # BLD AUTO: 4.67 X10*3/UL (ref 1.2–7.7)
NEUTROPHILS NFR BLD AUTO: 53.4 %
NITRITE UR QL STRIP.AUTO: POSITIVE
NRBC BLD-RTO: 0.2 /100 WBCS (ref 0–0)
OXYHGB MFR BLDV: 77.8 % (ref 45–75)
OXYHGB MFR BLDV: 79.8 % (ref 45–75)
OXYHGB MFR BLDV: 83.9 % (ref 45–75)
PCO2 BLDV: 67 MM HG (ref 41–51)
PCO2 BLDV: 78 MM HG (ref 41–51)
PCO2 BLDV: 98 MM HG (ref 41–51)
PH BLDV: 7.24 PH (ref 7.33–7.43)
PH BLDV: 7.33 PH (ref 7.33–7.43)
PH BLDV: 7.35 PH (ref 7.33–7.43)
PH UR STRIP.AUTO: 6 [PH]
PLATELET # BLD AUTO: 154 X10*3/UL (ref 150–450)
PO2 BLDV: 58 MM HG (ref 35–45)
PO2 BLDV: 60 MM HG (ref 35–45)
PO2 BLDV: 63 MM HG (ref 35–45)
POTASSIUM BLDV-SCNC: 4.3 MMOL/L (ref 3.5–5.3)
POTASSIUM BLDV-SCNC: 4.3 MMOL/L (ref 3.5–5.3)
POTASSIUM BLDV-SCNC: 4.4 MMOL/L (ref 3.5–5.3)
POTASSIUM SERPL-SCNC: 4.2 MMOL/L (ref 3.5–5.3)
PROT SERPL-MCNC: 6.7 G/DL (ref 6.4–8.2)
PROT UR STRIP.AUTO-MCNC: ABNORMAL MG/DL
RBC # BLD AUTO: 4.01 X10*6/UL (ref 4–5.2)
RBC # UR STRIP.AUTO: NEGATIVE /UL
RBC #/AREA URNS AUTO: ABNORMAL /HPF
SAO2 % BLDV: 86 % (ref 45–75)
SAO2 % BLDV: 87 % (ref 45–75)
SAO2 % BLDV: 89 % (ref 45–75)
SODIUM BLDV-SCNC: 135 MMOL/L (ref 136–145)
SODIUM BLDV-SCNC: 139 MMOL/L (ref 136–145)
SODIUM BLDV-SCNC: 141 MMOL/L (ref 136–145)
SODIUM SERPL-SCNC: 142 MMOL/L (ref 136–145)
SP GR UR STRIP.AUTO: 1.01
UROBILINOGEN UR STRIP.AUTO-MCNC: <2 MG/DL
WBC # BLD AUTO: 8.7 X10*3/UL (ref 4.4–11.3)
WBC #/AREA URNS AUTO: >50 /HPF

## 2024-05-15 PROCEDURE — 99291 CRITICAL CARE FIRST HOUR: CPT | Performed by: STUDENT IN AN ORGANIZED HEALTH CARE EDUCATION/TRAINING PROGRAM

## 2024-05-15 PROCEDURE — 87086 URINE CULTURE/COLONY COUNT: CPT | Mod: ELYLAB | Performed by: STUDENT IN AN ORGANIZED HEALTH CARE EDUCATION/TRAINING PROGRAM

## 2024-05-15 PROCEDURE — 84075 ASSAY ALKALINE PHOSPHATASE: CPT | Performed by: STUDENT IN AN ORGANIZED HEALTH CARE EDUCATION/TRAINING PROGRAM

## 2024-05-15 PROCEDURE — 83880 ASSAY OF NATRIURETIC PEPTIDE: CPT | Performed by: STUDENT IN AN ORGANIZED HEALTH CARE EDUCATION/TRAINING PROGRAM

## 2024-05-15 PROCEDURE — 2500000004 HC RX 250 GENERAL PHARMACY W/ HCPCS (ALT 636 FOR OP/ED): Performed by: PHARMACIST

## 2024-05-15 PROCEDURE — 96375 TX/PRO/DX INJ NEW DRUG ADDON: CPT | Mod: 59

## 2024-05-15 PROCEDURE — 5A1945Z RESPIRATORY VENTILATION, 24-96 CONSECUTIVE HOURS: ICD-10-PCS | Performed by: STUDENT IN AN ORGANIZED HEALTH CARE EDUCATION/TRAINING PROGRAM

## 2024-05-15 PROCEDURE — 83605 ASSAY OF LACTIC ACID: CPT | Performed by: STUDENT IN AN ORGANIZED HEALTH CARE EDUCATION/TRAINING PROGRAM

## 2024-05-15 PROCEDURE — 51702 INSERT TEMP BLADDER CATH: CPT

## 2024-05-15 PROCEDURE — 84132 ASSAY OF SERUM POTASSIUM: CPT | Performed by: STUDENT IN AN ORGANIZED HEALTH CARE EDUCATION/TRAINING PROGRAM

## 2024-05-15 PROCEDURE — 31500 INSERT EMERGENCY AIRWAY: CPT | Performed by: STUDENT IN AN ORGANIZED HEALTH CARE EDUCATION/TRAINING PROGRAM

## 2024-05-15 PROCEDURE — 80307 DRUG TEST PRSMV CHEM ANLYZR: CPT | Performed by: NURSE PRACTITIONER

## 2024-05-15 PROCEDURE — 87040 BLOOD CULTURE FOR BACTERIA: CPT | Mod: ELYLAB | Performed by: STUDENT IN AN ORGANIZED HEALTH CARE EDUCATION/TRAINING PROGRAM

## 2024-05-15 PROCEDURE — 94762 N-INVAS EAR/PLS OXIMTRY CONT: CPT

## 2024-05-15 PROCEDURE — 36415 COLL VENOUS BLD VENIPUNCTURE: CPT | Performed by: STUDENT IN AN ORGANIZED HEALTH CARE EDUCATION/TRAINING PROGRAM

## 2024-05-15 PROCEDURE — 2500000005 HC RX 250 GENERAL PHARMACY W/O HCPCS: Performed by: STUDENT IN AN ORGANIZED HEALTH CARE EDUCATION/TRAINING PROGRAM

## 2024-05-15 PROCEDURE — A4217 STERILE WATER/SALINE, 500 ML: HCPCS | Performed by: PHARMACIST

## 2024-05-15 PROCEDURE — 84484 ASSAY OF TROPONIN QUANT: CPT | Performed by: STUDENT IN AN ORGANIZED HEALTH CARE EDUCATION/TRAINING PROGRAM

## 2024-05-15 PROCEDURE — 85025 COMPLETE CBC W/AUTO DIFF WBC: CPT | Performed by: STUDENT IN AN ORGANIZED HEALTH CARE EDUCATION/TRAINING PROGRAM

## 2024-05-15 PROCEDURE — 81001 URINALYSIS AUTO W/SCOPE: CPT | Performed by: STUDENT IN AN ORGANIZED HEALTH CARE EDUCATION/TRAINING PROGRAM

## 2024-05-15 PROCEDURE — 94002 VENT MGMT INPAT INIT DAY: CPT

## 2024-05-15 PROCEDURE — 94640 AIRWAY INHALATION TREATMENT: CPT

## 2024-05-15 PROCEDURE — 93005 ELECTROCARDIOGRAM TRACING: CPT

## 2024-05-15 PROCEDURE — 2500000004 HC RX 250 GENERAL PHARMACY W/ HCPCS (ALT 636 FOR OP/ED): Performed by: STUDENT IN AN ORGANIZED HEALTH CARE EDUCATION/TRAINING PROGRAM

## 2024-05-15 PROCEDURE — 84702 CHORIONIC GONADOTROPIN TEST: CPT | Performed by: STUDENT IN AN ORGANIZED HEALTH CARE EDUCATION/TRAINING PROGRAM

## 2024-05-15 PROCEDURE — 2500000005 HC RX 250 GENERAL PHARMACY W/O HCPCS

## 2024-05-15 PROCEDURE — 71045 X-RAY EXAM CHEST 1 VIEW: CPT

## 2024-05-15 PROCEDURE — 96366 THER/PROPH/DIAG IV INF ADDON: CPT | Mod: 59

## 2024-05-15 PROCEDURE — 83690 ASSAY OF LIPASE: CPT | Performed by: STUDENT IN AN ORGANIZED HEALTH CARE EDUCATION/TRAINING PROGRAM

## 2024-05-15 PROCEDURE — 71045 X-RAY EXAM CHEST 1 VIEW: CPT | Performed by: RADIOLOGY

## 2024-05-15 PROCEDURE — 87641 MR-STAPH DNA AMP PROBE: CPT | Performed by: STUDENT IN AN ORGANIZED HEALTH CARE EDUCATION/TRAINING PROGRAM

## 2024-05-15 PROCEDURE — 2500000004 HC RX 250 GENERAL PHARMACY W/ HCPCS (ALT 636 FOR OP/ED)

## 2024-05-15 PROCEDURE — 96365 THER/PROPH/DIAG IV INF INIT: CPT | Mod: 59

## 2024-05-15 PROCEDURE — 2500000002 HC RX 250 W HCPCS SELF ADMINISTERED DRUGS (ALT 637 FOR MEDICARE OP, ALT 636 FOR OP/ED): Performed by: STUDENT IN AN ORGANIZED HEALTH CARE EDUCATION/TRAINING PROGRAM

## 2024-05-15 PROCEDURE — 0BH17EZ INSERTION OF ENDOTRACHEAL AIRWAY INTO TRACHEA, VIA NATURAL OR ARTIFICIAL OPENING: ICD-10-PCS | Performed by: STUDENT IN AN ORGANIZED HEALTH CARE EDUCATION/TRAINING PROGRAM

## 2024-05-15 RX ORDER — NOREPINEPHRINE BITARTRATE/D5W 8 MG/250ML
.01-.5 PLASTIC BAG, INJECTION (ML) INTRAVENOUS CONTINUOUS
Status: DISCONTINUED | OUTPATIENT
Start: 2024-05-15 | End: 2024-05-16

## 2024-05-15 RX ORDER — ROCURONIUM BROMIDE 10 MG/ML
INJECTION, SOLUTION INTRAVENOUS CODE/TRAUMA/SEDATION MEDICATION
Status: COMPLETED | OUTPATIENT
Start: 2024-05-15 | End: 2024-05-15

## 2024-05-15 RX ORDER — KETAMINE HYDROCHLORIDE 10 MG/ML
INJECTION, SOLUTION INTRAMUSCULAR; INTRAVENOUS CODE/TRAUMA/SEDATION MEDICATION
Status: COMPLETED | OUTPATIENT
Start: 2024-05-15 | End: 2024-05-15

## 2024-05-15 RX ORDER — NOREPINEPHRINE BITARTRATE/D5W 8 MG/250ML
PLASTIC BAG, INJECTION (ML) INTRAVENOUS
Status: DISPENSED
Start: 2024-05-15 | End: 2024-05-16

## 2024-05-15 RX ORDER — IPRATROPIUM BROMIDE AND ALBUTEROL SULFATE 2.5; .5 MG/3ML; MG/3ML
3 SOLUTION RESPIRATORY (INHALATION) EVERY 20 MIN
Status: COMPLETED | OUTPATIENT
Start: 2024-05-15 | End: 2024-05-15

## 2024-05-15 RX ORDER — NALOXONE HYDROCHLORIDE 0.4 MG/ML
1 INJECTION, SOLUTION INTRAMUSCULAR; INTRAVENOUS; SUBCUTANEOUS ONCE
Status: DISCONTINUED | OUTPATIENT
Start: 2024-05-15 | End: 2024-05-15

## 2024-05-15 RX ORDER — NALOXONE HYDROCHLORIDE 1 MG/ML
1 INJECTION INTRAMUSCULAR; INTRAVENOUS; SUBCUTANEOUS ONCE
Status: COMPLETED | OUTPATIENT
Start: 2024-05-15 | End: 2024-05-15

## 2024-05-15 RX ORDER — VANCOMYCIN HYDROCHLORIDE 1 G/20ML
INJECTION, POWDER, LYOPHILIZED, FOR SOLUTION INTRAVENOUS DAILY PRN
Status: DISCONTINUED | OUTPATIENT
Start: 2024-05-15 | End: 2024-05-16

## 2024-05-15 RX ORDER — SODIUM CHLORIDE, SODIUM LACTATE, POTASSIUM CHLORIDE, CALCIUM CHLORIDE 600; 310; 30; 20 MG/100ML; MG/100ML; MG/100ML; MG/100ML
INJECTION, SOLUTION INTRAVENOUS
Status: COMPLETED | OUTPATIENT
Start: 2024-05-15 | End: 2024-05-15

## 2024-05-15 RX ORDER — PROPOFOL 10 MG/ML
5-20 INJECTION, EMULSION INTRAVENOUS CONTINUOUS
Status: DISCONTINUED | OUTPATIENT
Start: 2024-05-15 | End: 2024-05-16

## 2024-05-15 RX ORDER — EPINEPHRINE 0.1 MG/ML
INJECTION INTRACARDIAC; INTRAVENOUS
Status: COMPLETED
Start: 2024-05-15 | End: 2024-05-15

## 2024-05-15 RX ADMIN — VANCOMYCIN HYDROCHLORIDE 1750 MG: 5 INJECTION, POWDER, LYOPHILIZED, FOR SOLUTION INTRAVENOUS at 23:21

## 2024-05-15 RX ADMIN — PIPERACILLIN AND TAZOBACTAM 4.5 G: 4; .5 INJECTION, POWDER, LYOPHILIZED, FOR SOLUTION INTRAVENOUS at 23:20

## 2024-05-15 RX ADMIN — EPINEPHRINE 0.02 MG: 0.1 INJECTION INTRACARDIAC; INTRAVENOUS at 22:25

## 2024-05-15 RX ADMIN — KETAMINE HYDROCHLORIDE 50 MG: 10 INJECTION INTRAMUSCULAR; INTRAVENOUS at 22:32

## 2024-05-15 RX ADMIN — NALOXONE HYDROCHLORIDE 1 MG: 1 INJECTION PARENTERAL at 21:20

## 2024-05-15 RX ADMIN — ROCURONIUM BROMIDE 100 MG: 50 INJECTION, SOLUTION INTRAVENOUS at 22:32

## 2024-05-15 RX ADMIN — IPRATROPIUM BROMIDE AND ALBUTEROL SULFATE 3 ML: 2.5; .5 SOLUTION RESPIRATORY (INHALATION) at 21:33

## 2024-05-15 RX ADMIN — IPRATROPIUM BROMIDE AND ALBUTEROL SULFATE 3 ML: 2.5; .5 SOLUTION RESPIRATORY (INHALATION) at 21:36

## 2024-05-15 RX ADMIN — IPRATROPIUM BROMIDE AND ALBUTEROL SULFATE 3 ML: 2.5; .5 SOLUTION RESPIRATORY (INHALATION) at 21:37

## 2024-05-15 RX ADMIN — SODIUM CHLORIDE, SODIUM LACTATE, POTASSIUM CHLORIDE, AND CALCIUM CHLORIDE 1000 ML: 600; 310; 30; 20 INJECTION, SOLUTION INTRAVENOUS at 22:10

## 2024-05-15 RX ADMIN — Medication 100 PERCENT: at 22:45

## 2024-05-15 ASSESSMENT — PAIN - FUNCTIONAL ASSESSMENT: PAIN_FUNCTIONAL_ASSESSMENT: CPOT (CRITICAL CARE PAIN OBSERVATION TOOL)

## 2024-05-15 ASSESSMENT — LIFESTYLE VARIABLES
HAVE PEOPLE ANNOYED YOU BY CRITICIZING YOUR DRINKING: NO
EVER FELT BAD OR GUILTY ABOUT YOUR DRINKING: NO
EVER HAD A DRINK FIRST THING IN THE MORNING TO STEADY YOUR NERVES TO GET RID OF A HANGOVER: NO
TOTAL SCORE: 0
HAVE YOU EVER FELT YOU SHOULD CUT DOWN ON YOUR DRINKING: NO

## 2024-05-16 ENCOUNTER — APPOINTMENT (OUTPATIENT)
Dept: RADIOLOGY | Facility: HOSPITAL | Age: 44
DRG: 208 | End: 2024-05-16
Payer: COMMERCIAL

## 2024-05-16 PROBLEM — R06.02 SHORTNESS OF BREATH: Status: ACTIVE | Noted: 2024-05-16

## 2024-05-16 LAB
AMPHETAMINES UR QL SCN: ABNORMAL
ANION GAP BLDA CALCULATED.4IONS-SCNC: 7 MMO/L (ref 10–25)
ANION GAP BLDA CALCULATED.4IONS-SCNC: 9 MMO/L (ref 10–25)
ANION GAP SERPL CALC-SCNC: 12 MMOL/L (ref 10–20)
ANION GAP SERPL CALC-SCNC: 14 MMOL/L (ref 10–20)
BARBITURATES UR QL SCN: ABNORMAL
BASE EXCESS BLDA CALC-SCNC: 6.4 MMOL/L (ref -2–3)
BASE EXCESS BLDA CALC-SCNC: 7.1 MMOL/L (ref -2–3)
BENZODIAZ UR QL SCN: ABNORMAL
BODY TEMPERATURE: ABNORMAL
BODY TEMPERATURE: ABNORMAL
BUN SERPL-MCNC: 16 MG/DL (ref 6–23)
BUN SERPL-MCNC: 16 MG/DL (ref 6–23)
BZE UR QL SCN: ABNORMAL
CA-I BLDA-SCNC: 1.18 MMOL/L (ref 1.1–1.33)
CA-I BLDA-SCNC: 1.21 MMOL/L (ref 1.1–1.33)
CALCIUM SERPL-MCNC: 9.3 MG/DL (ref 8.6–10.3)
CALCIUM SERPL-MCNC: 9.6 MG/DL (ref 8.6–10.3)
CANNABINOIDS UR QL SCN: ABNORMAL
CHLORIDE BLDA-SCNC: 99 MMOL/L (ref 98–107)
CHLORIDE BLDA-SCNC: 99 MMOL/L (ref 98–107)
CHLORIDE SERPL-SCNC: 100 MMOL/L (ref 98–107)
CHLORIDE SERPL-SCNC: 99 MMOL/L (ref 98–107)
CK SERPL-CCNC: 29 U/L (ref 0–215)
CO2 SERPL-SCNC: 30 MMOL/L (ref 21–32)
CO2 SERPL-SCNC: 31 MMOL/L (ref 21–32)
CREAT SERPL-MCNC: 0.74 MG/DL (ref 0.5–1.05)
CREAT SERPL-MCNC: 0.86 MG/DL (ref 0.5–1.05)
EGFRCR SERPLBLD CKD-EPI 2021: 86 ML/MIN/1.73M*2
EGFRCR SERPLBLD CKD-EPI 2021: >90 ML/MIN/1.73M*2
ERYTHROCYTE [DISTWIDTH] IN BLOOD BY AUTOMATED COUNT: 16.1 % (ref 11.5–14.5)
FENTANYL+NORFENTANYL UR QL SCN: ABNORMAL
GLUCOSE BLD MANUAL STRIP-MCNC: 151 MG/DL (ref 74–99)
GLUCOSE BLD MANUAL STRIP-MCNC: 165 MG/DL (ref 74–99)
GLUCOSE BLD MANUAL STRIP-MCNC: 208 MG/DL (ref 74–99)
GLUCOSE BLD MANUAL STRIP-MCNC: 252 MG/DL (ref 74–99)
GLUCOSE BLD MANUAL STRIP-MCNC: 300 MG/DL (ref 74–99)
GLUCOSE BLDA-MCNC: 198 MG/DL (ref 74–99)
GLUCOSE BLDA-MCNC: 250 MG/DL (ref 74–99)
GLUCOSE SERPL-MCNC: 155 MG/DL (ref 74–99)
GLUCOSE SERPL-MCNC: 240 MG/DL (ref 74–99)
HCO3 BLDA-SCNC: 33.1 MMOL/L (ref 22–26)
HCO3 BLDA-SCNC: 33.4 MMOL/L (ref 22–26)
HCT VFR BLD AUTO: 42.1 % (ref 36–46)
HCT VFR BLD EST: 38 % (ref 36–46)
HCT VFR BLD EST: 39 % (ref 36–46)
HGB BLD-MCNC: 12.9 G/DL (ref 12–16)
HGB BLDA-MCNC: 12.7 G/DL (ref 12–16)
HGB BLDA-MCNC: 13 G/DL (ref 12–16)
HOLD SPECIMEN: NORMAL
HOLD SPECIMEN: NORMAL
INHALED O2 CONCENTRATION: 80 %
INHALED O2 CONCENTRATION: 90 %
LACTATE BLDA-SCNC: 2.4 MMOL/L (ref 0.4–2)
LACTATE BLDA-SCNC: 3.1 MMOL/L (ref 0.4–2)
LACTATE BLDV-SCNC: 3.3 MMOL/L (ref 0.4–2)
LACTATE SERPL-SCNC: 2.5 MMOL/L (ref 0.4–2)
LACTATE SERPL-SCNC: 4.2 MMOL/L (ref 0.4–2)
LEGIONELLA AG UR QL: NEGATIVE
MAGNESIUM SERPL-MCNC: 1.57 MG/DL (ref 1.6–2.4)
MCH RBC QN AUTO: 30.4 PG (ref 26–34)
MCHC RBC AUTO-ENTMCNC: 30.6 G/DL (ref 32–36)
MCV RBC AUTO: 99 FL (ref 80–100)
METHADONE UR QL SCN: ABNORMAL
MRSA DNA SPEC QL NAA+PROBE: NOT DETECTED
NRBC BLD-RTO: 0.2 /100 WBCS (ref 0–0)
OPIATES UR QL SCN: ABNORMAL
OXYCODONE+OXYMORPHONE UR QL SCN: ABNORMAL
OXYHGB MFR BLDA: 85.1 % (ref 94–98)
OXYHGB MFR BLDA: 95.8 % (ref 94–98)
PCO2 BLDA: 54 MM HG (ref 38–42)
PCO2 BLDA: 56 MM HG (ref 38–42)
PCP UR QL SCN: ABNORMAL
PEEP CMH2O: 7 CM H2O
PH BLDA: 7.38 PH (ref 7.38–7.42)
PH BLDA: 7.4 PH (ref 7.38–7.42)
PLATELET # BLD AUTO: 157 X10*3/UL (ref 150–450)
PO2 BLDA: 51 MM HG (ref 85–95)
PO2 BLDA: 80 MM HG (ref 85–95)
POTASSIUM BLDA-SCNC: 3.9 MMOL/L (ref 3.5–5.3)
POTASSIUM BLDA-SCNC: 4.1 MMOL/L (ref 3.5–5.3)
POTASSIUM SERPL-SCNC: 3.8 MMOL/L (ref 3.5–5.3)
POTASSIUM SERPL-SCNC: 4.1 MMOL/L (ref 3.5–5.3)
RBC # BLD AUTO: 4.25 X10*6/UL (ref 4–5.2)
S PNEUM AG UR QL: NEGATIVE
SAO2 % BLDA: 88 % (ref 94–100)
SAO2 % BLDA: 98 % (ref 94–100)
SODIUM BLDA-SCNC: 135 MMOL/L (ref 136–145)
SODIUM BLDA-SCNC: 137 MMOL/L (ref 136–145)
SODIUM SERPL-SCNC: 138 MMOL/L (ref 136–145)
SODIUM SERPL-SCNC: 140 MMOL/L (ref 136–145)
SPECIMEN DRAWN FROM PATIENT: ABNORMAL
TIDAL VOLUME: 400 ML
VENTILATOR MODE: ABNORMAL
VENTILATOR RATE: 16 BPM
WBC # BLD AUTO: 9.7 X10*3/UL (ref 4.4–11.3)

## 2024-05-16 PROCEDURE — 99291 CRITICAL CARE FIRST HOUR: CPT | Performed by: NURSE PRACTITIONER

## 2024-05-16 PROCEDURE — 2500000005 HC RX 250 GENERAL PHARMACY W/O HCPCS: Performed by: STUDENT IN AN ORGANIZED HEALTH CARE EDUCATION/TRAINING PROGRAM

## 2024-05-16 PROCEDURE — 87449 NOS EACH ORGANISM AG IA: CPT | Mod: ELYLAB | Performed by: NURSE PRACTITIONER

## 2024-05-16 PROCEDURE — 87899 AGENT NOS ASSAY W/OPTIC: CPT | Mod: ELYLAB | Performed by: NURSE PRACTITIONER

## 2024-05-16 PROCEDURE — 83605 ASSAY OF LACTIC ACID: CPT | Performed by: STUDENT IN AN ORGANIZED HEALTH CARE EDUCATION/TRAINING PROGRAM

## 2024-05-16 PROCEDURE — 84132 ASSAY OF SERUM POTASSIUM: CPT | Performed by: NURSE PRACTITIONER

## 2024-05-16 PROCEDURE — 36415 COLL VENOUS BLD VENIPUNCTURE: CPT | Performed by: NURSE PRACTITIONER

## 2024-05-16 PROCEDURE — 71260 CT THORAX DX C+: CPT

## 2024-05-16 PROCEDURE — 71045 X-RAY EXAM CHEST 1 VIEW: CPT

## 2024-05-16 PROCEDURE — 84132 ASSAY OF SERUM POTASSIUM: CPT | Performed by: STUDENT IN AN ORGANIZED HEALTH CARE EDUCATION/TRAINING PROGRAM

## 2024-05-16 PROCEDURE — 2500000004 HC RX 250 GENERAL PHARMACY W/ HCPCS (ALT 636 FOR OP/ED)

## 2024-05-16 PROCEDURE — 83605 ASSAY OF LACTIC ACID: CPT | Performed by: NURSE PRACTITIONER

## 2024-05-16 PROCEDURE — 2500000004 HC RX 250 GENERAL PHARMACY W/ HCPCS (ALT 636 FOR OP/ED): Performed by: STUDENT IN AN ORGANIZED HEALTH CARE EDUCATION/TRAINING PROGRAM

## 2024-05-16 PROCEDURE — 85027 COMPLETE CBC AUTOMATED: CPT | Performed by: NURSE PRACTITIONER

## 2024-05-16 PROCEDURE — 2500000004 HC RX 250 GENERAL PHARMACY W/ HCPCS (ALT 636 FOR OP/ED): Performed by: NURSE PRACTITIONER

## 2024-05-16 PROCEDURE — 70450 CT HEAD/BRAIN W/O DYE: CPT | Performed by: RADIOLOGY

## 2024-05-16 PROCEDURE — 2500000002 HC RX 250 W HCPCS SELF ADMINISTERED DRUGS (ALT 637 FOR MEDICARE OP, ALT 636 FOR OP/ED): Performed by: STUDENT IN AN ORGANIZED HEALTH CARE EDUCATION/TRAINING PROGRAM

## 2024-05-16 PROCEDURE — 2500000006 HC RX 250 W HCPCS SELF ADMINISTERED DRUGS (ALT 637 FOR ALL PAYERS): Performed by: STUDENT IN AN ORGANIZED HEALTH CARE EDUCATION/TRAINING PROGRAM

## 2024-05-16 PROCEDURE — 94640 AIRWAY INHALATION TREATMENT: CPT

## 2024-05-16 PROCEDURE — 71260 CT THORAX DX C+: CPT | Performed by: RADIOLOGY

## 2024-05-16 PROCEDURE — 71045 X-RAY EXAM CHEST 1 VIEW: CPT | Performed by: RADIOLOGY

## 2024-05-16 PROCEDURE — 82947 ASSAY GLUCOSE BLOOD QUANT: CPT

## 2024-05-16 PROCEDURE — 70450 CT HEAD/BRAIN W/O DYE: CPT

## 2024-05-16 PROCEDURE — 2020000001 HC ICU ROOM DAILY

## 2024-05-16 PROCEDURE — 94003 VENT MGMT INPAT SUBQ DAY: CPT

## 2024-05-16 PROCEDURE — 82550 ASSAY OF CK (CPK): CPT

## 2024-05-16 PROCEDURE — 83735 ASSAY OF MAGNESIUM: CPT | Performed by: NURSE PRACTITIONER

## 2024-05-16 PROCEDURE — 2500000002 HC RX 250 W HCPCS SELF ADMINISTERED DRUGS (ALT 637 FOR MEDICARE OP, ALT 636 FOR OP/ED): Performed by: NURSE PRACTITIONER

## 2024-05-16 PROCEDURE — 87070 CULTURE OTHR SPECIMN AEROBIC: CPT | Mod: ELYLAB | Performed by: NURSE PRACTITIONER

## 2024-05-16 PROCEDURE — C9113 INJ PANTOPRAZOLE SODIUM, VIA: HCPCS | Performed by: STUDENT IN AN ORGANIZED HEALTH CARE EDUCATION/TRAINING PROGRAM

## 2024-05-16 PROCEDURE — 2550000001 HC RX 255 CONTRASTS

## 2024-05-16 RX ORDER — FUROSEMIDE 10 MG/ML
40 INJECTION INTRAMUSCULAR; INTRAVENOUS ONCE
Status: COMPLETED | OUTPATIENT
Start: 2024-05-16 | End: 2024-05-16

## 2024-05-16 RX ORDER — CLONAZEPAM 1 MG/1
1 TABLET ORAL 2 TIMES DAILY PRN
Status: DISCONTINUED | OUTPATIENT
Start: 2024-05-16 | End: 2024-05-18

## 2024-05-16 RX ORDER — PRAZOSIN HYDROCHLORIDE 5 MG/1
5 CAPSULE ORAL NIGHTLY
Status: DISCONTINUED | OUTPATIENT
Start: 2024-05-16 | End: 2024-05-19 | Stop reason: HOSPADM

## 2024-05-16 RX ORDER — DEXTROSE 50 % IN WATER (D50W) INTRAVENOUS SYRINGE
25
Status: DISCONTINUED | OUTPATIENT
Start: 2024-05-16 | End: 2024-05-19 | Stop reason: HOSPADM

## 2024-05-16 RX ORDER — HYDROMORPHONE HYDROCHLORIDE 1 MG/ML
INJECTION, SOLUTION INTRAMUSCULAR; INTRAVENOUS; SUBCUTANEOUS
Status: COMPLETED
Start: 2024-05-16 | End: 2024-05-16

## 2024-05-16 RX ORDER — CEFTRIAXONE 1 G/50ML
1 INJECTION, SOLUTION INTRAVENOUS EVERY 24 HOURS
Status: DISCONTINUED | OUTPATIENT
Start: 2024-05-16 | End: 2024-05-18

## 2024-05-16 RX ORDER — MAGNESIUM SULFATE HEPTAHYDRATE 40 MG/ML
4 INJECTION, SOLUTION INTRAVENOUS ONCE
Status: COMPLETED | OUTPATIENT
Start: 2024-05-16 | End: 2024-05-16

## 2024-05-16 RX ORDER — EPINEPHRINE 0.1 MG/ML
0.02 INJECTION INTRACARDIAC; INTRAVENOUS ONCE
Status: COMPLETED | OUTPATIENT
Start: 2024-05-16 | End: 2024-05-15

## 2024-05-16 RX ORDER — ROCURONIUM BROMIDE 10 MG/ML
100 INJECTION, SOLUTION INTRAVENOUS ONCE
Status: DISCONTINUED | OUTPATIENT
Start: 2024-05-16 | End: 2024-05-16

## 2024-05-16 RX ORDER — PANTOPRAZOLE SODIUM 40 MG/1
40 TABLET, DELAYED RELEASE ORAL
Status: DISCONTINUED | OUTPATIENT
Start: 2024-05-17 | End: 2024-05-16

## 2024-05-16 RX ORDER — PANTOPRAZOLE SODIUM 40 MG/10ML
40 INJECTION, POWDER, LYOPHILIZED, FOR SOLUTION INTRAVENOUS
Status: DISCONTINUED | OUTPATIENT
Start: 2024-05-16 | End: 2024-05-19 | Stop reason: HOSPADM

## 2024-05-16 RX ORDER — PANTOPRAZOLE SODIUM 40 MG/1
40 TABLET, DELAYED RELEASE ORAL
Status: DISCONTINUED | OUTPATIENT
Start: 2024-05-16 | End: 2024-05-19 | Stop reason: HOSPADM

## 2024-05-16 RX ORDER — INSULIN GLARGINE 100 [IU]/ML
10 INJECTION, SOLUTION SUBCUTANEOUS EVERY MORNING
Status: DISCONTINUED | OUTPATIENT
Start: 2024-05-16 | End: 2024-05-19 | Stop reason: HOSPADM

## 2024-05-16 RX ORDER — FAMOTIDINE 10 MG/ML
20 INJECTION INTRAVENOUS ONCE
Status: COMPLETED | OUTPATIENT
Start: 2024-05-16 | End: 2024-05-16

## 2024-05-16 RX ORDER — ESCITALOPRAM OXALATE 10 MG/1
20 TABLET ORAL EVERY MORNING
Status: DISCONTINUED | OUTPATIENT
Start: 2024-05-17 | End: 2024-05-19 | Stop reason: HOSPADM

## 2024-05-16 RX ORDER — METOPROLOL TARTRATE 25 MG/1
25 TABLET, FILM COATED ORAL 2 TIMES DAILY
Status: DISCONTINUED | OUTPATIENT
Start: 2024-05-17 | End: 2024-05-19 | Stop reason: HOSPADM

## 2024-05-16 RX ORDER — DIPHENHYDRAMINE HYDROCHLORIDE 50 MG/ML
50 INJECTION INTRAMUSCULAR; INTRAVENOUS ONCE
Status: COMPLETED | OUTPATIENT
Start: 2024-05-16 | End: 2024-05-16

## 2024-05-16 RX ORDER — PROPOFOL 10 MG/ML
5-50 INJECTION, EMULSION INTRAVENOUS CONTINUOUS
Status: DISCONTINUED | OUTPATIENT
Start: 2024-05-16 | End: 2024-05-18

## 2024-05-16 RX ORDER — HYDROMORPHONE HYDROCHLORIDE 1 MG/ML
1 INJECTION, SOLUTION INTRAMUSCULAR; INTRAVENOUS; SUBCUTANEOUS ONCE
Status: COMPLETED | OUTPATIENT
Start: 2024-05-16 | End: 2024-05-16

## 2024-05-16 RX ORDER — BUSPIRONE HYDROCHLORIDE 5 MG/1
30 TABLET ORAL 2 TIMES DAILY
Status: DISCONTINUED | OUTPATIENT
Start: 2024-05-16 | End: 2024-05-19 | Stop reason: HOSPADM

## 2024-05-16 RX ORDER — PANTOPRAZOLE SODIUM 40 MG/10ML
40 INJECTION, POWDER, LYOPHILIZED, FOR SOLUTION INTRAVENOUS
Status: DISCONTINUED | OUTPATIENT
Start: 2024-05-17 | End: 2024-05-16

## 2024-05-16 RX ORDER — DEXTROSE 50 % IN WATER (D50W) INTRAVENOUS SYRINGE
12.5
Status: DISCONTINUED | OUTPATIENT
Start: 2024-05-16 | End: 2024-05-19 | Stop reason: HOSPADM

## 2024-05-16 RX ORDER — HYDROXYZINE PAMOATE 25 MG/1
50 CAPSULE ORAL 3 TIMES DAILY PRN
Status: DISCONTINUED | OUTPATIENT
Start: 2024-05-16 | End: 2024-05-19 | Stop reason: HOSPADM

## 2024-05-16 RX ORDER — IPRATROPIUM BROMIDE AND ALBUTEROL SULFATE 2.5; .5 MG/3ML; MG/3ML
3 SOLUTION RESPIRATORY (INHALATION)
Status: DISCONTINUED | OUTPATIENT
Start: 2024-05-16 | End: 2024-05-19 | Stop reason: HOSPADM

## 2024-05-16 RX ORDER — INSULIN LISPRO 100 [IU]/ML
0-10 INJECTION, SOLUTION INTRAVENOUS; SUBCUTANEOUS EVERY 6 HOURS
Status: DISCONTINUED | OUTPATIENT
Start: 2024-05-16 | End: 2024-05-18

## 2024-05-16 RX ORDER — KETAMINE HYDROCHLORIDE 50 MG/ML
50 INJECTION, SOLUTION INTRAMUSCULAR; INTRAVENOUS ONCE
Status: DISCONTINUED | OUTPATIENT
Start: 2024-05-16 | End: 2024-05-16

## 2024-05-16 RX ORDER — LEVOTHYROXINE SODIUM 75 UG/1
150 TABLET ORAL DAILY
Status: DISCONTINUED | OUTPATIENT
Start: 2024-05-17 | End: 2024-05-19 | Stop reason: HOSPADM

## 2024-05-16 RX ADMIN — PROPOFOL 20 MCG/KG/MIN: 10 INJECTION, EMULSION INTRAVENOUS at 09:56

## 2024-05-16 RX ADMIN — IOHEXOL 75 ML: 350 INJECTION, SOLUTION INTRAVENOUS at 02:21

## 2024-05-16 RX ADMIN — Medication 90 PERCENT: at 08:02

## 2024-05-16 RX ADMIN — CEFTRIAXONE SODIUM 1 G: 1 INJECTION, SOLUTION INTRAVENOUS at 04:23

## 2024-05-16 RX ADMIN — METHYLPREDNISOLONE SODIUM SUCCINATE 125 MG: 125 INJECTION, POWDER, FOR SOLUTION INTRAMUSCULAR; INTRAVENOUS at 01:19

## 2024-05-16 RX ADMIN — RIVAROXABAN 10 MG: 10 TABLET, FILM COATED ORAL at 16:00

## 2024-05-16 RX ADMIN — INSULIN GLARGINE 10 UNITS: 100 INJECTION, SOLUTION SUBCUTANEOUS at 13:02

## 2024-05-16 RX ADMIN — PANTOPRAZOLE SODIUM 40 MG: 40 INJECTION, POWDER, FOR SOLUTION INTRAVENOUS at 13:01

## 2024-05-16 RX ADMIN — MAGNESIUM SULFATE HEPTAHYDRATE 4 G: 40 INJECTION, SOLUTION INTRAVENOUS at 06:53

## 2024-05-16 RX ADMIN — INSULIN LISPRO 2 UNITS: 100 INJECTION, SOLUTION INTRAVENOUS; SUBCUTANEOUS at 21:55

## 2024-05-16 RX ADMIN — PROPOFOL 25 MCG/KG/MIN: 10 INJECTION, EMULSION INTRAVENOUS at 15:05

## 2024-05-16 RX ADMIN — INSULIN LISPRO 2 UNITS: 100 INJECTION, SOLUTION INTRAVENOUS; SUBCUTANEOUS at 15:08

## 2024-05-16 RX ADMIN — FAMOTIDINE 20 MG: 10 INJECTION INTRAVENOUS at 01:19

## 2024-05-16 RX ADMIN — IPRATROPIUM BROMIDE AND ALBUTEROL SULFATE 3 ML: 2.5; .5 SOLUTION RESPIRATORY (INHALATION) at 04:15

## 2024-05-16 RX ADMIN — HYDROMORPHONE HYDROCHLORIDE: 1 INJECTION, SOLUTION INTRAMUSCULAR; INTRAVENOUS; SUBCUTANEOUS at 02:15

## 2024-05-16 RX ADMIN — DOXYCYCLINE 100 MG: 100 INJECTION, POWDER, LYOPHILIZED, FOR SOLUTION INTRAVENOUS at 01:29

## 2024-05-16 RX ADMIN — DIPHENHYDRAMINE HYDROCHLORIDE 50 MG: 50 INJECTION, SOLUTION INTRAMUSCULAR; INTRAVENOUS at 01:19

## 2024-05-16 RX ADMIN — METHYLPREDNISOLONE SODIUM SUCCINATE 40 MG: 40 INJECTION, POWDER, FOR SOLUTION INTRAMUSCULAR; INTRAVENOUS at 09:55

## 2024-05-16 RX ADMIN — INSULIN LISPRO 6 UNITS: 100 INJECTION, SOLUTION INTRAVENOUS; SUBCUTANEOUS at 04:23

## 2024-05-16 RX ADMIN — AZITHROMYCIN MONOHYDRATE 500 MG: 500 INJECTION, POWDER, LYOPHILIZED, FOR SOLUTION INTRAVENOUS at 04:23

## 2024-05-16 RX ADMIN — IPRATROPIUM BROMIDE AND ALBUTEROL SULFATE 3 ML: 2.5; .5 SOLUTION RESPIRATORY (INHALATION) at 19:45

## 2024-05-16 RX ADMIN — SODIUM CHLORIDE 500 ML: 9 INJECTION, SOLUTION INTRAVENOUS at 14:28

## 2024-05-16 RX ADMIN — PROPOFOL 5 MCG/KG/MIN: 10 INJECTION, EMULSION INTRAVENOUS at 00:54

## 2024-05-16 RX ADMIN — IPRATROPIUM BROMIDE AND ALBUTEROL SULFATE 3 ML: 2.5; .5 SOLUTION RESPIRATORY (INHALATION) at 13:35

## 2024-05-16 RX ADMIN — IPRATROPIUM BROMIDE AND ALBUTEROL SULFATE 3 ML: 2.5; .5 SOLUTION RESPIRATORY (INHALATION) at 08:00

## 2024-05-16 RX ADMIN — PROPOFOL 35 MCG/KG/MIN: 10 INJECTION, EMULSION INTRAVENOUS at 19:55

## 2024-05-16 RX ADMIN — INSULIN LISPRO 4 UNITS: 100 INJECTION, SOLUTION INTRAVENOUS; SUBCUTANEOUS at 09:59

## 2024-05-16 RX ADMIN — FUROSEMIDE 40 MG: 10 INJECTION, SOLUTION INTRAMUSCULAR; INTRAVENOUS at 03:32

## 2024-05-16 SDOH — SOCIAL STABILITY: SOCIAL INSECURITY: HAS ANYONE EVER THREATENED TO HURT YOUR FAMILY OR YOUR PETS?: UNABLE TO ASSESS

## 2024-05-16 SDOH — SOCIAL STABILITY: SOCIAL INSECURITY: DO YOU FEEL ANYONE HAS EXPLOITED OR TAKEN ADVANTAGE OF YOU FINANCIALLY OR OF YOUR PERSONAL PROPERTY?: UNABLE TO ASSESS

## 2024-05-16 SDOH — SOCIAL STABILITY: SOCIAL INSECURITY: ARE YOU OR HAVE YOU BEEN THREATENED OR ABUSED PHYSICALLY, EMOTIONALLY, OR SEXUALLY BY ANYONE?: UNABLE TO ASSESS

## 2024-05-16 SDOH — SOCIAL STABILITY: SOCIAL INSECURITY: DO YOU FEEL UNSAFE GOING BACK TO THE PLACE WHERE YOU ARE LIVING?: UNABLE TO ASSESS

## 2024-05-16 SDOH — SOCIAL STABILITY: SOCIAL INSECURITY: DOES ANYONE TRY TO KEEP YOU FROM HAVING/CONTACTING OTHER FRIENDS OR DOING THINGS OUTSIDE YOUR HOME?: UNABLE TO ASSESS

## 2024-05-16 SDOH — SOCIAL STABILITY: SOCIAL INSECURITY: HAVE YOU HAD THOUGHTS OF HARMING ANYONE ELSE?: UNABLE TO ASSESS

## 2024-05-16 SDOH — SOCIAL STABILITY: SOCIAL INSECURITY: ARE THERE ANY APPARENT SIGNS OF INJURIES/BEHAVIORS THAT COULD BE RELATED TO ABUSE/NEGLECT?: UNABLE TO ASSESS

## 2024-05-16 SDOH — SOCIAL STABILITY: SOCIAL INSECURITY: ABUSE: ADULT

## 2024-05-16 SDOH — SOCIAL STABILITY: SOCIAL INSECURITY: HAVE YOU HAD ANY THOUGHTS OF HARMING ANYONE ELSE?: UNABLE TO ASSESS

## 2024-05-16 SDOH — SOCIAL STABILITY: SOCIAL INSECURITY: WERE YOU ABLE TO COMPLETE ALL THE BEHAVIORAL HEALTH SCREENINGS?: NO

## 2024-05-16 ASSESSMENT — COGNITIVE AND FUNCTIONAL STATUS - GENERAL
MOBILITY SCORE: 24
PATIENT BASELINE BEDBOUND: NO
DAILY ACTIVITIY SCORE: 24
MOBILITY SCORE: 24
DAILY ACTIVITIY SCORE: 24

## 2024-05-16 ASSESSMENT — ACTIVITIES OF DAILY LIVING (ADL)
BATHING: INDEPENDENT
JUDGMENT_ADEQUATE_SAFELY_COMPLETE_DAILY_ACTIVITIES: YES
HEARING - RIGHT EAR: FUNCTIONAL
TOILETING: INDEPENDENT
PATIENT'S MEMORY ADEQUATE TO SAFELY COMPLETE DAILY ACTIVITIES?: YES
GROOMING: INDEPENDENT
DRESSING YOURSELF: INDEPENDENT
HEARING - LEFT EAR: FUNCTIONAL
WALKS IN HOME: INDEPENDENT
FEEDING YOURSELF: INDEPENDENT
ADEQUATE_TO_COMPLETE_ADL: YES
LACK_OF_TRANSPORTATION: PATIENT UNABLE TO ANSWER

## 2024-05-16 ASSESSMENT — PAIN - FUNCTIONAL ASSESSMENT
PAIN_FUNCTIONAL_ASSESSMENT: CPOT (CRITICAL CARE PAIN OBSERVATION TOOL)
PAIN_FUNCTIONAL_ASSESSMENT: CPOT (CRITICAL CARE PAIN OBSERVATION TOOL)
PAIN_FUNCTIONAL_ASSESSMENT: 0-10
PAIN_FUNCTIONAL_ASSESSMENT: CPOT (CRITICAL CARE PAIN OBSERVATION TOOL)

## 2024-05-16 ASSESSMENT — PAIN SCALES - WONG BAKER
WONGBAKER_NUMERICALRESPONSE: NO HURT
WONGBAKER_NUMERICALRESPONSE: NO HURT
WONGBAKER_NUMERICALRESPONSE: HURTS WHOLE LOT

## 2024-05-16 ASSESSMENT — LIFESTYLE VARIABLES
HOW OFTEN DO YOU HAVE 6 OR MORE DRINKS ON ONE OCCASION: PATIENT UNABLE TO ANSWER
AUDIT-C TOTAL SCORE: -1
HOW OFTEN DO YOU HAVE A DRINK CONTAINING ALCOHOL: PATIENT UNABLE TO ANSWER
AUDIT-C TOTAL SCORE: -1
HOW MANY STANDARD DRINKS CONTAINING ALCOHOL DO YOU HAVE ON A TYPICAL DAY: PATIENT UNABLE TO ANSWER
SKIP TO QUESTIONS 9-10: 0

## 2024-05-16 NOTE — CONSULTS
Stephenie Mccray, Age 44 is being initiated on pharmacy to dose vancomycin for Pneumonia in the ED.  Patient is only ordered 1 dose of vancomycin therapy, as ED orders are not valid to continue when pt is transferred to a floor. Renal function is currently stable (CrCl = 90.1ml/min).    Patient will be given an initial dose of Vancomycin 1750mg    Attending or ID Services must reorder if Vancomycin is to be continued.    Please contact the pharmacy at extension [phone] with any questions.    Reviewed and approved by REX GOMEZ on 5/15/24 at 11:02 PM.

## 2024-05-16 NOTE — ED PROVIDER NOTES
"HPI   Chief Complaint   Patient presents with    Shortness of Breath     Pt arrives via ems for c/o shortness of breath and pt reportedly took \"2 edibles.\"        Patient is a 44-year-old female with history of diabetes and COPD who presents for respiratory depression.  Patient reportedly took 2 Gummies just prior to arrival.  Was short of breath and EMS was called.  No trauma.  Patient does not provide significant additional history on assessment.  On later discussion with patient's family, they do not she read suffered any trauma.  They state that she took 2 Gummies earlier and began speaking incoherently and had trouble breathing, therefore they called EMS.  No other recent illnesses, fevers, chills, chest pain, shortness of breath, vomiting, diarrhea.                          No data recorded                   Patient History   Past Medical History:   Diagnosis Date    Acute on chronic respiratory failure (Multi)     Arthritis     COPD (chronic obstructive pulmonary disease) (Multi)     Diabetes mellitus (Multi)     type 2 IDDM    GERD (gastroesophageal reflux disease)     History of transfusion     History of venous thromboembolism     Hyperlipidemia     Hypertension     Hypothyroidism     Lipoma     Nephrolithiasis     CHARLIE (obstructive sleep apnea)     Ovarian teratoma     Schizophrenia (Multi)      Past Surgical History:   Procedure Laterality Date     SECTION, LOW TRANSVERSE      x 1    EYE SURGERY      TONSILLECTOMY      UMBILICAL HERNIA REPAIR      VENTRAL HERNIA REPAIR       Family History   Problem Relation Name Age of Onset    Fibromyalgia Father      Hypertension Brother      Thyroid disease Maternal Grandmother      Thyroid disease Paternal Grandmother      Lung cancer Paternal Grandfather      Coronary artery disease Other Grandmother      Social History     Tobacco Use    Smoking status: Every Day     Current packs/day: 1.00     Types: Cigarettes     Passive exposure: Never    Smokeless " tobacco: Never   Substance Use Topics    Alcohol use: Never    Drug use: Yes     Types: Marijuana       Physical Exam   ED Triage Vitals   Temp Pulse Resp BP   -- -- -- --      SpO2 Temp src Heart Rate Source Patient Position   -- -- -- --      BP Location FiO2 (%)     -- --       Physical Exam  Constitutional:       General: She is not in acute distress.     Comments: drowsy   HENT:      Head: Normocephalic.   Eyes:      Extraocular Movements: Extraocular movements intact.      Conjunctiva/sclera: Conjunctivae normal.      Pupils: Pupils are equal, round, and reactive to light.   Cardiovascular:      Rate and Rhythm: Normal rate and regular rhythm.      Pulses: Normal pulses.      Heart sounds: Normal heart sounds.   Pulmonary:      Effort: Pulmonary effort is normal.      Breath sounds: Normal breath sounds.   Abdominal:      General: There is no distension.      Palpations: Abdomen is soft. There is no mass.      Tenderness: There is no abdominal tenderness. There is no guarding.   Musculoskeletal:         General: No deformity.      Cervical back: Normal range of motion and neck supple.      Right lower leg: No edema.      Left lower leg: No edema.   Skin:     General: Skin is warm and dry.      Findings: No lesion or rash.   Neurological:      General: No focal deficit present.      Mental Status: Mental status is at baseline.      Cranial Nerves: No cranial nerve deficit.      Sensory: No sensory deficit.      Motor: No weakness.      Comments: A&O x 2   Psychiatric:         Mood and Affect: Mood normal.         ED Course & MDM   Diagnoses as of 05/16/24 0023   Shortness of breath   Altered mental status, unspecified altered mental status type   Acute respiratory failure with hypercapnia (Multi)       Medical Decision Making  EKG my interpretation: Rate 133, rhythm regular, axis normal, , QRS 70, QTc 455, T waves: Unremarkable, ST segments: No elevations or depressions, dictation: Sinus tachycardia, no  STEMI    EKG on my interpretation: Rate 107, rhythm regular, axis normal, , QRS 70, QTc 43, T waves: Unremarkable, ST segments: No elevations or depressions, dictation: Sinus tachycardia, no STEMI    Patient is a 44-year-old female the above-stated past medical history presents for respiratory depression.  Given the patient's reported use of Gummies, there was concern that they could have had additional substances in them, therefore she was given naloxone without significant improvement in her symptoms.  Patient was placed on nasal cannula and subsequently nonrebreather.  Her blood gas initially showed respiratory acidosis and patient was placed on BiPAP, however her respiratory effort worsened and she had increasing CO2 on her end-tidal, therefore I was concerned about airway protection and patient was Parafricta Bashan.  She did have a soft blood pressure at the time he was given IV fluids as well as norepinephrine and 20 mcg of push dose epi.  Patient responded to this and she was intubated without issue, please see separate procedure note.  Patient's workup shows pulmonary edema versus possible pneumonia, patient was covered broad-spectrum antibiotics.  Her troponins are negative and her EKG is nonischemic, low suspicion for ACS.  BNP is not consistent with CHF exacerbation.  Patient's lipase is not consistent pancreatitis.  hCG is not consistent with intrauterine or ectopic pregnancy.  I low suspicion for an acute abdominal cause her symptoms.  Patient does have a urinary tract infection.  Given the unclear nature of the patient's presentation initially, a CT head was ordered and is pending.  Given her history reported by family I low suspicion for CVA at this time.  Patient patient's case was discussed with the ICU who accept the patient for admission.    Disclaimer: This note was dictated using speech recognition software. Minor errors in transcription may be present. Please call if questions.      Sixto Kumari MD  [facility]  Contact on [employer]        Problems Addressed:  Acute respiratory failure with hypercapnia (Multi): acute illness or injury  Altered mental status, unspecified altered mental status type: acute illness or injury  Shortness of breath: acute illness or injury    Amount and/or Complexity of Data Reviewed  Labs: ordered.  Radiology: ordered.  ECG/medicine tests: ordered and independent interpretation performed.        Procedure  Intubation    Performed by: Sixto Kumari MD  Authorized by: Sixto Kumari MD    Consent:     Consent obtained:  Emergent situation  Pre-procedure details:     Indications: altered consciousness and respiratory failure      Patient status:  Altered mental status    Neck mobility: normal      Pharmacologic strategy: RSI      Induction agents:  Ketamine (50mg)    Paralytics:  Rocuronium (100mg)  Procedure details:     Preoxygenation:  BiLevel    CPR in progress: no      Number of attempts:  1  Successful intubation attempt details:     Intubation method:  Oral    Intubation technique: video assisted      Laryngoscope blade:  Mac 3    Bougie used: no      Grade view: II      Tube size (mm):  7.5    Tube type:  Cuffed    Tube visualized through cords: yes    Placement assessment:     ETT at teeth/gumline (cm):  23    Tube secured with:  ETT rondon    Breath sounds:  Equal    Placement verification: direct visualization and waveform ETCO2    Post-procedure details:     Procedure completion:  Tolerated well, no immediate complications  Critical Care    Performed by: Sixto Kumari MD  Authorized by: Sixto Kumari MD    Critical care provider statement:     Critical care time (minutes):  45    Critical care time was exclusive of:  Separately billable procedures and treating other patients    Critical care was necessary to treat or prevent imminent or life-threatening deterioration of the following conditions:  Respiratory failure (AMS)     Critical care was time spent personally by me on the following activities:  Evaluation of patient's response to treatment, development of treatment plan with patient or surrogate, examination of patient, obtaining history from patient or surrogate, ordering and performing treatments and interventions, ordering and review of laboratory studies, ordering and review of radiographic studies, pulse oximetry, re-evaluation of patient's condition, ventilator management and review of old charts    Care discussed with: admitting provider         Sixto Kumari MD  05/16/24 0049

## 2024-05-16 NOTE — H&P
"[facility] Critical Care Medicine       Date:  2024  Patient:  Stephenie Mccray  YOB: 1980  MRN:  [id number]   Admit Date:  5/15/2024  ========================================================================================================    Chief Complaint   Patient presents with    Shortness of Breath     Pt arrives via ems for c/o shortness of breath and pt reportedly took \"2 edibles.\"          History of Present Illness:  Stephenie Mccray is a 44 y.o. year old female patient with Past Medical History of  COPD (4L@home), DVT/PE (on Xarelto), schizophrenia, migraine headaches, HTN, HLD, current smoker, GERD, IDDM, and hypothyroidism who presented to [facility] emergency department by EMS for altered mental status. Was reported by family took 2 gummies at home and then noted to be acting different. When arrived to the emergency room was initially with mild respiratory depression but was arousable to answer questions. While waiting in the emergency department became more lethargic with increased respiratory depression and escalating oxygen requirements with concerns for airway protection therefore was intubated in the ER.     Interval ICU Events:  : Admitted to the ICU for acute on chronic hypoxic/ hypercapnic respiratory failure.     Medical History:  Past Medical History:   Diagnosis Date    Acute on chronic respiratory failure (Multi)     Arthritis     COPD (chronic obstructive pulmonary disease) (Multi)     Diabetes mellitus (Multi)     type 2 IDDM    GERD (gastroesophageal reflux disease)     History of transfusion     History of venous thromboembolism     Hyperlipidemia     Hypertension     Hypothyroidism     Lipoma     Nephrolithiasis     CHARLIE (obstructive sleep apnea)     Ovarian teratoma     Schizophrenia (Multi)      Past Surgical History:   Procedure Laterality Date     SECTION, LOW TRANSVERSE      x 1    EYE SURGERY      TONSILLECTOMY      UMBILICAL HERNIA REPAIR      VENTRAL " HERNIA REPAIR       (Not in a hospital admission)    Fluticasone furoate-vilanterol, Fluticasone-umeclidin-vilanter, Levofloxacin, Sumatriptan, and Vortioxetine  Social History     Tobacco Use    Smoking status: Every Day     Current packs/day: 1.00     Types: Cigarettes     Passive exposure: Never    Smokeless tobacco: Never   Substance Use Topics    Alcohol use: Never    Drug use: Yes     Types: Marijuana     Family History   Problem Relation Name Age of Onset    Fibromyalgia Father      Hypertension Brother      Thyroid disease Maternal Grandmother      Thyroid disease Paternal Grandmother      Lung cancer Paternal Grandfather      Coronary artery disease [facility]        [facility] Medications:    norepinephrine, 0.01-0.5 mcg/kg/min, Last Rate: Stopped (05/16/24 0036)  propofol, 5-20 mcg/kg/min (Order-Specific), Last Rate: 5 mcg/kg/min (05/16/24 0054)          Current Facility-Administered Medications:     doxycycline (Vibramycin) in dextrose 5%  mg, 100 mg, intravenous, Once, Sixto Kumari MD, Last Rate: 100 mL/hr at 05/16/24 0129, 100 mg at 05/16/24 0129    iohexol (OMNIPaque) 350 mg iodine/mL solution 75 mL, 75 mL, intravenous, Once in imaging, BARTOLOME Garcia-CNP    ketamine injection 50 mg, 50 mg, intravenous, Once, Sixto Kumari MD    norepinephrine (Levophed) 8 mg in dextrose 5% 250 mL (0.032 mg/mL) infusion (premix), 0.01-0.5 mcg/kg/min, intravenous, Continuous, Sixto Kumari MD, Stopped at 05/16/24 0036    oxygen (O2) therapy, , inhalation, Continuous - Inhalation, Sixto Kumari MD, 90 percent at 05/15/24 2324    propofol (Diprivan) infusion, 5-20 mcg/kg/min (Order-Specific), intravenous, Continuous, Sixto Kumari MD, Last Rate: 2.23 mL/hr at 05/16/24 0054, 5 mcg/kg/min at 05/16/24 0054    rocuronium (ZeMuron) injection 100 mg, 100 mg, intravenous, Once, Sixto Kumari MD    Current Outpatient Medications:     alcohol swabs (Alcohol Prep Pads) pads, medicated,  "Use 3 times daily prn, Disp: 100 each, Rfl: 3    atorvastatin (Lipitor) 20 mg tablet, Take 1 tablet (20 mg) by mouth once daily. Dr. Davis covering for Dr. Yuen, Disp: 30 tablet, Rfl: 0    BD Ultra-Fine Mini Pen Needle 31 gauge x 3/16\" needle, USE TO INJECT 4 TIMES DAILY AS DIRECTED, Disp: 400 each, Rfl: 3    busPIRone (Buspar) 30 mg tablet, Take 1 tablet (30 mg) by mouth 2 times a day., Disp: , Rfl:     clonazePAM (KlonoPIN) 1 mg tablet, Take by mouth 2 times a day as needed., Disp: , Rfl:     cloNIDine (Catapres) 0.1 mg tablet, Take 1 tablet (0.1 mg) by mouth 2 times a day as needed (anxiety)., Disp: , Rfl:     escitalopram (Lexapro) 20 mg tablet, Take 1 tablet (20 mg) by mouth once daily in the morning., Disp: , Rfl:     hydrOXYzine pamoate (Vistaril) 50 mg capsule, Take 1 capsule (50 mg) by mouth 3 times a day as needed for anxiety., Disp: , Rfl:     insulin glargine (Lantus Solostar U-100 Insulin) 100 unit/mL (3 mL) pen, Inject 10 Units under the skin once daily in the morning. Take as directed per insulin instructions. (Patient taking differently: Inject 18 Units under the skin once daily in the morning. Take as directed per insulin instructions.), Disp: 3 mL, Rfl: 2    insulin lispro (HumaLOG) 100 unit/mL injection, Inject 0.04 mL (4 Units) under the skin 3 times a day with meals. Plus sliding scale, Disp: , Rfl:     ipratropium-albuteroL (Duo-Neb) 0.5-2.5 mg/3 mL nebulizer solution, Take 3 mL by nebulization every 6 hours if needed for shortness of breath or wheezing. (Patient taking differently: Take 3 mL by nebulization every 4 hours. As needed), Disp: 180 mL, Rfl: 1    levothyroxine (Synthroid, Levoxyl) 150 mcg tablet, Take 1 tablet (150 mcg) by mouth once daily., Disp: 90 tablet, Rfl: 1    metoprolol tartrate (Lopressor) 25 mg tablet, Take 1 tablet (25 mg) by mouth 2 times a day., Disp: , Rfl:     nicotine (Nicoderm CQ) 14 mg/24 hr patch, Place 1 patch over 24 hours on the skin once daily for 14 " doses. Do not start before February 21, 2024., Disp: 14 patch, Rfl: 0    nicotine (Nicoderm CQ) 21 mg/24 hr patch, Place 1 patch over 24 hours on the skin once daily for 37 doses. Do not start before January 15, 2024., Disp: 30 patch, Rfl: 1    nicotine polacrilex (Commit) 4 mg lozenge, Dissolve 1 lozenge (4 mg) in the mouth every 2 hours if needed for smoking cessation., Disp: 100 lozenge, Rfl: 0    ondansetron ODT (Zofran-ODT) 4 mg disintegrating tablet, Take 1 tablet (4 mg) by mouth every 8 hours if needed for nausea or vomiting., Disp: , Rfl:     OneTouch Verio test strips strip, , Disp: , Rfl:     paliperidone (Invega) 3 mg 24 hr tablet, Take 1 tablet (3 mg) by mouth once daily. Do not crush, chew, or split. Do not start before January 15, 2024., Disp: 30 tablet, Rfl: 0    paliperidone palmitate ER (Invega Sustenna) 234 mg/1.5 mL syringe, Inject 1.5 mL (234 mg) into the muscle every 28 (twenty-eight) days., Disp: , Rfl:     pantoprazole (ProtoNix) 40 mg EC tablet, Take 1 tablet (40 mg) by mouth once daily., Disp: 90 tablet, Rfl: 1    prazosin (Minipress) 5 mg capsule, Take 1 capsule (5 mg) by mouth once daily at bedtime., Disp: , Rfl:     tiotropium (Spiriva Respimat) 2.5 mcg/actuation inhaler, Inhale 2 puffs once daily. Do not start before March 9, 2024. (Patient taking differently: Inhale 2 puffs once daily as needed.), Disp: 8 g, Rfl: 11    Xarelto 10 mg tablet, Take 1 tablet (10 mg) by mouth once daily., Disp: , Rfl:     Review of Systems:  14 point review of systems was completed and negative except for those specially mention in my HPI    Physical Exam:    Heart Rate:  []   Temperature:  [36.7 °C (98.1 °F)]   Respirations:  [18-20]   BP: ()/(49-89)   Weight:  [74.3 kg (163 lb 12.8 oz)]   Pulse Ox:  [81 %-99 %]     Physical Exam  Constitutional:       Appearance: She is ill-appearing.   HENT:      Head: Normocephalic.      Mouth/Throat:      Mouth: Mucous membranes are moist.   Eyes:       Pupils: Pupils are equal, round, and reactive to light.   Cardiovascular:      Rate and Rhythm: Normal rate and regular rhythm.      Pulses: Normal pulses.      Heart sounds: Normal heart sounds.   Pulmonary:      Effort: Pulmonary effort is normal.      Breath sounds: Wheezing (mild scattered) present.      Comments: intubated  Abdominal:      General: Bowel sounds are normal.      Palpations: Abdomen is soft.   Musculoskeletal:      Right lower le+ Edema present.      Left lower le+ Edema present.   Skin:     General: Skin is warm and dry.      Capillary Refill: Capillary refill takes less than 2 seconds.   Neurological:      Comments: sedated         Objective:    I have reviewed all medications, laboratory results, and imaging pertinent for today's encounter.    Vent Mode: Volume control/assist control  FiO2 (%):  [90 %-100 %] 100 %  S RR:  [20] 20  S VT:  [400 mL] 400 mL  PEEP/CPAP (cm H2O):  [5 cm H20] 5 cm H20  MAP (cm H2O):  [11] 11      Intake/Output Summary (Last 24 hours) at 2024 0134  Last data filed at 2024 0102  Gross per 24 hour   Intake 1612.24 ml   Output --   Net 1612.24 ml       Recent Imaging  XR chest 1 view   Final Result   1. Cardiomegaly. Mild vascular congestion. Small bilateral pleural   effusions. Bilateral airspace opacities, may be secondary to   pneumonia and/or pulmonary edema.   2. Life support devices as described above.                  MACRO:   None.        Signed by: Tali Moore 2024 1:27 AM   Dictation workstation:   [id number]      XR chest 1 view   Final Result   1. Cardiomegaly. Vascular congestion. Small bilateral pleural   effusions. Bilateral airspace opacities, may be secondary to   pulmonary edema and/or pneumonia.                  MACRO:   None.        Signed by: Tali Moore 5/15/2024 10:36 PM   Dictation workstation:   [id number]      CT head wo IV contrast    (Results Pending)   CT chest w IV contrast    (Results Pending)       No  echocardiogram results found for the past 14 days    Assessment/Plan:    I am currently managing this critically ill patient for the following problems:    Neuro/Psych/Pain Ctrl/Sedation:  Acute encephalopathy, multifactorial 2/2 toxic and metabolic  Schizophrenia  Migraine  CAM-ICU  Delirium precautions  Sedation with propofol  Daily SAT    Respiratory/ENT:  Acute on chronic hypoxic/ hypercapnic respiratory failure  COPD without acute exacerbation, baseline 4 L nasal cannula  Ventilator management, FiO2 to maintain SPO2 greater than 92%, wean as able  DuoNebs  Home inhalers  Daily SBT    Cardiovascular:  HTN  HLD  Beta blocker  Statin  diuresis    GI:  GERD  Home PPI  NPO    Renal/Volume Status (Intra & Extravascular):  Strict I&Os  Trend BMP    Endocrine  IDDM  Hypothyroidism  Accu check with SSI  Home levothyroxine    Infectious Disease:  UTI  Pneumonia  CT chest with infiltrates  MRSA negative  Ceftriaxone and azithromycin, de-escalate as able  Urine culture pending  Blood culture, pending  Sputum culture  Legionella and strep pneumo    Heme/Onc:  Hx DVT/ PE  Home xarelto  Trend CBC  Monitor for s/s of bleeding  Transfuse for hemoglobin less than 7 or symptomatic anemia    MSK:  Turn reposition    Ethics/Code Status:  FULL    :  DVT Prophylaxis: home xarelto  GI Prophylaxis: home PPI  Bowel Regimen: as needed  Diet: NPO  CVC: none  [place]: none  Tran: yes, inserted 5/15  Restraints: yes  Dispo: ICU    Critical Care Time:  50 minutes spent in preparing to see patient (I.e. review of medical records), evaluation of diagnostics (I.e. labs, imaging, etc.), documentation, discussing plan of care with patient/ family/ caregiver, and/ or coordination of care with multidisciplinary team. Time does not include completion of procedure time.       Jazmin Portillo, APRN-CNP

## 2024-05-16 NOTE — PROGRESS NOTES
Patient was just admitted overnight to the ICU . She was intubated for chronic hypoxic/ hypercapnic respiratory failure. Patient was reviewed during interdisciplinary rounds this morning.  Prior to presentation to ED patient took two gummies and was noted acting different. Will work on trying to weaning later today/ tomorrow. No family present currently. Needs TBD. Care Transition team to monitor care progression and address needs/ concerns as identified. TORREY Landon

## 2024-05-16 NOTE — PROGRESS NOTES
"[facility] Critical Care Medicine Progress Note      Date:  2024  Patient:  Stephenie Mccray  YOB: 1980  MRN:  [id number]   Admit Date:  5/15/2024  ========================================================================================================    Chief Complaint   Patient presents with    Shortness of Breath     Pt arrives via ems for c/o shortness of breath and pt reportedly took \"2 edibles.\"      Interval ICU Events:  Pt over night was afebrile and hemodynamically stable and was only briefly on levophed and now currently off. The patient is currently on 20 of propofol and is awake and following commands. the patient has had UOP of 2.2L and is net -204cc since arriving to the ICU with a Cr that is stable at 0.86 this morning but a lactic acid of 3.0 this AM. Pt Currently on vent with PEEP of 8 on 90% Fio2. Pt this morning awakes to voice and follows commands on ventilator.     Medical History:  Past Medical History:   Diagnosis Date    Acute on chronic respiratory failure (Multi)     Arthritis     COPD (chronic obstructive pulmonary disease) (Multi)     Diabetes mellitus (Multi)     type 2 IDDM    GERD (gastroesophageal reflux disease)     History of transfusion     History of venous thromboembolism     Hyperlipidemia     Hypertension     Hypothyroidism     Lipoma     Nephrolithiasis     CHARLIE (obstructive sleep apnea)     Ovarian teratoma     Schizophrenia (Multi)      Past Surgical History:   Procedure Laterality Date     SECTION, LOW TRANSVERSE      x 1    EYE SURGERY      TONSILLECTOMY      UMBILICAL HERNIA REPAIR      VENTRAL HERNIA REPAIR       Medications Prior to Admission   Medication Sig Dispense Refill Last Dose    alcohol swabs (Alcohol Prep Pads) pads, medicated Use 3 times daily prn 100 each 3 Unknown    atorvastatin (Lipitor) 20 mg tablet Take 1 tablet (20 mg) by mouth once daily. Dr. Davis covering for Dr. Yuen 30 tablet 0     BD Ultra-Fine Mini Pen Needle 31 " "gauge x 3/16\" needle USE TO INJECT 4 TIMES DAILY AS DIRECTED 400 each 3 Unknown    busPIRone (Buspar) 30 mg tablet Take 1 tablet (30 mg) by mouth 2 times a day.   Unknown    clonazePAM (KlonoPIN) 1 mg tablet Take by mouth 2 times a day as needed.   Unknown    cloNIDine (Catapres) 0.1 mg tablet Take 1 tablet (0.1 mg) by mouth 2 times a day as needed (anxiety).   Unknown    escitalopram (Lexapro) 20 mg tablet Take 1 tablet (20 mg) by mouth once daily in the morning.   Unknown    hydrOXYzine pamoate (Vistaril) 50 mg capsule Take 1 capsule (50 mg) by mouth 3 times a day as needed for anxiety.   Unknown    insulin glargine (Lantus Solostar U-100 Insulin) 100 unit/mL (3 mL) pen Inject 10 Units under the skin once daily in the morning. Take as directed per insulin instructions. (Patient taking differently: Inject 18 Units under the skin once daily in the morning. Take as directed per insulin instructions.) 3 mL 2     insulin lispro (HumaLOG) 100 unit/mL injection Inject 0.04 mL (4 Units) under the skin 3 times a day with meals. Plus sliding scale       ipratropium-albuteroL (Duo-Neb) 0.5-2.5 mg/3 mL nebulizer solution Take 3 mL by nebulization every 6 hours if needed for shortness of breath or wheezing. (Patient taking differently: Take 3 mL by nebulization every 4 hours. As needed) 180 mL 1     levothyroxine (Synthroid, Levoxyl) 150 mcg tablet Take 1 tablet (150 mcg) by mouth once daily. 90 tablet 1 Unknown    metoprolol tartrate (Lopressor) 25 mg tablet Take 1 tablet (25 mg) by mouth 2 times a day.   Unknown    nicotine (Nicoderm CQ) 14 mg/24 hr patch Place 1 patch over 24 hours on the skin once daily for 14 doses. Do not start before February 21, 2024. 14 patch 0     nicotine (Nicoderm CQ) 21 mg/24 hr patch Place 1 patch over 24 hours on the skin once daily for 37 doses. Do not start before January 15, 2024. 30 patch 1     nicotine polacrilex (Commit) 4 mg lozenge Dissolve 1 lozenge (4 mg) in the mouth every 2 hours if " needed for smoking cessation. 100 lozenge 0     ondansetron ODT (Zofran-ODT) 4 mg disintegrating tablet Take 1 tablet (4 mg) by mouth every 8 hours if needed for nausea or vomiting.   Unknown    OneTouch Verio test strips strip    Unknown    paliperidone (Invega) 3 mg 24 hr tablet Take 1 tablet (3 mg) by mouth once daily. Do not crush, chew, or split. Do not start before January 15, 2024. 30 tablet 0     paliperidone palmitate ER (Invega Sustenna) 234 mg/1.5 mL syringe Inject 1.5 mL (234 mg) into the muscle every 28 (twenty-eight) days.   Unknown    pantoprazole (ProtoNix) 40 mg EC tablet Take 1 tablet (40 mg) by mouth once daily. 90 tablet 1 Unknown    prazosin (Minipress) 5 mg capsule Take 1 capsule (5 mg) by mouth once daily at bedtime.   Unknown    tiotropium (Spiriva Respimat) 2.5 mcg/actuation inhaler Inhale 2 puffs once daily. Do not start before March 9, 2024. (Patient taking differently: Inhale 2 puffs once daily as needed.) 8 g 11 Unknown    Xarelto 10 mg tablet Take 1 tablet (10 mg) by mouth once daily.   Unknown     Fluticasone furoate-vilanterol, Fluticasone-umeclidin-vilanter, Levofloxacin, Sumatriptan, and Vortioxetine  Social History     Tobacco Use    Smoking status: Every Day     Current packs/day: 1.00     Types: Cigarettes     Passive exposure: Never    Smokeless tobacco: Never   Substance Use Topics    Alcohol use: Never    Drug use: Yes     Types: Marijuana     Family History   Problem Relation Name Age of Onset    Fibromyalgia Father      Hypertension Brother      Thyroid disease Maternal Grandmother      Thyroid disease Paternal Grandmother      Lung cancer Paternal Grandfather      Coronary artery disease Other Grandmother        Review of Systems:  14 point review of systems was completed and negative except for those specially mention in my HPI    Physical Exam:    Heart Rate:  []   Temp:  [36.2 °C (97.2 °F)-36.7 °C (98.1 °F)]   Resp:  [15-20]   BP: ()/(49-91)   Height:  [154.9  "cm (5' 1\")]   Weight:  [74.3 kg (163 lb 12.8 oz)-75 kg (165 lb 5.5 oz)]   SpO2:  [81 %-99 %]     Physical Exam  Constitutional:       Comments: Intubated but alert and follows commands   HENT:      Mouth/Throat:      Comments: ETT in place with little secretions  Eyes:      General: No scleral icterus.     Extraocular Movements: Extraocular movements intact.      Pupils: Pupils are equal, round, and reactive to light.   Cardiovascular:      Rate and Rhythm: Normal rate and regular rhythm.      Pulses: Normal pulses.      Heart sounds: No murmur heard.  Pulmonary:      Effort: Pulmonary effort is normal.      Breath sounds: Rales present. No wheezing.      Comments: Some diminished breath sounds in bilateral lung fields  KATELYN rales and RLL rales appreciated  Abdominal:      General: Abdomen is flat. There is no distension.      Palpations: Abdomen is soft.      Tenderness: There is no abdominal tenderness.   Musculoskeletal:      Right lower leg: No edema.      Left lower leg: No edema.   Skin:     General: Skin is warm and dry.      Capillary Refill: Capillary refill takes less than 2 seconds.      Findings: No rash.   Neurological:      Mental Status: She is alert.      Comments: Intubated but awake and alert and following commands         Objective:    I have reviewed all medications, laboratory results, and imaging pertinent for today's encounter    Assessment/Plan:  Pt is a 43 y/o F w/ a PMHx of COPD (4-5L@home), DVT/PE (on Xarelto), schizophrenia, migraine headaches, HTN, HLD, current smoker, GERD, IDDM, and hypothyroidism who presented to [facility] emergency department by EMS for altered mental status in the setting of acute hypercapnic/hypoxemic respiratroy failure due to multifocal pneumonia requiring intubation. Pt currently stable and with already decreasing FiO2 to 70% this AM.     Neuro/Psych/Pain Ctrl/Sedation:  #Metabolic Encephalopathy - in the setting of acute on chronic hypercapnic respiratory " failure  #History of Schizophrenia  -Will keep RASS 0 to -1 today and SAT  -CAM-ICU and delirium precautions  -Aspiration precautions  -Will clarify med rec and restart any psychiatric medications as needed    Respiratory/ENT:  #Acute on chronic hypercapnic/hypoxemic respiratory failure  #COPD Exacerbation  #Multifocal PNA  #Need for Mechanical Ventilation  -Will c/w duonebs  -C/W 40mg IV solumedrol daily for COPD Exacc and treatment of  Multifocal CAP (near equivalent to 200mg hydrocortisone dose)  -Abx as below  -Wean FiO2 and vent settings to SBT today    Cardiovascular:  #History of DVT/PE  #History of HTN  #History of HLD  #Elevated lactic acid  - Possibly in the setting of dehydration but not currently in shock  - POCUS with indeterminate IVC and will give 500cc IVFs and repeat lactic acid this afternoon  -C/W Home xarelto if able confirm patient still taking    Renal/Volume Status (Intra & Extravascular):  - No current active issues  - Monitor UOP Q4H and Cr daily  - Avoid nephrotoxic drugs and renally dose all medications  -Give 500cc IVFs and repeat lactic acid    GI:  #GERD  -C/W PPI  -NPO for now; If able to extubate will do bedside dysphagia screen but if unable will start Tfs today    Infectious Disease:  #Multifocal PNA  -C/W Ceftriaxone and Azithromycin  -Will follow-up Respiratory cultures  -Will follow-up urine leg and strep and discontinue azithromycin as indicated    Heme/Onc:  #History of DVT/PE  - No current active issues  - Monitor Hgb Daily and transfuse for Hgb<7 or bleeding with hemodynamic instability  - Xarelto for VTE Ppx if able to confirm    Endocrine  #Hyperglycemia  #History of type 2 diabetes on insulin  #History of hypothyroidism  - Will restart home lantus 10U now and will c/w ISS - will increase basal boluse and ISS as needed to meet below goals  - Check Finger sticks Q6H for now  - Finger sticks goals 100-180  - Will clarify med rec and start medications as indicated for her  hypothyroidism    Ethics/Code Status:  -Full code    :  DVT Prophylaxis: Xarelto  GI Prophylaxis: PPI  Bowel Regimen: None  Diet: NPO - possible tube feeds or advance late  CVC: None  [place]: None  Tran: 5/16/24  Restraints: Yes  Dispo: ICU    Critical Care Time: 24 minutes  Critical Care Activities: Management and titration of mechanical ventillation, coordination of consultation services involved within the patient's care, and time spent with patient explaining current diagnosis and treatment plan and answering any questions.    Amado Moon MD

## 2024-05-16 NOTE — CARE PLAN
The patient's goals for the shift include      The clinical goals for the shift include patient will be free from injury by end of shift    Over the shift, the patient did make progress toward the following goals.    Problem: Pain - Adult  Goal: Verbalizes/displays adequate comfort level or baseline comfort level  Outcome: Not Progressing       Problem: Pain - Adult  Goal: Verbalizes/displays adequate comfort level or baseline comfort level  Outcome: Not Progressing     Problem: Discharge Planning  Goal: Discharge to home or other facility with appropriate resources  Outcome: Progressing     Problem: Mechanical Ventilation  Goal: Ability to express needs and understand communication  Outcome: Progressing     Problem: Diabetes  Goal: Achieve decreasing blood glucose levels by end of shift  Outcome: Progressing  Goal: Increase stability of blood glucose readings by end of shift  Outcome: Progressing  Goal: Maintain glucose levels >70mg/dl to <250mg/dl throughout shift  Outcome: Progressing  Goal: Increase self care and/or family involovement by end of shift  Outcome: Progressing     Problem: Safety - Adult  Goal: Free from fall injury  Outcome: Met     Problem: Chronic Conditions and Co-morbidities  Goal: Patient's chronic conditions and co-morbidity symptoms are monitored and maintained or improved  Outcome: Met     Problem: Mechanical Ventilation  Goal: Patient Will Maintain Patent Airway  Outcome: Met  Goal: Oral health is maintained or improved  Outcome: Met  Goal: ET tube will be managed safely  Outcome: Met  Goal: Mobility/activity is maintained at optimum level for patient  Outcome: Met     Problem: Diabetes  Goal: Maintain electrolyte levels within acceptable range throughout shift  Outcome: Met  Goal: No changes in neurological exam by end of shift  Outcome: Met  Goal: Learn about and adhere to nutrition recommendations by end of shift  Outcome: Met  Goal: Vital signs within normal range for age by end of  shift  Outcome: Met  Goal: Receive DSME education by end of shift  Outcome: Met

## 2024-05-17 LAB
ALBUMIN SERPL BCP-MCNC: 3.9 G/DL (ref 3.4–5)
ALP SERPL-CCNC: 68 U/L (ref 33–110)
ALT SERPL W P-5'-P-CCNC: 62 U/L (ref 7–45)
ANION GAP BLDA CALCULATED.4IONS-SCNC: 3 MMO/L (ref 10–25)
ANION GAP SERPL CALC-SCNC: 14 MMOL/L (ref 10–20)
APTT PPP: 26 SECONDS (ref 27–38)
ARTERIAL PATENCY WRIST A: NEGATIVE
AST SERPL W P-5'-P-CCNC: 33 U/L (ref 9–39)
ATRIAL RATE: 107 BPM
ATRIAL RATE: 133 BPM
BASE EXCESS BLDA CALC-SCNC: 9.5 MMOL/L (ref -2–3)
BILIRUB DIRECT SERPL-MCNC: 0 MG/DL (ref 0–0.3)
BILIRUB SERPL-MCNC: 0.3 MG/DL (ref 0–1.2)
BODY TEMPERATURE: ABNORMAL
BUN SERPL-MCNC: 19 MG/DL (ref 6–23)
CA-I BLDA-SCNC: 1.23 MMOL/L (ref 1.1–1.33)
CALCIUM SERPL-MCNC: 9.7 MG/DL (ref 8.6–10.3)
CHLORIDE BLDA-SCNC: 105 MMOL/L (ref 98–107)
CHLORIDE SERPL-SCNC: 102 MMOL/L (ref 98–107)
CO2 SERPL-SCNC: 29 MMOL/L (ref 21–32)
CREAT SERPL-MCNC: 0.71 MG/DL (ref 0.5–1.05)
EGFRCR SERPLBLD CKD-EPI 2021: >90 ML/MIN/1.73M*2
ERYTHROCYTE [DISTWIDTH] IN BLOOD BY AUTOMATED COUNT: 17.1 % (ref 11.5–14.5)
GLUCOSE BLD MANUAL STRIP-MCNC: 109 MG/DL (ref 74–99)
GLUCOSE BLD MANUAL STRIP-MCNC: 140 MG/DL (ref 74–99)
GLUCOSE BLD MANUAL STRIP-MCNC: 163 MG/DL (ref 74–99)
GLUCOSE BLD MANUAL STRIP-MCNC: 190 MG/DL (ref 74–99)
GLUCOSE BLDA-MCNC: 113 MG/DL (ref 74–99)
GLUCOSE SERPL-MCNC: 130 MG/DL (ref 74–99)
HCO3 BLDA-SCNC: 34.8 MMOL/L (ref 22–26)
HCT VFR BLD AUTO: 39.8 % (ref 36–46)
HCT VFR BLD EST: 37 % (ref 36–46)
HGB BLD-MCNC: 12.4 G/DL (ref 12–16)
HGB BLDA-MCNC: 12.2 G/DL (ref 12–16)
INHALED O2 CONCENTRATION: 70 %
INR PPP: 1.1 (ref 0.9–1.1)
LACTATE BLDA-SCNC: 2.5 MMOL/L (ref 0.4–2)
LACTATE SERPL-SCNC: 2.3 MMOL/L (ref 0.4–2)
MAGNESIUM SERPL-MCNC: 2.17 MG/DL (ref 1.6–2.4)
MCH RBC QN AUTO: 30.6 PG (ref 26–34)
MCHC RBC AUTO-ENTMCNC: 31.2 G/DL (ref 32–36)
MCV RBC AUTO: 98 FL (ref 80–100)
NRBC BLD-RTO: 0 /100 WBCS (ref 0–0)
OXYHGB MFR BLDA: 93.9 % (ref 94–98)
P AXIS: 36 DEGREES
P AXIS: 51 DEGREES
P OFFSET: 196 MS
P OFFSET: 214 MS
P ONSET: 149 MS
P ONSET: 165 MS
PCO2 BLDA: 49 MM HG (ref 38–42)
PEEP CMH2O: 5 CM H2O
PH BLDA: 7.46 PH (ref 7.38–7.42)
PLATELET # BLD AUTO: 153 X10*3/UL (ref 150–450)
PO2 BLDA: 70 MM HG (ref 85–95)
POTASSIUM BLDA-SCNC: 3.5 MMOL/L (ref 3.5–5.3)
POTASSIUM SERPL-SCNC: 4.2 MMOL/L (ref 3.5–5.3)
PR INTERVAL: 122 MS
PR INTERVAL: 122 MS
PROT SERPL-MCNC: 6.9 G/DL (ref 6.4–8.2)
PROTHROMBIN TIME: 12.7 SECONDS (ref 9.8–12.8)
Q ONSET: 210 MS
Q ONSET: 226 MS
QRS COUNT: 18 BEATS
QRS COUNT: 22 BEATS
QRS DURATION: 78 MS
QRS DURATION: 78 MS
QT INTERVAL: 306 MS
QT INTERVAL: 362 MS
QTC CALCULATION(BAZETT): 455 MS
QTC CALCULATION(BAZETT): 483 MS
QTC FREDERICIA: 398 MS
QTC FREDERICIA: 439 MS
R AXIS: -21 DEGREES
R AXIS: -25 DEGREES
RBC # BLD AUTO: 4.05 X10*6/UL (ref 4–5.2)
SAO2 % BLDA: 96 % (ref 94–100)
SODIUM BLDA-SCNC: 139 MMOL/L (ref 136–145)
SODIUM SERPL-SCNC: 141 MMOL/L (ref 136–145)
SPECIMEN DRAWN FROM PATIENT: ABNORMAL
T AXIS: 22 DEGREES
T AXIS: 23 DEGREES
T OFFSET: 363 MS
T OFFSET: 407 MS
TIDAL VOLUME: 400 ML
TOTAL MINUTE VOLUME: 7 LITER
VENTILATOR MODE: ABNORMAL
VENTILATOR RATE: 18 BPM
VENTRICULAR RATE: 107 BPM
VENTRICULAR RATE: 133 BPM
WBC # BLD AUTO: 13.4 X10*3/UL (ref 4.4–11.3)

## 2024-05-17 PROCEDURE — 36415 COLL VENOUS BLD VENIPUNCTURE: CPT | Performed by: STUDENT IN AN ORGANIZED HEALTH CARE EDUCATION/TRAINING PROGRAM

## 2024-05-17 PROCEDURE — 83735 ASSAY OF MAGNESIUM: CPT | Performed by: NURSE PRACTITIONER

## 2024-05-17 PROCEDURE — 2500000006 HC RX 250 W HCPCS SELF ADMINISTERED DRUGS (ALT 637 FOR ALL PAYERS): Performed by: NURSE PRACTITIONER

## 2024-05-17 PROCEDURE — 2500000005 HC RX 250 GENERAL PHARMACY W/O HCPCS

## 2024-05-17 PROCEDURE — 85027 COMPLETE CBC AUTOMATED: CPT | Performed by: NURSE PRACTITIONER

## 2024-05-17 PROCEDURE — 2500000002 HC RX 250 W HCPCS SELF ADMINISTERED DRUGS (ALT 637 FOR MEDICARE OP, ALT 636 FOR OP/ED): Performed by: STUDENT IN AN ORGANIZED HEALTH CARE EDUCATION/TRAINING PROGRAM

## 2024-05-17 PROCEDURE — 2500000001 HC RX 250 WO HCPCS SELF ADMINISTERED DRUGS (ALT 637 FOR MEDICARE OP): Performed by: NURSE PRACTITIONER

## 2024-05-17 PROCEDURE — 94003 VENT MGMT INPAT SUBQ DAY: CPT

## 2024-05-17 PROCEDURE — 2500000004 HC RX 250 GENERAL PHARMACY W/ HCPCS (ALT 636 FOR OP/ED)

## 2024-05-17 PROCEDURE — 2500000002 HC RX 250 W HCPCS SELF ADMINISTERED DRUGS (ALT 637 FOR MEDICARE OP, ALT 636 FOR OP/ED): Performed by: NURSE PRACTITIONER

## 2024-05-17 PROCEDURE — 82947 ASSAY GLUCOSE BLOOD QUANT: CPT

## 2024-05-17 PROCEDURE — 2500000004 HC RX 250 GENERAL PHARMACY W/ HCPCS (ALT 636 FOR OP/ED): Performed by: STUDENT IN AN ORGANIZED HEALTH CARE EDUCATION/TRAINING PROGRAM

## 2024-05-17 PROCEDURE — 2500000005 HC RX 250 GENERAL PHARMACY W/O HCPCS: Performed by: STUDENT IN AN ORGANIZED HEALTH CARE EDUCATION/TRAINING PROGRAM

## 2024-05-17 PROCEDURE — 94660 CPAP INITIATION&MGMT: CPT

## 2024-05-17 PROCEDURE — C9113 INJ PANTOPRAZOLE SODIUM, VIA: HCPCS | Performed by: STUDENT IN AN ORGANIZED HEALTH CARE EDUCATION/TRAINING PROGRAM

## 2024-05-17 PROCEDURE — 99291 CRITICAL CARE FIRST HOUR: CPT | Performed by: STUDENT IN AN ORGANIZED HEALTH CARE EDUCATION/TRAINING PROGRAM

## 2024-05-17 PROCEDURE — 83605 ASSAY OF LACTIC ACID: CPT | Performed by: STUDENT IN AN ORGANIZED HEALTH CARE EDUCATION/TRAINING PROGRAM

## 2024-05-17 PROCEDURE — 80048 BASIC METABOLIC PNL TOTAL CA: CPT | Performed by: NURSE PRACTITIONER

## 2024-05-17 PROCEDURE — 85610 PROTHROMBIN TIME: CPT

## 2024-05-17 PROCEDURE — 2020000001 HC ICU ROOM DAILY

## 2024-05-17 PROCEDURE — 84132 ASSAY OF SERUM POTASSIUM: CPT | Performed by: STUDENT IN AN ORGANIZED HEALTH CARE EDUCATION/TRAINING PROGRAM

## 2024-05-17 PROCEDURE — 2500000006 HC RX 250 W HCPCS SELF ADMINISTERED DRUGS (ALT 637 FOR ALL PAYERS): Performed by: STUDENT IN AN ORGANIZED HEALTH CARE EDUCATION/TRAINING PROGRAM

## 2024-05-17 PROCEDURE — 82248 BILIRUBIN DIRECT: CPT

## 2024-05-17 PROCEDURE — 36600 WITHDRAWAL OF ARTERIAL BLOOD: CPT

## 2024-05-17 PROCEDURE — 94640 AIRWAY INHALATION TREATMENT: CPT

## 2024-05-17 PROCEDURE — 2500000004 HC RX 250 GENERAL PHARMACY W/ HCPCS (ALT 636 FOR OP/ED): Performed by: NURSE PRACTITIONER

## 2024-05-17 RX ORDER — DEXMEDETOMIDINE HYDROCHLORIDE 4 UG/ML
INJECTION, SOLUTION INTRAVENOUS
Status: COMPLETED
Start: 2024-05-17 | End: 2024-05-17

## 2024-05-17 RX ORDER — DEXMEDETOMIDINE HYDROCHLORIDE 4 UG/ML
.1-1.5 INJECTION, SOLUTION INTRAVENOUS CONTINUOUS
Status: DISCONTINUED | OUTPATIENT
Start: 2024-05-17 | End: 2024-05-18

## 2024-05-17 RX ADMIN — DEXMEDETOMIDINE HYDROCHLORIDE 1.5 MCG/KG/HR: 400 INJECTION INTRAVENOUS at 20:44

## 2024-05-17 RX ADMIN — CLONAZEPAM 1 MG: 1 TABLET ORAL at 19:47

## 2024-05-17 RX ADMIN — CLONAZEPAM 1 MG: 1 TABLET ORAL at 06:42

## 2024-05-17 RX ADMIN — DEXMEDETOMIDINE HYDROCHLORIDE 0.1 MCG/KG/HR: 4 INJECTION, SOLUTION INTRAVENOUS at 09:15

## 2024-05-17 RX ADMIN — HYDROXYZINE PAMOATE 50 MG: 25 CAPSULE ORAL at 19:47

## 2024-05-17 RX ADMIN — METHYLPREDNISOLONE SODIUM SUCCINATE 40 MG: 40 INJECTION, POWDER, FOR SOLUTION INTRAMUSCULAR; INTRAVENOUS at 10:01

## 2024-05-17 RX ADMIN — Medication 5 L/MIN: at 20:00

## 2024-05-17 RX ADMIN — PROPOFOL 50 MCG/KG/MIN: 10 INJECTION, EMULSION INTRAVENOUS at 05:04

## 2024-05-17 RX ADMIN — LEVOTHYROXINE SODIUM 150 MCG: 75 TABLET ORAL at 05:05

## 2024-05-17 RX ADMIN — INSULIN GLARGINE 10 UNITS: 100 INJECTION, SOLUTION SUBCUTANEOUS at 12:14

## 2024-05-17 RX ADMIN — METOPROLOL TARTRATE 25 MG: 25 TABLET, FILM COATED ORAL at 20:43

## 2024-05-17 RX ADMIN — RIVAROXABAN 10 MG: 10 TABLET, FILM COATED ORAL at 19:46

## 2024-05-17 RX ADMIN — INSULIN LISPRO 2 UNITS: 100 INJECTION, SOLUTION INTRAVENOUS; SUBCUTANEOUS at 21:04

## 2024-05-17 RX ADMIN — ESCITALOPRAM OXALATE 20 MG: 10 TABLET, FILM COATED ORAL at 10:01

## 2024-05-17 RX ADMIN — AZITHROMYCIN MONOHYDRATE 500 MG: 500 INJECTION, POWDER, LYOPHILIZED, FOR SOLUTION INTRAVENOUS at 04:03

## 2024-05-17 RX ADMIN — PROPOFOL 50 MCG/KG/MIN: 10 INJECTION, EMULSION INTRAVENOUS at 15:26

## 2024-05-17 RX ADMIN — IPRATROPIUM BROMIDE AND ALBUTEROL SULFATE 3 ML: 2.5; .5 SOLUTION RESPIRATORY (INHALATION) at 08:24

## 2024-05-17 RX ADMIN — Medication 50 PERCENT: at 16:59

## 2024-05-17 RX ADMIN — BUSPIRONE HYDROCHLORIDE 30 MG: 5 TABLET ORAL at 00:18

## 2024-05-17 RX ADMIN — Medication 50 PERCENT: at 15:21

## 2024-05-17 RX ADMIN — PROPOFOL 35 MCG/KG/MIN: 10 INJECTION, EMULSION INTRAVENOUS at 00:16

## 2024-05-17 RX ADMIN — DEXMEDETOMIDINE HYDROCHLORIDE 0.1 MCG/KG/HR: 400 INJECTION INTRAVENOUS at 09:15

## 2024-05-17 RX ADMIN — PROPOFOL 50 MCG/KG/MIN: 10 INJECTION, EMULSION INTRAVENOUS at 10:13

## 2024-05-17 RX ADMIN — PANTOPRAZOLE SODIUM 40 MG: 40 INJECTION, POWDER, FOR SOLUTION INTRAVENOUS at 12:21

## 2024-05-17 RX ADMIN — Medication 60 PERCENT: at 11:22

## 2024-05-17 RX ADMIN — Medication 5 L/MIN: at 17:30

## 2024-05-17 RX ADMIN — BUSPIRONE HYDROCHLORIDE 30 MG: 5 TABLET ORAL at 10:01

## 2024-05-17 RX ADMIN — IPRATROPIUM BROMIDE AND ALBUTEROL SULFATE 3 ML: 2.5; .5 SOLUTION RESPIRATORY (INHALATION) at 19:43

## 2024-05-17 RX ADMIN — IPRATROPIUM BROMIDE AND ALBUTEROL SULFATE 3 ML: 2.5; .5 SOLUTION RESPIRATORY (INHALATION) at 15:19

## 2024-05-17 RX ADMIN — CEFTRIAXONE SODIUM 1 G: 1 INJECTION, SOLUTION INTRAVENOUS at 04:03

## 2024-05-17 RX ADMIN — DEXMEDETOMIDINE HYDROCHLORIDE 1.5 MCG/KG/HR: 400 INJECTION INTRAVENOUS at 17:46

## 2024-05-17 RX ADMIN — BUSPIRONE HYDROCHLORIDE 30 MG: 5 TABLET ORAL at 20:43

## 2024-05-17 RX ADMIN — METOPROLOL TARTRATE 25 MG: 25 TABLET, FILM COATED ORAL at 10:01

## 2024-05-17 RX ADMIN — IPRATROPIUM BROMIDE AND ALBUTEROL SULFATE 3 ML: 2.5; .5 SOLUTION RESPIRATORY (INHALATION) at 00:15

## 2024-05-17 ASSESSMENT — ENCOUNTER SYMPTOMS
COUGH: 1
GASTROINTESTINAL NEGATIVE: 1
CHEST TIGHTNESS: 1
ALLERGIC/IMMUNOLOGIC NEGATIVE: 1
HEMATOLOGIC/LYMPHATIC NEGATIVE: 1
CARDIOVASCULAR NEGATIVE: 1
EYES NEGATIVE: 1
SLEEP DISTURBANCE: 1
MYALGIAS: 1
WEAKNESS: 1
ENDOCRINE NEGATIVE: 1
FATIGUE: 1
NERVOUS/ANXIOUS: 1
WHEEZING: 1
ARTHRALGIAS: 1
SHORTNESS OF BREATH: 1
ACTIVITY CHANGE: 1
APPETITE CHANGE: 1

## 2024-05-17 ASSESSMENT — PAIN - FUNCTIONAL ASSESSMENT: PAIN_FUNCTIONAL_ASSESSMENT: 0-10

## 2024-05-17 ASSESSMENT — PAIN SCALES - GENERAL: PAINLEVEL_OUTOF10: 0 - NO PAIN

## 2024-05-17 NOTE — PROGRESS NOTES
Rounded with nursing on this date and discussed with intensivist.  Pt remains in Critical care, and intubated. The plan is to wean from ventilator and extubate if pt tolerates.   Care transitions will continue to follow for dc needs , TBD at present time.       Rachael Snow RN

## 2024-05-17 NOTE — CONSULTS
"Nutrition Initial Assessment:   Nutrition Assessment         Patient is a 44 y.o. female presenting with shortness of breath      Nutrition History:  Food and Nutrient History: RD consulted self for tube feeding recommendations following discussing with intensivist. Intubated and sedated, though awake and able to nod and gesture appropriately, also typing answers on phone appropriately. Propofol at 22.5 ml/hr at time of RD visit, providing 594 kcal. Discussed with pt that if not extubated today, recommend starting TF. If able to be extubated, recommend PO diet if passes swallow eval. Per interview with pt, was eating well PTA with no nutritional concerns. Nods that has had no significant wt loss, no chewing/swallowing concerns, and no GI concerns PTA. Has a history of diabetes, would recommend 60g carb controlled diet when able to take PO.  Vitamin/Herbal Supplement Use: none  Food Allergies/Intolerances:  None  GI Symptoms: None  Oral Problems:  Pt denies. Per ICU interdisciplinary rounds, plans for SLP eval once extubated.       Anthropometrics:  Height: 154.9 cm (5' 1\")   Weight: 75 kg (165 lb 5.5 oz)   BMI (Calculated): 31.26  IBW/kg (Dietitian Calculated): 47.7 kg          Weight History:     Wt Readings from Last 20 Encounters:   05/16/24 75 kg (165 lb 5.5 oz)   04/17/24 74.3 kg (163 lb 14.4 oz)   04/10/24 76.7 kg (169 lb)   04/08/24 76.7 kg (169 lb 1.5 oz)   03/13/24 72.6 kg (160 lb)   03/11/24 72.6 kg (160 lb)   03/07/24 70.8 kg (156 lb 1.4 oz)   02/15/24 77.5 kg (170 lb 12.8 oz)   02/09/24 72.6 kg (160 lb)   01/14/24 76.4 kg (168 lb 6.9 oz)   12/29/23 73.3 kg (161 lb 9.6 oz)   12/27/23 75.8 kg (167 lb)   12/21/23 76.1 kg (167 lb 12.3 oz)   11/21/23 77.3 kg (170 lb 6.4 oz)   10/31/23 81.6 kg (180 lb)   10/16/23 80.3 kg (177 lb)   08/18/23 86.2 kg (190 lb)   07/27/23 82.6 kg (182 lb)   06/28/23 78.9 kg (174 lb)   05/24/23 83.3 kg (183 lb 9.6 oz)         Weight Change %:  Significant Weight Loss: " No    Nutrition Focused Physical Exam Findings:    Subcutaneous Fat Loss:   Orbital Fat Pads: Well nourished (slightly bulging fat pads)  Buccal Fat Pads: Well nourished (full, rounded cheeks)  Triceps: Well nourished (ample fat tissue)  Ribs: Defer  Muscle Wasting:  Temporalis: Well nourished (well-defined muscle)  Pectoralis (Clavicular Region): Well nourished (clavicle not visible)  Deltoid/Trapezius: Well nourished (rounded appearance at arm, shoulder, neck)  Interosseous: Well nourished (muscle bulges)  Gastrocnemius: Defer  Edema:  Edema: none  Physical Findings:  Skin: Negative (for pressure injuries)    Nutrition Significant Labs:  BMP Trend:   Results from last 7 days   Lab Units 05/17/24  0728 05/16/24  1739 05/16/24  0422 05/15/24  2117   GLUCOSE mg/dL 130* 155* 240* 123*   CALCIUM mg/dL 9.7 9.6 9.3 9.0   SODIUM mmol/L 141 140 138 142   POTASSIUM mmol/L 4.2 3.8 4.1 4.2   CO2 mmol/L 29 30 31 36*   CHLORIDE mmol/L 102 100 99 102   BUN mg/dL 19 16 16 17   CREATININE mg/dL 0.71 0.74 0.86 0.76    , A1C:  Lab Results   Component Value Date    HGBA1C 7.1 (H) 04/10/2024       Nutrition Specific Medications:  Reviewed. Synthroid tablet. Propofol.    I/O:    ;      Dietary Orders (From admission, onward)       Start     Ordered    05/16/24 0651  May Not Participate in Room Service  Once        Question:  .  Answer:  Yes    05/16/24 0651    05/16/24 0237  NPO Diet Except: Sips with meds; Effective now  Diet effective now        Question:  Except:  Answer:  Sips with meds    05/16/24 0236                     Estimated Needs:   Total Energy Estimated Needs (kCal): 1340 kCal  Method for Estimating Needs: [place]  Total Protein Estimated Needs (g): 95 g  Method for Estimating Needs: IBW x 2 g/kg  Total Fluid Estimated Needs (mL): 1670 mL  Method for Estimating Needs: 35 ml/kg IBW or per MD        Nutrition Diagnosis   Malnutrition Diagnosis  Patient has Malnutrition Diagnosis: No    Nutrition Diagnosis  Patient has  Nutrition Diagnosis: Yes  Diagnosis Status (1): New  Nutrition Diagnosis 1: Inadequate oral intake  Related to (1): acute illness requiring intubation  As Evidenced by (1): NPO. Need for TF if unable to extubate.  Additional Nutrition Diagnosis: Diagnosis 2  Diagnosis Status (2): New  Nutrition Diagnosis 2: Altered nutrition related to laboratory values  Related to (2): endocrine dysfunction  As Evidenced by (2): A1c 7.1, need for carb controlled TF formula/diet pending clinical course       Nutrition Interventions/Recommendations         Nutrition Prescription:  Individualized Nutrition Prescription Provided for : NPO. Enteral nutrition versus 60g carb controlled diet pending clinical course        Nutrition Interventions:   Interventions: Enteral intake, Meals and snacks  Meals and Snacks: Carbohydrate-modified diet  Goal: Once extubated and passes SLP eval, consumes 3 meals per day  Enteral Intake: Modify composition of enteral nutrition, Modify rate of enteral nutrition, Modify schedule of enteral nutrition, Feeding tube flush  Goal: If unable to extubate: TF Glucerna 1.5 at goal rate of 10 ml/hr x22 hours/day, hold 1 hour before and 1 hour after Synthroid (provides 330 kcal, 18 g protein, and 167 ml free H2O). 150 ml flushes q4h or per MD. 4 Pro-Stat per day via feeding tube (1 provides 100 kcal, 15g protein)    Collaboration and Referral of Nutrition Care: Collaboration by nutrition professional with other providers  Goal: ICU interdisciplinary rounds; RN    Nutrition Education:      Not appropriate at this time. To re-assess need for education at RD follow-up.    Nutrition Monitoring and Evaluation   Food/Nutrient Related History Monitoring  Monitoring and Evaluation Plan: Enteral and parenteral nutrition intake, Energy intake, Fluid intake, Amount of food  Energy Intake: Estimated energy intake  Criteria: Meets >75% of estimated energy needs via EN or PO  Fluid Intake: Estimated fluid intake  Criteria: Meets  >75% of estimated fluid needs  Criteria: Once able to extubate, consumes >75% at meals  Enteral and Parenteral Nutrition Intake: Enteral nutrition intake  Criteria: Tolerates tube feeds at goal rate if unable to extubate    Body Composition/Growth/Weight History  Monitoring and Evaluation Plan: Weight  Weight: Measured weight  Criteria: Maintain stable wt    Biochemical Data, Medical Tests and Procedures  Monitoring and Evaluation Plan: Glucose/endocrine profile, Electrolyte/renal panel  Electrolyte and Renal Panel: Sodium, Potassium, Phosphorus  Criteria: Electrolytes WNL  Glucose/Endocrine Profile: Glucose, casual  Criteria: BG within desirable range            Time Spent/Follow-up Reminder:   Time Spent (min): 60 minutes  Last Date of Nutrition Visit: 05/17/24  Nutrition Follow-Up Needed?: 3-5 days

## 2024-05-17 NOTE — PROGRESS NOTES
"[facility] Critical Care Medicine Progress Note      Date:  2024  Patient:  Stephenie Mccray  YOB: 1980  MRN:  [id number]   Admit Date:  5/15/2024  ========================================================================================================    Chief Complaint   Patient presents with    Shortness of Breath     Pt arrives via ems for c/o shortness of breath and pt reportedly took \"2 edibles.\"      Interval ICU Events:  Pt over night was afebrile with one episode of sinus tachycardia but otherwise hemodynamically stable. The patient is currently on prop of 50 and still on mechanical ventilation with a PEEP of 7 and on 65% FiO2 with an ABG of . The patient UOP was 2.19L and was net negative 1.2L in 24 hours with a Cr of 0.71 And improvement in her lactic acid from 4.2 to 2.5 to 2.3. The patient this morning is still able to follow commands and asking to be extubated.    Medical History:  Past Medical History:   Diagnosis Date    Acute on chronic respiratory failure (Multi)     Arthritis     COPD (chronic obstructive pulmonary disease) (Multi)     Diabetes mellitus (Multi)     type 2 IDDM    GERD (gastroesophageal reflux disease)     History of transfusion     History of venous thromboembolism     Hyperlipidemia     Hypertension     Hypothyroidism     Lipoma     Nephrolithiasis     CHARLIE (obstructive sleep apnea)     Ovarian teratoma     Schizophrenia (Multi)      Past Surgical History:   Procedure Laterality Date     SECTION, LOW TRANSVERSE      x 1    EYE SURGERY      TONSILLECTOMY      UMBILICAL HERNIA REPAIR      VENTRAL HERNIA REPAIR       Medications Prior to Admission   Medication Sig Dispense Refill Last Dose    alcohol swabs (Alcohol Prep Pads) pads, medicated Use 3 times daily prn 100 each 3 Unknown    atorvastatin (Lipitor) 20 mg tablet Take 1 tablet (20 mg) by mouth once daily. Dr. Davis covering for Dr. Yuen 30 tablet 0     BD Ultra-Fine Mini Pen Needle 31 gauge x " "3/16\" needle USE TO INJECT 4 TIMES DAILY AS DIRECTED 400 each 3 Unknown    busPIRone (Buspar) 30 mg tablet Take 1 tablet (30 mg) by mouth 2 times a day.   Unknown    clonazePAM (KlonoPIN) 1 mg tablet Take by mouth 2 times a day as needed.   Unknown    cloNIDine (Catapres) 0.1 mg tablet Take 1 tablet (0.1 mg) by mouth 2 times a day as needed (anxiety).   Unknown    escitalopram (Lexapro) 20 mg tablet Take 1 tablet (20 mg) by mouth once daily in the morning.   Unknown    hydrOXYzine pamoate (Vistaril) 50 mg capsule Take 1 capsule (50 mg) by mouth 3 times a day as needed for anxiety.   Unknown    insulin glargine (Lantus Solostar U-100 Insulin) 100 unit/mL (3 mL) pen Inject 10 Units under the skin once daily in the morning. Take as directed per insulin instructions. (Patient taking differently: Inject 18 Units under the skin once daily in the morning. Take as directed per insulin instructions.) 3 mL 2     insulin lispro (HumaLOG) 100 unit/mL injection Inject 0.04 mL (4 Units) under the skin 3 times a day with meals. Plus sliding scale       ipratropium-albuteroL (Duo-Neb) 0.5-2.5 mg/3 mL nebulizer solution Take 3 mL by nebulization every 6 hours if needed for shortness of breath or wheezing. (Patient taking differently: Take 3 mL by nebulization every 4 hours. As needed) 180 mL 1     levothyroxine (Synthroid, Levoxyl) 150 mcg tablet Take 1 tablet (150 mcg) by mouth once daily. 90 tablet 1 Unknown    metoprolol tartrate (Lopressor) 25 mg tablet Take 1 tablet (25 mg) by mouth 2 times a day.   Unknown    nicotine (Nicoderm CQ) 14 mg/24 hr patch Place 1 patch over 24 hours on the skin once daily for 14 doses. Do not start before February 21, 2024. 14 patch 0     nicotine (Nicoderm CQ) 21 mg/24 hr patch Place 1 patch over 24 hours on the skin once daily for 37 doses. Do not start before January 15, 2024. 30 patch 1     nicotine polacrilex (Commit) 4 mg lozenge Dissolve 1 lozenge (4 mg) in the mouth every 2 hours if needed " for smoking cessation. 100 lozenge 0     ondansetron ODT (Zofran-ODT) 4 mg disintegrating tablet Take 1 tablet (4 mg) by mouth every 8 hours if needed for nausea or vomiting.   Unknown    OneTouch Verio test strips strip    Unknown    paliperidone (Invega) 3 mg 24 hr tablet Take 1 tablet (3 mg) by mouth once daily. Do not crush, chew, or split. Do not start before January 15, 2024. 30 tablet 0     paliperidone palmitate ER (Invega Sustenna) 234 mg/1.5 mL syringe Inject 1.5 mL (234 mg) into the muscle every 28 (twenty-eight) days.   Unknown    pantoprazole (ProtoNix) 40 mg EC tablet Take 1 tablet (40 mg) by mouth once daily. 90 tablet 1 Unknown    prazosin (Minipress) 5 mg capsule Take 1 capsule (5 mg) by mouth once daily at bedtime.   Unknown    tiotropium (Spiriva Respimat) 2.5 mcg/actuation inhaler Inhale 2 puffs once daily. Do not start before March 9, 2024. (Patient taking differently: Inhale 2 puffs once daily as needed.) 8 g 11 Unknown    Xarelto 10 mg tablet Take 1 tablet (10 mg) by mouth once daily.   Unknown     Fluticasone furoate-vilanterol, Fluticasone-umeclidin-vilanter, Levofloxacin, Sumatriptan, and Vortioxetine  Social History     Tobacco Use    Smoking status: Every Day     Current packs/day: 1.00     Types: Cigarettes     Passive exposure: Never    Smokeless tobacco: Never   Substance Use Topics    Alcohol use: Never    Drug use: Yes     Types: Marijuana     Family History   Problem Relation Name Age of Onset    Fibromyalgia Father      Hypertension Brother      Thyroid disease Maternal Grandmother      Thyroid disease Paternal Grandmother      Lung cancer Paternal Grandfather      Coronary artery disease Other Grandmother        Review of Systems:  14 point review of systems was completed and negative except for those specially mention in my HPI    Physical Exam:    Heart Rate:  []   Temp:  [36 °C (96.8 °F)-36.5 °C (97.7 °F)]   Resp:  [15-36]   BP: ()/()   Weight:  [75 kg (165 lb  5.5 oz)]   SpO2:  [91 %-97 %]     Physical Exam  Constitutional:       Comments: Intubated but alert and follows commands   HENT:      Mouth/Throat:      Comments: ETT in place with little secretions  Eyes:      General: No scleral icterus.     Extraocular Movements: Extraocular movements intact.      Pupils: Pupils are equal, round, and reactive to light.   Cardiovascular:      Rate and Rhythm: Normal rate and regular rhythm.      Pulses: Normal pulses.      Heart sounds: No murmur heard.  Pulmonary:      Effort: Pulmonary effort is normal.      Breath sounds: Rales present. No wheezing.      Comments: Some diminished breath sounds in bilateral lung fields  KATELYN rales and RLL rales appreciated  Abdominal:      General: Abdomen is flat. There is no distension.      Palpations: Abdomen is soft.      Tenderness: There is no abdominal tenderness.   Musculoskeletal:      Right lower leg: No edema.      Left lower leg: No edema.   Skin:     General: Skin is warm and dry.      Capillary Refill: Capillary refill takes less than 2 seconds.      Findings: No rash.   Neurological:      Mental Status: She is alert.      Comments: Intubated but awake and alert and following commands         Objective:  I have reviewed all medications, laboratory results, and imaging pertinent for today's encounter    Assessment/Plan:  Pt is a 45 y/o F w/ a PMHx of COPD (4-5L@home), DVT/PE (on Xarelto), schizophrenia, migraine headaches, HTN, HLD, current smoker, GERD, IDDM, and hypothyroidism who presented to [facility] emergency department by EMS for altered mental status in the setting of acute hypercapnic/hypoxemic respiratroy failure due to multifocal pneumonia requiring intubation. Pt currently improving and with decreasing oxygen requirements.    Neuro/Psych/Pain Ctrl/Sedation:  #Metabolic Encephalopathy - in the setting of acute on chronic hypercapnic respiratory failure  #History of Schizophrenia  - Will keep RASS 0 to -1 today and SAT  -  CAM-ICU and delirium precautions  - Aspiration precautions  - Will c/ patient's Buspar  - Will restart patient Clonazepam and Escitalopram  - Starting Precedex for RASS of 0 to -2    Respiratory/ENT:  #Acute on chronic hypercapnic/hypoxemic respiratory failure  #COPD Exacerbation  #Multifocal PNA  #Need for Mechanical Ventilation  -Will c/w duonebs  -C/W 40mg IV solumedrol daily (Day 3) for COPD Exacc and treatment of  Multifocal CAP (near equivalent to 200mg hydrocortisone dose)  -Abx as below  -Wean FiO2 and vent settings and possible SBT today - will need extubate to BiPAP as the patient is high risk    Cardiovascular:  #History of DVT/PE  #History of HTN  #History of HLD  #Elevated lactic acid  - Unclear etiology of lactic acid possible Type B but clinically looks well  -C/W Home xarelto    Renal/Volume Status (Intra & Extravascular):  - No current active issues  - Monitor UOP Q4H and Cr daily  - Avoid nephrotoxic drugs and renally dose all medications    GI:  #GERD  -C/W PPI  -If unable to extubate today will start Tube feeds    Infectious Disease:  #Multifocal PNA  -C/W Ceftriaxone (Day 3)  -Resp cultures showing GPC  -Discontinue Azithro and urine leg negative  -Will follow-up urine leg and strep and discontinue azithromycin as indicated    Heme/Onc:  #History of DVT/PE  - No current active issues  - Monitor Hgb Daily and transfuse for Hgb<7 or bleeding with hemodynamic instability  - Xarelto for VTE Ppx    Endocrine  #Hyperglycemia - resolved  #History of type 2 diabetes on insulin  #History of hypothyroidism  - C/W home lantus 10U now and will c/w ISS - will increase basal bolus and ISS as needed to meet below goals  - Check Finger sticks Q6H for now  - Finger sticks goals 100-180  - C/W home Levothyroxine    Ethics/Code Status:  -Full code    :  DVT Prophylaxis: Xarelto  GI Prophylaxis: PPI  Bowel Regimen: None  Diet: NPO - possible tube feeds  CVC: None  [place]: None  Tran:  5/16/24  Restraints: Yes  Dispo: ICU    Critical Care Time: 35 minutes  Critical Care Activities: Management and titration of mechanical ventillation, coordination of consultation services involved within the patient's care, and time spent with patient explaining current diagnosis and treatment plan and answering any questions.    Amado Moon MD

## 2024-05-18 LAB
ANION GAP SERPL CALC-SCNC: 13 MMOL/L (ref 10–20)
BACTERIA SPEC RESP CULT: ABNORMAL
BACTERIA UR CULT: ABNORMAL
BUN SERPL-MCNC: 19 MG/DL (ref 6–23)
CALCIUM SERPL-MCNC: 9.5 MG/DL (ref 8.6–10.3)
CHLORIDE SERPL-SCNC: 105 MMOL/L (ref 98–107)
CO2 SERPL-SCNC: 29 MMOL/L (ref 21–32)
CREAT SERPL-MCNC: 0.64 MG/DL (ref 0.5–1.05)
EGFRCR SERPLBLD CKD-EPI 2021: >90 ML/MIN/1.73M*2
ERYTHROCYTE [DISTWIDTH] IN BLOOD BY AUTOMATED COUNT: 17 % (ref 11.5–14.5)
GLUCOSE BLD MANUAL STRIP-MCNC: 125 MG/DL (ref 74–99)
GLUCOSE BLD MANUAL STRIP-MCNC: 146 MG/DL (ref 74–99)
GLUCOSE BLD MANUAL STRIP-MCNC: 156 MG/DL (ref 74–99)
GLUCOSE BLD MANUAL STRIP-MCNC: 192 MG/DL (ref 74–99)
GLUCOSE SERPL-MCNC: 157 MG/DL (ref 74–99)
GRAM STN SPEC: ABNORMAL
GRAM STN SPEC: ABNORMAL
HCT VFR BLD AUTO: 41.1 % (ref 36–46)
HGB BLD-MCNC: 12.1 G/DL (ref 12–16)
LACTATE SERPL-SCNC: 0.9 MMOL/L (ref 0.4–2)
MAGNESIUM SERPL-MCNC: 1.87 MG/DL (ref 1.6–2.4)
MCH RBC QN AUTO: 30.3 PG (ref 26–34)
MCHC RBC AUTO-ENTMCNC: 29.4 G/DL (ref 32–36)
MCV RBC AUTO: 103 FL (ref 80–100)
NRBC BLD-RTO: 0 /100 WBCS (ref 0–0)
PLATELET # BLD AUTO: 141 X10*3/UL (ref 150–450)
POTASSIUM SERPL-SCNC: 4.2 MMOL/L (ref 3.5–5.3)
RBC # BLD AUTO: 3.99 X10*6/UL (ref 4–5.2)
SODIUM SERPL-SCNC: 143 MMOL/L (ref 136–145)
WBC # BLD AUTO: 10.1 X10*3/UL (ref 4.4–11.3)

## 2024-05-18 PROCEDURE — 2500000005 HC RX 250 GENERAL PHARMACY W/O HCPCS: Performed by: STUDENT IN AN ORGANIZED HEALTH CARE EDUCATION/TRAINING PROGRAM

## 2024-05-18 PROCEDURE — 2500000004 HC RX 250 GENERAL PHARMACY W/ HCPCS (ALT 636 FOR OP/ED): Performed by: NURSE PRACTITIONER

## 2024-05-18 PROCEDURE — 2500000002 HC RX 250 W HCPCS SELF ADMINISTERED DRUGS (ALT 637 FOR MEDICARE OP, ALT 636 FOR OP/ED): Performed by: STUDENT IN AN ORGANIZED HEALTH CARE EDUCATION/TRAINING PROGRAM

## 2024-05-18 PROCEDURE — 99233 SBSQ HOSP IP/OBS HIGH 50: CPT | Performed by: STUDENT IN AN ORGANIZED HEALTH CARE EDUCATION/TRAINING PROGRAM

## 2024-05-18 PROCEDURE — 2500000006 HC RX 250 W HCPCS SELF ADMINISTERED DRUGS (ALT 637 FOR ALL PAYERS): Performed by: STUDENT IN AN ORGANIZED HEALTH CARE EDUCATION/TRAINING PROGRAM

## 2024-05-18 PROCEDURE — 2500000001 HC RX 250 WO HCPCS SELF ADMINISTERED DRUGS (ALT 637 FOR MEDICARE OP): Performed by: OBSTETRICS & GYNECOLOGY

## 2024-05-18 PROCEDURE — 94640 AIRWAY INHALATION TREATMENT: CPT | Performed by: STUDENT IN AN ORGANIZED HEALTH CARE EDUCATION/TRAINING PROGRAM

## 2024-05-18 PROCEDURE — 1210000001 HC SEMI-PRIVATE ROOM DAILY

## 2024-05-18 PROCEDURE — 2500000004 HC RX 250 GENERAL PHARMACY W/ HCPCS (ALT 636 FOR OP/ED): Performed by: STUDENT IN AN ORGANIZED HEALTH CARE EDUCATION/TRAINING PROGRAM

## 2024-05-18 PROCEDURE — 97162 PT EVAL MOD COMPLEX 30 MIN: CPT | Mod: GP | Performed by: PHYSICAL THERAPIST

## 2024-05-18 PROCEDURE — 2500000001 HC RX 250 WO HCPCS SELF ADMINISTERED DRUGS (ALT 637 FOR MEDICARE OP): Performed by: STUDENT IN AN ORGANIZED HEALTH CARE EDUCATION/TRAINING PROGRAM

## 2024-05-18 PROCEDURE — 82947 ASSAY GLUCOSE BLOOD QUANT: CPT

## 2024-05-18 PROCEDURE — 99221 1ST HOSP IP/OBS SF/LOW 40: CPT | Performed by: PSYCHIATRY & NEUROLOGY

## 2024-05-18 PROCEDURE — 2500000006 HC RX 250 W HCPCS SELF ADMINISTERED DRUGS (ALT 637 FOR ALL PAYERS): Performed by: NURSE PRACTITIONER

## 2024-05-18 PROCEDURE — 2500000004 HC RX 250 GENERAL PHARMACY W/ HCPCS (ALT 636 FOR OP/ED): Performed by: OBSTETRICS & GYNECOLOGY

## 2024-05-18 PROCEDURE — 2500000006 HC RX 250 W HCPCS SELF ADMINISTERED DRUGS (ALT 637 FOR ALL PAYERS): Performed by: OBSTETRICS & GYNECOLOGY

## 2024-05-18 PROCEDURE — 2500000001 HC RX 250 WO HCPCS SELF ADMINISTERED DRUGS (ALT 637 FOR MEDICARE OP): Performed by: NURSE PRACTITIONER

## 2024-05-18 PROCEDURE — 83605 ASSAY OF LACTIC ACID: CPT | Performed by: STUDENT IN AN ORGANIZED HEALTH CARE EDUCATION/TRAINING PROGRAM

## 2024-05-18 PROCEDURE — 94640 AIRWAY INHALATION TREATMENT: CPT

## 2024-05-18 PROCEDURE — 94660 CPAP INITIATION&MGMT: CPT

## 2024-05-18 PROCEDURE — 85027 COMPLETE CBC AUTOMATED: CPT | Performed by: NURSE PRACTITIONER

## 2024-05-18 PROCEDURE — 83735 ASSAY OF MAGNESIUM: CPT | Performed by: NURSE PRACTITIONER

## 2024-05-18 PROCEDURE — 2500000005 HC RX 250 GENERAL PHARMACY W/O HCPCS

## 2024-05-18 PROCEDURE — 36415 COLL VENOUS BLD VENIPUNCTURE: CPT | Performed by: NURSE PRACTITIONER

## 2024-05-18 PROCEDURE — 80048 BASIC METABOLIC PNL TOTAL CA: CPT | Performed by: NURSE PRACTITIONER

## 2024-05-18 PROCEDURE — 2500000002 HC RX 250 W HCPCS SELF ADMINISTERED DRUGS (ALT 637 FOR MEDICARE OP, ALT 636 FOR OP/ED): Performed by: NURSE PRACTITIONER

## 2024-05-18 PROCEDURE — 97165 OT EVAL LOW COMPLEX 30 MIN: CPT | Mod: GO

## 2024-05-18 RX ORDER — TRANEXAMIC ACID 650 MG/1
1300 TABLET ORAL ONCE
Status: COMPLETED | OUTPATIENT
Start: 2024-05-18 | End: 2024-05-18

## 2024-05-18 RX ORDER — CLONAZEPAM 0.5 MG/1
0.5 TABLET ORAL 2 TIMES DAILY PRN
Status: DISCONTINUED | OUTPATIENT
Start: 2024-05-18 | End: 2024-05-19 | Stop reason: HOSPADM

## 2024-05-18 RX ORDER — CELECOXIB 100 MG/1
400 CAPSULE ORAL ONCE
Status: COMPLETED | OUTPATIENT
Start: 2024-05-18 | End: 2024-05-18

## 2024-05-18 RX ORDER — SODIUM CHLORIDE, SODIUM LACTATE, POTASSIUM CHLORIDE, CALCIUM CHLORIDE 600; 310; 30; 20 MG/100ML; MG/100ML; MG/100ML; MG/100ML
100 INJECTION, SOLUTION INTRAVENOUS CONTINUOUS
Status: DISCONTINUED | OUTPATIENT
Start: 2024-05-18 | End: 2024-05-19

## 2024-05-18 RX ORDER — DOXYCYCLINE HYCLATE 100 MG
100 TABLET ORAL EVERY 12 HOURS SCHEDULED
Status: DISCONTINUED | OUTPATIENT
Start: 2024-05-19 | End: 2024-05-18

## 2024-05-18 RX ORDER — PALIPERIDONE 3 MG/1
3 TABLET, EXTENDED RELEASE ORAL DAILY
Status: DISCONTINUED | OUTPATIENT
Start: 2024-05-18 | End: 2024-05-19 | Stop reason: HOSPADM

## 2024-05-18 RX ORDER — MAGNESIUM SULFATE HEPTAHYDRATE 40 MG/ML
2 INJECTION, SOLUTION INTRAVENOUS ONCE
Status: COMPLETED | OUTPATIENT
Start: 2024-05-18 | End: 2024-05-18

## 2024-05-18 RX ORDER — DOXYCYCLINE HYCLATE 100 MG
100 TABLET ORAL EVERY 12 HOURS SCHEDULED
Status: DISCONTINUED | OUTPATIENT
Start: 2024-05-19 | End: 2024-05-19

## 2024-05-18 RX ORDER — INSULIN LISPRO 100 [IU]/ML
0-10 INJECTION, SOLUTION INTRAVENOUS; SUBCUTANEOUS
Status: DISCONTINUED | OUTPATIENT
Start: 2024-05-18 | End: 2024-05-19 | Stop reason: HOSPADM

## 2024-05-18 RX ORDER — OLANZAPINE 10 MG/1
5 TABLET, ORALLY DISINTEGRATING ORAL NIGHTLY
Status: DISCONTINUED | OUTPATIENT
Start: 2024-05-18 | End: 2024-05-18

## 2024-05-18 RX ORDER — ACETAMINOPHEN 325 MG/1
975 TABLET ORAL ONCE
Status: COMPLETED | OUTPATIENT
Start: 2024-05-18 | End: 2024-05-18

## 2024-05-18 RX ORDER — CLONIDINE HYDROCHLORIDE 0.1 MG/1
0.1 TABLET ORAL EVERY 12 HOURS SCHEDULED
Status: DISCONTINUED | OUTPATIENT
Start: 2024-05-18 | End: 2024-05-19 | Stop reason: HOSPADM

## 2024-05-18 RX ORDER — GABAPENTIN 300 MG/1
600 CAPSULE ORAL ONCE
Status: COMPLETED | OUTPATIENT
Start: 2024-05-18 | End: 2024-05-18

## 2024-05-18 RX ORDER — HYDRALAZINE HYDROCHLORIDE 20 MG/ML
10 INJECTION INTRAMUSCULAR; INTRAVENOUS EVERY 6 HOURS PRN
Status: DISCONTINUED | OUTPATIENT
Start: 2024-05-18 | End: 2024-05-19 | Stop reason: HOSPADM

## 2024-05-18 RX ADMIN — CELECOXIB 400 MG: 100 CAPSULE ORAL at 19:42

## 2024-05-18 RX ADMIN — ESCITALOPRAM OXALATE 20 MG: 10 TABLET, FILM COATED ORAL at 08:20

## 2024-05-18 RX ADMIN — PRAZOSIN HYDROCHLORIDE 5 MG: 5 CAPSULE ORAL at 01:47

## 2024-05-18 RX ADMIN — PRAZOSIN HYDROCHLORIDE 5 MG: 5 CAPSULE ORAL at 20:06

## 2024-05-18 RX ADMIN — BUSPIRONE HYDROCHLORIDE 30 MG: 5 TABLET ORAL at 08:20

## 2024-05-18 RX ADMIN — IPRATROPIUM BROMIDE AND ALBUTEROL SULFATE 3 ML: 2.5; .5 SOLUTION RESPIRATORY (INHALATION) at 07:47

## 2024-05-18 RX ADMIN — PALIPERIDONE 3 MG: 3 TABLET, EXTENDED RELEASE ORAL at 12:14

## 2024-05-18 RX ADMIN — METHYLPREDNISOLONE SODIUM SUCCINATE 40 MG: 40 INJECTION, POWDER, FOR SOLUTION INTRAMUSCULAR; INTRAVENOUS at 08:20

## 2024-05-18 RX ADMIN — IPRATROPIUM BROMIDE AND ALBUTEROL SULFATE 3 ML: 2.5; .5 SOLUTION RESPIRATORY (INHALATION) at 00:04

## 2024-05-18 RX ADMIN — CLONAZEPAM 1 MG: 1 TABLET ORAL at 10:30

## 2024-05-18 RX ADMIN — GABAPENTIN 600 MG: 300 CAPSULE ORAL at 19:42

## 2024-05-18 RX ADMIN — CLONIDINE HYDROCHLORIDE 0.1 MG: 0.1 TABLET ORAL at 08:56

## 2024-05-18 RX ADMIN — INSULIN LISPRO 2 UNITS: 100 INJECTION, SOLUTION INTRAVENOUS; SUBCUTANEOUS at 16:50

## 2024-05-18 RX ADMIN — BUSPIRONE HYDROCHLORIDE 30 MG: 5 TABLET ORAL at 21:44

## 2024-05-18 RX ADMIN — TIOTROPIUM BROMIDE INHALATION SPRAY 2 PUFF: 3.12 SPRAY, METERED RESPIRATORY (INHALATION) at 06:30

## 2024-05-18 RX ADMIN — DEXMEDETOMIDINE HYDROCHLORIDE 0.84 MCG/KG/HR: 400 INJECTION INTRAVENOUS at 04:15

## 2024-05-18 RX ADMIN — Medication 5 L/MIN: at 20:00

## 2024-05-18 RX ADMIN — CLONAZEPAM 1 MG: 1 TABLET ORAL at 10:41

## 2024-05-18 RX ADMIN — IPRATROPIUM BROMIDE AND ALBUTEROL SULFATE 3 ML: 2.5; .5 SOLUTION RESPIRATORY (INHALATION) at 13:26

## 2024-05-18 RX ADMIN — PANTOPRAZOLE SODIUM 40 MG: 40 TABLET, DELAYED RELEASE ORAL at 06:30

## 2024-05-18 RX ADMIN — CLONIDINE HYDROCHLORIDE 0.1 MG: 0.1 TABLET ORAL at 20:06

## 2024-05-18 RX ADMIN — CEFTRIAXONE SODIUM 1 G: 1 INJECTION, SOLUTION INTRAVENOUS at 03:01

## 2024-05-18 RX ADMIN — LEVOTHYROXINE SODIUM 150 MCG: 75 TABLET ORAL at 05:50

## 2024-05-18 RX ADMIN — SODIUM CHLORIDE, POTASSIUM CHLORIDE, SODIUM LACTATE AND CALCIUM CHLORIDE 100 ML/HR: 600; 310; 30; 20 INJECTION, SOLUTION INTRAVENOUS at 19:41

## 2024-05-18 RX ADMIN — DEXMEDETOMIDINE HYDROCHLORIDE 1.5 MCG/KG/HR: 400 INJECTION INTRAVENOUS at 00:08

## 2024-05-18 RX ADMIN — Medication 5 L/MIN: at 07:51

## 2024-05-18 RX ADMIN — METOPROLOL TARTRATE 25 MG: 25 TABLET, FILM COATED ORAL at 20:06

## 2024-05-18 RX ADMIN — ACETAMINOPHEN 975 MG: 325 TABLET ORAL at 19:42

## 2024-05-18 RX ADMIN — Medication 5 L/MIN: at 21:37

## 2024-05-18 RX ADMIN — IPRATROPIUM BROMIDE AND ALBUTEROL SULFATE 3 ML: 2.5; .5 SOLUTION RESPIRATORY (INHALATION) at 21:37

## 2024-05-18 RX ADMIN — HYDROXYZINE PAMOATE 50 MG: 25 CAPSULE ORAL at 16:41

## 2024-05-18 RX ADMIN — INSULIN LISPRO 2 UNITS: 100 INJECTION, SOLUTION INTRAVENOUS; SUBCUTANEOUS at 20:06

## 2024-05-18 RX ADMIN — TRANEXAMIC ACID 1300 MG: 650 TABLET ORAL at 19:49

## 2024-05-18 RX ADMIN — HYDROXYZINE PAMOATE 50 MG: 25 CAPSULE ORAL at 05:03

## 2024-05-18 RX ADMIN — INSULIN GLARGINE 10 UNITS: 100 INJECTION, SOLUTION SUBCUTANEOUS at 08:22

## 2024-05-18 RX ADMIN — HYDROXYZINE PAMOATE 50 MG: 25 CAPSULE ORAL at 10:52

## 2024-05-18 RX ADMIN — MAGNESIUM SULFATE HEPTAHYDRATE 2 G: 40 INJECTION, SOLUTION INTRAVENOUS at 06:29

## 2024-05-18 RX ADMIN — RIVAROXABAN 10 MG: 10 TABLET, FILM COATED ORAL at 17:00

## 2024-05-18 RX ADMIN — SODIUM CHLORIDE, POTASSIUM CHLORIDE, SODIUM LACTATE AND CALCIUM CHLORIDE 1000 ML: 600; 310; 30; 20 INJECTION, SOLUTION INTRAVENOUS at 14:15

## 2024-05-18 ASSESSMENT — COGNITIVE AND FUNCTIONAL STATUS - GENERAL
MOVING TO AND FROM BED TO CHAIR: A LITTLE
DRESSING REGULAR LOWER BODY CLOTHING: A LITTLE
WALKING IN HOSPITAL ROOM: A LITTLE
PERSONAL GROOMING: A LITTLE
WALKING IN HOSPITAL ROOM: TOTAL
DRESSING REGULAR LOWER BODY CLOTHING: A LITTLE
MOBILITY SCORE: 20
HELP NEEDED FOR BATHING: A LITTLE
TURNING FROM BACK TO SIDE WHILE IN FLAT BAD: A LITTLE
CLIMB 3 TO 5 STEPS WITH RAILING: A LITTLE
CLIMB 3 TO 5 STEPS WITH RAILING: TOTAL
DAILY ACTIVITIY SCORE: 20
STANDING UP FROM CHAIR USING ARMS: A LITTLE
MOVING TO AND FROM BED TO CHAIR: A LITTLE
MOBILITY SCORE: 15
HELP NEEDED FOR BATHING: A LITTLE
TOILETING: A LITTLE
DAILY ACTIVITIY SCORE: 22
STANDING UP FROM CHAIR USING ARMS: A LITTLE

## 2024-05-18 ASSESSMENT — PAIN SCALES - GENERAL
PAINLEVEL_OUTOF10: 0 - NO PAIN

## 2024-05-18 ASSESSMENT — PAIN - FUNCTIONAL ASSESSMENT
PAIN_FUNCTIONAL_ASSESSMENT: 0-10

## 2024-05-18 ASSESSMENT — ACTIVITIES OF DAILY LIVING (ADL): BATHING_ASSISTANCE: STAND BY

## 2024-05-18 NOTE — PROGRESS NOTES
"[facility] Progress Note      Date:  2024  Patient:  Stephenie Mccray  YOB: 1980  MRN:  [id number]   Admit Date:  5/15/2024  ========================================================================================================    Chief Complaint   Patient presents with    Shortness of Breath     Pt arrives via ems for c/o shortness of breath and pt reportedly took \"2 edibles.\"      Interval ICU Events:  Pt over night did well on BiPAP overnight and was transitioned to her home 5L nC this AM. Pt states that she is feeling well and that she denies shortness of breath or abdominal pain or nausea, but that she is anxious because her  is stating that he wants to divorce her. Pt with UOP of 2.3L and net -1.4L with a Cr of 0.64 and stbale bicarb at 29.     Medical History:  Past Medical History:   Diagnosis Date    Acute on chronic respiratory failure (Multi)     Arthritis     COPD (chronic obstructive pulmonary disease) (Multi)     Diabetes mellitus (Multi)     type 2 IDDM    GERD (gastroesophageal reflux disease)     History of transfusion     History of venous thromboembolism     Hyperlipidemia     Hypertension     Hypothyroidism     Lipoma     Nephrolithiasis     CHARLIE (obstructive sleep apnea)     Ovarian teratoma     Schizophrenia (Multi)      Past Surgical History:   Procedure Laterality Date     SECTION, LOW TRANSVERSE      x 1    EYE SURGERY      TONSILLECTOMY      UMBILICAL HERNIA REPAIR      VENTRAL HERNIA REPAIR       Medications Prior to Admission   Medication Sig Dispense Refill Last Dose    alcohol swabs (Alcohol Prep Pads) pads, medicated Use 3 times daily prn 100 each 3 Unknown    atorvastatin (Lipitor) 20 mg tablet Take 1 tablet (20 mg) by mouth once daily. Dr. Davis covering for Dr. Yuen 30 tablet 0     BD Ultra-Fine Mini Pen Needle 31 gauge x 3/16\" needle USE TO INJECT 4 TIMES DAILY AS DIRECTED 400 each 3 Unknown    busPIRone (Buspar) 30 mg " tablet Take 1 tablet (30 mg) by mouth 2 times a day.   Unknown    clonazePAM (KlonoPIN) 1 mg tablet Take by mouth 2 times a day as needed.   Unknown    cloNIDine (Catapres) 0.1 mg tablet Take 1 tablet (0.1 mg) by mouth 2 times a day as needed (anxiety).   Unknown    escitalopram (Lexapro) 20 mg tablet Take 1 tablet (20 mg) by mouth once daily in the morning.   Unknown    hydrOXYzine pamoate (Vistaril) 50 mg capsule Take 1 capsule (50 mg) by mouth 3 times a day as needed for anxiety.   Unknown    insulin glargine (Lantus Solostar U-100 Insulin) 100 unit/mL (3 mL) pen Inject 10 Units under the skin once daily in the morning. Take as directed per insulin instructions. (Patient taking differently: Inject 18 Units under the skin once daily in the morning. Take as directed per insulin instructions.) 3 mL 2     insulin lispro (HumaLOG) 100 unit/mL injection Inject 0.04 mL (4 Units) under the skin 3 times a day with meals. Plus sliding scale       ipratropium-albuteroL (Duo-Neb) 0.5-2.5 mg/3 mL nebulizer solution Take 3 mL by nebulization every 6 hours if needed for shortness of breath or wheezing. (Patient taking differently: Take 3 mL by nebulization every 4 hours. As needed) 180 mL 1     levothyroxine (Synthroid, Levoxyl) 150 mcg tablet Take 1 tablet (150 mcg) by mouth once daily. 90 tablet 1 Unknown    metoprolol tartrate (Lopressor) 25 mg tablet Take 1 tablet (25 mg) by mouth 2 times a day.   Unknown    nicotine (Nicoderm CQ) 14 mg/24 hr patch Place 1 patch over 24 hours on the skin once daily for 14 doses. Do not start before February 21, 2024. 14 patch 0     nicotine (Nicoderm CQ) 21 mg/24 hr patch Place 1 patch over 24 hours on the skin once daily for 37 doses. Do not start before January 15, 2024. 30 patch 1     nicotine polacrilex (Commit) 4 mg lozenge Dissolve 1 lozenge (4 mg) in the mouth every 2 hours if needed for smoking cessation. 100 lozenge 0     ondansetron ODT (Zofran-ODT) 4 mg disintegrating tablet Take  1 tablet (4 mg) by mouth every 8 hours if needed for nausea or vomiting.   Unknown    OneTouch Verio test strips strip    Unknown    paliperidone (Invega) 3 mg 24 hr tablet Take 1 tablet (3 mg) by mouth once daily. Do not crush, chew, or split. Do not start before January 15, 2024. 30 tablet 0     paliperidone palmitate ER (Invega Sustenna) 234 mg/1.5 mL syringe Inject 1.5 mL (234 mg) into the muscle every 28 (twenty-eight) days.   Unknown    pantoprazole (ProtoNix) 40 mg EC tablet Take 1 tablet (40 mg) by mouth once daily. 90 tablet 1 Unknown    prazosin (Minipress) 5 mg capsule Take 1 capsule (5 mg) by mouth once daily at bedtime.   Unknown    tiotropium (Spiriva Respimat) 2.5 mcg/actuation inhaler Inhale 2 puffs once daily. Do not start before March 9, 2024. (Patient taking differently: Inhale 2 puffs once daily as needed.) 8 g 11 Unknown    Xarelto 10 mg tablet Take 1 tablet (10 mg) by mouth once daily.   Unknown     Fluticasone furoate-vilanterol, Fluticasone-umeclidin-vilanter, Levofloxacin, Sumatriptan, and Vortioxetine  Social History     Tobacco Use    Smoking status: Every Day     Current packs/day: 1.00     Types: Cigarettes     Passive exposure: Never    Smokeless tobacco: Never   Substance Use Topics    Alcohol use: Never    Drug use: Yes     Types: Marijuana     Family History   Problem Relation Name Age of Onset    Fibromyalgia Father      Hypertension Brother      Thyroid disease Maternal Grandmother      Thyroid disease Paternal Grandmother      Lung cancer Paternal Grandfather      Coronary artery disease Other Grandmother        Review of Systems:  14 point review of systems was completed and negative except for those specially mention in my HPI    Physical Exam:    Heart Rate:  []   Temp:  [35.7 °C (96.3 °F)-36.5 °C (97.7 °F)]   Resp:  [15-33]   BP: (127-179)/()   Weight:  [72.3 kg (159 lb 6.3 oz)]   SpO2:  [89 %-100 %]     Physical Exam  Constitutional:       Comments: Awake and  alert and following all commands and appears well   Eyes:      General: No scleral icterus.     Extraocular Movements: Extraocular movements intact.      Pupils: Pupils are equal, round, and reactive to light.   Cardiovascular:      Rate and Rhythm: Normal rate and regular rhythm.      Pulses: Normal pulses.      Heart sounds: No murmur heard.  Pulmonary:      Effort: Pulmonary effort is normal.      Breath sounds: Wheezing present. No rales.      Comments: No further rales but faint bilateral expiratory wheezes in anterior and posterior fields and no appreciated rales today  Abdominal:      General: Abdomen is flat. There is no distension.      Palpations: Abdomen is soft.      Tenderness: There is no abdominal tenderness.   Musculoskeletal:      Right lower leg: No edema.      Left lower leg: No edema.   Skin:     General: Skin is warm and dry.      Capillary Refill: Capillary refill takes less than 2 seconds.      Findings: No rash.   Neurological:      General: No focal deficit present.      Mental Status: She is alert and oriented to person, place, and time. Mental status is at baseline.      Cranial Nerves: No cranial nerve deficit.      Motor: No weakness.      Comments: Alert and oriented x 4 and following all commands  Moving all extremities without deficits  Cranial nerves intact without deficits and EOMs normal       Objective:  I have reviewed all medications, laboratory results, and imaging pertinent for today's encounter    Assessment/Plan:  Pt is a 45 y/o F w/ a PMHx of COPD (4-5L@home), DVT/PE (on Xarelto), schizophrenia, migraine headaches, HTN, HLD, current smoker, GERD, IDDM, and hypothyroidism who presented to [facility] emergency department by EMS for altered mental status in the setting of acute hypercapnic/hypoxemic respiratroy failure due to multifocal pneumonia requiring intubation. Pt currently s/p extubation and doing well on her home oxygen requirements but highly anxious.    Neuro/Psych/Pain  Ctrl/Sedation:  #Metabolic Encephalopathy - in the setting of acute on chronic hypercapnic respiratory failure  #History of Schizophrenia  - CAM-ICU and delirium precautions  - Aspiration precautions  - Will c/ patient's Buspar, Clonazepam, Clonazepam, hydroxyzine, and Escitalopram  -Pt will be placed back on her oral Invega and will need her injection on Monday 5/20/24 if still in hospital  -Will restart patient clonidine  - Will appreciate psychiatry recs as they have been consulted  -Pt possibly can go home but will get PT/OT to see before discharging    Respiratory/ENT:  #Acute on chronic hypercapnic/hypoxemic respiratory failure  #COPD Exacerbation  #Multifocal PNA  #Need for Mechanical Ventilation  -Will c/w duonebs  - Will convert IV steroids to PO today to finish 2 more days (ending 5/20/24  -Abx as below  -Continue with supplemental O2 and titrate to SpO2>90%  -Needs Nighttime BiPAP    Cardiovascular:  #History of DVT/PE  #History of HTN  #History of HLD  #Elevated lactic acid  -C/W Home xarelto    Renal/Volume Status (Intra & Extravascular):  - No current active issues  - Monitor UOP Q4H and Cr daily  - Avoid nephrotoxic drugs and renally dose all medications    GI:  #GERD  -C/W PPI  -If unable to extubate today will start Tube feeds    Infectious Disease:  #Multifocal PNA  -Day 4 of Abx and will switch Abx to PO doxy given allergies to complete a 7 day course (to end 5/21/24)  -Resp cultures showing GPC    Heme/Onc:  #History of DVT/PE  - No current active issues  - Monitor Hgb Daily and transfuse for Hgb<7 or bleeding with hemodynamic instability  - Xarelto for VTE Ppx    Endocrine  #Hyperglycemia - resolved  #History of type 2 diabetes on insulin  #History of hypothyroidism  - C/W home lantus 10U now and will c/w ISS - will increase basal bolus and ISS as needed to meet below goals  - Check Finger sticks Q6H for now  - Finger sticks goals 100-180  - C/W home Levothyroxine    Ethics/Code Status:  -Full  code    :  DVT Prophylaxis: Xarelto  GI Prophylaxis: PPI  Bowel Regimen: None  Diet: NPO - possible tube feeds  CVC: None  [place]: None  Tran: 5/16/24  Restraints: Yes  Dispo: Transfer to Floor vs discharge pending PT charbel Moon MD

## 2024-05-18 NOTE — CARE PLAN
Problem: Pain - Adult  Pt educated in pain scale as appropriate for understanding and condition. Baseline level of tolerable pain assessed and documented. Interventions reassessed at proper interval for effectiveness and documented per policy.   Goal: Verbalizes/displays adequate comfort level or baseline comfort level  Outcome: Progressing     Problem: Discharge Planning  Pt information obtained at admission about typical living arrangement and level of community support that pt has access to. Changes in level of function evaluated by multidisciplinary team and plan made for optimal level of pt independence and function once discharged from this hospital facility. Family or significant social support personnel sought for corroborating input if pt unable to speak for self or appears to be less than forthcoming with factual report of situation.      Goal: Discharge to home or other facility with appropriate resources  Outcome: Progressing     Problem: Diabetes  Accu checks every 6 hrs with SSI as appropriate per provider parameters.  Goal: Increase stability of blood glucose readings by end of shift  Outcome: Progressing     Problem: Diabetes  Goal: Maintain glucose levels >70mg/dl to <250mg/dl throughout shift  Outcome: Progressing     Problem: Skin  Pt turned an repositioned every 2hrs. Heels elevated off bed with pillows or protective devices. Bony prominences padded with mepilex foam dressings. Moisture barriers applied to skin exposed to incontinence or wound drainage.     Goal: Prevent/manage excess moisture  Outcome: Progressing     Problem: Skin  Goal: Prevent/minimize sheer/friction injuries  Outcome: Progressing     Problem: Skin  Goal: Promote/optimize nutrition  Outcome: Progressing     Problem: Daily Care  Pt oriented to ICU routine for vitals and assessments, location of bed controls and call bell system demonstrated, reinforced as neccessary,  oral and bodily hygiene assured at regular intervals and  "as needed. ROM of limbs demonstrating weakness performed. Diversionary activities provided, personal effects, phone and visual or hearing aides kept in reach on bedside table .    Goal: Daily care needs are met  Outcome: Progressing     Problem: Psychosocial Needs  Goal: Demonstrates ability to cope with hospitalization/illness  Outcome: Progressing     Problem: Psychosocial Needs  Pt presents difficult to control experience of anxiety. Continuous precedex at maximal rate with addition of clonazepam, hydroxyzine, buspar with pt requesting additional medication. Psych consult was placed. Provider returned call and stated will see pt in morning if there is not an acute crisis ongoing, which there is not. Pt was able ot sleep with the above mentioned medications. Will wean back precedex prior to giving prazosin.  Goal: Collaborate with me, my family, and caregiver to identify my specific goals  Outcome: Progressing     Problem: Discharge Barriers  Family dynamics with  are strained.   Goal: My discharge needs are met  Outcome: Progressing     Problem: Fall/Injury  Falls prevention measures have been explained. Pt warned of bed alarm use if activated, use of nurse call bell system detailed and modifications made to devices if activation is difficult.   Purposeful hourly rounding program explained for assurance of the \"five Ps (pain, position, potty, possessions, privacy). Monitoring devices, IV tubing and SCD connecting tubes shown to pt to educate on their contribution to falling hazard and personal injury potential.   The above points reviewed with pts lacking retention of new information until evidence of learning is shown.     Goal: Not fall by end of shift  Outcome: Progressing     Problem: Fall/Injury  Goal: Verbalize understanding of personal risk factors for fall in the hospital  Outcome: Progressing     Problem: Fall/Injury  Goal: Be free from injury by end of the shift  Outcome: Progressing     "

## 2024-05-18 NOTE — PROGRESS NOTES
Occupational Therapy    Evaluation    Patient Name: Stephenie Mccray  MRN: [id number]  Today's Date: 5/18/2024  Time Calculation  Start Time: 1256  Stop Time: 1315  Time Calculation (min): 19 min        Assessment:  OT Assessment: Decreased balance, strength, endurance  Prognosis: Fair  End of Session Communication: Bedside nurse  End of Session Patient Position: Bed, 3 rail up (sitter present)  OT Assessment Results: Decreased ADL status, Decreased upper extremity strength, Decreased endurance, Decreased functional mobility, Decreased IADLs  Prognosis: Fair  Plan:  Treatment Interventions: ADL retraining, Functional transfer training, UE strengthening/ROM, Endurance training, Patient/family training  OT Frequency: 2 times per week  OT Discharge Recommendations: Low intensity level of continued care  Equipment Recommended upon Discharge:  (TBD, none at this time)  OT - OK to Discharge: Yes (ok to d/c to next level of care once medically cleared)  Treatment Interventions: ADL retraining, Functional transfer training, UE strengthening/ROM, Endurance training, Patient/family training    Subjective   Current Problem:  1. Shortness of breath        2. Altered mental status, unspecified altered mental status type        3. Acute respiratory failure with hypercapnia (Multi)          General:  General  Reason for Referral: self care impairment  Referred By: Sarai  Past Medical History Relevant to Rehab: Schizophrenia, COPD, home O2, PTSD, nephrolithiasis, smoker, marijuana use, DM, hypothyroidism, obesity, CHARLIE, HTN  Family/Caregiver Present: Yes  Caregiver Feedback: Mother present for 2nd half of session  Prior to Session Communication: Bedside nurse  Patient Position Received: Bed, 3 rail up (1:1 sitter present d/t suicidal ideation, Tele, 5LO2)  General Comment: To ED 5/18 with c/o SOB and AMS after eating 2 marijuana gummies; pCO2 98, pH 7.24. In ED, pt's respiratory status worsened and pt. was intubated and admitted  to ICU for acute on chronic hypoxic/hypercapnic respiratory failure. Pt. was extubated on 5/17. On 5/18, pt. became agitated and made comments concerning for suicidal ideation. Sitter placed in room and psych consulted. Pt. restarted on psych meds 5/18. CT chest: Bilateral upper and lower lobe consolidation is most consistent with atelectasis, however, superimposed pneumonia is not excluded.  Precautions:  Medical Precautions: Fall precautions  Precautions Comment: Orthostatic hypotension precautions  Vital Signs:  Heart Rate:  (supine 70 bpm, sitting 106 bpm, standing 128 bpm)  SpO2: 96 % (on 5L O2)  BP:  (seated 151/94, standing 113/83)  Pain:  Pain Assessment  Pain Assessment: 0-10  Pain Score: 0 - No pain    Objective   Cognition:  Overall Cognitive Status:  (Pt tearful towards end of session d/t spouse not coming to see her, mother providing emotional support)  Orientation Level: Oriented X4  Impulsive: Mildly           Home Living:  Home Living Comments: Lives with spouse in 1 story home with 3 ROBYN with HR. Does not own DME but has home O2. Has a tub shower, no equipment.  Prior Function:  Prior Function Comments: Indep. amb. without AD. Completes all ADLs/iADLs IND. Denies any recent falls. Does not drive. Does not work. Uses 5LO2 PRN.     ADL:  Eating Assistance: Independent  Grooming Assistance: Stand by  Bathing Assistance: Stand by  UE Dressing Assistance: Independent  LE Dressing Assistance: Stand by  Toileting Assistance with Device: Stand by  Activity Tolerance:  Endurance: Decreased tolerance for upright activites  Activity Tolerance Comments: mild FARIA, orthostatic hypotension  Bed Mobility/Transfers: Bed Mobility  Bed Mobility:  (Supine to/from sit with SUP)    Transfers  Transfer:  (Sit to/from stand without AD and CGA/MIN Ax1)      Functional Mobility:  Functional Mobility  Functional Mobility Performed:  (Pt ambulated 3 feet no device with CGA/MIN Ax1, HR in 130s and pt returned to EOB. RN arrived  and ortho BPs were assessed (see vitals). Pt ambulated 2nd trial 3-4 side steps towards HOB with CGA/MIN Ax1.)  Sitting Balance:  Static Sitting Balance  Static Sitting-Comment/Number of Minutes: fair +  Dynamic Sitting Balance  Dynamic Sitting-Comments: fair +  Standing Balance:  Static Standing Balance  Static Standing-Comment/Number of Minutes: fair  Dynamic Standing Balance  Dynamic Standing-Comments: fair -      Strength:  Strength Comments: BUE strength grossly 4+/5      Hand Function:  Gross Grasp: Functional  Extremities: RUE   RUE : Within Functional Limits and LUE   LUE: Within Functional Limits    Outcome Measures:[facility] Daily Activity  Putting on and taking off regular lower body clothing: A little  Bathing (including washing, rinsing, drying): A little  Putting on and taking off regular upper body clothing: None  Toileting, which includes using toilet, bedpan or urinal: A little  Taking care of personal grooming such as brushing teeth: A little  Eating Meals: None  Daily Activity - Total Score: 20    Education Documentation  Body Mechanics, taught by Stephenie Horne, OT at 5/18/2024  2:35 PM.  Learner: Patient  Readiness: Acceptance  Method: Explanation, Demonstration  Response: Verbalizes Understanding, Demonstrated Understanding, Needs Reinforcement    IP EDUCATION:  Education  Individual(s) Educated: Patient  Education Comment: Pt educated on safe functional transfer/mobility techniques. Verbalized/demo good understanding.    Goals:  Encounter Problems       Encounter Problems (Active)       OT Goals       Pt will complete all functional transfers with MOD I. (Progressing)       Start:  05/18/24    Expected End:  06/01/24            Pt will complete all functional mobility with MOD I. (Progressing)       Start:  05/18/24    Expected End:  06/01/24            Pt will improve dynamic standing balance to fair + in prep for ADL tasks. (Progressing)       Start:  05/18/24    Expected End:  06/01/24             Pt will complete LB dressing with MOD I. (Progressing)       Start:  05/18/24    Expected End:  06/01/24

## 2024-05-18 NOTE — CONSULTS
Reason For Consult  Altered mental status, medication management    History Of Present Illness  Stephenie Mccray is a 44 y.o. female presenting with respiratory depression and adult mental status following ingestion Gummies.  The patient has history of schizophrenia, she follows up at the [facility].  She was admitted to the MICU for respiratory depression, she was intubated and extubated.  I interviewed the patient, reviewed the records and discussed the case with staff  She tells me that her last appointment with her psychiatrist was last week.  She sees her psychiatrist regularly.  She has a diagnosis of schizophrenia on long-acting Invega Sustenna unclear when was the last time she received it.  She is also prescribed Invega p.o. 3 mg daily.  Lexapro 20 mg daily, buspirone 30 mg twice a day, clonidine and clonazepam 1 mg p.o. twice daily.  OAARS reviewed, last prescribed on 2024 she received 60 tablets  Currently, she feels her schizophrenia is getting worse as she has not received her medications yet.  She stated she hears chattering    Past psychiatric history  2 inpatient psychiatric admissions the most recent 1 in   Past diagnoses include schizophrenia and PTSD     Past Medical History  She has a past medical history of Acute on chronic respiratory failure (Multi), Arthritis, COPD (chronic obstructive pulmonary disease) (Multi), Diabetes mellitus (Multi), GERD (gastroesophageal reflux disease), History of transfusion, History of venous thromboembolism, Hyperlipidemia, Hypertension, Hypothyroidism, Lipoma, Nephrolithiasis, CHARLIE (obstructive sleep apnea), Ovarian teratoma, and Schizophrenia (Multi).    Surgical History  She has a past surgical history that includes Tonsillectomy; Eye surgery;  section, low transverse; Ventral hernia repair; and Umbilical hernia repair.     Social History  She reports that she has been smoking cigarettes. She has never been exposed to tobacco smoke. She has  "never used smokeless tobacco. She reports current drug use. Drug: Marijuana. She reports that she does not drink alcohol.    Family History  Family History   Problem Relation Name Age of Onset    Fibromyalgia Father      Hypertension Brother      Thyroid disease Maternal Grandmother      Thyroid disease Paternal Grandmother      Lung cancer Paternal Grandfather      Coronary artery disease Other Grandmother    Positive for bipolar disorder     Allergies  Fluticasone furoate-vilanterol, Fluticasone-umeclidin-vilanter, Levofloxacin, Sumatriptan, and Vortioxetine     Physical Exam  She was awake alert and oriented to time place and situation.  She appeared as of stated age in no acute physical distress.  Mood was dysphoric and angry.  Affect was restricted.  No thoughts of suicide or homicide.  Did not appear to attend or respond to internal stimuli but reported auditory hallucinations.  No delusions  She was restless but no agitation.  I did not see any tremor or tardive dyskinesia  Attention and concentration were intact  Insight and judgment were fair  Speech was of normal rate and rhythm     Last Recorded Vitals  Blood pressure (!) 172/103, pulse 86, temperature 35.7 °C (96.3 °F), temperature source Temporal, resp. rate 20, height 1.549 m (5' 1\"), weight 72.3 kg (159 lb 6.3 oz), SpO2 91%.     Assessment/Plan   44-year-old female admitted for altered mental status following respiratory depression after ingesting some Gummies for recreational use.  She is already established with the [facility].  Diagnosed with schizophrenia.  Stable on current medications.  THC abuse caused worsening of the psychosis.  I suggest to continue medical treatment for respiratory depression and altered mental status.  Resume her home medications which include Invega 3 mg p.o. daily, Invega Sustenna we will need to verify the dose and administer it if it is due.  It is given monthly so we are not sure when was the last time it was " received that will need to be verified.  I will also suggest resuming Lexapro, hydroxyzine, clonidine as prescribed.  Due to respiratory depression, I suggest cutting back on clonazepam from 1 mg twice a day to 0.5 mg twice a day and that she needs to talk to her outpatient psychiatrist to be taken off of this medicine gradually.    Diagnosis schizophrenia  THC abuse  Naveed Rowley MD

## 2024-05-18 NOTE — CONSULTS
Reason For Consult  Acute on chronic hypoxic respiratory failure, COPD exacerbation.  Patient is my office patient follow-up for COPD And sleep apnea.    History Of Present Illness  Stephenie Mccray is a 44 y.o. female presenting with with shortness of breath after taking Gummies.  Patient has history of advanced COPD.  Patient became more confused up to go measle more short of breath there was concern for other substances in Gummies patient given naloxone no improvement patient was on oxygen then subsequently nonrebreather and BiPAP patient's respiratory status status continued to get worse with increasing CO2 patient was intubated for respiratory failure was given IV fluid and epi to maintain the blood pressure.  Patient was admitted to ICU remains on ventilator yesterday and extubated today.     Past Medical History  She has a past medical history of Acute on chronic respiratory failure (Multi), Arthritis, COPD (chronic obstructive pulmonary disease) (Multi), Diabetes mellitus (Multi), GERD (gastroesophageal reflux disease), History of transfusion, History of venous thromboembolism, Hyperlipidemia, Hypertension, Hypothyroidism, Lipoma, Nephrolithiasis, CHARLIE (obstructive sleep apnea), Ovarian teratoma, and Schizophrenia (Multi).    Surgical History  She has a past surgical history that includes Tonsillectomy; Eye surgery;  section, low transverse; Ventral hernia repair; and Umbilical hernia repair.     Social History  She reports that she has been smoking cigarettes. She has never been exposed to tobacco smoke. She has never used smokeless tobacco. She reports current drug use. Drug: Marijuana. She reports that she does not drink alcohol.    Family History  Family History   Problem Relation Name Age of Onset    Fibromyalgia Father      Hypertension Brother      Thyroid disease Maternal Grandmother      Thyroid disease Paternal Grandmother      Lung cancer Paternal Grandfather      Coronary artery disease  Other Grandmother         Allergies  Fluticasone furoate-vilanterol, Fluticasone-umeclidin-vilanter, Levofloxacin, Sumatriptan, and Vortioxetine    Review of Systems  Review of Systems   Constitutional:  Positive for activity change, appetite change and fatigue.   HENT:  Positive for congestion.    Eyes: Negative.    Respiratory:  Positive for cough, chest tightness, shortness of breath and wheezing.    Cardiovascular: Negative.    Gastrointestinal: Negative.    Endocrine: Negative.    Genitourinary: Negative.    Musculoskeletal:  Positive for arthralgias and myalgias.   Skin: Negative.    Allergic/Immunologic: Negative.    Neurological:  Positive for weakness.   Hematological: Negative.    Psychiatric/Behavioral:  Positive for sleep disturbance. The patient is nervous/anxious.    All other systems reviewed and are negative.        Physical Exam  Physical Exam  Constitutional:       Appearance: She is ill-appearing.   HENT:      Head: Normocephalic and atraumatic.      Right Ear: External ear normal.      Left Ear: External ear normal.      Nose: Congestion present.   Eyes:      Conjunctiva/sclera: Conjunctivae normal.   Cardiovascular:      Rate and Rhythm: Regular rhythm. Bradycardia present.      Pulses: Normal pulses.      Heart sounds: Normal heart sounds.   Pulmonary:      Breath sounds: Wheezing and rales present.   Abdominal:      General: Bowel sounds are normal.      Palpations: Abdomen is soft.   Musculoskeletal:         General: Normal range of motion.      Cervical back: Normal range of motion.   Skin:     General: Skin is dry.      Capillary Refill: Capillary refill takes 2 to 3 seconds.   Neurological:      General: No focal deficit present.      Mental Status: She is alert. Mental status is at baseline.      Motor: Weakness present.      Gait: Gait abnormal.           Last Recorded Vitals  Blood pressure 171/90, pulse 56, temperature 36.4 °C (97.5 °F), temperature source Temporal, resp. rate 25,  "height 1.549 m (5' 1\"), weight 75 kg (165 lb 5.5 oz), SpO2 95%.    Relevant Results  Results for orders placed or performed during the hospital encounter of 05/15/24 (from the past 96 hour(s))   CBC and Auto Differential   Result Value Ref Range    WBC 8.7 4.4 - 11.3 x10*3/uL    nRBC 0.2 (H) 0.0 - 0.0 /100 WBCs    RBC 4.01 4.00 - 5.20 x10*6/uL    Hemoglobin 12.3 12.0 - 16.0 g/dL    Hematocrit 40.5 36.0 - 46.0 %     (H) 80 - 100 fL    MCH 30.7 26.0 - 34.0 pg    MCHC 30.4 (L) 32.0 - 36.0 g/dL    RDW 16.3 (H) 11.5 - 14.5 %    Platelets 154 150 - 450 x10*3/uL    Neutrophils % 53.4 40.0 - 80.0 %    Immature Granulocytes %, Automated 2.3 (H) 0.0 - 0.9 %    Lymphocytes % 36.4 13.0 - 44.0 %    Monocytes % 6.6 2.0 - 10.0 %    Eosinophils % 0.5 0.0 - 6.0 %    Basophils % 0.8 0.0 - 2.0 %    Neutrophils Absolute 4.67 1.20 - 7.70 x10*3/uL    Immature Granulocytes Absolute, Automated 0.20 0.00 - 0.70 x10*3/uL    Lymphocytes Absolute 3.18 1.20 - 4.80 x10*3/uL    Monocytes Absolute 0.58 0.10 - 1.00 x10*3/uL    Eosinophils Absolute 0.04 0.00 - 0.70 x10*3/uL    Basophils Absolute 0.07 0.00 - 0.10 x10*3/uL   Comprehensive Metabolic Panel   Result Value Ref Range    Glucose 123 (H) 74 - 99 mg/dL    Sodium 142 136 - 145 mmol/L    Potassium 4.2 3.5 - 5.3 mmol/L    Chloride 102 98 - 107 mmol/L    Bicarbonate 36 (H) 21 - 32 mmol/L    Anion Gap 8 (L) 10 - 20 mmol/L    Urea Nitrogen 17 6 - 23 mg/dL    Creatinine 0.76 0.50 - 1.05 mg/dL    eGFR >90 >60 mL/min/1.73m*2    Calcium 9.0 8.6 - 10.3 mg/dL    Albumin 3.9 3.4 - 5.0 g/dL    Alkaline Phosphatase 65 33 - 110 U/L    Total Protein 6.7 6.4 - 8.2 g/dL    AST 15 9 - 39 U/L    Bilirubin, Total 0.4 0.0 - 1.2 mg/dL    ALT 13 7 - 45 U/L   Lipase   Result Value Ref Range    Lipase 10 9 - 82 U/L   hCG, quantitative, pregnancy   Result Value Ref Range    HCG, Beta-Quantitative <2 <5 mIU/mL   Troponin I, High Sensitivity, Initial   Result Value Ref Range    Troponin I, High Sensitivity 5 0 - 13 " ng/L   BLOOD GAS VENOUS FULL PANEL   Result Value Ref Range    POCT pH, Venous 7.33 7.33 - 7.43 pH    POCT pCO2, Venous 78 (HH) 41 - 51 mm Hg    POCT pO2, Venous 58 (H) 35 - 45 mm Hg    POCT SO2, Venous 86 (H) 45 - 75 %    POCT Oxy Hemoglobin, Venous 77.8 (H) 45.0 - 75.0 %    POCT Hematocrit Calculated, Venous 37.0 36.0 - 46.0 %    POCT Sodium, Venous 141 136 - 145 mmol/L    POCT Potassium, Venous 4.3 3.5 - 5.3 mmol/L    POCT Chloride, Venous 103 98 - 107 mmol/L    POCT Ionized Calicum, Venous 1.26 1.10 - 1.33 mmol/L    POCT Glucose, Venous 128 (H) 74 - 99 mg/dL    POCT Lactate, Venous 0.8 0.4 - 2.0 mmol/L    POCT Base Excess, Venous 12.1 (H) -2.0 - 3.0 mmol/L    POCT HCO3 Calculated, Venous 41.1 (H) 22.0 - 26.0 mmol/L    POCT Hemoglobin, Venous 12.2 12.0 - 16.0 g/dL    POCT Anion Gap, Venous 1.0 (L) 10.0 - 25.0 mmol/L    Patient Temperature      FiO2 100 %    Critical Called By PATTI DOYLE     Critical Called To DIDIER     Critical Call Time 2112     Critical Read Back Y    B-type natriuretic peptide   Result Value Ref Range    BNP 13 0 - 99 pg/mL   ECG 12 lead   Result Value Ref Range    Ventricular Rate 133 BPM    Atrial Rate 133 BPM    VT Interval 122 ms    QRS Duration 78 ms    QT Interval 306 ms    QTC Calculation(Bazett) 455 ms    P Axis 36 degrees    R Axis -25 degrees    T Axis 22 degrees    QRS Count 22 beats    Q Onset 210 ms    P Onset 149 ms    P Offset 196 ms    T Offset 363 ms    QTC Fredericia 398 ms   Troponin, High Sensitivity, 1 Hour   Result Value Ref Range    Troponin I, High Sensitivity 5 0 - 13 ng/L   BLOOD GAS VENOUS FULL PANEL   Result Value Ref Range    POCT pH, Venous 7.24 (LL) 7.33 - 7.43 pH    POCT pCO2, Venous 98 (HH) 41 - 51 mm Hg    POCT pO2, Venous 63 (H) 35 - 45 mm Hg    POCT SO2, Venous 87 (H) 45 - 75 %    POCT Oxy Hemoglobin, Venous 79.8 (H) 45.0 - 75.0 %    POCT Hematocrit Calculated, Venous 39.0 36.0 - 46.0 %    POCT Sodium, Venous 139 136 - 145 mmol/L    POCT Potassium, Venous  4.4 3.5 - 5.3 mmol/L    POCT Chloride, Venous 102 98 - 107 mmol/L    POCT Ionized Calicum, Venous 1.26 1.10 - 1.33 mmol/L    POCT Glucose, Venous 169 (H) 74 - 99 mg/dL    POCT Lactate, Venous 0.7 0.4 - 2.0 mmol/L    POCT Base Excess, Venous 10.7 (H) -2.0 - 3.0 mmol/L    POCT HCO3 Calculated, Venous 42.0 (H) 22.0 - 26.0 mmol/L    POCT Hemoglobin, Venous 12.9 12.0 - 16.0 g/dL    POCT Anion Gap, Venous -1.0 (L) 10.0 - 25.0 mmol/L    Patient Temperature      FiO2 60 %    Critical Called By PATTI DOYLE     Critical Called To DR VELÁSQUEZ     Critical Call Time 2209     Critical Read Back Y    Lactate   Result Value Ref Range    Lactate 1.0 0.4 - 2.0 mmol/L   Blood Culture    Specimen: Peripheral Venipuncture; Blood culture   Result Value Ref Range    Blood Culture No growth at 1 day    Blood Culture    Specimen: Peripheral Venipuncture; Blood culture   Result Value Ref Range    Blood Culture No growth at 1 day    Urinalysis with Reflex Culture and Microscopic   Result Value Ref Range    Color, Urine Yellow Straw, Yellow    Appearance, Urine Hazy (N) Clear    Specific Gravity, Urine 1.014 1.005 - 1.035    pH, Urine 6.0 5.0, 5.5, 6.0, 6.5, 7.0, 7.5, 8.0    Protein, Urine 30 (1+) (N) NEGATIVE mg/dL    Glucose, Urine NEGATIVE NEGATIVE mg/dL    Blood, Urine NEGATIVE NEGATIVE    Ketones, Urine NEGATIVE NEGATIVE mg/dL    Bilirubin, Urine NEGATIVE NEGATIVE    Urobilinogen, Urine <2.0 <2.0 mg/dL    Nitrite, Urine POSITIVE (A) NEGATIVE    Leukocyte Esterase, Urine LARGE (3+) (A) NEGATIVE   Extra Urine Gray Tube   Result Value Ref Range    Extra Tube Hold for add-ons.    BLOOD GAS VENOUS FULL PANEL   Result Value Ref Range    POCT pH, Venous 7.35 7.33 - 7.43 pH    POCT pCO2, Venous 67 (H) 41 - 51 mm Hg    POCT pO2, Venous 60 (H) 35 - 45 mm Hg    POCT SO2, Venous 89 (H) 45 - 75 %    POCT Oxy Hemoglobin, Venous 83.9 (H) 45.0 - 75.0 %    POCT Hematocrit Calculated, Venous 35.0 (L) 36.0 - 46.0 %    POCT Sodium, Venous 135 (L) 136 - 145  mmol/L    POCT Potassium, Venous 4.3 3.5 - 5.3 mmol/L    POCT Chloride, Venous 102 98 - 107 mmol/L    POCT Ionized Calicum, Venous 1.24 1.10 - 1.33 mmol/L    POCT Glucose, Venous 268 (H) 74 - 99 mg/dL    POCT Lactate, Venous 1.1 0.4 - 2.0 mmol/L    POCT Base Excess, Venous 9.2 (H) -2.0 - 3.0 mmol/L    POCT HCO3 Calculated, Venous 37.0 (H) 22.0 - 26.0 mmol/L    POCT Hemoglobin, Venous 11.8 (L) 12.0 - 16.0 g/dL    POCT Anion Gap, Venous 0.0 (L) 10.0 - 25.0 mmol/L    Patient Temperature      FiO2 100 %   Microscopic Only, Urine   Result Value Ref Range    WBC, Urine >50 (A) 1-5, NONE /HPF    RBC, Urine 6-10 (A) NONE, 1-2, 3-5 /HPF    Bacteria, Urine 1+ (A) NONE SEEN /HPF   Urine Culture    Specimen: Clean Catch/Voided; Urine   Result Value Ref Range    Urine Culture >100,000 Enteric bacilli (A)    Drug Screen, Urine   Result Value Ref Range    Amphetamine Screen, Urine Presumptive Negative Presumptive Negative    Barbiturate Screen, Urine Presumptive Negative Presumptive Negative    Benzodiazepines Screen, Urine Presumptive Negative Presumptive Negative    Cannabinoid Screen, Urine Presumptive Positive (A) Presumptive Negative    Cocaine Metabolite Screen, Urine Presumptive Negative Presumptive Negative    Fentanyl Screen, Urine Presumptive Negative Presumptive Negative    Opiate Screen, Urine Presumptive Negative Presumptive Negative    Oxycodone Screen, Urine Presumptive Negative Presumptive Negative    PCP Screen, Urine Presumptive Negative Presumptive Negative    Methadone Screen, Urine Presumptive Negative Presumptive Negative   ECG 12 lead   Result Value Ref Range    Ventricular Rate 107 BPM    Atrial Rate 107 BPM    NC Interval 122 ms    QRS Duration 78 ms    QT Interval 362 ms    QTC Calculation(Bazett) 483 ms    P Axis 51 degrees    R Axis -21 degrees    T Axis 23 degrees    QRS Count 18 beats    Q Onset 226 ms    P Onset 165 ms    P Offset 214 ms    T Offset 407 ms    QTC Fredericia 439 ms   MRSA Surveillance  for Vancomycin De-escalation, PCR    Specimen: Anterior Nares; Swab   Result Value Ref Range    MRSA PCR Not Detected Not Detected   POCT GLUCOSE   Result Value Ref Range    POCT Glucose 252 (H) 74 - 99 mg/dL   Blood Gas Arterial Full Panel   Result Value Ref Range    POCT pH, Arterial 7.38 7.38 - 7.42 pH    POCT pCO2, Arterial 56 (H) 38 - 42 mm Hg    POCT pO2, Arterial 51 (L) 85 - 95 mm Hg    POCT SO2, Arterial 88 (L) 94 - 100 %    POCT Oxy Hemoglobin, Arterial 85.1 (L) 94.0 - 98.0 %    POCT Hematocrit Calculated, Arterial 39.0 36.0 - 46.0 %    POCT Sodium, Arterial 137 136 - 145 mmol/L    POCT Potassium, Arterial 4.1 3.5 - 5.3 mmol/L    POCT Chloride, Arterial 99 98 - 107 mmol/L    POCT Ionized Calcium, Arterial 1.21 1.10 - 1.33 mmol/L    POCT Glucose, Arterial 250 (H) 74 - 99 mg/dL    POCT Lactate, Arterial 2.4 (H) 0.4 - 2.0 mmol/L    POCT Base Excess, Arterial 6.4 (H) -2.0 - 3.0 mmol/L    POCT HCO3 Calculated, Arterial 33.1 (H) 22.0 - 26.0 mmol/L    POCT Hemoglobin, Arterial 13.0 12.0 - 16.0 g/dL    POCT Anion Gap, Arterial 9 (L) 10 - 25 mmo/L    Patient Temperature      FiO2 80 %   Streptococcus pneumoniae Antigen, Urine    Specimen: Urine   Result Value Ref Range    Streptococcus pneumoniae Ag, Urine Negative Negative   Legionella Antigen, Urine    Specimen: Urine   Result Value Ref Range    L. pneumophila Urine Ag Negative Negative   CBC   Result Value Ref Range    WBC 9.7 4.4 - 11.3 x10*3/uL    nRBC 0.2 (H) 0.0 - 0.0 /100 WBCs    RBC 4.25 4.00 - 5.20 x10*6/uL    Hemoglobin 12.9 12.0 - 16.0 g/dL    Hematocrit 42.1 36.0 - 46.0 %    MCV 99 80 - 100 fL    MCH 30.4 26.0 - 34.0 pg    MCHC 30.6 (L) 32.0 - 36.0 g/dL    RDW 16.1 (H) 11.5 - 14.5 %    Platelets 157 150 - 450 x10*3/uL   Basic Metabolic Panel   Result Value Ref Range    Glucose 240 (H) 74 - 99 mg/dL    Sodium 138 136 - 145 mmol/L    Potassium 4.1 3.5 - 5.3 mmol/L    Chloride 99 98 - 107 mmol/L    Bicarbonate 31 21 - 32 mmol/L    Anion Gap 12 10 - 20  mmol/L    Urea Nitrogen 16 6 - 23 mg/dL    Creatinine 0.86 0.50 - 1.05 mg/dL    eGFR 86 >60 mL/min/1.73m*2    Calcium 9.3 8.6 - 10.3 mg/dL   Magnesium   Result Value Ref Range    Magnesium 1.57 (L) 1.60 - 2.40 mg/dL   POCT GLUCOSE   Result Value Ref Range    POCT Glucose 300 (H) 74 - 99 mg/dL   Blood Gas Lactic Acid, Venous   Result Value Ref Range    POCT Lactate, Venous 3.3 (H) 0.4 - 2.0 mmol/L   Respiratory Culture/Smear    Specimen: Tracheal Aspirate; Fluid   Result Value Ref Range    Respiratory Culture/Smear No growth to date     Gram Stain (3+) Moderate Polymorphonuclear leukocytes (A)     Gram Stain Mixed Gram positive bacteria (A)    POCT GLUCOSE   Result Value Ref Range    POCT Glucose 208 (H) 74 - 99 mg/dL   Blood Gas Arterial Full Panel   Result Value Ref Range    POCT pH, Arterial 7.40 7.38 - 7.42 pH    POCT pCO2, Arterial 54 (H) 38 - 42 mm Hg    POCT pO2, Arterial 80 (L) 85 - 95 mm Hg    POCT SO2, Arterial 98 94 - 100 %    POCT Oxy Hemoglobin, Arterial 95.8 94.0 - 98.0 %    POCT Hematocrit Calculated, Arterial 38.0 36.0 - 46.0 %    POCT Sodium, Arterial 135 (L) 136 - 145 mmol/L    POCT Potassium, Arterial 3.9 3.5 - 5.3 mmol/L    POCT Chloride, Arterial 99 98 - 107 mmol/L    POCT Ionized Calcium, Arterial 1.18 1.10 - 1.33 mmol/L    POCT Glucose, Arterial 198 (H) 74 - 99 mg/dL    POCT Lactate, Arterial 3.1 (H) 0.4 - 2.0 mmol/L    POCT Base Excess, Arterial 7.1 (H) -2.0 - 3.0 mmol/L    POCT HCO3 Calculated, Arterial 33.4 (H) 22.0 - 26.0 mmol/L    POCT Hemoglobin, Arterial 12.7 12.0 - 16.0 g/dL    POCT Anion Gap, Arterial 7 (L) 10 - 25 mmo/L    Patient Temperature      FiO2 90 %    Ventilator Mode A/C     Ventilator Rate 16 bpm    Tidal Volume 400 mL    Peep CHM2O 7.0 cm H2O    Site of Arterial Puncture Brachial Left    POCT GLUCOSE   Result Value Ref Range    POCT Glucose 151 (H) 74 - 99 mg/dL   Lactate   Result Value Ref Range    Lactate 4.2 (HH) 0.4 - 2.0 mmol/L   Basic metabolic panel   Result Value  Ref Range    Glucose 155 (H) 74 - 99 mg/dL    Sodium 140 136 - 145 mmol/L    Potassium 3.8 3.5 - 5.3 mmol/L    Chloride 100 98 - 107 mmol/L    Bicarbonate 30 21 - 32 mmol/L    Anion Gap 14 10 - 20 mmol/L    Urea Nitrogen 16 6 - 23 mg/dL    Creatinine 0.74 0.50 - 1.05 mg/dL    eGFR >90 >60 mL/min/1.73m*2    Calcium 9.6 8.6 - 10.3 mg/dL   Lavender Top   Result Value Ref Range    Extra Tube Hold for add-ons.    Creatine Kinase   Result Value Ref Range    Creatine Kinase 29 0 - 215 U/L   Lactate   Result Value Ref Range    Lactate 2.5 (H) 0.4 - 2.0 mmol/L   POCT GLUCOSE   Result Value Ref Range    POCT Glucose 165 (H) 74 - 99 mg/dL   POCT GLUCOSE   Result Value Ref Range    POCT Glucose 140 (H) 74 - 99 mg/dL   CBC   Result Value Ref Range    WBC 13.4 (H) 4.4 - 11.3 x10*3/uL    nRBC 0.0 0.0 - 0.0 /100 WBCs    RBC 4.05 4.00 - 5.20 x10*6/uL    Hemoglobin 12.4 12.0 - 16.0 g/dL    Hematocrit 39.8 36.0 - 46.0 %    MCV 98 80 - 100 fL    MCH 30.6 26.0 - 34.0 pg    MCHC 31.2 (L) 32.0 - 36.0 g/dL    RDW 17.1 (H) 11.5 - 14.5 %    Platelets 153 150 - 450 x10*3/uL   Basic Metabolic Panel   Result Value Ref Range    Glucose 130 (H) 74 - 99 mg/dL    Sodium 141 136 - 145 mmol/L    Potassium 4.2 3.5 - 5.3 mmol/L    Chloride 102 98 - 107 mmol/L    Bicarbonate 29 21 - 32 mmol/L    Anion Gap 14 10 - 20 mmol/L    Urea Nitrogen 19 6 - 23 mg/dL    Creatinine 0.71 0.50 - 1.05 mg/dL    eGFR >90 >60 mL/min/1.73m*2    Calcium 9.7 8.6 - 10.3 mg/dL   Magnesium   Result Value Ref Range    Magnesium 2.17 1.60 - 2.40 mg/dL   Hepatic function panel   Result Value Ref Range    Albumin 3.9 3.4 - 5.0 g/dL    Bilirubin, Total 0.3 0.0 - 1.2 mg/dL    Bilirubin, Direct 0.0 0.0 - 0.3 mg/dL    Alkaline Phosphatase 68 33 - 110 U/L    ALT 62 (H) 7 - 45 U/L    AST 33 9 - 39 U/L    Total Protein 6.9 6.4 - 8.2 g/dL   Coagulation Screen   Result Value Ref Range    Protime 12.7 9.8 - 12.8 seconds    INR 1.1 0.9 - 1.1    aPTT 26 (L) 27 - 38 seconds   Lactate   Result  Value Ref Range    Lactate 2.3 (H) 0.4 - 2.0 mmol/L   Blood Gas Arterial Full Panel   Result Value Ref Range    POCT pH, Arterial 7.46 (H) 7.38 - 7.42 pH    POCT pCO2, Arterial 49 (H) 38 - 42 mm Hg    POCT pO2, Arterial 70 (L) 85 - 95 mm Hg    POCT SO2, Arterial 96 94 - 100 %    POCT Oxy Hemoglobin, Arterial 93.9 (L) 94.0 - 98.0 %    POCT Hematocrit Calculated, Arterial 37.0 36.0 - 46.0 %    POCT Sodium, Arterial 139 136 - 145 mmol/L    POCT Potassium, Arterial 3.5 3.5 - 5.3 mmol/L    POCT Chloride, Arterial 105 98 - 107 mmol/L    POCT Ionized Calcium, Arterial 1.23 1.10 - 1.33 mmol/L    POCT Glucose, Arterial 113 (H) 74 - 99 mg/dL    POCT Lactate, Arterial 2.5 (H) 0.4 - 2.0 mmol/L    POCT Base Excess, Arterial 9.5 (H) -2.0 - 3.0 mmol/L    POCT HCO3 Calculated, Arterial 34.8 (H) 22.0 - 26.0 mmol/L    POCT Hemoglobin, Arterial 12.2 12.0 - 16.0 g/dL    POCT Anion Gap, Arterial 3 (L) 10 - 25 mmo/L    Patient Temperature      FiO2 70 %    Ventilator Mode A/C     Ventilator Rate 18 bpm    Tidal Volume 400 mL    Total Minute Volume 7.0 Liter    Peep CHM2O 5.0 cm H2O    Site of Arterial Puncture Brachial Right     Andrew's Test Negative    POCT GLUCOSE   Result Value Ref Range    POCT Glucose 109 (H) 74 - 99 mg/dL   POCT GLUCOSE   Result Value Ref Range    POCT Glucose 190 (H) 74 - 99 mg/dL   POCT GLUCOSE   Result Value Ref Range    POCT Glucose 163 (H) 74 - 99 mg/dL    ECG 12 lead    Result Date: 5/17/2024  Sinus tachycardia Possible Left atrial enlargement Inferior infarct , age undetermined Abnormal ECG When compared with ECG of 15-MAY-2024 21:27, (unconfirmed) No significant change was found See ED provider note for full interpretation and clinical correlation Confirmed by Yumi Silva ([id number]) on 5/17/2024 4:00:40 PM    ECG 12 lead    Result Date: 5/17/2024  Sinus tachycardia Possible Left atrial enlargement Low voltage QRS Borderline ECG When compared with ECG of 08-APR-2024 04:13, Fusion complexes are no longer  Present Vent. rate has increased BY  68 BPM Questionable change in QRS duration Criteria for Anteroseptal infarct are no longer Present See ED provider note for full interpretation and clinical correlation Confirmed by Yumi Silva ([id number]) on 5/17/2024 4:00:18 PM    XR chest 1 view    Result Date: 5/16/2024  Interpreted By:  Jaylen Roman, STUDY: XR CHEST 1 VIEW;  5/16/2024 2:48 pm   INDICATION: Signs/Symptoms:worsening hypoxemia r/o PTx and pulm edema.   COMPARISON: 05/16/2024.   ACCESSION NUMBER(S): [id number]   ORDERING CLINICIAN: ELGIN CUTLER   FINDINGS: CARDIOMEDIASTINAL SILHOUETTE: Endotracheal tube tip projects 1.5 cm above the daron level. NG tube has tip at the distal stomach level in the right upper quadrant. Cardiomegaly is similar to prior.   LUNGS: Prominent region of consolidation and volume loss is again seen in the left lower lobe retrocardiac region. Right basilar irregular consolidation and volume loss is slightly less prominent from prior. No definite pleural effusion. No pneumothorax is seen.   ABDOMEN: No remarkable upper abdominal findings.   BONES: Bones are stable.       1.  Prominent irregular region of consolidation and volume loss in the left lower lobe retrocardiac region similar to prior. 2. Right basilar irregular consolidation volume loss slightly less prominent from prior.       MACRO: None.   Signed by: Jaylen Roman 5/16/2024 2:59 PM Dictation workstation:   [id number]    CT chest w IV contrast    Result Date: 5/16/2024  Interpreted By:  Lara Sheehan, STUDY: CT CHEST W IV CONTRAST;  5/16/2024 2:27 am   INDICATION: Signs/Symptoms:hypoxia.   COMPARISON: 03/05/2024   ACCESSION NUMBER(S): [id number]   ORDERING CLINICIAN: VINNIE GASCA   TECHNIQUE: Axial CT images of the chest obtained  after intravenous administration of contrast.   FINDINGS: VESSELS: No aortic aneurysm. HEART: Cardiomegaly. Mild coronary artery calcification  no pericardial effusion.  MEDIASTINUM AND RICHARD: No pathologically enlarged lymph nodes. LUNG, PLEURA, AND LARGE AIRWAYS: The endotracheal tube terminates approximately 2 cm above the daron. Dependent consolidation in the bilateral upper lobes and lower lobes. There is associated volume loss, most notably in the left lower lobe, consistent with atelectasis. However, superimposed pneumonia is not excluded in the appropriate clinical setting. No pleural effusion or pneumothorax. CHEST WALL AND LOWER NECK: Within normal limits.   UPPER ABDOMEN: 2 cm hypoattenuating lesion in the right lobe of the liver demonstrated characteristics consistent with a hemangioma on multiphasic CT 07/30/2021. The liver is otherwise unremarkable. The nasogastric/orogastric tube terminates in the distal stomach.   BONES: No acute osseous abnormality.       Bilateral upper and lower lobe consolidation is most consistent with atelectasis, however, superimposed pneumonia is not excluded in the appropriate clinical setting.     MACRO: None   Signed by: Lara Sheehan 5/16/2024 2:41 AM Dictation workstation:   [id number]    CT head wo IV contrast    Result Date: 5/16/2024  Interpreted By:  Lara Sheehan, STUDY: CT HEAD WO IV CONTRAST;  5/16/2024 2:24 am   INDICATION: Signs/Symptoms:AMS.   COMPARISON: 02/09/2024   ACCESSION NUMBER(S): [id number]   ORDERING CLINICIAN: PAUL VELÁSQUEZ   TECHNIQUE: Axial noncontrast CT images of the head.   FINDINGS: BRAIN PARENCHYMA:  Gray-white matter interfaces are preserved. No mass effect or midline shift.   HEMORRHAGE: No acute intracranial hemorrhage. VENTRICLES and EXTRA-AXIAL SPACES: The ventricles and sulci are within normal limits in size for brain volume. No abnormal extraaxial fluid collection. EXTRACRANIAL SOFT TISSUES: Within normal limits. PARANASAL SINUSES/MASTOIDS:  The visualized paranasal sinuses and mastoid air cells are aerated. Right mastoidectomy noted. CALVARIUM: No depressed skull fracture. No destructive osseous  lesion.   OTHER FINDINGS: None.       No acute intracranial abnormality.     MACRO: None   Signed by: Lara Sheehan 5/16/2024 2:32 AM Dictation workstation:   [id number]    XR chest 1 view    Result Date: 5/16/2024  Interpreted By:  Tali Moore, STUDY: XR CHEST 1 VIEW;  5/16/2024 12:57 am   INDICATION: Signs/Symptoms:post intubation   COMPARISON: Chest x-ray 05/15/2024.   ACCESSION NUMBER(S): [id number]   ORDERING CLINICIAN: PAUL VELÁSQUEZ   TECHNIQUE: Portable supine frontal view of the chest was obtained .   FINDINGS: Monitoring wires are overlying the patient.   An endotracheal tube terminates approximately 1.9 cm above the daron. An enteric tube courses into the left upper quadrant, its distal tip is not included on this exam.   The heart is enlarged. There is mild vascular congestion.   There are small bilateral pleural effusions. There are bilateral airspace opacities. No evidence for pneumothorax on supine imaging.       1. Cardiomegaly. Mild vascular congestion. Small bilateral pleural effusions. Bilateral airspace opacities, may be secondary to pneumonia and/or pulmonary edema. 2. Life support devices as described above.       MACRO: None.   Signed by: Tali Moore 5/16/2024 1:27 AM Dictation workstation:   [id number]    XR chest 1 view    Result Date: 5/15/2024  Interpreted By:  Tali Moore, STUDY: XR CHEST 1 VIEW;  5/15/2024 9:50 pm   INDICATION: Signs/Symptoms:SOB   COMPARISON: Chest x-ray 04/08/2024   ACCESSION NUMBER(S): [id number]   ORDERING CLINICIAN: PAUL VELÁSQUEZ   TECHNIQUE: Portable frontal view of the chest was obtained .   FINDINGS: Monitoring wires are overlying the patient.   The patient is rotated and in a kyphotic position.   The heart is enlarged. There is vascular congestion.   There are bilateral airspace opacities. There are small bilateral pleural effusions. No pneumothorax.       1. Cardiomegaly. Vascular congestion. Small bilateral pleural effusions. Bilateral  airspace opacities, may be secondary to pulmonary edema and/or pneumonia.       MACRO: None.   Signed by: Tali Moore 5/15/2024 10:36 PM Dictation workstation:   [id number]       Current Facility-Administered Medications:     busPIRone (Buspar) tablet 30 mg, 30 mg, oral, BID, VALDEMAR Aden, 30 mg at 05/17/24 2043    cefTRIAXone (Rocephin) IVPB 1 g, 1 g, intravenous, q24h, BARTOLOME Aden-CNP, Stopped at 05/17/24 0433    clonazePAM (KlonoPIN) tablet 1 mg, 1 mg, oral, BID PRN, VALDEMAR Aden, 1 mg at 05/17/24 1947    dexmedeTOMIDine (Precedex) 400 mcg in 100 mL (4 mcg/mL) sodium chloride 0.9% infusion, 0.1-1.5 mcg/kg/hr, intravenous, Continuous, Amado Moon MD, Last Rate: 28.1 mL/hr at 05/17/24 2044, 1.5 mcg/kg/hr at 05/17/24 2044    dextrose 50 % injection 12.5 g, 12.5 g, intravenous, q15 min PRN, VALDEMAR Aden    dextrose 50 % injection 25 g, 25 g, intravenous, q15 min PRN, BARTOLOME Aden-CNP    escitalopram (Lexapro) tablet 20 mg, 20 mg, oral, q AM, BARTOLOME Aden-CNP, 20 mg at 05/17/24 1001    glucagon (Glucagen) injection 1 mg, 1 mg, intramuscular, q15 min PRN, BARTOLOME Aden-CNP    glucagon (Glucagen) injection 1 mg, 1 mg, intramuscular, q15 min PRN, VALDEMAR Aden    hydrOXYzine pamoate (Vistaril) capsule 50 mg, 50 mg, oral, TID PRN, BARTOLOME Aden-CNP, 50 mg at 05/17/24 1947    insulin glargine (Lantus) injection 10 Units, 10 Units, subcutaneous, q AM, Amado Moon MD, 10 Units at 05/17/24 1214    insulin lispro (HumaLOG) injection 0-10 Units, 0-10 Units, subcutaneous, q6h, VALDEMAR Aden, 2 Units at 05/17/24 2104    ipratropium-albuteroL (Duo-Neb) 0.5-2.5 mg/3 mL nebulizer solution 3 mL, 3 mL, nebulization, q6h, VALDEMAR Aden, 3 mL at 05/17/24 1943    levothyroxine (Synthroid, Levoxyl) tablet 150 mcg, 150 mcg, oral, Daily, Jazmin ARMSTRONG  Gayheart, APRN-CNP, 150 mcg at 05/17/24 0505    methylPREDNISolone sod succinate (SOLU-Medrol) 40 mg/mL injection 40 mg, 40 mg, intravenous, q24h, VALDEMAR Aden, 40 mg at 05/17/24 1001    metoprolol tartrate (Lopressor) tablet 25 mg, 25 mg, oral, BID, VALDEMAR Aden, 25 mg at 05/17/24 2043    oxygen (O2) therapy, , inhalation, Continuous PRN - O2/gases, Amado Moon MD, 50 percent at 05/17/24 1659    oxygen (O2) therapy, , inhalation, Continuous - Inhalation, VALDEMAR Hickey, 5 L/min at 05/17/24 2000    pantoprazole (ProtoNix) EC tablet 40 mg, 40 mg, oral, Daily before breakfast **OR** pantoprazole (ProtoNix) injection 40 mg, 40 mg, intravenous, Daily before breakfast, Amado Moon MD, 40 mg at 05/17/24 1221    [Held by provider] prazosin (Minipress) capsule 5 mg, 5 mg, oral, Nightly, VALDEMAR Aden    propofol (Diprivan) infusion, 5-50 mcg/kg/min (Order-Specific), intravenous, Continuous, Amado Moon MD, Stopped at 05/17/24 1715    rivaroxaban (Xarelto) tablet 10 mg, 10 mg, oral, Daily with evening meal, Amado Moon MD, 10 mg at 05/17/24 1946    tiotropium (Spiriva Respimat) 2.5 mcg/actuation inhaler 2 puff, 2 puff, inhalation, Daily, VALDEMAR Aden   Assessment/Plan   Acute on chronic hypoxic and hypercapnic respiratory failure patient was on ventilator last night svent support patient extubated today.  Acute metabolic encephalopathy improving.  COPD exacerbation continue bronchodilators and oxygen keeping saturation more than 90%.  UTI.  Pneumonia.  Monitor delirium.  Obstructive sleep apnea.  History of migraine.  Schizophrenia.  Anxiety.  Depression.  Hypertension.  Dyslipidemia.  GERD.  Diabetes.  Hypothyroidism.  History of DVT and pulmonary embolism in the past.  Deconditioning start PT OT.  DVT prophylaxis.  P.o. diet.    Afshin Villalba MD

## 2024-05-18 NOTE — NURSING NOTE
"Patient very agitated at this time. Patient states \"my  is leaving me, DO SOMETHING\". Patient given clonazepam for anxiety. Patient states she hates her life and she just wants to die. Charge nurse notified. Sitter established. Patients phone and belongings in bag outside of room. Patients cell phone taken due to agitation and  inducing agitation and SI.    "

## 2024-05-18 NOTE — PROGRESS NOTES
Physical Therapy    Physical Therapy Evaluation    Patient Name: Stephenie Mccray  MRN: [id number]  Today's Date: 5/18/2024   Time Calculation  Start Time: 1255  Stop Time: 1315  Time Calculation (min): 20 min    Assessment/Plan   PT Assessment  PT Assessment Results: Decreased endurance, Impaired balance, Decreased mobility, Obesity  Rehab Prognosis: Good  Barriers to Discharge: + orthostatic hypotension  End of Session Communication: Bedside nurse  Assessment Comment: Pt. requires assist for mobility and is limited by orthostatic hypotension at this time. Will benefit from continued PT to increase mobility and indep, as tolerated.  End of Session Patient Position: Bed, 3 rail up (1:1 sitter present)  IP OR SWING BED PT PLAN  Inpatient or Swing Bed: Inpatient  PT Plan  Treatment/Interventions: Bed mobility, Transfer training, Gait training, Stair training, Balance training, Therapeutic activity  PT Plan: Skilled PT  PT Frequency: 3 times per week  PT Discharge Recommendations: Low intensity level of continued care  PT Recommended Transfer Status: Assist x1  PT - OK to Discharge: Yes (when medically stable); may benefit from  consult d/t home situation             General Visit Information:  General  Reason for Referral: Impaired mobility  Referred By: Sarai  Past Medical History Relevant to Rehab: Schizophrenia, COPD, home O2, PTSD, nephrolithiasis, smoker, marijuana use, DM, hypothyroidism, obesity, CHARLIE, HTN  Family/Caregiver Present: Yes  Caregiver Feedback: Mother present for 2nd half of session  Prior to Session Communication: Bedside nurse  Patient Position Received: Bed, 3 rail up (1:1 sitter present due to suicidal ideation precautions; 5LO2, tele)  General Comment: To ED 5/18 with c/o SOB and AMS after eating 2 marijuana gummies; pCO2 98, pH 7.24. In ED, pt's respiratory status worsened and pt. was intubated and admitted to ICU for acute on chronic hypoxic/hypercapnic respiratory failure. Pt.  was extubated on 5/17. On 5/18, pt. became agitated and made comments concerning for suicidal ideation. Sitter placed in room and psych consulted. Pt. restarted on psych meds 5/18. CT chest: Bilateral upper and lower lobe consolidation is most consistent with atelectasis, however, superimposed pneumonia is not excluded.    Home Living:  Home Living  Home Living Comments: Lives with spouse in 1 story home with 3 ROBYN with HR. Does not own DME, however, pt. has home O2. **Per nsg note 5/18, pt states that her  is leaving her.**    Prior Level of Function:  Prior Function Per Pt/Caregiver Report  Prior Function Comments: Indep. amb. without AD. Denies any recent falls. Does not drive. Does not work. Uses 5LO2 PRN.    Precautions:  Precautions  Medical Precautions: Fall precautions  Precautions Comment: Orthostatic hypotension precautions    Vital Signs:  Vital Signs  Heart Rate:  (supine 70 bpm, sitting 106 bpm, standing 128 bpm)  SpO2: 96 % (on 5LO2)  BP:  (seated 151/94; standing 113/83)  Objective     Pain:  Pain Assessment  Pain Assessment: 0-10  Pain Score: 0 - No pain    Cognition:  Cognition  Overall Cognitive Status:  (pt. tearful towards end of session d/t spouse not coming to see her; mother providing emotional support)  Orientation Level: Oriented X4  Impulsive: Mildly    General Assessments:      Activity Tolerance  Endurance: Decreased tolerance for upright activites  Activity Tolerance Comments: mild FARIA, orthostatic hypotension     Strength  Strength Comments: BLE 4/5, BUE WFL              Static Standing Balance  Static Standing-Balance Support: No upper extremity supported  Static Standing-Level of Assistance:  (CGA initially, then minAx1 towards end of trial)  Static Standing-Comment/Number of Minutes: 2 min.; pt. denied dizziness/lightheadedeness, however, pt. became slightly less alert, started swaying and B/L knees buckled mildly, but able to return to extension with minAx1; pt. returned to  EOB; RN present and aware (see orthostatic vitals above)    Functional Assessments:     Bed Mobility  Bed Mobility:  (supine to/from sit with supervision)  Transfers  Transfer:  (Sit to/from stand without AD and CGA/minAx1)  Ambulation/Gait Training  Ambulation/Gait Training Performed:  (Pt. amb. 3 ft without AD and CGA/minAx1; therapist then noted that pt was tachycardic (130s); therefore, pt. returned to EOB. RN arrived and orthostatic BPs were assessed (see vitals); pt. ambulated a 2nd trial 3-4 side steps towards HOB with CGA/minAx1)          Extremity/Trunk Assessments:  RUE   RUE : Within Functional Limits  LUE   LUE: Within Functional Limits  RLE   RLE : Within Functional Limits  LLE   LLE : Within Functional Limits    Outcome Measures:  [facility] Basic Mobility  Turning from your back to your side while in a flat bed without using bedrails: None  Moving from lying on your back to sitting on the side of a flat bed without using bedrails: A little  Moving to and from bed to chair (including a wheelchair): A little  Standing up from a chair using your arms (e.g. wheelchair or bedside chair): A little  To walk in hospital room: Total  Climbing 3-5 steps with railing: Total  Basic Mobility - Total Score: 15                            Goals:  Encounter Problems       Encounter Problems (Active)       Impaired mobility        Perform all bed mobility with indep.        Start:  05/18/24    Expected End:  06/01/24            Perform all transfers without device and indep.        Start:  05/18/24    Expected End:  06/01/24            Patient will ambulate >/= 150 ft without device and mod. indep.        Start:  05/18/24    Expected End:  06/01/24            Patient will ascend/descend 3 steps with HR and mod. indep.        Start:  05/18/24    Expected End:  06/01/24               Pain - Adult            Education Documentation  Mobility Training, taught by Sol Saldana, PT at 5/18/2024  2:05 PM.  Learner:  Patient  Readiness: Acceptance  Method: Explanation  Response: Verbalizes Understanding  Comment: see note    Education Comments  No comments found.

## 2024-05-19 VITALS
SYSTOLIC BLOOD PRESSURE: 125 MMHG | DIASTOLIC BLOOD PRESSURE: 74 MMHG | OXYGEN SATURATION: 96 % | TEMPERATURE: 98.8 F | HEART RATE: 80 BPM

## 2024-05-19 VITALS
HEIGHT: 61 IN | TEMPERATURE: 97.2 F | SYSTOLIC BLOOD PRESSURE: 121 MMHG | BODY MASS INDEX: 30.93 KG/M2 | RESPIRATION RATE: 15 BRPM | DIASTOLIC BLOOD PRESSURE: 60 MMHG | HEART RATE: 65 BPM | WEIGHT: 163.8 LBS | OXYGEN SATURATION: 96 %

## 2024-05-19 LAB
ANION GAP SERPL CALC-SCNC: 12 MMOL/L (ref 10–20)
BUN SERPL-MCNC: 26 MG/DL (ref 6–23)
CALCIUM SERPL-MCNC: 8.8 MG/DL (ref 8.6–10.3)
CHLORIDE SERPL-SCNC: 108 MMOL/L (ref 98–107)
CO2 SERPL-SCNC: 27 MMOL/L (ref 21–32)
CREAT SERPL-MCNC: 0.74 MG/DL (ref 0.5–1.05)
EGFRCR SERPLBLD CKD-EPI 2021: >90 ML/MIN/1.73M*2
ERYTHROCYTE [DISTWIDTH] IN BLOOD BY AUTOMATED COUNT: 17 % (ref 11.5–14.5)
GLUCOSE BLD MANUAL STRIP-MCNC: 106 MG/DL (ref 74–99)
GLUCOSE BLD MANUAL STRIP-MCNC: 157 MG/DL (ref 74–99)
GLUCOSE SERPL-MCNC: 117 MG/DL (ref 74–99)
HCT VFR BLD AUTO: 39.3 % (ref 36–46)
HGB BLD-MCNC: 11.5 G/DL (ref 12–16)
HOLD SPECIMEN: NORMAL
MAGNESIUM SERPL-MCNC: 1.62 MG/DL (ref 1.6–2.4)
MCH RBC QN AUTO: 30.3 PG (ref 26–34)
MCHC RBC AUTO-ENTMCNC: 29.3 G/DL (ref 32–36)
MCV RBC AUTO: 103 FL (ref 80–100)
NRBC BLD-RTO: 0 /100 WBCS (ref 0–0)
PLATELET # BLD AUTO: 159 X10*3/UL (ref 150–450)
POTASSIUM SERPL-SCNC: 4.1 MMOL/L (ref 3.5–5.3)
RBC # BLD AUTO: 3.8 X10*6/UL (ref 4–5.2)
SODIUM SERPL-SCNC: 143 MMOL/L (ref 136–145)
WBC # BLD AUTO: 8 X10*3/UL (ref 4.4–11.3)

## 2024-05-19 PROCEDURE — 85027 COMPLETE CBC AUTOMATED: CPT | Performed by: STUDENT IN AN ORGANIZED HEALTH CARE EDUCATION/TRAINING PROGRAM

## 2024-05-19 PROCEDURE — 2500000004 HC RX 250 GENERAL PHARMACY W/ HCPCS (ALT 636 FOR OP/ED): Performed by: STUDENT IN AN ORGANIZED HEALTH CARE EDUCATION/TRAINING PROGRAM

## 2024-05-19 PROCEDURE — 2500000006 HC RX 250 W HCPCS SELF ADMINISTERED DRUGS (ALT 637 FOR ALL PAYERS): Performed by: STUDENT IN AN ORGANIZED HEALTH CARE EDUCATION/TRAINING PROGRAM

## 2024-05-19 PROCEDURE — 2500000002 HC RX 250 W HCPCS SELF ADMINISTERED DRUGS (ALT 637 FOR MEDICARE OP, ALT 636 FOR OP/ED): Performed by: STUDENT IN AN ORGANIZED HEALTH CARE EDUCATION/TRAINING PROGRAM

## 2024-05-19 PROCEDURE — 2500000001 HC RX 250 WO HCPCS SELF ADMINISTERED DRUGS (ALT 637 FOR MEDICARE OP): Performed by: STUDENT IN AN ORGANIZED HEALTH CARE EDUCATION/TRAINING PROGRAM

## 2024-05-19 PROCEDURE — 2500000004 HC RX 250 GENERAL PHARMACY W/ HCPCS (ALT 636 FOR OP/ED): Performed by: OBSTETRICS & GYNECOLOGY

## 2024-05-19 PROCEDURE — 83735 ASSAY OF MAGNESIUM: CPT | Performed by: STUDENT IN AN ORGANIZED HEALTH CARE EDUCATION/TRAINING PROGRAM

## 2024-05-19 PROCEDURE — 82947 ASSAY GLUCOSE BLOOD QUANT: CPT

## 2024-05-19 PROCEDURE — 2500000005 HC RX 250 GENERAL PHARMACY W/O HCPCS: Performed by: STUDENT IN AN ORGANIZED HEALTH CARE EDUCATION/TRAINING PROGRAM

## 2024-05-19 PROCEDURE — 94640 AIRWAY INHALATION TREATMENT: CPT

## 2024-05-19 PROCEDURE — 99239 HOSP IP/OBS DSCHRG MGMT >30: CPT | Performed by: INTERNAL MEDICINE

## 2024-05-19 PROCEDURE — 2500000001 HC RX 250 WO HCPCS SELF ADMINISTERED DRUGS (ALT 637 FOR MEDICARE OP): Performed by: INTERNAL MEDICINE

## 2024-05-19 PROCEDURE — 80048 BASIC METABOLIC PNL TOTAL CA: CPT | Performed by: STUDENT IN AN ORGANIZED HEALTH CARE EDUCATION/TRAINING PROGRAM

## 2024-05-19 PROCEDURE — 99232 SBSQ HOSP IP/OBS MODERATE 35: CPT | Performed by: PSYCHIATRY & NEUROLOGY

## 2024-05-19 PROCEDURE — 36415 COLL VENOUS BLD VENIPUNCTURE: CPT | Performed by: STUDENT IN AN ORGANIZED HEALTH CARE EDUCATION/TRAINING PROGRAM

## 2024-05-19 PROCEDURE — 94660 CPAP INITIATION&MGMT: CPT

## 2024-05-19 RX ORDER — AMOXICILLIN AND CLAVULANATE POTASSIUM 875; 125 MG/1; MG/1
1 TABLET, FILM COATED ORAL EVERY 12 HOURS SCHEDULED
Qty: 7 TABLET | Refills: 0 | Status: SHIPPED | OUTPATIENT
Start: 2024-05-19 | End: 2024-05-23

## 2024-05-19 RX ORDER — PREDNISONE 50 MG/1
50 TABLET ORAL DAILY
Qty: 1 TABLET | Refills: 0 | Status: SHIPPED | OUTPATIENT
Start: 2024-05-20 | End: 2024-05-21

## 2024-05-19 RX ORDER — AMOXICILLIN AND CLAVULANATE POTASSIUM 875; 125 MG/1; MG/1
1 TABLET, FILM COATED ORAL EVERY 12 HOURS SCHEDULED
Status: DISCONTINUED | OUTPATIENT
Start: 2024-05-19 | End: 2024-05-19

## 2024-05-19 RX ORDER — AMOXICILLIN AND CLAVULANATE POTASSIUM 875; 125 MG/1; MG/1
1 TABLET, FILM COATED ORAL EVERY 12 HOURS SCHEDULED
Status: DISCONTINUED | OUTPATIENT
Start: 2024-05-19 | End: 2024-05-19 | Stop reason: HOSPADM

## 2024-05-19 RX ADMIN — CLONAZEPAM 0.5 MG: 0.5 TABLET ORAL at 13:30

## 2024-05-19 RX ADMIN — CLONAZEPAM 0.5 MG: 0.5 TABLET ORAL at 00:35

## 2024-05-19 RX ADMIN — TIOTROPIUM BROMIDE INHALATION SPRAY 2 PUFF: 3.12 SPRAY, METERED RESPIRATORY (INHALATION) at 06:02

## 2024-05-19 RX ADMIN — IPRATROPIUM BROMIDE AND ALBUTEROL SULFATE 3 ML: 2.5; .5 SOLUTION RESPIRATORY (INHALATION) at 13:24

## 2024-05-19 RX ADMIN — CLONIDINE HYDROCHLORIDE 0.1 MG: 0.1 TABLET ORAL at 08:13

## 2024-05-19 RX ADMIN — PALIPERIDONE 3 MG: 3 TABLET, EXTENDED RELEASE ORAL at 08:58

## 2024-05-19 RX ADMIN — PREDNISONE 50 MG: 20 TABLET ORAL at 08:14

## 2024-05-19 RX ADMIN — BUSPIRONE HYDROCHLORIDE 30 MG: 5 TABLET ORAL at 08:19

## 2024-05-19 RX ADMIN — HYDROXYZINE PAMOATE 50 MG: 25 CAPSULE ORAL at 06:51

## 2024-05-19 RX ADMIN — METOPROLOL TARTRATE 25 MG: 25 TABLET, FILM COATED ORAL at 08:12

## 2024-05-19 RX ADMIN — AMOXICILLIN AND CLAVULANATE POTASSIUM 1 TABLET: 875; 125 TABLET, FILM COATED ORAL at 11:46

## 2024-05-19 RX ADMIN — IPRATROPIUM BROMIDE AND ALBUTEROL SULFATE 3 ML: 2.5; .5 SOLUTION RESPIRATORY (INHALATION) at 07:00

## 2024-05-19 RX ADMIN — INSULIN LISPRO 2 UNITS: 100 INJECTION, SOLUTION INTRAVENOUS; SUBCUTANEOUS at 11:45

## 2024-05-19 RX ADMIN — PANTOPRAZOLE SODIUM 40 MG: 40 TABLET, DELAYED RELEASE ORAL at 06:02

## 2024-05-19 RX ADMIN — SODIUM CHLORIDE, POTASSIUM CHLORIDE, SODIUM LACTATE AND CALCIUM CHLORIDE 100 ML/HR: 600; 310; 30; 20 INJECTION, SOLUTION INTRAVENOUS at 05:40

## 2024-05-19 RX ADMIN — ESCITALOPRAM OXALATE 20 MG: 10 TABLET, FILM COATED ORAL at 08:14

## 2024-05-19 RX ADMIN — LEVOTHYROXINE SODIUM 150 MCG: 75 TABLET ORAL at 06:02

## 2024-05-19 RX ADMIN — IPRATROPIUM BROMIDE AND ALBUTEROL SULFATE 3 ML: 2.5; .5 SOLUTION RESPIRATORY (INHALATION) at 00:55

## 2024-05-19 RX ADMIN — DOXYCYCLINE HYCLATE 100 MG: 100 TABLET, FILM COATED ORAL at 08:13

## 2024-05-19 RX ADMIN — INSULIN GLARGINE 10 UNITS: 100 INJECTION, SOLUTION SUBCUTANEOUS at 08:05

## 2024-05-19 RX ADMIN — Medication 5 L/MIN: at 08:00

## 2024-05-19 ASSESSMENT — COGNITIVE AND FUNCTIONAL STATUS - GENERAL
DAILY ACTIVITIY SCORE: 24
MOBILITY SCORE: 24

## 2024-05-19 ASSESSMENT — PAIN SCALES - GENERAL: PAINLEVEL_OUTOF10: 0 - NO PAIN

## 2024-05-19 NOTE — CARE PLAN
The patient's goals for the shift include      The clinical goals for the shift include Safety

## 2024-05-19 NOTE — PROGRESS NOTES
Per dr yanes Pt does not want any c but agreeable to healthy at home. Dr yanes ordered the program.

## 2024-05-19 NOTE — PROGRESS NOTES
"Stephenie Mccray is a 44 y.o. female on day 3 of admission presenting with Shortness of breath.    Subjective   After she was seen by me yesterday, her  called and it seems like he threatened to leave her so she expressed suicidal ideation to the staff.  She was started on suicide precaution.  The patient now is transferred to the medical floor as she no longer needs to be in intensive care unit  She later denied saying she had thoughts of suicide.  I talked with her today.  She stated her  no longer wishes to leave her.  She denied any thoughts of suicide.  She continues to hear chattering that she has healed since she was 11 years old.  She is already on long-acting Invega Sustenna and p.o. Invega.  The patient is already established with the [facility].  She receives her medications from there.  She follows up there on monthly basis       Objective     Physical Exam  Psychiatric:         Attention and Perception: She perceives auditory hallucinations.         Mood and Affect: Mood is anxious.         Speech: Speech normal.         Behavior: Behavior normal. Behavior is cooperative.         Thought Content: Thought content normal.         Cognition and Memory: Cognition normal.         Judgment: Judgment normal.         Last Recorded Vitals  Blood pressure 121/60, pulse 65, temperature 36.2 °C (97.2 °F), resp. rate 15, height 1.549 m (5' 1\"), weight 74.3 kg (163 lb 12.8 oz), SpO2 96%.  Intake/Output last 3 Shifts:  I/O last 3 completed shifts:  In: 1837 (24.7 mL/kg) [I.V.:1837 (24.7 mL/kg)]  Out: 835 (11.2 mL/kg) [Urine:835 (0.3 mL/kg/hr)]  Weight: 74.3 kg     Scheduled medications  amoxicillin-pot clavulanate, 1 tablet, oral, q12h BLUE  busPIRone, 30 mg, oral, BID  cloNIDine, 0.1 mg, oral, q12h BLUE  escitalopram, 20 mg, oral, q AM  insulin glargine, 10 Units, subcutaneous, q AM  insulin lispro, 0-10 Units, subcutaneous, 4x daily  ipratropium-albuteroL, 3 mL, nebulization, q6h  levothyroxine, 150 " mcg, oral, Daily  metoprolol tartrate, 25 mg, oral, BID  oxygen, , inhalation, Continuous - Inhalation  paliperidone, 3 mg, oral, Daily  pantoprazole, 40 mg, oral, Daily before breakfast   Or  pantoprazole, 40 mg, intravenous, Daily before breakfast  prazosin, 5 mg, oral, Nightly  predniSONE, 50 mg, oral, Daily  rivaroxaban, 10 mg, oral, Daily with evening meal  tiotropium, 2 puff, inhalation, Daily      Continuous medications     PRN medications  PRN medications: clonazePAM, dextrose, dextrose, glucagon, glucagon, hydrALAZINE, hydrOXYzine pamoate, oxygen     Patient Active Problem List   Diagnosis    Acid reflux    COPD (chronic obstructive pulmonary disease) (Multi)    Mixed hyperlipidemia    Primary hypertension    Acquired hypothyroidism    Lipoma of forearm    Marijuana abuse    Multiple renal calculi    Ovarian teratoma    Schizophrenia (Multi)    Type 2 diabetes mellitus without complication, with long-term current use of insulin (Multi)    Class 1 obesity due to excess calories with serious comorbidity and body mass index (BMI) of 30.0 to 30.9 in adult    Superficial thrombophlebitis    Pulmonary embolism (Multi)    Acute exacerbation of chronic obstructive pulmonary disease (COPD) (Multi)    Acute on chronic respiratory failure with hypercapnia (Multi)    Sterilization    Shortness of breath                      Assessment/Plan   Principal Problem:    Shortness of breath  Schizophrenia  THC abuse    The patient does not meet involuntary criteria for inpatient admission.  She declined voluntary admission  She is already established at the [facility] and has regular follow-up appointments  I do not recommend further med changes   we decrease the clonazepam from 1 twice daily to 0.5 twice daily and I suggested that she talks to her outpatient psychiatrist for further tapering of the clonazepam due to the risks of dependency and respiratory depression  We will continue to follow-up while she is in the medical  floor  Schedule an outpatient follow-up appointment with the [facility] soon after discharge    Naveed Rowley MD

## 2024-05-19 NOTE — PROGRESS NOTES
ASSESSMENT & PLAN:     Pt is a 45 y/o F w/ a PMHx of COPD (4-5L@home), DVT/PE (on Xarelto), schizophrenia, migraine headaches, HTN, HLD, current smoker, GERD, IDDM, and hypothyroidism who presented to [facility] emergency department by EMS for altered mental status in the setting of acute hypercapnic/hypoxemic respiratroy failure due to multifocal pneumonia requiring intubation. Pt s/p extubation and was doing well on her home oxygen requirements, transferred to floor. She is currently very anxious, tearful, was expressing SI.     Acute on chronic hypoxemic and hypercapnic respiratory failure  Multifocal CAP  COPD exacerbation  Acute metabolic encephalopathy, resolved  Decompensated schizophrenia  SI  VTE on AC  HTN  IDDM2  DLD  Tobacco use disorder  Hypothyroidism  GERD    -pulm following  -finish abx and steroid courses as ordered  -cont nebulizer treatments, home inhalers  -cont baseline supplemental O2, satting well. Cont BiPAP at bedtime and prn.   -psych following for schizophrenia, SI. Recommended to continue her home meds, decrease clonazepam PRN dosing. She is currently 1:1 sitter still. Dispo tbd.   -cont basal bolus insulin as ordered  -cont Xarelto for VTE history  -cont other home meds  -pt/ot eval  -vte ppx already on xarelto    Edwardo Acosta MD    SUBJECTIVE     Transferred out of ICU yesterday. Today very tearful, crying because her  says he is leaving her. She expressed SI to the team yesterday, but today says she didn't mean it. Denies dyspnea. Stable on baseline o2.     OBJECTIVE:       Last Recorded Vitals:  Vitals:    05/19/24 0055 05/19/24 0600 05/19/24 0613 05/19/24 0702   BP:   130/78    BP Location:       Patient Position:       Pulse:   91    Resp: 16      Temp:   36.1 °C (97 °F)    TempSrc:       SpO2: 96%  92% 94%   Weight:  74.3 kg (163 lb 12.8 oz)     Height:           Last I/O:  I/O last 3 completed shifts:  In: 1837 (24.7 mL/kg) [I.V.:1837 (24.7 mL/kg)]  Out: 835 (11.2 mL/kg)  [Urine:835 (0.3 mL/kg/hr)]  Weight: 74.3 kg     Physical Exam:  GEN: appears stated age, NAD  CV: RRR, no m/r/g, no LE edema  LUNGS: CTAB, on o2 via nc  ABD: soft, NT, obese  MSK; no gross deformities, normal joints  NEURO: A+Ox3, no FND  PSYCH: anxious, tearful    Inpatient Medications:  busPIRone, 30 mg, oral, BID  cloNIDine, 0.1 mg, oral, q12h BLUE  doxycylcine, 100 mg, oral, q12h BLUE  escitalopram, 20 mg, oral, q AM  insulin glargine, 10 Units, subcutaneous, q AM  insulin lispro, 0-10 Units, subcutaneous, 4x daily  ipratropium-albuteroL, 3 mL, nebulization, q6h  levothyroxine, 150 mcg, oral, Daily  metoprolol tartrate, 25 mg, oral, BID  oxygen, , inhalation, Continuous - Inhalation  paliperidone, 3 mg, oral, Daily  pantoprazole, 40 mg, oral, Daily before breakfast   Or  pantoprazole, 40 mg, intravenous, Daily before breakfast  prazosin, 5 mg, oral, Nightly  predniSONE, 50 mg, oral, Daily  rivaroxaban, 10 mg, oral, Daily with evening meal  tiotropium, 2 puff, inhalation, Daily        PRN Medications  PRN medications: clonazePAM, dextrose, dextrose, glucagon, glucagon, hydrALAZINE, hydrOXYzine pamoate, oxygen    Continuous Medications:         LABS AND IMAGING:     Labs:  Results for orders placed or performed during the hospital encounter of 05/15/24 (from the past 24 hour(s))   POCT GLUCOSE   Result Value Ref Range    POCT Glucose 192 (H) 74 - 99 mg/dL   POCT GLUCOSE   Result Value Ref Range    POCT Glucose 156 (H) 74 - 99 mg/dL   CBC   Result Value Ref Range    WBC 8.0 4.4 - 11.3 x10*3/uL    nRBC 0.0 0.0 - 0.0 /100 WBCs    RBC 3.80 (L) 4.00 - 5.20 x10*6/uL    Hemoglobin 11.5 (L) 12.0 - 16.0 g/dL    Hematocrit 39.3 36.0 - 46.0 %     (H) 80 - 100 fL    MCH 30.3 26.0 - 34.0 pg    MCHC 29.3 (L) 32.0 - 36.0 g/dL    RDW 17.0 (H) 11.5 - 14.5 %    Platelets 159 150 - 450 x10*3/uL   Basic Metabolic Panel   Result Value Ref Range    Glucose 117 (H) 74 - 99 mg/dL    Sodium 143 136 - 145 mmol/L    Potassium 4.1 3.5 -  5.3 mmol/L    Chloride 108 (H) 98 - 107 mmol/L    Bicarbonate 27 21 - 32 mmol/L    Anion Gap 12 10 - 20 mmol/L    Urea Nitrogen 26 (H) 6 - 23 mg/dL    Creatinine 0.74 0.50 - 1.05 mg/dL    eGFR >90 >60 mL/min/1.73m*2    Calcium 8.8 8.6 - 10.3 mg/dL   Magnesium   Result Value Ref Range    Magnesium 1.62 1.60 - 2.40 mg/dL   POCT GLUCOSE   Result Value Ref Range    POCT Glucose 106 (H) 74 - 99 mg/dL        Imaging:  ECG 12 lead  Sinus tachycardia  Possible Left atrial enlargement  Inferior infarct , age undetermined  Abnormal ECG  When compared with ECG of 15-MAY-2024 21:27, (unconfirmed)  No significant change was found  See ED provider note for full interpretation and clinical correlation  Confirmed by Yumi Silva [id number]) on 5/17/2024 4:00:40 PM  ECG 12 lead  Sinus tachycardia  Possible Left atrial enlargement  Low voltage QRS  Borderline ECG  When compared with ECG of 08-APR-2024 04:13,  Fusion complexes are no longer Present  Vent. rate has increased BY  68 BPM  Questionable change in QRS duration  Criteria for Anteroseptal infarct are no longer Present  See ED provider note for full interpretation and clinical correlation  Confirmed by Yumi Silva [id number]) on 5/17/2024 4:00:18 PM

## 2024-05-19 NOTE — DISCHARGE SUMMARY
DISCHARGE DIAGNOSIS     Acute on chronic hypoxemic and hypercapnic respiratory failure  Multifocal CAP  COPD exacerbation  Acute metabolic encephalopathy, resolved  Schizophrenia  SI  VTE on AC  HTN  IDDM2  DLD  Tobacco use disorder  Hypothyroidism  GERD    HOSPITAL COURSE AND DETAILS     Pt is a 45 y/o F w/ a PMHx of COPD (4-5L@home), DVT/PE (on Xarelto), schizophrenia, migraine headaches, HTN, HLD, current smoker, GERD, IDDM, and hypothyroidism who presented to [facility] emergency department by EMS for altered mental status in the setting of acute hypercapnic/hypoxemic respiratroy failure due to multifocal pneumonia requiring intubation. Pt s/p extubation and was doing well on her home oxygen requirements, transferred to floor. She was stable. Will finish out her abx and steroid course for 7d total with Augmentin and prednisone. Fu with PCP and pulmonology.     During admission, she was very anxious and expressed SI. Was briefly on 1:1 sitter and suicide precautions. Seen by psychiatry. Her SI resolved. She did not meet criteria for involuntary psychiatric admission, and declined voluntary admission. Psych recommended continuing her home meds, and tapering off of clonazepam eventually as outpt. She will fu with [facility].     Seen by PT/OT, rec low intensity. Pt declines any therapy on discharge. Will send with [facility] program. Stable for dc to home. Total time spent on discharge services 32 minutes.     DISCHARGE PHYSICAL EXAM     Last Recorded Vitals:  Vitals:    05/19/24 0600 05/19/24 0613 05/19/24 0702 05/19/24 1101   BP:  130/78  121/60   BP Location:       Patient Position:       Pulse:  91  65   Resp:    15   Temp:  36.1 °C (97 °F)  36.2 °C (97.2 °F)   TempSrc:       SpO2:  92% 94% 96%   Weight: 74.3 kg (163 lb 12.8 oz)      Height:           Physical Exam:  GEN: appears stated age, NAD  CV: RRR, no m/r/g, no LE edema  LUNGS: CTAB, on o2 via nc  ABD: soft, NT, obese  MSK; no gross deformities, normal  joints  NEURO: A+Ox3, no FND  PSYCH: anxious, tearful      PERTINENT LABS AND IMAGING     Results for orders placed or performed during the hospital encounter of 05/15/24 (from the past 96 hour(s))   CBC and Auto Differential   Result Value Ref Range    WBC 8.7 4.4 - 11.3 x10*3/uL    nRBC 0.2 (H) 0.0 - 0.0 /100 WBCs    RBC 4.01 4.00 - 5.20 x10*6/uL    Hemoglobin 12.3 12.0 - 16.0 g/dL    Hematocrit 40.5 36.0 - 46.0 %     (H) 80 - 100 fL    MCH 30.7 26.0 - 34.0 pg    MCHC 30.4 (L) 32.0 - 36.0 g/dL    RDW 16.3 (H) 11.5 - 14.5 %    Platelets 154 150 - 450 x10*3/uL    Neutrophils % 53.4 40.0 - 80.0 %    Immature Granulocytes %, Automated 2.3 (H) 0.0 - 0.9 %    Lymphocytes % 36.4 13.0 - 44.0 %    Monocytes % 6.6 2.0 - 10.0 %    Eosinophils % 0.5 0.0 - 6.0 %    Basophils % 0.8 0.0 - 2.0 %    Neutrophils Absolute 4.67 1.20 - 7.70 x10*3/uL    Immature Granulocytes Absolute, Automated 0.20 0.00 - 0.70 x10*3/uL    Lymphocytes Absolute 3.18 1.20 - 4.80 x10*3/uL    Monocytes Absolute 0.58 0.10 - 1.00 x10*3/uL    Eosinophils Absolute 0.04 0.00 - 0.70 x10*3/uL    Basophils Absolute 0.07 0.00 - 0.10 x10*3/uL   Comprehensive Metabolic Panel   Result Value Ref Range    Glucose 123 (H) 74 - 99 mg/dL    Sodium 142 136 - 145 mmol/L    Potassium 4.2 3.5 - 5.3 mmol/L    Chloride 102 98 - 107 mmol/L    Bicarbonate 36 (H) 21 - 32 mmol/L    Anion Gap 8 (L) 10 - 20 mmol/L    Urea Nitrogen 17 6 - 23 mg/dL    Creatinine 0.76 0.50 - 1.05 mg/dL    eGFR >90 >60 mL/min/1.73m*2    Calcium 9.0 8.6 - 10.3 mg/dL    Albumin 3.9 3.4 - 5.0 g/dL    Alkaline Phosphatase 65 33 - 110 U/L    Total Protein 6.7 6.4 - 8.2 g/dL    AST 15 9 - 39 U/L    Bilirubin, Total 0.4 0.0 - 1.2 mg/dL    ALT 13 7 - 45 U/L   Lipase   Result Value Ref Range    Lipase 10 9 - 82 U/L   hCG, quantitative, pregnancy   Result Value Ref Range    HCG, Beta-Quantitative <2 <5 mIU/mL   Troponin I, High Sensitivity, Initial   Result Value Ref Range    Troponin I, High Sensitivity 5 0  - 13 ng/L   BLOOD GAS VENOUS FULL PANEL   Result Value Ref Range    POCT pH, Venous 7.33 7.33 - 7.43 pH    POCT pCO2, Venous 78 (HH) 41 - 51 mm Hg    POCT pO2, Venous 58 (H) 35 - 45 mm Hg    POCT SO2, Venous 86 (H) 45 - 75 %    POCT Oxy Hemoglobin, Venous 77.8 (H) 45.0 - 75.0 %    POCT Hematocrit Calculated, Venous 37.0 36.0 - 46.0 %    POCT Sodium, Venous 141 136 - 145 mmol/L    POCT Potassium, Venous 4.3 3.5 - 5.3 mmol/L    POCT Chloride, Venous 103 98 - 107 mmol/L    POCT Ionized Calicum, Venous 1.26 1.10 - 1.33 mmol/L    POCT Glucose, Venous 128 (H) 74 - 99 mg/dL    POCT Lactate, Venous 0.8 0.4 - 2.0 mmol/L    POCT Base Excess, Venous 12.1 (H) -2.0 - 3.0 mmol/L    POCT HCO3 Calculated, Venous 41.1 (H) 22.0 - 26.0 mmol/L    POCT Hemoglobin, Venous 12.2 12.0 - 16.0 g/dL    POCT Anion Gap, Venous 1.0 (L) 10.0 - 25.0 mmol/L    Patient Temperature      FiO2 100 %    Critical Called By PATTI DOYLE     Critical Called To DIDIER     Critical Call Time 2112     Critical Read Back Y    B-type natriuretic peptide   Result Value Ref Range    BNP 13 0 - 99 pg/mL   ECG 12 lead   Result Value Ref Range    Ventricular Rate 133 BPM    Atrial Rate 133 BPM    PA Interval 122 ms    QRS Duration 78 ms    QT Interval 306 ms    QTC Calculation(Bazett) 455 ms    P Axis 36 degrees    R Axis -25 degrees    T Axis 22 degrees    QRS Count 22 beats    Q Onset 210 ms    P Onset 149 ms    P Offset 196 ms    T Offset 363 ms    QTC Fredericia 398 ms   Troponin, High Sensitivity, 1 Hour   Result Value Ref Range    Troponin I, High Sensitivity 5 0 - 13 ng/L   BLOOD GAS VENOUS FULL PANEL   Result Value Ref Range    POCT pH, Venous 7.24 (LL) 7.33 - 7.43 pH    POCT pCO2, Venous 98 (HH) 41 - 51 mm Hg    POCT pO2, Venous 63 (H) 35 - 45 mm Hg    POCT SO2, Venous 87 (H) 45 - 75 %    POCT Oxy Hemoglobin, Venous 79.8 (H) 45.0 - 75.0 %    POCT Hematocrit Calculated, Venous 39.0 36.0 - 46.0 %    POCT Sodium, Venous 139 136 - 145 mmol/L    POCT Potassium,  Venous 4.4 3.5 - 5.3 mmol/L    POCT Chloride, Venous 102 98 - 107 mmol/L    POCT Ionized Calicum, Venous 1.26 1.10 - 1.33 mmol/L    POCT Glucose, Venous 169 (H) 74 - 99 mg/dL    POCT Lactate, Venous 0.7 0.4 - 2.0 mmol/L    POCT Base Excess, Venous 10.7 (H) -2.0 - 3.0 mmol/L    POCT HCO3 Calculated, Venous 42.0 (H) 22.0 - 26.0 mmol/L    POCT Hemoglobin, Venous 12.9 12.0 - 16.0 g/dL    POCT Anion Gap, Venous -1.0 (L) 10.0 - 25.0 mmol/L    Patient Temperature      FiO2 60 %    Critical Called By PATTI DOYLE     Critical Called To DR VELÁSQUEZ     Critical Call Time 2204     Critical Read Back Y    Lactate   Result Value Ref Range    Lactate 1.0 0.4 - 2.0 mmol/L   Blood Culture    Specimen: Peripheral Venipuncture; Blood culture   Result Value Ref Range    Blood Culture No growth at 3 days    Blood Culture    Specimen: Peripheral Venipuncture; Blood culture   Result Value Ref Range    Blood Culture No growth at 3 days    Urinalysis with Reflex Culture and Microscopic   Result Value Ref Range    Color, Urine Yellow Straw, Yellow    Appearance, Urine Hazy (N) Clear    Specific Gravity, Urine 1.014 1.005 - 1.035    pH, Urine 6.0 5.0, 5.5, 6.0, 6.5, 7.0, 7.5, 8.0    Protein, Urine 30 (1+) (N) NEGATIVE mg/dL    Glucose, Urine NEGATIVE NEGATIVE mg/dL    Blood, Urine NEGATIVE NEGATIVE    Ketones, Urine NEGATIVE NEGATIVE mg/dL    Bilirubin, Urine NEGATIVE NEGATIVE    Urobilinogen, Urine <2.0 <2.0 mg/dL    Nitrite, Urine POSITIVE (A) NEGATIVE    Leukocyte Esterase, Urine LARGE (3+) (A) NEGATIVE   Extra Urine Gray Tube   Result Value Ref Range    Extra Tube Hold for add-ons.    BLOOD GAS VENOUS FULL PANEL   Result Value Ref Range    POCT pH, Venous 7.35 7.33 - 7.43 pH    POCT pCO2, Venous 67 (H) 41 - 51 mm Hg    POCT pO2, Venous 60 (H) 35 - 45 mm Hg    POCT SO2, Venous 89 (H) 45 - 75 %    POCT Oxy Hemoglobin, Venous 83.9 (H) 45.0 - 75.0 %    POCT Hematocrit Calculated, Venous 35.0 (L) 36.0 - 46.0 %    POCT Sodium, Venous 135 (L) 136 -  145 mmol/L    POCT Potassium, Venous 4.3 3.5 - 5.3 mmol/L    POCT Chloride, Venous 102 98 - 107 mmol/L    POCT Ionized Calicum, Venous 1.24 1.10 - 1.33 mmol/L    POCT Glucose, Venous 268 (H) 74 - 99 mg/dL    POCT Lactate, Venous 1.1 0.4 - 2.0 mmol/L    POCT Base Excess, Venous 9.2 (H) -2.0 - 3.0 mmol/L    POCT HCO3 Calculated, Venous 37.0 (H) 22.0 - 26.0 mmol/L    POCT Hemoglobin, Venous 11.8 (L) 12.0 - 16.0 g/dL    POCT Anion Gap, Venous 0.0 (L) 10.0 - 25.0 mmol/L    Patient Temperature      FiO2 100 %   Microscopic Only, Urine   Result Value Ref Range    WBC, Urine >50 (A) 1-5, NONE /HPF    RBC, Urine 6-10 (A) NONE, 1-2, 3-5 /HPF    Bacteria, Urine 1+ (A) NONE SEEN /HPF   Urine Culture    Specimen: Clean Catch/Voided; Urine   Result Value Ref Range    Urine Culture >100,000 Escherichia coli (A)        Susceptibility    Escherichia coli - MICROSCAN     Ampicillin  Resistant mcg/mL     Amoxicillin/Clavulanate  Susceptible mcg/mL     Ampicillin/Sulbactam  Resistant mcg/mL     Cefazolin  Susceptible mcg/mL     Cefazolin (uncomplicated UTIs only)  Susceptible mcg/mL     Ciprofloxacin  Resistant mcg/mL     Gentamicin  Susceptible mcg/mL     Nitrofurantoin  Susceptible mcg/mL     Piperacillin/Tazobactam  Susceptible mcg/mL     Trimethoprim/Sulfamethoxazole  Susceptible mcg/mL   Drug Screen, Urine   Result Value Ref Range    Amphetamine Screen, Urine Presumptive Negative Presumptive Negative    Barbiturate Screen, Urine Presumptive Negative Presumptive Negative    Benzodiazepines Screen, Urine Presumptive Negative Presumptive Negative    Cannabinoid Screen, Urine Presumptive Positive (A) Presumptive Negative    Cocaine Metabolite Screen, Urine Presumptive Negative Presumptive Negative    Fentanyl Screen, Urine Presumptive Negative Presumptive Negative    Opiate Screen, Urine Presumptive Negative Presumptive Negative    Oxycodone Screen, Urine Presumptive Negative Presumptive Negative    PCP Screen, Urine Presumptive  Negative Presumptive Negative    Methadone Screen, Urine Presumptive Negative Presumptive Negative   ECG 12 lead   Result Value Ref Range    Ventricular Rate 107 BPM    Atrial Rate 107 BPM    AK Interval 122 ms    QRS Duration 78 ms    QT Interval 362 ms    QTC Calculation(Bazett) 483 ms    P Axis 51 degrees    R Axis -21 degrees    T Axis 23 degrees    QRS Count 18 beats    Q Onset 226 ms    P Onset 165 ms    P Offset 214 ms    T Offset 407 ms    QTC Fredericia 439 ms   MRSA Surveillance for Vancomycin De-escalation, PCR    Specimen: Anterior Nares; Swab   Result Value Ref Range    MRSA PCR Not Detected Not Detected   POCT GLUCOSE   Result Value Ref Range    POCT Glucose 252 (H) 74 - 99 mg/dL   Blood Gas Arterial Full Panel   Result Value Ref Range    POCT pH, Arterial 7.38 7.38 - 7.42 pH    POCT pCO2, Arterial 56 (H) 38 - 42 mm Hg    POCT pO2, Arterial 51 (L) 85 - 95 mm Hg    POCT SO2, Arterial 88 (L) 94 - 100 %    POCT Oxy Hemoglobin, Arterial 85.1 (L) 94.0 - 98.0 %    POCT Hematocrit Calculated, Arterial 39.0 36.0 - 46.0 %    POCT Sodium, Arterial 137 136 - 145 mmol/L    POCT Potassium, Arterial 4.1 3.5 - 5.3 mmol/L    POCT Chloride, Arterial 99 98 - 107 mmol/L    POCT Ionized Calcium, Arterial 1.21 1.10 - 1.33 mmol/L    POCT Glucose, Arterial 250 (H) 74 - 99 mg/dL    POCT Lactate, Arterial 2.4 (H) 0.4 - 2.0 mmol/L    POCT Base Excess, Arterial 6.4 (H) -2.0 - 3.0 mmol/L    POCT HCO3 Calculated, Arterial 33.1 (H) 22.0 - 26.0 mmol/L    POCT Hemoglobin, Arterial 13.0 12.0 - 16.0 g/dL    POCT Anion Gap, Arterial 9 (L) 10 - 25 mmo/L    Patient Temperature      FiO2 80 %   Streptococcus pneumoniae Antigen, Urine    Specimen: Urine   Result Value Ref Range    Streptococcus pneumoniae Ag, Urine Negative Negative   Legionella Antigen, Urine    Specimen: Urine   Result Value Ref Range    L. pneumophila Urine Ag Negative Negative   CBC   Result Value Ref Range    WBC 9.7 4.4 - 11.3 x10*3/uL    nRBC 0.2 (H) 0.0 - 0.0 /100  WBCs    RBC 4.25 4.00 - 5.20 x10*6/uL    Hemoglobin 12.9 12.0 - 16.0 g/dL    Hematocrit 42.1 36.0 - 46.0 %    MCV 99 80 - 100 fL    MCH 30.4 26.0 - 34.0 pg    MCHC 30.6 (L) 32.0 - 36.0 g/dL    RDW 16.1 (H) 11.5 - 14.5 %    Platelets 157 150 - 450 x10*3/uL   Basic Metabolic Panel   Result Value Ref Range    Glucose 240 (H) 74 - 99 mg/dL    Sodium 138 136 - 145 mmol/L    Potassium 4.1 3.5 - 5.3 mmol/L    Chloride 99 98 - 107 mmol/L    Bicarbonate 31 21 - 32 mmol/L    Anion Gap 12 10 - 20 mmol/L    Urea Nitrogen 16 6 - 23 mg/dL    Creatinine 0.86 0.50 - 1.05 mg/dL    eGFR 86 >60 mL/min/1.73m*2    Calcium 9.3 8.6 - 10.3 mg/dL   Magnesium   Result Value Ref Range    Magnesium 1.57 (L) 1.60 - 2.40 mg/dL   POCT GLUCOSE   Result Value Ref Range    POCT Glucose 300 (H) 74 - 99 mg/dL   Blood Gas Lactic Acid, Venous   Result Value Ref Range    POCT Lactate, Venous 3.3 (H) 0.4 - 2.0 mmol/L   Respiratory Culture/Smear    Specimen: Tracheal Aspirate; Fluid   Result Value Ref Range    Respiratory Culture/Smear Normal throat jon     Gram Stain (3+) Moderate Polymorphonuclear leukocytes (A)     Gram Stain Mixed Gram positive bacteria (A)    POCT GLUCOSE   Result Value Ref Range    POCT Glucose 208 (H) 74 - 99 mg/dL   Blood Gas Arterial Full Panel   Result Value Ref Range    POCT pH, Arterial 7.40 7.38 - 7.42 pH    POCT pCO2, Arterial 54 (H) 38 - 42 mm Hg    POCT pO2, Arterial 80 (L) 85 - 95 mm Hg    POCT SO2, Arterial 98 94 - 100 %    POCT Oxy Hemoglobin, Arterial 95.8 94.0 - 98.0 %    POCT Hematocrit Calculated, Arterial 38.0 36.0 - 46.0 %    POCT Sodium, Arterial 135 (L) 136 - 145 mmol/L    POCT Potassium, Arterial 3.9 3.5 - 5.3 mmol/L    POCT Chloride, Arterial 99 98 - 107 mmol/L    POCT Ionized Calcium, Arterial 1.18 1.10 - 1.33 mmol/L    POCT Glucose, Arterial 198 (H) 74 - 99 mg/dL    POCT Lactate, Arterial 3.1 (H) 0.4 - 2.0 mmol/L    POCT Base Excess, Arterial 7.1 (H) -2.0 - 3.0 mmol/L    POCT HCO3 Calculated, Arterial 33.4  (H) 22.0 - 26.0 mmol/L    POCT Hemoglobin, Arterial 12.7 12.0 - 16.0 g/dL    POCT Anion Gap, Arterial 7 (L) 10 - 25 mmo/L    Patient Temperature      FiO2 90 %    Ventilator Mode A/C     Ventilator Rate 16 bpm    Tidal Volume 400 mL    Peep CHM2O 7.0 cm H2O    Site of Arterial Puncture Brachial Left    POCT GLUCOSE   Result Value Ref Range    POCT Glucose 151 (H) 74 - 99 mg/dL   Lactate   Result Value Ref Range    Lactate 4.2 (HH) 0.4 - 2.0 mmol/L   Basic metabolic panel   Result Value Ref Range    Glucose 155 (H) 74 - 99 mg/dL    Sodium 140 136 - 145 mmol/L    Potassium 3.8 3.5 - 5.3 mmol/L    Chloride 100 98 - 107 mmol/L    Bicarbonate 30 21 - 32 mmol/L    Anion Gap 14 10 - 20 mmol/L    Urea Nitrogen 16 6 - 23 mg/dL    Creatinine 0.74 0.50 - 1.05 mg/dL    eGFR >90 >60 mL/min/1.73m*2    Calcium 9.6 8.6 - 10.3 mg/dL   Lavender Top   Result Value Ref Range    Extra Tube Hold for add-ons.    Creatine Kinase   Result Value Ref Range    Creatine Kinase 29 0 - 215 U/L   Lactate   Result Value Ref Range    Lactate 2.5 (H) 0.4 - 2.0 mmol/L   POCT GLUCOSE   Result Value Ref Range    POCT Glucose 165 (H) 74 - 99 mg/dL   POCT GLUCOSE   Result Value Ref Range    POCT Glucose 140 (H) 74 - 99 mg/dL   CBC   Result Value Ref Range    WBC 13.4 (H) 4.4 - 11.3 x10*3/uL    nRBC 0.0 0.0 - 0.0 /100 WBCs    RBC 4.05 4.00 - 5.20 x10*6/uL    Hemoglobin 12.4 12.0 - 16.0 g/dL    Hematocrit 39.8 36.0 - 46.0 %    MCV 98 80 - 100 fL    MCH 30.6 26.0 - 34.0 pg    MCHC 31.2 (L) 32.0 - 36.0 g/dL    RDW 17.1 (H) 11.5 - 14.5 %    Platelets 153 150 - 450 x10*3/uL   Basic Metabolic Panel   Result Value Ref Range    Glucose 130 (H) 74 - 99 mg/dL    Sodium 141 136 - 145 mmol/L    Potassium 4.2 3.5 - 5.3 mmol/L    Chloride 102 98 - 107 mmol/L    Bicarbonate 29 21 - 32 mmol/L    Anion Gap 14 10 - 20 mmol/L    Urea Nitrogen 19 6 - 23 mg/dL    Creatinine 0.71 0.50 - 1.05 mg/dL    eGFR >90 >60 mL/min/1.73m*2    Calcium 9.7 8.6 - 10.3 mg/dL   Magnesium    Result Value Ref Range    Magnesium 2.17 1.60 - 2.40 mg/dL   Hepatic function panel   Result Value Ref Range    Albumin 3.9 3.4 - 5.0 g/dL    Bilirubin, Total 0.3 0.0 - 1.2 mg/dL    Bilirubin, Direct 0.0 0.0 - 0.3 mg/dL    Alkaline Phosphatase 68 33 - 110 U/L    ALT 62 (H) 7 - 45 U/L    AST 33 9 - 39 U/L    Total Protein 6.9 6.4 - 8.2 g/dL   Coagulation Screen   Result Value Ref Range    Protime 12.7 9.8 - 12.8 seconds    INR 1.1 0.9 - 1.1    aPTT 26 (L) 27 - 38 seconds   Lactate   Result Value Ref Range    Lactate 2.3 (H) 0.4 - 2.0 mmol/L   Blood Gas Arterial Full Panel   Result Value Ref Range    POCT pH, Arterial 7.46 (H) 7.38 - 7.42 pH    POCT pCO2, Arterial 49 (H) 38 - 42 mm Hg    POCT pO2, Arterial 70 (L) 85 - 95 mm Hg    POCT SO2, Arterial 96 94 - 100 %    POCT Oxy Hemoglobin, Arterial 93.9 (L) 94.0 - 98.0 %    POCT Hematocrit Calculated, Arterial 37.0 36.0 - 46.0 %    POCT Sodium, Arterial 139 136 - 145 mmol/L    POCT Potassium, Arterial 3.5 3.5 - 5.3 mmol/L    POCT Chloride, Arterial 105 98 - 107 mmol/L    POCT Ionized Calcium, Arterial 1.23 1.10 - 1.33 mmol/L    POCT Glucose, Arterial 113 (H) 74 - 99 mg/dL    POCT Lactate, Arterial 2.5 (H) 0.4 - 2.0 mmol/L    POCT Base Excess, Arterial 9.5 (H) -2.0 - 3.0 mmol/L    POCT HCO3 Calculated, Arterial 34.8 (H) 22.0 - 26.0 mmol/L    POCT Hemoglobin, Arterial 12.2 12.0 - 16.0 g/dL    POCT Anion Gap, Arterial 3 (L) 10 - 25 mmo/L    Patient Temperature      FiO2 70 %    Ventilator Mode A/C     Ventilator Rate 18 bpm    Tidal Volume 400 mL    Total Minute Volume 7.0 Liter    Peep CHM2O 5.0 cm H2O    Site of Arterial Puncture Brachial Right     Andrew's Test Negative    POCT GLUCOSE   Result Value Ref Range    POCT Glucose 109 (H) 74 - 99 mg/dL   POCT GLUCOSE   Result Value Ref Range    POCT Glucose 190 (H) 74 - 99 mg/dL   POCT GLUCOSE   Result Value Ref Range    POCT Glucose 163 (H) 74 - 99 mg/dL   POCT GLUCOSE   Result Value Ref Range    POCT Glucose 146 (H) 74 - 99  mg/dL   CBC   Result Value Ref Range    WBC 10.1 4.4 - 11.3 x10*3/uL    nRBC 0.0 0.0 - 0.0 /100 WBCs    RBC 3.99 (L) 4.00 - 5.20 x10*6/uL    Hemoglobin 12.1 12.0 - 16.0 g/dL    Hematocrit 41.1 36.0 - 46.0 %     (H) 80 - 100 fL    MCH 30.3 26.0 - 34.0 pg    MCHC 29.4 (L) 32.0 - 36.0 g/dL    RDW 17.0 (H) 11.5 - 14.5 %    Platelets 141 (L) 150 - 450 x10*3/uL   Basic Metabolic Panel   Result Value Ref Range    Glucose 157 (H) 74 - 99 mg/dL    Sodium 143 136 - 145 mmol/L    Potassium 4.2 3.5 - 5.3 mmol/L    Chloride 105 98 - 107 mmol/L    Bicarbonate 29 21 - 32 mmol/L    Anion Gap 13 10 - 20 mmol/L    Urea Nitrogen 19 6 - 23 mg/dL    Creatinine 0.64 0.50 - 1.05 mg/dL    eGFR >90 >60 mL/min/1.73m*2    Calcium 9.5 8.6 - 10.3 mg/dL   Magnesium   Result Value Ref Range    Magnesium 1.87 1.60 - 2.40 mg/dL   Lactate   Result Value Ref Range    Lactate 0.9 0.4 - 2.0 mmol/L   POCT GLUCOSE   Result Value Ref Range    POCT Glucose 125 (H) 74 - 99 mg/dL   POCT GLUCOSE   Result Value Ref Range    POCT Glucose 192 (H) 74 - 99 mg/dL   POCT GLUCOSE   Result Value Ref Range    POCT Glucose 156 (H) 74 - 99 mg/dL   CBC   Result Value Ref Range    WBC 8.0 4.4 - 11.3 x10*3/uL    nRBC 0.0 0.0 - 0.0 /100 WBCs    RBC 3.80 (L) 4.00 - 5.20 x10*6/uL    Hemoglobin 11.5 (L) 12.0 - 16.0 g/dL    Hematocrit 39.3 36.0 - 46.0 %     (H) 80 - 100 fL    MCH 30.3 26.0 - 34.0 pg    MCHC 29.3 (L) 32.0 - 36.0 g/dL    RDW 17.0 (H) 11.5 - 14.5 %    Platelets 159 150 - 450 x10*3/uL   Basic Metabolic Panel   Result Value Ref Range    Glucose 117 (H) 74 - 99 mg/dL    Sodium 143 136 - 145 mmol/L    Potassium 4.1 3.5 - 5.3 mmol/L    Chloride 108 (H) 98 - 107 mmol/L    Bicarbonate 27 21 - 32 mmol/L    Anion Gap 12 10 - 20 mmol/L    Urea Nitrogen 26 (H) 6 - 23 mg/dL    Creatinine 0.74 0.50 - 1.05 mg/dL    eGFR >90 >60 mL/min/1.73m*2    Calcium 8.8 8.6 - 10.3 mg/dL   Magnesium   Result Value Ref Range    Magnesium 1.62 1.60 - 2.40 mg/dL   SST TOP   Result  Value Ref Range    Extra Tube Hold for add-ons.    POCT GLUCOSE   Result Value Ref Range    POCT Glucose 106 (H) 74 - 99 mg/dL   POCT GLUCOSE   Result Value Ref Range    POCT Glucose 157 (H) 74 - 99 mg/dL        XR chest 1 view   Final Result   1.  Prominent irregular region of consolidation and volume loss in   the left lower lobe retrocardiac region similar to prior.   2. Right basilar irregular consolidation volume loss slightly less   prominent from prior.                  MACRO:   None.        Signed by: Jaylen Roman 5/16/2024 2:59 PM   Dictation workstation:   [id number]      CT chest w IV contrast   Final Result   Bilateral upper and lower lobe consolidation is most consistent with   atelectasis, however, superimposed pneumonia is not excluded in the   appropriate clinical setting.             MACRO:   None        Signed by: Lara Sheehan 5/16/2024 2:41 AM   Dictation workstation:   [id number]      CT head wo IV contrast   Final Result   No acute intracranial abnormality.             MACRO:   None        Signed by: Lara Sheehan 5/16/2024 2:32 AM   Dictation workstation:   [id number]      XR chest 1 view   Final Result   1. Cardiomegaly. Mild vascular congestion. Small bilateral pleural   effusions. Bilateral airspace opacities, may be secondary to   pneumonia and/or pulmonary edema.   2. Life support devices as described above.                  MACRO:   None.        Signed by: Tali Moore 5/16/2024 1:27 AM   Dictation workstation:   [id number]      XR chest 1 view   Final Result   1. Cardiomegaly. Vascular congestion. Small bilateral pleural   effusions. Bilateral airspace opacities, may be secondary to   pulmonary edema and/or pneumonia.                  MACRO:   None.        Signed by: Tali Moore 5/15/2024 10:36 PM   Dictation workstation:   [id number]          No echocardiogram results found for the past 14 days    DISCHARGE MEDICATIONS        Your medication list        START  "taking these medications        Instructions Last Dose Given Next Dose Due   amoxicillin-pot clavulanate 875-125 mg tablet  Commonly known as: Augmentin      Take 1 tablet by mouth every 12 hours for 7 doses.       predniSONE 50 mg tablet  Commonly known as: Deltasone  Start taking on: May 20, 2024      Take 1 tablet (50 mg) by mouth once daily for 1 dose.              CHANGE how you take these medications        Instructions Last Dose Given Next Dose Due   insulin glargine 100 unit/mL (3 mL) pen  Commonly known as: Lantus Solostar U-100 Insulin  What changed: how much to take      Inject 10 Units under the skin once daily in the morning. Take as directed per insulin instructions.       ipratropium-albuteroL 0.5-2.5 mg/3 mL nebulizer solution  Commonly known as: Duo-Neb  What changed:   when to take this  additional instructions      Take 3 mL by nebulization every 6 hours if needed for shortness of breath or wheezing.              CONTINUE taking these medications        Instructions Last Dose Given Next Dose Due   alcohol swabs pads, medicated  Commonly known as: Alcohol Prep Pads      Use 3 times daily prn       atorvastatin 20 mg tablet  Commonly known as: Lipitor      Take 1 tablet (20 mg) by mouth once daily. Dr. Davis covering for Dr. Yuen       BD Ultra-Fine Mini Pen Needle 31 gauge x 3/16\" needle  Generic drug: pen needle, diabetic      USE TO INJECT 4 TIMES DAILY AS DIRECTED       busPIRone 30 mg tablet  Commonly known as: Buspar           clonazePAM 1 mg tablet  Commonly known as: KlonoPIN           cloNIDine 0.1 mg tablet  Commonly known as: Catapres           escitalopram 20 mg tablet  Commonly known as: Lexapro           hydrOXYzine pamoate 50 mg capsule  Commonly known as: Vistaril           insulin lispro 100 unit/mL injection  Commonly known as: HumaLOG           Invega Sustenna 234 mg/1.5 mL syringe  Generic drug: paliperidone palmitate ER           levothyroxine 150 mcg tablet  Commonly known " as: Synthroid, Levoxyl      Take 1 tablet (150 mcg) by mouth once daily.       metoprolol tartrate 25 mg tablet  Commonly known as: Lopressor           ondansetron ODT 4 mg disintegrating tablet  Commonly known as: Zofran-ODT           OneTouch Verio test strips strip  Generic drug: blood sugar diagnostic           paliperidone 3 mg 24 hr tablet  Commonly known as: Invega      Take 1 tablet (3 mg) by mouth once daily. Do not crush, chew, or split. Do not start before January 15, 2024.       pantoprazole 40 mg EC tablet  Commonly known as: ProtoNix      Take 1 tablet (40 mg) by mouth once daily.       prazosin 5 mg capsule  Commonly known as: Minipress           tiotropium 2.5 mcg/actuation inhaler  Commonly known as: Spiriva Respimat      Inhale 2 puffs once daily. Do not start before March 9, 2024.       Xarelto 10 mg tablet  Generic drug: rivaroxaban                     Where to Get Your Medications        These medications were sent to [id number] - [place], [place] - [address]  [address] [facility] [address]      Phone: [phone]   amoxicillin-pot clavulanate 875-125 mg tablet  predniSONE 50 mg tablet         OUTPATIENT FOLLOW-UP     Future Appointments   Date Time Provider Department Center   3/12/2025  1:00 PM Tyson Valdes DO [id number] [place]

## 2024-05-19 NOTE — PROGRESS NOTES
"Stephenie Mccray is a 44 y.o. female on day 2 of admission presenting with Shortness of breath after taking Gummies.  Patient has history of advanced COPD.  Patient became more confused up to go measle more short of breath there was concern for other substances in Gummies patient given naloxone no improvement patient was on oxygen then subsequently nonrebreather and BiPAP patient's respiratory status status continued to get worse with increasing CO2 patient was intubated for respiratory failure was given IV fluid and epi to maintain the blood pressure.  Patient was admitted to ICU remains on ventilator yesterday and extubated today.   5/18/2024 patient continued to improve transferred to the floor..    Subjective   Patient states she is not doing better     Objective   Patient hemodynamically stable respiratory status improving patient on 5 L nasal cannula oxygenation mid 90s.  ROS  Review of Systems   Constitutional:  Positive for activity change, appetite change and fatigue.  Improving  HENT:  Positive for congestion.    Eyes: Negative.    Respiratory:  Positive for cough, chest tightness, shortness of breath and wheezing.  Improving  Cardiovascular: Negative.    Gastrointestinal: Negative.    Endocrine: Negative.    Genitourinary: Negative.    Musculoskeletal:  Positive for arthralgias and myalgias.   Skin: Negative.    Allergic/Immunologic: Negative.    Neurological:  Positive for weakness.   Hematological: Negative.    Psychiatric/Behavioral:  Positive for sleep disturbance. The patient is nervous/anxious.    All other systems reviewed and are negative.     Vital Signs  Blood pressure 141/78, pulse 98, temperature 35.7 °C (96.3 °F), temperature source Temporal, resp. rate 18, height 1.549 m (5' 1\"), weight 72.3 kg (159 lb 6.3 oz), SpO2 95%.    Physical Exam   Appearance: She is ill-appearing.  Improving  HENT:      Head: Normocephalic and atraumatic.      Right Ear: External ear normal.      Left Ear: " External ear normal.      Nose: Congestion present.   Eyes:      Conjunctiva/sclera: Conjunctivae normal.   Cardiovascular:      Rate and Rhythm: Regular rhythm. Bradycardia present.      Pulses: Normal pulses.      Heart sounds: Normal heart sounds.   Pulmonary:      Breath sounds: Wheezing and rales present.  Improving  Abdominal:      General: Bowel sounds are normal.      Palpations: Abdomen is soft.   Musculoskeletal:         General: Normal range of motion.      Cervical back: Normal range of motion.   Skin:     General: Skin is dry.      Capillary Refill: Capillary refill takes 2 to 3 seconds.   Neurological:      General: No focal deficit present.      Mental Status: She is alert. Mental status is at baseline.      Motor: Weakness present.      Gait: Gait abnormal.      Oxygen Therapy  SpO2: 95 %  Medical Gas Therapy: Supplemental oxygen  O2 Delivery Method: Nasal cannula  FiO2 (%):  [40 %-93 %] 40 %  S RR:  [16] 16    Intake/Output  I/O last 3 completed shifts:  In: 838.7 (11.6 mL/kg) [I.V.:838.7 (11.6 mL/kg)]  Out: 2435 (33.7 mL/kg) [Urine:2435 (0.9 mL/kg/hr)]  Weight: 72.3 kg     Lines and Tubes:  Peripheral IV 05/15/24 18 G Proximal;Right Forearm (Active)   Placement Date/Time: 05/15/24 2112   Placed by External Staff?: EMS  Size (Gauge): 18 G  Orientation: Proximal;Right  Location: Forearm   Number of days: 3       Peripheral IV 05/15/24 20 G Right Hand (Active)   Placement Date/Time: 05/15/24 2213   Size (Gauge): 20 G  Orientation: Right  Location: Hand   Number of days: 2       Peripheral IV 05/15/24 20 G Left Hand (Active)   Placement Date/Time: 05/15/24 2218   Size (Gauge): 20 G  Orientation: Left  Location: Hand  Placed by: Valerie GONZALEZ   Number of days: 2       Results for orders placed or performed during the hospital encounter of 05/15/24 (from the past 96 hour(s))   CBC and Auto Differential   Result Value Ref Range    WBC 8.7 4.4 - 11.3 x10*3/uL    nRBC 0.2 (H) 0.0 - 0.0 /100 WBCs    RBC 4.01 4.00  - 5.20 x10*6/uL    Hemoglobin 12.3 12.0 - 16.0 g/dL    Hematocrit 40.5 36.0 - 46.0 %     (H) 80 - 100 fL    MCH 30.7 26.0 - 34.0 pg    MCHC 30.4 (L) 32.0 - 36.0 g/dL    RDW 16.3 (H) 11.5 - 14.5 %    Platelets 154 150 - 450 x10*3/uL    Neutrophils % 53.4 40.0 - 80.0 %    Immature Granulocytes %, Automated 2.3 (H) 0.0 - 0.9 %    Lymphocytes % 36.4 13.0 - 44.0 %    Monocytes % 6.6 2.0 - 10.0 %    Eosinophils % 0.5 0.0 - 6.0 %    Basophils % 0.8 0.0 - 2.0 %    Neutrophils Absolute 4.67 1.20 - 7.70 x10*3/uL    Immature Granulocytes Absolute, Automated 0.20 0.00 - 0.70 x10*3/uL    Lymphocytes Absolute 3.18 1.20 - 4.80 x10*3/uL    Monocytes Absolute 0.58 0.10 - 1.00 x10*3/uL    Eosinophils Absolute 0.04 0.00 - 0.70 x10*3/uL    Basophils Absolute 0.07 0.00 - 0.10 x10*3/uL   Comprehensive Metabolic Panel   Result Value Ref Range    Glucose 123 (H) 74 - 99 mg/dL    Sodium 142 136 - 145 mmol/L    Potassium 4.2 3.5 - 5.3 mmol/L    Chloride 102 98 - 107 mmol/L    Bicarbonate 36 (H) 21 - 32 mmol/L    Anion Gap 8 (L) 10 - 20 mmol/L    Urea Nitrogen 17 6 - 23 mg/dL    Creatinine 0.76 0.50 - 1.05 mg/dL    eGFR >90 >60 mL/min/1.73m*2    Calcium 9.0 8.6 - 10.3 mg/dL    Albumin 3.9 3.4 - 5.0 g/dL    Alkaline Phosphatase 65 33 - 110 U/L    Total Protein 6.7 6.4 - 8.2 g/dL    AST 15 9 - 39 U/L    Bilirubin, Total 0.4 0.0 - 1.2 mg/dL    ALT 13 7 - 45 U/L   Lipase   Result Value Ref Range    Lipase 10 9 - 82 U/L   hCG, quantitative, pregnancy   Result Value Ref Range    HCG, Beta-Quantitative <2 <5 mIU/mL   Troponin I, High Sensitivity, Initial   Result Value Ref Range    Troponin I, High Sensitivity 5 0 - 13 ng/L   BLOOD GAS VENOUS FULL PANEL   Result Value Ref Range    POCT pH, Venous 7.33 7.33 - 7.43 pH    POCT pCO2, Venous 78 (HH) 41 - 51 mm Hg    POCT pO2, Venous 58 (H) 35 - 45 mm Hg    POCT SO2, Venous 86 (H) 45 - 75 %    POCT Oxy Hemoglobin, Venous 77.8 (H) 45.0 - 75.0 %    POCT Hematocrit Calculated, Venous 37.0 36.0 - 46.0  %    POCT Sodium, Venous 141 136 - 145 mmol/L    POCT Potassium, Venous 4.3 3.5 - 5.3 mmol/L    POCT Chloride, Venous 103 98 - 107 mmol/L    POCT Ionized Calicum, Venous 1.26 1.10 - 1.33 mmol/L    POCT Glucose, Venous 128 (H) 74 - 99 mg/dL    POCT Lactate, Venous 0.8 0.4 - 2.0 mmol/L    POCT Base Excess, Venous 12.1 (H) -2.0 - 3.0 mmol/L    POCT HCO3 Calculated, Venous 41.1 (H) 22.0 - 26.0 mmol/L    POCT Hemoglobin, Venous 12.2 12.0 - 16.0 g/dL    POCT Anion Gap, Venous 1.0 (L) 10.0 - 25.0 mmol/L    Patient Temperature      FiO2 100 %    Critical Called By PATTI DOYLE     Critical Called To DIDIER     Critical Call Time 2112     Critical Read Back Y    B-type natriuretic peptide   Result Value Ref Range    BNP 13 0 - 99 pg/mL   ECG 12 lead   Result Value Ref Range    Ventricular Rate 133 BPM    Atrial Rate 133 BPM    MO Interval 122 ms    QRS Duration 78 ms    QT Interval 306 ms    QTC Calculation(Bazett) 455 ms    P Axis 36 degrees    R Axis -25 degrees    T Axis 22 degrees    QRS Count 22 beats    Q Onset 210 ms    P Onset 149 ms    P Offset 196 ms    T Offset 363 ms    QTC Fredericia 398 ms   Troponin, High Sensitivity, 1 Hour   Result Value Ref Range    Troponin I, High Sensitivity 5 0 - 13 ng/L   BLOOD GAS VENOUS FULL PANEL   Result Value Ref Range    POCT pH, Venous 7.24 (LL) 7.33 - 7.43 pH    POCT pCO2, Venous 98 (HH) 41 - 51 mm Hg    POCT pO2, Venous 63 (H) 35 - 45 mm Hg    POCT SO2, Venous 87 (H) 45 - 75 %    POCT Oxy Hemoglobin, Venous 79.8 (H) 45.0 - 75.0 %    POCT Hematocrit Calculated, Venous 39.0 36.0 - 46.0 %    POCT Sodium, Venous 139 136 - 145 mmol/L    POCT Potassium, Venous 4.4 3.5 - 5.3 mmol/L    POCT Chloride, Venous 102 98 - 107 mmol/L    POCT Ionized Calicum, Venous 1.26 1.10 - 1.33 mmol/L    POCT Glucose, Venous 169 (H) 74 - 99 mg/dL    POCT Lactate, Venous 0.7 0.4 - 2.0 mmol/L    POCT Base Excess, Venous 10.7 (H) -2.0 - 3.0 mmol/L    POCT HCO3 Calculated, Venous 42.0 (H) 22.0 - 26.0 mmol/L     POCT Hemoglobin, Venous 12.9 12.0 - 16.0 g/dL    POCT Anion Gap, Venous -1.0 (L) 10.0 - 25.0 mmol/L    Patient Temperature      FiO2 60 %    Critical Called By PATTI DOYLE     Critical Called To DR VELÁSQUEZ     Critical Call Time 2209     Critical Read Back Y    Lactate   Result Value Ref Range    Lactate 1.0 0.4 - 2.0 mmol/L   Blood Culture    Specimen: Peripheral Venipuncture; Blood culture   Result Value Ref Range    Blood Culture No growth at 2 days    Blood Culture    Specimen: Peripheral Venipuncture; Blood culture   Result Value Ref Range    Blood Culture No growth at 2 days    Urinalysis with Reflex Culture and Microscopic   Result Value Ref Range    Color, Urine Yellow Straw, Yellow    Appearance, Urine Hazy (N) Clear    Specific Gravity, Urine 1.014 1.005 - 1.035    pH, Urine 6.0 5.0, 5.5, 6.0, 6.5, 7.0, 7.5, 8.0    Protein, Urine 30 (1+) (N) NEGATIVE mg/dL    Glucose, Urine NEGATIVE NEGATIVE mg/dL    Blood, Urine NEGATIVE NEGATIVE    Ketones, Urine NEGATIVE NEGATIVE mg/dL    Bilirubin, Urine NEGATIVE NEGATIVE    Urobilinogen, Urine <2.0 <2.0 mg/dL    Nitrite, Urine POSITIVE (A) NEGATIVE    Leukocyte Esterase, Urine LARGE (3+) (A) NEGATIVE   Extra Urine Gray Tube   Result Value Ref Range    Extra Tube Hold for add-ons.    BLOOD GAS VENOUS FULL PANEL   Result Value Ref Range    POCT pH, Venous 7.35 7.33 - 7.43 pH    POCT pCO2, Venous 67 (H) 41 - 51 mm Hg    POCT pO2, Venous 60 (H) 35 - 45 mm Hg    POCT SO2, Venous 89 (H) 45 - 75 %    POCT Oxy Hemoglobin, Venous 83.9 (H) 45.0 - 75.0 %    POCT Hematocrit Calculated, Venous 35.0 (L) 36.0 - 46.0 %    POCT Sodium, Venous 135 (L) 136 - 145 mmol/L    POCT Potassium, Venous 4.3 3.5 - 5.3 mmol/L    POCT Chloride, Venous 102 98 - 107 mmol/L    POCT Ionized Calicum, Venous 1.24 1.10 - 1.33 mmol/L    POCT Glucose, Venous 268 (H) 74 - 99 mg/dL    POCT Lactate, Venous 1.1 0.4 - 2.0 mmol/L    POCT Base Excess, Venous 9.2 (H) -2.0 - 3.0 mmol/L    POCT HCO3 Calculated, Venous  37.0 (H) 22.0 - 26.0 mmol/L    POCT Hemoglobin, Venous 11.8 (L) 12.0 - 16.0 g/dL    POCT Anion Gap, Venous 0.0 (L) 10.0 - 25.0 mmol/L    Patient Temperature      FiO2 100 %   Microscopic Only, Urine   Result Value Ref Range    WBC, Urine >50 (A) 1-5, NONE /HPF    RBC, Urine 6-10 (A) NONE, 1-2, 3-5 /HPF    Bacteria, Urine 1+ (A) NONE SEEN /HPF   Urine Culture    Specimen: Clean Catch/Voided; Urine   Result Value Ref Range    Urine Culture >100,000 Escherichia coli (A)        Susceptibility    Escherichia coli - MICROSCAN     Ampicillin  Resistant mcg/mL     Amoxicillin/Clavulanate  Susceptible mcg/mL     Ampicillin/Sulbactam  Resistant mcg/mL     Cefazolin  Susceptible mcg/mL     Cefazolin (uncomplicated UTIs only)  Susceptible mcg/mL     Ciprofloxacin  Resistant mcg/mL     Gentamicin  Susceptible mcg/mL     Nitrofurantoin  Susceptible mcg/mL     Piperacillin/Tazobactam  Susceptible mcg/mL     Trimethoprim/Sulfamethoxazole  Susceptible mcg/mL   Drug Screen, Urine   Result Value Ref Range    Amphetamine Screen, Urine Presumptive Negative Presumptive Negative    Barbiturate Screen, Urine Presumptive Negative Presumptive Negative    Benzodiazepines Screen, Urine Presumptive Negative Presumptive Negative    Cannabinoid Screen, Urine Presumptive Positive (A) Presumptive Negative    Cocaine Metabolite Screen, Urine Presumptive Negative Presumptive Negative    Fentanyl Screen, Urine Presumptive Negative Presumptive Negative    Opiate Screen, Urine Presumptive Negative Presumptive Negative    Oxycodone Screen, Urine Presumptive Negative Presumptive Negative    PCP Screen, Urine Presumptive Negative Presumptive Negative    Methadone Screen, Urine Presumptive Negative Presumptive Negative   ECG 12 lead   Result Value Ref Range    Ventricular Rate 107 BPM    Atrial Rate 107 BPM    MS Interval 122 ms    QRS Duration 78 ms    QT Interval 362 ms    QTC Calculation(Bazett) 483 ms    P Axis 51 degrees    R Axis -21 degrees    T Axis  23 degrees    QRS Count 18 beats    Q Onset 226 ms    P Onset 165 ms    P Offset 214 ms    T Offset 407 ms    QTC Fredericia 439 ms   MRSA Surveillance for Vancomycin De-escalation, PCR    Specimen: Anterior Nares; Swab   Result Value Ref Range    MRSA PCR Not Detected Not Detected   POCT GLUCOSE   Result Value Ref Range    POCT Glucose 252 (H) 74 - 99 mg/dL   Blood Gas Arterial Full Panel   Result Value Ref Range    POCT pH, Arterial 7.38 7.38 - 7.42 pH    POCT pCO2, Arterial 56 (H) 38 - 42 mm Hg    POCT pO2, Arterial 51 (L) 85 - 95 mm Hg    POCT SO2, Arterial 88 (L) 94 - 100 %    POCT Oxy Hemoglobin, Arterial 85.1 (L) 94.0 - 98.0 %    POCT Hematocrit Calculated, Arterial 39.0 36.0 - 46.0 %    POCT Sodium, Arterial 137 136 - 145 mmol/L    POCT Potassium, Arterial 4.1 3.5 - 5.3 mmol/L    POCT Chloride, Arterial 99 98 - 107 mmol/L    POCT Ionized Calcium, Arterial 1.21 1.10 - 1.33 mmol/L    POCT Glucose, Arterial 250 (H) 74 - 99 mg/dL    POCT Lactate, Arterial 2.4 (H) 0.4 - 2.0 mmol/L    POCT Base Excess, Arterial 6.4 (H) -2.0 - 3.0 mmol/L    POCT HCO3 Calculated, Arterial 33.1 (H) 22.0 - 26.0 mmol/L    POCT Hemoglobin, Arterial 13.0 12.0 - 16.0 g/dL    POCT Anion Gap, Arterial 9 (L) 10 - 25 mmo/L    Patient Temperature      FiO2 80 %   Streptococcus pneumoniae Antigen, Urine    Specimen: Urine   Result Value Ref Range    Streptococcus pneumoniae Ag, Urine Negative Negative   Legionella Antigen, Urine    Specimen: Urine   Result Value Ref Range    L. pneumophila Urine Ag Negative Negative   CBC   Result Value Ref Range    WBC 9.7 4.4 - 11.3 x10*3/uL    nRBC 0.2 (H) 0.0 - 0.0 /100 WBCs    RBC 4.25 4.00 - 5.20 x10*6/uL    Hemoglobin 12.9 12.0 - 16.0 g/dL    Hematocrit 42.1 36.0 - 46.0 %    MCV 99 80 - 100 fL    MCH 30.4 26.0 - 34.0 pg    MCHC 30.6 (L) 32.0 - 36.0 g/dL    RDW 16.1 (H) 11.5 - 14.5 %    Platelets 157 150 - 450 x10*3/uL   Basic Metabolic Panel   Result Value Ref Range    Glucose 240 (H) 74 - 99 mg/dL     Sodium 138 136 - 145 mmol/L    Potassium 4.1 3.5 - 5.3 mmol/L    Chloride 99 98 - 107 mmol/L    Bicarbonate 31 21 - 32 mmol/L    Anion Gap 12 10 - 20 mmol/L    Urea Nitrogen 16 6 - 23 mg/dL    Creatinine 0.86 0.50 - 1.05 mg/dL    eGFR 86 >60 mL/min/1.73m*2    Calcium 9.3 8.6 - 10.3 mg/dL   Magnesium   Result Value Ref Range    Magnesium 1.57 (L) 1.60 - 2.40 mg/dL   POCT GLUCOSE   Result Value Ref Range    POCT Glucose 300 (H) 74 - 99 mg/dL   Blood Gas Lactic Acid, Venous   Result Value Ref Range    POCT Lactate, Venous 3.3 (H) 0.4 - 2.0 mmol/L   Respiratory Culture/Smear    Specimen: Tracheal Aspirate; Fluid   Result Value Ref Range    Respiratory Culture/Smear Normal throat jon     Gram Stain (3+) Moderate Polymorphonuclear leukocytes (A)     Gram Stain Mixed Gram positive bacteria (A)    POCT GLUCOSE   Result Value Ref Range    POCT Glucose 208 (H) 74 - 99 mg/dL   Blood Gas Arterial Full Panel   Result Value Ref Range    POCT pH, Arterial 7.40 7.38 - 7.42 pH    POCT pCO2, Arterial 54 (H) 38 - 42 mm Hg    POCT pO2, Arterial 80 (L) 85 - 95 mm Hg    POCT SO2, Arterial 98 94 - 100 %    POCT Oxy Hemoglobin, Arterial 95.8 94.0 - 98.0 %    POCT Hematocrit Calculated, Arterial 38.0 36.0 - 46.0 %    POCT Sodium, Arterial 135 (L) 136 - 145 mmol/L    POCT Potassium, Arterial 3.9 3.5 - 5.3 mmol/L    POCT Chloride, Arterial 99 98 - 107 mmol/L    POCT Ionized Calcium, Arterial 1.18 1.10 - 1.33 mmol/L    POCT Glucose, Arterial 198 (H) 74 - 99 mg/dL    POCT Lactate, Arterial 3.1 (H) 0.4 - 2.0 mmol/L    POCT Base Excess, Arterial 7.1 (H) -2.0 - 3.0 mmol/L    POCT HCO3 Calculated, Arterial 33.4 (H) 22.0 - 26.0 mmol/L    POCT Hemoglobin, Arterial 12.7 12.0 - 16.0 g/dL    POCT Anion Gap, Arterial 7 (L) 10 - 25 mmo/L    Patient Temperature      FiO2 90 %    Ventilator Mode A/C     Ventilator Rate 16 bpm    Tidal Volume 400 mL    Peep CHM2O 7.0 cm H2O    Site of Arterial Puncture Brachial Left    POCT GLUCOSE   Result Value Ref Range     POCT Glucose 151 (H) 74 - 99 mg/dL   Lactate   Result Value Ref Range    Lactate 4.2 (HH) 0.4 - 2.0 mmol/L   Basic metabolic panel   Result Value Ref Range    Glucose 155 (H) 74 - 99 mg/dL    Sodium 140 136 - 145 mmol/L    Potassium 3.8 3.5 - 5.3 mmol/L    Chloride 100 98 - 107 mmol/L    Bicarbonate 30 21 - 32 mmol/L    Anion Gap 14 10 - 20 mmol/L    Urea Nitrogen 16 6 - 23 mg/dL    Creatinine 0.74 0.50 - 1.05 mg/dL    eGFR >90 >60 mL/min/1.73m*2    Calcium 9.6 8.6 - 10.3 mg/dL   Lavender Top   Result Value Ref Range    Extra Tube Hold for add-ons.    Creatine Kinase   Result Value Ref Range    Creatine Kinase 29 0 - 215 U/L   Lactate   Result Value Ref Range    Lactate 2.5 (H) 0.4 - 2.0 mmol/L   POCT GLUCOSE   Result Value Ref Range    POCT Glucose 165 (H) 74 - 99 mg/dL   POCT GLUCOSE   Result Value Ref Range    POCT Glucose 140 (H) 74 - 99 mg/dL   CBC   Result Value Ref Range    WBC 13.4 (H) 4.4 - 11.3 x10*3/uL    nRBC 0.0 0.0 - 0.0 /100 WBCs    RBC 4.05 4.00 - 5.20 x10*6/uL    Hemoglobin 12.4 12.0 - 16.0 g/dL    Hematocrit 39.8 36.0 - 46.0 %    MCV 98 80 - 100 fL    MCH 30.6 26.0 - 34.0 pg    MCHC 31.2 (L) 32.0 - 36.0 g/dL    RDW 17.1 (H) 11.5 - 14.5 %    Platelets 153 150 - 450 x10*3/uL   Basic Metabolic Panel   Result Value Ref Range    Glucose 130 (H) 74 - 99 mg/dL    Sodium 141 136 - 145 mmol/L    Potassium 4.2 3.5 - 5.3 mmol/L    Chloride 102 98 - 107 mmol/L    Bicarbonate 29 21 - 32 mmol/L    Anion Gap 14 10 - 20 mmol/L    Urea Nitrogen 19 6 - 23 mg/dL    Creatinine 0.71 0.50 - 1.05 mg/dL    eGFR >90 >60 mL/min/1.73m*2    Calcium 9.7 8.6 - 10.3 mg/dL   Magnesium   Result Value Ref Range    Magnesium 2.17 1.60 - 2.40 mg/dL   Hepatic function panel   Result Value Ref Range    Albumin 3.9 3.4 - 5.0 g/dL    Bilirubin, Total 0.3 0.0 - 1.2 mg/dL    Bilirubin, Direct 0.0 0.0 - 0.3 mg/dL    Alkaline Phosphatase 68 33 - 110 U/L    ALT 62 (H) 7 - 45 U/L    AST 33 9 - 39 U/L    Total Protein 6.9 6.4 - 8.2 g/dL    Coagulation Screen   Result Value Ref Range    Protime 12.7 9.8 - 12.8 seconds    INR 1.1 0.9 - 1.1    aPTT 26 (L) 27 - 38 seconds   Lactate   Result Value Ref Range    Lactate 2.3 (H) 0.4 - 2.0 mmol/L   Blood Gas Arterial Full Panel   Result Value Ref Range    POCT pH, Arterial 7.46 (H) 7.38 - 7.42 pH    POCT pCO2, Arterial 49 (H) 38 - 42 mm Hg    POCT pO2, Arterial 70 (L) 85 - 95 mm Hg    POCT SO2, Arterial 96 94 - 100 %    POCT Oxy Hemoglobin, Arterial 93.9 (L) 94.0 - 98.0 %    POCT Hematocrit Calculated, Arterial 37.0 36.0 - 46.0 %    POCT Sodium, Arterial 139 136 - 145 mmol/L    POCT Potassium, Arterial 3.5 3.5 - 5.3 mmol/L    POCT Chloride, Arterial 105 98 - 107 mmol/L    POCT Ionized Calcium, Arterial 1.23 1.10 - 1.33 mmol/L    POCT Glucose, Arterial 113 (H) 74 - 99 mg/dL    POCT Lactate, Arterial 2.5 (H) 0.4 - 2.0 mmol/L    POCT Base Excess, Arterial 9.5 (H) -2.0 - 3.0 mmol/L    POCT HCO3 Calculated, Arterial 34.8 (H) 22.0 - 26.0 mmol/L    POCT Hemoglobin, Arterial 12.2 12.0 - 16.0 g/dL    POCT Anion Gap, Arterial 3 (L) 10 - 25 mmo/L    Patient Temperature      FiO2 70 %    Ventilator Mode A/C     Ventilator Rate 18 bpm    Tidal Volume 400 mL    Total Minute Volume 7.0 Liter    Peep CHM2O 5.0 cm H2O    Site of Arterial Puncture Brachial Right     Andrew's Test Negative    POCT GLUCOSE   Result Value Ref Range    POCT Glucose 109 (H) 74 - 99 mg/dL   POCT GLUCOSE   Result Value Ref Range    POCT Glucose 190 (H) 74 - 99 mg/dL   POCT GLUCOSE   Result Value Ref Range    POCT Glucose 163 (H) 74 - 99 mg/dL   POCT GLUCOSE   Result Value Ref Range    POCT Glucose 146 (H) 74 - 99 mg/dL   CBC   Result Value Ref Range    WBC 10.1 4.4 - 11.3 x10*3/uL    nRBC 0.0 0.0 - 0.0 /100 WBCs    RBC 3.99 (L) 4.00 - 5.20 x10*6/uL    Hemoglobin 12.1 12.0 - 16.0 g/dL    Hematocrit 41.1 36.0 - 46.0 %     (H) 80 - 100 fL    MCH 30.3 26.0 - 34.0 pg    MCHC 29.4 (L) 32.0 - 36.0 g/dL    RDW 17.0 (H) 11.5 - 14.5 %    Platelets 141  (L) 150 - 450 x10*3/uL   Basic Metabolic Panel   Result Value Ref Range    Glucose 157 (H) 74 - 99 mg/dL    Sodium 143 136 - 145 mmol/L    Potassium 4.2 3.5 - 5.3 mmol/L    Chloride 105 98 - 107 mmol/L    Bicarbonate 29 21 - 32 mmol/L    Anion Gap 13 10 - 20 mmol/L    Urea Nitrogen 19 6 - 23 mg/dL    Creatinine 0.64 0.50 - 1.05 mg/dL    eGFR >90 >60 mL/min/1.73m*2    Calcium 9.5 8.6 - 10.3 mg/dL   Magnesium   Result Value Ref Range    Magnesium 1.87 1.60 - 2.40 mg/dL   Lactate   Result Value Ref Range    Lactate 0.9 0.4 - 2.0 mmol/L   POCT GLUCOSE   Result Value Ref Range    POCT Glucose 125 (H) 74 - 99 mg/dL   POCT GLUCOSE   Result Value Ref Range    POCT Glucose 192 (H) 74 - 99 mg/dL   POCT GLUCOSE   Result Value Ref Range    POCT Glucose 156 (H) 74 - 99 mg/dL      Relevant Results  Recent Results (from the past 24 hour(s))   POCT GLUCOSE    Collection Time: 05/18/24  3:03 AM   Result Value Ref Range    POCT Glucose 146 (H) 74 - 99 mg/dL   CBC    Collection Time: 05/18/24  4:18 AM   Result Value Ref Range    WBC 10.1 4.4 - 11.3 x10*3/uL    nRBC 0.0 0.0 - 0.0 /100 WBCs    RBC 3.99 (L) 4.00 - 5.20 x10*6/uL    Hemoglobin 12.1 12.0 - 16.0 g/dL    Hematocrit 41.1 36.0 - 46.0 %     (H) 80 - 100 fL    MCH 30.3 26.0 - 34.0 pg    MCHC 29.4 (L) 32.0 - 36.0 g/dL    RDW 17.0 (H) 11.5 - 14.5 %    Platelets 141 (L) 150 - 450 x10*3/uL   Basic Metabolic Panel    Collection Time: 05/18/24  4:18 AM   Result Value Ref Range    Glucose 157 (H) 74 - 99 mg/dL    Sodium 143 136 - 145 mmol/L    Potassium 4.2 3.5 - 5.3 mmol/L    Chloride 105 98 - 107 mmol/L    Bicarbonate 29 21 - 32 mmol/L    Anion Gap 13 10 - 20 mmol/L    Urea Nitrogen 19 6 - 23 mg/dL    Creatinine 0.64 0.50 - 1.05 mg/dL    eGFR >90 >60 mL/min/1.73m*2    Calcium 9.5 8.6 - 10.3 mg/dL   Magnesium    Collection Time: 05/18/24  4:18 AM   Result Value Ref Range    Magnesium 1.87 1.60 - 2.40 mg/dL   Lactate    Collection Time: 05/18/24  4:18 AM   Result Value Ref Range     Lactate 0.9 0.4 - 2.0 mmol/L   POCT GLUCOSE    Collection Time: 05/18/24  8:04 AM   Result Value Ref Range    POCT Glucose 125 (H) 74 - 99 mg/dL   POCT GLUCOSE    Collection Time: 05/18/24  4:46 PM   Result Value Ref Range    POCT Glucose 192 (H) 74 - 99 mg/dL   POCT GLUCOSE    Collection Time: 05/18/24  7:03 PM   Result Value Ref Range    POCT Glucose 156 (H) 74 - 99 mg/dL      ECG 12 lead    Result Date: 5/17/2024  Sinus tachycardia Possible Left atrial enlargement Inferior infarct , age undetermined Abnormal ECG When compared with ECG of 15-MAY-2024 21:27, (unconfirmed) No significant change was found See ED provider note for full interpretation and clinical correlation Confirmed by Yumi Silva ([id number]) on 5/17/2024 4:00:40 PM    ECG 12 lead    Result Date: 5/17/2024  Sinus tachycardia Possible Left atrial enlargement Low voltage QRS Borderline ECG When compared with ECG of 08-APR-2024 04:13, Fusion complexes are no longer Present Vent. rate has increased BY  68 BPM Questionable change in QRS duration Criteria for Anteroseptal infarct are no longer Present See ED provider note for full interpretation and clinical correlation Confirmed by Yumi Silva ([id number]) on 5/17/2024 4:00:18 PM    XR chest 1 view    Result Date: 5/16/2024  Interpreted By:  Jaylen Roman, STUDY: XR CHEST 1 VIEW;  5/16/2024 2:48 pm   INDICATION: Signs/Symptoms:worsening hypoxemia r/o PTx and pulm edema.   COMPARISON: 05/16/2024.   ACCESSION NUMBER(S): [id number]   ORDERING CLINICIAN: ELGIN CUTLER   FINDINGS: CARDIOMEDIASTINAL SILHOUETTE: Endotracheal tube tip projects 1.5 cm above the daron level. NG tube has tip at the distal stomach level in the right upper quadrant. Cardiomegaly is similar to prior.   LUNGS: Prominent region of consolidation and volume loss is again seen in the left lower lobe retrocardiac region. Right basilar irregular consolidation and volume loss is slightly less prominent from prior. No  definite pleural effusion. No pneumothorax is seen.   ABDOMEN: No remarkable upper abdominal findings.   BONES: Bones are stable.       1.  Prominent irregular region of consolidation and volume loss in the left lower lobe retrocardiac region similar to prior. 2. Right basilar irregular consolidation volume loss slightly less prominent from prior.       MACRO: None.   Signed by: Jaylen Roman 5/16/2024 2:59 PM Dictation workstation:   [id number]    CT chest w IV contrast    Result Date: 5/16/2024  Interpreted By:  Lara Sheehan, STUDY: CT CHEST W IV CONTRAST;  5/16/2024 2:27 am   INDICATION: Signs/Symptoms:hypoxia.   COMPARISON: 03/05/2024   ACCESSION NUMBER(S): [id number]   ORDERING CLINICIAN: VINNIE GASCA   TECHNIQUE: Axial CT images of the chest obtained  after intravenous administration of contrast.   FINDINGS: VESSELS: No aortic aneurysm. HEART: Cardiomegaly. Mild coronary artery calcification  no pericardial effusion. MEDIASTINUM AND RICHARD: No pathologically enlarged lymph nodes. LUNG, PLEURA, AND LARGE AIRWAYS: The endotracheal tube terminates approximately 2 cm above the daron. Dependent consolidation in the bilateral upper lobes and lower lobes. There is associated volume loss, most notably in the left lower lobe, consistent with atelectasis. However, superimposed pneumonia is not excluded in the appropriate clinical setting. No pleural effusion or pneumothorax. CHEST WALL AND LOWER NECK: Within normal limits.   UPPER ABDOMEN: 2 cm hypoattenuating lesion in the right lobe of the liver demonstrated characteristics consistent with a hemangioma on multiphasic CT 07/30/2021. The liver is otherwise unremarkable. The nasogastric/orogastric tube terminates in the distal stomach.   BONES: No acute osseous abnormality.       Bilateral upper and lower lobe consolidation is most consistent with atelectasis, however, superimposed pneumonia is not excluded in the appropriate clinical setting.     MACRO:  None   Signed by: Lara Sheehan 5/16/2024 2:41 AM Dictation workstation:   [id number]    CT head wo IV contrast    Result Date: 5/16/2024  Interpreted By:  Lara Sheehan, STUDY: CT HEAD WO IV CONTRAST;  5/16/2024 2:24 am   INDICATION: Signs/Symptoms:AMS.   COMPARISON: 02/09/2024   ACCESSION NUMBER(S): [id number]   ORDERING CLINICIAN: PAUL VELÁSQUEZ   TECHNIQUE: Axial noncontrast CT images of the head.   FINDINGS: BRAIN PARENCHYMA:  Gray-white matter interfaces are preserved. No mass effect or midline shift.   HEMORRHAGE: No acute intracranial hemorrhage. VENTRICLES and EXTRA-AXIAL SPACES: The ventricles and sulci are within normal limits in size for brain volume. No abnormal extraaxial fluid collection. EXTRACRANIAL SOFT TISSUES: Within normal limits. PARANASAL SINUSES/MASTOIDS:  The visualized paranasal sinuses and mastoid air cells are aerated. Right mastoidectomy noted. CALVARIUM: No depressed skull fracture. No destructive osseous lesion.   OTHER FINDINGS: None.       No acute intracranial abnormality.     MACRO: None   Signed by: Lara Sheehan 5/16/2024 2:32 AM Dictation workstation:   [id number]    XR chest 1 view    Result Date: 5/16/2024  Interpreted By:  Tali Moore, STUDY: XR CHEST 1 VIEW;  5/16/2024 12:57 am   INDICATION: Signs/Symptoms:post intubation   COMPARISON: Chest x-ray 05/15/2024.   ACCESSION NUMBER(S): [id number]   ORDERING CLINICIAN: PAUL VELÁSQUEZ   TECHNIQUE: Portable supine frontal view of the chest was obtained .   FINDINGS: Monitoring wires are overlying the patient.   An endotracheal tube terminates approximately 1.9 cm above the daron. An enteric tube courses into the left upper quadrant, its distal tip is not included on this exam.   The heart is enlarged. There is mild vascular congestion.   There are small bilateral pleural effusions. There are bilateral airspace opacities. No evidence for pneumothorax on supine imaging.       1. Cardiomegaly. Mild vascular congestion.  Small bilateral pleural effusions. Bilateral airspace opacities, may be secondary to pneumonia and/or pulmonary edema. 2. Life support devices as described above.       MACRO: None.   Signed by: Tali Moore 5/16/2024 1:27 AM Dictation workstation:   [id number]    XR chest 1 view    Result Date: 5/15/2024  Interpreted By:  Tali Moore, STUDY: XR CHEST 1 VIEW;  5/15/2024 9:50 pm   INDICATION: Signs/Symptoms:SOB   COMPARISON: Chest x-ray 04/08/2024   ACCESSION NUMBER(S): [id number]   ORDERING CLINICIAN: PAUL VELÁSQUEZ   TECHNIQUE: Portable frontal view of the chest was obtained .   FINDINGS: Monitoring wires are overlying the patient.   The patient is rotated and in a kyphotic position.   The heart is enlarged. There is vascular congestion.   There are bilateral airspace opacities. There are small bilateral pleural effusions. No pneumothorax.       1. Cardiomegaly. Vascular congestion. Small bilateral pleural effusions. Bilateral airspace opacities, may be secondary to pulmonary edema and/or pneumonia.       MACRO: None.   Signed by: Tali Moore 5/15/2024 10:36 PM Dictation workstation:   [id number]    Radiology:  ECG 12 lead    Result Date: 5/17/2024  Sinus tachycardia Possible Left atrial enlargement Inferior infarct , age undetermined Abnormal ECG When compared with ECG of 15-MAY-2024 21:27, (unconfirmed) No significant change was found See ED provider note for full interpretation and clinical correlation Confirmed by Yumi Silva ([id number]) on 5/17/2024 4:00:40 PM    ECG 12 lead    Result Date: 5/17/2024  Sinus tachycardia Possible Left atrial enlargement Low voltage QRS Borderline ECG When compared with ECG of 08-APR-2024 04:13, Fusion complexes are no longer Present Vent. rate has increased BY  68 BPM Questionable change in QRS duration Criteria for Anteroseptal infarct are no longer Present See ED provider note for full interpretation and clinical correlation Confirmed by Ricardo  Yumi ([id number]) on 5/17/2024 4:00:18 PM    XR chest 1 view    Result Date: 5/16/2024  Interpreted By:  Jaylen Roman, STUDY: XR CHEST 1 VIEW;  5/16/2024 2:48 pm   INDICATION: Signs/Symptoms:worsening hypoxemia r/o PTx and pulm edema.   COMPARISON: 05/16/2024.   ACCESSION NUMBER(S): [id number]   ORDERING CLINICIAN: ELGIN CUTLER   FINDINGS: CARDIOMEDIASTINAL SILHOUETTE: Endotracheal tube tip projects 1.5 cm above the daron level. NG tube has tip at the distal stomach level in the right upper quadrant. Cardiomegaly is similar to prior.   LUNGS: Prominent region of consolidation and volume loss is again seen in the left lower lobe retrocardiac region. Right basilar irregular consolidation and volume loss is slightly less prominent from prior. No definite pleural effusion. No pneumothorax is seen.   ABDOMEN: No remarkable upper abdominal findings.   BONES: Bones are stable.       1.  Prominent irregular region of consolidation and volume loss in the left lower lobe retrocardiac region similar to prior. 2. Right basilar irregular consolidation volume loss slightly less prominent from prior.       MACRO: None.   Signed by: Jaylen Roman 5/16/2024 2:59 PM Dictation workstation:   [id number]    CT chest w IV contrast    Result Date: 5/16/2024  Interpreted By:  Lara Sheehan, STUDY: CT CHEST W IV CONTRAST;  5/16/2024 2:27 am   INDICATION: Signs/Symptoms:hypoxia.   COMPARISON: 03/05/2024   ACCESSION NUMBER(S): [id number]   ORDERING CLINICIAN: VINNIE GASCA   TECHNIQUE: Axial CT images of the chest obtained  after intravenous administration of contrast.   FINDINGS: VESSELS: No aortic aneurysm. HEART: Cardiomegaly. Mild coronary artery calcification  no pericardial effusion. MEDIASTINUM AND RICHARD: No pathologically enlarged lymph nodes. LUNG, PLEURA, AND LARGE AIRWAYS: The endotracheal tube terminates approximately 2 cm above the daron. Dependent consolidation in the bilateral upper lobes and lower  lobes. There is associated volume loss, most notably in the left lower lobe, consistent with atelectasis. However, superimposed pneumonia is not excluded in the appropriate clinical setting. No pleural effusion or pneumothorax. CHEST WALL AND LOWER NECK: Within normal limits.   UPPER ABDOMEN: 2 cm hypoattenuating lesion in the right lobe of the liver demonstrated characteristics consistent with a hemangioma on multiphasic CT 07/30/2021. The liver is otherwise unremarkable. The nasogastric/orogastric tube terminates in the distal stomach.   BONES: No acute osseous abnormality.       Bilateral upper and lower lobe consolidation is most consistent with atelectasis, however, superimposed pneumonia is not excluded in the appropriate clinical setting.     MACRO: None   Signed by: Lara Sheehan 5/16/2024 2:41 AM Dictation workstation:   [id number]    CT head wo IV contrast    Result Date: 5/16/2024  Interpreted By:  Lara Sheehan, STUDY: CT HEAD WO IV CONTRAST;  5/16/2024 2:24 am   INDICATION: Signs/Symptoms:AMS.   COMPARISON: 02/09/2024   ACCESSION NUMBER(S): [id number]   ORDERING CLINICIAN: PAUL VELÁSQUEZ   TECHNIQUE: Axial noncontrast CT images of the head.   FINDINGS: BRAIN PARENCHYMA:  Gray-white matter interfaces are preserved. No mass effect or midline shift.   HEMORRHAGE: No acute intracranial hemorrhage. VENTRICLES and EXTRA-AXIAL SPACES: The ventricles and sulci are within normal limits in size for brain volume. No abnormal extraaxial fluid collection. EXTRACRANIAL SOFT TISSUES: Within normal limits. PARANASAL SINUSES/MASTOIDS:  The visualized paranasal sinuses and mastoid air cells are aerated. Right mastoidectomy noted. CALVARIUM: No depressed skull fracture. No destructive osseous lesion.   OTHER FINDINGS: None.       No acute intracranial abnormality.     MACRO: None   Signed by: Lara Sheehan 5/16/2024 2:32 AM Dictation workstation:   [id number]    XR chest 1 view    Result Date:  5/16/2024  Interpreted By:  Tali Moore, STUDY: XR CHEST 1 VIEW;  5/16/2024 12:57 am   INDICATION: Signs/Symptoms:post intubation   COMPARISON: Chest x-ray 05/15/2024.   ACCESSION NUMBER(S): [id number]   ORDERING CLINICIAN: PAUL VELÁSQUEZ   TECHNIQUE: Portable supine frontal view of the chest was obtained .   FINDINGS: Monitoring wires are overlying the patient.   An endotracheal tube terminates approximately 1.9 cm above the daron. An enteric tube courses into the left upper quadrant, its distal tip is not included on this exam.   The heart is enlarged. There is mild vascular congestion.   There are small bilateral pleural effusions. There are bilateral airspace opacities. No evidence for pneumothorax on supine imaging.       1. Cardiomegaly. Mild vascular congestion. Small bilateral pleural effusions. Bilateral airspace opacities, may be secondary to pneumonia and/or pulmonary edema. 2. Life support devices as described above.       MACRO: None.   Signed by: Tali Moore 5/16/2024 1:27 AM Dictation workstation:   [id number]    XR chest 1 view    Result Date: 5/15/2024  Interpreted By:  Tali Moore, STUDY: XR CHEST 1 VIEW;  5/15/2024 9:50 pm   INDICATION: Signs/Symptoms:SOB   COMPARISON: Chest x-ray 04/08/2024   ACCESSION NUMBER(S): [id number]   ORDERING CLINICIAN: PUAL VELÁSQUEZ   TECHNIQUE: Portable frontal view of the chest was obtained .   FINDINGS: Monitoring wires are overlying the patient.   The patient is rotated and in a kyphotic position.   The heart is enlarged. There is vascular congestion.   There are bilateral airspace opacities. There are small bilateral pleural effusions. No pneumothorax.       1. Cardiomegaly. Vascular congestion. Small bilateral pleural effusions. Bilateral airspace opacities, may be secondary to pulmonary edema and/or pneumonia.       MACRO: None.   Signed by: Tali Moore 5/15/2024 10:36 PM Dictation workstation:   [id number]     Current  Facility-Administered Medications:     busPIRone (Buspar) tablet 30 mg, 30 mg, oral, BID, Amado Moon MD, 30 mg at 05/18/24 0820    clonazePAM (KlonoPIN) tablet 0.5 mg, 0.5 mg, oral, BID PRN, Amado Moon MD    cloNIDine (Catapres) tablet 0.1 mg, 0.1 mg, oral, q12h BLUE, Amado Moon MD, 0.1 mg at 05/18/24 2006    dextrose 50 % injection 12.5 g, 12.5 g, intravenous, q15 min PRN, Amado Moon MD    dextrose 50 % injection 25 g, 25 g, intravenous, q15 min PRN, Amado Moon MD    [START ON 5/19/2024] doxycycline (Vibra-Tabs) tablet 100 mg, 100 mg, oral, q12h BLUE, Amado Moon MD    escitalopram (Lexapro) tablet 20 mg, 20 mg, oral, q AM, Amado Moon MD, 20 mg at 05/18/24 0820    glucagon (Glucagen) injection 1 mg, 1 mg, intramuscular, q15 min PRN, Amado Moon MD    glucagon (Glucagen) injection 1 mg, 1 mg, intramuscular, q15 min PRN, Amado Moon MD    hydrALAZINE (Apresoline) injection 10 mg, 10 mg, intravenous, q6h PRN, Amado Moon MD    hydrOXYzine pamoate (Vistaril) capsule 50 mg, 50 mg, oral, TID PRN, Amado Moon MD, 50 mg at 05/18/24 1641    insulin glargine (Lantus) injection 10 Units, 10 Units, subcutaneous, q AM, Amado Moon MD, 10 Units at 05/18/24 0822    insulin lispro (HumaLOG) injection 0-10 Units, 0-10 Units, subcutaneous, 4x daily, Amado Moon MD, 2 Units at 05/18/24 2006    ipratropium-albuteroL (Duo-Neb) 0.5-2.5 mg/3 mL nebulizer solution 3 mL, 3 mL, nebulization, q6h, Amado Moon MD, 3 mL at 05/18/24 2137    lactated Ringer's bolus 1,000 mL, 1,000 mL, intravenous, Once, Amado Moon MD    lactated Ringer's infusion, 100 mL/hr, intravenous, Continuous, Jesse Jones DO, Last Rate: 100 mL/hr at 05/18/24 1941, 100 mL/hr at 05/18/24 1941    levothyroxine (Synthroid, Levoxyl) tablet 150 mcg, 150 mcg, oral, Daily, Amado Moon MD, 150 mcg at 05/18/24 0535     metoprolol tartrate (Lopressor) tablet 25 mg, 25 mg, oral, BID, Amado Moon MD, 25 mg at 05/18/24 2006    oxygen (O2) therapy, , inhalation, Continuous PRN - O2/gases, Amado Moon MD, 5 L/min at 05/18/24 2137    oxygen (O2) therapy, , inhalation, Continuous - Inhalation, Amado Moon MD, 5 L/min at 05/18/24 2000    paliperidone (Invega) 24 hr tablet 3 mg, 3 mg, oral, Daily, Amado Moon MD, 3 mg at 05/18/24 1214    pantoprazole (ProtoNix) EC tablet 40 mg, 40 mg, oral, Daily before breakfast, 40 mg at 05/18/24 0630 **OR** pantoprazole (ProtoNix) injection 40 mg, 40 mg, intravenous, Daily before breakfast, Amado Moon MD, 40 mg at 05/17/24 1221    prazosin (Minipress) capsule 5 mg, 5 mg, oral, Nightly, Amado Moon MD, 5 mg at 05/18/24 2006    [START ON 5/19/2024] predniSONE (Deltasone) tablet 50 mg, 50 mg, oral, Daily, Amado Moon MD    rivaroxaban (Xarelto) tablet 10 mg, 10 mg, oral, Daily with evening meal, Amado Moon MD, 10 mg at 05/18/24 1700    tiotropium (Spiriva Respimat) 2.5 mcg/actuation inhaler 2 puff, 2 puff, inhalation, Daily, Amado Moon MD, 2 puff at 05/18/24 0630   Principal Problem:    Shortness of breath      Assessment/Plan    Acute on chronic hypoxic and hypercapnic respiratory failure patient was on ventilator last night svent support patient extubated today.  Acute metabolic encephalopathy improving.  COPD exacerbation continue bronchodilators and oxygen keeping saturation more than 90%.  UTI.  Pneumonia.  Monitor delirium.  Obstructive sleep apnea.  History of migraine.  Schizophrenia.  Anxiety.  Depression.  Hypertension.  Dyslipidemia.  GERD.  Diabetes.  Hypothyroidism.  History of DVT and pulmonary embolism in the past.  Deconditioning start PT OT.  DVT prophylaxis.  P.o. diet.  4/18/2024 patient continued to improve transferred to the floor from ICU.  Respiratory status improving patient on 5 L nasal  cannula O2 saturation mid 90s.  Patient more awake and alert     Afshin Villalba MD

## 2024-05-20 LAB
BACTERIA BLD CULT: NORMAL
BACTERIA BLD CULT: NORMAL

## 2024-05-29 ENCOUNTER — APPOINTMENT (OUTPATIENT)
Dept: OBSTETRICS AND GYNECOLOGY | Facility: CLINIC | Age: 44
End: 2024-05-29
Payer: COMMERCIAL

## 2024-06-19 ENCOUNTER — APPOINTMENT (OUTPATIENT)
Dept: OBSTETRICS AND GYNECOLOGY | Facility: CLINIC | Age: 44
End: 2024-06-19
Payer: COMMERCIAL

## 2024-06-22 ENCOUNTER — HOSPITAL ENCOUNTER (OUTPATIENT)
Dept: RADIOLOGY | Facility: CLINIC | Age: 44
Discharge: HOME | End: 2024-06-22
Payer: COMMERCIAL

## 2024-06-22 DIAGNOSIS — S93.402A SPRAIN OF UNSPECIFIED LIGAMENT OF LEFT ANKLE, INITIAL ENCOUNTER: ICD-10-CM

## 2024-06-22 PROCEDURE — 73610 X-RAY EXAM OF ANKLE: CPT | Mod: LT

## 2024-06-22 PROCEDURE — 73610 X-RAY EXAM OF ANKLE: CPT | Mod: LEFT SIDE | Performed by: RADIOLOGY

## 2024-06-22 PROCEDURE — 73630 X-RAY EXAM OF FOOT: CPT | Mod: LT

## 2024-06-22 PROCEDURE — 73630 X-RAY EXAM OF FOOT: CPT | Mod: LEFT SIDE | Performed by: RADIOLOGY

## 2024-06-24 ENCOUNTER — OFFICE VISIT (OUTPATIENT)
Dept: ORTHOPEDIC SURGERY | Facility: CLINIC | Age: 44
End: 2024-06-24
Payer: COMMERCIAL

## 2024-06-24 VITALS — BODY MASS INDEX: 29.44 KG/M2 | HEIGHT: 62 IN | WEIGHT: 160 LBS

## 2024-06-24 DIAGNOSIS — M25.572 PAIN AND SWELLING OF LEFT ANKLE: ICD-10-CM

## 2024-06-24 DIAGNOSIS — S93.409A GRADE 3 ANKLE SPRAIN: Primary | ICD-10-CM

## 2024-06-24 DIAGNOSIS — M25.472 PAIN AND SWELLING OF LEFT ANKLE: ICD-10-CM

## 2024-06-24 PROCEDURE — 3051F HG A1C>EQUAL 7.0%<8.0%: CPT | Performed by: INTERNAL MEDICINE

## 2024-06-24 PROCEDURE — 3008F BODY MASS INDEX DOCD: CPT | Performed by: INTERNAL MEDICINE

## 2024-06-24 PROCEDURE — 99213 OFFICE O/P EST LOW 20 MIN: CPT | Performed by: INTERNAL MEDICINE

## 2024-06-24 PROCEDURE — 3050F LDL-C >= 130 MG/DL: CPT | Performed by: INTERNAL MEDICINE

## 2024-06-24 PROCEDURE — 4004F PT TOBACCO SCREEN RCVD TLK: CPT | Performed by: INTERNAL MEDICINE

## 2024-06-24 PROCEDURE — L4361 PNEUMA/VAC WALK BOOT PRE OTS: HCPCS | Performed by: INTERNAL MEDICINE

## 2024-06-24 RX ORDER — TRAMADOL HYDROCHLORIDE 50 MG/1
50 TABLET ORAL EVERY 12 HOURS PRN
Qty: 14 TABLET | Refills: 0 | Status: SHIPPED | OUTPATIENT
Start: 2024-06-24 | End: 2024-07-01

## 2024-06-24 ASSESSMENT — PATIENT HEALTH QUESTIONNAIRE - PHQ9
1. LITTLE INTEREST OR PLEASURE IN DOING THINGS: NOT AT ALL
SUM OF ALL RESPONSES TO PHQ9 QUESTIONS 1 AND 2: 0
2. FEELING DOWN, DEPRESSED OR HOPELESS: NOT AT ALL

## 2024-06-24 NOTE — PROGRESS NOTES
"  Acute Injury New Patient Visit    CC:   Chief Complaint   Patient presents with    Left Ankle - Pain     Patient states she fell in her kitchen on 24. Lateral pain , xrays at [facility]       HPI: Stephenie is a 44 y.o. female presents today for evaluation for acute left ankle injury sustained after she fell in her kitchen five days ago. She had x-rays taken at urgent care. She is here for initial evaluation.         Review of Systems   GENERAL: Negative for malaise, significant weight loss, fever  MUSCULOSKELETAL: See HPI  NEURO:  Negative for numbness / tingling     Past Medical History  Past Medical History:   Diagnosis Date    Acute on chronic respiratory failure (Multi)     Arthritis     COPD (chronic obstructive pulmonary disease) (Multi)     Diabetes mellitus (Multi)     type 2 IDDM    GERD (gastroesophageal reflux disease)     History of transfusion     History of venous thromboembolism     Hyperlipidemia     Hypertension     Hypothyroidism     Lipoma     Nephrolithiasis     CHARLIE (obstructive sleep apnea)     Ovarian teratoma     Schizophrenia (Multi)        Medication review  Medication Documentation Review Audit       Reviewed by Erica Milligan MA (Medical Assistant) on 24 at 1454      Medication Order Taking? Sig Documenting Provider Last Dose Status   alcohol swabs (Alcohol Prep Pads) pads, medicated [id number] No Use 3 times daily prn Delta Yuen MD Unknown Active   atorvastatin (Lipitor) 20 mg tablet [id number] No Take 1 tablet (20 mg) by mouth once daily. Dr. Davis covering for Dr. Alpa Davis MD 2024  23 2359   BD Ultra-Fine Mini Pen Needle 31 gauge x 3/16\" needle [id number] No USE TO INJECT 4 TIMES DAILY AS DIRECTED Delta Yuen MD Unknown Active   busPIRone (Buspar) 30 mg tablet [id number] No Take 1 tablet (30 mg) by mouth 2 times a day. Historical Provider, MD Unknown Active   clonazePAM (KlonoPIN) 1 mg tablet [id number] No Take by mouth 2 times a day as " needed. Ly Ballard MD Unknown Active   cloNIDine (Catapres) 0.1 mg tablet [id number] No Take 1 tablet (0.1 mg) by mouth 2 times a day as needed (anxiety). Ly Ballard MD Unknown Active   escitalopram (Lexapro) 20 mg tablet [id number] No Take 1 tablet (20 mg) by mouth once daily in the morning. Ly Ballard MD Unknown Active   hydrOXYzine pamoate (Vistaril) 50 mg capsule [id number] No Take 1 capsule (50 mg) by mouth 3 times a day as needed for anxiety. Ly Ballard MD Unknown Active   insulin glargine (Lantus Solostar U-100 Insulin) 100 unit/mL (3 mL) pen [id number] No Inject 10 Units under the skin once daily in the morning. Take as directed per insulin instructions.   Patient taking differently: Inject 18 Units under the skin once daily in the morning. Take as directed per insulin instructions.    Delta Yuen MD 2024 am  23 2359   insulin lispro (HumaLOG) 100 unit/mL injection [id number] No Inject 0.04 mL (4 Units) under the skin 3 times a day with meals. Plus sliding scale Ly Ballard MD 2024  24 2359   ipratropium-albuteroL (Duo-Neb) 0.5-2.5 mg/3 mL nebulizer solution [id number] No Take 3 mL by nebulization every 6 hours if needed for shortness of breath or wheezing.   Patient taking differently: Take 3 mL by nebulization every 4 hours. As needed    Delta Yuen MD not recent  24 2359   levothyroxine (Synthroid, Levoxyl) 150 mcg tablet [id number] No Take 1 tablet (150 mcg) by mouth once daily. Delta Yuen MD Unknown Active   metoprolol tartrate (Lopressor) 25 mg tablet [id number] No Take 1 tablet (25 mg) by mouth 2 times a day. Ly Ballard MD Unknown Active   ondansetron ODT (Zofran-ODT) 4 mg disintegrating tablet [id number] No Take 1 tablet (4 mg) by mouth every 8 hours if needed for nausea or vomiting. Ly Ballard MD Unknown Active   OneTouch Verio test strips strip [id number] No  Historical Provider,  MD Unknown Active   paliperidone (Invega) 3 mg 24 hr tablet [id number] No Take 1 tablet (3 mg) by mouth once daily. Do not crush, chew, or split. Do not start before January 15, 2024. Pete Trevizo MD 2024  24 2359   paliperidone palmitate ER (Invega Sustenna) 234 mg/1.5 mL syringe [id number] No Inject 1.5 mL (234 mg) into the muscle every 28 (twenty-eight) days. Historical Provider, MD Unknown Active   pantoprazole (ProtoNix) 40 mg EC tablet [id number] No Take 1 tablet (40 mg) by mouth once daily. Delta Yuen MD Unknown Active   prazosin (Minipress) 5 mg capsule [id number] No Take 1 capsule (5 mg) by mouth once daily at bedtime. Historical Provider, MD Unknown Active   tiotropium (Spiriva Respimat) 2.5 mcg/actuation inhaler [id number] No Inhale 2 puffs once daily. Do not start before 2024.   Patient taking differently: Inhale 2 puffs once daily as needed.    Edwardo Acosta MD Unknown Active   Xarelto 10 mg tablet [id number] No Take 1 tablet (10 mg) by mouth once daily. Historical Provider, MD Unknown Active                    Allergies  Allergies   Allergen Reactions    Fluticasone Furoate-Vilanterol Unknown    Fluticasone-Umeclidin-Vilanter Other     Headaches    Levofloxacin Rash    Sumatriptan Other     Flushing    Vortioxetine Unknown       Social History  Social History     Socioeconomic History    Marital status:      Spouse name: Not on file    Number of children: 1    Years of education: Not on file    Highest education level: Not on file   Occupational History    Not on file   Tobacco Use    Smoking status: Every Day     Current packs/day: 1.00     Types: Cigarettes     Passive exposure: Never    Smokeless tobacco: Never   Substance and Sexual Activity    Alcohol use: Never    Drug use: Yes     Types: Marijuana    Sexual activity: Yes     Partners: Male     Birth control/protection: Condom Male   Other Topics Concern    Not on file   Social History Narrative    Not on file      Social Determinants of Health     Financial Resource Strain: Patient Unable To Answer (2024)    Overall Financial Resource Strain (CARDIA)     Difficulty of Paying Living Expenses: Patient unable to answer   Food Insecurity: No Food Insecurity (2024)    Hunger Vital Sign     Worried About Running Out of Food in the Last Year: Never true     Ran Out of Food in the Last Year: Never true   Transportation Needs: Patient Unable To Answer (2024)    PRAPARE - Transportation     Lack of Transportation (Medical): Patient unable to answer     Lack of Transportation (Non-Medical): Patient unable to answer   Physical Activity: Inactive (2024)    Exercise Vital Sign     Days of Exercise per Week: 0 days     Minutes of Exercise per Session: 0 min   Stress: No Stress Concern Present (2024)    Citizen of [place] of Occupational Health - Occupational Stress Questionnaire     Feeling of Stress : Not at all   Social Connections: Socially Isolated (2024)    Social Connection and Isolation Panel [NHANES]     Frequency of Communication with Friends and Family: Never     Frequency of Social Gatherings with Friends and Family: Never     Attends Tenriism Services: Never     Active Member of Clubs or Organizations: No     Attends Club or Organization Meetings: Never     Marital Status: Living with partner   Intimate Partner Violence: Not on file   Housing Stability: Patient Unable To Answer (2024)    Housing Stability Vital Sign     Unable to Pay for Housing in the Last Year: Patient unable to answer     Number of Places Lived in the Last Year: 1     Unstable Housing in the Last Year: Patient unable to answer       Surgical History  Past Surgical History:   Procedure Laterality Date     SECTION, LOW TRANSVERSE      x 1    EYE SURGERY      TONSILLECTOMY      UMBILICAL HERNIA REPAIR      VENTRAL HERNIA REPAIR         Physical Exam:  GENERAL:  Patient is awake, alert, and oriented to person place and  time.  Patient appears well nourished and well kept.  Affect Calm, Not Acutely Distressed.  HEENT:  Normocephalic, Atraumatic, EOMI  CARDIOVASCULAR:  Hemodynamically stable.  RESPIRATORY:  Normal respirations with unlabored breathing.  Extremity: Left ankle shows skin is intact.  There is no erythema or warmth.  Swelling localized on the lateral aspect with bruising and ecchymosis.  There is no clinical signs of infection.  There is no pain over the lateral malleolus.  There is no pain of the medial malleolus.  There is pain over the ATF, CF and PTF ligament.  There is no pain over the deltoid ligament.  No pain over the Achilles tendon.  Negative Saldana's test.  Negative squeeze test.  Negative anterior drawer test.  Negative talar tilt test.  No pain over the anterior process of the talus.  There is no pain over the talar dome.  There is no pain at the base of the fifth metatarsal bone.  No pain of the calcaneus.  No pain over the plantar aponeurosis.  No pain of the midfoot.  Neurovascularly intact.      Diagnostics: X-rays reviewed  XR ankle left 3+ views, XR foot left 3+ views  Narrative: Interpreted By:  Jon Espana,   STUDY:  XR FOOT LEFT 3+ VIEWS; XR ANKLE LEFT 3+ VIEWS      INDICATION:  Signs/Symptoms:X.      COMPARISON:  None      ACCESSION NUMBER(S):  [id number]; [id number]      ORDERING CLINICIAN:  MARISOL JORDAN      FINDINGS:  Soft tissue swelling of the left foot and ankle.      No evidence of fracture or dislocation.      Mild degenerative changes with plantar spur.      Impression: Soft tissue swelling about the left foot and ankle. No acute findings.      Signed by: Jon Espana 6/22/2024 5:07 PM  Dictation workstation:   [id number]      Procedure: None    Assessment: Left ankle sprain, grade 3    Plan: Stephenie presents today for initial evaluation for a left ankle sprain, grade 3. We placed her into a fracture boot for support.  She may weight-bear as tolerated, and take the boot off to shower  skin care and sleeping.  Recommend icing and anti-inflammatories.  She will follow-up in 2-3 weeks for reevaluation. If doing better we will place her into a lace up ankle brace, if no improvement, possible physical therapy.     No orders of the defined types were placed in this encounter.     At the conclusion of the visit there were no further questions by the patient/family regarding their plan of care.  Patient was instructed to call or return with any issues, questions, or concerns regarding their injury and/or treatment plan described above.     06/24/24 at 3:15 PM - Blaise Diaz MD  Scribe Attestation  By signing my name below, I, Damian Steinbergmo, Scribe   attest that this documentation has been prepared under the direction and in the presence of Blaise Diaz MD.    Office: [phone]    This note was prepared using voice recognition software.  The details of this note are correct and have been reviewed, and corrected to the best of my ability.  Some grammatical errors may persist related to the Dragon software.

## 2024-06-27 ENCOUNTER — TELEPHONE (OUTPATIENT)
Dept: ORTHOPEDIC SURGERY | Facility: CLINIC | Age: 44
End: 2024-06-27
Payer: COMMERCIAL

## 2024-06-28 ENCOUNTER — LAB (OUTPATIENT)
Dept: LAB | Facility: LAB | Age: 44
End: 2024-06-28
Payer: COMMERCIAL

## 2024-06-28 ENCOUNTER — TELEPHONE (OUTPATIENT)
Dept: PRIMARY CARE | Facility: CLINIC | Age: 44
End: 2024-06-28

## 2024-06-28 DIAGNOSIS — N89.8 CLEAR VAGINAL DISCHARGE: Primary | ICD-10-CM

## 2024-06-28 DIAGNOSIS — N89.8 CLEAR VAGINAL DISCHARGE: ICD-10-CM

## 2024-06-28 LAB — B-HCG SERPL-ACNC: 2 MIU/ML

## 2024-06-28 PROCEDURE — 84702 CHORIONIC GONADOTROPIN TEST: CPT

## 2024-06-28 PROCEDURE — 36415 COLL VENOUS BLD VENIPUNCTURE: CPT

## 2024-06-28 NOTE — TELEPHONE ENCOUNTER
Patient called in wondering if Dr. Yuen could place an order for a blood test to see if she's pregnant. She stated she is itchy all over and is having light discharge  Please Advise

## 2024-07-01 ENCOUNTER — TELEPHONE (OUTPATIENT)
Dept: ORTHOPEDIC SURGERY | Facility: CLINIC | Age: 44
End: 2024-07-01
Payer: COMMERCIAL

## 2024-07-09 ENCOUNTER — OFFICE VISIT (OUTPATIENT)
Dept: ORTHOPEDIC SURGERY | Facility: CLINIC | Age: 44
End: 2024-07-09
Payer: COMMERCIAL

## 2024-07-09 DIAGNOSIS — S93.409A GRADE 3 ANKLE SPRAIN: ICD-10-CM

## 2024-07-09 PROCEDURE — 99214 OFFICE O/P EST MOD 30 MIN: CPT | Performed by: INTERNAL MEDICINE

## 2024-07-09 PROCEDURE — L1902 AFO ANKLE GAUNTLET PRE OTS: HCPCS | Performed by: INTERNAL MEDICINE

## 2024-07-09 PROCEDURE — 3051F HG A1C>EQUAL 7.0%<8.0%: CPT | Performed by: INTERNAL MEDICINE

## 2024-07-09 PROCEDURE — 3050F LDL-C >= 130 MG/DL: CPT | Performed by: INTERNAL MEDICINE

## 2024-07-09 PROCEDURE — 99213 OFFICE O/P EST LOW 20 MIN: CPT | Performed by: INTERNAL MEDICINE

## 2024-07-09 PROCEDURE — 3008F BODY MASS INDEX DOCD: CPT | Performed by: INTERNAL MEDICINE

## 2024-07-09 RX ORDER — TRAMADOL HYDROCHLORIDE 50 MG/1
50 TABLET ORAL EVERY 12 HOURS PRN
Qty: 10 TABLET | Refills: 0 | Status: SHIPPED | OUTPATIENT
Start: 2024-07-09 | End: 2024-07-14

## 2024-07-09 NOTE — PROGRESS NOTES
"      CC:   Chief Complaint   Patient presents with    Left Ankle - Follow-up     sprain, grade 3  Re evaluate today       HPI: Stephenie is a 44 y.o. female presents today for reevaluation for left ankle sprain, grade 3. She states that she is getting better.         Review of Systems   GENERAL: Negative for malaise, significant weight loss, fever  MUSCULOSKELETAL: See HPI  NEURO:  Negative for numbness / tingling     Past Medical History  Past Medical History:   Diagnosis Date    Acute on chronic respiratory failure (Multi)     Arthritis     COPD (chronic obstructive pulmonary disease) (Multi)     Diabetes mellitus (Multi)     type 2 IDDM    GERD (gastroesophageal reflux disease)     History of transfusion     History of venous thromboembolism     Hyperlipidemia     Hypertension     Hypothyroidism     Lipoma     Nephrolithiasis     CHARLIE (obstructive sleep apnea)     Ovarian teratoma     Schizophrenia (Multi)        Medication review  Medication Documentation Review Audit       Reviewed by Erica Milligan MA (Medical Assistant) on 24 at 1454      Medication Order Taking? Sig Documenting Provider Last Dose Status   alcohol swabs (Alcohol Prep Pads) pads, medicated [id number] No Use 3 times daily prn Delta Yuen MD Unknown Active   atorvastatin (Lipitor) 20 mg tablet [id number] No Take 1 tablet (20 mg) by mouth once daily. Dr. Davis covering for Dr. Alpa Davis MD 2024  23 2359   BD Ultra-Fine Mini Pen Needle 31 gauge x 3/16\" needle [id number] No USE TO INJECT 4 TIMES DAILY AS DIRECTED Delta Yuen MD Unknown Active   busPIRone (Buspar) 30 mg tablet [id number] No Take 1 tablet (30 mg) by mouth 2 times a day. Historical Provider, MD Unknown Active   clonazePAM (KlonoPIN) 1 mg tablet [id number] No Take by mouth 2 times a day as needed. Ly Provider, MD Unknown Active   cloNIDine (Catapres) 0.1 mg tablet [id number] No Take 1 tablet (0.1 mg) by mouth 2 times a day as needed " (anxiety). Ly Ballard MD Unknown Active   escitalopram (Lexapro) 20 mg tablet [id number] No Take 1 tablet (20 mg) by mouth once daily in the morning. Ly Ballard MD Unknown Active   hydrOXYzine pamoate (Vistaril) 50 mg capsule [id number] No Take 1 capsule (50 mg) by mouth 3 times a day as needed for anxiety. Ly Ballard MD Unknown Active   insulin glargine (Lantus Solostar U-100 Insulin) 100 unit/mL (3 mL) pen [id number] No Inject 10 Units under the skin once daily in the morning. Take as directed per insulin instructions.   Patient taking differently: Inject 18 Units under the skin once daily in the morning. Take as directed per insulin instructions.    Delta Yuen MD 2024 am  23 2359   insulin lispro (HumaLOG) 100 unit/mL injection [id number] No Inject 0.04 mL (4 Units) under the skin 3 times a day with meals. Plus sliding scale Ly Ballard MD 2024  24 2359   ipratropium-albuteroL (Duo-Neb) 0.5-2.5 mg/3 mL nebulizer solution [id number] No Take 3 mL by nebulization every 6 hours if needed for shortness of breath or wheezing.   Patient taking differently: Take 3 mL by nebulization every 4 hours. As needed    Delta Yuen MD not recent  24 2359   levothyroxine (Synthroid, Levoxyl) 150 mcg tablet [id number] No Take 1 tablet (150 mcg) by mouth once daily. Delta Yuen MD Unknown Active   metoprolol tartrate (Lopressor) 25 mg tablet [id number] No Take 1 tablet (25 mg) by mouth 2 times a day. Ly Ballard MD Unknown Active   ondansetron ODT (Zofran-ODT) 4 mg disintegrating tablet [id number] No Take 1 tablet (4 mg) by mouth every 8 hours if needed for nausea or vomiting. Ly Ballard MD Unknown Active   OneTouch Verio test strips strip [id number] No  Ly Ballard MD Unknown Active   paliperidone (Invega) 3 mg 24 hr tablet [id number] No Take 1 tablet (3 mg) by mouth once daily. Do not crush, chew, or split. Do not  start before January 15, 2024. Pete Trevizo MD 2024  24 2350   paliperidone palmitate ER (Invega Sustenna) 234 mg/1.5 mL syringe [id number] No Inject 1.5 mL (234 mg) into the muscle every 28 (twenty-eight) days. Historical Provider, MD Unknown Active   pantoprazole (ProtoNix) 40 mg EC tablet [id number] No Take 1 tablet (40 mg) by mouth once daily. Delta Yuen MD Unknown Active   prazosin (Minipress) 5 mg capsule [id number] No Take 1 capsule (5 mg) by mouth once daily at bedtime. Historical Provider, MD Unknown Active   tiotropium (Spiriva Respimat) 2.5 mcg/actuation inhaler [id number] No Inhale 2 puffs once daily. Do not start before 2024.   Patient taking differently: Inhale 2 puffs once daily as needed.    Edwardo Acosta MD Unknown Active   Xarelto 10 mg tablet [id number] No Take 1 tablet (10 mg) by mouth once daily. Historical Provider, MD Unknown Active                    Allergies  Allergies   Allergen Reactions    Fluticasone Furoate-Vilanterol Unknown    Fluticasone-Umeclidin-Vilanter Other     Headaches    Levofloxacin Rash    Sumatriptan Other     Flushing    Vortioxetine Unknown       Social History  Social History     Socioeconomic History    Marital status:      Spouse name: Not on file    Number of children: 1    Years of education: Not on file    Highest education level: Not on file   Occupational History    Not on file   Tobacco Use    Smoking status: Every Day     Current packs/day: 1.00     Types: Cigarettes     Passive exposure: Never    Smokeless tobacco: Never   Substance and Sexual Activity    Alcohol use: Never    Drug use: Yes     Types: Marijuana    Sexual activity: Yes     Partners: Male     Birth control/protection: Condom Male   Other Topics Concern    Not on file   Social History Narrative    Not on file     Social Determinants of Health     Financial Resource Strain: Patient Unable To Answer (2024)    Overall Financial Resource Strain (CARDIA)      Difficulty of Paying Living Expenses: Patient unable to answer   Food Insecurity: No Food Insecurity (2024)    Hunger Vital Sign     Worried About Running Out of Food in the Last Year: Never true     Ran Out of Food in the Last Year: Never true   Transportation Needs: Patient Unable To Answer (2024)    PRAPARE - Transportation     Lack of Transportation (Medical): Patient unable to answer     Lack of Transportation (Non-Medical): Patient unable to answer   Physical Activity: Inactive (2024)    Exercise Vital Sign     Days of Exercise per Week: 0 days     Minutes of Exercise per Session: 0 min   Stress: No Stress Concern Present (2024)    [place] [employer] Occupational [employer] - Occupational Stress Questionnaire     Feeling of Stress : Not at all   Social Connections: Socially Isolated (2024)    Social Connection and Isolation Panel [NHANES]     Frequency of Communication with Friends and Family: Never     Frequency of Social Gatherings with Friends and Family: Never     Attends Sabianism Services: Never     Active Member of Clubs or Organizations: No     Attends Club or Organization Meetings: Never     Marital Status: Living with partner   Intimate Partner Violence: Not on file   Housing Stability: Patient Unable To Answer (2024)    Housing Stability Vital Sign     Unable to Pay for Housing in the Last Year: Patient unable to answer     Number of Places Lived in the Last Year: 1     Unstable Housing in the Last Year: Patient unable to answer       Surgical History  Past Surgical History:   Procedure Laterality Date     SECTION, LOW TRANSVERSE      x 1    EYE SURGERY      TONSILLECTOMY      UMBILICAL HERNIA REPAIR      VENTRAL HERNIA REPAIR         Physical Exam:  GENERAL:  Patient is awake, alert, and oriented to person place and time.  Patient appears well nourished and well kept.  Affect Calm, Not Acutely Distressed.  HEENT:  Normocephalic, Atraumatic, EOMI  CARDIOVASCULAR:   Hemodynamically stable.  RESPIRATORY:  Normal respirations with unlabored breathing.  Extremity:  Left ankle shows skin is intact.  There is no erythema or warmth.  Resolved swelling localized on the lateral aspect with no bruising and ecchymosis.  There is no clinical signs of infection.  There is no pain over the lateral malleolus.  There is no pain of the medial malleolus.  There mild pain over the ATF, and CF, no pain over the PTF ligament.  There is no pain over the deltoid ligament.  No pain over the Achilles tendon.  Negative Saldana's test.  Negative squeeze test.  Negative anterior drawer test.  Negative talar tilt test.  No pain over the anterior process of the talus.  There is no pain over the talar dome.  There is no pain at the base of the fifth metatarsal bone.  No pain of the calcaneus.  No pain over the plantar aponeurosis.  No pain of the midfoot.  Neurovascularly intact.       Diagnostics: None today  XR ankle left 3+ views, XR foot left 3+ views  Narrative: Interpreted By:  Jon Espana,   STUDY:  XR FOOT LEFT 3+ VIEWS; XR ANKLE LEFT 3+ VIEWS      INDICATION:  Signs/Symptoms:X.      COMPARISON:  None      ACCESSION NUMBER(S):  [id number]; [id number]      ORDERING CLINICIAN:  MARISOL JORDAN      FINDINGS:  Soft tissue swelling of the left foot and ankle.      No evidence of fracture or dislocation.      Mild degenerative changes with plantar spur.      Impression: Soft tissue swelling about the left foot and ankle. No acute findings.      Signed by: Jon Espana 6/22/2024 5:07 PM  Dictation workstation:   [id number]        Procedure: None    Assessment:  Left ankle sprain, grade 3     Plan: Stephenie presents today for reevaluation for a left ankle sprain, grade 3. She is clinically getting better, still has some pain. We recommended physical therapy and lace up ankle brace. She will follow-up in 4-5 weeks for reevaluation.  She is requesting refill pain medication we did offer one last refill  prescription today.    No orders of the defined types were placed in this encounter.     At the conclusion of the visit there were no further questions by the patient/family regarding their plan of care.  Patient was instructed to call or return with any issues, questions, or concerns regarding their injury and/or treatment plan described above.     07/09/24 at 9:50 AM - Blaise Diaz MD  Scribe Attestation  By signing my name below, I, Damian Davy, Scribe   attest that this documentation has been prepared under the direction and in the presence of Blaise Diaz MD.    Office: [phone]    This note was prepared using voice recognition software.  The details of this note are correct and have been reviewed, and corrected to the best of my ability.  Some grammatical errors may persist related to the Dragon software.

## 2024-07-17 DIAGNOSIS — R00.0 TACHYCARDIA: Primary | ICD-10-CM

## 2024-07-17 DIAGNOSIS — E78.00 PURE HYPERCHOLESTEROLEMIA: ICD-10-CM

## 2024-07-17 RX ORDER — METOPROLOL TARTRATE 25 MG/1
25 TABLET, FILM COATED ORAL 2 TIMES DAILY
Qty: 60 TABLET | Refills: 5 | Status: SHIPPED | OUTPATIENT
Start: 2024-07-17

## 2024-07-17 RX ORDER — ATORVASTATIN CALCIUM 20 MG/1
20 TABLET, FILM COATED ORAL DAILY
Qty: 30 TABLET | Refills: 5 | Status: SHIPPED | OUTPATIENT
Start: 2024-07-17 | End: 2025-01-13

## 2024-07-17 NOTE — TELEPHONE ENCOUNTER
Rx Refill Request Telephone Encounter    Name:  Stephenie Mccray  :  [id number]  Medication Name:  METOPROLOL 25MG & ATORVASTATIN 20MG  Specific Pharmacy location:  [facility]   Date of last appointment: 4-10-24  Date of next appointment:  24  Best number to reach patient:  [phone]

## 2024-07-20 ENCOUNTER — APPOINTMENT (OUTPATIENT)
Dept: RADIOLOGY | Facility: HOSPITAL | Age: 44
End: 2024-07-20
Payer: COMMERCIAL

## 2024-07-20 ENCOUNTER — HOSPITAL ENCOUNTER (EMERGENCY)
Facility: HOSPITAL | Age: 44
Discharge: HOME | End: 2024-07-20
Attending: EMERGENCY MEDICINE
Payer: COMMERCIAL

## 2024-07-20 VITALS
BODY MASS INDEX: 29.08 KG/M2 | HEART RATE: 86 BPM | OXYGEN SATURATION: 96 % | WEIGHT: 158 LBS | DIASTOLIC BLOOD PRESSURE: 92 MMHG | TEMPERATURE: 97 F | SYSTOLIC BLOOD PRESSURE: 140 MMHG | HEIGHT: 62 IN | RESPIRATION RATE: 16 BRPM

## 2024-07-20 DIAGNOSIS — S93.402A SPRAIN OF LEFT ANKLE, UNSPECIFIED LIGAMENT, INITIAL ENCOUNTER: Primary | ICD-10-CM

## 2024-07-20 PROCEDURE — 2500000004 HC RX 250 GENERAL PHARMACY W/ HCPCS (ALT 636 FOR OP/ED): Performed by: EMERGENCY MEDICINE

## 2024-07-20 PROCEDURE — 99283 EMERGENCY DEPT VISIT LOW MDM: CPT

## 2024-07-20 PROCEDURE — 96372 THER/PROPH/DIAG INJ SC/IM: CPT | Performed by: EMERGENCY MEDICINE

## 2024-07-20 PROCEDURE — 73610 X-RAY EXAM OF ANKLE: CPT | Mod: LT

## 2024-07-20 PROCEDURE — 73610 X-RAY EXAM OF ANKLE: CPT | Mod: LEFT SIDE | Performed by: RADIOLOGY

## 2024-07-20 PROCEDURE — 2500000002 HC RX 250 W HCPCS SELF ADMINISTERED DRUGS (ALT 637 FOR MEDICARE OP, ALT 636 FOR OP/ED): Performed by: EMERGENCY MEDICINE

## 2024-07-20 PROCEDURE — 94640 AIRWAY INHALATION TREATMENT: CPT

## 2024-07-20 RX ORDER — TRAMADOL HYDROCHLORIDE 50 MG/1
50 TABLET ORAL EVERY 6 HOURS PRN
Qty: 6 TABLET | Refills: 0 | Status: SHIPPED | OUTPATIENT
Start: 2024-07-20 | End: 2024-07-23

## 2024-07-20 RX ORDER — KETOROLAC TROMETHAMINE 30 MG/ML
30 INJECTION, SOLUTION INTRAMUSCULAR; INTRAVENOUS ONCE
Status: COMPLETED | OUTPATIENT
Start: 2024-07-20 | End: 2024-07-20

## 2024-07-20 RX ORDER — ALBUTEROL SULFATE 0.83 MG/ML
2.5 SOLUTION RESPIRATORY (INHALATION) ONCE
Status: COMPLETED | OUTPATIENT
Start: 2024-07-20 | End: 2024-07-20

## 2024-07-20 RX ADMIN — KETOROLAC TROMETHAMINE 30 MG: 30 INJECTION, SOLUTION INTRAMUSCULAR at 08:27

## 2024-07-20 RX ADMIN — ALBUTEROL SULFATE 2.5 MG: 2.5 SOLUTION RESPIRATORY (INHALATION) at 09:04

## 2024-07-20 ASSESSMENT — COLUMBIA-SUICIDE SEVERITY RATING SCALE - C-SSRS
6. HAVE YOU EVER DONE ANYTHING, STARTED TO DO ANYTHING, OR PREPARED TO DO ANYTHING TO END YOUR LIFE?: NO
1. IN THE PAST MONTH, HAVE YOU WISHED YOU WERE DEAD OR WISHED YOU COULD GO TO SLEEP AND NOT WAKE UP?: NO
1. IN THE PAST MONTH, HAVE YOU WISHED YOU WERE DEAD OR WISHED YOU COULD GO TO SLEEP AND NOT WAKE UP?: NO
6. HAVE YOU EVER DONE ANYTHING, STARTED TO DO ANYTHING, OR PREPARED TO DO ANYTHING TO END YOUR LIFE?: NO
2. HAVE YOU ACTUALLY HAD ANY THOUGHTS OF KILLING YOURSELF?: NO
2. HAVE YOU ACTUALLY HAD ANY THOUGHTS OF KILLING YOURSELF?: NO

## 2024-07-20 ASSESSMENT — PAIN DESCRIPTION - LOCATION
LOCATION: ANKLE

## 2024-07-20 ASSESSMENT — PAIN DESCRIPTION - ORIENTATION
ORIENTATION: LEFT

## 2024-07-20 ASSESSMENT — PAIN DESCRIPTION - DESCRIPTORS
DESCRIPTORS: ACHING;SHARP
DESCRIPTORS: ACHING;STABBING
DESCRIPTORS: ACHING;STABBING

## 2024-07-20 ASSESSMENT — PAIN DESCRIPTION - PAIN TYPE
TYPE: ACUTE PAIN
TYPE: ACUTE PAIN

## 2024-07-20 ASSESSMENT — PAIN - FUNCTIONAL ASSESSMENT
PAIN_FUNCTIONAL_ASSESSMENT: 0-10

## 2024-07-20 ASSESSMENT — PAIN DESCRIPTION - FREQUENCY: FREQUENCY: CONSTANT/CONTINUOUS

## 2024-07-20 ASSESSMENT — PAIN SCALES - GENERAL
PAINLEVEL_OUTOF10: 8
PAINLEVEL_OUTOF10: 9
PAINLEVEL_OUTOF10: 7
PAINLEVEL_OUTOF10: 9

## 2024-07-20 ASSESSMENT — PAIN DESCRIPTION - PROGRESSION: CLINICAL_PROGRESSION: GRADUALLY IMPROVING

## 2024-07-20 ASSESSMENT — LIFESTYLE VARIABLES
HAVE PEOPLE ANNOYED YOU BY CRITICIZING YOUR DRINKING: NO
HAVE YOU EVER FELT YOU SHOULD CUT DOWN ON YOUR DRINKING: NO
TOTAL SCORE: 0
EVER FELT BAD OR GUILTY ABOUT YOUR DRINKING: NO
EVER HAD A DRINK FIRST THING IN THE MORNING TO STEADY YOUR NERVES TO GET RID OF A HANGOVER: NO

## 2024-07-20 NOTE — ED PROVIDER NOTES
HPI   Chief Complaint   Patient presents with    Ankle Pain     Pt c/o left ankle pain after falling 1 hour ago       HPI: 44-year-old female history of acute on chronic respiratory failure with intubation in May in the setting of overdose, COPD, diabetes, schizophrenia presents stating that she tripped over a bag of dog food and injured her left ankle.  She states that she had previously twisted her left ankle about 3 weeks ago.  In looking at the computer it appears that [facility]  She currently has a lace up brace on the ankle.  She denies any other injuries.  She denies any numbness tingling or weakness.  She is on Xarelto.  She states that she did not hit her head.  No LOC.    Family HX: Denies any significant/pertinent family history.  Social Hx: Denies ETOH or drug use.  Review of systems:  Gen.: No weight loss, fatigue, anorexia, insomnia, fever.   Eyes: No vision loss, double vision, drainage, eye pain.   ENT: No pharyngitis, dry mouth.   Cardiac: No chest pain, palpitations, syncope, near syncope.   Pulmonary: No shortness of breath, cough, hemoptysis.   Heme/lymph: No swollen glands, fever, bleeding.   GI: No abdominal pain, change in bowel habits, melena, hematemesis, hematochezia, nausea, vomiting, diarrhea.   : No discharge, dysuria, frequency, urgency, hematuria.   Musculoskeletal: No limb pain, joint pain, joint swelling.   Skin: No rashes.   Psych: No depression, anxiety, suicidality, homicidality.   Review of systems is otherwise negative unless stated above or in history of present illness.    Physical Exam:    Appearance: Alert, oriented , cooperative,  in no acute distress. Well nourished & well hydrated.    Skin: Intact,  dry skin, no lesions, rash, petechiae or purpura.     Eyes: PERRLA, EOMs intact,  Conjunctiva pink with no redness or exudates. Eyelids without lesions. No scleral icterus.     ENT: Hearing grossly intact. External auditory canals patent,  tympanic membranes intact with visible landmarks. Nares patent, mucus membranes moist. Dentition without lesions. Pharynx clear, uvula midline.     Neck: Supple, without meningismus. Thyroid not palpable. Trachea at midline. No lymphadenopathy.    Pulmonary: Clear bilaterally with good chest wall excursion. No rales, rhonchi or wheezing. No accessory muscle use or stridor.    Cardiac: Normal S1, S2 without murmur, rub, gallop or extrasystole. No JVD, Carotids without bruits.    Abdomen: Soft, nontender, active bowel sounds.  No palpable organomegaly.  No rebound or guarding.  No CVA tenderness.    Genitourinary: Exam deferred.    Musculoskeletal: Full range of motion. no pain, edema, or deformity. Pulses full and equal. No cyanosis, clubbing, left ankle TTP about the lateral aspect.  There is some mild soft tissue swelling.  2+ pulses.  No tenderness to the posterior ankle.  Achilles tendon appears intact.  Negative Saldana test.  No tenderness to the tib-fib.  Neurological:  Cranial nerves II through XII are grossly intact, finger-nose touch is normal, normal sensation, no weakness, no focal findings identified.    Psychiatric: Appropriate mood and affect.     Medical Decision-Making:  Testing: We will obtain a x-ray of the left ankle.  I did look at old records.  She saw orthopedics for a grade 3 sprain follow-up on the ninth of this month.  In looking at the [facility] report she does receive tramadol from orthopedics as well.  Patient be given 1 dose of Toradol here.  She is neurovascularly intact.  She has what looks like documented soft tissue swelling to the lateral ankle from her grade 3 sprain from June.  Initial pulse ox in triage was 91%.  We will recheck this as the patient is not in any respiratory distress and is not endorsing any shortness of breath.  Story was obtained which was read by radiology as mild soft tissue diffuse swelling but no fractures.  I did look at it as well.  It appears similar to a  previous x-ray that was done on .  At this time patient will be given of short prescription for tramadol.  I also gave her 1 breathing treatment.  Oxygen levels have improved.  She states that they are at her baseline.  She is in no respiratory distress and denies being short of breath.  I do not see signs that would suggest that this is a DVT.  She has no calf pain swelling or tenderness.  She has some lateral malleolus tenderness that is isolated.  Treatment:   Reevaluation:   Plan: Homegoing. Discussed differential. Will follow-up with the primary physician in the next 2-3 days. Return if worse. They understand return precautions and discharge instructions. Patient and family/friend/caregiver are in agreement with this plan.   Impression:   1.  Left ankle sprain  2.  Labs Reviewed - No data to display     XR ankle left 3+ views   Final Result    Diffuse soft tissue swelling is seen.  No definite evidence of acute    fracture is identified.    Signed by Agnes Sears MD                      Patient History   Past Medical History:   Diagnosis Date    Acute on chronic respiratory failure (Multi)     Arthritis     COPD (chronic obstructive pulmonary disease) (Multi)     Diabetes mellitus (Multi)     type 2 IDDM    GERD (gastroesophageal reflux disease)     History of transfusion     History of venous thromboembolism     Hyperlipidemia     Hypertension     Hypothyroidism     Lipoma     Nephrolithiasis     CHARLIE (obstructive sleep apnea)     Ovarian teratoma     Schizophrenia (Multi)      Past Surgical History:   Procedure Laterality Date     SECTION, LOW TRANSVERSE      x 1    EYE SURGERY      TONSILLECTOMY      UMBILICAL HERNIA REPAIR      VENTRAL HERNIA REPAIR       Family History   Problem Relation Name Age of Onset    Fibromyalgia Father      Hypertension Brother      Thyroid disease Maternal Grandmother      Thyroid disease Paternal Grandmother      Lung cancer Paternal Grandfather      Coronary artery  disease Other Grandmother      Social History     Tobacco Use    Smoking status: Every Day     Current packs/day: 1.00     Types: Cigarettes     Passive exposure: Never    Smokeless tobacco: Never   Substance Use Topics    Alcohol use: Never    Drug use: Yes     Types: Marijuana       Physical Exam   ED Triage Vitals   Temperature Heart Rate Respirations BP   07/20/24 0806 07/20/24 0806 07/20/24 0806 07/20/24 0806   36 °C (96.8 °F) 96 16 (!) 140/92      Pulse Ox Temp src Heart Rate Source Patient Position   07/20/24 0806 -- 07/20/24 0818 --   (!) 91 %  Monitor       BP Location FiO2 (%)     -- --             Physical Exam      ED Course & MDM   Diagnoses as of 07/20/24 0948   Sprain of left ankle, unspecified ligament, initial encounter                       [place] Coma Scale Score: 15                        Medical Decision Making      Procedure  Procedures     Jazmin Gaytan MD  07/20/24 0948

## 2024-07-24 ENCOUNTER — APPOINTMENT (OUTPATIENT)
Dept: ORTHOPEDIC SURGERY | Facility: CLINIC | Age: 44
End: 2024-07-24
Payer: COMMERCIAL

## 2024-07-24 ENCOUNTER — TELEPHONE (OUTPATIENT)
Dept: OBSTETRICS AND GYNECOLOGY | Facility: CLINIC | Age: 44
End: 2024-07-24

## 2024-07-24 DIAGNOSIS — S93.402A SPRAIN OF LEFT ANKLE, UNSPECIFIED LIGAMENT, INITIAL ENCOUNTER: ICD-10-CM

## 2024-07-24 DIAGNOSIS — S93.402A MODERATE LEFT ANKLE SPRAIN, INITIAL ENCOUNTER: Primary | ICD-10-CM

## 2024-07-24 PROCEDURE — 3051F HG A1C>EQUAL 7.0%<8.0%: CPT | Performed by: EMERGENCY MEDICINE

## 2024-07-24 PROCEDURE — 99203 OFFICE O/P NEW LOW 30 MIN: CPT | Performed by: EMERGENCY MEDICINE

## 2024-07-24 PROCEDURE — 4004F PT TOBACCO SCREEN RCVD TLK: CPT | Performed by: EMERGENCY MEDICINE

## 2024-07-24 PROCEDURE — L4361 PNEUMA/VAC WALK BOOT PRE OTS: HCPCS | Performed by: EMERGENCY MEDICINE

## 2024-07-24 PROCEDURE — 3050F LDL-C >= 130 MG/DL: CPT | Performed by: EMERGENCY MEDICINE

## 2024-07-24 RX ORDER — DICLOFENAC EPOLAMINE 0.01 G/1
1 SYSTEM TOPICAL DAILY
Qty: 15 PATCH | Refills: 0 | Status: SHIPPED | OUTPATIENT
Start: 2024-07-24 | End: 2024-08-08

## 2024-07-24 ASSESSMENT — PAIN - FUNCTIONAL ASSESSMENT: PAIN_FUNCTIONAL_ASSESSMENT: 0-10

## 2024-07-24 ASSESSMENT — PAIN SCALES - GENERAL: PAINLEVEL_OUTOF10: 9

## 2024-07-24 ASSESSMENT — PAIN DESCRIPTION - DESCRIPTORS: DESCRIPTORS: THROBBING

## 2024-07-24 NOTE — TELEPHONE ENCOUNTER
7/24/24 - pt was for Lap Tubal w/ Robert on  5/17/24 but was admitted prior to this date w/ Respiratory issues..  Per Dr. Jones, if pt wants to reschedule this surgery, will need Pulmonary clearance first or she can get an IUD in office instead..  Iveth called pt on 5/21 and informed  her of this, pt she she will let us know.  Called pt again on 7/24, she states her doctor told he she cannot have surgery and she will call us at a later time to make an appointment regarding other questions.

## 2024-07-24 NOTE — PROGRESS NOTES
"Subjective    Patient ID: Stephenie Mccray is a 44 y.o. female.    Chief Complaint: Pain of the Left Ankle (NPV - Sprain it 2x already.  Went to ER on Sat 7/20/24.  )     Last Surgery: No surgery found  Last Surgery Date: No surgery found    VIOLET is a 44-year-old female here today with her significant other who is helping provide additional historyrin after being referred here by Lukas for an injury to her left ankle.  She fell about 2 weeks ago and inverted her left ankle.  She then sustained a similar injury when she \"sprained it\" again last Saturday on 7/20/2024.  She is currently in a lace up ankle brace but still having pain and having trouble walking.  X-rays were repeated here today.  Her pain is all over the lateral aspect of the left ankle.  She has not been responding well to Tylenol and is unable to take NSAIDs because of a risk of bleeding based on her medical history.  Her pain today is moderate to severe and worse with activity.        Objective   Right Ankle Exam   Right ankle exam is normal.       Left Ankle Exam     Tenderness   The patient is experiencing tenderness in the CF and lateral malleolus.   Swelling: moderate    Range of Motion   Dorsiflexion:  normal   Plantar flexion:  normal   Eversion:  normal   Inversion:  normal     Muscle Strength   The patient has normal left ankle strength.  Dorsiflexion:  5/5   Plantar flexion:  5/5   Anterior tibial:  5/5   Posterior tibial:  5/5  Gastrocsoleus:  5/5  Peroneal muscle:  5/5    Tests   Anterior drawer: positive  Varus tilt: positive    Other   Erythema: absent  Sensation: normal  Pulse: present    Comments:  Mild to moderate ankle effusion present with tenderness palpation over the lateral joint line and peroneal tendons.  No tenderness to palpation of the syndesmosis and no tenderness to palpation over the proximal fibular head.  The base of the fifth met is also nontender.  Patient is able to ambulate and bear weight but with difficulty because " of pain.  DP pulse intact.  Sensation intact to light touch over the entire left foot.            Image Results:  XR ankle left 3+ views  Narrative: STUDY:  Ankle Radiographs;  7/20/2024 8:52 AM.  INDICATION:  Injury.  COMPARISON:  Left ankle radiographs 6/22/2024.  ACCESSION NUMBER(S):  [id number]  ORDERING CLINICIAN:  VINNIE JIMENEZ  TECHNIQUE:  Three views of the left ankle.  FINDINGS:    There is no displaced fracture.  The alignment is anatomic.  There is  diffuse soft tissue swelling appreciated.  There is an inferior  calcaneal spur.  Impression: Diffuse soft tissue swelling is seen.  No definite evidence of acute  fracture is identified.  Signed by Agnes Sears MD    X-rays of the left ankle were repeated reviewed and interpreted by me on 7/24/2024 and were grossly unremarkable without any evidence of acute injuries or fractures.    Assessment/Plan   Encounter Diagnoses:  Moderate left ankle sprain, initial encounter    Sprain of left ankle, unspecified ligament, initial encounter    Orders Placed This Encounter    diclofenac epolamine (Licart) 1.3 % patch 24 hour     No follow-ups on file.    We discussed her treatment options and agreed to place her in a tall walking boot for the next 2 weeks.  She is also going to be prescribed 15 patches of Licart for pain control since she is not responding well to Tylenol and is unable to take NSAIDs.  I think the patches will work wonderfully for her and will be better tolerated than Voltaren given the fact that she is going to have to be in a boot for the next couple of weeks.  She will then follow-up with me in about 2 weeks and we will hopefully transition out of the boot and into a lace up ankle brace.  At that time we could potentially start physical therapy.  If she is not better she may require an MRI at that time.    **Patient was prescribed a tall walking boot for [a moderate left ankle sprain].The patient is ambulatory with or without aid; but, has  weakness, instability and/or deformity of their [left ankle] which requires stabilization from this orthosis to improve their function.       Verbal and written instructions for the use, wear schedule, cleaning and application of this item were given.  Patient was instructed that should the brace result in increased pain, decreased sensation, increased swelling, or an overall worsening of their medical condition, to please contact our office immediately.      Orthotic management and training was provided for skin care, modifications due to healing tissues, edema changes, interruption in skin integrity, and safety precautions with the orthosis.**    ** Please excuse any errors in grammar or translation related to this dictation. Voice recognition software was utilized to prepare this document. **       César Sun MD  [facility]

## 2024-07-26 ENCOUNTER — TELEPHONE (OUTPATIENT)
Dept: ORTHOPEDIC SURGERY | Facility: CLINIC | Age: 44
End: 2024-07-26
Payer: COMMERCIAL

## 2024-08-07 ENCOUNTER — APPOINTMENT (OUTPATIENT)
Dept: ORTHOPEDIC SURGERY | Facility: CLINIC | Age: 44
End: 2024-08-07
Payer: COMMERCIAL

## 2024-08-07 ENCOUNTER — APPOINTMENT (OUTPATIENT)
Dept: PHYSICAL THERAPY | Facility: CLINIC | Age: 44
End: 2024-08-07
Payer: COMMERCIAL

## 2024-08-08 ENCOUNTER — APPOINTMENT (OUTPATIENT)
Dept: ORTHOPEDIC SURGERY | Facility: CLINIC | Age: 44
End: 2024-08-08
Payer: COMMERCIAL

## 2024-08-12 ENCOUNTER — APPOINTMENT (OUTPATIENT)
Dept: ORTHOPEDIC SURGERY | Facility: CLINIC | Age: 44
End: 2024-08-12
Payer: COMMERCIAL

## 2024-08-14 ENCOUNTER — APPOINTMENT (OUTPATIENT)
Dept: PRIMARY CARE | Facility: CLINIC | Age: 44
End: 2024-08-14
Payer: COMMERCIAL

## 2024-08-15 ENCOUNTER — APPOINTMENT (OUTPATIENT)
Dept: ORTHOPEDIC SURGERY | Facility: CLINIC | Age: 44
End: 2024-08-15
Payer: COMMERCIAL

## 2024-08-26 ENCOUNTER — APPOINTMENT (OUTPATIENT)
Dept: PHYSICAL THERAPY | Facility: CLINIC | Age: 44
End: 2024-08-26
Payer: COMMERCIAL

## 2024-08-30 ENCOUNTER — OFFICE VISIT (OUTPATIENT)
Dept: ORTHOPEDIC SURGERY | Facility: CLINIC | Age: 44
End: 2024-08-30
Payer: COMMERCIAL

## 2024-08-30 DIAGNOSIS — S93.409A GRADE 3 ANKLE SPRAIN: ICD-10-CM

## 2024-08-30 PROCEDURE — 99213 OFFICE O/P EST LOW 20 MIN: CPT | Performed by: INTERNAL MEDICINE

## 2024-08-30 PROCEDURE — L1902 AFO ANKLE GAUNTLET PRE OTS: HCPCS | Performed by: INTERNAL MEDICINE

## 2024-08-30 NOTE — PROGRESS NOTES
"      CC:   No chief complaint on file.      HPI: Stephenie is a 44 y.o. female presents today for reevaluation for left ankle sprain, grad 3. She states that she is doing better.  She is currently wearing her fracture boot.        Review of Systems   GENERAL: Negative for malaise, significant weight loss, fever  MUSCULOSKELETAL: See HPI  NEURO:  Negative for numbness / tingling     Past Medical History  Past Medical History:   Diagnosis Date    Acute on chronic respiratory failure (Multi)     Arthritis     COPD (chronic obstructive pulmonary disease) (Multi)     Diabetes mellitus (Multi)     type 2 IDDM    GERD (gastroesophageal reflux disease)     History of transfusion     History of venous thromboembolism     Hyperlipidemia     Hypertension     Hypothyroidism     Lipoma     Nephrolithiasis     CHARLIE (obstructive sleep apnea)     Ovarian teratoma     Schizophrenia (Multi)        Medication review  Medication Documentation Review Audit       Reviewed by Terri López MA (Medical Assistant) on 07/24/24 at 1432      Medication Order Taking? Sig Documenting Provider Last Dose Status   alcohol swabs (Alcohol Prep Pads) pads, medicated [id number] No Use 3 times daily prn Delta Yuen MD Unknown Active   atorvastatin (Lipitor) 20 mg tablet [id number]  Take 1 tablet (20 mg) by mouth once daily. Dr. Davis covering for Dr. Alpa Yuen MD  Active   BD Ultra-Fine Mini Pen Needle 31 gauge x 3/16\" needle [id number] No USE TO INJECT 4 TIMES DAILY AS DIRECTED Delta Yuen MD Unknown Active   busPIRone (Buspar) 30 mg tablet [id number] No Take 1 tablet (30 mg) by mouth 2 times a day. Ly Ballard MD Unknown Active   clonazePAM (KlonoPIN) 1 mg tablet [id number] No Take by mouth 2 times a day as needed. Ly Ballard MD Unknown Active   cloNIDine (Catapres) 0.1 mg tablet [id number] No Take 1 tablet (0.1 mg) by mouth 2 times a day as needed (anxiety). Historical MD Nellie Unknown Active   escitalopram " (Lexapro) 20 mg tablet [id number] No Take 1 tablet (20 mg) by mouth once daily in the morning. Historical Provider, MD Unknown Active   hydrOXYzine pamoate (Vistaril) 50 mg capsule [id number] No Take 1 capsule (50 mg) by mouth 3 times a day as needed for anxiety. Historical Provider, MD Unknown Active   insulin glargine (Lantus Solostar U-100 Insulin) 100 unit/mL (3 mL) pen [id number] No Inject 10 Units under the skin once daily in the morning. Take as directed per insulin instructions.   Patient taking differently: Inject 18 Units under the skin once daily in the morning. Take as directed per insulin instructions.    Delta Yuen MD 2024 am  23 2359   insulin lispro (HumaLOG) 100 unit/mL injection [id number] No Inject 0.04 mL (4 Units) under the skin 3 times a day with meals. Plus sliding scale Historical Provider, MD 2024  24 2359   ipratropium-albuteroL (Duo-Neb) 0.5-2.5 mg/3 mL nebulizer solution [id number] No Take 3 mL by nebulization every 6 hours if needed for shortness of breath or wheezing.   Patient taking differently: Take 3 mL by nebulization every 4 hours. As needed    Delta Yuen MD not recent  24 2359   levothyroxine (Synthroid, Levoxyl) 150 mcg tablet [id number] No Take 1 tablet (150 mcg) by mouth once daily. Delta Yuen MD Unknown Active   metoprolol tartrate (Lopressor) 25 mg tablet [id number]  Take 1 tablet (25 mg) by mouth 2 times a day. Delta Yuen MD  Active   ondansetron ODT (Zofran-ODT) 4 mg disintegrating tablet [id number] No Take 1 tablet (4 mg) by mouth every 8 hours if needed for nausea or vomiting. Historical Provider, MD Unknown Active   OneTouch Verio test strips strip [id number] No  Historical Provider, MD Unknown Active   paliperidone (Invega) 3 mg 24 hr tablet [id number] No Take 1 tablet (3 mg) by mouth once daily. Do not crush, chew, or split. Do not start before January 15, 2024. Pete Trevizo MD 2024  24  2359   paliperidone palmitate ER (Invega Sustenna) 234 mg/1.5 mL syringe [id number] No Inject 1.5 mL (234 mg) into the muscle every 28 (twenty-eight) days. Historical Provider, MD Unknown Active   pantoprazole (ProtoNix) 40 mg EC tablet [id number] No Take 1 tablet (40 mg) by mouth once daily. Delta Yuen MD Unknown Active   prazosin (Minipress) 5 mg capsule [id number] No Take 1 capsule (5 mg) by mouth once daily at bedtime. Historical Provider, MD Unknown Active   tiotropium (Spiriva Respimat) 2.5 mcg/actuation inhaler [id number] No Inhale 2 puffs once daily. Do not start before 2024.   Patient taking differently: Inhale 2 puffs once daily as needed.    Edwardo Acosta MD Unknown Active   traMADol (Ultram) 50 mg tablet [id number]  Take 1 tablet (50 mg) by mouth every 6 hours if needed for severe pain (7 - 10) for up to 3 days. Jazmin Gaytan MD   24 2359   Xarelto 10 mg tablet [id number] No Take 1 tablet (10 mg) by mouth once daily. Historical Provider, MD Unknown Active                    Allergies  Allergies   Allergen Reactions    Fluticasone Furoate-Vilanterol Unknown    Fluticasone-Umeclidin-Vilanter Other     Headaches    Levofloxacin Rash    Sumatriptan Other     Flushing    Vortioxetine Unknown       Social History  Social History     Socioeconomic History    Marital status:      Spouse name: Not on file    Number of children: 1    Years of education: Not on file    Highest education level: Not on file   Occupational History    Not on file   Tobacco Use    Smoking status: Every Day     Current packs/day: 1.00     Types: Cigarettes     Passive exposure: Never    Smokeless tobacco: Never   Substance and Sexual Activity    Alcohol use: Never    Drug use: Yes     Types: Marijuana    Sexual activity: Yes     Partners: Male     Birth control/protection: Condom Male   Other Topics Concern    Not on file   Social History Narrative    Not on file     Social Determinants of Health      Financial Resource Strain: Patient Unable To Answer (2024)    Overall Financial Resource Strain (CARDIA)     Difficulty of Paying Living Expenses: Patient unable to answer   Food Insecurity: No Food Insecurity (2024)    Hunger Vital Sign     Worried About Running Out of Food in the Last Year: Never true     Ran Out of Food in the Last Year: Never true   Transportation Needs: Patient Unable To Answer (2024)    PRAPARE - Transportation     Lack of Transportation (Medical): Patient unable to answer     Lack of Transportation (Non-Medical): Patient unable to answer   Physical Activity: Inactive (2024)    Exercise Vital Sign     Days of Exercise per Week: 0 days     Minutes of Exercise per Session: 0 min   Stress: No Stress Concern Present (2024)    [employer] - Occupational Stress Questionnaire     Feeling of Stress : Not at all   Social Connections: Socially Isolated (2024)    Social Connection and Isolation Panel [NHANES]     Frequency of Communication with Friends and Family: Never     Frequency of Social Gatherings with Friends and Family: Never     Attends Holiness Services: Never     Active Member of Clubs or Organizations: No     Attends Club or Organization Meetings: Never     Marital Status: Living with partner   Intimate Partner Violence: Not on file   Housing Stability: Patient Unable To Answer (2024)    Housing Stability Vital Sign     Unable to Pay for Housing in the Last Year: Patient unable to answer     Number of Places Lived in the Last Year: 1     Unstable Housing in the Last Year: Patient unable to answer       Surgical History  Past Surgical History:   Procedure Laterality Date     SECTION, LOW TRANSVERSE      x 1    EYE SURGERY      TONSILLECTOMY      UMBILICAL HERNIA REPAIR      VENTRAL HERNIA REPAIR         Physical Exam:  GENERAL:  Patient is awake, alert, and oriented to person place and time.  Patient appears well  nourished and well kept.  Affect Calm, Not Acutely Distressed.  HEENT:  Normocephalic, Atraumatic, EOMI  CARDIOVASCULAR:  Hemodynamically stable.  RESPIRATORY:  Normal respirations with unlabored breathing.  Extremity: Left ankle shows skin is intact.  There is no erythema or warmth.  Resolved swelling localized on the lateral aspect with no bruising and ecchymosis.  There is no clinical signs of infection.  There is no pain over the lateral malleolus.  There is no pain of the medial malleolus.  There no pain over the ATF, and CF, no pain over the PTF ligament.  There is no pain over the deltoid ligament.  No pain over the Achilles tendon.  Negative Saldana's test.  Negative squeeze test.  Negative anterior drawer test.  Negative talar tilt test.  No pain over the anterior process of the talus.  There is no pain over the talar dome.  There is no pain at the base of the fifth metatarsal bone.  No pain of the calcaneus.  No pain over the plantar aponeurosis.  No pain of the midfoot.  Neurovascularly intact.       Diagnostics: None today  XR ankle left 3+ views  Narrative: STUDY:  Ankle Radiographs;  7/20/2024 8:52 AM.  INDICATION:  Injury.  COMPARISON:  Left ankle radiographs 6/22/2024.  ACCESSION NUMBER(S):  [id number]  ORDERING CLINICIAN:  VINNIE JIMENEZ  TECHNIQUE:  Three views of the left ankle.  FINDINGS:    There is no displaced fracture.  The alignment is anatomic.  There is  diffuse soft tissue swelling appreciated.  There is an inferior  calcaneal spur.  Impression: Diffuse soft tissue swelling is seen.  No definite evidence of acute  fracture is identified.  Signed by Agnes Sears MD        Procedure: None    Assessment: Left ankle sprain, grade 3     Plan: Stephenie  presents today for reevaluation for a left ankle sprain, grade 3. She is clinically getting better, we will wean her out of the walking fracture boot into a lace up ankle brace.  We also recommended physical therapy. She will follow-up as needed.      No orders of the defined types were placed in this encounter.     At the conclusion of the visit there were no further questions by the patient/family regarding their plan of care.  Patient was instructed to call or return with any issues, questions, or concerns regarding their injury and/or treatment plan described above.     08/30/24 at 2:52 PM - Blaise Diaz MD  Scribe Attestation  By signing my name below, I, Damian Davy, Scribe   attest that this documentation has been prepared under the direction and in the presence of Blaise Diaz MD.    Office: [phone]    This note was prepared using voice recognition software.  The details of this note are correct and have been reviewed, and corrected to the best of my ability.  Some grammatical errors may persist related to the Dragon software.

## 2024-09-03 ENCOUNTER — APPOINTMENT (OUTPATIENT)
Dept: PRIMARY CARE | Facility: CLINIC | Age: 44
End: 2024-09-03
Payer: COMMERCIAL

## 2024-09-03 VITALS
TEMPERATURE: 97.2 F | HEART RATE: 90 BPM | OXYGEN SATURATION: 93 % | RESPIRATION RATE: 16 BRPM | SYSTOLIC BLOOD PRESSURE: 100 MMHG | DIASTOLIC BLOOD PRESSURE: 60 MMHG | BODY MASS INDEX: 30 KG/M2 | WEIGHT: 163 LBS | HEIGHT: 62 IN

## 2024-09-03 DIAGNOSIS — Z00.00 ROUTINE GENERAL MEDICAL EXAMINATION AT HEALTH CARE FACILITY: Primary | ICD-10-CM

## 2024-09-03 DIAGNOSIS — E78.2 MIXED HYPERLIPIDEMIA: ICD-10-CM

## 2024-09-03 DIAGNOSIS — E03.9 ACQUIRED HYPOTHYROIDISM: ICD-10-CM

## 2024-09-03 DIAGNOSIS — E11.9 TYPE 2 DIABETES MELLITUS WITHOUT COMPLICATION, WITH LONG-TERM CURRENT USE OF INSULIN (MULTI): ICD-10-CM

## 2024-09-03 DIAGNOSIS — Z79.4 TYPE 2 DIABETES MELLITUS WITHOUT COMPLICATION, WITH LONG-TERM CURRENT USE OF INSULIN (MULTI): ICD-10-CM

## 2024-09-03 DIAGNOSIS — K21.9 GASTROESOPHAGEAL REFLUX DISEASE, UNSPECIFIED WHETHER ESOPHAGITIS PRESENT: ICD-10-CM

## 2024-09-03 DIAGNOSIS — I10 PRIMARY HYPERTENSION: ICD-10-CM

## 2024-09-03 DIAGNOSIS — M79.89 MASS OF SOFT TISSUE OF SHOULDER: ICD-10-CM

## 2024-09-03 PROCEDURE — 3050F LDL-C >= 130 MG/DL: CPT | Performed by: FAMILY MEDICINE

## 2024-09-03 PROCEDURE — 99213 OFFICE O/P EST LOW 20 MIN: CPT | Performed by: FAMILY MEDICINE

## 2024-09-03 PROCEDURE — 3078F DIAST BP <80 MM HG: CPT | Performed by: FAMILY MEDICINE

## 2024-09-03 PROCEDURE — 3051F HG A1C>EQUAL 7.0%<8.0%: CPT | Performed by: FAMILY MEDICINE

## 2024-09-03 PROCEDURE — 4004F PT TOBACCO SCREEN RCVD TLK: CPT | Performed by: FAMILY MEDICINE

## 2024-09-03 PROCEDURE — G0439 PPPS, SUBSEQ VISIT: HCPCS | Performed by: FAMILY MEDICINE

## 2024-09-03 PROCEDURE — 3074F SYST BP LT 130 MM HG: CPT | Performed by: FAMILY MEDICINE

## 2024-09-03 PROCEDURE — 3008F BODY MASS INDEX DOCD: CPT | Performed by: FAMILY MEDICINE

## 2024-09-03 RX ORDER — CITALOPRAM 40 MG/1
1.5 TABLET, FILM COATED ORAL
COMMUNITY
Start: 2024-02-17

## 2024-09-03 RX ORDER — LANCETS 33 GAUGE
EACH MISCELLANEOUS
COMMUNITY
Start: 2024-05-06

## 2024-09-03 RX ORDER — PANTOPRAZOLE SODIUM 40 MG/1
40 TABLET, DELAYED RELEASE ORAL DAILY
Qty: 90 TABLET | Refills: 3 | Status: SHIPPED | OUTPATIENT
Start: 2024-09-03

## 2024-09-03 RX ORDER — PREDNISONE 10 MG/1
TABLET ORAL
COMMUNITY
Start: 2024-08-19

## 2024-09-03 RX ORDER — ALBUTEROL SULFATE 90 UG/1
INHALANT RESPIRATORY (INHALATION)
COMMUNITY
Start: 2024-09-02

## 2024-09-03 RX ORDER — AMOXICILLIN 500 MG/1
1 CAPSULE ORAL
COMMUNITY
Start: 2024-08-19

## 2024-09-03 ASSESSMENT — ENCOUNTER SYMPTOMS
OCCASIONAL FEELINGS OF UNSTEADINESS: 0
LOSS OF SENSATION IN FEET: 0

## 2024-09-03 ASSESSMENT — ACTIVITIES OF DAILY LIVING (ADL)
DOING_HOUSEWORK: INDEPENDENT
DRESSING: INDEPENDENT
MANAGING_FINANCES: INDEPENDENT
TAKING_MEDICATION: INDEPENDENT
BATHING: INDEPENDENT
GROCERY_SHOPPING: INDEPENDENT

## 2024-09-03 ASSESSMENT — PATIENT HEALTH QUESTIONNAIRE - PHQ9
SUM OF ALL RESPONSES TO PHQ9 QUESTIONS 1 AND 2: 0
1. LITTLE INTEREST OR PLEASURE IN DOING THINGS: NOT AT ALL
2. FEELING DOWN, DEPRESSED OR HOPELESS: NOT AT ALL

## 2024-09-03 NOTE — PROGRESS NOTES
"Subjective   Reason for Visit: Stephenie Mccray is an 44 y.o. female here for a Medicare Wellness visit.     Past Medical, Surgical, and Family History reviewed and updated in chart.    Reviewed all medications by prescribing practitioner or clinical pharmacist (such as prescriptions, OTCs, herbal therapies and supplements) and documented in the medical record.    HPI    Patient Care Team:  Delta Yuen MD as PCP - General  Delta Yuen MD as PCP - [facility] [employer] PCP     Review of Systems    Objective   Vitals:  /60 (BP Location: Left arm)   Pulse 90   Temp 36.2 °C (97.2 °F)   Resp 16   Ht 1.575 m (5' 2\")   Wt 73.9 kg (163 lb)   SpO2 93%   BMI 29.81 kg/m²       Physical Exam    Assessment/Plan   Problem List Items Addressed This Visit             ICD-10-CM    Acid reflux K21.9    Relevant Medications    pantoprazole (ProtoNix) 40 mg EC tablet    Mixed hyperlipidemia E78.2    Relevant Orders    Lipid panel    Primary hypertension I10    Relevant Orders    Comprehensive metabolic panel    CBC and Auto Differential    Acquired hypothyroidism E03.9    Relevant Orders    Tsh With Reflex To Free T4 If Abnormal    Type 2 diabetes mellitus without complication, with long-term current use of insulin (Multi) E11.9, Z79.4    Relevant Orders    Hemoglobin A1c     Other Visit Diagnoses         Codes    Routine general medical examination at health care facility    -  Primary Z00.00    Relevant Orders    1 Year Follow Up In [facility] - PCP - Wellness Exam    Mass of soft tissue of shoulder     M79.89    Relevant Orders    MR shoulder right wo IV contrast                   "

## 2024-09-03 NOTE — PROGRESS NOTES
"Subjective   Patient ID: Stephenie Mccray is a 44 y.o. female who presents for Medicare Annual Wellness Visit Subsequent. I last saw the patient 4/10/2024    HPI     Patient is here for a AVW, she is with her mother in law today  She has a large fluid pocket on her right shoulder, it has audrey there for a few weeks, she says it sends nerve pains down to her hand, wants to inquire about draining it.    Past medical, surgical, and family history reviewed.  Reviewed and documented all medications   Pt eating well, exercising as tolerated and taking medications as directed      Review of Systems  Except positives as noted in the CC & HPI      Constitutional: Denies fevers, chills, night sweats, fatigue, weight changes, change in appetite    Eyes: Denies blurry vision, double vision    ENT: Denies otalgia, trouble hearing, tinnitus, vertigo, nasal congestion, rhinorrhea, sore throat    Neck: Denies swelling, masses    Cardiovascular: Denies chest pain, palpitations, edema, orthopnea, syncope    Respiratory: Denies dyspnea, cough, wheezing, postural nocturnal dyspnea    Gastrointestinal: Denies abdominal pain, nausea, vomiting, diarrhea, constipation, melena, hematochezia    Genitourinary: Denies dysuria, hematuria, frequency, urgency    Musculoskeletal: Denies back pain, neck pain, arthralgias, myalgias    Integumentary: Denies skin lesions, rashes, masses    Neurological: Denies dizziness, headaches, confusion, limb weakness, paresthesias, syncope, convulsions    Psychiatric: Denies depression, anxiety, homicidal ideations, suicidal ideations, sleep disturbances    Endocrine: Denies polyphagia, polydipsia, polyuria, weakness, hair thinning, heat intolerance, cold intolerance, weight changes    Heme/Lymph: Denies easy bruising, easy bleeding, swollen glands    Objective   /60 (BP Location: Left arm)   Pulse 90   Temp 36.2 °C (97.2 °F)   Resp 16   Ht 1.575 m (5' 2\")   Wt 73.9 kg (163 lb)   SpO2 93%   BMI " 29.81 kg/m²     Physical Exam    Gen. Appearance - well-developed, well-nourished, 44 y.o., White female in no acute distress.     Skin - warm, pink and dry without rash or concerning lesions.    Mental Status - alert and oriented times 3. Normal mood and affect appropriate to mood.     Neck - supple without lymphadenopathy. Carotid pulses are normal without bruits. Thyroid is normal in midline without nodules.    Chest - lungs are clear to auscultation without rales, rhonchi. Diminished breath sounds at the bases bilaterally. Scattered faint expiratory wheezes bilaterally.    Heart - regular, rate, and rhythm without murmurs, rubs or gallops.      Extremities - no cyanosis, clubbing or edema Pedal pulses are 2+ normal at the dorsalis pedis and posterior tibial pulses bilaterally.     Right shoulder - Full range of motion with normal motor strength. No pain to palpation and a large fluctuant mobile mass involving the lateral and posterior aspect of the shoulder.    Neurological - cranial nerves II through XII are grossly intact. Motor strength 5/5 at all fours.     Assessment/Plan   1. Routine general medical examination at health care facility  1 Year Follow Up In [facility] - PCP - Wellness Exam      2. Mass of soft tissue of shoulder  MR shoulder right wo IV contrast      3. Acquired hypothyroidism  Tsh With Reflex To Free T4 If Abnormal      4. Type 2 diabetes mellitus without complication, with long-term current use of insulin (Multi)  Hemoglobin A1c      5. Primary hypertension  Comprehensive metabolic panel    CBC and Auto Differential      6. Mixed hyperlipidemia  Lipid panel      7. Gastroesophageal reflux disease, unspecified whether esophagitis present  pantoprazole (ProtoNix) 40 mg EC tablet        Patient will continue on a diabetic, low-cholesterol diet and weight reduction. Exercise as tolerated. Patient will continue medications as prescribed. Follow-up in 3 month(s) otherwise as needed.        Patient is to return for fasting CBC, CMP, Lipid profile, TSH with reflex free T4 if abnormal, A1c labs at their convenience prior to their next appointment. Fasting is nothing to eat or drink except water or black coffee for 8-12 hours. Will call patient with results when available.     Ordered MR shoulder right wo IV contrast for Mass of soft tissue of shoulder . Will call patient with results when available.      Patient was started on:   Pantoprazole Sodium EC 40 mg, TAKE 1 TAB BY MOUTH DAILY     Rx(s) sent to pharmacy.      Scribe Attestation  By signing my name below, IDahlia , Kunal   attest that this documentation has been prepared under the direction and in the presence of Delta Yuen MD.

## 2024-09-06 ENCOUNTER — TELEPHONE (OUTPATIENT)
Dept: PRIMARY CARE | Facility: CLINIC | Age: 44
End: 2024-09-06

## 2024-09-06 NOTE — TELEPHONE ENCOUNTER
We received a request for prior authorization on the patient's pantoprazole from their pharmacy. Prior authorization was submitted to insurance today. We will await their determination.   
(3) occasionally moist

## 2024-09-09 ENCOUNTER — TELEPHONE (OUTPATIENT)
Dept: PRIMARY CARE | Facility: CLINIC | Age: 44
End: 2024-09-09
Payer: COMMERCIAL

## 2024-09-09 DIAGNOSIS — M79.89 MASS OF SOFT TISSUE OF SHOULDER: Primary | ICD-10-CM

## 2024-09-09 NOTE — TELEPHONE ENCOUNTER
PER MARIANNE THE AUTHORIZATION FOR MRI SHOULDER HAS BEEN DENIED. SUPPORTING OFFICE NOTE AND CLINICALS WERE SUBMITTED AT THE TIME OF AUTH REQUEST.     REASON FOR DENIAL    - THE RESULTS OF YOUR COMPLETE HISTORY AND EXAM MUST SUPPORT THIS TYPE OF IMAGING. THE NOTES SHOULD INCLUDE DETAILS ABOUT YOUR MASS SUCH AS     - WHERE IT IS LOCATED     - THE SIZE OF IT     - HOW LONG HAS IT BEEN PRESENT     WE NEED TO SEE RESULTS OF AN XRAY TAKEN AFTER YOUR SYMPTOMS STARTED THAT SUPPORT FURTHER IMAGING.     CAN APPEAL AND REQUEST PEER TO PEER     PLEASE ADVISE ON NEXT STEPS

## 2024-09-11 ENCOUNTER — HOSPITAL ENCOUNTER (OUTPATIENT)
Dept: RADIOLOGY | Facility: HOSPITAL | Age: 44
Discharge: HOME | End: 2024-09-11
Payer: COMMERCIAL

## 2024-09-11 DIAGNOSIS — M79.89 MASS OF SOFT TISSUE OF SHOULDER: ICD-10-CM

## 2024-09-11 PROCEDURE — 73030 X-RAY EXAM OF SHOULDER: CPT | Mod: RIGHT SIDE | Performed by: RADIOLOGY

## 2024-09-11 PROCEDURE — 73030 X-RAY EXAM OF SHOULDER: CPT | Mod: RT

## 2024-09-17 ENCOUNTER — APPOINTMENT (OUTPATIENT)
Dept: RADIOLOGY | Facility: HOSPITAL | Age: 44
End: 2024-09-17
Payer: COMMERCIAL

## 2024-09-18 DIAGNOSIS — E03.9 HYPOTHYROIDISM, UNSPECIFIED TYPE: ICD-10-CM

## 2024-09-18 RX ORDER — LEVOTHYROXINE SODIUM 150 UG/1
150 TABLET ORAL DAILY
Qty: 90 TABLET | Refills: 1 | Status: SHIPPED | OUTPATIENT
Start: 2024-09-18

## 2024-09-18 NOTE — TELEPHONE ENCOUNTER
Rx Refill Request Telephone Encounter    Name:  Stephenie Mccray  :  [id number]  Medication Name:  levothyroxine (Synthroid, Levoxyl) 150 mcg   Specific Pharmacy location:  [facility]   Date of last appointment:  9/3  Date of next appointment:  9/3/2025  Best number to reach patient:  [phone]

## 2024-09-24 ENCOUNTER — HOSPITAL ENCOUNTER (OUTPATIENT)
Dept: RADIOLOGY | Facility: HOSPITAL | Age: 44
Discharge: HOME | End: 2024-09-24
Payer: COMMERCIAL

## 2024-09-24 DIAGNOSIS — M79.89 MASS OF SOFT TISSUE OF SHOULDER: ICD-10-CM

## 2024-09-24 PROCEDURE — 73221 MRI JOINT UPR EXTREM W/O DYE: CPT | Mod: RIGHT SIDE | Performed by: RADIOLOGY

## 2024-09-24 PROCEDURE — 73221 MRI JOINT UPR EXTREM W/O DYE: CPT | Mod: RT

## 2024-09-25 ENCOUNTER — APPOINTMENT (OUTPATIENT)
Dept: PHYSICAL THERAPY | Facility: CLINIC | Age: 44
End: 2024-09-25
Payer: COMMERCIAL

## 2024-09-25 DIAGNOSIS — J44.1 CHRONIC OBSTRUCTIVE PULMONARY DISEASE WITH ACUTE EXACERBATION (MULTI): ICD-10-CM

## 2024-09-25 RX ORDER — IPRATROPIUM BROMIDE AND ALBUTEROL SULFATE 2.5; .5 MG/3ML; MG/3ML
3 SOLUTION RESPIRATORY (INHALATION) EVERY 6 HOURS PRN
Qty: 180 ML | Refills: 1 | Status: SHIPPED | OUTPATIENT
Start: 2024-09-25 | End: 2024-10-25

## 2024-09-25 NOTE — TELEPHONE ENCOUNTER
Rx Refill Request Telephone Encounter    Name:  Stephenie Mccray  :  [id number]  Medication Name:  ipratropium-albuteroL (Duo-Neb) 0.5-2.5 mg/3 mL nebulizer solution   Specific Pharmacy location:  [facility]   Date of last appointment:  2024  Date of next appointment:  9/3/2025  Best number to reach patient:  [phone]

## 2024-09-29 DIAGNOSIS — M79.89 MASS OF SOFT TISSUE OF SHOULDER: Primary | ICD-10-CM

## 2024-09-29 NOTE — RESULT ENCOUNTER NOTE
Please call the patient regarding her abnormal result.  Right shoulder soft tissue MRI did reveal a lipomatous mass in the subcutaneous tissues of the posterolateral to lateral right shoulder.  There is at least 1 location in the posterior aspect of the mass which may resent a component of atypical features indicative of atypical lipomatous neoplasm.  Referral to orthopedic oncology is recommended and a referral was placed to Dr. Vigil.

## 2024-10-02 ENCOUNTER — LAB (OUTPATIENT)
Dept: LAB | Facility: LAB | Age: 44
End: 2024-10-02
Payer: COMMERCIAL

## 2024-10-02 DIAGNOSIS — I10 PRIMARY HYPERTENSION: ICD-10-CM

## 2024-10-02 DIAGNOSIS — Z79.4 TYPE 2 DIABETES MELLITUS WITHOUT COMPLICATION, WITH LONG-TERM CURRENT USE OF INSULIN (MULTI): ICD-10-CM

## 2024-10-02 DIAGNOSIS — E78.2 MIXED HYPERLIPIDEMIA: ICD-10-CM

## 2024-10-02 DIAGNOSIS — E03.9 ACQUIRED HYPOTHYROIDISM: ICD-10-CM

## 2024-10-02 DIAGNOSIS — E11.9 TYPE 2 DIABETES MELLITUS WITHOUT COMPLICATION, WITH LONG-TERM CURRENT USE OF INSULIN (MULTI): ICD-10-CM

## 2024-10-02 LAB
ALBUMIN SERPL BCP-MCNC: 3.8 G/DL (ref 3.4–5)
ALP SERPL-CCNC: 68 U/L (ref 33–110)
ALT SERPL W P-5'-P-CCNC: 11 U/L (ref 7–45)
ANION GAP SERPL CALC-SCNC: 9 MMOL/L (ref 10–20)
AST SERPL W P-5'-P-CCNC: 9 U/L (ref 9–39)
BASOPHILS # BLD MANUAL: 0.11 X10*3/UL (ref 0–0.1)
BASOPHILS NFR BLD MANUAL: 1 %
BILIRUB SERPL-MCNC: 0.4 MG/DL (ref 0–1.2)
BUN SERPL-MCNC: 15 MG/DL (ref 6–23)
CALCIUM SERPL-MCNC: 9.3 MG/DL (ref 8.6–10.3)
CHLORIDE SERPL-SCNC: 103 MMOL/L (ref 98–107)
CHOLEST SERPL-MCNC: 165 MG/DL (ref 0–199)
CHOLESTEROL/HDL RATIO: 3.6
CO2 SERPL-SCNC: 39 MMOL/L (ref 21–32)
CREAT SERPL-MCNC: 0.78 MG/DL (ref 0.5–1.05)
EGFRCR SERPLBLD CKD-EPI 2021: >90 ML/MIN/1.73M*2
EOSINOPHIL # BLD MANUAL: 0.11 X10*3/UL (ref 0–0.7)
EOSINOPHIL NFR BLD MANUAL: 1 %
ERYTHROCYTE [DISTWIDTH] IN BLOOD BY AUTOMATED COUNT: 17.4 % (ref 11.5–14.5)
EST. AVERAGE GLUCOSE BLD GHB EST-MCNC: 157 MG/DL
GLUCOSE SERPL-MCNC: 96 MG/DL (ref 74–99)
HBA1C MFR BLD: 7.1 %
HCT VFR BLD AUTO: 42 % (ref 36–46)
HDLC SERPL-MCNC: 45.8 MG/DL
HGB BLD-MCNC: 12.7 G/DL (ref 12–16)
IMM GRANULOCYTES # BLD AUTO: 0.69 X10*3/UL (ref 0–0.7)
IMM GRANULOCYTES NFR BLD AUTO: 6.6 % (ref 0–0.9)
LDLC SERPL CALC-MCNC: 88 MG/DL
LYMPHOCYTES # BLD MANUAL: 3.57 X10*3/UL (ref 1.2–4.8)
LYMPHOCYTES NFR BLD MANUAL: 34 %
MCH RBC QN AUTO: 30.7 PG (ref 26–34)
MCHC RBC AUTO-ENTMCNC: 30.2 G/DL (ref 32–36)
MCV RBC AUTO: 101 FL (ref 80–100)
METAMYELOCYTES # BLD MANUAL: 0.11 X10*3/UL
METAMYELOCYTES NFR BLD MANUAL: 1 %
MONOCYTES # BLD MANUAL: 0.42 X10*3/UL (ref 0.1–1)
MONOCYTES NFR BLD MANUAL: 4 %
MYELOCYTES # BLD MANUAL: 0.11 X10*3/UL
MYELOCYTES NFR BLD MANUAL: 1 %
NEUTROPHILS # BLD MANUAL: 5.67 X10*3/UL (ref 1.2–7.7)
NEUTS BAND # BLD MANUAL: 0.42 X10*3/UL (ref 0–0.7)
NEUTS BAND NFR BLD MANUAL: 4 %
NEUTS SEG # BLD MANUAL: 5.25 X10*3/UL (ref 1.2–7)
NEUTS SEG NFR BLD MANUAL: 50 %
NON HDL CHOLESTEROL: 119 MG/DL (ref 0–149)
NRBC BLD-RTO: 1.3 /100 WBCS (ref 0–0)
PLATELET # BLD AUTO: 160 X10*3/UL (ref 150–450)
POLYCHROMASIA BLD QL SMEAR: ABNORMAL
POTASSIUM SERPL-SCNC: 4 MMOL/L (ref 3.5–5.3)
PROT SERPL-MCNC: 6.1 G/DL (ref 6.4–8.2)
RBC # BLD AUTO: 4.14 X10*6/UL (ref 4–5.2)
RBC MORPH BLD: ABNORMAL
SODIUM SERPL-SCNC: 147 MMOL/L (ref 136–145)
T4 FREE SERPL-MCNC: 1 NG/DL (ref 0.61–1.12)
TOTAL CELLS COUNTED BLD: 100
TRIGL SERPL-MCNC: 157 MG/DL (ref 0–149)
TSH SERPL-ACNC: 4.84 MIU/L (ref 0.44–3.98)
VARIANT LYMPHS # BLD MANUAL: 0.42 X10*3/UL (ref 0–0.5)
VARIANT LYMPHS NFR BLD: 4 %
VLDL: 31 MG/DL (ref 0–40)
WBC # BLD AUTO: 10.5 X10*3/UL (ref 4.4–11.3)

## 2024-10-02 PROCEDURE — 84443 ASSAY THYROID STIM HORMONE: CPT

## 2024-10-02 PROCEDURE — 85027 COMPLETE CBC AUTOMATED: CPT

## 2024-10-02 PROCEDURE — 85007 BL SMEAR W/DIFF WBC COUNT: CPT

## 2024-10-02 PROCEDURE — 80061 LIPID PANEL: CPT

## 2024-10-02 PROCEDURE — 84439 ASSAY OF FREE THYROXINE: CPT

## 2024-10-02 PROCEDURE — 36415 COLL VENOUS BLD VENIPUNCTURE: CPT

## 2024-10-02 PROCEDURE — 80053 COMPREHEN METABOLIC PANEL: CPT

## 2024-10-02 PROCEDURE — 83036 HEMOGLOBIN GLYCOSYLATED A1C: CPT

## 2024-10-05 DIAGNOSIS — E03.9 HYPOTHYROIDISM, UNSPECIFIED TYPE: ICD-10-CM

## 2024-10-05 RX ORDER — LEVOTHYROXINE SODIUM 150 UG/1
150 TABLET ORAL DAILY
Qty: 90 TABLET | Refills: 1 | Status: SHIPPED | OUTPATIENT
Start: 2024-10-05

## 2024-10-10 ENCOUNTER — TELEPHONE (OUTPATIENT)
Dept: PRIMARY CARE | Facility: CLINIC | Age: 44
End: 2024-10-10
Payer: COMMERCIAL

## 2024-10-10 DIAGNOSIS — E03.9 HYPOTHYROIDISM, UNSPECIFIED TYPE: ICD-10-CM

## 2024-10-10 RX ORDER — LEVOTHYROXINE SODIUM 175 UG/1
175 TABLET ORAL DAILY
Qty: 90 TABLET | Refills: 3 | Status: SHIPPED | OUTPATIENT
Start: 2024-10-10

## 2024-10-10 NOTE — TELEPHONE ENCOUNTER
Patient called in stating she had new thyroid labs done and was told she needed to increase her synthroid from 150 mcg to 175 mcg but 150 mcg was called into the pharmacy. Patient is wondering when the proper dosage will be called in as she has been out of medication for a couple days. Please advise.

## 2024-10-17 ENCOUNTER — APPOINTMENT (OUTPATIENT)
Dept: ORTHOPEDIC SURGERY | Facility: CLINIC | Age: 44
End: 2024-10-17
Payer: COMMERCIAL

## 2024-10-30 ENCOUNTER — APPOINTMENT (OUTPATIENT)
Dept: SURGERY | Facility: CLINIC | Age: 44
End: 2024-10-30
Payer: COMMERCIAL

## 2024-10-30 ENCOUNTER — OFFICE VISIT (OUTPATIENT)
Dept: SURGERY | Facility: CLINIC | Age: 44
End: 2024-10-30
Payer: COMMERCIAL

## 2024-10-30 VITALS
BODY MASS INDEX: 29.81 KG/M2 | OXYGEN SATURATION: 92 % | WEIGHT: 163 LBS | SYSTOLIC BLOOD PRESSURE: 119 MMHG | TEMPERATURE: 97.4 F | DIASTOLIC BLOOD PRESSURE: 79 MMHG | HEART RATE: 104 BPM

## 2024-10-30 DIAGNOSIS — M62.89 MASS OF MUSCLE OF RIGHT UPPER EXTREMITY: Primary | ICD-10-CM

## 2024-10-30 PROCEDURE — 99203 OFFICE O/P NEW LOW 30 MIN: CPT | Performed by: SURGERY

## 2024-10-30 PROCEDURE — 3048F LDL-C <100 MG/DL: CPT | Performed by: SURGERY

## 2024-10-30 PROCEDURE — 3078F DIAST BP <80 MM HG: CPT | Performed by: SURGERY

## 2024-10-30 PROCEDURE — 3074F SYST BP LT 130 MM HG: CPT | Performed by: SURGERY

## 2024-10-30 PROCEDURE — 3051F HG A1C>EQUAL 7.0%<8.0%: CPT | Performed by: SURGERY

## 2024-10-30 ASSESSMENT — PAIN SCALES - GENERAL: PAINLEVEL_OUTOF10: 0-NO PAIN

## 2024-11-04 NOTE — H&P (VIEW-ONLY)
Assessment and Plan:  Ms. Mccray is a 44-year-old woman with a lipoma on her right shoulder that is of significant size and has an area of signal abnormality on MRI.  I recommended marginal excision at [place] and she would like to proceed to soon as possible.  We discussed smoking cessation and I am overall worried about her COPD and health related to smoking but I think we can proceed with surgery.  I did acknowledge that she is at high risk for wound issues and she is aware of that.    I spent 60 minutes in the professional and overall care of this patient.    Niels Yanes MD  Assistant Professor of Surgery  Division of Surgical Oncology  [phone]  [email or web address]      History Of Present Illness  Stephenie Mccray is a 44 y.o. female referred by Alvin Gonzalez for right shoulder lipomatous mass.    The patient reports a soft fatty mass growing on her right shoulder over the past couple of months as well as some jolting symptoms in her hand.  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  Of note she has a significant history for COPD, still smoking a pack a day, as well as blood clots.    All other systems have been reviewed and are negative except as noted in the HPI.    I personally reviewed all necessary laboratory results, pathology reports, and radiologic images for this patient.    Past Medical History  She has a past medical history of Acute on chronic respiratory failure (Multi), Arthritis, COPD (chronic obstructive pulmonary disease) (Multi), Diabetes mellitus (Multi), GERD (gastroesophageal reflux disease), History of transfusion, History of venous thromboembolism, Hyperlipidemia, Hypertension, Hypothyroidism, Lipoma, Nephrolithiasis, CHARLIE (obstructive sleep apnea), Ovarian teratoma, and Schizophrenia.    Surgical History  She has a past surgical  history that includes Tonsillectomy; Eye surgery;  section, low transverse; Ventral hernia repair; and Umbilical hernia repair.     Social History  She reports that she has been smoking cigarettes. She has been exposed to tobacco smoke. She has never used smokeless tobacco. She reports current drug use. Drug: Marijuana. She reports that she does not drink alcohol.    Family History  Family History   Problem Relation Name Age of Onset    Fibromyalgia Father      Hypertension Brother      Thyroid disease Maternal Grandmother      Thyroid disease Paternal Grandmother      Lung cancer Paternal Grandfather      Coronary artery disease Other Grandmother         Allergies  Fluticasone furoate-vilanterol, Fluticasone-umeclidin-vilanter, Levofloxacin, Sumatriptan, and Vortioxetine     Last Recorded Vitals  Blood pressure 121/73, pulse 98, temperature 36.3 °C (97.3 °F), temperature source Temporal, resp. rate 18, weight 70.9 kg (156 lb 4.8 oz), SpO2 93%.    Physical Exam  General: no acute distress, well-nourished  Eyes: intact EOM, no scleral icterus  ENT: hearing intact, no drainage  Respiratory: symmetric chest rise, no cough  Cardiovascular: intact distal pulses, no pitting edema  Abdominal: soft, nontender, nondistended  Musculoskeletal: large fatty mass posterior/lateral right shoulder  Integumentary: warm, dry, no lymphadenopathy  Neuro: no focal deficits, sensation intact  Psych: normal mood and affect       Relevant Results  Interpreted By:  Niels Caicedo,   STUDY:  MRI of the right shoulder  without intravenous contrast date  2024.      INDICATION:  Signs/Symptoms:right shoulder soft tissue mass      COMPARISON:  None.      ACCESSION NUMBER(S):  [id number]      ORDERING CLINICIAN:  MICHELLE MOSCOSO      TECHNIQUE:  Multiplanar multisequence MRI of the right shoulder was performed  without intra-articular or intravenous contrast.      FINDINGS:  MUSCLES AND TENDONS:      The supraspinatus tendon is  intact.  The infraspinatus tendon is  intact.  The teres minor tendon is intact.  The subscapularis tendon  is intact.  The tendon of the long head of the biceps is intact and  is seated within the bicipital groove distally.  No significant  rotator cuff muscle atrophy is evident.  No mass is evident and the  suprascapular or spinoglenoid notch or the quadrangular space.      OSSEOUS STRUCTURES AND JOINTS:      No displaced glenoid labral tear is evident. There is  no significant  degenerative change of the glenohumeral joint. There is  no  significant degenerative change of the acromioclavicular joint.   The  acromion is  type II  without significant lateral downsloping.  There is no shoulder joint effusion.      No fracture or dislocation is evident. Cystic changes are seen at the  greater tubercle of the humerus. Bone marrow signal intensity is  otherwise within normal limits.      SOFT TISSUES:      There is no significant volume of fluid in the subacromial subdeltoid  bursa.  There is no significant volume of fluid in the subcoracoid  bursa. In the subcutaneous tissues the shoulder there is a  multilobulated mass. It measures up to approximately 6.4 x 13.2 x  13.3 cm. It is incompletely included and assessed in the field of  view in the axial plane. Overall the internal intensity appears to be  predominantly fat signal intensity on all pulse sequences. There are  some tortuous prominent blood vessels in the anterior aspect of the  mass. Some scattered blood vessels are seen coursing (Series 5, Image  24) through other parts of the mass. In the posterior aspect the mass  there is 1 area of intermediate T1 and increased T2 signal intensity  measuring up to approximately 0.6 x 1 x 0.6 cm. The mass at least  abuts the superficial myofascial plane of the deltoid with some  scalloping of the underlying deltoid muscle belly.      IMPRESSION:  Lipomatous mass in the subcutaneous tissues of the posterolateral  to  lateral right shoulder. There is at least 1 location in the posterior  aspect of the mass which may represent a component of atypical  feature indicative of atypical lipomatous neoplasm. Consultation with  Orthopedic Oncology is recommended.

## 2024-11-04 NOTE — PROGRESS NOTES
Assessment and Plan:  Ms. Mccray is a 44-year-old woman with a lipoma on her right shoulder that is of significant size and has an area of signal abnormality on MRI.  I recommended marginal excision at [place] and she would like to proceed to soon as possible.  We discussed smoking cessation and I am overall worried about her COPD and health related to smoking but I think we can proceed with surgery.  I did acknowledge that she is at high risk for wound issues and she is aware of that.    I spent 60 minutes in the professional and overall care of this patient.    Niels Yanes MD  Assistant Professor of Surgery  Division of Surgical Oncology  [phone]  [email or web address]      History Of Present Illness  Stephenie Mccray is a 44 y.o. female referred by Alvin Gonzalez for right shoulder lipomatous mass.    The patient reports a soft fatty mass growing on her right shoulder over the past couple of months as well as some jolting symptoms in her hand.  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  Of note she has a significant history for COPD, still smoking a pack a day, as well as blood clots.    All other systems have been reviewed and are negative except as noted in the HPI.    I personally reviewed all necessary laboratory results, pathology reports, and radiologic images for this patient.    Past Medical History  She has a past medical history of Acute on chronic respiratory failure (Multi), Arthritis, COPD (chronic obstructive pulmonary disease) (Multi), Diabetes mellitus (Multi), GERD (gastroesophageal reflux disease), History of transfusion, History of venous thromboembolism, Hyperlipidemia, Hypertension, Hypothyroidism, Lipoma, Nephrolithiasis, CHARLIE (obstructive sleep apnea), Ovarian teratoma, and Schizophrenia.    Surgical History  She has a past surgical  history that includes Tonsillectomy; Eye surgery;  section, low transverse; Ventral hernia repair; and Umbilical hernia repair.     Social History  She reports that she has been smoking cigarettes. She has been exposed to tobacco smoke. She has never used smokeless tobacco. She reports current drug use. Drug: Marijuana. She reports that she does not drink alcohol.    Family History  Family History   Problem Relation Name Age of Onset    Fibromyalgia Father      Hypertension Brother      Thyroid disease Maternal Grandmother      Thyroid disease Paternal Grandmother      Lung cancer Paternal Grandfather      Coronary artery disease Other Grandmother         Allergies  Fluticasone furoate-vilanterol, Fluticasone-umeclidin-vilanter, Levofloxacin, Sumatriptan, and Vortioxetine     Last Recorded Vitals  Blood pressure 121/73, pulse 98, temperature 36.3 °C (97.3 °F), temperature source Temporal, resp. rate 18, weight 70.9 kg (156 lb 4.8 oz), SpO2 93%.    Physical Exam  General: no acute distress, well-nourished  Eyes: intact EOM, no scleral icterus  ENT: hearing intact, no drainage  Respiratory: symmetric chest rise, no cough  Cardiovascular: intact distal pulses, no pitting edema  Abdominal: soft, nontender, nondistended  Musculoskeletal: large fatty mass posterior/lateral right shoulder  Integumentary: warm, dry, no lymphadenopathy  Neuro: no focal deficits, sensation intact  Psych: normal mood and affect       Relevant Results  Interpreted By:  Niels Caicedo,   STUDY:  MRI of the right shoulder  without intravenous contrast date  2024.      INDICATION:  Signs/Symptoms:right shoulder soft tissue mass      COMPARISON:  None.      ACCESSION NUMBER(S):  [id number]      ORDERING CLINICIAN:  MICHELLE MOSCOSO      TECHNIQUE:  Multiplanar multisequence MRI of the right shoulder was performed  without intra-articular or intravenous contrast.      FINDINGS:  MUSCLES AND TENDONS:      The supraspinatus tendon is  intact.  The infraspinatus tendon is  intact.  The teres minor tendon is intact.  The subscapularis tendon  is intact.  The tendon of the long head of the biceps is intact and  is seated within the bicipital groove distally.  No significant  rotator cuff muscle atrophy is evident.  No mass is evident and the  suprascapular or spinoglenoid notch or the quadrangular space.      OSSEOUS STRUCTURES AND JOINTS:      No displaced glenoid labral tear is evident. There is  no significant  degenerative change of the glenohumeral joint. There is  no  significant degenerative change of the acromioclavicular joint.   The  acromion is  type II  without significant lateral downsloping.  There is no shoulder joint effusion.      No fracture or dislocation is evident. Cystic changes are seen at the  greater tubercle of the humerus. Bone marrow signal intensity is  otherwise within normal limits.      SOFT TISSUES:      There is no significant volume of fluid in the subacromial subdeltoid  bursa.  There is no significant volume of fluid in the subcoracoid  bursa. In the subcutaneous tissues the shoulder there is a  multilobulated mass. It measures up to approximately 6.4 x 13.2 x  13.3 cm. It is incompletely included and assessed in the field of  view in the axial plane. Overall the internal intensity appears to be  predominantly fat signal intensity on all pulse sequences. There are  some tortuous prominent blood vessels in the anterior aspect of the  mass. Some scattered blood vessels are seen coursing (Series 5, Image  24) through other parts of the mass. In the posterior aspect the mass  there is 1 area of intermediate T1 and increased T2 signal intensity  measuring up to approximately 0.6 x 1 x 0.6 cm. The mass at least  abuts the superficial myofascial plane of the deltoid with some  scalloping of the underlying deltoid muscle belly.      IMPRESSION:  Lipomatous mass in the subcutaneous tissues of the posterolateral  to  lateral right shoulder. There is at least 1 location in the posterior  aspect of the mass which may represent a component of atypical  feature indicative of atypical lipomatous neoplasm. Consultation with  Orthopedic Oncology is recommended.

## 2024-11-05 ENCOUNTER — OFFICE VISIT (OUTPATIENT)
Dept: SURGICAL ONCOLOGY | Facility: CLINIC | Age: 44
End: 2024-11-05
Payer: COMMERCIAL

## 2024-11-05 VITALS
BODY MASS INDEX: 28.59 KG/M2 | OXYGEN SATURATION: 93 % | TEMPERATURE: 97.3 F | DIASTOLIC BLOOD PRESSURE: 73 MMHG | RESPIRATION RATE: 18 BRPM | SYSTOLIC BLOOD PRESSURE: 121 MMHG | HEART RATE: 98 BPM | WEIGHT: 156.3 LBS

## 2024-11-05 DIAGNOSIS — M62.89 MASS OF MUSCLE OF RIGHT UPPER EXTREMITY: Primary | ICD-10-CM

## 2024-11-05 PROCEDURE — 3074F SYST BP LT 130 MM HG: CPT | Performed by: STUDENT IN AN ORGANIZED HEALTH CARE EDUCATION/TRAINING PROGRAM

## 2024-11-05 PROCEDURE — 3051F HG A1C>EQUAL 7.0%<8.0%: CPT | Performed by: STUDENT IN AN ORGANIZED HEALTH CARE EDUCATION/TRAINING PROGRAM

## 2024-11-05 PROCEDURE — 99204 OFFICE O/P NEW MOD 45 MIN: CPT | Performed by: STUDENT IN AN ORGANIZED HEALTH CARE EDUCATION/TRAINING PROGRAM

## 2024-11-05 PROCEDURE — 3078F DIAST BP <80 MM HG: CPT | Performed by: STUDENT IN AN ORGANIZED HEALTH CARE EDUCATION/TRAINING PROGRAM

## 2024-11-05 PROCEDURE — 99214 OFFICE O/P EST MOD 30 MIN: CPT | Mod: 57 | Performed by: STUDENT IN AN ORGANIZED HEALTH CARE EDUCATION/TRAINING PROGRAM

## 2024-11-05 PROCEDURE — 4004F PT TOBACCO SCREEN RCVD TLK: CPT | Performed by: STUDENT IN AN ORGANIZED HEALTH CARE EDUCATION/TRAINING PROGRAM

## 2024-11-05 PROCEDURE — 3048F LDL-C <100 MG/DL: CPT | Performed by: STUDENT IN AN ORGANIZED HEALTH CARE EDUCATION/TRAINING PROGRAM

## 2024-11-05 RX ORDER — CEFAZOLIN SODIUM 2 G/100ML
2 INJECTION, SOLUTION INTRAVENOUS ONCE
OUTPATIENT
Start: 2024-11-05 | End: 2024-11-05

## 2024-11-05 ASSESSMENT — PAIN SCALES - GENERAL: PAINLEVEL_OUTOF10: 0-NO PAIN

## 2024-11-05 ASSESSMENT — ENCOUNTER SYMPTOMS
OCCASIONAL FEELINGS OF UNSTEADINESS: 0
DEPRESSION: 0
LOSS OF SENSATION IN FEET: 0

## 2024-11-05 ASSESSMENT — COLUMBIA-SUICIDE SEVERITY RATING SCALE - C-SSRS
6. HAVE YOU EVER DONE ANYTHING, STARTED TO DO ANYTHING, OR PREPARED TO DO ANYTHING TO END YOUR LIFE?: NO
1. IN THE PAST MONTH, HAVE YOU WISHED YOU WERE DEAD OR WISHED YOU COULD GO TO SLEEP AND NOT WAKE UP?: NO
2. HAVE YOU ACTUALLY HAD ANY THOUGHTS OF KILLING YOURSELF?: NO

## 2024-11-05 NOTE — LETTER
November 5, 2024     Alvin MARTINS MD  [address]  [address]  [facility] [address]    Patient: Stephenie Mccray   YOB: 1980   Date of Visit: 11/5/2024       Dear Dr. Alvin MARTINS MD:    Thank you for referring Stephenie Mccray to me for evaluation. Below are my notes for this consultation.  If you have questions, please do not hesitate to call me. I look forward to following your patient along with you.       Sincerely,     Niels Yanes MD      CC: No Recipients  ______________________________________________________________________________________    Assessment and Plan:  Ms. Mccray is a 44-year-old woman with a lipoma on her right shoulder that is of significant size and has an area of signal abnormality on MRI.  I recommended marginal excision at [facility] and she would like to proceed to soon as possible.  We discussed smoking cessation and I am overall worried about her COPD and health related to smoking but I think we can proceed with surgery.  I did acknowledge that she is at high risk for wound issues and she is aware of that.    I spent 60 minutes in the professional and overall care of this patient.    Niels Yanes MD  Assistant Professor of Surgery  Division of Surgical Oncology  [phone]  [email or web address]      History Of Present Illness  Stephenie Mccray is a 44 y.o. female referred by Alvin Gonzalez for right shoulder lipomatous mass.    The patient reports a soft fatty mass growing on her right shoulder over the past couple of months as well as some jolting symptoms in her hand.  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  Of note she has a significant history for COPD, still smoking a pack a day, as well as blood clots.    All other systems have been reviewed and are negative except as noted in the HPI.    I personally reviewed  all necessary laboratory results, pathology reports, and radiologic images for this patient.    Past Medical History  She has a past medical history of Acute on chronic respiratory failure (Multi), Arthritis, COPD (chronic obstructive pulmonary disease) (Multi), Diabetes mellitus (Multi), GERD (gastroesophageal reflux disease), History of transfusion, History of venous thromboembolism, Hyperlipidemia, Hypertension, Hypothyroidism, Lipoma, Nephrolithiasis, CHARLIE (obstructive sleep apnea), Ovarian teratoma, and Schizophrenia.    Surgical History  She has a past surgical history that includes Tonsillectomy; Eye surgery;  section, low transverse; Ventral hernia repair; and Umbilical hernia repair.     Social History  She reports that she has been smoking cigarettes. She has been exposed to tobacco smoke. She has never used smokeless tobacco. She reports current drug use. Drug: Marijuana. She reports that she does not drink alcohol.    Family History  Family History   Problem Relation Name Age of Onset   • Fibromyalgia Father     • Hypertension Brother     • Thyroid disease Maternal Grandmother     • Thyroid disease Paternal Grandmother     • Lung cancer Paternal Grandfather     • Coronary artery disease Other Grandmother         Allergies  Fluticasone furoate-vilanterol, Fluticasone-umeclidin-vilanter, Levofloxacin, Sumatriptan, and Vortioxetine     Last Recorded Vitals  Blood pressure 121/73, pulse 98, temperature 36.3 °C (97.3 °F), temperature source Temporal, resp. rate 18, weight 70.9 kg (156 lb 4.8 oz), SpO2 93%.    Physical Exam  General: no acute distress, well-nourished  Eyes: intact EOM, no scleral icterus  ENT: hearing intact, no drainage  Respiratory: symmetric chest rise, no cough  Cardiovascular: intact distal pulses, no pitting edema  Abdominal: soft, nontender, nondistended  Musculoskeletal: large fatty mass posterior/lateral right shoulder  Integumentary: warm, dry, no lymphadenopathy  Neuro: no  focal deficits, sensation intact  Psych: normal mood and affect       Relevant Results  Interpreted By:  Niels Caicedo,   STUDY:  MRI of the right shoulder  without intravenous contrast date  9/24/2024.      INDICATION:  Signs/Symptoms:right shoulder soft tissue mass      COMPARISON:  None.      ACCESSION NUMBER(S):  [id number]      ORDERING CLINICIAN:  MICHELLE MOSCOSO      TECHNIQUE:  Multiplanar multisequence MRI of the right shoulder was performed  without intra-articular or intravenous contrast.      FINDINGS:  MUSCLES AND TENDONS:      The supraspinatus tendon is intact.  The infraspinatus tendon is  intact.  The teres minor tendon is intact.  The subscapularis tendon  is intact.  The tendon of the long head of the biceps is intact and  is seated within the bicipital groove distally.  No significant  rotator cuff muscle atrophy is evident.  No mass is evident and the  suprascapular or spinoglenoid notch or the quadrangular space.      OSSEOUS STRUCTURES AND JOINTS:      No displaced glenoid labral tear is evident. There is  no significant  degenerative change of the glenohumeral joint. There is  no  significant degenerative change of the acromioclavicular joint.   The  acromion is  type II  without significant lateral downsloping.  There is no shoulder joint effusion.      No fracture or dislocation is evident. Cystic changes are seen at the  greater tubercle of the humerus. Bone marrow signal intensity is  otherwise within normal limits.      SOFT TISSUES:      There is no significant volume of fluid in the subacromial subdeltoid  bursa.  There is no significant volume of fluid in the subcoracoid  bursa. In the subcutaneous tissues the shoulder there is a  multilobulated mass. It measures up to approximately 6.4 x 13.2 x  13.3 cm. It is incompletely included and assessed in the field of  view in the axial plane. Overall the internal intensity appears to be  predominantly fat signal intensity on all pulse  sequences. There are  some tortuous prominent blood vessels in the anterior aspect of the  mass. Some scattered blood vessels are seen coursing (Series 5, Image  24) through other parts of the mass. In the posterior aspect the mass  there is 1 area of intermediate T1 and increased T2 signal intensity  measuring up to approximately 0.6 x 1 x 0.6 cm. The mass at least  abuts the superficial myofascial plane of the deltoid with some  scalloping of the underlying deltoid muscle belly.      IMPRESSION:  Lipomatous mass in the subcutaneous tissues of the posterolateral to  lateral right shoulder. There is at least 1 location in the posterior  aspect of the mass which may represent a component of atypical  feature indicative of atypical lipomatous neoplasm. Consultation with  Orthopedic Oncology is recommended.

## 2024-11-06 ENCOUNTER — APPOINTMENT (OUTPATIENT)
Dept: ORTHOPEDIC SURGERY | Facility: CLINIC | Age: 44
End: 2024-11-06
Payer: COMMERCIAL

## 2024-11-19 NOTE — PREPROCEDURE INSTRUCTIONS

## 2024-11-21 ENCOUNTER — APPOINTMENT (OUTPATIENT)
Dept: ORTHOPEDIC SURGERY | Facility: CLINIC | Age: 44
End: 2024-11-21
Payer: COMMERCIAL

## 2024-11-23 NOTE — PROGRESS NOTES
November 23, 2024       [facility]  [address]  [place] [address]  Via Fax: [phone]      Patient: Antionette Swift   YOB: 1993   Date of Visit: 11/23/2024       Dear Dr. Hook:    I saw your patient, Antionette Swift, for an evaluation. Below are my notes for this visit with her.    If you have questions, please do not hesitate to call me.      Sincerely,        Andrew Rowan MD        CC: No Recipients  Andrew Rowan MD  11/23/2024  6:27 PM  Signed  ICC VISIT NOTE       Chief Complaint   Patient presents with   • Cough     Cough and cold symptoms with some difficulty breathing for the last 2 weeks.  Eating and drinking ok.  Denies n/v/d.  Denies fever.  This morning had some difficulty breathing.       History Of Present Illness  Antionette is a 31 year old female presenting with chief complaint of cold and cough symptoms with some mild difficulty breathing over the past 2 weeks.  She denies nausea, vomiting, diarrhea.  Denies fever or chills.  Denies myalgias or arthralgias or exposure to known COVID-positive patient..       Past Medical History  Past Medical History:   Diagnosis Date   • Anxiety    • Bipolar 1 disorder  (CMD)    • Depression    • Schizoaffective disorder  (CMD)         Surgical History  Past Surgical History:   Procedure Laterality Date   • Appendectomy     • Cardiac surgery      when born         Social History  Social History     Tobacco Use   • Smoking status: Never   • Smokeless tobacco: Never   Vaping Use   • Vaping status: never used   Substance Use Topics   • Alcohol use: Not Currently   • Drug use: Not Currently       Family History    Family History   Problem Relation Age of Onset   • Rheumatoid Arthritis Mother         FH reviewed and negative for any heart or lung disease, bleeding disorders, or issues related to this complaint.     Allergies  ALLERGIES:  Patient has no known allergies.    Medications  Current Outpatient Medications   Medication Sig  "Daily call completed. Pt states she is doing okay today but last night dropped to 85% just before bed. Pt states she put her BiPAP on and came up to 93%. Pt at 95% today on RA with no complaints. Aware she is able to call us with any further needs. Pt did not take any other VS or BGTs yet today. She is doing \"mostly okay\" with using nicotine patches vs smoking, states she has only had a few cigarettes. Pt has no further questions/concerns/needs at this time. Daily call frequency changed to MWF at 1000. Aware of all upcoming appts. [facility] scheduled 1/30 at 0900.     Pt Education: O2 needs  Barriers: NA  Topics for Daily Review: VS, use of PRN O2, smoking cessation/use of patches  Pt demonstrates clear understanding: Yes    Daily Weight:  There were no vitals filed for this visit.   Last 3 Weights:  Wt Readings from Last 7 Encounters:   01/14/24 76.4 kg (168 lb 6.9 oz)   12/29/23 73.3 kg (161 lb 9.6 oz)   12/27/23 75.8 kg (167 lb)   12/21/23 76.1 kg (167 lb 12.3 oz)   11/21/23 77.3 kg (170 lb 6.4 oz)   10/31/23 81.6 kg (180 lb)   10/16/23 80.3 kg (177 lb)       Masimo Device: No   Masimo Clinical Impression: NA    Virtual Visits--Scheduled (Most Recent Date at Top)  Follow up Appointments  Recent Visits  No visits were found meeting these conditions.  Showing recent visits within past 30 days and meeting all other requirements  Future Appointments  Date Type Provider Dept   02/15/24 Appointment Delta Yuen MD Do [id number]   Showing future appointments within next 90 days and meeting all other requirements       Frequency of RN Calls & Virtual Visits per Team Agreement: [facility] Home Frequency: Bi-Weekly    Medication issues Addressed (what was done): NA    Follow up appointments scheduled by [facility] Staff: NA  Referrals made by [facility] staff: NA      [facility] Transitions Assessment    Patient's Address:   [address] [place], [place] [address] [address]  **  If this is not the address patient will "   • gabapentin (NEURONTIN) 300 MG capsule Take 300 mg by mouth in the morning and 300 mg at noon and 300 mg in the evening.   • amoxicillin (AMOXIL) 500 MG tablet Take 1 tablet by mouth in the morning and 1 tablet in the evening. Do all this for 10 days.   • benzonatate (TESSALON PERLES) 100 MG capsule Take 1 capsule by mouth 3 times daily as needed for Cough.   • mirtazapine (REMERON) 15 MG tablet Take by mouth daily.   • risperiDONE (RisperDAL) 3 MG tablet Take 1 tablet by mouth nightly.   • clonazePAM (KlonoPIN) 0.5 MG tablet take 1 tablet up to 3 times per day PRN for anxiety   • albuterol 108 (90 Base) MCG/ACT inhaler Inhale 2 puffs into the lungs every 4 hours as needed for Shortness of Breath or Wheezing.   • clonazePAM (KLONOPIN) 1 MG tablet Take 1 mg by mouth in the morning and 1 mg at noon and 1 mg in the evening.     No current facility-administered medications for this visit.        Review of Systems   Constitutional: Negative for fever and chills.  Skin: Negative for rash.  HEENT: Negative for eye drainage,  rhinorrhea, sinus congestion, ear pain, or sore throat.  Respiratory: Negative wheezing, shortness of breath, + cough.  Cardiovascular: Negative for chest pain, chest pressure, palpitations or diaphoresis.  Gastrointestinal: Negative for nausea, vomiting, diarrhea or abdominal pain.  Genitourinary: Negative for dysuria, urgency, frequency, hematuria or flank pain.  Extremities:  Negative for joint swelling or joint pain.  Neurologic:  Negative for change in sensory or motor function.  Negative for headache.  Endocrine: Negative for heat or cold intolerance, weight loss or gain.  Hematological: Negative for bleeding, bruising or adenopathy.  Psychiatric: Negative for change in affect, change in mentation or sleep disturbance.  All other systems reviewed and otherwise negative unless noted.      Last Recorded Vitals  Vitals:    11/23/24 1530   BP: 101/78   Pulse: 98   Resp: 18   Temp: 97.8 °F (36.6  receive services - alert team and address in EMR**       Patient Contacts:  Extended Emergency Contact Information  Primary Emergency Contact: Kofi Mccray  Home Phone: [phone]  Relation: Spouse  Secondary Emergency Contact: [place],Jean  Home Phone: [phone]  Relation: None                                Patient's Preferred Phone: [phone]  Patient's E-mail: [email or web address]                                       °C)   SpO2: 95%   Weight: 115.7 kg (255 lb)   PainSc:  0   LMP: 11/04/2024        Physical Exam  Vitals and nursing note reviewed.   Constitutional:       General: She is not in acute distress.     Appearance: She is not ill-appearing or toxic-appearing.   HENT:      Head: Normocephalic and atraumatic.   Cardiovascular:      Heart sounds: Normal heart sounds.   Pulmonary:      Effort: Pulmonary effort is normal. No respiratory distress.      Breath sounds: Normal breath sounds. No wheezing.   Skin:     General: Skin is warm and dry.   Neurological:      Mental Status: She is alert.   Psychiatric:         Mood and Affect: Affect normal.          Imaging (also reviewed by this provider):    XR CHEST PA AND LATERAL 2 VIEWS  Narrative: EXAM: XR CHEST PA AND LATERAL 2 VIEWS    CLINICAL INDICATION: Cough and shortness of breath    COMPARISON: 5/26/2023    FINDINGS: Minimal subsegmental atelectasis right lung base. Lungs otherwise  clear. No pleural effusion or pneumothorax. Heart and pulmonary vessels are  normal.  Impression: Minimal right basilar subsegmental atelectasis.    Electronically Signed by: PHAN ALMONTE M.D.   Signed on: 11/23/2024 3:51 PM   Workstation ID: [id number]        Labs reviewed by this provider:  No results found for this visit on 11/23/24.     Differential Diagnosis/MD      Patient presents to [facility] for evaluation of bronchitis. Suspect that patient has infection as etiology for their symptoms.   They were instructed to follow up with PCP. All questions were answered and criteria necessitating return visit to ICC/ED was discussed     Ddx: Bronchitis;  2.  Symptomatic care discussed.  See patient AVS instructions for any further details.  3.  Instructed patient on appropriate medical follow-up.  See below  4.  Further evaluation: Physical exam; chest x-ray = normal subsegmental atelectasis at the right lung base.  5.  Patient will be contacted with any positive or discordant results via phone.     6. Follow with PCP; Proceed to the ED if worsening      The plan was discussed verbally and key components were summarized in the discharge instructions. All questions were answered. In discussing management and follow-up, they are advised that the plan is based only on information that was available at the time of the ICC evaluation. Additional testing and treatment may be necessary, and outpatient evaluation is frequently required to ensure complete care. At the same time, there is always potential for symptoms to recur or worsen, or for additional signs or symptoms to develop that could substantially affect the treatment plan. If any new or uncontrolled  symptoms occur, or if there are any other concerns, they are advised to seek medical evaluation immediately.     Condition on Discharge: Good   Final diagnoses:   1. Breathing difficulty    2. Acute cough            Diagnosis:   (R06.89) Breathing difficulty  (primary encounter diagnosis)  Plan: XR CHEST PA AND LATERAL 2 VIEWS    (R05.1) Acute cough      New Prescriptions    AMOXICILLIN (AMOXIL) 500 MG TABLET    Take 1 tablet by mouth in the morning and 1 tablet in the evening. Do all this for 10 days.    BENZONATATE (TESSALON PERLES) 100 MG CAPSULE    Take 1 capsule by mouth 3 times daily as needed for Cough.       Follow-up Information       Follow up With Specialties Details Why Contact Info    Dept, [facility]  Schedule an appointment as soon as possible for a visit As needed [address] [facility] [id number] [phone]                Patient Instructions   Tylenol or Ibuprofen  Mucinex or Robitussin  Flonase nasal spray or saline nasal spray for nasal congestion.;  Amoxicillin as prescribed;  Tessalon Perles as prescribed  Fluids/Increase Humidification  Rest  Follow up with your Doctor  Proceed to the Emergency Department if worsening      Primary Care Physician  Dept, [facility]      Andrew Rowan MD

## 2024-11-29 ENCOUNTER — ANESTHESIA EVENT (OUTPATIENT)
Dept: OPERATING ROOM | Facility: HOSPITAL | Age: 44
End: 2024-11-29
Payer: COMMERCIAL

## 2024-11-29 SDOH — HEALTH STABILITY: MENTAL HEALTH: CURRENT SMOKER: 1

## 2024-11-29 NOTE — ANESTHESIA PREPROCEDURE EVALUATION
Stephenie Mccray is a 44 y.o. female here for:    exision of right shoulder lipoma  With iNels Yanes MD  Pre-Op Diagnosis Codes:      * Mass of muscle of right upper extremity [M62.89]    Relevant Problems   Cardiac  Echo 2024:  CONCLUSIONS:   1. Left ventricular systolic function is normal with a 60% estimated ejection fraction.   2. No evidence right to left shunting on agitated saline bubble contrast study.     (+) Mixed hyperlipidemia   (+) Primary hypertension      Pulmonary   (+) Acute exacerbation of chronic obstructive pulmonary disease (COPD) (Multi)   (+) COPD (chronic obstructive pulmonary disease) (Multi)   (+) Pulmonary embolism      Neuro   (+) Schizophrenia      GI   (+) Acid reflux      /Renal   (+) Multiple renal calculi      Endocrine   (+) Acquired hypothyroidism   (+) Class 1 obesity due to excess calories with serious comorbidity and body mass index (BMI) of 30.0 to 30.9 in adult   (+) Type 2 diabetes mellitus without complication, with long-term current use of insulin (Multi)       Lab Results   Component Value Date    HGB 12.7 10/02/2024    HCT 42.0 10/02/2024    WBC 10.5 10/02/2024     10/02/2024     (H) 10/02/2024    K 4.0 10/02/2024     10/02/2024    CREATININE 0.78 10/02/2024    BUN 15 10/02/2024       Social History     Tobacco Use   Smoking Status Every Day    Current packs/day: 0.50    Types: Cigarettes    Passive exposure: Current   Smokeless Tobacco Never       Allergies   Allergen Reactions    Fluticasone Furoate-Vilanterol Unknown    Fluticasone-Umeclidin-Vilanter Other     Headaches    Levofloxacin Rash    Sumatriptan Other     Flushing    Vortioxetine Unknown       Current Outpatient Medications   Medication Instructions    albuterol 90 mcg/actuation inhaler     alcohol swabs (Alcohol Prep Pads) pads, medicated Use 3 times daily prn    amoxicillin (Amoxil) 500 mg capsule 1 capsule, 3 times daily (0900,1400,1900)    atorvastatin (LIPITOR) 20 mg, oral,  "Daily, Dr. Davis covering for Dr. Yuen    BD Ultra-Fine Mini Pen Needle 31 gauge x 3/16\" needle USE TO INJECT 4 TIMES DAILY AS DIRECTED    busPIRone (BUSPAR) 30 mg, 2 times daily    citalopram (CeleXA) 40 mg tablet 1.5 tablets, Daily (630)    clonazePAM (KlonoPIN) 1 mg tablet 2 times daily PRN    cloNIDine (CATAPRES) 0.1 mg, 2 times daily PRN    escitalopram (LEXAPRO) 20 mg, Every morning    hydrOXYzine pamoate (VISTARIL) 50 mg, 3 times daily PRN    iloperidone (FANAPT) 6 mg, oral, Daily    insulin glargine (LANTUS SOLOSTAR U-100 INSULIN) 10 Units, subcutaneous, Every morning, Take as directed per insulin instructions.    insulin lispro 4 Units, 3 times daily (morning, midday, late afternoon)    Invega Sustenna 234 mg, Every 28 days    ipratropium-albuteroL (Duo-Neb) 0.5-2.5 mg/3 mL nebulizer solution 3 mL, nebulization, Every 6 hours PRN    levothyroxine (SYNTHROID, LEVOXYL) 175 mcg, oral, Daily    metoprolol tartrate (LOPRESSOR) 25 mg, oral, 2 times daily    ondansetron ODT (ZOFRAN-ODT) 4 mg, Every 8 hours PRN    OneTouch Delica Plus Lancet 33 gauge misc USE 1 TO CHECK GLUCOSE THREE TIMES DAILY    OneTouch Verio test strips strip     paliperidone (INVEGA) 3 mg, oral, Daily, Do not crush, chew, or split.    pantoprazole (PROTONIX) 40 mg, oral, Daily    prazosin (MINIPRESS) 5 mg, Nightly    predniSONE (Deltasone) 10 mg tablet TAKE 4 TABLETS BY MOUTH ONCE DAILY FOR 3 DAYS THEN 3 ONCE DAILY FOR 3 DAYS THEN 2 ONCE DAILY FOR 3 DAYS THEN 1 ONCE DAILY FOR 3 DAYS    tiotropium (Spiriva Respimat) 2.5 mcg/actuation inhaler 2 puffs, inhalation, Daily RT    Xarelto 10 mg, Daily       Past Surgical History:   Procedure Laterality Date     SECTION, LOW TRANSVERSE      x 1    EYE SURGERY      LITHOTRIPSY      STAPEDES SURGERY      TONSILLECTOMY      UMBILICAL HERNIA REPAIR      VENTRAL HERNIA REPAIR         Family History   Problem Relation Name Age of Onset    Fibromyalgia Father      Hypertension Brother      " Thyroid disease Maternal Grandmother      Thyroid disease Paternal Grandmother      Lung cancer Paternal Grandfather      Coronary artery disease Other Grandmother        NPO Details:  No data recorded    Physical Exam    Airway  Mallampati: IV  TM distance: >3 FB     Cardiovascular - normal exam     Dental   (+) upper dentures     Pulmonary - normal exam     Abdominal            Anesthesia Plan    History of general anesthesia?: yes  History of complications of general anesthesia?: no    ASA 3     general     The patient is a current smoker.    intravenous induction   Postoperative administration of opioids is intended.  Anesthetic plan and risks discussed with patient.

## 2024-12-02 ENCOUNTER — APPOINTMENT (OUTPATIENT)
Dept: CARDIOLOGY | Facility: HOSPITAL | Age: 44
End: 2024-12-02
Payer: COMMERCIAL

## 2024-12-02 ENCOUNTER — ANESTHESIA (OUTPATIENT)
Dept: OPERATING ROOM | Facility: HOSPITAL | Age: 44
End: 2024-12-02
Payer: COMMERCIAL

## 2024-12-02 ENCOUNTER — HOSPITAL ENCOUNTER (OUTPATIENT)
Facility: HOSPITAL | Age: 44
Setting detail: OUTPATIENT SURGERY
Discharge: HOME | End: 2024-12-02
Attending: STUDENT IN AN ORGANIZED HEALTH CARE EDUCATION/TRAINING PROGRAM | Admitting: STUDENT IN AN ORGANIZED HEALTH CARE EDUCATION/TRAINING PROGRAM
Payer: COMMERCIAL

## 2024-12-02 VITALS
DIASTOLIC BLOOD PRESSURE: 67 MMHG | OXYGEN SATURATION: 93 % | TEMPERATURE: 98.1 F | WEIGHT: 156.97 LBS | BODY MASS INDEX: 28.89 KG/M2 | HEIGHT: 62 IN | RESPIRATION RATE: 18 BRPM | SYSTOLIC BLOOD PRESSURE: 121 MMHG | HEART RATE: 77 BPM

## 2024-12-02 DIAGNOSIS — M62.89 MASS OF MUSCLE OF RIGHT UPPER EXTREMITY: Primary | ICD-10-CM

## 2024-12-02 LAB
ATRIAL RATE: 66 BPM
GLUCOSE BLD MANUAL STRIP-MCNC: 110 MG/DL (ref 74–99)
GLUCOSE BLD MANUAL STRIP-MCNC: 81 MG/DL (ref 74–99)
P AXIS: 47 DEGREES
P OFFSET: 171 MS
P ONSET: 129 MS
PR INTERVAL: 134 MS
PREGNANCY TEST URINE, POC: NEGATIVE
Q ONSET: 196 MS
QRS COUNT: 11 BEATS
QRS DURATION: 126 MS
QT INTERVAL: 432 MS
QTC CALCULATION(BAZETT): 452 MS
QTC FREDERICIA: 446 MS
R AXIS: 0 DEGREES
T AXIS: 2 DEGREES
T OFFSET: 412 MS
VENTRICULAR RATE: 66 BPM

## 2024-12-02 PROCEDURE — 2500000004 HC RX 250 GENERAL PHARMACY W/ HCPCS (ALT 636 FOR OP/ED): Performed by: STUDENT IN AN ORGANIZED HEALTH CARE EDUCATION/TRAINING PROGRAM

## 2024-12-02 PROCEDURE — 2500000001 HC RX 250 WO HCPCS SELF ADMINISTERED DRUGS (ALT 637 FOR MEDICARE OP): Performed by: ANESTHESIOLOGY

## 2024-12-02 PROCEDURE — 7100000010 HC PHASE TWO TIME - EACH INCREMENTAL 1 MINUTE: Performed by: STUDENT IN AN ORGANIZED HEALTH CARE EDUCATION/TRAINING PROGRAM

## 2024-12-02 PROCEDURE — 88307 TISSUE EXAM BY PATHOLOGIST: CPT | Mod: TC,SUR,WESLAB | Performed by: STUDENT IN AN ORGANIZED HEALTH CARE EDUCATION/TRAINING PROGRAM

## 2024-12-02 PROCEDURE — 7100000001 HC RECOVERY ROOM TIME - INITIAL BASE CHARGE: Performed by: STUDENT IN AN ORGANIZED HEALTH CARE EDUCATION/TRAINING PROGRAM

## 2024-12-02 PROCEDURE — 2500000001 HC RX 250 WO HCPCS SELF ADMINISTERED DRUGS (ALT 637 FOR MEDICARE OP): Performed by: STUDENT IN AN ORGANIZED HEALTH CARE EDUCATION/TRAINING PROGRAM

## 2024-12-02 PROCEDURE — 3700000002 HC GENERAL ANESTHESIA TIME - EACH INCREMENTAL 1 MINUTE: Performed by: STUDENT IN AN ORGANIZED HEALTH CARE EDUCATION/TRAINING PROGRAM

## 2024-12-02 PROCEDURE — 93010 ELECTROCARDIOGRAM REPORT: CPT | Performed by: INTERNAL MEDICINE

## 2024-12-02 PROCEDURE — 7100000009 HC PHASE TWO TIME - INITIAL BASE CHARGE: Performed by: STUDENT IN AN ORGANIZED HEALTH CARE EDUCATION/TRAINING PROGRAM

## 2024-12-02 PROCEDURE — 7100000002 HC RECOVERY ROOM TIME - EACH INCREMENTAL 1 MINUTE: Performed by: STUDENT IN AN ORGANIZED HEALTH CARE EDUCATION/TRAINING PROGRAM

## 2024-12-02 PROCEDURE — 3700000001 HC GENERAL ANESTHESIA TIME - INITIAL BASE CHARGE: Performed by: STUDENT IN AN ORGANIZED HEALTH CARE EDUCATION/TRAINING PROGRAM

## 2024-12-02 PROCEDURE — 3600000002 HC OR TIME - INITIAL BASE CHARGE - PROCEDURE LEVEL TWO: Performed by: STUDENT IN AN ORGANIZED HEALTH CARE EDUCATION/TRAINING PROGRAM

## 2024-12-02 PROCEDURE — 88307 TISSUE EXAM BY PATHOLOGIST: CPT | Performed by: STUDENT IN AN ORGANIZED HEALTH CARE EDUCATION/TRAINING PROGRAM

## 2024-12-02 PROCEDURE — 2500000004 HC RX 250 GENERAL PHARMACY W/ HCPCS (ALT 636 FOR OP/ED): Mod: JZ | Performed by: STUDENT IN AN ORGANIZED HEALTH CARE EDUCATION/TRAINING PROGRAM

## 2024-12-02 PROCEDURE — 2500000002 HC RX 250 W HCPCS SELF ADMINISTERED DRUGS (ALT 637 FOR MEDICARE OP, ALT 636 FOR OP/ED): Performed by: STUDENT IN AN ORGANIZED HEALTH CARE EDUCATION/TRAINING PROGRAM

## 2024-12-02 PROCEDURE — 88312 SPECIAL STAINS GROUP 1: CPT | Performed by: STUDENT IN AN ORGANIZED HEALTH CARE EDUCATION/TRAINING PROGRAM

## 2024-12-02 PROCEDURE — 3600000007 HC OR TIME - EACH INCREMENTAL 1 MINUTE - PROCEDURE LEVEL TWO: Performed by: STUDENT IN AN ORGANIZED HEALTH CARE EDUCATION/TRAINING PROGRAM

## 2024-12-02 PROCEDURE — 2720000007 HC OR 272 NO HCPCS: Performed by: STUDENT IN AN ORGANIZED HEALTH CARE EDUCATION/TRAINING PROGRAM

## 2024-12-02 PROCEDURE — 2500000005 HC RX 250 GENERAL PHARMACY W/O HCPCS: Performed by: STUDENT IN AN ORGANIZED HEALTH CARE EDUCATION/TRAINING PROGRAM

## 2024-12-02 PROCEDURE — 93005 ELECTROCARDIOGRAM TRACING: CPT

## 2024-12-02 PROCEDURE — 24071 EXC ARM/ELBOW LES SC 3 CM/>: CPT | Performed by: STUDENT IN AN ORGANIZED HEALTH CARE EDUCATION/TRAINING PROGRAM

## 2024-12-02 PROCEDURE — 2500000004 HC RX 250 GENERAL PHARMACY W/ HCPCS (ALT 636 FOR OP/ED): Performed by: ANESTHESIOLOGY

## 2024-12-02 PROCEDURE — 82947 ASSAY GLUCOSE BLOOD QUANT: CPT

## 2024-12-02 PROCEDURE — 81025 URINE PREGNANCY TEST: CPT | Performed by: STUDENT IN AN ORGANIZED HEALTH CARE EDUCATION/TRAINING PROGRAM

## 2024-12-02 RX ORDER — MIDAZOLAM HYDROCHLORIDE 1 MG/ML
INJECTION, SOLUTION INTRAMUSCULAR; INTRAVENOUS AS NEEDED
Status: DISCONTINUED | OUTPATIENT
Start: 2024-12-02 | End: 2024-12-02

## 2024-12-02 RX ORDER — LIDOCAINE HYDROCHLORIDE 20 MG/ML
INJECTION, SOLUTION INFILTRATION; PERINEURAL AS NEEDED
Status: DISCONTINUED | OUTPATIENT
Start: 2024-12-02 | End: 2024-12-02

## 2024-12-02 RX ORDER — PROPOFOL 10 MG/ML
INJECTION, EMULSION INTRAVENOUS AS NEEDED
Status: DISCONTINUED | OUTPATIENT
Start: 2024-12-02 | End: 2024-12-02

## 2024-12-02 RX ORDER — SODIUM CHLORIDE, SODIUM LACTATE, POTASSIUM CHLORIDE, CALCIUM CHLORIDE 600; 310; 30; 20 MG/100ML; MG/100ML; MG/100ML; MG/100ML
INJECTION, SOLUTION INTRAVENOUS CONTINUOUS PRN
Status: DISCONTINUED | OUTPATIENT
Start: 2024-12-02 | End: 2024-12-02

## 2024-12-02 RX ORDER — ONDANSETRON HYDROCHLORIDE 2 MG/ML
INJECTION, SOLUTION INTRAVENOUS AS NEEDED
Status: DISCONTINUED | OUTPATIENT
Start: 2024-12-02 | End: 2024-12-02

## 2024-12-02 RX ORDER — SODIUM CHLORIDE, SODIUM LACTATE, POTASSIUM CHLORIDE, CALCIUM CHLORIDE 600; 310; 30; 20 MG/100ML; MG/100ML; MG/100ML; MG/100ML
100 INJECTION, SOLUTION INTRAVENOUS CONTINUOUS
Status: DISCONTINUED | OUTPATIENT
Start: 2024-12-02 | End: 2024-12-02 | Stop reason: HOSPADM

## 2024-12-02 RX ORDER — ACETAMINOPHEN 325 MG/1
650 TABLET ORAL EVERY 4 HOURS PRN
Status: DISCONTINUED | OUTPATIENT
Start: 2024-12-02 | End: 2024-12-02 | Stop reason: HOSPADM

## 2024-12-02 RX ORDER — CEFAZOLIN SODIUM 2 G/100ML
2 INJECTION, SOLUTION INTRAVENOUS ONCE
Status: COMPLETED | OUTPATIENT
Start: 2024-12-02 | End: 2024-12-02

## 2024-12-02 RX ORDER — ONDANSETRON HYDROCHLORIDE 2 MG/ML
4 INJECTION, SOLUTION INTRAVENOUS ONCE AS NEEDED
Status: DISCONTINUED | OUTPATIENT
Start: 2024-12-02 | End: 2024-12-02 | Stop reason: HOSPADM

## 2024-12-02 RX ORDER — ALBUTEROL SULFATE 0.83 MG/ML
2.5 SOLUTION RESPIRATORY (INHALATION) ONCE AS NEEDED
Status: COMPLETED | OUTPATIENT
Start: 2024-12-02 | End: 2024-12-02

## 2024-12-02 RX ORDER — PHENYLEPHRINE HCL IN 0.9% NACL 1 MG/10 ML
SYRINGE (ML) INTRAVENOUS AS NEEDED
Status: DISCONTINUED | OUTPATIENT
Start: 2024-12-02 | End: 2024-12-02

## 2024-12-02 RX ORDER — DIPHENHYDRAMINE HYDROCHLORIDE 50 MG/ML
12.5 INJECTION INTRAMUSCULAR; INTRAVENOUS ONCE AS NEEDED
Status: DISCONTINUED | OUTPATIENT
Start: 2024-12-02 | End: 2024-12-02 | Stop reason: HOSPADM

## 2024-12-02 RX ORDER — METOPROLOL TARTRATE 25 MG/1
25 TABLET, FILM COATED ORAL ONCE
Status: COMPLETED | OUTPATIENT
Start: 2024-12-02 | End: 2024-12-02

## 2024-12-02 RX ORDER — SODIUM CHLORIDE 0.9 G/100ML
IRRIGANT IRRIGATION AS NEEDED
Status: DISCONTINUED | OUTPATIENT
Start: 2024-12-02 | End: 2024-12-02 | Stop reason: HOSPADM

## 2024-12-02 RX ORDER — MEPERIDINE HYDROCHLORIDE 25 MG/ML
12.5 INJECTION INTRAMUSCULAR; INTRAVENOUS; SUBCUTANEOUS EVERY 10 MIN PRN
Status: DISCONTINUED | OUTPATIENT
Start: 2024-12-02 | End: 2024-12-02 | Stop reason: HOSPADM

## 2024-12-02 RX ORDER — KETOROLAC TROMETHAMINE 30 MG/ML
INJECTION, SOLUTION INTRAMUSCULAR; INTRAVENOUS AS NEEDED
Status: DISCONTINUED | OUTPATIENT
Start: 2024-12-02 | End: 2024-12-02

## 2024-12-02 RX ORDER — FENTANYL CITRATE 50 UG/ML
25 INJECTION, SOLUTION INTRAMUSCULAR; INTRAVENOUS EVERY 5 MIN PRN
Status: DISCONTINUED | OUTPATIENT
Start: 2024-12-02 | End: 2024-12-02 | Stop reason: HOSPADM

## 2024-12-02 RX ORDER — TRAMADOL HYDROCHLORIDE 50 MG/1
50 TABLET ORAL 2 TIMES DAILY PRN
Qty: 5 TABLET | Refills: 0 | Status: SHIPPED | OUTPATIENT
Start: 2024-12-02 | End: 2024-12-07

## 2024-12-02 RX ORDER — FENTANYL CITRATE 50 UG/ML
INJECTION, SOLUTION INTRAMUSCULAR; INTRAVENOUS AS NEEDED
Status: DISCONTINUED | OUTPATIENT
Start: 2024-12-02 | End: 2024-12-02

## 2024-12-02 RX ORDER — METOPROLOL TARTRATE 1 MG/ML
5 INJECTION, SOLUTION INTRAVENOUS ONCE
Status: DISCONTINUED | OUTPATIENT
Start: 2024-12-02 | End: 2024-12-02 | Stop reason: HOSPADM

## 2024-12-02 RX ORDER — OXYCODONE HYDROCHLORIDE 5 MG/1
10 TABLET ORAL EVERY 4 HOURS PRN
Status: DISCONTINUED | OUTPATIENT
Start: 2024-12-02 | End: 2024-12-02 | Stop reason: HOSPADM

## 2024-12-02 RX ORDER — LIDOCAINE HYDROCHLORIDE 10 MG/ML
0.1 INJECTION, SOLUTION EPIDURAL; INFILTRATION; INTRACAUDAL; PERINEURAL ONCE
Status: DISCONTINUED | OUTPATIENT
Start: 2024-12-02 | End: 2024-12-02 | Stop reason: HOSPADM

## 2024-12-02 RX ORDER — GLYCOPYRROLATE 0.2 MG/ML
INJECTION INTRAMUSCULAR; INTRAVENOUS AS NEEDED
Status: DISCONTINUED | OUTPATIENT
Start: 2024-12-02 | End: 2024-12-02

## 2024-12-02 RX ORDER — OXYCODONE HYDROCHLORIDE 5 MG/1
5 TABLET ORAL EVERY 4 HOURS PRN
Status: DISCONTINUED | OUTPATIENT
Start: 2024-12-02 | End: 2024-12-02 | Stop reason: HOSPADM

## 2024-12-02 RX ORDER — ROCURONIUM BROMIDE 10 MG/ML
INJECTION, SOLUTION INTRAVENOUS AS NEEDED
Status: DISCONTINUED | OUTPATIENT
Start: 2024-12-02 | End: 2024-12-02

## 2024-12-02 ASSESSMENT — PAIN SCALES - GENERAL
PAINLEVEL_OUTOF10: 6
PAIN_LEVEL: 2
PAINLEVEL_OUTOF10: 0 - NO PAIN
PAINLEVEL_OUTOF10: 4
PAINLEVEL_OUTOF10: 0 - NO PAIN
PAINLEVEL_OUTOF10: 7
PAINLEVEL_OUTOF10: 7

## 2024-12-02 ASSESSMENT — PAIN DESCRIPTION - ORIENTATION
ORIENTATION: RIGHT
ORIENTATION: RIGHT

## 2024-12-02 ASSESSMENT — PAIN DESCRIPTION - LOCATION
LOCATION: SHOULDER
LOCATION: SHOULDER

## 2024-12-02 ASSESSMENT — PAIN - FUNCTIONAL ASSESSMENT
PAIN_FUNCTIONAL_ASSESSMENT: 0-10

## 2024-12-02 ASSESSMENT — PAIN DESCRIPTION - DESCRIPTORS
DESCRIPTORS: THROBBING
DESCRIPTORS: THROBBING

## 2024-12-02 ASSESSMENT — PAIN SCALES - PAIN ASSESSMENT IN ADVANCED DEMENTIA (PAINAD): TOTALSCORE: MEDICATION (SEE MAR);COLD APPLIED

## 2024-12-02 NOTE — OP NOTE
exision of right shoulder lipoma (R) Operative Note     Date: 2024  OR Location: [facility]    Name: Stephenie Mccray, : 1980, Age: 44 y.o., MRN: [id number], Sex: female    Diagnosis  Pre-op Diagnosis      * Mass of muscle of right upper extremity [M62.89] Post-op Diagnosis     * Mass of muscle of right upper extremity [M62.89]     Procedures  exision of right shoulder lipoma  61596 - NV EXC TUMOR SOFT TISSUE UPPER ARM/ELBOW SUBQ 3CM/>      Surgeons      * Niels Yanes - Primary    Resident/Fellow/Other Assistant:  Surgeons and Role:  * No surgeons found with a matching role *    Staff:   Circulator: Gloria  Scrgen Person: Judy  Surgical Assistant: Jovan    Anesthesia Staff: Anesthesiologist: Roberto Jiménez DO    Procedure Summary  Anesthesia: General  ASA: III  Estimated Blood Loss: 5mL  Intra-op Medications:   Administrations occurring from 0715 to 0830 on 24:   Medication Name Total Dose   sodium chloride 0.9 % irrigation solution 1,000 mL   BUPivacaine HCl (Marcaine) 0.5 % (5 mg/mL) 17 mL, lidocaine (Xylocaine) 17 mL syringe 20 mL   fentaNYL PF 0.05 mg/mL 100 mcg   glycopyrrolate (Robinul) injection 0.2 mg/mL 0.2 mg   LR infusion Cannot be calculated   lidocaine (Xylocaine) injection 2 % 80 mg   midazolam (Versed) 1 mg/1 mL 2 mg   phenylephrine 100 mcg/mL syringe 10 mL (prefilled) 100 mcg   propofol (Diprivan) injection 10 mg/mL 200 mg   rocuronium (ZeMuron) 50 mg/5 mL injection 60 mg   ceFAZolin (Ancef) 2 g in dextrose (iso)  mL 2 g              Anesthesia Record               Intraprocedure I/O Totals          Intake    ceFAZolin (Ancef) 2 g in dextrose (iso)  mL 100.00 mL    Total Intake 100 mL          Specimen:   ID Type Source Tests Collected by Time   1 : RIGHT SHOULDER MASS- STITCH MARKS LATERAL MARGIN Tissue SKIN EXCISION DERMPATH LAB- DERMATOPATHOLOGY Niels Yanes MD 2024 0825                 Findings: Large fatty mass in the subcutaneous space and above the  muscle on the posterior lateral right shoulder, mostly soft fat but with some more dense tissue and thicker adhesions on the lateralmost aspect, marked with a stitch    Indications for surgery: Stephenie Mccray is a 44 y.o. year old female who was diagnosed with a right shoulder lipoma with some irregular features on MRI.  After considering the risk benefits the patient elected proceed with surgery.    Details of procedure: The patient arrived to the preoperative area at [facility] for the aforementioned procedure. History and physical was performed, consent was verified, questions were answered, and she was moved into the operating room. A timeout was performed and she was induced under general anesthesia. The right upper extremity was prepped and draped in the usual sterile fashion. Next, local anesthetic was injected and a oblique transverse incision was made on the posterior lateral right shoulder.  The fatty subcutaneous mass was quickly encountered while moving through the subcutaneous fat.  Skin flaps were raised circumferentially and the mass was dissected from the normal subcutaneous fat and the deltoid muscle.  There appeared to be a nice capsule and plane around this mass through most aspects.  On the lateral area there was some firmness to the mass and some irregular nodules in the adhesions in this area were a little bit more dense.  These were taken with dissection and cautery.  There were several large veins that were encountered throughout the dissection that were addressed with pinch cautery.  The mass was eventually removed and marked as above.  This was sent for permanent pathology.  The wound bed was inspected for hemostasis which was achieved.  It was irrigated and again inspected for hemostasis which was adequate.  The incision was then closed with deep dermal 2-0 figure-of-eight Vicryl sutures, interrupted deep dermal 3-0 Vicryl's, and a running subcuticular 4-0 Monocryl, and  dressed with glue.    All sponge and needle counts were correct at the end of the case. The patient was awoken from the procedure and moved to the PACU for recovery. I was present and scrubbed and directed all operative decision making.    Niels Yanes MD  Assistant Professor of Surgery  Division of Surgical Oncology  [phone]  [email or web address]

## 2024-12-02 NOTE — ANESTHESIA PROCEDURE NOTES
Airway  Date/Time: 12/2/2024 7:40 AM  Urgency: elective    Airway not difficult    Staffing  Performed: attending   Authorized by: Roberto Jiméenz DO    Performed by: Roberto Jiménez DO  Patient location during procedure: OR    Indications and Patient Condition  Indications for airway management: anesthesia and airway protection  Spontaneous Ventilation: absent  Sedation level: deep  Preoxygenated: yes  Patient position: sniffing  MILS maintained throughout  Mask difficulty assessment: 2 - vent by mask + OA or adjuvant +/- NMBA  Planned trial extubation    Final Airway Details  Final airway type: endotracheal airway      Successful airway: ETT  Cuffed: yes   Successful intubation technique: video laryngoscopy  Facilitating devices/methods: intubating stylet  Endotracheal tube insertion site: oral  Blade type: Light.  Blade size: #2  ETT size (mm): 7.0  Cormack-Lehane Classification: grade I - full view of glottis  Placement verified by: chest auscultation and capnometry   Cuff volume (mL): 7  Measured from: gums  ETT to gums (cm): 22  Number of attempts at approach: 1  Number of other approaches attempted: 0    Additional Comments  atraumatic

## 2024-12-02 NOTE — ANESTHESIA POSTPROCEDURE EVALUATION
Patient: Stephenie Mccray    Procedure Summary       Date: 12/02/24 Room / Location: [facility] OR 01 / [facility]    Anesthesia Start: 0730 Anesthesia Stop: 0902    Procedure: exision of right shoulder lipoma (Right) Diagnosis:       Mass of muscle of right upper extremity      (Mass of muscle of right upper extremity [M62.89])    Surgeons: Niels Yanes MD Responsible Provider: Roberto Jiménez DO    Anesthesia Type: general ASA Status: 3            Anesthesia Type: general    Vitals Value Taken Time   /83 12/02/24 0909   Temp 36 12/02/24 0916   Pulse 82 12/02/24 0914   Resp 14 12/02/24 0914   SpO2 97 % 12/02/24 0914   Vitals shown include unfiled device data.    Anesthesia Post Evaluation    Patient location during evaluation: PACU  Patient participation: complete - patient participated  Level of consciousness: awake  Pain score: 2  Pain management: adequate  Airway patency: patent  Cardiovascular status: acceptable, blood pressure returned to baseline and hemodynamically stable  Respiratory status: acceptable, nonlabored ventilation, spontaneous ventilation and face mask  Hydration status: acceptable  Postoperative Nausea and Vomiting: none  Comments: Patient SPO2 86-88% on RA after adequate PACU stay. She arrived this a.m. for surgery with an O2 saturation of 87%. This appears to be her baseline as patient has O2 dependent COPD but only wears her home O2 PRN. Patient is acceptable for discharge with instruction to wear her home O2 for the remainder of the day at home. On 2L nasal cannula in PACU patient SPO2 reading 90-92%.        No notable events documented.

## 2024-12-02 NOTE — PERIOPERATIVE NURSING NOTE
Spoke with Dr Jiménez. Spo2 90% on 2L nasal canula and 87% on room air.  Patient encouraged to wear her oxygen at home today.Since this is her baseline she is OK to go to [facility] and home.

## 2024-12-02 NOTE — PERIOPERATIVE NURSING NOTE
SPO2 down to 86% on room air. Patient using incentive spirometer up to 500-750. She states she wears 2L nasal canula at home -encouraged to wear it today-she states understanding. SPO2 up to 91% on 2 L. She states her baseline is 88-low 90%.

## 2024-12-02 NOTE — PERIOPERATIVE NURSING NOTE
Patient encouraged to wear her home oxygen,CPAP and use incentive spirometer at home today. She states understanding

## 2024-12-02 NOTE — DISCHARGE INSTRUCTIONS
Outpatient Surgery Discharge Instructions    Diet  No diet restrictions***    Pain control  For baseline pain control: Tylenol (acetaminophen) 1,000 mg every 8 hours for one week  For mild pain: ibuprofen 600 mg every 8 hours with food as needed  For moderate to severe pain: Tramadol as prescribed    Activity  Generally light restrictions for right upper extremity but please do range of motion exercises to keep shoulder loose  In general, listen to your body and do not overly stress your surgical sites    Incisions  Your incisions are covered with skin glue. This is a sterile dressing placed in the operating room. Do not pick or peel it off. This will fall off in 2-3 weeks.  No dressing changes or wound care is needed. Do not use any ointments or cleaning agents.  You have multiple layers of sutures under the skin that will dissolve.   If you have sutures through the skin they will be removed at your postoperative appointment in 3 weeks.  You may shower the day after surgery. Let soap and water wash over the incision and pat it dry.   No going underwater (bath, pool, lake, ocean, etc.) for 2 weeks.  If you notice any of the following, please call Aissat[facility] Geronimo ([phone]) or Dr. Yanes's office ([phone]).  Swelling under the incision like a golf ball or larger.  Redness of the skin that is spreading away from the incision.  Drainage from the incision.  Fevers, chills, or any other concerning symptoms.    Follow-up  Call Ashley Taylor at [phone] to arrange a postop visit in 2-3 weeks  If you have sutures through your skin, these should be removed at 3 weeks  Dr. Yanes will discuss your pathology at your postop visit. You may see your pathology online prior the visit. Write down any questions you have and bring them to your postop visit.   Wear Home Oxygen today as instructed by Anesthesia    General Anesthesia Discharge Instructions    About this topic  You may need general anesthesia if you need to be  asleep during a procedure. Your doctor will use drugs to block the signals that go from your nerves to your brain. Doctors give general anesthesia during a surgery or procedure to:  Allow you to sleep  Help your body be still  Relax your muscles  Help you to relax and be pain free  Keep you from remembering the surgery  Let the doctor manage your airway, breathing, and blood flow  The doctor or nurse anesthetist gives general anesthesia by a shot into your vein. Sometimes, you may breathe in a gas through a mask placed over your face.  What care is needed at home?  Ask your doctor what you need to do when you go home. Make sure you ask questions if you do not understand what the doctor says.  Your doctor may give you drugs to prevent or treat an upset stomach from the anesthetic. Take them as ordered.  If your throat is sore, suck on ice chips or popsicles to ease throat pain.  Put 2 to 3 pillows under your head and back when you lie down to help you breathe easier.  For the first 24 to 48 hours:  Do not operate heavy or dangerous machinery.  Do not make major decisions or sign important papers. You may not be able to think clearly.  Avoid beer, wine, or mixed drinks.  You are at a higher risk of falling for at least 24 hours after general anesthesia.  Take extra care when you get up.  Do not change positions quickly.  Do not rush when you need to go to the bathroom or to answer the phone.  Ask for help if you feel unsteady when you try to walk.  Wear shoes with non-slip soles and low heels.  What follow-up care is needed?  Your doctor may ask you to come back to the office to check on your progress. Be sure to keep these visits.  If you have stitches that do not dissolve or staples, you will need to have them removed. Your doctor will want to do this in 1 to 2 weeks. If the doctor used skin glue, the glue will fall off on its own.  What drugs may be needed?  The doctor may order drugs to:  Help with pain  Treat an  upset stomach or throwing up  Will physical activity be limited?  You will not be allowed to drive right away after the procedure. Ask a family member or a friend to drive you home.  Avoid trying to get out of bed without help until you are sure of your balance.  You may have to limit your activity. Talk to your doctor about if you need to limit how much you lift or limit exercise after your procedure.  What changes to diet are needed?  Start with a light diet when you are fully awake. This includes things that are easy to swallow like soups, pudding, jello, toast, and eggs. Slowly progress to your normal diet.  What problems could happen?  Low blood pressure  Breathing problems  Upset stomach or throwing up  Dizziness  Blood clots  Infection  When do I need to call the doctor?  Trouble breathing  Upset stomach or throwing up more than 3 times in the next 2 days  Dizziness  Teach Back: Helping You Understand  The Teach Back Method helps you understand the information we are giving you. After you talk with the staff, tell them in your own words what you learned. This helps to make sure the staff has described each thing clearly. It also helps to explain things that may have been confusing. Before going home, make sure you can do these:  I can tell you about my procedure.  I can tell you if I need to follow up with my doctor.  I can tell you what is good for me to eat and drink the next day.  I can tell you what I would do if I have trouble breathing, an upset stomach, or dizziness.  Where can I learn more?  [employer] [place] [employer]  https://www.nigms.nih.gov/education/pages/factsheet_Anesthesia.aspx  NHS Choices  http://www.nhs.uk/conditions/Anaesthetic-general/Pages/Definition.aspx  Last Reviewed Date  2020-04-22

## 2024-12-02 NOTE — NURSING NOTE
Patient tolerated fluids and snack, pain now tolerable after oxy IR. Discharge instructions printed and reviewed with patient and family. Instructed on need to use IS and wear home o2 at 2L today

## 2024-12-04 DIAGNOSIS — M62.89 MASS OF MUSCLE OF RIGHT UPPER EXTREMITY: Primary | ICD-10-CM

## 2024-12-16 LAB
LABORATORY COMMENT REPORT: NORMAL
PATH REPORT.COMMENTS IMP SPEC: NORMAL
PATH REPORT.FINAL DX SPEC: NORMAL
PATH REPORT.GROSS SPEC: NORMAL
PATH REPORT.RELEVANT HX SPEC: NORMAL
PATH REPORT.TOTAL CANCER: NORMAL

## 2024-12-16 NOTE — PROGRESS NOTES
ASSESSMENT AND PLAN  Stephenie Mccray is a 44 y.o. female who is recovering well following resection of a lipomatous mass on her right shoulder. On final pathology it was a benign lipoma with a small focus of organizing scar consistent with prior trauma, no cancer. We discussed that this is good news and no follow up is needed. She will call us with any concerns going forward.     Niels Yanes MD  Assistant Professor of Surgery  Division of Surgical Oncology  [phone]  [email or web address]      SUBJECTIVE  Stephenie Mccray is a 44 y.o. female who presents for a postoperative clinic visit.  Referring provider: Alvin Gonzalez  Diagnosis: Right shoulder lipoma  Surgery: Resection of right shoulder lipoma on 12/2/2024  Postoperative issues: none        Physical exam  Incisions are clean, dry, and intact    Surgical Pathology  Component    FINAL DIAGNOSIS   Soft Tissue, Posterolateral Right Shoulder, Excision: Mature adipose tissue with foci of histiocytic inflammation, most compatible with a remote and organizing trauma.   Electronically signed by Obey Tobar MD on 12/16/2024 at 1216        By the signature on this report, the individual or group listed as making the Final Interpretation/Diagnosis certifies that they have reviewed this case.    Comment    The specimen is comprised of mature fibroadipose tissue with out atypia.  There are multiple foci that are well-defined with a fibrotic background and marked histiocytic inflammation including multinucleated giant cells and central necrotizing material.  There is no polarizable material.  GMS and Ziehl-Neelsen stains are negative.  The engulfed material within the multinucleated giant cells is not discernible.  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

## 2024-12-17 ENCOUNTER — OFFICE VISIT (OUTPATIENT)
Dept: SURGICAL ONCOLOGY | Facility: CLINIC | Age: 44
End: 2024-12-17
Payer: COMMERCIAL

## 2024-12-17 VITALS
WEIGHT: 153 LBS | OXYGEN SATURATION: 93 % | HEART RATE: 120 BPM | SYSTOLIC BLOOD PRESSURE: 120 MMHG | TEMPERATURE: 97.2 F | RESPIRATION RATE: 16 BRPM | BODY MASS INDEX: 27.98 KG/M2 | DIASTOLIC BLOOD PRESSURE: 66 MMHG

## 2024-12-17 DIAGNOSIS — M62.89 MASS OF MUSCLE OF RIGHT UPPER EXTREMITY: Primary | ICD-10-CM

## 2024-12-17 PROCEDURE — 3051F HG A1C>EQUAL 7.0%<8.0%: CPT | Performed by: STUDENT IN AN ORGANIZED HEALTH CARE EDUCATION/TRAINING PROGRAM

## 2024-12-17 PROCEDURE — 99211 OFF/OP EST MAY X REQ PHY/QHP: CPT | Performed by: STUDENT IN AN ORGANIZED HEALTH CARE EDUCATION/TRAINING PROGRAM

## 2024-12-17 PROCEDURE — 3074F SYST BP LT 130 MM HG: CPT | Performed by: STUDENT IN AN ORGANIZED HEALTH CARE EDUCATION/TRAINING PROGRAM

## 2024-12-17 PROCEDURE — 3048F LDL-C <100 MG/DL: CPT | Performed by: STUDENT IN AN ORGANIZED HEALTH CARE EDUCATION/TRAINING PROGRAM

## 2024-12-17 PROCEDURE — 3078F DIAST BP <80 MM HG: CPT | Performed by: STUDENT IN AN ORGANIZED HEALTH CARE EDUCATION/TRAINING PROGRAM

## 2024-12-17 ASSESSMENT — PAIN SCALES - GENERAL: PAINLEVEL_OUTOF10: 2

## 2024-12-17 NOTE — LETTER
December 17, 2024     Alvin MARTINS MD  [address]  [place] [address]    Patient: Stephenie Mccray   YOB: 1980   Date of Visit: 12/17/2024       Dear Dr. Alvin MARTINS MD:    Thank you for referring Stephenie Mccray to me for evaluation. Below are my notes for this consultation.  If you have questions, please do not hesitate to call me. I look forward to following your patient along with you.       Sincerely,     Niels Yanes MD      CC: No Recipients  ______________________________________________________________________________________    ASSESSMENT AND PLAN  Stephenie Mccray is a 44 y.o. female who is recovering well following resection of a lipomatous mass on her right shoulder. On final pathology it was a benign lipoma with a small focus of organizing scar consistent with prior trauma, no cancer. We discussed that this is good news and no follow up is needed. She will call us with any concerns going forward.     Niels Yanes MD  Assistant Professor of Surgery  Division of Surgical Oncology  [phone]  [email or web address]      SUBJECTIVE  Stephenie Mccray is a 44 y.o. female who presents for a postoperative clinic visit.  Referring provider: Alvin Gonzalez  Diagnosis: Right shoulder lipoma  Surgery: Resection of right shoulder lipoma on 12/2/2024  Postoperative issues: none        Physical exam  Incisions are clean, dry, and intact    Surgical Pathology  Component    FINAL DIAGNOSIS   Soft Tissue, Posterolateral Right Shoulder, Excision: Mature adipose tissue with foci of histiocytic inflammation, most compatible with a remote and organizing trauma.   Electronically signed by Obey Tobar MD on 12/16/2024 at 1216        By the signature on this report, the individual or group listed as making the Final Interpretation/Diagnosis certifies that they have reviewed this case.    Comment    The specimen is comprised of mature fibroadipose tissue with out atypia.  There  are multiple foci that are well-defined with a fibrotic background and marked histiocytic inflammation including multinucleated giant cells and central necrotizing material.  There is no polarizable material.  GMS and Ziehl-Neelsen stains are negative.  The engulfed material within the multinucleated giant cells is not discernible.  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

## 2024-12-31 ENCOUNTER — TELEPHONE (OUTPATIENT)
Dept: PRIMARY CARE | Facility: CLINIC | Age: 44
End: 2024-12-31

## 2024-12-31 NOTE — TELEPHONE ENCOUNTER
Patient called in stating she has a painful lump on her left leg. She stated she has had this lump since 12/4. Patient wondering what Dr. Yuen would recommend?  Please Advise

## 2025-01-11 DIAGNOSIS — E78.00 PURE HYPERCHOLESTEROLEMIA: Primary | ICD-10-CM

## 2025-01-13 ENCOUNTER — TELEPHONE (OUTPATIENT)
Dept: PRIMARY CARE | Facility: CLINIC | Age: 45
End: 2025-01-13
Payer: COMMERCIAL

## 2025-01-13 RX ORDER — ATORVASTATIN CALCIUM 20 MG/1
20 TABLET, FILM COATED ORAL DAILY
Qty: 30 TABLET | Refills: 0 | Status: SHIPPED | OUTPATIENT
Start: 2025-01-13

## 2025-01-13 NOTE — TELEPHONE ENCOUNTER
Rx Refill Request Telephone Encounter    Name:  Stephenie Mccray  :  [id number]  Medication Name:  atorvastatin (Lipitor) 20 mg   Specific Pharmacy location:  [facility]   Date of last appointment:  9/3/2024  Date of next appointment:  9/3  Best number to reach patient:  [phone]

## 2025-01-29 ENCOUNTER — HOSPITAL ENCOUNTER (OUTPATIENT)
Dept: RADIOLOGY | Facility: HOSPITAL | Age: 45
Discharge: HOME | End: 2025-01-29
Payer: COMMERCIAL

## 2025-01-29 ENCOUNTER — APPOINTMENT (OUTPATIENT)
Dept: RADIOLOGY | Facility: HOSPITAL | Age: 45
End: 2025-01-29
Payer: COMMERCIAL

## 2025-01-29 ENCOUNTER — OFFICE VISIT (OUTPATIENT)
Dept: PRIMARY CARE | Facility: CLINIC | Age: 45
End: 2025-01-29
Payer: COMMERCIAL

## 2025-01-29 DIAGNOSIS — Z87.442 HISTORY OF KIDNEY STONES: ICD-10-CM

## 2025-01-29 DIAGNOSIS — R35.0 FREQUENCY OF URINATION: ICD-10-CM

## 2025-01-29 DIAGNOSIS — R39.9 SYMPTOMS OF URINARY TRACT INFECTION: ICD-10-CM

## 2025-01-29 DIAGNOSIS — R39.15 URGENCY OF URINATION: ICD-10-CM

## 2025-01-29 DIAGNOSIS — R10.9 RIGHT FLANK PAIN: ICD-10-CM

## 2025-01-29 DIAGNOSIS — R10.9 RIGHT FLANK PAIN: Primary | ICD-10-CM

## 2025-01-29 LAB
POC APPEARANCE, URINE: ABNORMAL
POC BILIRUBIN, URINE: NEGATIVE
POC BLOOD, URINE: ABNORMAL
POC COLOR, URINE: YELLOW
POC GLUCOSE, URINE: NEGATIVE MG/DL
POC KETONES, URINE: NEGATIVE MG/DL
POC LEUKOCYTES, URINE: ABNORMAL
POC NITRITE,URINE: NEGATIVE
POC PH, URINE: 5.5 PH
POC PROTEIN, URINE: NEGATIVE MG/DL
POC SPECIFIC GRAVITY, URINE: 1.02
POC UROBILINOGEN, URINE: 0.2 EU/DL

## 2025-01-29 PROCEDURE — 76770 US EXAM ABDO BACK WALL COMP: CPT

## 2025-01-29 PROCEDURE — 81003 URINALYSIS AUTO W/O SCOPE: CPT | Performed by: NURSE PRACTITIONER

## 2025-01-29 PROCEDURE — 99212 OFFICE O/P EST SF 10 MIN: CPT | Performed by: NURSE PRACTITIONER

## 2025-01-29 PROCEDURE — 76770 US EXAM ABDO BACK WALL COMP: CPT | Performed by: RADIOLOGY

## 2025-01-29 RX ORDER — SULFAMETHOXAZOLE AND TRIMETHOPRIM 800; 160 MG/1; MG/1
1 TABLET ORAL 2 TIMES DAILY
Qty: 14 TABLET | Refills: 0 | Status: SHIPPED | OUTPATIENT
Start: 2025-01-29 | End: 2025-02-05

## 2025-01-29 RX ORDER — PHENAZOPYRIDINE HYDROCHLORIDE 95 MG/1
95 TABLET ORAL 3 TIMES DAILY PRN
Qty: 10 TABLET | Refills: 0 | Status: SHIPPED | OUTPATIENT
Start: 2025-01-29 | End: 2025-02-01

## 2025-01-29 ASSESSMENT — PAIN SCALES - GENERAL: PAINLEVEL_OUTOF10: 3

## 2025-01-29 NOTE — PROGRESS NOTES
"Subjective   Patient ID: Stephenie Mccray is a 44 y.o. female who presents for Flank Pain.      Symptoms: right  flank pain, radiates to lower back pain and frequency  Length of symptoms: 2 days ago  OTC: tylenol with mild help.  Related information:    HPI     Review of Systems    Objective   BP 96/62   Pulse 89   Temp 36.2 °C (97.2 °F)   Resp 16   Ht 1.575 m (5' 2\")   Wt 71.8 kg (158 lb 3.2 oz)   SpO2 92%   BMI 28.94 kg/m²     Physical Exam    Assessment/Plan          "

## 2025-01-29 NOTE — PROGRESS NOTES
Subjective   Patient ID: Stephenie Mccray is a 44 y.o. female who is with complaint of symptoms of UTI and right flank pain.    HPI   Patient is a 44 y.o. female who CONSULTED AT [facility] today. Patient is with complaints of increased urinary frequency, urgency of urination, mild sensation of inadequate emptying post voiding, lower abdominal discomfort (dysuria), nocturia, right sided low back pain, right flank pain, mild incontinence, and cloudy urine.  She denies having any burning sensation on urination, blood in urine, nausea, vomiting, chills, nor fever. Patient states symptoms has been going on for 3 days. Patient has been taking Tylenol for the flank pain.   Patient states she has PMHx of kidney stones.    Review of Systems  General: no weight loss, generally healthy, no fatigue  Head:  no headaches / sinus pain, no vertigo, no injury  Eyes: no diplopia, no tearing, no pain,   Ears: no change in hearing, no tinnitus, no bleeding, no vertigo  Mouth:  no dental difficulties, no gingival bleeding, no sore throat, no loss of sense of taste  Nose: no congestion, no  discharge, no bleeding, no obstruction, no loss of sense of smell  Neck: no stiffness, no pain, no tenderness, no masses, no bruit  Pulmonary: no dyspnea, no wheezing, no hemoptysis, no cough  Cardiovascular: no chest pain, no palpitations, no syncope, no orthopnea  Gastrointestinal: no change in appetite, no dysphagia, no abdominal pains, no diarrhea, no emesis, no melena  Genito Urinary: (+) increased urinary frequency, (+) urgency of urination, (+) mild sensation of inadequate emptying post voiding, (+) lower abdominal discomfort (dysuria), (+) nocturia, (+) right sided low back pain, (+) right flank pain, (+) mild incontinence, (+) cloudy urine, no burning sensation on urination, no blood in urine,   Musculoskeletal: no muscle ache, no joint pain, no limitation of range of motion, no paresthesia, no  "numbness  Constitutional: no fever, no chills, no night sweats    Objective   BP 96/62   Pulse 89   Temp 36.2 °C (97.2 °F)   Resp 16   Ht 1.575 m (5' 2\")   Wt 71.8 kg (158 lb 3.2 oz)   SpO2 92%   BMI 28.94 kg/m²     Physical Exam  General: ambulatory, in no acute distress  Head: normocephalic, no lesions  Eyes: pink palpebral conjunctiva, anicteric sclerae, PERRLA, EOM's full  Ears: clear external auditory canals, no ear discharge, no bleeding from the ears, tympanic membrane intact  Nose: nasal mucosa normal, no nasal discharge, no bleeding, no obstruction  Throat: clear, no exudate, no lesions  Neck: supple, no masses, no bruits  Chest: symmetrical chest expansion, no lagging, no retractions, clear breath sounds, no rales, no wheezes  Heart: normal rate, regular rhythm, no heaves, no thrills, no murmurs  Abdomen: flat, NABS, soft, (+) slight direct tenderness on deep palpation of ---, no rebound tenderness, no mass palpated, SIGNS: no [place], no R[place]sings, no Psoas, no Obturator; No CVA tenderness,  Musculoskeletal: no limitation of range of motion, no paralysis, no deformity  Extremities: full and equal peripheral pulses, no edema,    Assessment/Plan   Problem List Items Addressed This Visit    None  Visit Diagnoses         Codes    Right flank pain    -  Primary R10.9    Relevant Medications    sulfamethoxazole-trimethoprim (Bactrim DS) 800-160 mg tablet    phenazopyridine (Urinary Pain Relief) 95 mg tablet    Other Relevant Orders    POCT UA Automated manually resulted (Completed)    Urine Culture    US renal complete    History of kidney stones     Z87.442    Relevant Medications    sulfamethoxazole-trimethoprim (Bactrim DS) 800-160 mg tablet    phenazopyridine (Urinary Pain Relief) 95 mg tablet    Other Relevant Orders    POCT UA Automated manually resulted (Completed)    Urine Culture    US renal complete    Symptoms of urinary tract infection     R39.9    Relevant Medications    " sulfamethoxazole-trimethoprim (Bactrim DS) 800-160 mg tablet    phenazopyridine (Urinary Pain Relief) 95 mg tablet    Other Relevant Orders    POCT UA Automated manually resulted (Completed)    Urine Culture    US renal complete    Frequency of urination     R35.0    Relevant Medications    sulfamethoxazole-trimethoprim (Bactrim DS) 800-160 mg tablet    phenazopyridine (Urinary Pain Relief) 95 mg tablet    Other Relevant Orders    POCT UA Automated manually resulted (Completed)    Urine Culture    US renal complete    Urgency of urination     R39.15    Relevant Medications    sulfamethoxazole-trimethoprim (Bactrim DS) 800-160 mg tablet    phenazopyridine (Urinary Pain Relief) 95 mg tablet    Other Relevant Orders    POCT UA Automated manually resulted (Completed)    Urine Culture    US renal complete        patient for renal ultrasound  Patient assisted by  staff with regards to when and where to get the ultrasound done.  will call patient with official reading from the radiologist  patient verbalized understanding    Urinalysis was done at the office today. Urinalysis result explained and discussed with patient. Urine sample submitted to laboratory for culture and sensitivity study. The laboratory examination requested were explained and discussed with patient.    DISCHARGE SUMMARY:   Patient seen and examined. Probable diagnosis, differential diagnosis, treatment, treatment options, and probable complications were discussed and explained to patient. she was to take medication/s associated with this visit. she may take over-the-counter pain and/or fever medication if needed. Advised increased oral fluid intake (2 liters of water or more per day). Reinforced to continue personal hygiene. Patient to return to clinic if there is worsening or persistence of symptoms. Patient verbalized understanding.    Patient to come back in 7 - 10 days if needed for worsening symptoms.

## 2025-01-30 VITALS
HEART RATE: 89 BPM | TEMPERATURE: 97.2 F | HEIGHT: 62 IN | BODY MASS INDEX: 29.11 KG/M2 | SYSTOLIC BLOOD PRESSURE: 96 MMHG | DIASTOLIC BLOOD PRESSURE: 62 MMHG | RESPIRATION RATE: 16 BRPM | OXYGEN SATURATION: 92 % | WEIGHT: 158.2 LBS

## 2025-01-30 NOTE — PATIENT INSTRUCTIONS
DISCHARGE SUMMARY:   Patient seen and examined. Probable diagnosis, differential diagnosis, treatment, treatment options, and probable complications were discussed and explained to patient. she was to take medication/s associated with this visit. she may take over-the-counter pain and/or fever medication if needed. Advised increased oral fluid intake (2 liters of water or more per day). Reinforced to continue personal hygiene. Patient to return to clinic if there is worsening or persistence of symptoms. Patient verbalized understanding.    Patient to come back in 7 - 10 days if needed for worsening symptoms.

## 2025-01-31 ENCOUNTER — DOCUMENTATION (OUTPATIENT)
Dept: PRIMARY CARE | Facility: CLINIC | Age: 45
End: 2025-01-31
Payer: COMMERCIAL

## 2025-01-31 LAB — BACTERIA UR CULT: NORMAL

## 2025-02-02 PROCEDURE — 99284 EMERGENCY DEPT VISIT MOD MDM: CPT | Performed by: EMERGENCY MEDICINE

## 2025-02-03 ENCOUNTER — HOSPITAL ENCOUNTER (EMERGENCY)
Facility: HOSPITAL | Age: 45
Discharge: PSYCHIATRIC OR SUBSTANCE USE FACILITY | End: 2025-02-04
Attending: STUDENT IN AN ORGANIZED HEALTH CARE EDUCATION/TRAINING PROGRAM
Payer: COMMERCIAL

## 2025-02-03 ENCOUNTER — APPOINTMENT (OUTPATIENT)
Dept: RADIOLOGY | Facility: HOSPITAL | Age: 45
End: 2025-02-03
Payer: COMMERCIAL

## 2025-02-03 ENCOUNTER — HOSPITAL ENCOUNTER (EMERGENCY)
Facility: HOSPITAL | Age: 45
Discharge: HOME | End: 2025-02-03
Attending: EMERGENCY MEDICINE
Payer: COMMERCIAL

## 2025-02-03 ENCOUNTER — APPOINTMENT (OUTPATIENT)
Dept: CARDIOLOGY | Facility: HOSPITAL | Age: 45
End: 2025-02-03
Payer: COMMERCIAL

## 2025-02-03 VITALS
WEIGHT: 153 LBS | RESPIRATION RATE: 18 BRPM | TEMPERATURE: 97.5 F | SYSTOLIC BLOOD PRESSURE: 105 MMHG | HEART RATE: 75 BPM | OXYGEN SATURATION: 96 % | BODY MASS INDEX: 28.16 KG/M2 | HEIGHT: 62 IN | DIASTOLIC BLOOD PRESSURE: 61 MMHG

## 2025-02-03 DIAGNOSIS — R45.850 HOMICIDAL IDEATION: Primary | ICD-10-CM

## 2025-02-03 DIAGNOSIS — F22 PARANOIA (PSYCHOSIS) (MULTI): ICD-10-CM

## 2025-02-03 DIAGNOSIS — J42 CHRONIC BRONCHITIS, UNSPECIFIED CHRONIC BRONCHITIS TYPE (MULTI): ICD-10-CM

## 2025-02-03 DIAGNOSIS — L29.9 GENERALIZED PRURITUS: Primary | ICD-10-CM

## 2025-02-03 LAB
ALBUMIN SERPL BCP-MCNC: 3.7 G/DL (ref 3.4–5)
ALP SERPL-CCNC: 158 U/L (ref 33–110)
ALT SERPL W P-5'-P-CCNC: 100 U/L (ref 7–45)
ANION GAP SERPL CALC-SCNC: 12 MMOL/L (ref 10–20)
APAP SERPL-MCNC: <10 UG/ML
AST SERPL W P-5'-P-CCNC: 35 U/L (ref 9–39)
ATRIAL RATE: 124 BPM
B-HCG SERPL-ACNC: 3 MIU/ML
BASOPHILS # BLD MANUAL: 0 X10*3/UL (ref 0–0.1)
BASOPHILS NFR BLD MANUAL: 0 %
BILIRUB SERPL-MCNC: 0.4 MG/DL (ref 0–1.2)
BNP SERPL-MCNC: 13 PG/ML (ref 0–99)
BUN SERPL-MCNC: 12 MG/DL (ref 6–23)
CALCIUM SERPL-MCNC: 8.9 MG/DL (ref 8.6–10.3)
CARDIAC TROPONIN I PNL SERPL HS: 4 NG/L (ref 0–13)
CHLORIDE SERPL-SCNC: 107 MMOL/L (ref 98–107)
CO2 SERPL-SCNC: 25 MMOL/L (ref 21–32)
CREAT SERPL-MCNC: 0.97 MG/DL (ref 0.5–1.05)
EGFRCR SERPLBLD CKD-EPI 2021: 75 ML/MIN/1.73M*2
EOSINOPHIL # BLD MANUAL: 0.1 X10*3/UL (ref 0–0.7)
EOSINOPHIL NFR BLD MANUAL: 2 %
ERYTHROCYTE [DISTWIDTH] IN BLOOD BY AUTOMATED COUNT: 14.3 % (ref 11.5–14.5)
ETHANOL SERPL-MCNC: <10 MG/DL
FLUAV RNA RESP QL NAA+PROBE: NOT DETECTED
FLUBV RNA RESP QL NAA+PROBE: NOT DETECTED
GLUCOSE SERPL-MCNC: 140 MG/DL (ref 74–99)
HCT VFR BLD AUTO: 39.1 % (ref 36–46)
HGB BLD-MCNC: 12.7 G/DL (ref 12–16)
IMM GRANULOCYTES # BLD AUTO: 0.05 X10*3/UL (ref 0–0.7)
IMM GRANULOCYTES NFR BLD AUTO: 1 % (ref 0–0.9)
INR PPP: 1.3 (ref 0.9–1.1)
LYMPHOCYTES # BLD MANUAL: 1.12 X10*3/UL (ref 1.2–4.8)
LYMPHOCYTES NFR BLD MANUAL: 22 %
MAGNESIUM SERPL-MCNC: 1.62 MG/DL (ref 1.6–2.4)
MCH RBC QN AUTO: 30.9 PG (ref 26–34)
MCHC RBC AUTO-ENTMCNC: 32.5 G/DL (ref 32–36)
MCV RBC AUTO: 95 FL (ref 80–100)
MONOCYTES # BLD MANUAL: 0.46 X10*3/UL (ref 0.1–1)
MONOCYTES NFR BLD MANUAL: 9 %
NEUTS SEG # BLD MANUAL: 3.26 X10*3/UL (ref 1.2–7)
NEUTS SEG NFR BLD MANUAL: 64 %
NRBC BLD-RTO: 0 /100 WBCS (ref 0–0)
P AXIS: 61 DEGREES
P OFFSET: 186 MS
P ONSET: 136 MS
PLATELET # BLD AUTO: 94 X10*3/UL (ref 150–450)
POTASSIUM SERPL-SCNC: 3.7 MMOL/L (ref 3.5–5.3)
PR INTERVAL: 124 MS
PROT SERPL-MCNC: 6.6 G/DL (ref 6.4–8.2)
PROTHROMBIN TIME: 14.9 SECONDS (ref 9.8–12.8)
Q ONSET: 198 MS
QRS COUNT: 20 BEATS
QRS DURATION: 124 MS
QT INTERVAL: 300 MS
QTC CALCULATION(BAZETT): 431 MS
QTC FREDERICIA: 382 MS
R AXIS: 104 DEGREES
RBC # BLD AUTO: 4.11 X10*6/UL (ref 4–5.2)
RBC MORPH BLD: ABNORMAL
S PYO DNA THROAT QL NAA+PROBE: NOT DETECTED
SALICYLATES SERPL-MCNC: <3 MG/DL
SARS-COV-2 RNA RESP QL NAA+PROBE: NOT DETECTED
SODIUM SERPL-SCNC: 140 MMOL/L (ref 136–145)
T AXIS: 24 DEGREES
T OFFSET: 348 MS
TOTAL CELLS COUNTED BLD: 100
TSH SERPL-ACNC: 3.66 MIU/L (ref 0.44–3.98)
VARIANT LYMPHS # BLD MANUAL: 0.15 X10*3/UL (ref 0–0.5)
VARIANT LYMPHS NFR BLD: 3 %
VENTRICULAR RATE: 124 BPM
WBC # BLD AUTO: 5.1 X10*3/UL (ref 4.4–11.3)

## 2025-02-03 PROCEDURE — 87086 URINE CULTURE/COLONY COUNT: CPT | Mod: ELYLAB | Performed by: PHYSICIAN ASSISTANT

## 2025-02-03 PROCEDURE — 99285 EMERGENCY DEPT VISIT HI MDM: CPT | Mod: 25 | Performed by: STUDENT IN AN ORGANIZED HEALTH CARE EDUCATION/TRAINING PROGRAM

## 2025-02-03 PROCEDURE — 70450 CT HEAD/BRAIN W/O DYE: CPT | Performed by: RADIOLOGY

## 2025-02-03 PROCEDURE — 83880 ASSAY OF NATRIURETIC PEPTIDE: CPT | Performed by: PHYSICIAN ASSISTANT

## 2025-02-03 PROCEDURE — 2500000001 HC RX 250 WO HCPCS SELF ADMINISTERED DRUGS (ALT 637 FOR MEDICARE OP): Performed by: EMERGENCY MEDICINE

## 2025-02-03 PROCEDURE — 80307 DRUG TEST PRSMV CHEM ANLYZR: CPT | Mod: 59 | Performed by: PHYSICIAN ASSISTANT

## 2025-02-03 PROCEDURE — 2500000001 HC RX 250 WO HCPCS SELF ADMINISTERED DRUGS (ALT 637 FOR MEDICARE OP): Performed by: PHYSICIAN ASSISTANT

## 2025-02-03 PROCEDURE — 81001 URINALYSIS AUTO W/SCOPE: CPT | Performed by: PHYSICIAN ASSISTANT

## 2025-02-03 PROCEDURE — 85610 PROTHROMBIN TIME: CPT | Performed by: PHYSICIAN ASSISTANT

## 2025-02-03 PROCEDURE — 80053 COMPREHEN METABOLIC PANEL: CPT | Performed by: PHYSICIAN ASSISTANT

## 2025-02-03 PROCEDURE — 71046 X-RAY EXAM CHEST 2 VIEWS: CPT

## 2025-02-03 PROCEDURE — 71046 X-RAY EXAM CHEST 2 VIEWS: CPT | Mod: FOREIGN READ | Performed by: RADIOLOGY

## 2025-02-03 PROCEDURE — 85007 BL SMEAR W/DIFF WBC COUNT: CPT | Performed by: PHYSICIAN ASSISTANT

## 2025-02-03 PROCEDURE — 36415 COLL VENOUS BLD VENIPUNCTURE: CPT | Performed by: PHYSICIAN ASSISTANT

## 2025-02-03 PROCEDURE — 84702 CHORIONIC GONADOTROPIN TEST: CPT | Performed by: PHYSICIAN ASSISTANT

## 2025-02-03 PROCEDURE — 83735 ASSAY OF MAGNESIUM: CPT | Performed by: PHYSICIAN ASSISTANT

## 2025-02-03 PROCEDURE — 80320 DRUG SCREEN QUANTALCOHOLS: CPT | Performed by: PHYSICIAN ASSISTANT

## 2025-02-03 PROCEDURE — 85027 COMPLETE CBC AUTOMATED: CPT | Performed by: PHYSICIAN ASSISTANT

## 2025-02-03 PROCEDURE — 84443 ASSAY THYROID STIM HORMONE: CPT | Performed by: STUDENT IN AN ORGANIZED HEALTH CARE EDUCATION/TRAINING PROGRAM

## 2025-02-03 PROCEDURE — 87636 SARSCOV2 & INF A&B AMP PRB: CPT | Performed by: EMERGENCY MEDICINE

## 2025-02-03 PROCEDURE — 87651 STREP A DNA AMP PROBE: CPT | Performed by: EMERGENCY MEDICINE

## 2025-02-03 PROCEDURE — 84484 ASSAY OF TROPONIN QUANT: CPT | Performed by: PHYSICIAN ASSISTANT

## 2025-02-03 PROCEDURE — 93005 ELECTROCARDIOGRAM TRACING: CPT

## 2025-02-03 PROCEDURE — 70450 CT HEAD/BRAIN W/O DYE: CPT

## 2025-02-03 RX ORDER — ESCITALOPRAM OXALATE 10 MG/1
20 TABLET ORAL DAILY
Status: DISCONTINUED | OUTPATIENT
Start: 2025-02-04 | End: 2025-02-04

## 2025-02-03 RX ORDER — DOXYCYCLINE 100 MG/1
100 CAPSULE ORAL 2 TIMES DAILY
Qty: 20 CAPSULE | Refills: 0 | Status: SHIPPED | OUTPATIENT
Start: 2025-02-03 | End: 2025-02-13

## 2025-02-03 RX ORDER — PRAZOSIN HYDROCHLORIDE 1 MG/1
2 CAPSULE ORAL ONCE
Status: COMPLETED | OUTPATIENT
Start: 2025-02-03 | End: 2025-02-03

## 2025-02-03 RX ORDER — ALBUTEROL SULFATE 90 UG/1
1-2 INHALANT RESPIRATORY (INHALATION) EVERY 6 HOURS PRN
Qty: 18 G | Refills: 0 | Status: SHIPPED | OUTPATIENT
Start: 2025-02-03 | End: 2025-03-05

## 2025-02-03 RX ORDER — HYDROXYZINE HYDROCHLORIDE 25 MG/1
50 TABLET, FILM COATED ORAL ONCE
Status: COMPLETED | OUTPATIENT
Start: 2025-02-03 | End: 2025-02-03

## 2025-02-03 RX ORDER — BUSPIRONE HYDROCHLORIDE 5 MG/1
30 TABLET ORAL 2 TIMES DAILY
Status: DISCONTINUED | OUTPATIENT
Start: 2025-02-04 | End: 2025-02-04 | Stop reason: HOSPADM

## 2025-02-03 RX ORDER — RISPERIDONE 1 MG/1
1 TABLET ORAL 2 TIMES DAILY
Status: DISCONTINUED | OUTPATIENT
Start: 2025-02-04 | End: 2025-02-04

## 2025-02-03 RX ORDER — PREDNISONE 20 MG/1
60 TABLET ORAL DAILY
Qty: 15 TABLET | Refills: 0 | Status: SHIPPED | OUTPATIENT
Start: 2025-02-03 | End: 2025-02-08

## 2025-02-03 RX ORDER — CLONIDINE HYDROCHLORIDE 0.1 MG/1
0.1 TABLET ORAL DAILY
Status: DISCONTINUED | OUTPATIENT
Start: 2025-02-04 | End: 2025-02-04

## 2025-02-03 RX ORDER — PERMETHRIN 50 MG/G
1 CREAM TOPICAL ONCE
Qty: 15 G | Refills: 0 | Status: SHIPPED | OUTPATIENT
Start: 2025-02-03 | End: 2025-02-03

## 2025-02-03 RX ORDER — HYDROXYZINE HYDROCHLORIDE 25 MG/1
25 TABLET, FILM COATED ORAL EVERY 6 HOURS PRN
Qty: 20 TABLET | Refills: 0 | Status: SHIPPED | OUTPATIENT
Start: 2025-02-03

## 2025-02-03 RX ORDER — LORAZEPAM 1 MG/1
1 TABLET ORAL ONCE
Status: COMPLETED | OUTPATIENT
Start: 2025-02-03 | End: 2025-02-03

## 2025-02-03 RX ORDER — CLONAZEPAM 0.5 MG/1
0.5 TABLET ORAL DAILY PRN
Status: DISCONTINUED | OUTPATIENT
Start: 2025-02-03 | End: 2025-02-04 | Stop reason: HOSPADM

## 2025-02-03 RX ADMIN — PRAZOSIN HYDROCHLORIDE 2 MG: 1 CAPSULE ORAL at 23:46

## 2025-02-03 RX ADMIN — LORAZEPAM 1 MG: 1 TABLET ORAL at 00:49

## 2025-02-03 RX ADMIN — HYDROXYZINE HYDROCHLORIDE 50 MG: 25 TABLET ORAL at 00:49

## 2025-02-03 RX ADMIN — CLONAZEPAM 0.5 MG: 0.5 TABLET ORAL at 23:54

## 2025-02-03 SDOH — HEALTH STABILITY: MENTAL HEALTH

## 2025-02-03 SDOH — SOCIAL STABILITY: SOCIAL INSECURITY: FAMILY BEHAVIORS: UNABLE TO ASSESS

## 2025-02-03 SDOH — SOCIAL STABILITY: SOCIAL NETWORK: VISITOR BEHAVIORS: UNABLE TO ASSESS

## 2025-02-03 SDOH — SOCIAL STABILITY: SOCIAL NETWORK: PARENT/GUARDIAN/SIGNIFICANT OTHER INVOLVEMENT: NO INVOLVEMENT

## 2025-02-03 SDOH — HEALTH STABILITY: MENTAL HEALTH: DELUSIONS: OTHER (COMMENT)

## 2025-02-03 SDOH — HEALTH STABILITY: MENTAL HEALTH: NEEDS EXPRESSED: DENIES

## 2025-02-03 SDOH — HEALTH STABILITY: MENTAL HEALTH: BEHAVIORAL HEALTH(WDL): EXCEPTIONS TO WDL

## 2025-02-03 SDOH — HEALTH STABILITY: MENTAL HEALTH: BEHAVIORS/MOOD: SLEEPING

## 2025-02-03 SDOH — HEALTH STABILITY: MENTAL HEALTH: SLEEP PATTERN: DISTURBED/INTERRUPTED SLEEP

## 2025-02-03 SDOH — SOCIAL STABILITY: SOCIAL NETWORK: EMOTIONAL SUPPORT GIVEN: REASSURE

## 2025-02-03 SDOH — HEALTH STABILITY: MENTAL HEALTH: HALLUCINATION: AUDITORY

## 2025-02-03 SDOH — HEALTH STABILITY: MENTAL HEALTH: CONTENT: UNABLE TO ASSESS

## 2025-02-03 SDOH — HEALTH STABILITY: PHYSICAL HEALTH: PATIENT ACTIVITY: SLEEPING

## 2025-02-03 SDOH — HEALTH STABILITY: MENTAL HEALTH: HAVE YOU ACTUALLY HAD ANY THOUGHTS OF KILLING YOURSELF?: NO

## 2025-02-03 SDOH — HEALTH STABILITY: MENTAL HEALTH: SUICIDE ASSESSMENT:: ADULT (C-SSRS)

## 2025-02-03 SDOH — HEALTH STABILITY: MENTAL HEALTH: HAVE YOU EVER DONE ANYTHING, STARTED TO DO ANYTHING, OR PREPARED TO DO ANYTHING TO END YOUR LIFE?: NO

## 2025-02-03 SDOH — HEALTH STABILITY: MENTAL HEALTH: COGNITION: APPROPRIATE FOR DEVELOPMENTAL AGE;FOLLOWS COMMANDS

## 2025-02-03 SDOH — HEALTH STABILITY: MENTAL HEALTH: HAVE YOU WISHED YOU WERE DEAD OR WISHED YOU COULD GO TO SLEEP AND NOT WAKE UP?: NO

## 2025-02-03 SDOH — HEALTH STABILITY: PHYSICAL HEALTH: PATIENT ACTIVITY: AWAKE

## 2025-02-03 ASSESSMENT — LIFESTYLE VARIABLES
HAVE YOU EVER FELT YOU SHOULD CUT DOWN ON YOUR DRINKING: NO
EVER HAD A DRINK FIRST THING IN THE MORNING TO STEADY YOUR NERVES TO GET RID OF A HANGOVER: NO
HAVE PEOPLE ANNOYED YOU BY CRITICIZING YOUR DRINKING: NO
EVER FELT BAD OR GUILTY ABOUT YOUR DRINKING: NO
TOTAL SCORE: 0

## 2025-02-03 ASSESSMENT — PAIN SCALES - GENERAL
PAINLEVEL_OUTOF10: 0 - NO PAIN
PAINLEVEL_OUTOF10: 0 - NO PAIN

## 2025-02-03 ASSESSMENT — COLUMBIA-SUICIDE SEVERITY RATING SCALE - C-SSRS
6. HAVE YOU EVER DONE ANYTHING, STARTED TO DO ANYTHING, OR PREPARED TO DO ANYTHING TO END YOUR LIFE?: NO
6. HAVE YOU EVER DONE ANYTHING, STARTED TO DO ANYTHING, OR PREPARED TO DO ANYTHING TO END YOUR LIFE?: YES
6. HAVE YOU EVER DONE ANYTHING, STARTED TO DO ANYTHING, OR PREPARED TO DO ANYTHING TO END YOUR LIFE?: NO
2. HAVE YOU ACTUALLY HAD ANY THOUGHTS OF KILLING YOURSELF?: NO
1. IN THE PAST MONTH, HAVE YOU WISHED YOU WERE DEAD OR WISHED YOU COULD GO TO SLEEP AND NOT WAKE UP?: NO
1. IN THE PAST MONTH, HAVE YOU WISHED YOU WERE DEAD OR WISHED YOU COULD GO TO SLEEP AND NOT WAKE UP?: NO
6. HAVE YOU EVER DONE ANYTHING, STARTED TO DO ANYTHING, OR PREPARED TO DO ANYTHING TO END YOUR LIFE?: NO
2. HAVE YOU ACTUALLY HAD ANY THOUGHTS OF KILLING YOURSELF?: NO

## 2025-02-03 ASSESSMENT — PAIN - FUNCTIONAL ASSESSMENT
PAIN_FUNCTIONAL_ASSESSMENT: 0-10
PAIN_FUNCTIONAL_ASSESSMENT: 0-10
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
## 2025-02-03 NOTE — ED PROVIDER NOTES
HPI   Chief Complaint   Patient presents with    Hives     I have hives and maybe pneumonia, I'm just getting over the flu I think         History provided by:  Patient and significant other    Chief Complaint   Patient presents with    Hives     I have hives and maybe pneumonia, I'm just getting over the flu I think       History of Present Illness:  Stephenie Mccray is a 44 y.o. female presents with itchy rash to her arms and legs since she started taking Bactrim.  She is on the Bactrim for a possible UTI.  No new exposures otherwise.  No one else has a similar rash.  No chest pain or shortness of breath related to the rash.  The patient also shares that she thinks that she may have pneumonia because she is having a cough and scratchy throat.  No fever or chills.  No nausea or vomiting.  No sick contacts.  Patient also requested her Invega shot.      PMFSH:   As per HPI, otherwise nurses notes reviewed in EMR.    Past Medical History:   Past Medical History:   Diagnosis Date    Acute on chronic respiratory failure (Multi)     Anxiety     Arthritis     Chronic headaches     COPD (chronic obstructive pulmonary disease) (Multi)     CPAP (continuous positive airway pressure) dependence     Depression     Diabetes mellitus (Multi)     type 2 IDDM    GERD (gastroesophageal reflux disease)     History of transfusion     History of venous thromboembolism     HL (hearing loss)     Hyperlipidemia     Hypertension     Hypothyroidism     Lipoma     Nephrolithiasis     CHARLIE (obstructive sleep apnea)     Ovarian teratoma     PE (pulmonary thromboembolism) (Multi)     PTSD (post-traumatic stress disorder)     Schizophrenia     Type 2 diabetes mellitus     VTE (venous thromboembolism)       Past Surgical History:   Past Surgical History:   Procedure Laterality Date     SECTION, LOW TRANSVERSE      x 1    EYE SURGERY      LITHOTRIPSY      STAPEDES SURGERY      TONSILLECTOMY      UMBILICAL HERNIA REPAIR      VENTRAL HERNIA  "REPAIR        Family History:   Family History   Problem Relation Name Age of Onset    Fibromyalgia Father      Hypertension Brother      Thyroid disease Maternal Grandmother      Thyroid disease Paternal Grandmother      Lung cancer Paternal Grandfather      Coronary artery disease Other Grandmother       Social History:    Social History     Tobacco Use    Smoking status: Every Day     Current packs/day: 0.50     Types: Cigarettes     Passive exposure: Current    Smokeless tobacco: Never   Substance Use Topics    Alcohol use: Never    Drug use: Yes     Types: Marijuana     Allergies:   Allergies   Allergen Reactions    Fluticasone Furoate-Vilanterol Unknown    Fluticasone-Umeclidin-Vilanter Other     Headaches    Levofloxacin Rash    Sumatriptan Other     Flushing    Vortioxetine Unknown     Current Outpatient Medications   Medication Instructions    albuterol 90 mcg/actuation inhaler     albuterol 90 mcg/actuation inhaler 1-2 puffs, inhalation, Every 6 hours PRN    alcohol swabs (Alcohol Prep Pads) pads, medicated Use 3 times daily prn    atorvastatin (LIPITOR) 20 mg, oral, Daily    BD Ultra-Fine Mini Pen Needle 31 gauge x 3/16\" needle USE TO INJECT 4 TIMES DAILY AS DIRECTED    busPIRone (BUSPAR) 30 mg, 2 times daily    clonazePAM (KlonoPIN) 1 mg tablet 2 times daily PRN    cloNIDine (CATAPRES) 0.1 mg, 2 times daily PRN    doxycycline (VIBRAMYCIN) 100 mg, oral, 2 times daily, Take with at least 8 ounces (large glass) of water, do not lie down for 30 minutes after    escitalopram (LEXAPRO) 20 mg, Every morning    hydrOXYzine HCL (ATARAX) 25 mg, oral, Every 6 hours PRN    hydrOXYzine pamoate (VISTARIL) 50 mg, 3 times daily PRN    iloperidone (FANAPT) 6 mg, Daily    insulin glargine (LANTUS SOLOSTAR U-100 INSULIN) 10 Units, subcutaneous, Every morning, Take as directed per insulin instructions.    insulin lispro 4 Units, 3 times daily (morning, midday, late afternoon)    Invega Sustenna 234 mg, Every 28 days    " ipratropium-albuteroL (Duo-Neb) 0.5-2.5 mg/3 mL nebulizer solution 3 mL, nebulization, Every 6 hours PRN    levothyroxine (SYNTHROID, LEVOXYL) 175 mcg, oral, Daily    metoprolol tartrate (LOPRESSOR) 25 mg, oral, 2 times daily    OneTouch Delica Plus Lancet 33 gauge misc USE 1 TO CHECK GLUCOSE THREE TIMES DAILY    OneTouch Verio test strips strip     paliperidone (INVEGA) 3 mg, oral, Daily, Do not crush, chew, or split.    pantoprazole (PROTONIX) 40 mg, oral, Daily    permethrin (Elimite) 5 % cream 1 Application, Topical, Once, Apply cream to entire body May repeat treatment in 14 days if live mites still present Itching may persist after effective treatment    prazosin (MINIPRESS) 5 mg, Nightly    predniSONE (DELTASONE) 60 mg, oral, Daily    sulfamethoxazole-trimethoprim (Bactrim DS) 800-160 mg tablet 1 tablet, oral, 2 times daily    tiotropium (Spiriva Respimat) 2.5 mcg/actuation inhaler 2 puffs, inhalation, Daily RT    Xarelto 10 mg, Daily              Patient History   Past Medical History:   Diagnosis Date    Acute on chronic respiratory failure (Multi)     Anxiety     Arthritis     Chronic headaches     COPD (chronic obstructive pulmonary disease) (Multi)     CPAP (continuous positive airway pressure) dependence     Depression     Diabetes mellitus (Multi)     type 2 IDDM    GERD (gastroesophageal reflux disease)     History of transfusion     History of venous thromboembolism     HL (hearing loss)     Hyperlipidemia     Hypertension     Hypothyroidism     Lipoma     Nephrolithiasis     CHARLIE (obstructive sleep apnea)     Ovarian teratoma     PE (pulmonary thromboembolism) (Multi)     PTSD (post-traumatic stress disorder)     Schizophrenia     Type 2 diabetes mellitus     VTE (venous thromboembolism)      Past Surgical History:   Procedure Laterality Date     SECTION, LOW TRANSVERSE      x 1    EYE SURGERY      LITHOTRIPSY      STAPEDES SURGERY      TONSILLECTOMY      UMBILICAL HERNIA REPAIR      VENTRAL  "HERNIA REPAIR       Family History   Problem Relation Name Age of Onset    Fibromyalgia Father      Hypertension Brother      Thyroid disease Maternal Grandmother      Thyroid disease Paternal Grandmother      Lung cancer Paternal Grandfather      Coronary artery disease Other Grandmother      Social History     Tobacco Use    Smoking status: Every Day     Current packs/day: 0.50     Types: Cigarettes     Passive exposure: Current    Smokeless tobacco: Never   Substance Use Topics    Alcohol use: Never    Drug use: Yes     Types: Marijuana       Physical Exam   ED Triage Vitals [02/03/25 0002]   Temperature Heart Rate Respirations BP   36.4 °C (97.5 °F) (!) 102 15 137/65      Pulse Ox Temp Source Heart Rate Source Patient Position   (!) 93 % Temporal Monitor Sitting      BP Location FiO2 (%)     Right arm --       Physical Exam  Physical Exam:    ED Triage Vitals [02/03/25 0002]   Temperature Heart Rate Respirations BP   36.4 °C (97.5 °F) (!) 102 15 137/65      Pulse Ox Temp Source Heart Rate Source Patient Position   (!) 93 % Temporal Monitor Sitting      BP Location FiO2 (%)     Right arm --         Patient Vitals for the past 24 hrs:   BP Temp Temp src Pulse Resp SpO2 Height Weight   02/03/25 0002 137/65 36.4 °C (97.5 °F) Temporal (!) 102 15 (!) 93 % 1.575 m (5' 2\") 69.4 kg (153 lb)       Constitutional: Vital signs per nursing notes.  Well developed, well nourished.  No acute distress.    Psychiatric: alert and oriented to person, place, and time; no abnormalities of mood or affect; memory intact  Eyes: PERRL; conjunctivae and lids normal; EOMI  ENT: otoscopic exam of external canal and TM´s normal; nasal mucosa, turbinates, and septum normal; mouth, tongue, and pharynx normal; pharynx without edema, exudate, or injection  Respiratory: normal respiratory effort and excursion; no rales, rhonchi, or wheezes; equal air entry  Cardiovascular: regular rate and rhythm; no murmurs, rubs or gallops; symmetric pulses; no " edema; normal capillary refill; distal pulses present  Neurological: normal speech; CN II-XII grossly intact; normal motor and sensory function; no nystagmus  GI: no masses, tenderness, rebound or guarding; no palpable, pulsatile mass; no organomegaly; no hernia; normal bowel sounds; (-) Banda´s sign; (-) McBurney´s sign; (-) CVA tenderness  Lymphatic: no adenopathy of neck  Musculoskeletal: normal gait and station; normal digits and nails; no gross tendon or ligament injury; normal to palpation; normal strength/tone; neurovascular status intact; (-) Denisa´s sign; (-) straight leg raise  Skin: normal to inspection; normal to palpation; no rash; except papular rash to the arms and legs with excoriations; no urticaria present; no burrows or intertriginous lesions; no vesicles  GCS: 15      ED Course & MDM   Diagnoses as of 02/03/25 0139   Generalized pruritus   Chronic bronchitis, unspecified chronic bronchitis type (Multi)                 No data recorded     Ezequiel Coma Scale Score: 15 (02/03/25 0002 : Tracy Goff RN)                           Medical Decision Making  Medical Decision Making:    EKG:    Labs:   Labs Reviewed   GROUP A STREPTOCOCCUS, PCR - Normal       Result Value    Group A Strep PCR Not Detected     SARS-COV-2 AND INFLUENZA A/B PCR - Normal    Flu A Result Not Detected      Flu B Result Not Detected      Coronavirus 2019, PCR Not Detected      Narrative:     This assay is an FDA-cleared, in vitro diagnostic nucleic acid amplification test for the qualitative detection and differentiation of SARS CoV-2/ Influenza A/B from nasopharyngeal specimens collected from individuals with signs and symptoms of respiratory tract infections, and has been validated for use at [facility]. Negative results do not preclude COVID-19/ Influenza A/B infections and should not be used as the sole basis for diagnosis, treatment, or other management decisions. Testing for SARS CoV-2 is  recommended only for patients who meet current clinical and/or epidemiological criteria defined by federal, state, or local public health directives.       Diagnostic Imaging:   XR chest 2 views   Final Result   Hyperinflation. No definitive consolidation.   Signed by Niels Perez DO          ED Medication Administration:   Medications   hydrOXYzine HCL (Atarax) tablet 50 mg (50 mg oral Given 2/3/25 0049)   LORazepam (Ativan) tablet 1 mg (1 mg oral Given 2/3/25 0049)       ED Course:     Stephenie Mccray is a 44 y.o. female presents with itchy rash and cough with shortness of breath.    Differential Diagnoses Considered:  Medication reaction, hives, scabies; pneumonia, viral syndrome      Diagnoses as of 02/03/25 0139   Generalized pruritus   Chronic bronchitis, unspecified chronic bronchitis type (Multi)       Abnormal Labs Reviewed - No abnormal labs to display    BP      Temp      Pulse     Resp      SpO2        Patient presents with rash and cough.     Chest x-ray to evaluate for evidence of pneumonia.     Considered lab work to evaluate for evidence of severe anemia, thrombocytopenia, leukocytosis/leukopenia, or electrolyte abnormality (including hypokalemia, hyperkalemia, hypernatremia, hyponatremia, hyperglycemia, or hypoglycemia) but not indicated as I considered but do not suspect any of these acute issues.    Considered UA to evaluate for evidence of UTI.  However, I was able to find her urine culture which was performed on January 29, 2025.  It showed mixed genital jon.  Therefore the patient does not actually have a UTI and she can discontinue taking her Bactrim.    COVID-19/RSV/Influenza swab to evaluate for evidence of respective infections.      Diagnostic evaluation was completed.  Influenza, COVID and strep are negative.  Chest x-ray shows hyperinflation.  No definite consolidation.    I suspect the patient likely has chronic bronchitis.  She will be treated medications for her symptoms.  I  do not believe the patient has UTI so I told her to discontinue the Bactrim.  The exact cause of the rash is unclear at this time.  I will give her medication for the itching.  Will also give her medication to apply topically.  I shared with the patient that we do not give Invega injections and that she needs to follow-up with her psychiatric provider for this.    I discussed the results and discharge plan with the patient and/or family/friend if present.  I emphasized the importance of follow up with the physician I referred them to in the timeframe recommended.  I explained reasons for the patient to return to the Emergency Department.  Questions were addressed.  The patient and/or family/friend expressed understanding.        Shared decision making made with patient, and/or family, who agrees with plan.        External Record Review: I reviewed and interpreted prior non-ED medical record specialty: I reviewed the patient's outpatient lab work performed on January 29, 2025.  Urine culture was negative.  Urinalysis showed trace blood and trace leukocyte Estrace.  Renal ultrasound showed mild debris within the urinary bladder.  Otherwise unremarkable examination.    Social Determinants of Health Affecting Care:      Poor Health Literacy (Additional Time Provided in Explanation)  Psychiatric Illness poorly controlled (Additional Time Provided in Explanations)      Prescription Medication Consideration/Given:   ED Prescriptions       Medication Sig Dispense Start Date End Date Auth. Provider    albuterol 90 mcg/actuation inhaler Inhale 1-2 puffs every 6 hours if needed for wheezing. 18 g 2/3/2025 3/5/2025 Niels MARTINS MD    predniSONE (Deltasone) 20 mg tablet Take 3 tablets (60 mg) by mouth once daily for 5 days. 15 tablet 2/3/2025 2/8/2025 Niels MARTINS MD    doxycycline (Vibramycin) 100 mg capsule Take 1 capsule (100 mg) by mouth 2 times a day for 10 days. Take with at least 8 ounces (large glass) of  water, do not lie down for 30 minutes after 20 capsule 2/3/2025 2/13/2025 Niels MARTINS MD    permethrin (Elimite) 5 % cream (Expires today) Apply 1 Application topically 1 time for 1 dose. Apply cream to entire body May repeat treatment in 14 days if live mites still present Itching may persist after effective treatment 15 g 2/3/2025 2/3/2025 Niels MARTINS MD    hydrOXYzine HCL (Atarax) 25 mg tablet Take 1 tablet (25 mg) by mouth every 6 hours if needed for itching. 20 tablet 2/3/2025 -- Niels MARTINS MD            Diagnosis:   1. Generalized pruritus    2. Chronic bronchitis, unspecified chronic bronchitis type (Multi)                    Procedure  Procedures     Niels MARTINS MD  02/03/25 0139

## 2025-02-04 VITALS
TEMPERATURE: 97.2 F | DIASTOLIC BLOOD PRESSURE: 74 MMHG | HEIGHT: 62 IN | BODY MASS INDEX: 28.16 KG/M2 | OXYGEN SATURATION: 90 % | HEART RATE: 101 BPM | WEIGHT: 153 LBS | SYSTOLIC BLOOD PRESSURE: 132 MMHG | RESPIRATION RATE: 16 BRPM

## 2025-02-04 LAB
AMPHETAMINES UR QL SCN: ABNORMAL
APPEARANCE UR: CLEAR
BACTERIA #/AREA URNS AUTO: ABNORMAL /HPF
BARBITURATES UR QL SCN: ABNORMAL
BENZODIAZ UR QL SCN: ABNORMAL
BILIRUB UR STRIP.AUTO-MCNC: NEGATIVE MG/DL
BZE UR QL SCN: ABNORMAL
CANNABINOIDS UR QL SCN: ABNORMAL
COLOR UR: YELLOW
FENTANYL+NORFENTANYL UR QL SCN: ABNORMAL
GLUCOSE UR STRIP.AUTO-MCNC: NORMAL MG/DL
HOLD SPECIMEN: NORMAL
HYALINE CASTS #/AREA URNS AUTO: ABNORMAL /LPF
KETONES UR STRIP.AUTO-MCNC: NEGATIVE MG/DL
LEUKOCYTE ESTERASE UR QL STRIP.AUTO: ABNORMAL
METHADONE UR QL SCN: ABNORMAL
MUCOUS THREADS #/AREA URNS AUTO: ABNORMAL /LPF
NITRITE UR QL STRIP.AUTO: NEGATIVE
OPIATES UR QL SCN: ABNORMAL
OXYCODONE+OXYMORPHONE UR QL SCN: ABNORMAL
PCP UR QL SCN: ABNORMAL
PH UR STRIP.AUTO: 6.5 [PH]
PROT UR STRIP.AUTO-MCNC: ABNORMAL MG/DL
RBC # UR STRIP.AUTO: NEGATIVE /UL
RBC #/AREA URNS AUTO: ABNORMAL /HPF
SP GR UR STRIP.AUTO: 1.02
SQUAMOUS #/AREA URNS AUTO: ABNORMAL /HPF
UROBILINOGEN UR STRIP.AUTO-MCNC: ABNORMAL MG/DL
WBC #/AREA URNS AUTO: ABNORMAL /HPF

## 2025-02-04 PROCEDURE — 2500000002 HC RX 250 W HCPCS SELF ADMINISTERED DRUGS (ALT 637 FOR MEDICARE OP, ALT 636 FOR OP/ED): Performed by: EMERGENCY MEDICINE

## 2025-02-04 PROCEDURE — 2500000001 HC RX 250 WO HCPCS SELF ADMINISTERED DRUGS (ALT 637 FOR MEDICARE OP): Performed by: EMERGENCY MEDICINE

## 2025-02-04 RX ORDER — ESCITALOPRAM OXALATE 10 MG/1
20 TABLET ORAL ONCE
Status: COMPLETED | OUTPATIENT
Start: 2025-02-04 | End: 2025-02-04

## 2025-02-04 RX ORDER — RISPERIDONE 1 MG/1
1 TABLET ORAL ONCE
Status: COMPLETED | OUTPATIENT
Start: 2025-02-04 | End: 2025-02-04

## 2025-02-04 RX ORDER — CLONIDINE HYDROCHLORIDE 0.1 MG/1
0.1 TABLET ORAL ONCE
Status: COMPLETED | OUTPATIENT
Start: 2025-02-04 | End: 2025-02-04

## 2025-02-04 RX ADMIN — ESCITALOPRAM OXALATE 20 MG: 10 TABLET, FILM COATED ORAL at 07:41

## 2025-02-04 RX ADMIN — CLONIDINE HYDROCHLORIDE 0.1 MG: 0.1 TABLET ORAL at 07:42

## 2025-02-04 RX ADMIN — RISPERIDONE 1 MG: 1 TABLET, FILM COATED ORAL at 07:42

## 2025-02-04 SDOH — HEALTH STABILITY: MENTAL HEALTH: CONTENT: UNREMARKABLE

## 2025-02-04 SDOH — HEALTH STABILITY: MENTAL HEALTH: HAVE YOU ACTUALLY HAD ANY THOUGHTS OF KILLING YOURSELF?: NO

## 2025-02-04 SDOH — HEALTH STABILITY: MENTAL HEALTH: BEHAVIORS/MOOD: ANXIOUS;COOPERATIVE

## 2025-02-04 SDOH — HEALTH STABILITY: MENTAL HEALTH: BEHAVIORS/MOOD: SLEEPING

## 2025-02-04 SDOH — HEALTH STABILITY: MENTAL HEALTH

## 2025-02-04 SDOH — SOCIAL STABILITY: SOCIAL NETWORK: PARENT/GUARDIAN/SIGNIFICANT OTHER INVOLVEMENT: NO INVOLVEMENT

## 2025-02-04 SDOH — HEALTH STABILITY: MENTAL HEALTH: CONTENT: UNABLE TO ASSESS

## 2025-02-04 SDOH — SOCIAL STABILITY: SOCIAL NETWORK: EMOTIONAL SUPPORT GIVEN: REASSURE

## 2025-02-04 SDOH — HEALTH STABILITY: MENTAL HEALTH: NEEDS EXPRESSED: DENIES

## 2025-02-04 SDOH — SOCIAL STABILITY: SOCIAL INSECURITY: FAMILY BEHAVIORS: UNABLE TO ASSESS

## 2025-02-04 SDOH — HEALTH STABILITY: PHYSICAL HEALTH: PATIENT ACTIVITY: AWAKE

## 2025-02-04 SDOH — SOCIAL STABILITY: SOCIAL NETWORK: VISITOR BEHAVIORS: UNABLE TO ASSESS

## 2025-02-04 SDOH — HEALTH STABILITY: MENTAL HEALTH: COGNITION: FOLLOWS COMMANDS;POOR SAFETY AWARENESS;POOR JUDGEMENT

## 2025-02-04 SDOH — HEALTH STABILITY: MENTAL HEALTH: BEHAVIORAL HEALTH(WDL): EXCEPTIONS TO WDL

## 2025-02-04 SDOH — HEALTH STABILITY: MENTAL HEALTH: SUICIDE ASSESSMENT: ADULT (C-SSRS)

## 2025-02-04 SDOH — HEALTH STABILITY: MENTAL HEALTH: SLEEP PATTERN: UNABLE TO ASSESS

## 2025-02-04 SDOH — HEALTH STABILITY: MENTAL HEALTH: DELUSIONS: OTHER (COMMENT)

## 2025-02-04 SDOH — HEALTH STABILITY: MENTAL HEALTH: HAVE YOU WISHED YOU WERE DEAD OR WISHED YOU COULD GO TO SLEEP AND NOT WAKE UP?: NO

## 2025-02-04 SDOH — HEALTH STABILITY: MENTAL HEALTH: HAVE YOU EVER DONE ANYTHING, STARTED TO DO ANYTHING, OR PREPARED TO DO ANYTHING TO END YOUR LIFE?: NO

## 2025-02-04 SDOH — HEALTH STABILITY: MENTAL HEALTH: FOR HIGH RISK PATIENTS: 1:1 PATIENT OBSERVER AT ALL TIMES

## 2025-02-04 SDOH — HEALTH STABILITY: MENTAL HEALTH
OTHER SUICIDE PRECAUTIONS INCLUDE: PATIENT PLACED IN AN EASILY OBSERVABLE ROOM WITH DOOR/CURTAIN REMAINING OPEN;PATIENT PLACED IN GOWN (SNAPS OR PAPER GOWNS PREFERRED) AND WANDED;PATIENT PLACED IN PSYCH SAFE ROOM (IF AVAILABLE);ELOPEMENT RISK IDENTIFIED;FREQUENT ROUNDING WITH IRREGULAR CHECKS AT MINIMUM OF EVERY 15 MINUTES TO ASSESS PSYCH SAFETY PERFORMED;HOURLY BEHAVIORAL ASSESSMENT PERFORMED

## 2025-02-04 ASSESSMENT — COLUMBIA-SUICIDE SEVERITY RATING SCALE - C-SSRS
2. HAVE YOU ACTUALLY HAD ANY THOUGHTS OF KILLING YOURSELF?: NO
1. SINCE LAST CONTACT, HAVE YOU WISHED YOU WERE DEAD OR WISHED YOU COULD GO TO SLEEP AND NOT WAKE UP?: NO
6. HAVE YOU EVER DONE ANYTHING, STARTED TO DO ANYTHING, OR PREPARED TO DO ANYTHING TO END YOUR LIFE?: NO

## 2025-02-04 NOTE — CONSULTS
"Consults     HISTORY OF PRESENT ILLNESS:  Stephenie Mccray is a 44 y.o. female with a past psychiatric history of schizophrenia, and a past medical history of COPD, DM2, HTN, hypothyroid who was BIB EMS to [place] on 2/3/25 for psychiatric evaluation.     On chart review, patient presented to [place] ED at midnight on 2/3/25. Found to have bronchitis and discharged to home.     On interview, reports that she \"needs Ativan\" despite falling asleep during interview. When asked about earlier visit and what she did after discharge reports that she went home in [place] where lives with  and his mother. She cannot recall what she did with her day or how she got to the ED. Reports that she wanted to kill \"the bad people... who hurt children\". Alter personality is \"Fifteen\" whom she clarifies who wants to kill the bad people. Denies wanting to hurt a specific person or having a plan. Reports she has missed \"a few\" of her psychiatric medications stating \"I want the shot\". She cannot recall the last time she had the shot or what medications she takes at home.     Endorses derogatory auditory hallucinations. Endorses visual hallucinations though without specifics. Endorses chronic daily marijuana use via smoking. Denies suicidal ideation, thoughts of hurting self.     Collateral:   - Kofi Mccray - [phone]  Received verbal confirmation from patient to call   Attempted to call, did not answer, RUST - Spoke with Tunde   Called [facility], stating that she was agitated, wanted Ativan. Encouraged to go to center, wanted emergency psychiatric visit. She was not able to get a visit and was encouraged to take her home medications. She then later called, frida, saying she wanted her medications, she stated she had thoughts of hurting someone. Recalled the [facility] stating she had taken her medications, including klonopin. Suggested patient go to ED. Historically, she calls once a month to request " an increase in her medications. She has not had a medication change in roughly 6 months. Saw psychiatry provider 1/30/25 - found to be psychiatric cleared, nursing injection scheduled for 2/5/26. Follow up on the 2/6/25.     PSYCHIATRIC REVIEW OF SYSTEMS  Unable to perform full ROS. Though, see [facility] for obtained information.     PSYCHIATRIC HISTORY  2 inpatient psychiatric admissions the most recent 1 in 2017  Past diagnoses include schizophrenia and PTSD    Current outpatient treatment: [facility]      Current psychiatric medications: Confirmed by Tunde at [place]  -invegga [phone] due 2/5/25  -Buspar 30 mg PO BID  -Prazosin 2 mg at bedtime  -Klonopin 0.5 PO BID PRN  -Paliperidone 6 mg once daily   -Lexapro 20 mg PO daily  -clonidine 0.1 mg PO daily     Family psychiatric history: Unable to obtain    SUBSTANCE USE HISTORY   She reports that she has been smoking cigarettes. She has been exposed to tobacco smoke. She has never used smokeless tobacco. She reports current drug use. Drug: Marijuana. She reports that she does not drink alcohol.    Unable to complete full substance use evaluation  Other substances: endorsing marijuana        SOCIAL HISTORY  Social History     Socioeconomic History   • Marital status:    • Number of children: 1   Tobacco Use   • Smoking status: Every Day     Current packs/day: 0.50     Types: Cigarettes     Passive exposure: Current   • Smokeless tobacco: Never   Substance and Sexual Activity   • Alcohol use: Never   • Drug use: Yes     Types: Marijuana   • Sexual activity: Yes     Partners: Male     Birth control/protection: Condom Male     Comment: LMP: 12 years ago     Social Drivers of Health     Financial Resource Strain: Patient Unable To Answer (5/16/2024)    Overall Financial Resource Strain (CARDIA)    • Difficulty of Paying Living Expenses: Patient unable to answer   Food Insecurity: No Food Insecurity (4/8/2024)    Hunger Vital Sign    • Worried About Running Out of Food in the  Last Year: Never true    • Ran Out of Food in the Last Year: Never true   Transportation Needs: Patient Unable To Answer (5/16/2024)    PRAPARE - Transportation    • Lack of Transportation (Medical): Patient unable to answer    • Lack of Transportation (Non-Medical): Patient unable to answer   Physical Activity: Inactive (4/8/2024)    Exercise Vital Sign    • Days of Exercise per Week: 0 days    • Minutes of Exercise per Session: 0 min   Stress: No Stress Concern Present (4/8/2024)    [employer] [place] [employer] - Occupational Stress Questionnaire    • Feeling of Stress : Not at all   Social Connections: Socially Isolated (4/8/2024)    Social Connection and Isolation Panel [NHANES]    • Frequency of Communication with Friends and Family: Never    • Frequency of Social Gatherings with Friends and Family: Never    • Attends [employer] Services: Never    • Active Member of Clubs or Organizations: No    • Attends Club or Organization Meetings: Never    • Marital Status: Living with partner   Housing Stability: Patient Unable To Answer (5/16/2024)    Housing Stability Vital Sign    • Unable to Pay for Housing in the Last Year: Patient unable to answer    • Number of Places Lived in the Last Year: 1    • Unstable Housing in the Last Year: Patient unable to answer        PAST MEDICAL HISTORY  Past Medical History:   Diagnosis Date   • Acute on chronic respiratory failure (Multi)    • Anxiety    • Arthritis    • Chronic headaches    • COPD (chronic obstructive pulmonary disease) (Multi)    • CPAP (continuous positive airway pressure) dependence    • Depression    • Diabetes mellitus (Multi)     type 2 IDDM   • GERD (gastroesophageal reflux disease)    • History of transfusion    • History of venous thromboembolism    • HL (hearing loss)    • Hyperlipidemia    • Hypertension    • Hypothyroidism    • Lipoma    • Nephrolithiasis    • CHARLIE (obstructive sleep apnea)    • Ovarian teratoma    • PE (pulmonary  thromboembolism) (Multi)    • PTSD (post-traumatic stress disorder)    • Schizophrenia    • Type 2 diabetes mellitus    • VTE (venous thromboembolism)       PAST SURGICAL HISTORY  Past Surgical History:   Procedure Laterality Date   •  SECTION, LOW TRANSVERSE      x 1   • EYE SURGERY     • LITHOTRIPSY     • STAPEDES SURGERY     • TONSILLECTOMY     • UMBILICAL HERNIA REPAIR     • VENTRAL HERNIA REPAIR         FAMILY HISTORY  Family History   Problem Relation Name Age of Onset   • Fibromyalgia Father     • Hypertension Brother     • Thyroid disease Maternal Grandmother     • Thyroid disease Paternal Grandmother     • Lung cancer Paternal Grandfather     • Coronary artery disease Other Grandmother         ALLERGIES  Fluticasone furoate-vilanterol, Fluticasone-umeclidin-vilanter, Levofloxacin, Sumatriptan, and Vortioxetine    Some components of the patient's history were obtained through personal review of the patient's available medical records.    OARRS REVIEW  OARRS checked: Yes  OARRS comments:     01/15/2025 2024 1 Clonazepam 0.5 Mg Tablet 60.00 30 Vi Cate [id number] Mike ([id number]) 1 2.00 LME [place]   2024 1 Clonazepam 0.5 Mg Tablet 60.00 30 Vi Cate [id number] Mike ([id number]) 0 2.00 [place]   2024 1 Tramadol Hcl 50 Mg Tablet 5.00 5 Ri Hoe [id number] Wal ([id number]) 0 10.00 M[place]   2024 1 Clonazepam 0.5 Mg Tablet 60.00 30 Vi Cate [id number] Mike (8802) 1 2.00 [place]   10/22/2024 2024 1 Clonazepam 0.5 Mg Tablet 60.00 30 Vi Cate [id number] Mike (8802) 0 2.00 [place]   2024 1 Clonazepam 0.5 Mg Tablet 60.00 30 Vi Cate [id number] Mike (8802) 1          OBJECTIVE    VITALS      2024    11:25 AM 2025    11:06 AM 2/3/2025    12:02 AM 2/3/2025     1:48 AM 2/3/2025     4:02 PM 2/3/2025     6:34 PM 2/3/2025     6:43 PM   Vitals   Systolic 120 96 137 105 143  135   Diastolic 66 62 65 61 69  79   BP Location Left arm  Right arm     "Right arm   Heart Rate 120 89 102 75 121  103   Temp 36.2 °C (97.2 °F) 36.2 °C (97.2 °F) 36.4 °C (97.5 °F)  36.7 °C (98.1 °F) 36.3 °C (97.3 °F) 36.3 °C (97.3 °F)   Resp 16 16 15 18 18  16   Height  1.575 m (5' 2\") 1.575 m (5' 2\")  1.575 m (5' 2\")     Weight (lb) 153 158.2 153  153     BMI 27.98 kg/m2 28.94 kg/m2 27.98 kg/m2  27.98 kg/m2     BSA (m2) 1.74 m2 1.77 m2 1.74 m2  1.74 m2     Visit Report Report Report             MENTAL STATUS EXAM  Appearance: Overweight woman, wearing hospital gown, seen on telemedicine, often drifting off to sleep during interview. By end of interview, iPad was facing ceiling and she did not want to answer further questions at that time.   Attitude: Sedated, cooperative for brief periods before drifting to sleep, open to answering questions though in disorganized manner  Motor Activity: difficult to discern due to angle of camera, gait unassessed  Speech: mild slur, raspy voice which she related to bronchitis  Mood: \"I need ativan\"  Affect: constricted, stable  Thought Process: concrete- required several questions to discover all encompassing answers to questions   Thought Content:  endorsing homicidal ideaiton without a plan towards \"the bad people who hurt children\"  Thought Perception: disorganized  Cognition: impaired  Insight: poor  Judgement: poor    MEDICAL REVIEW OF SYSTEMS  Review of Systems     HOME MEDICATIONS  Medication Documentation Review Audit       Reviewed by Kathryn Guillen RN (Registered Nurse) on 02/03/25 at 1605      Medication Order Taking? Sig Documenting Provider Last Dose Status   albuterol 90 mcg/actuation inhaler [id number]   Historical Provider, MD  Active   albuterol 90 mcg/actuation inhaler [id number]  Inhale 1-2 puffs every 6 hours if needed for wheezing. Niels MARTINS MD  Active   alcohol swabs (Alcohol Prep Pads) pads, medicated [id number]  Use 3 times daily prn Delta Yuen MD  Active   atorvastatin (Lipitor) 20 mg tablet [id number]  Take 1 tablet " "by mouth once daily Edgar Powers MD  Active   BD Ultra-Fine Mini Pen Needle 31 gauge x 3/16\" needle [id number]  USE TO INJECT 4 TIMES DAILY AS DIRECTED Delta Yuen MD  Active   busPIRone (Buspar) 30 mg tablet [id number]  Take 1 tablet (30 mg) by mouth 2 times a day. Historical Provider, MD  Active   clonazePAM (KlonoPIN) 1 mg tablet [id number]  Take by mouth 2 times a day as needed. Historical Provider, MD  Active   cloNIDine (Catapres) 0.1 mg tablet [id number]  Take 1 tablet (0.1 mg) by mouth 2 times a day as needed (anxiety). Historical Provider, MD  Active   doxycycline (Vibramycin) 100 mg capsule [id number]  Take 1 capsule (100 mg) by mouth 2 times a day for 10 days. Take with at least 8 ounces (large glass) of water, do not lie down for 30 minutes after Niels MARTINS MD  Active   escitalopram (Lexapro) 20 mg tablet [id number]  Take 1 tablet (20 mg) by mouth once daily in the morning. Historical Provider, MD  Active   hydrOXYzine HCL (Atarax) 25 mg tablet [id number]  Take 1 tablet (25 mg) by mouth every 6 hours if needed for itching. Niels MARTINS MD  Active   hydrOXYzine pamoate (Vistaril) 50 mg capsule [id number]  Take 1 capsule (50 mg) by mouth 3 times a day as needed for anxiety. Historical Provider, MD  Active   iloperidone (Fanapt) 6 mg tablet [id number]  Take 1 tablet (6 mg) by mouth once daily. Historical Provider, MD  Active   insulin glargine (Lantus Solostar U-100 Insulin) 100 unit/mL (3 mL) pen [id number]  Inject 10 Units under the skin once daily in the morning. Take as directed per insulin instructions.   Patient not taking: Reported on 2024    Delta Yuen MD   23 2359   insulin lispro (HumaLOG) 100 unit/mL injection [id number]  Inject 0.04 mL (4 Units) under the skin 3 times a day with meals. Plus sliding scale   Patient not taking: Reported on 2024    Historical MD Nellie   24 2359   ipratropium-albuteroL (Duo-Neb) 0.5-2.5 mg/3 mL nebulizer " solution [id number]  Take 3 mL by nebulization every 6 hours if needed for shortness of breath or wheezing. Delta Yuen MD   24 235   levothyroxine (Synthroid, Levoxyl) 175 mcg tablet [id number]  Take 1 tablet (175 mcg) by mouth once daily. Delta Yuen MD  Active   metoprolol tartrate (Lopressor) 25 mg tablet [id number]  Take 1 tablet (25 mg) by mouth 2 times a day. Delta Yuen MD  Active   OneTouch Delica Plus Lancet 33 gauge misc [id number]  USE 1 TO CHECK GLUCOSE THREE TIMES DAILY Historical Provider, MD  Active   OneTouch Verio test strips strip [id number]   Historical Provider, MD  Active   paliperidone (Invega) 3 mg 24 hr tablet [id number]  Take 1 tablet (3 mg) by mouth once daily. Do not crush, chew, or split. Do not start before January 15, 2024. Pete Trevizo MD   24 235   paliperidone palmitate ER (Invega Sustenna) 234 mg/1.5 mL syringe [id number]  Inject 1.5 mL (234 mg) into the muscle every 28 (twenty-eight) days. Historical Provider, MD  Active   pantoprazole (ProtoNix) 40 mg EC tablet [id number]  Take 1 tablet (40 mg) by mouth once daily. Delta Yuen MD  Active   permethrin (Elimite) 5 % cream [id number]  Apply 1 Application topically 1 time for 1 dose. Apply cream to entire body May repeat treatment in 14 days if live mites still present Itching may persist after effective treatment Niels MARTINS MD  Active   phenazopyridine (Urinary Pain Relief) 95 mg tablet [id number]  Take 1 tablet (95 mg) by mouth 3 times a day as needed for bladder spasms for up to 3 days. BARTOLOME Dang-CNP   25 235   prazosin (Minipress) 5 mg capsule [id number]  Take 1 capsule (5 mg) by mouth once daily at bedtime. Historical Provider, MD  Active   predniSONE (Deltasone) 20 mg tablet [id number]  Take 3 tablets (60 mg) by mouth once daily for 5 days. Niels MARTINS MD  Active   sulfamethoxazole-trimethoprim (Bactrim DS) 800-160 mg tablet [id number]  Take 1  tablet by mouth 2 times a day for 7 days. Pete Tidwell, APRN-CNP  Active   tiotropium (Spiriva Respimat) 2.5 mcg/actuation inhaler [id number]  Inhale 2 puffs once daily. Do not start before March 9, 2024. Edwardo Acosta MD  Active   Xarelto 10 mg tablet [id number]  Take 1 tablet (10 mg) by mouth once daily. Historical Provider, MD  Active                     CURRENT MEDICATIONS  Scheduled medications      Continuous medications      PRN medications       LABS  Results for orders placed or performed during the hospital encounter of 02/03/25 (from the past 24 hours)   Protime-INR   Result Value Ref Range    Protime 14.9 (H) 9.8 - 12.8 seconds    INR 1.3 (H) 0.9 - 1.1   CBC and Auto Differential   Result Value Ref Range    WBC 5.1 4.4 - 11.3 x10*3/uL    nRBC 0.0 0.0 - 0.0 /100 WBCs    RBC 4.11 4.00 - 5.20 x10*6/uL    Hemoglobin 12.7 12.0 - 16.0 g/dL    Hematocrit 39.1 36.0 - 46.0 %    MCV 95 80 - 100 fL    MCH 30.9 26.0 - 34.0 pg    MCHC 32.5 32.0 - 36.0 g/dL    RDW 14.3 11.5 - 14.5 %    Platelets 94 (L) 150 - 450 x10*3/uL    Immature Granulocytes %, Automated 1.0 (H) 0.0 - 0.9 %    Immature Granulocytes Absolute, Automated 0.05 0.00 - 0.70 x10*3/uL   Magnesium   Result Value Ref Range    Magnesium 1.62 1.60 - 2.40 mg/dL   Comprehensive metabolic panel   Result Value Ref Range    Glucose 140 (H) 74 - 99 mg/dL    Sodium 140 136 - 145 mmol/L    Potassium 3.7 3.5 - 5.3 mmol/L    Chloride 107 98 - 107 mmol/L    Bicarbonate 25 21 - 32 mmol/L    Anion Gap 12 10 - 20 mmol/L    Urea Nitrogen 12 6 - 23 mg/dL    Creatinine 0.97 0.50 - 1.05 mg/dL    eGFR 75 >60 mL/min/1.73m*2    Calcium 8.9 8.6 - 10.3 mg/dL    Albumin 3.7 3.4 - 5.0 g/dL    Alkaline Phosphatase 158 (H) 33 - 110 U/L    Total Protein 6.6 6.4 - 8.2 g/dL    AST 35 9 - 39 U/L    Bilirubin, Total 0.4 0.0 - 1.2 mg/dL     (H) 7 - 45 U/L   Troponin I, High Sensitivity   Result Value Ref Range    Troponin I, High Sensitivity 4 0 - 13 ng/L   Acute Toxicology Panel,  Blood   Result Value Ref Range    Acetaminophen <10.0 10.0 - 30.0 ug/mL    Salicylate  <3 4 - 20 mg/dL    Alcohol <10 <=10 mg/dL   Human Chorionic Gonadotropin, Serum Quantitative   Result Value Ref Range    HCG, Beta-Quantitative 3 <5 mIU/mL   B-Type Natriuretic Peptide   Result Value Ref Range    BNP 13 0 - 99 pg/mL   Manual Differential   Result Value Ref Range    Neutrophils %, Manual 64.0 40.0 - 80.0 %    Lymphocytes %, Manual 22.0 13.0 - 44.0 %    Monocytes %, Manual 9.0 2.0 - 10.0 %    Eosinophils %, Manual 2.0 0.0 - 6.0 %    Basophils %, Manual 0.0 0.0 - 2.0 %    Atypical Lymphocytes %, Manual 3.0 0.0 - 2.0 %    Seg Neutrophils Absolute, Manual 3.26 1.20 - 7.00 x10*3/uL    Lymphocytes Absolute, Manual 1.12 (L) 1.20 - 4.80 x10*3/uL    Monocytes Absolute, Manual 0.46 0.10 - 1.00 x10*3/uL    Eosinophils Absolute, Manual 0.10 0.00 - 0.70 x10*3/uL    Basophils Absolute, Manual 0.00 0.00 - 0.10 x10*3/uL    Atypical Lymphs Absolute, Manual 0.15 0.00 - 0.50 x10*3/uL    Total Cells Counted 100     RBC Morphology No significant RBC morphology present    ECG 12 lead   Result Value Ref Range    Ventricular Rate 124 BPM    Atrial Rate 124 BPM    AK Interval 124 ms    QRS Duration 124 ms    QT Interval 300 ms    QTC Calculation(Bazett) 431 ms    P Axis 61 degrees    R Axis 104 degrees    T Axis 24 degrees    QRS Count 20 beats    Q Onset 198 ms    P Onset 136 ms    P Offset 186 ms    T Offset 348 ms    QTC Fredericia 382 ms        IMAGING  CT head wo IV contrast    Result Date: 2/3/2025  Interpreted By:  Dahiana Conrad, STUDY: CT HEAD WO IV CONTRAST;  2/3/2025 5:13 pm   INDICATION: Signs/Symptoms:ams.   COMPARISON: CT head 5/16/24, 02/09/2024,.   ACCESSION NUMBER(S): [id number]   ORDERING CLINICIAN: DONNELL CHOUDHURY   TECHNIQUE: Noncontrast axial CT scan of head was performed. Multiplanar reconstructions   FINDINGS: No evidence of a large territorial infarct. Gray-white matter interfaces are preserved. no acute intracranial  hemorrhage, mass effect, midline shift, or herniation. No evidence of hydrocephalus. The ventricles and sulci are unremarkable for age.   The adenoids appear prominent and there is a focal 11 mm area of hypodensity in the left adenoid (sagittal image 44).   Prior right mastoidectomy. The visualized paranasal sinuses and mastoid air cells are otherwise clear. No acute osseous abnormality of the calvarium. No destructive bone lesion.       1. No CT evidence of acute intracranial abnormality. Consider follow-up with MRI as warranted.   2. Adenoids appear prominent and there is a focal 1 cm area of hypodensity in the left adenoid. This raises the possibility of inflammation/infection with a possible small abscess or a cyst, allergy, lymphoproliferative process or HIV. Recommend correlation with visual inspection and with contrast-enhanced CT or MRI.     Signed by: Dahiana Conrad 2/3/2025 5:52 PM Dictation workstation:   [id number]    ECG 12 lead    Result Date: 2/3/2025  Sinus tachycardia Possible Left atrial enlargement Indeterminate axis Right bundle branch block Cannot rule out Inferior infarct , age undetermined Abnormal ECG When compared with ECG of 02-DEC-2024 06:34, Fusion complexes are no longer Present Vent. rate has increased BY  58 BPM ST no longer elevated in Anterior leads    XR chest 2 views    Result Date: 2/3/2025  STUDY: Chest Radiographs;  2/3/2025 at 12:31 AM. INDICATION: Cough, shortness of breath. COMPARISON: XR chest 5/15/2024, 4/8/2024, 3/11/2024; CTA chest 3/5/2024. ACCESSION NUMBER(S): [id number] ORDERING CLINICIAN: NIELS MARTINEZ TECHNIQUE:  Frontal and lateral chest. FINDINGS: CARDIOMEDIASTINAL SILHOUETTE: Cardiomediastinal silhouette is enlarged..  LUNGS: Lungs are hyperinflated.  No definitive consolidation is appreciated. ABDOMEN: No remarkable upper abdominal findings.  BONES: No acute osseous changes.    Hyperinflation. No definitive consolidation. Signed by Niels Perez DO   "    PSYCHIATRIC RISK ASSESSMENT  Violence Risk Factors:  current psychiatric illness and substance abuse   Acute Risk of Harm to Others is Considered: Low  Suicide Risk Factors: ; /[place] native, history of trauma or abuse, chronic medical illness, current psychiatric illness, substance abuse , lack of treatment access, discontinuities in treatment, or recent discharge from hospital, and nonadherence to medication treatment for psychosis   Protective Factors: none  Acute Risk of Harm to Self is Considered: Moderate    ASSESSMENT AND PLAN  Stephenie Mccray is a 44 y.o. female with a past psychiatric history of schizophrenia, and a past medical history of COPD, DM2, HTN, hypothyroid who was BIB EMS to [place] on 2/3/25 for psychiatric evaluation.     On initial assessment, patient grossly disorganized, though willing to cooperative and answer questions in concrete manner. Endorsing homicidal ideation without specific plan, with references to \"the bad people\", denying specific person. Appears patient in a state of decompensated psychosis in the setting of possible medication non-adherence. Patient may have taken more klonopin as prescribed given she was agitated during phone calls with JOANNA. Would taken caution in giving patient additional benzodiazepines though, should consider patient may be taking her prescribed klonopin regularly and thus, at risk for withdrawal. For the reasons above, patient would benefit from treatment in a hospital for mental illness and is in need of such treatment as there is evidence of behavior that creates a grave and imminent risk to substance rights of others or himself.      Given patients history of uncontrolled hypothyroid, would recheck TSH. Additionally, patient acutely ill with bronchitis, with small abscess or cyst on CT head in L adenoid, and should ensure she is medically cleared.     IMPRESSION  #Psychosis, decompensated     RECOMMENDATIONS  Safety:  - " "Patient does currently meet criteria for inpatient psychiatric admission. Once patient is deemed medically cleared, please document in note that patient is MEDICALLY CLEARED and contact [facility] for referral at [phone], pager [phone]. Issue Application for Emergency Admission (pink slip) only after patient is accepted to an inpatient psychiatric unit and is ready to be discharged. Search \"Application for Emergency Admission\" under [employer].  - Patient lacks the capacity to leave AMA at this time and thus cannot leave AMA. Call [facility] if patient attempts to leave AMA.  - To evaluate decision-making capacity, recommend use of the Capacity Evaluation Tool. Search “[facility] Capacity Evaluation\" under [employer] [place] unless the patient has a legal guardian, in which case all decisions per the legal guardian.  - Patient does require a 1:1 sitter from a psychiatric perspective at this time.    Medications:  -Buspar 30 mg PO BID  -Prazosin 2 mg at bedtime  -Klonopin 0.5 PO ONCE daily PRN  -Paliperidone 6 mg once daily   -Lexapro 20 mg PO daily  -clonidine 0.1 mg PO daily     Work-up:  - EKG   -TSH     ==========  - Discussed recommendations with ED provider, Dr. Drake      Thank you for allowing us to participate in the care of this patient. Please page [phone] with any questions or concerns.    Patient seen and discussed with Dr. Klein, who agrees with above plan.    Charlene Mike MD    Medication Consent  Medication Consent: n/a; consult service     "
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
## 2025-02-04 NOTE — SIGNIFICANT EVENT
Application for Emergency Admission      Ready for Transfer?  Is the patient medically cleared for transfer to inpatient psychiatry: Yes  Has the patient been accepted to an inpatient psychiatric hospital: Yes    Application for Emergency Admission  IN ACCORDANCE WITH SECTION 5122.10 [place]  The Chief Clinical Officer of: [facility] 2/4/2025 .1:57 AM    Reason for Hospitalization  The undersigned has reason to believe that: Stephenie Mccray Is a mentally ill person subject to hospitalization by court order under division B Section 5122.01 of the Revised Code, i.e., this person:    1.No  Represents a substantial risk of physical harm to self as manifested by evidence of threats of, or attempts at, suicide or serious self-inflicted bodily harm    2.Yes Represents a substantial risk of physical harm to others as manifested by evidence of recent homicidal or other violent behavior, evidence of recent threats that place another in reasonable fear of violent behavior and serious physical harm, or other evidence of present dangerousness    3.No Represents a substantial and immediate risk of serious physical impairment or injury to self as manifested by  evidence that the person is unable to provide for and is not providing for the person's basic physical needs because of the person's mental illness and that appropriate provision for those needs cannot be made  immediately available in the community    4.Yes Would benefit from treatment in a hospital for his mental illness and is in need of such treatment as manifested by evidence of behavior that creates a grave and imminent risk to substantial rights of others or  himself.    5.No Would benefit from treatment as manifested by evidence of behavior that indicates all of the following:       (a) The person is unlikely to survive safely in the community without supervision, based on a clinical determination.       (b) The person has a history of lack of compliance with  treatment for mental illness and one of the following applies:      (i) At least twice within the thirty-six months prior to the filing of an affidavit seeking court-ordered treatment of the person under section 5122.111 of the Revised Code, the lack of compliance has been a significant factor in necessitating hospitalization in a hospital or receipt of services in a forensic or other mental health unit of a correctional facility, provided that the thirty-six-month period shall be extended by the length of any hospitalization or incarceration of the person that occurred within the thirty-six-month period.      (ii) Within the forty-eight months prior to the filing of an affidavit seeking court-ordered treatment of the person under section 5122.111 of the Revised Code, the lack of compliance resulted in one or more acts of serious violent behavior toward self or others or threats of, or attempts at, serious physical harm to self or others, provided that the forty-eight-month period shall be extended by the length of any hospitalization or incarceration of the person that occurred within the forty-eight-month period.      (c) The person, as a result of mental illness, is unlikely to voluntarily participate in necessary treatment.       (d) In view of the person's treatment history and current behavior, the person is in need of treatment in order to prevent a relapse or deterioration that would be likely to result in substantial risk of serious harm to the person or others.    (e) Represents a substantial risk of physical harm to self or others if allowed to remain at liberty pending examination.    Therefore, it is requested that said person be admitted to the above named facility.    STATEMENT OF BELIEF    Must be filled out by one of the following: a psychiatrist, licensed physician, licensed clinical psychologist, health or ,  or .  (Statement shall include the circumstances under  which the individual was taken into custody and the reason for the person's belief that hospitalization is necessary. The statement shall also include a reference to efforts made to secure the individual's property at his residence if he was taken into custody there. Every reasonable and appropriate effort should be made to take this person into custody in the least conspicuous manner possible.)    Patient has been having homicidal ideation.     Anthony Ayala MD 2/4/2025     _____________________________________________________________   Place of Employment: [facility]    STATEMENT OF OBSERVATION BY PSYCHIATRIST, LICENSED PHYSICIAN, OR LICENSED CLINICAL PSYCHOLOGIST, IF APPLICABLE    Place of Observation (e.g., [facility], general hospital, office, emergency facility)  (If applicable, please complete)    Anthony Ayala MD 2/4/2025    _____________________________________________________________

## 2025-02-05 ENCOUNTER — HOSPITAL ENCOUNTER (INPATIENT)
Age: 45
LOS: 1 days | Discharge: PSYCHIATRIC OR SUBSTANCE USE FACILITY | DRG: 191 | End: 2025-02-06
Attending: STUDENT IN AN ORGANIZED HEALTH CARE EDUCATION/TRAINING PROGRAM | Admitting: INTERNAL MEDICINE
Payer: COMMERCIAL

## 2025-02-05 ENCOUNTER — APPOINTMENT (OUTPATIENT)
Dept: CT IMAGING | Age: 45
DRG: 191 | End: 2025-02-05
Payer: COMMERCIAL

## 2025-02-05 ENCOUNTER — APPOINTMENT (OUTPATIENT)
Dept: GENERAL RADIOLOGY | Age: 45
DRG: 191 | End: 2025-02-05
Payer: COMMERCIAL

## 2025-02-05 DIAGNOSIS — J40 BRONCHITIS: ICD-10-CM

## 2025-02-05 DIAGNOSIS — J44.1 COPD EXACERBATION (HCC): Primary | ICD-10-CM

## 2025-02-05 LAB
ALBUMIN SERPL-MCNC: 4.2 G/DL (ref 3.5–4.6)
ALP SERPL-CCNC: 209 U/L (ref 40–130)
ALT SERPL-CCNC: 71 U/L (ref 0–33)
AMPHET UR QL SCN: ABNORMAL
ANION GAP SERPL CALCULATED.3IONS-SCNC: 13 MEQ/L (ref 9–15)
ANISOCYTOSIS BLD QL SMEAR: ABNORMAL
AST SERPL-CCNC: 16 U/L (ref 0–35)
B PARAP IS1001 DNA NPH QL NAA+NON-PROBE: NOT DETECTED
B PERT.PT PRMT NPH QL NAA+NON-PROBE: NOT DETECTED
BACTERIA UR CULT: NORMAL
BARBITURATES UR QL SCN: ABNORMAL
BASOPHILS # BLD: 0 K/UL (ref 0–0.2)
BASOPHILS NFR BLD: 0.5 %
BENZODIAZ UR QL SCN: POSITIVE
BILIRUB SERPL-MCNC: 0.3 MG/DL (ref 0.2–0.7)
BILIRUB UR QL STRIP: NEGATIVE
BNP BLD-MCNC: 170 PG/ML
BUN SERPL-MCNC: 15 MG/DL (ref 6–20)
C PNEUM DNA NPH QL NAA+NON-PROBE: NOT DETECTED
CALCIUM SERPL-MCNC: 9.6 MG/DL (ref 8.5–9.9)
CANNABINOIDS UR QL SCN: POSITIVE
CHLORIDE SERPL-SCNC: 102 MEQ/L (ref 95–107)
CLARITY UR: CLEAR
CO2 SERPL-SCNC: 25 MEQ/L (ref 20–31)
COCAINE UR QL SCN: ABNORMAL
COLOR UR: YELLOW
CREAT SERPL-MCNC: 0.63 MG/DL (ref 0.5–0.9)
DRUG SCREEN COMMENT UR-IMP: ABNORMAL
EKG ATRIAL RATE: 128 BPM
EKG P AXIS: 65 DEGREES
EKG P-R INTERVAL: 146 MS
EKG Q-T INTERVAL: 312 MS
EKG QRS DURATION: 118 MS
EKG QTC CALCULATION (BAZETT): 455 MS
EKG R AXIS: -60 DEGREES
EKG T AXIS: -11 DEGREES
EKG VENTRICULAR RATE: 128 BPM
EOSINOPHIL # BLD: 0 K/UL (ref 0–0.7)
EOSINOPHIL NFR BLD: 0.1 %
ERYTHROCYTE [DISTWIDTH] IN BLOOD BY AUTOMATED COUNT: 14.3 % (ref 11.5–14.5)
ESTIMATED AVERAGE GLUCOSE: 140 MG/DL
FENTANYL SCREEN, URINE: ABNORMAL
FLUAV RNA NPH QL NAA+NON-PROBE: NOT DETECTED
FLUBV RNA NPH QL NAA+NON-PROBE: NOT DETECTED
GLOBULIN SER CALC-MCNC: 3.6 G/DL (ref 2.3–3.5)
GLUCOSE BLD-MCNC: 273 MG/DL (ref 70–99)
GLUCOSE SERPL-MCNC: 175 MG/DL (ref 70–99)
GLUCOSE UR STRIP-MCNC: NEGATIVE MG/DL
HADV DNA NPH QL NAA+NON-PROBE: NOT DETECTED
HBA1C MFR BLD: 6.5 % (ref 4–6)
HCOV 229E RNA NPH QL NAA+NON-PROBE: NOT DETECTED
HCOV HKU1 RNA NPH QL NAA+NON-PROBE: NOT DETECTED
HCOV NL63 RNA NPH QL NAA+NON-PROBE: NOT DETECTED
HCOV OC43 RNA NPH QL NAA+NON-PROBE: NOT DETECTED
HCT VFR BLD AUTO: 45.7 % (ref 37–47)
HGB BLD-MCNC: 15 G/DL (ref 12–16)
HGB UR QL STRIP: NEGATIVE
HMPV RNA NPH QL NAA+NON-PROBE: NOT DETECTED
HPIV1 RNA NPH QL NAA+NON-PROBE: NOT DETECTED
HPIV2 RNA NPH QL NAA+NON-PROBE: NOT DETECTED
HPIV3 RNA NPH QL NAA+NON-PROBE: NOT DETECTED
HPIV4 RNA NPH QL NAA+NON-PROBE: NOT DETECTED
KETONES UR STRIP-MCNC: ABNORMAL MG/DL
LEUKOCYTE ESTERASE UR QL STRIP: NEGATIVE
LYMPHOCYTES # BLD: 1.3 K/UL (ref 1–4.8)
LYMPHOCYTES NFR BLD: 5 %
M PNEUMO DNA NPH QL NAA+NON-PROBE: NOT DETECTED
MACROCYTES BLD QL SMEAR: ABNORMAL
MAGNESIUM SERPL-MCNC: 1.7 MG/DL (ref 1.7–2.4)
MCH RBC QN AUTO: 31 PG (ref 27–31.3)
MCHC RBC AUTO-ENTMCNC: 32.8 % (ref 33–37)
MCV RBC AUTO: 94.4 FL (ref 79.4–94.8)
METAMYELOCYTES NFR BLD MANUAL: 1 %
METHADONE UR QL SCN: ABNORMAL
MONOCYTES # BLD: 0.2 K/UL (ref 0.2–0.8)
MONOCYTES NFR BLD: 1.9 %
NEUTROPHILS # BLD: 9 K/UL (ref 1.4–6.5)
NEUTS BAND NFR BLD MANUAL: 12 % (ref 5–11)
NEUTS SEG NFR BLD: 73 %
NEUTS VAC BLD QL SMEAR: ABNORMAL
NITRITE UR QL STRIP: NEGATIVE
OPIATES UR QL SCN: ABNORMAL
OXYCODONE UR QL SCN: ABNORMAL
PCP UR QL SCN: ABNORMAL
PERFORMED ON: ABNORMAL
PH UR STRIP: 6 [PH] (ref 5–9)
PLATELET # BLD AUTO: 106 K/UL (ref 130–400)
PLATELET BLD QL SMEAR: ABNORMAL
POIKILOCYTOSIS BLD QL SMEAR: ABNORMAL
POTASSIUM SERPL-SCNC: 4 MEQ/L (ref 3.4–4.9)
PROCALCITONIN SERPL IA-MCNC: 0.08 NG/ML (ref 0–0.15)
PROPOXYPH UR QL SCN: ABNORMAL
PROT SERPL-MCNC: 7.8 G/DL (ref 6.3–8)
PROT UR STRIP-MCNC: NEGATIVE MG/DL
RBC # BLD AUTO: 4.84 M/UL (ref 4.2–5.4)
RSV RNA NPH QL NAA+NON-PROBE: NOT DETECTED
RV+EV RNA NPH QL NAA+NON-PROBE: NOT DETECTED
SARS-COV-2 RNA NPH QL NAA+NON-PROBE: NOT DETECTED
SLIDE REVIEW: ABNORMAL
SMUDGE CELLS BLD QL SMEAR: 4.8
SODIUM SERPL-SCNC: 140 MEQ/L (ref 135–144)
SP GR UR STRIP: 1.07 (ref 1–1.03)
T4 FREE SERPL-MCNC: 0.74 NG/DL (ref 0.84–1.68)
TROPONIN, HIGH SENSITIVITY: <6 NG/L (ref 0–19)
TSH REFLEX: 4.41 UIU/ML (ref 0.44–3.86)
URINE REFLEX TO CULTURE: ABNORMAL
UROBILINOGEN UR STRIP-ACNC: 0.2 E.U./DL
VARIANT LYMPHS NFR BLD: 7 %
WBC # BLD AUTO: 10.5 K/UL (ref 4.8–10.8)

## 2025-02-05 PROCEDURE — 94640 AIRWAY INHALATION TREATMENT: CPT

## 2025-02-05 PROCEDURE — 2060000000 HC ICU INTERMEDIATE R&B

## 2025-02-05 PROCEDURE — 6370000000 HC RX 637 (ALT 250 FOR IP): Performed by: INTERNAL MEDICINE

## 2025-02-05 PROCEDURE — 96365 THER/PROPH/DIAG IV INF INIT: CPT

## 2025-02-05 PROCEDURE — 6360000004 HC RX CONTRAST MEDICATION: Performed by: INTERNAL MEDICINE

## 2025-02-05 PROCEDURE — 2700000000 HC OXYGEN THERAPY PER DAY

## 2025-02-05 PROCEDURE — 99285 EMERGENCY DEPT VISIT HI MDM: CPT

## 2025-02-05 PROCEDURE — 96366 THER/PROPH/DIAG IV INF ADDON: CPT

## 2025-02-05 PROCEDURE — 0202U NFCT DS 22 TRGT SARS-COV-2: CPT

## 2025-02-05 PROCEDURE — 85025 COMPLETE CBC W/AUTO DIFF WBC: CPT

## 2025-02-05 PROCEDURE — 83036 HEMOGLOBIN GLYCOSYLATED A1C: CPT

## 2025-02-05 PROCEDURE — 6370000000 HC RX 637 (ALT 250 FOR IP): Performed by: PERSONAL EMERGENCY RESPONSE ATTENDANT

## 2025-02-05 PROCEDURE — 94761 N-INVAS EAR/PLS OXIMETRY MLT: CPT

## 2025-02-05 PROCEDURE — 81003 URINALYSIS AUTO W/O SCOPE: CPT

## 2025-02-05 PROCEDURE — 6360000002 HC RX W HCPCS: Performed by: PERSONAL EMERGENCY RESPONSE ATTENDANT

## 2025-02-05 PROCEDURE — 96374 THER/PROPH/DIAG INJ IV PUSH: CPT

## 2025-02-05 PROCEDURE — 84484 ASSAY OF TROPONIN QUANT: CPT

## 2025-02-05 PROCEDURE — 96375 TX/PRO/DX INJ NEW DRUG ADDON: CPT

## 2025-02-05 PROCEDURE — 84145 PROCALCITONIN (PCT): CPT

## 2025-02-05 PROCEDURE — 80053 COMPREHEN METABOLIC PANEL: CPT

## 2025-02-05 PROCEDURE — G0378 HOSPITAL OBSERVATION PER HR: HCPCS

## 2025-02-05 PROCEDURE — 2500000003 HC RX 250 WO HCPCS: Performed by: INTERNAL MEDICINE

## 2025-02-05 PROCEDURE — 94760 N-INVAS EAR/PLS OXIMETRY 1: CPT

## 2025-02-05 PROCEDURE — 83880 ASSAY OF NATRIURETIC PEPTIDE: CPT

## 2025-02-05 PROCEDURE — 2500000003 HC RX 250 WO HCPCS: Performed by: PERSONAL EMERGENCY RESPONSE ATTENDANT

## 2025-02-05 PROCEDURE — 6360000002 HC RX W HCPCS: Performed by: INTERNAL MEDICINE

## 2025-02-05 PROCEDURE — 71045 X-RAY EXAM CHEST 1 VIEW: CPT

## 2025-02-05 PROCEDURE — 84439 ASSAY OF FREE THYROXINE: CPT

## 2025-02-05 PROCEDURE — 71275 CT ANGIOGRAPHY CHEST: CPT

## 2025-02-05 PROCEDURE — 80307 DRUG TEST PRSMV CHEM ANLYZR: CPT

## 2025-02-05 PROCEDURE — 96376 TX/PRO/DX INJ SAME DRUG ADON: CPT

## 2025-02-05 PROCEDURE — 90792 PSYCH DIAG EVAL W/MED SRVCS: CPT | Performed by: PSYCHIATRY & NEUROLOGY

## 2025-02-05 PROCEDURE — 93005 ELECTROCARDIOGRAM TRACING: CPT | Performed by: PERSONAL EMERGENCY RESPONSE ATTENDANT

## 2025-02-05 PROCEDURE — 83735 ASSAY OF MAGNESIUM: CPT

## 2025-02-05 PROCEDURE — 84443 ASSAY THYROID STIM HORMONE: CPT

## 2025-02-05 RX ORDER — LEVOTHYROXINE SODIUM 100 UG/1
100 TABLET ORAL DAILY
COMMUNITY

## 2025-02-05 RX ORDER — CLONIDINE HYDROCHLORIDE 0.1 MG/1
0.1 TABLET ORAL DAILY
Status: DISCONTINUED | OUTPATIENT
Start: 2025-02-05 | End: 2025-02-06 | Stop reason: HOSPADM

## 2025-02-05 RX ORDER — ATORVASTATIN CALCIUM 20 MG/1
20 TABLET, FILM COATED ORAL EVERY EVENING
COMMUNITY

## 2025-02-05 RX ORDER — IPRATROPIUM BROMIDE AND ALBUTEROL SULFATE 2.5; .5 MG/3ML; MG/3ML
2 SOLUTION RESPIRATORY (INHALATION) CONTINUOUS PRN
Status: DISCONTINUED | OUTPATIENT
Start: 2025-02-05 | End: 2025-02-05

## 2025-02-05 RX ORDER — RISPERIDONE 1 MG/1
1 TABLET ORAL 2 TIMES DAILY
Status: DISCONTINUED | OUTPATIENT
Start: 2025-02-05 | End: 2025-02-06 | Stop reason: HOSPADM

## 2025-02-05 RX ORDER — OLANZAPINE 5 MG/1
5 TABLET ORAL
Status: ON HOLD | COMMUNITY
End: 2025-02-11 | Stop reason: HOSPADM

## 2025-02-05 RX ORDER — RISPERIDONE 1 MG/1
1 TABLET ORAL 2 TIMES DAILY
Status: ON HOLD | COMMUNITY
End: 2025-02-05

## 2025-02-05 RX ORDER — ESCITALOPRAM OXALATE 20 MG/1
20 TABLET ORAL DAILY
Status: DISCONTINUED | OUTPATIENT
Start: 2025-02-05 | End: 2025-02-06 | Stop reason: HOSPADM

## 2025-02-05 RX ORDER — CLONIDINE HYDROCHLORIDE 0.1 MG/1
0.1 TABLET ORAL DAILY
COMMUNITY

## 2025-02-05 RX ORDER — TRAZODONE HYDROCHLORIDE 50 MG/1
50 TABLET, FILM COATED ORAL NIGHTLY
Status: ON HOLD | COMMUNITY
End: 2025-02-11 | Stop reason: HOSPADM

## 2025-02-05 RX ORDER — PALIPERIDONE 6 MG/1
6 TABLET, EXTENDED RELEASE ORAL EVERY MORNING
Status: ON HOLD | COMMUNITY
End: 2025-02-11 | Stop reason: HOSPADM

## 2025-02-05 RX ORDER — RIVAROXABAN 10 MG/1
10 TABLET, FILM COATED ORAL
COMMUNITY

## 2025-02-05 RX ORDER — ATORVASTATIN CALCIUM 20 MG/1
20 TABLET, FILM COATED ORAL EVERY EVENING
Status: DISCONTINUED | OUTPATIENT
Start: 2025-02-05 | End: 2025-02-06 | Stop reason: HOSPADM

## 2025-02-05 RX ORDER — CLONAZEPAM 0.5 MG/1
0.5 TABLET ORAL 2 TIMES DAILY PRN
Status: DISCONTINUED | OUTPATIENT
Start: 2025-02-05 | End: 2025-02-06 | Stop reason: HOSPADM

## 2025-02-05 RX ORDER — ALBUTEROL SULFATE 90 UG/1
2 INHALANT RESPIRATORY (INHALATION) 4 TIMES DAILY PRN
Status: ON HOLD | COMMUNITY
End: 2025-02-11 | Stop reason: HOSPADM

## 2025-02-05 RX ORDER — HYDROXYZINE HYDROCHLORIDE 25 MG/1
25 TABLET, FILM COATED ORAL EVERY 6 HOURS PRN
Status: ON HOLD | COMMUNITY
End: 2025-02-11 | Stop reason: HOSPADM

## 2025-02-05 RX ORDER — METOPROLOL SUCCINATE 25 MG/1
25 TABLET, EXTENDED RELEASE ORAL DAILY
Status: DISCONTINUED | OUTPATIENT
Start: 2025-02-05 | End: 2025-02-06 | Stop reason: HOSPADM

## 2025-02-05 RX ORDER — TRAZODONE HYDROCHLORIDE 50 MG/1
50 TABLET, FILM COATED ORAL NIGHTLY
Status: DISCONTINUED | OUTPATIENT
Start: 2025-02-05 | End: 2025-02-06 | Stop reason: HOSPADM

## 2025-02-05 RX ORDER — SODIUM CHLORIDE 9 MG/ML
INJECTION, SOLUTION INTRAVENOUS PRN
Status: DISCONTINUED | OUTPATIENT
Start: 2025-02-05 | End: 2025-02-06 | Stop reason: HOSPADM

## 2025-02-05 RX ORDER — POLYETHYLENE GLYCOL 3350 17 G/17G
17 POWDER, FOR SOLUTION ORAL DAILY PRN
Status: DISCONTINUED | OUTPATIENT
Start: 2025-02-05 | End: 2025-02-06 | Stop reason: HOSPADM

## 2025-02-05 RX ORDER — CLONAZEPAM 0.5 MG/1
0.5 TABLET ORAL 2 TIMES DAILY PRN
COMMUNITY

## 2025-02-05 RX ORDER — ACETAMINOPHEN 325 MG/1
650 TABLET ORAL EVERY 6 HOURS PRN
Status: ON HOLD | COMMUNITY
End: 2025-02-11 | Stop reason: HOSPADM

## 2025-02-05 RX ORDER — SULFAMETHOXAZOLE AND TRIMETHOPRIM 200; 40 MG/5ML; MG/5ML
160 SUSPENSION ORAL 2 TIMES DAILY
Status: ON HOLD | COMMUNITY
End: 2025-02-05

## 2025-02-05 RX ORDER — MAGNESIUM SULFATE IN WATER 40 MG/ML
2000 INJECTION, SOLUTION INTRAVENOUS ONCE
Status: DISCONTINUED | OUTPATIENT
Start: 2025-02-05 | End: 2025-02-05

## 2025-02-05 RX ORDER — ACETAMINOPHEN 325 MG/1
650 TABLET ORAL EVERY 6 HOURS PRN
Status: DISCONTINUED | OUTPATIENT
Start: 2025-02-05 | End: 2025-02-06 | Stop reason: HOSPADM

## 2025-02-05 RX ORDER — PREDNISONE 20 MG/1
20 TABLET ORAL 3 TIMES DAILY
Status: ON HOLD | COMMUNITY
End: 2025-02-11 | Stop reason: HOSPADM

## 2025-02-05 RX ORDER — SODIUM CHLORIDE 0.9 % (FLUSH) 0.9 %
5-40 SYRINGE (ML) INJECTION PRN
Status: DISCONTINUED | OUTPATIENT
Start: 2025-02-05 | End: 2025-02-06 | Stop reason: HOSPADM

## 2025-02-05 RX ORDER — PERMETHRIN 50 MG/G
60 CREAM TOPICAL ONCE
Status: ON HOLD | COMMUNITY
Start: 2025-02-03 | End: 2025-02-11 | Stop reason: HOSPADM

## 2025-02-05 RX ORDER — ACETAMINOPHEN 650 MG/1
650 SUPPOSITORY RECTAL EVERY 6 HOURS PRN
Status: DISCONTINUED | OUTPATIENT
Start: 2025-02-05 | End: 2025-02-06 | Stop reason: HOSPADM

## 2025-02-05 RX ORDER — BUSPIRONE HYDROCHLORIDE 10 MG/1
30 TABLET ORAL 2 TIMES DAILY
Status: DISCONTINUED | OUTPATIENT
Start: 2025-02-05 | End: 2025-02-06 | Stop reason: HOSPADM

## 2025-02-05 RX ORDER — SODIUM CHLORIDE 0.9 % (FLUSH) 0.9 %
5-40 SYRINGE (ML) INJECTION EVERY 12 HOURS SCHEDULED
Status: DISCONTINUED | OUTPATIENT
Start: 2025-02-05 | End: 2025-02-06 | Stop reason: HOSPADM

## 2025-02-05 RX ORDER — DOXYCYCLINE 100 MG/1
100 CAPSULE ORAL 2 TIMES DAILY
Status: ON HOLD | COMMUNITY
End: 2025-02-05

## 2025-02-05 RX ORDER — IPRATROPIUM BROMIDE AND ALBUTEROL SULFATE 2.5; .5 MG/3ML; MG/3ML
1 SOLUTION RESPIRATORY (INHALATION) EVERY 4 HOURS PRN
Status: DISCONTINUED | OUTPATIENT
Start: 2025-02-05 | End: 2025-02-05

## 2025-02-05 RX ORDER — NICOTINE 21 MG/24HR
1 PATCH, TRANSDERMAL 24 HOURS TRANSDERMAL EVERY 24 HOURS
Status: ON HOLD | COMMUNITY
End: 2025-02-11 | Stop reason: HOSPADM

## 2025-02-05 RX ORDER — PRAZOSIN HYDROCHLORIDE 2 MG/1
2 CAPSULE ORAL 2 TIMES DAILY
Status: ON HOLD | COMMUNITY
End: 2025-02-11 | Stop reason: HOSPADM

## 2025-02-05 RX ORDER — MAGNESIUM SULFATE IN WATER 40 MG/ML
2000 INJECTION, SOLUTION INTRAVENOUS ONCE
Status: COMPLETED | OUTPATIENT
Start: 2025-02-05 | End: 2025-02-05

## 2025-02-05 RX ORDER — MAGNESIUM SULFATE IN WATER 40 MG/ML
2000 INJECTION, SOLUTION INTRAVENOUS PRN
Status: DISCONTINUED | OUTPATIENT
Start: 2025-02-05 | End: 2025-02-06 | Stop reason: HOSPADM

## 2025-02-05 RX ORDER — IPRATROPIUM BROMIDE AND ALBUTEROL SULFATE 2.5; .5 MG/3ML; MG/3ML
1 SOLUTION RESPIRATORY (INHALATION) EVERY 4 HOURS PRN
Status: DISCONTINUED | OUTPATIENT
Start: 2025-02-05 | End: 2025-02-06 | Stop reason: HOSPADM

## 2025-02-05 RX ORDER — BUSPIRONE HYDROCHLORIDE 15 MG/1
30 TABLET ORAL 2 TIMES DAILY
Status: ON HOLD | COMMUNITY
End: 2025-02-11 | Stop reason: HOSPADM

## 2025-02-05 RX ORDER — IOPAMIDOL 612 MG/ML
65 INJECTION, SOLUTION INTRAVASCULAR
Status: COMPLETED | OUTPATIENT
Start: 2025-02-05 | End: 2025-02-05

## 2025-02-05 RX ORDER — LEVOTHYROXINE SODIUM 75 UG/1
75 TABLET ORAL DAILY
COMMUNITY

## 2025-02-05 RX ORDER — ONDANSETRON 4 MG/1
4 TABLET, ORALLY DISINTEGRATING ORAL EVERY 8 HOURS PRN
Status: DISCONTINUED | OUTPATIENT
Start: 2025-02-05 | End: 2025-02-06 | Stop reason: HOSPADM

## 2025-02-05 RX ORDER — POTASSIUM CHLORIDE 7.45 MG/ML
10 INJECTION INTRAVENOUS PRN
Status: DISCONTINUED | OUTPATIENT
Start: 2025-02-05 | End: 2025-02-06 | Stop reason: HOSPADM

## 2025-02-05 RX ORDER — ONDANSETRON 2 MG/ML
4 INJECTION INTRAMUSCULAR; INTRAVENOUS EVERY 6 HOURS PRN
Status: DISCONTINUED | OUTPATIENT
Start: 2025-02-05 | End: 2025-02-06 | Stop reason: HOSPADM

## 2025-02-05 RX ORDER — LEVOTHYROXINE SODIUM 100 UG/1
100 TABLET ORAL DAILY
Status: DISCONTINUED | OUTPATIENT
Start: 2025-02-05 | End: 2025-02-06 | Stop reason: HOSPADM

## 2025-02-05 RX ORDER — ESCITALOPRAM OXALATE 20 MG/1
20 TABLET ORAL DAILY
COMMUNITY

## 2025-02-05 RX ORDER — METHYLPREDNISOLONE SODIUM SUCCINATE 40 MG/ML
40 INJECTION INTRAMUSCULAR; INTRAVENOUS DAILY
Status: DISCONTINUED | OUTPATIENT
Start: 2025-02-05 | End: 2025-02-06 | Stop reason: HOSPADM

## 2025-02-05 RX ORDER — POTASSIUM CHLORIDE 1500 MG/1
40 TABLET, EXTENDED RELEASE ORAL PRN
Status: DISCONTINUED | OUTPATIENT
Start: 2025-02-05 | End: 2025-02-06 | Stop reason: HOSPADM

## 2025-02-05 RX ORDER — IPRATROPIUM BROMIDE AND ALBUTEROL SULFATE 2.5; .5 MG/3ML; MG/3ML
1 SOLUTION RESPIRATORY (INHALATION)
Status: DISCONTINUED | OUTPATIENT
Start: 2025-02-05 | End: 2025-02-06 | Stop reason: HOSPADM

## 2025-02-05 RX ORDER — PANTOPRAZOLE SODIUM 40 MG/1
40 FOR SUSPENSION ORAL
COMMUNITY

## 2025-02-05 RX ADMIN — TRAZODONE HYDROCHLORIDE 50 MG: 50 TABLET ORAL at 21:13

## 2025-02-05 RX ADMIN — METHYLPREDNISOLONE SODIUM SUCCINATE 40 MG: 40 INJECTION INTRAMUSCULAR; INTRAVENOUS at 12:19

## 2025-02-05 RX ADMIN — IPRATROPIUM BROMIDE AND ALBUTEROL SULFATE 2 DOSE: 2.5; .5 SOLUTION RESPIRATORY (INHALATION) at 01:50

## 2025-02-05 RX ADMIN — RISPERIDONE 1 MG: 1 TABLET, FILM COATED ORAL at 21:12

## 2025-02-05 RX ADMIN — IPRATROPIUM BROMIDE AND ALBUTEROL SULFATE 1 DOSE: 2.5; .5 SOLUTION RESPIRATORY (INHALATION) at 06:48

## 2025-02-05 RX ADMIN — RIVAROXABAN 20 MG: 20 TABLET, FILM COATED ORAL at 21:13

## 2025-02-05 RX ADMIN — Medication 10 ML: at 21:13

## 2025-02-05 RX ADMIN — BUSPIRONE HYDROCHLORIDE 30 MG: 10 TABLET ORAL at 12:19

## 2025-02-05 RX ADMIN — IOPAMIDOL 65 ML: 612 INJECTION, SOLUTION INTRAVENOUS at 04:25

## 2025-02-05 RX ADMIN — CLONAZEPAM 0.5 MG: 0.5 TABLET ORAL at 12:48

## 2025-02-05 RX ADMIN — RISPERIDONE 1 MG: 1 TABLET, FILM COATED ORAL at 12:48

## 2025-02-05 RX ADMIN — Medication 10 ML: at 12:52

## 2025-02-05 RX ADMIN — CLONIDINE HYDROCHLORIDE 0.1 MG: 0.1 TABLET ORAL at 12:48

## 2025-02-05 RX ADMIN — IPRATROPIUM BROMIDE AND ALBUTEROL SULFATE 1 DOSE: 2.5; .5 SOLUTION RESPIRATORY (INHALATION) at 19:21

## 2025-02-05 RX ADMIN — IPRATROPIUM BROMIDE AND ALBUTEROL SULFATE 1 DOSE: 2.5; .5 SOLUTION RESPIRATORY (INHALATION) at 13:33

## 2025-02-05 RX ADMIN — MAGNESIUM SULFATE HEPTAHYDRATE 2000 MG: 40 INJECTION, SOLUTION INTRAVENOUS at 02:34

## 2025-02-05 RX ADMIN — BUSPIRONE HYDROCHLORIDE 30 MG: 10 TABLET ORAL at 21:13

## 2025-02-05 RX ADMIN — METHYLPREDNISOLONE SODIUM SUCCINATE 125 MG: 125 INJECTION INTRAMUSCULAR; INTRAVENOUS at 02:31

## 2025-02-05 RX ADMIN — ESCITALOPRAM OXALATE 20 MG: 20 TABLET ORAL at 12:19

## 2025-02-05 RX ADMIN — METOPROLOL SUCCINATE 25 MG: 25 TABLET, EXTENDED RELEASE ORAL at 15:46

## 2025-02-05 RX ADMIN — Medication 3 MG: at 21:13

## 2025-02-05 RX ADMIN — ATORVASTATIN CALCIUM 20 MG: 20 TABLET, FILM COATED ORAL at 21:16

## 2025-02-05 ASSESSMENT — ENCOUNTER SYMPTOMS
DIARRHEA: 0
SORE THROAT: 0
BLOOD IN STOOL: 0
NAUSEA: 0
COLOR CHANGE: 0
RHINORRHEA: 0
ABDOMINAL PAIN: 0
COUGH: 1
VOMITING: 0
SHORTNESS OF BREATH: 1

## 2025-02-05 ASSESSMENT — PAIN - FUNCTIONAL ASSESSMENT: PAIN_FUNCTIONAL_ASSESSMENT: NONE - DENIES PAIN

## 2025-02-05 ASSESSMENT — LIFESTYLE VARIABLES
HOW OFTEN DO YOU HAVE A DRINK CONTAINING ALCOHOL: NEVER
HOW MANY STANDARD DRINKS CONTAINING ALCOHOL DO YOU HAVE ON A TYPICAL DAY: PATIENT DOES NOT DRINK

## 2025-02-05 NOTE — PROGRESS NOTES
Occupational Therapy  Facility/Department: [facility] MED SURG W163/W163-01  Interdisciplinary Communication Note      To the referring provider,     While we thank you for your referral, Katt Josue was not seen for an evaluation as:     The patient was observed as IND in the room and does not require skilled services. Pt up in room without LOB, per nursing staff pt has been ambulating around room and has no difficulty. Declines need for therapy services.    Thank you,    Electronically signed by LOAN Oconnell/L on 2/5/25 at 2:08 PM EST    The [facility] O.T[facility] Department.

## 2025-02-05 NOTE — PROGRESS NOTES
Physical Therapy Missed Treatment   Facility/Department: [facility] MED SURG W163/W163-01    NAME: Katt Josue    : 1980 (44 y.o.)  MRN: [id number]    Account: [id number]  Gender: female      Attempted PT eval. Pt reports no concerns or need for an eval. Nursing and OT have confirmed that pt has been indep in her room. No PT warranted at this time. Nursing staff notified.        This therapy session was supervised by Olya Diane, PT   Valentina Echeverria, SPT, 25 at 3:14 PM

## 2025-02-05 NOTE — CARE COORDINATION
LSW called [facility] spoke with [facility].   Per [facility], pt admitted from [facility] to [facility] [place] for Homicidal ideation.   Admitted to [facility] on 2/4/2025 11:50AM.  Asked about pt's CPAP. Yung Brarta does not know about CPAP.      Pt is DC from [facility]. Will need new referral.     Pending Psych Eval.     LSW tried to call pt's . No answer.     Updated RN.     Electronically signed by ABDELRAHMAN Bradley on 2/5/2025 at 11:41 AM    Per [facility], Unable to accept pt with O2.  But is able to accept pt only with CPAP at night. Pt will have to supply own CPAP.     Electronically signed by ABDELRAHMAN Bradley on 2/5/2025 at 3:19 PM

## 2025-02-05 NOTE — CARE COORDINATION
Patient resides at [facility]. She was sleeping when I rounded. COPD and smoking cessation information left for this patient to review at her convenience. Electronically signed by Chanel Downing RN on 2/5/2025 at 10:45 AM

## 2025-02-05 NOTE — CARE COORDINATION
Case Management Assessment  Initial Evaluation    Date/Time of Evaluation: 2/5/2025 11:51 AM  Assessment Completed by: ABDELRAHMAN Bradley    If patient is discharged prior to next notation, then this note serves as note for discharge by case management.    Patient Name: Katt Josue                   YOB: 1980  Diagnosis: Bronchitis [J40]  COPD exacerbation (HCC) [J44.1]  COPD with acute exacerbation (HCC) [J44.1]                   Date / Time: 2/5/2025  1:32 AM    Patient Admission Status: Inpatient   Readmission Risk (Low < 19, Mod (19-27), High > 27): Readmission Risk Score: 9.4    Current PCP: Jersey Paul MD  PCP verified by CM? Yes    Chart Reviewed: Yes      History Provided by: Patient  Patient Orientation: Alert and Oriented    Patient Cognition: Alert    Hospitalization in the last 30 days (Readmission):  No    If yes, Readmission Assessment in CM Navigator will be completed.    Advance Directives:      Code Status: Full Code   Patient's Primary Decision Maker is: Patient Declined (Legal Next of Kin Remains as Decision Maker)    Primary Decision Maker: Praneeth Josue - Spouse - [phone]    Discharge Planning:    Patient lives with: Other (Comment) Type of Home: Behavioral Health  Primary Care Giver: Self  Patient Support Systems include: Spouse/Significant Other   Current Financial resources: Other (Comment) (COMMERICIAL)  Current community resources: None  Current services prior to admission: Other (Comment) (unknown)            Current DME:              Type of Home Care services:  None    ADLS  Prior functional level: Independent in ADLs/IADLs  Current functional level: Independent in ADLs/IADLs    PT AM-PAC:   /24  OT AM-PAC:   /24    Family can provide assistance at DC: Yes  Would you like Case Management to discuss the discharge plan with any other family members/significant others, and if so, who? Yes ()  Plans to Return to Present Housing: Yes  Other Identified  Representative Agree with the Discharge Plan?      ABDELRAHMAN Bradley  Case Management Department  Ph:  Fax:

## 2025-02-05 NOTE — PROGRESS NOTES
02/05/25 1639   Resting (Room Air)   SpO2 81      Resting (On O2)   SpO2 94   HR 98   O2 Device Nasal cannula   O2 Flow Rate (l/min) 4 l/min   During Walk (On O2)   SpO2 89      O2 Device Nasal cannula   O2 Flow Rate (l/min) 4 l/min   Need Additional O2 Flow Rate Rows Yes   O2 Flow Rate (l/min) 5 l/min   O2 Saturation 91   O2 Flow Rate (l/min) 6 l/min   O2 Saturation 93   Walk/Assistance Device Ambulation   After Walk   Does the Patient Qualify for Home O2 Yes   Liter Flow at Rest 4   Liter Flow on Exertion 6   Does the Patient Need Portable Oxygen Tanks Yes        02/05/25 1639   Resting (Room Air)   SpO2 81      Resting (On O2)   SpO2 94   HR 98   O2 Device Nasal cannula   O2 Flow Rate (l/min) 4 l/min   During Walk (On O2)   SpO2 89      O2 Device Nasal cannula   O2 Flow Rate (l/min) 4 l/min   Need Additional O2 Flow Rate Rows Yes   O2 Flow Rate (l/min) 5 l/min   O2 Saturation 91   O2 Flow Rate (l/min) 6 l/min   O2 Saturation 93   Walk/Assistance Device Ambulation   After Walk   Does the Patient Qualify for Home O2 Yes   Liter Flow at Rest 4   Liter Flow on Exertion 6   Does the Patient Need Portable Oxygen Tanks Yes

## 2025-02-05 NOTE — CARE COORDINATION
CALL TO [facility] TO CHECK IF PT HAS 02 WITH THEM. THEY STATED PT WAS ARRANGED WITH ANOTHER COMPANY D/T DEVOTED INSURANCE AT THE TIME, DID NOT KNOW THE COMPANY. CALL TO [facility], THEY HAVE HER WITH TRILOGY BUT NO O2. CALL TO JEANETH AT [facility] TO DOUBLE CHECK, SHE WILL CALL CM BACK. CALL TO [facility] TO CHECK IF PT HAS 02 WITH THEM. PT HAS EQUIPMENT FROM THEM BUT WILL NEED TO RETURN IT DUE TO HAVING [employer]. PT HAS CONCENTRATOR AND PORTABLE UP TO 8L. CALL BACK FROM JEANETH AT [facility] AND PT HAS A JERRELL NOT TRILOGY, THEY CAN PROVIDE PT WITH 02 EQUIPMENT AT [place] AFTER TESTING.     SPOKE WITH SPOUSE, HE HAS NUMBER TO CALL TO RETURN 02 EQUIPMENT AFTER NEW 02 CAN BE DELIVERED. [phone]

## 2025-02-05 NOTE — ACP (ADVANCE CARE PLANNING)
Advance Care Planning   Healthcare Decision Maker:    Primary Decision Maker: Praneeth Josue - [facility] - [phone]    Click here to complete Healthcare Decision Makers including selection of the Healthcare Decision Maker Relationship (ie \"Primary\").  Today we documented Decision Maker(s) consistent with Legal Next of Kin hierarchy.       Electronically signed by ABDELRAHMAN Bradley on 2/5/2025 at 11:48 AM

## 2025-02-05 NOTE — FLOWSHEET NOTE
Resume care of pt at 6:39 AM Pt in bed and resp is giving her a treatment.  Pt is alert and oriented x3 breathing 97% on 5 liters Resp turned oxygen down.Pt is pinked slipped Electronically signed by Mayra Hughes LPN on 2/5/2025 at 6:59 AM  Pt requested a pop serrie mist given.Electronically signed by Mayra Hughes LPN on 2/5/2025 at 7:04 AM    Pt oxygen down to 82% and HR up to 130.  RN and Resp aware and O2 placed back on 5/l.  Pt is now eating breakfast and asking for her medication explained to he that Doctor has to write for them first.Electronically signed by Mayra Hughes LPN on 2/5/2025 at 7:37 AM  SAT up to 93% and heart rate 130.  Pt ate 100% of breakast.Electronically signed by Mayra Hughes LPN on 2/5/2025 at 7:41 AM  Assisted pt to the bathroom walks with steady gait and calls for nurse before she gets up.Electronically signed by Mayra Hughes LPN on 2/5/2025 at 7:45 AM  Pt resting in bed heart rate 111 and oxygen 96% on 5 / nc.Electronically signed by Mayra Hughes LPN on 2/5/2025 at 7:59 AM  Urine obtained and sent to lab.Electronically signed by Mayra Hughes LPN on 2/5/2025 at 8:19 AM  Pt resting in her bed pulse ox 97% while resting on 5 liters, Heart rate 96 .Electronically signed by Mayra Hughes LPN on 2/5/2025 at 10:01 AM  Medication list completed and CHRISTINE franklin served Dr. Danielle to let him know it was completed.Electronically signed by Mayra Hughes LPN on 2/5/2025 at 10:03 AM  Pt continues to rest arouses when spoken to and turns herself in the be.  No c/o pain or discomfort.Electronically signed by Mayra Hughes LPN on 2/5/2025 at 11:05 AM  Pt used a house phone to call her   once done she handed me back the phone.Electronically signed by Mayra Hughes LPN on 2/5/2025 at 12:45 PM

## 2025-02-05 NOTE — ED PROVIDER NOTES
[facility] EMERGENCY DEPARTMENT  eMERGENCY dEPARTMENT eNCOUnter      Pt Name: Katt Josue  MRN: [id number]  Birthdate 1980  Date of evaluation: 2/5/2025  Provider: RAMAKRISHNA DOAN  1:32 AM EST     My attending is Dr. Elder.    HISTORY OF PRESENT ILLNESS    Katt Josue is a 44 y.o. female with PMHx of anxiety, GERD, hyperlipidemia, hypothyroidism, nightmares, scabies, schizophrenia presents to the emergency department with shortness of breath. Pt from [facility], Full Code.  For the past couple days she has been having nasal congestion and productive cough with minimal shortness of breath and was placed on doxycycline yesterday.  Patient started complaining of some shortness of breath and ambulance found her 67% on RA and placed on 4L at 94%.  She is post to wear CPAP at night but this is not available at [facility].  She denies fevers, chest pain, abdominal pain, nausea, vomiting, diarrhea, constipation, urinary symptoms, leg swelling.  Patient on Xarelto.    [facility]    Nursing Notes were reviewed.    REVIEW OF SYSTEMS       Review of Systems   Constitutional:  Negative for appetite change, chills and fever.   HENT:  Negative for congestion, rhinorrhea and sore throat.    Respiratory:  Positive for cough and shortness of breath.    Cardiovascular:  Negative for chest pain.   Gastrointestinal:  Negative for abdominal pain, blood in stool, diarrhea, nausea and vomiting.   Genitourinary:  Negative for difficulty urinating.   Musculoskeletal:  Negative for neck stiffness.   Skin:  Negative for color change and rash.   Neurological:  Negative for dizziness, syncope, weakness, light-headedness, numbness and headaches.   All other systems reviewed and are negative.            PAST MEDICAL HISTORY     Past Medical History:   Diagnosis Date    Anxiety     COPD (chronic obstructive pulmonary disease) (HCC)     GERD (gastroesophageal reflux disease)     Hyperlipidemia     Hypothyroidism     Nightmares     Scabies

## 2025-02-05 NOTE — H&P
Hospitalist Group   History and Physical      CHIEF COMPLAINT: Shortness of breath    History of Present Illness:  44 y.o. female with a history of COPD, hyperlipidemia, hypothyroidism, schizophrenia, PE on anticoagulation, presents from [facility] with shortness of breath.  Patient said that she has been there for 1 day only.  She started having shortness of breath, cough, fatigue, body ache.  Cough is productive with yellow phlegm.  She is not on oxygen at baseline.  She said that she has been getting her Xarelto however there and before when she was home she takes her medication most of the time.  Patient denies any chest pain or dizziness.    REVIEW OF SYSTEMS:  no fevers, chills, cp, has sob, no n/v, ha, vision/hearing changes, wt changes, hot/cold flashes, other open skin lesions, diarrhea, constipation, dysuria/hematuria unless noted in HPI. Complete ROS performed with the patient and is otherwise negative.      PMH:  Past Medical History:   Diagnosis Date    Anxiety     COPD (chronic obstructive pulmonary disease) ([facility])     GERD (gastroesophageal reflux disease)     Hyperlipidemia     Hypothyroidism     Nightmares     Scabies     Schizophrenia ([facility])        Surgical History:  History reviewed. No pertinent surgical history.    Medications Prior to Admission:    Prior to Admission medications    Not on File       Allergies:    Levaquin [levofloxacin]    Social History:    reports that she has been smoking cigarettes. She does not have any smokeless tobacco history on file. She reports current drug use. Drug: Marijuana (Weed).    Family History:   History reviewed. No pertinent family history.    PHYSICAL EXAM:  Vitals:  BP (!) 147/79   Pulse (!) 131   Temp 97.6 °F (36.4 °C) (Oral)   Resp 16   Ht 1.626 m (5' 4\")   Wt 69.4 kg (152 lb 14.4 oz)   LMP  (LMP Unknown) Comment: menapause  SpO2 97%   BMI 26.25 kg/m²   General Appearance: alert and oriented to person, place and time   Pulmonary/Chest: clear

## 2025-02-05 NOTE — CONSULTS
[facility], Department of Psychiatry  Behavioral Health Consult    REASON FOR CONSULT: HI    CONSULTING PHYSICIAN: Dr Danielle    History obtained from: Patient    HISTORY OF PRESENT ILLNESS:      The patient is a 44 y.o. female with significant past psychiatric history of schizophrenia who presents with HI  Pt was admitted from [facility] where she got admitted for HI  Pt see psych at [facility] and has been taking medication  Pt went to [facility] ER following thoughts of HI against pedophiles   She does not have any particular person in mind  Pt said that something snapped in her mind and she became paranoid and thoughts of harming self  Pt is getting better physically but does not feel safe  Does not want to return back to   Agreeable to come to [facility] for further stabilization  Pt live with         The patient is currently receiving care for the above psychiatric illness.      Psychiatric Review of Systems       Depression: denies     Fani or Hypomania:  no     Panic Attacks:  no     Phobias:  no     Obsessions and Compulsions:  no     PTSD : no     Hallucinations:  yes      Delusions:  yes       Substance Abuse History:  ETOH: no  Marijuana: no  Opiates: no  Other Drugs: no      Past Psychiatric History:  Prior Diagnosis: Schizophrenia  Psychiatrist: [facility]  Therapist:no  Hospitalization: yes  Hx of Suicidal Attempts: no  Hx of violence:  no  ECT: no    Past Medical History:        Diagnosis Date    Anxiety     COPD (chronic obstructive pulmonary disease) ([facility])     GERD (gastroesophageal reflux disease)     Hyperlipidemia     Hypothyroidism     Nightmares     Scabies     Schizophrenia ([facility])        Past Surgical History:    History reviewed. No pertinent surgical history.    Medications Prior to Admission:   Medications Prior to Admission: clonazePAM (KLONOPIN) 0.5 MG tablet, Take 1 tablet by mouth 2 times daily as needed.  cloNIDine (CATAPRES) 0.1 MG tablet, Take 1 tablet by mouth daily  levothyroxine  am COMPARISON: None. HISTORY: ORDERING SYSTEM PROVIDED HISTORY: SOB TECHNOLOGIST PROVIDED HISTORY: Reason for exam:->SOB What reading provider will be dictating this exam?->CRC FINDINGS: No focal consolidation.  No pleural effusion.  No pneumothorax.  Cardiac silhouette is borderline enlarged.  No acute osseous abnormality.     1. Borderline cardiomegaly. 2. No focal consolidation.       EKG: TRACING REVIEWED    RECOMMENDATIONS    Risk Management:  routine:  no special precautions necessary    Medications:  Restart home medication  Collateral from   Pt to be admitted to [facility] when medically stable  Discussed with the treating physician/ team about the patient and treatment plan  Reviewed the chart    Discussed with the patient risk, benefit, alternative and common side effects for the  proposed medication treatment. Patient is consenting to the treatment.    Thanks for the consult. Please call me if needed.    Electronically signed by JESI ORONA MD on 2/5/2025 at 5:50 PM

## 2025-02-06 ENCOUNTER — HOSPITAL ENCOUNTER (INPATIENT)
Age: 45
LOS: 5 days | Discharge: HOME | DRG: 885 | End: 2025-02-11
Attending: PSYCHIATRY & NEUROLOGY | Admitting: PSYCHIATRY & NEUROLOGY
Payer: COMMERCIAL

## 2025-02-06 VITALS
TEMPERATURE: 97.6 F | WEIGHT: 149.91 LBS | DIASTOLIC BLOOD PRESSURE: 69 MMHG | BODY MASS INDEX: 25.59 KG/M2 | HEIGHT: 64 IN | HEART RATE: 64 BPM | SYSTOLIC BLOOD PRESSURE: 122 MMHG | RESPIRATION RATE: 18 BRPM | OXYGEN SATURATION: 96 %

## 2025-02-06 PROBLEM — F20.0 PARANOID SCHIZOPHRENIA (HCC): Status: ACTIVE | Noted: 2025-02-06

## 2025-02-06 LAB
ALBUMIN SERPL-MCNC: 3.6 G/DL (ref 3.5–4.6)
ALP SERPL-CCNC: 152 U/L (ref 40–130)
ALT SERPL-CCNC: 45 U/L (ref 0–33)
ANION GAP SERPL CALCULATED.3IONS-SCNC: 9 MEQ/L (ref 9–15)
ANISOCYTOSIS BLD QL SMEAR: ABNORMAL
AST SERPL-CCNC: 10 U/L (ref 0–35)
BASOPHILS # BLD: 0 K/UL (ref 0–0.2)
BASOPHILS NFR BLD: 0.4 %
BILIRUB SERPL-MCNC: <0.2 MG/DL (ref 0.2–0.7)
BUN SERPL-MCNC: 19 MG/DL (ref 6–20)
CALCIUM SERPL-MCNC: 9.2 MG/DL (ref 8.5–9.9)
CHLORIDE SERPL-SCNC: 104 MEQ/L (ref 95–107)
CO2 SERPL-SCNC: 27 MEQ/L (ref 20–31)
CREAT SERPL-MCNC: 0.71 MG/DL (ref 0.5–0.9)
EOSINOPHIL # BLD: 0 K/UL (ref 0–0.7)
EOSINOPHIL NFR BLD: 0.1 %
ERYTHROCYTE [DISTWIDTH] IN BLOOD BY AUTOMATED COUNT: 14.6 % (ref 11.5–14.5)
GLOBULIN SER CALC-MCNC: 3.4 G/DL (ref 2.3–3.5)
GLUCOSE SERPL-MCNC: 123 MG/DL (ref 70–99)
HCT VFR BLD AUTO: 43.6 % (ref 37–47)
HGB BLD-MCNC: 13.8 G/DL (ref 12–16)
LYMPHOCYTES # BLD: 3.8 K/UL (ref 1–4.8)
LYMPHOCYTES NFR BLD: 21 %
MACROCYTES BLD QL SMEAR: ABNORMAL
MCH RBC QN AUTO: 30.8 PG (ref 27–31.3)
MCHC RBC AUTO-ENTMCNC: 31.7 % (ref 33–37)
MCV RBC AUTO: 97.3 FL (ref 79.4–94.8)
METAMYELOCYTES NFR BLD MANUAL: 2 %
MONOCYTES # BLD: 1.4 K/UL (ref 0.2–0.8)
MONOCYTES NFR BLD: 9.5 %
MYELOCYTES NFR BLD MANUAL: 1 %
NEUTROPHILS # BLD: 9 K/UL (ref 1.4–6.5)
NEUTS BAND NFR BLD MANUAL: 1 % (ref 5–11)
NEUTS SEG NFR BLD: 60 %
NEUTS VAC BLD QL SMEAR: ABNORMAL
PLATELET # BLD AUTO: ABNORMAL K/UL (ref 130–400)
PLATELET BLD QL SMEAR: ABNORMAL
POIKILOCYTOSIS BLD QL SMEAR: ABNORMAL
POTASSIUM SERPL-SCNC: 4.6 MEQ/L (ref 3.4–4.9)
PROT SERPL-MCNC: 7 G/DL (ref 6.3–8)
RBC # BLD AUTO: 4.48 M/UL (ref 4.2–5.4)
SMUDGE CELLS BLD QL SMEAR: 12.4
SODIUM SERPL-SCNC: 140 MEQ/L (ref 135–144)
VARIANT LYMPHS NFR BLD: 6 %
WBC # BLD AUTO: 14 K/UL (ref 4.8–10.8)

## 2025-02-06 PROCEDURE — 94640 AIRWAY INHALATION TREATMENT: CPT

## 2025-02-06 PROCEDURE — 80053 COMPREHEN METABOLIC PANEL: CPT

## 2025-02-06 PROCEDURE — 2500000003 HC RX 250 WO HCPCS: Performed by: INTERNAL MEDICINE

## 2025-02-06 PROCEDURE — 1240000000 HC EMOTIONAL WELLNESS R&B

## 2025-02-06 PROCEDURE — 6370000000 HC RX 637 (ALT 250 FOR IP): Performed by: INTERNAL MEDICINE

## 2025-02-06 PROCEDURE — 36415 COLL VENOUS BLD VENIPUNCTURE: CPT

## 2025-02-06 PROCEDURE — 94760 N-INVAS EAR/PLS OXIMETRY 1: CPT

## 2025-02-06 PROCEDURE — 85025 COMPLETE CBC W/AUTO DIFF WBC: CPT

## 2025-02-06 PROCEDURE — 96376 TX/PRO/DX INJ SAME DRUG ADON: CPT

## 2025-02-06 PROCEDURE — G0378 HOSPITAL OBSERVATION PER HR: HCPCS

## 2025-02-06 PROCEDURE — 2500000003 HC RX 250 WO HCPCS

## 2025-02-06 PROCEDURE — 2700000000 HC OXYGEN THERAPY PER DAY

## 2025-02-06 PROCEDURE — 6360000002 HC RX W HCPCS: Performed by: INTERNAL MEDICINE

## 2025-02-06 RX ORDER — PREDNISONE 20 MG/1
20 TABLET ORAL DAILY
Status: CANCELLED | OUTPATIENT
Start: 2025-02-06 | End: 2025-02-09

## 2025-02-06 RX ORDER — ONDANSETRON 2 MG/ML
4 INJECTION INTRAMUSCULAR; INTRAVENOUS EVERY 6 HOURS PRN
Status: DISCONTINUED | OUTPATIENT
Start: 2025-02-06 | End: 2025-02-08

## 2025-02-06 RX ORDER — CLONAZEPAM 0.5 MG/1
0.5 TABLET ORAL 2 TIMES DAILY PRN
Status: DISCONTINUED | OUTPATIENT
Start: 2025-02-06 | End: 2025-02-07

## 2025-02-06 RX ORDER — MAGNESIUM SULFATE IN WATER 40 MG/ML
2000 INJECTION, SOLUTION INTRAVENOUS PRN
Status: DISCONTINUED | OUTPATIENT
Start: 2025-02-06 | End: 2025-02-11 | Stop reason: HOSPADM

## 2025-02-06 RX ORDER — ACETAMINOPHEN 650 MG/1
650 SUPPOSITORY RECTAL EVERY 6 HOURS PRN
Status: DISCONTINUED | OUTPATIENT
Start: 2025-02-06 | End: 2025-02-11 | Stop reason: HOSPADM

## 2025-02-06 RX ORDER — HALOPERIDOL 5 MG/1
5 TABLET ORAL EVERY 4 HOURS PRN
Status: DISCONTINUED | OUTPATIENT
Start: 2025-02-06 | End: 2025-02-11 | Stop reason: HOSPADM

## 2025-02-06 RX ORDER — LEVOTHYROXINE SODIUM 75 UG/1
75 TABLET ORAL DAILY
Status: DISCONTINUED | OUTPATIENT
Start: 2025-02-07 | End: 2025-02-11 | Stop reason: HOSPADM

## 2025-02-06 RX ORDER — CLONIDINE HYDROCHLORIDE 0.1 MG/1
0.1 TABLET ORAL DAILY
Status: DISCONTINUED | OUTPATIENT
Start: 2025-02-07 | End: 2025-02-11 | Stop reason: HOSPADM

## 2025-02-06 RX ORDER — MAGNESIUM SULFATE IN WATER 40 MG/ML
2000 INJECTION, SOLUTION INTRAVENOUS PRN
Status: CANCELLED | OUTPATIENT
Start: 2025-02-06

## 2025-02-06 RX ORDER — RISPERIDONE 1 MG/1
1 TABLET ORAL 2 TIMES DAILY
Status: CANCELLED | OUTPATIENT
Start: 2025-02-06

## 2025-02-06 RX ORDER — CLONAZEPAM 0.5 MG/1
0.5 TABLET ORAL 2 TIMES DAILY PRN
Status: CANCELLED | OUTPATIENT
Start: 2025-02-06

## 2025-02-06 RX ORDER — BACTERIOSTATIC WATER 1 ML/ML
INJECTION, SOLUTION INTRAMUSCULAR; INTRAVENOUS; SUBCUTANEOUS
Status: COMPLETED
Start: 2025-02-06 | End: 2025-02-06

## 2025-02-06 RX ORDER — POTASSIUM CHLORIDE 7.45 MG/ML
10 INJECTION INTRAVENOUS PRN
Status: DISCONTINUED | OUTPATIENT
Start: 2025-02-06 | End: 2025-02-08

## 2025-02-06 RX ORDER — MAGNESIUM HYDROXIDE/ALUMINUM HYDROXICE/SIMETHICONE 120; 1200; 1200 MG/30ML; MG/30ML; MG/30ML
30 SUSPENSION ORAL PRN
Status: DISCONTINUED | OUTPATIENT
Start: 2025-02-06 | End: 2025-02-11 | Stop reason: HOSPADM

## 2025-02-06 RX ORDER — ESCITALOPRAM OXALATE 20 MG/1
20 TABLET ORAL DAILY
Status: CANCELLED | OUTPATIENT
Start: 2025-02-07

## 2025-02-06 RX ORDER — POLYETHYLENE GLYCOL 3350 17 G/17G
17 POWDER, FOR SOLUTION ORAL DAILY PRN
Status: DISCONTINUED | OUTPATIENT
Start: 2025-02-06 | End: 2025-02-11 | Stop reason: HOSPADM

## 2025-02-06 RX ORDER — ACETAMINOPHEN 325 MG/1
650 TABLET ORAL EVERY 6 HOURS PRN
Status: DISCONTINUED | OUTPATIENT
Start: 2025-02-06 | End: 2025-02-11 | Stop reason: HOSPADM

## 2025-02-06 RX ORDER — SODIUM CHLORIDE 0.9 % (FLUSH) 0.9 %
5-40 SYRINGE (ML) INJECTION PRN
Status: DISCONTINUED | OUTPATIENT
Start: 2025-02-06 | End: 2025-02-11 | Stop reason: HOSPADM

## 2025-02-06 RX ORDER — ACETAMINOPHEN 325 MG/1
650 TABLET ORAL EVERY 4 HOURS PRN
Status: DISCONTINUED | OUTPATIENT
Start: 2025-02-06 | End: 2025-02-06 | Stop reason: SDUPTHER

## 2025-02-06 RX ORDER — IPRATROPIUM BROMIDE AND ALBUTEROL SULFATE 2.5; .5 MG/3ML; MG/3ML
1 SOLUTION RESPIRATORY (INHALATION) EVERY 4 HOURS PRN
Status: DISCONTINUED | OUTPATIENT
Start: 2025-02-06 | End: 2025-02-11 | Stop reason: HOSPADM

## 2025-02-06 RX ORDER — TRAZODONE HYDROCHLORIDE 50 MG/1
50 TABLET, FILM COATED ORAL NIGHTLY
Status: DISCONTINUED | OUTPATIENT
Start: 2025-02-06 | End: 2025-02-11 | Stop reason: HOSPADM

## 2025-02-06 RX ORDER — SODIUM CHLORIDE 0.9 % (FLUSH) 0.9 %
5-40 SYRINGE (ML) INJECTION PRN
Status: CANCELLED | OUTPATIENT
Start: 2025-02-06

## 2025-02-06 RX ORDER — LEVOTHYROXINE SODIUM 100 UG/1
100 TABLET ORAL DAILY
Status: DISCONTINUED | OUTPATIENT
Start: 2025-02-07 | End: 2025-02-11 | Stop reason: HOSPADM

## 2025-02-06 RX ORDER — POTASSIUM CHLORIDE 1500 MG/1
40 TABLET, EXTENDED RELEASE ORAL PRN
Status: DISCONTINUED | OUTPATIENT
Start: 2025-02-06 | End: 2025-02-11 | Stop reason: HOSPADM

## 2025-02-06 RX ORDER — ACETAMINOPHEN 325 MG/1
650 TABLET ORAL EVERY 4 HOURS PRN
Status: CANCELLED | OUTPATIENT
Start: 2025-02-06

## 2025-02-06 RX ORDER — ONDANSETRON 4 MG/1
4 TABLET, ORALLY DISINTEGRATING ORAL EVERY 8 HOURS PRN
Status: CANCELLED | OUTPATIENT
Start: 2025-02-06

## 2025-02-06 RX ORDER — METOPROLOL SUCCINATE 25 MG/1
25 TABLET, EXTENDED RELEASE ORAL DAILY
Status: CANCELLED | OUTPATIENT
Start: 2025-02-07

## 2025-02-06 RX ORDER — BUSPIRONE HYDROCHLORIDE 10 MG/1
30 TABLET ORAL 2 TIMES DAILY
Status: DISCONTINUED | OUTPATIENT
Start: 2025-02-06 | End: 2025-02-11 | Stop reason: HOSPADM

## 2025-02-06 RX ORDER — RISPERIDONE 1 MG/1
1 TABLET ORAL 2 TIMES DAILY
Status: DISCONTINUED | OUTPATIENT
Start: 2025-02-06 | End: 2025-02-11 | Stop reason: HOSPADM

## 2025-02-06 RX ORDER — MAGNESIUM HYDROXIDE/ALUMINUM HYDROXICE/SIMETHICONE 120; 1200; 1200 MG/30ML; MG/30ML; MG/30ML
30 SUSPENSION ORAL PRN
Status: CANCELLED | OUTPATIENT
Start: 2025-02-06

## 2025-02-06 RX ORDER — HALOPERIDOL 5 MG/1
5 TABLET ORAL EVERY 4 HOURS PRN
Status: CANCELLED | OUTPATIENT
Start: 2025-02-06

## 2025-02-06 RX ORDER — IPRATROPIUM BROMIDE AND ALBUTEROL SULFATE 2.5; .5 MG/3ML; MG/3ML
1 SOLUTION RESPIRATORY (INHALATION)
Status: DISCONTINUED | OUTPATIENT
Start: 2025-02-06 | End: 2025-02-07

## 2025-02-06 RX ORDER — POLYETHYLENE GLYCOL 3350 17 G/17G
17 POWDER, FOR SOLUTION ORAL DAILY PRN
Status: CANCELLED | OUTPATIENT
Start: 2025-02-06

## 2025-02-06 RX ORDER — SODIUM CHLORIDE 0.9 % (FLUSH) 0.9 %
5-40 SYRINGE (ML) INJECTION EVERY 12 HOURS SCHEDULED
Status: CANCELLED | OUTPATIENT
Start: 2025-02-06

## 2025-02-06 RX ORDER — TRAZODONE HYDROCHLORIDE 50 MG/1
50 TABLET, FILM COATED ORAL NIGHTLY
Status: CANCELLED | OUTPATIENT
Start: 2025-02-06

## 2025-02-06 RX ORDER — ESCITALOPRAM OXALATE 20 MG/1
20 TABLET ORAL DAILY
Status: DISCONTINUED | OUTPATIENT
Start: 2025-02-07 | End: 2025-02-11 | Stop reason: HOSPADM

## 2025-02-06 RX ORDER — ACETAMINOPHEN 650 MG/1
650 SUPPOSITORY RECTAL EVERY 6 HOURS PRN
Status: CANCELLED | OUTPATIENT
Start: 2025-02-06

## 2025-02-06 RX ORDER — METOPROLOL SUCCINATE 25 MG/1
25 TABLET, EXTENDED RELEASE ORAL DAILY
Status: DISCONTINUED | OUTPATIENT
Start: 2025-02-07 | End: 2025-02-11 | Stop reason: HOSPADM

## 2025-02-06 RX ORDER — SODIUM CHLORIDE 9 MG/ML
INJECTION, SOLUTION INTRAVENOUS PRN
Status: DISCONTINUED | OUTPATIENT
Start: 2025-02-06 | End: 2025-02-11 | Stop reason: HOSPADM

## 2025-02-06 RX ORDER — POTASSIUM CHLORIDE 1500 MG/1
40 TABLET, EXTENDED RELEASE ORAL PRN
Status: CANCELLED | OUTPATIENT
Start: 2025-02-06

## 2025-02-06 RX ORDER — ATORVASTATIN CALCIUM 20 MG/1
20 TABLET, FILM COATED ORAL EVERY EVENING
Status: DISCONTINUED | OUTPATIENT
Start: 2025-02-06 | End: 2025-02-11 | Stop reason: HOSPADM

## 2025-02-06 RX ORDER — ONDANSETRON 2 MG/ML
4 INJECTION INTRAMUSCULAR; INTRAVENOUS EVERY 6 HOURS PRN
Status: CANCELLED | OUTPATIENT
Start: 2025-02-06

## 2025-02-06 RX ORDER — LEVOTHYROXINE SODIUM 100 UG/1
100 TABLET ORAL DAILY
Status: CANCELLED | OUTPATIENT
Start: 2025-02-07

## 2025-02-06 RX ORDER — CLONIDINE HYDROCHLORIDE 0.1 MG/1
0.1 TABLET ORAL DAILY
Status: CANCELLED | OUTPATIENT
Start: 2025-02-07

## 2025-02-06 RX ORDER — SODIUM CHLORIDE 9 MG/ML
INJECTION, SOLUTION INTRAVENOUS PRN
Status: CANCELLED | OUTPATIENT
Start: 2025-02-06

## 2025-02-06 RX ORDER — BUSPIRONE HYDROCHLORIDE 10 MG/1
30 TABLET ORAL 2 TIMES DAILY
Status: CANCELLED | OUTPATIENT
Start: 2025-02-06

## 2025-02-06 RX ORDER — ATORVASTATIN CALCIUM 20 MG/1
20 TABLET, FILM COATED ORAL EVERY EVENING
Status: CANCELLED | OUTPATIENT
Start: 2025-02-06

## 2025-02-06 RX ORDER — ACETAMINOPHEN 325 MG/1
650 TABLET ORAL EVERY 6 HOURS PRN
Status: CANCELLED | OUTPATIENT
Start: 2025-02-06

## 2025-02-06 RX ORDER — BENZTROPINE MESYLATE 1 MG/ML
2 INJECTION, SOLUTION INTRAMUSCULAR; INTRAVENOUS 2 TIMES DAILY PRN
Status: CANCELLED | OUTPATIENT
Start: 2025-02-06

## 2025-02-06 RX ORDER — PREDNISONE 20 MG/1
20 TABLET ORAL DAILY
Status: COMPLETED | OUTPATIENT
Start: 2025-02-07 | End: 2025-02-09

## 2025-02-06 RX ORDER — ONDANSETRON 4 MG/1
4 TABLET, ORALLY DISINTEGRATING ORAL EVERY 8 HOURS PRN
Status: DISCONTINUED | OUTPATIENT
Start: 2025-02-06 | End: 2025-02-08

## 2025-02-06 RX ORDER — SODIUM CHLORIDE 0.9 % (FLUSH) 0.9 %
5-40 SYRINGE (ML) INJECTION EVERY 12 HOURS SCHEDULED
Status: DISCONTINUED | OUTPATIENT
Start: 2025-02-06 | End: 2025-02-11 | Stop reason: HOSPADM

## 2025-02-06 RX ORDER — IPRATROPIUM BROMIDE AND ALBUTEROL SULFATE 2.5; .5 MG/3ML; MG/3ML
1 SOLUTION RESPIRATORY (INHALATION)
Status: CANCELLED | OUTPATIENT
Start: 2025-02-06

## 2025-02-06 RX ORDER — IPRATROPIUM BROMIDE AND ALBUTEROL SULFATE 2.5; .5 MG/3ML; MG/3ML
1 SOLUTION RESPIRATORY (INHALATION) EVERY 4 HOURS PRN
Status: CANCELLED | OUTPATIENT
Start: 2025-02-06

## 2025-02-06 RX ORDER — HALOPERIDOL 5 MG/ML
5 INJECTION INTRAMUSCULAR EVERY 4 HOURS PRN
Status: CANCELLED | OUTPATIENT
Start: 2025-02-06

## 2025-02-06 RX ORDER — POTASSIUM CHLORIDE 7.45 MG/ML
10 INJECTION INTRAVENOUS PRN
Status: CANCELLED | OUTPATIENT
Start: 2025-02-06

## 2025-02-06 RX ORDER — HALOPERIDOL 5 MG/ML
5 INJECTION INTRAMUSCULAR EVERY 4 HOURS PRN
Status: DISCONTINUED | OUTPATIENT
Start: 2025-02-06 | End: 2025-02-11 | Stop reason: HOSPADM

## 2025-02-06 RX ORDER — BENZTROPINE MESYLATE 1 MG/ML
2 INJECTION, SOLUTION INTRAMUSCULAR; INTRAVENOUS 2 TIMES DAILY PRN
Status: DISCONTINUED | OUTPATIENT
Start: 2025-02-06 | End: 2025-02-11 | Stop reason: HOSPADM

## 2025-02-06 RX ADMIN — IPRATROPIUM BROMIDE AND ALBUTEROL SULFATE 1 DOSE: 2.5; .5 SOLUTION RESPIRATORY (INHALATION) at 13:00

## 2025-02-06 RX ADMIN — LEVOTHYROXINE SODIUM 75 MCG: 0.05 TABLET ORAL at 05:23

## 2025-02-06 RX ADMIN — ESCITALOPRAM OXALATE 20 MG: 20 TABLET ORAL at 08:58

## 2025-02-06 RX ADMIN — RISPERIDONE 1 MG: 1 TABLET, FILM COATED ORAL at 21:05

## 2025-02-06 RX ADMIN — CLONIDINE HYDROCHLORIDE 0.1 MG: 0.1 TABLET ORAL at 08:58

## 2025-02-06 RX ADMIN — METOPROLOL SUCCINATE 25 MG: 25 TABLET, EXTENDED RELEASE ORAL at 08:58

## 2025-02-06 RX ADMIN — Medication 3 MG: at 21:05

## 2025-02-06 RX ADMIN — CLONAZEPAM 0.5 MG: 0.5 TABLET ORAL at 20:00

## 2025-02-06 RX ADMIN — ATORVASTATIN CALCIUM 20 MG: 20 TABLET, FILM COATED ORAL at 19:10

## 2025-02-06 RX ADMIN — TRAZODONE HYDROCHLORIDE 50 MG: 50 TABLET ORAL at 21:05

## 2025-02-06 RX ADMIN — METHYLPREDNISOLONE SODIUM SUCCINATE 40 MG: 40 INJECTION INTRAMUSCULAR; INTRAVENOUS at 08:59

## 2025-02-06 RX ADMIN — LEVOTHYROXINE SODIUM 100 MCG: 0.1 TABLET ORAL at 05:23

## 2025-02-06 RX ADMIN — CLONAZEPAM 0.5 MG: 0.5 TABLET ORAL at 01:50

## 2025-02-06 RX ADMIN — RIVAROXABAN 20 MG: 20 TABLET, FILM COATED ORAL at 19:10

## 2025-02-06 RX ADMIN — RISPERIDONE 1 MG: 1 TABLET, FILM COATED ORAL at 08:58

## 2025-02-06 RX ADMIN — BUSPIRONE HYDROCHLORIDE 30 MG: 10 TABLET ORAL at 08:58

## 2025-02-06 RX ADMIN — IPRATROPIUM BROMIDE AND ALBUTEROL SULFATE 1 DOSE: 2.5; .5 SOLUTION RESPIRATORY (INHALATION) at 07:17

## 2025-02-06 RX ADMIN — ACETAMINOPHEN 650 MG: 325 TABLET ORAL at 05:22

## 2025-02-06 RX ADMIN — CLONAZEPAM 0.5 MG: 0.5 TABLET ORAL at 12:49

## 2025-02-06 RX ADMIN — Medication 10 ML: at 08:59

## 2025-02-06 RX ADMIN — BACTERIOSTATIC WATER 1 ML: 1 INJECTION, SOLUTION INTRAMUSCULAR; INTRAVENOUS; SUBCUTANEOUS at 08:59

## 2025-02-06 RX ADMIN — BUSPIRONE HYDROCHLORIDE 30 MG: 10 TABLET ORAL at 21:05

## 2025-02-06 ASSESSMENT — PATIENT HEALTH QUESTIONNAIRE - PHQ9
1. LITTLE INTEREST OR PLEASURE IN DOING THINGS: NOT AT ALL
SUM OF ALL RESPONSES TO PHQ9 QUESTIONS 1 & 2: 0
SUM OF ALL RESPONSES TO PHQ QUESTIONS 1-9: 0
SUM OF ALL RESPONSES TO PHQ QUESTIONS 1-9: 0
2. FEELING DOWN, DEPRESSED OR HOPELESS: NOT AT ALL
SUM OF ALL RESPONSES TO PHQ9 QUESTIONS 1 & 2: 0
SUM OF ALL RESPONSES TO PHQ QUESTIONS 1-9: 0
1. LITTLE INTEREST OR PLEASURE IN DOING THINGS: NOT AT ALL
2. FEELING DOWN, DEPRESSED OR HOPELESS: NOT AT ALL
SUM OF ALL RESPONSES TO PHQ QUESTIONS 1-9: 0

## 2025-02-06 ASSESSMENT — PAIN SCALES - GENERAL: PAINLEVEL_OUTOF10: 5

## 2025-02-06 ASSESSMENT — SLEEP AND FATIGUE QUESTIONNAIRES
DO YOU USE A SLEEP AID: YES
AVERAGE NUMBER OF SLEEP HOURS: 6
DO YOU HAVE DIFFICULTY SLEEPING: YES

## 2025-02-06 ASSESSMENT — PAIN DESCRIPTION - LOCATION: LOCATION: HEAD

## 2025-02-06 ASSESSMENT — PAIN DESCRIPTION - DESCRIPTORS: DESCRIPTORS: ACHING

## 2025-02-06 ASSESSMENT — LIFESTYLE VARIABLES
HOW MANY STANDARD DRINKS CONTAINING ALCOHOL DO YOU HAVE ON A TYPICAL DAY: PATIENT DOES NOT DRINK
HOW MANY STANDARD DRINKS CONTAINING ALCOHOL DO YOU HAVE ON A TYPICAL DAY: PATIENT DOES NOT DRINK
HOW OFTEN DO YOU HAVE A DRINK CONTAINING ALCOHOL: NEVER
HOW OFTEN DO YOU HAVE A DRINK CONTAINING ALCOHOL: NEVER

## 2025-02-06 NOTE — PROGRESS NOTES
[facility]  BEHAVIORAL HEALTH FOLLOW-UP NOTE       2/6/2025     Patient was seen and examined in person, Chart reviewed   Patient's case discussed with staff/team    Chief Complaint: psychosis    Interim History:   Pt still has vague HI thoughts about the people who harm children  Pt denies any AH or VH  paranoid  Sleep better  No active SI  Pt is feeling bored being here  Pt is on continuous oxygen      Appetite:   [] Normal/Unchanged  [] Increased  [] Decreased      Sleep:       [] Normal/Unchanged  [] Fair       [] Poor              Energy:    [] Normal/Unchanged  [] Increased  [] Decreased        SI [] Present  [] Absent    HI  []Present  [] Absent     Aggression:  [] yes  [] no    Patient is [] able  [] unable to CONTRACT FOR SAFETY     PAST MEDICAL/PSYCHIATRIC HISTORY:   Past Medical History:   Diagnosis Date    Anxiety     COPD (chronic obstructive pulmonary disease) ([facility])     GERD (gastroesophageal reflux disease)     Hyperlipidemia     Hypothyroidism     Nightmares     Scabies     Schizophrenia ([facility])        FAMILY/SOCIAL HISTORY:  History reviewed. No pertinent family history.  Social History     Socioeconomic History    Marital status:      Spouse name: Not on file    Number of children: Not on file    Years of education: Not on file    Highest education level: Not on file   Occupational History    Not on file   Tobacco Use    Smoking status: Every Day     Current packs/day: 1.00     Types: Cigarettes    Smokeless tobacco: Not on file   Vaping Use    Vaping status: Never Used   Substance and Sexual Activity    Alcohol use: Not on file    Drug use: Yes     Types: Marijuana (Weed)    Sexual activity: Not on file   Other Topics Concern    Not on file   Social History Narrative    Not on file     Social Determinants of Health     Financial Resource Strain: Not on file   Food Insecurity: No Food Insecurity (2/5/2025)    Hunger Vital Sign     Worried About Running Out of Food in  team.    PSYCHOTHERAPY/COUNSELING:  [x] Therapeutic interview  [x] Supportive  [] CBT  [] Ongoing  [] Other    [x] Patient continues to need, on a daily basis, active treatment furnished directly by or requiring the supervision of inpatient psychiatric personnel      Anticipated Length of stay:***        COSIGN : ***    Electronically signed by JESI ORONA MD on 2/6/2025 at 12:37 PM

## 2025-02-06 NOTE — CARE COORDINATION
SPOKE TO GIN AT [facility], O2 APPROVED AWAITING TANK DELIVERY, [facility] TO CALL CM.  PLAN FOR [facility]W AFTER DELIVERY.

## 2025-02-06 NOTE — PROGRESS NOTES
02/06/25 1000   RT Protocol   History Pulmonary Disease 2   Respiratory pattern 0   Breath sounds 6   Cough 0   Indications for Bronchodilator Therapy Wheezing associated with pulm disorder   Bronchodilator Assessment Score 8

## 2025-02-06 NOTE — DISCHARGE SUMMARY
Hospital Medicine Discharge Summary    Katt Josue  :  1980  MRN:  [id number]    Admit date:  2025  Discharge date:  2025    Admitting Physician:  Samantha Chan MD  Primary Care Physician:  Jersey Paul MD    Katt Josue is a 44 y.o. female that was admitted and treated for the following medical issues:     Active Problems:    * No active hospital problems. *  Resolved Problems:    * No resolved hospital problems. *      Discharge Diagnoses:    Active Problems:    * No active hospital problems. *  Resolved Problems:    * No resolved hospital problems. *    Chief Complaint   Patient presents with    Shortness of Breath       Hospital Course:   Katt Josue is a 44 y.o. female who was transferred from psychiatric facility to ER for hypoxia.  Was being seen for homicidal ideation.  Hypoxia resolved, most likely because patient had not been restarted on her home oxygen while at facility.  Stable on baseline oxygen, transferred to [facility] for further management  Pt was discharge in a stable condition.       /67   Pulse 60   Temp 97.9 °F (36.6 °C) (Oral)   Resp 18   Ht 1.626 m (5' 4.02\")   Wt 68 kg (149 lb 14.6 oz)   LMP  (LMP Unknown) Comment: menapause  SpO2 96%   BMI 25.72 kg/m²     Patient was seen by the following consultants  Consults:  IP CONSULT TO PSYCHIATRY  IP CONSULT TO HOSPITALIST  IP CONSULT TO SOCIAL WORK    Significant Diagnostic Studies:    Refer to chart if  CTA CHEST W WO CONTRAST    Result Date: 2025  EXAMINATION: CTA OF THE CHEST WITH AND WITHOUT CONTRAST 2025 4:25 am TECHNIQUE: CTA of the chest was performed before and after the administration of intravenous contrast.  Multiplanar reformatted images are provided for review.  MIP images are provided for review. Automated exposure control, iterative reconstruction, and/or weight based adjustment of the mA/kV was utilized to reduce the radiation dose to as low as reasonably achievable. COMPARISON:

## 2025-02-06 NOTE — FLOWSHEET NOTE
Resume care of pt at 11:45 Pt using the bathroom.Electronically signed by Mayra Hughes LPN on 2/6/2025 at 12:03 PM  Pt ate her lunch and is now snacking on some chips. No distress noted.Electronically signed by Mayra Hughes LPN on 2/6/2025 at 12:03 PM  Dr.Saravanna graham uo see pt saifd to give her a klonopin now and he will change it when she gets up there. Pt changed into clean clothes and ate lunch and is resting at this time.Electronically signed by Mayra Hughes LPN on 2/6/2025 at 1:13 PM    Oxygen tank delivered and waiting for [facility] to call to tell us what bed.Pt has been resting in her bed all afternoon.Electronically signed by Mayra Hughes LPN on 2/6/2025 at 2:13 PM

## 2025-02-06 NOTE — PROGRESS NOTES
Comprehensive Nutrition Assessment    Type and Reason for Visit:  Initial, Positive nutrition screen    Nutrition Recommendations/Plan:   Continue carb control 4 diet/safety tray  Obtain HbA1c, hyperglycemia noted.     Malnutrition Assessment:  Malnutrition Status:  No malnutrition (02/06/25 1039)      Nutrition Assessment:    Pt stable from a nutrition standpoint. Poor appetite noted PTA related to psychological stress. Currently eating >75%. No malnutrition. Plan for transfer to psych floor. Noted hyperglycemia with no DM dx. Recommend obtaining HbA1c.    Nutrition Related Findings:    pt admitted from [facility] to [facility] for Homicidal ideation. Now admitted with SOB. Plan for transfer to psych unit. Pmhx: GERD, HLD, hypothyroid, scabies, schizophrenia, COPD. Gluc >200 no hx of DM. No A1c. Elevated LFTs. Meds include Synthroid. BM PTA. No edema. Wound Type: None       Current Nutrition Intake & Therapies:    Average Meal Intake: %  Average Supplements Intake: None Ordered  ADULT DIET; Regular; 4 carb choices (60 gm/meal); Safety Tray; Safety Tray (Disposables)    Anthropometric Measures:  Height: 162.6 cm (5' 4.02\")  Ideal Body Weight (IBW): 120 lbs (55 kg)    Admission Body Weight: 69.3 kg (152 lb 14 oz)  Current Body Weight: 68 kg (149 lb 15 oz),    Weight Source: Bed scale  Current BMI (kg/m2): 25.7  Usual Body Weight:  (no wt hx)                          BMI Categories: Overweight (BMI 25.0-29.9)    Estimated Daily Nutrient Needs:  Energy Requirements Based On: Kcal/kg  Weight Used for Energy Requirements: Current  Energy (kcal/day): ~1400kcal (20-21kcal/kg)  Weight Used for Protein Requirements: Ideal  Protein (g/day): ~70g (1.3g/kg)  Method Used for Fluid Requirements: 1 ml/kcal  Fluid (ml/day): ~1400ml    Nutrition Diagnosis:   Inadequate oral intake related to psychological cause or life stress as evidenced by poor intake prior to admission    Nutrition Interventions:   Food and/or

## 2025-02-06 NOTE — FLOWSHEET NOTE
Patient very concerned due to missing Invega doses. Psychiatric doctor notified of patient concerns. No orders placed.Electronically signed by Gail Graham RN on 2/6/2025 at 2:29 PM    Discharge complete. RN waiting for oxygen to be delivered and bed to be ready.     1517: Report given to [facility] nurse. Security contacted for patient transport. Patient sitter to go with patient. Patient oxygen tank to go with patient, [facility] aware. IV and heart monitor aware.     .Electronically signed by Gail Graham RN on 2/6/2025 at 3:20 PM

## 2025-02-06 NOTE — PROGRESS NOTES
Stable on baseline home 2 for 24 hrs, HR much improved.   No medical barriers for transfer to [facility], psych unit.

## 2025-02-06 NOTE — CARE COORDINATION
SPOKE WITH CLAUDE GOOD, O2 WILL BE DELIVERED WITHIN THE HR.  THIS TANK WILL GO WITH PT TO [facility] TO BE USED FOR TRANSPORT HOME FROM [facility].

## 2025-02-07 LAB
ALBUMIN SERPL-MCNC: 3.6 G/DL (ref 3.5–4.6)
ALP SERPL-CCNC: 133 U/L (ref 40–130)
ALT SERPL-CCNC: 33 U/L (ref 0–33)
ANION GAP SERPL CALCULATED.3IONS-SCNC: 8 MEQ/L (ref 9–15)
AST SERPL-CCNC: 9 U/L (ref 0–35)
BASOPHILS # BLD: 0.1 K/UL (ref 0–0.2)
BASOPHILS NFR BLD: 0.5 %
BILIRUB SERPL-MCNC: <0.2 MG/DL (ref 0.2–0.7)
BUN SERPL-MCNC: 24 MG/DL (ref 6–20)
CALCIUM SERPL-MCNC: 9.2 MG/DL (ref 8.5–9.9)
CHLORIDE SERPL-SCNC: 102 MEQ/L (ref 95–107)
CHOLEST SERPL-MCNC: 138 MG/DL (ref 0–199)
CO2 SERPL-SCNC: 28 MEQ/L (ref 20–31)
CREAT SERPL-MCNC: 0.68 MG/DL (ref 0.5–0.9)
EOSINOPHIL # BLD: 0.1 K/UL (ref 0–0.7)
EOSINOPHIL NFR BLD: 0.6 %
ERYTHROCYTE [DISTWIDTH] IN BLOOD BY AUTOMATED COUNT: 14.6 % (ref 11.5–14.5)
GLOBULIN SER CALC-MCNC: 3.2 G/DL (ref 2.3–3.5)
GLUCOSE SERPL-MCNC: 120 MG/DL (ref 70–99)
HCT VFR BLD AUTO: 42.7 % (ref 37–47)
HDLC SERPL-MCNC: 38 MG/DL (ref 40–59)
HGB BLD-MCNC: 13.7 G/DL (ref 12–16)
LDLC SERPL CALC-MCNC: 74 MG/DL (ref 0–129)
LYMPHOCYTES # BLD: 4.3 K/UL (ref 1–4.8)
LYMPHOCYTES NFR BLD: 36 %
MCH RBC QN AUTO: 31.6 PG (ref 27–31.3)
MCHC RBC AUTO-ENTMCNC: 32.1 % (ref 33–37)
MCV RBC AUTO: 98.4 FL (ref 79.4–94.8)
MONOCYTES # BLD: 0.7 K/UL (ref 0.2–0.8)
MONOCYTES NFR BLD: 5.8 %
NEUTROPHILS # BLD: 6.3 K/UL (ref 1.4–6.5)
NEUTS SEG NFR BLD: 53.5 %
PLATELET # BLD AUTO: 167 K/UL (ref 130–400)
POTASSIUM SERPL-SCNC: 4.7 MEQ/L (ref 3.4–4.9)
PROT SERPL-MCNC: 6.8 G/DL (ref 6.3–8)
RBC # BLD AUTO: 4.34 M/UL (ref 4.2–5.4)
SODIUM SERPL-SCNC: 138 MEQ/L (ref 135–144)
TRIGL SERPL-MCNC: 129 MG/DL (ref 0–150)
WBC # BLD AUTO: 11.8 K/UL (ref 4.8–10.8)

## 2025-02-07 PROCEDURE — 80053 COMPREHEN METABOLIC PANEL: CPT

## 2025-02-07 PROCEDURE — 6370000000 HC RX 637 (ALT 250 FOR IP): Performed by: PSYCHIATRY & NEUROLOGY

## 2025-02-07 PROCEDURE — 2700000000 HC OXYGEN THERAPY PER DAY

## 2025-02-07 PROCEDURE — 80061 LIPID PANEL: CPT

## 2025-02-07 PROCEDURE — 94761 N-INVAS EAR/PLS OXIMETRY MLT: CPT

## 2025-02-07 PROCEDURE — 99223 1ST HOSP IP/OBS HIGH 75: CPT | Performed by: PSYCHIATRY & NEUROLOGY

## 2025-02-07 PROCEDURE — 94640 AIRWAY INHALATION TREATMENT: CPT

## 2025-02-07 PROCEDURE — 85025 COMPLETE CBC W/AUTO DIFF WBC: CPT

## 2025-02-07 PROCEDURE — 36415 COLL VENOUS BLD VENIPUNCTURE: CPT

## 2025-02-07 PROCEDURE — 6370000000 HC RX 637 (ALT 250 FOR IP): Performed by: INTERNAL MEDICINE

## 2025-02-07 PROCEDURE — 1240000000 HC EMOTIONAL WELLNESS R&B

## 2025-02-07 RX ORDER — HYDROXYZINE PAMOATE 50 MG/1
50 CAPSULE ORAL EVERY 6 HOURS PRN
Status: DISCONTINUED | OUTPATIENT
Start: 2025-02-07 | End: 2025-02-11 | Stop reason: HOSPADM

## 2025-02-07 RX ORDER — HYDROXYZINE PAMOATE 50 MG/1
50 CAPSULE ORAL 3 TIMES DAILY PRN
Status: ON HOLD | COMMUNITY
End: 2025-02-11 | Stop reason: HOSPADM

## 2025-02-07 RX ORDER — CLONAZEPAM 0.5 MG/1
0.5 TABLET ORAL 2 TIMES DAILY
Status: DISCONTINUED | OUTPATIENT
Start: 2025-02-07 | End: 2025-02-11 | Stop reason: HOSPADM

## 2025-02-07 RX ORDER — HYDROXYZINE PAMOATE 50 MG/1
50 CAPSULE ORAL EVERY 6 HOURS
Status: DISCONTINUED | OUTPATIENT
Start: 2025-02-07 | End: 2025-02-07

## 2025-02-07 RX ORDER — IPRATROPIUM BROMIDE AND ALBUTEROL SULFATE 2.5; .5 MG/3ML; MG/3ML
1 SOLUTION RESPIRATORY (INHALATION)
Status: DISCONTINUED | OUTPATIENT
Start: 2025-02-07 | End: 2025-02-11 | Stop reason: HOSPADM

## 2025-02-07 RX ADMIN — CLONIDINE HYDROCHLORIDE 0.1 MG: 0.1 TABLET ORAL at 08:47

## 2025-02-07 RX ADMIN — ACETAMINOPHEN 650 MG: 325 TABLET ORAL at 01:16

## 2025-02-07 RX ADMIN — RISPERIDONE 1 MG: 1 TABLET, FILM COATED ORAL at 08:42

## 2025-02-07 RX ADMIN — ACETAMINOPHEN 650 MG: 325 TABLET ORAL at 08:40

## 2025-02-07 RX ADMIN — LEVOTHYROXINE SODIUM 100 MCG: 0.1 TABLET ORAL at 08:47

## 2025-02-07 RX ADMIN — HYDROXYZINE PAMOATE 50 MG: 50 CAPSULE ORAL at 17:03

## 2025-02-07 RX ADMIN — METOPROLOL SUCCINATE 25 MG: 25 TABLET, FILM COATED, EXTENDED RELEASE ORAL at 08:41

## 2025-02-07 RX ADMIN — ATORVASTATIN CALCIUM 20 MG: 20 TABLET, FILM COATED ORAL at 16:56

## 2025-02-07 RX ADMIN — BUSPIRONE HYDROCHLORIDE 30 MG: 10 TABLET ORAL at 08:40

## 2025-02-07 RX ADMIN — ACETAMINOPHEN 650 MG: 325 TABLET ORAL at 21:59

## 2025-02-07 RX ADMIN — Medication 3 MG: at 22:20

## 2025-02-07 RX ADMIN — RIVAROXABAN 20 MG: 20 TABLET, FILM COATED ORAL at 16:56

## 2025-02-07 RX ADMIN — RISPERIDONE 1 MG: 1 TABLET, FILM COATED ORAL at 21:50

## 2025-02-07 RX ADMIN — CLONAZEPAM 0.5 MG: 0.5 TABLET ORAL at 21:50

## 2025-02-07 RX ADMIN — CLONAZEPAM 0.5 MG: 0.5 TABLET ORAL at 08:48

## 2025-02-07 RX ADMIN — BUSPIRONE HYDROCHLORIDE 30 MG: 10 TABLET ORAL at 21:50

## 2025-02-07 RX ADMIN — PREDNISONE 20 MG: 20 TABLET ORAL at 08:40

## 2025-02-07 RX ADMIN — IPRATROPIUM BROMIDE AND ALBUTEROL SULFATE 1 DOSE: .5; 2.5 SOLUTION RESPIRATORY (INHALATION) at 07:13

## 2025-02-07 RX ADMIN — LEVOTHYROXINE SODIUM 75 MCG: 0.07 TABLET ORAL at 08:41

## 2025-02-07 RX ADMIN — ESCITALOPRAM OXALATE 20 MG: 20 TABLET ORAL at 08:41

## 2025-02-07 RX ADMIN — IPRATROPIUM BROMIDE AND ALBUTEROL SULFATE 1 DOSE: .5; 2.5 SOLUTION RESPIRATORY (INHALATION) at 19:28

## 2025-02-07 RX ADMIN — IPRATROPIUM BROMIDE AND ALBUTEROL SULFATE 1 DOSE: .5; 2.5 SOLUTION RESPIRATORY (INHALATION) at 01:35

## 2025-02-07 RX ADMIN — TRAZODONE HYDROCHLORIDE 50 MG: 50 TABLET ORAL at 21:50

## 2025-02-07 ASSESSMENT — PAIN DESCRIPTION - LOCATION
LOCATION: HEAD
LOCATION: HEAD
LOCATION: BACK;HEAD

## 2025-02-07 ASSESSMENT — PAIN SCALES - GENERAL
PAINLEVEL_OUTOF10: 8
PAINLEVEL_OUTOF10: 7
PAINLEVEL_OUTOF10: 7

## 2025-02-07 ASSESSMENT — PAIN DESCRIPTION - DESCRIPTORS
DESCRIPTORS: ACHING
DESCRIPTORS: ACHING
DESCRIPTORS: ACHING;DISCOMFORT

## 2025-02-07 ASSESSMENT — PAIN DESCRIPTION - ORIENTATION: ORIENTATION: LEFT;ANTERIOR

## 2025-02-07 NOTE — PLAN OF CARE
Problem: Risk for Elopement  Goal: Patient will not exit the unit/facility without proper excort  Outcome: Progressing     Problem: Safety - Adult  Goal: Free from fall injury  Outcome: Progressing     Problem: Anxiety  Goal: Will report anxiety at manageable levels  Description: INTERVENTIONS:  1. Administer medication as ordered  2. Teach and rehearse alternative coping skills  3. Provide emotional support with 1:1 interaction with staff  Outcome: Progressing     Problem: Coping  Goal: Pt/Family able to verbalize concerns and demonstrate effective coping strategies  Description: INTERVENTIONS:  1. Assist patient/family to identify coping skills, available support systems and cultural and spiritual values  2. Provide emotional support, including active listening and acknowledgement of concerns of patient and caregivers  3. Reduce environmental stimuli, as able  4. Instruct patient/family in relaxation techniques, as appropriate  5. Assess for spiritual pain/suffering and initiate Spiritual Care, Psychosocial Clinical Specialist consults as needed  Outcome: Progressing     Problem: Confusion  Goal: Confusion, delirium, dementia, or psychosis is improved or at baseline  Description: INTERVENTIONS:  1. Assess for possible contributors to thought disturbance, including medications, impaired vision or hearing, underlying metabolic abnormalities, dehydration, psychiatric diagnoses, and notify attending LIP  2. [place] high risk fall precautions, as indicated  3. Provide frequent short contacts to provide reality reorientation, refocusing and direction  4. Decrease environmental stimuli, including noise as appropriate  5. Monitor and intervene to maintain adequate nutrition, hydration, elimination, sleep and activity  6. If unable to ensure safety without constant attention obtain sitter and review sitter guidelines with assigned personnel  7. Initiate Psychosocial CNS and Spiritual Care consult, as

## 2025-02-07 NOTE — H&P
[facility] - Department of Psychiatry    History and Physical - Adult         CHIEF COMPLAINT:  Psychosis    History obtained from:  patient    Patient was seen after discussing with the treatment team and reviewing the chart        CIRCUMSTANCES OF ADMISSION: transfer from medical floor    HISTORY OF PRESENT ILLNESS:      The patient is a 44 y.o. female with significant past history of Schizophrenia  Pt was admitted from [facility] where she got admitted for HI  Pt see psych at [facility] and has been taking medication  Pt went to [facility] ER following thoughts of HI against pedophiles   She does not have any particular person in mind  Pt said that something snapped in her mind and she became paranoid and thoughts of harming self  Pt is getting better physically but does not feel safe  Does not want to return back to   Agreeable to come to [facility] for further stabilization  Pt live with     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    Medications Prior to Admission:   Medications Prior to Admission: clonazePAM (KLONOPIN) 0.5 MG tablet, Take 1 tablet by mouth 2 times daily as needed.  cloNIDine (CATAPRES) 0.1 MG tablet, Take 1 tablet by mouth daily  levothyroxine (SYNTHROID) 75 MCG tablet, Take 1 tablet by mouth Daily Indications: Underactive Thyroid Add to 100mcg for a total of 175mcg  escitalopram (LEXAPRO) 20 MG tablet, Take 1 tablet by

## 2025-02-07 NOTE — CONSULTS
texture, turgor, no redness, warmth, or swelling, no rashes, and no lesions    Labs    CBC:  Recent Labs     02/05/25 0226 02/06/25 0458 02/07/25 0626   WBC 10.5 14.0* 11.8*   RBC 4.84 4.48 4.34   HGB 15.0 13.8 13.7   HCT 45.7 43.6 42.7   MCV 94.4 97.3* 98.4*   RDW 14.3 14.6* 14.6*   * CLUMPED 167     CHEMISTRIES:  Recent Labs     02/05/25 0226 02/06/25 0458 02/07/25 0626    140 138   K 4.0 4.6 4.7    104 102   CO2 25 27 28   BUN 15 19 24*   CREATININE 0.63 0.71 0.68   GLUCOSE 175* 123* 120*   MG 1.7  --   --      PT/INR:No results for input(s): \"PROTIME\", \"INR\" in the last 72 hours.  APTT:No results for input(s): \"APTT\" in the last 72 hours.  LIVER PROFILE:  Recent Labs     02/05/25 0226 02/06/25 0458 02/07/25 0626   AST 16 10 9   ALT 71* 45* 33   BILITOT 0.3 <0.2 <0.2   ALKPHOS 209* 152* 133*       Imaging/Diagnostics   CTA CHEST W WO CONTRAST    Result Date: 2/5/2025  No evidence of pulmonary embolism or acute pulmonary abnormality.     XR CHEST PORTABLE    Result Date: 2/5/2025  1. Borderline cardiomegaly. 2. No focal consolidation.       Assessment      Hospital Problems             Last Modified POA    * (Principal) Paranoid schizophrenia (HCC) 2/6/2025 Yes       Plan   *Paranoid schizophrenia  -Psychiatry    * COPD  -Oxygen protocol; currently on 4 L; SpO2 high 90s  -DuoNeb treatments twice daily ; prednisone 20 mg daily ;nursing to taper/ wean patient  off O2     *Hypothyroidism  -On levothyroxine     *Hyperlipidemia  -On atorvastatin    *GERD  -Maalox as needed    *History of DVT/PE  On Xarelto 20 mg daily    Thank you for consult.  Additional work up or/and treatment plan may be added today or then after based on clinical progression by other providers or specialists.  Patient will need to follow-up with PCP for chronic disease management.     I have spent greater than 70% of time  spent focused exclusively on this patient ,reviewing  chart, labs/diagnostics, reconciling

## 2025-02-08 LAB
ALBUMIN SERPL-MCNC: 3.9 G/DL (ref 3.5–4.6)
ALP SERPL-CCNC: 132 U/L (ref 40–130)
ALT SERPL-CCNC: 29 U/L (ref 0–33)
ANION GAP SERPL CALCULATED.3IONS-SCNC: 10 MEQ/L (ref 9–15)
AST SERPL-CCNC: 8 U/L (ref 0–35)
BASOPHILS # BLD: 0 K/UL (ref 0–0.2)
BASOPHILS NFR BLD: 1.1 %
BILIRUB SERPL-MCNC: <0.2 MG/DL (ref 0.2–0.7)
BUN SERPL-MCNC: 22 MG/DL (ref 6–20)
CALCIUM SERPL-MCNC: 9.7 MG/DL (ref 8.5–9.9)
CHLORIDE SERPL-SCNC: 101 MEQ/L (ref 95–107)
CO2 SERPL-SCNC: 30 MEQ/L (ref 20–31)
CREAT SERPL-MCNC: 0.65 MG/DL (ref 0.5–0.9)
EOSINOPHIL # BLD: 0.1 K/UL (ref 0–0.7)
EOSINOPHIL NFR BLD: 1 %
ERYTHROCYTE [DISTWIDTH] IN BLOOD BY AUTOMATED COUNT: 14.3 % (ref 11.5–14.5)
GLOBULIN SER CALC-MCNC: 3.2 G/DL (ref 2.3–3.5)
GLUCOSE SERPL-MCNC: 131 MG/DL (ref 70–99)
HCT VFR BLD AUTO: 46.1 % (ref 37–47)
HGB BLD-MCNC: 14.4 G/DL (ref 12–16)
LYMPHOCYTES # BLD: 4.2 K/UL (ref 1–4.8)
LYMPHOCYTES NFR BLD: 38 %
MCH RBC QN AUTO: 30.3 PG (ref 27–31.3)
MCHC RBC AUTO-ENTMCNC: 31.2 % (ref 33–37)
MCV RBC AUTO: 96.8 FL (ref 79.4–94.8)
MONOCYTES # BLD: 0.4 K/UL (ref 0.2–0.8)
MONOCYTES NFR BLD: 4.8 %
MYELOCYTES NFR BLD MANUAL: 1 %
NEUTROPHILS # BLD: 3.9 K/UL (ref 1.4–6.5)
NEUTS SEG NFR BLD: 45 %
PLATELET # BLD AUTO: 187 K/UL (ref 130–400)
PLATELET BLD QL SMEAR: ADEQUATE
POTASSIUM SERPL-SCNC: 5.1 MEQ/L (ref 3.4–4.9)
PROT SERPL-MCNC: 7.1 G/DL (ref 6.3–8)
RBC # BLD AUTO: 4.76 M/UL (ref 4.2–5.4)
SLIDE REVIEW: ABNORMAL
SMUDGE CELLS BLD QL SMEAR: 11.4
SODIUM SERPL-SCNC: 141 MEQ/L (ref 135–144)
VARIANT LYMPHS NFR BLD: 11 %
WBC # BLD AUTO: 8.5 K/UL (ref 4.8–10.8)

## 2025-02-08 PROCEDURE — 6370000000 HC RX 637 (ALT 250 FOR IP): Performed by: PSYCHIATRY & NEUROLOGY

## 2025-02-08 PROCEDURE — 6370000000 HC RX 637 (ALT 250 FOR IP): Performed by: INTERNAL MEDICINE

## 2025-02-08 PROCEDURE — 94640 AIRWAY INHALATION TREATMENT: CPT

## 2025-02-08 PROCEDURE — 85025 COMPLETE CBC W/AUTO DIFF WBC: CPT

## 2025-02-08 PROCEDURE — 94761 N-INVAS EAR/PLS OXIMETRY MLT: CPT

## 2025-02-08 PROCEDURE — 99232 SBSQ HOSP IP/OBS MODERATE 35: CPT | Performed by: PSYCHIATRY & NEUROLOGY

## 2025-02-08 PROCEDURE — 36415 COLL VENOUS BLD VENIPUNCTURE: CPT

## 2025-02-08 PROCEDURE — 1240000000 HC EMOTIONAL WELLNESS R&B

## 2025-02-08 PROCEDURE — 80053 COMPREHEN METABOLIC PANEL: CPT

## 2025-02-08 RX ADMIN — METOPROLOL SUCCINATE 25 MG: 25 TABLET, FILM COATED, EXTENDED RELEASE ORAL at 10:09

## 2025-02-08 RX ADMIN — IPRATROPIUM BROMIDE AND ALBUTEROL SULFATE 1 DOSE: .5; 2.5 SOLUTION RESPIRATORY (INHALATION) at 06:49

## 2025-02-08 RX ADMIN — HYDROXYZINE PAMOATE 50 MG: 50 CAPSULE ORAL at 13:29

## 2025-02-08 RX ADMIN — CLONAZEPAM 0.5 MG: 0.5 TABLET ORAL at 09:59

## 2025-02-08 RX ADMIN — IPRATROPIUM BROMIDE AND ALBUTEROL SULFATE 1 DOSE: .5; 2.5 SOLUTION RESPIRATORY (INHALATION) at 20:49

## 2025-02-08 RX ADMIN — RISPERIDONE 1 MG: 1 TABLET, FILM COATED ORAL at 20:46

## 2025-02-08 RX ADMIN — RISPERIDONE 1 MG: 1 TABLET, FILM COATED ORAL at 10:09

## 2025-02-08 RX ADMIN — ATORVASTATIN CALCIUM 20 MG: 20 TABLET, FILM COATED ORAL at 17:38

## 2025-02-08 RX ADMIN — TRAZODONE HYDROCHLORIDE 50 MG: 50 TABLET ORAL at 20:46

## 2025-02-08 RX ADMIN — LEVOTHYROXINE SODIUM 75 MCG: 0.07 TABLET ORAL at 06:25

## 2025-02-08 RX ADMIN — PREDNISONE 20 MG: 20 TABLET ORAL at 10:09

## 2025-02-08 RX ADMIN — CLONAZEPAM 0.5 MG: 0.5 TABLET ORAL at 20:46

## 2025-02-08 RX ADMIN — ESCITALOPRAM OXALATE 20 MG: 20 TABLET ORAL at 10:09

## 2025-02-08 RX ADMIN — BUSPIRONE HYDROCHLORIDE 30 MG: 10 TABLET ORAL at 10:17

## 2025-02-08 RX ADMIN — ACETAMINOPHEN 650 MG: 325 TABLET ORAL at 04:15

## 2025-02-08 RX ADMIN — LEVOTHYROXINE SODIUM 100 MCG: 0.1 TABLET ORAL at 06:25

## 2025-02-08 RX ADMIN — RIVAROXABAN 20 MG: 20 TABLET, FILM COATED ORAL at 17:38

## 2025-02-08 RX ADMIN — ACETAMINOPHEN 650 MG: 325 TABLET ORAL at 10:16

## 2025-02-08 RX ADMIN — BUSPIRONE HYDROCHLORIDE 30 MG: 10 TABLET ORAL at 20:46

## 2025-02-08 RX ADMIN — CLONIDINE HYDROCHLORIDE 0.1 MG: 0.1 TABLET ORAL at 10:09

## 2025-02-08 ASSESSMENT — PAIN SCALES - GENERAL
PAINLEVEL_OUTOF10: 0
PAINLEVEL_OUTOF10: 7

## 2025-02-08 ASSESSMENT — PAIN DESCRIPTION - ORIENTATION: ORIENTATION: ANTERIOR

## 2025-02-08 ASSESSMENT — PAIN DESCRIPTION - LOCATION
LOCATION: HEAD
LOCATION: HEAD

## 2025-02-08 ASSESSMENT — PAIN DESCRIPTION - DESCRIPTORS
DESCRIPTORS: ACHING
DESCRIPTORS: ACHING

## 2025-02-08 NOTE — PLAN OF CARE
Problem: Risk for Elopement  Goal: Patient will not exit the unit/facility without proper excort  2/7/2025 2237 by Kari Linares RN  Outcome: Progressing  2/7/2025 1326 by Vilma Ahn RN  Outcome: Progressing     Problem: Safety - Adult  Goal: Free from fall injury  2/7/2025 2237 by Kari Linares RN  Outcome: Progressing  2/7/2025 1326 by Vilma Ahn RN  Outcome: Progressing     Problem: Anxiety  Goal: Will report anxiety at manageable levels  Description: INTERVENTIONS:  1. Administer medication as ordered  2. Teach and rehearse alternative coping skills  3. Provide emotional support with 1:1 interaction with staff  2/7/2025 2237 by Kari Linares RN  Outcome: Progressing  2/7/2025 1326 by Vilma Ahn RN  Outcome: Progressing     Problem: Coping  Goal: Pt/Family able to verbalize concerns and demonstrate effective coping strategies  Description: INTERVENTIONS:  1. Assist patient/family to identify coping skills, available support systems and cultural and spiritual values  2. Provide emotional support, including active listening and acknowledgement of concerns of patient and caregivers  3. Reduce environmental stimuli, as able  4. Instruct patient/family in relaxation techniques, as appropriate  5. Assess for spiritual pain/suffering and initiate Spiritual Care, Psychosocial Clinical Specialist consults as needed  2/7/2025 2237 by Kari Linares RN  Outcome: Progressing  2/7/2025 1326 by Vilma Ahn RN  Outcome: Progressing     Problem: Confusion  Goal: Confusion, delirium, dementia, or psychosis is improved or at baseline  Description: INTERVENTIONS:  1. Assess for possible contributors to thought disturbance, including medications, impaired vision or hearing, underlying metabolic abnormalities, dehydration, psychiatric diagnoses, and notify attending LIP  2. [place] high risk fall precautions, as indicated  3. Provide frequent short contacts to provide reality

## 2025-02-08 NOTE — PLAN OF CARE
Problem: Risk for Elopement  Goal: Patient will not exit the unit/facility without proper excort  2/8/2025 0743 by Sarah Meza RN  Outcome: Progressing  2/7/2025 2237 by Kari Linares RN  Outcome: Progressing     Problem: Safety - Adult  Goal: Free from fall injury  2/8/2025 0743 by Sarah Meza RN  Outcome: Progressing  2/7/2025 2237 by Kari Linares RN  Outcome: Progressing     Problem: Anxiety  Goal: Will report anxiety at manageable levels  Description: INTERVENTIONS:  1. Administer medication as ordered  2. Teach and rehearse alternative coping skills  3. Provide emotional support with 1:1 interaction with staff  2/8/2025 0743 by Sarah Meza RN  Outcome: Progressing  2/7/2025 2237 by Kari Linares RN  Outcome: Progressing     Problem: Coping  Goal: Pt/Family able to verbalize concerns and demonstrate effective coping strategies  Description: INTERVENTIONS:  1. Assist patient/family to identify coping skills, available support systems and cultural and spiritual values  2. Provide emotional support, including active listening and acknowledgement of concerns of patient and caregivers  3. Reduce environmental stimuli, as able  4. Instruct patient/family in relaxation techniques, as appropriate  5. Assess for spiritual pain/suffering and initiate Spiritual Care, Psychosocial Clinical Specialist consults as needed  2/8/2025 0743 by Sarah Meza RN  Outcome: Progressing  2/7/2025 2237 by Kari Linares RN  Outcome: Progressing     Problem: Confusion  Goal: Confusion, delirium, dementia, or psychosis is improved or at baseline  Description: INTERVENTIONS:  1. Assess for possible contributors to thought disturbance, including medications, impaired vision or hearing, underlying metabolic abnormalities, dehydration, psychiatric diagnoses, and notify attending LIP  2. [place] high risk fall precautions, as indicated  3. Provide frequent short contacts to provide reality reorientation, refocusing

## 2025-02-08 NOTE — PROCEDURES
Patient requesting and cooperative in taking prn Vistaril 50mg po for anxiety 7/10 with ten being the worst.

## 2025-02-09 PROCEDURE — 6370000000 HC RX 637 (ALT 250 FOR IP): Performed by: PSYCHIATRY & NEUROLOGY

## 2025-02-09 PROCEDURE — 1240000000 HC EMOTIONAL WELLNESS R&B

## 2025-02-09 PROCEDURE — 2700000000 HC OXYGEN THERAPY PER DAY

## 2025-02-09 PROCEDURE — 99232 SBSQ HOSP IP/OBS MODERATE 35: CPT | Performed by: PSYCHIATRY & NEUROLOGY

## 2025-02-09 PROCEDURE — 6370000000 HC RX 637 (ALT 250 FOR IP): Performed by: INTERNAL MEDICINE

## 2025-02-09 PROCEDURE — 94640 AIRWAY INHALATION TREATMENT: CPT

## 2025-02-09 PROCEDURE — 94761 N-INVAS EAR/PLS OXIMETRY MLT: CPT

## 2025-02-09 RX ADMIN — HYDROXYZINE PAMOATE 50 MG: 50 CAPSULE ORAL at 17:57

## 2025-02-09 RX ADMIN — TRAZODONE HYDROCHLORIDE 50 MG: 50 TABLET ORAL at 20:45

## 2025-02-09 RX ADMIN — BUSPIRONE HYDROCHLORIDE 30 MG: 10 TABLET ORAL at 08:56

## 2025-02-09 RX ADMIN — METOPROLOL SUCCINATE 25 MG: 25 TABLET, FILM COATED, EXTENDED RELEASE ORAL at 08:45

## 2025-02-09 RX ADMIN — PREDNISONE 20 MG: 20 TABLET ORAL at 08:54

## 2025-02-09 RX ADMIN — BUSPIRONE HYDROCHLORIDE 30 MG: 10 TABLET ORAL at 20:45

## 2025-02-09 RX ADMIN — ESCITALOPRAM OXALATE 20 MG: 20 TABLET ORAL at 08:45

## 2025-02-09 RX ADMIN — LEVOTHYROXINE SODIUM 100 MCG: 0.1 TABLET ORAL at 06:29

## 2025-02-09 RX ADMIN — RISPERIDONE 1 MG: 1 TABLET, FILM COATED ORAL at 08:54

## 2025-02-09 RX ADMIN — HYDROXYZINE PAMOATE 50 MG: 50 CAPSULE ORAL at 11:44

## 2025-02-09 RX ADMIN — LEVOTHYROXINE SODIUM 75 MCG: 0.07 TABLET ORAL at 06:28

## 2025-02-09 RX ADMIN — CLONAZEPAM 0.5 MG: 0.5 TABLET ORAL at 08:43

## 2025-02-09 RX ADMIN — ACETAMINOPHEN 650 MG: 325 TABLET ORAL at 06:51

## 2025-02-09 RX ADMIN — ATORVASTATIN CALCIUM 20 MG: 20 TABLET, FILM COATED ORAL at 20:44

## 2025-02-09 RX ADMIN — ACETAMINOPHEN 650 MG: 325 TABLET ORAL at 02:06

## 2025-02-09 RX ADMIN — IPRATROPIUM BROMIDE AND ALBUTEROL SULFATE 1 DOSE: .5; 2.5 SOLUTION RESPIRATORY (INHALATION) at 20:37

## 2025-02-09 RX ADMIN — HYDROXYZINE PAMOATE 50 MG: 50 CAPSULE ORAL at 02:06

## 2025-02-09 RX ADMIN — CLONIDINE HYDROCHLORIDE 0.1 MG: 0.1 TABLET ORAL at 08:54

## 2025-02-09 RX ADMIN — CLONAZEPAM 0.5 MG: 0.5 TABLET ORAL at 20:45

## 2025-02-09 RX ADMIN — Medication 3 MG: at 20:45

## 2025-02-09 RX ADMIN — RISPERIDONE 1 MG: 1 TABLET, FILM COATED ORAL at 20:44

## 2025-02-09 RX ADMIN — IPRATROPIUM BROMIDE AND ALBUTEROL SULFATE 1 DOSE: .5; 2.5 SOLUTION RESPIRATORY (INHALATION) at 06:58

## 2025-02-09 RX ADMIN — RIVAROXABAN 20 MG: 20 TABLET, FILM COATED ORAL at 20:45

## 2025-02-09 ASSESSMENT — PAIN SCALES - GENERAL
PAINLEVEL_OUTOF10: 0
PAINLEVEL_OUTOF10: 7

## 2025-02-09 ASSESSMENT — PAIN DESCRIPTION - LOCATION: LOCATION: HEAD

## 2025-02-09 NOTE — CARE COORDINATION
FAMILY COLLATERAL NOTE    Family/Support Name: Praneeth Josue  Contact #: # [phone]   Relationship to Pt:: Spouse    LSW reached out to above to complete collateral information. VM states patient's name. HIPAA compliant VM left for return call.    ABDELRAHMAN Eubanks  
Dr. Ferrell was going to discontinue the telemetry order, but it  before he had a chance.  
Family/Support Name: Praneeth Josue  Contact #: [phone]  Relationship to Patient:     Placed call to above for collateral information,    Response: Called and left message for pt  to call back for collateral.   
Leisure Assessment  February 7, 2025 /  0945  am    Appearance: Alert, Appears as stated age, and Pleasant  Current Mental Status: Calm and Cooperative  Affect/Mood: Constricted/ Quiet  Thought Content/Processes: Vague  Insight/Judgement: Poor insight and Fair judgment  Speech: normal volume and slow  Delusions/Hallucinations: none observed/reported at this time   Admit Status:  Involuntary     Patient identified her leisure interests were making art and playing on her phone. Patient shared that she spends her time with others, primarily with her MIL. Patient expressed having a strong support system. Patient explained that she typically \"screams\" to help her manage stress, but clarified that she wanted to focus on improving her anger management skills during admission. Patient described that she thought a friend \"screwed me over\" and she lost control prior to hospitalization. Patient identified her strength was being a \"nice person.\"     Recommendations: Decrease Impulsivity/Impulsive Behaviors, Improve/Maintain Coping Skills Development, Improve/Maintain Emotion Regulation Skills/Mood, Improve/Maintain Expressive Communication/Self-Expression, Improve/Maintain Insight/Self-Awareness, and Promote Reality Orientation    Signature: Alanna Grant, Licensed Professional Music Therapist (LPMT)  
Patient did not attempt group despite staff encouragement.  
[] No   SSI  Leisure & Recreational Interests and Hobbies/Coping Skills:  deep breathing, calling a friend    Ability to Complete Activities of Daily Living (ADLs):  [x] Yes  [] No (Specify)    Health Issues:Zoroastrian Preferences:     None [x]  Yes [] (Specify)  ________________________:       Environment/Home Setting and Family Circumstances: Legal Status:  [x] Involuntary   [] Voluntary  [] Guardian  [] Payee:  [] Other (Specify):    Collateral Contact Identified  Name: Praneeth Josue  Relationship   Number: [phone]  Pertinent Collateral Information: None     Access to [employer] per Patient and/or Collateral Contact: [] Reported  [x] Denies         Initial Discharge Plan:  [x] Home: with   [] Shelter:  [] Crisis Unit:  [] Substance Abuse Rehab:  [] Nursing Facility:  [] Other (Specify):    Follow up Community Services:  [place]  Ability to access services including transportation: yes    Safety Plan reviewed, updated or completed:      Patient stated that she was really upset with a friend. \" I thought I was getting screwed over and I snapped.\" Patient stated she starting having thoughts of wanting to hurt pedophiles but did not have any particular person in mind. \" I just can't believe they get away with hurting people.\" When asked if she was having these thoughts currently she stated she was not. Denies A/V hallucinations. Is cooperative during assessment. Wants to go back home to her  whom she states is very supportive.   Patient reports med compliance and was worried she did not receive her Invega shot.

## 2025-02-09 NOTE — PLAN OF CARE
Pt remains on 1:1 for safety. Denies SI, HI and current AVH. Pt rates anxiety 8/10 (with 10 being the highest), denies any depression. Pt pre-occupied with Klonopin and asking for medication early. Pt affect flat and blunted with delayed response. Pt does not make any paranoid or delusional comments.  Problem: Risk for Elopement  Goal: Patient will not exit the unit/facility without proper excort  2/8/2025 2025 by Shanta Menon RN  Outcome: Progressing  2/8/2025 0743 by Sarah Meza RN  Outcome: Progressing     Problem: Safety - Adult  Goal: Free from fall injury  2/8/2025 2025 by Shanta Menon RN  Outcome: Progressing  2/8/2025 0743 by Sarah Meza RN  Outcome: Progressing     Problem: Anxiety  Goal: Will report anxiety at manageable levels  2/8/2025 2025 by Shanta Menon RN  Outcome: Progressing  2/8/2025 0743 by Sarah Meza RN  Outcome: Progressing     Problem: Coping  Goal: Pt/Family able to verbalize concerns and demonstrate effective coping strategies  2/8/2025 2025 by Shanta Menon RN  Outcome: Progressing  2/8/2025 0743 by Sarah Meza RN  Outcome: Progressing     Problem: Confusion  Goal: Confusion, delirium, dementia, or psychosis is improved or at baseline  2/8/2025 2025 by Shanta Menon RN  Outcome: Progressing  2/8/2025 0743 by Sarah Meza RN  Outcome: Progressing     Problem: Depression/Self Harm  Goal: Effect of psychiatric condition will be minimized and patient will be protected from self harm  2/8/2025 2025 by Shanta Menon RN  Outcome: Progressing  2/8/2025 0743 by Sarah Meza RN  Outcome: Progressing

## 2025-02-09 NOTE — PLAN OF CARE
Problem: Risk for Elopement  Goal: Patient will not exit the unit/facility without proper excort  2/9/2025 0745 by Sarah Meza RN  Outcome: Progressing  2/8/2025 2025 by Shanta Menon RN  Outcome: Progressing     Problem: Safety - Adult  Goal: Free from fall injury  2/9/2025 0745 by Sarah Meza RN  Outcome: Progressing  2/8/2025 2025 by Shanta Menon RN  Outcome: Progressing     Problem: Anxiety  Goal: Will report anxiety at manageable levels  Description: INTERVENTIONS:  1. Administer medication as ordered  2. Teach and rehearse alternative coping skills  3. Provide emotional support with 1:1 interaction with staff  2/9/2025 0745 by Sarah Meza RN  Outcome: Progressing  2/8/2025 2025 by Shanta Menon RN  Outcome: Progressing     Problem: Coping  Goal: Pt/Family able to verbalize concerns and demonstrate effective coping strategies  Description: INTERVENTIONS:  1. Assist patient/family to identify coping skills, available support systems and cultural and spiritual values  2. Provide emotional support, including active listening and acknowledgement of concerns of patient and caregivers  3. Reduce environmental stimuli, as able  4. Instruct patient/family in relaxation techniques, as appropriate  5. Assess for spiritual pain/suffering and initiate Spiritual Care, Psychosocial Clinical Specialist consults as needed  2/9/2025 0745 by Sarah Meza RN  Outcome: Progressing  2/8/2025 2025 by Shanta Menon RN  Outcome: Progressing     Problem: Confusion  Goal: Confusion, delirium, dementia, or psychosis is improved or at baseline  Description: INTERVENTIONS:  1. Assess for possible contributors to thought disturbance, including medications, impaired vision or hearing, underlying metabolic abnormalities, dehydration, psychiatric diagnoses, and notify attending LIP  2. [place] high risk fall precautions, as indicated  3. Provide frequent short contacts to provide reality reorientation, refocusing and

## 2025-02-10 DIAGNOSIS — E78.00 PURE HYPERCHOLESTEROLEMIA: Primary | ICD-10-CM

## 2025-02-10 PROCEDURE — 99232 SBSQ HOSP IP/OBS MODERATE 35: CPT | Performed by: PSYCHIATRY & NEUROLOGY

## 2025-02-10 PROCEDURE — 6370000000 HC RX 637 (ALT 250 FOR IP): Performed by: INTERNAL MEDICINE

## 2025-02-10 PROCEDURE — 6370000000 HC RX 637 (ALT 250 FOR IP): Performed by: PSYCHIATRY & NEUROLOGY

## 2025-02-10 PROCEDURE — 1240000000 HC EMOTIONAL WELLNESS R&B

## 2025-02-10 PROCEDURE — 94760 N-INVAS EAR/PLS OXIMETRY 1: CPT

## 2025-02-10 PROCEDURE — 94640 AIRWAY INHALATION TREATMENT: CPT

## 2025-02-10 RX ORDER — ATORVASTATIN CALCIUM 20 MG/1
20 TABLET, FILM COATED ORAL DAILY
Qty: 30 TABLET | Refills: 2 | Status: SHIPPED | OUTPATIENT
Start: 2025-02-10

## 2025-02-10 RX ADMIN — HYDROXYZINE PAMOATE 50 MG: 50 CAPSULE ORAL at 16:48

## 2025-02-10 RX ADMIN — HYDROXYZINE PAMOATE 50 MG: 50 CAPSULE ORAL at 11:03

## 2025-02-10 RX ADMIN — LEVOTHYROXINE SODIUM 100 MCG: 0.1 TABLET ORAL at 06:36

## 2025-02-10 RX ADMIN — CLONAZEPAM 0.5 MG: 0.5 TABLET ORAL at 08:00

## 2025-02-10 RX ADMIN — ATORVASTATIN CALCIUM 20 MG: 20 TABLET, FILM COATED ORAL at 16:48

## 2025-02-10 RX ADMIN — ESCITALOPRAM OXALATE 20 MG: 20 TABLET ORAL at 08:00

## 2025-02-10 RX ADMIN — RISPERIDONE 1 MG: 1 TABLET, FILM COATED ORAL at 21:32

## 2025-02-10 RX ADMIN — ACETAMINOPHEN 650 MG: 325 TABLET ORAL at 00:30

## 2025-02-10 RX ADMIN — BUSPIRONE HYDROCHLORIDE 30 MG: 10 TABLET ORAL at 08:01

## 2025-02-10 RX ADMIN — Medication 3 MG: at 21:32

## 2025-02-10 RX ADMIN — RISPERIDONE 1 MG: 1 TABLET, FILM COATED ORAL at 08:01

## 2025-02-10 RX ADMIN — IPRATROPIUM BROMIDE AND ALBUTEROL SULFATE 1 DOSE: .5; 2.5 SOLUTION RESPIRATORY (INHALATION) at 07:13

## 2025-02-10 RX ADMIN — IPRATROPIUM BROMIDE AND ALBUTEROL SULFATE 1 DOSE: .5; 2.5 SOLUTION RESPIRATORY (INHALATION) at 20:45

## 2025-02-10 RX ADMIN — LEVOTHYROXINE SODIUM 75 MCG: 0.07 TABLET ORAL at 06:35

## 2025-02-10 RX ADMIN — BUSPIRONE HYDROCHLORIDE 30 MG: 10 TABLET ORAL at 21:32

## 2025-02-10 RX ADMIN — CLONIDINE HYDROCHLORIDE 0.1 MG: 0.1 TABLET ORAL at 08:01

## 2025-02-10 RX ADMIN — RIVAROXABAN 20 MG: 20 TABLET, FILM COATED ORAL at 16:48

## 2025-02-10 RX ADMIN — CLONAZEPAM 0.5 MG: 0.5 TABLET ORAL at 21:32

## 2025-02-10 RX ADMIN — TRAZODONE HYDROCHLORIDE 50 MG: 50 TABLET ORAL at 21:32

## 2025-02-10 RX ADMIN — METOPROLOL SUCCINATE 25 MG: 25 TABLET, FILM COATED, EXTENDED RELEASE ORAL at 08:01

## 2025-02-10 RX ADMIN — ACETAMINOPHEN 650 MG: 325 TABLET ORAL at 06:34

## 2025-02-10 ASSESSMENT — PAIN DESCRIPTION - DESCRIPTORS
DESCRIPTORS: ACHING
DESCRIPTORS: POUNDING;PRESSURE

## 2025-02-10 ASSESSMENT — PAIN SCALES - GENERAL
PAINLEVEL_OUTOF10: 0
PAINLEVEL_OUTOF10: 6
PAINLEVEL_OUTOF10: 7

## 2025-02-10 ASSESSMENT — PAIN DESCRIPTION - LOCATION
LOCATION: HEAD
LOCATION: HEAD

## 2025-02-10 NOTE — TELEPHONE ENCOUNTER
Rx Refill Request Telephone Encounter    Name:  Stephenie Mccray  :  [id number]  Medication Name:  atorvastatin (Lipitor) 20 mg   Specific Pharmacy location:  [facility]   Date of last appointment:    Date of next appointment:    Best number to reach patient:  [phone]

## 2025-02-10 NOTE — FLOWSHEET NOTE
Pt admitted to [facility] room 374.  Pt was showed her room and oxygen supplied from the was unit.  Pt has a portable oxygen tank to walk around with. .  Will be going to dinner soon.Electronically signed by Mayra Hughes LPN on 2/6/2025 at 3:55 PM    
Pt requested a prn vistaril for anxiety rated a 3/10, pt states she is anxious to go home.  
Pt requesting her prn Klonopin , states \" I am having a lot of anxiety and I need something\". Pt  was offered her prn Klonopin and accepted.  
Pt visible on unit and social with staff and peers.Walks with strong and steady gait . Pt remains a 1:1 for safety D/T  continues oxygen. Pt endorses adequate sleep and appetite. Pt rates her anxiety a 8/10 and depression 3/10 on a scale of \"1-10\" with ten the worst. Pt affect flat  and blunted. Pt did not voice any paranoid or delusional statements. Pt denies SI/HI/AVH.  
Pt visible out on the unit and social with peers.Pt walks with a strong and steady gait. Pt did shower and encouraged to attend groups and other milieu activities. Pt endorses adequate sleep and appetite. Pt denies suicidal ideation, intent or harm. Pt is discharge focused.  
66

## 2025-02-11 VITALS
OXYGEN SATURATION: 97 % | DIASTOLIC BLOOD PRESSURE: 75 MMHG | TEMPERATURE: 97.3 F | RESPIRATION RATE: 16 BRPM | SYSTOLIC BLOOD PRESSURE: 108 MMHG | HEART RATE: 109 BPM

## 2025-02-11 PROCEDURE — 99239 HOSP IP/OBS DSCHRG MGMT >30: CPT | Performed by: PSYCHIATRY & NEUROLOGY

## 2025-02-11 PROCEDURE — 6370000000 HC RX 637 (ALT 250 FOR IP): Performed by: PSYCHIATRY & NEUROLOGY

## 2025-02-11 PROCEDURE — 6370000000 HC RX 637 (ALT 250 FOR IP): Performed by: INTERNAL MEDICINE

## 2025-02-11 PROCEDURE — 94640 AIRWAY INHALATION TREATMENT: CPT

## 2025-02-11 RX ORDER — BUSPIRONE HYDROCHLORIDE 30 MG/1
30 TABLET ORAL 2 TIMES DAILY
Qty: 30 TABLET | Refills: 2 | Status: SHIPPED | OUTPATIENT
Start: 2025-02-11

## 2025-02-11 RX ORDER — TRAZODONE HYDROCHLORIDE 50 MG/1
50 TABLET, FILM COATED ORAL NIGHTLY
Qty: 30 TABLET | Refills: 1 | Status: SHIPPED | OUTPATIENT
Start: 2025-02-11

## 2025-02-11 RX ORDER — METOPROLOL SUCCINATE 25 MG/1
25 TABLET, EXTENDED RELEASE ORAL DAILY
Qty: 30 TABLET | Refills: 3 | Status: SHIPPED | OUTPATIENT
Start: 2025-02-12

## 2025-02-11 RX ORDER — RISPERIDONE 1 MG/1
1 TABLET ORAL 2 TIMES DAILY
Qty: 60 TABLET | Refills: 1 | Status: SHIPPED | OUTPATIENT
Start: 2025-02-11

## 2025-02-11 RX ADMIN — LEVOTHYROXINE SODIUM 100 MCG: 0.1 TABLET ORAL at 06:27

## 2025-02-11 RX ADMIN — ACETAMINOPHEN 650 MG: 325 TABLET ORAL at 06:29

## 2025-02-11 RX ADMIN — RISPERIDONE 1 MG: 1 TABLET, FILM COATED ORAL at 08:58

## 2025-02-11 RX ADMIN — IPRATROPIUM BROMIDE AND ALBUTEROL SULFATE 1 DOSE: .5; 2.5 SOLUTION RESPIRATORY (INHALATION) at 08:11

## 2025-02-11 RX ADMIN — CLONAZEPAM 0.5 MG: 0.5 TABLET ORAL at 08:58

## 2025-02-11 RX ADMIN — METOPROLOL SUCCINATE 25 MG: 25 TABLET, FILM COATED, EXTENDED RELEASE ORAL at 08:58

## 2025-02-11 RX ADMIN — HYDROXYZINE PAMOATE 50 MG: 50 CAPSULE ORAL at 06:56

## 2025-02-11 RX ADMIN — ESCITALOPRAM OXALATE 20 MG: 20 TABLET ORAL at 08:58

## 2025-02-11 RX ADMIN — BUSPIRONE HYDROCHLORIDE 30 MG: 10 TABLET ORAL at 09:01

## 2025-02-11 RX ADMIN — CLONIDINE HYDROCHLORIDE 0.1 MG: 0.1 TABLET ORAL at 08:58

## 2025-02-11 RX ADMIN — LEVOTHYROXINE SODIUM 75 MCG: 0.07 TABLET ORAL at 06:21

## 2025-02-11 ASSESSMENT — PAIN SCALES - GENERAL
PAINLEVEL_OUTOF10: 0
PAINLEVEL_OUTOF10: 7

## 2025-02-11 ASSESSMENT — PAIN DESCRIPTION - LOCATION: LOCATION: HEAD

## 2025-02-11 ASSESSMENT — PAIN DESCRIPTION - DESCRIPTORS: DESCRIPTORS: ACHING
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
## 2025-02-11 NOTE — GROUP NOTE
Group Therapy Note    Date: 2/10/2025    Group Start Time: 1015  Group End Time: 1045  Group Topic: Art Therapy     [facility] 3[facility] Jewell Gomez, DEBORAHW        Group Therapy Note    Attendees: 3       Patient's Goal:  To participate in morning group art therapy.     Notes:  Patient did not attend.     Modes of Intervention: Activity      Discipline Responsible: Psychoeducational Specialist      Signature:  Jewell Gotti MA ATR LPAT    
                                                                      Group Therapy Note    Date: 2/10/2025    Group Start Time: 1950  Group End Time: 2020  Group Topic: Activity    [facility] 3[facility] Mellisa Justin        Group Therapy Note    Attendees: 6/16       Patient's Goal: Attend activity group and participate in [employer]     Notes:  Pt attended group and actively participated    Status After Intervention:  Improved    Participation Level: Active Listener and Interactive    Participation Quality: Appropriate, Attentive, and Sharing      Speech:  slurred      Thought Process/Content: Logical      Affective Functioning: Congruent      Mood: euthymic      Level of consciousness:  Alert and Attentive      Response to Learning: Progressing to goal      Endings: None Reported    Modes of Intervention: Activity      Discipline Responsible: Behavorial Health Tech      Signature:  Mellisa Villanueva    
                                                                      Group Therapy Note    Date: 2/10/2025    Group Start Time: 2020  Group End Time: 2030  Group Topic: Wrap-Up    [facility] 3[facility] Mellisa Justin        Group Therapy Note    Attendees: 6/16       Patient's Goal: Attend wrap-up group and share one positive thing about today.    Notes:  Patient shared she was able to talk on the phone with her best friend and      Status After Intervention:  Improved    Participation Level: Active Listener and Interactive    Participation Quality: Appropriate, Attentive, and Sharing      Speech:  slurred      Thought Process/Content: Logical      Affective Functioning: Congruent      Mood: euthymic      Level of consciousness:  Alert and Attentive      Response to Learning: Progressing to goal      Endings: None Reported    Modes of Intervention: Support      Discipline Responsible: Behavorial Health Tech      Signature:  Mellisa Villanueva    
                                                                      Group Therapy Note    Date: 2/9/2025    Group Start Time: 1200  Group End Time: 1245  Group Topic: Psychoeducation    [facility] 3[facility] Marilee Galarza    Group Therapy Note    End of week check-in     Description:   Patients will be directed to complete the worksheet that prompts them to decide how they are currently feeling, what their lowest reported feeling was for the week, and what their highest reported feeling of the week was. Patients will then be prompted on the sheet to describe what that would look like if it were a color, weather pattern, and an animal. Patients will then be encouraged to speak about their experiences and how they described their moods.     Goals:   Peer interaction, emotion/mood identification, mood regulation, reality orientation    BINGO     Description:   Patients will be directed to take one BINGO card and listen to the songs playing. Patients will be directed to identify the song and place their \"bingo chip\" on the correct square.     Goal:   Reality orientation, peer interaction     Patient entered the group room as the leading facilitator was ending group. The leading facilitator allowed the patient to complete the end of week check-in and conversed with her about her answers. Pt reported feeling her lowest (1) when she \"had an episode after her friend cancelled on her.\" Patient reported her highest feeling as 7, and mentioned that she was happiest when she was completing a \"cristiano dots\" craft for her family. Pt was pleasant and sociable with staff.     Attended: Less than 1/4 attendance  Participation Level: Interactive  Participation Quality: Appropriate and Sharing  Affect/Mood: Congruent/Euthymic  Speech: normal rate and normal volume   Insight/Judgement: Poor judgment and Fair insight  Response: Able to verbalize current knowledge/experience, Able to retain information, and Capable of insight    Signature: 
                                                                      Group Therapy Note  Pt did not attend group.   Date: 2/8/2025    Group Start Time: 0930  Group End Time: 1000  Group Topic: Psychoeducation    [facility] Marilee Galarza    Group Therapy Note    Poetry Slam    Description:   Patients will be given multiple example prompts to choose from to create a poem. All prompts are no longer than a sentence, and some are deemed as \"funny\" (example: I was so good at crastination I became a pro) and \"serious\" (example: My passion is waiting for my courage to catch up). Patients will be given supplies in the form of blank paper, coloring utensils, pencils, glue sticks and the prompts to create their own poem. Patients will be encouraged to share their poem with peers/staff, and explain what it means to them.     Goals:   Self-expression, insight into issues, peer collaboration       Signature: Marilee Chauhan, Psychoeducational Specialist     
Date: 2/10/2025    Group Start Time: 0915  Group End Time: 0948  Group Topic: Music Therapy    [facility] 3[facility] Alanna Velazco    Letter of Support to Past Self  Clarifying, Exploration, Composition, Live Music Listening, Songwriting     Patients will listen to \"Hey Farhan\" by the Beatles and explore what support looks like/means to them. Patients will identify how it feels to receive the support they find helpful, and compare it to their feelings when they receive support they do not find helpful.  Patients will complete an individual david substitution in the style of \"Hey Farhan\" framed with the idea of Writing a Letter of Support to your Past Self. Patients will have the option to verbally share their song and/or listen to LPMT recreate their song live with guleslyr.     Focus: Challenging Negative Self-Talk, Identity Development, Support, Processing    Goals: Improve Mood, Improve Insight/Self-Awareness, Increase Socialization/Community Building, Improve Self-Expression, Improve Attention to Task, Develop/Improve Coping Skills, Improve Emotion Regulation Skills     Patient listened to recorded music and engaged in peer song discussions. Patient interpreted that the message of the song was to \"be happy.\" Patient identified that giving support could look like giving advice, but that this could also be harmful at times. Patient independently completed her david substitution and volunteered for LPMT to recreate it live. Patient smiled and nodded along while listening to her david substitution, and voiced appreciation for the experience.     Attended: 3/4-full attendance  Participation Level: Active Listener and Interactive  Participation Quality: Attentive, Sharing, and Supportive  Affect/Mood: Congruent/Euthymic  Speech: spontaneous, normal rate, and normal volume   Thought Content/Processes: Linear  Level of consciousness: Alert  Response: Able to verbalize current knowledge/experience and Able to verbalize/acknowledge new 
Date: 2/7/2025    Group Start Time: 0915  Group End Time: 0945  Group Topic: Community Meeting    [facility] [place] [facility] Alanna Velazco    Automatic Thoughts, Core Beliefs, and Differentiating Facts from Opinions  Clarifying, Education, Exploration, Support    Patients will learn about core beliefs and identify/reflect on some of their own. Patients will learn about ways to differentiate \"facts\" versus \"opinions\" when we interpret various situations (ex: \"The shivani is blue\" -fact / \"The weather today is beautiful\" -opinion). Patients will determine potential triggers, identify/reflect on their \"automatic\" thoughts related to potential triggers, and come up with a way to re-frame the \"automatic\" thought individually.     Focus: Automatic Thoughts, Cognitive Re-framing, Identifying Triggers    Goals: Improve Self-Awareness/Insight, Improve Coping Skills, Decrease Negative Thoughts/Negative Self-Talk, Improve Self-Esteem    Patient listened to and engaged in peer discussions about their core beliefs. Patient shared that she believes \"no one likes me\" even when presented with evidence showing otherwise. Patient was attentive and pleasant while in group.      Attended: 1/2-3/4 attendance  Participation Level: Active Listener  Participation Quality: Sharing  Affect/Mood: Constricted/Quiet  Speech: slow   Thought Content/Processes: Vague  Level of consciousness: Alert  Response: Able to verbalize current knowledge/experience  Outcomes: Initial interaction with patient    Signature: Alanna Grant, Licensed Professional Music Therapist (LPMT)    
Date: 2/7/2025    Group Start Time: 1015  Group End Time: 1055  Group Topic: Music Therapy    [facility] Alanna Velazco    Building Self-Esteem through Music  Aparna Analysis/Discussion, Recorded Music Listening, Education, Support, Exploration    Patients will listen to the song \"Love Me More\" by Randell Dempsey and identify lyrics, themes, & interpretations that they resonate with. Patients will express their thoughts on the song and how it may relate to their personal lives. Patients will listen to the song at the end of group and explore how their interpretations may have changed after discussion.     Focus: Processing, Challenging Negative Self-Talk  Goals: Improve Mood, Decrease Isolation, Increase Sense of Community/Socialization, Improve Self-Esteem, Improve Self-Expression, Develop Coping Skills, Improve Self-Awareness/Insight     Patient listened to recorded music and peer song discussions. Patient interpreted the song's message to be how to \"work on loving yourself\" after both the initial listening and second listening.    Attended: 3/4-full attendance  Participation Level: Active Listener and Interactive  Participation Quality: Attentive, Sharing, and Supportive  Affect/Mood: Congruent/Reserved  Speech: normal rate and normal volume   Thought Content/Processes: Vague  Level of consciousness: Alert  Response: Able to verbalize current knowledge/experience  Outcomes: Improving    Signature: Alanna Grant Licensed Professional Music Therapist (LPMT)  
Patient briefly attended, then left early d/t discharge preparation.     Date: 2/11/2025    Group Start Time: 0905  Group End Time: 0940  Group Topic: Psychoeducation    [facility] 3[facility] Alanna Velazco    Anxiety: Triggers, Symptoms, and Coping Strategies  Education, Exploration, Aparna Analysis/Discussion, Receptive Music Listening, Support/Validation    Patients will listen to \"Anxiety\" by April Mullins (marta Cha) and identify lyrics/themes/interpretations derived from the song. Patients will explore what triggers their anxiety, what cognitive/emotional/physical symptoms of anxiety they experience, and what they can do to cope with anxiety. Patients will engage in music-guided relaxation techniques as a group and reflect on the strategies presented.     Goals: Improve/Maintain Attention to Task, Improve/Maintain Cognitive Skills, Improve/Maintain Insight / Self-Awareness, Improve/Maintain Self-Expression, Improve/Maintain Use of Coping Skills, Increase Sensory Stimulation, and Promote Reality Orientation    Signature: Alanna Grant, Licensed Professional Music Therapist (LPMT)  
Patient briefly joined for the last few minutes of group / Patient has anticipated discharge today.     Date: 2/11/2025    Group Start Time: 1015  Group End Time: 1055  Group Topic: Music Therapy    [address] Alanna Velazco    Live Music Group  Active Music Making, Live Music Listening, and Music Reminiscence    Patients will be given a songbook and offered the opportunity to select (a) song(s) of their choice for live music listening on [employer]. Patients will be encouraged to sing along, move along, and listen to the music.    Focus: Building Positive Experiences and Relaxation     Goals: Increase/Maintain Level of Socialization, Improve Mood, Improve/Maintain Self-Esteem, Improve/Maintain Self-Expression, Improve/Maintain Use of Coping Skills, and Promote Reality Orientation     Signature: Alanna Grant Licensed Professional Music Therapist (LPMT)  
Specialist

## 2025-02-11 NOTE — DISCHARGE INSTRUCTIONS
Someone from [facility] will be calling you tomorrow to follow up on your care. If you don't hear from us, give us a call! [phone].    Keep all follow up appointments, take medications as ordered, utilize positive supports, abstain from use of alcohol and drugs. If symptoms return or you feel at risk to yourself or others, please call 911, return the nearest emergency room, or call your local crisis hotline:  [facility]: [phone]  [facility]: [phone]  [facility]: [phone]    Due to the Covid-19 Pandemic, [facility] is not currently available. For assistance with quitting smoking please go to https://smokefree.gov. A prescription for an FDA-approved tobacco cessation medication was offered at discharge and the patient refused.    You were offered a flu vaccine but declined, as you had one earlier this flu season.

## 2025-02-11 NOTE — DISCHARGE INSTR - DIET
Good nutrition is important when healing from an illness, injury, or surgery.  Follow any nutrition recommendations given to you during your hospital stay.   If you were given an oral nutrition supplement while in the hospital, continue to take this supplement at home.  You can take it with meals, in-between meals, and/or before bedtime. These supplements can be purchased at most local grocery stores, pharmacies, and chain [employer]-stores.   If you have any questions about your diet or nutrition, call the hospital and ask for the dietitian.  Resume as tolerated.

## 2025-02-11 NOTE — DISCHARGE SUMMARY
patient. Education provided on the complaince with treatment.    Risks, benefits, side effects, drug-to-drug interactions and alternatives to treatment were discussed.    Encourage patient to attend outpatient follow up appointment and therapy.    Patient was advised to call the outpatient provider, visit the nearest ED or call 911 if symptoms are not manageable.     Patient's family member was contacted prior to the discharge.         Medication List        START taking these medications      metoprolol succinate 25 MG extended release tablet  Commonly known as: TOPROL XL  Take 1 tablet by mouth daily  Start taking on: February 12, 2025     risperiDONE 1 MG tablet  Commonly known as: RISPERDAL  Take 1 tablet by mouth 2 times daily            CHANGE how you take these medications      busPIRone 30 MG tablet  Commonly known as: BUSPAR  Take 30 mg by mouth 2 times daily  What changed: medication strength     traZODone 50 MG tablet  Commonly known as: DESYREL  Take 1 tablet by mouth nightly  What changed: additional instructions            CONTINUE taking these medications      atorvastatin 20 MG tablet  Commonly known as: LIPITOR     clonazePAM 0.5 MG tablet  Commonly known as: KLONOPIN     cloNIDine 0.1 MG tablet  Commonly known as: CATAPRES     escitalopram 20 MG tablet  Commonly known as: LEXAPRO     * levothyroxine 75 MCG tablet  Commonly known as: SYNTHROID     * levothyroxine 100 MCG tablet  Commonly known as: SYNTHROID     pantoprazole sodium 40 MG Pack packet  Commonly known as: PROTONIX     Xarelto 10 MG Tabs tablet  Generic drug: rivaroxaban           * This list has 2 medication(s) that are the same as other medications prescribed for you. Read the directions carefully, and ask your doctor or other care provider to review them with you.                STOP taking these medications      acetaminophen 325 MG tablet  Commonly known as: TYLENOL     hydrOXYzine HCl 25 MG tablet  Commonly known as: ATARAX

## 2025-02-13 DIAGNOSIS — R00.0 TACHYCARDIA: ICD-10-CM

## 2025-02-13 NOTE — TELEPHONE ENCOUNTER
Rx Refill Request Telephone Encounter    Name:  Stephenie Mccray  :  [id number]  Medication Name:  metoprolol tartrate (Lopressor) 25 mg   Specific Pharmacy location:  [facility]   Date of last appointment:    Date of next appointment:    Best number to reach patient:  [phone]

## 2025-02-14 RX ORDER — METOPROLOL TARTRATE 25 MG/1
25 TABLET, FILM COATED ORAL 2 TIMES DAILY
Qty: 60 TABLET | Refills: 5 | Status: SHIPPED | OUTPATIENT
Start: 2025-02-14

## 2025-02-15 ENCOUNTER — APPOINTMENT (OUTPATIENT)
Dept: RADIOLOGY | Facility: HOSPITAL | Age: 45
End: 2025-02-15
Payer: COMMERCIAL

## 2025-02-15 ENCOUNTER — APPOINTMENT (OUTPATIENT)
Dept: CARDIOLOGY | Facility: HOSPITAL | Age: 45
End: 2025-02-15
Payer: COMMERCIAL

## 2025-02-15 ENCOUNTER — HOSPITAL ENCOUNTER (EMERGENCY)
Facility: HOSPITAL | Age: 45
Discharge: HOME | End: 2025-02-15
Attending: EMERGENCY MEDICINE
Payer: COMMERCIAL

## 2025-02-15 VITALS
RESPIRATION RATE: 18 BRPM | OXYGEN SATURATION: 95 % | SYSTOLIC BLOOD PRESSURE: 116 MMHG | WEIGHT: 156 LBS | DIASTOLIC BLOOD PRESSURE: 69 MMHG | HEART RATE: 83 BPM | BODY MASS INDEX: 28.71 KG/M2 | TEMPERATURE: 97.7 F | HEIGHT: 62 IN

## 2025-02-15 DIAGNOSIS — K63.89 EPIPLOIC APPENDAGITIS: Primary | ICD-10-CM

## 2025-02-15 DIAGNOSIS — F20.0 PARANOID SCHIZOPHRENIA (MULTI): ICD-10-CM

## 2025-02-15 LAB
ALBUMIN SERPL BCP-MCNC: 3.9 G/DL (ref 3.4–5)
ALP SERPL-CCNC: 85 U/L (ref 33–110)
ALT SERPL W P-5'-P-CCNC: 23 U/L (ref 7–45)
AMPHETAMINES UR QL SCN: ABNORMAL
ANION GAP SERPL CALC-SCNC: 10 MMOL/L (ref 10–20)
APAP SERPL-MCNC: <10 UG/ML
APPEARANCE UR: CLEAR
AST SERPL W P-5'-P-CCNC: 13 U/L (ref 9–39)
ATRIAL RATE: 64 BPM
BARBITURATES UR QL SCN: ABNORMAL
BASOPHILS # BLD AUTO: 0.1 X10*3/UL (ref 0–0.1)
BASOPHILS NFR BLD AUTO: 1.1 %
BENZODIAZ UR QL SCN: ABNORMAL
BILIRUB SERPL-MCNC: 0.3 MG/DL (ref 0–1.2)
BILIRUB UR STRIP.AUTO-MCNC: NEGATIVE MG/DL
BUN SERPL-MCNC: 24 MG/DL (ref 6–23)
BZE UR QL SCN: ABNORMAL
CALCIUM SERPL-MCNC: 9.4 MG/DL (ref 8.6–10.3)
CANNABINOIDS UR QL SCN: ABNORMAL
CARDIAC TROPONIN I PNL SERPL HS: 3 NG/L (ref 0–13)
CHLORIDE SERPL-SCNC: 103 MMOL/L (ref 98–107)
CO2 SERPL-SCNC: 31 MMOL/L (ref 21–32)
COLOR UR: COLORLESS
CREAT SERPL-MCNC: 0.79 MG/DL (ref 0.5–1.05)
EGFRCR SERPLBLD CKD-EPI 2021: >90 ML/MIN/1.73M*2
EOSINOPHIL # BLD AUTO: 0.07 X10*3/UL (ref 0–0.7)
EOSINOPHIL NFR BLD AUTO: 0.8 %
ERYTHROCYTE [DISTWIDTH] IN BLOOD BY AUTOMATED COUNT: 14.1 % (ref 11.5–14.5)
ETHANOL SERPL-MCNC: <10 MG/DL
FENTANYL+NORFENTANYL UR QL SCN: ABNORMAL
GLUCOSE SERPL-MCNC: 152 MG/DL (ref 74–99)
GLUCOSE UR STRIP.AUTO-MCNC: NORMAL MG/DL
HCT VFR BLD AUTO: 40.5 % (ref 36–46)
HGB BLD-MCNC: 12.8 G/DL (ref 12–16)
IMM GRANULOCYTES # BLD AUTO: 0.41 X10*3/UL (ref 0–0.7)
IMM GRANULOCYTES NFR BLD AUTO: 4.5 % (ref 0–0.9)
KETONES UR STRIP.AUTO-MCNC: NEGATIVE MG/DL
LACTATE SERPL-SCNC: 0.9 MMOL/L (ref 0.4–2)
LEUKOCYTE ESTERASE UR QL STRIP.AUTO: NEGATIVE
LIPASE SERPL-CCNC: 21 U/L (ref 9–82)
LYMPHOCYTES # BLD AUTO: 3.78 X10*3/UL (ref 1.2–4.8)
LYMPHOCYTES NFR BLD AUTO: 41.8 %
MAGNESIUM SERPL-MCNC: 1.88 MG/DL (ref 1.6–2.4)
MCH RBC QN AUTO: 31.1 PG (ref 26–34)
MCHC RBC AUTO-ENTMCNC: 31.6 G/DL (ref 32–36)
MCV RBC AUTO: 99 FL (ref 80–100)
METHADONE UR QL SCN: ABNORMAL
MONOCYTES # BLD AUTO: 0.51 X10*3/UL (ref 0.1–1)
MONOCYTES NFR BLD AUTO: 5.6 %
NEUTROPHILS # BLD AUTO: 4.18 X10*3/UL (ref 1.2–7.7)
NEUTROPHILS NFR BLD AUTO: 46.2 %
NITRITE UR QL STRIP.AUTO: NEGATIVE
NRBC BLD-RTO: 0 /100 WBCS (ref 0–0)
OPIATES UR QL SCN: ABNORMAL
OXYCODONE+OXYMORPHONE UR QL SCN: ABNORMAL
P AXIS: 22 DEGREES
P OFFSET: 165 MS
P ONSET: 134 MS
PCP UR QL SCN: ABNORMAL
PH UR STRIP.AUTO: 5.5 [PH]
PLATELET # BLD AUTO: 75 X10*3/UL (ref 150–450)
POTASSIUM SERPL-SCNC: 4.2 MMOL/L (ref 3.5–5.3)
PR INTERVAL: 110 MS
PROT SERPL-MCNC: 6.8 G/DL (ref 6.4–8.2)
PROT UR STRIP.AUTO-MCNC: NEGATIVE MG/DL
Q ONSET: 189 MS
QRS COUNT: 10 BEATS
QRS DURATION: 136 MS
QT INTERVAL: 420 MS
QTC CALCULATION(BAZETT): 433 MS
QTC FREDERICIA: 429 MS
R AXIS: -13 DEGREES
RBC # BLD AUTO: 4.11 X10*6/UL (ref 4–5.2)
RBC # UR STRIP.AUTO: NEGATIVE MG/DL
SALICYLATES SERPL-MCNC: <3 MG/DL
SODIUM SERPL-SCNC: 140 MMOL/L (ref 136–145)
SP GR UR STRIP.AUTO: 1.01
T AXIS: 4 DEGREES
T OFFSET: 399 MS
T4 FREE SERPL-MCNC: 0.5 NG/DL (ref 0.61–1.12)
TSH SERPL-ACNC: 11.69 MIU/L (ref 0.44–3.98)
UROBILINOGEN UR STRIP.AUTO-MCNC: NORMAL MG/DL
VENTRICULAR RATE: 64 BPM
WBC # BLD AUTO: 9.1 X10*3/UL (ref 4.4–11.3)

## 2025-02-15 PROCEDURE — 85025 COMPLETE CBC W/AUTO DIFF WBC: CPT | Performed by: PHYSICIAN ASSISTANT

## 2025-02-15 PROCEDURE — 96372 THER/PROPH/DIAG INJ SC/IM: CPT | Performed by: PHYSICIAN ASSISTANT

## 2025-02-15 PROCEDURE — 83735 ASSAY OF MAGNESIUM: CPT | Performed by: PHYSICIAN ASSISTANT

## 2025-02-15 PROCEDURE — 99285 EMERGENCY DEPT VISIT HI MDM: CPT | Mod: 25 | Performed by: EMERGENCY MEDICINE

## 2025-02-15 PROCEDURE — 74177 CT ABD & PELVIS W/CONTRAST: CPT | Performed by: RADIOLOGY

## 2025-02-15 PROCEDURE — 81003 URINALYSIS AUTO W/O SCOPE: CPT | Performed by: PHYSICIAN ASSISTANT

## 2025-02-15 PROCEDURE — 84484 ASSAY OF TROPONIN QUANT: CPT | Performed by: PHYSICIAN ASSISTANT

## 2025-02-15 PROCEDURE — 80053 COMPREHEN METABOLIC PANEL: CPT | Performed by: PHYSICIAN ASSISTANT

## 2025-02-15 PROCEDURE — 84443 ASSAY THYROID STIM HORMONE: CPT | Performed by: PHYSICIAN ASSISTANT

## 2025-02-15 PROCEDURE — 96361 HYDRATE IV INFUSION ADD-ON: CPT

## 2025-02-15 PROCEDURE — 83690 ASSAY OF LIPASE: CPT | Performed by: PHYSICIAN ASSISTANT

## 2025-02-15 PROCEDURE — 80143 DRUG ASSAY ACETAMINOPHEN: CPT | Performed by: PHYSICIAN ASSISTANT

## 2025-02-15 PROCEDURE — 2500000004 HC RX 250 GENERAL PHARMACY W/ HCPCS (ALT 636 FOR OP/ED): Performed by: PHYSICIAN ASSISTANT

## 2025-02-15 PROCEDURE — 80307 DRUG TEST PRSMV CHEM ANLYZR: CPT | Performed by: PHYSICIAN ASSISTANT

## 2025-02-15 PROCEDURE — 84439 ASSAY OF FREE THYROXINE: CPT | Performed by: PHYSICIAN ASSISTANT

## 2025-02-15 PROCEDURE — 96374 THER/PROPH/DIAG INJ IV PUSH: CPT

## 2025-02-15 PROCEDURE — 74177 CT ABD & PELVIS W/CONTRAST: CPT

## 2025-02-15 PROCEDURE — 2550000001 HC RX 255 CONTRASTS: Performed by: PHYSICIAN ASSISTANT

## 2025-02-15 PROCEDURE — 83605 ASSAY OF LACTIC ACID: CPT | Performed by: PHYSICIAN ASSISTANT

## 2025-02-15 PROCEDURE — 93005 ELECTROCARDIOGRAM TRACING: CPT

## 2025-02-15 PROCEDURE — 36415 COLL VENOUS BLD VENIPUNCTURE: CPT | Performed by: PHYSICIAN ASSISTANT

## 2025-02-15 RX ORDER — ONDANSETRON HYDROCHLORIDE 2 MG/ML
4 INJECTION, SOLUTION INTRAVENOUS ONCE
Status: COMPLETED | OUTPATIENT
Start: 2025-02-15 | End: 2025-02-15

## 2025-02-15 RX ORDER — DICYCLOMINE HYDROCHLORIDE 10 MG/ML
20 INJECTION INTRAMUSCULAR ONCE
Status: COMPLETED | OUTPATIENT
Start: 2025-02-15 | End: 2025-02-15

## 2025-02-15 RX ADMIN — DICYCLOMINE HYDROCHLORIDE 20 MG: 10 INJECTION, SOLUTION INTRAMUSCULAR at 16:46

## 2025-02-15 RX ADMIN — SODIUM CHLORIDE 1000 ML: 9 INJECTION, SOLUTION INTRAVENOUS at 16:46

## 2025-02-15 RX ADMIN — ONDANSETRON 4 MG: 2 INJECTION INTRAMUSCULAR; INTRAVENOUS at 16:46

## 2025-02-15 RX ADMIN — IOHEXOL 75 ML: 350 INJECTION, SOLUTION INTRAVENOUS at 17:13

## 2025-02-15 SDOH — HEALTH STABILITY: MENTAL HEALTH: HAVE YOU WISHED YOU WERE DEAD OR WISHED YOU COULD GO TO SLEEP AND NOT WAKE UP?: NO

## 2025-02-15 SDOH — HEALTH STABILITY: MENTAL HEALTH: BEHAVIORS/MOOD: ANXIOUS

## 2025-02-15 SDOH — SOCIAL STABILITY: SOCIAL NETWORK: VISITOR BEHAVIORS: UNABLE TO ASSESS

## 2025-02-15 SDOH — SOCIAL STABILITY: SOCIAL NETWORK: EMOTIONAL SUPPORT GIVEN: REASSURE

## 2025-02-15 SDOH — HEALTH STABILITY: MENTAL HEALTH: HAVE YOU EVER DONE ANYTHING, STARTED TO DO ANYTHING, OR PREPARED TO DO ANYTHING TO END YOUR LIFE?: NO

## 2025-02-15 SDOH — HEALTH STABILITY: MENTAL HEALTH: SLEEP PATTERN: UNABLE TO ASSESS

## 2025-02-15 SDOH — HEALTH STABILITY: MENTAL HEALTH: HAVE YOU ACTUALLY HAD ANY THOUGHTS OF KILLING YOURSELF?: NO

## 2025-02-15 SDOH — SOCIAL STABILITY: SOCIAL INSECURITY: FAMILY BEHAVIORS: UNABLE TO ASSESS

## 2025-02-15 SDOH — HEALTH STABILITY: MENTAL HEALTH: BEHAVIORAL HEALTH(WDL): EXCEPTIONS TO WDL

## 2025-02-15 SDOH — HEALTH STABILITY: MENTAL HEALTH: SUICIDE ASSESSMENT: ADULT (C-SSRS)

## 2025-02-15 SDOH — HEALTH STABILITY: MENTAL HEALTH: NEEDS EXPRESSED: EMOTIONAL

## 2025-02-15 ASSESSMENT — PAIN SCALES - GENERAL
PAINLEVEL_OUTOF10: 7
PAINLEVEL_OUTOF10: 2

## 2025-02-15 ASSESSMENT — LIFESTYLE VARIABLES
TOTAL SCORE: 0
HAVE PEOPLE ANNOYED YOU BY CRITICIZING YOUR DRINKING: NO
EVER FELT BAD OR GUILTY ABOUT YOUR DRINKING: NO
HAVE YOU EVER FELT YOU SHOULD CUT DOWN ON YOUR DRINKING: NO
EVER HAD A DRINK FIRST THING IN THE MORNING TO STEADY YOUR NERVES TO GET RID OF A HANGOVER: NO

## 2025-02-15 ASSESSMENT — PAIN - FUNCTIONAL ASSESSMENT
PAIN_FUNCTIONAL_ASSESSMENT: 0-10
PAIN_FUNCTIONAL_ASSESSMENT: 0-10

## 2025-02-15 ASSESSMENT — PAIN DESCRIPTION - PAIN TYPE: TYPE: ACUTE PAIN

## 2025-02-15 ASSESSMENT — PAIN DESCRIPTION - LOCATION: LOCATION: ABDOMEN

## 2025-02-15 NOTE — CONSULTS
"Visit type: Virtual evaluation    HISTORY OF PRESENT ILLNESS:  Stephenie Mccray is a 44 y.o. female with a past psychiatric history of Schizophrenia and a past medical history of COPD, HTN, HLD, GERD, T2DM, DVT/PE, fibroids, hypothyroidism, CHARLIE, and past surgical hx who arrived to [facility] [place] ED on 2/15/25 for abdominal pain.  Emergency Psychiatric Assessment Team consulted on 2/15/25 for psychiatric evaluation. Eth negative. Utox positive for cannabis.     No prn medication needed for agitation.    On chart review,     \"    This is a 44-year-old female PMH paranoid schizophrenia, COPD, HTN, HLD, GERD, DM, hypothyroidism, CHARLIE presenting for evaluation of abdominal pain.  Started today.  Called EMS.  States her abdomen feels swollen and bloated.  Denies fevers, chills, chest pain, shortness of breath, nausea, vomiting, diarrhea, constipation, hematochezia, melena, urinary symptoms, vaginal symptoms.     Patient has mildly distended abdomen rectus diastases present and old midline surgical scar no rebound or guarding. Belly is soft. Her laboratory evaluation overall is reassuring. Her CT scan shows findings concerning for epiploic appendagitis. Her pain is controlled. On my reassessment the patient was largely speaking gibberish and saying it was her \"danielle\" language and that she is currently in the personality of a 16-year-old. She is medically cleared there is no medical contraindication to psychiatric evaluation at this time. We will have EPAT weigh in. \"    Of note pt had worse episode a couple weeks ago (2/3/25) with EMS actually bringing patient in but more intense, disorganized, and irritable, had also potentially taken extra clonazepam -> was asking for ativan and agitated, also called angrily prior to visit to [facility]. Pt was admitted to [facility] for about a week.    On interview,   When asked what brought her in she just states \"I have the personality of a 16 year old girl.\" When reminded she came in " "for abdominal pain she agrees and states she no longer does. When discussing her CT scan she asks \"so it is inflammed\" but correct that there were minor findings in her impression and she shows understanding - blood work overall looked ok aside from low thyroid level. Pt is very difficult to understand given poor dentition and raspy voice, however with extra careful listening can make out what she is saying. She denies SI. She endorses HI vaguely towards persons who \"are bad\" and \"hurt children.\" She is not able to articulate intent and denies a plan. She does state that she hears voices tell her who is bad and to hurt bad people. She states she loves her  and he is close to her. She denies depression or VH currently. She states she heard voices since she was 9 years old. She feels her medications work well and state she came in for an abdominal pain appt but this has left. Does want to stop clonazepam, explained not to go off cold turkey, talk to provider, and wean slowly (some few pills a week). When asked where she wants to go she asks for Ativan and states she would like to go to [facility] but cannot give a reason. She is able to state her medicaitons are Clonazepam 0.5mg BID, Clonidine 0.1mg daily, Buspar 30mg BID, Risperdal 1mg BID, and Lexapro 20mg daily which actually lines up with dispense record. She denies paliperidone stating Invega was discontinued but she did get an injectable at 2/4/25; per pt [facility] stopped this. She states she has an appointment tommorow at first but then states \"wait nah, tomorrow is Sunday right, I have my appointment Monday at [facility].\" She does have interest in her mental health care. She is also able to confirm her medical medications that align with the dispense record. She aknowledges her thyroid level was a bit low but is vehement she takes all her medications daily and keeps them in a bag \"if it fuckin' works, I take it!\"     Called  Kofi Gianluca, " [phone] 3x, left message, no answer.    PSYCHIATRIC REVIEW OF SYSTEMS  Depression: negative  Anxiety: negative  Kristen: negative  Psychosis: auditory hallucinations:command and delusions: persecutory  Delirium: negative   Trauma: Unclear.    PSYCHIATRIC HISTORY  Prior diagnoses: Schizophrenia  Prior hospitalizations: Followed by consult service in 5/2024 for SI.  History of suicide attempts: Denies.  History of self-harm: Denies.  History of trauma/abuse/loss: None endorsed.  History of violence: Denies.    Current psychiatrist: Kirby Rene NP  Current mental health agency: [facility].  Current : Unclear, pt voices having one at [facility].  Guardian or payee: Self.    Current psychiatric medications: Risperdal 1mg qhs, Trazodone 50mg qhs, Prazosin 4mg at bedtime (has been up to 5mg), , hydroxyzine 50mg q6h, Escitalopram 20mg daily, Clonidine 0.1mg daily, Buspirone 30mg daily, Clonazepam 0.5mg BID  Past psychiatric medications: Citalopram 40mg daily, Paliperidone ER 6mg (pt denies taking),   Past psychiatric procedures: None.    Family psychiatric history: Unclear.     SUBSTANCE USE HISTORY   She reports that she has been smoking cigarettes. She has been exposed to tobacco smoke. She has never used smokeless tobacco. She reports current drug use. Drug: Marijuana. She reports that she does not drink alcohol.    Tobacco: 10-15 per day  Alcohol: Denies, airplane shooter once a month     - History of severe withdrawal: Denies.     - Last use: Denies.  Cannabis: Every day smokes marijuana.  Other substances: Denies.     - Last use: Denies.     - History of overdose: Denies.     - Longest period of sobriety: Denies.  Prior substance use disorder treatment: Denies.    SOCIAL HISTORY  Social History     Socioeconomic History    Marital status:     Number of children: 1   Tobacco Use    Smoking status: Every Day     Current packs/day: 0.50     Types: Cigarettes     Passive exposure: Current     Smokeless tobacco: Never   Substance and Sexual Activity    Alcohol use: Never    Drug use: Yes     Types: Marijuana    Sexual activity: Yes     Partners: Male     Birth control/protection: Condom Male     Comment: LMP: 12 years ago     Social Drivers of Health     Financial Resource Strain: Patient Unable To Answer (5/16/2024)    Overall Financial Resource Strain (CARDIA)     Difficulty of Paying Living Expenses: Patient unable to answer   Food Insecurity: No Food Insecurity (4/8/2024)    Hunger Vital Sign     Worried About Running Out of Food in the Last Year: Never true     Ran Out of Food in the Last Year: Never true   Transportation Needs: Patient Unable To Answer (5/16/2024)    PRAPARE - Transportation     Lack of Transportation (Medical): Patient unable to answer     Lack of Transportation (Non-Medical): Patient unable to answer   Physical Activity: Inactive (4/8/2024)    Exercise Vital Sign     Days of Exercise per Week: 0 days     Minutes of Exercise per Session: 0 min   Stress: No Stress Concern Present (4/8/2024)    [employer] - Occupational Stress Questionnaire     Feeling of Stress : Not at all   Social Connections: Socially Isolated (4/8/2024)    Social Connection and Isolation Panel [NHANES]     Frequency of Communication with Friends and Family: Never     Frequency of Social Gatherings with Friends and Family: Never     Attends Orthodoxy Services: Never     Active Member of Clubs or Organizations: No     Attends Club or Organization Meetings: Never     Marital Status: Living with partner   Housing Stability: Patient Unable To Answer (5/16/2024)    Housing Stability Vital Sign     Unable to Pay for Housing in the Last Year: Patient unable to answer     Number of Places Lived in the Last Year: 1     Unstable Housing in the Last Year: Patient unable to answer      Current living situation: Living with , mother in law in house.  Current employment/source of income:  [employer]  Current stressors: Denies current stressors.    Family: Mother and dad, daughter in [facility]. Sister lives in , brother lives nearby as well.   Education: Some college  Employment: SSDI, unemployed.  Marital status:    Children: 1 daughter, 21 years old, close  Social support:   Buddhist/Spirituality: Denies.  Legal history: Denies   history: Denies.  Access to weapons: Denies.    PAST MEDICAL HISTORY  Past Medical History:   Diagnosis Date    Acute on chronic respiratory failure (Multi)     Anxiety     Arthritis     Chronic headaches     COPD (chronic obstructive pulmonary disease) (Multi)     CPAP (continuous positive airway pressure) dependence     Depression     Diabetes mellitus (Multi)     type 2 IDDM    GERD (gastroesophageal reflux disease)     History of transfusion     History of venous thromboembolism     HL (hearing loss)     Hyperlipidemia     Hypertension     Hypothyroidism     Lipoma     Nephrolithiasis     CHARLIE (obstructive sleep apnea)     Ovarian teratoma     PE (pulmonary thromboembolism) (Multi)     PTSD (post-traumatic stress disorder)     Schizophrenia     Type 2 diabetes mellitus     VTE (venous thromboembolism)         PAST SURGICAL HISTORY  Past Surgical History:   Procedure Laterality Date     SECTION, LOW TRANSVERSE      x 1    EYE SURGERY      LITHOTRIPSY      STAPEDES SURGERY      TONSILLECTOMY      UMBILICAL HERNIA REPAIR      VENTRAL HERNIA REPAIR      , b/l ear surgery, lipoma excision, tonsillectomy, ventral and umbilical hernia repair, cystoscopy with lithotripsy     FAMILY HISTORY  Family History   Problem Relation Name Age of Onset    Fibromyalgia Father      Hypertension Brother      Thyroid disease Maternal Grandmother      Thyroid disease Paternal Grandmother      Lung cancer Paternal Grandfather      Coronary artery disease Other Grandmother         ALLERGIES  Fluticasone furoate-vilanterol, Fluticasone-umeclidin-vilanter,  "Levofloxacin, Sumatriptan, and Vortioxetine    OARRS REVIEW  OARRS checked: Yes  OARRS comments: No issues.    OBJECTIVE    VITALS      2/3/2025     4:02 PM 2/3/2025     6:34 PM 2/3/2025     6:43 PM 2/3/2025     9:36 PM 2/4/2025     7:12 AM 2/4/2025     9:45 AM 2/15/2025     3:57 PM   Vitals   Systolic       145   Diastolic       95   BP Location          Heart Rate       80   Temp       36.1 °C (97 °F)   Resp       18   Height       1.575 m (5' 2\")   Weight (lb)       156   BMI       28.53 kg/m2   BSA (m2)       1.76 m2       Information is confidential and restricted. Go to Review Flowsheets to unlock data.        MENTAL STATUS EXAM  Appearance: female,Appears stated age, , wearing hospital clothes, sitting comfortably in bed, NAD.  Attitude: Guarded, superficially cooperative  Behavior: Poor eye contact, no agitation noted.  Motor Activity: No psychomotor agitation or retardation, no tremors noted, no TD/EPS, signs of akathisia, or myoclonus noted. Gait not assessed.  Speech: Spontaneous, normal volume, normal rate, dentition creates difficult to understand slur and rhythm, and normal tone.  Mood: \"I'm fine, things are good.\"  Affect: Flat  Thought Process: Somewhat disorganized, at times linear, goal-directed, no flight of ideas.  Thought Content:  Denies SI, HI. No delusions elicited.  Thought Perception: Endorses chronic AH, somewhat command to hurt those who hurt others or are bad, tells her who is bad at times but she cannot specify, denies VH. No internal stimulation noted. Mild parnoid ideation noted.   Cognition: Grossly AxO, attention and concentration grossly intact, memory appears grossly intact.  Insight: Limited, patient has some understanding of condition.  Judgement: Poor, patient is unable to make sound decisions 2/2 symptoms of mental illness      HOME MEDICATIONS  Medication Documentation Review Audit       Reviewed by Kathryn Guillen RN (Registered Nurse) on 02/03/25 at 1605  " "    Medication Order Taking? Sig Documenting Provider Last Dose Status   albuterol 90 mcg/actuation inhaler [id number]   Historical Provider, MD  Active   albuterol 90 mcg/actuation inhaler [id number]  Inhale 1-2 puffs every 6 hours if needed for wheezing. Niels MARTINS MD  Active   alcohol swabs (Alcohol Prep Pads) pads, medicated [id number]  Use 3 times daily prn Delta Yuen MD  Active   atorvastatin (Lipitor) 20 mg tablet [id number]  Take 1 tablet by mouth once daily Edgar Powers MD  Active   BD Ultra-Fine Mini Pen Needle 31 gauge x 3/16\" needle [id number]  USE TO INJECT 4 TIMES DAILY AS DIRECTED Delta Yuen MD  Active   busPIRone (Buspar) 30 mg tablet [id number]  Take 1 tablet (30 mg) by mouth 2 times a day. Historical Provider, MD  Active   clonazePAM (KlonoPIN) 1 mg tablet [id number]  Take by mouth 2 times a day as needed. Historical Provider, MD  Active   cloNIDine (Catapres) 0.1 mg tablet [id number]  Take 1 tablet (0.1 mg) by mouth 2 times a day as needed (anxiety). Historical Provider, MD  Active   doxycycline (Vibramycin) 100 mg capsule [id number]  Take 1 capsule (100 mg) by mouth 2 times a day for 10 days. Take with at least 8 ounces (large glass) of water, do not lie down for 30 minutes after Niels MARTINS MD  Active   escitalopram (Lexapro) 20 mg tablet [id number]  Take 1 tablet (20 mg) by mouth once daily in the morning. Historical Provider, MD  Active   hydrOXYzine HCL (Atarax) 25 mg tablet [id number]  Take 1 tablet (25 mg) by mouth every 6 hours if needed for itching. Niels MARTINS MD  Active   hydrOXYzine pamoate (Vistaril) 50 mg capsule [id number]  Take 1 capsule (50 mg) by mouth 3 times a day as needed for anxiety. Historical Provider, MD  Active   iloperidone (Fanapt) 6 mg tablet [id number]  Take 1 tablet (6 mg) by mouth once daily. Historical Provider, MD  Active   insulin glargine (Lantus Solostar U-100 Insulin) 100 unit/mL (3 mL) pen [id number]  Inject 10 Units under the skin " once daily in the morning. Take as directed per insulin instructions.   Patient not taking: Reported on 2024    Delta Yuen MD   23 235   insulin lispro (HumaLOG) 100 unit/mL injection [id number]  Inject 0.04 mL (4 Units) under the skin 3 times a day with meals. Plus sliding scale   Patient not taking: Reported on 2024    Historical Provider, MD   24 235   ipratropium-albuteroL (Duo-Neb) 0.5-2.5 mg/3 mL nebulizer solution [id number]  Take 3 mL by nebulization every 6 hours if needed for shortness of breath or wheezing. Delta Yuen MD   24 235   levothyroxine (Synthroid, Levoxyl) 175 mcg tablet [id number]  Take 1 tablet (175 mcg) by mouth once daily. Delta Yuen MD  Active   metoprolol tartrate (Lopressor) 25 mg tablet [id number]  Take 1 tablet (25 mg) by mouth 2 times a day. Delta Yuen MD  Active   OneTouch Delica Plus Lancet 33 gauge misc [id number]  USE 1 TO CHECK GLUCOSE THREE TIMES DAILY Historical Provider, MD  Active   OneTouch Verio test strips strip [id number]   Historical Provider, MD  Active   paliperidone (Invega) 3 mg 24 hr tablet [id number]  Take 1 tablet (3 mg) by mouth once daily. Do not crush, chew, or split. Do not start before January 15, 2024. Pete Trevizo MD   24 235   paliperidone palmitate ER (Invega Sustenna) 234 mg/1.5 mL syringe [id number]  Inject 1.5 mL (234 mg) into the muscle every 28 (twenty-eight) days. Historical Provider, MD  Active   pantoprazole (ProtoNix) 40 mg EC tablet [id number]  Take 1 tablet (40 mg) by mouth once daily. Delta Yuen MD  Active   permethrin (Elimite) 5 % cream [id number]  Apply 1 Application topically 1 time for 1 dose. Apply cream to entire body May repeat treatment in 14 days if live mites still present Itching may persist after effective treatment Niels Helen MARTINS MD  Active   phenazopyridine (Urinary Pain Relief) 95 mg tablet [id number]  Take 1 tablet (95 mg) by mouth 3  times a day as needed for bladder spasms for up to 3 days. BARTOLOME Dang-CNP   25 3029   prazosin (Minipress) 5 mg capsule [id number]  Take 1 capsule (5 mg) by mouth once daily at bedtime. Historical Provider, MD  Active   predniSONE (Deltasone) 20 mg tablet [id number]  Take 3 tablets (60 mg) by mouth once daily for 5 days. Niels MARTINS MD  Active   sulfamethoxazole-trimethoprim (Bactrim DS) 800-160 mg tablet [id number]  Take 1 tablet by mouth 2 times a day for 7 days. Pete Tidwell, APRN-CNP  Active   tiotropium (Spiriva Respimat) 2.5 mcg/actuation inhaler [id number]  Inhale 2 puffs once daily. Do not start before 2024. Edwardo Acosta MD  Active   Xarelto 10 mg tablet [id number]  Take 1 tablet (10 mg) by mouth once daily. Historical Provider, MD  Active                     CURRENT MEDICATIONS  Scheduled medications      Continuous medications      PRN medications       LABS  Results for orders placed or performed during the hospital encounter of 02/15/25 (from the past 24 hours)   Comprehensive metabolic panel   Result Value Ref Range    Glucose 152 (H) 74 - 99 mg/dL    Sodium 140 136 - 145 mmol/L    Potassium 4.2 3.5 - 5.3 mmol/L    Chloride 103 98 - 107 mmol/L    Bicarbonate 31 21 - 32 mmol/L    Anion Gap 10 10 - 20 mmol/L    Urea Nitrogen 24 (H) 6 - 23 mg/dL    Creatinine 0.79 0.50 - 1.05 mg/dL    eGFR >90 >60 mL/min/1.73m*2    Calcium 9.4 8.6 - 10.3 mg/dL    Albumin 3.9 3.4 - 5.0 g/dL    Alkaline Phosphatase 85 33 - 110 U/L    Total Protein 6.8 6.4 - 8.2 g/dL    AST 13 9 - 39 U/L    Bilirubin, Total 0.3 0.0 - 1.2 mg/dL    ALT 23 7 - 45 U/L   Magnesium   Result Value Ref Range    Magnesium 1.88 1.60 - 2.40 mg/dL   CBC and Auto Differential   Result Value Ref Range    WBC 9.1 4.4 - 11.3 x10*3/uL    nRBC 0.0 0.0 - 0.0 /100 WBCs    RBC 4.11 4.00 - 5.20 x10*6/uL    Hemoglobin 12.8 12.0 - 16.0 g/dL    Hematocrit 40.5 36.0 - 46.0 %    MCV 99 80 - 100 fL    MCH 31.1 26.0 - 34.0 pg     MCHC 31.6 (L) 32.0 - 36.0 g/dL    RDW 14.1 11.5 - 14.5 %    Platelets 75 (L) 150 - 450 x10*3/uL    Neutrophils % 46.2 40.0 - 80.0 %    Immature Granulocytes %, Automated 4.5 (H) 0.0 - 0.9 %    Lymphocytes % 41.8 13.0 - 44.0 %    Monocytes % 5.6 2.0 - 10.0 %    Eosinophils % 0.8 0.0 - 6.0 %    Basophils % 1.1 0.0 - 2.0 %    Neutrophils Absolute 4.18 1.20 - 7.70 x10*3/uL    Immature Granulocytes Absolute, Automated 0.41 0.00 - 0.70 x10*3/uL    Lymphocytes Absolute 3.78 1.20 - 4.80 x10*3/uL    Monocytes Absolute 0.51 0.10 - 1.00 x10*3/uL    Eosinophils Absolute 0.07 0.00 - 0.70 x10*3/uL    Basophils Absolute 0.10 0.00 - 0.10 x10*3/uL   Lipase   Result Value Ref Range    Lipase 21 9 - 82 U/L   Lactate   Result Value Ref Range    Lactate 0.9 0.4 - 2.0 mmol/L   Troponin I, High Sensitivity   Result Value Ref Range    Troponin I, High Sensitivity 3 0 - 13 ng/L   TSH with reflex to Free T4 if abnormal   Result Value Ref Range    Thyroid Stimulating Hormone 11.69 (H) 0.44 - 3.98 mIU/L   Acute Toxicology Panel, Blood   Result Value Ref Range    Acetaminophen <10.0 10.0 - 30.0 ug/mL    Salicylate  <3 4 - 20 mg/dL    Alcohol <10 <=10 mg/dL   Thyroxine, Free   Result Value Ref Range    Thyroxine, Free 0.50 (L) 0.61 - 1.12 ng/dL   ECG 12 lead   Result Value Ref Range    Ventricular Rate 64 BPM    Atrial Rate 64 BPM    MA Interval 110 ms    QRS Duration 136 ms    QT Interval 420 ms    QTC Calculation(Bazett) 433 ms    P Axis 22 degrees    R Axis -13 degrees    T Axis 4 degrees    QRS Count 10 beats    Q Onset 189 ms    P Onset 134 ms    P Offset 165 ms    T Offset 399 ms    QTC Fredericia 429 ms   Urinalysis with Reflex Culture and Microscopic   Result Value Ref Range    Color, Urine Colorless (N) Light-Yellow, Yellow, Dark-Yellow    Appearance, Urine Clear Clear    Specific Gravity, Urine 1.012 1.005 - 1.035    pH, Urine 5.5 5.0, 5.5, 6.0, 6.5, 7.0, 7.5, 8.0    Protein, Urine NEGATIVE NEGATIVE, 10 (TRACE), 20 (TRACE) mg/dL     Glucose, Urine Normal Normal mg/dL    Blood, Urine NEGATIVE NEGATIVE mg/dL    Ketones, Urine NEGATIVE NEGATIVE mg/dL    Bilirubin, Urine NEGATIVE NEGATIVE mg/dL    Urobilinogen, Urine Normal Normal mg/dL    Nitrite, Urine NEGATIVE NEGATIVE    Leukocyte Esterase, Urine NEGATIVE NEGATIVE   Drug Screen, Urine   Result Value Ref Range    Amphetamine Screen, Urine Presumptive Negative Presumptive Negative    Barbiturate Screen, Urine Presumptive Negative Presumptive Negative    Benzodiazepines Screen, Urine Presumptive Negative Presumptive Negative    Cannabinoid Screen, Urine Presumptive Positive (A) Presumptive Negative    Cocaine Metabolite Screen, Urine Presumptive Negative Presumptive Negative    Fentanyl Screen, Urine Presumptive Negative Presumptive Negative    Opiate Screen, Urine Presumptive Negative Presumptive Negative    Oxycodone Screen, Urine Presumptive Negative Presumptive Negative    PCP Screen, Urine Presumptive Negative Presumptive Negative    Methadone Screen, Urine Presumptive Negative Presumptive Negative     *Note: Due to a large number of results and/or encounters for the requested time period, some results have not been displayed. A complete set of results can be found in Results Review.        IMAGING  CT abdomen pelvis w IV contrast    Result Date: 2/15/2025  Interpreted By:  Jovan King, STUDY: CT ABDOMEN PELVIS W IV CONTRAST;  2/15/2025 5:19 pm   INDICATION: Signs/Symptoms:abd pain bloating.     COMPARISON: July 30, 2021 CT urogram, April 6, 2022 pelvic ultrasound, January 29, 2025 renal ultrasound   ACCESSION NUMBER(S): [id number]   ORDERING CLINICIAN: DONNELL CHOUDHURY   TECHNIQUE: CT of the abdomen and pelvis was performed.  Standard contiguous axial images were obtained at 3 mm slice thickness through the abdomen and pelvis. Coronal and sagittal reconstructions at 3 mm slice thickness were performed.  75 ML of Omnipaque 350 was administered intravenously without immediate complication.    FINDINGS: LOWER CHEST: The visualized lung base is unremarkable. The heart is normal in size without pericardial effusion. No pleural effusion is present. Visualized distal esophagus appears normal.   ABDOMEN:   LIVER: Similar appearance including probably benign lobulated hypodensity segment 5 with typical features of hemangioma on remote examination measuring about 2.2 cm transverse diameter image 201/47 compared with about 2.0 cm.   BILE DUCTS: Similar prominent caliber of the extrahepatic bile ducts without evident obstructing mass or calculus. The common hepatic duct measures 10 mm coronal image 202/49 compared with 11 mm image 302/42   GALLBLADDER: The gallbladder is nondistended and without evidence of radiopaque stones.   PANCREAS: No significant abnormality.   SPLEEN: No significant abnormality.   ADRENAL GLANDS: No significant abnormality.   KIDNEYS AND URETERS: No significant abnormality.   PELVIS:   BLADDER: Distended without mass, calculus or surrounding inflammatory change.   REPRODUCTIVE ORGANS: No significant abnormality.   BOWEL: Nondilated without wall thickening or acute surrounding inflammatory changes. Normal caliber appendix   VESSELS: Mild atherosclerosis.   PERITONEUM/RETROPERITONEUM/LYMPH NODES: They are newly seen probably benign ring shaped calcifications within the intraperitoneal fat left side including dominant partially calcified encapsulated fat left pelvic inlet measuring about 2 cm AP diameter favoring areas of fat necrosis potentially related to prior abnormality such as epiploic appendagitis and/or omental infarcts. There is a questionable oval-shaped area of thin rim encapsulated fat anterior to the descending colon image 201 which could be due to a normal asymmetric vessel versus an area of fat necrosis, age indeterminate. Recommend correlation with symptomatology for signs of acute epiploic appendagitis or omental infarct. Which could have this appearance.   BONES AND  ABDOMINAL WALL: No suspicious osseous abnormality. Status post prior umbilical region herniorrhaphy with intact mesh. Similar tiny fat containing inferior epigastric midline hernia at the superior margin of the mass sagittal image 203/67.       1.  Newly seen areas of encapsulated fat left lower abdomen and pelvis likely related to interval epiploic appendagitis and/or omental infarcts. At least 1 area is age indeterminate. Recommend correlation with symptomatology for evidence of acute features of either omental infarct or epiploic appendagitis. 2. Similar fusiform dilation extrahepatic bile duct of indeterminate cause potentially related to prior obstruction, occult stricture or choledochocele. 3. Similar tiny fat containing epigastric hernia.     MACRO: None   Signed by: Jovan King 2/15/2025 5:53 PM Dictation workstation:   [id number]    ECG 12 lead    Result Date: 2/15/2025  Sinus rhythm with sinus arrhythmia with short IN Right bundle branch block Abnormal ECG When compared with ECG of 03-FEB-2025 16:46, (unconfirmed) Vent. rate has decreased BY  60 BPM Non-specific change in ST segment in Anterior leads      EKG:  EKG (2/15): QTc of 433, vent rate 64    PSYCHIATRIC RISK ASSESSMENT  Violence Risk Factors:  current psychiatric illness, unemployed, truancy, lower socioeconomic status, and persecutory delusions  Acute Risk of Harm to Others is Considered: Low to Moderate  Suicide Risk Factors: ; /[place] native, chronic medical illness, current psychiatric illness, and command hallucinations  Protective Factors: sense of responsibility towards family, positive family relationships, hopefulness/future-orientation, marriage/partnership, and life satisfaction  Acute Risk of Harm to Self is Considered: Low    ASSESSMENT AND PLAN  Stephenie Mccray is a 44 y.o. female with a past psychiatric history of Schizophrenia and a past medical history of COPD, HTN, HLD, GERD, T2DM, DVT/PE,  fibroids, hypothyroidism, CHARLIE, and past surgical hx who arrived to [facility] ED on 2/15/25 for abdominal pain.  Emergency Psychiatric Assessment Team consulted on 2/15/25 for psychiatric evaluation. Eth negative. Utox positive for cannabis.     The pt is overall fairly organized and is able to give her medication list and her next appt as Monday at [facility]. She does have some bizzare chronic delusions but nothing that is impairing function; he thoughts of being a 16 year old girl for instance. Her HI towards bad people and AH are chronic but she has no access to weapons and no specific targets with no hx of violence per pt or chart. She denies any plan or intent. She is help seeking. Her visit today actually was not for mental health. Could not reach her  but pt states she feels safe at home and marriage is going well. She has good relationship with daughter and is totally fine with providers calling family. She does want to be admitted but gives no reason for this other than to get Ativan or change medications as needed. She is at a safe baseline per past notes and was just inpatient and got an BOWERS 2/4 (prior to inpt hospitalization) although pt states this was removed because she was still getting agitated towards the end of the month. Has appt with [facility] they can reinstate if they please.           IMPRESSION  #Schizophrenia, chronic    RECOMMENDATIONS  - Patient does not currently meet criteria for inpatient psychiatric admission.   ::Pt chronically psychotic to this degree, knows her medications, and appt with [facility], knows provider, on Monday 2/17.  ::Pt is not a threat to self or others.  - Patient does not require a 1:1 sitter from a psychiatric perspective at this time.  - Patient should be in hospital attire. Please remove/secure personal belongings from the room.    MEDICATIONS:  -Buspar 30 mg PO BID  -Prazosin 4 mg at bedtime  -Klonopin 0.5 PO BID PRN (taking every day per pt)  -Invega  Sustenna 234mg 2/4/25 last injection (pt stopped by 23press per pt).  -Risperdal 1mg BID per pt and dispense record.  -Lexapro 20 mg PO daily  -clonidine 0.1 mg PO daily     PRN:  None needed.    ADDITIONAL WORK UP:  -Per primary team.    ==========  Patient discussed with Dr. Mcmahon, who agrees with above plan.    Curt Victoria MD  Adult Psychiatry, PGY-3, on behalf of the Adult Psychiatry EPAT service  EPAT ext [phone]    Medication Consent  Medication Consent: n/a; consult service

## 2025-02-15 NOTE — ED PROVIDER NOTES
HPI   Chief Complaint   Patient presents with    Abdominal Pain     Pt states she has 4 personalities and has decided to be 16 years old today       This is a 44-year-old female PMH paranoid schizophrenia, COPD, HTN, HLD, GERD, DM, hypothyroidism, CHARLIE presenting for evaluation of abdominal pain.  Started today.  Called EMS.  States her abdomen feels swollen and bloated.  Denies fevers, chills, chest pain, shortness of breath, nausea, vomiting, diarrhea, constipation, hematochezia, melena, urinary symptoms, vaginal symptoms.      History provided by:  Patient and EMS personnel   used: No            Patient History   Past Medical History:   Diagnosis Date    Acute on chronic respiratory failure (Multi)     Anxiety     Arthritis     Chronic headaches     COPD (chronic obstructive pulmonary disease) (Multi)     CPAP (continuous positive airway pressure) dependence     Depression     Diabetes mellitus (Multi)     type 2 IDDM    GERD (gastroesophageal reflux disease)     History of transfusion     History of venous thromboembolism     HL (hearing loss)     Hyperlipidemia     Hypertension     Hypothyroidism     Lipoma     Nephrolithiasis     CHARLIE (obstructive sleep apnea)     Ovarian teratoma     PE (pulmonary thromboembolism) (Multi)     PTSD (post-traumatic stress disorder)     Schizophrenia     Type 2 diabetes mellitus     VTE (venous thromboembolism)      Past Surgical History:   Procedure Laterality Date     SECTION, LOW TRANSVERSE      x 1    EYE SURGERY      LITHOTRIPSY      STAPEDES SURGERY      TONSILLECTOMY      UMBILICAL HERNIA REPAIR      VENTRAL HERNIA REPAIR       Family History   Problem Relation Name Age of Onset    Fibromyalgia Father      Hypertension Brother      Thyroid disease Maternal Grandmother      Thyroid disease Paternal Grandmother      Lung cancer Paternal Grandfather      Coronary artery disease Other Grandmother      Social History     Tobacco Use    Smoking status:  "Every Day     Current packs/day: 0.50     Types: Cigarettes     Passive exposure: Current    Smokeless tobacco: Never   Substance Use Topics    Alcohol use: Never    Drug use: Yes     Types: Marijuana       Physical Exam   ED Triage Vitals   Temp Pulse Resp BP   -- -- -- --      SpO2 Temp src Heart Rate Source Patient Position   -- -- -- --      BP Location FiO2 (%)     -- --       Physical Exam    General: Vitals noted. Afebrile. No apparent distress.  Unkempt.  EENT: Sclerae anicteric  Cardiac: Regular rate and rhythm. No murmur  Pulmonary: Lungs clear bilaterally with good aeration  Abdomen: Soft.  Mild distention.  Rectus diastasis present.  Old midline surgical scar.  No rebound. No guarding  : No CVA tenderness. exam deferred  Extremities: HOLMAN normally  Skin: No rash on abdomen  Neuro: Alert and oriented    ED Course & MDM   Diagnoses as of 02/15/25 2054   Epiploic appendagitis   Paranoid schizophrenia (Multi)                 No data recorded     [place] Coma Scale Score: 15 (02/15/25 1718 : Steve Everett RN)                           Medical Decision Making  DDx: Colitis, obstruction, ureterolithiasis, pyelonephritis, cardiac etiology, ovarian pathology    Patient has mildly distended abdomen rectus diastases present and old midline surgical scar no rebound or guarding.  Belly is soft.  Her laboratory evaluation overall is reassuring.  Her CT scan shows findings concerning for epiploic appendagitis.  Her pain is controlled.  On my reassessment the patient was largely speaking gibberish and saying it was her \"danielle\" language and that she is currently in the personality of a 16-year-old.  She is medically cleared there is no medical contraindication to psychiatric evaluation at this time.  We had a EPAT weigh in and according their assessment this to be baseline psychosis for the patient.  Patient can be safely discharged at this time.  Instructed to return to the nearest ED if any concerns or new or " worsening symptoms. Patient verbalized understanding and agreement with plan. Discharged in stable condition.  This visit was staffed with the attending physician Dr. Cervantes.      Disclaimer: This note was dictated using speech recognition software. An attempt at proofreading was made to minimize errors. Minor errors in transcription may be present. Please call if questions.    Amount and/or Complexity of Data Reviewed  Labs: ordered.  Radiology: ordered.  ECG/medicine tests: ordered and independent interpretation performed.     Details: EKG interpreted by me: Sinus rhythm.  Sinus arrhythmia.  Rate 64.  Left axis.  Right bundle nenita block.  QTc 433.  No STEMI.          Shared ASH Attestation:    I personally saw the patient and made/approved the management plan and take responsibility for the patient management.     History: 44-year-old female presents with abdominal pain.    Exam: Regular rate and rhythm cardiac exam with clear breath sounds bilaterally.  Abdomen is mildly distended with minimal tenderness to palpation throughout the abdomen.  No rebound or guarding.  Neurological exam is grossly intact.  Negative Homans' sign bilaterally.  Psychiatrically, the patient is expressing delusions but is unclear whether this is chronic or acute for her.    MDM: Colitis, bowel obstruction, infection    Labs Reviewed   COMPREHENSIVE METABOLIC PANEL - Abnormal       Result Value    Glucose 152 (*)     Sodium 140      Potassium 4.2      Chloride 103      Bicarbonate 31      Anion Gap 10      Urea Nitrogen 24 (*)     Creatinine 0.79      eGFR >90      Calcium 9.4      Albumin 3.9      Alkaline Phosphatase 85      Total Protein 6.8      AST 13      Bilirubin, Total 0.3      ALT 23     CBC WITH AUTO DIFFERENTIAL - Abnormal    WBC 9.1      nRBC 0.0      RBC 4.11      Hemoglobin 12.8      Hematocrit 40.5      MCV 99      MCH 31.1      MCHC 31.6 (*)     RDW 14.1      Platelets 75 (*)     Neutrophils % 46.2      Immature  Granulocytes %, Automated 4.5 (*)     Lymphocytes % 41.8      Monocytes % 5.6      Eosinophils % 0.8      Basophils % 1.1      Neutrophils Absolute 4.18      Immature Granulocytes Absolute, Automated 0.41      Lymphocytes Absolute 3.78      Monocytes Absolute 0.51      Eosinophils Absolute 0.07      Basophils Absolute 0.10     URINALYSIS WITH REFLEX CULTURE AND MICROSCOPIC - Abnormal    Color, Urine Colorless (*)     Appearance, Urine Clear      Specific Gravity, Urine 1.012      pH, Urine 5.5      Protein, Urine NEGATIVE      Glucose, Urine Normal      Blood, Urine NEGATIVE      Ketones, Urine NEGATIVE      Bilirubin, Urine NEGATIVE      Urobilinogen, Urine Normal      Nitrite, Urine NEGATIVE      Leukocyte Esterase, Urine NEGATIVE     TSH WITH REFLEX TO FREE T4 IF ABNORMAL - Abnormal    Thyroid Stimulating Hormone 11.69 (*)     Narrative:     TSH testing is performed using different testing methodology at [facility] than at other [facility]. Direct result comparisons should only be made within the same method.     DRUG SCREEN,URINE - Abnormal    Amphetamine Screen, Urine Presumptive Negative      Barbiturate Screen, Urine Presumptive Negative      Benzodiazepines Screen, Urine Presumptive Negative      Cannabinoid Screen, Urine Presumptive Positive (*)     Cocaine Metabolite Screen, Urine Presumptive Negative      Fentanyl Screen, Urine Presumptive Negative      Opiate Screen, Urine Presumptive Negative      Oxycodone Screen, Urine Presumptive Negative      PCP Screen, Urine Presumptive Negative      Methadone Screen, Urine Presumptive Negative      Narrative:     Drug screen results are presumptive and should not be used to assess   compliance with prescribed medication. Contact the performing [facility] laboratory   to add-on definitive confirmatory testing if clinically indicated.    Toxicology screening results are reported qualitatively. The concentration must   be greater than or equal to the  cutoff to be reported as positive. The concentration   at which the screening test can detect an individual drug or metabolite varies.   The absence of expected drug(s) and/or drug metabolite(s) may indicate non-compliance,   inappropriate timing of specimen collection relative to drug administration, poor drug   absorption, diluted/adulterated urine, or limitations of testing. For medical purposes   only; not valid for forensic use.    Interpretive questions should be directed to the laboratory medical directors.   THYROXINE, FREE - Abnormal    Thyroxine, Free 0.50 (*)     Narrative:     Thyroxine Free testing is performed using different testing methodology at [facility] than at other [facility]. Direct result comparisons should only be made within the same method.    Biotin can cause falsely elevated free T4 results. Patients taking a Biotin dose of up to 10 mg/day should refrain from taking Biotin for 24 hours before sample collection. Patient taking a Biotin dose of >10 mg/day should consult with their physician or the laboratory before the blood draw.   MAGNESIUM - Normal    Magnesium 1.88     LIPASE - Normal    Lipase 21      Narrative:     Venipuncture immediately after or during the administration of Metamizole may lead to falsely low results. Testing should be performed immediately prior to Metamizole dosing.   LACTATE - Normal    Lactate 0.9      Narrative:     Venipuncture immediately after or during the administration of Metamizole may lead to falsely low results. Testing should be performed immediately prior to Metamizole dosing.   TROPONIN I, HIGH SENSITIVITY - Normal    Troponin I, High Sensitivity 3      Narrative:     Less than 99th percentile of normal range cutoff-  Female and children under 18 years old <14 ng/L; Male <21 ng/L: Negative  Repeat testing should be performed if clinically indicated.     Female and children under 18 years old 14-50 ng/L; Male 21-50  ng/L:  Consistent with possible cardiac damage and possible increased clinical   risk. Serial measurements may help to assess extent of myocardial damage.     >50 ng/L: Consistent with cardiac damage, increased clinical risk and  myocardial infarction. Serial measurements may help assess extent of   myocardial damage.      NOTE: Children less than 1 year old may have higher baseline troponin   levels and results should be interpreted in conjunction with the overall   clinical context.     NOTE: Troponin I testing is performed using a different   testing methodology at [facility] than at other   [facility]. Direct result comparisons should only   be made within the same method.   ACUTE TOXICOLOGY PANEL, BLOOD - Normal    Acetaminophen <10.0      Salicylate  <3      Alcohol <10     URINALYSIS WITH REFLEX CULTURE AND MICROSCOPIC    Narrative:     The following orders were created for panel order Urinalysis with Reflex Culture and Microscopic.  Procedure                               Abnormality         Status                     ---------                               -----------         ------                     Urinalysis with Reflex C...[[id number]]  Abnormal            Final result               Extra Urine Gray Tube[[id number]]                                                         Please view results for these tests on the individual orders.   EXTRA URINE GRAY TUBE       CT abdomen pelvis w IV contrast   Final Result   1.  Newly seen areas of encapsulated fat left lower abdomen and   pelvis likely related to interval epiploic appendagitis and/or   omental infarcts. At least 1 area is age indeterminate. Recommend   correlation with symptomatology for evidence of acute features of   either omental infarct or epiploic appendagitis.   2. Similar fusiform dilation extrahepatic bile duct of indeterminate   cause potentially related to prior obstruction, occult stricture or   choledochocele.   3.  Similar tiny fat containing epigastric hernia.             MACRO:   None        Signed by: Jovan King 2/15/2025 5:53 PM   Dictation workstation:   [id number]        Patient was found to have epiploic appendagitis.  Treatment for this is supportive care.  Therefore she is medically clear.  We will have the emergency psychiatric assessment team talk to the patient to see if her schizophrenia is currently at baseline or if this is something that requires higher level of care in their opinion.      Niels Cervantes MD        Procedure  Procedures     Josesito Khoury PA-C  02/15/25 2055

## 2025-02-16 NOTE — DISCHARGE INSTRUCTIONS
Your CT showed that you have epiploic appendagitis causing your pain.  Epiploic appendages are small pieces of fat that attached to and hanging from outside of your large intestine and if one of them gets twisted it can cause pain and inflammation.  This does not require surgery or hospitalization.  Anti-inflammatories such as ibuprofen or naproxen are used to treat pain.  The pain usually lasts 1 to 3 weeks.  In rare cases patients require surgery for this.  Please follow-up with your regular doctor.  Return to the nearest emergency department if you have any concerns or new or worsening symptoms.

## 2025-02-19 ENCOUNTER — APPOINTMENT (OUTPATIENT)
Dept: PRIMARY CARE | Facility: CLINIC | Age: 45
End: 2025-02-19
Payer: COMMERCIAL

## 2025-02-19 VITALS
RESPIRATION RATE: 16 BRPM | TEMPERATURE: 97.4 F | HEART RATE: 102 BPM | HEIGHT: 62 IN | OXYGEN SATURATION: 94 % | DIASTOLIC BLOOD PRESSURE: 58 MMHG | SYSTOLIC BLOOD PRESSURE: 94 MMHG | BODY MASS INDEX: 30.36 KG/M2 | WEIGHT: 165 LBS

## 2025-02-19 DIAGNOSIS — Z79.4 TYPE 2 DIABETES MELLITUS WITHOUT COMPLICATION, WITH LONG-TERM CURRENT USE OF INSULIN (MULTI): Primary | ICD-10-CM

## 2025-02-19 DIAGNOSIS — K63.89 EPIPLOIC APPENDAGITIS: ICD-10-CM

## 2025-02-19 DIAGNOSIS — E11.9 TYPE 2 DIABETES MELLITUS WITHOUT COMPLICATION, WITH LONG-TERM CURRENT USE OF INSULIN (MULTI): Primary | ICD-10-CM

## 2025-02-19 DIAGNOSIS — J42 CHRONIC BRONCHITIS, UNSPECIFIED CHRONIC BRONCHITIS TYPE (MULTI): ICD-10-CM

## 2025-02-19 DIAGNOSIS — J96.22 ACUTE ON CHRONIC RESPIRATORY FAILURE WITH HYPERCAPNIA (MULTI): ICD-10-CM

## 2025-02-19 DIAGNOSIS — R39.9 SYMPTOMS OF URINARY TRACT INFECTION: ICD-10-CM

## 2025-02-19 DIAGNOSIS — E78.2 MIXED HYPERLIPIDEMIA: ICD-10-CM

## 2025-02-19 DIAGNOSIS — M62.08 DIASTASIS RECTI: ICD-10-CM

## 2025-02-19 DIAGNOSIS — Z87.442 HISTORY OF KIDNEY STONES: ICD-10-CM

## 2025-02-19 DIAGNOSIS — Z12.31 ENCOUNTER FOR SCREENING MAMMOGRAM FOR BREAST CANCER: ICD-10-CM

## 2025-02-19 PROCEDURE — 3008F BODY MASS INDEX DOCD: CPT | Performed by: FAMILY MEDICINE

## 2025-02-19 PROCEDURE — 99214 OFFICE O/P EST MOD 30 MIN: CPT | Performed by: FAMILY MEDICINE

## 2025-02-19 PROCEDURE — 3074F SYST BP LT 130 MM HG: CPT | Performed by: FAMILY MEDICINE

## 2025-02-19 PROCEDURE — 3078F DIAST BP <80 MM HG: CPT | Performed by: FAMILY MEDICINE

## 2025-02-19 PROCEDURE — G2211 COMPLEX E/M VISIT ADD ON: HCPCS | Performed by: FAMILY MEDICINE

## 2025-02-19 ASSESSMENT — PATIENT HEALTH QUESTIONNAIRE - PHQ9
1. LITTLE INTEREST OR PLEASURE IN DOING THINGS: NOT AT ALL
1. LITTLE INTEREST OR PLEASURE IN DOING THINGS: NOT AT ALL
2. FEELING DOWN, DEPRESSED OR HOPELESS: NOT AT ALL
2. FEELING DOWN, DEPRESSED OR HOPELESS: NOT AT ALL
SUM OF ALL RESPONSES TO PHQ9 QUESTIONS 1 AND 2: 0
SUM OF ALL RESPONSES TO PHQ9 QUESTIONS 1 AND 2: 0

## 2025-02-19 NOTE — PROGRESS NOTES
Subjective   Patient ID: Stephenie Mccray is a 44 y.o. female who presents for Hospital Follow-up. I last saw the patient 3/3/2024    HPI   Pt went to ED 2/5, 5/6 & 2/15. She was evaluated for upper abdominal distension, CT of the abdomen was negative except for epiploic appendagitis patient denies any abdominal pain today.     DX: Colitis, obstruction, ureterolithiasis, pyelonephritis, cardiac etiology, ovarian pathology     She does note her psychiatric medications has been adjusted and she seems to be doing better.    Reviewed labs    Past medical, surgical, and family history reviewed.  Reviewed and documented all medications   Pt eating well, exercising as tolerated and taking medications as directed    Has no medical concerns for rooming MA    Review of Systems  Except positives as noted in the CC & HPI      Constitutional: Denies fevers, chills, night sweats, fatigue, weight changes, change in appetite    Eyes: Denies blurry vision, double vision    ENT: Denies otalgia, trouble hearing, tinnitus, vertigo, nasal congestion, rhinorrhea, sore throat    Neck: Denies swelling, masses    Cardiovascular: Denies chest pain, palpitations, edema, orthopnea, syncope    Respiratory: Denies dyspnea, cough, wheezing, postural nocturnal dyspnea    Gastrointestinal: Denies abdominal pain, nausea, vomiting, diarrhea, constipation, melena, hematochezia    Genitourinary: Denies dysuria, hematuria, frequency, urgency    Musculoskeletal: Denies back pain, neck pain, arthralgias, myalgias    Integumentary: Denies skin lesions, rashes, masses    Neurological: Denies dizziness, headaches, confusion, limb weakness, paresthesias, syncope, convulsions    Psychiatric: Denies depression, anxiety, homicidal ideations, suicidal ideations, sleep disturbances    Endocrine: Denies polyphagia, polydipsia, polyuria, weakness, hair thinning, heat intolerance, cold intolerance, weight changes    Heme/Lymph: Denies easy bruising, easy bleeding,  "swollen glands    Objective   BP 94/58 (BP Location: Right arm, Patient Position: Sitting, BP Cuff Size: Large adult)   Pulse 102   Temp 36.3 °C (97.4 °F)   Resp 16   Ht 1.575 m (5' 2\")   Wt 74.8 kg (165 lb)   SpO2 94%   BMI 30.18 kg/m²     Physical Exam    Gen. Appearance - well-developed, well-nourished, 44 y.o., White female in no acute distress.     Skin - warm, pink and dry without rash or concerning lesions.    Mental Status - alert and oriented times 3. Normal mood and affect appropriate to mood.     Neck - supple without lymphadenopathy. Carotid pulses are normal without bruits. Thyroid is normal in midline without nodules.    Chest - lungs are clear to auscultation without rales, rhonchi. Diminished breath sounds at the bases bilaterally. Scattered faint expiratory wheezes bilaterally.    Heart - regular, rate, and rhythm without murmurs, rubs or gallops.      Abdomen - soft, obese, protuberant, nontender, nondistended. No masses, hepatomegaly or splenomegaly is noted. No rebound, rigidity or guarding is noted. Bowel sounds are normoactive.  Bulging of the mid abdomen between the xiphoid process and umbilicus which is most noticeable with patient attempting to sit up. No tenderness noted and no definite hernia.    Extremities - no cyanosis, clubbing or edema Pedal pulses are 2+ normal at the dorsalis pedis and posterior tibial pulses bilaterally.     Neurological - cranial nerves II through XII are grossly intact. Motor strength 5/5 at all fours.     Assessment/Plan   1. Type 2 diabetes mellitus without complication, with long-term current use of insulin (Multi)  Hemoglobin A1c    Hemoglobin A1c    CANCELED: POCT glycosylated hemoglobin (Hb A1C) manually resulted      2. Diastasis recti        3. Epiploic appendagitis        4. Symptoms of urinary tract infection        5. History of kidney stones        6. Mixed hyperlipidemia        7. Acute on chronic respiratory failure with hypercapnia (Multi)   "      8. Chronic bronchitis, unspecified chronic bronchitis type (Multi)        9. Encounter for screening mammogram for breast cancer  BI mammo bilateral diagnostic        Patient will continue on a diabetic, low-cholesterol diet and weight reduction. Exercise as tolerated. Patient will continue medications as prescribed. Follow-up in 3 month(s) otherwise as needed.       Will obtain Hemoglobin A1C prior to patient's next appointment. Will call patient with results when available.      Discussed with patient that she does not have a true hernia it is merely a condition called diathesis recti which is a separation of the rectus abdominis muscles causing a weakness in the abdominal wall.    Patient is to follow up with her endocrinologist as scheduled.     Scribe Attestation  By signing my name below, IDahlia Scribe   attest that this documentation has been prepared under the direction and in the presence of Delta Yuen MD.

## 2025-02-26 LAB
ATRIAL RATE: 124 BPM
P AXIS: 61 DEGREES
P OFFSET: 186 MS
P ONSET: 136 MS
PR INTERVAL: 124 MS
Q ONSET: 198 MS
QRS COUNT: 20 BEATS
QRS DURATION: 124 MS
QT INTERVAL: 300 MS
QTC CALCULATION(BAZETT): 431 MS
QTC FREDERICIA: 382 MS
R AXIS: 104 DEGREES
T AXIS: 24 DEGREES
T OFFSET: 348 MS
VENTRICULAR RATE: 124 BPM

## 2025-02-28 LAB
ATRIAL RATE: 64 BPM
P AXIS: 22 DEGREES
P OFFSET: 165 MS
P ONSET: 134 MS
PR INTERVAL: 110 MS
Q ONSET: 189 MS
QRS COUNT: 10 BEATS
QRS DURATION: 136 MS
QT INTERVAL: 420 MS
QTC CALCULATION(BAZETT): 433 MS
QTC FREDERICIA: 429 MS
R AXIS: -13 DEGREES
T AXIS: 4 DEGREES
T OFFSET: 399 MS
VENTRICULAR RATE: 64 BPM

## 2025-03-12 ENCOUNTER — APPOINTMENT (OUTPATIENT)
Dept: OTOLARYNGOLOGY | Facility: CLINIC | Age: 45
End: 2025-03-12
Payer: COMMERCIAL

## 2025-03-19 ENCOUNTER — APPOINTMENT (OUTPATIENT)
Facility: CLINIC | Age: 45
End: 2025-03-19
Payer: COMMERCIAL

## 2025-03-20 ENCOUNTER — HOSPITAL ENCOUNTER (EMERGENCY)
Facility: HOSPITAL | Age: 45
Discharge: HOME | End: 2025-03-21
Payer: COMMERCIAL

## 2025-03-20 ENCOUNTER — APPOINTMENT (OUTPATIENT)
Dept: RADIOLOGY | Facility: HOSPITAL | Age: 45
End: 2025-03-20
Payer: COMMERCIAL

## 2025-03-20 ENCOUNTER — APPOINTMENT (OUTPATIENT)
Dept: CARDIOLOGY | Facility: HOSPITAL | Age: 45
End: 2025-03-20
Payer: COMMERCIAL

## 2025-03-20 DIAGNOSIS — R51.9 NONINTRACTABLE HEADACHE, UNSPECIFIED CHRONICITY PATTERN, UNSPECIFIED HEADACHE TYPE: Primary | ICD-10-CM

## 2025-03-20 DIAGNOSIS — R91.1 PULMONARY NODULE: ICD-10-CM

## 2025-03-20 DIAGNOSIS — R10.84 GENERALIZED ABDOMINAL PAIN: ICD-10-CM

## 2025-03-20 DIAGNOSIS — J44.1 COPD EXACERBATION (MULTI): ICD-10-CM

## 2025-03-20 LAB
ALBUMIN SERPL BCP-MCNC: 4.1 G/DL (ref 3.4–5)
ALP SERPL-CCNC: 78 U/L (ref 33–110)
ALT SERPL W P-5'-P-CCNC: 12 U/L (ref 7–45)
ANION GAP SERPL CALC-SCNC: 12 MMOL/L (ref 10–20)
AST SERPL W P-5'-P-CCNC: 21 U/L (ref 9–39)
BASOPHILS # BLD AUTO: 0.08 X10*3/UL (ref 0–0.1)
BASOPHILS NFR BLD AUTO: 0.9 %
BILIRUB SERPL-MCNC: 0.3 MG/DL (ref 0–1.2)
BUN SERPL-MCNC: 20 MG/DL (ref 6–23)
CALCIUM SERPL-MCNC: 9.2 MG/DL (ref 8.6–10.3)
CARDIAC TROPONIN I PNL SERPL HS: 4 NG/L (ref 0–13)
CHLORIDE SERPL-SCNC: 105 MMOL/L (ref 98–107)
CO2 SERPL-SCNC: 28 MMOL/L (ref 21–32)
CREAT SERPL-MCNC: 0.69 MG/DL (ref 0.5–1.05)
EGFRCR SERPLBLD CKD-EPI 2021: >90 ML/MIN/1.73M*2
EOSINOPHIL # BLD AUTO: 0.06 X10*3/UL (ref 0–0.7)
EOSINOPHIL NFR BLD AUTO: 0.7 %
ERYTHROCYTE [DISTWIDTH] IN BLOOD BY AUTOMATED COUNT: 16.1 % (ref 11.5–14.5)
GLUCOSE SERPL-MCNC: 114 MG/DL (ref 74–99)
HCT VFR BLD AUTO: 42.8 % (ref 36–46)
HGB BLD-MCNC: 13.5 G/DL (ref 12–16)
IMM GRANULOCYTES # BLD AUTO: 0.27 X10*3/UL (ref 0–0.7)
IMM GRANULOCYTES NFR BLD AUTO: 3.1 % (ref 0–0.9)
INR PPP: 1 (ref 0.9–1.1)
LIPASE SERPL-CCNC: 19 U/L (ref 9–82)
LYMPHOCYTES # BLD AUTO: 3.63 X10*3/UL (ref 1.2–4.8)
LYMPHOCYTES NFR BLD AUTO: 42.3 %
MCH RBC QN AUTO: 31.8 PG (ref 26–34)
MCHC RBC AUTO-ENTMCNC: 31.5 G/DL (ref 32–36)
MCV RBC AUTO: 101 FL (ref 80–100)
MONOCYTES # BLD AUTO: 0.53 X10*3/UL (ref 0.1–1)
MONOCYTES NFR BLD AUTO: 6.2 %
NEUTROPHILS # BLD AUTO: 4.01 X10*3/UL (ref 1.2–7.7)
NEUTROPHILS NFR BLD AUTO: 46.8 %
NRBC BLD-RTO: 0.5 /100 WBCS (ref 0–0)
PLATELET # BLD AUTO: 133 X10*3/UL (ref 150–450)
POTASSIUM SERPL-SCNC: 4.8 MMOL/L (ref 3.5–5.3)
PROT SERPL-MCNC: 7 G/DL (ref 6.4–8.2)
PROTHROMBIN TIME: 11.3 SECONDS (ref 9.8–12.4)
RBC # BLD AUTO: 4.24 X10*6/UL (ref 4–5.2)
SODIUM SERPL-SCNC: 140 MMOL/L (ref 136–145)
WBC # BLD AUTO: 8.6 X10*3/UL (ref 4.4–11.3)

## 2025-03-20 PROCEDURE — 96365 THER/PROPH/DIAG IV INF INIT: CPT | Mod: 59

## 2025-03-20 PROCEDURE — 81001 URINALYSIS AUTO W/SCOPE: CPT | Performed by: REGISTERED NURSE

## 2025-03-20 PROCEDURE — 85025 COMPLETE CBC W/AUTO DIFF WBC: CPT | Performed by: REGISTERED NURSE

## 2025-03-20 PROCEDURE — 71045 X-RAY EXAM CHEST 1 VIEW: CPT | Performed by: STUDENT IN AN ORGANIZED HEALTH CARE EDUCATION/TRAINING PROGRAM

## 2025-03-20 PROCEDURE — 96366 THER/PROPH/DIAG IV INF ADDON: CPT

## 2025-03-20 PROCEDURE — 84075 ASSAY ALKALINE PHOSPHATASE: CPT | Performed by: REGISTERED NURSE

## 2025-03-20 PROCEDURE — 80307 DRUG TEST PRSMV CHEM ANLYZR: CPT | Performed by: REGISTERED NURSE

## 2025-03-20 PROCEDURE — 87086 URINE CULTURE/COLONY COUNT: CPT | Mod: ELYLAB | Performed by: REGISTERED NURSE

## 2025-03-20 PROCEDURE — 96375 TX/PRO/DX INJ NEW DRUG ADDON: CPT | Mod: 59

## 2025-03-20 PROCEDURE — 71275 CT ANGIOGRAPHY CHEST: CPT

## 2025-03-20 PROCEDURE — 84484 ASSAY OF TROPONIN QUANT: CPT | Performed by: REGISTERED NURSE

## 2025-03-20 PROCEDURE — 70450 CT HEAD/BRAIN W/O DYE: CPT

## 2025-03-20 PROCEDURE — 71045 X-RAY EXAM CHEST 1 VIEW: CPT

## 2025-03-20 PROCEDURE — 99285 EMERGENCY DEPT VISIT HI MDM: CPT | Mod: 25

## 2025-03-20 PROCEDURE — 74177 CT ABD & PELVIS W/CONTRAST: CPT

## 2025-03-20 PROCEDURE — 36415 COLL VENOUS BLD VENIPUNCTURE: CPT | Performed by: REGISTERED NURSE

## 2025-03-20 PROCEDURE — 93005 ELECTROCARDIOGRAM TRACING: CPT

## 2025-03-20 PROCEDURE — 85610 PROTHROMBIN TIME: CPT | Performed by: REGISTERED NURSE

## 2025-03-20 PROCEDURE — 2500000004 HC RX 250 GENERAL PHARMACY W/ HCPCS (ALT 636 FOR OP/ED): Performed by: REGISTERED NURSE

## 2025-03-20 PROCEDURE — 83690 ASSAY OF LIPASE: CPT | Performed by: REGISTERED NURSE

## 2025-03-20 RX ORDER — DIPHENHYDRAMINE HYDROCHLORIDE 50 MG/ML
25 INJECTION, SOLUTION INTRAMUSCULAR; INTRAVENOUS ONCE
Status: COMPLETED | OUTPATIENT
Start: 2025-03-20 | End: 2025-03-20

## 2025-03-20 RX ORDER — MAGNESIUM SULFATE HEPTAHYDRATE 40 MG/ML
2 INJECTION, SOLUTION INTRAVENOUS ONCE
Status: COMPLETED | OUTPATIENT
Start: 2025-03-20 | End: 2025-03-21

## 2025-03-20 RX ORDER — METOCLOPRAMIDE HYDROCHLORIDE 5 MG/ML
10 INJECTION INTRAMUSCULAR; INTRAVENOUS ONCE
Status: COMPLETED | OUTPATIENT
Start: 2025-03-20 | End: 2025-03-20

## 2025-03-20 RX ADMIN — METOCLOPRAMIDE HYDROCHLORIDE 10 MG: 5 INJECTION INTRAMUSCULAR; INTRAVENOUS at 23:56

## 2025-03-20 RX ADMIN — DIPHENHYDRAMINE HYDROCHLORIDE 25 MG: 50 INJECTION, SOLUTION INTRAMUSCULAR; INTRAVENOUS at 23:56

## 2025-03-20 RX ADMIN — SODIUM CHLORIDE 500 ML: 0.9 INJECTION, SOLUTION INTRAVENOUS at 23:46

## 2025-03-20 RX ADMIN — MAGNESIUM SULFATE HEPTAHYDRATE 2 G: 40 INJECTION, SOLUTION INTRAVENOUS at 23:49

## 2025-03-20 ASSESSMENT — COLUMBIA-SUICIDE SEVERITY RATING SCALE - C-SSRS
2. HAVE YOU ACTUALLY HAD ANY THOUGHTS OF KILLING YOURSELF?: NO
1. IN THE PAST MONTH, HAVE YOU WISHED YOU WERE DEAD OR WISHED YOU COULD GO TO SLEEP AND NOT WAKE UP?: NO
6. HAVE YOU EVER DONE ANYTHING, STARTED TO DO ANYTHING, OR PREPARED TO DO ANYTHING TO END YOUR LIFE?: NO

## 2025-03-20 ASSESSMENT — PAIN - FUNCTIONAL ASSESSMENT
PAIN_FUNCTIONAL_ASSESSMENT: 0-10
PAIN_FUNCTIONAL_ASSESSMENT: 0-10

## 2025-03-20 ASSESSMENT — PAIN DESCRIPTION - LOCATION
LOCATION: HEAD
LOCATION: HEAD

## 2025-03-20 ASSESSMENT — PAIN DESCRIPTION - PAIN TYPE: TYPE: ACUTE PAIN

## 2025-03-20 ASSESSMENT — PAIN SCALES - GENERAL
PAINLEVEL_OUTOF10: 9
PAINLEVEL_OUTOF10: 9

## 2025-03-20 ASSESSMENT — PAIN DESCRIPTION - FREQUENCY
FREQUENCY: CONSTANT/CONTINUOUS
FREQUENCY: CONSTANT/CONTINUOUS

## 2025-03-21 VITALS
HEART RATE: 97 BPM | DIASTOLIC BLOOD PRESSURE: 94 MMHG | TEMPERATURE: 96.8 F | BODY MASS INDEX: 29.44 KG/M2 | HEIGHT: 62 IN | OXYGEN SATURATION: 93 % | RESPIRATION RATE: 18 BRPM | SYSTOLIC BLOOD PRESSURE: 129 MMHG | WEIGHT: 160 LBS

## 2025-03-21 LAB
AMPHETAMINES UR QL SCN: ABNORMAL
APPEARANCE UR: CLEAR
ATRIAL RATE: 106 BPM
BACTERIA #/AREA URNS AUTO: ABNORMAL /HPF
BARBITURATES UR QL SCN: ABNORMAL
BENZODIAZ UR QL SCN: ABNORMAL
BILIRUB UR STRIP.AUTO-MCNC: NEGATIVE MG/DL
BZE UR QL SCN: ABNORMAL
CANNABINOIDS UR QL SCN: ABNORMAL
COLOR UR: ABNORMAL
FENTANYL+NORFENTANYL UR QL SCN: ABNORMAL
FLUAV RNA RESP QL NAA+PROBE: NOT DETECTED
FLUBV RNA RESP QL NAA+PROBE: NOT DETECTED
GLUCOSE UR STRIP.AUTO-MCNC: NORMAL MG/DL
HOLD SPECIMEN: NORMAL
KETONES UR STRIP.AUTO-MCNC: NEGATIVE MG/DL
LEUKOCYTE ESTERASE UR QL STRIP.AUTO: ABNORMAL
METHADONE UR QL SCN: ABNORMAL
NITRITE UR QL STRIP.AUTO: NEGATIVE
OPIATES UR QL SCN: ABNORMAL
OXYCODONE+OXYMORPHONE UR QL SCN: ABNORMAL
P AXIS: 56 DEGREES
P OFFSET: 207 MS
P ONSET: 151 MS
PCP UR QL SCN: ABNORMAL
PH UR STRIP.AUTO: 5.5 [PH]
PR INTERVAL: 144 MS
PROT UR STRIP.AUTO-MCNC: NEGATIVE MG/DL
Q ONSET: 223 MS
QRS COUNT: 18 BEATS
QRS DURATION: 124 MS
QT INTERVAL: 396 MS
QTC CALCULATION(BAZETT): 526 MS
QTC FREDERICIA: 478 MS
R AXIS: 7 DEGREES
RBC # UR STRIP.AUTO: NEGATIVE MG/DL
RBC #/AREA URNS AUTO: ABNORMAL /HPF
SARS-COV-2 RNA RESP QL NAA+PROBE: NOT DETECTED
SP GR UR STRIP.AUTO: 1.03
SQUAMOUS #/AREA URNS AUTO: ABNORMAL /HPF
T AXIS: 18 DEGREES
T OFFSET: 421 MS
UROBILINOGEN UR STRIP.AUTO-MCNC: NORMAL MG/DL
VENTRICULAR RATE: 106 BPM
WBC #/AREA URNS AUTO: ABNORMAL /HPF

## 2025-03-21 PROCEDURE — 87636 SARSCOV2 & INF A&B AMP PRB: CPT | Performed by: REGISTERED NURSE

## 2025-03-21 PROCEDURE — 2550000001 HC RX 255 CONTRASTS: Performed by: REGISTERED NURSE

## 2025-03-21 PROCEDURE — 70450 CT HEAD/BRAIN W/O DYE: CPT | Performed by: RADIOLOGY

## 2025-03-21 PROCEDURE — 96375 TX/PRO/DX INJ NEW DRUG ADDON: CPT

## 2025-03-21 PROCEDURE — 2500000004 HC RX 250 GENERAL PHARMACY W/ HCPCS (ALT 636 FOR OP/ED): Performed by: REGISTERED NURSE

## 2025-03-21 RX ORDER — METOPROLOL TARTRATE 1 MG/ML
5 INJECTION, SOLUTION INTRAVENOUS ONCE
Status: COMPLETED | OUTPATIENT
Start: 2025-03-21 | End: 2025-03-21

## 2025-03-21 RX ADMIN — METOPROLOL TARTRATE 5 MG: 5 INJECTION INTRAVENOUS at 03:11

## 2025-03-21 RX ADMIN — SODIUM CHLORIDE 500 ML: 0.9 INJECTION, SOLUTION INTRAVENOUS at 02:36

## 2025-03-21 RX ADMIN — IOHEXOL 75 ML: 350 INJECTION, SOLUTION INTRAVENOUS at 00:12

## 2025-03-21 ASSESSMENT — LIFESTYLE VARIABLES
EVER HAD A DRINK FIRST THING IN THE MORNING TO STEADY YOUR NERVES TO GET RID OF A HANGOVER: NO
HAVE PEOPLE ANNOYED YOU BY CRITICIZING YOUR DRINKING: NO
EVER FELT BAD OR GUILTY ABOUT YOUR DRINKING: NO
TOTAL SCORE: 0
HAVE YOU EVER FELT YOU SHOULD CUT DOWN ON YOUR DRINKING: NO

## 2025-03-21 NOTE — ED PROVIDER NOTES
"HPI   Chief Complaint   Patient presents with    GI Problem     Pt has had HA for 3 days. Not light sensitive. Excedrin no relief. Pt admits to vomiting \"black\" 20 minutes ago. Denies blood in stool. No dizzy/lightheaded. No LOC/fall. Pt a/ox4 from home       44-year-old female with past medical history significant for COPD on oxygen at baseline, CPAP, anxiety/depression, diabetes, GERD, hypertension, hyperlipidemia, hypothyroidism, and CHARLIE presents emergency department today for evaluation of multiple complaints.  Patient tells me she has had a headache for 3 days.  Patient tells me she is taken Excedrin with no relief.  Patient tells me she has been having abdominal discomfort as well as lack of vomiting.  Patient denies any chest pain but does endorse baseline shortness of breath secondary to her COPD.  Patient was noted to be 88% on her home oxygen in triage.  Patient does have a pulse oximeter with her which is now reading 93% on her oxygen.  Patient reports some abdominal discomfort.  Patient tells me she has a mass in her stomach and she does not know what it is.  Patient denies any fever or chills.  Patient denies diarrhea, dysuria, or hematochezia.    History provided by:  Patient and medical records   used: No            Patient History   Past Medical History:   Diagnosis Date    Acute on chronic respiratory failure (Multi)     Anxiety     Arthritis     Chronic headaches     COPD (chronic obstructive pulmonary disease) (Multi)     CPAP (continuous positive airway pressure) dependence     Depression     Diabetes mellitus (Multi)     type 2 IDDM    GERD (gastroesophageal reflux disease)     History of transfusion     History of venous thromboembolism     HL (hearing loss)     Hyperlipidemia     Hypertension     Hypothyroidism     Lipoma     Nephrolithiasis     CHARLIE (obstructive sleep apnea)     Ovarian teratoma     PE (pulmonary thromboembolism) (Multi)     PTSD (post-traumatic stress " disorder)     Schizophrenia     Type 2 diabetes mellitus     VTE (venous thromboembolism)      Past Surgical History:   Procedure Laterality Date     SECTION, LOW TRANSVERSE      x 1    EYE SURGERY      LITHOTRIPSY      STAPEDES SURGERY      TONSILLECTOMY      UMBILICAL HERNIA REPAIR      VENTRAL HERNIA REPAIR       Family History   Problem Relation Name Age of Onset    Fibromyalgia Father      Hypertension Brother      Thyroid disease Maternal Grandmother      Thyroid disease Paternal Grandmother      Lung cancer Paternal Grandfather      Coronary artery disease Other Grandmother      Social History     Tobacco Use    Smoking status: Every Day     Current packs/day: 0.50     Types: Cigarettes     Passive exposure: Current    Smokeless tobacco: Never   Substance Use Topics    Alcohol use: Never    Drug use: Yes     Types: Marijuana       Physical Exam   ED Triage Vitals [25]   Temperature Heart Rate Respirations BP   36 °C (96.8 °F) (!) 120 20 133/67      Pulse Ox Temp Source Heart Rate Source Patient Position   (!) 88 % Temporal Monitor --      BP Location FiO2 (%)     -- --       Physical Exam  Vitals and nursing note reviewed.   Constitutional:       Appearance: Normal appearance.   HENT:      Head: Normocephalic and atraumatic.   Cardiovascular:      Rate and Rhythm: Regular rhythm. Tachycardia present.      Pulses: Normal pulses.      Heart sounds: Normal heart sounds.   Pulmonary:      Effort: Pulmonary effort is normal.      Breath sounds: Wheezing present.   Abdominal:      Palpations: Abdomen is soft.      Hernia: A hernia is present.   Skin:     General: Skin is warm and dry.      Capillary Refill: Capillary refill takes less than 2 seconds.   Neurological:      General: No focal deficit present.      Mental Status: She is alert and oriented to person, place, and time.   Psychiatric:         Mood and Affect: Mood normal.         Behavior: Behavior normal.           ED Course & MDM    Diagnoses as of 03/21/25 0133   Nonintractable headache, unspecified chronicity pattern, unspecified headache type   Pulmonary nodule   Generalized abdominal pain   COPD exacerbation (Multi)                 No data recorded     Ezequiel Coma Scale Score: 15 (03/20/25 2056 : Tracy Austin, CARLOS)                           Medical Decision Making    Patient seen and examined emergency department; patient is chronically ill in appearance but does not appear to be any acute distress.  Patient's lung sounds are diminished with expiratory wheezing.  Heart rate is tachycardic, abdomen soft and round, she does have a hernia noted in the umbilical region.  Patient is neurologically intact without any focal deficits.    Order CT of the abdomen pelvis to further evaluate patient's hernia as well as a PE study.  Patient has allergies to steroids patient given magnesium to relax her muscles in the lungs.  Patient given IV Reglan and IV Benadryl for her headache.  Patient also given IV fluids.    EKG at 23:35 with ventricular rate of 106, as interpreted by me, shows a sinus tachycardia with right bundle branch block, normal axis, normal intervals. And normal ST and T wave pattern with no evidence of acute ischemia or other acute findings.  Right bundle branch block noted on EKG in December 2024    Patient's micro swabs are negative.  Urinalysis does show leukocytes but is negative for nitrites and bacteria.  Patient's drug screen was positive for marijuana and benzodiazepines.  High sensitive troponin is 4.  Lipase is 19.  CBC and CMP are without significant findings.  PT/INR is 11.3/1.  CT of the abdomen pelvis is without significant findings.  PE study is without evidence of pulmonary embolism there is scattered atelectasis.  There is also a 5 mm right lower lobe nodule.  CT of the head without acute intracranial findings.    Despite IV fluids patient continues to be tachycardic.  I did review her chart, patient takes  metoprolol 25 mg twice daily.  Patient did not have her evening dose.  Therefore I did give her 5 mg of IV Lopressor    Post ministration Lopressor patient is rate is down to 101/min.    Patient and I discussed her results.  On reevaluation patient is sleeping soundly on ED cart.  Patient awakens to verbal stimulus.  Patient reports improvement of her headache.  We discussed follow-up including the pulmonary nodule.  I did recommend following up with her PCP in 3 to 5 days and that she needs to discuss the nodule with him so she can have repeat imaging done of her chest.  All questions and concerns were addressed prior to discharge.  Patient discharged home in stable condition.        Labs Reviewed   CBC WITH AUTO DIFFERENTIAL - Abnormal       Result Value    WBC 8.6      nRBC 0.5 (*)     RBC 4.24      Hemoglobin 13.5      Hematocrit 42.8       (*)     MCH 31.8      MCHC 31.5 (*)     RDW 16.1 (*)     Platelets 133 (*)     Neutrophils % 46.8      Immature Granulocytes %, Automated 3.1 (*)     Lymphocytes % 42.3      Monocytes % 6.2      Eosinophils % 0.7      Basophils % 0.9      Neutrophils Absolute 4.01      Immature Granulocytes Absolute, Automated 0.27      Lymphocytes Absolute 3.63      Monocytes Absolute 0.53      Eosinophils Absolute 0.06      Basophils Absolute 0.08     COMPREHENSIVE METABOLIC PANEL - Abnormal    Glucose 114 (*)     Sodium 140      Potassium 4.8      Chloride 105      Bicarbonate 28      Anion Gap 12      Urea Nitrogen 20      Creatinine 0.69      eGFR >90      Calcium 9.2      Albumin 4.1      Alkaline Phosphatase 78      Total Protein 7.0      AST 21      Bilirubin, Total 0.3      ALT 12     URINALYSIS WITH REFLEX CULTURE AND MICROSCOPIC - Abnormal    Color, Urine Light-Yellow      Appearance, Urine Clear      Specific Gravity, Urine 1.029      pH, Urine 5.5      Protein, Urine NEGATIVE      Glucose, Urine Normal      Blood, Urine NEGATIVE      Ketones, Urine NEGATIVE      Bilirubin,  Urine NEGATIVE      Urobilinogen, Urine Normal      Nitrite, Urine NEGATIVE      Leukocyte Esterase, Urine 75 Ian/uL (*)    MICROSCOPIC ONLY, URINE - Abnormal    WBC, Urine 11-20 (*)     RBC, Urine 3-5      Squamous Epithelial Cells, Urine 1-9 (SPARSE)      Bacteria, Urine 1+ (*)    LIPASE - Normal    Lipase 19      Narrative:     Venipuncture immediately after or during the administration of Metamizole may lead to falsely low results. Testing should be performed immediately prior to Metamizole dosing.   TROPONIN I, HIGH SENSITIVITY - Normal    Troponin I, High Sensitivity 4      Narrative:     Less than 99th percentile of normal range cutoff-  Female and children under 18 years old <14 ng/L; Male <21 ng/L: Negative  Repeat testing should be performed if clinically indicated.     Female and children under 18 years old 14-50 ng/L; Male 21-50 ng/L:  Consistent with possible cardiac damage and possible increased clinical   risk. Serial measurements may help to assess extent of myocardial damage.     >50 ng/L: Consistent with cardiac damage, increased clinical risk and  myocardial infarction. Serial measurements may help assess extent of   myocardial damage.      NOTE: Children less than 1 year old may have higher baseline troponin   levels and results should be interpreted in conjunction with the overall   clinical context.     NOTE: Troponin I testing is performed using a different   testing methodology at [facility] than at other   [facility]. Direct result comparisons should only   be made within the same method.   SARS-COV-2 AND INFLUENZA A/B PCR - Normal    Flu A Result Not Detected      Flu B Result Not Detected      Coronavirus 2019, PCR Not Detected      Narrative:     This assay is an FDA-cleared, in vitro diagnostic nucleic acid amplification test for the qualitative detection and differentiation of SARS CoV-2/ Influenza A/B from nasopharyngeal specimens collected from individuals with  signs and symptoms of respiratory tract infections, and has been validated for use at [facility]. Negative results do not preclude COVID-19/ Influenza A/B infections and should not be used as the sole basis for diagnosis, treatment, or other management decisions. Testing for SARS CoV-2 is recommended only for patients who meet current clinical and/or epidemiological criteria defined by federal, state, or local public health directives.   PROTIME-INR - Normal    Protime 11.3      INR 1.0     URINE CULTURE   URINALYSIS WITH REFLEX CULTURE AND MICROSCOPIC    Narrative:     The following orders were created for panel order Urinalysis with Reflex Culture and Microscopic.  Procedure                               Abnormality         Status                     ---------                               -----------         ------                     Urinalysis with Reflex C...[[id number]]  Abnormal            Final result               Extra Urine Gray Tube[[id number]]                            In process                   Please view results for these tests on the individual orders.   EXTRA URINE GRAY TUBE       CT abdomen pelvis w IV contrast   Final Result   Significantly limited evaluation for pulmonary embolism. No PE is   seen to the lobar level.        Mild scattered subsegmental atelectasis. Heterogeneous attenuation of   the lungs, suggesting air trapping.        5 mm right lower lobe pulmonary nodule is of doubtful clinical   significance. If the patient has risk factors for cancer, such as   smoking, a follow-up chest CT in 12 months may be considered.        No acute abdominal or pelvic abnormality.        MACRO:   None        Signed by: Lara Sheehan 3/21/2025 12:30 AM   Dictation workstation:   [id number]      CT angio chest for pulmonary embolism   Final Result   Significantly limited evaluation for pulmonary embolism. No PE is   seen to the lobar level.        Mild scattered subsegmental  atelectasis. Heterogeneous attenuation of   the lungs, suggesting air trapping.        5 mm right lower lobe pulmonary nodule is of doubtful clinical   significance. If the patient has risk factors for cancer, such as   smoking, a follow-up chest CT in 12 months may be considered.        No acute abdominal or pelvic abnormality.        MACRO:   None        Signed by: Lara Sheehan 3/21/2025 12:30 AM   Dictation workstation:   [id number]      CT head wo IV contrast   Final Result   No acute intracranial abnormality.             MACRO:   None        Signed by: Lara Sheehan 3/21/2025 12:15 AM   Dictation workstation:   [id number]      XR chest 1 view   Final Result   1.  No evidence of acute cardiopulmonary process.             Signed by: Keith Nunez 3/20/2025 11:36 PM   Dictation workstation:   [id number]          Procedure  Procedures     Charlene Hull, BARTOLOME-CNP  03/21/25 0337

## 2025-03-22 LAB — BACTERIA UR CULT: NORMAL

## 2025-03-26 ENCOUNTER — HOSPITAL ENCOUNTER (OUTPATIENT)
Dept: RADIOLOGY | Facility: HOSPITAL | Age: 45
Discharge: HOME | End: 2025-03-26
Payer: COMMERCIAL

## 2025-03-26 DIAGNOSIS — Z12.31 ENCOUNTER FOR SCREENING MAMMOGRAM FOR BREAST CANCER: ICD-10-CM

## 2025-03-26 PROCEDURE — 77063 BREAST TOMOSYNTHESIS BI: CPT | Performed by: RADIOLOGY

## 2025-03-26 PROCEDURE — 77067 SCR MAMMO BI INCL CAD: CPT | Performed by: RADIOLOGY

## 2025-03-26 PROCEDURE — 77063 BREAST TOMOSYNTHESIS BI: CPT

## 2025-03-29 DIAGNOSIS — R92.8 ABNORMAL MAMMOGRAM OF RIGHT BREAST: Primary | ICD-10-CM

## 2025-03-30 NOTE — RESULT ENCOUNTER NOTE
Please call the patient regarding her abnormal result.  Right breast mammogram reveals 2 masses.  Evaluation with ultrasound is recommended.

## 2025-04-02 ENCOUNTER — APPOINTMENT (OUTPATIENT)
Dept: SURGERY | Facility: CLINIC | Age: 45
End: 2025-04-02
Payer: COMMERCIAL

## 2025-04-03 ENCOUNTER — OFFICE VISIT (OUTPATIENT)
Dept: SURGERY | Facility: CLINIC | Age: 45
End: 2025-04-03
Payer: COMMERCIAL

## 2025-04-03 VITALS — BODY MASS INDEX: 29.44 KG/M2 | WEIGHT: 160 LBS | HEIGHT: 62 IN

## 2025-04-03 DIAGNOSIS — M62.08 DIASTASIS RECTI: Primary | ICD-10-CM

## 2025-04-03 PROCEDURE — 99213 OFFICE O/P EST LOW 20 MIN: CPT | Performed by: SURGERY

## 2025-04-03 PROCEDURE — 3008F BODY MASS INDEX DOCD: CPT | Performed by: SURGERY

## 2025-04-03 RX ORDER — TRAZODONE HYDROCHLORIDE 50 MG/1
1 TABLET ORAL
COMMUNITY
Start: 2025-04-01

## 2025-04-03 RX ORDER — RISPERIDONE 2 MG/1
1 TABLET ORAL
COMMUNITY
Start: 2025-04-01

## 2025-04-03 ASSESSMENT — ENCOUNTER SYMPTOMS
ABDOMINAL PAIN: 1
ABDOMINAL DISTENTION: 1

## 2025-04-03 NOTE — PROGRESS NOTES
Subjective   Patient ID: Stephenie Mccray is a 44 y.o. female who presents for New Patient Visit (Hernia consult. ).  HPI  44-year-old female referred for abdominal wall bulging that is painful she states she has 7/10 pain in this area no obstructive symptoms she states she has had a hernia repair in the area remotely does not remember the details or the surgeon she is on Xarelto for PEs  Review of Systems   Gastrointestinal:  Positive for abdominal distention and abdominal pain.   All other systems reviewed and are negative.      Objective   Physical Exam  Abdominal:      Comments: Mildly flaccid abdominal wall she has diastases recti beginning 2 to 3 cm above the umbilicus extending for 6 cm with a 4 cm, she has an incision vertically above the umbilicus there is an umbilical incision transversely     Physical Exam  Constitutional:       Appearance: Normal appearance.   HENT:      Head: Normocephalic and atraumatic.      Mouth/Throat:      Pharynx: Oropharynx is clear.   Eyes:      Pupils: Pupils are equal, round, and reactive to light.   Cardiovascular:      Rate and Rhythm: Normal rate and regular rhythm.   Pulmonary:      Effort: Pulmonary effort is normal.      Breath sounds: Normal breath sounds.   Abdominal:      General: Abdomen is flat. Bowel sounds are normal.      Palpations: Abdomen is soft.   Musculoskeletal:         General: Normal range of motion.      Cervical back: Normal range of motion.   Skin:     General: Skin is warm.   Neurological:      General: No focal deficit present.      Mental Status: She is alert. Mental status is at baseline.   Psychiatric:         Mood and Affect: Mood normal.       Assessment/Plan   CT reviewed with patient high risk for complications would be a good candidate for robotic repair however reviewed the risks in detail with the patient and person accompanying her recommend cessation of smoking strongly, she will see us back in 4 to 5 weeks update us as to how smoking  cessation is progressing if not progressing well may still proceed based on her symptoms at the time         Alvin Gonzalez MD 04/03/25 9:52 AM

## 2025-04-15 LAB — TSH SERPL-ACNC: 0.58 MIU/L

## 2025-04-16 LAB
EST. AVERAGE GLUCOSE BLD GHB EST-MCNC: 148 MG/DL
EST. AVERAGE GLUCOSE BLD GHB EST-SCNC: 8.2 MMOL/L
HBA1C MFR BLD: 6.8 %

## 2025-04-21 DIAGNOSIS — E11.9 TYPE 2 DIABETES MELLITUS WITHOUT COMPLICATION, WITH LONG-TERM CURRENT USE OF INSULIN: Primary | ICD-10-CM

## 2025-04-21 DIAGNOSIS — Z79.4 TYPE 2 DIABETES MELLITUS WITHOUT COMPLICATION, WITH LONG-TERM CURRENT USE OF INSULIN: Primary | ICD-10-CM

## 2025-04-21 RX ORDER — METFORMIN HYDROCHLORIDE 500 MG/1
500 TABLET, EXTENDED RELEASE ORAL
Qty: 30 TABLET | Refills: 3 | Status: SHIPPED | OUTPATIENT
Start: 2025-04-21 | End: 2025-08-19

## 2025-04-23 ENCOUNTER — APPOINTMENT (OUTPATIENT)
Dept: RADIOLOGY | Facility: HOSPITAL | Age: 45
End: 2025-04-23
Payer: COMMERCIAL

## 2025-04-24 ENCOUNTER — TELEPHONE (OUTPATIENT)
Dept: PRIMARY CARE | Facility: CLINIC | Age: 45
End: 2025-04-24
Payer: COMMERCIAL

## 2025-04-24 DIAGNOSIS — R11.0 NAUSEA: ICD-10-CM

## 2025-04-24 DIAGNOSIS — G43.519 INTRACTABLE PERSISTENT MIGRAINE AURA WITHOUT CEREBRAL INFARCTION AND WITHOUT STATUS MIGRAINOSUS: ICD-10-CM

## 2025-04-24 DIAGNOSIS — K80.00 ACUTE CALCULOUS CHOLECYSTITIS: Primary | ICD-10-CM

## 2025-04-24 RX ORDER — ONDANSETRON 4 MG/1
4 TABLET, ORALLY DISINTEGRATING ORAL EVERY 8 HOURS PRN
Qty: 20 TABLET | Refills: 0 | Status: SHIPPED | OUTPATIENT
Start: 2025-04-24 | End: 2025-05-01

## 2025-04-24 NOTE — TELEPHONE ENCOUNTER
Patient called in stating she has had a migraine for the past 3 days and is requesting something to help. She noted she is allergic to Imitrex. Patient also requesting zofran to help with the nausea   [facility]   Please Advise

## 2025-04-25 ENCOUNTER — HOSPITAL ENCOUNTER (EMERGENCY)
Facility: HOSPITAL | Age: 45
Discharge: HOME | End: 2025-04-25
Attending: EMERGENCY MEDICINE
Payer: COMMERCIAL

## 2025-04-25 ENCOUNTER — APPOINTMENT (OUTPATIENT)
Dept: RADIOLOGY | Facility: HOSPITAL | Age: 45
End: 2025-04-25
Payer: COMMERCIAL

## 2025-04-25 ENCOUNTER — APPOINTMENT (OUTPATIENT)
Dept: CARDIOLOGY | Facility: HOSPITAL | Age: 45
End: 2025-04-25
Payer: COMMERCIAL

## 2025-04-25 VITALS
RESPIRATION RATE: 16 BRPM | TEMPERATURE: 97.9 F | SYSTOLIC BLOOD PRESSURE: 131 MMHG | OXYGEN SATURATION: 95 % | BODY MASS INDEX: 31.41 KG/M2 | DIASTOLIC BLOOD PRESSURE: 76 MMHG | HEART RATE: 52 BPM | WEIGHT: 160 LBS | HEIGHT: 60 IN

## 2025-04-25 DIAGNOSIS — R10.84 ABDOMINAL PAIN, GENERALIZED: Primary | ICD-10-CM

## 2025-04-25 LAB
ALBUMIN SERPL BCP-MCNC: 4.1 G/DL (ref 3.4–5)
ALP SERPL-CCNC: 64 U/L (ref 33–110)
ALT SERPL W P-5'-P-CCNC: 13 U/L (ref 7–45)
ANION GAP SERPL CALC-SCNC: 9 MMOL/L (ref 10–20)
APPEARANCE UR: CLEAR
AST SERPL W P-5'-P-CCNC: 11 U/L (ref 9–39)
ATRIAL RATE: 54 BPM
BASOPHILS # BLD AUTO: 0.03 X10*3/UL (ref 0–0.1)
BASOPHILS NFR BLD AUTO: 0.4 %
BILIRUB SERPL-MCNC: 0.3 MG/DL (ref 0–1.2)
BILIRUB UR STRIP.AUTO-MCNC: NEGATIVE MG/DL
BUN SERPL-MCNC: 13 MG/DL (ref 6–23)
CALCIUM SERPL-MCNC: 9.2 MG/DL (ref 8.6–10.3)
CARDIAC TROPONIN I PNL SERPL HS: 4 NG/L (ref 0–13)
CHLORIDE SERPL-SCNC: 105 MMOL/L (ref 98–107)
CO2 SERPL-SCNC: 33 MMOL/L (ref 21–32)
COLOR UR: COLORLESS
CREAT SERPL-MCNC: 0.83 MG/DL (ref 0.5–1.05)
EGFRCR SERPLBLD CKD-EPI 2021: 90 ML/MIN/1.73M*2
EOSINOPHIL # BLD AUTO: 0.05 X10*3/UL (ref 0–0.7)
EOSINOPHIL NFR BLD AUTO: 0.7 %
ERYTHROCYTE [DISTWIDTH] IN BLOOD BY AUTOMATED COUNT: 16.3 % (ref 11.5–14.5)
GLUCOSE SERPL-MCNC: 146 MG/DL (ref 74–99)
GLUCOSE UR STRIP.AUTO-MCNC: NORMAL MG/DL
HCT VFR BLD AUTO: 41.2 % (ref 36–46)
HGB BLD-MCNC: 12.7 G/DL (ref 12–16)
HOLD SPECIMEN: NORMAL
IMM GRANULOCYTES # BLD AUTO: 0.12 X10*3/UL (ref 0–0.7)
IMM GRANULOCYTES NFR BLD AUTO: 1.7 % (ref 0–0.9)
KETONES UR STRIP.AUTO-MCNC: NEGATIVE MG/DL
LACTATE SERPL-SCNC: 1.1 MMOL/L (ref 0.4–2)
LEUKOCYTE ESTERASE UR QL STRIP.AUTO: NEGATIVE
LIPASE SERPL-CCNC: 14 U/L (ref 9–82)
LYMPHOCYTES # BLD AUTO: 2.45 X10*3/UL (ref 1.2–4.8)
LYMPHOCYTES NFR BLD AUTO: 34.6 %
MCH RBC QN AUTO: 31.7 PG (ref 26–34)
MCHC RBC AUTO-ENTMCNC: 30.8 G/DL (ref 32–36)
MCV RBC AUTO: 103 FL (ref 80–100)
MONOCYTES # BLD AUTO: 0.44 X10*3/UL (ref 0.1–1)
MONOCYTES NFR BLD AUTO: 6.2 %
NEUTROPHILS # BLD AUTO: 3.99 X10*3/UL (ref 1.2–7.7)
NEUTROPHILS NFR BLD AUTO: 56.4 %
NITRITE UR QL STRIP.AUTO: NEGATIVE
NRBC BLD-RTO: 0 /100 WBCS (ref 0–0)
P AXIS: 16 DEGREES
P OFFSET: 190 MS
P ONSET: 149 MS
PH UR STRIP.AUTO: 6.5 [PH]
PLATELET # BLD AUTO: 136 X10*3/UL (ref 150–450)
POTASSIUM SERPL-SCNC: 4.7 MMOL/L (ref 3.5–5.3)
PR INTERVAL: 126 MS
PROT SERPL-MCNC: 6.7 G/DL (ref 6.4–8.2)
PROT UR STRIP.AUTO-MCNC: NEGATIVE MG/DL
Q ONSET: 212 MS
QRS COUNT: 8 BEATS
QRS DURATION: 84 MS
QT INTERVAL: 466 MS
QTC CALCULATION(BAZETT): 441 MS
QTC FREDERICIA: 449 MS
R AXIS: -19 DEGREES
RBC # BLD AUTO: 4.01 X10*6/UL (ref 4–5.2)
RBC # UR STRIP.AUTO: NEGATIVE MG/DL
SODIUM SERPL-SCNC: 142 MMOL/L (ref 136–145)
SP GR UR STRIP.AUTO: 1.03
T AXIS: 34 DEGREES
T OFFSET: 445 MS
UROBILINOGEN UR STRIP.AUTO-MCNC: NORMAL MG/DL
VENTRICULAR RATE: 54 BPM
WBC # BLD AUTO: 7.1 X10*3/UL (ref 4.4–11.3)

## 2025-04-25 PROCEDURE — 93005 ELECTROCARDIOGRAM TRACING: CPT

## 2025-04-25 PROCEDURE — 84075 ASSAY ALKALINE PHOSPHATASE: CPT

## 2025-04-25 PROCEDURE — 71045 X-RAY EXAM CHEST 1 VIEW: CPT

## 2025-04-25 PROCEDURE — 85025 COMPLETE CBC W/AUTO DIFF WBC: CPT

## 2025-04-25 PROCEDURE — 81003 URINALYSIS AUTO W/O SCOPE: CPT

## 2025-04-25 PROCEDURE — 83605 ASSAY OF LACTIC ACID: CPT

## 2025-04-25 PROCEDURE — 2550000001 HC RX 255 CONTRASTS: Performed by: EMERGENCY MEDICINE

## 2025-04-25 PROCEDURE — 84484 ASSAY OF TROPONIN QUANT: CPT

## 2025-04-25 PROCEDURE — 71045 X-RAY EXAM CHEST 1 VIEW: CPT | Performed by: INTERNAL MEDICINE

## 2025-04-25 PROCEDURE — 36415 COLL VENOUS BLD VENIPUNCTURE: CPT

## 2025-04-25 PROCEDURE — 83690 ASSAY OF LIPASE: CPT

## 2025-04-25 PROCEDURE — 96375 TX/PRO/DX INJ NEW DRUG ADDON: CPT

## 2025-04-25 PROCEDURE — 99285 EMERGENCY DEPT VISIT HI MDM: CPT | Mod: 25 | Performed by: EMERGENCY MEDICINE

## 2025-04-25 PROCEDURE — 96361 HYDRATE IV INFUSION ADD-ON: CPT

## 2025-04-25 PROCEDURE — 96374 THER/PROPH/DIAG INJ IV PUSH: CPT | Mod: 59

## 2025-04-25 PROCEDURE — 74177 CT ABD & PELVIS W/CONTRAST: CPT

## 2025-04-25 PROCEDURE — 74177 CT ABD & PELVIS W/CONTRAST: CPT | Performed by: RADIOLOGY

## 2025-04-25 PROCEDURE — 2500000004 HC RX 250 GENERAL PHARMACY W/ HCPCS (ALT 636 FOR OP/ED): Mod: JZ

## 2025-04-25 RX ORDER — SUCRALFATE 1 G/10ML
1 SUSPENSION ORAL 2 TIMES DAILY
Qty: 200 ML | Refills: 0 | Status: SHIPPED | OUTPATIENT
Start: 2025-04-25 | End: 2025-05-05

## 2025-04-25 RX ORDER — POLYETHYLENE GLYCOL 3350 17 G/17G
17 POWDER, FOR SOLUTION ORAL DAILY
Qty: 7 PACKET | Refills: 0 | Status: SHIPPED | OUTPATIENT
Start: 2025-04-25 | End: 2025-05-02

## 2025-04-25 RX ORDER — FAMOTIDINE 20 MG/1
20 TABLET, FILM COATED ORAL 2 TIMES DAILY
Qty: 20 TABLET | Refills: 0 | Status: SHIPPED | OUTPATIENT
Start: 2025-04-25 | End: 2025-05-05

## 2025-04-25 RX ORDER — FENTANYL CITRATE 50 UG/ML
50 INJECTION, SOLUTION INTRAMUSCULAR; INTRAVENOUS ONCE
Status: COMPLETED | OUTPATIENT
Start: 2025-04-25 | End: 2025-04-25

## 2025-04-25 RX ORDER — ONDANSETRON 4 MG/1
4 TABLET, ORALLY DISINTEGRATING ORAL EVERY 8 HOURS PRN
Qty: 20 TABLET | Refills: 0 | Status: SHIPPED | OUTPATIENT
Start: 2025-04-25 | End: 2025-05-02

## 2025-04-25 RX ORDER — FAMOTIDINE 10 MG/ML
20 INJECTION, SOLUTION INTRAVENOUS ONCE
Status: COMPLETED | OUTPATIENT
Start: 2025-04-25 | End: 2025-04-25

## 2025-04-25 RX ADMIN — SODIUM CHLORIDE 500 ML: 0.9 INJECTION, SOLUTION INTRAVENOUS at 10:08

## 2025-04-25 RX ADMIN — FENTANYL CITRATE 50 MCG: 50 INJECTION INTRAMUSCULAR; INTRAVENOUS at 10:04

## 2025-04-25 RX ADMIN — IOHEXOL 75 ML: 350 INJECTION, SOLUTION INTRAVENOUS at 10:58

## 2025-04-25 RX ADMIN — FAMOTIDINE 20 MG: 10 INJECTION, SOLUTION INTRAVENOUS at 10:04

## 2025-04-25 ASSESSMENT — PAIN SCALES - GENERAL
PAINLEVEL_OUTOF10: 9
PAINLEVEL_OUTOF10: 9

## 2025-04-25 ASSESSMENT — PAIN DESCRIPTION - LOCATION: LOCATION: ABDOMEN

## 2025-04-25 ASSESSMENT — PAIN - FUNCTIONAL ASSESSMENT
PAIN_FUNCTIONAL_ASSESSMENT: 0-10
PAIN_FUNCTIONAL_ASSESSMENT: 0-10

## 2025-04-25 ASSESSMENT — LIFESTYLE VARIABLES
EVER HAD A DRINK FIRST THING IN THE MORNING TO STEADY YOUR NERVES TO GET RID OF A HANGOVER: NO
EVER FELT BAD OR GUILTY ABOUT YOUR DRINKING: NO
HAVE PEOPLE ANNOYED YOU BY CRITICIZING YOUR DRINKING: NO
TOTAL SCORE: 0
HAVE YOU EVER FELT YOU SHOULD CUT DOWN ON YOUR DRINKING: NO

## 2025-04-25 NOTE — PROGRESS NOTES
Telephone note- call for SOA trouble catching breath; pt thinks related to hernia, note h/o PE on xarelto, elective VHR pending. Advised immediate ED eval as only option. Does not sound in distress on phone. Pt expressed understanding

## 2025-04-25 NOTE — ED PROVIDER NOTES
HPI   Chief Complaint   Patient presents with    Abdominal Pain    Shortness of Breath       History provided by: Patient    Limitations to history: None    CC: Abdominal pain    HPI: 44-year-old female with a history of COPD on oxygen, obstructive sleep apnea, anxiety depression, hypertension, hyperlipidemia, diabetes, GERD, pulmonary embolisms presents emergency department to be evaluated for abdominal pain.  Patient states she has been having epigastric abdominal pain for several months.  She does not follow-up with anyone regularly for this.  She has been told that she has a hernia.  She has not been taking anything for her discomfort prior to arrival.  Denies nausea vomiting.  Denies diarrhea or constipation.  Denies blood in the urine or stool.  She is still passing gas.  Denies fever or chills.  Denies chest pain but she does report intermittently feeling short of breath due to the discomfort.  Patient does have a history of COPD and wears oxygen as needed.  Patient's original pulse ox was 88, repeat with a more securely attached pulse ox is 93%.  Patient states that her breathing is been doing well and she wears the oxygen and uses her breathing treatments as needed.  She feels that her COPD is improving.  She does have a history of pulmonary embolisms and is anticoagulated on Xarelto.  She has been compliant with her prescription.  Denies pleuritic pain, hemoptysis, palpitations.  She denies history of ACS, arrhythmia, heart failure.  Denies use of antiplatelets or diuretics.  Denies all other systemic symptoms.    ROS: Negative unless mentioned in HPI    Medical Hx: Denies allergies.  Immunizations are up-to-date.    Physical exam:    Constitutional: Patient is well-nourished and well-developed.  Sitting comfortably in the room and in no distress.  Oriented to person, place, time, and situation.    HEENT: Head is normocephalic, atraumatic. Patient's airway is patent.  Tympanic membranes are clear  bilaterally.  Nasal mucosa clear.  Mouth with normal mucosa.  Throat is not erythematous and there are no oropharyngeal exudates, uvula is midline.  No obvious facial deformities.    Eyes: Clear bilaterally.  Pupils are equal round and reactive to light and accommodation.  Extraocular movements intact.      Cardiac: Regular rate, regular rhythm.  Heart sounds S1, S2.  No murmurs, rubs, or gallops.  PMI nondisplaced.  No JVD.    Respiratory: 93% on room air.  Regular respiratory rate and effort.  Breath sounds are equal bilaterally, mild wheezing noted.  Patient is speaking in full sentences and is in no apparent respiratory distress. No use of accessory muscles.      Gastrointestinal: Epigastric abdominal tenderness palpation.  Abdomen is soft and mildly distended.  There are no obvious deformities.  No rebound tenderness or guarding.  Bowel sounds are normal active.    Genitourinary: No CVA or flank tenderness.    Musculoskeletal: No reproducible tenderness.  No obvious skin or bony deformities.  Patient has equal range of motion in all extremities and no strength deficiencies.  No muscle or joint tenderness. No back or neck tenderness.  Capillary refill less than 3 seconds.  Strong peripheral pulses.  No sensory deficits.    Neurological: Patient is alert and oriented.  No focal deficits.  5/5 strength in all extremities.  Cranial nerves II through XII intact. GCS15.     Skin: Skin is normal color for race and is warm, dry, and intact.  No evidence of trauma.  No lesions, rashes, bruising, jaundice, or masses.    Psych: Appropriate mood and affect.  No apparent risk to self or others.    Heme/lymph: No adenopathy, lymphadenopathy, or splenomegaly    Physical exam is otherwise negative unless stated above or in history of present illness.              Patient History   Medical History[1]  Surgical History[2]  Family History[3]  Social History[4]    Physical Exam   ED Triage Vitals [04/25/25 0919]   Temperature  Heart Rate Respirations BP   36.3 °C (97.3 °F) 60 18 139/85      Pulse Ox Temp Source Heart Rate Source Patient Position   (!) 88 % Temporal -- Sitting      BP Location FiO2 (%)     Right arm --       Physical Exam      ED Course & MDM   Diagnoses as of 04/25/25 1207   Abdominal pain, generalized     Patient updated on plan for lab testing, IV insertion, radiology imaging, and medications to be administered while in the ER (if indicated). Patient updated on expected wait times for testing and results. Patient provided my name and told to ask any staff member for questions or concerns if they should arise. Electronic medical record reviewed.     [facility]    Patient presented to the emergency department with the chief complaint abdominal pain.  93% on room air.  Regular respiratory rate and effort.  Breath sounds are equal bilaterally, mild wheezing noted.  Patient is speaking in full sentences and is in no apparent respiratory distress. No use of accessory muscles.  Epigastric abdominal tenderness palpation.  Abdomen is soft and mildly distended.  There are no obvious deformities.  No rebound tenderness or guarding.  Bowel sounds are normal active. On arrival to the emergency department, vital signs were within normal limits    Will give the patient Tylenol and Pepcid for her symptoms.  Will obtain basic blood work, EKG and troponin, lactate and lipase, CT abdomen pelvis, and chest x-ray.  Patient's original EKG was performed at 926 however continued artifact so we will obtain a repeat.    EKG was performed at 938 and interpreted by me.  Sinus bradycardia 54 beats minute without signs of AV block.  Left axis deviation.  No ST elevation or depression.  No prolonged QT.    Patient's pulse ox did drop to 86% so the patient was put on 2 L via nasal cannula.  Patient does wear 2 L as needed at baseline.    Urinalysis reveals no urinary tract infection.  Lactate is 1.1.  CBC reveals thrombocytopenia similar to baseline.   Troponin is 4.  Lipase is 14.  CMP reveals hyperglycemia 146.  CT abdomen and pelvis reveals constipation without signs of obstruction.  No sign of pancreatitis.  Chest x-ray reveals no pneumonia or other acute cardiopulmonary process.    Patient feels well and feels comfortable going home.  Will start the patient on Pepcid, Carafate, Zofran, MiraLAX.  I will have her follow-up with GI given that she has had intermittent abdominal problems for the last few months.  All questions and concerns addressed.  Reasons to return to ER discussed.  Patient verbalized understanding and agreement with the treatment plan and they remained hemodynamically stable in the ER.    This note was dictated using a speech recognition program.  While an attempt was made at proof-reading to minimize errors, minor errors in transcription may be present            No data recorded                                 Medical Decision Making      Procedure  Procedures         [1]   Past Medical History:  Diagnosis Date    Acute on chronic respiratory failure     Anxiety     Arthritis     Chronic headaches     COPD (chronic obstructive pulmonary disease) (Multi)     CPAP (continuous positive airway pressure) dependence     Depression     Diabetes mellitus (Multi)     type 2 IDDM    GERD (gastroesophageal reflux disease)     History of transfusion     History of venous thromboembolism     HL (hearing loss)     Hyperlipidemia     Hypertension     Hypothyroidism     Lipoma     Nephrolithiasis     CHARLIE (obstructive sleep apnea)     Ovarian teratoma     PE (pulmonary thromboembolism) (Multi)     PTSD (post-traumatic stress disorder)     Schizophrenia     Type 2 diabetes mellitus     VTE (venous thromboembolism)    [2]   Past Surgical History:  Procedure Laterality Date     SECTION, LOW TRANSVERSE      x 1    EYE SURGERY      LITHOTRIPSY      STAPEDES SURGERY      TONSILLECTOMY      UMBILICAL HERNIA REPAIR      VENTRAL HERNIA REPAIR     [3]    Family History  Problem Relation Name Age of Onset    Fibromyalgia Father      Hypertension Brother      Thyroid disease Maternal Grandmother      Thyroid disease Paternal Grandmother      Lung cancer Paternal Grandfather      Coronary artery disease Other Grandmother    [4]   Social History  Tobacco Use    Smoking status: Every Day     Current packs/day: 0.50     Types: Cigarettes     Passive exposure: Current    Smokeless tobacco: Never   Vaping Use    Vaping status: Never Used   Substance Use Topics    Alcohol use: Never    Drug use: Yes     Types: Marijuana        Larry Kay PA-C  04/25/25 1208       Larry Kay PA-C  04/25/25 1208

## 2025-04-28 ENCOUNTER — TELEPHONE (OUTPATIENT)
Dept: PRIMARY CARE | Facility: CLINIC | Age: 45
End: 2025-04-28
Payer: COMMERCIAL

## 2025-04-28 NOTE — TELEPHONE ENCOUNTER
We received a request for prior authorization on the patient's ubrogepant (Ubrelvy) 100 mg tablet  from their pharmacy. Prior authorization was submitted to insurance today. We will await their determination.

## 2025-04-30 ENCOUNTER — APPOINTMENT (OUTPATIENT)
Dept: SURGERY | Facility: CLINIC | Age: 45
End: 2025-04-30
Payer: COMMERCIAL

## 2025-04-30 ENCOUNTER — APPOINTMENT (OUTPATIENT)
Dept: RADIOLOGY | Facility: HOSPITAL | Age: 45
End: 2025-04-30
Payer: COMMERCIAL

## 2025-05-02 RX ORDER — SITAGLIPTIN 50 MG/1
1 TABLET, FILM COATED ORAL
Status: ON HOLD | COMMUNITY
Start: 2025-04-22

## 2025-05-05 NOTE — TELEPHONE ENCOUNTER
Prior Authorization has been APPROVED.    Approval valid through 05.05.2026  Approval letter scanned into media on 05.05.2025

## 2025-05-06 ENCOUNTER — TELEPHONE (OUTPATIENT)
Dept: PRIMARY CARE | Facility: CLINIC | Age: 45
End: 2025-05-06
Payer: COMMERCIAL

## 2025-05-06 NOTE — TELEPHONE ENCOUNTER
Prior Authorization for Ubrevley 100 MG was DENIED by insurance.   Denial reason :   Per your health plan's criteria, more than 16 tablets per 30 days is covered if you meet the following: (1) One of the following: (A) The higher dose or amount is supported in the dosage and administration part of the 's prescribing information. (B) The higher dose or amount is supported by one of the following medical references: (I) [facility] Formulary Service (AHFS) Drug Information. (II) DRUGDEX System by [employer]. The information provided does not show that you meet the criteria listed above. Please speak with your doctor about your options.    Please advise on alternative medication.

## 2025-05-07 ENCOUNTER — APPOINTMENT (OUTPATIENT)
Dept: SURGERY | Facility: CLINIC | Age: 45
End: 2025-05-07
Payer: COMMERCIAL

## 2025-05-07 VITALS
DIASTOLIC BLOOD PRESSURE: 72 MMHG | BODY MASS INDEX: 31.41 KG/M2 | WEIGHT: 160 LBS | SYSTOLIC BLOOD PRESSURE: 105 MMHG | HEIGHT: 60 IN

## 2025-05-07 DIAGNOSIS — M62.08 DIASTASIS RECTI: Primary | ICD-10-CM

## 2025-05-07 DIAGNOSIS — E78.00 PURE HYPERCHOLESTEROLEMIA: ICD-10-CM

## 2025-05-07 PROCEDURE — 99213 OFFICE O/P EST LOW 20 MIN: CPT | Performed by: SURGERY

## 2025-05-07 PROCEDURE — 3008F BODY MASS INDEX DOCD: CPT | Performed by: SURGERY

## 2025-05-07 PROCEDURE — 3074F SYST BP LT 130 MM HG: CPT | Performed by: SURGERY

## 2025-05-07 PROCEDURE — 3078F DIAST BP <80 MM HG: CPT | Performed by: SURGERY

## 2025-05-07 RX ORDER — ATORVASTATIN CALCIUM 20 MG/1
20 TABLET, FILM COATED ORAL DAILY
Qty: 30 TABLET | Refills: 2 | Status: ON HOLD | OUTPATIENT
Start: 2025-05-07

## 2025-05-07 RX ORDER — CEFAZOLIN SODIUM 2 G/100ML
2 INJECTION, SOLUTION INTRAVENOUS ONCE
OUTPATIENT
Start: 2025-05-07 | End: 2025-05-07

## 2025-05-07 RX ORDER — HEPARIN SODIUM 5000 [USP'U]/ML
5000 INJECTION, SOLUTION INTRAVENOUS; SUBCUTANEOUS ONCE
OUTPATIENT
Start: 2025-05-07 | End: 2025-05-07

## 2025-05-07 ASSESSMENT — ENCOUNTER SYMPTOMS
ABDOMINAL PAIN: 1
ABDOMINAL DISTENTION: 1
COUGH: 1

## 2025-05-07 NOTE — TELEPHONE ENCOUNTER
Rx Refill Request     Name: Stephenie Mccray  :  1980   Medication Name:  atorvastatin   Specific Pharmacy location:  [facility] in [facility]   Date of last appointment:  2025   Date of next appointment:  2025   Best number to reach patient:  [phone]

## 2025-05-07 NOTE — PROGRESS NOTES
Subjective   Patient ID: Stephenie Mccray is a 44 y.o. female who presents for Follow-up (1 month follow up.).  HPI  Patient returns for follow-up smoking cessation she is down to half pack per day states she needs oxygen occasionally at home moderate amount of coughing abdominal pain continues and is constant    Review of Systems   Respiratory:  Positive for cough.    Gastrointestinal:  Positive for abdominal distention and abdominal pain.       Objective   Physical Exam  Physical Exam  Constitutional:       Appearance: Normal appearance.   HENT:      Head: Normocephalic and atraumatic.      Mouth/Throat:      Pharynx: Oropharynx is clear.   Eyes:      Pupils: Pupils are equal, round, and reactive to light.   Cardiovascular:      Rate and Rhythm: Normal rate and regular rhythm.   Pulmonary:      Effort: Pulmonary effort is normal.      Breath sounds: Normal breath sounds.   Abdomen soft protuberant with diastases as before 6 x 4 cm      musculoskeletal:         General: Normal range of motion.      Cervical back: Normal range of motion.   Skin:     General: Skin is warm.   Neurological:      General: No focal deficit present.      Mental Status: She is alert. Mental status is at baseline.   Psychiatric:         Mood and Affect: Mood normal.     Assessment/Plan   Long discussion held with the patient and her significant other she is in significant amount of pain and accepts the risks she understands that she is higher than usual risk given her COPD and other comorbidities.  Procedure risks benefits alternatives discussed with the patient possibility of revisional surgery recurrence infection reviewed whether informed consent obtained all questions answered off Xarelto for 5 days will need clearance by medicine and pulmonary         Alvin Gonzalez MD 05/07/25 9:23 AM

## 2025-05-08 NOTE — TELEPHONE ENCOUNTER
Pt made aware, states that she has been using Excedrin OTC but would prefer Ubrevely. I advised pt that I will have samples ready for her to . Pt will come tomorrow

## 2025-05-09 ENCOUNTER — APPOINTMENT (OUTPATIENT)
Dept: RADIOLOGY | Facility: HOSPITAL | Age: 45
DRG: 189 | End: 2025-05-09
Payer: COMMERCIAL

## 2025-05-09 ENCOUNTER — HOSPITAL ENCOUNTER (INPATIENT)
Facility: HOSPITAL | Age: 45
DRG: 189 | End: 2025-05-09
Attending: EMERGENCY MEDICINE | Admitting: STUDENT IN AN ORGANIZED HEALTH CARE EDUCATION/TRAINING PROGRAM
Payer: COMMERCIAL

## 2025-05-09 ENCOUNTER — APPOINTMENT (OUTPATIENT)
Dept: CARDIOLOGY | Facility: HOSPITAL | Age: 45
DRG: 189 | End: 2025-05-09
Payer: COMMERCIAL

## 2025-05-09 DIAGNOSIS — J42 CHRONIC BRONCHITIS, UNSPECIFIED CHRONIC BRONCHITIS TYPE (MULTI): ICD-10-CM

## 2025-05-09 DIAGNOSIS — J42 CHRONIC BRONCHITIS, UNSPECIFIED CHRONIC BRONCHITIS TYPE (MULTI): Primary | ICD-10-CM

## 2025-05-09 DIAGNOSIS — J96.21 ACUTE ON CHRONIC RESPIRATORY FAILURE WITH HYPOXIA: Primary | ICD-10-CM

## 2025-05-09 LAB
ALBUMIN SERPL BCP-MCNC: 4.6 G/DL (ref 3.4–5)
ALP SERPL-CCNC: 75 U/L (ref 33–110)
ALT SERPL W P-5'-P-CCNC: 13 U/L (ref 7–45)
ANION GAP SERPL CALC-SCNC: 13 MMOL/L (ref 10–20)
AST SERPL W P-5'-P-CCNC: 13 U/L (ref 9–39)
BASOPHILS # BLD AUTO: 0.04 X10*3/UL (ref 0–0.1)
BASOPHILS NFR BLD AUTO: 0.4 %
BILIRUB SERPL-MCNC: 0.4 MG/DL (ref 0–1.2)
BNP SERPL-MCNC: 27 PG/ML (ref 0–99)
BUN SERPL-MCNC: 13 MG/DL (ref 6–23)
CALCIUM SERPL-MCNC: 9.3 MG/DL (ref 8.6–10.3)
CARDIAC TROPONIN I PNL SERPL HS: 4 NG/L (ref 0–13)
CHLORIDE SERPL-SCNC: 104 MMOL/L (ref 98–107)
CO2 SERPL-SCNC: 29 MMOL/L (ref 21–32)
CREAT SERPL-MCNC: 0.77 MG/DL (ref 0.5–1.05)
EGFRCR SERPLBLD CKD-EPI 2021: >90 ML/MIN/1.73M*2
EOSINOPHIL # BLD AUTO: 0.01 X10*3/UL (ref 0–0.7)
EOSINOPHIL NFR BLD AUTO: 0.1 %
ERYTHROCYTE [DISTWIDTH] IN BLOOD BY AUTOMATED COUNT: 14.9 % (ref 11.5–14.5)
GLUCOSE SERPL-MCNC: 130 MG/DL (ref 74–99)
HCT VFR BLD AUTO: 45.7 % (ref 36–46)
HGB BLD-MCNC: 14.5 G/DL (ref 12–16)
IMM GRANULOCYTES # BLD AUTO: 0.1 X10*3/UL (ref 0–0.7)
IMM GRANULOCYTES NFR BLD AUTO: 1.1 % (ref 0–0.9)
LYMPHOCYTES # BLD AUTO: 0.82 X10*3/UL (ref 1.2–4.8)
LYMPHOCYTES NFR BLD AUTO: 9 %
MCH RBC QN AUTO: 31.6 PG (ref 26–34)
MCHC RBC AUTO-ENTMCNC: 31.7 G/DL (ref 32–36)
MCV RBC AUTO: 100 FL (ref 80–100)
MONOCYTES # BLD AUTO: 0.2 X10*3/UL (ref 0.1–1)
MONOCYTES NFR BLD AUTO: 2.2 %
NEUTROPHILS # BLD AUTO: 7.94 X10*3/UL (ref 1.2–7.7)
NEUTROPHILS NFR BLD AUTO: 87.2 %
NRBC BLD-RTO: 0 /100 WBCS (ref 0–0)
PLATELET # BLD AUTO: 113 X10*3/UL (ref 150–450)
POTASSIUM SERPL-SCNC: 4.3 MMOL/L (ref 3.5–5.3)
PROT SERPL-MCNC: 7.7 G/DL (ref 6.4–8.2)
RBC # BLD AUTO: 4.59 X10*6/UL (ref 4–5.2)
SODIUM SERPL-SCNC: 142 MMOL/L (ref 136–145)
WBC # BLD AUTO: 9.1 X10*3/UL (ref 4.4–11.3)

## 2025-05-09 PROCEDURE — 93005 ELECTROCARDIOGRAM TRACING: CPT

## 2025-05-09 PROCEDURE — 80053 COMPREHEN METABOLIC PANEL: CPT | Performed by: EMERGENCY MEDICINE

## 2025-05-09 PROCEDURE — 71275 CT ANGIOGRAPHY CHEST: CPT | Performed by: RADIOLOGY

## 2025-05-09 PROCEDURE — 84484 ASSAY OF TROPONIN QUANT: CPT | Performed by: EMERGENCY MEDICINE

## 2025-05-09 PROCEDURE — 99285 EMERGENCY DEPT VISIT HI MDM: CPT | Mod: 25 | Performed by: EMERGENCY MEDICINE

## 2025-05-09 PROCEDURE — 2500000002 HC RX 250 W HCPCS SELF ADMINISTERED DRUGS (ALT 637 FOR MEDICARE OP, ALT 636 FOR OP/ED): Performed by: EMERGENCY MEDICINE

## 2025-05-09 PROCEDURE — 99223 1ST HOSP IP/OBS HIGH 75: CPT | Performed by: NURSE PRACTITIONER

## 2025-05-09 PROCEDURE — 96375 TX/PRO/DX INJ NEW DRUG ADDON: CPT

## 2025-05-09 PROCEDURE — 87641 MR-STAPH DNA AMP PROBE: CPT | Performed by: STUDENT IN AN ORGANIZED HEALTH CARE EDUCATION/TRAINING PROGRAM

## 2025-05-09 PROCEDURE — 71046 X-RAY EXAM CHEST 2 VIEWS: CPT

## 2025-05-09 PROCEDURE — 2500000001 HC RX 250 WO HCPCS SELF ADMINISTERED DRUGS (ALT 637 FOR MEDICARE OP): Performed by: NURSE PRACTITIONER

## 2025-05-09 PROCEDURE — 71046 X-RAY EXAM CHEST 2 VIEWS: CPT | Performed by: STUDENT IN AN ORGANIZED HEALTH CARE EDUCATION/TRAINING PROGRAM

## 2025-05-09 PROCEDURE — 87040 BLOOD CULTURE FOR BACTERIA: CPT | Mod: ELYLAB | Performed by: EMERGENCY MEDICINE

## 2025-05-09 PROCEDURE — 96374 THER/PROPH/DIAG INJ IV PUSH: CPT

## 2025-05-09 PROCEDURE — 71275 CT ANGIOGRAPHY CHEST: CPT

## 2025-05-09 PROCEDURE — 36415 COLL VENOUS BLD VENIPUNCTURE: CPT | Performed by: EMERGENCY MEDICINE

## 2025-05-09 PROCEDURE — 5A0935A ASSISTANCE WITH RESPIRATORY VENTILATION, LESS THAN 24 CONSECUTIVE HOURS, HIGH NASAL FLOW/VELOCITY: ICD-10-PCS | Performed by: EMERGENCY MEDICINE

## 2025-05-09 PROCEDURE — 94640 AIRWAY INHALATION TREATMENT: CPT

## 2025-05-09 PROCEDURE — 2500000004 HC RX 250 GENERAL PHARMACY W/ HCPCS (ALT 636 FOR OP/ED): Mod: JZ | Performed by: EMERGENCY MEDICINE

## 2025-05-09 PROCEDURE — 99291 CRITICAL CARE FIRST HOUR: CPT | Performed by: EMERGENCY MEDICINE

## 2025-05-09 PROCEDURE — 85025 COMPLETE CBC W/AUTO DIFF WBC: CPT | Performed by: EMERGENCY MEDICINE

## 2025-05-09 PROCEDURE — 2550000001 HC RX 255 CONTRASTS: Performed by: EMERGENCY MEDICINE

## 2025-05-09 PROCEDURE — 2060000001 HC INTERMEDIATE ICU ROOM DAILY

## 2025-05-09 PROCEDURE — 93010 ELECTROCARDIOGRAM REPORT: CPT | Performed by: INTERNAL MEDICINE

## 2025-05-09 PROCEDURE — 86140 C-REACTIVE PROTEIN: CPT | Performed by: NURSE PRACTITIONER

## 2025-05-09 PROCEDURE — 83880 ASSAY OF NATRIURETIC PEPTIDE: CPT | Performed by: EMERGENCY MEDICINE

## 2025-05-09 RX ORDER — PANTOPRAZOLE SODIUM 40 MG/1
40 TABLET, DELAYED RELEASE ORAL DAILY
Status: DISCONTINUED | OUTPATIENT
Start: 2025-05-10 | End: 2025-05-12 | Stop reason: HOSPADM

## 2025-05-09 RX ORDER — HYDROXYZINE HYDROCHLORIDE 25 MG/1
25 TABLET, FILM COATED ORAL EVERY 6 HOURS PRN
Status: DISCONTINUED | OUTPATIENT
Start: 2025-05-09 | End: 2025-05-10

## 2025-05-09 RX ORDER — CLONIDINE HYDROCHLORIDE 0.1 MG/1
0.1 TABLET ORAL 2 TIMES DAILY PRN
Status: CANCELLED | OUTPATIENT
Start: 2025-05-09

## 2025-05-09 RX ORDER — CLONIDINE HYDROCHLORIDE 0.1 MG/1
0.1 TABLET ORAL 2 TIMES DAILY PRN
Status: DISCONTINUED | OUTPATIENT
Start: 2025-05-09 | End: 2025-05-12 | Stop reason: HOSPADM

## 2025-05-09 RX ORDER — ONDANSETRON HYDROCHLORIDE 2 MG/ML
4 INJECTION, SOLUTION INTRAVENOUS EVERY 8 HOURS PRN
Status: DISCONTINUED | OUTPATIENT
Start: 2025-05-09 | End: 2025-05-12 | Stop reason: HOSPADM

## 2025-05-09 RX ORDER — RISPERIDONE 0.25 MG/1
2 TABLET ORAL 2 TIMES DAILY
Status: CANCELLED | OUTPATIENT
Start: 2025-05-09

## 2025-05-09 RX ORDER — CEFTRIAXONE 2 G/50ML
2 INJECTION, SOLUTION INTRAVENOUS ONCE
Status: COMPLETED | OUTPATIENT
Start: 2025-05-09 | End: 2025-05-09

## 2025-05-09 RX ORDER — KETOROLAC TROMETHAMINE 30 MG/ML
15 INJECTION, SOLUTION INTRAMUSCULAR; INTRAVENOUS ONCE
Status: COMPLETED | OUTPATIENT
Start: 2025-05-09 | End: 2025-05-09

## 2025-05-09 RX ORDER — ONDANSETRON 4 MG/1
4 TABLET, FILM COATED ORAL EVERY 8 HOURS PRN
Status: DISCONTINUED | OUTPATIENT
Start: 2025-05-09 | End: 2025-05-12 | Stop reason: HOSPADM

## 2025-05-09 RX ORDER — RISPERIDONE 1 MG/1
2 TABLET ORAL
Status: DISCONTINUED | OUTPATIENT
Start: 2025-05-10 | End: 2025-05-12 | Stop reason: HOSPADM

## 2025-05-09 RX ORDER — IPRATROPIUM BROMIDE AND ALBUTEROL SULFATE 2.5; .5 MG/3ML; MG/3ML
9 SOLUTION RESPIRATORY (INHALATION) ONCE
Status: COMPLETED | OUTPATIENT
Start: 2025-05-09 | End: 2025-05-09

## 2025-05-09 RX ORDER — METOCLOPRAMIDE HYDROCHLORIDE 5 MG/ML
10 INJECTION INTRAMUSCULAR; INTRAVENOUS ONCE
Status: COMPLETED | OUTPATIENT
Start: 2025-05-09 | End: 2025-05-09

## 2025-05-09 RX ORDER — METOPROLOL TARTRATE 25 MG/1
25 TABLET, FILM COATED ORAL 2 TIMES DAILY
Status: CANCELLED | OUTPATIENT
Start: 2025-05-09

## 2025-05-09 RX ORDER — NICOTINE 7MG/24HR
1 PATCH, TRANSDERMAL 24 HOURS TRANSDERMAL DAILY
Status: DISCONTINUED | OUTPATIENT
Start: 2025-07-05 | End: 2025-05-12 | Stop reason: HOSPADM

## 2025-05-09 RX ORDER — ATORVASTATIN CALCIUM 20 MG/1
20 TABLET, FILM COATED ORAL DAILY
Status: DISCONTINUED | OUTPATIENT
Start: 2025-05-10 | End: 2025-05-12 | Stop reason: HOSPADM

## 2025-05-09 RX ORDER — CEFTRIAXONE 2 G/50ML
2 INJECTION, SOLUTION INTRAVENOUS EVERY 24 HOURS
Status: DISCONTINUED | OUTPATIENT
Start: 2025-05-10 | End: 2025-05-12 | Stop reason: HOSPADM

## 2025-05-09 RX ORDER — AZITHROMYCIN 250 MG/1
500 TABLET, FILM COATED ORAL ONCE
Status: COMPLETED | OUTPATIENT
Start: 2025-05-09 | End: 2025-05-09

## 2025-05-09 RX ORDER — DIPHENHYDRAMINE HYDROCHLORIDE 50 MG/ML
25 INJECTION, SOLUTION INTRAMUSCULAR; INTRAVENOUS ONCE
Status: COMPLETED | OUTPATIENT
Start: 2025-05-09 | End: 2025-05-09

## 2025-05-09 RX ORDER — TRAZODONE HYDROCHLORIDE 50 MG/1
50 TABLET ORAL NIGHTLY
Status: DISCONTINUED | OUTPATIENT
Start: 2025-05-10 | End: 2025-05-11

## 2025-05-09 RX ORDER — IBUPROFEN 200 MG
1 TABLET ORAL DAILY
Status: DISCONTINUED | OUTPATIENT
Start: 2025-06-21 | End: 2025-05-12 | Stop reason: HOSPADM

## 2025-05-09 RX ORDER — ACETAMINOPHEN 160 MG/5ML
650 SOLUTION ORAL EVERY 4 HOURS PRN
Status: DISCONTINUED | OUTPATIENT
Start: 2025-05-09 | End: 2025-05-12 | Stop reason: HOSPADM

## 2025-05-09 RX ORDER — ACETAMINOPHEN 325 MG/1
650 TABLET ORAL EVERY 4 HOURS PRN
Status: DISCONTINUED | OUTPATIENT
Start: 2025-05-09 | End: 2025-05-12 | Stop reason: HOSPADM

## 2025-05-09 RX ORDER — IBUPROFEN 200 MG
1 TABLET ORAL DAILY
Status: DISCONTINUED | OUTPATIENT
Start: 2025-05-10 | End: 2025-05-12 | Stop reason: HOSPADM

## 2025-05-09 RX ORDER — PRAZOSIN HYDROCHLORIDE 5 MG/1
5 CAPSULE ORAL NIGHTLY
Status: DISCONTINUED | OUTPATIENT
Start: 2025-05-09 | End: 2025-05-12 | Stop reason: HOSPADM

## 2025-05-09 RX ORDER — ESCITALOPRAM OXALATE 10 MG/1
20 TABLET ORAL EVERY MORNING
Status: DISCONTINUED | OUTPATIENT
Start: 2025-05-10 | End: 2025-05-12 | Stop reason: HOSPADM

## 2025-05-09 RX ORDER — IPRATROPIUM BROMIDE AND ALBUTEROL SULFATE 2.5; .5 MG/3ML; MG/3ML
3 SOLUTION RESPIRATORY (INHALATION)
Status: DISCONTINUED | OUTPATIENT
Start: 2025-05-10 | End: 2025-05-12 | Stop reason: HOSPADM

## 2025-05-09 RX ORDER — METOPROLOL TARTRATE 25 MG/1
25 TABLET, FILM COATED ORAL 2 TIMES DAILY
Status: DISCONTINUED | OUTPATIENT
Start: 2025-05-09 | End: 2025-05-12 | Stop reason: HOSPADM

## 2025-05-09 RX ORDER — TRAZODONE HYDROCHLORIDE 50 MG/1
50 TABLET ORAL NIGHTLY
Status: CANCELLED | OUTPATIENT
Start: 2025-05-09

## 2025-05-09 RX ORDER — ACETAMINOPHEN 650 MG/1
650 SUPPOSITORY RECTAL EVERY 4 HOURS PRN
Status: DISCONTINUED | OUTPATIENT
Start: 2025-05-09 | End: 2025-05-12 | Stop reason: HOSPADM

## 2025-05-09 RX ORDER — CLONAZEPAM 1 MG/1
1 TABLET ORAL 2 TIMES DAILY PRN
Status: CANCELLED | OUTPATIENT
Start: 2025-05-09

## 2025-05-09 RX ORDER — METFORMIN HYDROCHLORIDE 500 MG/1
500 TABLET, EXTENDED RELEASE ORAL
Status: DISCONTINUED | OUTPATIENT
Start: 2025-05-10 | End: 2025-05-12 | Stop reason: HOSPADM

## 2025-05-09 RX ORDER — GUAIFENESIN 600 MG/1
600 TABLET, EXTENDED RELEASE ORAL EVERY 12 HOURS PRN
Status: DISCONTINUED | OUTPATIENT
Start: 2025-05-09 | End: 2025-05-12 | Stop reason: HOSPADM

## 2025-05-09 RX ADMIN — METOPROLOL TARTRATE 25 MG: 25 TABLET, FILM COATED ORAL at 23:00

## 2025-05-09 RX ADMIN — AZITHROMYCIN DIHYDRATE 500 MG: 250 TABLET, FILM COATED ORAL at 21:17

## 2025-05-09 RX ADMIN — CEFTRIAXONE 2 G: 2 INJECTION, SOLUTION INTRAVENOUS at 20:36

## 2025-05-09 RX ADMIN — METOCLOPRAMIDE HYDROCHLORIDE 10 MG: 5 INJECTION INTRAMUSCULAR; INTRAVENOUS at 18:06

## 2025-05-09 RX ADMIN — METHYLPREDNISOLONE SODIUM SUCCINATE 125 MG: 125 INJECTION, POWDER, FOR SOLUTION INTRAMUSCULAR; INTRAVENOUS at 17:44

## 2025-05-09 RX ADMIN — IOHEXOL 75 ML: 350 INJECTION, SOLUTION INTRAVENOUS at 19:44

## 2025-05-09 RX ADMIN — KETOROLAC TROMETHAMINE 15 MG: 30 INJECTION, SOLUTION INTRAMUSCULAR at 18:12

## 2025-05-09 RX ADMIN — IPRATROPIUM BROMIDE AND ALBUTEROL SULFATE 9 ML: 2.5; .5 SOLUTION RESPIRATORY (INHALATION) at 17:25

## 2025-05-09 RX ADMIN — DIPHENHYDRAMINE HYDROCHLORIDE 25 MG: 50 INJECTION, SOLUTION INTRAMUSCULAR; INTRAVENOUS at 18:10

## 2025-05-09 RX ADMIN — AZITHROMYCIN MONOHYDRATE 500 MG: 500 INJECTION, POWDER, LYOPHILIZED, FOR SOLUTION INTRAVENOUS at 23:58

## 2025-05-09 RX ADMIN — PRAZOSIN HYDROCHLORIDE 5 MG: 5 CAPSULE ORAL at 23:00

## 2025-05-09 SDOH — SOCIAL STABILITY: SOCIAL INSECURITY
WITHIN THE LAST YEAR, HAVE YOU BEEN KICKED, HIT, SLAPPED, OR OTHERWISE PHYSICALLY HURT BY YOUR PARTNER OR EX-PARTNER?: PATIENT DECLINED

## 2025-05-09 SDOH — SOCIAL STABILITY: SOCIAL INSECURITY: ABUSE: ADULT

## 2025-05-09 SDOH — SOCIAL STABILITY: SOCIAL INSECURITY: HAS ANYONE EVER THREATENED TO HURT YOUR FAMILY OR YOUR PETS?: NO

## 2025-05-09 SDOH — HEALTH STABILITY: MENTAL HEALTH: HOW OFTEN DO YOU HAVE A DRINK CONTAINING ALCOHOL?: PATIENT DECLINED

## 2025-05-09 SDOH — SOCIAL STABILITY: SOCIAL NETWORK: HOW OFTEN DO YOU GET TOGETHER WITH FRIENDS OR RELATIVES?: PATIENT DECLINED

## 2025-05-09 SDOH — SOCIAL STABILITY: SOCIAL NETWORK: HOW OFTEN DO YOU ATTEND CHURCH OR RELIGIOUS SERVICES?: PATIENT DECLINED

## 2025-05-09 SDOH — ECONOMIC STABILITY: FOOD INSECURITY
WITHIN THE PAST 12 MONTHS, YOU WORRIED THAT YOUR FOOD WOULD RUN OUT BEFORE YOU GOT THE MONEY TO BUY MORE.: PATIENT DECLINED

## 2025-05-09 SDOH — HEALTH STABILITY: MENTAL HEALTH: HOW MANY DRINKS CONTAINING ALCOHOL DO YOU HAVE ON A TYPICAL DAY WHEN YOU ARE DRINKING?: PATIENT DECLINED

## 2025-05-09 SDOH — ECONOMIC STABILITY: FOOD INSECURITY: HOW HARD IS IT FOR YOU TO PAY FOR THE VERY BASICS LIKE FOOD, HOUSING, MEDICAL CARE, AND HEATING?: PATIENT DECLINED

## 2025-05-09 SDOH — SOCIAL STABILITY: SOCIAL INSECURITY: HAVE YOU HAD ANY THOUGHTS OF HARMING ANYONE ELSE?: NO

## 2025-05-09 SDOH — SOCIAL STABILITY: SOCIAL INSECURITY: HAVE YOU HAD THOUGHTS OF HARMING ANYONE ELSE?: NO

## 2025-05-09 SDOH — HEALTH STABILITY: PHYSICAL HEALTH
HOW OFTEN DO YOU NEED TO HAVE SOMEONE HELP YOU WHEN YOU READ INSTRUCTIONS, PAMPHLETS, OR OTHER WRITTEN MATERIAL FROM YOUR DOCTOR OR PHARMACY?: PATIENT DECLINES TO RESPOND

## 2025-05-09 SDOH — ECONOMIC STABILITY: HOUSING INSECURITY: AT ANY TIME IN THE PAST 12 MONTHS, WERE YOU HOMELESS OR LIVING IN A SHELTER (INCLUDING NOW)?: PATIENT DECLINED

## 2025-05-09 SDOH — SOCIAL STABILITY: SOCIAL NETWORK: HOW OFTEN DO YOU ATTEND MEETINGS OF THE CLUBS OR ORGANIZATIONS YOU BELONG TO?: PATIENT DECLINED

## 2025-05-09 SDOH — SOCIAL STABILITY: SOCIAL INSECURITY: ARE YOU MARRIED, WIDOWED, DIVORCED, SEPARATED, NEVER MARRIED, OR LIVING WITH A PARTNER?: PATIENT DECLINED

## 2025-05-09 SDOH — SOCIAL STABILITY: SOCIAL INSECURITY: WITHIN THE LAST YEAR, HAVE YOU BEEN AFRAID OF YOUR PARTNER OR EX-PARTNER?: PATIENT DECLINED

## 2025-05-09 SDOH — SOCIAL STABILITY: SOCIAL INSECURITY: DO YOU FEEL ANYONE HAS EXPLOITED OR TAKEN ADVANTAGE OF YOU FINANCIALLY OR OF YOUR PERSONAL PROPERTY?: NO

## 2025-05-09 SDOH — SOCIAL STABILITY: SOCIAL INSECURITY
WITHIN THE LAST YEAR, HAVE YOU BEEN RAPED OR FORCED TO HAVE ANY KIND OF SEXUAL ACTIVITY BY YOUR PARTNER OR EX-PARTNER?: PATIENT DECLINED

## 2025-05-09 SDOH — SOCIAL STABILITY: SOCIAL INSECURITY
WITHIN THE LAST YEAR, HAVE YOU BEEN HUMILIATED OR EMOTIONALLY ABUSED IN OTHER WAYS BY YOUR PARTNER OR EX-PARTNER?: PATIENT DECLINED

## 2025-05-09 SDOH — SOCIAL STABILITY: SOCIAL INSECURITY: ARE YOU OR HAVE YOU BEEN THREATENED OR ABUSED PHYSICALLY, EMOTIONALLY, OR SEXUALLY BY ANYONE?: NO

## 2025-05-09 SDOH — SOCIAL STABILITY: SOCIAL NETWORK: IN A TYPICAL WEEK, HOW MANY TIMES DO YOU TALK ON THE PHONE WITH FAMILY, FRIENDS, OR NEIGHBORS?: PATIENT DECLINED

## 2025-05-09 SDOH — ECONOMIC STABILITY: FOOD INSECURITY: WITHIN THE PAST 12 MONTHS, THE FOOD YOU BOUGHT JUST DIDN'T LAST AND YOU DIDN'T HAVE MONEY TO GET MORE.: PATIENT DECLINED

## 2025-05-09 SDOH — HEALTH STABILITY: MENTAL HEALTH: HOW OFTEN DO YOU HAVE SIX OR MORE DRINKS ON ONE OCCASION?: PATIENT DECLINED

## 2025-05-09 SDOH — HEALTH STABILITY: PHYSICAL HEALTH
ON AVERAGE, HOW MANY DAYS PER WEEK DO YOU ENGAGE IN MODERATE TO STRENUOUS EXERCISE (LIKE A BRISK WALK)?: PATIENT DECLINED

## 2025-05-09 SDOH — ECONOMIC STABILITY: INCOME INSECURITY
IN THE PAST 12 MONTHS HAS THE ELECTRIC, GAS, OIL, OR WATER COMPANY THREATENED TO SHUT OFF SERVICES IN YOUR HOME?: PATIENT DECLINED

## 2025-05-09 SDOH — ECONOMIC STABILITY: HOUSING INSECURITY: IN THE LAST 12 MONTHS, WAS THERE A TIME WHEN YOU WERE NOT ABLE TO PAY THE MORTGAGE OR RENT ON TIME?: PATIENT DECLINED

## 2025-05-09 SDOH — HEALTH STABILITY: MENTAL HEALTH
DO YOU FEEL STRESS - TENSE, RESTLESS, NERVOUS, OR ANXIOUS, OR UNABLE TO SLEEP AT NIGHT BECAUSE YOUR MIND IS TROUBLED ALL THE TIME - THESE DAYS?: PATIENT DECLINED

## 2025-05-09 SDOH — HEALTH STABILITY: PHYSICAL HEALTH: ON AVERAGE, HOW MANY MINUTES DO YOU ENGAGE IN EXERCISE AT THIS LEVEL?: PATIENT DECLINED

## 2025-05-09 SDOH — ECONOMIC STABILITY: HOUSING INSECURITY: IN THE PAST 12 MONTHS, HOW MANY TIMES HAVE YOU MOVED WHERE YOU WERE LIVING?: 1

## 2025-05-09 SDOH — SOCIAL STABILITY: SOCIAL NETWORK
DO YOU BELONG TO ANY CLUBS OR ORGANIZATIONS SUCH AS CHURCH GROUPS, UNIONS, FRATERNAL OR ATHLETIC GROUPS, OR SCHOOL GROUPS?: PATIENT DECLINED

## 2025-05-09 SDOH — ECONOMIC STABILITY: TRANSPORTATION INSECURITY
IN THE PAST 12 MONTHS, HAS LACK OF TRANSPORTATION KEPT YOU FROM MEDICAL APPOINTMENTS OR FROM GETTING MEDICATIONS?: PATIENT DECLINED

## 2025-05-09 SDOH — SOCIAL STABILITY: SOCIAL INSECURITY: DOES ANYONE TRY TO KEEP YOU FROM HAVING/CONTACTING OTHER FRIENDS OR DOING THINGS OUTSIDE YOUR HOME?: NO

## 2025-05-09 SDOH — SOCIAL STABILITY: SOCIAL INSECURITY: ARE THERE ANY APPARENT SIGNS OF INJURIES/BEHAVIORS THAT COULD BE RELATED TO ABUSE/NEGLECT?: NO

## 2025-05-09 SDOH — SOCIAL STABILITY: SOCIAL INSECURITY: DO YOU FEEL UNSAFE GOING BACK TO THE PLACE WHERE YOU ARE LIVING?: NO

## 2025-05-09 SDOH — SOCIAL STABILITY: SOCIAL INSECURITY: WERE YOU ABLE TO COMPLETE ALL THE BEHAVIORAL HEALTH SCREENINGS?: YES

## 2025-05-09 ASSESSMENT — ACTIVITIES OF DAILY LIVING (ADL)
DRESSING YOURSELF: INDEPENDENT
PATIENT'S MEMORY ADEQUATE TO SAFELY COMPLETE DAILY ACTIVITIES?: YES
ADEQUATE_TO_COMPLETE_ADL: YES
HEARING - LEFT EAR: FUNCTIONAL
HEARING - RIGHT EAR: FUNCTIONAL
JUDGMENT_ADEQUATE_SAFELY_COMPLETE_DAILY_ACTIVITIES: YES
FEEDING YOURSELF: INDEPENDENT
BATHING: INDEPENDENT
WALKS IN HOME: INDEPENDENT
TOILETING: INDEPENDENT
GROOMING: INDEPENDENT
LACK_OF_TRANSPORTATION: PATIENT DECLINED

## 2025-05-09 ASSESSMENT — COGNITIVE AND FUNCTIONAL STATUS - GENERAL
DAILY ACTIVITIY SCORE: 24
MOBILITY SCORE: 22
WALKING IN HOSPITAL ROOM: A LITTLE
WALKING IN HOSPITAL ROOM: A LITTLE
DAILY ACTIVITIY SCORE: 24
CLIMB 3 TO 5 STEPS WITH RAILING: A LITTLE
PATIENT BASELINE BEDBOUND: NO
CLIMB 3 TO 5 STEPS WITH RAILING: A LITTLE
PATIENT BASELINE BEDBOUND: NO

## 2025-05-09 ASSESSMENT — PAIN DESCRIPTION - LOCATION: LOCATION: HEAD

## 2025-05-09 ASSESSMENT — PATIENT HEALTH QUESTIONNAIRE - PHQ9
SUM OF ALL RESPONSES TO PHQ9 QUESTIONS 1 & 2: 0
1. LITTLE INTEREST OR PLEASURE IN DOING THINGS: NOT AT ALL
2. FEELING DOWN, DEPRESSED OR HOPELESS: NOT AT ALL

## 2025-05-09 ASSESSMENT — LIFESTYLE VARIABLES
SKIP TO QUESTIONS 9-10: 1
AUDIT-C TOTAL SCORE: 0
HAVE YOU EVER FELT YOU SHOULD CUT DOWN ON YOUR DRINKING: NO
EVER HAD A DRINK FIRST THING IN THE MORNING TO STEADY YOUR NERVES TO GET RID OF A HANGOVER: NO
TOTAL SCORE: 0
HOW MANY STANDARD DRINKS CONTAINING ALCOHOL DO YOU HAVE ON A TYPICAL DAY: PATIENT DOES NOT DRINK
EVER FELT BAD OR GUILTY ABOUT YOUR DRINKING: NO
HAVE PEOPLE ANNOYED YOU BY CRITICIZING YOUR DRINKING: NO
AUDIT-C TOTAL SCORE: -1
SUBSTANCE_ABUSE_PAST_12_MONTHS: NO
AUDIT-C TOTAL SCORE: 0
HOW OFTEN DO YOU HAVE 6 OR MORE DRINKS ON ONE OCCASION: NEVER
PRESCIPTION_ABUSE_PAST_12_MONTHS: NO
SKIP TO QUESTIONS 9-10: 0
HOW OFTEN DO YOU HAVE A DRINK CONTAINING ALCOHOL: NEVER

## 2025-05-09 ASSESSMENT — PAIN DESCRIPTION - PAIN TYPE: TYPE: ACUTE PAIN

## 2025-05-09 ASSESSMENT — PAIN SCALES - GENERAL
PAINLEVEL_OUTOF10: 2
PAINLEVEL_OUTOF10: 8
PAINLEVEL_OUTOF10: 0 - NO PAIN

## 2025-05-09 ASSESSMENT — PAIN - FUNCTIONAL ASSESSMENT
PAIN_FUNCTIONAL_ASSESSMENT: 0-10
PAIN_FUNCTIONAL_ASSESSMENT: 0-10

## 2025-05-09 ASSESSMENT — PAIN DESCRIPTION - DESCRIPTORS: DESCRIPTORS: ACHING

## 2025-05-09 NOTE — ED NOTES
RN called and stated she placed patient on 50% Venturi Mask and that patient was satting low 80's, I placed paitent on 8L Oxymizer per provider, current saturation is 91%   Island Pedicle Flap Text: The defect edges were debeveled with a #15 scalpel blade.  Given the location of the defect, shape of the defect and the proximity to free margins an island pedicle advancement flap was deemed most appropriate.  Using a sterile surgical marker, an appropriate advancement flap was drawn incorporating the defect, outlining the appropriate donor tissue and placing the expected incisions within the relaxed skin tension lines where possible.    The area thus outlined was incised deep to adipose tissue with a #15 scalpel blade.  The skin margins were undermined to an appropriate distance in all directions around the primary defect and laterally outward around the island pedicle utilizing iris scissors.  There was minimal undermining beneath the pedicle flap.

## 2025-05-09 NOTE — ED PROVIDER NOTES
Emergency Department Provider Note       History of Present Illness     History provided by: Patient  Limitations to History: None  External Records Reviewed with Brief Summary: None    HPI:  Chief complaint: Shortness of breath    History of present illness: Patient is a 44-year-old female with history of COPD, hyperlipidemia hypertension hypothyroidism schizophrenia diabetes pulmonary embolus presenting to the emergency department with complaints of shortness of breath.  According to the patient, her shortness of breath started getting worse yesterday.  The patient states that she has had cough during this period of time.  The patient states that she checked her pulse ox at home and it was in the 70s as result she presents to the emergency department for further evaluation.  Patient states that she has had symptoms like this in the past she denies any fever with this.  She denies any chest pain.  The patient states that she takes 4 L of oxygen at home as needed however she states that she did not take this.    Physical Exam   Triage vitals:  T 36.7 °C (98.1 °F)  HR 90  BP (!) 178/104  RR 18  O2 (!) 85 % None (Room air)    Physical Exam  Constitutional:       Appearance: Normal appearance.   HENT:      Head: Normocephalic and atraumatic.      Right Ear: External ear normal.      Left Ear: External ear normal.      Nose: Nose normal.      Mouth/Throat:      Mouth: Mucous membranes are moist.   Eyes:      General: Lids are normal.      Extraocular Movements: Extraocular movements intact.      Pupils: Pupils are equal, round, and reactive to light.   Cardiovascular:      Rate and Rhythm: Normal rate and regular rhythm.      Heart sounds: Normal heart sounds.   Pulmonary:      Effort: Pulmonary effort is normal.      Breath sounds: Wheezing present.   Abdominal:      General: Abdomen is flat.      Palpations: Abdomen is soft.      Tenderness: There is no abdominal tenderness. There is no guarding or rebound.  "  Musculoskeletal:         General: No deformity. Normal range of motion.      Cervical back: Normal range of motion and neck supple.   Skin:     General: Skin is warm.      Capillary Refill: Capillary refill takes less than 2 seconds.      Coloration: Skin is not jaundiced.   Neurological:      General: No focal deficit present.      Mental Status: She is alert and oriented to person, place, and time.   Psychiatric:         Mood and Affect: Mood normal.         Behavior: Behavior normal.           Medical Decision Making & ED Course   Medical Decision Makin y.o. female ***  ----  {Scoring Tools Utilized:48440}    Differential diagnoses considered include but are not limited to: ***    Social Determinants of Health which Significantly Impact Care: {Social Determinants of Health which Significantly Impact Care:99475} {The following actions were taken to address these social determinants (Optional):09697}    EKG Independent Interpretation: {EKG Independent Interpretation:47094}    Independent Result Review and Interpretation: {Independent Result Review and Interpretation:40684::\"Relevant laboratory and radiographic results were reviewed and independently interpreted by myself.  As necessary, they are commented on in the ED Course.\"}    Chronic conditions affecting the patient's care: {Chronic conditions affecting the patient's care:03195::\"As documented above in MDM\"}    The patient was discussed with the following consultants/services: {The patient was discussed with the following consultants/services:87854}    Care Considerations: {Care Considerations:34219::\"As documented above in MDM\"}    ED Course:       Disposition   {ED Disposition:09050}    Procedures   Procedures    {ED Provider Level (Optional):77282}    Anthony Ayala MD  Emergency Medicine                                                         " persistent hypoxia, the patient will require admission to the hospital this time I spoke with the hospitalist who agreed the patient could be admitted to their service.  The patient was then admitted to the hospital in otherwise improving condition.      Diagnoses as of 05/14/25 1327   Acute on chronic respiratory failure with hypoxia       Disposition   As a result of their workup, the patient will require admission to the hospital.  The patient was informed of her diagnosis.  The patient was given the opportunity to ask questions and I answered them. The patient agreed to be admitted to the hospital.    Procedures   Critical Care    Performed by: Anthony Ayala MD  Authorized by: Jaylen Frey DO    Critical care provider statement:     Critical care time (minutes):  45    Critical care time was exclusive of:  Separately billable procedures and treating other patients    Critical care was necessary to treat or prevent imminent or life-threatening deterioration of the following conditions:  Respiratory failure    Critical care was time spent personally by me on the following activities:  Blood draw for specimens, evaluation of patient's response to treatment, examination of patient, ordering and performing treatments and interventions, ordering and review of laboratory studies, ordering and review of radiographic studies, pulse oximetry, re-evaluation of patient's condition and ventilator management    Care discussed with: admitting provider        Patient was seen independently    Anthony Ayala MD  Emergency Medicine                                                            Anthony Ayala MD  05/14/25 0371

## 2025-05-09 NOTE — TELEPHONE ENCOUNTER
Patient called in wondering if the office has a nebulizer she can be given? She is wanting a breathing treatment but wouldn't state why she is wanting one  Please Advise

## 2025-05-09 NOTE — SIGNIFICANT EVENT
05/09/25 1725   Inhalation Therapy   Breath Sounds Pre-Treatment Right Expiratory wheeze   Breath Sounds Pre-Treatment Left Expiratory wheeze   Pre-Treatment Pulse 69   Pre-Treatment Respirations 18   Breath Sounds Post-Treatment Right Expiratory wheeze   Breath Sounds Post-Treatment Left Expiratory wheeze   Post-Treatment Pulse 80   Post-Treatment Respirations 18   Delivery Source Oxygen   Position Supine   Treatment Tolerance Tolerated well   $ Aerosol/MDI Treatment Charge Aerosol/inhalation treatment     Upon assessment, I observed patient on 5LNC, Treatment administered per order

## 2025-05-10 ENCOUNTER — APPOINTMENT (OUTPATIENT)
Dept: CARDIOLOGY | Facility: HOSPITAL | Age: 45
DRG: 189 | End: 2025-05-10
Payer: COMMERCIAL

## 2025-05-10 LAB
ALBUMIN SERPL BCP-MCNC: 4.2 G/DL (ref 3.4–5)
ALP SERPL-CCNC: 67 U/L (ref 33–110)
ALT SERPL W P-5'-P-CCNC: 11 U/L (ref 7–45)
ANION GAP BLDA CALCULATED.4IONS-SCNC: 3 MMO/L (ref 10–25)
ANION GAP BLDA CALCULATED.4IONS-SCNC: 7 MMO/L (ref 10–25)
ANION GAP SERPL CALC-SCNC: 11 MMOL/L (ref 10–20)
APPARATUS: ABNORMAL
APPARATUS: ABNORMAL
ARTERIAL PATENCY WRIST A: ABNORMAL
ARTERIAL PATENCY WRIST A: POSITIVE
AST SERPL W P-5'-P-CCNC: 8 U/L (ref 9–39)
ATRIAL RATE: 85 BPM
BASE EXCESS BLDA CALC-SCNC: 3.1 MMOL/L (ref -2–3)
BASE EXCESS BLDA CALC-SCNC: 6.1 MMOL/L (ref -2–3)
BASOPHILS # BLD MANUAL: 0 X10*3/UL (ref 0–0.1)
BASOPHILS NFR BLD MANUAL: 0 %
BILIRUB SERPL-MCNC: 0.2 MG/DL (ref 0–1.2)
BODY TEMPERATURE: ABNORMAL
BODY TEMPERATURE: ABNORMAL
BUN SERPL-MCNC: 19 MG/DL (ref 6–23)
CA-I BLDA-SCNC: 1.3 MMOL/L (ref 1.1–1.33)
CA-I BLDA-SCNC: 1.34 MMOL/L (ref 1.1–1.33)
CALCIUM SERPL-MCNC: 9.4 MG/DL (ref 8.6–10.3)
CHLORIDE BLDA-SCNC: 102 MMOL/L (ref 98–107)
CHLORIDE BLDA-SCNC: 104 MMOL/L (ref 98–107)
CHLORIDE SERPL-SCNC: 104 MMOL/L (ref 98–107)
CO2 SERPL-SCNC: 30 MMOL/L (ref 21–32)
CREAT SERPL-MCNC: 0.75 MG/DL (ref 0.5–1.05)
CRP SERPL-MCNC: 3.34 MG/DL
EGFRCR SERPLBLD CKD-EPI 2021: >90 ML/MIN/1.73M*2
EOSINOPHIL # BLD MANUAL: 0 X10*3/UL (ref 0–0.7)
EOSINOPHIL NFR BLD MANUAL: 0 %
EPAP CMH2O: 6 CM H2O
ERYTHROCYTE [DISTWIDTH] IN BLOOD BY AUTOMATED COUNT: 14.7 % (ref 11.5–14.5)
FLOW: 8 LPM
GLUCOSE BLD MANUAL STRIP-MCNC: 147 MG/DL (ref 74–99)
GLUCOSE BLD MANUAL STRIP-MCNC: 167 MG/DL (ref 74–99)
GLUCOSE BLD MANUAL STRIP-MCNC: 186 MG/DL (ref 74–99)
GLUCOSE BLD MANUAL STRIP-MCNC: 194 MG/DL (ref 74–99)
GLUCOSE BLD MANUAL STRIP-MCNC: 288 MG/DL (ref 74–99)
GLUCOSE BLDA-MCNC: 245 MG/DL (ref 74–99)
GLUCOSE BLDA-MCNC: 325 MG/DL (ref 74–99)
GLUCOSE SERPL-MCNC: 201 MG/DL (ref 74–99)
HCO3 BLDA-SCNC: 31.5 MMOL/L (ref 22–26)
HCO3 BLDA-SCNC: 33.7 MMOL/L (ref 22–26)
HCT VFR BLD AUTO: 42.8 % (ref 36–46)
HCT VFR BLD EST: 42 % (ref 36–46)
HCT VFR BLD EST: 42 % (ref 36–46)
HGB BLD-MCNC: 13.8 G/DL (ref 12–16)
HGB BLDA-MCNC: 13.9 G/DL (ref 12–16)
HGB BLDA-MCNC: 14.1 G/DL (ref 12–16)
IMM GRANULOCYTES # BLD AUTO: 0.24 X10*3/UL (ref 0–0.7)
IMM GRANULOCYTES NFR BLD AUTO: 4.2 % (ref 0–0.9)
INHALED O2 CONCENTRATION: 50 %
INHALED O2 CONCENTRATION: 52 %
INSPIRATORY TIME: 0.9
IPAP CMH2O: 18 CM H2O
LACTATE BLDA-SCNC: 0.5 MMOL/L (ref 0.4–2)
LACTATE BLDA-SCNC: 0.7 MMOL/L (ref 0.4–2)
LACTATE SERPL-SCNC: 0.8 MMOL/L (ref 0.4–2)
LEGIONELLA AG UR QL: NEGATIVE
LYMPHOCYTES # BLD MANUAL: 1.14 X10*3/UL (ref 1.2–4.8)
LYMPHOCYTES NFR BLD MANUAL: 20 %
MAGNESIUM SERPL-MCNC: 1.9 MG/DL (ref 1.6–2.4)
MCH RBC QN AUTO: 32.3 PG (ref 26–34)
MCHC RBC AUTO-ENTMCNC: 32.2 G/DL (ref 32–36)
MCV RBC AUTO: 100 FL (ref 80–100)
MONOCYTES # BLD MANUAL: 0.17 X10*3/UL (ref 0.1–1)
MONOCYTES NFR BLD MANUAL: 3 %
MRSA DNA SPEC QL NAA+PROBE: NOT DETECTED
NEUTS SEG # BLD MANUAL: 4.22 X10*3/UL (ref 1.2–7)
NEUTS SEG NFR BLD MANUAL: 74 %
NRBC BLD-RTO: 0 /100 WBCS (ref 0–0)
OXYHGB MFR BLDA: 88.8 % (ref 94–98)
OXYHGB MFR BLDA: 94.5 % (ref 94–98)
P AXIS: 63 DEGREES
P OFFSET: 194 MS
P ONSET: 142 MS
PCO2 BLDA: 61 MM HG (ref 38–42)
PCO2 BLDA: 64 MM HG (ref 38–42)
PH BLDA: 7.3 PH (ref 7.38–7.42)
PH BLDA: 7.35 PH (ref 7.38–7.42)
PLATELET # BLD AUTO: 93 X10*3/UL (ref 150–450)
PO2 BLDA: 62 MM HG (ref 85–95)
PO2 BLDA: 80 MM HG (ref 85–95)
POTASSIUM BLDA-SCNC: 4.6 MMOL/L (ref 3.5–5.3)
POTASSIUM BLDA-SCNC: 5 MMOL/L (ref 3.5–5.3)
POTASSIUM SERPL-SCNC: 4.4 MMOL/L (ref 3.5–5.3)
PR INTERVAL: 136 MS
PROCALCITONIN SERPL-MCNC: <0.02 NG/ML
PROT SERPL-MCNC: 7.1 G/DL (ref 6.4–8.2)
Q ONSET: 210 MS
QRS COUNT: 14 BEATS
QRS DURATION: 88 MS
QT INTERVAL: 386 MS
QTC CALCULATION(BAZETT): 459 MS
QTC FREDERICIA: 433 MS
R AXIS: -43 DEGREES
RBC # BLD AUTO: 4.27 X10*6/UL (ref 4–5.2)
RBC MORPH BLD: ABNORMAL
S PNEUM AG UR QL: NEGATIVE
SAO2 % BLDA: 93 % (ref 94–100)
SAO2 % BLDA: 98 % (ref 94–100)
SITE OF ARTERIAL PUNCTURE: ABNORMAL
SITE OF ARTERIAL PUNCTURE: ABNORMAL
SODIUM BLDA-SCNC: 136 MMOL/L (ref 136–145)
SODIUM BLDA-SCNC: 136 MMOL/L (ref 136–145)
SODIUM SERPL-SCNC: 141 MMOL/L (ref 136–145)
SPECIMEN DRAWN FROM PATIENT: ABNORMAL
SPECIMEN DRAWN FROM PATIENT: ABNORMAL
T AXIS: 40 DEGREES
T OFFSET: 403 MS
TOTAL CELLS COUNTED BLD: 100
VARIANT LYMPHS # BLD MANUAL: 0.17 X10*3/UL (ref 0–0.5)
VARIANT LYMPHS NFR BLD: 3 %
VENTILATOR MODE: ABNORMAL
VENTILATOR RATE: 18 BPM
VENTRICULAR RATE: 85 BPM
WBC # BLD AUTO: 5.7 X10*3/UL (ref 4.4–11.3)

## 2025-05-10 PROCEDURE — 94640 AIRWAY INHALATION TREATMENT: CPT

## 2025-05-10 PROCEDURE — 84132 ASSAY OF SERUM POTASSIUM: CPT | Performed by: NURSE PRACTITIONER

## 2025-05-10 PROCEDURE — 99232 SBSQ HOSP IP/OBS MODERATE 35: CPT | Performed by: STUDENT IN AN ORGANIZED HEALTH CARE EDUCATION/TRAINING PROGRAM

## 2025-05-10 PROCEDURE — 2500000005 HC RX 250 GENERAL PHARMACY W/O HCPCS: Performed by: STUDENT IN AN ORGANIZED HEALTH CARE EDUCATION/TRAINING PROGRAM

## 2025-05-10 PROCEDURE — 5A09357 ASSISTANCE WITH RESPIRATORY VENTILATION, LESS THAN 24 CONSECUTIVE HOURS, CONTINUOUS POSITIVE AIRWAY PRESSURE: ICD-10-PCS | Performed by: NURSE PRACTITIONER

## 2025-05-10 PROCEDURE — 94669 MECHANICAL CHEST WALL OSCILL: CPT

## 2025-05-10 PROCEDURE — 36415 COLL VENOUS BLD VENIPUNCTURE: CPT | Performed by: NURSE PRACTITIONER

## 2025-05-10 PROCEDURE — 87449 NOS EACH ORGANISM AG IA: CPT | Mod: ELYLAB | Performed by: NURSE PRACTITIONER

## 2025-05-10 PROCEDURE — 2500000002 HC RX 250 W HCPCS SELF ADMINISTERED DRUGS (ALT 637 FOR MEDICARE OP, ALT 636 FOR OP/ED): Performed by: STUDENT IN AN ORGANIZED HEALTH CARE EDUCATION/TRAINING PROGRAM

## 2025-05-10 PROCEDURE — 2500000002 HC RX 250 W HCPCS SELF ADMINISTERED DRUGS (ALT 637 FOR MEDICARE OP, ALT 636 FOR OP/ED): Mod: JZ | Performed by: NURSE PRACTITIONER

## 2025-05-10 PROCEDURE — 82947 ASSAY GLUCOSE BLOOD QUANT: CPT

## 2025-05-10 PROCEDURE — 85007 BL SMEAR W/DIFF WBC COUNT: CPT | Performed by: NURSE PRACTITIONER

## 2025-05-10 PROCEDURE — 2500000004 HC RX 250 GENERAL PHARMACY W/ HCPCS (ALT 636 FOR OP/ED): Performed by: STUDENT IN AN ORGANIZED HEALTH CARE EDUCATION/TRAINING PROGRAM

## 2025-05-10 PROCEDURE — 2500000001 HC RX 250 WO HCPCS SELF ADMINISTERED DRUGS (ALT 637 FOR MEDICARE OP): Performed by: NURSE PRACTITIONER

## 2025-05-10 PROCEDURE — 83735 ASSAY OF MAGNESIUM: CPT | Performed by: NURSE PRACTITIONER

## 2025-05-10 PROCEDURE — 2500000004 HC RX 250 GENERAL PHARMACY W/ HCPCS (ALT 636 FOR OP/ED): Mod: JZ | Performed by: NURSE PRACTITIONER

## 2025-05-10 PROCEDURE — 2020000001 HC ICU ROOM DAILY

## 2025-05-10 PROCEDURE — 94660 CPAP INITIATION&MGMT: CPT

## 2025-05-10 PROCEDURE — 36600 WITHDRAWAL OF ARTERIAL BLOOD: CPT

## 2025-05-10 PROCEDURE — 84145 PROCALCITONIN (PCT): CPT | Mod: ELYLAB | Performed by: NURSE PRACTITIONER

## 2025-05-10 PROCEDURE — 2500000001 HC RX 250 WO HCPCS SELF ADMINISTERED DRUGS (ALT 637 FOR MEDICARE OP): Performed by: STUDENT IN AN ORGANIZED HEALTH CARE EDUCATION/TRAINING PROGRAM

## 2025-05-10 PROCEDURE — 83605 ASSAY OF LACTIC ACID: CPT | Performed by: NURSE PRACTITIONER

## 2025-05-10 PROCEDURE — 87899 AGENT NOS ASSAY W/OPTIC: CPT | Mod: ELYLAB | Performed by: NURSE PRACTITIONER

## 2025-05-10 PROCEDURE — 93005 ELECTROCARDIOGRAM TRACING: CPT

## 2025-05-10 PROCEDURE — 84132 ASSAY OF SERUM POTASSIUM: CPT | Performed by: STUDENT IN AN ORGANIZED HEALTH CARE EDUCATION/TRAINING PROGRAM

## 2025-05-10 PROCEDURE — 85027 COMPLETE CBC AUTOMATED: CPT | Performed by: NURSE PRACTITIONER

## 2025-05-10 PROCEDURE — 2500000004 HC RX 250 GENERAL PHARMACY W/ HCPCS (ALT 636 FOR OP/ED): Mod: JZ | Performed by: STUDENT IN AN ORGANIZED HEALTH CARE EDUCATION/TRAINING PROGRAM

## 2025-05-10 RX ORDER — KETOROLAC TROMETHAMINE 30 MG/ML
15 INJECTION, SOLUTION INTRAMUSCULAR; INTRAVENOUS ONCE
Status: COMPLETED | OUTPATIENT
Start: 2025-05-10 | End: 2025-05-10

## 2025-05-10 RX ORDER — DEXTROSE 50 % IN WATER (D50W) INTRAVENOUS SYRINGE
25
Status: DISCONTINUED | OUTPATIENT
Start: 2025-05-10 | End: 2025-05-12 | Stop reason: HOSPADM

## 2025-05-10 RX ORDER — INSULIN GLARGINE 100 [IU]/ML
10 INJECTION, SOLUTION SUBCUTANEOUS EVERY 24 HOURS
Status: DISCONTINUED | OUTPATIENT
Start: 2025-05-10 | End: 2025-05-12 | Stop reason: HOSPADM

## 2025-05-10 RX ORDER — CLONAZEPAM 0.5 MG/1
0.5 TABLET ORAL 2 TIMES DAILY PRN
Status: DISCONTINUED | OUTPATIENT
Start: 2025-05-10 | End: 2025-05-12 | Stop reason: HOSPADM

## 2025-05-10 RX ORDER — DEXTROSE 50 % IN WATER (D50W) INTRAVENOUS SYRINGE
12.5
Status: DISCONTINUED | OUTPATIENT
Start: 2025-05-10 | End: 2025-05-12 | Stop reason: HOSPADM

## 2025-05-10 RX ADMIN — Medication 6 L/MIN: at 06:27

## 2025-05-10 RX ADMIN — CLONAZEPAM 0.5 MG: 0.5 TABLET ORAL at 20:46

## 2025-05-10 RX ADMIN — PANTOPRAZOLE SODIUM 40 MG: 40 TABLET, DELAYED RELEASE ORAL at 09:16

## 2025-05-10 RX ADMIN — CLONIDINE HYDROCHLORIDE 0.1 MG: 0.1 TABLET ORAL at 06:16

## 2025-05-10 RX ADMIN — CLONIDINE HYDROCHLORIDE 0.1 MG: 0.1 TABLET ORAL at 17:05

## 2025-05-10 RX ADMIN — ACETAMINOPHEN 650 MG: 325 TABLET ORAL at 12:56

## 2025-05-10 RX ADMIN — CEFTRIAXONE 2 G: 2 INJECTION, SOLUTION INTRAVENOUS at 20:47

## 2025-05-10 RX ADMIN — METOPROLOL TARTRATE 25 MG: 25 TABLET, FILM COATED ORAL at 20:46

## 2025-05-10 RX ADMIN — IPRATROPIUM BROMIDE AND ALBUTEROL SULFATE 3 ML: .5; 3 SOLUTION RESPIRATORY (INHALATION) at 13:08

## 2025-05-10 RX ADMIN — Medication 6 L/MIN: at 20:23

## 2025-05-10 RX ADMIN — Medication 6 L/MIN: at 08:00

## 2025-05-10 RX ADMIN — IPRATROPIUM BROMIDE AND ALBUTEROL SULFATE 3 ML: .5; 3 SOLUTION RESPIRATORY (INHALATION) at 20:23

## 2025-05-10 RX ADMIN — SITAGLIPTIN 50 MG: 25 TABLET, FILM COATED ORAL at 06:16

## 2025-05-10 RX ADMIN — CLONAZEPAM 0.5 MG: 0.5 TABLET ORAL at 09:52

## 2025-05-10 RX ADMIN — TRAZODONE HYDROCHLORIDE 50 MG: 50 TABLET ORAL at 00:33

## 2025-05-10 RX ADMIN — METFORMIN ER 500 MG 500 MG: 500 TABLET ORAL at 17:03

## 2025-05-10 RX ADMIN — METOPROLOL TARTRATE 25 MG: 25 TABLET, FILM COATED ORAL at 09:16

## 2025-05-10 RX ADMIN — KETOROLAC TROMETHAMINE 15 MG: 30 INJECTION, SOLUTION INTRAMUSCULAR at 06:29

## 2025-05-10 RX ADMIN — Medication 50 PERCENT: at 04:45

## 2025-05-10 RX ADMIN — LEVOTHYROXINE SODIUM 175 MCG: 0.05 TABLET ORAL at 06:16

## 2025-05-10 RX ADMIN — ATORVASTATIN CALCIUM 20 MG: 20 TABLET, FILM COATED ORAL at 09:16

## 2025-05-10 RX ADMIN — METHYLPREDNISOLONE SODIUM SUCCINATE 40 MG: 40 INJECTION, POWDER, LYOPHILIZED, FOR SOLUTION INTRAMUSCULAR; INTRAVENOUS at 11:29

## 2025-05-10 RX ADMIN — IPRATROPIUM BROMIDE AND ALBUTEROL SULFATE 3 ML: .5; 3 SOLUTION RESPIRATORY (INHALATION) at 01:16

## 2025-05-10 RX ADMIN — METHYLPREDNISOLONE SODIUM SUCCINATE 40 MG: 40 INJECTION, POWDER, LYOPHILIZED, FOR SOLUTION INTRAMUSCULAR; INTRAVENOUS at 20:46

## 2025-05-10 RX ADMIN — IPRATROPIUM BROMIDE AND ALBUTEROL SULFATE 3 ML: .5; 3 SOLUTION RESPIRATORY (INHALATION) at 06:53

## 2025-05-10 RX ADMIN — PRAZOSIN HYDROCHLORIDE 5 MG: 5 CAPSULE ORAL at 20:46

## 2025-05-10 RX ADMIN — INSULIN GLARGINE 10 UNITS: 100 INJECTION, SOLUTION SUBCUTANEOUS at 11:29

## 2025-05-10 RX ADMIN — RIVAROXABAN 10 MG: 20 TABLET, FILM COATED ORAL at 09:16

## 2025-05-10 RX ADMIN — ESCITALOPRAM OXALATE 20 MG: 10 TABLET, FILM COATED ORAL at 09:16

## 2025-05-10 ASSESSMENT — COGNITIVE AND FUNCTIONAL STATUS - GENERAL
CLIMB 3 TO 5 STEPS WITH RAILING: A LITTLE
DAILY ACTIVITIY SCORE: 24
WALKING IN HOSPITAL ROOM: A LITTLE
MOBILITY SCORE: 22

## 2025-05-10 ASSESSMENT — PAIN SCALES - GENERAL
PAINLEVEL_OUTOF10: 0 - NO PAIN
PAINLEVEL_OUTOF10: 3
PAINLEVEL_OUTOF10: 1
PAINLEVEL_OUTOF10: 0 - NO PAIN

## 2025-05-10 ASSESSMENT — PAIN DESCRIPTION - ORIENTATION: ORIENTATION: MID

## 2025-05-10 ASSESSMENT — PAIN - FUNCTIONAL ASSESSMENT
PAIN_FUNCTIONAL_ASSESSMENT: 0-10

## 2025-05-10 ASSESSMENT — PAIN DESCRIPTION - LOCATION: LOCATION: HEAD

## 2025-05-10 ASSESSMENT — PAIN DESCRIPTION - DESCRIPTORS: DESCRIPTORS: ACHING

## 2025-05-10 NOTE — SIGNIFICANT EVENT
Acute Respiratory Failure d/t Pneumonia  ABG showing need for BiPap - ordered  Reviewed case with ICU  Transfer initiated

## 2025-05-10 NOTE — H&P
Medical Group History and Physical    ASSESSMENT & PLAN:     Acute on chronic respiratory failure with hypoxia  Community-acquired bacterial pneumonia  COPD Home O2 4L as needed  CTA shows evidence of cardiomegaly, negative for PE, concerning for bilateral pneumonia; labs w/o evidence of leukocytosis, pOx 70% now stable on 8L high flow - admit to 8th floor  - cont azithromycin and rocephin  - Follow BC x 2  - Send sputum culture if able  - Send urine Legionella and strep pneumoniae antigens  - May need increase in home O2  - TCC for DC planning possible placement  - Daily EKG - monitor for QTc prolongation  - Admit to eighth floor -stepdown unit    Hx: Schizophrenia  Hx: Insomnia  - Resume home meds    VTE Prophylaxis: Defer    Tracy Caal, APRN-CNP    ADDENDUM:   Updated bedside nursing and ICU NP re: increased oxygen needs; however given pt stability she was admitted to the 8th floor - will try to wean off HF this evening and ICU will accept if hypoxia worsens    HISTORY OF PRESENT ILLNESS:   Chief Complaint: shortness of breath    History Of Present Illness:    Stephenie Mccray is a 44 y.o. female with a significant past medical history of COPD home O2 4L NC as needed, schizophrenia, HTN, HLD, hypothyroid, DM2, pulmonary embolism, presenting to [place] [facility] with complaints of shortness of breath that began approximately 2 days ago.  She is a heavy smoker, does not want to quit, she was found to have bilateral pneumonia on CTA.  She was also noted to be in acute respiratory failure requiring 8 L via high flow while in ER to maintain SpO2 greater than 90%.  She is afebrile no evidence of leukocytosis, antibiotics started with azithromycin and Rocephin, blood cultures were drawn, may need to consider increasing home O2 versus placement at time of discharge.  Given patient's multiple use of QTc prolonging agents will do daily EKGs while on antibiotics in the hospital setting, and limit use of other Qtc  prolonging agents.     VSS ready for admission to general medicine for acute on chronic respiratory failure with hypoxia due to community-acquired bacterial pneumonia and COPD exacerbation     Review of systems: 10 point review of systems is otherwise negative except as mentioned above.    PAST HISTORIES:       Past Medical History:  Medical Problems       Problem List       Acid reflux    COPD (chronic obstructive pulmonary disease) (Multi)    Overview Addendum 5/1/2023  4:22 PM by Frank Mclain, PharmD   Current Medications:  Albuterol 90 mcg/act; 2 puffs Q6h PRN - patient taking 2 puffs BID  Ipratropium/albuterol 0.5-2.5 mg/3 mL; 3 mL Q6h PRN - patient taking 3 mL BID  Budesonide/formoterol 160-4.5 mcg/act; 2 puffs BID    Patient uses oxygen while sleeping. Currently running at 3L.     Patient referred to pharmacy by platinum plan discharge for medication maintenance. Patient has had two recent COPD exacerbations which required hospitalization. No updates to patient's COPD medications were made. Due to persistent exacerbations despite dual long-acting bronchodilator therapy, will initiate Trelegy and discontinue Symbicort. Patient stated they are seeing a pulmonologist in 1 week, but could not remember the date of appointment or the pulmonologist they are seeing. Plan to follow up with patient in 1 week to ensure patient has received medication and is tolerating and to follow up on new pulmonologist appointment, [facility] on circumstance [facility] with [facility].          Mixed hyperlipidemia    Primary hypertension    Acquired hypothyroidism    Lipoma of forearm    Marijuana abuse    Multiple renal calculi    Ovarian teratoma    Schizophrenia    Type 2 diabetes mellitus without complication, with long-term current use of insulin    Class 1 obesity due to excess calories with serious comorbidity and body mass index (BMI) of 30.0 to 30.9 in adult    Superficial thrombophlebitis     Pulmonary embolism    Acute exacerbation of chronic obstructive pulmonary disease (COPD) (Multi)    Acute on chronic respiratory failure with hypercapnia    Shortness of breath    Sterilization    Mass of muscle of right upper extremity    Diastasis recti           Past Surgical History:  Surgical History[1]       Social History:  She reports that she has been smoking cigarettes. She has been exposed to tobacco smoke. She has never used smokeless tobacco. She reports current drug use. Drug: Marijuana. She reports that she does not drink alcohol.    Family History:  Family History[2]     Allergies:  Fluticasone furoate-vilanterol, Fluticasone-umeclidin-vilanter, Levofloxacin, Sumatriptan, and Vortioxetine    OBJECTIVE:       Last Recorded Vitals:  Vitals:    05/09/25 1715 05/09/25 1800 05/09/25 1911 05/09/25 2000   BP:   163/87    Pulse: 72 90 92 90   Resp:   16    Temp:       TempSrc:       SpO2: (!) 91% (!) 87% (!) 88% (!) 89%   Weight:       Height:           Last I/O:  No intake/output data recorded.    Physical Exam  Vitals and nursing note reviewed.   Constitutional:       Appearance: Normal appearance.      Comments: Chronically ill   HENT:      Head: Normocephalic and atraumatic.      Nose: Nose normal.      Mouth/Throat:      Mouth: Mucous membranes are moist.      Pharynx: Oropharynx is clear.   Eyes:      Extraocular Movements: Extraocular movements intact.      Conjunctiva/sclera: Conjunctivae normal.      Pupils: Pupils are equal, round, and reactive to light.   Cardiovascular:      Rate and Rhythm: Normal rate and regular rhythm.      Pulses: Normal pulses.      Heart sounds: Normal heart sounds.   Pulmonary:      Breath sounds: Normal breath sounds.      Comments: Diminished bases bilaterally, increased work of breathing on admission now improved.  Requiring high flow 8 L to maintain SPO2 greater than 90% = 4L NC PRN at Baseline, no acute distress, able to speak in full sentences without  "issue  Abdominal:      General: Abdomen is flat. Bowel sounds are normal.      Palpations: Abdomen is soft.   Musculoskeletal:         General: Normal range of motion.      Cervical back: Normal range of motion and neck supple.      Comments: No edema   Skin:     General: Skin is warm and dry.      Capillary Refill: Capillary refill takes 2 to 3 seconds.   Neurological:      General: No focal deficit present.      Mental Status: She is alert and oriented to person, place, and time. Mental status is at baseline.      Motor: Weakness present.      Comments: Generalized weakness   Psychiatric:         Mood and Affect: Mood normal.         Behavior: Behavior normal.         Thought Content: Thought content normal.         Judgment: Judgment normal.           Scheduled Medications  Scheduled Medications[3]  PRN Medications  PRN Medications[4]  Continuous Medications  Continuous Medications[5]    Outpatient Medications:  Prior to Admission medications    Medication Sig Start Date End Date Taking? Authorizing Provider   albuterol 90 mcg/actuation inhaler  9/2/24   Historical Provider, MD   albuterol 90 mcg/actuation inhaler Inhale 1-2 puffs every 6 hours if needed for wheezing. 2/3/25 3/5/25  Niels MARTINS MD   alcohol swabs (Alcohol Prep Pads) pads, medicated Use 3 times daily prn 7/27/23   Delta Yuen MD   atorvastatin (Lipitor) 20 mg tablet Take 1 tablet (20 mg) by mouth once daily. 5/7/25   Delta Yuen MD   BD Ultra-Fine Mini Pen Needle 31 gauge x 3/16\" needle USE TO INJECT 4 TIMES DAILY AS DIRECTED 2/1/24   Delta Yuen MD   busPIRone (Buspar) 30 mg tablet Take 1 tablet (30 mg) by mouth 2 times a day. 1/23/24   Historical Provider, MD   clonazePAM (KlonoPIN) 1 mg tablet Take by mouth 2 times a day as needed.    Historical Provider, MD   cloNIDine (Catapres) 0.1 mg tablet Take 1 tablet (0.1 mg) by mouth 2 times a day as needed (anxiety).    Historical Provider, MD   escitalopram (Lexapro) 20 mg " tablet Take 1 tablet (20 mg) by mouth once daily in the morning.    Historical Provider, MD   famotidine (Pepcid) 20 mg tablet Take 1 tablet (20 mg) by mouth 2 times a day for 10 days. 4/25/25 5/5/25  Larry Kay PA-C   hydrOXYzine HCL (Atarax) 25 mg tablet Take 1 tablet (25 mg) by mouth every 6 hours if needed for itching. 2/3/25   Niels MARTINS MD   ipratropium-albuteroL (Duo-Neb) 0.5-2.5 mg/3 mL nebulizer solution Take 3 mL by nebulization every 6 hours if needed for shortness of breath or wheezing. 9/25/24 12/2/24  Delta Yuen MD   Januvia 50 mg tablet Take 1 tablet (50 mg) by mouth early in the morning.. 4/22/25   Historical Provider, MD   levothyroxine (Synthroid, Levoxyl) 175 mcg tablet Take 1 tablet (175 mcg) by mouth once daily. 10/10/24   Delta Yuen MD   metFORMIN  mg 24 hr tablet Take 1 tablet (500 mg) by mouth once daily in the evening. Take with meals. Do not crush, chew, or split. 4/21/25 8/19/25  Delta Yuen MD   metoprolol tartrate (Lopressor) 25 mg tablet Take 1 tablet (25 mg) by mouth 2 times a day. 2/14/25   Delta Yuen MD   ondansetron ODT (Zofran-ODT) 4 mg disintegrating tablet Dissolve 1 tablet (4 mg) in the mouth every 8 hours if needed for nausea or vomiting for up to 7 days. 4/25/25 5/2/25  Larry Kay PA-C   paliperidone (Invega) 3 mg 24 hr tablet Take 1 tablet (3 mg) by mouth once daily. Do not crush, chew, or split. Do not start before January 15, 2024. 1/15/24 12/2/24  Pete Trevizo MD   pantoprazole (ProtoNix) 40 mg EC tablet Take 1 tablet (40 mg) by mouth once daily. 9/3/24   Delta Yuen MD   polyethylene glycol (Glycolax, Miralax) 17 gram packet Take 17 g by mouth once daily for 7 days. 4/25/25 5/2/25  Larry Kay PA-C   prazosin (Minipress) 5 mg capsule Take 1 capsule (5 mg) by mouth once daily at bedtime. 1/23/24   Historical Provider, MD   risperiDONE (RisperDAL) 2 mg tablet Take 1 tablet (2 mg) by mouth every 12 hours. 4/1/25    Historical Provider, MD   sucralfate (Carafate) 100 mg/mL suspension Take 10 mL (1 g) by mouth 2 times a day for 10 days. 4/25/25 5/5/25  Larry Kay PA-C   tiotropium (Spiriva Respimat) 2.5 mcg/actuation inhaler Inhale 2 puffs once daily. Do not start before March 9, 2024. 3/9/24   Edwardo Acosta MD   traZODone (Desyrel) 50 mg tablet Take 1 tablet (50 mg) by mouth early in the morning.. 4/1/25   Historical Provider, MD   ubrogepant (Ubrelvy) 100 mg tablet Take 1 tablet (100 mg) by mouth 1 time if needed (Headache) for up to 20 doses. The dose may be repeated in 2 hours if headache persists or recurs. No more than 2 doses are to be taken in a 24 hour periodl 4/24/25   Delta Yuen MD   Xarelto 10 mg tablet Take 1 tablet (10 mg) by mouth once daily. 1/17/24   Historical Provider, MD   atorvastatin (Lipitor) 20 mg tablet Take 1 tablet (20 mg) by mouth once daily. 2/10/25 5/7/25  Delta Yuen MD       LABS AND IMAGING:     Labs:  Results for orders placed or performed during the hospital encounter of 05/09/25 (from the past 24 hours)   CBC and Auto Differential   Result Value Ref Range    WBC 9.1 4.4 - 11.3 x10*3/uL    nRBC 0.0 0.0 - 0.0 /100 WBCs    RBC 4.59 4.00 - 5.20 x10*6/uL    Hemoglobin 14.5 12.0 - 16.0 g/dL    Hematocrit 45.7 36.0 - 46.0 %     80 - 100 fL    MCH 31.6 26.0 - 34.0 pg    MCHC 31.7 (L) 32.0 - 36.0 g/dL    RDW 14.9 (H) 11.5 - 14.5 %    Platelets 113 (L) 150 - 450 x10*3/uL    Neutrophils % 87.2 40.0 - 80.0 %    Immature Granulocytes %, Automated 1.1 (H) 0.0 - 0.9 %    Lymphocytes % 9.0 13.0 - 44.0 %    Monocytes % 2.2 2.0 - 10.0 %    Eosinophils % 0.1 0.0 - 6.0 %    Basophils % 0.4 0.0 - 2.0 %    Neutrophils Absolute 7.94 (H) 1.20 - 7.70 x10*3/uL    Immature Granulocytes Absolute, Automated 0.10 0.00 - 0.70 x10*3/uL    Lymphocytes Absolute 0.82 (L) 1.20 - 4.80 x10*3/uL    Monocytes Absolute 0.20 0.10 - 1.00 x10*3/uL    Eosinophils Absolute 0.01 0.00 - 0.70 x10*3/uL    Basophils  Absolute 0.04 0.00 - 0.10 x10*3/uL   Comprehensive metabolic panel   Result Value Ref Range    Glucose 130 (H) 74 - 99 mg/dL    Sodium 142 136 - 145 mmol/L    Potassium 4.3 3.5 - 5.3 mmol/L    Chloride 104 98 - 107 mmol/L    Bicarbonate 29 21 - 32 mmol/L    Anion Gap 13 10 - 20 mmol/L    Urea Nitrogen 13 6 - 23 mg/dL    Creatinine 0.77 0.50 - 1.05 mg/dL    eGFR >90 >60 mL/min/1.73m*2    Calcium 9.3 8.6 - 10.3 mg/dL    Albumin 4.6 3.4 - 5.0 g/dL    Alkaline Phosphatase 75 33 - 110 U/L    Total Protein 7.7 6.4 - 8.2 g/dL    AST 13 9 - 39 U/L    Bilirubin, Total 0.4 0.0 - 1.2 mg/dL    ALT 13 7 - 45 U/L   Troponin I, High Sensitivity   Result Value Ref Range    Troponin I, High Sensitivity 4 0 - 13 ng/L   B-Type Natriuretic Peptide   Result Value Ref Range    BNP 27 0 - 99 pg/mL   ECG 12 lead   Result Value Ref Range    Ventricular Rate 71 BPM    Atrial Rate 71 BPM    TX Interval 128 ms    QRS Duration 84 ms    QT Interval 396 ms    QTC Calculation(Bazett) 430 ms    P Axis 50 degrees    R Axis -44 degrees    T Axis 37 degrees    QRS Count 11 beats    Q Onset 211 ms    P Onset 147 ms    P Offset 195 ms    T Offset 409 ms    QTC Fredericia 419 ms     *Note: Due to a large number of results and/or encounters for the requested time period, some results have not been displayed. A complete set of results can be found in Results Review.        Imaging:  CT angio chest for pulmonary embolism   Final Result   No significant pulmonary embolism.        Cardiomegaly seen with bilateral ground-glass airspace opacities as   well as additional more patchy areas of airspace opacity in the lung   bases. Correlate with infectious or inflammatory process versus edema.        MACRO:   None.        Signed by: Tavares Duckworth 5/9/2025 8:12 PM   Dictation workstation:   [id number]      XR chest 2 views   Final Result   No acute cardiopulmonary process.             MACRO:   None.        Signed by: Renzo Boyd 5/9/2025 6:27 PM   Dictation  workstation:   [id number]                [1]   Past Surgical History:  Procedure Laterality Date     SECTION, LOW TRANSVERSE      x 1    EYE SURGERY      LITHOTRIPSY      STAPEDES SURGERY      TONSILLECTOMY      UMBILICAL HERNIA REPAIR      VENTRAL HERNIA REPAIR     [2]   Family History  Problem Relation Name Age of Onset    Fibromyalgia Father      Hypertension Brother      Thyroid disease Maternal Grandmother      Thyroid disease Paternal Grandmother      Lung cancer Paternal Grandfather      Coronary artery disease Other Grandmother    [3] azithromycin, 500 mg, oral, Once  cefTRIAXone, 2 g, intravenous, Once    [4] [5]

## 2025-05-10 NOTE — SIGNIFICANT EVENT
05/10/25 0653   Inhalation Therapy   Breath Sounds Pre-Treatment Right Expiratory wheeze   Breath Sounds Pre-Treatment Left Expiratory wheeze   Pre-Treatment Pulse 70   Pre-Treatment Respirations 18   Breath Sounds Post-Treatment Right Expiratory wheeze   Breath Sounds Post-Treatment Left Expiratory wheeze   Post-Treatment Pulse 71   Post-Treatment Respirations 18   Delivery Source Oxygen   Position Supine   Treatment Tolerance Tolerated well   $ Aerosol/MDI Treatment Charge Aerosol/inhalation treatment     Upon assessment, I observed patient on 6LNC with humidification, and bilevel OSB, Treatment administered per order

## 2025-05-10 NOTE — CARE PLAN
Problem: Fall/Injury  Goal: Not fall by end of shift  5/9/2025 2305 by Aubree Ordaz RN  Outcome: Progressing  5/9/2025 2305 by Aubree Ordaz RN  Outcome: Progressing  Goal: Be free from injury by end of the shift  5/9/2025 2305 by Aubree Ordaz RN  Outcome: Progressing  5/9/2025 2305 by Aubree Ordaz RN  Outcome: Progressing   The patient's goals for the shift include rest    The clinical goals for the shift include pt will remain HDS throughout shift.

## 2025-05-10 NOTE — SIGNIFICANT EVENT
05/10/25 1308   Inhalation Therapy   Breath Sounds Pre-Treatment Right Diminished   Breath Sounds Pre-Treatment Left Diminished   Pre-Treatment Pulse 65   Pre-Treatment Respirations 18   Breath Sounds Post-Treatment Right Diminished   Breath Sounds Post-Treatment Left Diminished   Post-Treatment Pulse 74   Post-Treatment Respirations 18   Delivery Source Oxygen   Position Sitting   Treatment Tolerance Tolerated well   $ Aerosol/MDI Treatment Charge Aerosol/inhalation treatment     Upon assessment, I observed patient on 8LNC with humidification, Treatment administered per order, Bilevel also OSB at this time

## 2025-05-10 NOTE — PROGRESS NOTES
Stephenie Mccray is a 44 y.o. female on day 1 of admission presenting with Acute on chronic respiratory failure with hypoxia.      Subjective   Patient is feeling significantly better.  She is on 6 L of nasal cannula.  She was anxious earlier       Objective     Last Recorded Vitals  /74 (BP Location: Left arm, Patient Position: Lying)   Pulse 73   Temp 36 °C (96.8 °F) (Temporal)   Resp 18   Wt 72.6 kg (160 lb)   SpO2 91%   Intake/Output last 3 Shifts:    Intake/Output Summary (Last 24 hours) at 5/10/2025 1113  Last data filed at 5/10/2025 0500  Gross per 24 hour   Intake 222 ml   Output 450 ml   Net -228 ml       Admission Weight  Weight: 72.6 kg (160 lb) (05/09/25 1659)    Daily Weight  05/09/25 : 72.6 kg (160 lb)    Image Results  CT angio chest for pulmonary embolism  Narrative: Interpreted By:  Tavares Duckworth,   STUDY:  CT ANGIO CHEST FOR PULMONARY EMBOLISM;  5/9/2025 7:46 pm      INDICATION:  Signs/Symptoms:Hypoixa..      COMPARISON:  03/21/2025      ACCESSION NUMBER(S):  [id number]      ORDERING CLINICIAN:  LUCIANO MCKAY      TECHNIQUE:  Contiguous axial images of the chest were obtained after the  intravenous administration of iodinated contrast using angiographic  PE protocol. Coronal and sagittal reformatted images were  reconstructed from the axial data. MIP images were created on an  independent workstation and reviewed.      FINDINGS:  No significant pulmonary embolism. Dilated appearing main pulmonary  artery measures 3.5 cm in diameter.      Mild to moderate cardiomegaly is present. No significant adenopathy  in the chest.      There is some bilateral scattered ground-glass airspace opacity seen  with areas of more patchy airspace opacity in the lung bases. This  may potentially reflect combination of edema and/or pneumonia.      No significant effusions.      Osseous structures are intact.      Impression: No significant pulmonary embolism.      Cardiomegaly seen with bilateral  ground-glass airspace opacities as  well as additional more patchy areas of airspace opacity in the lung  bases. Correlate with infectious or inflammatory process versus edema.      MACRO:  None.      Signed by: Tavares Duckworth 5/9/2025 8:12 PM  Dictation workstation:   [id number]  XR chest 2 views  Narrative: Interpreted By:  Renzo Boyd,   STUDY:  XR CHEST 2 VIEWS;  5/9/2025 6:13 pm      INDICATION:  Signs/Symptoms:Hypoxia..          COMPARISON:  04/25/2025      ACCESSION NUMBER(S):  [id number]      ORDERING CLINICIAN:  LUCIANO MCKAY      FINDINGS:          The cardiomediastinal silhouette and pulmonary vasculature are within  normal limits.      No consolidation, pleural effusion or pneumothorax.          Impression: No acute cardiopulmonary process.          MACRO:  None.      Signed by: Renzo Boyd 5/9/2025 6:27 PM  Dictation workstation:   [id number]  ECG 12 lead  Sinus rhythm with marked sinus arrhythmia  Left axis deviation  Cannot rule out Anterior infarct (cited on or before 20-MAR-2025)  Abnormal ECG  When compared with ECG of 25-APR-2025 09:38,  No significant change was found    See ED provider note for full interpretation and clinical correlation      Physical Exam  Constitutional:       Appearance: Normal appearance.   HENT:      Head: Normocephalic and atraumatic.      Nose: Nose normal.   Eyes:      Extraocular Movements: Extraocular movements intact.      Pupils: Pupils are equal, round, and reactive to light.   Cardiovascular:      Rate and Rhythm: Normal rate and regular rhythm.      Heart sounds: Normal heart sounds.   Pulmonary:      Effort: Pulmonary effort is normal.      Breath sounds: Wheezing present.   Abdominal:      General: Bowel sounds are normal.      Palpations: Abdomen is soft.   Musculoskeletal:      Cervical back: Neck supple.   Skin:     General: Skin is warm and dry.   Neurological:      General: No focal deficit present.      Mental Status: She is alert. Mental  status is at baseline.   Psychiatric:         Mood and Affect: Mood normal.         Relevant Results               This patient currently has cardiac telemetry ordered; if you would like to modify or discontinue the telemetry order, click here to go to the orders activity to modify/discontinue the order.              Assessment & Plan  Acute on chronic respiratory failure with hypoxia    Problem list    Acute on chronic hypoxic and hypercapnic respiratory failure  Acute COPD exacerbation  Bilateral pneumonia suspect atypical  Tobacco abuse and she continues to smoke  Severe anxiety  Type 2 diabetes    Treatment plan    Start IV Solu-Medrol.  Continue bronchodilator therapy.  Wean down oxygen as able.  Patient looks significantly better.  BiPAP at night and as needed.  Continue current antibiotics.  Resumed Klonopin for anxiety.           Franchesca Gutierrez MD

## 2025-05-10 NOTE — SIGNIFICANT EVENT
Re-evaluated patient earlier this morning per ICU provider request, appears comfortable on BiPAP without signs of respiratory distress.  Patient was continued on BiPAP with repeat ABG showing improvement in hypercapnia and almost full resolution of respiratory acidosis.  Mental status significantly improved, now fully awake and alert without somnolence,  Kept patient on BiPAP for additional 1.5 hours following repeat ABG.  She is know saturating 89-91% on 6L nasal cannula (baseline is 4L) without tachypnea or increased work of breathing.  Stable to remain on stepdown unit, no indication for ICU transfer at this time.  Continue remainder of plan as documented in admission H&P.    Ariel Gloria MD

## 2025-05-11 ENCOUNTER — APPOINTMENT (OUTPATIENT)
Dept: CARDIOLOGY | Facility: HOSPITAL | Age: 45
DRG: 189 | End: 2025-05-11
Payer: COMMERCIAL

## 2025-05-11 VITALS
SYSTOLIC BLOOD PRESSURE: 153 MMHG | OXYGEN SATURATION: 98 % | RESPIRATION RATE: 18 BRPM | HEIGHT: 62 IN | HEART RATE: 70 BPM | TEMPERATURE: 98.2 F | DIASTOLIC BLOOD PRESSURE: 89 MMHG | BODY MASS INDEX: 29.44 KG/M2 | WEIGHT: 160 LBS

## 2025-05-11 LAB
ALBUMIN SERPL BCP-MCNC: 4.3 G/DL (ref 3.4–5)
ALP SERPL-CCNC: 64 U/L (ref 33–110)
ALT SERPL W P-5'-P-CCNC: 11 U/L (ref 7–45)
ANION GAP SERPL CALC-SCNC: 12 MMOL/L (ref 10–20)
AST SERPL W P-5'-P-CCNC: 8 U/L (ref 9–39)
ATRIAL RATE: 66 BPM
ATRIAL RATE: 71 BPM
BASOPHILS # BLD AUTO: 0.04 X10*3/UL (ref 0–0.1)
BASOPHILS NFR BLD AUTO: 0.4 %
BILIRUB SERPL-MCNC: 0.2 MG/DL (ref 0–1.2)
BUN SERPL-MCNC: 22 MG/DL (ref 6–23)
CALCIUM SERPL-MCNC: 9.8 MG/DL (ref 8.6–10.3)
CHLORIDE SERPL-SCNC: 104 MMOL/L (ref 98–107)
CO2 SERPL-SCNC: 30 MMOL/L (ref 21–32)
CREAT SERPL-MCNC: 0.72 MG/DL (ref 0.5–1.05)
EGFRCR SERPLBLD CKD-EPI 2021: >90 ML/MIN/1.73M*2
EOSINOPHIL # BLD AUTO: 0 X10*3/UL (ref 0–0.7)
EOSINOPHIL NFR BLD AUTO: 0 %
ERYTHROCYTE [DISTWIDTH] IN BLOOD BY AUTOMATED COUNT: 14.8 % (ref 11.5–14.5)
GLUCOSE BLD MANUAL STRIP-MCNC: 178 MG/DL (ref 74–99)
GLUCOSE BLD MANUAL STRIP-MCNC: 187 MG/DL (ref 74–99)
GLUCOSE BLD MANUAL STRIP-MCNC: 192 MG/DL (ref 74–99)
GLUCOSE BLD MANUAL STRIP-MCNC: 211 MG/DL (ref 74–99)
GLUCOSE SERPL-MCNC: 196 MG/DL (ref 74–99)
HCT VFR BLD AUTO: 43 % (ref 36–46)
HGB BLD-MCNC: 13.4 G/DL (ref 12–16)
IMM GRANULOCYTES # BLD AUTO: 0.37 X10*3/UL (ref 0–0.7)
IMM GRANULOCYTES NFR BLD AUTO: 3.4 % (ref 0–0.9)
LYMPHOCYTES # BLD AUTO: 1.14 X10*3/UL (ref 1.2–4.8)
LYMPHOCYTES NFR BLD AUTO: 10.4 %
MAGNESIUM SERPL-MCNC: 1.68 MG/DL (ref 1.6–2.4)
MCH RBC QN AUTO: 31.4 PG (ref 26–34)
MCHC RBC AUTO-ENTMCNC: 31.2 G/DL (ref 32–36)
MCV RBC AUTO: 101 FL (ref 80–100)
MONOCYTES # BLD AUTO: 0.52 X10*3/UL (ref 0.1–1)
MONOCYTES NFR BLD AUTO: 4.7 %
NEUTROPHILS # BLD AUTO: 8.91 X10*3/UL (ref 1.2–7.7)
NEUTROPHILS NFR BLD AUTO: 81.1 %
NRBC BLD-RTO: 0 /100 WBCS (ref 0–0)
P AXIS: 42 DEGREES
P AXIS: 50 DEGREES
P OFFSET: 195 MS
P OFFSET: 196 MS
P ONSET: 146 MS
P ONSET: 147 MS
PLATELET # BLD AUTO: 120 X10*3/UL (ref 150–450)
POTASSIUM SERPL-SCNC: 4.7 MMOL/L (ref 3.5–5.3)
PR INTERVAL: 126 MS
PR INTERVAL: 128 MS
PROT SERPL-MCNC: 7.2 G/DL (ref 6.4–8.2)
Q ONSET: 209 MS
Q ONSET: 211 MS
QRS COUNT: 11 BEATS
QRS COUNT: 11 BEATS
QRS DURATION: 84 MS
QRS DURATION: 88 MS
QT INTERVAL: 386 MS
QT INTERVAL: 396 MS
QTC CALCULATION(BAZETT): 404 MS
QTC CALCULATION(BAZETT): 430 MS
QTC FREDERICIA: 398 MS
QTC FREDERICIA: 419 MS
R AXIS: -20 DEGREES
R AXIS: -44 DEGREES
RBC # BLD AUTO: 4.27 X10*6/UL (ref 4–5.2)
SODIUM SERPL-SCNC: 141 MMOL/L (ref 136–145)
T AXIS: 25 DEGREES
T AXIS: 37 DEGREES
T OFFSET: 402 MS
T OFFSET: 409 MS
VENTRICULAR RATE: 66 BPM
VENTRICULAR RATE: 71 BPM
WBC # BLD AUTO: 11 X10*3/UL (ref 4.4–11.3)

## 2025-05-11 PROCEDURE — 85025 COMPLETE CBC W/AUTO DIFF WBC: CPT | Performed by: NURSE PRACTITIONER

## 2025-05-11 PROCEDURE — 97161 PT EVAL LOW COMPLEX 20 MIN: CPT | Mod: GP

## 2025-05-11 PROCEDURE — 2500000004 HC RX 250 GENERAL PHARMACY W/ HCPCS (ALT 636 FOR OP/ED): Mod: JZ | Performed by: NURSE PRACTITIONER

## 2025-05-11 PROCEDURE — 93010 ELECTROCARDIOGRAM REPORT: CPT | Performed by: INTERNAL MEDICINE

## 2025-05-11 PROCEDURE — 97165 OT EVAL LOW COMPLEX 30 MIN: CPT | Mod: GO | Performed by: OCCUPATIONAL THERAPIST

## 2025-05-11 PROCEDURE — 93005 ELECTROCARDIOGRAM TRACING: CPT

## 2025-05-11 PROCEDURE — 94640 AIRWAY INHALATION TREATMENT: CPT

## 2025-05-11 PROCEDURE — 2020000001 HC ICU ROOM DAILY

## 2025-05-11 PROCEDURE — 83735 ASSAY OF MAGNESIUM: CPT | Performed by: NURSE PRACTITIONER

## 2025-05-11 PROCEDURE — 2500000001 HC RX 250 WO HCPCS SELF ADMINISTERED DRUGS (ALT 637 FOR MEDICARE OP): Performed by: NURSE PRACTITIONER

## 2025-05-11 PROCEDURE — 2500000001 HC RX 250 WO HCPCS SELF ADMINISTERED DRUGS (ALT 637 FOR MEDICARE OP): Performed by: STUDENT IN AN ORGANIZED HEALTH CARE EDUCATION/TRAINING PROGRAM

## 2025-05-11 PROCEDURE — 94660 CPAP INITIATION&MGMT: CPT

## 2025-05-11 PROCEDURE — 82947 ASSAY GLUCOSE BLOOD QUANT: CPT

## 2025-05-11 PROCEDURE — 2500000004 HC RX 250 GENERAL PHARMACY W/ HCPCS (ALT 636 FOR OP/ED): Mod: JZ | Performed by: STUDENT IN AN ORGANIZED HEALTH CARE EDUCATION/TRAINING PROGRAM

## 2025-05-11 PROCEDURE — 2500000005 HC RX 250 GENERAL PHARMACY W/O HCPCS: Performed by: STUDENT IN AN ORGANIZED HEALTH CARE EDUCATION/TRAINING PROGRAM

## 2025-05-11 PROCEDURE — 2500000002 HC RX 250 W HCPCS SELF ADMINISTERED DRUGS (ALT 637 FOR MEDICARE OP, ALT 636 FOR OP/ED): Mod: JZ | Performed by: NURSE PRACTITIONER

## 2025-05-11 PROCEDURE — 36415 COLL VENOUS BLD VENIPUNCTURE: CPT | Performed by: NURSE PRACTITIONER

## 2025-05-11 PROCEDURE — 2500000002 HC RX 250 W HCPCS SELF ADMINISTERED DRUGS (ALT 637 FOR MEDICARE OP, ALT 636 FOR OP/ED): Performed by: STUDENT IN AN ORGANIZED HEALTH CARE EDUCATION/TRAINING PROGRAM

## 2025-05-11 PROCEDURE — 80053 COMPREHEN METABOLIC PANEL: CPT | Performed by: NURSE PRACTITIONER

## 2025-05-11 PROCEDURE — 99232 SBSQ HOSP IP/OBS MODERATE 35: CPT | Performed by: STUDENT IN AN ORGANIZED HEALTH CARE EDUCATION/TRAINING PROGRAM

## 2025-05-11 RX ORDER — TRAZODONE HYDROCHLORIDE 50 MG/1
50 TABLET ORAL NIGHTLY
Status: DISCONTINUED | OUTPATIENT
Start: 2025-05-11 | End: 2025-05-12 | Stop reason: HOSPADM

## 2025-05-11 RX ADMIN — METHYLPREDNISOLONE SODIUM SUCCINATE 40 MG: 40 INJECTION, POWDER, LYOPHILIZED, FOR SOLUTION INTRAMUSCULAR; INTRAVENOUS at 21:40

## 2025-05-11 RX ADMIN — CLONIDINE HYDROCHLORIDE 0.1 MG: 0.1 TABLET ORAL at 14:17

## 2025-05-11 RX ADMIN — TRAZODONE HYDROCHLORIDE 50 MG: 50 TABLET ORAL at 21:53

## 2025-05-11 RX ADMIN — AZITHROMYCIN MONOHYDRATE 500 MG: 500 INJECTION, POWDER, LYOPHILIZED, FOR SOLUTION INTRAVENOUS at 00:05

## 2025-05-11 RX ADMIN — Medication 6 L/MIN: at 07:11

## 2025-05-11 RX ADMIN — CEFTRIAXONE 2 G: 2 INJECTION, SOLUTION INTRAVENOUS at 21:53

## 2025-05-11 RX ADMIN — CLONAZEPAM 0.5 MG: 0.5 TABLET ORAL at 17:56

## 2025-05-11 RX ADMIN — RISPERIDONE 2 MG: 1 TABLET, FILM COATED ORAL at 06:30

## 2025-05-11 RX ADMIN — PANTOPRAZOLE SODIUM 40 MG: 40 TABLET, DELAYED RELEASE ORAL at 08:48

## 2025-05-11 RX ADMIN — PRAZOSIN HYDROCHLORIDE 5 MG: 5 CAPSULE ORAL at 21:53

## 2025-05-11 RX ADMIN — RISPERIDONE 2 MG: 1 TABLET, FILM COATED ORAL at 17:53

## 2025-05-11 RX ADMIN — SITAGLIPTIN 50 MG: 25 TABLET, FILM COATED ORAL at 05:37

## 2025-05-11 RX ADMIN — METHYLPREDNISOLONE SODIUM SUCCINATE 40 MG: 40 INJECTION, POWDER, LYOPHILIZED, FOR SOLUTION INTRAMUSCULAR; INTRAVENOUS at 12:06

## 2025-05-11 RX ADMIN — METFORMIN ER 500 MG 500 MG: 500 TABLET ORAL at 16:41

## 2025-05-11 RX ADMIN — METHYLPREDNISOLONE SODIUM SUCCINATE 40 MG: 40 INJECTION, POWDER, LYOPHILIZED, FOR SOLUTION INTRAMUSCULAR; INTRAVENOUS at 03:57

## 2025-05-11 RX ADMIN — CLONIDINE HYDROCHLORIDE 0.1 MG: 0.1 TABLET ORAL at 05:49

## 2025-05-11 RX ADMIN — IPRATROPIUM BROMIDE AND ALBUTEROL SULFATE 3 ML: .5; 3 SOLUTION RESPIRATORY (INHALATION) at 07:09

## 2025-05-11 RX ADMIN — RIVAROXABAN 10 MG: 20 TABLET, FILM COATED ORAL at 08:48

## 2025-05-11 RX ADMIN — CLONAZEPAM 0.5 MG: 0.5 TABLET ORAL at 08:51

## 2025-05-11 RX ADMIN — ACETAMINOPHEN 650 MG: 325 TABLET ORAL at 01:04

## 2025-05-11 RX ADMIN — INSULIN GLARGINE 10 UNITS: 100 INJECTION, SOLUTION SUBCUTANEOUS at 12:06

## 2025-05-11 RX ADMIN — IPRATROPIUM BROMIDE AND ALBUTEROL SULFATE 3 ML: .5; 3 SOLUTION RESPIRATORY (INHALATION) at 21:48

## 2025-05-11 RX ADMIN — ACETAMINOPHEN 650 MG: 325 TABLET ORAL at 12:12

## 2025-05-11 RX ADMIN — METOPROLOL TARTRATE 25 MG: 25 TABLET, FILM COATED ORAL at 08:49

## 2025-05-11 RX ADMIN — IPRATROPIUM BROMIDE AND ALBUTEROL SULFATE 3 ML: .5; 3 SOLUTION RESPIRATORY (INHALATION) at 00:15

## 2025-05-11 RX ADMIN — ESCITALOPRAM OXALATE 20 MG: 10 TABLET, FILM COATED ORAL at 08:49

## 2025-05-11 RX ADMIN — LEVOTHYROXINE SODIUM 175 MCG: 0.05 TABLET ORAL at 05:37

## 2025-05-11 RX ADMIN — IPRATROPIUM BROMIDE AND ALBUTEROL SULFATE 3 ML: .5; 3 SOLUTION RESPIRATORY (INHALATION) at 13:21

## 2025-05-11 RX ADMIN — METOPROLOL TARTRATE 25 MG: 25 TABLET, FILM COATED ORAL at 21:53

## 2025-05-11 RX ADMIN — ATORVASTATIN CALCIUM 20 MG: 20 TABLET, FILM COATED ORAL at 08:49

## 2025-05-11 ASSESSMENT — PAIN - FUNCTIONAL ASSESSMENT
PAIN_FUNCTIONAL_ASSESSMENT: 0-10
PAIN_FUNCTIONAL_ASSESSMENT: UNABLE TO SELF-REPORT
PAIN_FUNCTIONAL_ASSESSMENT: 0-10

## 2025-05-11 ASSESSMENT — COGNITIVE AND FUNCTIONAL STATUS - GENERAL
MOBILITY SCORE: 24
MOBILITY SCORE: 22
CLIMB 3 TO 5 STEPS WITH RAILING: A LITTLE
WALKING IN HOSPITAL ROOM: A LITTLE
DAILY ACTIVITIY SCORE: 24
DAILY ACTIVITIY SCORE: 24

## 2025-05-11 ASSESSMENT — PAIN SCALES - GENERAL
PAINLEVEL_OUTOF10: 3
PAINLEVEL_OUTOF10: 0 - NO PAIN

## 2025-05-11 NOTE — SIGNIFICANT EVENT
05/11/25 1321   Inhalation Therapy   Breath Sounds Pre-Treatment Right Diminished   Breath Sounds Pre-Treatment Left Diminished   Pre-Treatment Pulse 66   Pre-Treatment Respirations 18   Breath Sounds Post-Treatment Right Diminished   Breath Sounds Post-Treatment Left Diminished   Post-Treatment Pulse 82   Post-Treatment Respirations 18   Delivery Source Oxygen   Position Sitting   Treatment Tolerance Tolerated well   $ Aerosol/MDI Treatment Charge Aerosol/inhalation treatment

## 2025-05-11 NOTE — SIGNIFICANT EVENT
05/11/25 0709   Inhalation Therapy   Breath Sounds Pre-Treatment Right Diminished   Breath Sounds Pre-Treatment Left Diminished   Pre-Treatment Pulse 63   Pre-Treatment Respirations 20   Breath Sounds Post-Treatment Right Inspiratory wheeze;Expiratory wheeze   Breath Sounds Post-Treatment Left Inspiratory wheeze;Expiratory wheeze   Post-Treatment Pulse 67   Post-Treatment Respirations 20   Delivery Source Oxygen   Position Supine   Treatment Tolerance Tolerated well   $ Aerosol/MDI Treatment Charge Aerosol/inhalation treatment     Upon assessment, I observed patient on 6LNC with humidification, and Bilevel OSB upon entrance into the room, Treatment administered per order

## 2025-05-11 NOTE — PROGRESS NOTES
Occupational Therapy    Evaluation    Patient Name: Stephenie Mccray  MRN: [id number]  Today's Date: 5/11/2025  Time Calculation  Start Time: 1000  Stop Time: 1010  Time Calculation (min): 10 min  816/816-A    Assessment  IP OT Assessment  End of Session Communication: Bedside nurse  End of Session Patient Position: Bed, 3 rail up, Alarm off, not on at start of session    Plan:  No Skilled OT: No acute OT goals identified  OT Frequency: OT eval only  OT - OK to Discharge:  (When medically appropraite)    Subjective     Current Problem:  1. Acute on chronic respiratory failure with hypoxia            General:  General  Reason for Referral: Impaired ADLs/Safety assessment  Referred By: PT/OT Eval and treat Afua (APRN) 5/9  Past Medical History Relevant to Rehab: Past medical history of COPD home O2 4L NC as needed, schizophrenia, HTN, HLD, hypothyroid, DM2, pulmonary embolism,  Family/Caregiver Present: No  Co-Treatment: PT  Co-Treatment Reason: Maximize patient safety  Prior to Session Communication: Bedside nurse  Patient Position Received: Up in chair (6L 02, tele)  General Comment: 44 y.o. female presenting to [place] [facility] with complaints of shortness of breath that began approximately 2 days ago. CXR  (-); CT Angio chest (-) cardiomegaly, bilateral ground glass airspace opacities    Precautions:  Medical Precautions: Fall precautions    Vital Signs:  Heart Rate: 90  SpO2: 93 %    Pain:  Pain Assessment  Pain Assessment: 0-10  0-10 (Numeric) Pain Score: 0 - No pain    Objective     Cognition:  Overall Cognitive Status: Within Functional Limits  Orientation Level: Oriented X4     Home Living:  Home Living Comments: Patient reported living in  1 level home with her  amd mother in law. 3 saskia/HR to enter the home. No AD, Independent with ADLS/IADLs, grooming independent, toileting independent     ADL:  ADL Comments: UB/LB bathing and dressing Independent; grooming Independent, Toileting Independent    Activity  Tolerance:  Endurance: Endurance does not limit participation in activity    Bed Mobility/Transfers:   Bed Mobility  Bed Mobility: Yes  Bed Mobility 1  Bed Mobility Comments 1: Independent  Transfers  Transfer: Yes  Transfer 1  Trials/Comments 1: Sup-sti-stand- Independent     Sensation:  Light Touch: No apparent deficits    Strength:  Strength Comments: 4/5    Coordination:  Movements are Fluid and Coordinated: Yes     Hand Function:  Hand Function  Gross Grasp: Functional    Extremities: RUE   RUE : Within Functional Limits and LUE   LUE: Within Functional Limits    Outcome Measures: [facility] Daily Activity  Putting on and taking off regular lower body clothing: None  Bathing (including washing, rinsing, drying): None  Putting on and taking off regular upper body clothing: None  Toileting, which includes using toilet, bedpan or urinal: None  Taking care of personal grooming such as brushing teeth: None  Eating Meals: None  Daily Activity - Total Score: 24

## 2025-05-11 NOTE — PROGRESS NOTES
Physical Therapy    Physical Therapy Evaluation    Patient Name: Stephenie Mccray  MRN: [id number]  Today's Date: 5/11/2025   Time Calculation  Start Time: 1001  Stop Time: 1010  Time Calculation (min): 9 min  816/816-A    Assessment/Plan   PT Assessment  Barriers to Discharge Home: No anticipated barriers  End of Session Communication: Bedside nurse  Assessment Comment: PT eval only pt at her baseline mobility level no PT needs at this time.  End of Session Patient Position: Bed, 3 rail up, Alarm off, not on at start of session  IP OR SWING BED PT PLAN  Inpatient or Swing Bed: Inpatient  PT Plan  PT Plan: PT Eval only  PT Eval Only Reason: At baseline function  PT Frequency: PT eval only  PT Discharge Recommendations: No further acute PT  PT Recommended Transfer Status: Independent  PT - OK to Discharge: Yes (once medically ready for discharge to next level care.)  Subjective   Current Problem:  1. Acute on chronic respiratory failure with hypoxia          General Visit Information:  General  Reason for Referral: impaired mobility  Referred By: PT/OT Eval and treat Afua (APRN) 5/9  Past Medical History Relevant to Rehab: Past medical history of COPD home O2 4L NC as needed, schizophrenia, HTN, HLD, hypothyroid, DM2, pulmonary embolism,  Family/Caregiver Present: No  Co-Treatment: OT  Co-Treatment Reason: Maximize patient safety  Prior to Session Communication: Bedside nurse  Patient Position Received: Up in chair (6 liter 02 . no alarm .)  General Comment: 44 y.o. female presenting to [place] ER with complaints of shortness of breath that began approximately 2 days ago. CXR  (-); CT Angio chest (-) cardiomegaly, bilateral ground glass airspace opacities  Home Living:  Home Living  Home Living Comments: Patient reported living in 1 level home with her  and mother in law. 3 saskia/HR to enter the home. No AD, Independent with ADLS/IADLs, grooming independent, toileting independent. Does not drive, zero falls past  three moths.  Precautions:  Precautions  Medical Precautions: Fall precautions, Oxygen therapy device and L/min  Vital Signs:  Vital Signs  Heart Rate: 90  SpO2: 93 %  Objective   Pain:  Pain Assessment  Pain Assessment: 0-10  0-10 (Numeric) Pain Score: 0 - No pain  Cognition:  Cognition  Overall Cognitive Status: Within Functional Limits  Orientation Level: Oriented X4  General Assessments:  Activity Tolerance  Endurance: Endurance does not limit participation in activity  Sensation  Sensation Comment: intact  Strength  Strength Comments: BLEs 5/5  Coordination  Movements are Fluid and Coordinated: Yes  Static Sitting Balance  Static Sitting-Comment/Number of Minutes: good  Dynamic Sitting Balance  Dynamic Sitting-Comments: good  Static Standing Balance  Static Standing-Comment/Number of Minutes: good  Dynamic Standing Balance  Dynamic Standing-Comments: good  Functional Assessments:  Bed Mobility  Bed Mobility: Yes  Bed Mobility 1  Bed Mobility Comments 1: IND in and out of bed  Transfers  Transfer: Yes  Transfer 1  Technique 1: Sit to stand, Stand to sit  Transfer Level of Assistance 1: Independent  Trials/Comments 1: IND no A/D  Ambulation/Gait Training  Ambulation/Gait Training Performed: Yes (30ft x 2 with no A/D  iND)  Extremity/Trunk Assessments:  RUE   RUE : Within Functional Limits  LUE   LUE: Within Functional Limits  RLE   RLE : Within Functional Limits  LLE   LLE : Within Functional Limits  Outcome Measures:  [facility] Basic Mobility  Turning from your back to your side while in a flat bed without using bedrails: None  Moving from lying on your back to sitting on the side of a flat bed without using bedrails: None  Moving to and from bed to chair (including a wheelchair): None  Standing up from a chair using your arms (e.g. wheelchair or bedside chair): None  To walk in hospital room: None  Climbing 3-5 steps with railing: None  Basic Mobility - Total Score: 24

## 2025-05-11 NOTE — PROGRESS NOTES
Stephenie Mccray is a 44 y.o. female on day 2 of admission presenting with Acute on chronic respiratory failure with hypoxia.      Subjective   Patient is feeling significantly better.  She is on 6 L of nasal cannula.    Saturation is 95% on 6 L.  Will try to lower her O2 nasal cannula to 4 L today.       Objective     Last Recorded Vitals  /87   Pulse 90   Temp 37.8 °C (100 °F)   Resp 18   Wt 72.6 kg (160 lb)   SpO2 96%   Intake/Output last 3 Shifts:    Intake/Output Summary (Last 24 hours) at 5/11/2025 1206  Last data filed at 5/11/2025 0906  Gross per 24 hour   Intake 708 ml   Output 950 ml   Net -242 ml       Admission Weight  Weight: 72.6 kg (160 lb) (05/09/25 1659)    Daily Weight  05/09/25 : 72.6 kg (160 lb)    Image Results  ECG 12 lead  Sinus rhythm with marked sinus arrhythmia  Otherwise normal ECG  When compared with ECG of 09-MAY-2025 22:16, (unconfirmed)  Minimal criteria for Anterior infarct are no longer Present  T wave inversion less evident in Anterior leads  QT has shortened  ECG 12 lead  Sinus rhythm with marked sinus arrhythmia  Left axis deviation  Cannot rule out Anterior infarct (cited on or before 20-MAR-2025)  Abnormal ECG  When compared with ECG of 25-APR-2025 09:38,  No significant change was found    See ED provider note for full interpretation and clinical correlation  Confirmed by Jagjit Nichole ([id number]) on 5/11/2025 10:45:20 AM      Physical Exam  Constitutional:       Appearance: Normal appearance.   HENT:      Head: Normocephalic and atraumatic.      Nose: Nose normal.   Eyes:      Extraocular Movements: Extraocular movements intact.      Pupils: Pupils are equal, round, and reactive to light.   Cardiovascular:      Rate and Rhythm: Normal rate and regular rhythm.      Heart sounds: Normal heart sounds.   Pulmonary:      Effort: Pulmonary effort is normal.      Breath sounds: Wheezing present.   Abdominal:      General: Bowel sounds are normal.      Palpations: Abdomen  is soft.   Musculoskeletal:      Cervical back: Neck supple.   Skin:     General: Skin is warm and dry.   Neurological:      General: No focal deficit present.      Mental Status: She is alert. Mental status is at baseline.   Psychiatric:         Mood and Affect: Mood normal.         Relevant Results               This patient currently has cardiac telemetry ordered; if you would like to modify or discontinue the telemetry order, click here to go to the orders activity to modify/discontinue the order.              Assessment & Plan  Acute on chronic respiratory failure with hypoxia    Problem list    Acute on chronic hypoxic and hypercapnic respiratory failure  Acute COPD exacerbation  Bilateral pneumonia suspect atypical  Tobacco abuse and she continues to smoke  Severe anxiety  Type 2 diabetes    Treatment plan    Continue IV Solu-Medrol, bronchodilator therapy.    Continue to wean oxygen down.    Will need ambulatory pulse ox prior to discharge    BiPAP as needed and at night.    Patient tells me today that she uses her oxygen at home only on an as-needed basis.                 Franchesca Gutierrez MD

## 2025-05-12 ENCOUNTER — APPOINTMENT (OUTPATIENT)
Dept: CARDIOLOGY | Facility: HOSPITAL | Age: 45
DRG: 189 | End: 2025-05-12
Payer: COMMERCIAL

## 2025-05-12 VITALS
SYSTOLIC BLOOD PRESSURE: 155 MMHG | DIASTOLIC BLOOD PRESSURE: 87 MMHG | WEIGHT: 160 LBS | RESPIRATION RATE: 18 BRPM | BODY MASS INDEX: 29.44 KG/M2 | OXYGEN SATURATION: 91 % | TEMPERATURE: 98.1 F | HEART RATE: 82 BPM | HEIGHT: 62 IN

## 2025-05-12 LAB
ALBUMIN SERPL BCP-MCNC: 4.1 G/DL (ref 3.4–5)
ALP SERPL-CCNC: 60 U/L (ref 33–110)
ALT SERPL W P-5'-P-CCNC: 10 U/L (ref 7–45)
ANION GAP SERPL CALC-SCNC: 10 MMOL/L (ref 10–20)
AST SERPL W P-5'-P-CCNC: 7 U/L (ref 9–39)
ATRIAL RATE: 66 BPM
ATRIAL RATE: 85 BPM
BASOPHILS # BLD AUTO: 0.06 X10*3/UL (ref 0–0.1)
BASOPHILS NFR BLD AUTO: 0.5 %
BILIRUB SERPL-MCNC: 0.2 MG/DL (ref 0–1.2)
BUN SERPL-MCNC: 21 MG/DL (ref 6–23)
CALCIUM SERPL-MCNC: 9.8 MG/DL (ref 8.6–10.3)
CHLORIDE SERPL-SCNC: 99 MMOL/L (ref 98–107)
CO2 SERPL-SCNC: 37 MMOL/L (ref 21–32)
CREAT SERPL-MCNC: 0.65 MG/DL (ref 0.5–1.05)
EGFRCR SERPLBLD CKD-EPI 2021: >90 ML/MIN/1.73M*2
EOSINOPHIL # BLD AUTO: 0 X10*3/UL (ref 0–0.7)
EOSINOPHIL NFR BLD AUTO: 0 %
ERYTHROCYTE [DISTWIDTH] IN BLOOD BY AUTOMATED COUNT: 14.5 % (ref 11.5–14.5)
GLUCOSE BLD MANUAL STRIP-MCNC: 215 MG/DL (ref 74–99)
GLUCOSE BLD MANUAL STRIP-MCNC: 238 MG/DL (ref 74–99)
GLUCOSE SERPL-MCNC: 206 MG/DL (ref 74–99)
HCT VFR BLD AUTO: 43 % (ref 36–46)
HGB BLD-MCNC: 13.3 G/DL (ref 12–16)
IMM GRANULOCYTES # BLD AUTO: 0.54 X10*3/UL (ref 0–0.7)
IMM GRANULOCYTES NFR BLD AUTO: 4.9 % (ref 0–0.9)
LYMPHOCYTES # BLD AUTO: 1.23 X10*3/UL (ref 1.2–4.8)
LYMPHOCYTES NFR BLD AUTO: 11.1 %
MAGNESIUM SERPL-MCNC: 1.65 MG/DL (ref 1.6–2.4)
MCH RBC QN AUTO: 31.5 PG (ref 26–34)
MCHC RBC AUTO-ENTMCNC: 30.9 G/DL (ref 32–36)
MCV RBC AUTO: 102 FL (ref 80–100)
MONOCYTES # BLD AUTO: 0.51 X10*3/UL (ref 0.1–1)
MONOCYTES NFR BLD AUTO: 4.6 %
NEUTROPHILS # BLD AUTO: 8.77 X10*3/UL (ref 1.2–7.7)
NEUTROPHILS NFR BLD AUTO: 78.9 %
NRBC BLD-RTO: 0 /100 WBCS (ref 0–0)
P AXIS: 42 DEGREES
P AXIS: 63 DEGREES
P OFFSET: 194 MS
P OFFSET: 196 MS
P ONSET: 142 MS
P ONSET: 146 MS
PLATELET # BLD AUTO: 94 X10*3/UL (ref 150–450)
POTASSIUM SERPL-SCNC: 4.6 MMOL/L (ref 3.5–5.3)
PR INTERVAL: 126 MS
PR INTERVAL: 136 MS
PROT SERPL-MCNC: 6.9 G/DL (ref 6.4–8.2)
Q ONSET: 209 MS
Q ONSET: 210 MS
QRS COUNT: 11 BEATS
QRS COUNT: 14 BEATS
QRS DURATION: 88 MS
QRS DURATION: 88 MS
QT INTERVAL: 386 MS
QT INTERVAL: 386 MS
QTC CALCULATION(BAZETT): 404 MS
QTC CALCULATION(BAZETT): 459 MS
QTC FREDERICIA: 398 MS
QTC FREDERICIA: 433 MS
R AXIS: -20 DEGREES
R AXIS: -43 DEGREES
RBC # BLD AUTO: 4.22 X10*6/UL (ref 4–5.2)
SODIUM SERPL-SCNC: 141 MMOL/L (ref 136–145)
T AXIS: 25 DEGREES
T AXIS: 40 DEGREES
T OFFSET: 402 MS
T OFFSET: 403 MS
VENTRICULAR RATE: 66 BPM
VENTRICULAR RATE: 85 BPM
WBC # BLD AUTO: 11.1 X10*3/UL (ref 4.4–11.3)

## 2025-05-12 PROCEDURE — 2500000001 HC RX 250 WO HCPCS SELF ADMINISTERED DRUGS (ALT 637 FOR MEDICARE OP): Performed by: STUDENT IN AN ORGANIZED HEALTH CARE EDUCATION/TRAINING PROGRAM

## 2025-05-12 PROCEDURE — 94640 AIRWAY INHALATION TREATMENT: CPT

## 2025-05-12 PROCEDURE — 2500000002 HC RX 250 W HCPCS SELF ADMINISTERED DRUGS (ALT 637 FOR MEDICARE OP, ALT 636 FOR OP/ED): Performed by: NURSE PRACTITIONER

## 2025-05-12 PROCEDURE — 36415 COLL VENOUS BLD VENIPUNCTURE: CPT | Performed by: NURSE PRACTITIONER

## 2025-05-12 PROCEDURE — 93010 ELECTROCARDIOGRAM REPORT: CPT | Performed by: INTERNAL MEDICINE

## 2025-05-12 PROCEDURE — 82947 ASSAY GLUCOSE BLOOD QUANT: CPT

## 2025-05-12 PROCEDURE — 2500000004 HC RX 250 GENERAL PHARMACY W/ HCPCS (ALT 636 FOR OP/ED): Performed by: NURSE PRACTITIONER

## 2025-05-12 PROCEDURE — 2500000004 HC RX 250 GENERAL PHARMACY W/ HCPCS (ALT 636 FOR OP/ED): Performed by: STUDENT IN AN ORGANIZED HEALTH CARE EDUCATION/TRAINING PROGRAM

## 2025-05-12 PROCEDURE — 2500000005 HC RX 250 GENERAL PHARMACY W/O HCPCS: Performed by: STUDENT IN AN ORGANIZED HEALTH CARE EDUCATION/TRAINING PROGRAM

## 2025-05-12 PROCEDURE — 2500000001 HC RX 250 WO HCPCS SELF ADMINISTERED DRUGS (ALT 637 FOR MEDICARE OP): Performed by: NURSE PRACTITIONER

## 2025-05-12 PROCEDURE — 2500000002 HC RX 250 W HCPCS SELF ADMINISTERED DRUGS (ALT 637 FOR MEDICARE OP, ALT 636 FOR OP/ED): Performed by: STUDENT IN AN ORGANIZED HEALTH CARE EDUCATION/TRAINING PROGRAM

## 2025-05-12 PROCEDURE — 80053 COMPREHEN METABOLIC PANEL: CPT | Performed by: NURSE PRACTITIONER

## 2025-05-12 PROCEDURE — 94660 CPAP INITIATION&MGMT: CPT

## 2025-05-12 PROCEDURE — 2500000004 HC RX 250 GENERAL PHARMACY W/ HCPCS (ALT 636 FOR OP/ED): Mod: JZ | Performed by: STUDENT IN AN ORGANIZED HEALTH CARE EDUCATION/TRAINING PROGRAM

## 2025-05-12 PROCEDURE — 93005 ELECTROCARDIOGRAM TRACING: CPT

## 2025-05-12 PROCEDURE — 85025 COMPLETE CBC W/AUTO DIFF WBC: CPT | Performed by: NURSE PRACTITIONER

## 2025-05-12 PROCEDURE — 83735 ASSAY OF MAGNESIUM: CPT | Performed by: NURSE PRACTITIONER

## 2025-05-12 PROCEDURE — 99239 HOSP IP/OBS DSCHRG MGMT >30: CPT | Performed by: STUDENT IN AN ORGANIZED HEALTH CARE EDUCATION/TRAINING PROGRAM

## 2025-05-12 RX ORDER — PREDNISONE 10 MG/1
TABLET ORAL
Qty: 30 TABLET | Refills: 0 | Status: ON HOLD | OUTPATIENT
Start: 2025-05-12 | End: 2025-05-17 | Stop reason: ALTCHOICE

## 2025-05-12 RX ORDER — KETOROLAC TROMETHAMINE 30 MG/ML
15 INJECTION, SOLUTION INTRAMUSCULAR; INTRAVENOUS EVERY 6 HOURS PRN
Status: DISCONTINUED | OUTPATIENT
Start: 2025-05-12 | End: 2025-05-12 | Stop reason: HOSPADM

## 2025-05-12 RX ORDER — AZITHROMYCIN 250 MG/1
250 TABLET, FILM COATED ORAL DAILY
Qty: 2 TABLET | Refills: 0 | Status: SHIPPED | OUTPATIENT
Start: 2025-05-13 | End: 2025-05-19 | Stop reason: HOSPADM

## 2025-05-12 RX ADMIN — SITAGLIPTIN 50 MG: 25 TABLET, FILM COATED ORAL at 05:41

## 2025-05-12 RX ADMIN — ESCITALOPRAM OXALATE 20 MG: 10 TABLET, FILM COATED ORAL at 08:09

## 2025-05-12 RX ADMIN — GUAIFENESIN 600 MG: 600 TABLET, EXTENDED RELEASE ORAL at 01:25

## 2025-05-12 RX ADMIN — ACETAMINOPHEN 650 MG: 325 TABLET ORAL at 08:10

## 2025-05-12 RX ADMIN — AZITHROMYCIN MONOHYDRATE 500 MG: 500 INJECTION, POWDER, LYOPHILIZED, FOR SOLUTION INTRAVENOUS at 00:29

## 2025-05-12 RX ADMIN — PANTOPRAZOLE SODIUM 40 MG: 40 TABLET, DELAYED RELEASE ORAL at 08:09

## 2025-05-12 RX ADMIN — METHYLPREDNISOLONE SODIUM SUCCINATE 40 MG: 40 INJECTION, POWDER, LYOPHILIZED, FOR SOLUTION INTRAMUSCULAR; INTRAVENOUS at 04:27

## 2025-05-12 RX ADMIN — METOPROLOL TARTRATE 25 MG: 25 TABLET, FILM COATED ORAL at 08:10

## 2025-05-12 RX ADMIN — CLONAZEPAM 0.5 MG: 0.5 TABLET ORAL at 05:48

## 2025-05-12 RX ADMIN — IPRATROPIUM BROMIDE AND ALBUTEROL SULFATE 3 ML: .5; 3 SOLUTION RESPIRATORY (INHALATION) at 01:58

## 2025-05-12 RX ADMIN — RIVAROXABAN 10 MG: 20 TABLET, FILM COATED ORAL at 08:10

## 2025-05-12 RX ADMIN — METHYLPREDNISOLONE SODIUM SUCCINATE 40 MG: 40 INJECTION, POWDER, LYOPHILIZED, FOR SOLUTION INTRAMUSCULAR; INTRAVENOUS at 12:12

## 2025-05-12 RX ADMIN — LEVOTHYROXINE SODIUM 175 MCG: 0.05 TABLET ORAL at 05:41

## 2025-05-12 RX ADMIN — INSULIN GLARGINE 10 UNITS: 100 INJECTION, SOLUTION SUBCUTANEOUS at 12:12

## 2025-05-12 RX ADMIN — Medication 6 L/MIN: at 07:09

## 2025-05-12 RX ADMIN — ACETAMINOPHEN 650 MG: 325 TABLET ORAL at 04:09

## 2025-05-12 RX ADMIN — CLONIDINE HYDROCHLORIDE 0.1 MG: 0.1 TABLET ORAL at 08:14

## 2025-05-12 RX ADMIN — IPRATROPIUM BROMIDE AND ALBUTEROL SULFATE 3 ML: .5; 3 SOLUTION RESPIRATORY (INHALATION) at 07:56

## 2025-05-12 RX ADMIN — KETOROLAC TROMETHAMINE 15 MG: 30 INJECTION, SOLUTION INTRAMUSCULAR at 09:37

## 2025-05-12 RX ADMIN — ATORVASTATIN CALCIUM 20 MG: 20 TABLET, FILM COATED ORAL at 08:10

## 2025-05-12 RX ADMIN — RISPERIDONE 2 MG: 1 TABLET, FILM COATED ORAL at 05:41

## 2025-05-12 ASSESSMENT — COGNITIVE AND FUNCTIONAL STATUS - GENERAL
DAILY ACTIVITIY SCORE: 24
CLIMB 3 TO 5 STEPS WITH RAILING: A LITTLE
MOBILITY SCORE: 22
WALKING IN HOSPITAL ROOM: A LITTLE
MOBILITY SCORE: 22
WALKING IN HOSPITAL ROOM: A LITTLE
DAILY ACTIVITIY SCORE: 24
CLIMB 3 TO 5 STEPS WITH RAILING: A LITTLE

## 2025-05-12 ASSESSMENT — PAIN SCALES - GENERAL
PAINLEVEL_OUTOF10: 3
PAINLEVEL_OUTOF10: 2

## 2025-05-12 ASSESSMENT — PAIN - FUNCTIONAL ASSESSMENT: PAIN_FUNCTIONAL_ASSESSMENT: 0-10

## 2025-05-12 NOTE — SIGNIFICANT EVENT
05/12/25 0756   Inhalation Therapy   Breath Sounds Pre-Treatment Right Clear;Diminished   Breath Sounds Pre-Treatment Left Clear;Diminished   Pre-Treatment Pulse 97   Pre-Treatment Respirations 18   Breath Sounds Post-Treatment Right Clear;Diminished   Breath Sounds Post-Treatment Left Clear;Diminished   Post-Treatment Pulse 81   Post-Treatment Respirations 18   Delivery Source Oxygen   Position Supine   Treatment Tolerance Tolerated well   $ Aerosol/MDI Treatment Charge Aerosol/inhalation treatment     Upon assessment, I observed patient off of Bilevel and on 6LNC with humidification, Treatment administered per order

## 2025-05-12 NOTE — CARE PLAN
The patient's goals for the shift include rest    The clinical goals for the shift include pt will remain hemodynamically stable    Over the shift, the patient did not make progress toward the following goals. Barriers to progression include doctors orders. Recommendations to address these barriers include medications and monitoring.

## 2025-05-12 NOTE — NURSING NOTE
This nurse introduced self and role to patient, prepared and provided AVS, sat with patient, started and completed discharge education, pt verbalized understanding, without further questions or concerns, agreeable and comfortable with discharge at this time, piv removed with catheter intact, tele removed and returned to desk, pt states she has all of her belongings, her ride should be here in 5 minutes, transport requested, discharge complete.

## 2025-05-12 NOTE — PROGRESS NOTES
05/12/25 1145   Discharge Planning   Living Arrangements Spouse/significant other   Support Systems Spouse/significant other   Assistance Needed none   Type of Residence Private residence   Who is requesting discharge planning? Provider   Home or Post Acute Services None   Expected Discharge Disposition Home   Patient Choice   Patient / Family choosing to utilize agency / facility established prior to hospitalization No   Stroke Family Assessment   Stroke Family Assessment Needed No   Intensity of Service   Intensity of Service 0-30 min     Per review, pt presented to  ER with complaints of shortness of breath that began approximately 2 days ago. CXR  (-); CT Angio chest (-) cardiomegaly, bilateral ground glass airspace opacities  Home Living Comments: Patient reported living in 1 level home with her  and mother in law. 3 saskia/HR to enter the home. No AD, Independent with ADLS/IADLs, grooming independent, toileting independent. Does not drive, zero falls past three months.  Pt follows with Dr Yuen PCP,  pt to be discharged home , no needs identified.

## 2025-05-12 NOTE — DISCHARGE SUMMARY
"Discharge Diagnosis  Acute on chronic respiratory failure with hypoxia           Issues Requiring Follow-Up  Post hospital follow up    Discharge Meds     Medication List      START taking these medications     azithromycin 250 mg tablet; Commonly known as: Zithromax; Take 1 tablet   (250 mg) by mouth once daily. Do not fill before May 13, 2025.; Start   taking on: May 13, 2025   predniSONE 10 mg tablet; Commonly known as: Deltasone; Take 4 tablets   (40 mg) by mouth once daily for 3 days, THEN 3 tablets (30 mg) once daily   for 3 days, THEN 2 tablets (20 mg) once daily for 3 days, THEN 1 tablet   (10 mg) once daily for 3 days.; Start taking on: May 12, 2025     CONTINUE taking these medications     * albuterol 90 mcg/actuation inhaler   * albuterol 90 mcg/actuation inhaler; Inhale 1-2 puffs every 6 hours if   needed for wheezing.   alcohol swabs; Commonly known as: Alcohol Prep Pads; Use 3 times daily   prn   atorvastatin 20 mg tablet; Commonly known as: Lipitor; Take 1 tablet (20   mg) by mouth once daily.   BD Ultra-Fine Mini Pen Needle 31 gauge x 3/16\" needle; Generic drug: pen   needle, diabetic; USE TO INJECT 4 TIMES DAILY AS DIRECTED   busPIRone 30 mg tablet; Commonly known as: Buspar   clonazePAM 1 mg tablet; Commonly known as: KlonoPIN   cloNIDine 0.1 mg tablet; Commonly known as: Catapres   escitalopram 20 mg tablet; Commonly known as: Lexapro   famotidine 20 mg tablet; Commonly known as: Pepcid; Take 1 tablet (20   mg) by mouth 2 times a day for 10 days.   hydrOXYzine HCL 25 mg tablet; Commonly known as: Atarax; Take 1 tablet   (25 mg) by mouth every 6 hours if needed for itching.   ipratropium-albuteroL 0.5-2.5 mg/3 mL nebulizer solution; Commonly known   as: Duo-Neb; Take 3 mL by nebulization every 6 hours if needed for   shortness of breath or wheezing.   Januvia 50 mg tablet; Generic drug: SITagliptin phosphate   levothyroxine 175 mcg tablet; Commonly known as: Synthroid, Levoxyl;   Take 1 tablet (175 " mcg) by mouth once daily.   metFORMIN  mg 24 hr tablet; Commonly known as: Glucophage-XR; Take   1 tablet (500 mg) by mouth once daily in the evening. Take with meals. Do   not crush, chew, or split.   metoprolol tartrate 25 mg tablet; Commonly known as: Lopressor; Take 1   tablet (25 mg) by mouth 2 times a day.   paliperidone 3 mg 24 hr tablet; Commonly known as: Invega; Take 1 tablet   (3 mg) by mouth once daily. Do not crush, chew, or split. Do not start   before January 15, 2024.   pantoprazole 40 mg EC tablet; Commonly known as: ProtoNix; Take 1 tablet   (40 mg) by mouth once daily.   prazosin 5 mg capsule; Commonly known as: Minipress   risperiDONE 2 mg tablet; Commonly known as: RisperDAL   tiotropium 2.5 mcg/actuation inhaler; Commonly known as: Spiriva   Respimat; Inhale 2 puffs once daily. Do not start before March 9, 2024.   traZODone 50 mg tablet; Commonly known as: Desyrel   ubrogepant 100 mg tablet; Commonly known as: Ubrelvy; Take 1 tablet (100   mg) by mouth 1 time if needed (Headache) for up to 20 doses. The dose may   be repeated in 2 hours if headache persists or recurs. No more than 2   doses are to be taken in a 24 hour periodl   Xarelto 10 mg tablet; Generic drug: rivaroxaban  * This list has 2 medication(s) that are the same as other medications   prescribed for you. Read the directions carefully, and ask your doctor or   other care provider to review them with you.     ASK your doctor about these medications     ondansetron ODT 4 mg disintegrating tablet; Commonly known as:   Zofran-ODT; Dissolve 1 tablet (4 mg) in the mouth every 8 hours if needed   for nausea or vomiting for up to 7 days.; Ask about: Should I take this   medication?   polyethylene glycol 17 gram packet; Commonly known as: Glycolax,   Miralax; Take 17 g by mouth once daily for 7 days.; Ask about: Should I   take this medication?   sucralfate 100 mg/mL suspension; Commonly known as: Carafate; Take 10 mL   (1 g) by mouth  2 times a day for 10 days.; Ask about: Should I take this   medication?       Test Results Pending At Discharge  Pending Labs       Order Current Status    Blood Culture Preliminary result    Blood Culture Preliminary result            Hospital Course   Stephenie Mccray is a 44 y.o. female with a significant past medical history of COPD home O2 4L NC as needed, schizophrenia, HTN, HLD, hypothyroid, DM2, pulmonary embolism, presenting to [place] ER with complaints of shortness of breath that began approximately 2 days PTA. She has acute on chronic hypoxic respiratory failure 2/2 COPD exacerbation and atypical pneumonia. She is given duonebs, steroids, and abx. Symptoms improved. She is weaned to 2L NC. She is hemodynamically stable for discharge at this time with close outpatient follow ups.     The patient was discharged in satisfactory condition.   Medications and side effect profile reviewed with patient.  More than 30 minutes were spent in coordinating patient discharge       Pertinent Physical Exam At Time of Discharge  Physical Exam  Vitals and nursing note reviewed.   Constitutional:       General: She is not in acute distress.     Appearance: Normal appearance. She is not ill-appearing or toxic-appearing.   HENT:      Head: Normocephalic and atraumatic.      Nose: Nose normal.      Mouth/Throat:      Mouth: Mucous membranes are moist.   Eyes:      Extraocular Movements: Extraocular movements intact.      Conjunctiva/sclera: Conjunctivae normal.      Pupils: Pupils are equal, round, and reactive to light.   Cardiovascular:      Rate and Rhythm: Normal rate and regular rhythm.      Heart sounds: No murmur heard.     No gallop.   Pulmonary:      Effort: Pulmonary effort is normal. No respiratory distress.      Breath sounds: Normal breath sounds. No wheezing, rhonchi or rales.   Abdominal:      General: Abdomen is flat. Bowel sounds are normal. There is no distension.      Palpations: Abdomen is soft. There is  no mass.      Tenderness: There is no abdominal tenderness.   Musculoskeletal:         General: No swelling or tenderness. Normal range of motion.      Cervical back: Normal range of motion and neck supple.   Skin:     General: Skin is warm and dry.   Neurological:      General: No focal deficit present.      Mental Status: She is alert and oriented to person, place, and time. Mental status is at baseline.   Psychiatric:         Mood and Affect: Mood normal.         Behavior: Behavior normal.         Thought Content: Thought content normal.         Judgment: Judgment normal.         Outpatient Follow-Up  Future Appointments   Date Time Provider Department Center   5/21/2025 10:00 AM ELY BREAST ULTRASOUND 1 [facility] [place]   6/25/2025  4:00 PM Delta Yuen MD [id number] [place]   7/25/2025  8:20 AM BARTOLOME Kearney-CNP [id number] [place]   9/3/2025  3:00 PM Delta Yuen MD [id number] [place]         Valerie Lowery MD

## 2025-05-12 NOTE — DISCHARGE INSTRUCTIONS
Given Tylenol this morning, can have again anytime after 2:00 PM  Given Motrin this morning, can have again anytime after 3:30 PM

## 2025-05-12 NOTE — CARE PLAN
The patient's goals for the shift include rest    The clinical goals for the shift include pt will remain hemodynamically stable throughout shift    Problem: Fall/Injury  Goal: Not fall by end of shift  Outcome: Adequate for Discharge  Goal: Be free from injury by end of the shift  Outcome: Adequate for Discharge     Problem: Respiratory  Goal: Minimize anxiety/maximize coping throughout shift  Outcome: Adequate for Discharge  Goal: Minimal/no exertional discomfort or dyspnea this shift  Outcome: Adequate for Discharge  Goal: No signs of respiratory distress (eg. Use of accessory muscles. Peds grunting)  Outcome: Adequate for Discharge  Goal: Verbalize decreased shortness of breath this shift  Outcome: Adequate for Discharge  Goal: Wean oxygen to maintain O2 saturation per order/standard this shift  Outcome: Adequate for Discharge  Goal: Increase self care and/or family involvement in next 24 hours  Outcome: Adequate for Discharge     Problem: Pain - Adult  Goal: Verbalizes/displays adequate comfort level or baseline comfort level  Outcome: Adequate for Discharge     Problem: Safety - Adult  Goal: Free from fall injury  Outcome: Adequate for Discharge     Problem: Discharge Planning  Goal: Discharge to home or other facility with appropriate resources  Outcome: Adequate for Discharge     Problem: Chronic Conditions and Co-morbidities  Goal: Patient's chronic conditions and co-morbidity symptoms are monitored and maintained or improved  Outcome: Adequate for Discharge     Problem: Nutrition  Goal: Nutrient intake appropriate for maintaining nutritional needs  Outcome: Adequate for Discharge

## 2025-05-13 ENCOUNTER — PATIENT OUTREACH (OUTPATIENT)
Dept: PRIMARY CARE | Facility: CLINIC | Age: 45
End: 2025-05-13
Payer: COMMERCIAL

## 2025-05-13 NOTE — PROGRESS NOTES
Discharge Facility: [facility]  Discharge Diagnosis: Acute on chronic respiratory failure with hypoxia   Admission Date: 5/9/25  Discharge Date:  5/12/25    PCP Appointment Date: 5/20/25  Specialist Appointment Date: D  Hospital Encounter and Summary Linked: Yes  ED to Hosp-Admission (Discharged) with Valerie Lowery MD; Anthony Ayala MD (05/09/2025)     See discharge assessment below for further details     Wrap Up  Wrap Up Additional Comments: This CM spoke with patient via phone. Successful transition of care outreach complete. Pt reports doing well at home since discharge. New meds reviewed. Pt denies any prescription medication questions. . Patient denies any further discharge questions/needs at this time. Emphasized that Follow up is needed after discharge to review the hospital recommendations, assess your response to your treatment.  Pt aware of my availability for non-emergent concerns. Contact info provided to patient. (5/13/2025  3:46 PM)    Medications  Medications reviewed with patient/caregiver?: Yes (5/13/2025  3:46 PM)  Is the patient having any side effects they believe may be caused by any medication additions or changes?: No (5/13/2025  3:46 PM)  Does the patient have all medications ordered at discharge?: Yes (5/13/2025  3:46 PM)  Care Management Interventions: No intervention needed (5/13/2025  3:46 PM)  Prescription Comments: azithromycin 250 mg tablet; Commonly known as: Zithromax; Take 1 tablet   (250 mg) by mouth once daily. Do not fill before May 13, 2025.; Start   taking on: May 13, 2025   predniSONE 10 mg tablet; Commonly known as: Deltasone; Take 4 tablets   (40 mg) by mouth once daily for 3 days, THEN 3 tablets (30 mg) once daily   for 3 days, THEN 2 tablets (20 mg) once daily for 3 days, THEN 1 tablet   (10 mg) once daily for 3 days.; Start taking on: May 12, 2025 (5/13/2025  3:46 PM)  Is the patient taking all medications as directed (includes completed medication regime)?: Yes  (5/13/2025  3:46 PM)  Care Management Interventions: Provided patient education (5/13/2025  3:46 PM)  Medication Comments: no noted issues obtaining medications (5/13/2025  3:46 PM)    Appointments  Does the patient have a primary care provider?: Yes (5/13/2025  3:46 PM)  Care Management Interventions: Verified appointment date/time/provider (5/13/2025  3:46 PM)  Has the patient kept scheduled appointments due by today?: Yes (5/13/2025  3:46 PM)  Care Management Interventions: Advised patient to keep appointment (5/13/2025  3:46 PM)    Self Management  What is the home health agency?: denies need (5/13/2025  3:46 PM)  What Durable Medical Equipment (DME) was ordered?: home oxygen therapy continued (5/13/2025  3:46 PM)    Patient Teaching  Does the patient have access to their discharge instructions?: Yes (5/13/2025  3:46 PM)  Care Management Interventions: Reviewed instructions with patient (5/13/2025  3:46 PM)  What is the patient's perception of their health status since discharge?: Improving (5/13/2025  3:46 PM)  Is the patient/caregiver able to teach back the hierarchy of who to call/visit for symptoms/problems? PCP, Specialist, Home Health nurse, Urgent Care, ED, 911: Yes (5/13/2025  3:46 PM)

## 2025-05-14 LAB
ATRIAL RATE: 67 BPM
BACTERIA BLD CULT: NORMAL
BACTERIA BLD CULT: NORMAL
P AXIS: 39 DEGREES
P OFFSET: 197 MS
P ONSET: 147 MS
PR INTERVAL: 124 MS
Q ONSET: 209 MS
QRS COUNT: 11 BEATS
QRS DURATION: 86 MS
QT INTERVAL: 390 MS
QTC CALCULATION(BAZETT): 412 MS
QTC FREDERICIA: 404 MS
R AXIS: -23 DEGREES
T AXIS: 22 DEGREES
T OFFSET: 404 MS
VENTRICULAR RATE: 67 BPM

## 2025-05-17 ENCOUNTER — APPOINTMENT (OUTPATIENT)
Dept: CARDIOLOGY | Facility: HOSPITAL | Age: 45
DRG: 189 | End: 2025-05-17
Payer: COMMERCIAL

## 2025-05-17 ENCOUNTER — APPOINTMENT (OUTPATIENT)
Dept: RADIOLOGY | Facility: HOSPITAL | Age: 45
DRG: 189 | End: 2025-05-17
Payer: COMMERCIAL

## 2025-05-17 ENCOUNTER — HOSPITAL ENCOUNTER (INPATIENT)
Facility: HOSPITAL | Age: 45
DRG: 189 | End: 2025-05-17
Attending: STUDENT IN AN ORGANIZED HEALTH CARE EDUCATION/TRAINING PROGRAM | Admitting: STUDENT IN AN ORGANIZED HEALTH CARE EDUCATION/TRAINING PROGRAM
Payer: COMMERCIAL

## 2025-05-17 DIAGNOSIS — J96.22 ACUTE ON CHRONIC RESPIRATORY FAILURE WITH HYPOXIA AND HYPERCAPNIA: Primary | ICD-10-CM

## 2025-05-17 DIAGNOSIS — J44.1 COPD EXACERBATION (MULTI): ICD-10-CM

## 2025-05-17 DIAGNOSIS — F17.209 NICOTINE DEPENDENCE WITH NICOTINE-INDUCED DISORDER, UNSPECIFIED NICOTINE PRODUCT TYPE: ICD-10-CM

## 2025-05-17 DIAGNOSIS — J96.21 ACUTE ON CHRONIC RESPIRATORY FAILURE WITH HYPOXIA AND HYPERCAPNIA: Primary | ICD-10-CM

## 2025-05-17 LAB
AMPHETAMINES UR QL SCN: ABNORMAL
ANION GAP BLDA CALCULATED.4IONS-SCNC: 1 MMO/L (ref 10–25)
ANION GAP BLDA CALCULATED.4IONS-SCNC: 4 MMO/L (ref 10–25)
ANION GAP SERPL CALC-SCNC: 8 MMOL/L (ref 10–20)
APPARATUS: ABNORMAL
APPEARANCE UR: CLEAR
ARTERIAL PATENCY WRIST A: ABNORMAL
ARTERIAL PATENCY WRIST A: ABNORMAL
ARTERIAL PATENCY WRIST A: POSITIVE
BARBITURATES UR QL SCN: ABNORMAL
BASE EXCESS BLDA CALC-SCNC: 13 MMOL/L (ref -2–3)
BASE EXCESS BLDA CALC-SCNC: 9.9 MMOL/L (ref -2–3)
BASO STIPL BLD QL SMEAR: PRESENT
BASOPHILS # BLD MANUAL: 0 X10*3/UL (ref 0–0.1)
BASOPHILS NFR BLD MANUAL: 0 %
BENZODIAZ UR QL SCN: ABNORMAL
BILIRUB UR STRIP.AUTO-MCNC: NEGATIVE MG/DL
BNP SERPL-MCNC: 41 PG/ML (ref 0–99)
BODY TEMPERATURE: ABNORMAL
BODY TEMPERATURE: ABNORMAL
BUN SERPL-MCNC: 24 MG/DL (ref 6–23)
BZE UR QL SCN: ABNORMAL
CA-I BLDA-SCNC: 1.26 MMOL/L (ref 1.1–1.33)
CA-I BLDA-SCNC: 1.27 MMOL/L (ref 1.1–1.33)
CALCIUM SERPL-MCNC: 9.3 MG/DL (ref 8.6–10.3)
CANNABINOIDS UR QL SCN: ABNORMAL
CARDIAC TROPONIN I PNL SERPL HS: 6 NG/L (ref 0–13)
CHLORIDE BLDA-SCNC: 101 MMOL/L (ref 98–107)
CHLORIDE BLDA-SCNC: 99 MMOL/L (ref 98–107)
CHLORIDE SERPL-SCNC: 100 MMOL/L (ref 98–107)
CO2 SERPL-SCNC: 41 MMOL/L (ref 21–32)
COLOR UR: YELLOW
CREAT SERPL-MCNC: 0.71 MG/DL (ref 0.5–1.05)
CRITICAL CALL TIME: 241
CRITICAL CALL TIME: 503
CRITICAL CALLED BY: ABNORMAL
CRITICAL CALLED BY: ABNORMAL
CRITICAL CALLED TO: ABNORMAL
CRITICAL CALLED TO: ABNORMAL
CRITICAL READ BACK: ABNORMAL
CRITICAL READ BACK: ABNORMAL
DACRYOCYTES BLD QL SMEAR: ABNORMAL
EGFRCR SERPLBLD CKD-EPI 2021: >90 ML/MIN/1.73M*2
EOSINOPHIL # BLD MANUAL: 0 X10*3/UL (ref 0–0.7)
EOSINOPHIL NFR BLD MANUAL: 0 %
ERYTHROCYTE [DISTWIDTH] IN BLOOD BY AUTOMATED COUNT: 14.4 % (ref 11.5–14.5)
FENTANYL+NORFENTANYL UR QL SCN: ABNORMAL
FLUAV RNA RESP QL NAA+PROBE: NOT DETECTED
FLUBV RNA RESP QL NAA+PROBE: NOT DETECTED
GLUCOSE BLD MANUAL STRIP-MCNC: 150 MG/DL (ref 74–99)
GLUCOSE BLD MANUAL STRIP-MCNC: 181 MG/DL (ref 74–99)
GLUCOSE BLD MANUAL STRIP-MCNC: 189 MG/DL (ref 74–99)
GLUCOSE BLD MANUAL STRIP-MCNC: 276 MG/DL (ref 74–99)
GLUCOSE BLDA-MCNC: 100 MG/DL (ref 74–99)
GLUCOSE BLDA-MCNC: 181 MG/DL (ref 74–99)
GLUCOSE SERPL-MCNC: 100 MG/DL (ref 74–99)
GLUCOSE UR STRIP.AUTO-MCNC: NORMAL MG/DL
HCO3 BLDA-SCNC: 39.2 MMOL/L (ref 22–26)
HCO3 BLDA-SCNC: 42.6 MMOL/L (ref 22–26)
HCT VFR BLD AUTO: 45.1 % (ref 36–46)
HCT VFR BLD EST: 41 % (ref 36–46)
HCT VFR BLD EST: 41 % (ref 36–46)
HGB BLD-MCNC: 13.7 G/DL (ref 12–16)
HGB BLDA-MCNC: 13.5 G/DL (ref 12–16)
HGB BLDA-MCNC: 13.8 G/DL (ref 12–16)
HIGH PEEP CMH2O: 15 CM H2O
HOLD SPECIMEN: NORMAL
HYALINE CASTS #/AREA URNS AUTO: ABNORMAL /LPF
IMM GRANULOCYTES # BLD AUTO: 1.82 X10*3/UL (ref 0–0.7)
IMM GRANULOCYTES NFR BLD AUTO: 12.4 % (ref 0–0.9)
INHALED O2 CONCENTRATION: 50 %
INHALED O2 CONCENTRATION: 50 %
INSPIRATORY TIME: 0.8
KETONES UR STRIP.AUTO-MCNC: NEGATIVE MG/DL
LACTATE BLDA-SCNC: 0.5 MMOL/L (ref 0.4–2)
LACTATE BLDA-SCNC: 0.6 MMOL/L (ref 0.4–2)
LACTATE SERPL-SCNC: 0.6 MMOL/L (ref 0.4–2)
LEUKOCYTE ESTERASE UR QL STRIP.AUTO: NEGATIVE
LOW PEEP CMH2O: 8 CM H2O
LYMPHOCYTES # BLD MANUAL: 4.7 X10*3/UL (ref 1.2–4.8)
LYMPHOCYTES NFR BLD MANUAL: 32 %
MCH RBC QN AUTO: 31.5 PG (ref 26–34)
MCHC RBC AUTO-ENTMCNC: 30.4 G/DL (ref 32–36)
MCV RBC AUTO: 104 FL (ref 80–100)
METHADONE UR QL SCN: ABNORMAL
MONOCYTES # BLD MANUAL: 1.03 X10*3/UL (ref 0.1–1)
MONOCYTES NFR BLD MANUAL: 7 %
MYELOCYTES # BLD MANUAL: 0.15 X10*3/UL
MYELOCYTES NFR BLD MANUAL: 1 %
NEUTROPHILS # BLD MANUAL: 8.82 X10*3/UL (ref 1.2–7.7)
NEUTS BAND # BLD MANUAL: 0.59 X10*3/UL (ref 0–0.7)
NEUTS BAND NFR BLD MANUAL: 4 %
NEUTS SEG # BLD MANUAL: 8.23 X10*3/UL (ref 1.2–7)
NEUTS SEG NFR BLD MANUAL: 56 %
NITRITE UR QL STRIP.AUTO: NEGATIVE
NRBC BLD-RTO: 0.5 /100 WBCS (ref 0–0)
OPIATES UR QL SCN: ABNORMAL
OVALOCYTES BLD QL SMEAR: ABNORMAL
OXYCODONE+OXYMORPHONE UR QL SCN: ABNORMAL
OXYHGB MFR BLDA: 82.8 % (ref 94–98)
OXYHGB MFR BLDA: 89.5 % (ref 94–98)
PCO2 BLDA: 76 MM HG (ref 38–42)
PCO2 BLDA: 79 MM HG (ref 38–42)
PCP UR QL SCN: ABNORMAL
PEAK PRESSURE: 50 CM H2O
PH BLDA: 7.32 PH (ref 7.38–7.42)
PH BLDA: 7.34 PH (ref 7.38–7.42)
PH UR STRIP.AUTO: 6 [PH]
PLATELET # BLD AUTO: 136 X10*3/UL (ref 150–450)
PO2 BLDA: 56 MM HG (ref 85–95)
PO2 BLDA: 75 MM HG (ref 85–95)
POLYCHROMASIA BLD QL SMEAR: ABNORMAL
POTASSIUM BLDA-SCNC: 3.8 MMOL/L (ref 3.5–5.3)
POTASSIUM BLDA-SCNC: 4.2 MMOL/L (ref 3.5–5.3)
POTASSIUM SERPL-SCNC: 4.2 MMOL/L (ref 3.5–5.3)
PRESSURE SUPPORT: ABNORMAL
PROCALCITONIN SERPL-MCNC: 0.32 NG/ML
PROT UR STRIP.AUTO-MCNC: NORMAL MG/DL
RBC # BLD AUTO: 4.35 X10*6/UL (ref 4–5.2)
RBC # UR STRIP.AUTO: NEGATIVE MG/DL
RBC #/AREA URNS AUTO: ABNORMAL /HPF
RBC MORPH BLD: ABNORMAL
SAO2 % BLDA: 90 % (ref 94–100)
SAO2 % BLDA: 96 % (ref 94–100)
SARS-COV-2 RNA RESP QL NAA+PROBE: NOT DETECTED
SITE OF ARTERIAL PUNCTURE: ABNORMAL
SITE OF ARTERIAL PUNCTURE: ABNORMAL
SODIUM BLDA-SCNC: 138 MMOL/L (ref 136–145)
SODIUM BLDA-SCNC: 141 MMOL/L (ref 136–145)
SODIUM SERPL-SCNC: 145 MMOL/L (ref 136–145)
SP GR UR STRIP.AUTO: 1.03
SPECIMEN DRAWN FROM PATIENT: ABNORMAL
TIDAL VOLUME: 500 ML
TOTAL CELLS COUNTED BLD: 100
UROBILINOGEN UR STRIP.AUTO-MCNC: NORMAL MG/DL
VENTILATOR MODE: ABNORMAL
VENTILATOR RATE: 20 BPM
WBC # BLD AUTO: 14.7 X10*3/UL (ref 4.4–11.3)
WBC #/AREA URNS AUTO: ABNORMAL /HPF

## 2025-05-17 PROCEDURE — 87075 CULTR BACTERIA EXCEPT BLOOD: CPT | Mod: ELYLAB | Performed by: STUDENT IN AN ORGANIZED HEALTH CARE EDUCATION/TRAINING PROGRAM

## 2025-05-17 PROCEDURE — 2500000004 HC RX 250 GENERAL PHARMACY W/ HCPCS (ALT 636 FOR OP/ED): Performed by: STUDENT IN AN ORGANIZED HEALTH CARE EDUCATION/TRAINING PROGRAM

## 2025-05-17 PROCEDURE — 94660 CPAP INITIATION&MGMT: CPT

## 2025-05-17 PROCEDURE — 84132 ASSAY OF SERUM POTASSIUM: CPT | Performed by: STUDENT IN AN ORGANIZED HEALTH CARE EDUCATION/TRAINING PROGRAM

## 2025-05-17 PROCEDURE — 2500000002 HC RX 250 W HCPCS SELF ADMINISTERED DRUGS (ALT 637 FOR MEDICARE OP, ALT 636 FOR OP/ED)

## 2025-05-17 PROCEDURE — 2500000005 HC RX 250 GENERAL PHARMACY W/O HCPCS: Performed by: STUDENT IN AN ORGANIZED HEALTH CARE EDUCATION/TRAINING PROGRAM

## 2025-05-17 PROCEDURE — 85007 BL SMEAR W/DIFF WBC COUNT: CPT | Performed by: STUDENT IN AN ORGANIZED HEALTH CARE EDUCATION/TRAINING PROGRAM

## 2025-05-17 PROCEDURE — 2500000001 HC RX 250 WO HCPCS SELF ADMINISTERED DRUGS (ALT 637 FOR MEDICARE OP): Performed by: STUDENT IN AN ORGANIZED HEALTH CARE EDUCATION/TRAINING PROGRAM

## 2025-05-17 PROCEDURE — 80048 BASIC METABOLIC PNL TOTAL CA: CPT | Performed by: STUDENT IN AN ORGANIZED HEALTH CARE EDUCATION/TRAINING PROGRAM

## 2025-05-17 PROCEDURE — 83605 ASSAY OF LACTIC ACID: CPT | Performed by: STUDENT IN AN ORGANIZED HEALTH CARE EDUCATION/TRAINING PROGRAM

## 2025-05-17 PROCEDURE — 2500000002 HC RX 250 W HCPCS SELF ADMINISTERED DRUGS (ALT 637 FOR MEDICARE OP, ALT 636 FOR OP/ED): Performed by: STUDENT IN AN ORGANIZED HEALTH CARE EDUCATION/TRAINING PROGRAM

## 2025-05-17 PROCEDURE — 71045 X-RAY EXAM CHEST 1 VIEW: CPT

## 2025-05-17 PROCEDURE — 87636 SARSCOV2 & INF A&B AMP PRB: CPT | Performed by: STUDENT IN AN ORGANIZED HEALTH CARE EDUCATION/TRAINING PROGRAM

## 2025-05-17 PROCEDURE — 96374 THER/PROPH/DIAG INJ IV PUSH: CPT | Mod: 59

## 2025-05-17 PROCEDURE — 96375 TX/PRO/DX INJ NEW DRUG ADDON: CPT | Mod: 59

## 2025-05-17 PROCEDURE — 80307 DRUG TEST PRSMV CHEM ANLYZR: CPT | Performed by: STUDENT IN AN ORGANIZED HEALTH CARE EDUCATION/TRAINING PROGRAM

## 2025-05-17 PROCEDURE — 36600 WITHDRAWAL OF ARTERIAL BLOOD: CPT

## 2025-05-17 PROCEDURE — 94640 AIRWAY INHALATION TREATMENT: CPT

## 2025-05-17 PROCEDURE — 71045 X-RAY EXAM CHEST 1 VIEW: CPT | Performed by: STUDENT IN AN ORGANIZED HEALTH CARE EDUCATION/TRAINING PROGRAM

## 2025-05-17 PROCEDURE — 2500000002 HC RX 250 W HCPCS SELF ADMINISTERED DRUGS (ALT 637 FOR MEDICARE OP, ALT 636 FOR OP/ED): Mod: JZ | Performed by: STUDENT IN AN ORGANIZED HEALTH CARE EDUCATION/TRAINING PROGRAM

## 2025-05-17 PROCEDURE — 82947 ASSAY GLUCOSE BLOOD QUANT: CPT

## 2025-05-17 PROCEDURE — 84295 ASSAY OF SERUM SODIUM: CPT | Performed by: STUDENT IN AN ORGANIZED HEALTH CARE EDUCATION/TRAINING PROGRAM

## 2025-05-17 PROCEDURE — 2020000001 HC ICU ROOM DAILY

## 2025-05-17 PROCEDURE — 36415 COLL VENOUS BLD VENIPUNCTURE: CPT | Performed by: STUDENT IN AN ORGANIZED HEALTH CARE EDUCATION/TRAINING PROGRAM

## 2025-05-17 PROCEDURE — 2500000004 HC RX 250 GENERAL PHARMACY W/ HCPCS (ALT 636 FOR OP/ED): Mod: JZ | Performed by: STUDENT IN AN ORGANIZED HEALTH CARE EDUCATION/TRAINING PROGRAM

## 2025-05-17 PROCEDURE — 82805 BLOOD GASES W/O2 SATURATION: CPT | Performed by: STUDENT IN AN ORGANIZED HEALTH CARE EDUCATION/TRAINING PROGRAM

## 2025-05-17 PROCEDURE — 51701 INSERT BLADDER CATHETER: CPT

## 2025-05-17 PROCEDURE — 81001 URINALYSIS AUTO W/SCOPE: CPT | Performed by: STUDENT IN AN ORGANIZED HEALTH CARE EDUCATION/TRAINING PROGRAM

## 2025-05-17 PROCEDURE — 2500000004 HC RX 250 GENERAL PHARMACY W/ HCPCS (ALT 636 FOR OP/ED): Mod: JZ

## 2025-05-17 PROCEDURE — S4991 NICOTINE PATCH NONLEGEND: HCPCS

## 2025-05-17 PROCEDURE — 84484 ASSAY OF TROPONIN QUANT: CPT | Performed by: STUDENT IN AN ORGANIZED HEALTH CARE EDUCATION/TRAINING PROGRAM

## 2025-05-17 PROCEDURE — 99291 CRITICAL CARE FIRST HOUR: CPT | Performed by: STUDENT IN AN ORGANIZED HEALTH CARE EDUCATION/TRAINING PROGRAM

## 2025-05-17 PROCEDURE — 84145 PROCALCITONIN (PCT): CPT | Mod: ELYLAB | Performed by: STUDENT IN AN ORGANIZED HEALTH CARE EDUCATION/TRAINING PROGRAM

## 2025-05-17 PROCEDURE — 85027 COMPLETE CBC AUTOMATED: CPT | Performed by: STUDENT IN AN ORGANIZED HEALTH CARE EDUCATION/TRAINING PROGRAM

## 2025-05-17 PROCEDURE — 2500000001 HC RX 250 WO HCPCS SELF ADMINISTERED DRUGS (ALT 637 FOR MEDICARE OP)

## 2025-05-17 PROCEDURE — 93005 ELECTROCARDIOGRAM TRACING: CPT

## 2025-05-17 PROCEDURE — 83880 ASSAY OF NATRIURETIC PEPTIDE: CPT | Performed by: STUDENT IN AN ORGANIZED HEALTH CARE EDUCATION/TRAINING PROGRAM

## 2025-05-17 RX ORDER — RISPERIDONE 1 MG/1
2 TABLET ORAL
Status: DISCONTINUED | OUTPATIENT
Start: 2025-05-17 | End: 2025-05-19 | Stop reason: HOSPADM

## 2025-05-17 RX ORDER — NICOTINE 7MG/24HR
1 PATCH, TRANSDERMAL 24 HOURS TRANSDERMAL DAILY
Status: DISCONTINUED | OUTPATIENT
Start: 2025-07-12 | End: 2025-05-19 | Stop reason: HOSPADM

## 2025-05-17 RX ORDER — FAMOTIDINE 20 MG/1
40 TABLET, FILM COATED ORAL 2 TIMES DAILY
Status: DISCONTINUED | OUTPATIENT
Start: 2025-05-17 | End: 2025-05-19 | Stop reason: HOSPADM

## 2025-05-17 RX ORDER — ALBUTEROL SULFATE 0.83 MG/ML
SOLUTION RESPIRATORY (INHALATION)
Status: COMPLETED
Start: 2025-05-17 | End: 2025-05-17

## 2025-05-17 RX ORDER — IPRATROPIUM BROMIDE AND ALBUTEROL SULFATE 2.5; .5 MG/3ML; MG/3ML
3 SOLUTION RESPIRATORY (INHALATION) EVERY 2 HOUR PRN
Status: DISCONTINUED | OUTPATIENT
Start: 2025-05-17 | End: 2025-05-19 | Stop reason: HOSPADM

## 2025-05-17 RX ORDER — PRAZOSIN HYDROCHLORIDE 5 MG/1
5 CAPSULE ORAL NIGHTLY
Status: DISCONTINUED | OUTPATIENT
Start: 2025-05-17 | End: 2025-05-18

## 2025-05-17 RX ORDER — METOCLOPRAMIDE HYDROCHLORIDE 5 MG/ML
10 INJECTION INTRAMUSCULAR; INTRAVENOUS ONCE
Status: COMPLETED | OUTPATIENT
Start: 2025-05-17 | End: 2025-05-17

## 2025-05-17 RX ORDER — DEXTROSE 50 % IN WATER (D50W) INTRAVENOUS SYRINGE
25
Status: DISCONTINUED | OUTPATIENT
Start: 2025-05-17 | End: 2025-05-19 | Stop reason: HOSPADM

## 2025-05-17 RX ORDER — INSULIN LISPRO 100 [IU]/ML
0-10 INJECTION, SOLUTION INTRAVENOUS; SUBCUTANEOUS EVERY 4 HOURS
Status: DISCONTINUED | OUTPATIENT
Start: 2025-05-17 | End: 2025-05-17

## 2025-05-17 RX ORDER — METOPROLOL TARTRATE 25 MG/1
25 TABLET, FILM COATED ORAL 2 TIMES DAILY
Status: DISCONTINUED | OUTPATIENT
Start: 2025-05-17 | End: 2025-05-19 | Stop reason: HOSPADM

## 2025-05-17 RX ORDER — CLONIDINE HYDROCHLORIDE 0.1 MG/1
0.1 TABLET ORAL 2 TIMES DAILY PRN
Status: DISCONTINUED | OUTPATIENT
Start: 2025-05-17 | End: 2025-05-19 | Stop reason: HOSPADM

## 2025-05-17 RX ORDER — CEFTRIAXONE 1 G/50ML
1 INJECTION, SOLUTION INTRAVENOUS EVERY 24 HOURS
Status: DISCONTINUED | OUTPATIENT
Start: 2025-05-18 | End: 2025-05-18

## 2025-05-17 RX ORDER — DIPHENHYDRAMINE HYDROCHLORIDE 50 MG/ML
25 INJECTION, SOLUTION INTRAMUSCULAR; INTRAVENOUS ONCE
Status: COMPLETED | OUTPATIENT
Start: 2025-05-17 | End: 2025-05-17

## 2025-05-17 RX ORDER — HYDROXYZINE HYDROCHLORIDE 25 MG/1
25 TABLET, FILM COATED ORAL EVERY 6 HOURS PRN
Status: DISCONTINUED | OUTPATIENT
Start: 2025-05-17 | End: 2025-05-19 | Stop reason: HOSPADM

## 2025-05-17 RX ORDER — METFORMIN HYDROCHLORIDE 500 MG/1
500 TABLET, EXTENDED RELEASE ORAL
Status: DISCONTINUED | OUTPATIENT
Start: 2025-05-17 | End: 2025-05-19 | Stop reason: HOSPADM

## 2025-05-17 RX ORDER — ALBUTEROL SULFATE 0.83 MG/ML
2.5 SOLUTION RESPIRATORY (INHALATION) ONCE
Status: COMPLETED | OUTPATIENT
Start: 2025-05-17 | End: 2025-05-17

## 2025-05-17 RX ORDER — CLONAZEPAM 1 MG/1
1 TABLET ORAL 2 TIMES DAILY
Status: DISCONTINUED | OUTPATIENT
Start: 2025-05-17 | End: 2025-05-17

## 2025-05-17 RX ORDER — CEFTRIAXONE 2 G/50ML
2 INJECTION, SOLUTION INTRAVENOUS ONCE
Status: COMPLETED | OUTPATIENT
Start: 2025-05-17 | End: 2025-05-17

## 2025-05-17 RX ORDER — INSULIN LISPRO 100 [IU]/ML
0-10 INJECTION, SOLUTION INTRAVENOUS; SUBCUTANEOUS
Status: DISCONTINUED | OUTPATIENT
Start: 2025-05-18 | End: 2025-05-19 | Stop reason: HOSPADM

## 2025-05-17 RX ORDER — ATORVASTATIN CALCIUM 20 MG/1
20 TABLET, FILM COATED ORAL DAILY
Status: DISCONTINUED | OUTPATIENT
Start: 2025-05-17 | End: 2025-05-19 | Stop reason: HOSPADM

## 2025-05-17 RX ORDER — DEXTROSE 50 % IN WATER (D50W) INTRAVENOUS SYRINGE
12.5
Status: DISCONTINUED | OUTPATIENT
Start: 2025-05-17 | End: 2025-05-19 | Stop reason: HOSPADM

## 2025-05-17 RX ORDER — IBUPROFEN 200 MG
1 TABLET ORAL DAILY
Status: DISCONTINUED | OUTPATIENT
Start: 2025-06-28 | End: 2025-05-19 | Stop reason: HOSPADM

## 2025-05-17 RX ORDER — CLONAZEPAM 1 MG/1
1 TABLET ORAL 2 TIMES DAILY PRN
Status: DISCONTINUED | OUTPATIENT
Start: 2025-05-17 | End: 2025-05-18

## 2025-05-17 RX ORDER — BUSPIRONE HYDROCHLORIDE 5 MG/1
30 TABLET ORAL 2 TIMES DAILY
Status: DISCONTINUED | OUTPATIENT
Start: 2025-05-17 | End: 2025-05-19 | Stop reason: HOSPADM

## 2025-05-17 RX ORDER — KETOROLAC TROMETHAMINE 30 MG/ML
30 INJECTION, SOLUTION INTRAMUSCULAR; INTRAVENOUS ONCE
Status: COMPLETED | OUTPATIENT
Start: 2025-05-17 | End: 2025-05-17

## 2025-05-17 RX ORDER — NALOXONE HYDROCHLORIDE 1 MG/ML
2 INJECTION INTRAMUSCULAR; INTRAVENOUS; SUBCUTANEOUS ONCE
Status: COMPLETED | OUTPATIENT
Start: 2025-05-17 | End: 2025-05-17

## 2025-05-17 RX ORDER — MAGNESIUM SULFATE HEPTAHYDRATE 40 MG/ML
2 INJECTION, SOLUTION INTRAVENOUS ONCE
Status: COMPLETED | OUTPATIENT
Start: 2025-05-17 | End: 2025-05-17

## 2025-05-17 RX ORDER — IBUPROFEN 200 MG
1 TABLET ORAL DAILY
Status: DISCONTINUED | OUTPATIENT
Start: 2025-05-17 | End: 2025-05-19 | Stop reason: HOSPADM

## 2025-05-17 RX ORDER — TRAZODONE HYDROCHLORIDE 50 MG/1
50 TABLET ORAL DAILY
Status: DISCONTINUED | OUTPATIENT
Start: 2025-05-17 | End: 2025-05-19 | Stop reason: HOSPADM

## 2025-05-17 RX ORDER — KETOROLAC TROMETHAMINE 30 MG/ML
15 INJECTION, SOLUTION INTRAMUSCULAR; INTRAVENOUS ONCE
Status: COMPLETED | OUTPATIENT
Start: 2025-05-17 | End: 2025-05-17

## 2025-05-17 RX ORDER — IPRATROPIUM BROMIDE AND ALBUTEROL SULFATE 2.5; .5 MG/3ML; MG/3ML
3 SOLUTION RESPIRATORY (INHALATION)
Status: DISCONTINUED | OUTPATIENT
Start: 2025-05-17 | End: 2025-05-19

## 2025-05-17 RX ORDER — ESCITALOPRAM OXALATE 10 MG/1
20 TABLET ORAL EVERY MORNING
Status: DISCONTINUED | OUTPATIENT
Start: 2025-05-17 | End: 2025-05-19 | Stop reason: HOSPADM

## 2025-05-17 RX ADMIN — BUSPIRONE HYDROCHLORIDE 30 MG: 5 TABLET ORAL at 20:14

## 2025-05-17 RX ADMIN — INSULIN LISPRO 6 UNITS: 100 INJECTION, SOLUTION INTRAVENOUS; SUBCUTANEOUS at 20:30

## 2025-05-17 RX ADMIN — ESCITALOPRAM OXALATE 20 MG: 10 TABLET, FILM COATED ORAL at 10:31

## 2025-05-17 RX ADMIN — IPRATROPIUM BROMIDE AND ALBUTEROL SULFATE 3 ML: 2.5; .5 SOLUTION RESPIRATORY (INHALATION) at 07:07

## 2025-05-17 RX ADMIN — LEVOTHYROXINE SODIUM 175 MCG: 0.05 TABLET ORAL at 10:31

## 2025-05-17 RX ADMIN — IPRATROPIUM BROMIDE AND ALBUTEROL SULFATE 3 ML: 2.5; .5 SOLUTION RESPIRATORY (INHALATION) at 11:30

## 2025-05-17 RX ADMIN — METHYLPREDNISOLONE SODIUM SUCCINATE 40 MG: 40 INJECTION, POWDER, FOR SOLUTION INTRAMUSCULAR; INTRAVENOUS at 14:38

## 2025-05-17 RX ADMIN — IPRATROPIUM BROMIDE AND ALBUTEROL SULFATE 3 ML: 2.5; .5 SOLUTION RESPIRATORY (INHALATION) at 23:08

## 2025-05-17 RX ADMIN — HYDROXYZINE HYDROCHLORIDE 25 MG: 25 TABLET ORAL at 10:56

## 2025-05-17 RX ADMIN — METOPROLOL TARTRATE 25 MG: 25 TABLET, FILM COATED ORAL at 20:14

## 2025-05-17 RX ADMIN — DIPHENHYDRAMINE HYDROCHLORIDE 25 MG: 50 INJECTION, SOLUTION INTRAMUSCULAR; INTRAVENOUS at 02:08

## 2025-05-17 RX ADMIN — Medication 40 PERCENT: at 23:08

## 2025-05-17 RX ADMIN — BUSPIRONE HYDROCHLORIDE 30 MG: 5 TABLET ORAL at 10:31

## 2025-05-17 RX ADMIN — IPRATROPIUM BROMIDE AND ALBUTEROL SULFATE 3 ML: 2.5; .5 SOLUTION RESPIRATORY (INHALATION) at 15:56

## 2025-05-17 RX ADMIN — Medication 50 PERCENT: at 07:09

## 2025-05-17 RX ADMIN — Medication 5 L/MIN: at 01:26

## 2025-05-17 RX ADMIN — METHYLPREDNISOLONE SODIUM SUCCINATE 125 MG: 125 INJECTION, POWDER, FOR SOLUTION INTRAMUSCULAR; INTRAVENOUS at 02:11

## 2025-05-17 RX ADMIN — Medication 50 PERCENT: at 02:54

## 2025-05-17 RX ADMIN — ALBUTEROL SULFATE 2.5 MG: 2.5 SOLUTION RESPIRATORY (INHALATION) at 01:28

## 2025-05-17 RX ADMIN — INSULIN LISPRO 2 UNITS: 100 INJECTION, SOLUTION INTRAVENOUS; SUBCUTANEOUS at 12:24

## 2025-05-17 RX ADMIN — MAGNESIUM SULFATE HEPTAHYDRATE 2 G: 40 INJECTION, SOLUTION INTRAVENOUS at 10:32

## 2025-05-17 RX ADMIN — RISPERIDONE 2 MG: 1 TABLET, FILM COATED ORAL at 17:47

## 2025-05-17 RX ADMIN — CEFTRIAXONE 2 G: 2 INJECTION, SOLUTION INTRAVENOUS at 03:24

## 2025-05-17 RX ADMIN — Medication 48 PERCENT: at 08:39

## 2025-05-17 RX ADMIN — CLONIDINE HYDROCHLORIDE 0.1 MG: 0.1 TABLET ORAL at 10:31

## 2025-05-17 RX ADMIN — ATORVASTATIN CALCIUM 20 MG: 20 TABLET, FILM COATED ORAL at 10:31

## 2025-05-17 RX ADMIN — NICOTINE 1 PATCH: 21 PATCH, EXTENDED RELEASE TRANSDERMAL at 11:04

## 2025-05-17 RX ADMIN — FAMOTIDINE 40 MG: 20 TABLET, FILM COATED ORAL at 20:14

## 2025-05-17 RX ADMIN — INSULIN LISPRO 2 UNITS: 100 INJECTION, SOLUTION INTRAVENOUS; SUBCUTANEOUS at 06:28

## 2025-05-17 RX ADMIN — METOPROLOL TARTRATE 25 MG: 25 TABLET, FILM COATED ORAL at 10:31

## 2025-05-17 RX ADMIN — IPRATROPIUM BROMIDE AND ALBUTEROL SULFATE 3 ML: 2.5; .5 SOLUTION RESPIRATORY (INHALATION) at 19:26

## 2025-05-17 RX ADMIN — ALBUTEROL SULFATE 2.5 MG: 2.5 SOLUTION RESPIRATORY (INHALATION) at 01:26

## 2025-05-17 RX ADMIN — AZITHROMYCIN MONOHYDRATE 500 MG: 500 INJECTION, POWDER, LYOPHILIZED, FOR SOLUTION INTRAVENOUS at 04:05

## 2025-05-17 RX ADMIN — Medication 5 L/MIN: at 21:24

## 2025-05-17 RX ADMIN — RIVAROXABAN 10 MG: 10 TABLET, FILM COATED ORAL at 10:31

## 2025-05-17 RX ADMIN — Medication 5 L/MIN: at 19:26

## 2025-05-17 RX ADMIN — METOCLOPRAMIDE 10 MG: 5 INJECTION, SOLUTION INTRAMUSCULAR; INTRAVENOUS at 02:16

## 2025-05-17 RX ADMIN — FAMOTIDINE 40 MG: 20 TABLET, FILM COATED ORAL at 10:31

## 2025-05-17 RX ADMIN — ALBUTEROL SULFATE 2.5 MG: 0.83 SOLUTION RESPIRATORY (INHALATION) at 01:26

## 2025-05-17 RX ADMIN — KETOROLAC TROMETHAMINE 15 MG: 30 INJECTION, SOLUTION INTRAMUSCULAR at 02:09

## 2025-05-17 RX ADMIN — SODIUM CHLORIDE 1000 ML: 0.9 INJECTION, SOLUTION INTRAVENOUS at 04:11

## 2025-05-17 RX ADMIN — NALOXONE HYDROCHLORIDE 2 MG: 1 INJECTION PARENTERAL at 03:02

## 2025-05-17 RX ADMIN — KETOROLAC TROMETHAMINE 30 MG: 30 INJECTION, SOLUTION INTRAMUSCULAR at 11:04

## 2025-05-17 RX ADMIN — ALBUTEROL SULFATE 2.5 MG: 2.5 SOLUTION RESPIRATORY (INHALATION) at 01:27

## 2025-05-17 SDOH — SOCIAL STABILITY: SOCIAL INSECURITY
WITHIN THE LAST YEAR, HAVE YOU BEEN HUMILIATED OR EMOTIONALLY ABUSED IN OTHER WAYS BY YOUR PARTNER OR EX-PARTNER?: PATIENT UNABLE TO ANSWER

## 2025-05-17 SDOH — SOCIAL STABILITY: SOCIAL INSECURITY: ARE THERE ANY APPARENT SIGNS OF INJURIES/BEHAVIORS THAT COULD BE RELATED TO ABUSE/NEGLECT?: UNABLE TO ASSESS

## 2025-05-17 SDOH — ECONOMIC STABILITY: INCOME INSECURITY
IN THE PAST 12 MONTHS HAS THE ELECTRIC, GAS, OIL, OR WATER COMPANY THREATENED TO SHUT OFF SERVICES IN YOUR HOME?: PATIENT UNABLE TO ANSWER

## 2025-05-17 SDOH — ECONOMIC STABILITY: FOOD INSECURITY
WITHIN THE PAST 12 MONTHS, THE FOOD YOU BOUGHT JUST DIDN'T LAST AND YOU DIDN'T HAVE MONEY TO GET MORE.: PATIENT UNABLE TO ANSWER

## 2025-05-17 SDOH — SOCIAL STABILITY: SOCIAL INSECURITY: WITHIN THE LAST YEAR, HAVE YOU BEEN AFRAID OF YOUR PARTNER OR EX-PARTNER?: PATIENT UNABLE TO ANSWER

## 2025-05-17 SDOH — SOCIAL STABILITY: SOCIAL INSECURITY
WITHIN THE LAST YEAR, HAVE YOU BEEN KICKED, HIT, SLAPPED, OR OTHERWISE PHYSICALLY HURT BY YOUR PARTNER OR EX-PARTNER?: PATIENT UNABLE TO ANSWER

## 2025-05-17 SDOH — ECONOMIC STABILITY: FOOD INSECURITY
WITHIN THE PAST 12 MONTHS, YOU WORRIED THAT YOUR FOOD WOULD RUN OUT BEFORE YOU GOT THE MONEY TO BUY MORE.: PATIENT UNABLE TO ANSWER

## 2025-05-17 SDOH — SOCIAL STABILITY: SOCIAL INSECURITY: DO YOU FEEL ANYONE HAS EXPLOITED OR TAKEN ADVANTAGE OF YOU FINANCIALLY OR OF YOUR PERSONAL PROPERTY?: UNABLE TO ASSESS

## 2025-05-17 SDOH — SOCIAL STABILITY: SOCIAL INSECURITY: HAVE YOU HAD THOUGHTS OF HARMING ANYONE ELSE?: UNABLE TO ASSESS

## 2025-05-17 SDOH — SOCIAL STABILITY: SOCIAL INSECURITY: DOES ANYONE TRY TO KEEP YOU FROM HAVING/CONTACTING OTHER FRIENDS OR DOING THINGS OUTSIDE YOUR HOME?: UNABLE TO ASSESS

## 2025-05-17 SDOH — SOCIAL STABILITY: SOCIAL INSECURITY
WITHIN THE LAST YEAR, HAVE YOU BEEN RAPED OR FORCED TO HAVE ANY KIND OF SEXUAL ACTIVITY BY YOUR PARTNER OR EX-PARTNER?: PATIENT UNABLE TO ANSWER

## 2025-05-17 SDOH — SOCIAL STABILITY: SOCIAL INSECURITY: WERE YOU ABLE TO COMPLETE ALL THE BEHAVIORAL HEALTH SCREENINGS?: NO

## 2025-05-17 SDOH — SOCIAL STABILITY: SOCIAL INSECURITY: ARE YOU OR HAVE YOU BEEN THREATENED OR ABUSED PHYSICALLY, EMOTIONALLY, OR SEXUALLY BY ANYONE?: UNABLE TO ASSESS

## 2025-05-17 SDOH — SOCIAL STABILITY: SOCIAL INSECURITY: ABUSE: ADULT

## 2025-05-17 SDOH — SOCIAL STABILITY: SOCIAL INSECURITY: HAVE YOU HAD ANY THOUGHTS OF HARMING ANYONE ELSE?: UNABLE TO ASSESS

## 2025-05-17 SDOH — SOCIAL STABILITY: SOCIAL INSECURITY: HAS ANYONE EVER THREATENED TO HURT YOUR FAMILY OR YOUR PETS?: UNABLE TO ASSESS

## 2025-05-17 SDOH — SOCIAL STABILITY: SOCIAL INSECURITY: DO YOU FEEL UNSAFE GOING BACK TO THE PLACE WHERE YOU ARE LIVING?: UNABLE TO ASSESS

## 2025-05-17 ASSESSMENT — ACTIVITIES OF DAILY LIVING (ADL)
LACK_OF_TRANSPORTATION: PATIENT UNABLE TO ANSWER
TOILETING: UNABLE TO ASSESS
BATHING: UNABLE TO ASSESS
DRESSING YOURSELF: UNABLE TO ASSESS
PATIENT'S MEMORY ADEQUATE TO SAFELY COMPLETE DAILY ACTIVITIES?: UNABLE TO ASSESS
JUDGMENT_ADEQUATE_SAFELY_COMPLETE_DAILY_ACTIVITIES: UNABLE TO ASSESS
HEARING - RIGHT EAR: FUNCTIONAL
FEEDING YOURSELF: UNABLE TO ASSESS
HEARING - LEFT EAR: FUNCTIONAL
ADEQUATE_TO_COMPLETE_ADL: UNABLE TO ASSESS
WALKS IN HOME: UNABLE TO ASSESS
GROOMING: UNABLE TO ASSESS

## 2025-05-17 ASSESSMENT — PAIN DESCRIPTION - LOCATION: LOCATION: HEAD

## 2025-05-17 ASSESSMENT — LIFESTYLE VARIABLES
AUDIT-C TOTAL SCORE: -1
HAVE PEOPLE ANNOYED YOU BY CRITICIZING YOUR DRINKING: NO
HOW OFTEN DO YOU HAVE 6 OR MORE DRINKS ON ONE OCCASION: PATIENT UNABLE TO ANSWER
HAVE YOU EVER FELT YOU SHOULD CUT DOWN ON YOUR DRINKING: NO
HOW OFTEN DO YOU HAVE A DRINK CONTAINING ALCOHOL: PATIENT UNABLE TO ANSWER
EVER HAD A DRINK FIRST THING IN THE MORNING TO STEADY YOUR NERVES TO GET RID OF A HANGOVER: NO
TOTAL SCORE: 0
AUDIT-C TOTAL SCORE: -1
HOW MANY STANDARD DRINKS CONTAINING ALCOHOL DO YOU HAVE ON A TYPICAL DAY: PATIENT UNABLE TO ANSWER
EVER FELT BAD OR GUILTY ABOUT YOUR DRINKING: NO
SKIP TO QUESTIONS 9-10: 0

## 2025-05-17 ASSESSMENT — PAIN SCALES - GENERAL
PAINLEVEL_OUTOF10: 0 - NO PAIN
PAINLEVEL_OUTOF10: 0 - NO PAIN
PAINLEVEL_OUTOF10: 8

## 2025-05-17 ASSESSMENT — PATIENT HEALTH QUESTIONNAIRE - PHQ9
1. LITTLE INTEREST OR PLEASURE IN DOING THINGS: NOT AT ALL
SUM OF ALL RESPONSES TO PHQ9 QUESTIONS 1 & 2: 0
2. FEELING DOWN, DEPRESSED OR HOPELESS: NOT AT ALL

## 2025-05-17 ASSESSMENT — PAIN DESCRIPTION - PAIN TYPE: TYPE: ACUTE PAIN

## 2025-05-17 ASSESSMENT — PAIN - FUNCTIONAL ASSESSMENT: PAIN_FUNCTIONAL_ASSESSMENT: 0-10

## 2025-05-17 ASSESSMENT — COGNITIVE AND FUNCTIONAL STATUS - GENERAL: PATIENT BASELINE BEDBOUND: UNABLE TO ASSESS AT THIS TIME

## 2025-05-17 NOTE — CARE PLAN
The patient's goals for the shift include      The clinical goals for the shift include Patient's SpO2 will be greater than 92% throughout this shift.      Problem: Respiratory  Goal: Clear secretions with interventions this shift  Outcome: Progressing  Goal: Minimize anxiety/maximize coping throughout shift  Outcome: Progressing  Goal: Minimal/no exertional discomfort or dyspnea this shift  Outcome: Progressing  Goal: No signs of respiratory distress (eg. Use of accessory muscles. Peds grunting)  Outcome: Progressing  Goal: Patent airway maintained this shift  Outcome: Progressing  Goal: Tolerate mechanical ventilation evidenced by VS/agitation level this shift  Outcome: Progressing  Goal: Tolerate pulmonary toileting this shift  Outcome: Progressing  Goal: Verbalize decreased shortness of breath this shift  Outcome: Progressing  Goal: Wean oxygen to maintain O2 saturation per order/standard this shift  Outcome: Progressing  Goal: Increase self care and/or family involvement in next 24 hours  Outcome: Progressing     Problem: Fall/Injury  Goal: Not fall by end of shift  Outcome: Progressing  Goal: Be free from injury by end of the shift  Outcome: Progressing  Goal: Verbalize understanding of personal risk factors for fall in the hospital  Outcome: Progressing

## 2025-05-17 NOTE — ED PROVIDER NOTES
HPI   Chief Complaint   Patient presents with    Shortness of Breath     Admitted for pneumonia on the 9th. Has o2 at home doesn't have portable o2. ^9 percent on ra           Patient is a 45-year-old female with past medical history of COPD on 4 L of nasal cannula oxygen at baseline who presents the ED for shortness of breath.  Patient states that today she started having progressively worsening shortness of breath.  She does have a nonproductive cough with this.  She denies any chest pain or palpitations.  She denies any fever or chills.  She denies nausea or vomiting.              Patient History   Medical History[1]  Surgical History[2]  Family History[3]  Social History[4]    Physical Exam   ED Triage Vitals   Temperature Heart Rate Respirations BP   05/17/25 0110 05/17/25 0106 05/17/25 0110 05/17/25 0110   36.5 °C (97.7 °F) (!) 126 (!) 22 (!) 160/101      Pulse Ox Temp Source Heart Rate Source Patient Position   05/17/25 0106 05/17/25 0110 05/17/25 0110 05/17/25 0122   (!) 72 % Temporal Monitor Sitting      BP Location FiO2 (%)     -- --             Physical Exam  Vitals and nursing note reviewed.   Constitutional:       Appearance: She is not diaphoretic.   HENT:      Head: Normocephalic.   Cardiovascular:      Rate and Rhythm: Regular rhythm. Tachycardia present.      Heart sounds: No murmur heard.  Pulmonary:      Effort: Tachypnea present.      Breath sounds: Wheezing present. No rhonchi or rales.   Abdominal:      Palpations: Abdomen is soft.      Tenderness: There is no abdominal tenderness. There is no guarding.   Musculoskeletal:      Cervical back: Neck supple.   Skin:     General: Skin is warm and dry.           ED Course & MDM   Diagnoses as of 05/17/25 0331   Acute on chronic respiratory failure with hypoxia and hypercapnia                 No data recorded     Ezequiel Coma Scale Score: 15 (05/17/25 0109 : Shruti Victoria RN)                           Medical Decision Making  EKG performed at 0133 is  interpreted by me as sinus tachycardia with a ventricular rate of 104 bpm.  Left axis deviation.  QTc of 476.  No ST elevation or depression.    To the ED the patient is tachycardic and tachypneic.  She does have diffuse wheezing.  Will order breathing treatments as well as a workup here in the ED.    Blood work shows a leukocytosis for between 0.7 as well as a mild thrombocytopenia of 136.  Troponin is within normal limits.  BNP is within normal limits.  She has a significantly elevated bicarbonate on her metabolic panel.  She is negative for COVID and influenza.    Patient was desaturating so respiratory increased her oxygen.  She was becoming more somnolent so a ABG was performed.  It does show a Mariya in addition to hypoxia.  They trialed her on BiPAP for this.    Chest x-ray does show enlarged cardiomediastinal silhouette with layering effusions bilaterally.  Early infiltrate is not excluded in the left lung base so we will put her on Rocephin and azithromycin.    With the patient having somnolence and hypercapnia requiring respiratory support Case was discussed with Dr. Moon from the ICU who agreed to admit the patient for further management.        Procedure  Critical Care    Performed by: Larry Park DO  Authorized by: Larry Park DO    Critical care provider statement:     Critical care time (minutes):  41    Critical care time was exclusive of:  Separately billable procedures and treating other patients    Critical care was necessary to treat or prevent imminent or life-threatening deterioration of the following conditions:  Respiratory failure    Critical care was time spent personally by me on the following activities:  Discussions with consultants, examination of patient, ordering and review of laboratory studies, pulse oximetry and re-evaluation of patient's condition    Care discussed with: admitting provider           [1]   Past Medical History:  Diagnosis Date    Acute on chronic  respiratory failure     Anxiety     Arthritis     Chronic headaches     COPD (chronic obstructive pulmonary disease) (Multi)     CPAP (continuous positive airway pressure) dependence     Depression     Diabetes mellitus (Multi)     type 2 IDDM    GERD (gastroesophageal reflux disease)     History of transfusion     History of venous thromboembolism     HL (hearing loss)     Hyperlipidemia     Hypertension     Hypothyroidism     Lipoma     Nephrolithiasis     CHARLIE (obstructive sleep apnea)     Ovarian teratoma     PE (pulmonary thromboembolism) (Multi)     PTSD (post-traumatic stress disorder)     Schizophrenia     Type 2 diabetes mellitus     VTE (venous thromboembolism)    [2]   Past Surgical History:  Procedure Laterality Date     SECTION, LOW TRANSVERSE      x 1    EYE SURGERY      LITHOTRIPSY      STAPEDES SURGERY      TONSILLECTOMY      UMBILICAL HERNIA REPAIR      VENTRAL HERNIA REPAIR     [3]   Family History  Problem Relation Name Age of Onset    Fibromyalgia Father      Hypertension Brother      Thyroid disease Maternal Grandmother      Thyroid disease Paternal Grandmother      Lung cancer Paternal Grandfather      Coronary artery disease Other Grandmother    [4]   Social History  Tobacco Use    Smoking status: Every Day     Current packs/day: 0.50     Types: Cigarettes     Passive exposure: Current    Smokeless tobacco: Never   Vaping Use    Vaping status: Never Used   Substance Use Topics    Alcohol use: Never    Drug use: Yes     Types: Marijuana        Larry Park DO  [phone]

## 2025-05-17 NOTE — H&P
[facility] Critical Care Medicine History & Physical    Date:  5/17/2025  Patient:  Stephenie Mccray  YOB: 1980  MRN:  [id number]   Admit Date:  5/17/2025  ========================================================================================================    Chief Complaint   Patient presents with    Shortness of Breath     Admitted for pneumonia on the 9th. Has o2 at home doesn't have portable o2. ^9 percent on ra         History of Present Illness:  Stephenie Mccray is a 45 y.o. year old female patient with Past Medical History of COPD home O2 4L NC as needed, schizophrenia, HTN, HLD, hypothyroid, DM2, pulmonary embolism on xarelto, who p/w shortness of breath. Of note the patient was discharged  5 days ago on a steroid taper and azithromycin for COPD exacerbation, The patient had arrived to the ER fairly somnolent but responds to commands but is difficult to arouse. Patient is currently not answering questions and requires vigorous stimulation to arouse at this time. History provided by  at bedside at this time. According to the  the patient felt way 1-2 days after discharge but then yesterday she started to feel progressively more short of breath. She did not have a nebulizer and was feeling so short of breath that the decided to come to the ER. The  denies any worsening cough or sputum production, complaints of fevers, dysuria, abdominal pain, but she still smokes at least 1 pack of cigarettes daily.     In the ER the patient was noted to be afebrile, tachycardic (Sinus tachycardia on EKG without ischemic changes), normotensive but hypoxemic on 5L NC with increased work of breathing and notble wheezing on exam. The patient was noted to have a WBC of 14K, negative flu and covid pcr, Cr of 0.71 but bicarb of 41 (previously bicarb of 30-37 on prior BMPs from recent hospitalization) and an ABG of 7.34/79/56 while on 50% ventimask. Pt was given Narcan given somnolence  "without response, duonebs x 3, 125mg solumedrol, Ceftriaxone and azithromycin, and toradol, reglan, and benadryl for headache. Patient was paced on BiPAP for increased work of breathing and hypoxemia on ABG but was not pulling good Tidal volumes and was switched to AVAPS with better tidal volumes. ICU was called for likely admission.    Medical History:  Medical History[1]  Surgical History[2]  Prescriptions Prior to Admission[3]  Fluticasone furoate-vilanterol, Fluticasone-umeclidin-vilanter, Levofloxacin, Sumatriptan, and Vortioxetine  Social History[4]  Family History[5]    Review of Systems:  14 point review of systems was completed and negative except for those specially mention in my HPI    Physical Exam:    Heart Rate:  []   Temperature:  [36.3 °C (97.3 °F)-36.5 °C (97.7 °F)]   Respirations:  [22-24]   BP: (120-160)/()   Height:  [157.5 cm (5' 2\")]   Weight:  [72.6 kg (160 lb)]   Pulse Ox:  [72 %-95 %]     Physical Exam  Constitutional:       General: She is not in acute distress.     Appearance: She is obese. She is not toxic-appearing.   HENT:      Mouth/Throat:      Mouth: Mucous membranes are dry.   Eyes:      General: No scleral icterus.     Comments: Pupils mildly dilated at 3mm but reactive to light bilaterally   Cardiovascular:      Rate and Rhythm: Normal rate and regular rhythm.      Pulses: Normal pulses.      Heart sounds: No murmur heard.  Pulmonary:      Effort: Pulmonary effort is normal. No respiratory distress.      Breath sounds: Wheezing present.      Comments: Not in respiratory distress on AVAPs pulling TVs around 350  Diffuse and significant wheezes noted bilaterally  Abdominal:      General: Abdomen is flat. There is no distension.      Palpations: Abdomen is soft. There is no mass.      Tenderness: There is no abdominal tenderness.   Musculoskeletal:      Right lower leg: No edema.      Left lower leg: No edema.   Skin:     General: Skin is warm and dry.      Capillary " Refill: Capillary refill takes less than 2 seconds.      Coloration: Skin is not jaundiced.      Findings: No rash.   Neurological:      Comments: Somnolent but arouses to vigorous stimulation  Moving all extremities and non-focal and does follow commands when aroused (gives thumbs up when asked and wiggles toes)       Objective:  I have reviewed all medications, laboratory results, and imaging pertinent for today's encounter    Assessment/Plan:  Pt is a 46 y/o F w/ a PMHx of COPD home O2 4L NC as needed, schizophrenia, HTN, HLD, hypothyroid, type 2 Diabetes, pulmonary embolism on xarelto, who p/w shortness of breath in the setting of acute on chronic hypoxemic respiratory failure with chronic hypercapnic respiratory failure with metabolic compensation due to COPD exacerbation requiring NIPPV and ICU admission. Pt currently appears stable at this time but in need of close respiratory monitoring.     Neuro/Psych/Pain Ctrl/Sedation:  #Schizophrenia  #Metabolic encephalopathy - possibly due to hypercapnia vs sepsis vs intoxication from other drug use vs iatrogenic from medications used in treatment of headache  - Will hold home trazodone, Clonazepam, Escitalopram, hydroxyzine, paliperidone, and risperidone until more awake and then can slowly restart  - C/W home ubrogepant PRN for headache  - CAM-ICU & Delirium Precautions  - PT/OT evaluation once medically more stable   - Will check urine tox as patient somnolence out of proportion to degree of compensated hypercapnia  - Patient non-focal on exam and will monitor for improvement if patient does not become more arousable later will consider CT imaging    Respiratory/ENT:  #Acute on chronic hypoxemic respiratory failure  #Chronic hypercapnic Respiratory failure with metabolic compensation  #COPD with Acute exacerbation  - C/w Duonebs Q4H and Q2h PRN  - Continue with IV steroids 40mg Q12H for now  - Repeat ABG once arrives in ICU  - Will continue with NIPPV over night  and remove in AM and wean to HFNC/NC as tolerated  - Will wean Supplemental Oxygen as needed to maintain an SpO2>88% once off NIPPV  -Incentive spirometry and Early mobilization for lung volume expansion     Cardiovascular:  #History of Pulmonary Embolism  #History of Hypertension  #History of HLD  - C/W home statin  - C/w home clonidine, metoprolol, and prazosin  - C/w home xarelto  - Low suspicion for PE at this time given Wells score of 1.5 since tachycardia now resolved with COPD treatment and already on anticoagulation and will hold off on CT imaging at this time  - Continuous Telemetry Monitoring    Renal/Volume Status (Intra & Extravascular):  - No current active issues  - Will give 1L of NS as patient appears dry on exam  - Monitor UOP Q4H and Cr daily  - Avoid nephrotoxic drugs and renally dose all medications     GI:  - No active issues  - NPO while on NIPPV but will advance to Carb controlled/Cardiac Diet once weaned off  - Discontinue home PPI as no clear indication but can continue home Famotidine   - Add PRN miralax    Infectious Disease:  #Leukocytosis  - possibly reactive due to acute stress response  - Low suspicion for infectious etiology at this time although underlying PNA is possible with low suspicion for sepsis as tachycardia likely due to respiratory distress at the time of ED arrival  - Check UA  - Will check procal and if <0.25 can discontinue ceftriaxone and will also discontinue ceftriaxone as patient Eosinophils not >200 on prior lab work and unlikely to benefit based on GOLD guidelines  - Will continue to monitor off Abx at this time and culture and start broad spectrum as clinically indicated     Heme/Onc:  #History of PE  - No current active issues  - Monitor Hgb Daily and transfuse for Hgb<7 or bleeding with hemodynamic instability  - C/W hoe xarelto for VTE PPx     Endocrine  #History of Type 2 Diabetes  #History of Hypothyroidism  - No current active issues  - C/W home  levothyroxine  - Hold PO oral diabetic medications and can restart as tolerated once off BiPAP  - Check Finger sticks Q6H for now  - Will add moderate ISS as patient will be on steroids   - Finger sticks goals 100-180     Ethics/Code Status:  - Full code per  and ok with intubation if needed    :  DVT Prophylaxis: Xarelto  GI Prophylaxis: Home Famotidine  Bowel Regimen: PRN miralax  Diet: NPO while on NIPPV and then Cardiac/Carb consistent diet  CVC: None  [place]: None  Tran: None  Restraints: none  Dispo: ICU    Critical Care Time:  45 minutes  Critical Care Activities: Management and titration of NIPPV oxygen systems, review of patient's chart/laboratory data/treatments, examination of patient, and time spent with patient's  explaining current diagnosis and treatment plan and answering any questions.    Amado Moon MD         [1]   Past Medical History:  Diagnosis Date    Acute on chronic respiratory failure     Anxiety     Arthritis     Chronic headaches     COPD (chronic obstructive pulmonary disease) (Multi)     CPAP (continuous positive airway pressure) dependence     Depression     Diabetes mellitus (Multi)     type 2 IDDM    GERD (gastroesophageal reflux disease)     History of transfusion     History of venous thromboembolism     HL (hearing loss)     Hyperlipidemia     Hypertension     Hypothyroidism     Lipoma     Nephrolithiasis     CHARLIE (obstructive sleep apnea)     Ovarian teratoma     PE (pulmonary thromboembolism) (Multi)     PTSD (post-traumatic stress disorder)     Schizophrenia     Type 2 diabetes mellitus     VTE (venous thromboembolism)    [2]   Past Surgical History:  Procedure Laterality Date     SECTION, LOW TRANSVERSE      x 1    EYE SURGERY      LITHOTRIPSY      STAPEDES SURGERY      TONSILLECTOMY      UMBILICAL HERNIA REPAIR      VENTRAL HERNIA REPAIR     [3] (Not in a hospital admission)  [4]   Social History  Tobacco Use    Smoking status:  Every Day     Current packs/day: 0.50     Types: Cigarettes     Passive exposure: Current    Smokeless tobacco: Never   Vaping Use    Vaping status: Never Used   Substance Use Topics    Alcohol use: Never    Drug use: Yes     Types: Marijuana   [5]   Family History  Problem Relation Name Age of Onset    Fibromyalgia Father      Hypertension Brother      Thyroid disease Maternal Grandmother      Thyroid disease Paternal Grandmother      Lung cancer Paternal Grandfather      Coronary artery disease Other Grandmother

## 2025-05-18 VITALS
WEIGHT: 161.16 LBS | TEMPERATURE: 97 F | OXYGEN SATURATION: 95 % | HEART RATE: 59 BPM | RESPIRATION RATE: 24 BRPM | HEIGHT: 62 IN | SYSTOLIC BLOOD PRESSURE: 118 MMHG | BODY MASS INDEX: 29.66 KG/M2 | DIASTOLIC BLOOD PRESSURE: 63 MMHG

## 2025-05-18 LAB
ALBUMIN SERPL BCP-MCNC: 3.6 G/DL (ref 3.4–5)
ANION GAP SERPL CALC-SCNC: 9 MMOL/L (ref 10–20)
ATRIAL RATE: 104 BPM
BACTERIA BLD CULT: NORMAL
BACTERIA BLD CULT: NORMAL
BASOPHILS # BLD MANUAL: 0 X10*3/UL (ref 0–0.1)
BASOPHILS NFR BLD MANUAL: 0 %
BUN SERPL-MCNC: 37 MG/DL (ref 6–23)
CALCIUM SERPL-MCNC: 9.1 MG/DL (ref 8.6–10.3)
CHLORIDE SERPL-SCNC: 100 MMOL/L (ref 98–107)
CO2 SERPL-SCNC: 39 MMOL/L (ref 21–32)
CREAT SERPL-MCNC: 0.76 MG/DL (ref 0.5–1.05)
DACRYOCYTES BLD QL SMEAR: ABNORMAL
EGFRCR SERPLBLD CKD-EPI 2021: >90 ML/MIN/1.73M*2
EOSINOPHIL # BLD MANUAL: 0 X10*3/UL (ref 0–0.7)
EOSINOPHIL NFR BLD MANUAL: 0 %
ERYTHROCYTE [DISTWIDTH] IN BLOOD BY AUTOMATED COUNT: 14.3 % (ref 11.5–14.5)
GLUCOSE BLD MANUAL STRIP-MCNC: 105 MG/DL (ref 74–99)
GLUCOSE BLD MANUAL STRIP-MCNC: 178 MG/DL (ref 74–99)
GLUCOSE BLD MANUAL STRIP-MCNC: 208 MG/DL (ref 74–99)
GLUCOSE BLD MANUAL STRIP-MCNC: 251 MG/DL (ref 74–99)
GLUCOSE SERPL-MCNC: 184 MG/DL (ref 74–99)
HCT VFR BLD AUTO: 40.8 % (ref 36–46)
HGB BLD-MCNC: 12.4 G/DL (ref 12–16)
IMM GRANULOCYTES # BLD AUTO: 1.27 X10*3/UL (ref 0–0.7)
IMM GRANULOCYTES NFR BLD AUTO: 8.3 % (ref 0–0.9)
LYMPHOCYTES # BLD MANUAL: 2.74 X10*3/UL (ref 1.2–4.8)
LYMPHOCYTES NFR BLD MANUAL: 18 %
MAGNESIUM SERPL-MCNC: 2.08 MG/DL (ref 1.6–2.4)
MCH RBC QN AUTO: 31.5 PG (ref 26–34)
MCHC RBC AUTO-ENTMCNC: 30.4 G/DL (ref 32–36)
MCV RBC AUTO: 104 FL (ref 80–100)
METAMYELOCYTES # BLD MANUAL: 0.46 X10*3/UL
METAMYELOCYTES NFR BLD MANUAL: 3 %
MONOCYTES # BLD MANUAL: 0.61 X10*3/UL (ref 0.1–1)
MONOCYTES NFR BLD MANUAL: 4 %
MYELOCYTES # BLD MANUAL: 0.3 X10*3/UL
MYELOCYTES NFR BLD MANUAL: 2 %
NEUTROPHILS # BLD MANUAL: 11.09 X10*3/UL (ref 1.2–7.7)
NEUTS BAND # BLD MANUAL: 0.3 X10*3/UL (ref 0–0.7)
NEUTS BAND NFR BLD MANUAL: 2 %
NEUTS SEG # BLD MANUAL: 10.79 X10*3/UL (ref 1.2–7)
NEUTS SEG NFR BLD MANUAL: 71 %
NRBC BLD-RTO: 0.5 /100 WBCS (ref 0–0)
OVALOCYTES BLD QL SMEAR: ABNORMAL
P AXIS: 53 DEGREES
P OFFSET: 196 MS
P ONSET: 138 MS
PHOSPHATE SERPL-MCNC: 3.9 MG/DL (ref 2.5–4.9)
PLATELET # BLD AUTO: 141 X10*3/UL (ref 150–450)
POLYCHROMASIA BLD QL SMEAR: ABNORMAL
POTASSIUM SERPL-SCNC: 4.4 MMOL/L (ref 3.5–5.3)
PR INTERVAL: 144 MS
Q ONSET: 210 MS
QRS COUNT: 17 BEATS
QRS DURATION: 78 MS
QT INTERVAL: 362 MS
QTC CALCULATION(BAZETT): 476 MS
QTC FREDERICIA: 435 MS
R AXIS: -30 DEGREES
RBC # BLD AUTO: 3.94 X10*6/UL (ref 4–5.2)
RBC MORPH BLD: ABNORMAL
SODIUM SERPL-SCNC: 144 MMOL/L (ref 136–145)
T AXIS: 33 DEGREES
T OFFSET: 391 MS
T4 FREE SERPL-MCNC: 1.25 NG/DL (ref 0.61–1.12)
TOTAL CELLS COUNTED BLD: 100
TSH SERPL-ACNC: 0.2 MIU/L (ref 0.44–3.98)
VENTRICULAR RATE: 104 BPM
WBC # BLD AUTO: 15.2 X10*3/UL (ref 4.4–11.3)

## 2025-05-18 PROCEDURE — 99291 CRITICAL CARE FIRST HOUR: CPT | Performed by: NURSE PRACTITIONER

## 2025-05-18 PROCEDURE — 82947 ASSAY GLUCOSE BLOOD QUANT: CPT

## 2025-05-18 PROCEDURE — 2500000002 HC RX 250 W HCPCS SELF ADMINISTERED DRUGS (ALT 637 FOR MEDICARE OP, ALT 636 FOR OP/ED): Performed by: HOSPITALIST

## 2025-05-18 PROCEDURE — 2500000005 HC RX 250 GENERAL PHARMACY W/O HCPCS: Performed by: STUDENT IN AN ORGANIZED HEALTH CARE EDUCATION/TRAINING PROGRAM

## 2025-05-18 PROCEDURE — 84443 ASSAY THYROID STIM HORMONE: CPT

## 2025-05-18 PROCEDURE — 84439 ASSAY OF FREE THYROXINE: CPT

## 2025-05-18 PROCEDURE — 2500000004 HC RX 250 GENERAL PHARMACY W/ HCPCS (ALT 636 FOR OP/ED): Mod: JZ

## 2025-05-18 PROCEDURE — 2500000005 HC RX 250 GENERAL PHARMACY W/O HCPCS: Performed by: HOSPITALIST

## 2025-05-18 PROCEDURE — 2500000002 HC RX 250 W HCPCS SELF ADMINISTERED DRUGS (ALT 637 FOR MEDICARE OP, ALT 636 FOR OP/ED)

## 2025-05-18 PROCEDURE — 36415 COLL VENOUS BLD VENIPUNCTURE: CPT | Performed by: STUDENT IN AN ORGANIZED HEALTH CARE EDUCATION/TRAINING PROGRAM

## 2025-05-18 PROCEDURE — 85007 BL SMEAR W/DIFF WBC COUNT: CPT | Performed by: STUDENT IN AN ORGANIZED HEALTH CARE EDUCATION/TRAINING PROGRAM

## 2025-05-18 PROCEDURE — 2500000001 HC RX 250 WO HCPCS SELF ADMINISTERED DRUGS (ALT 637 FOR MEDICARE OP)

## 2025-05-18 PROCEDURE — 83735 ASSAY OF MAGNESIUM: CPT | Performed by: STUDENT IN AN ORGANIZED HEALTH CARE EDUCATION/TRAINING PROGRAM

## 2025-05-18 PROCEDURE — 2500000001 HC RX 250 WO HCPCS SELF ADMINISTERED DRUGS (ALT 637 FOR MEDICARE OP): Performed by: HOSPITALIST

## 2025-05-18 PROCEDURE — 2500000001 HC RX 250 WO HCPCS SELF ADMINISTERED DRUGS (ALT 637 FOR MEDICARE OP): Performed by: NURSE PRACTITIONER

## 2025-05-18 PROCEDURE — S4991 NICOTINE PATCH NONLEGEND: HCPCS

## 2025-05-18 PROCEDURE — 85027 COMPLETE CBC AUTOMATED: CPT | Performed by: STUDENT IN AN ORGANIZED HEALTH CARE EDUCATION/TRAINING PROGRAM

## 2025-05-18 PROCEDURE — 80069 RENAL FUNCTION PANEL: CPT | Performed by: STUDENT IN AN ORGANIZED HEALTH CARE EDUCATION/TRAINING PROGRAM

## 2025-05-18 PROCEDURE — 94640 AIRWAY INHALATION TREATMENT: CPT

## 2025-05-18 PROCEDURE — 94660 CPAP INITIATION&MGMT: CPT

## 2025-05-18 PROCEDURE — 1210000001 HC SEMI-PRIVATE ROOM DAILY

## 2025-05-18 PROCEDURE — 2500000004 HC RX 250 GENERAL PHARMACY W/ HCPCS (ALT 636 FOR OP/ED): Mod: JW | Performed by: NURSE PRACTITIONER

## 2025-05-18 PROCEDURE — 2500000001 HC RX 250 WO HCPCS SELF ADMINISTERED DRUGS (ALT 637 FOR MEDICARE OP): Performed by: STUDENT IN AN ORGANIZED HEALTH CARE EDUCATION/TRAINING PROGRAM

## 2025-05-18 PROCEDURE — 2500000004 HC RX 250 GENERAL PHARMACY W/ HCPCS (ALT 636 FOR OP/ED): Mod: JZ | Performed by: STUDENT IN AN ORGANIZED HEALTH CARE EDUCATION/TRAINING PROGRAM

## 2025-05-18 PROCEDURE — 2500000002 HC RX 250 W HCPCS SELF ADMINISTERED DRUGS (ALT 637 FOR MEDICARE OP, ALT 636 FOR OP/ED): Performed by: STUDENT IN AN ORGANIZED HEALTH CARE EDUCATION/TRAINING PROGRAM

## 2025-05-18 RX ORDER — PRAZOSIN HYDROCHLORIDE 1 MG/1
4 CAPSULE ORAL NIGHTLY
Status: DISCONTINUED | OUTPATIENT
Start: 2025-05-18 | End: 2025-05-19 | Stop reason: HOSPADM

## 2025-05-18 RX ORDER — NEBULIZER ACCESSORIES
KIT MISCELLANEOUS
Qty: 1 EACH | Refills: 1 | Status: SHIPPED | OUTPATIENT
Start: 2025-05-18

## 2025-05-18 RX ORDER — ACETAMINOPHEN 325 MG/1
650 TABLET ORAL EVERY 6 HOURS PRN
Status: DISCONTINUED | OUTPATIENT
Start: 2025-05-18 | End: 2025-05-19 | Stop reason: HOSPADM

## 2025-05-18 RX ORDER — KETOROLAC TROMETHAMINE 30 MG/ML
15 INJECTION, SOLUTION INTRAMUSCULAR; INTRAVENOUS ONCE
Status: COMPLETED | OUTPATIENT
Start: 2025-05-18 | End: 2025-05-18

## 2025-05-18 RX ORDER — CLONAZEPAM 0.5 MG/1
0.5 TABLET ORAL 2 TIMES DAILY PRN
Status: DISCONTINUED | OUTPATIENT
Start: 2025-05-18 | End: 2025-05-19 | Stop reason: HOSPADM

## 2025-05-18 RX ADMIN — RIVAROXABAN 10 MG: 10 TABLET, FILM COATED ORAL at 08:14

## 2025-05-18 RX ADMIN — CLONAZEPAM 0.5 MG: 0.5 TABLET ORAL at 16:41

## 2025-05-18 RX ADMIN — METHYLPREDNISOLONE SODIUM SUCCINATE 40 MG: 40 INJECTION, POWDER, FOR SOLUTION INTRAMUSCULAR; INTRAVENOUS at 02:13

## 2025-05-18 RX ADMIN — INSULIN LISPRO 6 UNITS: 100 INJECTION, SOLUTION INTRAVENOUS; SUBCUTANEOUS at 16:45

## 2025-05-18 RX ADMIN — Medication 4 L/MIN: at 07:16

## 2025-05-18 RX ADMIN — FAMOTIDINE 40 MG: 20 TABLET, FILM COATED ORAL at 08:14

## 2025-05-18 RX ADMIN — ATORVASTATIN CALCIUM 20 MG: 20 TABLET, FILM COATED ORAL at 08:14

## 2025-05-18 RX ADMIN — CLONIDINE HYDROCHLORIDE 0.1 MG: 0.1 TABLET ORAL at 20:20

## 2025-05-18 RX ADMIN — CEFTRIAXONE 1 G: 1 INJECTION, SOLUTION INTRAVENOUS at 02:54

## 2025-05-18 RX ADMIN — KETOROLAC TROMETHAMINE 15 MG: 30 INJECTION, SOLUTION INTRAMUSCULAR at 13:18

## 2025-05-18 RX ADMIN — Medication 5 L/MIN: at 19:30

## 2025-05-18 RX ADMIN — IPRATROPIUM BROMIDE AND ALBUTEROL SULFATE 3 ML: 2.5; .5 SOLUTION RESPIRATORY (INHALATION) at 03:49

## 2025-05-18 RX ADMIN — RISPERIDONE 2 MG: 1 TABLET, FILM COATED ORAL at 17:45

## 2025-05-18 RX ADMIN — METOPROLOL TARTRATE 25 MG: 25 TABLET, FILM COATED ORAL at 20:19

## 2025-05-18 RX ADMIN — AZITHROMYCIN MONOHYDRATE 500 MG: 500 INJECTION, POWDER, LYOPHILIZED, FOR SOLUTION INTRAVENOUS at 03:35

## 2025-05-18 RX ADMIN — TRAZODONE HYDROCHLORIDE 50 MG: 50 TABLET ORAL at 08:14

## 2025-05-18 RX ADMIN — INSULIN LISPRO 2 UNITS: 100 INJECTION, SOLUTION INTRAVENOUS; SUBCUTANEOUS at 11:16

## 2025-05-18 RX ADMIN — FAMOTIDINE 40 MG: 20 TABLET, FILM COATED ORAL at 20:20

## 2025-05-18 RX ADMIN — Medication 4 L/MIN: at 07:37

## 2025-05-18 RX ADMIN — LEVOTHYROXINE SODIUM 175 MCG: 0.05 TABLET ORAL at 08:14

## 2025-05-18 RX ADMIN — METOPROLOL TARTRATE 25 MG: 25 TABLET, FILM COATED ORAL at 08:14

## 2025-05-18 RX ADMIN — ESCITALOPRAM OXALATE 20 MG: 10 TABLET, FILM COATED ORAL at 08:14

## 2025-05-18 RX ADMIN — CLONIDINE HYDROCHLORIDE 0.1 MG: 0.1 TABLET ORAL at 09:41

## 2025-05-18 RX ADMIN — IPRATROPIUM BROMIDE AND ALBUTEROL SULFATE 3 ML: 2.5; .5 SOLUTION RESPIRATORY (INHALATION) at 07:14

## 2025-05-18 RX ADMIN — Medication 5 L/MIN: at 03:49

## 2025-05-18 RX ADMIN — Medication 5 L/MIN: at 19:29

## 2025-05-18 RX ADMIN — CLONAZEPAM 1 MG: 1 TABLET ORAL at 06:35

## 2025-05-18 RX ADMIN — PRAZOSIN HYDROCHLORIDE 4 MG: 1 CAPSULE ORAL at 20:20

## 2025-05-18 RX ADMIN — IPRATROPIUM BROMIDE AND ALBUTEROL SULFATE 3 ML: 2.5; .5 SOLUTION RESPIRATORY (INHALATION) at 23:28

## 2025-05-18 RX ADMIN — Medication 50 PERCENT: at 23:28

## 2025-05-18 RX ADMIN — IPRATROPIUM BROMIDE AND ALBUTEROL SULFATE 3 ML: 2.5; .5 SOLUTION RESPIRATORY (INHALATION) at 11:23

## 2025-05-18 RX ADMIN — RISPERIDONE 2 MG: 1 TABLET, FILM COATED ORAL at 06:35

## 2025-05-18 RX ADMIN — BUSPIRONE HYDROCHLORIDE 30 MG: 5 TABLET ORAL at 20:18

## 2025-05-18 RX ADMIN — BUSPIRONE HYDROCHLORIDE 30 MG: 5 TABLET ORAL at 08:14

## 2025-05-18 RX ADMIN — IPRATROPIUM BROMIDE AND ALBUTEROL SULFATE 3 ML: 2.5; .5 SOLUTION RESPIRATORY (INHALATION) at 19:29

## 2025-05-18 RX ADMIN — INSULIN LISPRO 4 UNITS: 100 INJECTION, SOLUTION INTRAVENOUS; SUBCUTANEOUS at 06:29

## 2025-05-18 RX ADMIN — NICOTINE 1 PATCH: 21 PATCH, EXTENDED RELEASE TRANSDERMAL at 08:14

## 2025-05-18 RX ADMIN — ACETAMINOPHEN 650 MG: 325 TABLET ORAL at 12:17

## 2025-05-18 ASSESSMENT — COGNITIVE AND FUNCTIONAL STATUS - GENERAL
DAILY ACTIVITIY SCORE: 24
MOBILITY SCORE: 24

## 2025-05-18 ASSESSMENT — PAIN SCALES - GENERAL
PAINLEVEL_OUTOF10: 0 - NO PAIN
PAINLEVEL_OUTOF10: 0 - NO PAIN

## 2025-05-18 NOTE — PROGRESS NOTES
.[facility] Critical Care Medicine       Date:  5/18/2025  Patient:  Stephenie Mccray  YOB: 1980  MRN:  [id number]   Admit Date:  5/17/2025  ========================================================================================================    Chief Complaint   Patient presents with    Shortness of Breath     Admitted for pneumonia on the 9th. Has o2 at home doesn't have portable o2. ^9 percent on ra             History of Present Illness:  Stephenie Mccray is a 45 y.o. year old female patient with Past Medical History of COPD home O2 4L NC as needed, schizophrenia, HTN, HLD, hypothyroid, DM2, pulmonary embolism on xarelto, who p/w shortness of breath. Of note the patient was discharged  5 days ago on a steroid taper and azithromycin for COPD exacerbation, The patient had arrived to the ER fairly somnolent but responds to commands but is difficult to arouse. Patient is currently not answering questions and requires vigorous stimulation to arouse at this time. History provided by  at bedside at this time. According to the  the patient felt way 1-2 days after discharge but then yesterday she started to feel progressively more short of breath. She did not have a nebulizer and was feeling so short of breath that the decided to come to the ER. The  denies any worsening cough or sputum production, complaints of fevers, dysuria, abdominal pain, but she still smokes at least 1 pack of cigarettes daily.      In the ER the patient was noted to be afebrile, tachycardic (Sinus tachycardia on EKG without ischemic changes), normotensive but hypoxemic on 5L NC with increased work of breathing and notble wheezing on exam. The patient was noted to have a WBC of 14K, negative flu and covid pcr, Cr of 0.71 but bicarb of 41 (previously bicarb of 30-37 on prior BMPs from recent hospitalization) and an ABG of 7.34/79/56 while on 50% ventimask. Pt was given Narcan given somnolence without  response, duonebs x 3, 125mg solumedrol, Ceftriaxone and azithromycin, and toradol, reglan, and benadryl for headache. Patient was paced on BiPAP for increased work of breathing and hypoxemia on ABG but was not pulling good Tidal volumes and was switched to AVAPS with better tidal volumes. ICU was called for likely admission.      Interval ICU Events:  5/18: CO2 has normalized. Alert. Anxious.     Medical History:  Medical History[1]  Surgical History[2]  Prescriptions Prior to Admission[3]  Fluticasone furoate-vilanterol, Fluticasone-umeclidin-vilanter, Levofloxacin, Sumatriptan, and Vortioxetine  Social History[4]  Family History[5]    Review of Systems:  14 point review of systems was completed and negative except for those specially mention in my HPI    Physical Exam:    Heart Rate:  [48-96]   Temp:  [36.5 °C (97.7 °F)-36.6 °C (97.9 °F)]   Resp:  [14-36]   BP: (117-179)/()   SpO2:  [87 %-99 %]     Physical Exam  HENT:      Head: Normocephalic.      Nose: Nose normal.      Mouth/Throat:      Mouth: Mucous membranes are moist.   Eyes:      Pupils: Pupils are equal, round, and reactive to light.   Cardiovascular:      Rate and Rhythm: Normal rate.   Pulmonary:      Effort: Pulmonary effort is normal. No respiratory distress.   Abdominal:      Palpations: Abdomen is soft.   Skin:     General: Skin is warm and dry.      Capillary Refill: Capillary refill takes less than 2 seconds.   Neurological:      General: No focal deficit present.      Mental Status: She is alert. Mental status is at baseline.       Objective:  I have reviewed all medications, laboratory results, and imaging pertinent for today's encounter     Assessment/Plan:  Pt is a 44 y/o F w/ a PMHx of COPD home O2 4L NC as needed, schizophrenia, HTN, HLD, hypothyroid, type 2 Diabetes, pulmonary embolism on xarelto, who p/w shortness of breath in the setting of acute on chronic hypoxemic respiratory failure with chronic hypercapnic respiratory failure  with metabolic compensation due to COPD exacerbation requiring NIPPV and ICU admission. Pt currently appears stable at this time but in need of close respiratory monitoring.      Neuro/Psych/Pain Ctrl/Sedation:  #Schizophrenia  #Metabolic encephalopathy - improved - likely due to hypercapnia - etiology of hypercapnia unclear - per mother she is supposed to wear bipap at home but does not and/or could also be medication induced  - contnue home trazodone, Clonazepam, Escitalopram, hydroxyzine, paliperidone, and risperidone and monitor mentation  - C/W home ubrogepant PRN for headache  - PT/OT   - Urine tox with benzo / cannabinoid  - consult psych to determine if psych medications contributing lethargy leading to elevated co2     Respiratory/ENT:  #Acute on chronic hypoxemic respiratory failure - at baseline  #Chronic hypercapnic Respiratory failure with metabolic compensation  #COPD with Acute exacerbation  - C/w Duonebs Q4H and Q2h PRN  - wean Supplemental Oxygen as needed to maintain an SpO2>88%  - Incentive spirometry and Early mobilization for lung volume expansion   - Change Solu-Medrol 40mg from Q12H to daily x 5 days  - Azithromax 500 mg x 3 days  - BIPAP at night  - consult Pulm to determine if pulmonary cause of elevated co2, patient states on CPAP at home however appears to have been Dc'd on BiPAP for similar admission.      Cardiovascular:  #History of Pulmonary Embolism  #History of Hypertension  #History of HLD  - C/W home statin  - C/w home clonidine, metoprolol, and prazosin  - C/w home xarelto  - Continuous Telemetry Monitoring     Renal/Volume Status (Intra & Extravascular):  - No current active issues  - I/O  - Avoid nephrotoxic drugs and renally dose all medications      GI:  - No active issues  - Carb controlled/Cardiac Diet once weaned off  - Discontinue home PPI as no clear indication but can continue home Famotidine   - PRN miralax     Infectious Disease:  #Leukocytosis  - possibly reactive due  to acute stress response vs steroids  - Low suspicion for infectious etiology, dc ceftriaxone     Heme/Onc:  #History of PE  - No current active issues  - Monitor Hgb Daily and transfuse for Hgb<7 or bleeding with hemodynamic instability  - C/W hoe xarelto for VTE PPx      Endocrine  #History of Type 2 Diabetes  #History of Hypothyroidism  - No current active issues  - C/W home levothyroxine  - ISS  - Finger sticks goals 100-180      Ethics/Code Status:  - Full code per  and ok with intubation if needed     :  DVT Prophylaxis: Xarelto  GI Prophylaxis: Home Famotidine  Bowel Regimen: PRN miralax  Diet: NPO while on NIPPV and then Cardiac/Carb consistent diet  CVC: None  Altus: None  Tran: None  Restraints: none  Dispo: Transfer to floor if remains stable today.      Critical Care Time:  45 minutes    Critical Care Activities: Management and titration of NIPPV oxygen systems, review of patient's chart/laboratory data/treatments, examination of patient, and time spent with patient's  explaining current diagnosis and treatment plan and answering any questions.    Jairo Hines, APRN-CNP, DNP         [1]   Past Medical History:  Diagnosis Date    Acute on chronic respiratory failure     Anxiety     Arthritis     Chronic headaches     COPD (chronic obstructive pulmonary disease) (Multi)     CPAP (continuous positive airway pressure) dependence     Depression     Diabetes mellitus (Multi)     type 2 IDDM    GERD (gastroesophageal reflux disease)     History of transfusion     History of venous thromboembolism     HL (hearing loss)     Hyperlipidemia     Hypertension     Hypothyroidism     Lipoma     Nephrolithiasis     CHARLIE (obstructive sleep apnea)     Ovarian teratoma     PE (pulmonary thromboembolism) (Multi)     PTSD (post-traumatic stress disorder)     Schizophrenia     Type 2 diabetes mellitus     VTE (venous thromboembolism)    [2]   Past Surgical History:  Procedure Laterality Date  "    SECTION, LOW TRANSVERSE      x 1    EYE SURGERY      LITHOTRIPSY      STAPEDES SURGERY      TONSILLECTOMY      UMBILICAL HERNIA REPAIR      VENTRAL HERNIA REPAIR     [3]   Medications Prior to Admission   Medication Sig Dispense Refill Last Dose/Taking    albuterol 90 mcg/actuation inhaler    Unknown    albuterol 90 mcg/actuation inhaler Inhale 1-2 puffs every 6 hours if needed for wheezing. 18 g 0     alcohol swabs (Alcohol Prep Pads) pads, medicated Use 3 times daily prn 100 each 3 Unknown    atorvastatin (Lipitor) 20 mg tablet Take 1 tablet (20 mg) by mouth once daily. 30 tablet 2 Unknown    azithromycin (Zithromax) 250 mg tablet Take 1 tablet (250 mg) by mouth once daily. Do not fill before May 13, 2025. 2 tablet 0 Unknown    BD Ultra-Fine Mini Pen Needle 31 gauge x 3/16\" needle USE TO INJECT 4 TIMES DAILY AS DIRECTED 400 each 3 Unknown    busPIRone (Buspar) 30 mg tablet Take 1 tablet (30 mg) by mouth 2 times a day.   Unknown    clonazePAM (KlonoPIN) 1 mg tablet Take by mouth 2 times a day as needed.   Unknown    cloNIDine (Catapres) 0.1 mg tablet Take 1 tablet (0.1 mg) by mouth 2 times a day as needed (anxiety).   Unknown    escitalopram (Lexapro) 20 mg tablet Take 1 tablet (20 mg) by mouth once daily in the morning.   Unknown    famotidine (Pepcid) 20 mg tablet Take 1 tablet (20 mg) by mouth 2 times a day for 10 days. 20 tablet 0     hydrOXYzine HCL (Atarax) 25 mg tablet Take 1 tablet (25 mg) by mouth every 6 hours if needed for itching. 20 tablet 0 Unknown    ipratropium-albuteroL (Duo-Neb) 0.5-2.5 mg/3 mL nebulizer solution Take 3 mL by nebulization every 6 hours if needed for shortness of breath or wheezing. 180 mL 1     Januvia 50 mg tablet Take 1 tablet (50 mg) by mouth early in the morning..   Unknown    levothyroxine (Synthroid, Levoxyl) 175 mcg tablet Take 1 tablet (175 mcg) by mouth once daily. 90 tablet 3 Unknown    metFORMIN  mg 24 hr tablet Take 1 tablet (500 mg) by mouth once " daily in the evening. Take with meals. Do not crush, chew, or split. 30 tablet 3 Unknown    metoprolol tartrate (Lopressor) 25 mg tablet Take 1 tablet (25 mg) by mouth 2 times a day. 60 tablet 5 Unknown    paliperidone (Invega) 3 mg 24 hr tablet Take 1 tablet (3 mg) by mouth once daily. Do not crush, chew, or split. Do not start before January 15, 2024. 30 tablet 0     prazosin (Minipress) 5 mg capsule Take 1 capsule (5 mg) by mouth once daily at bedtime.   Unknown    risperiDONE (RisperDAL) 2 mg tablet Take 1 tablet (2 mg) by mouth every 12 hours.   Unknown    tiotropium (Spiriva Respimat) 2.5 mcg/actuation inhaler Inhale 2 puffs once daily. Do not start before March 9, 2024. 8 g 11 Unknown    traZODone (Desyrel) 50 mg tablet Take 1 tablet (50 mg) by mouth early in the morning..   Unknown    ubrogepant (Ubrelvy) 100 mg tablet Take 1 tablet (100 mg) by mouth 1 time if needed (Headache) for up to 20 doses. The dose may be repeated in 2 hours if headache persists or recurs. No more than 2 doses are to be taken in a 24 hour periodl 10 tablet 1 Unknown    Xarelto 10 mg tablet Take 1 tablet (10 mg) by mouth once daily.   Unknown   [4]   Social History  Tobacco Use    Smoking status: Every Day     Current packs/day: 0.50     Types: Cigarettes     Passive exposure: Current    Smokeless tobacco: Never   Vaping Use    Vaping status: Never Used   Substance Use Topics    Alcohol use: Never    Drug use: Yes     Types: Marijuana   [5]   Family History  Problem Relation Name Age of Onset    Fibromyalgia Father      Hypertension Brother      Thyroid disease Maternal Grandmother      Thyroid disease Paternal Grandmother      Lung cancer Paternal Grandfather      Coronary artery disease Other Grandmother

## 2025-05-18 NOTE — CONSULTS
Reason For Consult  # Chronic hypoxic/acute/chronic hypoxic/hypercapnic respiratory failure, COPD exacerbation, nicotine dependence and acute bronchitis.  Admit Date:  5/17/2025  ========================================================================================================          Chief Complaint   Patient presents with    Shortness of Breath       Admitted for pneumonia on the 9th. Has o2 at home doesn't have portable o2. ^9 percent on ra                History of Present Illness:  Stephenie Mccray is a 45 y.o. year old female patient with Past Medical History of COPD home O2 4L NC as needed, schizophrenia, HTN, HLD, hypothyroid, DM2, pulmonary embolism on xarelto, who p/w shortness of breath. Of note the patient was discharged  5 days ago on a steroid taper and azithromycin for COPD exacerbation, The patient had arrived to the ER fairly somnolent but responds to commands but is difficult to arouse. Patient is currently not answering questions and requires vigorous stimulation to arouse at this time. History provided by  at bedside at this time. According to the  the patient felt way 1-2 days after discharge but then yesterday she started to feel progressively more short of breath. She did not have a nebulizer and was feeling so short of breath that the decided to come to the ER. The  denies any worsening cough or sputum production, complaints of fevers, dysuria, abdominal pain, but she still smokes at least 1 pack of cigarettes daily.      In the ER the patient was noted to be afebrile, tachycardic (Sinus tachycardia on EKG without ischemic changes), normotensive but hypoxemic on 5L NC with increased work of breathing and notble wheezing on exam. The patient was noted to have a WBC of 14K, negative flu and covid pcr, Cr of 0.71 but bicarb of 41 (previously bicarb of 30-37 on prior BMPs from recent hospitalization) and an ABG of 7.34/79/56 while on 50% ventimask. Pt was given  Narcan given somnolence without response, duonebs x 3, 125mg solumedrol, Ceftriaxone and azithromycin, and toradol, reglan, and benadryl for headache. Patient was paced on BiPAP for increased work of breathing and hypoxemia on ABG but was not pulling good Tidal volumes and was switched to AVAPS with better tidal volumes. ICU was called for likely admission.     I was asked to evaluate and follow from a pulmonary perspective.  Patient normally follows with Dr. Afshin Villalba.  She has longstanding smoking and felt to have severe COPD and she is on home O2 5 L nasal cannula around-the-clock through [employer].  She appears to be on generic DuoNebs as needed(not using as she does not have home nebulizer lately) and Spiriva Respimat 2.5 mcg daily.  She does appear to have chronic respiratory failure for which she is on trilogy home vent but she has been using this only 2-3 times a week.  There is also question edema if she was on too much psychiatric meds and her clonidine dose has been decreased by psychiatrist Dr. Dimas.  She also appears to be on Risperdal.     Interval ICU Events:  5/18: CO2 has normalized. Alert. Anxious.      Medical History:  [Medical History]    [Medical History]  Past Medical History       Diagnosis Date    Acute on chronic respiratory failure      Anxiety      Arthritis      Chronic headaches      COPD (chronic obstructive pulmonary disease) (Multi)      CPAP (continuous positive airway pressure) dependence      Depression      Diabetes mellitus (Multi)       type 2 IDDM    GERD (gastroesophageal reflux disease)      History of transfusion      History of venous thromboembolism      HL (hearing loss)      Hyperlipidemia      Hypertension      Hypothyroidism      Lipoma      Nephrolithiasis      CHARLIE (obstructive sleep apnea)      Ovarian teratoma      PE (pulmonary thromboembolism) (Multi)      PTSD (post-traumatic stress disorder)      Schizophrenia      Type 2 diabetes mellitus      VTE (venous  "thromboembolism)       [Surgical History]    [Surgical History]  Past Surgical History        Procedure Laterality Date     SECTION, LOW TRANSVERSE         x 1    EYE SURGERY        LITHOTRIPSY        STAPEDES SURGERY        TONSILLECTOMY        UMBILICAL HERNIA REPAIR        VENTRAL HERNIA REPAIR         [Prescriptions Prior to Admission]    [Prescriptions Prior to Admission]          Medications Prior to Admission   Medication Sig Dispense Refill Last Dose/Taking    albuterol 90 mcg/actuation inhaler       Unknown    albuterol 90 mcg/actuation inhaler Inhale 1-2 puffs every 6 hours if needed for wheezing. 18 g 0      alcohol swabs (Alcohol Prep Pads) pads, medicated Use 3 times daily prn 100 each 3 Unknown    atorvastatin (Lipitor) 20 mg tablet Take 1 tablet (20 mg) by mouth once daily. 30 tablet 2 Unknown    azithromycin (Zithromax) 250 mg tablet Take 1 tablet (250 mg) by mouth once daily. Do not fill before May 13, 2025. 2 tablet 0 Unknown    BD Ultra-Fine Mini Pen Needle 31 gauge x 3/16\" needle USE TO INJECT 4 TIMES DAILY AS DIRECTED 400 each 3 Unknown    busPIRone (Buspar) 30 mg tablet Take 1 tablet (30 mg) by mouth 2 times a day.     Unknown    clonazePAM (KlonoPIN) 1 mg tablet Take by mouth 2 times a day as needed.     Unknown    cloNIDine (Catapres) 0.1 mg tablet Take 1 tablet (0.1 mg) by mouth 2 times a day as needed (anxiety).     Unknown    escitalopram (Lexapro) 20 mg tablet Take 1 tablet (20 mg) by mouth once daily in the morning.     Unknown    famotidine (Pepcid) 20 mg tablet Take 1 tablet (20 mg) by mouth 2 times a day for 10 days. 20 tablet 0      hydrOXYzine HCL (Atarax) 25 mg tablet Take 1 tablet (25 mg) by mouth every 6 hours if needed for itching. 20 tablet 0 Unknown    ipratropium-albuteroL (Duo-Neb) 0.5-2.5 mg/3 mL nebulizer solution Take 3 mL by nebulization every 6 hours if needed for shortness of breath or wheezing. 180 mL 1      Januvia 50 mg tablet Take 1 tablet (50 mg) by mouth " early in the morning..     Unknown    levothyroxine (Synthroid, Levoxyl) 175 mcg tablet Take 1 tablet (175 mcg) by mouth once daily. 90 tablet 3 Unknown    metFORMIN  mg 24 hr tablet Take 1 tablet (500 mg) by mouth once daily in the evening. Take with meals. Do not crush, chew, or split. 30 tablet 3 Unknown    metoprolol tartrate (Lopressor) 25 mg tablet Take 1 tablet (25 mg) by mouth 2 times a day. 60 tablet 5 Unknown    paliperidone (Invega) 3 mg 24 hr tablet Take 1 tablet (3 mg) by mouth once daily. Do not crush, chew, or split. Do not start before January 15, 2024. 30 tablet 0      prazosin (Minipress) 5 mg capsule Take 1 capsule (5 mg) by mouth once daily at bedtime.     Unknown    risperiDONE (RisperDAL) 2 mg tablet Take 1 tablet (2 mg) by mouth every 12 hours.     Unknown    tiotropium (Spiriva Respimat) 2.5 mcg/actuation inhaler Inhale 2 puffs once daily. Do not start before March 9, 2024. 8 g 11 Unknown    traZODone (Desyrel) 50 mg tablet Take 1 tablet (50 mg) by mouth early in the morning..     Unknown    ubrogepant (Ubrelvy) 100 mg tablet Take 1 tablet (100 mg) by mouth 1 time if needed (Headache) for up to 20 doses. The dose may be repeated in 2 hours if headache persists or recurs. No more than 2 doses are to be taken in a 24 hour periodl 10 tablet 1 Unknown    Xarelto 10 mg tablet Take 1 tablet (10 mg) by mouth once daily.     Unknown     Fluticasone furoate-vilanterol, Fluticasone-umeclidin-vilanter, Levofloxacin, Sumatriptan, and Vortioxetine  [Social History]    [Social History]        Tobacco Use    Smoking status: Every Day       Current packs/day: 0.50       Types: Cigarettes       Passive exposure: Current    Smokeless tobacco: Never   Vaping Use    Vaping status: Never Used   Substance Use Topics    Alcohol use: Never    Drug use: Yes       Types: Marijuana     [Family History]    [Family History]         Problem Relation Name Age of Onset    Fibromyalgia Father        Hypertension  Brother        Thyroid disease Maternal Grandmother        Thyroid disease Paternal Grandmother        Lung cancer Paternal Grandfather        Coronary artery disease Other Grandmother          Review of Systems:  14 point review of systems was completed and negative except for those specially mention in my HPI     Physical Exam:     Heart Rate:  [48-96]   Temp:  [36.5 °C (97.7 °F)-36.6 °C (97.9 °F)]   Resp:  [14-36]   BP: (117-179)/()   SpO2:  [87 %-99 %]      Physical Exam  HENT:      Head: Normocephalic.      Nose: Nose normal.      Mouth/Throat:      Mouth: Mucous membranes are moist.   Eyes:      Pupils: Pupils are equal, round, and reactive to light.   Cardiovascular:      Rate and Rhythm: Normal rate.   Pulmonary:      Effort: Pulmonary effort is normal. No respiratory distress.   Abdominal:      Palpations: Abdomen is soft.   Skin:     General: Skin is warm and dry.      Capillary Refill: Capillary refill takes less than 2 seconds.   Neurological:      General: No focal deficit present.      Mental Status: She is alert. Mental status is at baseline.         Objective:  I have reviewed all medications, laboratory results, and imaging pertinent for today's encounter     Assessment/Plan:  Pt is a 46 y/o F w/ a PMHx of COPD home O2 4L NC as needed, schizophrenia, HTN, HLD, hypothyroid, type 2 Diabetes, pulmonary embolism on xarelto, who p/w shortness of breath in the setting of acute on chronic hypoxemic respiratory failure with chronic hypercapnic respiratory failure with metabolic compensation due to COPD exacerbation requiring NIPPV and ICU admission. Pt currently appears stable at this time but in need of close respiratory monitoring.      Neuro/Psych/Pain Ctrl/Sedation:  #Schizophrenia  #Metabolic encephalopathy - improved - likely due to hypercapnia - etiology of hypercapnia unclear - per mother she is supposed to wear bipap at home but does not and/or could also be medication induced  - contnue home  trazodone, Clonazepam, Escitalopram, hydroxyzine, paliperidone, and risperidone and monitor mentation  - C/W home ubrogepant PRN for headache  - PT/OT   - Urine tox with benzo / cannabinoid  - consult psych to determine if psych medications contributing lethargy leading to elevated co2     Respiratory/ENT:  #Acute on chronic hypoxemic respiratory failure - at baseline  #Chronic hypercapnic Respiratory failure with metabolic compensation  #COPD with Acute exacerbation  - C/w Duonebs Q4H and Q2h PRN  - wean Supplemental Oxygen as needed to maintain an SpO2>88%  - Incentive spirometry and Early mobilization for lung volume expansion   - Change Solu-Medrol 40mg from Q12H to daily x 5 days  - Azithromax 500 mg x 3 days  - AVAPS at night  - consult Pulm to determine if pulmonary cause of elevated co2, patient states on trilogy vent at home however appears to have been Dc'd on BiPAP for similar admission.      Cardiovascular:  #History of Pulmonary Embolism  #History of Hypertension  #History of HLD  - C/W home statin  - C/w home clonidine, metoprolol, and prazosin  - C/w home xarelto  - Continuous Telemetry Monitoring     Renal/Volume Status (Intra & Extravascular):  - No current active issues  - I/O  - Avoid nephrotoxic drugs and renally dose all medications      GI:  - No active issues  - Carb controlled/Cardiac Diet once weaned off  - Discontinue home PPI as no clear indication but can continue home Famotidine   - PRN miralax     Infectious Disease:  #Leukocytosis  - possibly reactive due to acute stress response vs steroids  - Low suspicion for infectious etiology, dc ceftriaxone     Heme/Onc:  #History of PE  - No current active issues  - Monitor Hgb Daily and transfuse for Hgb<7 or bleeding with hemodynamic instability  - C/W hoe xarelto for VTE PPx      Endocrine  #History of Type 2 Diabetes  #History of Hypothyroidism  - No current active issues  - C/W home levothyroxine  - ISS  - Finger sticks goals 100-180       Ethics/Code Status:  - Full code per  and ok with intubation if needed     :  DVT Prophylaxis: Xarelto  GI Prophylaxis: Home Famotidine  Bowel Regimen: PRN miralax  Diet: NPO while on NIPPV and then Cardiac/Carb consistent diet    Pulmonary comments:-Agree with treatment of acute/chronic hypoxic/hypoxic hypercapnic respiratory failure with supplemental oxygen as well as AVAPS noninvasive ventilator as ordered.  Agree with treatment of COPD exacerbation with IV Solu-Medrol and generic DuoNebs around-the-clock as ordered.  Patient appropriately on azithromycin for presumptive acute bronchitis and also for COPD exacerbation.  Patient counseled to quit smoking cigarettes.  A/T/S pamphlets on COPD nicotine dependence sleep apnea inhaler use and pulse oximetry were given.  I shall continue to monitor this patient while here and patient is to follow-up with Dr. Villalba as soon after she is discharged home.  She is on multiple psychiatric meds with very strong sedative properties such as prazosin, clonidine, clonazepam and trazodone; as psychiatry service is addressing if any medication changes to be made here.  I thank you for the referral.      I spent 55 minutes in the professional and overall care of this patient.      Mitesh Alonzo MD

## 2025-05-18 NOTE — TREATMENT PLAN
This RT did reach out to Riaz with [facility] to confirm that patient does have home 02. He confirmed that patient is current with them and has orders of 5LPM nasal cannula continuous and does have all equipment needed including concentrators and portable tanks

## 2025-05-18 NOTE — CARE PLAN
The patient's goals for the shift include      The clinical goals for the shift include Patient's SpO2 will be greater than 88% throughout this shift.      Problem: Respiratory  Goal: Clear secretions with interventions this shift  Outcome: Progressing  Goal: Minimize anxiety/maximize coping throughout shift  Outcome: Progressing  Goal: Minimal/no exertional discomfort or dyspnea this shift  Outcome: Progressing  Goal: No signs of respiratory distress (eg. Use of accessory muscles. Peds grunting)  Outcome: Progressing  Goal: Patent airway maintained this shift  Outcome: Progressing  Goal: Tolerate mechanical ventilation evidenced by VS/agitation level this shift  Outcome: Progressing  Goal: Tolerate pulmonary toileting this shift  Outcome: Progressing  Goal: Verbalize decreased shortness of breath this shift  Outcome: Progressing  Goal: Wean oxygen to maintain O2 saturation per order/standard this shift  Outcome: Progressing  Goal: Increase self care and/or family involvement in next 24 hours  Outcome: Progressing     Problem: Fall/Injury  Goal: Not fall by end of shift  Outcome: Progressing  Goal: Be free from injury by end of the shift  Outcome: Progressing  Goal: Verbalize understanding of personal risk factors for fall in the hospital  Outcome: Progressing     Problem: Skin  Goal: Prevent/manage excess moisture  Outcome: Progressing  Flowsheets (Taken 5/18/2025 0414)  Prevent/manage excess moisture: Moisturize dry skin  Goal: Prevent/minimize sheer/friction injuries  Outcome: Progressing  Flowsheets (Taken 5/18/2025 0414)  Prevent/minimize sheer/friction injuries:   Complete micro-shifts as needed if patient unable. Adjust patient position to relieve pressure points, not a full turn   Use pull sheet  Goal: Promote/optimize nutrition  Outcome: Progressing  Flowsheets (Taken 5/18/2025 0414)  Promote/optimize nutrition: Monitor/record intake including meals  Goal: Promote skin healing  Outcome:  Progressing  Flowsheets (Taken 5/18/2025 3588)  Promote skin healing:   Assess skin/pad under line(s)/device(s)   Protective dressings over bony prominences

## 2025-05-19 VITALS
TEMPERATURE: 96.8 F | WEIGHT: 172.18 LBS | BODY MASS INDEX: 31.68 KG/M2 | HEART RATE: 82 BPM | OXYGEN SATURATION: 96 % | SYSTOLIC BLOOD PRESSURE: 118 MMHG | RESPIRATION RATE: 20 BRPM | DIASTOLIC BLOOD PRESSURE: 63 MMHG | HEIGHT: 62 IN

## 2025-05-19 LAB
ALBUMIN SERPL BCP-MCNC: 3.6 G/DL (ref 3.4–5)
ANION GAP SERPL CALC-SCNC: 9 MMOL/L (ref 10–20)
BASOPHILS # BLD MANUAL: 0 X10*3/UL (ref 0–0.1)
BASOPHILS NFR BLD MANUAL: 0 %
BUN SERPL-MCNC: 36 MG/DL (ref 6–23)
CALCIUM SERPL-MCNC: 9.2 MG/DL (ref 8.6–10.3)
CHLORIDE SERPL-SCNC: 100 MMOL/L (ref 98–107)
CO2 SERPL-SCNC: 37 MMOL/L (ref 21–32)
CREAT SERPL-MCNC: 0.83 MG/DL (ref 0.5–1.05)
EGFRCR SERPLBLD CKD-EPI 2021: 89 ML/MIN/1.73M*2
EOSINOPHIL # BLD MANUAL: 0 X10*3/UL (ref 0–0.7)
EOSINOPHIL NFR BLD MANUAL: 0 %
ERYTHROCYTE [DISTWIDTH] IN BLOOD BY AUTOMATED COUNT: 14.3 % (ref 11.5–14.5)
GLUCOSE BLD MANUAL STRIP-MCNC: 229 MG/DL (ref 74–99)
GLUCOSE BLD MANUAL STRIP-MCNC: 235 MG/DL (ref 74–99)
GLUCOSE SERPL-MCNC: 279 MG/DL (ref 74–99)
HCT VFR BLD AUTO: 41.6 % (ref 36–46)
HGB BLD-MCNC: 12.7 G/DL (ref 12–16)
HOLD SPECIMEN: 293
IMM GRANULOCYTES # BLD AUTO: 1.11 X10*3/UL (ref 0–0.7)
IMM GRANULOCYTES NFR BLD AUTO: 10.4 % (ref 0–0.9)
LYMPHOCYTES # BLD MANUAL: 2.33 X10*3/UL (ref 1.2–4.8)
LYMPHOCYTES NFR BLD MANUAL: 22 %
MAGNESIUM SERPL-MCNC: 1.78 MG/DL (ref 1.6–2.4)
MCH RBC QN AUTO: 31.6 PG (ref 26–34)
MCHC RBC AUTO-ENTMCNC: 30.5 G/DL (ref 32–36)
MCV RBC AUTO: 104 FL (ref 80–100)
METAMYELOCYTES # BLD MANUAL: 0.32 X10*3/UL
METAMYELOCYTES NFR BLD MANUAL: 3 %
MONOCYTES # BLD MANUAL: 0.42 X10*3/UL (ref 0.1–1)
MONOCYTES NFR BLD MANUAL: 4 %
MYELOCYTES # BLD MANUAL: 0.32 X10*3/UL
MYELOCYTES NFR BLD MANUAL: 3 %
NEUTROPHILS # BLD MANUAL: 7.21 X10*3/UL (ref 1.2–7.7)
NEUTS BAND # BLD MANUAL: 0.11 X10*3/UL (ref 0–0.7)
NEUTS BAND NFR BLD MANUAL: 1 %
NEUTS SEG # BLD MANUAL: 7.1 X10*3/UL (ref 1.2–7)
NEUTS SEG NFR BLD MANUAL: 67 %
NRBC BLD-RTO: 0.2 /100 WBCS (ref 0–0)
OVALOCYTES BLD QL SMEAR: ABNORMAL
PHOSPHATE SERPL-MCNC: 3.4 MG/DL (ref 2.5–4.9)
PLATELET # BLD AUTO: 114 X10*3/UL (ref 150–450)
POLYCHROMASIA BLD QL SMEAR: ABNORMAL
POTASSIUM SERPL-SCNC: 4.8 MMOL/L (ref 3.5–5.3)
PROCALCITONIN SERPL-MCNC: <0.02 NG/ML
RBC # BLD AUTO: 4.02 X10*6/UL (ref 4–5.2)
RBC MORPH BLD: ABNORMAL
SODIUM SERPL-SCNC: 141 MMOL/L (ref 136–145)
TOTAL CELLS COUNTED BLD: 100
WBC # BLD AUTO: 10.6 X10*3/UL (ref 4.4–11.3)

## 2025-05-19 PROCEDURE — 2500000002 HC RX 250 W HCPCS SELF ADMINISTERED DRUGS (ALT 637 FOR MEDICARE OP, ALT 636 FOR OP/ED): Performed by: HOSPITALIST

## 2025-05-19 PROCEDURE — 80069 RENAL FUNCTION PANEL: CPT | Performed by: HOSPITALIST

## 2025-05-19 PROCEDURE — 82947 ASSAY GLUCOSE BLOOD QUANT: CPT

## 2025-05-19 PROCEDURE — 94660 CPAP INITIATION&MGMT: CPT

## 2025-05-19 PROCEDURE — 84100 ASSAY OF PHOSPHORUS: CPT | Performed by: HOSPITALIST

## 2025-05-19 PROCEDURE — 2500000001 HC RX 250 WO HCPCS SELF ADMINISTERED DRUGS (ALT 637 FOR MEDICARE OP): Performed by: HOSPITALIST

## 2025-05-19 PROCEDURE — 94640 AIRWAY INHALATION TREATMENT: CPT

## 2025-05-19 PROCEDURE — 2500000004 HC RX 250 GENERAL PHARMACY W/ HCPCS (ALT 636 FOR OP/ED): Mod: JZ | Performed by: HOSPITALIST

## 2025-05-19 PROCEDURE — 2500000005 HC RX 250 GENERAL PHARMACY W/O HCPCS: Performed by: HOSPITALIST

## 2025-05-19 PROCEDURE — 85007 BL SMEAR W/DIFF WBC COUNT: CPT | Performed by: HOSPITALIST

## 2025-05-19 PROCEDURE — 36415 COLL VENOUS BLD VENIPUNCTURE: CPT | Performed by: HOSPITALIST

## 2025-05-19 PROCEDURE — 99221 1ST HOSP IP/OBS SF/LOW 40: CPT | Performed by: PSYCHIATRY & NEUROLOGY

## 2025-05-19 PROCEDURE — 5A09357 ASSISTANCE WITH RESPIRATORY VENTILATION, LESS THAN 24 CONSECUTIVE HOURS, CONTINUOUS POSITIVE AIRWAY PRESSURE: ICD-10-PCS | Performed by: STUDENT IN AN ORGANIZED HEALTH CARE EDUCATION/TRAINING PROGRAM

## 2025-05-19 PROCEDURE — 84145 PROCALCITONIN (PCT): CPT | Mod: ELYLAB | Performed by: HOSPITALIST

## 2025-05-19 PROCEDURE — 83735 ASSAY OF MAGNESIUM: CPT | Performed by: HOSPITALIST

## 2025-05-19 PROCEDURE — 99239 HOSP IP/OBS DSCHRG MGMT >30: CPT | Performed by: STUDENT IN AN ORGANIZED HEALTH CARE EDUCATION/TRAINING PROGRAM

## 2025-05-19 PROCEDURE — S4991 NICOTINE PATCH NONLEGEND: HCPCS | Performed by: HOSPITALIST

## 2025-05-19 PROCEDURE — 2500000004 HC RX 250 GENERAL PHARMACY W/ HCPCS (ALT 636 FOR OP/ED): Performed by: INTERNAL MEDICINE

## 2025-05-19 PROCEDURE — 85027 COMPLETE CBC AUTOMATED: CPT | Performed by: HOSPITALIST

## 2025-05-19 PROCEDURE — 2500000001 HC RX 250 WO HCPCS SELF ADMINISTERED DRUGS (ALT 637 FOR MEDICARE OP): Performed by: STUDENT IN AN ORGANIZED HEALTH CARE EDUCATION/TRAINING PROGRAM

## 2025-05-19 RX ORDER — PRAZOSIN HYDROCHLORIDE 2 MG/1
2 CAPSULE ORAL NIGHTLY
COMMUNITY

## 2025-05-19 RX ORDER — ONDANSETRON 4 MG/1
4 TABLET, ORALLY DISINTEGRATING ORAL EVERY 8 HOURS PRN
COMMUNITY

## 2025-05-19 RX ORDER — IBUPROFEN 200 MG
1 TABLET ORAL DAILY
Qty: 14 PATCH | Refills: 0 | Status: SHIPPED | OUTPATIENT
Start: 2025-06-28 | End: 2025-07-12

## 2025-05-19 RX ORDER — BUTALBITAL, ACETAMINOPHEN AND CAFFEINE 50; 325; 40 MG/1; MG/1; MG/1
1 TABLET ORAL EVERY 4 HOURS PRN
Status: DISCONTINUED | OUTPATIENT
Start: 2025-05-19 | End: 2025-05-19 | Stop reason: HOSPADM

## 2025-05-19 RX ORDER — AZITHROMYCIN 500 MG/1
500 TABLET, FILM COATED ORAL DAILY
Qty: 1 TABLET | Refills: 0 | Status: SHIPPED | OUTPATIENT
Start: 2025-05-19 | End: 2025-05-20

## 2025-05-19 RX ORDER — PREDNISONE 20 MG/1
40 TABLET ORAL DAILY
Qty: 6 TABLET | Refills: 0 | Status: SHIPPED | OUTPATIENT
Start: 2025-05-19 | End: 2025-05-22

## 2025-05-19 RX ORDER — IPRATROPIUM BROMIDE AND ALBUTEROL SULFATE 2.5; .5 MG/3ML; MG/3ML
3 SOLUTION RESPIRATORY (INHALATION)
Status: DISCONTINUED | OUTPATIENT
Start: 2025-05-19 | End: 2025-05-19 | Stop reason: HOSPADM

## 2025-05-19 RX ORDER — NICOTINE 7MG/24HR
1 PATCH, TRANSDERMAL 24 HOURS TRANSDERMAL DAILY
Qty: 14 PATCH | Refills: 0 | Status: SHIPPED | OUTPATIENT
Start: 2025-07-12 | End: 2025-07-26

## 2025-05-19 RX ORDER — KETOROLAC TROMETHAMINE 30 MG/ML
15 INJECTION, SOLUTION INTRAMUSCULAR; INTRAVENOUS ONCE
Status: COMPLETED | OUTPATIENT
Start: 2025-05-19 | End: 2025-05-19

## 2025-05-19 RX ORDER — IBUPROFEN 200 MG
1 TABLET ORAL DAILY
Qty: 30 PATCH | Refills: 1 | Status: SHIPPED | OUTPATIENT
Start: 2025-05-20 | End: 2025-06-28

## 2025-05-19 RX ORDER — CLONAZEPAM 0.5 MG/1
1 TABLET ORAL
COMMUNITY
Start: 2025-05-12

## 2025-05-19 RX ADMIN — LEVOTHYROXINE SODIUM 175 MCG: 0.05 TABLET ORAL at 08:36

## 2025-05-19 RX ADMIN — INSULIN LISPRO 4 UNITS: 100 INJECTION, SOLUTION INTRAVENOUS; SUBCUTANEOUS at 11:46

## 2025-05-19 RX ADMIN — ESCITALOPRAM OXALATE 20 MG: 10 TABLET, FILM COATED ORAL at 08:35

## 2025-05-19 RX ADMIN — BUTALBITAL, ACETAMINOPHEN AND CAFFEINE 1 TABLET: 325; 50; 40 TABLET ORAL at 12:06

## 2025-05-19 RX ADMIN — CLONIDINE HYDROCHLORIDE 0.1 MG: 0.1 TABLET ORAL at 04:05

## 2025-05-19 RX ADMIN — FAMOTIDINE 40 MG: 20 TABLET, FILM COATED ORAL at 08:35

## 2025-05-19 RX ADMIN — IPRATROPIUM BROMIDE AND ALBUTEROL SULFATE 3 ML: 2.5; .5 SOLUTION RESPIRATORY (INHALATION) at 07:28

## 2025-05-19 RX ADMIN — IPRATROPIUM BROMIDE AND ALBUTEROL SULFATE 3 ML: 2.5; .5 SOLUTION RESPIRATORY (INHALATION) at 03:24

## 2025-05-19 RX ADMIN — RISPERIDONE 2 MG: 1 TABLET, FILM COATED ORAL at 05:43

## 2025-05-19 RX ADMIN — Medication 4 L/MIN: at 07:00

## 2025-05-19 RX ADMIN — INSULIN LISPRO 4 UNITS: 100 INJECTION, SOLUTION INTRAVENOUS; SUBCUTANEOUS at 06:57

## 2025-05-19 RX ADMIN — KETOROLAC TROMETHAMINE 15 MG: 30 INJECTION, SOLUTION INTRAMUSCULAR at 02:58

## 2025-05-19 RX ADMIN — NICOTINE 1 PATCH: 21 PATCH, EXTENDED RELEASE TRANSDERMAL at 08:39

## 2025-05-19 RX ADMIN — CLONAZEPAM 0.5 MG: 0.5 TABLET ORAL at 05:54

## 2025-05-19 RX ADMIN — METOPROLOL TARTRATE 25 MG: 25 TABLET, FILM COATED ORAL at 08:35

## 2025-05-19 RX ADMIN — Medication 4 L/MIN: at 07:30

## 2025-05-19 RX ADMIN — HYDROXYZINE HYDROCHLORIDE 25 MG: 25 TABLET ORAL at 08:51

## 2025-05-19 RX ADMIN — METHYLPREDNISOLONE SODIUM SUCCINATE 40 MG: 40 INJECTION, POWDER, FOR SOLUTION INTRAMUSCULAR; INTRAVENOUS at 02:10

## 2025-05-19 RX ADMIN — RIVAROXABAN 10 MG: 10 TABLET, FILM COATED ORAL at 08:35

## 2025-05-19 RX ADMIN — AZITHROMYCIN MONOHYDRATE 500 MG: 500 INJECTION, POWDER, LYOPHILIZED, FOR SOLUTION INTRAVENOUS at 03:02

## 2025-05-19 RX ADMIN — BUSPIRONE HYDROCHLORIDE 30 MG: 5 TABLET ORAL at 08:50

## 2025-05-19 RX ADMIN — ACETAMINOPHEN 650 MG: 325 TABLET ORAL at 11:03

## 2025-05-19 RX ADMIN — TRAZODONE HYDROCHLORIDE 50 MG: 50 TABLET ORAL at 08:36

## 2025-05-19 RX ADMIN — ATORVASTATIN CALCIUM 20 MG: 20 TABLET, FILM COATED ORAL at 08:35

## 2025-05-19 ASSESSMENT — COGNITIVE AND FUNCTIONAL STATUS - GENERAL
DAILY ACTIVITIY SCORE: 24
MOBILITY SCORE: 24
MOBILITY SCORE: 24
DAILY ACTIVITIY SCORE: 24

## 2025-05-19 ASSESSMENT — PAIN SCALES - GENERAL
PAINLEVEL_OUTOF10: 0 - NO PAIN
PAINLEVEL_OUTOF10: 8
PAINLEVEL_OUTOF10: 8

## 2025-05-19 ASSESSMENT — PAIN DESCRIPTION - DESCRIPTORS
DESCRIPTORS: HEADACHE
DESCRIPTORS: ACHING
DESCRIPTORS: ACHING

## 2025-05-19 ASSESSMENT — PAIN - FUNCTIONAL ASSESSMENT
PAIN_FUNCTIONAL_ASSESSMENT: 0-10

## 2025-05-19 ASSESSMENT — PAIN DESCRIPTION - LOCATION
LOCATION: HEAD
LOCATION: HEAD

## 2025-05-19 NOTE — CONSULTS
"Nutrition Initial Assessment:   Nutrition Assessment    Reason for Assessment: Admission nursing screening    Patient is a 45 y.o. female presenting with acute on chronic respiratory failure  Past Medical History of COPD home O2 4L NC as needed, schizophrenia, HTN, HLD, hypothyroid, DM2, pulmonary embolism on xarelto, who p/w shortness of breath.       Nutrition History:  Energy Intake:  (no meal intakes recorded at this time)  Pain affecting nutrition status: N/A  Food and Nutrient History: RDN consult for MST score of 2. Met with pt who denies recent wt changes, states  lb. Pt denies following diet restrictions prior to admission. Pt reports OK appetite, states at times she does not feel hungry. Pt denies chewing or swallowing difficulty. Pt reports occasional GI symptoms, none at this time. Pt denies questions regarding diet at this time, assisted pt with ordering lunch and staying within carb allotment. Will monitor po intake and follow for the need for ONS.  Vitamin/Herbal Supplement Use: none per home med list       Anthropometrics:  Height: 157.5 cm (5' 2\")   Weight: 78.1 kg (172 lb 2.9 oz)   BMI (Calculated): 31.48  IBW/kg (Dietitian Calculated): 50 kg  Percent of IBW: 156 %                      Weight History:   Wt Readings from Last 10 Encounters:   05/19/25 78.1 kg (172 lb 2.9 oz) - bed scale   05/17/25 73.1 kg (161 lb) - bed scale   05/09/25 72.6 kg (160 lb)   05/07/25 72.6 kg (160 lb)   04/25/25 72.6 kg (160 lb)   04/03/25 72.6 kg (160 lb)   03/20/25 72.6 kg (160 lb)   02/19/25 74.8 kg (165 lb)   02/15/25 70.8 kg (156 lb)   02/03/25 69.4 kg (153 lb)   01/29/25 71.8 kg (158 lb 3.2 oz)      Weight Change %:  Weight History / % Weight Change: no significant wt changes per chart review.  Significant Weight Loss: No    Nutrition Focused Physical Exam Findings:    Subcutaneous Fat Loss:   Defer Subcutaneous Fat Loss Assessment: Defer all  Defer All Reason: Deferred as pt visually appears well-nourished " with no signs of malnutrition  Muscle Wasting:  Defer Muscle Wasting Assessment: Defer all  Defer All Reason: Deferred as pt visually appears well-nourished with no signs of malnutrition  Edema:  Edema Location: nonpitting BLE  Physical Findings:  Skin: Negative    Nutrition Significant Labs:  BMP Trend:   Results from last 7 days   Lab Units 05/19/25  0702 05/18/25  0328 05/17/25  0201   GLUCOSE mg/dL 279* 184* 100*   CALCIUM mg/dL 9.2 9.1 9.3   SODIUM mmol/L 141 144 145   POTASSIUM mmol/L 4.8 4.4 4.2   CO2 mmol/L 37* 39* 41*   CHLORIDE mmol/L 100 100 100   BUN mg/dL 36* 37* 24*   CREATININE mg/dL 0.83 0.76 0.71    , A1C:  Lab Results   Component Value Date    HGBA1C 6.8 (H) 04/15/2025   , BG POCT trend:   Results from last 7 days   Lab Units 05/19/25  1050 05/19/25  0650 05/18/25  1923 05/18/25  1549 05/18/25  1104   POCT GLUCOSE mg/dL 229* 235* 105* 251* 178*        Nutrition Specific Medications:  Scheduled medications  Scheduled Medications[1]  Continuous medications  Continuous Medications[2]  PRN medications  PRN Medications[3]     I/O:   Last BM Date: 05/17/25;      Dietary Orders (From admission, onward)       Start     Ordered    05/18/25 0850  Adult diet Regular, Consistent Carb; CCD 60 gm/meal  Diet effective now        Question Answer Comment   Diet type Regular    Diet type Consistent Carb    Carb diet selection: CCD 60 gm/meal        05/18/25 0849    05/17/25 0506  May Participate in Room Service  ( ROOM SERVICE MAY PARTICIPATE)  Once        Question:  .  Answer:  Yes    05/17/25 0505                     Estimated Needs:   Total Energy Estimated Needs in 24 hours (kCal): 1560 kCal  Method for Estimating Needs: 20 kcal/kg ABW  Total Protein Estimated Needs in 24 Hours (g): 78 g  Method for Estimating 24 Hour Protein Needs: 1 g/kg ABW  Total Fluid Estimated Needs in 24 Hours (mL): 1560 mL  Method for Estimating 24 Hour Fluid Needs: 1 ml/kcal or per MD  Patient on Order Fluid Restriction: No         Nutrition Diagnosis   Malnutrition Diagnosis  Patient has Malnutrition Diagnosis: No    Nutrition Diagnosis  Patient has Nutrition Diagnosis: Yes  Diagnosis Status (1): New  Nutrition Diagnosis 1: Decreased nutrient needs  Related to (1): endocrine dysfunction  As Evidenced by (1): need for therapeutic diet       Nutrition Interventions/Recommendations   Nutrition prescription for oral nutrition    Nutrition Recommendations:  Individualized Nutrition Prescription Provided for : 60 g Carb Control diet, monitor need for ONS    Nutrition Interventions/Goals:   Meals and Snacks: Carbohydrate-modified diet, General healthful diet  Goal: Consumes 3 meals per day  Coordination of Care with Providers: Nursing      Education Documentation  No documentation found.    Pt denies questions at this time        Nutrition Monitoring and Evaluation   Food/Nutrient Related History Monitoring  Monitoring and Evaluation Plan: Intake / amount of food, Estimated Energy Intake  Estimated Energy Intake: Energy intake 50 -75% of estimated energy needs  Intake / Amount of food: Consumes at least 50% or more of meals/snacks/supplements    Anthropometric Measurements  Monitoring and Evaluation Plan: Body weight  Body Weight: Body weight - Maintain stable weight    Biochemical Data, Medical Tests and Procedures  Monitoring and Evaluation Plan: Electrolyte/renal panel, Glucose/endocrine profile  Electrolyte and Renal Panel: Electrolytes within normal limits  Glucose/Endocrine Profile: Glucose within normal limits ( mg/dL)              Time Spent (min): 40 minutes            [1] atorvastatin, 20 mg, oral, Daily  azithromycin, 500 mg, intravenous, q24h  busPIRone, 30 mg, oral, BID  escitalopram, 20 mg, oral, q AM  famotidine, 40 mg, oral, BID  insulin lispro, 0-10 Units, subcutaneous, Before meals & nightly  ipratropium-albuteroL, 3 mL, nebulization, TID  levothyroxine, 175 mcg, oral, Daily  [Held by provider] metFORMIN XR, 500 mg, oral,  Daily with evening meal  methylPREDNISolone sodium succinate (PF), 40 mg, intravenous, q24h  metoprolol tartrate, 25 mg, oral, BID  nicotine, 1 patch, transdermal, Daily   Followed by  [START ON 6/28/2025] nicotine, 1 patch, transdermal, Daily   Followed by  [START ON 7/12/2025] nicotine, 1 patch, transdermal, Daily  oxygen, , inhalation, Continuous - Inhalation  oxygen, , inhalation, Continuous - Inhalation  prazosin, 4 mg, oral, Nightly  risperiDONE, 2 mg, oral, q12h BLUE  rivaroxaban, 10 mg, oral, Daily  [Held by provider] SITagliptin phosphate, 50 mg, oral, Daily  traZODone, 50 mg, oral, Daily     [2]    [3] PRN medications: acetaminophen, clonazePAM, cloNIDine, dextrose, dextrose, glucagon, glucagon, hydrOXYzine HCL, ipratropium-albuteroL, [Held by provider] ubrogepant

## 2025-05-19 NOTE — PROGRESS NOTES
05/19/25 1049   Discharge Planning   Living Arrangements Spouse/significant other   Support Systems Spouse/significant other;Family members   Assistance Needed Patient is independent at home, states she uses oxygen PRN, usually at 4 liters n/c when needed.   Type of Residence Private residence   Number of Stairs to Enter Residence 3   Number of Stairs Within Residence 0   Do you have animals or pets at home? Yes   Type of Animals or Pets cats and dogs   Who is requesting discharge planning? Provider   Home or Post Acute Services None   Expected Discharge Disposition Home   Does the patient need discharge transport arranged? No   Patient Choice   Provider Choice list and CMS website (https://medicare.gov/care-compare#search) for post-acute Quality and Resource Measure Data were provided and reviewed with: Patient   Patient / Family choosing to utilize agency / facility established prior to hospitalization No   Stroke Family Assessment   Stroke Family Assessment Needed No   Intensity of Service   Intensity of Service 0-30 min     Patient admitted from home with chronic respiratory failure, is supposed to use CPAP at home q HS, unsure of the compliancy with this. Patient to be seen by psychiatry to make sure she understands the importance of this as she has frequent admissions d/t respiratory issues. Patient states she does have oxygen at home, wears 4 liters when she needs it, as she told me. Patient lives with her spouse and mother in law, at this time plan will be home, no needs. CT team will continue to follow.

## 2025-05-19 NOTE — CARE PLAN
The patient's goals for the shift include Labs WNL    The clinical goals for the shift include Labs WNL      Problem: Respiratory  Goal: Clear secretions with interventions this shift  Outcome: Progressing  Goal: Minimize anxiety/maximize coping throughout shift  Outcome: Progressing  Goal: Minimal/no exertional discomfort or dyspnea this shift  Outcome: Progressing  Goal: No signs of respiratory distress (eg. Use of accessory muscles. Peds grunting)  Outcome: Progressing  Goal: Patent airway maintained this shift  Outcome: Progressing  Goal: Tolerate mechanical ventilation evidenced by VS/agitation level this shift  Outcome: Progressing  Goal: Tolerate pulmonary toileting this shift  Outcome: Progressing  Goal: Verbalize decreased shortness of breath this shift  Outcome: Progressing  Goal: Wean oxygen to maintain O2 saturation per order/standard this shift  Outcome: Progressing  Goal: Increase self care and/or family involvement in next 24 hours  Outcome: Progressing     Problem: Fall/Injury  Goal: Not fall by end of shift  Outcome: Progressing  Goal: Be free from injury by end of the shift  Outcome: Progressing  Goal: Verbalize understanding of personal risk factors for fall in the hospital  Outcome: Progressing     Problem: Skin  Goal: Prevent/manage excess moisture  5/19/2025 0642 by Tracy Marcos RN  Outcome: Progressing  5/19/2025 0225 by Tracy Marcos RN  Flowsheets (Taken 5/19/2025 0225)  Prevent/manage excess moisture:   Use wicking fabric (obtain order)   Moisturize dry skin   Cleanse incontinence/protect with barrier cream   Monitor for/manage infection if present   Follow provider orders for dressing changes  Goal: Prevent/minimize sheer/friction injuries  5/19/2025 0642 by Tracy Marcos RN  Outcome: Progressing  5/19/2025 0225 by Tracy Marcos RN  Flowsheets (Taken 5/19/2025 0225)  Prevent/minimize sheer/friction injuries:   Utilize specialty bed per algorithm   Use pull sheet    Increase activity/out of bed for meals   Turn/reposition every 2 hours/use positioning/transfer devices   HOB 30 degrees or less   Complete micro-shifts as needed if patient unable. Adjust patient position to relieve pressure points, not a full turn  Goal: Promote/optimize nutrition  5/19/2025 0642 by Tracy Marcos RN  Outcome: Progressing  5/19/2025 0225 by Tracy Marcos RN  Flowsheets (Taken 5/19/2025 0225)  Promote/optimize nutrition:   Offer water/supplements/favorite foods   Discuss with provider if NPO > 2 days   Assist with feeding   Reassess MST if dietician not consulted   Monitor/record intake including meals   Consume > 50% meals/supplements  Goal: Promote skin healing  5/19/2025 0642 by Tracy Marcos RN  Outcome: Progressing  5/19/2025 0225 by Tracy Marcos RN  Flowsheets (Taken 5/19/2025 0225)  Promote skin healing:   Turn/reposition every 2 hours/use positioning/transfer devices   Rotate device position/do not position patient on device   Protective dressings over bony prominences   Ensure correct size (line/device) and apply per  instructions   Assess skin/pad under line(s)/device(s)     Problem: Pain - Adult  Goal: Verbalizes/displays adequate comfort level or baseline comfort level  Outcome: Progressing     Problem: Safety - Adult  Goal: Free from fall injury  Outcome: Progressing     Problem: Discharge Planning  Goal: Discharge to home or other facility with appropriate resources  Outcome: Progressing     Problem: Chronic Conditions and Co-morbidities  Goal: Patient's chronic conditions and co-morbidity symptoms are monitored and maintained or improved  Outcome: Progressing     Problem: Nutrition  Goal: Nutrient intake appropriate for maintaining nutritional needs  Outcome: Progressing     Problem: Diabetes  Goal: Achieve decreasing blood glucose levels by end of shift  Outcome: Progressing  Goal: Increase stability of blood glucose readings by end of shift  Outcome:  Progressing  Goal: Decrease in ketones present in urine by end of shift  Outcome: Progressing  Goal: Maintain electrolyte levels within acceptable range throughout shift  Outcome: Progressing  Goal: Maintain glucose levels >70mg/dl to <250mg/dl throughout shift  Outcome: Progressing  Goal: No changes in neurological exam by end of shift  Outcome: Progressing  Goal: Learn about and adhere to nutrition recommendations by end of shift  Outcome: Progressing  Goal: Vital signs within normal range for age by end of shift  Outcome: Progressing  Goal: Increase self care and/or family involovement by end of shift  Outcome: Progressing  Goal: Receive DSME education by end of shift  Outcome: Progressing     Problem: Pain  Goal: Takes deep breaths with improved pain control throughout the shift  Outcome: Progressing  Goal: Turns in bed with improved pain control throughout the shift  Outcome: Progressing  Goal: Walks with improved pain control throughout the shift  Outcome: Progressing  Goal: Performs ADL's with improved pain control throughout shift  Outcome: Progressing  Goal: Participates in PT with improved pain control throughout the shift  Outcome: Progressing  Goal: Free from opioid side effects throughout the shift  Outcome: Progressing  Goal: Free from acute confusion related to pain meds throughout the shift  Outcome: Progressing

## 2025-05-19 NOTE — TELEPHONE ENCOUNTER
[facility] called in stating they are unable to supply this to the patient. They are requesting it be sent to a different pharmacy that can supply these. They are recommending [facility]  Please Advise

## 2025-05-19 NOTE — CONSULTS
"History Of Present Illness  Stephenie Mccray is a 45 y.o. female presenting with as per medical H&P \" Past Medical History of COPD home O2 4L NC as needed, schizophrenia, HTN, HLD, hypothyroid, DM2, pulmonary embolism on xarelto, who p/w shortness of breath. Of note the patient was discharged  5 days ago on a steroid taper and azithromycin for COPD exacerbation, The patient had arrived to the ER fairly somnolent but responds to commands but is difficult to arouse.\"  Psychiatry was consulted to assess and manage psychiatric medications    Stephenie  on assessment this morning apart from coughing did sleep okay last night.  She is on Paliperidone 3 mg daily along with prazosin 5 mg and Risperdal 2 mg twice a day.  She also takes trazodone for sleep and clonazepam 1 mg twice a day.  Out of all these medications the only one that may decrease the respiratory rate would be clonazepam otherwise she has been stable with her antipsychotic medication Risperdal along with the BuSpar.  Paliperidone 3 mg was not resumed as paliperidone is the metabolite of risperidone and there would be excess of antipsychotic burden as result of administering both    On assessment this morning denied any auditory hallucinations, visual hallucinations, did not endorse any delusions and no objective signs of psychosis evident. No signs of disorganized behavior, disorganized speech. Patient denied any racing thoughts, flight of ideas, severe sleeplessness, unusually elevated or angry mood, unusual impulsivity, unusual spending or impulsive physical relationships. No objective signs of mike or hypomania noticed.      She was offered psychoeducation about s medications that decrease respiratory rate may increase CO2 level in her blood and may cause confusion and altered mental state.      MSE: Patient was alert, oriented to time, place, person and situation. Patient appears well groomed and clad in climate appropriate clothes. Patient is resigned and " "guarded on approach. Recent and remote memory within normal limits. Memory registration and recall within normal limits. Attention and concentration within normal limits. Speech normal in rate, rhythm and volume. Good eye contact. Thought process Linear. Intact associations. Good fund of knowledge. Mood fine denied any and affect constricted. Patient did not endorse any delusions. Patient denied any auditory visual hallucinations. Patient has denied any active suicidal  ideations and is future oriented.  Patient has fair insight, fair judgment and good impulse control.     Musculoskeletal: Normal gait, no Parkinsonism, no Dystonia, no Akathisia, no TD. Psychomotor activity within normal limits.     Diagnosis: Schizophrenia    Assessment and Plan:     Patient is not acting risk of harm to self or others and does not meet inpatient psychiatry care  The excessive hypercapnia likely cannot be fully explained by just the presence of clonazepam which she takes as needed but appears more likely coming from pulmonary etiology.  She is supposed to use CPAP with effective settings  She receives psychiatric care at [facility]     Past Medical History  Medical History[1]    Surgical History  Surgical History[2]     Social History  She reports that she has been smoking cigarettes. She has been exposed to tobacco smoke. She has never used smokeless tobacco. She reports current drug use. Drug: Marijuana. She reports that she does not drink alcohol.    Family History  Family History[3]     Allergies  Fluticasone furoate-vilanterol, Fluticasone-umeclidin-vilanter, Levofloxacin, Sumatriptan, and Vortioxetine     Last Recorded Vitals  Blood pressure 118/63, pulse 82, temperature 36 °C (96.8 °F), temperature source Temporal, resp. rate 20, height 1.575 m (5' 2\"), weight 78.1 kg (172 lb 2.9 oz), SpO2 96%.    Relevant Results  Recent Results (from the past 48 hours)   POCT GLUCOSE    Collection Time: 05/17/25 11:45 AM   Result Value Ref " Range    POCT Glucose 181 (H) 74 - 99 mg/dL   POCT GLUCOSE    Collection Time: 05/17/25  5:46 PM   Result Value Ref Range    POCT Glucose 150 (H) 74 - 99 mg/dL   POCT GLUCOSE    Collection Time: 05/17/25  8:17 PM   Result Value Ref Range    POCT Glucose 276 (H) 74 - 99 mg/dL   SST TOP    Collection Time: 05/18/25  3:27 AM   Result Value Ref Range    Extra Tube 293    Renal Function Panel    Collection Time: 05/18/25  3:28 AM   Result Value Ref Range    Glucose 184 (H) 74 - 99 mg/dL    Sodium 144 136 - 145 mmol/L    Potassium 4.4 3.5 - 5.3 mmol/L    Chloride 100 98 - 107 mmol/L    Bicarbonate 39 (H) 21 - 32 mmol/L    Anion Gap 9 (L) 10 - 20 mmol/L    Urea Nitrogen 37 (H) 6 - 23 mg/dL    Creatinine 0.76 0.50 - 1.05 mg/dL    eGFR >90 >60 mL/min/1.73m*2    Calcium 9.1 8.6 - 10.3 mg/dL    Phosphorus 3.9 2.5 - 4.9 mg/dL    Albumin 3.6 3.4 - 5.0 g/dL   Magnesium    Collection Time: 05/18/25  3:28 AM   Result Value Ref Range    Magnesium 2.08 1.60 - 2.40 mg/dL   CBC and Auto Differential    Collection Time: 05/18/25  3:28 AM   Result Value Ref Range    WBC 15.2 (H) 4.4 - 11.3 x10*3/uL    nRBC 0.5 (H) 0.0 - 0.0 /100 WBCs    RBC 3.94 (L) 4.00 - 5.20 x10*6/uL    Hemoglobin 12.4 12.0 - 16.0 g/dL    Hematocrit 40.8 36.0 - 46.0 %     (H) 80 - 100 fL    MCH 31.5 26.0 - 34.0 pg    MCHC 30.4 (L) 32.0 - 36.0 g/dL    RDW 14.3 11.5 - 14.5 %    Platelets 141 (L) 150 - 450 x10*3/uL    Immature Granulocytes %, Automated 8.3 (H) 0.0 - 0.9 %    Immature Granulocytes Absolute, Automated 1.27 (H) 0.00 - 0.70 x10*3/uL   Manual Differential    Collection Time: 05/18/25  3:28 AM   Result Value Ref Range    Neutrophils %, Manual 71.0 40.0 - 80.0 %    Bands %, Manual 2.0 0.0 - 5.0 %    Lymphocytes %, Manual 18.0 13.0 - 44.0 %    Monocytes %, Manual 4.0 2.0 - 10.0 %    Eosinophils %, Manual 0.0 0.0 - 6.0 %    Basophils %, Manual 0.0 0.0 - 2.0 %    Metamyelocytes %, Manual 3.0 0.0 - 0.0 %    Myelocytes %, Manual 2.0 0.0 - 0.0 %    Seg  Neutrophils Absolute, Manual 10.79 (H) 1.20 - 7.00 x10*3/uL    Bands Absolute, Manual 0.30 0.00 - 0.70 x10*3/uL    Lymphocytes Absolute, Manual 2.74 1.20 - 4.80 x10*3/uL    Monocytes Absolute, Manual 0.61 0.10 - 1.00 x10*3/uL    Eosinophils Absolute, Manual 0.00 0.00 - 0.70 x10*3/uL    Basophils Absolute, Manual 0.00 0.00 - 0.10 x10*3/uL    Metamyelocytes Absolute, Manual 0.46 0.00 - 0.00 x10*3/uL    Myelocytes Absolute, Manual 0.30 0.00 - 0.00 x10*3/uL    Total Cells Counted 100     Neutrophils Absolute, Manual 11.09 (H) 1.20 - 7.70 x10*3/uL    RBC Morphology See Below     Polychromasia Mild     Ovalocytes Few     Teardrop Cells Few    TSH with reflex to Free T4 if abnormal    Collection Time: 05/18/25  3:28 AM   Result Value Ref Range    Thyroid Stimulating Hormone 0.20 (L) 0.44 - 3.98 mIU/L   Thyroxine, Free    Collection Time: 05/18/25  3:28 AM   Result Value Ref Range    Thyroxine, Free 1.25 (H) 0.61 - 1.12 ng/dL   POCT GLUCOSE    Collection Time: 05/18/25  6:24 AM   Result Value Ref Range    POCT Glucose 208 (H) 74 - 99 mg/dL   POCT GLUCOSE    Collection Time: 05/18/25 11:04 AM   Result Value Ref Range    POCT Glucose 178 (H) 74 - 99 mg/dL   POCT GLUCOSE    Collection Time: 05/18/25  3:49 PM   Result Value Ref Range    POCT Glucose 251 (H) 74 - 99 mg/dL   POCT GLUCOSE    Collection Time: 05/18/25  7:23 PM   Result Value Ref Range    POCT Glucose 105 (H) 74 - 99 mg/dL   POCT GLUCOSE    Collection Time: 05/19/25  6:50 AM   Result Value Ref Range    POCT Glucose 235 (H) 74 - 99 mg/dL   Renal Function Panel    Collection Time: 05/19/25  7:02 AM   Result Value Ref Range    Glucose 279 (H) 74 - 99 mg/dL    Sodium 141 136 - 145 mmol/L    Potassium 4.8 3.5 - 5.3 mmol/L    Chloride 100 98 - 107 mmol/L    Bicarbonate 37 (H) 21 - 32 mmol/L    Anion Gap 9 (L) 10 - 20 mmol/L    Urea Nitrogen 36 (H) 6 - 23 mg/dL    Creatinine 0.83 0.50 - 1.05 mg/dL    eGFR 89 >60 mL/min/1.73m*2    Calcium 9.2 8.6 - 10.3 mg/dL    Phosphorus  3.4 2.5 - 4.9 mg/dL    Albumin 3.6 3.4 - 5.0 g/dL   Magnesium    Collection Time: 05/19/25  7:02 AM   Result Value Ref Range    Magnesium 1.78 1.60 - 2.40 mg/dL   CBC and Auto Differential    Collection Time: 05/19/25  7:02 AM   Result Value Ref Range    WBC 10.6 4.4 - 11.3 x10*3/uL    nRBC 0.2 (H) 0.0 - 0.0 /100 WBCs    RBC 4.02 4.00 - 5.20 x10*6/uL    Hemoglobin 12.7 12.0 - 16.0 g/dL    Hematocrit 41.6 36.0 - 46.0 %     (H) 80 - 100 fL    MCH 31.6 26.0 - 34.0 pg    MCHC 30.5 (L) 32.0 - 36.0 g/dL    RDW 14.3 11.5 - 14.5 %    Platelets 114 (L) 150 - 450 x10*3/uL    Immature Granulocytes %, Automated 10.4 (H) 0.0 - 0.9 %    Immature Granulocytes Absolute, Automated 1.11 (H) 0.00 - 0.70 x10*3/uL   Manual Differential    Collection Time: 05/19/25  7:02 AM   Result Value Ref Range    Neutrophils %, Manual 67.0 40.0 - 80.0 %    Bands %, Manual 1.0 0.0 - 5.0 %    Lymphocytes %, Manual 22.0 13.0 - 44.0 %    Monocytes %, Manual 4.0 2.0 - 10.0 %    Eosinophils %, Manual 0.0 0.0 - 6.0 %    Basophils %, Manual 0.0 0.0 - 2.0 %    Metamyelocytes %, Manual 3.0 0.0 - 0.0 %    Myelocytes %, Manual 3.0 0.0 - 0.0 %    Seg Neutrophils Absolute, Manual 7.10 (H) 1.20 - 7.00 x10*3/uL    Bands Absolute, Manual 0.11 0.00 - 0.70 x10*3/uL    Lymphocytes Absolute, Manual 2.33 1.20 - 4.80 x10*3/uL    Monocytes Absolute, Manual 0.42 0.10 - 1.00 x10*3/uL    Eosinophils Absolute, Manual 0.00 0.00 - 0.70 x10*3/uL    Basophils Absolute, Manual 0.00 0.00 - 0.10 x10*3/uL    Metamyelocytes Absolute, Manual 0.32 0.00 - 0.00 x10*3/uL    Myelocytes Absolute, Manual 0.32 0.00 - 0.00 x10*3/uL    Total Cells Counted 100     Neutrophils Absolute, Manual 7.21 1.20 - 7.70 x10*3/uL    RBC Morphology See Below     Polychromasia Mild     Ovalocytes Few       Current Medications[4]     Psychiatric Risk Assessment  Violence Risk Assessment: major mental illness  Acute Risk of Harm to Others is Considered: low   Suicide Risk Assessment: chronic medical  illness  Protective Factors against Suicide: fear of suicide, hopefulness/future orientation, marriage/partnership, moral objections to suicide, and sense of responsibility toward family  Acute Risk of Harm to Self is Considered: low    Medication Consent: no medication changes necessary for review    Sebastian Dimas MDInpatient consult to Psychiatry  Consult performed by: Sebastian Dimas MD  Consult ordered by: Fahad Koenig MD             [1]   Past Medical History:  Diagnosis Date    Acute on chronic respiratory failure     Anxiety     Arthritis     Chronic headaches     COPD (chronic obstructive pulmonary disease) (Multi)     CPAP (continuous positive airway pressure) dependence     Depression     Diabetes mellitus (Multi)     type 2 IDDM    GERD (gastroesophageal reflux disease)     History of transfusion     History of venous thromboembolism     HL (hearing loss)     Hyperlipidemia     Hypertension     Hypothyroidism     Lipoma     Nephrolithiasis     CHARLIE (obstructive sleep apnea)     Ovarian teratoma     PE (pulmonary thromboembolism) (Multi)     PTSD (post-traumatic stress disorder)     Schizophrenia     Type 2 diabetes mellitus     VTE (venous thromboembolism)    [2]   Past Surgical History:  Procedure Laterality Date     SECTION, LOW TRANSVERSE      x 1    EYE SURGERY      LITHOTRIPSY      STAPEDES SURGERY      TONSILLECTOMY      UMBILICAL HERNIA REPAIR      VENTRAL HERNIA REPAIR     [3]   Family History  Problem Relation Name Age of Onset    Fibromyalgia Father      Hypertension Brother      Thyroid disease Maternal Grandmother      Thyroid disease Paternal Grandmother      Lung cancer Paternal Grandfather      Coronary artery disease Other Grandmother    [4]   Current Facility-Administered Medications:     acetaminophen (Tylenol) tablet 650 mg, 650 mg, oral, q6h PRN, Fahad Koenig MD, 650 mg at 25 1217    atorvastatin (Lipitor) tablet 20 mg, 20 mg, oral, Daily, Fahad Koenig MD, 20 mg at  05/19/25 0835    azithromycin (Zithromax) 500 mg in dextrose 5%  mL, 500 mg, intravenous, q24h, Fahad Koenig MD, Stopped at 05/19/25 0402    busPIRone (Buspar) tablet 30 mg, 30 mg, oral, BID, Fahad Koenig MD, 30 mg at 05/19/25 0850    clonazePAM (KlonoPIN) tablet 0.5 mg, 0.5 mg, oral, BID PRN, Fahad Koenig MD, 0.5 mg at 05/19/25 0554    cloNIDine (Catapres) tablet 0.1 mg, 0.1 mg, oral, BID PRN, Fahad Koenig MD, 0.1 mg at 05/19/25 0405    dextrose 50 % injection 12.5 g, 12.5 g, intravenous, q15 min PRN, Fahad Koenig MD    dextrose 50 % injection 25 g, 25 g, intravenous, q15 min PRN, Fahad Koenig MD    escitalopram (Lexapro) tablet 20 mg, 20 mg, oral, q AM, Fahad Koenig MD, 20 mg at 05/19/25 0835    famotidine (Pepcid) tablet 40 mg, 40 mg, oral, BID, Fahad Koenig MD, 40 mg at 05/19/25 0835    glucagon (Glucagen) injection 1 mg, 1 mg, intramuscular, q15 min PRN, Fahad Koenig MD    glucagon (Glucagen) injection 1 mg, 1 mg, intramuscular, q15 min PRN, Fahad Koenig MD    hydrOXYzine HCL (Atarax) tablet 25 mg, 25 mg, oral, q6h PRN, Fahad Koenig MD, 25 mg at 05/19/25 0851    insulin lispro injection 0-10 Units, 0-10 Units, subcutaneous, Before meals & nightly, Fahad Koenig MD, 4 Units at 05/19/25 0657    ipratropium-albuteroL (Duo-Neb) 0.5-2.5 mg/3 mL nebulizer solution 3 mL, 3 mL, nebulization, q4h, Fahad Koenig MD, 3 mL at 05/19/25 0728    ipratropium-albuteroL (Duo-Neb) 0.5-2.5 mg/3 mL nebulizer solution 3 mL, 3 mL, nebulization, q2h PRN, Fahad Koenig MD    levothyroxine (Synthroid, Levoxyl) tablet 175 mcg, 175 mcg, oral, Daily, Fahad Koenig MD, 175 mcg at 05/19/25 0836    [Held by provider] metFORMIN XR (Glucophage-XR) 24 hr tablet 500 mg, 500 mg, oral, Daily with evening meal, Fahad Koenig MD    methylPREDNISolone sod succinate (SOLU-Medrol) 40 mg/mL injection 40 mg, 40 mg, intravenous, q24h, Fahad Koenig MD, 40 mg at 05/19/25 0210    metoprolol tartrate (Lopressor) tablet 25 mg, 25 mg, oral,  BID, Fahad Koenig MD, 25 mg at 05/19/25 0835    nicotine (Nicoderm CQ) 21 mg/24 hr patch 1 patch, 1 patch, transdermal, Daily, 1 patch at 05/19/25 0839 **FOLLOWED BY** [START ON 6/28/2025] nicotine (Nicoderm CQ) 14 mg/24 hr patch 1 patch, 1 patch, transdermal, Daily **FOLLOWED BY** [START ON 7/12/2025] nicotine (Nicoderm CQ) 7 mg/24 hr patch 1 patch, 1 patch, transdermal, Daily, Fahad Koenig MD    oxygen (O2) therapy, , inhalation, Continuous - Inhalation, Fahad Koenig MD, 4 L/min at 05/19/25 0700    oxygen (O2) therapy, , inhalation, Continuous - Inhalation, Fahad Koenig MD, 4 L/min at 05/19/25 0730    prazosin (Minipress) capsule 4 mg, 4 mg, oral, Nightly, Fahad Koenig MD, 4 mg at 05/18/25 2020    risperiDONE (RisperDAL) tablet 2 mg, 2 mg, oral, q12h BLUE, Fahad Koenig MD, 2 mg at 05/19/25 0543    rivaroxaban (Xarelto) tablet 10 mg, 10 mg, oral, Daily, Fahad Koenig MD, 10 mg at 05/19/25 0835    [Held by provider] SITagliptin phosphate (Januvia) tablet 50 mg, 50 mg, oral, Daily, Fahad Koenig MD    traZODone (Desyrel) tablet 50 mg, 50 mg, oral, Daily, Fahad Koenig MD, 50 mg at 05/19/25 0836    [Held by provider] ubrogepant (Ubrelvy) tablet 100 mg, 100 mg, oral, Once PRN, Fahad Koenig MD

## 2025-05-19 NOTE — DISCHARGE SUMMARY
Medical Group Discharge Summary  DISCHARGE DIAGNOSIS     Acute on chronic hypercapneic hypoxic respiratory Failure  Acute Exacerbation of COPD  Acute Metabolic Encephalopathy secondary to CO2 Narcosis - improved  Hx Shcizophrenia, PE, HTN, HLD, T2DM, Hypothyroidism    HOSPITAL COURSE AND DETAILS       Stephenie Mccray is a 45 y.o. year old female patient with Past Medical History of COPD home O2 4L NC as needed, schizophrenia, HTN, HLD, hypothyroid, DM2, pulmonary embolism on xarelto, who p/w shortness of breath. Of note the patient was discharged  5 days prior on a steroid taper and azithromycin for COPD exacerbation, The patient had arrived to the ER fairly somnolent but responding to commands but was difficult to arouse. According to the  the patient felt well 1-2 days after discharge but then yesterday she started to feel progressively more short of breath. She did not have a nebulizer and was feeling so short of breath that the decided to come to the ER. The  denies any worsening cough or sputum production, complaints of fevers, dysuria, abdominal pain, but she still smokes at least 1 pack of cigarettes daily.      In the ER the patient was noted to be afebrile, tachycardic (Sinus tachycardia on EKG without ischemic changes), normotensive but hypoxemic on 5L NC with increased work of breathing and notble wheezing on exam. The patient was noted to have a WBC of 14K, negative flu and covid pcr, Cr of 0.71 but bicarb of 41 (previously bicarb of 30-37 on prior BMPs from recent hospitalization) and an ABG of 7.34/79/56 while on 50% ventimask. Pt was given Narcan given somnolence without response, duonebs x 3, 125mg solumedrol, Ceftriaxone and azithromycin, and toradol, reglan, and benadryl for headache. Patient was paced on BiPAP for increased work of breathing and hypoxemia on ABG but was not pulling good Tidal volumes and was switched to AVAPS with better tidal volumes. ICU was called for  likely admission.    Patient was continued on BiPAP and treated with IV Solu-Medrol, scheduled nebs, azithromycin while in ICU with improvement in respiratory status.  As condition generally improved and she was weaned to her home 4 L of oxygen, she was transferred to medical floor for continued management and monitoring.  While on medical floor patient continued to exhibit stability as well as compliance with BiPAP.  Was evaluated by psychiatry and pulmonology.  Psychiatry was consulted for assistance with medication management due to ICU concerns and may be impacting hypercapnia.  No medication changes were advised at this time and continued follow-up at the [facility] was suggested.  Pulmonology evaluated in house and agreed with current plan of care.  Advised close follow-up with Dr. Villalba on discharge.    Patient is eager to return home. Discussed continued treatment/monitoring in house, however patient prefers discharge at this time. As patient's respiratory status had returned to baseline, feel comfortable to discharge patient home for continued care.  Questionable compliance with CPAP at home does make her high risk for readmission.  Patient was in stable condition on day of discharge with no acute concerns.      40 minutes spent on discharge. Time calculated includes outpatient care coordination, bedside education, and counselling.     ---Of note, this documentation is completed using the Dragon Dictation system (voice recognition software). There may be spelling and/or grammatical errors that were not corrected prior to final submission.---    Pete Trevizo MD    DISCHARGE PHYSICAL EXAM     Last Recorded Vitals:  Vitals:    05/19/25 0700 05/19/25 0728 05/19/25 0730 05/19/25 0823   BP:       BP Location:       Patient Position:       Pulse:    82   Resp:    20   Temp:    36 °C (96.8 °F)   TempSrc:    Temporal   SpO2: 96% 98% 98% 96%   Weight:       Height:           Physical Exam  General: Ill-appearing  adult female in mild distress  HEENT: Clear sclera, EOMI, trachea midline, moist mucous membranes  Respiratory: Equal Chest Rise, No retractions, nasal cannula, mild diffuse wheezing  Cardiovascular: S1 and S2 auscultated, no murmurs clicks or rubs  Abdomen: Soft, nontender, nondistended  Extremities: No cyanosis or clubbing appreciated  Neurological: Spontaneously moves all extremities, no dysarthria, cranial nerves grossly intact  Psychiatric: Appropriate mood and affect  Skin: Warm, dry    DISCHARGE MEDICATIONS        Your medication list        START taking these medications        Instructions Last Dose Given Next Dose Due   Adult Aerosol Mask misc  Generic drug: nebulizer accessories      Use mask as directed with nebulizer       nicotine 21 mg/24 hr patch  Commonly known as: Nicoderm CQ  Start taking on: May 20, 2025      Place 1 patch over 24 hours on the skin once daily for 39 doses.       nicotine 14 mg/24 hr patch  Commonly known as: Nicoderm CQ  Start taking on: June 28, 2025      Place 1 patch over 24 hours on the skin once daily for 14 doses.       nicotine 7 mg/24 hr patch  Commonly known as: Nicoderm CQ  Start taking on: July 12, 2025      Place 1 patch over 24 hours on the skin once daily for 14 doses.       predniSONE 20 mg tablet  Commonly known as: Deltasone      Take 2 tablets (40 mg) by mouth once daily for 3 days.              CHANGE how you take these medications        Instructions Last Dose Given Next Dose Due   azithromycin 500 mg tablet  Commonly known as: Zithromax  What changed:   medication strength  how much to take      Take 1 tablet (500 mg) by mouth once daily for 1 day.              CONTINUE taking these medications        Instructions Last Dose Given Next Dose Due   albuterol 90 mcg/actuation inhaler           albuterol 90 mcg/actuation inhaler      Inhale 1-2 puffs every 6 hours if needed for wheezing.       alcohol swabs  Commonly known as: Alcohol Prep Pads      Use 3 times  "daily prn       atorvastatin 20 mg tablet  Commonly known as: Lipitor      Take 1 tablet (20 mg) by mouth once daily.       BD Ultra-Fine Mini Pen Needle 31 gauge x 3/16\" needle  Generic drug: pen needle, diabetic      USE TO INJECT 4 TIMES DAILY AS DIRECTED       busPIRone 30 mg tablet  Commonly known as: Buspar           clonazePAM 0.5 mg tablet  Commonly known as: KlonoPIN           cloNIDine 0.1 mg tablet  Commonly known as: Catapres           escitalopram 20 mg tablet  Commonly known as: Lexapro           famotidine 20 mg tablet  Commonly known as: Pepcid      Take 1 tablet (20 mg) by mouth 2 times a day for 10 days.       hydrOXYzine HCL 25 mg tablet  Commonly known as: Atarax      Take 1 tablet (25 mg) by mouth every 6 hours if needed for itching.       ipratropium-albuteroL 0.5-2.5 mg/3 mL nebulizer solution  Commonly known as: Duo-Neb      Take 3 mL by nebulization every 6 hours if needed for shortness of breath or wheezing.       Januvia 50 mg tablet  Generic drug: SITagliptin phosphate           levothyroxine 175 mcg tablet  Commonly known as: Synthroid, Levoxyl      Take 1 tablet (175 mcg) by mouth once daily.       metoprolol tartrate 25 mg tablet  Commonly known as: Lopressor      Take 1 tablet (25 mg) by mouth 2 times a day.       ondansetron ODT 4 mg disintegrating tablet  Commonly known as: Zofran-ODT           paliperidone 3 mg 24 hr tablet  Commonly known as: Invega      Take 1 tablet (3 mg) by mouth once daily. Do not crush, chew, or split. Do not start before January 15, 2024.       prazosin 2 mg capsule  Commonly known as: Minipress           risperiDONE 2 mg tablet  Commonly known as: RisperDAL           tiotropium 2.5 mcg/actuation inhaler  Commonly known as: Spiriva Respimat      Inhale 2 puffs once daily. Do not start before March 9, 2024.       traZODone 50 mg tablet  Commonly known as: Desyrel           ubrogepant 100 mg tablet  Commonly known as: Ubrelvy      Take 1 tablet (100 mg) by " mouth 1 time if needed (Headache) for up to 20 doses. The dose may be repeated in 2 hours if headache persists or recurs. No more than 2 doses are to be taken in a 24 hour periodl       Xarelto 10 mg tablet  Generic drug: rivaroxaban                  ASK your doctor about these medications        Instructions Last Dose Given Next Dose Due   metFORMIN  mg 24 hr tablet  Commonly known as: Glucophage-XR      Take 1 tablet (500 mg) by mouth once daily in the evening. Take with meals. Do not crush, chew, or split.                 Where to Get Your Medications        These medications were sent to [facility] [address] [place], [place] - [address]  [address] , [facility] [address]      Phone: [phone]   Adult Aerosol Mask misc  azithromycin 500 mg tablet  nicotine 14 mg/24 hr patch  nicotine 21 mg/24 hr patch  nicotine 7 mg/24 hr patch  predniSONE 20 mg tablet           OUTPATIENT FOLLOW-UP     Future Appointments   Date Time Provider Department Center   5/20/2025 10:30 AM Delta Yuen MD [id number] [place]   5/28/2025  3:00 PM [facility]TR[facility]OUND 1 [facility]UELITO Gonzales   6/25/2025  4:00 PM Delta Yuen MD [id number] [place]   7/25/2025  8:20 AM BARTOLOME Kearney-CNP [id number] [place]   9/3/2025  3:00 PM Delta Yuen MD [id number] [place]

## 2025-05-20 ENCOUNTER — APPOINTMENT (OUTPATIENT)
Dept: PRIMARY CARE | Facility: CLINIC | Age: 45
End: 2025-05-20
Payer: COMMERCIAL

## 2025-05-20 DIAGNOSIS — E11.9 TYPE 2 DIABETES MELLITUS WITHOUT COMPLICATION, WITH LONG-TERM CURRENT USE OF INSULIN: ICD-10-CM

## 2025-05-20 DIAGNOSIS — J42 CHRONIC BRONCHITIS, UNSPECIFIED CHRONIC BRONCHITIS TYPE (MULTI): Primary | ICD-10-CM

## 2025-05-20 DIAGNOSIS — Z79.4 TYPE 2 DIABETES MELLITUS WITHOUT COMPLICATION, WITH LONG-TERM CURRENT USE OF INSULIN: ICD-10-CM

## 2025-05-20 NOTE — PROGRESS NOTES
I reviewed the progress note and agree with the resident’s findings and plans as written. Case discussed with resident.    Rama Devlin, PharmD

## 2025-05-21 ENCOUNTER — PATIENT OUTREACH (OUTPATIENT)
Dept: PRIMARY CARE | Facility: CLINIC | Age: 45
End: 2025-05-21
Payer: COMMERCIAL

## 2025-05-21 ENCOUNTER — APPOINTMENT (OUTPATIENT)
Dept: RADIOLOGY | Facility: HOSPITAL | Age: 45
End: 2025-05-21
Payer: COMMERCIAL

## 2025-05-21 LAB
BACTERIA BLD CULT: NORMAL
BACTERIA BLD CULT: NORMAL

## 2025-05-21 NOTE — PROGRESS NOTES
Discharge Facility:   [facility]  Discharge Diagnosis: Acute on chronic hypercapneic hypoxic respiratory Failure   Admission Date: 5/17/25  Discharge Date: 5/20/25    PCP Appointment Date: tasked to office  Specialist Appointment Date: TBD  Hospital Encounter and Summary Linked: Yes  ED to Hosp-Admission (Discharged) with Pete Trevizo MD; Larry Park DO (05/17/2025)     Two attempts were made to reach patient within two business days after discharge. Left voicemail with contact information for patient to call back with any non-emergent questions or concerns.

## 2025-05-27 ENCOUNTER — APPOINTMENT (OUTPATIENT)
Dept: PHARMACY | Facility: HOSPITAL | Age: 45
End: 2025-05-27
Payer: COMMERCIAL

## 2025-05-28 ENCOUNTER — HOSPITAL ENCOUNTER (OUTPATIENT)
Dept: RADIOLOGY | Facility: HOSPITAL | Age: 45
Discharge: HOME | End: 2025-05-28
Payer: COMMERCIAL

## 2025-05-28 VITALS — WEIGHT: 165 LBS | BODY MASS INDEX: 30.18 KG/M2

## 2025-05-28 DIAGNOSIS — R92.8 ABNORMAL MAMMOGRAM OF RIGHT BREAST: ICD-10-CM

## 2025-05-28 PROCEDURE — 76642 ULTRASOUND BREAST LIMITED: CPT | Mod: RIGHT SIDE | Performed by: RADIOLOGY

## 2025-05-28 PROCEDURE — 76642 ULTRASOUND BREAST LIMITED: CPT | Mod: RT

## 2025-05-28 NOTE — PROGRESS NOTES
Subjective   Patient ID: Stephenie Mccray is a 45 y.o. female who presents for Hospital Follow Up  . I last saw the patient on 2/19/2025  .     HPI  Discharge Facility:  [facility]  Discharge Diagnosis: Acute on chronic hypercapneic hypoxic respiratory Failure   Admission Date: 5/17/25  Discharge Date: 5/20/25  Patient was discharged with nicotine patches, prednisone and a neb mask.   Patient is now feeling better.    Patient is scheduled for hernia repair surgery with Dr. Gonzalez on 6/20/25.    Patient would like samples of Ubrelvy.    Past medical, surgical, and family history reviewed.  Reviewed and documented all medications   Pt eating well, exercising as tolerated and taking medications as directed.      Review of Systems  Except positives as noted in the CC & HPI      Constitutional: Denies fevers, chills, night sweats, fatigue, weight changes, change in appetite    Eyes: Denies blurry vision, double vision    ENT: Denies otalgia, trouble hearing, tinnitus, vertigo, nasal congestion, rhinorrhea, sore throat    Neck: Denies swelling, masses    Cardiovascular: Denies chest pain, palpitations, edema, orthopnea, syncope    Respiratory: Denies dyspnea, cough, wheezing, postural nocturnal dyspnea    Gastrointestinal: Denies abdominal pain, nausea, vomiting, diarrhea, constipation, melena, hematochezia    Genitourinary: Denies dysuria, hematuria  Musculoskeletal: Denies back pain, neck pain, arthralgias, myalgias    Integumentary: Denies skin lesions, rashes, masses    Neurological: Denies dizziness, headaches, confusion, limb weakness, paresthesias, syncope, convulsions    Psychiatric: Denies depression, anxiety, homicidal ideations, suicidal ideations, sleep disturbances    Endocrine: Denies polyphagia, polydipsia, polyuria, weakness, hair thinning, heat intolerance, cold intolerance, weight changes    Heme/Lymph: Denies easy bruising, easy bleeding, swollen glands    Objective   Visit Vitals  /68 (BP Location:  "Right arm, Patient Position: Sitting, BP Cuff Size: Large adult)   Pulse 66   Temp 36.6 °C (97.8 °F)   Resp 16   Ht 1.575 m (5' 2\")   Wt 75.1 kg (165 lb 9.6 oz)   SpO2 91%   BMI 30.29 kg/m²   OB Status Perimenopausal   Smoking Status Every Day   BSA 1.81 m²       Physical Exam    Gen. Appearance - well-developed, well-nourished in no acute distress.     Skin - warm and dry without rash or concerning lesions.     Mental Status - alert and oriented times 3. Normal mood and affect appropriate to mood.     Neck - supple without lymphadenopathy. Carotid pulses are normal without bruits. Thyroid is normal in midline without nodules.    Chest - lungs are clear to auscultation without rales, rhonchi or wheezes.     Heart - regular, rate, and rhythm without murmurs, rubs or gallops.     Abdomen - soft, flat, nondistended, nontender . No masses, hepatomegaly or splenomegaly is noted. No rebound, rigidity or guarding is noted. Bowel sounds are normoactive.     Extremities - no cyanosis, clubbing. Pedal pulses are 2+ normal at the dorsalis pedis and posterior tibial pulses bilaterally.     Neurological - cranial nerves II through XII are grossly intact. Motor strength 5/5 at all fours.     Assessment   1. Type 2 diabetes mellitus without complication, with long-term current use of insulin  Hemoglobin A1c    Albumin-Creatinine Ratio, Urine Random    Hemoglobin A1c    Albumin-Creatinine Ratio, Urine Random    CANCELED: Albumin-Creatinine Ratio, Urine Random      2. Mixed hyperlipidemia        3. Chronic bronchitis, unspecified chronic bronchitis type (Multi)        4. Nonintractable headache, unspecified chronicity pattern, unspecified headache type  ubrogepant (Ubrelvy) 100 mg tablet          Patient to continue current medications (with any exceptions as noted) and diet. Follow-up in *** month(s) otherwise as needed.            Scribe Attestation  By signing my name below, I, ***, Scribe   attest that this documentation has " been prepared under the direction and in the presence of Delta Yuen MD.

## 2025-05-29 ENCOUNTER — OFFICE VISIT (OUTPATIENT)
Dept: PRIMARY CARE | Facility: CLINIC | Age: 45
End: 2025-05-29
Payer: COMMERCIAL

## 2025-05-29 VITALS
OXYGEN SATURATION: 91 % | TEMPERATURE: 97.8 F | BODY MASS INDEX: 30.47 KG/M2 | RESPIRATION RATE: 16 BRPM | HEART RATE: 66 BPM | WEIGHT: 165.6 LBS | SYSTOLIC BLOOD PRESSURE: 102 MMHG | DIASTOLIC BLOOD PRESSURE: 68 MMHG | HEIGHT: 62 IN

## 2025-05-29 DIAGNOSIS — E66.09 CLASS 1 OBESITY DUE TO EXCESS CALORIES WITH SERIOUS COMORBIDITY AND BODY MASS INDEX (BMI) OF 30.0 TO 30.9 IN ADULT: ICD-10-CM

## 2025-05-29 DIAGNOSIS — E11.9 TYPE 2 DIABETES MELLITUS WITHOUT COMPLICATION, WITH LONG-TERM CURRENT USE OF INSULIN: Primary | ICD-10-CM

## 2025-05-29 DIAGNOSIS — J42 CHRONIC BRONCHITIS, UNSPECIFIED CHRONIC BRONCHITIS TYPE (MULTI): ICD-10-CM

## 2025-05-29 DIAGNOSIS — E66.811 CLASS 1 OBESITY DUE TO EXCESS CALORIES WITH SERIOUS COMORBIDITY AND BODY MASS INDEX (BMI) OF 30.0 TO 30.9 IN ADULT: ICD-10-CM

## 2025-05-29 DIAGNOSIS — R51.9 NONINTRACTABLE HEADACHE, UNSPECIFIED CHRONICITY PATTERN, UNSPECIFIED HEADACHE TYPE: ICD-10-CM

## 2025-05-29 DIAGNOSIS — E03.9 ACQUIRED HYPOTHYROIDISM: ICD-10-CM

## 2025-05-29 DIAGNOSIS — I10 PRIMARY HYPERTENSION: ICD-10-CM

## 2025-05-29 DIAGNOSIS — E78.2 MIXED HYPERLIPIDEMIA: ICD-10-CM

## 2025-05-29 DIAGNOSIS — Z79.4 TYPE 2 DIABETES MELLITUS WITHOUT COMPLICATION, WITH LONG-TERM CURRENT USE OF INSULIN: Primary | ICD-10-CM

## 2025-05-29 PROCEDURE — 3008F BODY MASS INDEX DOCD: CPT | Performed by: FAMILY MEDICINE

## 2025-05-29 PROCEDURE — 3074F SYST BP LT 130 MM HG: CPT | Performed by: FAMILY MEDICINE

## 2025-05-29 PROCEDURE — 99495 TRANSJ CARE MGMT MOD F2F 14D: CPT | Performed by: FAMILY MEDICINE

## 2025-05-29 PROCEDURE — 3078F DIAST BP <80 MM HG: CPT | Performed by: FAMILY MEDICINE

## 2025-05-29 PROCEDURE — 99406 BEHAV CHNG SMOKING 3-10 MIN: CPT | Performed by: FAMILY MEDICINE

## 2025-05-29 ASSESSMENT — ENCOUNTER SYMPTOMS
DEPRESSION: 0
LOSS OF SENSATION IN FEET: 0
OCCASIONAL FEELINGS OF UNSTEADINESS: 0

## 2025-05-29 ASSESSMENT — PATIENT HEALTH QUESTIONNAIRE - PHQ9
SUM OF ALL RESPONSES TO PHQ9 QUESTIONS 1 AND 2: 4
7. TROUBLE CONCENTRATING ON THINGS, SUCH AS READING THE NEWSPAPER OR WATCHING TELEVISION: SEVERAL DAYS
2. FEELING DOWN, DEPRESSED OR HOPELESS: MORE THAN HALF THE DAYS
8. MOVING OR SPEAKING SO SLOWLY THAT OTHER PEOPLE COULD HAVE NOTICED. OR THE OPPOSITE, BEING SO FIGETY OR RESTLESS THAT YOU HAVE BEEN MOVING AROUND A LOT MORE THAN USUAL: SEVERAL DAYS
1. LITTLE INTEREST OR PLEASURE IN DOING THINGS: MORE THAN HALF THE DAYS
6. FEELING BAD ABOUT YOURSELF - OR THAT YOU ARE A FAILURE OR HAVE LET YOURSELF OR YOUR FAMILY DOWN: NOT AT ALL
5. POOR APPETITE OR OVEREATING: MORE THAN HALF THE DAYS
SUM OF ALL RESPONSES TO PHQ QUESTIONS 1-9: 12
3. TROUBLE FALLING OR STAYING ASLEEP OR SLEEPING TOO MUCH: MORE THAN HALF THE DAYS
9. THOUGHTS THAT YOU WOULD BE BETTER OFF DEAD, OR OF HURTING YOURSELF: NOT AT ALL
4. FEELING TIRED OR HAVING LITTLE ENERGY: MORE THAN HALF THE DAYS

## 2025-05-29 ASSESSMENT — ANXIETY QUESTIONNAIRES
1. FEELING NERVOUS, ANXIOUS, OR ON EDGE: MORE THAN HALF THE DAYS
4. TROUBLE RELAXING: MORE THAN HALF THE DAYS
2. NOT BEING ABLE TO STOP OR CONTROL WORRYING: MORE THAN HALF THE DAYS
6. BECOMING EASILY ANNOYED OR IRRITABLE: MORE THAN HALF THE DAYS
7. FEELING AFRAID AS IF SOMETHING AWFUL MIGHT HAPPEN: MORE THAN HALF THE DAYS
5. BEING SO RESTLESS THAT IT IS HARD TO SIT STILL: MORE THAN HALF THE DAYS
GAD7 TOTAL SCORE: 14
IF YOU CHECKED OFF ANY PROBLEMS ON THIS QUESTIONNAIRE, HOW DIFFICULT HAVE THESE PROBLEMS MADE IT FOR YOU TO DO YOUR WORK, TAKE CARE OF THINGS AT HOME, OR GET ALONG WITH OTHER PEOPLE: VERY DIFFICULT
3. WORRYING TOO MUCH ABOUT DIFFERENT THINGS: MORE THAN HALF THE DAYS

## 2025-05-29 NOTE — PROGRESS NOTES
Subjective   Patient ID: Stephenie Mccray is a 45 y.o. female who presents for Hospital Follow Up  . I last saw the patient on 2/19/2025. Her mom is with her today.    HPI  Dis[facility] Facility:   [facility]  Discharge Diagnosis: Acute on chronic hypercapneic hypoxic respiratory Failure   Admission Date: 5/17/25  Discharge Date: 5/20/25  Patient was discharged with nicotine patches, prednisone and a neb mask.   Patient is now feeling better.    Patient is scheduled for hernia repair surgery with Dr. Gonzalez on 6/20/25.    Patient would like samples of Ubrelvy.    Past medical, surgical, and family history reviewed.  Reviewed and documented all medications   Pt eating well, exercising as tolerated and taking medications as directed.      Review of Systems  Except positives as noted in the CC & HPI      Constitutional: Denies fevers, chills, night sweats, fatigue, weight changes, change in appetite    Eyes: Denies blurry vision, double vision    ENT: Denies otalgia, trouble hearing, tinnitus, vertigo, nasal congestion, rhinorrhea, sore throat    Neck: Denies swelling, masses    Cardiovascular: Denies chest pain, palpitations, edema, orthopnea, syncope    Respiratory: Denies dyspnea, cough, wheezing, postural nocturnal dyspnea    Gastrointestinal: Denies abdominal pain, nausea, vomiting, diarrhea, constipation, melena, hematochezia    Genitourinary: Denies dysuria, hematuria  Musculoskeletal: Denies back pain, neck pain, arthralgias, myalgias    Integumentary: Denies skin lesions, rashes, masses    Neurological: Denies dizziness, headaches, confusion, limb weakness, paresthesias, syncope, convulsions    Psychiatric: Denies depression, anxiety, homicidal ideations, suicidal ideations, sleep disturbances    Endocrine: Denies polyphagia, polydipsia, polyuria, weakness, hair thinning, heat intolerance, cold intolerance, weight changes    Heme/Lymph: Denies easy bruising, easy bleeding, swollen glands    Objective   Visit  "Vitals  /68 (BP Location: Right arm, Patient Position: Sitting, BP Cuff Size: Large adult)   Pulse 66   Temp 36.6 °C (97.8 °F)   Resp 16   Ht 1.575 m (5' 2\")   Wt 75.1 kg (165 lb 9.6 oz)   SpO2 91%   BMI 30.29 kg/m²   OB Status Perimenopausal   Smoking Status Every Day   BSA 1.81 m²       Physical Exam    Gen. Appearance - well-developed, well-nourished, 44 y.o., White female in no acute distress.      Skin - warm, pink and dry without rash or concerning lesions.     Mental Status - alert and oriented times 3. Normal mood and affect appropriate to mood.      Neck - supple without lymphadenopathy. Carotid pulses are normal without bruits. Thyroid is normal in midline without nodules.     Chest - lungs are clear to auscultation without rales, rhonchi. Diminished breath sounds at the bases bilaterally.      Heart - regular, rate, and rhythm without murmurs, rubs or gallops.       Abdomen - soft, obese, protuberant, nontender, nondistended. No masses, hepatomegaly or splenomegaly is noted. No rebound, rigidity or guarding is noted. Bowel sounds are normoactive.  Bulging of the mid abdomen between the xiphoid process and umbilicus which is most noticeable with patient attempting to sit up. No tenderness noted and questionable hernia.     Extremities - no cyanosis, clubbing or edema Pedal pulses are 2+ normal at the dorsalis pedis and posterior tibial pulses bilaterally.      Neurological - cranial nerves II through XII are grossly intact. Motor strength 5/5 at all fours.    Assessment   1. Type 2 diabetes mellitus without complication, with long-term current use of insulin  Hemoglobin A1c    Albumin-Creatinine Ratio, Urine Random    Hemoglobin A1c    Albumin-Creatinine Ratio, Urine Random    CANCELED: Albumin-Creatinine Ratio, Urine Random      2. Mixed hyperlipidemia        3. Chronic bronchitis, unspecified chronic bronchitis type (Multi)        4. Nonintractable headache, unspecified chronicity pattern, " unspecified headache type  ubrogepant (Ubrelvy) 100 mg tablet      5. Primary hypertension        6. Acquired hypothyroidism        7. Class 1 obesity due to excess calories with serious comorbidity and body mass index (BMI) of 30.0 to 30.9 in adult          Patient will continue on a diabetic, low-cholesterol diet and weight reduction. Exercise as tolerated. Patient will continue medications as prescribed. Follow-up on 09/03/2025 as scheduled, otherwise as needed.     Will obtain Hemoglobin A1C prior to patient's next appointment. Will call patient with results when available.      Ordered Albumin-Creatinine Ratio, Urine Random. Will call patient with results when available.       Patient was given refill(s) on:   Ubrogepant (Ubrelvy) 100 mg tablet, USE FOR HEADACHE x 3 samples given.    Rx(s) sent to pharmacy.     Patient was encouraged to quit smoking. Discussion time >3 minutes. She is trying to slowly taper back on her tobacco use. She does intend to quit.       Scribe Attestation  By signing my name below, IDahlia , Jorgeibe   attest that this documentation has been prepared under the direction and in the presence of Michelle Yuen MD.      This note has been transcribed using a medical scribe and there is a possibility of unintentional typing misprints.     All medical record entries made by the scribe were at my direction and personally dictated by me. I have reviewed the chart and agree that the record accurately reflects my personal performance of the history, physical exam, discussion, and plan.     MICHELLE YUEN M.D.

## 2025-05-30 LAB
ALBUMIN/CREAT UR: 3 MG/G CREAT
CREAT UR-MCNC: 117 MG/DL (ref 20–275)
MICROALBUMIN UR-MCNC: 0.4 MG/DL

## 2025-05-30 NOTE — RESULT ENCOUNTER NOTE
Please call the patient regarding her abnormal result.  Right breast diagnostic ultrasound reveals mass in the upper outer quadrant related to a cyst sonographically and requires no further evaluation.  Mass in the far medial and posterior position has no sonographic correlates.  Recommend 6-month follow-up diagnostic mammogram.

## 2025-06-01 ENCOUNTER — APPOINTMENT (OUTPATIENT)
Dept: RADIOLOGY | Facility: HOSPITAL | Age: 45
End: 2025-06-01
Payer: COMMERCIAL

## 2025-06-01 ENCOUNTER — HOSPITAL ENCOUNTER (EMERGENCY)
Facility: HOSPITAL | Age: 45
Discharge: HOME | End: 2025-06-01
Payer: COMMERCIAL

## 2025-06-01 VITALS
BODY MASS INDEX: 30.36 KG/M2 | WEIGHT: 165 LBS | HEIGHT: 62 IN | SYSTOLIC BLOOD PRESSURE: 122 MMHG | RESPIRATION RATE: 16 BRPM | TEMPERATURE: 97.7 F | HEART RATE: 70 BPM | OXYGEN SATURATION: 95 % | DIASTOLIC BLOOD PRESSURE: 70 MMHG

## 2025-06-01 DIAGNOSIS — M79.645 FINGER PAIN, LEFT: Primary | ICD-10-CM

## 2025-06-01 PROCEDURE — 2500000001 HC RX 250 WO HCPCS SELF ADMINISTERED DRUGS (ALT 637 FOR MEDICARE OP): Performed by: PHYSICIAN ASSISTANT

## 2025-06-01 PROCEDURE — 99283 EMERGENCY DEPT VISIT LOW MDM: CPT

## 2025-06-01 PROCEDURE — 73130 X-RAY EXAM OF HAND: CPT | Mod: LEFT SIDE | Performed by: STUDENT IN AN ORGANIZED HEALTH CARE EDUCATION/TRAINING PROGRAM

## 2025-06-01 PROCEDURE — 73130 X-RAY EXAM OF HAND: CPT | Mod: LT

## 2025-06-01 RX ORDER — HYDROCODONE BITARTRATE AND ACETAMINOPHEN 5; 325 MG/1; MG/1
1 TABLET ORAL ONCE
Refills: 0 | Status: COMPLETED | OUTPATIENT
Start: 2025-06-01 | End: 2025-06-01

## 2025-06-01 RX ADMIN — HYDROCODONE BITARTRATE AND ACETAMINOPHEN 1 TABLET: 5; 325 TABLET ORAL at 20:14

## 2025-06-01 ASSESSMENT — LIFESTYLE VARIABLES
HAVE PEOPLE ANNOYED YOU BY CRITICIZING YOUR DRINKING: NO
EVER HAD A DRINK FIRST THING IN THE MORNING TO STEADY YOUR NERVES TO GET RID OF A HANGOVER: NO
TOTAL SCORE: 0
EVER FELT BAD OR GUILTY ABOUT YOUR DRINKING: NO
HAVE YOU EVER FELT YOU SHOULD CUT DOWN ON YOUR DRINKING: NO

## 2025-06-01 ASSESSMENT — PAIN DESCRIPTION - FREQUENCY: FREQUENCY: CONSTANT/CONTINUOUS

## 2025-06-01 ASSESSMENT — PAIN DESCRIPTION - PAIN TYPE: TYPE: ACUTE PAIN

## 2025-06-01 ASSESSMENT — PAIN - FUNCTIONAL ASSESSMENT: PAIN_FUNCTIONAL_ASSESSMENT: 0-10

## 2025-06-01 ASSESSMENT — PAIN SCALES - GENERAL: PAINLEVEL_OUTOF10: 8

## 2025-06-01 ASSESSMENT — PAIN DESCRIPTION - LOCATION: LOCATION: FINGER (COMMENT WHICH ONE)

## 2025-06-01 ASSESSMENT — PAIN DESCRIPTION - ORIENTATION: ORIENTATION: LEFT

## 2025-06-02 NOTE — ED PROVIDER NOTES
HPI   Chief Complaint   Patient presents with   • Hand Pain     I was holding a plate and I heard a pop from my left finger tip       45-year-old female presenting to the ER today with pain to her left ring finger after an injury that occurred just prior to ER arrival.  Patient states that she was carrying a plate by her fingertips at home.  She went to set the plate down and she felt a pop in her left finger and has had some pain since then.  She states that she has had some pain to her left ring finger since then and came to the ED.  She did not take any medicine before coming to the ER.  She has not noticed any swelling or bruising or redness.  She is not having any numbness or paresthesias.  She states that she is having trouble fully extending her ring finger.  No further complaints.      History provided by:  Patient          Patient History   Medical History[1]  Surgical History[2]  Family History[3]  Social History[4]    Physical Exam   ED Triage Vitals [06/01/25 1932]   Temperature Heart Rate Respirations BP   36.5 °C (97.7 °F) 70 16 122/70      Pulse Ox Temp Source Heart Rate Source Patient Position   96 % Temporal Monitor Sitting      BP Location FiO2 (%)     Right arm --       Physical Exam  Constitutional:       General: She is not in acute distress.  Eyes:      Conjunctiva/sclera: Conjunctivae normal.   Cardiovascular:      Comments: Radial pulse 2+.  Cap refill less than 2 seconds.  Musculoskeletal:      Comments: Tenderness on the distal phalanx of the left fourth digit.  No obvious bony deformity or dislocation.  Flexion intact, difficulty with full extension to the left fourth digit.  No further bony tenderness or bony deformity throughout the left hand or wrist.  Negative anatomic snuffbox tenderness. NVI   Skin:     General: Skin is warm.      Capillary Refill: Capillary refill takes less than 2 seconds.   Neurological:      Mental Status: She is alert.      Comments: Speech normal           ED  Course & MDM   Diagnoses as of 06/01/25 2054   Finger pain, left                 No data recorded     Ezequiel Coma Scale Score: 15 (06/01/25 1936 : Юлия Chun RN)                           Medical Decision Making  45-year-old female presenting to the ER today with pain to her left ring finger after she was setting a plate down and felt a pop in the finger.  There was no crush injury, patient has had trouble extending her finger since then.  She came to the ER and did not take any medicine for pain relief before ER arrival.  She is right-hand dominant.  She denies any other injury or complaints and arrives afebrile with stable vital signs.  She is resting without signs of acute distress.  On exam she has good distal pulses, cap refill is less than 2 seconds.  She is neurovascular intact and there is no obvious bony deformity or dislocation.  She does have some tenderness to distal phalanx of left fourth digit and is having trouble extending the digit but is able to flex it.  Her exam is otherwise within normal limits and there is no further tenderness or signs of trauma.  X-rays ordered and patient is requesting something stronger for pain than Tylenol or Motrin so Norco was ordered.    X-ray performed today does not show any acute osseous abnormality.  Patient is having trouble fully extending the finger so I am concerned for possible injury to her extensor tendon.  Nursing staff did place the patient in an aluminum finger splint and she remains neurovascularly intact.  I discussed results with the patient today, diagnosis and treatment plan home-going.  We will have the patient follow-up with orthopedics.  I discussed pain control measures to take at home and she expressed understanding and agreed with the plan of care today.    Labs Reviewed - No data to display    XR hand left 3+ views   Final Result   No acute osseous abnormalities detected. If symptoms persists, repeat   radiographs in 10-14 days can be  considered.        Other chronic findings as discussed above.        MACRO:   None        Signed by: Tello Gallagher 2025 8:52 PM   Dictation workstation:   [id number]            Procedure  Procedures         [1]  Past Medical History:  Diagnosis Date   • Acute on chronic respiratory failure    • Anxiety    • Arthritis    • Chronic headaches    • COPD (chronic obstructive pulmonary disease) (Multi)    • CPAP (continuous positive airway pressure) dependence    • Depression    • Diabetes mellitus (Multi)     type 2 IDDM   • GERD (gastroesophageal reflux disease)    • History of transfusion    • History of venous thromboembolism    • HL (hearing loss)    • Hyperlipidemia    • Hypertension    • Hypothyroidism    • Lipoma    • Nephrolithiasis    • CHARLIE (obstructive sleep apnea)    • Ovarian teratoma    • PE (pulmonary thromboembolism) (Multi)    • PTSD (post-traumatic stress disorder)    • Schizophrenia    • Type 2 diabetes mellitus    • VTE (venous thromboembolism)    [2]  Past Surgical History:  Procedure Laterality Date   •  SECTION, LOW TRANSVERSE      x 1   • EYE SURGERY     • LITHOTRIPSY     • STAPEDES SURGERY     • TONSILLECTOMY     • UMBILICAL HERNIA REPAIR     • VENTRAL HERNIA REPAIR     [3]  Family History  Problem Relation Name Age of Onset   • Fibromyalgia Father     • Hypertension Brother     • Thyroid disease Maternal Grandmother     • Thyroid disease Paternal Grandmother     • Lung cancer Paternal Grandfather     • Coronary artery disease Other Grandmother    [4]  Social History  Tobacco Use   • Smoking status: Every Day     Current packs/day: 0.50     Types: Cigarettes     Passive exposure: Current   • Smokeless tobacco: Never   Vaping Use   • Vaping status: Never Used   Substance Use Topics   • Alcohol use: Never   • Drug use: Yes     Types: Marijuana      Kelly Andrews PA-C  25

## 2025-06-03 ENCOUNTER — PATIENT OUTREACH (OUTPATIENT)
Dept: PRIMARY CARE | Facility: CLINIC | Age: 45
End: 2025-06-03
Payer: COMMERCIAL

## 2025-06-05 PROBLEM — T30.0 BURN INJURY: Status: RESOLVED | Noted: 2025-06-05 | Resolved: 2025-06-05

## 2025-06-05 PROBLEM — E03.9 HYPOTHYROIDISM: Status: ACTIVE | Noted: 2025-06-05

## 2025-06-05 PROBLEM — I10 HYPERTENSION: Status: ACTIVE | Noted: 2025-06-05

## 2025-06-05 PROBLEM — R51.9 HEADACHE: Status: ACTIVE | Noted: 2025-06-05

## 2025-06-05 PROBLEM — E78.5 HYPERLIPIDEMIA: Status: ACTIVE | Noted: 2025-06-05

## 2025-06-05 PROBLEM — R06.00 DYSPNEA: Status: ACTIVE | Noted: 2025-06-05

## 2025-06-05 PROBLEM — T81.49XA POSTOPERATIVE WOUND INFECTION: Status: ACTIVE | Noted: 2025-06-05

## 2025-06-05 PROBLEM — R05.9 COUGH: Status: RESOLVED | Noted: 2025-06-05 | Resolved: 2025-06-05

## 2025-06-05 NOTE — PREPROCEDURE INSTRUCTIONS

## 2025-06-10 ENCOUNTER — OFFICE VISIT (OUTPATIENT)
Dept: ORTHOPEDIC SURGERY | Facility: CLINIC | Age: 45
End: 2025-06-10
Payer: COMMERCIAL

## 2025-06-10 ENCOUNTER — HOSPITAL ENCOUNTER (OUTPATIENT)
Dept: RADIOLOGY | Facility: CLINIC | Age: 45
Discharge: HOME | End: 2025-06-10
Payer: COMMERCIAL

## 2025-06-10 DIAGNOSIS — M79.645 FINGER PAIN, LEFT: ICD-10-CM

## 2025-06-10 PROCEDURE — 99212 OFFICE O/P EST SF 10 MIN: CPT | Performed by: ORTHOPAEDIC SURGERY

## 2025-06-10 PROCEDURE — 99203 OFFICE O/P NEW LOW 30 MIN: CPT | Performed by: ORTHOPAEDIC SURGERY

## 2025-06-10 PROCEDURE — 73130 X-RAY EXAM OF HAND: CPT | Mod: LEFT SIDE | Performed by: ORTHOPAEDIC SURGERY

## 2025-06-10 PROCEDURE — 73130 X-RAY EXAM OF HAND: CPT | Mod: LT

## 2025-06-10 NOTE — PROGRESS NOTES
History present illness: Patient presents today for evaluation of the left ring finger.  She describes a hyperextension type mechanism localizing pain about the PIP joint to the ring.  No open injury.  No gross deformity.      Past medical history: The patient's past medical history, family history, social history, and review of systems were documented on the patient medical intake.  The updated data was reviewed in the electronic medical record.  History is negative except otherwise stated in history of present illness.        Physical examination:  General: Alert and oriented to person, place, and time.  No acute distress and breathing comfortably: Pleasant and cooperative with examination.  HEENT: Head is normocephalic and atraumatic.  Neck: Supple, no visible swelling.  Cardiovascular: No palpable tachycardia  Lungs: No audible wheezing or labored breathing  Abdomen: Nondistended.  Extremities: Evaluation of the left upper extremity finds the patient had palpable radial artery at the wrist with brisk capillary refill to all digits.  Patient has intact sensation to axillary radial median and ulnar nerves.  There are no open wounds.  There are no signs of infection.  There is no evidence of lymphedema or lymphatic streaking.  The patient has supple compartments to left arm forearm and hand.  Tenderness to the left palm at ring finger A1 pulley.  No active triggering.  Tenderness over the dorsum of the PIP joint.  No swelling or ecchymosis.      Radiology: Hypoplastic left fourth metacarpal      Assessment: Left ring finger PIP sprain with possible component of early trigger      Plan: Treatment options were discussed.  We talked about recommendations for nonweightbearing and jules taping with range of motion exercises.  2-week follow-up.  If still painful consider steroid injection to the A1 pulley or PIP or both based on subjective symptoms and physical exam.  X-rays of left ring finger upon return if still  painful.        Procedure:

## 2025-06-10 NOTE — CARE PLAN
Problem: Fall/Injury  Goal: Not fall by end of shift  5/10/2025 1051 by Iveth Fulton RN  Outcome: Progressing  5/10/2025 1050 by Iveth Fulton RN  Outcome: Progressing  Goal: Be free from injury by end of the shift  5/10/2025 1051 by Iveth Fulton RN  Outcome: Progressing  5/10/2025 1050 by Iveth Fulton RN  Outcome: Progressing     Problem: Respiratory  Goal: Minimize anxiety/maximize coping throughout shift  Outcome: Progressing  Goal: Minimal/no exertional discomfort or dyspnea this shift  Outcome: Progressing  Goal: No signs of respiratory distress (eg. Use of accessory muscles. Peds grunting)  Outcome: Progressing  Goal: Verbalize decreased shortness of breath this shift  Outcome: Progressing  Goal: Wean oxygen to maintain O2 saturation per order/standard this shift  Outcome: Progressing  Goal: Increase self care and/or family involvement in next 24 hours  Outcome: Progressing     Problem: Pain - Adult  Goal: Verbalizes/displays adequate comfort level or baseline comfort level  Outcome: Progressing     Problem: Safety - Adult  Goal: Free from fall injury  Outcome: Progressing     Problem: Discharge Planning  Goal: Discharge to home or other facility with appropriate resources  Outcome: Progressing     Problem: Chronic Conditions and Co-morbidities  Goal: Patient's chronic conditions and co-morbidity symptoms are monitored and maintained or improved  Outcome: Progressing     Problem: Nutrition  Goal: Nutrient intake appropriate for maintaining nutritional needs  Outcome: Progressing      Health Care Proxy (HCP)

## 2025-06-12 ENCOUNTER — HOSPITAL ENCOUNTER (EMERGENCY)
Facility: HOSPITAL | Age: 45
Discharge: HOME | End: 2025-06-13
Payer: COMMERCIAL

## 2025-06-12 DIAGNOSIS — J44.1 COPD WITH ACUTE EXACERBATION (MULTI): ICD-10-CM

## 2025-06-12 DIAGNOSIS — G43.709 CHRONIC MIGRAINE W/O AURA W/O STATUS MIGRAINOSUS, NOT INTRACTABLE: Primary | ICD-10-CM

## 2025-06-12 PROCEDURE — 99284 EMERGENCY DEPT VISIT MOD MDM: CPT

## 2025-06-12 PROCEDURE — 85025 COMPLETE CBC W/AUTO DIFF WBC: CPT | Performed by: PHYSICIAN ASSISTANT

## 2025-06-12 RX ORDER — ALBUTEROL SULFATE 0.83 MG/ML
2.5 SOLUTION RESPIRATORY (INHALATION) ONCE
Status: COMPLETED | OUTPATIENT
Start: 2025-06-12 | End: 2025-06-13

## 2025-06-12 RX ORDER — KETOROLAC TROMETHAMINE 30 MG/ML
15 INJECTION, SOLUTION INTRAMUSCULAR; INTRAVENOUS ONCE
Status: COMPLETED | OUTPATIENT
Start: 2025-06-12 | End: 2025-06-13

## 2025-06-12 RX ORDER — ONDANSETRON HYDROCHLORIDE 2 MG/ML
4 INJECTION, SOLUTION INTRAVENOUS ONCE
Status: COMPLETED | OUTPATIENT
Start: 2025-06-12 | End: 2025-06-13

## 2025-06-12 RX ORDER — MAGNESIUM SULFATE HEPTAHYDRATE 40 MG/ML
2 INJECTION, SOLUTION INTRAVENOUS ONCE
Status: COMPLETED | OUTPATIENT
Start: 2025-06-12 | End: 2025-06-13

## 2025-06-12 RX ORDER — ORPHENADRINE CITRATE 30 MG/ML
60 INJECTION INTRAMUSCULAR; INTRAVENOUS ONCE
Status: COMPLETED | OUTPATIENT
Start: 2025-06-12 | End: 2025-06-13

## 2025-06-12 RX ORDER — ACETAMINOPHEN 10 MG/ML
1000 INJECTION, SOLUTION INTRAVENOUS ONCE
Status: COMPLETED | OUTPATIENT
Start: 2025-06-12 | End: 2025-06-13

## 2025-06-12 ASSESSMENT — LIFESTYLE VARIABLES
HAVE YOU EVER FELT YOU SHOULD CUT DOWN ON YOUR DRINKING: NO
EVER HAD A DRINK FIRST THING IN THE MORNING TO STEADY YOUR NERVES TO GET RID OF A HANGOVER: NO
HAVE PEOPLE ANNOYED YOU BY CRITICIZING YOUR DRINKING: NO
TOTAL SCORE: 0
EVER FELT BAD OR GUILTY ABOUT YOUR DRINKING: NO

## 2025-06-12 ASSESSMENT — PAIN DESCRIPTION - LOCATION: LOCATION: HEAD

## 2025-06-12 ASSESSMENT — PAIN SCALES - GENERAL: PAINLEVEL_OUTOF10: 9

## 2025-06-12 ASSESSMENT — PAIN - FUNCTIONAL ASSESSMENT: PAIN_FUNCTIONAL_ASSESSMENT: 0-10

## 2025-06-12 ASSESSMENT — PAIN DESCRIPTION - PAIN TYPE: TYPE: ACUTE PAIN

## 2025-06-13 VITALS
WEIGHT: 165 LBS | DIASTOLIC BLOOD PRESSURE: 74 MMHG | OXYGEN SATURATION: 99 % | SYSTOLIC BLOOD PRESSURE: 129 MMHG | HEART RATE: 78 BPM | HEIGHT: 62 IN | BODY MASS INDEX: 30.36 KG/M2 | RESPIRATION RATE: 18 BRPM | TEMPERATURE: 97.5 F

## 2025-06-13 LAB
ALBUMIN SERPL BCP-MCNC: 3.9 G/DL (ref 3.4–5)
ALP SERPL-CCNC: 72 U/L (ref 33–110)
ALT SERPL W P-5'-P-CCNC: 43 U/L (ref 7–45)
ANION GAP SERPL CALC-SCNC: 9 MMOL/L (ref 10–20)
AST SERPL W P-5'-P-CCNC: 20 U/L (ref 9–39)
BASOPHILS # BLD AUTO: 0.04 X10*3/UL (ref 0–0.1)
BASOPHILS NFR BLD AUTO: 0.6 %
BILIRUB SERPL-MCNC: 0.3 MG/DL (ref 0–1.2)
BUN SERPL-MCNC: 20 MG/DL (ref 6–23)
CALCIUM SERPL-MCNC: 8.9 MG/DL (ref 8.6–10.3)
CHLORIDE SERPL-SCNC: 106 MMOL/L (ref 98–107)
CO2 SERPL-SCNC: 32 MMOL/L (ref 21–32)
CREAT SERPL-MCNC: 0.85 MG/DL (ref 0.5–1.05)
EGFRCR SERPLBLD CKD-EPI 2021: 87 ML/MIN/1.73M*2
EOSINOPHIL # BLD AUTO: 0.03 X10*3/UL (ref 0–0.7)
EOSINOPHIL NFR BLD AUTO: 0.4 %
ERYTHROCYTE [DISTWIDTH] IN BLOOD BY AUTOMATED COUNT: 14.4 % (ref 11.5–14.5)
GLUCOSE SERPL-MCNC: 90 MG/DL (ref 74–99)
HCT VFR BLD AUTO: 39.7 % (ref 36–46)
HGB BLD-MCNC: 12.4 G/DL (ref 12–16)
IMM GRANULOCYTES # BLD AUTO: 0.12 X10*3/UL (ref 0–0.7)
IMM GRANULOCYTES NFR BLD AUTO: 1.8 % (ref 0–0.9)
LYMPHOCYTES # BLD AUTO: 3.3 X10*3/UL (ref 1.2–4.8)
LYMPHOCYTES NFR BLD AUTO: 48.3 %
MCH RBC QN AUTO: 32.8 PG (ref 26–34)
MCHC RBC AUTO-ENTMCNC: 31.2 G/DL (ref 32–36)
MCV RBC AUTO: 105 FL (ref 80–100)
MONOCYTES # BLD AUTO: 0.56 X10*3/UL (ref 0.1–1)
MONOCYTES NFR BLD AUTO: 8.2 %
NEUTROPHILS # BLD AUTO: 2.78 X10*3/UL (ref 1.2–7.7)
NEUTROPHILS NFR BLD AUTO: 40.7 %
NRBC BLD-RTO: 0 /100 WBCS (ref 0–0)
PLATELET # BLD AUTO: 93 X10*3/UL (ref 150–450)
POTASSIUM SERPL-SCNC: 4.2 MMOL/L (ref 3.5–5.3)
PROT SERPL-MCNC: 6.2 G/DL (ref 6.4–8.2)
RBC # BLD AUTO: 3.78 X10*6/UL (ref 4–5.2)
SODIUM SERPL-SCNC: 143 MMOL/L (ref 136–145)
WBC # BLD AUTO: 6.8 X10*3/UL (ref 4.4–11.3)

## 2025-06-13 PROCEDURE — 2500000002 HC RX 250 W HCPCS SELF ADMINISTERED DRUGS (ALT 637 FOR MEDICARE OP, ALT 636 FOR OP/ED): Performed by: PHYSICIAN ASSISTANT

## 2025-06-13 PROCEDURE — 94640 AIRWAY INHALATION TREATMENT: CPT

## 2025-06-13 PROCEDURE — 96367 TX/PROPH/DG ADDL SEQ IV INF: CPT

## 2025-06-13 PROCEDURE — 36415 COLL VENOUS BLD VENIPUNCTURE: CPT | Performed by: PHYSICIAN ASSISTANT

## 2025-06-13 PROCEDURE — 2500000004 HC RX 250 GENERAL PHARMACY W/ HCPCS (ALT 636 FOR OP/ED): Performed by: PHYSICIAN ASSISTANT

## 2025-06-13 PROCEDURE — 2500000005 HC RX 250 GENERAL PHARMACY W/O HCPCS: Performed by: PHYSICIAN ASSISTANT

## 2025-06-13 PROCEDURE — 96375 TX/PRO/DX INJ NEW DRUG ADDON: CPT

## 2025-06-13 PROCEDURE — 96365 THER/PROPH/DIAG IV INF INIT: CPT

## 2025-06-13 PROCEDURE — 80053 COMPREHEN METABOLIC PANEL: CPT | Performed by: PHYSICIAN ASSISTANT

## 2025-06-13 RX ORDER — BUTALBITAL, ACETAMINOPHEN AND CAFFEINE 50; 325; 40 MG/1; MG/1; MG/1
1 TABLET ORAL EVERY 6 HOURS PRN
Qty: 12 TABLET | Refills: 0 | Status: SHIPPED | OUTPATIENT
Start: 2025-06-13 | End: 2025-06-18

## 2025-06-13 RX ADMIN — ACETAMINOPHEN 1000 MG: 10 INJECTION, SOLUTION INTRAVENOUS at 00:26

## 2025-06-13 RX ADMIN — KETOROLAC TROMETHAMINE 15 MG: 30 INJECTION, SOLUTION INTRAMUSCULAR at 00:40

## 2025-06-13 RX ADMIN — SODIUM CHLORIDE, SODIUM LACTATE, POTASSIUM CHLORIDE, AND CALCIUM CHLORIDE 1000 ML: .6; .31; .03; .02 INJECTION, SOLUTION INTRAVENOUS at 00:26

## 2025-06-13 RX ADMIN — ORPHENADRINE CITRATE 60 MG: 60 INJECTION INTRAMUSCULAR; INTRAVENOUS at 00:39

## 2025-06-13 RX ADMIN — ALBUTEROL SULFATE 2.5 MG: 2.5 SOLUTION RESPIRATORY (INHALATION) at 00:02

## 2025-06-13 RX ADMIN — ONDANSETRON 4 MG: 2 INJECTION INTRAMUSCULAR; INTRAVENOUS at 00:39

## 2025-06-13 RX ADMIN — Medication 3 L/MIN: at 00:36

## 2025-06-13 RX ADMIN — MAGNESIUM SULFATE HEPTAHYDRATE 2 G: 40 INJECTION, SOLUTION INTRAVENOUS at 00:39

## 2025-06-13 ASSESSMENT — PAIN - FUNCTIONAL ASSESSMENT: PAIN_FUNCTIONAL_ASSESSMENT: 0-10

## 2025-06-13 ASSESSMENT — PAIN DESCRIPTION - LOCATION: LOCATION: HEAD

## 2025-06-13 ASSESSMENT — PAIN SCALES - GENERAL: PAINLEVEL_OUTOF10: 9

## 2025-06-13 NOTE — ED PROVIDER NOTES
HPI   Chief Complaint   Patient presents with    Migraine     Started this morning when I woke up starts in the back and goes to the front        This is a 45-year-old female with history of chronic migraines presents emergency room with chief complaint of a migraine headache that started earlier today.  She describes the headache as a dull ache, and is worse with light and loud noises, she has been taking Excedrin with some relief.  The patient reports that she gets headaches almost daily.  She states that this is a chronic issue with this is her typical migraine headache.  She denies any thunderclap type headache, denies that this is worst headache of her life.  She denies any dizziness or near syncope.  Patient states that she has not seen been about her migraine headaches for quite some time.  She does have a past medical history of COPD on oxygen, CPAP, anxiety, depression, diabetes, GERD, hypertension, hyperlipidemia, hypothyroidism, CHARLIE, elevated BMI.  The patient and her significant other are the primary historians      History provided by:  Patient, medical records and spouse          Patient History   Medical History[1]  Surgical History[2]  Family History[3]  Social History[4]    Physical Exam   ED Triage Vitals [06/12/25 2229]   Temperature Heart Rate Respirations BP   36.4 °C (97.5 °F) 80 16 (!) 149/93      Pulse Ox Temp Source Heart Rate Source Patient Position   (!) 90 % Temporal Monitor Sitting      BP Location FiO2 (%)     Right arm --       Physical Exam  Vitals and nursing note reviewed.   Constitutional:       General: She is awake. She is not in acute distress.     Appearance: Normal appearance. She is well-developed, well-groomed and overweight. She is not ill-appearing, toxic-appearing or diaphoretic.   HENT:      Head: Normocephalic and atraumatic.      Jaw: There is normal jaw occlusion.      Right Ear: Hearing, tympanic membrane, ear canal and external ear normal.      Left Ear: Hearing,  tympanic membrane, ear canal and external ear normal.      Nose: Nose normal.      Right Sinus: No maxillary sinus tenderness or frontal sinus tenderness.      Left Sinus: No maxillary sinus tenderness or frontal sinus tenderness.      Mouth/Throat:      Lips: Pink.      Mouth: Mucous membranes are moist.      Pharynx: Oropharynx is clear. Uvula midline.   Eyes:      Extraocular Movements: Extraocular movements intact.      Conjunctiva/sclera: Conjunctivae normal.      Pupils: Pupils are equal, round, and reactive to light.   Cardiovascular:      Rate and Rhythm: Normal rate and regular rhythm.      Pulses: Normal pulses.      Heart sounds: Normal heart sounds.   Pulmonary:      Effort: Pulmonary effort is normal. No tachypnea.      Breath sounds: No stridor or decreased air movement. Examination of the right-upper field reveals decreased breath sounds and wheezing. Examination of the left-upper field reveals decreased breath sounds and wheezing. Decreased breath sounds and wheezing present. No rhonchi or rales.   Abdominal:      General: Bowel sounds are normal.      Palpations: Abdomen is soft. There is no mass.      Tenderness: There is no abdominal tenderness. There is no guarding.   Musculoskeletal:         General: No swelling or tenderness. Normal range of motion.      Cervical back: Normal range of motion and neck supple. No rigidity.   Lymphadenopathy:      Cervical: No cervical adenopathy.   Skin:     General: Skin is warm and dry.      Capillary Refill: Capillary refill takes less than 2 seconds.      Findings: No rash.   Neurological:      General: No focal deficit present.      Mental Status: She is alert and oriented to person, place, and time. Mental status is at baseline.      GCS: GCS eye subscore is 4. GCS verbal subscore is 5. GCS motor subscore is 6.      Sensory: Sensation is intact.      Motor: Motor function is intact.      Coordination: Coordination is intact.      Gait: Gait is intact.    Psychiatric:         Attention and Perception: Attention and perception normal.         Mood and Affect: Mood and affect normal.         Speech: Speech normal.         Behavior: Behavior normal. Behavior is cooperative.         Thought Content: Thought content normal.         Cognition and Memory: Cognition and memory normal.         Judgment: Judgment normal.           ED Course & MDM   Diagnoses as of 06/13/25 0113   Chronic migraine w/o aura w/o status migrainosus, not intractable   COPD with acute exacerbation (Multi)                 No data recorded     Ezequiel Coma Scale Score: 15 (06/12/25 2233 : Юлия Chun RN)                           Medical Decision Making  This is a 45-year-old female with history of chronic migraines presents emergency room with chief complaint of a migraine headache that started earlier today.  She describes the headache as a dull ache, and is worse with light and loud noises, she has been taking Excedrin with some relief.  The patient reports that she gets headaches almost daily.  She states that this is a chronic issue with this is her typical migraine headache.  She denies any thunderclap type headache, denies that this is worst headache of her life.  She denies any dizziness or near syncope.  Patient states that she has not seen been about her migraine headaches for quite some time.  She does have a past medical history of COPD on oxygen, CPAP, anxiety, depression, diabetes, GERD, hypertension, hyperlipidemia, hypothyroidism, CHARLIE, elevated BMI.  The patient and her significant other are the primary historians  Temperature 36.4, heart rate 80, respirations 16, pulse ox is 90% on room air, /93.  Patient was given a migraine cocktail with 2 g magnesium sulfate IV piggyback, Toradol 15 mg IV push, Ofirmev 1 g IV piggyback, Norflex 60 mg IV push, 1 L of LR, Zofran 4 mg IV push.  She was placed on oxygen 2 L nasal cannula and was given albuterol treatment.    White count 6.8  hemoglobin 12.4 hematocrit 39.7, platelet count 93 metabolic in the gap of 9 total protein 6.2.  0100 hrs. rounded on the patient to discuss results of lab work repeat exam patient's headache has improved after the migraine cocktail.  She remains on 2 L of the cannula which is what she wears at night for sleep apnea.  The patient was discharged home with prescription for Fioricet.  She will follow-up with her primary care physician we will also give her referral for chronic migraines.  The patient shows no signs of respiratory distress.  Return precautions were discussed all questions answered prior to discharge        Procedure  Procedures       [1]   Past Medical History:  Diagnosis Date    Acute on chronic respiratory failure     Anxiety     Arthritis     Chronic headaches     Chronic respiratory failure     WITH HYPOXIA    COPD (chronic obstructive pulmonary disease) (Multi)     Cough 2025    COVID-19     VACCINATED    Depression     Diabetes mellitus (Multi)     type 2 IDDM    GERD (gastroesophageal reflux disease)     History of transfusion     History of venous thromboembolism     HL (hearing loss)     Hyperlipidemia     Hypertension     Hypothyroidism     Lipoma     Nephrolithiasis     CHARLIE (obstructive sleep apnea)     USES BIPAP    Ovarian teratoma     PE (pulmonary thromboembolism) (Multi)     PTSD (post-traumatic stress disorder)     Schizophrenia     Shortness of breath     Type 2 diabetes mellitus     VTE (venous thromboembolism)    [2]   Past Surgical History:  Procedure Laterality Date     SECTION, LOW TRANSVERSE      x 1    EYE SURGERY      LITHOTRIPSY      STAPEDES SURGERY      TONSILLECTOMY      UMBILICAL HERNIA REPAIR      VENTRAL HERNIA REPAIR     [3]   Family History  Problem Relation Name Age of Onset    Fibromyalgia Father      Hypertension Brother      Thyroid disease Maternal Grandmother      Thyroid disease Paternal Grandmother      Lung cancer Paternal Grandfather       Coronary artery disease Other Grandmother    [4]   Social History  Tobacco Use    Smoking status: Every Day     Current packs/day: 0.50     Types: Cigarettes     Passive exposure: Current    Smokeless tobacco: Never   Vaping Use    Vaping status: Never Used   Substance Use Topics    Alcohol use: Yes     Comment: RARELY    Drug use: Yes     Types: Marijuana        Ariel Whiting PA-C  06/13/25 0114

## 2025-06-14 ENCOUNTER — HOSPITAL ENCOUNTER (INPATIENT)
Facility: HOSPITAL | Age: 45
DRG: 189 | End: 2025-06-14
Attending: EMERGENCY MEDICINE | Admitting: STUDENT IN AN ORGANIZED HEALTH CARE EDUCATION/TRAINING PROGRAM
Payer: COMMERCIAL

## 2025-06-14 ENCOUNTER — APPOINTMENT (OUTPATIENT)
Dept: RADIOLOGY | Facility: HOSPITAL | Age: 45
DRG: 189 | End: 2025-06-14
Payer: COMMERCIAL

## 2025-06-14 ENCOUNTER — APPOINTMENT (OUTPATIENT)
Dept: CARDIOLOGY | Facility: HOSPITAL | Age: 45
DRG: 189 | End: 2025-06-14
Payer: COMMERCIAL

## 2025-06-14 DIAGNOSIS — J96.21 ACUTE ON CHRONIC RESPIRATORY FAILURE WITH HYPOXIA AND HYPERCAPNIA: Primary | ICD-10-CM

## 2025-06-14 DIAGNOSIS — R51.9 ACUTE NONINTRACTABLE HEADACHE, UNSPECIFIED HEADACHE TYPE: ICD-10-CM

## 2025-06-14 DIAGNOSIS — J18.9 PNEUMONIA OF BOTH LUNGS DUE TO INFECTIOUS ORGANISM, UNSPECIFIED PART OF LUNG: ICD-10-CM

## 2025-06-14 DIAGNOSIS — J96.22 ACUTE ON CHRONIC RESPIRATORY FAILURE WITH HYPOXIA AND HYPERCAPNIA: Primary | ICD-10-CM

## 2025-06-14 LAB
ALBUMIN SERPL BCP-MCNC: 4 G/DL (ref 3.4–5)
ALP SERPL-CCNC: 74 U/L (ref 33–110)
ALT SERPL W P-5'-P-CCNC: 32 U/L (ref 7–45)
AMMONIA PLAS-SCNC: 26 UMOL/L (ref 16–53)
AMPHETAMINES UR QL SCN: ABNORMAL
ANION GAP BLDA CALCULATED.4IONS-SCNC: 6 MMO/L (ref 10–25)
ANION GAP SERPL CALC-SCNC: 10 MMOL/L (ref 10–20)
APPARATUS: ABNORMAL
APPEARANCE UR: CLEAR
ARTERIAL PATENCY WRIST A: ABNORMAL
ARTERIAL PATENCY WRIST A: ABNORMAL
ARTERIAL PATENCY WRIST A: POSITIVE
ARTERIAL PATENCY WRIST A: POSITIVE
AST SERPL W P-5'-P-CCNC: 11 U/L (ref 9–39)
ATRIAL RATE: 84 BPM
B-HCG SERPL-ACNC: 2 MIU/ML
BARBITURATES UR QL SCN: ABNORMAL
BASE EXCESS BLDA CALC-SCNC: 1.2 MMOL/L (ref -2–3)
BASE EXCESS BLDA CALC-SCNC: 2.5 MMOL/L (ref -2–3)
BASOPHILS # BLD AUTO: 0.03 X10*3/UL (ref 0–0.1)
BASOPHILS NFR BLD AUTO: 0.4 %
BENZODIAZ UR QL SCN: ABNORMAL
BILIRUB SERPL-MCNC: 0.4 MG/DL (ref 0–1.2)
BILIRUB UR STRIP.AUTO-MCNC: NEGATIVE MG/DL
BNP SERPL-MCNC: 38 PG/ML (ref 0–99)
BODY TEMPERATURE: ABNORMAL
BODY TEMPERATURE: ABNORMAL
BUN SERPL-MCNC: 17 MG/DL (ref 6–23)
BZE UR QL SCN: ABNORMAL
CA-I BLDA-SCNC: 1.26 MMOL/L (ref 1.1–1.33)
CALCIUM SERPL-MCNC: 8.8 MG/DL (ref 8.6–10.3)
CANNABINOIDS UR QL SCN: ABNORMAL
CARDIAC TROPONIN I PNL SERPL HS: 24 NG/L (ref 0–13)
CARDIAC TROPONIN I PNL SERPL HS: 25 NG/L (ref 0–13)
CHLORIDE BLDA-SCNC: 104 MMOL/L (ref 98–107)
CHLORIDE SERPL-SCNC: 106 MMOL/L (ref 98–107)
CO2 SERPL-SCNC: 30 MMOL/L (ref 21–32)
COLOR UR: NORMAL
CREAT SERPL-MCNC: 0.85 MG/DL (ref 0.5–1.05)
EGFRCR SERPLBLD CKD-EPI 2021: 87 ML/MIN/1.73M*2
EOSINOPHIL # BLD AUTO: 0.03 X10*3/UL (ref 0–0.7)
EOSINOPHIL NFR BLD AUTO: 0.4 %
EPAP CMH2O: 8 CM H2O
ERYTHROCYTE [DISTWIDTH] IN BLOOD BY AUTOMATED COUNT: 14 % (ref 11.5–14.5)
FENTANYL+NORFENTANYL UR QL SCN: ABNORMAL
FLUAV RNA RESP QL NAA+PROBE: NOT DETECTED
FLUBV RNA RESP QL NAA+PROBE: NOT DETECTED
FREQUENCY (BPM): 20 BPM
GLUCOSE BLD MANUAL STRIP-MCNC: 183 MG/DL (ref 74–99)
GLUCOSE BLD MANUAL STRIP-MCNC: 188 MG/DL (ref 74–99)
GLUCOSE BLD MANUAL STRIP-MCNC: 191 MG/DL (ref 74–99)
GLUCOSE BLDA-MCNC: 115 MG/DL (ref 74–99)
GLUCOSE SERPL-MCNC: 118 MG/DL (ref 74–99)
GLUCOSE UR STRIP.AUTO-MCNC: NORMAL MG/DL
HCO3 BLDA-SCNC: 28.7 MMOL/L (ref 22–26)
HCO3 BLDA-SCNC: 30.8 MMOL/L (ref 22–26)
HCT VFR BLD AUTO: 42 % (ref 36–46)
HCT VFR BLD EST: 41 % (ref 36–46)
HGB BLD-MCNC: 13.1 G/DL (ref 12–16)
HGB BLDA-MCNC: 13.5 G/DL (ref 12–16)
HOLD SPECIMEN: NORMAL
IMM GRANULOCYTES # BLD AUTO: 0.14 X10*3/UL (ref 0–0.7)
IMM GRANULOCYTES NFR BLD AUTO: 2 % (ref 0–0.9)
INHALED O2 CONCENTRATION: 36 %
INHALED O2 CONCENTRATION: 50 %
INSPIRATORY TIME: 0.9
IPAP CMH2O: 16 CM H2O
KETONES UR STRIP.AUTO-MCNC: NEGATIVE MG/DL
LACTATE BLDA-SCNC: 0.5 MMOL/L (ref 0.4–2)
LACTATE SERPL-SCNC: 1 MMOL/L (ref 0.4–2)
LEUKOCYTE ESTERASE UR QL STRIP.AUTO: NEGATIVE
LYMPHOCYTES # BLD AUTO: 1.66 X10*3/UL (ref 1.2–4.8)
LYMPHOCYTES NFR BLD AUTO: 23.6 %
MAGNESIUM SERPL-MCNC: 1.54 MG/DL (ref 1.6–2.4)
MCH RBC QN AUTO: 32.5 PG (ref 26–34)
MCHC RBC AUTO-ENTMCNC: 31.2 G/DL (ref 32–36)
MCV RBC AUTO: 104 FL (ref 80–100)
METHADONE UR QL SCN: ABNORMAL
MONOCYTES # BLD AUTO: 0.5 X10*3/UL (ref 0.1–1)
MONOCYTES NFR BLD AUTO: 7.1 %
MRSA DNA SPEC QL NAA+PROBE: NOT DETECTED
NEUTROPHILS # BLD AUTO: 4.66 X10*3/UL (ref 1.2–7.7)
NEUTROPHILS NFR BLD AUTO: 66.5 %
NITRITE UR QL STRIP.AUTO: NEGATIVE
NRBC BLD-RTO: 0 /100 WBCS (ref 0–0)
OPIATES UR QL SCN: ABNORMAL
OXYCODONE+OXYMORPHONE UR QL SCN: ABNORMAL
OXYHGB MFR BLDA: 82.1 % (ref 94–98)
OXYHGB MFR BLDA: 82.4 % (ref 94–98)
P AXIS: 57 DEGREES
P OFFSET: 191 MS
P ONSET: 140 MS
PCO2 BLDA: 57 MM HG (ref 38–42)
PCO2 BLDA: 64 MM HG (ref 38–42)
PCP UR QL SCN: ABNORMAL
PH BLDA: 7.29 PH (ref 7.38–7.42)
PH BLDA: 7.31 PH (ref 7.38–7.42)
PH UR STRIP.AUTO: 5.5 [PH]
PLATELET # BLD AUTO: 99 X10*3/UL (ref 150–450)
PO2 BLDA: 53 MM HG (ref 85–95)
PO2 BLDA: 70 MM HG (ref 85–95)
POTASSIUM BLDA-SCNC: 4.2 MMOL/L (ref 3.5–5.3)
POTASSIUM SERPL-SCNC: 4 MMOL/L (ref 3.5–5.3)
PR INTERVAL: 132 MS
PROT SERPL-MCNC: 6.4 G/DL (ref 6.4–8.2)
PROT UR STRIP.AUTO-MCNC: NEGATIVE MG/DL
Q ONSET: 206 MS
QRS COUNT: 14 BEATS
QRS DURATION: 90 MS
QT INTERVAL: 378 MS
QTC CALCULATION(BAZETT): 446 MS
QTC FREDERICIA: 422 MS
R AXIS: -43 DEGREES
RBC # BLD AUTO: 4.03 X10*6/UL (ref 4–5.2)
RBC # UR STRIP.AUTO: NEGATIVE MG/DL
RSV RNA RESP QL NAA+PROBE: NOT DETECTED
SAO2 % BLDA: 91 % (ref 94–100)
SAO2 % BLDA: 98 % (ref 94–100)
SARS-COV-2 RNA RESP QL NAA+PROBE: NOT DETECTED
SITE OF ARTERIAL PUNCTURE: ABNORMAL
SITE OF ARTERIAL PUNCTURE: ABNORMAL
SODIUM BLDA-SCNC: 137 MMOL/L (ref 136–145)
SODIUM SERPL-SCNC: 142 MMOL/L (ref 136–145)
SP GR UR STRIP.AUTO: 1.03
SPECIMEN DRAWN FROM PATIENT: ABNORMAL
SPECIMEN DRAWN FROM PATIENT: ABNORMAL
SPONTANEOUS TIDAL VOLUME: 353 ML
T AXIS: 26 DEGREES
T OFFSET: 395 MS
T4 FREE SERPL-MCNC: 0.64 NG/DL (ref 0.61–1.12)
TSH SERPL-ACNC: 9.02 MIU/L (ref 0.44–3.98)
UROBILINOGEN UR STRIP.AUTO-MCNC: NORMAL MG/DL
VANCOMYCIN SERPL-MCNC: 14.6 UG/ML (ref 5–20)
VANCOMYCIN SERPL-MCNC: 7.8 UG/ML (ref 5–20)
VENTILATOR MODE: ABNORMAL
VENTILATOR RATE: 20 BPM
VENTRICULAR RATE: 84 BPM
WBC # BLD AUTO: 7 X10*3/UL (ref 4.4–11.3)

## 2025-06-14 PROCEDURE — 84443 ASSAY THYROID STIM HORMONE: CPT

## 2025-06-14 PROCEDURE — 84702 CHORIONIC GONADOTROPIN TEST: CPT | Performed by: EMERGENCY MEDICINE

## 2025-06-14 PROCEDURE — 36600 WITHDRAWAL OF ARTERIAL BLOOD: CPT

## 2025-06-14 PROCEDURE — 70450 CT HEAD/BRAIN W/O DYE: CPT

## 2025-06-14 PROCEDURE — 82947 ASSAY GLUCOSE BLOOD QUANT: CPT

## 2025-06-14 PROCEDURE — 94640 AIRWAY INHALATION TREATMENT: CPT

## 2025-06-14 PROCEDURE — 5A0935A ASSISTANCE WITH RESPIRATORY VENTILATION, LESS THAN 24 CONSECUTIVE HOURS, HIGH NASAL FLOW/VELOCITY: ICD-10-PCS | Performed by: INTERNAL MEDICINE

## 2025-06-14 PROCEDURE — 71275 CT ANGIOGRAPHY CHEST: CPT

## 2025-06-14 PROCEDURE — 71275 CT ANGIOGRAPHY CHEST: CPT | Performed by: STUDENT IN AN ORGANIZED HEALTH CARE EDUCATION/TRAINING PROGRAM

## 2025-06-14 PROCEDURE — 83880 ASSAY OF NATRIURETIC PEPTIDE: CPT | Performed by: EMERGENCY MEDICINE

## 2025-06-14 PROCEDURE — 36415 COLL VENOUS BLD VENIPUNCTURE: CPT

## 2025-06-14 PROCEDURE — 84439 ASSAY OF FREE THYROXINE: CPT

## 2025-06-14 PROCEDURE — 71045 X-RAY EXAM CHEST 1 VIEW: CPT | Performed by: INTERNAL MEDICINE

## 2025-06-14 PROCEDURE — 2020000001 HC ICU ROOM DAILY

## 2025-06-14 PROCEDURE — 94660 CPAP INITIATION&MGMT: CPT

## 2025-06-14 PROCEDURE — 99291 CRITICAL CARE FIRST HOUR: CPT | Mod: 25 | Performed by: EMERGENCY MEDICINE

## 2025-06-14 PROCEDURE — 2500000002 HC RX 250 W HCPCS SELF ADMINISTERED DRUGS (ALT 637 FOR MEDICARE OP, ALT 636 FOR OP/ED)

## 2025-06-14 PROCEDURE — 70450 CT HEAD/BRAIN W/O DYE: CPT | Performed by: STUDENT IN AN ORGANIZED HEALTH CARE EDUCATION/TRAINING PROGRAM

## 2025-06-14 PROCEDURE — 80307 DRUG TEST PRSMV CHEM ANLYZR: CPT

## 2025-06-14 PROCEDURE — 87040 BLOOD CULTURE FOR BACTERIA: CPT | Mod: ELYLAB

## 2025-06-14 PROCEDURE — 84484 ASSAY OF TROPONIN QUANT: CPT | Performed by: EMERGENCY MEDICINE

## 2025-06-14 PROCEDURE — 2500000005 HC RX 250 GENERAL PHARMACY W/O HCPCS: Performed by: STUDENT IN AN ORGANIZED HEALTH CARE EDUCATION/TRAINING PROGRAM

## 2025-06-14 PROCEDURE — 2500000004 HC RX 250 GENERAL PHARMACY W/ HCPCS (ALT 636 FOR OP/ED): Performed by: EMERGENCY MEDICINE

## 2025-06-14 PROCEDURE — 83735 ASSAY OF MAGNESIUM: CPT | Performed by: EMERGENCY MEDICINE

## 2025-06-14 PROCEDURE — 93005 ELECTROCARDIOGRAM TRACING: CPT

## 2025-06-14 PROCEDURE — 71045 X-RAY EXAM CHEST 1 VIEW: CPT

## 2025-06-14 PROCEDURE — 85025 COMPLETE CBC W/AUTO DIFF WBC: CPT | Performed by: EMERGENCY MEDICINE

## 2025-06-14 PROCEDURE — 5A09357 ASSISTANCE WITH RESPIRATORY VENTILATION, LESS THAN 24 CONSECUTIVE HOURS, CONTINUOUS POSITIVE AIRWAY PRESSURE: ICD-10-PCS | Performed by: INTERNAL MEDICINE

## 2025-06-14 PROCEDURE — 96367 TX/PROPH/DG ADDL SEQ IV INF: CPT

## 2025-06-14 PROCEDURE — 87641 MR-STAPH DNA AMP PROBE: CPT

## 2025-06-14 PROCEDURE — 2500000005 HC RX 250 GENERAL PHARMACY W/O HCPCS: Performed by: EMERGENCY MEDICINE

## 2025-06-14 PROCEDURE — 2500000004 HC RX 250 GENERAL PHARMACY W/ HCPCS (ALT 636 FOR OP/ED): Performed by: STUDENT IN AN ORGANIZED HEALTH CARE EDUCATION/TRAINING PROGRAM

## 2025-06-14 PROCEDURE — S4991 NICOTINE PATCH NONLEGEND: HCPCS

## 2025-06-14 PROCEDURE — 2500000004 HC RX 250 GENERAL PHARMACY W/ HCPCS (ALT 636 FOR OP/ED)

## 2025-06-14 PROCEDURE — 83605 ASSAY OF LACTIC ACID: CPT

## 2025-06-14 PROCEDURE — 96375 TX/PRO/DX INJ NEW DRUG ADDON: CPT

## 2025-06-14 PROCEDURE — 80202 ASSAY OF VANCOMYCIN: CPT

## 2025-06-14 PROCEDURE — 2550000001 HC RX 255 CONTRASTS: Performed by: STUDENT IN AN ORGANIZED HEALTH CARE EDUCATION/TRAINING PROGRAM

## 2025-06-14 PROCEDURE — 87449 NOS EACH ORGANISM AG IA: CPT | Mod: ELYLAB

## 2025-06-14 PROCEDURE — 99291 CRITICAL CARE FIRST HOUR: CPT

## 2025-06-14 PROCEDURE — 81003 URINALYSIS AUTO W/O SCOPE: CPT

## 2025-06-14 PROCEDURE — 87637 SARSCOV2&INF A&B&RSV AMP PRB: CPT

## 2025-06-14 PROCEDURE — 96365 THER/PROPH/DIAG IV INF INIT: CPT

## 2025-06-14 PROCEDURE — 84132 ASSAY OF SERUM POTASSIUM: CPT | Performed by: EMERGENCY MEDICINE

## 2025-06-14 PROCEDURE — 82140 ASSAY OF AMMONIA: CPT

## 2025-06-14 PROCEDURE — 82805 BLOOD GASES W/O2 SATURATION: CPT | Performed by: STUDENT IN AN ORGANIZED HEALTH CARE EDUCATION/TRAINING PROGRAM

## 2025-06-14 PROCEDURE — 2500000002 HC RX 250 W HCPCS SELF ADMINISTERED DRUGS (ALT 637 FOR MEDICARE OP, ALT 636 FOR OP/ED): Performed by: EMERGENCY MEDICINE

## 2025-06-14 PROCEDURE — 2500000001 HC RX 250 WO HCPCS SELF ADMINISTERED DRUGS (ALT 637 FOR MEDICARE OP)

## 2025-06-14 PROCEDURE — 87899 AGENT NOS ASSAY W/OPTIC: CPT | Mod: ELYLAB

## 2025-06-14 PROCEDURE — 84145 PROCALCITONIN (PCT): CPT | Mod: ELYLAB

## 2025-06-14 RX ORDER — CEFEPIME HYDROCHLORIDE 1 G/50ML
1 INJECTION, SOLUTION INTRAVENOUS EVERY 12 HOURS
Status: DISCONTINUED | OUTPATIENT
Start: 2025-06-14 | End: 2025-06-16

## 2025-06-14 RX ORDER — PRAZOSIN HYDROCHLORIDE 1 MG/1
2 CAPSULE ORAL NIGHTLY
Status: DISCONTINUED | OUTPATIENT
Start: 2025-06-14 | End: 2025-06-14

## 2025-06-14 RX ORDER — CLONAZEPAM 0.5 MG/1
0.25 TABLET ORAL
Status: DISCONTINUED | OUTPATIENT
Start: 2025-06-15 | End: 2025-06-15

## 2025-06-14 RX ORDER — DEXTROSE 50 % IN WATER (D50W) INTRAVENOUS SYRINGE
12.5
Status: DISCONTINUED | OUTPATIENT
Start: 2025-06-14 | End: 2025-06-17 | Stop reason: HOSPADM

## 2025-06-14 RX ORDER — RISPERIDONE 1 MG/1
2 TABLET ORAL
Status: DISCONTINUED | OUTPATIENT
Start: 2025-06-14 | End: 2025-06-17 | Stop reason: HOSPADM

## 2025-06-14 RX ORDER — CEFTRIAXONE 1 G/50ML
1 INJECTION, SOLUTION INTRAVENOUS ONCE
Status: COMPLETED | OUTPATIENT
Start: 2025-06-14 | End: 2025-06-14

## 2025-06-14 RX ORDER — ONDANSETRON 4 MG/1
4 TABLET, FILM COATED ORAL EVERY 6 HOURS PRN
Status: DISCONTINUED | OUTPATIENT
Start: 2025-06-14 | End: 2025-06-17 | Stop reason: HOSPADM

## 2025-06-14 RX ORDER — HYDROXYZINE HYDROCHLORIDE 25 MG/1
25 TABLET, FILM COATED ORAL EVERY 6 HOURS PRN
Status: DISCONTINUED | OUTPATIENT
Start: 2025-06-14 | End: 2025-06-15

## 2025-06-14 RX ORDER — IBUPROFEN 200 MG
1 TABLET ORAL DAILY
Status: DISCONTINUED | OUTPATIENT
Start: 2025-06-14 | End: 2025-06-17 | Stop reason: HOSPADM

## 2025-06-14 RX ORDER — CLONAZEPAM 0.5 MG/1
0.5 TABLET ORAL
Status: DISCONTINUED | OUTPATIENT
Start: 2025-06-14 | End: 2025-06-14

## 2025-06-14 RX ORDER — BUSPIRONE HYDROCHLORIDE 5 MG/1
30 TABLET ORAL 2 TIMES DAILY
Status: DISCONTINUED | OUTPATIENT
Start: 2025-06-14 | End: 2025-06-17 | Stop reason: HOSPADM

## 2025-06-14 RX ORDER — DEXTROSE 50 % IN WATER (D50W) INTRAVENOUS SYRINGE
25
Status: DISCONTINUED | OUTPATIENT
Start: 2025-06-14 | End: 2025-06-17 | Stop reason: HOSPADM

## 2025-06-14 RX ORDER — BUTALBITAL, ACETAMINOPHEN AND CAFFEINE 50; 325; 40 MG/1; MG/1; MG/1
1 TABLET ORAL EVERY 6 HOURS PRN
Status: DISCONTINUED | OUTPATIENT
Start: 2025-06-14 | End: 2025-06-17 | Stop reason: HOSPADM

## 2025-06-14 RX ORDER — METOCLOPRAMIDE HYDROCHLORIDE 5 MG/ML
10 INJECTION INTRAMUSCULAR; INTRAVENOUS ONCE
Status: COMPLETED | OUTPATIENT
Start: 2025-06-14 | End: 2025-06-14

## 2025-06-14 RX ORDER — TRAZODONE HYDROCHLORIDE 50 MG/1
50 TABLET ORAL NIGHTLY
Status: DISCONTINUED | OUTPATIENT
Start: 2025-06-14 | End: 2025-06-17 | Stop reason: HOSPADM

## 2025-06-14 RX ORDER — FAMOTIDINE 20 MG/1
20 TABLET, FILM COATED ORAL 2 TIMES DAILY
Status: DISCONTINUED | OUTPATIENT
Start: 2025-06-14 | End: 2025-06-17 | Stop reason: HOSPADM

## 2025-06-14 RX ORDER — CLONIDINE HYDROCHLORIDE 0.1 MG/1
0.1 TABLET ORAL 2 TIMES DAILY
Status: DISCONTINUED | OUTPATIENT
Start: 2025-06-14 | End: 2025-06-17 | Stop reason: HOSPADM

## 2025-06-14 RX ORDER — ACETAMINOPHEN 325 MG/1
650 TABLET ORAL EVERY 4 HOURS PRN
Status: DISCONTINUED | OUTPATIENT
Start: 2025-06-14 | End: 2025-06-17 | Stop reason: HOSPADM

## 2025-06-14 RX ORDER — IPRATROPIUM BROMIDE AND ALBUTEROL SULFATE 2.5; .5 MG/3ML; MG/3ML
3 SOLUTION RESPIRATORY (INHALATION)
Status: DISCONTINUED | OUTPATIENT
Start: 2025-06-14 | End: 2025-06-17 | Stop reason: HOSPADM

## 2025-06-14 RX ORDER — MAGNESIUM SULFATE HEPTAHYDRATE 40 MG/ML
2 INJECTION, SOLUTION INTRAVENOUS ONCE
Status: COMPLETED | OUTPATIENT
Start: 2025-06-14 | End: 2025-06-14

## 2025-06-14 RX ORDER — IPRATROPIUM BROMIDE AND ALBUTEROL SULFATE 2.5; .5 MG/3ML; MG/3ML
3 SOLUTION RESPIRATORY (INHALATION) ONCE
Status: COMPLETED | OUTPATIENT
Start: 2025-06-14 | End: 2025-06-14

## 2025-06-14 RX ORDER — VANCOMYCIN HYDROCHLORIDE 1 G/20ML
INJECTION, POWDER, LYOPHILIZED, FOR SOLUTION INTRAVENOUS DAILY PRN
Status: DISCONTINUED | OUTPATIENT
Start: 2025-06-14 | End: 2025-06-15

## 2025-06-14 RX ORDER — PALIPERIDONE 3 MG/1
3 TABLET, EXTENDED RELEASE ORAL DAILY
Status: DISCONTINUED | OUTPATIENT
Start: 2025-06-14 | End: 2025-06-17 | Stop reason: HOSPADM

## 2025-06-14 RX ORDER — ALBUTEROL SULFATE 0.83 MG/ML
2.5 SOLUTION RESPIRATORY (INHALATION) EVERY 4 HOURS PRN
Status: DISCONTINUED | OUTPATIENT
Start: 2025-06-14 | End: 2025-06-17 | Stop reason: HOSPADM

## 2025-06-14 RX ORDER — ATORVASTATIN CALCIUM 20 MG/1
20 TABLET, FILM COATED ORAL DAILY
Status: DISCONTINUED | OUTPATIENT
Start: 2025-06-14 | End: 2025-06-17 | Stop reason: HOSPADM

## 2025-06-14 RX ORDER — METOPROLOL TARTRATE 25 MG/1
25 TABLET, FILM COATED ORAL 2 TIMES DAILY
Status: DISCONTINUED | OUTPATIENT
Start: 2025-06-14 | End: 2025-06-17 | Stop reason: HOSPADM

## 2025-06-14 RX ORDER — DIPHENHYDRAMINE HYDROCHLORIDE 50 MG/ML
25 INJECTION, SOLUTION INTRAMUSCULAR; INTRAVENOUS ONCE
Status: COMPLETED | OUTPATIENT
Start: 2025-06-14 | End: 2025-06-14

## 2025-06-14 RX ORDER — ONDANSETRON HYDROCHLORIDE 2 MG/ML
4 INJECTION, SOLUTION INTRAVENOUS EVERY 6 HOURS PRN
Status: DISCONTINUED | OUTPATIENT
Start: 2025-06-14 | End: 2025-06-17 | Stop reason: HOSPADM

## 2025-06-14 RX ORDER — ACETAMINOPHEN 160 MG/5ML
650 SOLUTION ORAL EVERY 4 HOURS PRN
Status: DISCONTINUED | OUTPATIENT
Start: 2025-06-14 | End: 2025-06-17 | Stop reason: HOSPADM

## 2025-06-14 RX ORDER — PRAZOSIN HYDROCHLORIDE 1 MG/1
1 CAPSULE ORAL NIGHTLY
Status: DISCONTINUED | OUTPATIENT
Start: 2025-06-14 | End: 2025-06-17 | Stop reason: HOSPADM

## 2025-06-14 RX ORDER — ALBUTEROL SULFATE 0.83 MG/ML
2.5 SOLUTION RESPIRATORY (INHALATION) ONCE
Status: COMPLETED | OUTPATIENT
Start: 2025-06-14 | End: 2025-06-14

## 2025-06-14 RX ORDER — ESCITALOPRAM OXALATE 10 MG/1
20 TABLET ORAL EVERY MORNING
Status: DISCONTINUED | OUTPATIENT
Start: 2025-06-14 | End: 2025-06-17 | Stop reason: HOSPADM

## 2025-06-14 RX ORDER — VANCOMYCIN HYDROCHLORIDE 1 G/200ML
1000 INJECTION, SOLUTION INTRAVENOUS EVERY 12 HOURS
Status: DISCONTINUED | OUTPATIENT
Start: 2025-06-14 | End: 2025-06-15

## 2025-06-14 RX ORDER — INSULIN LISPRO 100 [IU]/ML
0-5 INJECTION, SOLUTION INTRAVENOUS; SUBCUTANEOUS EVERY 4 HOURS
Status: DISCONTINUED | OUTPATIENT
Start: 2025-06-14 | End: 2025-06-15

## 2025-06-14 RX ADMIN — ALBUTEROL SULFATE 2.5 MG: 2.5 SOLUTION RESPIRATORY (INHALATION) at 06:37

## 2025-06-14 RX ADMIN — Medication 4 L/MIN: at 05:49

## 2025-06-14 RX ADMIN — CLONIDINE HYDROCHLORIDE 0.1 MG: 0.1 TABLET ORAL at 21:45

## 2025-06-14 RX ADMIN — ATORVASTATIN CALCIUM 20 MG: 20 TABLET ORAL at 15:52

## 2025-06-14 RX ADMIN — CLONIDINE HYDROCHLORIDE 0.1 MG: 0.1 TABLET ORAL at 15:52

## 2025-06-14 RX ADMIN — INSULIN LISPRO 1 UNITS: 100 INJECTION, SOLUTION INTRAVENOUS; SUBCUTANEOUS at 16:23

## 2025-06-14 RX ADMIN — INSULIN LISPRO 1 UNITS: 100 INJECTION, SOLUTION INTRAVENOUS; SUBCUTANEOUS at 20:13

## 2025-06-14 RX ADMIN — RIVAROXABAN 10 MG: 10 TABLET, FILM COATED ORAL at 15:52

## 2025-06-14 RX ADMIN — DIPHENHYDRAMINE HYDROCHLORIDE 25 MG: 50 INJECTION, SOLUTION INTRAMUSCULAR; INTRAVENOUS at 06:05

## 2025-06-14 RX ADMIN — IPRATROPIUM BROMIDE AND ALBUTEROL SULFATE 3 ML: 2.5; .5 SOLUTION RESPIRATORY (INHALATION) at 06:36

## 2025-06-14 RX ADMIN — MAGNESIUM SULFATE HEPTAHYDRATE 2 G: 40 INJECTION, SOLUTION INTRAVENOUS at 08:33

## 2025-06-14 RX ADMIN — METHYLPREDNISOLONE SODIUM SUCCINATE 40 MG: 40 INJECTION, POWDER, FOR SOLUTION INTRAMUSCULAR; INTRAVENOUS at 18:20

## 2025-06-14 RX ADMIN — Medication 40 L/MIN: at 16:22

## 2025-06-14 RX ADMIN — METHYLPREDNISOLONE SODIUM SUCCINATE 125 MG: 125 INJECTION, POWDER, FOR SOLUTION INTRAMUSCULAR; INTRAVENOUS at 06:05

## 2025-06-14 RX ADMIN — Medication 40 PERCENT: at 19:31

## 2025-06-14 RX ADMIN — ESCITALOPRAM OXALATE 20 MG: 10 TABLET, FILM COATED ORAL at 15:52

## 2025-06-14 RX ADMIN — VANCOMYCIN HYDROCHLORIDE 1000 MG: 1 INJECTION, SOLUTION INTRAVENOUS at 14:26

## 2025-06-14 RX ADMIN — IPRATROPIUM BROMIDE AND ALBUTEROL SULFATE 3 ML: 2.5; .5 SOLUTION RESPIRATORY (INHALATION) at 19:31

## 2025-06-14 RX ADMIN — METOPROLOL TARTRATE 25 MG: 25 TABLET, FILM COATED ORAL at 15:52

## 2025-06-14 RX ADMIN — SITAGLIPTIN 50 MG: 25 TABLET, FILM COATED ORAL at 15:52

## 2025-06-14 RX ADMIN — METHYLPREDNISOLONE SODIUM SUCCINATE 40 MG: 40 INJECTION, POWDER, FOR SOLUTION INTRAMUSCULAR; INTRAVENOUS at 12:34

## 2025-06-14 RX ADMIN — CLONAZEPAM 0.5 MG: 0.5 TABLET ORAL at 16:55

## 2025-06-14 RX ADMIN — IOHEXOL 75 ML: 350 INJECTION, SOLUTION INTRAVENOUS at 08:02

## 2025-06-14 RX ADMIN — METOPROLOL TARTRATE 25 MG: 25 TABLET, FILM COATED ORAL at 21:45

## 2025-06-14 RX ADMIN — BUSPIRONE HYDROCHLORIDE 30 MG: 5 TABLET ORAL at 21:45

## 2025-06-14 RX ADMIN — CEFEPIME HYDROCHLORIDE 1 G: 1 INJECTION, SOLUTION INTRAVENOUS at 13:31

## 2025-06-14 RX ADMIN — CEFTRIAXONE 1 G: 1 INJECTION, SOLUTION INTRAVENOUS at 09:20

## 2025-06-14 RX ADMIN — INSULIN LISPRO 1 UNITS: 100 INJECTION, SOLUTION INTRAVENOUS; SUBCUTANEOUS at 12:34

## 2025-06-14 RX ADMIN — AZITHROMYCIN MONOHYDRATE 500 MG: 500 INJECTION, POWDER, LYOPHILIZED, FOR SOLUTION INTRAVENOUS at 10:24

## 2025-06-14 RX ADMIN — LEVOTHYROXINE SODIUM 175 MCG: 0.05 TABLET ORAL at 15:52

## 2025-06-14 RX ADMIN — NICOTINE 1 PATCH: 21 PATCH, EXTENDED RELEASE TRANSDERMAL at 12:34

## 2025-06-14 RX ADMIN — PRAZOSIN HYDROCHLORIDE 1 MG: 1 CAPSULE ORAL at 21:45

## 2025-06-14 RX ADMIN — IPRATROPIUM BROMIDE AND ALBUTEROL SULFATE 3 ML: 2.5; .5 SOLUTION RESPIRATORY (INHALATION) at 16:26

## 2025-06-14 RX ADMIN — Medication 50 PERCENT: at 08:22

## 2025-06-14 RX ADMIN — FAMOTIDINE 20 MG: 20 TABLET, FILM COATED ORAL at 21:45

## 2025-06-14 RX ADMIN — METOCLOPRAMIDE 10 MG: 5 INJECTION, SOLUTION INTRAMUSCULAR; INTRAVENOUS at 06:05

## 2025-06-14 RX ADMIN — TRAZODONE HYDROCHLORIDE 50 MG: 50 TABLET ORAL at 21:45

## 2025-06-14 RX ADMIN — RISPERIDONE 2 MG: 0.25 TABLET ORAL at 18:19

## 2025-06-14 SDOH — SOCIAL STABILITY: SOCIAL INSECURITY: DOES ANYONE TRY TO KEEP YOU FROM HAVING/CONTACTING OTHER FRIENDS OR DOING THINGS OUTSIDE YOUR HOME?: NO

## 2025-06-14 SDOH — SOCIAL STABILITY: SOCIAL INSECURITY: DO YOU FEEL ANYONE HAS EXPLOITED OR TAKEN ADVANTAGE OF YOU FINANCIALLY OR OF YOUR PERSONAL PROPERTY?: NO

## 2025-06-14 SDOH — SOCIAL STABILITY: SOCIAL INSECURITY: ARE THERE ANY APPARENT SIGNS OF INJURIES/BEHAVIORS THAT COULD BE RELATED TO ABUSE/NEGLECT?: NO

## 2025-06-14 SDOH — SOCIAL STABILITY: SOCIAL INSECURITY: ABUSE: ADULT

## 2025-06-14 SDOH — SOCIAL STABILITY: SOCIAL INSECURITY: HAS ANYONE EVER THREATENED TO HURT YOUR FAMILY OR YOUR PETS?: NO

## 2025-06-14 SDOH — SOCIAL STABILITY: SOCIAL INSECURITY: ARE YOU OR HAVE YOU BEEN THREATENED OR ABUSED PHYSICALLY, EMOTIONALLY, OR SEXUALLY BY ANYONE?: NO

## 2025-06-14 SDOH — SOCIAL STABILITY: SOCIAL INSECURITY: DO YOU FEEL UNSAFE GOING BACK TO THE PLACE WHERE YOU ARE LIVING?: NO

## 2025-06-14 SDOH — SOCIAL STABILITY: SOCIAL INSECURITY: HAVE YOU HAD THOUGHTS OF HARMING ANYONE ELSE?: NO

## 2025-06-14 SDOH — SOCIAL STABILITY: SOCIAL INSECURITY: HAVE YOU HAD ANY THOUGHTS OF HARMING ANYONE ELSE?: NO

## 2025-06-14 SDOH — SOCIAL STABILITY: SOCIAL INSECURITY: WERE YOU ABLE TO COMPLETE ALL THE BEHAVIORAL HEALTH SCREENINGS?: YES

## 2025-06-14 ASSESSMENT — PAIN SCALES - GENERAL
PAINLEVEL_OUTOF10: 9
PAINLEVEL_OUTOF10: 3
PAINLEVEL_OUTOF10: 5 - MODERATE PAIN

## 2025-06-14 ASSESSMENT — ACTIVITIES OF DAILY LIVING (ADL)
GROOMING: INDEPENDENT
DRESSING YOURSELF: INDEPENDENT
HEARING - RIGHT EAR: FUNCTIONAL
FEEDING YOURSELF: INDEPENDENT
BATHING: INDEPENDENT
JUDGMENT_ADEQUATE_SAFELY_COMPLETE_DAILY_ACTIVITIES: YES
TOILETING: INDEPENDENT
HEARING - LEFT EAR: FUNCTIONAL
ADEQUATE_TO_COMPLETE_ADL: YES
WALKS IN HOME: INDEPENDENT
PATIENT'S MEMORY ADEQUATE TO SAFELY COMPLETE DAILY ACTIVITIES?: YES

## 2025-06-14 ASSESSMENT — LIFESTYLE VARIABLES
EVER FELT BAD OR GUILTY ABOUT YOUR DRINKING: NO
PRESCIPTION_ABUSE_PAST_12_MONTHS: NO
HOW OFTEN DO YOU HAVE 6 OR MORE DRINKS ON ONE OCCASION: NEVER
HOW OFTEN DO YOU HAVE A DRINK CONTAINING ALCOHOL: NEVER
AUDIT-C TOTAL SCORE: 0
HOW MANY STANDARD DRINKS CONTAINING ALCOHOL DO YOU HAVE ON A TYPICAL DAY: PATIENT DOES NOT DRINK
SUBSTANCE_ABUSE_PAST_12_MONTHS: YES
SKIP TO QUESTIONS 9-10: 1
AUDIT-C TOTAL SCORE: 0
HAVE YOU EVER FELT YOU SHOULD CUT DOWN ON YOUR DRINKING: NO
HAVE PEOPLE ANNOYED YOU BY CRITICIZING YOUR DRINKING: NO
EVER HAD A DRINK FIRST THING IN THE MORNING TO STEADY YOUR NERVES TO GET RID OF A HANGOVER: NO
TOTAL SCORE: 0

## 2025-06-14 ASSESSMENT — PAIN DESCRIPTION - PAIN TYPE: TYPE: ACUTE PAIN

## 2025-06-14 ASSESSMENT — COGNITIVE AND FUNCTIONAL STATUS - GENERAL
DAILY ACTIVITIY SCORE: 24
PATIENT BASELINE BEDBOUND: NO
MOBILITY SCORE: 24

## 2025-06-14 ASSESSMENT — PAIN DESCRIPTION - LOCATION: LOCATION: HEAD

## 2025-06-14 ASSESSMENT — PAIN - FUNCTIONAL ASSESSMENT
PAIN_FUNCTIONAL_ASSESSMENT: 0-10

## 2025-06-14 ASSESSMENT — PATIENT HEALTH QUESTIONNAIRE - PHQ9
1. LITTLE INTEREST OR PLEASURE IN DOING THINGS: SEVERAL DAYS
SUM OF ALL RESPONSES TO PHQ9 QUESTIONS 1 & 2: 2
2. FEELING DOWN, DEPRESSED OR HOPELESS: SEVERAL DAYS

## 2025-06-14 ASSESSMENT — PAIN DESCRIPTION - ORIENTATION: ORIENTATION: ANTERIOR

## 2025-06-14 NOTE — ED PROVIDER NOTES
45-year-old female presents emergency department with chief complaint of headache.  Patient states she has had what she describes as a migraine headache for the past 3 days.  She was seen here yesterday in the emergency department and states the medications helped some but her headache has worsened.  She does report that this headache is consistent with a usual migraine headache for her, but is worse than usual.  She reports nausea and vomiting as well as posterior aching head pain that she states she often gets with migraines.  She denies any fevers.  Denies any coughing or congestion.  Patient is appreciated to be hypoxic on room air with wheezing.  She admits to tobacco use and history of COPD.  However, she states she does not have any shortness of breath.  Denies chest pain.  Patient states that she wears nasal cannula oxygen as needed 2 L, but has not been wearing at home.  No abdominal pain, dysuria, diarrhea, constipation, and black or bloody stools. Chart review shows patient was seen yesterday here in the emergency department for headache.  At that time patient was also treated with aerosols for COPD exacerbation. Chart review shows significant past medical history for COPD, hyperlipidemia, hypertension, hypothyroidism, marijuana abuse, schizophrenia, diabetes, elevated BMI, pulmonary embolus, respiratory failure with hypercapnia and hypoxia, hypothyroidism, and ovarian teratoma.      History provided by:  Patient and medical records   used: No           ------------------------------------------------------------------------------------------------------------------------------------------    VS: As documented in the triage note and EMR flowsheet from this visit were reviewed.    Review of Systems  Constitutional: no fever, chills reports malaise  Eyes: no redness, discharge, pain  HENT: no sore throat, nose bleeds, congestion, rhinorrhea   Cardiovascular: no chest pain, leg edema,  palpitations  Respiratory: no shortness of breath, cough, patient appreciated to have hypoxia states this is chronic for her admits to wheezing  GI: no nausea, diarrhea, pain, vomiting, constipation, BRBPR, melena  : no dysuria, frequency, hematuria  Musculoskeletal: no neck pain, stiffness,  no joint deformity, swelling  Skin: no rash, erythema, wounds  Neurological: Reports headache no dizziness, lightheadedness, weakness, numbness, or tingling  Psychiatric: no suicidal thoughts, confusion, agitation  Metabolic: no polyuria or polydipsia  Hematologic: Chart review shows patient is on Xarelto.  Immunology: No immunocompromise state    [facility]  Nursing notes reviewed and confirmed by me.  Chart review performed including medications, allergies, and medical, surgical, and family history  Visit Vitals  /84 (BP Location: Right arm, Patient Position: Lying)   Pulse 92   Temp 36.5 °C (97.7 °F)   Resp 19     Physical Exam  Vitals and nursing note reviewed.   Constitutional:       General: She is not in acute distress.     Appearance: She is ill-appearing.   HENT:      Head: Normocephalic and atraumatic.      Right Ear: External ear normal.      Left Ear: External ear normal.      Nose: Nose normal. No congestion or rhinorrhea.      Mouth/Throat:      Mouth: Mucous membranes are dry.      Pharynx: No oropharyngeal exudate or posterior oropharyngeal erythema.   Eyes:      Extraocular Movements: Extraocular movements intact.      Conjunctiva/sclera: Conjunctivae normal.      Pupils: Pupils are equal, round, and reactive to light.   Cardiovascular:      Rate and Rhythm: Regular rhythm. Tachycardia present.      Pulses: Normal pulses.      Heart sounds: Normal heart sounds.   Pulmonary:      Effort: No respiratory distress.      Breath sounds: No stridor. Wheezing present. No rhonchi or rales.      Comments: Breath sounds are coarse and diminished bilaterally with expiratory wheezing throughout both lung  fields.  Abdominal:      General: There is no distension.      Palpations: Abdomen is soft.      Tenderness: There is no abdominal tenderness. There is no guarding or rebound.   Musculoskeletal:         General: No swelling or deformity. Normal range of motion.      Cervical back: Normal range of motion and neck supple. No rigidity.      Right lower leg: No edema.      Left lower leg: No edema.      Comments: No calf tenderness    Skin:     General: Skin is warm and dry.      Capillary Refill: Capillary refill takes less than 2 seconds.      Coloration: Skin is not jaundiced.      Findings: No rash.   Neurological:      General: No focal deficit present.      Mental Status: She is alert and oriented to person, place, and time.      Sensory: No sensory deficit.      Motor: No weakness.   Psychiatric:         Mood and Affect: Mood normal.         Behavior: Behavior normal.        Medical History[1]   Surgical History[2]   Social History[3]   ------------------------------------------------------------------------------------------------------------------------------------------  CT head wo IV contrast    (Results Pending)   XR chest 1 view    (Results Pending)   CT angio chest for pulmonary embolism    (Results Pending)      Labs Reviewed   CBC WITH AUTO DIFFERENTIAL - Abnormal       Result Value    WBC 7.0      nRBC 0.0      RBC 4.03      Hemoglobin 13.1      Hematocrit 42.0       (*)     MCH 32.5      MCHC 31.2 (*)     RDW 14.0      Platelets 99 (*)     Neutrophils % 66.5      Immature Granulocytes %, Automated 2.0 (*)     Lymphocytes % 23.6      Monocytes % 7.1      Eosinophils % 0.4      Basophils % 0.4      Neutrophils Absolute 4.66      Immature Granulocytes Absolute, Automated 0.14      Lymphocytes Absolute 1.66      Monocytes Absolute 0.50      Eosinophils Absolute 0.03      Basophils Absolute 0.03     COMPREHENSIVE METABOLIC PANEL - Abnormal    Glucose 118 (*)     Sodium 142      Potassium 4.0       Chloride 106      Bicarbonate 30      Anion Gap 10      Urea Nitrogen 17      Creatinine 0.85      eGFR 87      Calcium 8.8      Albumin 4.0      Alkaline Phosphatase 74      Total Protein 6.4      AST 11      Bilirubin, Total 0.4      ALT 32     MAGNESIUM - Abnormal    Magnesium 1.54 (*)    BLOOD GAS ARTERIAL FULL PANEL - Abnormal    POCT pH, Arterial 7.29 (*)     POCT pCO2, Arterial 64 (*)     POCT pO2, Arterial 70 (*)     POCT SO2, Arterial 98      POCT Oxy Hemoglobin, Arterial 82.4 (*)     POCT Hematocrit Calculated, Arterial 41.0      POCT Sodium, Arterial 137      POCT Potassium, Arterial 4.2      POCT Chloride, Arterial 104      POCT Ionized Calcium, Arterial 1.26      POCT Glucose, Arterial 115 (*)     POCT Lactate, Arterial 0.5      POCT Base Excess, Arterial 2.5      POCT HCO3 Calculated, Arterial 30.8 (*)     POCT Hemoglobin, Arterial 13.5      POCT Anion Gap, Arterial 6 (*)     Patient Temperature        FiO2 36      Apparatus CANNULA      Site of Arterial Puncture Radial Right      Andrew's Test Positive      Site of Arterial Puncture RRA      Andrew's Test POS     SERIAL TROPONIN-INITIAL - Abnormal    Troponin I, High Sensitivity 25 (*)     Narrative:     Less than 99th percentile of normal range cutoff-  Female and children under 18 years old <14 ng/L; Male <21 ng/L: Negative  Repeat testing should be performed if clinically indicated.     Female and children under 18 years old 14-50 ng/L; Male 21-50 ng/L:  Consistent with possible cardiac damage and possible increased clinical   risk. Serial measurements may help to assess extent of myocardial damage.     >50 ng/L: Consistent with cardiac damage, increased clinical risk and  myocardial infarction. Serial measurements may help assess extent of   myocardial damage.      NOTE: Children less than 1 year old may have higher baseline troponin   levels and results should be interpreted in conjunction with the overall   clinical context.     NOTE: Troponin I  testing is performed using a different   testing methodology at [facility] than at other   [facility]. Direct result comparisons should only   be made within the same method.   SERIAL TROPONIN, 1 HOUR - Abnormal    Troponin I, High Sensitivity 24 (*)     Narrative:     Less than 99th percentile of normal range cutoff-  Female and children under 18 years old <14 ng/L; Male <21 ng/L: Negative  Repeat testing should be performed if clinically indicated.     Female and children under 18 years old 14-50 ng/L; Male 21-50 ng/L:  Consistent with possible cardiac damage and possible increased clinical   risk. Serial measurements may help to assess extent of myocardial damage.     >50 ng/L: Consistent with cardiac damage, increased clinical risk and  myocardial infarction. Serial measurements may help assess extent of   myocardial damage.      NOTE: Children less than 1 year old may have higher baseline troponin   levels and results should be interpreted in conjunction with the overall   clinical context.     NOTE: Troponin I testing is performed using a different   testing methodology at [facility] than at other   [facility]. Direct result comparisons should only   be made within the same method.   B-TYPE NATRIURETIC PEPTIDE - Normal    BNP 38      Narrative:        <100 pg/mL - Heart failure unlikely  100-299 pg/mL - Intermediate probability of acute heart                  failure exacerbation. Correlate with clinical                  context and patient history.    >=300 pg/mL - Heart Failure likely. Correlate with clinical                  context and patient history.    BNP testing is performed using different testing methodology at [facility] than at other [facility]. Direct result comparisons should only be made within the same method.      HUMAN CHORIONIC GONADOTROPIN, SERUM QUANTITATIVE - Normal    HCG, Beta-Quantitative 2      Narrative:      Total HCG  measurement is performed using the [employer] Access   Immunoassay which detects intact HCG and free beta HCG subunit.    This test is not indicated for use as a tumor marker.   HCG testing is performed using a different test methodology at [facility] than other [facility] hospitals. Direct result comparison   should only be made within the same method.       TROPONIN SERIES- (INITIAL, 1 HR)    Narrative:     The following orders were created for panel order Troponin I Series, High Sensitivity (0, 1 HR).  Procedure                               Abnormality         Status                     ---------                               -----------         ------                     Troponin I, High Sensiti...[[id number]]  Abnormal            Final result               Troponin, High Sensitivi...[[id number]]  Abnormal            Final result                 Please view results for these tests on the individual orders.        Medical Decision Making  EKG interpreted by ED physician: Sinus rhythm with sinus arrhythmia rate of 84.  NJ, QRS, QTc intervals all within normal limits.  No significant ST elevations or depressions.  No significant Q waves.  Poor R wave progression.  Left axis deviation.  EKG is not significantly changed from May 2025.    45-year-old female presents emergency department with chief complaint of headache.  On my exam she is afebrile, but is ill in appearance.  She has no focal deficit or meningismus signs.  However, patient is appreciated to be hypoxic on room air requiring 4 L of nasal cannula which is increased from her baseline 2 L as needed.  In addition, she has wheezing and diminished breath sounds bilaterally.  Given his clinical exam and presenting symptoms a thorough workup was obtained.  Aerosol treatments along with Solu-Medrol is ordered for presumed COPD exacerbation.  Patient also given Reglan and Benadryl for headache treatment.  ABG is ordered and confirms findings of  hypoxic and hypercapnic respiratory failure.  I suspect that her respiratory failure is causing her headache.  Cardiac enzymes x 1 is mildly elevated.  EKG does not show acute ACS.  CMP shows no significant metabolic abnormality.  CBC does not show significant leukocytosis or anemia.  I do not suspect sepsis.  BNP within normal range patient does not have findings of decompensated heart failure.  Magnesium is low and this is repleted IV.  Pregnancy testing negative.  Patient was placed on BiPAP for treatment of hypercapnic respiratory failure.  At the end of my shift chest imaging and head CT are pending along with reevaluation and disposition.  I discussed with patient and significant other at bedside my concerns regarding her respiratory failure and hypercapnia/hypoxia.  Advised her of BiPAP and likely admission to the ICU.  Patient signed out to Dr. Marin pending imaging evaluation.  In addition, I recommend rechecking ABG after BiPAP is started.       Diagnoses as of 06/14/25 0813   Acute on chronic respiratory failure with hypoxia and hypercapnia   Acute nonintractable headache, unspecified headache type      1. Acute on chronic respiratory failure with hypoxia and hypercapnia        2. Acute nonintractable headache, unspecified headache type           Critical Care    Performed by: Joaquin Kitchen DO  Authorized by: Joaquin Kitchen DO    Critical care provider statement:     Critical care time (minutes):  35    Critical care time was exclusive of:  Separately billable procedures and treating other patients    Critical care was necessary to treat or prevent imminent or life-threatening deterioration of the following conditions:  Respiratory failure    Critical care was time spent personally by me on the following activities:  Development of treatment plan with patient or surrogate, examination of patient, re-evaluation of patient's condition, pulse oximetry, ordering and review of radiographic studies,  ordering and review of laboratory studies and ordering and performing treatments and interventions    I assumed direction of critical care for this patient from another provider in my specialty: no      Care discussed with: admitting provider         This note was dictated using dragon software and may contain errors related to dictation interpretation errors.          [1]   Past Medical History:  Diagnosis Date    Acute on chronic respiratory failure     Anxiety     Arthritis     Chronic headaches     Chronic respiratory failure     WITH HYPOXIA    COPD (chronic obstructive pulmonary disease) (Multi)     Cough 2025    COVID-19     VACCINATED    Depression     Diabetes mellitus (Multi)     type 2 IDDM    GERD (gastroesophageal reflux disease)     History of transfusion     History of venous thromboembolism     HL (hearing loss)     Hyperlipidemia     Hypertension     Hypothyroidism     Lipoma     Nephrolithiasis     CHARLIE (obstructive sleep apnea)     USES BIPAP    Ovarian teratoma     PE (pulmonary thromboembolism) (Multi)     PTSD (post-traumatic stress disorder)     Schizophrenia     Shortness of breath     Type 2 diabetes mellitus     VTE (venous thromboembolism)    [2]   Past Surgical History:  Procedure Laterality Date     SECTION, LOW TRANSVERSE      x 1    EYE SURGERY      LITHOTRIPSY      STAPEDES SURGERY      TONSILLECTOMY      UMBILICAL HERNIA REPAIR      VENTRAL HERNIA REPAIR     [3]   Social History  Socioeconomic History    Marital status:     Number of children: 1   Tobacco Use    Smoking status: Every Day     Current packs/day: 0.50     Types: Cigarettes     Passive exposure: Current    Smokeless tobacco: Never   Vaping Use    Vaping status: Never Used   Substance and Sexual Activity    Alcohol use: Yes     Comment: RARELY    Drug use: Yes     Types: Marijuana    Sexual activity: Yes     Partners: Male     Birth control/protection: Condom Male     Comment: LMP: 12 years ago      Social Drivers of Health     Financial Resource Strain: Patient Unable To Answer (5/17/2025)    Overall Financial Resource Strain (CARDIA)     Difficulty of Paying Living Expenses: Patient unable to answer   Food Insecurity: Patient Unable To Answer (5/17/2025)    Hunger Vital Sign     Worried About Running Out of Food in the Last Year: Patient unable to answer     Ran Out of Food in the Last Year: Patient unable to answer   Transportation Needs: Patient Unable To Answer (5/17/2025)    PRAPARE - Transportation     Lack of Transportation (Medical): Patient unable to answer     Lack of Transportation (Non-Medical): Patient unable to answer   Physical Activity: Patient Declined (5/9/2025)    Exercise Vital Sign     Days of Exercise per Week: Patient declined     Minutes of Exercise per Session: Patient declined   Stress: Patient Declined (5/9/2025)    [place] [employer] - Occupational Stress Questionnaire     Feeling of Stress : Patient declined   Social Connections: Patient Declined (5/9/2025)    Social Connection and Isolation Panel [NHANES]     Frequency of Communication with Friends and Family: Patient declined     Frequency of Social Gatherings with Friends and Family: Patient declined     Attends Buddhist Services: Patient declined     Active Member of Clubs or Organizations: Patient declined     Attends Club or Organization Meetings: Patient declined     Marital Status: Patient declined   Intimate Partner Violence: Patient Unable To Answer (5/17/2025)    Humiliation, Afraid, Rape, and Kick questionnaire     Fear of Current or Ex-Partner: Patient unable to answer     Emotionally Abused: Patient unable to answer     Physically Abused: Patient unable to answer     Sexually Abused: Patient unable to answer   Housing Stability: Patient Unable To Answer (5/17/2025)    Housing Stability Vital Sign     Unable to Pay for Housing in the Last Year: Patient unable to answer     Number of Times  Moved in the Last Year: 1     Homeless in the Last Year: Patient unable to answer        Joaquin Kitchen,   06/14/25 0813

## 2025-06-14 NOTE — H&P
"[facility] Critical Care Medicine       Date:  6/14/2025  Patient:  Stephenie Mccray  YOB: 1980  MRN:  [id number]   Admit Date:  6/14/2025  ========================================================================================================    Chief Complaint   Patient presents with    Migraine     Migraine for 3 days          History of Present Illness:  Stephenie Mccray is a 45 y.o. year old female patient with Past Medical History of COPD home O2 4L NC as needed, has bipap for sleep but does not wear it, schizophrenia, HTN, HLD, hypothyroid, DM2, pulmonary embolism on xarelto, current smoker, presented to the emergency department yesterday for a migraine was treated and discharged and returned to the ED a few hours later with respiratory distress and audible wheezing. She was given steroids and duonebs and started on BiPap. CTA of the chest concerning for bilateral consolidations and atelectasis. She received azithromycin and ceftriaxone. Laboratory results significant for magnesium of 1.54, no leukocytosis or fever.     After wearing the BiPap for a few hours she was trailed on nasal cannula and subsequently desaturated into the low 80s. She was placed back on BiPap, ABG was obtained with Ph 7.29/ Co2 64/ O2 70 on 60%V Fio2. Decision made to admit patient to the ICU     Interval ICU Events:  6/14: Admitted to the ICU on BiPap    Medical History:  Medical History[1]  Surgical History[2]  Prescriptions Prior to Admission[3]  Fluticasone furoate-vilanterol, Fluticasone-umeclidin-vilanter, Levofloxacin, Sumatriptan, and Vortioxetine  Social History[4]  Family History[5]    Review of Systems:  14 point review of systems was completed and negative except for those specially mention in my HPI    Physical Exam:    Heart Rate:  []   Temperature:  [36.1 °C (97 °F)-36.5 °C (97.7 °F)]   Respirations:  [17-20]   BP: (129-173)/()   Height:  [157.5 cm (5' 2\")]   Weight:  [74.8 kg (165 lb)] "   Pulse Ox:  [84 %-94 %]     Physical Exam  Vitals reviewed.   Constitutional:       General: She is not in acute distress.     Appearance: She is overweight. She is not ill-appearing.      Comments: Not well-groomed    HENT:      Head: Normocephalic and atraumatic.      Right Ear: External ear normal.      Left Ear: External ear normal.      Nose: Nose normal.   Eyes:      General: Lids are normal.   Cardiovascular:      Rate and Rhythm: Normal rate and regular rhythm.      Pulses: Normal pulses.      Heart sounds: Normal heart sounds.   Pulmonary:      Effort: Pulmonary effort is normal.      Comments: Diminished breath sounds bilaterally   Abdominal:      General: Bowel sounds are normal.      Palpations: Abdomen is soft.      Tenderness: There is no abdominal tenderness.   Musculoskeletal:      Cervical back: Full passive range of motion without pain.      Right lower leg: No edema.      Left lower leg: No edema.   Skin:     General: Skin is warm and dry.      Capillary Refill: Capillary refill takes less than 2 seconds.   Neurological:      Mental Status: She is oriented to person, place, and time. She is lethargic.   Psychiatric:         Behavior: Behavior is cooperative.         Objective:    I have reviewed all medications, laboratory results, and imaging pertinent for today's encounter    Assessment/Plan:    I am currently managing this critically ill patient for the following problems:    Neuro/Psych/Pain Ctrl/Sedation:  #Encephalopathy-unclear source  #Nicotine dependence   #Hx Schizophrenia  -Continue home buspar & lexapro  -Hold home klonopin & atarax  -Tylenol PRN pain   -Fioricet PRN headache/Migraine  -Continue trazadone  -Nicotine patch   -Send ammonia level   -Send UDS    Respiratory/ENT:  #Acute on chronic hypoxic hypercapnic respiratory failure  #Hx COPD  #Hx pulmonary embolism on AC  -Continue anticoagulation  -Steroids  -Duonebs  -Bipap, wean as tolerated  -Pulmonary hygiene   -Continue  Spiriva  -Encourage smoking cessation     Cardiovascular:  #Hypertension  #Hx HLD  -Continuous cardiac monitoring  -Continue home metoprolol, minipress, and catapres   -Continue lipitor  -EKG PRN    GI:  #Hx GERD  -NPO while on BiPap  -Continue home PPI     Renal/Volume Status (Intra & Extravascular):  -No acute issues  -Monitor I&O  -Daily BMP    Endocrine  #T2DM  #Hypothyroidism  -Continue home snythroid  -SSI  -Check TSH     Infectious Disease:  #Pneumonia-community vs healthcare acquired (admitted 5/17)  -Send Covid/Flu/RSV  -Start Vancomycin, Azithryomycin, and cefepime  -Urine antigens  -Send lactate  -Check procal   -Send UA    Heme/Onc:  -No acute issues  -Daily CBC    OBGYN/MSK:  -No acute issues  -Ambulatory at home    Ethics/Code Status:  Full Code     :  DVT Prophylaxis:   GI Prophylaxis: home PPI  Bowel Regimen: PRN  Diet: NPO  CVC: none  [place]: none  Tran: none  Restraints: none  Dispo: ICU    Critical Care Time:  50 minutes spent in preparing to see patient (I.e. review of medical records), evaluation of diagnostics (I.e. labs, imaging, etc.), documentation, discussing plan of care with patient/ family/ caregiver, and/ or coordination of care with multidisciplinary team. Time does not include completion of procedure time.     Bonny Tomlin, BARTOLOME-CNP         [1]   Past Medical History:  Diagnosis Date    Acute on chronic respiratory failure     Anxiety     Arthritis     Chronic headaches     Chronic respiratory failure     WITH HYPOXIA    COPD (chronic obstructive pulmonary disease) (Multi)     Cough 06/05/2025    COVID-19     VACCINATED    Depression     Diabetes mellitus (Multi)     type 2 IDDM    GERD (gastroesophageal reflux disease)     History of transfusion     History of venous thromboembolism     HL (hearing loss)     Hyperlipidemia     Hypertension     Hypothyroidism     Lipoma     Nephrolithiasis     CHARLIE (obstructive sleep apnea)     USES BIPAP    Ovarian teratoma     PE  (pulmonary thromboembolism) (Multi)     PTSD (post-traumatic stress disorder)     Schizophrenia     Shortness of breath     Type 2 diabetes mellitus     VTE (venous thromboembolism)    [2]   Past Surgical History:  Procedure Laterality Date     SECTION, LOW TRANSVERSE      x 1    EYE SURGERY      LITHOTRIPSY      STAPEDES SURGERY      TONSILLECTOMY      UMBILICAL HERNIA REPAIR      VENTRAL HERNIA REPAIR     [3] (Not in a hospital admission)  [4]   Social History  Tobacco Use    Smoking status: Every Day     Current packs/day: 0.50     Types: Cigarettes     Passive exposure: Current    Smokeless tobacco: Never   Vaping Use    Vaping status: Never Used   Substance Use Topics    Alcohol use: Yes     Comment: RARELY    Drug use: Yes     Types: Marijuana   [5]   Family History  Problem Relation Name Age of Onset    Fibromyalgia Father      Hypertension Brother      Thyroid disease Maternal Grandmother      Thyroid disease Paternal Grandmother      Lung cancer Paternal Grandfather      Coronary artery disease Other Grandmother

## 2025-06-14 NOTE — CONSULTS
Vancomycin Dosing by Pharmacy- INITIAL    Stephenie Mccray is a 45 y.o. year old female who Pharmacy has been consulted for vancomycin dosing for pneumonia. Based on the patient's indication and renal status this patient will be dosed based on a goal AUC of 400-600.     Renal function is currently stable.    Visit Vitals  BP (!) 160/94 (BP Location: Right arm, Patient Position: Lying)   Pulse 94   Temp 36.4 °C (97.5 °F) (Temporal)   Resp 20        Lab Results   Component Value Date    CREATININE 0.85 2025    CREATININE 0.85 2025    CREATININE 0.83 2025    CREATININE 0.76 2025        Patient weight is as follows:   Vitals:    25 0532   Weight: 74.8 kg (165 lb)       Cultures:  No results found for the encounter in last 14 days.        No intake/output data recorded.  I/O during current shift:  No intake/output data recorded.    Temp (24hrs), Av.4 °C (97.5 °F), Min:36.1 °C (97 °F), Max:36.6 °C (97.9 °F)         Assessment/Plan     Patient will not be given a loading dose.  Will initiate vancomycin maintenance, 1,000 mg every 12 hours.    This dosing regimen is predicted by [employer] to result in the following pharmacokinetic parameters:  Loading dose: N/A  Regimen: 1000 mg IV every 12 hours.  Start time: 12:28 on 2025  Exposure target: AUC24 (range) 400-600 mg/L.hr   WAA36-33: 405 mg/L.hr  AUC24,ss: 499 mg/L.hr  Probability of AUC24 > 400: 74 %  Ctrough,ss: 15.5 mg/L  Probability of Ctrough,ss > 20: 28 %    Follow-up level will be ordered on 25 at 1400, unless clinically indicated sooner.  Will continue to monitor renal function daily while on vancomycin and order serum creatinine at least every 48 hours if not already ordered.  Follow for continued vancomycin needs, clinical response, and signs/symptoms of toxicity.     Zee Pascual, PharmD

## 2025-06-14 NOTE — PROGRESS NOTES
Emergency Medicine Transition of Care Note.    I received Stephenie Mccray in signout from Dr. Kitchen.  Please see the previous ED provider note for all HPI, PE and MDM up to the time of signout at 0700. This is in addition to the primary record.    In brief Stephenie Mccray is an 45 y.o. female presenting for   Chief Complaint   Patient presents with    Migraine     Migraine for 3 days      At the time of signout we were awaiting: CT/re-evaluation/admission    Diagnoses as of 06/14/25 1132   Acute on chronic respiratory failure with hypoxia and hypercapnia   Acute nonintractable headache, unspecified headache type   Pneumonia of both lungs due to infectious organism, unspecified part of lung       Medical Decision Making  Patient is a 45-year-old female who presents to the emergency department for complaints of headache that has been ongoing for the past 3 days.  Patient was seen in the emergency department treated with migraine cocktail yesterday.  Patient states that this is worsened.  Patient seen by the previous ED provider and signed out to me pending completion of workup and anticipated admission.  Patient hypoxic on her baseline 2 L nasal cannula when she arrived and this was increased.  Patient did have notable wheezing and was treated for COPD exacerbation ultimately placed on BiPAP.  On my evaluation the patient was on BiPAP with clear lung sounds resting in position of comfort.  I did attempt a trial off of BiPAP but the patient desatted and had some worsening difficulty breathing the patient was placed back on BiPAP ultimately given this I believe that she requires admission to the ICU for further treatment.  Her labs were reviewed.  CT head was negative for acute intracranial abnormalities.  Chest x-ray did have some increase in multifocal airspace opacities worse in the lower lobes concerning for multifocal pneumonia I did place the patient on antibiotics to treat for pneumonia.  CT PE study  showed no acute pulmonary embolism with some worsened bilateral ill-defined pulmonary consolidates again concerning for infection.  ICU service was contacted and the case discussed the patient was accepted for admission.  Patient was advised of her laboratory and imaging findings and current care plan and she is in agreement.        Final diagnoses:   [J96.21, J96.22] Acute on chronic respiratory failure with hypoxia and hypercapnia   [R51.9] Acute nonintractable headache, unspecified headache type   [J18.9] Pneumonia of both lungs due to infectious organism, unspecified part of lung     Labs Reviewed   CBC WITH AUTO DIFFERENTIAL - Abnormal       Result Value    WBC 7.0      nRBC 0.0      RBC 4.03      Hemoglobin 13.1      Hematocrit 42.0       (*)     MCH 32.5      MCHC 31.2 (*)     RDW 14.0      Platelets 99 (*)     Neutrophils % 66.5      Immature Granulocytes %, Automated 2.0 (*)     Lymphocytes % 23.6      Monocytes % 7.1      Eosinophils % 0.4      Basophils % 0.4      Neutrophils Absolute 4.66      Immature Granulocytes Absolute, Automated 0.14      Lymphocytes Absolute 1.66      Monocytes Absolute 0.50      Eosinophils Absolute 0.03      Basophils Absolute 0.03     COMPREHENSIVE METABOLIC PANEL - Abnormal    Glucose 118 (*)     Sodium 142      Potassium 4.0      Chloride 106      Bicarbonate 30      Anion Gap 10      Urea Nitrogen 17      Creatinine 0.85      eGFR 87      Calcium 8.8      Albumin 4.0      Alkaline Phosphatase 74      Total Protein 6.4      AST 11      Bilirubin, Total 0.4      ALT 32     MAGNESIUM - Abnormal    Magnesium 1.54 (*)    BLOOD GAS ARTERIAL FULL PANEL - Abnormal    POCT pH, Arterial 7.29 (*)     POCT pCO2, Arterial 64 (*)     POCT pO2, Arterial 70 (*)     POCT SO2, Arterial 98      POCT Oxy Hemoglobin, Arterial 82.4 (*)     POCT Hematocrit Calculated, Arterial 41.0      POCT Sodium, Arterial 137      POCT Potassium, Arterial 4.2      POCT Chloride, Arterial 104      POCT  Ionized Calcium, Arterial 1.26      POCT Glucose, Arterial 115 (*)     POCT Lactate, Arterial 0.5      POCT Base Excess, Arterial 2.5      POCT HCO3 Calculated, Arterial 30.8 (*)     POCT Hemoglobin, Arterial 13.5      POCT Anion Gap, Arterial 6 (*)     Patient Temperature        FiO2 36      Apparatus CANNULA      Site of Arterial Puncture Radial Right      Andrew's Test Positive      Site of Arterial Puncture RRA      Andrew's Test POS     SERIAL TROPONIN-INITIAL - Abnormal    Troponin I, High Sensitivity 25 (*)     Narrative:     Less than 99th percentile of normal range cutoff-  Female and children under 18 years old <14 ng/L; Male <21 ng/L: Negative  Repeat testing should be performed if clinically indicated.     Female and children under 18 years old 14-50 ng/L; Male 21-50 ng/L:  Consistent with possible cardiac damage and possible increased clinical   risk. Serial measurements may help to assess extent of myocardial damage.     >50 ng/L: Consistent with cardiac damage, increased clinical risk and  myocardial infarction. Serial measurements may help assess extent of   myocardial damage.      NOTE: Children less than 1 year old may have higher baseline troponin   levels and results should be interpreted in conjunction with the overall   clinical context.     NOTE: Troponin I testing is performed using a different   testing methodology at [facility] than at other   [facility]. Direct result comparisons should only   be made within the same method.   SERIAL TROPONIN, 1 HOUR - Abnormal    Troponin I, High Sensitivity 24 (*)     Narrative:     Less than 99th percentile of normal range cutoff-  Female and children under 18 years old <14 ng/L; Male <21 ng/L: Negative  Repeat testing should be performed if clinically indicated.     Female and children under 18 years old 14-50 ng/L; Male 21-50 ng/L:  Consistent with possible cardiac damage and possible increased clinical   risk. Serial measurements  may help to assess extent of myocardial damage.     >50 ng/L: Consistent with cardiac damage, increased clinical risk and  myocardial infarction. Serial measurements may help assess extent of   myocardial damage.      NOTE: Children less than 1 year old may have higher baseline troponin   levels and results should be interpreted in conjunction with the overall   clinical context.     NOTE: Troponin I testing is performed using a different   testing methodology at [facility] than at other   [facility]. Direct result comparisons should only   be made within the same method.   BLOOD GAS ARTERIAL - Abnormal    POCT pH, Arterial 7.31 (*)     POCT pCO2, Arterial 57 (*)     POCT pO2, Arterial 53 (*)     POCT SO2, Arterial 91 (*)     POCT Oxy Hemoglobin, Arterial 82.1 (*)     POCT Base Excess, Arterial 1.2      POCT HCO3 Calculated, Arterial 28.7 (*)     Patient Temperature        FiO2 50      Ventilator Mode BiPAP      Ventilator Rate 20      Spontaneous Tidal Volume 353      Frequency (BPM) 20      Inspiratory Time 0.9      Ipap CMH2O 16.0      Epap CMH2O 8.0      Site of Arterial Puncture Radial Right      Andrew's Test Positive      Site of Arterial Puncture RRA      Andrew's Test POS     DRUG SCREEN,URINE - Abnormal    Amphetamine Screen, Urine Presumptive Negative      Barbiturate Screen, Urine Presumptive Positive (*)     Benzodiazepines Screen, Urine Presumptive Negative      Cannabinoid Screen, Urine Presumptive Positive (*)     Cocaine Metabolite Screen, Urine Presumptive Negative      Fentanyl Screen, Urine Presumptive Negative      Opiate Screen, Urine Presumptive Negative      Oxycodone Screen, Urine Presumptive Negative      PCP Screen, Urine Presumptive Negative      Methadone Screen, Urine Presumptive Negative      Narrative:     Drug screen results are presumptive and should not be used to assess   compliance with prescribed medication. Contact the performing [facility] laboratory   to add-on  definitive confirmatory testing if clinically indicated.    Toxicology screening results are reported qualitatively. The concentration must   be greater than or equal to the cutoff to be reported as positive. The concentration   at which the screening test can detect an individual drug or metabolite varies.   The absence of expected drug(s) and/or drug metabolite(s) may indicate non-compliance,   inappropriate timing of specimen collection relative to drug administration, poor drug   absorption, diluted/adulterated urine, or limitations of testing. For medical purposes   only; not valid for forensic use.    Interpretive questions should be directed to the laboratory medical directors.   B-TYPE NATRIURETIC PEPTIDE - Normal    BNP 38      Narrative:        <100 pg/mL - Heart failure unlikely  100-299 pg/mL - Intermediate probability of acute heart                  failure exacerbation. Correlate with clinical                  context and patient history.    >=300 pg/mL - Heart Failure likely. Correlate with clinical                  context and patient history.    BNP testing is performed using different testing methodology at [facility] than at other [facility]. Direct result comparisons should only be made within the same method.      HUMAN CHORIONIC GONADOTROPIN, SERUM QUANTITATIVE - Normal    HCG, Beta-Quantitative 2      Narrative:      Total HCG measurement is performed using the [employer] Access   Immunoassay which detects intact HCG and free beta HCG subunit.    This test is not indicated for use as a tumor marker.   HCG testing is performed using a different test methodology at [facility] than other [facility]. Direct result comparison   should only be made within the same method.       URINALYSIS WITH REFLEX CULTURE AND MICROSCOPIC - Normal    Color, Urine Light-Yellow      Appearance, Urine Clear      Specific Gravity, Urine 1.030      pH, Urine 5.5       Protein, Urine NEGATIVE      Glucose, Urine Normal      Blood, Urine NEGATIVE      Ketones, Urine NEGATIVE      Bilirubin, Urine NEGATIVE      Urobilinogen, Urine Normal      Nitrite, Urine NEGATIVE      Leukocyte Esterase, Urine NEGATIVE     BLOOD CULTURE   BLOOD CULTURE   LEGIONELLA ANTIGEN, URINE   STREPTOCOCCUS PNEUMONIAE ANTIGEN, URINE   RESPIRATORY CULTURE/SMEAR   MRSA SURVEILLANCE FOR VANCOMYCIN DE-ESCALATION, PCR   TROPONIN SERIES- (INITIAL, 1 HR)    Narrative:     The following orders were created for panel order Troponin I Series, High Sensitivity (0, 1 HR).  Procedure                               Abnormality         Status                     ---------                               -----------         ------                     Troponin I, High Sensiti...[[id number]]  Abnormal            Final result               Troponin, High Sensitivi...[[id number]]  Abnormal            Final result                 Please view results for these tests on the individual orders.   URINALYSIS WITH REFLEX CULTURE AND MICROSCOPIC    Narrative:     The following orders were created for panel order Urinalysis with Reflex Culture and Microscopic.  Procedure                               Abnormality         Status                     ---------                               -----------         ------                     Urinalysis with Reflex C...[[id number]]  Normal              Final result               Extra Urine Gray Tube[[id number]]                            In process                   Please view results for these tests on the individual orders.   EXTRA URINE GRAY TUBE   SARS-COV-2 PCR   INFLUENZA A AND B PCR   RSV PCR   AMMONIA   PROCALCITONIN   LACTATE   TSH WITH REFLEX TO FREE T4 IF ABNORMAL   POCT GLUCOSE METER   POCT GLUCOSE METER   POCT GLUCOSE METER     CT angio chest for pulmonary embolism   Final Result   1. No acute central or segmental pulmonary embolism. The subsegmental   and peripheral branches are not  properly visualized given poor   opacification.   2. Slightly worsened bilateral ill-defined pulmonary consolidations   and atelectasis could be related to ongoing infection. Advise   continued follow-up to ensure resolution.   3. Hepatic segment 6 lesion and nonspecific trace peripancreatic free   fluid at the tail is unchanged from prior.        MACRO:   None        Signed by: Hayes Jacobs 6/14/2025 8:35 AM   Dictation workstation:   [id number]      CT head wo IV contrast   Final Result   1. No major intracranial hemorrhage or serious brain herniation.        MACRO:   None        Signed by: Hayes Jacobs 6/14/2025 9:10 AM   Dictation workstation:   [id number]      XR chest 1 view   Final Result   Interval increase in multifocal airspace opacities, worse in the   lower lobes, suspicious for multifocal pneumonia. Follow-up chest   radiograph or CT is recommended in 6-8 weeks to ensure resolution.        Signed by: Elva Anderson 6/14/2025 8:23 AM   Dictation workstation:   [id number]              Procedure  Procedures    Jake Marin DO

## 2025-06-15 VITALS
SYSTOLIC BLOOD PRESSURE: 126 MMHG | RESPIRATION RATE: 16 BRPM | BODY MASS INDEX: 30.36 KG/M2 | DIASTOLIC BLOOD PRESSURE: 68 MMHG | HEIGHT: 62 IN | OXYGEN SATURATION: 96 % | HEART RATE: 64 BPM | TEMPERATURE: 95.7 F | WEIGHT: 165 LBS

## 2025-06-15 LAB
ALBUMIN SERPL BCP-MCNC: 4 G/DL (ref 3.4–5)
ALP SERPL-CCNC: 74 U/L (ref 33–110)
ALT SERPL W P-5'-P-CCNC: 24 U/L (ref 7–45)
ANION GAP SERPL CALC-SCNC: 10 MMOL/L (ref 10–20)
AST SERPL W P-5'-P-CCNC: 9 U/L (ref 9–39)
BACTERIA BLD CULT: NORMAL
BACTERIA BLD CULT: NORMAL
BASOPHILS # BLD AUTO: 0.03 X10*3/UL (ref 0–0.1)
BASOPHILS NFR BLD AUTO: 0.3 %
BILIRUB SERPL-MCNC: 0.2 MG/DL (ref 0–1.2)
BUN SERPL-MCNC: 17 MG/DL (ref 6–23)
CALCIUM SERPL-MCNC: 9 MG/DL (ref 8.6–10.3)
CHLORIDE SERPL-SCNC: 103 MMOL/L (ref 98–107)
CO2 SERPL-SCNC: 30 MMOL/L (ref 21–32)
CREAT SERPL-MCNC: 0.72 MG/DL (ref 0.5–1.05)
EGFRCR SERPLBLD CKD-EPI 2021: >90 ML/MIN/1.73M*2
EOSINOPHIL # BLD AUTO: 0 X10*3/UL (ref 0–0.7)
EOSINOPHIL NFR BLD AUTO: 0 %
ERYTHROCYTE [DISTWIDTH] IN BLOOD BY AUTOMATED COUNT: 14.2 % (ref 11.5–14.5)
GIANT PLATELETS BLD QL SMEAR: NORMAL
GLUCOSE BLD MANUAL STRIP-MCNC: 161 MG/DL (ref 74–99)
GLUCOSE BLD MANUAL STRIP-MCNC: 180 MG/DL (ref 74–99)
GLUCOSE BLD MANUAL STRIP-MCNC: 195 MG/DL (ref 74–99)
GLUCOSE BLD MANUAL STRIP-MCNC: 201 MG/DL (ref 74–99)
GLUCOSE BLD MANUAL STRIP-MCNC: 239 MG/DL (ref 74–99)
GLUCOSE BLD MANUAL STRIP-MCNC: 273 MG/DL (ref 74–99)
GLUCOSE SERPL-MCNC: 201 MG/DL (ref 74–99)
HCT VFR BLD AUTO: 43.4 % (ref 36–46)
HGB BLD-MCNC: 13.8 G/DL (ref 12–16)
IMM GRANULOCYTES # BLD AUTO: 0.19 X10*3/UL (ref 0–0.7)
IMM GRANULOCYTES NFR BLD AUTO: 2.2 % (ref 0–0.9)
LEGIONELLA AG UR QL: NEGATIVE
LYMPHOCYTES # BLD AUTO: 1.36 X10*3/UL (ref 1.2–4.8)
LYMPHOCYTES NFR BLD AUTO: 15.7 %
MAGNESIUM SERPL-MCNC: 1.88 MG/DL (ref 1.6–2.4)
MCH RBC QN AUTO: 32.5 PG (ref 26–34)
MCHC RBC AUTO-ENTMCNC: 31.8 G/DL (ref 32–36)
MCV RBC AUTO: 102 FL (ref 80–100)
MONOCYTES # BLD AUTO: 0.5 X10*3/UL (ref 0.1–1)
MONOCYTES NFR BLD AUTO: 5.8 %
NEUTROPHILS # BLD AUTO: 6.6 X10*3/UL (ref 1.2–7.7)
NEUTROPHILS NFR BLD AUTO: 76 %
NRBC BLD-RTO: 0 /100 WBCS (ref 0–0)
OVALOCYTES BLD QL SMEAR: NORMAL
PHOSPHATE SERPL-MCNC: 2.9 MG/DL (ref 2.5–4.9)
PLATELET # BLD AUTO: 66 X10*3/UL (ref 150–450)
PLATELET CLUMP BLD QL SMEAR: PRESENT
POLYCHROMASIA BLD QL SMEAR: NORMAL
POTASSIUM SERPL-SCNC: 4.4 MMOL/L (ref 3.5–5.3)
PROCALCITONIN SERPL-MCNC: 0.02 NG/ML
PROT SERPL-MCNC: 6.6 G/DL (ref 6.4–8.2)
RBC # BLD AUTO: 4.25 X10*6/UL (ref 4–5.2)
RBC MORPH BLD: NORMAL
S PNEUM AG UR QL: NEGATIVE
SODIUM SERPL-SCNC: 139 MMOL/L (ref 136–145)
WBC # BLD AUTO: 8.7 X10*3/UL (ref 4.4–11.3)

## 2025-06-15 PROCEDURE — S4991 NICOTINE PATCH NONLEGEND: HCPCS

## 2025-06-15 PROCEDURE — 94660 CPAP INITIATION&MGMT: CPT

## 2025-06-15 PROCEDURE — 82947 ASSAY GLUCOSE BLOOD QUANT: CPT

## 2025-06-15 PROCEDURE — 2500000004 HC RX 250 GENERAL PHARMACY W/ HCPCS (ALT 636 FOR OP/ED)

## 2025-06-15 PROCEDURE — 94640 AIRWAY INHALATION TREATMENT: CPT

## 2025-06-15 PROCEDURE — 2500000001 HC RX 250 WO HCPCS SELF ADMINISTERED DRUGS (ALT 637 FOR MEDICARE OP): Performed by: INTERNAL MEDICINE

## 2025-06-15 PROCEDURE — 99222 1ST HOSP IP/OBS MODERATE 55: CPT

## 2025-06-15 PROCEDURE — 2500000001 HC RX 250 WO HCPCS SELF ADMINISTERED DRUGS (ALT 637 FOR MEDICARE OP)

## 2025-06-15 PROCEDURE — 80053 COMPREHEN METABOLIC PANEL: CPT

## 2025-06-15 PROCEDURE — 2500000005 HC RX 250 GENERAL PHARMACY W/O HCPCS

## 2025-06-15 PROCEDURE — 83735 ASSAY OF MAGNESIUM: CPT

## 2025-06-15 PROCEDURE — 2500000005 HC RX 250 GENERAL PHARMACY W/O HCPCS: Performed by: STUDENT IN AN ORGANIZED HEALTH CARE EDUCATION/TRAINING PROGRAM

## 2025-06-15 PROCEDURE — 85025 COMPLETE CBC W/AUTO DIFF WBC: CPT

## 2025-06-15 PROCEDURE — 84100 ASSAY OF PHOSPHORUS: CPT

## 2025-06-15 PROCEDURE — 2060000001 HC INTERMEDIATE ICU ROOM DAILY

## 2025-06-15 PROCEDURE — 36415 COLL VENOUS BLD VENIPUNCTURE: CPT

## 2025-06-15 PROCEDURE — 2500000002 HC RX 250 W HCPCS SELF ADMINISTERED DRUGS (ALT 637 FOR MEDICARE OP, ALT 636 FOR OP/ED)

## 2025-06-15 RX ORDER — HYDROXYZINE HYDROCHLORIDE 25 MG/1
25 TABLET, FILM COATED ORAL ONCE
Status: COMPLETED | OUTPATIENT
Start: 2025-06-15 | End: 2025-06-15

## 2025-06-15 RX ORDER — AZITHROMYCIN 250 MG/1
500 TABLET, FILM COATED ORAL EVERY 24 HOURS
Status: DISCONTINUED | OUTPATIENT
Start: 2025-06-16 | End: 2025-06-17 | Stop reason: HOSPADM

## 2025-06-15 RX ORDER — HYDROXYZINE HYDROCHLORIDE 25 MG/1
25 TABLET, FILM COATED ORAL EVERY 6 HOURS PRN
Status: DISCONTINUED | OUTPATIENT
Start: 2025-06-15 | End: 2025-06-15

## 2025-06-15 RX ORDER — CLONAZEPAM 0.5 MG/1
0.5 TABLET ORAL
Status: DISCONTINUED | OUTPATIENT
Start: 2025-06-15 | End: 2025-06-17 | Stop reason: HOSPADM

## 2025-06-15 RX ORDER — ENOXAPARIN SODIUM 100 MG/ML
40 INJECTION SUBCUTANEOUS DAILY
Status: DISCONTINUED | OUTPATIENT
Start: 2025-06-15 | End: 2025-06-17 | Stop reason: HOSPADM

## 2025-06-15 RX ORDER — KETOROLAC TROMETHAMINE 30 MG/ML
30 INJECTION, SOLUTION INTRAMUSCULAR; INTRAVENOUS EVERY 6 HOURS PRN
Status: DISCONTINUED | OUTPATIENT
Start: 2025-06-15 | End: 2025-06-17 | Stop reason: HOSPADM

## 2025-06-15 RX ORDER — PREDNISONE 20 MG/1
40 TABLET ORAL DAILY
Status: DISCONTINUED | OUTPATIENT
Start: 2025-06-15 | End: 2025-06-17 | Stop reason: HOSPADM

## 2025-06-15 RX ADMIN — Medication 5 L/MIN: at 07:19

## 2025-06-15 RX ADMIN — AZITHROMYCIN MONOHYDRATE 500 MG: 500 INJECTION, POWDER, LYOPHILIZED, FOR SOLUTION INTRAVENOUS at 10:15

## 2025-06-15 RX ADMIN — CLONIDINE HYDROCHLORIDE 0.1 MG: 0.1 TABLET ORAL at 08:02

## 2025-06-15 RX ADMIN — SITAGLIPTIN 50 MG: 25 TABLET, FILM COATED ORAL at 06:24

## 2025-06-15 RX ADMIN — BUSPIRONE HYDROCHLORIDE 30 MG: 5 TABLET ORAL at 08:02

## 2025-06-15 RX ADMIN — METOPROLOL TARTRATE 25 MG: 25 TABLET, FILM COATED ORAL at 20:03

## 2025-06-15 RX ADMIN — Medication 4 L/MIN: at 13:14

## 2025-06-15 RX ADMIN — CEFEPIME HYDROCHLORIDE 1 G: 1 INJECTION, SOLUTION INTRAVENOUS at 13:29

## 2025-06-15 RX ADMIN — CLONAZEPAM 0.25 MG: 0.5 TABLET ORAL at 17:36

## 2025-06-15 RX ADMIN — KETOROLAC TROMETHAMINE 30 MG: 30 INJECTION, SOLUTION INTRAMUSCULAR at 10:53

## 2025-06-15 RX ADMIN — LEVOTHYROXINE SODIUM 175 MCG: 0.05 TABLET ORAL at 08:02

## 2025-06-15 RX ADMIN — FAMOTIDINE 20 MG: 20 TABLET, FILM COATED ORAL at 20:03

## 2025-06-15 RX ADMIN — TIOTROPIUM BROMIDE INHALATION SPRAY 2 PUFF: 3.12 SPRAY, METERED RESPIRATORY (INHALATION) at 08:04

## 2025-06-15 RX ADMIN — METHYLPREDNISOLONE SODIUM SUCCINATE 40 MG: 40 INJECTION, POWDER, FOR SOLUTION INTRAMUSCULAR; INTRAVENOUS at 03:11

## 2025-06-15 RX ADMIN — INSULIN LISPRO 1 UNITS: 100 INJECTION, SOLUTION INTRAVENOUS; SUBCUTANEOUS at 04:48

## 2025-06-15 RX ADMIN — IPRATROPIUM BROMIDE AND ALBUTEROL SULFATE 3 ML: 2.5; .5 SOLUTION RESPIRATORY (INHALATION) at 13:10

## 2025-06-15 RX ADMIN — CEFEPIME HYDROCHLORIDE 1 G: 1 INJECTION, SOLUTION INTRAVENOUS at 01:00

## 2025-06-15 RX ADMIN — IPRATROPIUM BROMIDE AND ALBUTEROL SULFATE 3 ML: 2.5; .5 SOLUTION RESPIRATORY (INHALATION) at 00:05

## 2025-06-15 RX ADMIN — VANCOMYCIN HYDROCHLORIDE 1000 MG: 1 INJECTION, SOLUTION INTRAVENOUS at 02:25

## 2025-06-15 RX ADMIN — Medication 4 L/MIN: at 20:05

## 2025-06-15 RX ADMIN — METOPROLOL TARTRATE 25 MG: 25 TABLET, FILM COATED ORAL at 08:02

## 2025-06-15 RX ADMIN — ENOXAPARIN SODIUM 40 MG: 40 INJECTION SUBCUTANEOUS at 10:15

## 2025-06-15 RX ADMIN — INSULIN HUMAN 3 UNITS: 100 INJECTION, SOLUTION PARENTERAL at 17:08

## 2025-06-15 RX ADMIN — Medication 4 L/MIN: at 20:06

## 2025-06-15 RX ADMIN — TRAZODONE HYDROCHLORIDE 50 MG: 50 TABLET ORAL at 20:03

## 2025-06-15 RX ADMIN — HYDROXYZINE HYDROCHLORIDE 25 MG: 25 TABLET ORAL at 19:08

## 2025-06-15 RX ADMIN — FAMOTIDINE 20 MG: 20 TABLET, FILM COATED ORAL at 08:03

## 2025-06-15 RX ADMIN — CLONIDINE HYDROCHLORIDE 0.1 MG: 0.1 TABLET ORAL at 20:03

## 2025-06-15 RX ADMIN — BUSPIRONE HYDROCHLORIDE 30 MG: 5 TABLET ORAL at 20:03

## 2025-06-15 RX ADMIN — PREDNISONE 40 MG: 20 TABLET ORAL at 10:15

## 2025-06-15 RX ADMIN — Medication 5 L/MIN: at 08:18

## 2025-06-15 RX ADMIN — RISPERIDONE 2 MG: 0.25 TABLET ORAL at 17:39

## 2025-06-15 RX ADMIN — RISPERIDONE 2 MG: 0.25 TABLET ORAL at 06:23

## 2025-06-15 RX ADMIN — BUTALBITAL, ACETAMINOPHEN, AND CAFFEINE 1 TABLET: 50; 325; 40 TABLET ORAL at 08:27

## 2025-06-15 RX ADMIN — PRAZOSIN HYDROCHLORIDE 1 MG: 1 CAPSULE ORAL at 20:03

## 2025-06-15 RX ADMIN — INSULIN HUMAN 9 UNITS: 100 INJECTION, SOLUTION PARENTERAL at 11:35

## 2025-06-15 RX ADMIN — IPRATROPIUM BROMIDE AND ALBUTEROL SULFATE 3 ML: 2.5; .5 SOLUTION RESPIRATORY (INHALATION) at 19:13

## 2025-06-15 RX ADMIN — ATORVASTATIN CALCIUM 20 MG: 20 TABLET ORAL at 08:03

## 2025-06-15 RX ADMIN — KETOROLAC TROMETHAMINE 30 MG: 30 INJECTION, SOLUTION INTRAMUSCULAR at 20:07

## 2025-06-15 RX ADMIN — INSULIN LISPRO 1 UNITS: 100 INJECTION, SOLUTION INTRAVENOUS; SUBCUTANEOUS at 01:00

## 2025-06-15 RX ADMIN — ESCITALOPRAM OXALATE 20 MG: 10 TABLET, FILM COATED ORAL at 08:03

## 2025-06-15 RX ADMIN — IPRATROPIUM BROMIDE AND ALBUTEROL SULFATE 3 ML: 2.5; .5 SOLUTION RESPIRATORY (INHALATION) at 07:18

## 2025-06-15 RX ADMIN — CLONAZEPAM 0.25 MG: 0.5 TABLET ORAL at 06:23

## 2025-06-15 RX ADMIN — NICOTINE 1 PATCH: 21 PATCH, EXTENDED RELEASE TRANSDERMAL at 08:05

## 2025-06-15 RX ADMIN — Medication 4 L/MIN: at 19:13

## 2025-06-15 RX ADMIN — INSULIN HUMAN 3 UNITS: 100 INJECTION, SOLUTION PARENTERAL at 08:27

## 2025-06-15 ASSESSMENT — PAIN DESCRIPTION - LOCATION: LOCATION: HEAD

## 2025-06-15 ASSESSMENT — PAIN - FUNCTIONAL ASSESSMENT
PAIN_FUNCTIONAL_ASSESSMENT: 0-10

## 2025-06-15 ASSESSMENT — PAIN SCALES - GENERAL
PAINLEVEL_OUTOF10: 4
PAINLEVEL_OUTOF10: 4
PAINLEVEL_OUTOF10: 0 - NO PAIN
PAINLEVEL_OUTOF10: 6

## 2025-06-15 ASSESSMENT — COGNITIVE AND FUNCTIONAL STATUS - GENERAL
MOBILITY SCORE: 24
MOBILITY SCORE: 24
DAILY ACTIVITIY SCORE: 24
DAILY ACTIVITIY SCORE: 24

## 2025-06-15 ASSESSMENT — PAIN DESCRIPTION - DESCRIPTORS
DESCRIPTORS: ACHING

## 2025-06-15 NOTE — PROGRESS NOTES
Vancomycin Dosing by Pharmacy- FOLLOW UP    Stephenie Mccray is a 45 y.o. year old female who Pharmacy has been consulted for vancomycin dosing for pneumonia. Based on the patient's indication and renal status this patient is being dosed based on a goal AUC of 400-600.     Renal function is currently stable, no AILEEN per [employer].    Current vancomycin dose: 1000 mg given every 12 hours, next dose due at 2am    Estimated vancomycin AUC on current dose: 483 mg/L.hr     Visit Vitals  /66 (BP Location: Right arm, Patient Position: Lying)   Pulse 56   Temp 36.5 °C (97.7 °F) (Temporal)   Resp 16        Lab Results   Component Value Date    CREATININE 0.85 2025    CREATININE 0.85 2025    CREATININE 0.83 2025    CREATININE 0.76 2025        Patient weight is as follows:   Vitals:    25 0532   Weight: 74.8 kg (165 lb)       Cultures:  No results found for the encounter in last 14 days.       I/O last 3 completed shifts:  In: 300 (4 mL/kg) [I.V.:50 (0.7 mL/kg); IV Piggyback:250]  Out: 450 (6 mL/kg) [Urine:450 (0.2 mL/kg/hr)]  Weight: 74.8 kg   I/O during current shift:  I/O this shift:  In: -   Out: 100 [Urine:100]    Temp (24hrs), Av.4 °C (97.6 °F), Min:36.1 °C (97 °F), Max:36.6 °C (97.9 °F)      Assessment/Plan    Within goal random/trough level, continue 1 gram Q12h    This dosing regimen is predicted by [employer] to result in the following pharmacokinetic parameters:  Regimen: 1000 mg IV every 12 hours.  Start time: 02:00 on 06/15/2025  Exposure target: AUC24 (range) 400-600 mg/L.hr   MAA85-37: 483 mg/L.hr  AUC24,ss: 528 mg/L.hr  Probability of AUC24 > 400: 91 %  Ctrough,ss: 14.8 mg/L  Probability of Ctrough,ss > 20: 16 %      The next level will be obtained on 6/16/25 at 5am. May be obtained sooner if clinically indicated.   Will continue to monitor renal function daily while on vancomycin and order serum creatinine at least every 48 hours if not already ordered.  Follow for  continued vancomycin needs, clinical response, and signs/symptoms of toxicity.       Brody Moreno, RPh

## 2025-06-15 NOTE — H&P
[facility] Critical Care Medicine       Date:  6/15/2025  Patient:  Stephenie Mccray  YOB: 1980  MRN:  [id number]   Admit Date:  6/14/2025  ========================================================================================================    Chief Complaint   Patient presents with    Migraine     Migraine for 3 days          History of Present Illness:  Stephenie Mccray is a 45 y.o. year old female patient with Past Medical History of COPD home O2 4L NC as needed, has bipap for sleep but does not wear it, schizophrenia, HTN, HLD, hypothyroid, DM2, pulmonary embolism on xarelto, current smoker, presented to the emergency department yesterday for a migraine was treated and discharged and returned to the ED a few hours later with respiratory distress and audible wheezing. She was given steroids and duonebs and started on BiPap. CTA of the chest concerning for bilateral consolidations and atelectasis. She received azithromycin and ceftriaxone. Laboratory results significant for magnesium of 1.54, no leukocytosis or fever.     After wearing the BiPap for a few hours she was trailed on nasal cannula and subsequently desaturated into the low 80s. She was placed back on BiPap, ABG was obtained with Ph 7.29/ Co2 64/ O2 70 on 60%V Fio2. Decision made to admit patient to the ICU     Interval ICU Events:  6/14: Admitted to the ICU on BiPap     6/15: Wore NIV overnight.  Still somewhat drowsy today.  Woke up yesterday evening and was agitated and asking for her klonopin.  After taking 0.5mg she was again drowsy.  Dose was halved.  Received a half dose about 30 min ago so will assess effects.  BG over 200 this morning but on systemic steroids.  Otherwise not requiring pressors and was on 40% FIO2 so could likely tolerate NC.     Medical History:  Medical History[1]  Surgical History[2]  Prescriptions Prior to Admission[3]  Fluticasone furoate-vilanterol, Fluticasone-umeclidin-vilanter, Levofloxacin,  Sumatriptan, and Vortioxetine  Social History[4]  Family History[5]    Review of Systems:  14 point review of systems was completed and negative except for those specially mention in my HPI    Physical Exam:    Heart Rate:  [48-98]   Temp:  [36.4 °C (97.5 °F)-36.6 °C (97.9 °F)]   Resp:  [14-25]   BP: (111-173)/()   SpO2:  [87 %-96 %]     Physical Exam  Vitals reviewed.   Constitutional:       Appearance: She is overweight.      Comments: Not well-groomed    HENT:      Head: Normocephalic and atraumatic.      Right Ear: External ear normal.      Left Ear: External ear normal.      Nose: Nose normal.   Eyes:      General: Lids are normal.   Cardiovascular:      Rate and Rhythm: Normal rate and regular rhythm.      Pulses: Normal pulses.      Heart sounds: Normal heart sounds.   Pulmonary:      Effort: Pulmonary effort is normal.      Comments: Diminished breath sounds bilaterally   Abdominal:      General: Bowel sounds are normal.      Palpations: Abdomen is soft.      Tenderness: There is no abdominal tenderness.   Musculoskeletal:      Cervical back: Full passive range of motion without pain.      Right lower leg: No edema.      Left lower leg: No edema.   Skin:     General: Skin is warm and dry.      Capillary Refill: Capillary refill takes less than 2 seconds.   Neurological:      Mental Status: She is alert and oriented to person, place, and time.   Psychiatric:         Behavior: Behavior is cooperative.         Objective:    I have reviewed all medications, laboratory results, and imaging pertinent for today's encounter    Assessment/Plan:    I am currently managing this critically ill patient for the following problems:    Neuro/Psych/Pain Ctrl/Sedation:  #Encephalopathy-unclear source  #Nicotine dependence   #Hx Schizophrenia  -Continue home buspar & lexapro  -Klonopin reduced to 0.25 q 12 hour due to lethargy  -Tylenol PRN pain   -Fioricet/toradol PRN headache/Migraine  -Ammonia level normal  -UDS  positive for barbiturate and THC    Respiratory/ENT:  #Acute on chronic hypoxic hypercapnic respiratory failure  #Hx COPD  #Hx pulmonary embolism on AC  #CHARLIE  -Continue anticoagulation  -No formal PFT on file, will need to follow up for formal PFT  -Prednisone 40 x 5 days total  -Duonebs  -CPAP overnight at 40%, NC during the day   -Non-compliant with CPAP at home  -Patient states she has a sleep study and has bipap but does not wear it as there is too much pressure and is following up with Dr. Villalba   -Pulmonary hygiene   -Continue Spiriva  -Encourage smoking cessation   -Definite concern for benzo use with chronic resp suppression and subsequent CO2 retention and aspiration although tox screen only positive for TCH and barbituates    Cardiovascular:  #Hypertension  #Hx HLD  -Continuous cardiac monitoring  -Continue home metoprolol, minipress, and catapres   -Continue lipitor  -EKG PRN    GI:  #Hx GERD  -heart healthy/dm diet  -Continue home PPI     Renal/Volume Status (Intra & Extravascular):  -No acute issues  -Monitor I&O  -Daily BMP    Endocrine  #T2DM  #Hypothyroidism  -Continue home snythroid  -SSI, increased to scale 3    Infectious Disease:  #Pneumonia-community vs healthcare acquired (admitted 5/17)  -Send Covid/Flu/RSV  -Continue Cefepime and zithromax x 5 days  -MRSA PCR negative, stop vancomycin  -Urine antigens negative  -lactate negative  -Procal pending  -UA negative    Heme/Onc:  #Acute on chronic Thrombocytopenia  #HX of DVT/PE on chronic AC  -Daily CBC  -Monitor platelets, down to 66 in the setting of infection and DOAC  -Xarelto on hold for upcoming hernia repair, on lovenox 40mg nightly for DVT prophylaxis    OBGYN/MSK:  -No acute issues  -Ambulatory at home    Ethics/Code Status:  Full Code     :  DVT Prophylaxis: Lovenox  GI Prophylaxis: home PPI  Bowel Regimen: PRN  Diet: heart healthy and diabetic  CVC: none  [place]: none  Tran: none  Restraints: none  Dispo: Ok to transfer to  the floor    Critical Care Time:  50 minutes spent in preparing to see patient (I.e. review of medical records), evaluation of diagnostics (I.e. labs, imaging, etc.), documentation, discussing plan of care with patient/ family/ caregiver, and/ or coordination of care with multidisciplinary team. Time does not include completion of procedure time.     Shira Rodas, APRN-CNP  Pulmonology and Critical Care Medicine  [facility]         [1]   Past Medical History:  Diagnosis Date    Acute on chronic respiratory failure     Anxiety     Arthritis     Chronic headaches     Chronic respiratory failure     WITH HYPOXIA    COPD (chronic obstructive pulmonary disease) (Multi)     Cough 2025    COVID-19     VACCINATED    Depression     Diabetes mellitus (Multi)     type 2 IDDM    GERD (gastroesophageal reflux disease)     History of transfusion     History of venous thromboembolism     HL (hearing loss)     Hyperlipidemia     Hypertension     Hypothyroidism     Lipoma     Nephrolithiasis     CHARLIE (obstructive sleep apnea)     USES BIPAP    Ovarian teratoma     PE (pulmonary thromboembolism) (Multi)     PTSD (post-traumatic stress disorder)     Schizophrenia     Shortness of breath     Type 2 diabetes mellitus     VTE (venous thromboembolism)    [2]   Past Surgical History:  Procedure Laterality Date     SECTION, LOW TRANSVERSE      x 1    EYE SURGERY      LITHOTRIPSY      STAPEDES SURGERY      TONSILLECTOMY      UMBILICAL HERNIA REPAIR      VENTRAL HERNIA REPAIR     [3]   Medications Prior to Admission   Medication Sig Dispense Refill Last Dose/Taking    albuterol 90 mcg/actuation inhaler Inhale 2 puffs every 4 hours if needed for shortness of breath.       albuterol 90 mcg/actuation inhaler Inhale 1-2 puffs every 6 hours if needed for wheezing. (Patient taking differently: Inhale 2 puffs every 6 hours if needed for wheezing.) 18 g 0     alcohol swabs (Alcohol Prep Pads) pads, medicated Use 3  "times daily prn 100 each 3     aspirin-acetaminophen-caffeine (Excedrin Migraine) 250-250-65 mg tablet Take 1 tablet by mouth every 6 hours if needed for headaches.       atorvastatin (Lipitor) 20 mg tablet Take 1 tablet (20 mg) by mouth once daily. 30 tablet 2     BD Ultra-Fine Mini Pen Needle 31 gauge x 3/16\" needle USE TO INJECT 4 TIMES DAILY AS DIRECTED 400 each 3     busPIRone (Buspar) 30 mg tablet Take 1 tablet (30 mg) by mouth 2 times a day.       butalbital-acetaminophen-caff -40 mg tablet Take 1 tablet by mouth every 6 hours if needed for headaches or migraine for up to 5 days. 12 tablet 0     clonazePAM (KlonoPIN) 0.5 mg tablet Take 1 tablet (0.5 mg) by mouth every 12 hours.       cloNIDine (Catapres) 0.1 mg tablet Take 1 tablet (0.1 mg) by mouth 2 times a day.       escitalopram (Lexapro) 20 mg tablet Take 1 tablet (20 mg) by mouth once daily in the morning.       famotidine (Pepcid) 20 mg tablet Take 1 tablet (20 mg) by mouth 2 times a day for 10 days. (Patient not taking: Reported on 6/5/2025) 20 tablet 0     hydrOXYzine HCL (Atarax) 25 mg tablet Take 1 tablet (25 mg) by mouth every 6 hours if needed for itching. (Patient not taking: Reported on 6/5/2025) 20 tablet 0     ipratropium-albuteroL (Duo-Neb) 0.5-2.5 mg/3 mL nebulizer solution Take 3 mL by nebulization every 6 hours if needed for shortness of breath or wheezing. 180 mL 1     Januvia 50 mg tablet Take 1 tablet (50 mg) by mouth early in the morning..       levothyroxine (Synthroid, Levoxyl) 175 mcg tablet Take 1 tablet (175 mcg) by mouth once daily. 90 tablet 3     metFORMIN  mg 24 hr tablet Take 1 tablet (500 mg) by mouth once daily in the evening. Take with meals. Do not crush, chew, or split. (Patient not taking: Reported on 5/19/2025) 30 tablet 3     metoprolol tartrate (Lopressor) 25 mg tablet Take 1 tablet (25 mg) by mouth 2 times a day. 60 tablet 5     nebulizer accessories (Adult Aerosol Mask) misc Use mask as directed with " nebulizer 1 each 1     [START ON 6/28/2025] nicotine (Nicoderm CQ) 14 mg/24 hr patch Place 1 patch over 24 hours on the skin once daily for 14 doses. (Patient not taking: Reported on 6/5/2025 Do not fill before June 28, 2025.) 14 patch 0     nicotine (Nicoderm CQ) 21 mg/24 hr patch Place 1 patch over 24 hours on the skin once daily for 39 doses. (Patient not taking: Reported on 6/5/2025) 30 patch 1     [START ON 7/12/2025] nicotine (Nicoderm CQ) 7 mg/24 hr patch Place 1 patch over 24 hours on the skin once daily for 14 doses. (Patient not taking: Reported on 6/5/2025 Do not fill before July 12, 2025.) 14 patch 0     ondansetron ODT (Zofran-ODT) 4 mg disintegrating tablet Dissolve 1 tablet (4 mg) in the mouth every 8 hours if needed for nausea or vomiting.       paliperidone (Invega) 3 mg 24 hr tablet Take 1 tablet (3 mg) by mouth once daily. Do not crush, chew, or split. Do not start before January 15, 2024. (Patient not taking: Reported on 5/19/2025) 30 tablet 0     prazosin (Minipress) 2 mg capsule Take 2 capsules (4 mg) by mouth once daily at bedtime. (Patient taking differently: Take 1 capsule (2 mg) by mouth once daily at bedtime. PATIENT STATED TAKING 1 MG)       risperiDONE (RisperDAL) 2 mg tablet Take 1 tablet (2 mg) by mouth every 12 hours.       tiotropium (Spiriva Respimat) 2.5 mcg/actuation inhaler Inhale 2 puffs once daily. Do not start before March 9, 2024. 8 g 11     traZODone (Desyrel) 50 mg tablet Take 1 tablet (50 mg) by mouth once daily at bedtime.       ubrogepant (Ubrelvy) 100 mg tablet Use for headache 3 tablet 0     Xarelto 10 mg tablet Take 1 tablet (10 mg) by mouth once daily.      [4]   Social History  Tobacco Use    Smoking status: Every Day     Current packs/day: 0.50     Types: Cigarettes     Passive exposure: Current    Smokeless tobacco: Never   Vaping Use    Vaping status: Never Used   Substance Use Topics    Alcohol use: Yes     Comment: RARELY    Drug use: Yes     Types: Marijuana    [5]   Family History  Problem Relation Name Age of Onset    Fibromyalgia Father      Hypertension Brother      Thyroid disease Maternal Grandmother      Thyroid disease Paternal Grandmother      Lung cancer Paternal Grandfather      Coronary artery disease Other Grandmother

## 2025-06-15 NOTE — CARE PLAN
The patient's goals for the shift include      The clinical goals for the shift include Patient will remain hemodynmically stable

## 2025-06-15 NOTE — PROGRESS NOTES
Vancomycin Dosing by Pharmacy- Cessation of Therapy    Consult to pharmacy for vancomycin dosing has been discontinued by the prescriber, pharmacy will sign off at this time.    Please call pharmacy if there are further questions or re-enter a consult if vancomycin is resumed.     Kristine E Steinert, [facility]

## 2025-06-16 LAB
ALBUMIN SERPL BCP-MCNC: 3.7 G/DL (ref 3.4–5)
ALP SERPL-CCNC: 62 U/L (ref 33–110)
ALT SERPL W P-5'-P-CCNC: 16 U/L (ref 7–45)
ANION GAP SERPL CALC-SCNC: 12 MMOL/L
AST SERPL W P-5'-P-CCNC: 7 U/L (ref 9–39)
BASOPHILS # BLD AUTO: 0.03 X10*3/UL (ref 0–0.1)
BASOPHILS NFR BLD AUTO: 0.3 %
BILIRUB SERPL-MCNC: 0.2 MG/DL (ref 0–1.2)
BUN SERPL-MCNC: 28 MG/DL (ref 6–23)
CALCIUM SERPL-MCNC: 9.4 MG/DL (ref 8.6–10.3)
CHLORIDE SERPL-SCNC: 107 MMOL/L (ref 98–107)
CO2 SERPL-SCNC: 28 MMOL/L (ref 21–32)
CREAT SERPL-MCNC: 0.83 MG/DL (ref 0.5–1.05)
EGFRCR SERPLBLD CKD-EPI 2021: 89 ML/MIN/1.73M*2
EOSINOPHIL # BLD AUTO: 0.01 X10*3/UL (ref 0–0.7)
EOSINOPHIL NFR BLD AUTO: 0.1 %
ERYTHROCYTE [DISTWIDTH] IN BLOOD BY AUTOMATED COUNT: 14.2 % (ref 11.5–14.5)
GLUCOSE BLD MANUAL STRIP-MCNC: 149 MG/DL (ref 74–99)
GLUCOSE BLD MANUAL STRIP-MCNC: 153 MG/DL (ref 74–99)
GLUCOSE BLD MANUAL STRIP-MCNC: 182 MG/DL (ref 74–99)
GLUCOSE BLD MANUAL STRIP-MCNC: 201 MG/DL (ref 74–99)
GLUCOSE BLD MANUAL STRIP-MCNC: 233 MG/DL (ref 74–99)
GLUCOSE SERPL-MCNC: 169 MG/DL (ref 74–99)
HCT VFR BLD AUTO: 41.4 % (ref 36–46)
HGB BLD-MCNC: 12.6 G/DL (ref 12–16)
IMM GRANULOCYTES # BLD AUTO: 0.22 X10*3/UL (ref 0–0.7)
IMM GRANULOCYTES NFR BLD AUTO: 2.5 % (ref 0–0.9)
LYMPHOCYTES # BLD AUTO: 3.01 X10*3/UL (ref 1.2–4.8)
LYMPHOCYTES NFR BLD AUTO: 33.6 %
MAGNESIUM SERPL-MCNC: 1.72 MG/DL (ref 1.6–2.4)
MCH RBC QN AUTO: 31.8 PG (ref 26–34)
MCHC RBC AUTO-ENTMCNC: 30.4 G/DL (ref 32–36)
MCV RBC AUTO: 105 FL (ref 80–100)
MONOCYTES # BLD AUTO: 0.53 X10*3/UL (ref 0.1–1)
MONOCYTES NFR BLD AUTO: 5.9 %
NEUTROPHILS # BLD AUTO: 5.16 X10*3/UL (ref 1.2–7.7)
NEUTROPHILS NFR BLD AUTO: 57.6 %
NRBC BLD-RTO: 0 /100 WBCS (ref 0–0)
PHOSPHATE SERPL-MCNC: 3.5 MG/DL (ref 2.5–4.9)
PLATELET # BLD AUTO: 117 X10*3/UL (ref 150–450)
POTASSIUM SERPL-SCNC: 4 MMOL/L (ref 3.5–5.3)
PROT SERPL-MCNC: 6.1 G/DL (ref 6.4–8.2)
RBC # BLD AUTO: 3.96 X10*6/UL (ref 4–5.2)
SODIUM SERPL-SCNC: 143 MMOL/L (ref 136–145)
WBC # BLD AUTO: 9 X10*3/UL (ref 4.4–11.3)

## 2025-06-16 PROCEDURE — 36415 COLL VENOUS BLD VENIPUNCTURE: CPT

## 2025-06-16 PROCEDURE — 82947 ASSAY GLUCOSE BLOOD QUANT: CPT

## 2025-06-16 PROCEDURE — 2500000005 HC RX 250 GENERAL PHARMACY W/O HCPCS

## 2025-06-16 PROCEDURE — 80053 COMPREHEN METABOLIC PANEL: CPT

## 2025-06-16 PROCEDURE — 94660 CPAP INITIATION&MGMT: CPT

## 2025-06-16 PROCEDURE — 84100 ASSAY OF PHOSPHORUS: CPT

## 2025-06-16 PROCEDURE — 2060000001 HC INTERMEDIATE ICU ROOM DAILY

## 2025-06-16 PROCEDURE — 2500000004 HC RX 250 GENERAL PHARMACY W/ HCPCS (ALT 636 FOR OP/ED)

## 2025-06-16 PROCEDURE — 85025 COMPLETE CBC W/AUTO DIFF WBC: CPT

## 2025-06-16 PROCEDURE — S4991 NICOTINE PATCH NONLEGEND: HCPCS

## 2025-06-16 PROCEDURE — 2500000001 HC RX 250 WO HCPCS SELF ADMINISTERED DRUGS (ALT 637 FOR MEDICARE OP)

## 2025-06-16 PROCEDURE — 2500000001 HC RX 250 WO HCPCS SELF ADMINISTERED DRUGS (ALT 637 FOR MEDICARE OP): Performed by: STUDENT IN AN ORGANIZED HEALTH CARE EDUCATION/TRAINING PROGRAM

## 2025-06-16 PROCEDURE — 99232 SBSQ HOSP IP/OBS MODERATE 35: CPT | Performed by: STUDENT IN AN ORGANIZED HEALTH CARE EDUCATION/TRAINING PROGRAM

## 2025-06-16 PROCEDURE — 94640 AIRWAY INHALATION TREATMENT: CPT

## 2025-06-16 PROCEDURE — 83735 ASSAY OF MAGNESIUM: CPT

## 2025-06-16 PROCEDURE — 2500000001 HC RX 250 WO HCPCS SELF ADMINISTERED DRUGS (ALT 637 FOR MEDICARE OP): Performed by: INTERNAL MEDICINE

## 2025-06-16 PROCEDURE — 2500000002 HC RX 250 W HCPCS SELF ADMINISTERED DRUGS (ALT 637 FOR MEDICARE OP, ALT 636 FOR OP/ED)

## 2025-06-16 RX ORDER — LORAZEPAM 0.5 MG/1
0.5 TABLET ORAL ONCE
Status: COMPLETED | OUTPATIENT
Start: 2025-06-16 | End: 2025-06-16

## 2025-06-16 RX ADMIN — TRAZODONE HYDROCHLORIDE 50 MG: 50 TABLET ORAL at 20:21

## 2025-06-16 RX ADMIN — KETOROLAC TROMETHAMINE 30 MG: 30 INJECTION, SOLUTION INTRAMUSCULAR at 22:02

## 2025-06-16 RX ADMIN — RISPERIDONE 2 MG: 0.25 TABLET ORAL at 17:50

## 2025-06-16 RX ADMIN — BUSPIRONE HYDROCHLORIDE 30 MG: 5 TABLET ORAL at 20:21

## 2025-06-16 RX ADMIN — CLONAZEPAM 0.5 MG: 0.5 TABLET ORAL at 17:50

## 2025-06-16 RX ADMIN — LORAZEPAM 0.5 MG: 0.5 TABLET ORAL at 13:01

## 2025-06-16 RX ADMIN — CLONIDINE HYDROCHLORIDE 0.1 MG: 0.1 TABLET ORAL at 07:45

## 2025-06-16 RX ADMIN — FAMOTIDINE 20 MG: 20 TABLET, FILM COATED ORAL at 07:45

## 2025-06-16 RX ADMIN — IPRATROPIUM BROMIDE AND ALBUTEROL SULFATE 3 ML: 2.5; .5 SOLUTION RESPIRATORY (INHALATION) at 08:07

## 2025-06-16 RX ADMIN — ESCITALOPRAM OXALATE 20 MG: 10 TABLET, FILM COATED ORAL at 07:45

## 2025-06-16 RX ADMIN — Medication 3 L/MIN: at 08:35

## 2025-06-16 RX ADMIN — CEFEPIME HYDROCHLORIDE 1 G: 1 INJECTION, SOLUTION INTRAVENOUS at 01:29

## 2025-06-16 RX ADMIN — PALIPERIDONE 3 MG: 3 TABLET, EXTENDED RELEASE ORAL at 07:46

## 2025-06-16 RX ADMIN — Medication 2 L/MIN: at 19:20

## 2025-06-16 RX ADMIN — IPRATROPIUM BROMIDE AND ALBUTEROL SULFATE 3 ML: 2.5; .5 SOLUTION RESPIRATORY (INHALATION) at 19:20

## 2025-06-16 RX ADMIN — ENOXAPARIN SODIUM 40 MG: 40 INJECTION SUBCUTANEOUS at 07:46

## 2025-06-16 RX ADMIN — BUSPIRONE HYDROCHLORIDE 30 MG: 5 TABLET ORAL at 07:46

## 2025-06-16 RX ADMIN — AZITHROMYCIN DIHYDRATE 500 MG: 250 TABLET, FILM COATED ORAL at 10:02

## 2025-06-16 RX ADMIN — RISPERIDONE 2 MG: 0.25 TABLET ORAL at 10:38

## 2025-06-16 RX ADMIN — PRAZOSIN HYDROCHLORIDE 1 MG: 1 CAPSULE ORAL at 20:21

## 2025-06-16 RX ADMIN — NICOTINE 1 PATCH: 21 PATCH, EXTENDED RELEASE TRANSDERMAL at 07:46

## 2025-06-16 RX ADMIN — LEVOTHYROXINE SODIUM 175 MCG: 0.05 TABLET ORAL at 07:46

## 2025-06-16 RX ADMIN — INSULIN HUMAN 3 UNITS: 100 INJECTION, SOLUTION PARENTERAL at 06:34

## 2025-06-16 RX ADMIN — KETOROLAC TROMETHAMINE 30 MG: 30 INJECTION, SOLUTION INTRAMUSCULAR at 10:04

## 2025-06-16 RX ADMIN — ACETAMINOPHEN 650 MG: 325 SOLUTION ORAL at 07:42

## 2025-06-16 RX ADMIN — FAMOTIDINE 20 MG: 20 TABLET, FILM COATED ORAL at 20:21

## 2025-06-16 RX ADMIN — IPRATROPIUM BROMIDE AND ALBUTEROL SULFATE 3 ML: 2.5; .5 SOLUTION RESPIRATORY (INHALATION) at 00:23

## 2025-06-16 RX ADMIN — INSULIN HUMAN 6 UNITS: 100 INJECTION, SOLUTION PARENTERAL at 16:28

## 2025-06-16 RX ADMIN — SITAGLIPTIN 50 MG: 25 TABLET, FILM COATED ORAL at 06:34

## 2025-06-16 RX ADMIN — IPRATROPIUM BROMIDE AND ALBUTEROL SULFATE 3 ML: 2.5; .5 SOLUTION RESPIRATORY (INHALATION) at 13:46

## 2025-06-16 RX ADMIN — TIOTROPIUM BROMIDE INHALATION SPRAY 2 PUFF: 3.12 SPRAY, METERED RESPIRATORY (INHALATION) at 06:34

## 2025-06-16 RX ADMIN — CEFEPIME HYDROCHLORIDE 1 G: 1 INJECTION, SOLUTION INTRAVENOUS at 13:02

## 2025-06-16 RX ADMIN — CLONIDINE HYDROCHLORIDE 0.1 MG: 0.1 TABLET ORAL at 20:21

## 2025-06-16 RX ADMIN — KETOROLAC TROMETHAMINE 30 MG: 30 INJECTION, SOLUTION INTRAMUSCULAR at 04:39

## 2025-06-16 RX ADMIN — ACETAMINOPHEN 650 MG: 325 TABLET ORAL at 15:03

## 2025-06-16 RX ADMIN — METOPROLOL TARTRATE 25 MG: 25 TABLET, FILM COATED ORAL at 07:46

## 2025-06-16 RX ADMIN — METOPROLOL TARTRATE 25 MG: 25 TABLET, FILM COATED ORAL at 20:21

## 2025-06-16 RX ADMIN — PREDNISONE 40 MG: 20 TABLET ORAL at 07:45

## 2025-06-16 RX ADMIN — CLONAZEPAM 0.5 MG: 0.5 TABLET ORAL at 06:34

## 2025-06-16 RX ADMIN — ATORVASTATIN CALCIUM 20 MG: 20 TABLET ORAL at 07:46

## 2025-06-16 RX ADMIN — KETOROLAC TROMETHAMINE 30 MG: 30 INJECTION, SOLUTION INTRAMUSCULAR at 16:11

## 2025-06-16 ASSESSMENT — COGNITIVE AND FUNCTIONAL STATUS - GENERAL
MOBILITY SCORE: 24
DAILY ACTIVITIY SCORE: 24

## 2025-06-16 ASSESSMENT — PAIN SCALES - GENERAL
PAINLEVEL_OUTOF10: 0 - NO PAIN
PAINLEVEL_OUTOF10: 7
PAINLEVEL_OUTOF10: 2
PAINLEVEL_OUTOF10: 3

## 2025-06-16 ASSESSMENT — PAIN - FUNCTIONAL ASSESSMENT
PAIN_FUNCTIONAL_ASSESSMENT: 0-10

## 2025-06-16 ASSESSMENT — PAIN DESCRIPTION - DESCRIPTORS
DESCRIPTORS: ACHING
DESCRIPTORS: ACHING

## 2025-06-16 ASSESSMENT — PAIN DESCRIPTION - LOCATION
LOCATION: HEAD
LOCATION: HEAD

## 2025-06-16 NOTE — CARE PLAN
The patient's goals for the shift include      The clinical goals for the shift include patient to remain on nasal canula through out my shift with out any increase in o2 demand

## 2025-06-16 NOTE — NURSING NOTE
RRN note; ICU tx follow up: Plan for patient to discharge back home. Patient is feeling much better today and ready for discharge. VSS/no concerns.

## 2025-06-16 NOTE — PROGRESS NOTES
06/16/25 1637   Discharge Planning   Living Arrangements Spouse/significant other   Support Systems Spouse/significant other   Assistance Needed independent PTA   Type of Residence Private residence   Number of Stairs to Enter Residence 3   Number of Stairs Within Residence 12  (basement)   Do you have animals or pets at home? Yes   Type of Animals or Pets 3 dogs, 2 cats, parakeets, and guinea pigs   Who is requesting discharge planning? Patient   Home or Post Acute Services None   Expected Discharge Disposition Home   Does the patient need discharge transport arranged? No   Stroke Family Assessment   Stroke Family Assessment Needed No   Intensity of Service   Intensity of Service 0-30 min     Pt admitted with pneumonia. Is on 4L home O2 through [employer]. Independent PTA, lives with spouse. Plan to return home when medically cleared.

## 2025-06-16 NOTE — CARE PLAN
The patient's goals for the shift include  remain free from falls through out my shift      The clinical goals for the shift include Patient will remain hemodynmically stable    If he is clear, we can stop the prep. I would have night shift give another 16 ounces of the solution tomorrow at 5 am. and plan for EGD in colon tomorrow.   - nette Beal

## 2025-06-16 NOTE — PROGRESS NOTES
Medical Group Progress Note  ASSESSMENT & PLAN:   Stephenie Mccray is a 45 y.o. female admitted to [facility] for management of:    #Encephalopathy: Resolved  #Nicotine dependence   #Hx Schizophrenia  -Continue home buspar & lexapro  -Klonopin reduced to 0.25 q 12 hour due to lethargy  -Tylenol PRN pain   -Fioricet/toradol PRN headache/Migraine  -Ammonia level normal  -UDS positive for barbiturate and THC       #Acute on chronic hypoxic hypercapnic respiratory failure  #Hx COPD  #Hx pulmonary embolism on AC  #CHARLIE  -Continue anticoagulation  -No formal PFT on file, will need to follow up for formal PFT  -Prednisone 40 x 5 days total  -Duonebs  -CPAP overnight at 40%, NC during the day   -Non-compliant with CPAP at home  -Patient states she has a sleep study and has bipap but does not wear it as there is too much pressure and is following up with Dr. Villalba   -Pulmonary hygiene   -Continue Spiriva  -Encourage smoking cessation   -Definite concern for benzo use with chronic resp suppression and subsequent CO2 retention and aspiration although tox screen only positive for TCH and barbituates     #Hypertension  #Hx HLD  -Continue home metoprolol, minipress, and catapres   -Continue lipitor  -EKG PRN     #Hx GERD  -heart healthy/dm diet  -Continue home PPI        #T2DM  #Hypothyroidism  -Continue home Synthroid  -SSI, increased to scale 3     #Pneumonia-community vs healthcare acquired (admitted 5/17)  -Send Covid/Flu/RSV  -Continue Cefepime and zithromax x 5 days  -MRSA PCR negative, stop vancomycin  -Urine antigens negative  -lactate negative  -Procal normal, will discontinue antibiotics   -UA negative     #Acute on chronic Thrombocytopenia  #HX of DVT/PE on chronic AC  -Daily CBC  -Monitor platelets, down to 66 in the setting of infection and DOAC  -Xarelto on hold for upcoming hernia repair, on lovenox 40mg nightly for DVT prophylaxis    VTE Prophylaxis: Lovenox    Disposition: ADOD 1-2 days    Level of MDM:   Moderate   Risk: Moderate   Data Reviewed and/or Analyzed:   Included: Prior external notes from at least 1 unique source, Results, including laboratory findings and imaging reports, listed above, Orders and notes from all consultants involved, and Notes for this encounter.  I personally reviewed the tests referenced above.    The patient/family had opportunity to ask questions. All questions were answered to the best of my ability.    César Vicky, [facility]    SUBJECTIVE     Patient states to feel a lot better and was hoping to go home. She is significantly less short of breath today.    OBJECTIVE:     Last Recorded Vitals:  Vitals:    06/16/25 0530 06/16/25 0737 06/16/25 1034 06/16/25 1511   BP:  (!) 141/95 143/90 180/89   BP Location:  Right arm Right arm    Patient Position:  Lying Lying    Pulse:  69 68 64   Resp:  16 18    Temp:  36.7 °C (98.1 °F) 36.3 °C (97.3 °F) 36.6 °C (97.9 °F)   TempSrc:  Temporal Temporal    SpO2:  98% 97% 93%   Weight: 75.4 kg (166 lb 3.6 oz)      Height:         Last I/O:  I/O last 3 completed shifts:  In: 900 (11.9 mL/kg) [P.O.:600; IV Piggyback:300]  Out: 1175 (15.6 mL/kg) [Urine:1175 (0.4 mL/kg/hr)]  Weight: 75.4 kg     Physical Exam  Vitals and nursing note reviewed.   Constitutional:       General: She is not in acute distress.  HENT:      Mouth/Throat:      Mouth: Mucous membranes are moist.   Eyes:      Extraocular Movements: Extraocular movements intact.      Conjunctiva/sclera: Conjunctivae normal.   Cardiovascular:      Rate and Rhythm: Normal rate and regular rhythm.      Pulses: Normal pulses.      Heart sounds: Normal heart sounds.   Pulmonary:      Effort: Pulmonary effort is normal.      Breath sounds: Normal breath sounds.      Comments: On 3L o2  Abdominal:      General: Abdomen is flat. Bowel sounds are normal.      Palpations: Abdomen is soft.   Skin:     General: Skin is warm.   Neurological:      General: No focal deficit present.      Mental  Status: She is alert. Mental status is at baseline.   Psychiatric:         Mood and Affect: Mood normal.         Inpatient Medications:  Scheduled Medications[1]PRN Medications  PRN Medications[2]  Continuous Medications:  Continuous Medications[3]  LABS AND IMAGING:     Labs:  Results from last 7 days   Lab Units 06/16/25  0607 06/15/25  0436 06/14/25  0603   WBC AUTO x10*3/uL 9.0 8.7 7.0   RBC AUTO x10*6/uL 3.96* 4.25 4.03   HEMOGLOBIN g/dL 12.6 13.8 13.1   HEMATOCRIT % 41.4 43.4 42.0   MCV fL 105* 102* 104*   MCH pg 31.8 32.5 32.5   MCHC g/dL 30.4* 31.8* 31.2*   RDW % 14.2 14.2 14.0   PLATELETS AUTO x10*3/uL 117* 66* 99*     Results from last 7 days   Lab Units 06/16/25  0607 06/15/25  0436 06/14/25  0603   SODIUM mmol/L 143 139 142   POTASSIUM mmol/L 4.0 4.4 4.0   CHLORIDE mmol/L 107 103 106   CO2 mmol/L 28 30 30   BUN mg/dL 28* 17 17   CREATININE mg/dL 0.83 0.72 0.85   GLUCOSE mg/dL 169* 201* 118*   PROTEIN TOTAL g/dL 6.1* 6.6 6.4   CALCIUM mg/dL 9.4 9.0 8.8   BILIRUBIN TOTAL mg/dL 0.2 0.2 0.4   ALK PHOS U/L 62 74 74   AST U/L 7* 9 11   ALT U/L 16 24 32     Results from last 7 days   Lab Units 06/16/25  0607 06/15/25  0436 06/14/25  0603   MAGNESIUM mg/dL 1.72 1.88 1.54*     Results from last 7 days   Lab Units 06/14/25  0653 06/14/25  0603   TROPHS ng/L 24* 25*     Imaging:  ECG 12 lead  Sinus rhythm with marked sinus arrhythmia  Possible Left atrial enlargement  Left axis deviation  Cannot rule out Anterior infarct (cited on or before 17-MAY-2025)  Abnormal ECG  When compared with ECG of 17-MAY-2025 01:33,  No significant change was found  CT head wo IV contrast  Narrative: Interpreted By:  Hayes Jacobs,   STUDY:  CT HEAD WO IV CONTRAST;  6/14/2025 8:05 am      INDICATION:  Signs/Symptoms:headache.          COMPARISON:  None.      ACCESSION NUMBER(S):  [id number]      ORDERING CLINICIAN:  VONNIE SAMANIEGO      TECHNIQUE:  Noncontrast axial CT scan of head was performed. Angled reformats in  brain and bone  windows were generated. The images were reviewed in  bone, brain, blood and soft tissue windows.      FINDINGS:  CSF Spaces: The ventricles, sulci and basal cisterns are within  normal limits. There is no extraaxial fluid collection.      Parenchyma:  The grey-white differentiation is intact. There is no  mass effect or midline shift.  There is no intracranial hemorrhage.      Calvarium: The calvarium is unremarkable.      Paranasal sinuses and mastoids: Visualized paranasal sinuses and  mastoids are clear.      Impression: 1. No major intracranial hemorrhage or serious brain herniation.      MACRO:  None      Signed by: Hayes Jacobs 6/14/2025 9:10 AM  Dictation workstation:   [id number]  CT angio chest for pulmonary embolism  Narrative: Interpreted By:  Hayes Jacobs,   STUDY:  CT ANGIO CHEST FOR PULMONARY EMBOLISM;  6/14/2025 8:05 am      INDICATION:  Signs/Symptoms:sob, hypoxia.          COMPARISON:  CT pulmonary angiography 05/09/2025      ACCESSION NUMBER(S):  [id number]      ORDERING CLINICIAN:  JULI HENDRIX      TECHNIQUE:  Helical data acquisition of the chest was obtained after intravenous  administration of 75 ML Omnipaque 350, as per PE protocol. Images  were reformatted in coronal and sagittal planes. Axial and coronal  maximum intensity projection (MIP) images were created and reviewed.      FINDINGS:  POTENTIAL LIMITATIONS OF THE STUDY: Suboptimal      HEART AND VESSELS:  Adequate opacification of the pulmonary trunk, main pulmonary  arteries and 1st order segmental branches. The peripheral and  subsegmental branches could not be properly evaluated.      The thoracic aorta normal in course and caliber.  No coronary artery calcifications are seen. Please note, the study is  not optimized for evaluation of coronary arteries.      Heart:  No cardiomegaly. No pericardial effusion.      MEDIASTINUM AND RICHARD, LOWER NECK AND AXILLA:  The visualized thyroid gland is within normal limits.  No evidence of  thoracic lymphadenopathy by CT criteria.  Esophagus appears within normal limits as seen.      LUNGS AND AIRWAYS:  Slightly worsened bilateral areas of ill-defined consolidations and  atelectasis. No new suspicious pulmonary nodules or masses. No  pneumothorax. Central airways are patent.      UPPER ABDOMEN:  Hepatic segment 6 hypoattenuating lesion measuring 1.3 cm. Trace  peripancreatic fat stranding and free fluid surrounding the tail of  the pancreas.      CHEST WALL AND OSSEOUS STRUCTURES:  Chest wall is within normal limits.  No acute osseous pathology.There are no suspicious osseous lesions.      Impression: 1. No acute central or segmental pulmonary embolism. The subsegmental  and peripheral branches are not properly visualized given poor  opacification.  2. Slightly worsened bilateral ill-defined pulmonary consolidations  and atelectasis could be related to ongoing infection. Advise  continued follow-up to ensure resolution.  3. Hepatic segment 6 lesion and nonspecific trace peripancreatic free  fluid at the tail is unchanged from prior.      MACRO:  None      Signed by: Hayes Jacobs 6/14/2025 8:35 AM  Dictation workstation:   [id number]  XR chest 1 view  Narrative: Interpreted By:  Elva Anderson,   STUDY:  XR CHEST 1 VIEW;  6/14/2025 6:30 am      INDICATION:  Signs/Symptoms:wheezing shortness of breath.      COMPARISON:  05/17/2025      ACCESSION NUMBER(S):  [id number]      ORDERING CLINICIAN:  VONNIE SAMANIEGO      FINDINGS:  AP radiograph of the chest was provided.              CARDIOMEDIASTINAL SILHOUETTE:  Persistently enlarged cardiomediastinal silhouette.      LUNGS:  Interval increase in multifocal airspace opacities, worse in the  lower lobes. No pneumothorax.      ABDOMEN:  No remarkable upper abdominal findings.      BONES:  No acute osseous changes.      Impression: Interval increase in multifocal airspace opacities, worse in the  lower lobes, suspicious for multifocal pneumonia. Follow-up  chest  radiograph or CT is recommended in 6-8 weeks to ensure resolution.      Signed by: Elva Anderson 6/14/2025 8:23 AM  Dictation workstation:   [id number]           [1] atorvastatin, 20 mg, oral, Daily  azithromycin, 500 mg, oral, q24h  busPIRone, 30 mg, oral, BID  cefepime, 1 g, intravenous, q12h  clonazePAM, 0.5 mg, oral, q12h BLUE  cloNIDine, 0.1 mg, oral, BID  enoxaparin, 40 mg, subcutaneous, Daily  escitalopram, 20 mg, oral, q AM  famotidine, 20 mg, oral, BID  insulin regular, 0-15 Units, subcutaneous, TID AC  ipratropium-albuteroL, 3 mL, nebulization, q6h  levothyroxine, 175 mcg, oral, Daily  metoprolol tartrate, 25 mg, oral, BID  nicotine, 1 patch, transdermal, Daily  oxygen, , inhalation, Continuous - Inhalation  oxygen, , inhalation, Continuous - Inhalation  oxygen, , inhalation, Continuous - Inhalation  paliperidone, 3 mg, oral, Daily  prazosin, 1 mg, oral, Nightly  predniSONE, 40 mg, oral, Daily  risperiDONE, 2 mg, oral, q12h BLUE  [Held by provider] rivaroxaban, 10 mg, oral, Daily  SITagliptin phosphate, 50 mg, oral, Daily  tiotropium, 2 puff, inhalation, Daily  traZODone, 50 mg, oral, Nightly  [2] PRN medications: acetaminophen **OR** acetaminophen, albuterol, butalbital-acetaminophen-caff, dextrose, dextrose, dextrose, dextrose, glucagon, glucagon, glucagon, glucagon, ketorolac, ondansetron **OR** ondansetron, [Held by provider] ubrogepant  [3] oxygen, , Last Rate: 4 L/min (06/15/25 1314)

## 2025-06-16 NOTE — CARE PLAN
The patient's goals for the shift include  remain free from falls through out my shift      The clinical goals for the shift include Patient will remain hemodynmically stable

## 2025-06-16 NOTE — NURSING NOTE
RRN 12hr follow up post transfer out of ICU.    VSS, on 4L NC which is her home oxygen.  BPAP HS.  No concerns per bedside nurse.

## 2025-06-17 ENCOUNTER — PHARMACY VISIT (OUTPATIENT)
Dept: PHARMACY | Facility: CLINIC | Age: 45
End: 2025-06-17
Payer: COMMERCIAL

## 2025-06-17 VITALS
RESPIRATION RATE: 18 BRPM | OXYGEN SATURATION: 94 % | SYSTOLIC BLOOD PRESSURE: 186 MMHG | TEMPERATURE: 97.2 F | DIASTOLIC BLOOD PRESSURE: 102 MMHG | WEIGHT: 174.3 LBS | HEIGHT: 62 IN | BODY MASS INDEX: 32.07 KG/M2 | HEART RATE: 56 BPM

## 2025-06-17 LAB
ALBUMIN SERPL BCP-MCNC: 3.7 G/DL (ref 3.4–5)
ALP SERPL-CCNC: 57 U/L (ref 33–110)
ALT SERPL W P-5'-P-CCNC: 15 U/L (ref 7–45)
ANION GAP SERPL CALC-SCNC: 10 MMOL/L (ref 10–20)
AST SERPL W P-5'-P-CCNC: 6 U/L (ref 9–39)
BASOPHILS # BLD AUTO: 0.07 X10*3/UL (ref 0–0.1)
BASOPHILS NFR BLD AUTO: 0.8 %
BILIRUB SERPL-MCNC: 0.2 MG/DL (ref 0–1.2)
BUN SERPL-MCNC: 29 MG/DL (ref 6–23)
CALCIUM SERPL-MCNC: 9.4 MG/DL (ref 8.6–10.3)
CHLORIDE SERPL-SCNC: 105 MMOL/L (ref 98–107)
CO2 SERPL-SCNC: 32 MMOL/L (ref 21–32)
CREAT SERPL-MCNC: 0.85 MG/DL (ref 0.5–1.05)
EGFRCR SERPLBLD CKD-EPI 2021: 87 ML/MIN/1.73M*2
EOSINOPHIL # BLD AUTO: 0.01 X10*3/UL (ref 0–0.7)
EOSINOPHIL NFR BLD AUTO: 0.1 %
ERYTHROCYTE [DISTWIDTH] IN BLOOD BY AUTOMATED COUNT: 14.3 % (ref 11.5–14.5)
GLUCOSE BLD MANUAL STRIP-MCNC: 111 MG/DL (ref 74–99)
GLUCOSE BLD MANUAL STRIP-MCNC: 145 MG/DL (ref 74–99)
GLUCOSE SERPL-MCNC: 146 MG/DL (ref 74–99)
HCT VFR BLD AUTO: 39.6 % (ref 36–46)
HGB BLD-MCNC: 12.3 G/DL (ref 12–16)
IMM GRANULOCYTES # BLD AUTO: 0.36 X10*3/UL (ref 0–0.7)
IMM GRANULOCYTES NFR BLD AUTO: 4.1 % (ref 0–0.9)
LYMPHOCYTES # BLD AUTO: 3.18 X10*3/UL (ref 1.2–4.8)
LYMPHOCYTES NFR BLD AUTO: 36.3 %
MAGNESIUM SERPL-MCNC: 1.67 MG/DL (ref 1.6–2.4)
MCH RBC QN AUTO: 32.1 PG (ref 26–34)
MCHC RBC AUTO-ENTMCNC: 31.1 G/DL (ref 32–36)
MCV RBC AUTO: 103 FL (ref 80–100)
MONOCYTES # BLD AUTO: 0.56 X10*3/UL (ref 0.1–1)
MONOCYTES NFR BLD AUTO: 6.4 %
NEUTROPHILS # BLD AUTO: 4.59 X10*3/UL (ref 1.2–7.7)
NEUTROPHILS NFR BLD AUTO: 52.3 %
NRBC BLD-RTO: 0 /100 WBCS (ref 0–0)
PLATELET # BLD AUTO: 114 X10*3/UL (ref 150–450)
POTASSIUM SERPL-SCNC: 3.9 MMOL/L (ref 3.5–5.3)
PROT SERPL-MCNC: 6 G/DL (ref 6.4–8.2)
RBC # BLD AUTO: 3.83 X10*6/UL (ref 4–5.2)
SODIUM SERPL-SCNC: 143 MMOL/L (ref 136–145)
WBC # BLD AUTO: 8.8 X10*3/UL (ref 4.4–11.3)

## 2025-06-17 PROCEDURE — 2500000002 HC RX 250 W HCPCS SELF ADMINISTERED DRUGS (ALT 637 FOR MEDICARE OP, ALT 636 FOR OP/ED)

## 2025-06-17 PROCEDURE — 94660 CPAP INITIATION&MGMT: CPT

## 2025-06-17 PROCEDURE — 2500000004 HC RX 250 GENERAL PHARMACY W/ HCPCS (ALT 636 FOR OP/ED): Mod: JZ

## 2025-06-17 PROCEDURE — 36415 COLL VENOUS BLD VENIPUNCTURE: CPT | Performed by: INTERNAL MEDICINE

## 2025-06-17 PROCEDURE — RXMED WILLOW AMBULATORY MEDICATION CHARGE

## 2025-06-17 PROCEDURE — 82947 ASSAY GLUCOSE BLOOD QUANT: CPT

## 2025-06-17 PROCEDURE — 85025 COMPLETE CBC W/AUTO DIFF WBC: CPT | Performed by: INTERNAL MEDICINE

## 2025-06-17 PROCEDURE — 2500000005 HC RX 250 GENERAL PHARMACY W/O HCPCS

## 2025-06-17 PROCEDURE — 80053 COMPREHEN METABOLIC PANEL: CPT | Performed by: INTERNAL MEDICINE

## 2025-06-17 PROCEDURE — 94640 AIRWAY INHALATION TREATMENT: CPT

## 2025-06-17 PROCEDURE — 83735 ASSAY OF MAGNESIUM: CPT | Performed by: INTERNAL MEDICINE

## 2025-06-17 PROCEDURE — 2500000001 HC RX 250 WO HCPCS SELF ADMINISTERED DRUGS (ALT 637 FOR MEDICARE OP): Performed by: INTERNAL MEDICINE

## 2025-06-17 PROCEDURE — 2500000001 HC RX 250 WO HCPCS SELF ADMINISTERED DRUGS (ALT 637 FOR MEDICARE OP)

## 2025-06-17 PROCEDURE — 99239 HOSP IP/OBS DSCHRG MGMT >30: CPT | Performed by: STUDENT IN AN ORGANIZED HEALTH CARE EDUCATION/TRAINING PROGRAM

## 2025-06-17 RX ORDER — AZITHROMYCIN 250 MG/1
TABLET, FILM COATED ORAL
Qty: 6 TABLET | Refills: 0 | Status: SHIPPED | OUTPATIENT
Start: 2025-06-18 | End: 2025-06-23

## 2025-06-17 RX ORDER — PREDNISONE 20 MG/1
40 TABLET ORAL DAILY
Qty: 2 TABLET | Refills: 0 | Status: SHIPPED | OUTPATIENT
Start: 2025-06-18 | End: 2025-06-19

## 2025-06-17 RX ADMIN — ATORVASTATIN CALCIUM 20 MG: 20 TABLET ORAL at 08:34

## 2025-06-17 RX ADMIN — IPRATROPIUM BROMIDE AND ALBUTEROL SULFATE 3 ML: 2.5; .5 SOLUTION RESPIRATORY (INHALATION) at 07:20

## 2025-06-17 RX ADMIN — SITAGLIPTIN 50 MG: 25 TABLET, FILM COATED ORAL at 08:39

## 2025-06-17 RX ADMIN — Medication 3 L/MIN: at 08:35

## 2025-06-17 RX ADMIN — ENOXAPARIN SODIUM 40 MG: 40 INJECTION SUBCUTANEOUS at 08:34

## 2025-06-17 RX ADMIN — METOPROLOL TARTRATE 25 MG: 25 TABLET, FILM COATED ORAL at 08:34

## 2025-06-17 RX ADMIN — BUSPIRONE HYDROCHLORIDE 30 MG: 5 TABLET ORAL at 08:35

## 2025-06-17 RX ADMIN — CLONAZEPAM 0.5 MG: 0.5 TABLET ORAL at 06:06

## 2025-06-17 RX ADMIN — KETOROLAC TROMETHAMINE 30 MG: 30 INJECTION, SOLUTION INTRAMUSCULAR at 10:50

## 2025-06-17 RX ADMIN — KETOROLAC TROMETHAMINE 30 MG: 30 INJECTION, SOLUTION INTRAMUSCULAR at 04:50

## 2025-06-17 RX ADMIN — ESCITALOPRAM OXALATE 20 MG: 10 TABLET, FILM COATED ORAL at 08:34

## 2025-06-17 RX ADMIN — CLONIDINE HYDROCHLORIDE 0.1 MG: 0.1 TABLET ORAL at 08:34

## 2025-06-17 RX ADMIN — PREDNISONE 40 MG: 20 TABLET ORAL at 08:34

## 2025-06-17 RX ADMIN — AZITHROMYCIN DIHYDRATE 500 MG: 250 TABLET, FILM COATED ORAL at 08:34

## 2025-06-17 RX ADMIN — IPRATROPIUM BROMIDE AND ALBUTEROL SULFATE 3 ML: 2.5; .5 SOLUTION RESPIRATORY (INHALATION) at 12:51

## 2025-06-17 RX ADMIN — LEVOTHYROXINE SODIUM 175 MCG: 0.05 TABLET ORAL at 08:34

## 2025-06-17 RX ADMIN — TIOTROPIUM BROMIDE INHALATION SPRAY 2 PUFF: 3.12 SPRAY, METERED RESPIRATORY (INHALATION) at 06:06

## 2025-06-17 RX ADMIN — IPRATROPIUM BROMIDE AND ALBUTEROL SULFATE 3 ML: 2.5; .5 SOLUTION RESPIRATORY (INHALATION) at 00:07

## 2025-06-17 RX ADMIN — FAMOTIDINE 20 MG: 20 TABLET, FILM COATED ORAL at 08:34

## 2025-06-17 RX ADMIN — RISPERIDONE 2 MG: 0.25 TABLET ORAL at 06:06

## 2025-06-17 RX ADMIN — PALIPERIDONE 3 MG: 3 TABLET, EXTENDED RELEASE ORAL at 08:34

## 2025-06-17 ASSESSMENT — PAIN SCALES - GENERAL
PAINLEVEL_OUTOF10: 5 - MODERATE PAIN
PAINLEVEL_OUTOF10: 0 - NO PAIN
PAINLEVEL_OUTOF10: 0 - NO PAIN
PAINLEVEL_OUTOF10: 6
PAINLEVEL_OUTOF10: 0 - NO PAIN

## 2025-06-17 ASSESSMENT — COGNITIVE AND FUNCTIONAL STATUS - GENERAL
DAILY ACTIVITIY SCORE: 24
MOBILITY SCORE: 24
DAILY ACTIVITIY SCORE: 24
MOBILITY SCORE: 24

## 2025-06-17 ASSESSMENT — PAIN DESCRIPTION - LOCATION
LOCATION: HEAD
LOCATION: HEAD

## 2025-06-17 ASSESSMENT — PAIN - FUNCTIONAL ASSESSMENT
PAIN_FUNCTIONAL_ASSESSMENT: 0-10

## 2025-06-17 ASSESSMENT — PAIN DESCRIPTION - DESCRIPTORS: DESCRIPTORS: DISCOMFORT

## 2025-06-17 ASSESSMENT — PAIN DESCRIPTION - ORIENTATION: ORIENTATION: RIGHT;LEFT;MID

## 2025-06-17 NOTE — SIGNIFICANT EVENT
06/17/25 1251   Inhalation Therapy   Breath Sounds Pre-Treatment Right Expiratory wheeze   Breath Sounds Pre-Treatment Left Expiratory wheeze   Pre-Treatment Pulse 68   Pre-Treatment Respirations 18   Breath Sounds Post-Treatment Right Expiratory wheeze   Breath Sounds Post-Treatment Left Expiratory wheeze   Post-Treatment Pulse 98   Post-Treatment Respirations 18   Delivery Source Oxygen   Position Semi [facility]   Treatment Tolerance Tolerated well   $ Aerosol/MDI Treatment Charge Aerosol/inhalation treatment     Upon assessment, I observed patient on 1LNC, Treatment administered per order

## 2025-06-17 NOTE — SIGNIFICANT EVENT
06/17/25 0720   Inhalation Therapy   Breath Sounds Pre-Treatment Right Expiratory wheeze   Breath Sounds Pre-Treatment Left Expiratory wheeze   Pre-Treatment Pulse 63   Pre-Treatment Respirations 20   Breath Sounds Post-Treatment Right Expiratory wheeze   Breath Sounds Post-Treatment Left Expiratory wheeze   Post-Treatment Pulse 81   Post-Treatment Respirations 18   Delivery Source Oxygen   Position Semi [facility]   Treatment Tolerance Tolerated well   $ Aerosol/MDI Treatment Charge Aerosol/inhalation treatment     Upon assessment, I observed patient on 4LNC, Treatment administered per order

## 2025-06-17 NOTE — SIGNIFICANT EVENT
06/17/25 0723   Non-Invasive Ventilation   Mode - Non-Invasive BiPAP   NIV ON/OFF Off     Upon assessment, I observed patient off of bilevel and on 4LNC at this time

## 2025-06-17 NOTE — DISCHARGE SUMMARY
"Discharge Diagnosis  Acute on chronic respiratory failure with hypoxia and hypercapnia     Issues Requiring Follow-Up  PCP follow-up    Discharge Meds     Medication List      START taking these medications     azithromycin 250 mg tablet; Commonly known as: Zithromax; Take 2 tablets   (500 mg) by mouth once daily for 1 day, THEN 1 tablet (250 mg) once daily   for 4 days. Do not start before June 18, 2025.; Start taking on: June 18, 2025   predniSONE 20 mg tablet; Commonly known as: Deltasone; Take 2 tablets   (40 mg) by mouth once daily for 1 dose.; Start taking on: June 18, 2025     CHANGE how you take these medications     * albuterol 90 mcg/actuation inhaler; What changed: Another medication   with the same name was changed. Make sure you understand how and when to   take each.   * albuterol 90 mcg/actuation inhaler; Inhale 1-2 puffs every 6 hours if   needed for wheezing.; What changed: how much to take  * This list has 2 medication(s) that are the same as other medications   prescribed for you. Read the directions carefully, and ask your doctor or   other care provider to review them with you.     CONTINUE taking these medications     Adult Aerosol Mask misc; Generic drug: nebulizer accessories; Use mask   as directed with nebulizer   alcohol swabs; Commonly known as: Alcohol Prep Pads; Use 3 times daily   prn   atorvastatin 20 mg tablet; Commonly known as: Lipitor; Take 1 tablet (20   mg) by mouth once daily.   BD Ultra-Fine Mini Pen Needle 31 gauge x 3/16\" needle; Generic drug: pen   needle, diabetic; USE TO INJECT 4 TIMES DAILY AS DIRECTED   busPIRone 30 mg tablet; Commonly known as: Buspar   butalbital-acetaminophen-caff -40 mg tablet; Take 1 tablet by   mouth every 6 hours if needed for headaches or migraine for up to 5 days.   clonazePAM 0.5 mg tablet; Commonly known as: KlonoPIN   cloNIDine 0.1 mg tablet; Commonly known as: Catapres   escitalopram 20 mg tablet; Commonly known as: Lexapro   " famotidine 20 mg tablet; Commonly known as: Pepcid; Take 1 tablet (20   mg) by mouth 2 times a day for 10 days.   ipratropium-albuteroL 0.5-2.5 mg/3 mL nebulizer solution; Commonly known   as: Duo-Neb; Take 3 mL by nebulization every 6 hours if needed for   shortness of breath or wheezing.   Januvia 50 mg tablet; Generic drug: SITagliptin phosphate   levothyroxine 175 mcg tablet; Commonly known as: Synthroid, Levoxyl;   Take 1 tablet (175 mcg) by mouth once daily.   metoprolol tartrate 25 mg tablet; Commonly known as: Lopressor; Take 1   tablet (25 mg) by mouth 2 times a day.   ondansetron ODT 4 mg disintegrating tablet; Commonly known as:   Zofran-ODT   paliperidone 3 mg 24 hr tablet; Commonly known as: Invega; Take 1 tablet   (3 mg) by mouth once daily. Do not crush, chew, or split. Do not start   before January 15, 2024.   prazosin 2 mg capsule; Commonly known as: Minipress   risperiDONE 2 mg tablet; Commonly known as: RisperDAL   tiotropium 2.5 mcg/actuation inhaler; Commonly known as: Spiriva   Respimat; Inhale 2 puffs once daily. Do not start before March 9, 2024.   traZODone 50 mg tablet; Commonly known as: Desyrel   ubrogepant 100 mg tablet; Commonly known as: Ubrelvy; Use for headache   Xarelto 10 mg tablet; Generic drug: rivaroxaban     STOP taking these medications     aspirin-acetaminophen-caffeine 250-250-65 mg tablet; Commonly known as:   Excedrin Migraine   metFORMIN  mg 24 hr tablet; Commonly known as: Glucophage-XR   nicotine 14 mg/24 hr patch; Commonly known as: Nicoderm CQ   nicotine 7 mg/24 hr patch; Commonly known as: Nicoderm CQ     ASK your doctor about these medications     hydrOXYzine HCL 25 mg tablet; Commonly known as: Atarax; Take 1 tablet   (25 mg) by mouth every 6 hours if needed for itching.   nicotine 21 mg/24 hr patch; Commonly known as: Nicoderm CQ; Place 1   patch over 24 hours on the skin once daily for 39 doses.       Test Results Pending At Discharge  Pending Labs        Order Current Status    Blood Culture Preliminary result    Blood Culture Preliminary result            Hospital Course    Stephenie Mccray is a 45 y.o. female admitted to [facility] for management of:    #Acute on chronic hypoxic hypercapnic respiratory failure  #Hx COPD  #Hx pulmonary embolism on AC  #CHARLIE  -Continue anticoagulation  -No formal PFT on file, will need to follow up for formal PFT  -Prednisone 40 x 5 days total  -Duonebs  -CPAP overnight at 40%, NC during the day   -Non-compliant with CPAP at home  -Patient states she has a sleep study and has bipap but does not wear it as there is too much pressure and is following up with Dr. Villalba   -Pulmonary hygiene   -Continue Spiriva  -Encourage smoking cessation   -Definite concern for benzo use with chronic resp suppression and subsequent CO2 retention and aspiration although tox screen only positive for TCH and barbituates     #Hypertension  #Hx HLD  -Continue home metoprolol, minipress, and catapres   -Continue lipitor  -EKG PRN     #Pneumonia-community vs healthcare acquired (admitted 5/17)  -Send Covid/Flu/RSV  -Continue Cefepime and zithromax x 5 days  -MRSA PCR negative, stop vancomycin  -Urine antigens negative  -lactate negative  -Procal normal, will discontinue antibiotics   -UA negative    > 30min in discharge coordination of care which includes: discharge planning, medication reconciliation, arranging appropriate follow up and discussion of discharge instructions with the patient.      Pertinent Physical Exam At Time of Discharge  Physical Exam  Vitals and nursing note reviewed.   Constitutional:       Appearance: Normal appearance. She is obese.   HENT:      Mouth/Throat:      Mouth: Mucous membranes are moist.   Eyes:      Extraocular Movements: Extraocular movements intact.      Conjunctiva/sclera: Conjunctivae normal.   Cardiovascular:      Rate and Rhythm: Normal rate and regular rhythm.      Pulses: Normal pulses.      Heart sounds: Normal  heart sounds.   Pulmonary:      Effort: Pulmonary effort is normal.      Breath sounds: Normal breath sounds.   Abdominal:      General: Abdomen is flat. Bowel sounds are normal.      Palpations: Abdomen is soft.   Skin:     General: Skin is warm.   Neurological:      General: No focal deficit present.      Mental Status: She is alert. Mental status is at baseline.   Psychiatric:         Mood and Affect: Mood normal.         Behavior: Behavior normal.         Thought Content: Thought content normal.         Outpatient Follow-Up  Future Appointments   Date Time Provider Department Center   6/26/2025 10:45 AM Arnold Dillard DO [id number] [place]   7/25/2025  8:20 AM BARTOLOME Kearney-CNP [id number] [place]   9/3/2025  3:00 PM Delta Yuen MD [id number] [place]         César Perry DO

## 2025-06-17 NOTE — PROGRESS NOTES
Date/Time SpO2 Medical Gas Therapy Medical Gas Delivery Method Oxygen L/min Patient is on During the Study Patient Activity During Study    06/17/25 10:52:34 93 %  --  --  -- --     06/17/25 1242 94 %  None (Room air)  --  -- At rest     06/17/25 1247 93 %  None (Room air)  --  -- Ambulating     06/17/25 1248 94 %  None (Room air)  --  -- Ambulating     06/17/25 1249 94 %  None (Room air)  --  -- At rest    Patient does not qualify for home O2 at this time         Was a Home Oxygen Evaluation Performed? Yes  Based on the Home Oxygen Evaluation, Does the Patient Qualify for Home Oxygen Therapy? No - This patient does not qualify for home oxygen. They have O2 saturations greater than or equal to 89% while ambulating.            Recommendations:

## 2025-06-17 NOTE — NURSING NOTE
Rapid Response RN note:     48 hour post ICU transfer follow up -     Patient remains on 3L NC which she wears chronically prn at home. Wore BiPAP mask overnight without difficultly. Remains hypertensive. Orders placed to discharge home today. No concerns per bedside RN.

## 2025-06-18 ENCOUNTER — PATIENT OUTREACH (OUTPATIENT)
Dept: PRIMARY CARE | Facility: CLINIC | Age: 45
End: 2025-06-18
Payer: COMMERCIAL

## 2025-06-18 LAB
BACTERIA BLD CULT: NORMAL
BACTERIA BLD CULT: NORMAL

## 2025-06-18 NOTE — PROGRESS NOTES
Discharge Facility: [facility]  Discharge Diagnosis: Acute on chronic respiratory failure with hypoxia and hypercapnia   Admission Date: 6/14/25  Discharge Date: 6/17/25    PCP Appointment Date: Declined - msg to office  Specialist Appointment Date: N/A  Hospital Encounter and Summary Linked: Yes  ED to Hosp-Admission (Discharged) with César Perry DO; Jake Marin DO (06/14/2025)   See discharge assessment below for further details    Wrap Up  Wrap Up Additional Comments: Pt states she is feeling better; her breathing is good. Reviewed medication changes. Pt declined appointment date/times offered at time of outreach. Message to office to further assist scheduling. Pt Denies any needs at this time. Denies questions/concerns at this time. (6/19/2025 11:59 AM)    Medications  Medications reviewed with patient/caregiver?: Yes (new/changes only) (6/19/2025 11:59 AM)  Is the patient having any side effects they believe may be caused by any medication additions or changes?: No (6/19/2025 11:59 AM)  Does the patient have all medications ordered at discharge?: Yes (6/19/2025 11:59 AM)  Care Management Interventions: No intervention needed (6/19/2025 11:59 AM)  Prescription Comments: START: azithromycin, prednisone  CHANGE: albuterol 90mcg 1-2 puffs q6h PRN  STOP: excedrin migraine, metformin 500mg, nicoderm CQ (6/19/2025 11:59 AM)  Is the patient taking all medications as directed (includes completed medication regime)?: Yes (6/19/2025 11:59 AM)  Care Management Interventions: Provided patient education (6/19/2025 11:59 AM)    Appointments  Does the patient have a primary care provider?: Yes (6/19/2025 11:59 AM)  Care Management Interventions: Advised patient to make appointment (6/19/2025 11:59 AM)    Self Management  Has home health visited the patient within 72 hours of discharge?: Not applicable (6/19/2025 11:59 AM)  What Durable Medical Equipment (DME) was ordered?: n/a (6/19/2025 11:59 AM)    Patient  Teaching  Does the patient have access to their discharge instructions?: Yes (6/19/2025 11:59 AM)  Care Management Interventions: Reviewed instructions with patient (6/19/2025 11:59 AM)  What is the patient's perception of their health status since discharge?: Improving (6/19/2025 11:59 AM)  Is the patient/caregiver able to teach back the hierarchy of who to call/visit for symptoms/problems? PCP, Specialist, Home Health nurse, Urgent Care, ED, 911: Yes (6/19/2025 11:59 AM)

## 2025-06-19 DIAGNOSIS — M62.08 DIASTASIS RECTI: Primary | ICD-10-CM

## 2025-06-19 RX ORDER — OXYCODONE AND ACETAMINOPHEN 5; 325 MG/1; MG/1
1 TABLET ORAL EVERY 6 HOURS PRN
Qty: 20 TABLET | Refills: 0 | Status: SHIPPED | OUTPATIENT
Start: 2025-06-19

## 2025-06-23 DIAGNOSIS — G43.709 CHRONIC MIGRAINE W/O AURA W/O STATUS MIGRAINOSUS, NOT INTRACTABLE: ICD-10-CM

## 2025-06-23 DIAGNOSIS — E11.9 TYPE 2 DIABETES MELLITUS WITHOUT COMPLICATION, WITH LONG-TERM CURRENT USE OF INSULIN: Primary | ICD-10-CM

## 2025-06-23 DIAGNOSIS — Z79.4 TYPE 2 DIABETES MELLITUS WITHOUT COMPLICATION, WITH LONG-TERM CURRENT USE OF INSULIN: Primary | ICD-10-CM

## 2025-06-23 RX ORDER — SITAGLIPTIN 50 MG/1
50 TABLET, FILM COATED ORAL
Qty: 30 TABLET | Refills: 2 | Status: SHIPPED | OUTPATIENT
Start: 2025-06-23 | End: 2025-09-21

## 2025-06-23 RX ORDER — BUTALBITAL, ACETAMINOPHEN AND CAFFEINE 50; 325; 40 MG/1; MG/1; MG/1
1 TABLET ORAL EVERY 6 HOURS PRN
Qty: 12 TABLET | Refills: 0 | Status: SHIPPED | OUTPATIENT
Start: 2025-06-23 | End: 2025-06-28

## 2025-06-23 NOTE — TELEPHONE ENCOUNTER
Rx Refill Request Telephone Encounter    Name:  Stephenie Mccray  : 1980     Medication Name:  butalbital-acetaminophen-caff -40 mg tablet [[id number]]      Order Details    Dose: 1 tablet Route: oral Frequency: Every 6 hours PRN for headaches, migraine   Dispense Quantity: 12 tablet (3 day supply) Refills: 0    Duration: 5 days Dispense As Written: No          Sig: Take 1 tablet by mouth every 6 hours if needed for headaches or migraine for up to 5 days.             Januvia 50 mg tablet [[id number]]    Order Details    Dose: 1 tablet Route: oral Frequency: Daily (630)   Dispense Quantity: -- Refills: --    Duration: -- Dispense As Written: Yes          Sig: Take 1 tablet (50 mg) by mouth early in the morning..             ALLERGIES:   see list    Specific Pharmacy location:    [facility] [address] [place] [address] [address]  [address] [facility] [address]  Phone: [phone]  Fax: [phone]  [place] #: --          Date of last appointment:  25  Date of next appointment:  9/3/25    Best number to reach patient:  [phone]

## 2025-06-25 ENCOUNTER — OFFICE VISIT (OUTPATIENT)
Dept: SURGERY | Facility: CLINIC | Age: 45
End: 2025-06-25
Payer: COMMERCIAL

## 2025-06-25 ENCOUNTER — APPOINTMENT (OUTPATIENT)
Dept: PRIMARY CARE | Facility: CLINIC | Age: 45
End: 2025-06-25
Payer: COMMERCIAL

## 2025-06-25 ENCOUNTER — TELEPHONE (OUTPATIENT)
Dept: PRIMARY CARE | Facility: CLINIC | Age: 45
End: 2025-06-25

## 2025-06-25 VITALS — SYSTOLIC BLOOD PRESSURE: 119 MMHG | DIASTOLIC BLOOD PRESSURE: 80 MMHG | BODY MASS INDEX: 29.81 KG/M2 | WEIGHT: 163 LBS

## 2025-06-25 DIAGNOSIS — M62.08 DIASTASIS RECTI: Primary | ICD-10-CM

## 2025-06-25 DIAGNOSIS — J41.8 MIXED SIMPLE AND MUCOPURULENT CHRONIC BRONCHITIS (MULTI): Primary | ICD-10-CM

## 2025-06-25 PROCEDURE — 99213 OFFICE O/P EST LOW 20 MIN: CPT | Performed by: SURGERY

## 2025-06-25 PROCEDURE — 3074F SYST BP LT 130 MM HG: CPT | Performed by: SURGERY

## 2025-06-25 PROCEDURE — 3079F DIAST BP 80-89 MM HG: CPT | Performed by: SURGERY

## 2025-06-25 RX ORDER — ALBUTEROL SULFATE 0.83 MG/ML
3 SOLUTION RESPIRATORY (INHALATION) ONCE
OUTPATIENT
Start: 2025-06-25 | End: 2025-06-25

## 2025-06-25 RX ORDER — ALBUTEROL SULFATE 90 UG/1
1 INHALANT RESPIRATORY (INHALATION) ONCE
OUTPATIENT
Start: 2025-06-25

## 2025-06-25 RX ORDER — HYDROCODONE BITARTRATE AND ACETAMINOPHEN 7.5; 325 MG/1; MG/1
1 TABLET ORAL EVERY 6 HOURS PRN
Qty: 20 TABLET | Refills: 0 | Status: SHIPPED | OUTPATIENT
Start: 2025-06-25 | End: 2025-07-02

## 2025-06-25 ASSESSMENT — ENCOUNTER SYMPTOMS: ABDOMINAL PAIN: 1

## 2025-06-25 NOTE — H&P (VIEW-ONLY)
Subjective   Patient ID: Stephenie Mccray is a 45 y.o. female who presents for Follow-up (Hospital f/u).  HPI  Patient was scheduled for robotic ventral hernia repair and repair of diastases was canceled due to pneumonia she states she feels better denies any shortness of breath continues to complain of pain at the diastatic area  Review of Systems   Gastrointestinal:  Positive for abdominal pain.   All other systems reviewed and are negative.      Objective   Physical Exam  Pulmonary:      Effort: No respiratory distress.      Breath sounds: Normal breath sounds. No stridor. No wheezing, rhonchi or rales.   Chest:      Chest wall: No tenderness.   Abdominal:      Comments: Abdomen soft diastatic segment as before 6 cm x 3 cm     Physical Exam  Constitutional:       Appearance: Normal appearance.   HENT:      Head: Normocephalic and atraumatic.      Mouth/Throat:      Pharynx: Oropharynx is clear.   Eyes:      Pupils: Pupils are equal, round, and reactive to light.   Cardiovascular:      Rate and Rhythm: Normal rate and regular rhythm.   Pulmonary:      Effort: Pulmonary effort is normal.      Breath sounds: Normal breath sounds.   Abdominal:      General: Abdomen is flat. Bowel sounds are normal.      Palpations: Abdomen is soft.   Musculoskeletal:         General: Normal range of motion.      Cervical back: Normal range of motion.   Skin:     General: Skin is warm.   Neurological:      General: No focal deficit present.      Mental Status: She is alert. Mental status is at baseline.   Psychiatric:         Mood and Affect: Mood normal.       Assessment/Plan   Symptomatic diastases needs repair will reschedule resume Xarelto stop 3 days before patient will need clearance by her PCP         Alvin Gonzalez MD 06/25/25 10:09 AM

## 2025-06-26 ENCOUNTER — APPOINTMENT (OUTPATIENT)
Dept: ORTHOPEDIC SURGERY | Facility: CLINIC | Age: 45
End: 2025-06-26
Payer: COMMERCIAL

## 2025-07-01 ENCOUNTER — TELEPHONE (OUTPATIENT)
Dept: PRIMARY CARE | Facility: CLINIC | Age: 45
End: 2025-07-01
Payer: COMMERCIAL

## 2025-07-01 NOTE — TELEPHONE ENCOUNTER
Patient called and stated that she had PFT done at her pulmonologist today (Dr. Villalba) She asked when can ABG be drawn so that she can get cleared for surg by BB. I advised her to have pulm fax results to us and if BB deems testing sufficient we will go from there.   Patient became a lil aggravated with me but I told her that BB main concern is that she can handle being put under with her hx of resp issues. Patient understood and said that they will fax results.     I let her know I'd keep an eye out for them and we'd go from there.

## 2025-07-02 ENCOUNTER — TELEPHONE (OUTPATIENT)
Dept: SURGERY | Facility: CLINIC | Age: 45
End: 2025-07-02
Payer: COMMERCIAL

## 2025-07-02 DIAGNOSIS — M62.08 DIASTASIS RECTI: Primary | ICD-10-CM

## 2025-07-02 RX ORDER — OXYCODONE AND ACETAMINOPHEN 5; 325 MG/1; MG/1
1 TABLET ORAL EVERY 6 HOURS PRN
Qty: 20 TABLET | Refills: 0 | Status: SHIPPED | OUTPATIENT
Start: 2025-07-02 | End: 2025-07-09

## 2025-07-07 NOTE — PREPROCEDURE INSTRUCTIONS

## 2025-07-09 ENCOUNTER — TELEPHONE (OUTPATIENT)
Dept: SURGERY | Facility: CLINIC | Age: 45
End: 2025-07-09
Payer: COMMERCIAL

## 2025-07-10 ENCOUNTER — APPOINTMENT (OUTPATIENT)
Dept: PRIMARY CARE | Facility: CLINIC | Age: 45
End: 2025-07-10
Payer: COMMERCIAL

## 2025-07-10 VITALS
TEMPERATURE: 97.6 F | WEIGHT: 165 LBS | DIASTOLIC BLOOD PRESSURE: 52 MMHG | BODY MASS INDEX: 30.36 KG/M2 | SYSTOLIC BLOOD PRESSURE: 102 MMHG | HEART RATE: 74 BPM | HEIGHT: 62 IN | OXYGEN SATURATION: 95 %

## 2025-07-10 DIAGNOSIS — I50.33 ACUTE ON CHRONIC DIASTOLIC (CONGESTIVE) HEART FAILURE: ICD-10-CM

## 2025-07-10 DIAGNOSIS — M62.08 DIASTASIS RECTI: Primary | ICD-10-CM

## 2025-07-10 DIAGNOSIS — J42 CHRONIC BRONCHITIS, UNSPECIFIED CHRONIC BRONCHITIS TYPE (MULTI): ICD-10-CM

## 2025-07-10 DIAGNOSIS — J44.1 COPD WITH ACUTE EXACERBATION (MULTI): ICD-10-CM

## 2025-07-10 DIAGNOSIS — L29.9 GENERALIZED PRURITUS: ICD-10-CM

## 2025-07-10 DIAGNOSIS — G43.709 CHRONIC MIGRAINE W/O AURA W/O STATUS MIGRAINOSUS, NOT INTRACTABLE: ICD-10-CM

## 2025-07-10 DIAGNOSIS — Z01.818 PREOPERATIVE EVALUATION TO RULE OUT SURGICAL CONTRAINDICATION: Primary | ICD-10-CM

## 2025-07-10 DIAGNOSIS — I11.0 HYPERTENSIVE HEART DISEASE WITH HEART FAILURE: ICD-10-CM

## 2025-07-10 PROBLEM — I10 PRIMARY HYPERTENSION: Status: RESOLVED | Noted: 2023-03-22 | Resolved: 2025-07-10

## 2025-07-10 PROBLEM — J96.21 ACUTE ON CHRONIC RESPIRATORY FAILURE WITH HYPOXIA AND HYPERCAPNIA: Status: RESOLVED | Noted: 2025-05-17 | Resolved: 2025-07-10

## 2025-07-10 PROBLEM — E78.5 HYPERLIPIDEMIA: Status: RESOLVED | Noted: 2025-06-05 | Resolved: 2025-07-10

## 2025-07-10 PROBLEM — J96.21 ACUTE ON CHRONIC RESPIRATORY FAILURE WITH HYPOXIA: Status: RESOLVED | Noted: 2025-05-09 | Resolved: 2025-07-10

## 2025-07-10 PROBLEM — R06.02 SHORTNESS OF BREATH: Status: RESOLVED | Noted: 2024-05-16 | Resolved: 2025-07-10

## 2025-07-10 PROBLEM — I10 HYPERTENSION: Status: RESOLVED | Noted: 2025-06-05 | Resolved: 2025-07-10

## 2025-07-10 PROBLEM — D17.20 LIPOMA OF FOREARM: Status: RESOLVED | Noted: 2023-03-22 | Resolved: 2025-07-10

## 2025-07-10 PROBLEM — R06.00 DYSPNEA: Status: RESOLVED | Noted: 2025-06-05 | Resolved: 2025-07-10

## 2025-07-10 PROBLEM — J96.22 ACUTE ON CHRONIC RESPIRATORY FAILURE WITH HYPOXIA AND HYPERCAPNIA: Status: RESOLVED | Noted: 2025-05-17 | Resolved: 2025-07-10

## 2025-07-10 PROBLEM — Z30.2 STERILIZATION: Status: RESOLVED | Noted: 2024-04-17 | Resolved: 2025-07-10

## 2025-07-10 PROBLEM — T81.49XA POSTOPERATIVE WOUND INFECTION: Status: RESOLVED | Noted: 2025-06-05 | Resolved: 2025-07-10

## 2025-07-10 PROCEDURE — 3078F DIAST BP <80 MM HG: CPT | Performed by: INTERNAL MEDICINE

## 2025-07-10 PROCEDURE — 3008F BODY MASS INDEX DOCD: CPT | Performed by: INTERNAL MEDICINE

## 2025-07-10 PROCEDURE — 3074F SYST BP LT 130 MM HG: CPT | Performed by: INTERNAL MEDICINE

## 2025-07-10 PROCEDURE — 99215 OFFICE O/P EST HI 40 MIN: CPT | Performed by: INTERNAL MEDICINE

## 2025-07-10 RX ORDER — OXYCODONE AND ACETAMINOPHEN 5; 325 MG/1; MG/1
1 TABLET ORAL EVERY 6 HOURS PRN
Qty: 25 TABLET | Refills: 0 | Status: SHIPPED | OUTPATIENT
Start: 2025-07-10 | End: 2025-07-10 | Stop reason: WASHOUT

## 2025-07-10 ASSESSMENT — PAIN SCALES - GENERAL: PAINLEVEL_OUTOF10: 10-WORST PAIN EVER

## 2025-07-14 NOTE — PROGRESS NOTES
"Subjective     Patient ID: Stephenie Mccray is a 45 y.o. female who presents for New Patient Visit (Clearance for surgery).  History of Present Illness  The patient presents for a preoperative evaluation.    She has been under the care of Dr. Gonzalez for an abdominal hernia, the duration of which she cannot recall. She has a history of blood clots, with one occurring in 2018 and another in . Dr. Truong has recommended lifelong use of Xarelto, and she is currently on this medication.    She is seeking a refill of her Fioricet prescription for migraines, which was previously provided by Dr. Yuen. She has been using Excedrin for her migraines.    She is on Klonopin, risperidone, Buspar, and prazosin for her psychiatric conditions. She is under the care of Dr. Paola Escamilla, who she consults via phone calls. She also uses marijuana, which she grows herself.    She is on Januvia for diabetes, prescribed by her endocrinologist, Dr. Hoffman. Additionally, she has a thyroid problem and is taking medication for it.    She uses breathing treatments as needed for her COPD.    She maintains an active lifestyle, engaging in long walks with her .    PAST SURGICAL HISTORY:  Tonsillectomy, , and two ear surgeries.    SOCIAL HISTORY  She uses marijuana, which she grows herself. She has a 22-year-old daughter.    Objective   Vitals:    07/10/25 1523   BP: 102/52   BP Location: Left arm   Patient Position: Sitting   BP Cuff Size: Adult   Pulse: 74   Temp: 36.4 °C (97.6 °F)   TempSrc: Temporal   SpO2: 95%   Weight: 74.8 kg (165 lb)   Height: 1.575 m (5' 2\")     Wt Readings from Last 10 Encounters:   07/10/25 74.8 kg (165 lb)   25 73.9 kg (163 lb)   25 79.1 kg (174 lb 4.8 oz)   25 74.8 kg (165 lb)   25 74.8 kg (165 lb)   25 75.1 kg (165 lb 9.6 oz)   25 74.8 kg (165 lb)   25 78.1 kg (172 lb 2.9 oz)   25 72.6 kg (160 lb)   25 72.6 kg (160 lb) " "      Medication:  Current Outpatient Medications   Medication Instructions    albuterol 90 mcg/actuation inhaler Inhale 2 puffs every 6 hours if needed for shortness of breath. 1-2 puffs q6h PRN    alcohol swabs (Alcohol Prep Pads) pads, medicated Use 3 times daily prn    atorvastatin (LIPITOR) 20 mg, oral, Daily    BD Ultra-Fine Mini Pen Needle 31 gauge x 3/16\" needle USE TO INJECT 4 TIMES DAILY AS DIRECTED    busPIRone (BUSPAR) 30 mg, 2 times daily    clonazePAM (KlonoPIN) 0.5 mg tablet 1 tablet, Every 12 hours scheduled (0630,1830)    cloNIDine (CATAPRES) 0.1 mg, 2 times daily    escitalopram (LEXAPRO) 20 mg, Every morning    ipratropium-albuteroL (Duo-Neb) 0.5-2.5 mg/3 mL nebulizer solution 3 mL, nebulization, Every 6 hours PRN    Januvia 50 mg, oral, Daily (0630)    levothyroxine (SYNTHROID, LEVOXYL) 175 mcg, oral, Daily    metoprolol tartrate (LOPRESSOR) 25 mg, oral, 2 times daily    nebulizer accessories (Adult Aerosol Mask) [facility] Use mask as directed with nebulizer    oxyCODONE-acetaminophen (Percocet) 5-325 mg tablet 1 tablet, oral, Every 6 hours PRN    prazosin (Minipress) 2 mg capsule 2 capsules, Nightly    risperiDONE (RisperDAL) 2 mg tablet 1 tablet, Every 12 hours scheduled (0630,1830)    tiotropium (Spiriva Respimat) 2.5 mcg/actuation inhaler 2 puffs, inhalation, Daily RT    traZODone (Desyrel) 50 mg tablet 1 tablet, Nightly    ubrogepant (Ubrelvy) 100 mg tablet Use for headache    Xarelto 10 mg, Daily     Physical Exam  Gen:  Not in any acute distress   HE ENT: No pallor, No carotid bruit,  No thyromegaly   Lungs:  Clear to auscultation without rales, rhonchi, or rub.  Heart:  RRR, S1, S2, No murmur   Abd: No epigastric tenderness, No CVA tenderness   Extremities: No edema, calf swelling or tenderness.    Skin:  No rash, ecchymosis or erythema.      Review of Systems:  Respiratory:  Denies stridor. No blood in sputum   Cardiovascular:  Denies chest pain, no sudden excessive sweating "   Gastrointestinal:  Denies sour burping, no blood in stool    Genitourinary:  Denies blood in urine    Skin:  No new spot changing color or shape or border    Neurological: Denies speech difficulty, no memory disturbance        Recent Labs:   Lab Results   Component Value Date    WBC 8.8 06/17/2025    HGB 12.3 06/17/2025    HCT 39.6 06/17/2025     (L) 06/17/2025    CHOL 165 10/02/2024    TRIG 157 (H) 10/02/2024    HDL 45.8 10/02/2024    ALT 15 06/17/2025    AST 6 (L) 06/17/2025     06/17/2025    K 3.9 06/17/2025     06/17/2025    CREATININE 0.85 06/17/2025    BUN 29 (H) 06/17/2025    CO2 32 06/17/2025    TSH 9.02 (H) 06/14/2025    INR 1.0 03/20/2025    HGBA1C 6.8 (H) 04/15/2025     Lab Results   Component Value Date    GLUCOSE 146 (H) 06/17/2025    CALCIUM 9.4 06/17/2025     06/17/2025    K 3.9 06/17/2025    CO2 32 06/17/2025     06/17/2025    BUN 29 (H) 06/17/2025    CREATININE 0.85 06/17/2025      Lab Results   Component Value Date    LDLCALC 88 10/02/2024     Lab Results   Component Value Date    HGBA1C 6.8 (H) 04/15/2025    HGBA1C 7.1 (H) 10/02/2024    HGBA1C 7.1 (H) 04/10/2024     Lab Results   Component Value Date    LDLCALC 88 10/02/2024    CREATININE 0.85 06/17/2025       Diagnosis and Orders:     Preoperative evaluation to rule out surgical contraindication  Generalized pruritus  -     Referral to Primary Care  Chronic bronchitis, unspecified chronic bronchitis type (Multi)  -     Referral to Primary Care  Chronic migraine w/o aura w/o status migrainosus, not intractable  -     Referral to Primary Care  COPD with acute exacerbation (Multi)  -     Referral to Primary Care  Hypertensive heart disease with heart failure  Acute on chronic diastolic (congestive) heart failure         Assessment & Plan  1. Preoperative evaluation.  - A report will be sent to Dr. Gonzalez indicating that she is medically fit for surgery.    2. Migraine.  - Advised to continue using Excedrin for  migraines.  - Discussed potential risks of Fioricet, including interactions with psychiatric medications and marijuana, which could be fatal.  - Informed that her neurologist might consider administering an injection for migraines.    3. Psychiatric disorder.  - Currently on Klonopin, risperidone, Buspar, and prazosin for psychiatric condition.  - Advised to continue current medication regimen.    4. Diabetes mellitus.  - On Januvia for diabetes, prescribed by endocrinologist, Dr. Hoffman.    5. Thyroid disorder.  - Taking medication for thyroid condition.    6. Chronic obstructive pulmonary disease.  - Uses breathing treatments as needed for COPD.    Follow-up  - Follow-up appointment scheduled for 11/2025.              This medical note was created with the assistance of artificial intelligence (AI) for documentation purposes. The content has been reviewed and confirmed by the healthcare provider for accuracy and completeness. Patient consented to the use of audio recording and use of AI during their visit.

## 2025-07-14 NOTE — RESULT ENCOUNTER NOTE
Please call the patient regarding her abnormal result.  Complete PFTs reveal moderately severe obstructive disease.  Recommend patient follow-up with pulmonology for clearance prior to her surgery.  Does the patient normally see Dr. Villalba?

## 2025-07-17 ENCOUNTER — ANESTHESIA EVENT (OUTPATIENT)
Dept: OPERATING ROOM | Facility: HOSPITAL | Age: 45
End: 2025-07-17
Payer: COMMERCIAL

## 2025-07-17 ENCOUNTER — TELEPHONE (OUTPATIENT)
Dept: PRIMARY CARE | Facility: CLINIC | Age: 45
End: 2025-07-17
Payer: COMMERCIAL

## 2025-07-17 PROBLEM — R93.1 DECREASED CARDIAC EJECTION FRACTION: Status: RESOLVED | Noted: 2025-07-17 | Resolved: 2025-07-17

## 2025-07-17 PROBLEM — Z79.01 ANTICOAGULANT LONG-TERM USE: Status: ACTIVE | Noted: 2025-07-17

## 2025-07-17 PROBLEM — Z94.1: Status: ACTIVE | Noted: 2025-07-17

## 2025-07-17 PROBLEM — I50.9 CHF (CONGESTIVE HEART FAILURE): Status: ACTIVE | Noted: 2025-07-17

## 2025-07-17 PROBLEM — Z94.1: Status: RESOLVED | Noted: 2025-07-17 | Resolved: 2025-07-17

## 2025-07-17 PROBLEM — R93.1 DECREASED CARDIAC EJECTION FRACTION: Status: ACTIVE | Noted: 2025-07-17

## 2025-07-17 SDOH — HEALTH STABILITY: MENTAL HEALTH: CURRENT SMOKER: 0

## 2025-07-17 NOTE — TELEPHONE ENCOUNTER
Received vm from Dr Gonzalez. Stating that they never received a report stating that she was cleared for surgery tomorrow 7/18/25. Faxed OV notes from 7/10/25 stating she was cleared for surgery. Faxed to [phone] phone number is [phone].

## 2025-07-17 NOTE — ANESTHESIA PREPROCEDURE EVALUATION
Patient: Stephenie Mccray    Procedure Information       Date/Time: 07/18/25 1120    Procedure: REPAIR, HERNIA, VENTRAL, ROBOT-ASSISTED - PATIENT IS DIABETIC, CLEARANCE X 2 SENT.------ PER  OFFICE NOTE FROM 07/10 PATIENT MEDICALLY CLEARED FOR SURGERY--    Location: [facility] / [facility]    Surgeons: Alvin MARTINS MD            Relevant Problems   Cardiac  EKG:Sinus tachycardia  Possible Left atrial enlargement  Left axis deviation  Cannot rule out Anterior infarct , age undetermined      Echo(2024): 1. Left ventricular systolic function is normal with a 55% estimated ejection fraction.   2. There is no evidence of mitral valve stenosis.   3. Trace mitral valve regurgitation.   4. Trace to mild tricuspid regurgitation.   5. Aortic valve stenosis is not present.   6. The main pulmonary artery is normal in size, and position, with normal bifurcation into the left and right pulmonary arteries.     (+) CHF (congestive heart failure)   (+) Decreased cardiac ejection fraction (Resolved)   (+) Hx of heart transplant (Resolved)   (+) Mixed hyperlipidemia      Pulmonary   (+) Acute exacerbation of chronic obstructive pulmonary disease (COPD) (Multi)   (+) COPD (chronic obstructive pulmonary disease) (Multi)   (+) Pulmonary embolism      Neuro   (+) Schizophrenia      GI   (+) Acid reflux      /Renal   (+) Multiple renal calculi      Endocrine   (+) Acquired hypothyroidism   (+) Class 1 obesity due to excess calories with serious comorbidity and body mass index (BMI) of 30.0 to 30.9 in adult   (+) Hypothyroidism   (+) Type 2 diabetes mellitus without complication, with long-term current use of insulin      Hematology   (+) Anticoagulant long-term use      Respiratory   (+) Acute on chronic respiratory failure with hypercapnia      Circulatory   (+) Acute on chronic diastolic (congestive) heart failure   (+) Hypertensive heart disease with heart failure      Digestive   (+) Diastasis recti       Infectious/Inflammatory   (+) Superficial thrombophlebitis      Hematology and Neoplasia   (+) Ovarian teratoma      Mental Health   (+) Marijuana abuse       Clinical information reviewed:   Tobacco  Allergies  Meds   Med Hx  Surg Hx  OB Status  Fam Hx  Soc   Hx        NPO Detail:  NPO/Void Status  Carbohydrate Drink Given Prior to Surgery? : N  Date of Last Liquid: 07/17/25  Time of Last Liquid: 2359  Date of Last Solid: 07/17/25  Time of Last Solid: 2359  Last Intake Type: Clear fluids  Time of Last Void: 1015         Physical Exam    Airway  Mallampati: III  TM distance: >3 FB  Neck ROM: full  Mouth opening: 3 or more finger widths     Cardiovascular   Rhythm: regular  Rate: normal     Dental     (+) upper dentures     Pulmonary (+) decreased breath sounds     Abdominal (+) obese             Anesthesia Plan    History of general anesthesia?: yes  History of complications of general anesthesia?: no    ASA 3     general   (GETA)  The patient is not a current smoker.    intravenous induction   Postoperative pain plan includes opioids.  Trial extubation is planned.  Anesthetic plan and risks discussed with patient and mother.  Use of blood products discussed with patient and mother who consented to blood products.    Plan discussed with CRNA.

## 2025-07-18 ENCOUNTER — HOSPITAL ENCOUNTER (INPATIENT)
Facility: HOSPITAL | Age: 45
LOS: 1 days | Discharge: HOME | DRG: 500 | End: 2025-07-19
Attending: SURGERY | Admitting: STUDENT IN AN ORGANIZED HEALTH CARE EDUCATION/TRAINING PROGRAM
Payer: COMMERCIAL

## 2025-07-18 ENCOUNTER — APPOINTMENT (OUTPATIENT)
Dept: CARDIOLOGY | Facility: HOSPITAL | Age: 45
DRG: 500 | End: 2025-07-18
Payer: COMMERCIAL

## 2025-07-18 ENCOUNTER — APPOINTMENT (OUTPATIENT)
Dept: RADIOLOGY | Facility: HOSPITAL | Age: 45
DRG: 500 | End: 2025-07-18
Payer: COMMERCIAL

## 2025-07-18 ENCOUNTER — ANESTHESIA (OUTPATIENT)
Dept: OPERATING ROOM | Facility: HOSPITAL | Age: 45
End: 2025-07-18
Payer: COMMERCIAL

## 2025-07-18 DIAGNOSIS — J42 CHRONIC BRONCHITIS, UNSPECIFIED CHRONIC BRONCHITIS TYPE (MULTI): ICD-10-CM

## 2025-07-18 DIAGNOSIS — M62.08 DIASTASIS RECTI: Primary | ICD-10-CM

## 2025-07-18 LAB
ALBUMIN SERPL BCP-MCNC: 3.6 G/DL (ref 3.4–5)
ALP SERPL-CCNC: 57 U/L (ref 33–110)
ALT SERPL W P-5'-P-CCNC: 7 U/L (ref 7–45)
ANION GAP BLDA CALCULATED.4IONS-SCNC: 11 MMO/L (ref 10–25)
ANION GAP SERPL CALC-SCNC: 16 MMOL/L (ref 10–20)
ARTERIAL PATENCY WRIST A: ABNORMAL
AST SERPL W P-5'-P-CCNC: 9 U/L (ref 9–39)
ATRIAL RATE: 100 BPM
ATRIAL RATE: 84 BPM
BASE EXCESS BLDA CALC-SCNC: 1.9 MMOL/L (ref -2–3)
BILIRUB SERPL-MCNC: 0.3 MG/DL (ref 0–1.2)
BODY TEMPERATURE: ABNORMAL
BUN SERPL-MCNC: 12 MG/DL (ref 6–23)
CA-I BLDA-SCNC: 1.18 MMOL/L (ref 1.1–1.33)
CALCIUM SERPL-MCNC: 8.4 MG/DL (ref 8.6–10.3)
CHLORIDE BLDA-SCNC: 102 MMOL/L (ref 98–107)
CHLORIDE SERPL-SCNC: 104 MMOL/L (ref 98–107)
CO2 SERPL-SCNC: 23 MMOL/L (ref 21–32)
CREAT SERPL-MCNC: 0.82 MG/DL (ref 0.5–1.05)
EGFRCR SERPLBLD CKD-EPI 2021: >90 ML/MIN/1.73M*2
ERYTHROCYTE [DISTWIDTH] IN BLOOD BY AUTOMATED COUNT: 14.9 % (ref 11.5–14.5)
GLUCOSE BLD MANUAL STRIP-MCNC: 141 MG/DL (ref 74–99)
GLUCOSE BLD MANUAL STRIP-MCNC: 201 MG/DL (ref 74–99)
GLUCOSE BLD MANUAL STRIP-MCNC: 280 MG/DL (ref 74–99)
GLUCOSE BLD MANUAL STRIP-MCNC: 320 MG/DL (ref 74–99)
GLUCOSE BLDA-MCNC: 231 MG/DL (ref 74–99)
GLUCOSE SERPL-MCNC: 295 MG/DL (ref 74–99)
HCO3 BLDA-SCNC: 30.3 MMOL/L (ref 22–26)
HCT VFR BLD AUTO: 42.4 % (ref 36–46)
HCT VFR BLD EST: 43 % (ref 36–46)
HGB BLD-MCNC: 13 G/DL (ref 12–16)
HGB BLDA-MCNC: 14.3 G/DL (ref 12–16)
INHALED O2 CONCENTRATION: 100 %
LACTATE BLDA-SCNC: 0.9 MMOL/L (ref 0.4–2)
MCH RBC QN AUTO: 32.2 PG (ref 26–34)
MCHC RBC AUTO-ENTMCNC: 30.7 G/DL (ref 32–36)
MCV RBC AUTO: 105 FL (ref 80–100)
NRBC BLD-RTO: 0 /100 WBCS (ref 0–0)
OXYHGB MFR BLDA: 88.6 % (ref 94–98)
P AXIS: 56 DEGREES
P AXIS: 57 DEGREES
P OFFSET: 191 MS
P OFFSET: 196 MS
P ONSET: 140 MS
P ONSET: 146 MS
PCO2 BLDA: 63 MM HG (ref 38–42)
PEEP CMH2O: 8 CM H2O
PH BLDA: 7.29 PH (ref 7.38–7.42)
PLATELET # BLD AUTO: 94 X10*3/UL (ref 150–450)
PO2 BLDA: 86 MM HG (ref 85–95)
POTASSIUM BLDA-SCNC: 4.2 MMOL/L (ref 3.5–5.3)
POTASSIUM SERPL-SCNC: 3.2 MMOL/L (ref 3.5–5.3)
PR INTERVAL: 128 MS
PR INTERVAL: 132 MS
PREGNANCY TEST URINE, POC: NEGATIVE
PROT SERPL-MCNC: 5.7 G/DL (ref 6.4–8.2)
Q ONSET: 206 MS
Q ONSET: 210 MS
QRS COUNT: 14 BEATS
QRS COUNT: 17 BEATS
QRS DURATION: 84 MS
QRS DURATION: 90 MS
QT INTERVAL: 376 MS
QT INTERVAL: 378 MS
QTC CALCULATION(BAZETT): 446 MS
QTC CALCULATION(BAZETT): 485 MS
QTC FREDERICIA: 422 MS
QTC FREDERICIA: 446 MS
R AXIS: -43 DEGREES
R AXIS: -57 DEGREES
RBC # BLD AUTO: 4.04 X10*6/UL (ref 4–5.2)
SAO2 % BLDA: 98 % (ref 94–100)
SITE OF ARTERIAL PUNCTURE: ABNORMAL
SODIUM BLDA-SCNC: 139 MMOL/L (ref 136–145)
SODIUM SERPL-SCNC: 140 MMOL/L (ref 136–145)
SPECIMEN DRAWN FROM PATIENT: ABNORMAL
T AXIS: 26 DEGREES
T AXIS: 26 DEGREES
T OFFSET: 395 MS
T OFFSET: 398 MS
TIDAL VOLUME: 450 ML
VENTILATOR MODE: ABNORMAL
VENTILATOR RATE: 16 BPM
VENTRICULAR RATE: 100 BPM
VENTRICULAR RATE: 84 BPM
WBC # BLD AUTO: 10.5 X10*3/UL (ref 4.4–11.3)

## 2025-07-18 PROCEDURE — 2500000005 HC RX 250 GENERAL PHARMACY W/O HCPCS

## 2025-07-18 PROCEDURE — 49593 RPR AA HRN 1ST 3-10 RDC: CPT | Performed by: SURGERY

## 2025-07-18 PROCEDURE — 3600000018 HC OR TIME - INITIAL BASE CHARGE - PROCEDURE LEVEL SIX: Performed by: SURGERY

## 2025-07-18 PROCEDURE — 93005 ELECTROCARDIOGRAM TRACING: CPT

## 2025-07-18 PROCEDURE — 3600000017 HC OR TIME - EACH INCREMENTAL 1 MINUTE - PROCEDURE LEVEL SIX: Performed by: SURGERY

## 2025-07-18 PROCEDURE — 2500000004 HC RX 250 GENERAL PHARMACY W/ HCPCS (ALT 636 FOR OP/ED): Mod: JW | Performed by: STUDENT IN AN ORGANIZED HEALTH CARE EDUCATION/TRAINING PROGRAM

## 2025-07-18 PROCEDURE — 2500000002 HC RX 250 W HCPCS SELF ADMINISTERED DRUGS (ALT 637 FOR MEDICARE OP, ALT 636 FOR OP/ED)

## 2025-07-18 PROCEDURE — 8E0W4CZ ROBOTIC ASSISTED PROCEDURE OF TRUNK REGION, PERCUTANEOUS ENDOSCOPIC APPROACH: ICD-10-PCS | Performed by: SURGERY

## 2025-07-18 PROCEDURE — 2500000004 HC RX 250 GENERAL PHARMACY W/ HCPCS (ALT 636 FOR OP/ED)

## 2025-07-18 PROCEDURE — 0B9F8ZX DRAINAGE OF RIGHT LOWER LUNG LOBE, VIA NATURAL OR ARTIFICIAL OPENING ENDOSCOPIC, DIAGNOSTIC: ICD-10-PCS | Performed by: STUDENT IN AN ORGANIZED HEALTH CARE EDUCATION/TRAINING PROGRAM

## 2025-07-18 PROCEDURE — 99291 CRITICAL CARE FIRST HOUR: CPT

## 2025-07-18 PROCEDURE — 81025 URINE PREGNANCY TEST: CPT | Performed by: SURGERY

## 2025-07-18 PROCEDURE — 2500000002 HC RX 250 W HCPCS SELF ADMINISTERED DRUGS (ALT 637 FOR MEDICARE OP, ALT 636 FOR OP/ED): Performed by: STUDENT IN AN ORGANIZED HEALTH CARE EDUCATION/TRAINING PROGRAM

## 2025-07-18 PROCEDURE — 96372 THER/PROPH/DIAG INJ SC/IM: CPT | Performed by: SURGERY

## 2025-07-18 PROCEDURE — 2500000004 HC RX 250 GENERAL PHARMACY W/ HCPCS (ALT 636 FOR OP/ED): Performed by: SURGERY

## 2025-07-18 PROCEDURE — S2900 ROBOTIC SURGICAL SYSTEM: HCPCS | Performed by: SURGERY

## 2025-07-18 PROCEDURE — 3700000001 HC GENERAL ANESTHESIA TIME - INITIAL BASE CHARGE: Performed by: SURGERY

## 2025-07-18 PROCEDURE — 3700000002 HC GENERAL ANESTHESIA TIME - EACH INCREMENTAL 1 MINUTE: Performed by: SURGERY

## 2025-07-18 PROCEDURE — 87205 SMEAR GRAM STAIN: CPT | Mod: ELYLAB | Performed by: STUDENT IN AN ORGANIZED HEALTH CARE EDUCATION/TRAINING PROGRAM

## 2025-07-18 PROCEDURE — 0KQL4ZZ REPAIR LEFT ABDOMEN MUSCLE, PERCUTANEOUS ENDOSCOPIC APPROACH: ICD-10-PCS | Performed by: SURGERY

## 2025-07-18 PROCEDURE — 94002 VENT MGMT INPAT INIT DAY: CPT

## 2025-07-18 PROCEDURE — 2500000001 HC RX 250 WO HCPCS SELF ADMINISTERED DRUGS (ALT 637 FOR MEDICARE OP): Performed by: NURSE ANESTHETIST, CERTIFIED REGISTERED

## 2025-07-18 PROCEDURE — 2020000001 HC ICU ROOM DAILY

## 2025-07-18 PROCEDURE — 2720000007 HC OR 272 NO HCPCS: Performed by: SURGERY

## 2025-07-18 PROCEDURE — 85027 COMPLETE CBC AUTOMATED: CPT

## 2025-07-18 PROCEDURE — 71045 X-RAY EXAM CHEST 1 VIEW: CPT

## 2025-07-18 PROCEDURE — 2500000004 HC RX 250 GENERAL PHARMACY W/ HCPCS (ALT 636 FOR OP/ED): Performed by: NURSE ANESTHETIST, CERTIFIED REGISTERED

## 2025-07-18 PROCEDURE — 2500000005 HC RX 250 GENERAL PHARMACY W/O HCPCS: Performed by: SURGERY

## 2025-07-18 PROCEDURE — 84132 ASSAY OF SERUM POTASSIUM: CPT | Performed by: SURGERY

## 2025-07-18 PROCEDURE — 94640 AIRWAY INHALATION TREATMENT: CPT

## 2025-07-18 PROCEDURE — 84132 ASSAY OF SERUM POTASSIUM: CPT

## 2025-07-18 PROCEDURE — 36415 COLL VENOUS BLD VENIPUNCTURE: CPT

## 2025-07-18 PROCEDURE — 82947 ASSAY GLUCOSE BLOOD QUANT: CPT

## 2025-07-18 PROCEDURE — 71045 X-RAY EXAM CHEST 1 VIEW: CPT | Performed by: RADIOLOGY

## 2025-07-18 RX ORDER — FENTANYL CITRATE 50 UG/ML
INJECTION, SOLUTION INTRAMUSCULAR; INTRAVENOUS
Status: COMPLETED
Start: 2025-07-18 | End: 2025-07-18

## 2025-07-18 RX ORDER — TRAZODONE HYDROCHLORIDE 50 MG/1
50 TABLET ORAL NIGHTLY
Status: DISCONTINUED | OUTPATIENT
Start: 2025-07-18 | End: 2025-07-19 | Stop reason: HOSPADM

## 2025-07-18 RX ORDER — RISPERIDONE 1 MG/1
2 TABLET ORAL
Status: DISCONTINUED | OUTPATIENT
Start: 2025-07-18 | End: 2025-07-19 | Stop reason: HOSPADM

## 2025-07-18 RX ORDER — ALBUTEROL SULFATE 0.83 MG/ML
7.5 SOLUTION RESPIRATORY (INHALATION) ONCE
Status: COMPLETED | OUTPATIENT
Start: 2025-07-18 | End: 2025-07-18

## 2025-07-18 RX ORDER — FENTANYL CITRATE 50 UG/ML
12.5 INJECTION, SOLUTION INTRAMUSCULAR; INTRAVENOUS EVERY 5 MIN PRN
Refills: 0 | Status: CANCELLED | OUTPATIENT
Start: 2025-07-18

## 2025-07-18 RX ORDER — LIDOCAINE HYDROCHLORIDE 20 MG/ML
INJECTION, SOLUTION INFILTRATION; PERINEURAL AS NEEDED
Status: DISCONTINUED | OUTPATIENT
Start: 2025-07-18 | End: 2025-07-18

## 2025-07-18 RX ORDER — ESCITALOPRAM OXALATE 10 MG/1
20 TABLET ORAL EVERY MORNING
Status: DISCONTINUED | OUTPATIENT
Start: 2025-07-19 | End: 2025-07-19 | Stop reason: HOSPADM

## 2025-07-18 RX ORDER — PANTOPRAZOLE SODIUM 40 MG/10ML
40 INJECTION, POWDER, LYOPHILIZED, FOR SOLUTION INTRAVENOUS
Status: DISCONTINUED | OUTPATIENT
Start: 2025-07-19 | End: 2025-07-19 | Stop reason: HOSPADM

## 2025-07-18 RX ORDER — SODIUM CHLORIDE, SODIUM LACTATE, POTASSIUM CHLORIDE, CALCIUM CHLORIDE 600; 310; 30; 20 MG/100ML; MG/100ML; MG/100ML; MG/100ML
100 INJECTION, SOLUTION INTRAVENOUS CONTINUOUS
Status: CANCELLED | OUTPATIENT
Start: 2025-07-18 | End: 2025-07-19

## 2025-07-18 RX ORDER — HYDROCODONE BITARTRATE AND ACETAMINOPHEN 5; 325 MG/1; MG/1
1 TABLET ORAL EVERY 6 HOURS PRN
Qty: 12 TABLET | Refills: 0 | Status: SHIPPED | OUTPATIENT
Start: 2025-07-18 | End: 2025-07-25

## 2025-07-18 RX ORDER — FENTANYL CITRATE 50 UG/ML
100 INJECTION, SOLUTION INTRAMUSCULAR; INTRAVENOUS ONCE
Status: COMPLETED | OUTPATIENT
Start: 2025-07-18 | End: 2025-07-18

## 2025-07-18 RX ORDER — DEXMEDETOMIDINE HYDROCHLORIDE 4 UG/ML
0-1.5 INJECTION, SOLUTION INTRAVENOUS CONTINUOUS
Status: DISCONTINUED | OUTPATIENT
Start: 2025-07-18 | End: 2025-07-18

## 2025-07-18 RX ORDER — HYDROMORPHONE HYDROCHLORIDE 1 MG/ML
INJECTION, SOLUTION INTRAMUSCULAR; INTRAVENOUS; SUBCUTANEOUS AS NEEDED
Status: DISCONTINUED | OUTPATIENT
Start: 2025-07-18 | End: 2025-07-18

## 2025-07-18 RX ORDER — PANTOPRAZOLE SODIUM 40 MG/1
40 TABLET, DELAYED RELEASE ORAL
Status: DISCONTINUED | OUTPATIENT
Start: 2025-07-19 | End: 2025-07-19 | Stop reason: HOSPADM

## 2025-07-18 RX ORDER — PROPOFOL 10 MG/ML
INJECTION, EMULSION INTRAVENOUS AS NEEDED
Status: DISCONTINUED | OUTPATIENT
Start: 2025-07-18 | End: 2025-07-18

## 2025-07-18 RX ORDER — DEXMEDETOMIDINE HYDROCHLORIDE 4 UG/ML
0-1.5 INJECTION, SOLUTION INTRAVENOUS CONTINUOUS
Status: DISCONTINUED | OUTPATIENT
Start: 2025-07-18 | End: 2025-07-19

## 2025-07-18 RX ORDER — NALOXONE HYDROCHLORIDE 0.4 MG/ML
INJECTION, SOLUTION INTRAMUSCULAR; INTRAVENOUS; SUBCUTANEOUS AS NEEDED
Status: DISCONTINUED | OUTPATIENT
Start: 2025-07-18 | End: 2025-07-18

## 2025-07-18 RX ORDER — DIPHENHYDRAMINE HYDROCHLORIDE 50 MG/ML
INJECTION, SOLUTION INTRAMUSCULAR; INTRAVENOUS AS NEEDED
Status: DISCONTINUED | OUTPATIENT
Start: 2025-07-18 | End: 2025-07-18

## 2025-07-18 RX ORDER — CLONIDINE HYDROCHLORIDE 0.1 MG/1
0.1 TABLET ORAL 2 TIMES DAILY
Status: DISCONTINUED | OUTPATIENT
Start: 2025-07-18 | End: 2025-07-19 | Stop reason: HOSPADM

## 2025-07-18 RX ORDER — HEPARIN SODIUM 5000 [USP'U]/ML
5000 INJECTION, SOLUTION INTRAVENOUS; SUBCUTANEOUS ONCE
Status: COMPLETED | OUTPATIENT
Start: 2025-07-18 | End: 2025-07-18

## 2025-07-18 RX ORDER — DEXMEDETOMIDINE HYDROCHLORIDE 4 UG/ML
INJECTION, SOLUTION INTRAVENOUS
Status: COMPLETED
Start: 2025-07-18 | End: 2025-07-18

## 2025-07-18 RX ORDER — OXYCODONE HYDROCHLORIDE 5 MG/1
10 TABLET ORAL EVERY 4 HOURS PRN
Refills: 0 | Status: CANCELLED | OUTPATIENT
Start: 2025-07-18

## 2025-07-18 RX ORDER — ACETAMINOPHEN 325 MG/1
650 TABLET ORAL EVERY 4 HOURS PRN
Status: CANCELLED | OUTPATIENT
Start: 2025-07-18

## 2025-07-18 RX ORDER — FENTANYL CITRATE 50 UG/ML
INJECTION, SOLUTION INTRAMUSCULAR; INTRAVENOUS AS NEEDED
Status: DISCONTINUED | OUTPATIENT
Start: 2025-07-18 | End: 2025-07-18

## 2025-07-18 RX ORDER — ACETAMINOPHEN 325 MG/1
650 TABLET ORAL EVERY 6 HOURS PRN
Status: DISCONTINUED | OUTPATIENT
Start: 2025-07-18 | End: 2025-07-19 | Stop reason: HOSPADM

## 2025-07-18 RX ORDER — ALBUTEROL SULFATE 90 UG/1
INHALANT RESPIRATORY (INHALATION) AS NEEDED
Status: DISCONTINUED | OUTPATIENT
Start: 2025-07-18 | End: 2025-07-18

## 2025-07-18 RX ORDER — LABETALOL HYDROCHLORIDE 5 MG/ML
5 INJECTION, SOLUTION INTRAVENOUS ONCE AS NEEDED
Status: CANCELLED | OUTPATIENT
Start: 2025-07-18

## 2025-07-18 RX ORDER — ROCURONIUM BROMIDE 10 MG/ML
INJECTION, SOLUTION INTRAVENOUS AS NEEDED
Status: DISCONTINUED | OUTPATIENT
Start: 2025-07-18 | End: 2025-07-18

## 2025-07-18 RX ORDER — ALBUTEROL SULFATE 0.83 MG/ML
2.5 SOLUTION RESPIRATORY (INHALATION) ONCE AS NEEDED
Status: CANCELLED | OUTPATIENT
Start: 2025-07-18

## 2025-07-18 RX ORDER — ONDANSETRON HYDROCHLORIDE 2 MG/ML
4 INJECTION, SOLUTION INTRAVENOUS ONCE AS NEEDED
Status: CANCELLED | OUTPATIENT
Start: 2025-07-18

## 2025-07-18 RX ORDER — DEXTROSE 50 % IN WATER (D50W) INTRAVENOUS SYRINGE
12.5
Status: DISCONTINUED | OUTPATIENT
Start: 2025-07-18 | End: 2025-07-19 | Stop reason: HOSPADM

## 2025-07-18 RX ORDER — INSULIN LISPRO 100 [IU]/ML
0-10 INJECTION, SOLUTION INTRAVENOUS; SUBCUTANEOUS EVERY 4 HOURS
Status: DISCONTINUED | OUTPATIENT
Start: 2025-07-18 | End: 2025-07-19 | Stop reason: HOSPADM

## 2025-07-18 RX ORDER — FENTANYL CITRATE 50 UG/ML
25 INJECTION, SOLUTION INTRAMUSCULAR; INTRAVENOUS
Status: DISCONTINUED | OUTPATIENT
Start: 2025-07-18 | End: 2025-07-19 | Stop reason: HOSPADM

## 2025-07-18 RX ORDER — CLONAZEPAM 0.5 MG/1
0.5 TABLET ORAL
Status: DISCONTINUED | OUTPATIENT
Start: 2025-07-18 | End: 2025-07-19 | Stop reason: HOSPADM

## 2025-07-18 RX ORDER — SODIUM CHLORIDE 0.9 G/100ML
INJECTION, SOLUTION IRRIGATION AS NEEDED
Status: DISCONTINUED | OUTPATIENT
Start: 2025-07-18 | End: 2025-07-18 | Stop reason: HOSPADM

## 2025-07-18 RX ORDER — FENTANYL CITRATE 50 UG/ML
25 INJECTION, SOLUTION INTRAMUSCULAR; INTRAVENOUS EVERY 5 MIN PRN
Refills: 0 | Status: CANCELLED | OUTPATIENT
Start: 2025-07-18

## 2025-07-18 RX ORDER — OXYCODONE HYDROCHLORIDE 5 MG/1
5 TABLET ORAL EVERY 4 HOURS PRN
Refills: 0 | Status: CANCELLED | OUTPATIENT
Start: 2025-07-18

## 2025-07-18 RX ORDER — CEFAZOLIN SODIUM 2 G/50ML
2 SOLUTION INTRAVENOUS ONCE
Status: COMPLETED | OUTPATIENT
Start: 2025-07-18 | End: 2025-07-18

## 2025-07-18 RX ORDER — BUPIVACAINE HCL/EPINEPHRINE 0.25-.0005
VIAL (ML) INJECTION AS NEEDED
Status: DISCONTINUED | OUTPATIENT
Start: 2025-07-18 | End: 2025-07-18 | Stop reason: HOSPADM

## 2025-07-18 RX ORDER — MIDAZOLAM HYDROCHLORIDE 1 MG/ML
INJECTION, SOLUTION INTRAMUSCULAR; INTRAVENOUS AS NEEDED
Status: DISCONTINUED | OUTPATIENT
Start: 2025-07-18 | End: 2025-07-18

## 2025-07-18 RX ORDER — ONDANSETRON HYDROCHLORIDE 2 MG/ML
INJECTION, SOLUTION INTRAVENOUS AS NEEDED
Status: DISCONTINUED | OUTPATIENT
Start: 2025-07-18 | End: 2025-07-18

## 2025-07-18 RX ORDER — IPRATROPIUM BROMIDE AND ALBUTEROL SULFATE 2.5; .5 MG/3ML; MG/3ML
6 SOLUTION RESPIRATORY (INHALATION) ONCE
Status: COMPLETED | OUTPATIENT
Start: 2025-07-18 | End: 2025-07-18

## 2025-07-18 RX ORDER — BUSPIRONE HYDROCHLORIDE 10 MG/1
30 TABLET ORAL 2 TIMES DAILY
Status: DISCONTINUED | OUTPATIENT
Start: 2025-07-18 | End: 2025-07-19 | Stop reason: HOSPADM

## 2025-07-18 RX ORDER — FAMOTIDINE 10 MG/ML
INJECTION INTRAVENOUS AS NEEDED
Status: DISCONTINUED | OUTPATIENT
Start: 2025-07-18 | End: 2025-07-18

## 2025-07-18 RX ORDER — IPRATROPIUM BROMIDE AND ALBUTEROL SULFATE 2.5; .5 MG/3ML; MG/3ML
3 SOLUTION RESPIRATORY (INHALATION)
Status: DISCONTINUED | OUTPATIENT
Start: 2025-07-18 | End: 2025-07-19 | Stop reason: HOSPADM

## 2025-07-18 RX ORDER — METOPROLOL TARTRATE 25 MG/1
25 TABLET, FILM COATED ORAL 2 TIMES DAILY
Status: DISCONTINUED | OUTPATIENT
Start: 2025-07-18 | End: 2025-07-19 | Stop reason: HOSPADM

## 2025-07-18 RX ORDER — ATORVASTATIN CALCIUM 20 MG/1
20 TABLET, FILM COATED ORAL DAILY
Status: DISCONTINUED | OUTPATIENT
Start: 2025-07-18 | End: 2025-07-19 | Stop reason: HOSPADM

## 2025-07-18 RX ORDER — SODIUM CHLORIDE, SODIUM LACTATE, POTASSIUM CHLORIDE, CALCIUM CHLORIDE 600; 310; 30; 20 MG/100ML; MG/100ML; MG/100ML; MG/100ML
50 INJECTION, SOLUTION INTRAVENOUS CONTINUOUS
Status: DISCONTINUED | OUTPATIENT
Start: 2025-07-18 | End: 2025-07-18

## 2025-07-18 RX ORDER — LIDOCAINE HYDROCHLORIDE 10 MG/ML
0.1 INJECTION, SOLUTION EPIDURAL; INFILTRATION; INTRACAUDAL; PERINEURAL ONCE
Status: CANCELLED | OUTPATIENT
Start: 2025-07-18 | End: 2025-07-18

## 2025-07-18 RX ORDER — DEXTROSE 50 % IN WATER (D50W) INTRAVENOUS SYRINGE
25
Status: DISCONTINUED | OUTPATIENT
Start: 2025-07-18 | End: 2025-07-19 | Stop reason: HOSPADM

## 2025-07-18 RX ORDER — HYDRALAZINE HYDROCHLORIDE 20 MG/ML
5 INJECTION INTRAMUSCULAR; INTRAVENOUS EVERY 30 MIN PRN
Status: CANCELLED | OUTPATIENT
Start: 2025-07-18

## 2025-07-18 RX ORDER — PRAZOSIN HYDROCHLORIDE 1 MG/1
2 CAPSULE ORAL NIGHTLY
Status: DISCONTINUED | OUTPATIENT
Start: 2025-07-18 | End: 2025-07-19 | Stop reason: HOSPADM

## 2025-07-18 RX ORDER — FENTANYL CITRATE-0.9 % NACL/PF 10 MCG/ML
0-300 PLASTIC BAG, INJECTION (ML) INTRAVENOUS CONTINUOUS
Status: DISCONTINUED | OUTPATIENT
Start: 2025-07-18 | End: 2025-07-18

## 2025-07-18 RX ORDER — IPRATROPIUM BROMIDE AND ALBUTEROL SULFATE 2.5; .5 MG/3ML; MG/3ML
3 SOLUTION RESPIRATORY (INHALATION)
Status: DISCONTINUED | OUTPATIENT
Start: 2025-07-18 | End: 2025-07-18

## 2025-07-18 RX ORDER — PROPOFOL 10 MG/ML
0-50 INJECTION, EMULSION INTRAVENOUS CONTINUOUS
Status: DISCONTINUED | OUTPATIENT
Start: 2025-07-18 | End: 2025-07-19

## 2025-07-18 RX ORDER — MAGNESIUM SULFATE HEPTAHYDRATE 40 MG/ML
2 INJECTION, SOLUTION INTRAVENOUS ONCE
Status: COMPLETED | OUTPATIENT
Start: 2025-07-18 | End: 2025-07-18

## 2025-07-18 RX ADMIN — FENTANYL CITRATE 50 MCG: 50 INJECTION, SOLUTION INTRAMUSCULAR; INTRAVENOUS at 11:43

## 2025-07-18 RX ADMIN — IPRATROPIUM BROMIDE AND ALBUTEROL SULFATE 6 ML: .5; 3 SOLUTION RESPIRATORY (INHALATION) at 15:52

## 2025-07-18 RX ADMIN — HEPARIN SODIUM 5000 UNITS: 5000 INJECTION, SOLUTION INTRAVENOUS; SUBCUTANEOUS at 10:25

## 2025-07-18 RX ADMIN — IPRATROPIUM BROMIDE AND ALBUTEROL SULFATE 3 ML: .5; 3 SOLUTION RESPIRATORY (INHALATION) at 15:35

## 2025-07-18 RX ADMIN — FENTANYL CITRATE 50 MCG: 50 INJECTION, SOLUTION INTRAMUSCULAR; INTRAVENOUS at 12:26

## 2025-07-18 RX ADMIN — METHYLPREDNISOLONE SODIUM SUCCINATE 85 MG: 125 INJECTION, POWDER, FOR SOLUTION INTRAMUSCULAR; INTRAVENOUS at 17:44

## 2025-07-18 RX ADMIN — FENTANYL CITRATE 100 MCG: 50 INJECTION, SOLUTION INTRAMUSCULAR; INTRAVENOUS at 16:15

## 2025-07-18 RX ADMIN — ONDANSETRON 4 MG: 2 INJECTION, SOLUTION INTRAMUSCULAR; INTRAVENOUS at 13:17

## 2025-07-18 RX ADMIN — FENTANYL CITRATE 50 MCG: 50 INJECTION, SOLUTION INTRAMUSCULAR; INTRAVENOUS at 12:20

## 2025-07-18 RX ADMIN — MAGNESIUM SULFATE HEPTAHYDRATE 2 G: 40 INJECTION, SOLUTION INTRAVENOUS at 20:53

## 2025-07-18 RX ADMIN — LIDOCAINE HYDROCHLORIDE 60 MG: 20 INJECTION, SOLUTION INFILTRATION; PERINEURAL at 11:43

## 2025-07-18 RX ADMIN — PROPOFOL 45 MCG/KG/MIN: 10 INJECTION, EMULSION INTRAVENOUS at 23:20

## 2025-07-18 RX ADMIN — IPRATROPIUM BROMIDE AND ALBUTEROL SULFATE 3 ML: 2.5; .5 SOLUTION RESPIRATORY (INHALATION) at 20:40

## 2025-07-18 RX ADMIN — DEXMEDETOMIDINE HYDROCHLORIDE 0.5 MCG/KG/HR: 400 INJECTION INTRAVENOUS at 17:51

## 2025-07-18 RX ADMIN — Medication 30 MG: at 20:52

## 2025-07-18 RX ADMIN — PROPOFOL 50 MCG/KG/MIN: 10 INJECTION, EMULSION INTRAVENOUS at 17:43

## 2025-07-18 RX ADMIN — NALOXONE HYDROCHLORIDE 0.01 MG: 0.4 INJECTION, SOLUTION INTRAMUSCULAR; INTRAVENOUS; SUBCUTANEOUS at 13:39

## 2025-07-18 RX ADMIN — ROCURONIUM BROMIDE 70 MG: 10 SOLUTION INTRAVENOUS at 11:43

## 2025-07-18 RX ADMIN — Medication 100 PERCENT: at 15:28

## 2025-07-18 RX ADMIN — ALBUTEROL SULFATE 2 PUFF: 90 AEROSOL, METERED RESPIRATORY (INHALATION) at 13:45

## 2025-07-18 RX ADMIN — DEXAMETHASONE SODIUM PHOSPHATE 4 MG: 4 INJECTION, SOLUTION INTRAMUSCULAR; INTRAVENOUS at 11:53

## 2025-07-18 RX ADMIN — DIPHENHYDRAMINE HYDROCHLORIDE 12.5 MG: 50 INJECTION INTRAMUSCULAR; INTRAVENOUS at 11:52

## 2025-07-18 RX ADMIN — ALBUTEROL SULFATE 2 PUFF: 90 AEROSOL, METERED RESPIRATORY (INHALATION) at 13:21

## 2025-07-18 RX ADMIN — SUGAMMADEX 200 MG: 100 INJECTION, SOLUTION INTRAVENOUS at 13:36

## 2025-07-18 RX ADMIN — PROPOFOL 50 MG: 10 INJECTION, EMULSION INTRAVENOUS at 13:24

## 2025-07-18 RX ADMIN — ROCURONIUM BROMIDE 10 MG: 10 SOLUTION INTRAVENOUS at 12:44

## 2025-07-18 RX ADMIN — HYDROMORPHONE HYDROCHLORIDE 0.5 MG: 1 INJECTION, SOLUTION INTRAMUSCULAR; INTRAVENOUS; SUBCUTANEOUS at 12:57

## 2025-07-18 RX ADMIN — NALOXONE HYDROCHLORIDE 0.01 MG: 0.4 INJECTION, SOLUTION INTRAMUSCULAR; INTRAVENOUS; SUBCUTANEOUS at 13:44

## 2025-07-18 RX ADMIN — FAMOTIDINE 20 MG: 10 INJECTION, SOLUTION INTRAVENOUS at 11:55

## 2025-07-18 RX ADMIN — FENTANYL CITRATE 100 MCG: 50 INJECTION INTRAMUSCULAR; INTRAVENOUS at 16:15

## 2025-07-18 RX ADMIN — MIDAZOLAM 2 MG: 1 INJECTION INTRAMUSCULAR; INTRAVENOUS at 11:34

## 2025-07-18 RX ADMIN — AZITHROMYCIN MONOHYDRATE 500 MG: 500 INJECTION, POWDER, LYOPHILIZED, FOR SOLUTION INTRAVENOUS at 17:45

## 2025-07-18 RX ADMIN — ALBUTEROL SULFATE 2 PUFF: 90 AEROSOL, METERED RESPIRATORY (INHALATION) at 13:24

## 2025-07-18 RX ADMIN — Medication 80 PERCENT: at 20:41

## 2025-07-18 RX ADMIN — SODIUM CHLORIDE, POTASSIUM CHLORIDE, SODIUM LACTATE AND CALCIUM CHLORIDE 50 ML/HR: 600; 310; 30; 20 INJECTION, SOLUTION INTRAVENOUS at 10:16

## 2025-07-18 RX ADMIN — PROPOFOL 50 MCG/KG/MIN: 10 INJECTION, EMULSION INTRAVENOUS at 13:45

## 2025-07-18 RX ADMIN — SODIUM CHLORIDE, POTASSIUM CHLORIDE, SODIUM LACTATE AND CALCIUM CHLORIDE: 600; 310; 30; 20 INJECTION, SOLUTION INTRAVENOUS at 12:56

## 2025-07-18 RX ADMIN — ALBUTEROL SULFATE 7.5 MG: 2.5 SOLUTION RESPIRATORY (INHALATION) at 16:49

## 2025-07-18 RX ADMIN — FENTANYL CITRATE 50 MCG: 50 INJECTION, SOLUTION INTRAMUSCULAR; INTRAVENOUS at 12:44

## 2025-07-18 RX ADMIN — ROCURONIUM BROMIDE 10 MG: 10 SOLUTION INTRAVENOUS at 12:16

## 2025-07-18 RX ADMIN — METHYLPREDNISOLONE SODIUM SUCCINATE 40 MG: 40 INJECTION, POWDER, FOR SOLUTION INTRAMUSCULAR; INTRAVENOUS at 15:40

## 2025-07-18 RX ADMIN — CEFAZOLIN SODIUM 2 G: 2 SOLUTION INTRAVENOUS at 11:53

## 2025-07-18 RX ADMIN — SUGAMMADEX 200 MG: 100 INJECTION, SOLUTION INTRAVENOUS at 13:17

## 2025-07-18 RX ADMIN — PROPOFOL 15 MCG/KG/MIN: 10 INJECTION, EMULSION INTRAVENOUS at 14:04

## 2025-07-18 RX ADMIN — INSULIN LISPRO 6 UNITS: 100 INJECTION, SOLUTION INTRAVENOUS; SUBCUTANEOUS at 23:53

## 2025-07-18 RX ADMIN — INSULIN LISPRO 4 UNITS: 100 INJECTION, SOLUTION INTRAVENOUS; SUBCUTANEOUS at 15:40

## 2025-07-18 RX ADMIN — INSULIN LISPRO 8 UNITS: 100 INJECTION, SOLUTION INTRAVENOUS; SUBCUTANEOUS at 20:52

## 2025-07-18 RX ADMIN — PROPOFOL 150 MG: 10 INJECTION, EMULSION INTRAVENOUS at 11:43

## 2025-07-18 SDOH — SOCIAL STABILITY: SOCIAL INSECURITY: ARE YOU OR HAVE YOU BEEN THREATENED OR ABUSED PHYSICALLY, EMOTIONALLY, OR SEXUALLY BY ANYONE?: UNABLE TO ASSESS

## 2025-07-18 SDOH — SOCIAL STABILITY: SOCIAL INSECURITY: HAS ANYONE EVER THREATENED TO HURT YOUR FAMILY OR YOUR PETS?: UNABLE TO ASSESS

## 2025-07-18 SDOH — SOCIAL STABILITY: SOCIAL INSECURITY: DOES ANYONE TRY TO KEEP YOU FROM HAVING/CONTACTING OTHER FRIENDS OR DOING THINGS OUTSIDE YOUR HOME?: UNABLE TO ASSESS

## 2025-07-18 SDOH — SOCIAL STABILITY: SOCIAL INSECURITY: DO YOU FEEL UNSAFE GOING BACK TO THE PLACE WHERE YOU ARE LIVING?: UNABLE TO ASSESS

## 2025-07-18 SDOH — SOCIAL STABILITY: SOCIAL INSECURITY: DO YOU FEEL ANYONE HAS EXPLOITED OR TAKEN ADVANTAGE OF YOU FINANCIALLY OR OF YOUR PERSONAL PROPERTY?: UNABLE TO ASSESS

## 2025-07-18 SDOH — SOCIAL STABILITY: SOCIAL INSECURITY: HAVE YOU HAD ANY THOUGHTS OF HARMING ANYONE ELSE?: UNABLE TO ASSESS

## 2025-07-18 SDOH — SOCIAL STABILITY: SOCIAL INSECURITY: ABUSE: ADULT

## 2025-07-18 SDOH — SOCIAL STABILITY: SOCIAL INSECURITY: WERE YOU ABLE TO COMPLETE ALL THE BEHAVIORAL HEALTH SCREENINGS?: NO

## 2025-07-18 ASSESSMENT — COGNITIVE AND FUNCTIONAL STATUS - GENERAL
MOBILITY SCORE: 6
STANDING UP FROM CHAIR USING ARMS: TOTAL
WALKING IN HOSPITAL ROOM: TOTAL
PERSONAL GROOMING: TOTAL
HELP NEEDED FOR BATHING: TOTAL
TURNING FROM BACK TO SIDE WHILE IN FLAT BAD: TOTAL
DAILY ACTIVITIY SCORE: 6
MOVING TO AND FROM BED TO CHAIR: TOTAL
CLIMB 3 TO 5 STEPS WITH RAILING: TOTAL
TOILETING: TOTAL
MOVING FROM LYING ON BACK TO SITTING ON SIDE OF FLAT BED WITH BEDRAILS: TOTAL
DRESSING REGULAR LOWER BODY CLOTHING: TOTAL
EATING MEALS: TOTAL
DRESSING REGULAR UPPER BODY CLOTHING: TOTAL

## 2025-07-18 ASSESSMENT — PAIN SCALES - GENERAL
PAINLEVEL_OUTOF10: 8
PAIN_LEVEL: 0

## 2025-07-18 ASSESSMENT — LIFESTYLE VARIABLES
AUDIT-C TOTAL SCORE: -1
HOW OFTEN DO YOU HAVE A DRINK CONTAINING ALCOHOL: PATIENT UNABLE TO ANSWER
HOW MANY STANDARD DRINKS CONTAINING ALCOHOL DO YOU HAVE ON A TYPICAL DAY: PATIENT UNABLE TO ANSWER
HOW OFTEN DO YOU HAVE 6 OR MORE DRINKS ON ONE OCCASION: PATIENT UNABLE TO ANSWER
AUDIT-C TOTAL SCORE: -1
SKIP TO QUESTIONS 9-10: 0

## 2025-07-18 ASSESSMENT — PAIN DESCRIPTION - DESCRIPTORS: DESCRIPTORS: ACHING

## 2025-07-18 NOTE — ANESTHESIA PROCEDURE NOTES
Airway  Date/Time: 7/18/2025 11:46 AM  Reason: elective    Airway not difficult    Staffing  Performed: CRNA   Authorized by: Christie Chen MD    Performed by: BARTOLOME Giraldo-CRNA  Patient location during procedure: OR    Patient Condition  Indications for airway management: anesthesia  Patient position: sniffing  MILS maintained throughout  Planned trial extubation  Sedation level: deep     Final Airway Details   Preoxygenated: yes  Final airway type: endotracheal airway  Successful airway: ETT  Cuffed: no   Successful intubation technique: video laryngoscopy  Adjuncts used in placement: intubating stylet  Endotracheal tube insertion site: oral  Blade type: mcgraff.  Blade size: #3  ETT size (mm): 7.0  Cormack-Lehane Classification: grade I - full view of glottis  Placement verified by: capnometry   Measured from: lips  ETT to lips (cm): 21  Ventilation between attempts: none  Number of attempts at approach: 1  Number of other approaches attempted: 0

## 2025-07-18 NOTE — SIGNIFICANT EVENT
07/18/25 1624   Airway Suctioning/Secretions   Suction Type Endotracheal tube   Suction Device  Inline   Secretion Amount Large   Secretion Color Clear;White   Secretion Consistency Thick   Suction Tolerance Tolerated fairly well   Suctioning Adverse Effects Tachypnea     RT given permission by Dr. Wilson to lavage pt via ETT due to coarse ronchi. Thick, clear-white secretions were suction. Pt sat post suction was 95%.

## 2025-07-18 NOTE — PROCEDURES
Procedure Name:  Diagnostic and Therapeutic bronchoscopy     Date: 7/18/25  Attending: Baljinder Wilson MD     Procedure Description:  Procedure, risks (including pneumothorax, life threatening bleeding, infection, respiratory failure, and adverse effects due to medications), benefits, and alternatives were explained to the patient's Family and consent was obtained. All questions were answered, and informed consent was documented as per institutional protocol. Laboratory studies and radiographs were reviewed. A time-out was performed prior to the intervention. The bronchoscope was introduced via ETT and advanced to the tracheobronchial tree of both lungs. The patient tolerated the procedure well.     Findings:  The trachea was of normal caliber. The daron was sharp. The tracheobronchial tree was examined to at least the first subsegmental level. Bronchial mucosa and anatomy were mildly inflamed from the levels of the subsegments to the daron; no significant secretions. There were no endobronchial lesions. BAL sample sent from right lower lobe.  Complications: No immediate complications.  Estimated Blood Loss: Estimated blood loss: None.     Plan:  Continue albuterol for COPD exacerbation.  Increase steroid dose to 125mg total, 40mg BID for subsequent days x5 days total.  Add azithromycin 500mg x3 days.  Follow up BAL sample.    Baljinder Wilson MD

## 2025-07-18 NOTE — OP NOTE
REPAIR OF DIASTASIS, ROBOT-ASSISTED Operative Note     Date: 2025  OR Location: [facility]    Name: Stephenie Mccray, : 1980, Age: 45 y.o., MRN: [id number], Sex: female    Diagnosis  Pre-op Diagnosis      * Diastasis recti [M62.08] Post-op Diagnosis     * Diastasis recti [M62.08]     Procedures  REPAIR OF DIASTASIS, ROBOT-ASSISTED  19385 - NC RPR AA HERNIA 1ST 3-10 CM REDUCIBLE    REPAIR OF DIASTASIS, ROBOT-ASSISTED   - NC SURGICAL TECHNIQUES REQUIRING USE OF ROBOTIC SURGICAL SYSTEM (LIST SEPARATELY IN ADDITION TO CODE FOR PRIMARY PROCEDURE)      Surgeons      * Alvin Gonzalez V - Primary    Resident/Fellow/Other Assistant:  Surgeons and Role:  * No surgeons found with a matching role *    Staff:   Circulator: Gloria Babin Person: Patricia  Circulator: Lay    Anesthesia Staff: Anesthesiologist: Christie Chen MD  CRNA: BARTOLOME Giraldo-CRNA    Procedure Summary  Anesthesia: General  ASA: III  Estimated Blood Loss: 5 omental adhesions to previous repair rectus diastases mL  Intra-op Medications:   Administrations occurring from 1120 to 1420 on 25:   Medication Name Total Dose   sodium chloride 0.9 % irrigation solution 1,000 mL   BUPivacaine-EPINEPHrine (Marcaine w/EPI) 0.25 %-1:200,000 injection 46 mL   albuterol inhaler 4 puff   dexAMETHasone (Decadron) 4 mg/mL 4 mg   diphenhydrAMINE 50 mg/mL 12.5 mg   famotidine (Pepcid) 10 mg/mL 20 mg   fentaNYL PF 0.05 mg/mL 200 mcg   HYDROmorphone (Dilaudid) 1 mg/mL injection 0.5 mg   lactated Ringer's infusion 175 mL   lidocaine (Xylocaine) injection 2 % 60 mg   midazolam (Versed) 1 mg/1 mL 2 mg   ondansetron 2 mg/mL 4 mg   propofol (Diprivan) injection 10 mg/mL 200 mg   rocuronium (ZeMuron) 50 mg/5 mL injection 90 mg   sugammadex (Bridion) 100 mg/mL 200 mg   ceFAZolin (Ancef) 2 g in dextrose (iso) IV 50 mL 2 g              Anesthesia Record               Intraprocedure I/O Totals          Intake    lactated Ringer's infusion 1000.00 mL     ceFAZolin (Ancef) 2 g in dextrose (iso) IV 50 mL 50.00 mL    Total Intake 1050 mL       Output    Urine 300 mL    Est. Blood Loss 5 mL    Total Output 305 mL       Net    Net Volume 745 mL          Specimen: No specimens collected              Drains and/or Catheters:   Urethral Catheter Double-lumen 16 Fr. (Active)       Tourniquet Times:         Implants:     Findings: Omental adhesions to previous repair rectus diastases    Indications: Stephenie Mccray is an 45 y.o. female who is having surgery for Diastasis recti [M62.08].  Symptomatic rectus diastases    The patient was seen in the preoperative area. The risks, benefits, complications, treatment options, non-operative alternatives, expected recovery and outcomes were discussed with the patient. The possibilities of reaction to medication, pulmonary aspiration, injury to surrounding structures, bleeding, recurrent infection, the need for additional procedures, failure to diagnose a condition, and creating a complication requiring transfusion or operation were discussed with the patient. The patient concurred with the proposed plan, giving informed consent.  The site of surgery was properly noted/marked if necessary per policy. The patient has been actively warmed in preoperative area. Preoperative antibiotics were given within 1 hour venous thrombosis prophylaxis have been ordered including bilateral sequential compression devices and chemical prophylaxis    Procedure Details: Patient was brought to the OR laid supine preoperative huddle was performed and all team members participated.  Patient was placed under general anesthesia orogastric tube and Tran catheter were placed.  Abdomen was prepped and draped in standard surgical fashion.  Timeout procedure was observed and elected to proceed at this time.  Utilizing the Veress needle abdomen was insufflated and a 8 mm robotic port was placed optically in the left upper quadrant entry site was inspected no  injuries were noted.  2 other 8 mm robotic ports were placed all under direct vision and the robot was docked.  Patient had omental adhesions to the previous umbilical hernia repair and to the mesh this were carefully taken down bluntly and with cautery.  The area of diastases appeared to be approximately 6 cm.  Starting at the umbilical site and going up and coming back down this was repaired using a running 9 inch barbed suture x 2 and a five 6 inch strata fix with good reapproximation.  Mesh was not indicated.  Good repair was effected this was done under 8 mmHg.    For postop pain relief tap block was performed bilaterally using the Veress needle.  All ports were removed robot was undocked again and skin incisions were closed in the routine fashion patient tolerated procedure well discussed with family  Evidence of Infection: No   Complications:  None; patient tolerated the procedure well.    Disposition: PACU - hemodynamically stable.  Condition: stable             Task Performed by [facility] Assist or Physician Assistant:   Grant Preciado PA/NP, was necessary to assist on this case due to the nature of the case and difficulty. During the case heserved as my assist by docking and undocking robot instrument change suture placement and removal incision closure      Additional Details:     Attending Attestation: I performed the procedure.    Alvin Gonzalez V  Phone Number: [phone]

## 2025-07-18 NOTE — ANESTHESIA POSTPROCEDURE EVALUATION
Patient: Stephenie Mccray    Procedure Summary       Date: 07/18/25 Room / Location: [facility] 08 / [facility]    Anesthesia Start: 1135 Anesthesia Stop: 1440    Procedure: REPAIR OF DIASTASIS, ROBOT-ASSISTED Diagnosis:       Diastasis recti      (Diastasis recti [M62.08])    Surgeons: Alvin MARTINS MD Responsible Provider: Christie Chen MD    Anesthesia Type: general ASA Status: 3            Anesthesia Type: general    Vitals Value Taken Time   /110 07/18/25 14:42   Temp 36 07/18/25 14:42   Pulse 108 07/18/25 14:40   Resp 18 07/18/25 14:40   SpO2 85 % 07/18/25 14:40   Vitals shown include unfiled device data.    Anesthesia Post Evaluation    Patient location during evaluation: ICU  Patient participation: complete - patient cannot participate  Level of consciousness: sedated  Pain score: 0  Pain management: adequate  Airway patency: fixed obstruction  Cardiovascular status: acceptable  Respiratory status: ETT  Hydration status: acceptable  Postoperative Nausea and Vomiting: none  Comments: Patient unable to safely extubate at end of procedure. Patient with significant bronchospasm and desaturation while emerging requiring multiple doses of albuterol, IV epi, and restarting inhalational anesthetic. ETT suctioned with soft inline suction. Once spasm broke, patient with poor respiratory effort and increasing ETCO2. Other reversible causes of poor effort ruled out by administering 20mcg narcan and additional 200mg sugammadex. Patient still with poor effort and increased ETCO2 despite interventions.  Pt transported to ICU on monitor and bagged with 100% FiO2. Pt intermittenly lifting head, HOLMAN x 4. Some intermittent respiratory effort but continued high ETCO2.          Encounter Notable Events   Notable Event Outcome Phase Comment   Unplanned postop ventilation Ongoing Treatment Intraprocedure pt to icu intubated

## 2025-07-18 NOTE — SIGNIFICANT EVENT
Patient seen postoperatively    Significant bronchospasm after surgery unable to extubate    Transferred to ICU currently intubated plan is for bronchoscopy    ABG chest x-ray results reviewed    Discussed care with the ICU team

## 2025-07-18 NOTE — ANESTHESIA PROCEDURE NOTES
Peripheral IV  Date/Time: 7/18/2025 11:50 AM  Inserted by: Christie Chen MD    Placement  Needle size: 20 G  Laterality: left  Location: forearm  Local anesthetic: none  Site prep: alcohol  Technique: anatomical landmarks  Attempts: 1

## 2025-07-18 NOTE — NURSING NOTE
Patient arrived to micu 07 from OR. Dr Gonzalez, MOI Shaffer NP at bedside. Reviewed vitals. Xray and ABG's ordered.

## 2025-07-18 NOTE — NURSING NOTE
Parent's took patient belonging (clothes) home to wash. Patient's personal phone at bedside table.

## 2025-07-18 NOTE — PROCEDURES
Point of Care Ultrasound Note:    Indication: Severe hypoxia  (s): Baljinder Wilson MD  Exam Type(s): Limited Cardiac Point-of-Care Ultrasound, Lung Ultrasound- Full Exam    No pericardial effusion.  LV Function: Normal LV systolic function, visually estimated at ~60%.  LVOT VTI 22.4cm.  E/e' 7.39 with an e' of 11.2cm/s reflects likely normal diastolic function with normal left atrial pressure.  RV Function: Normal size and function, TAPSE 1.99cm.     IVC:  1.7cm in size with <15% respirophasic variation while intubated.    Left Lung Fields: A-line predominant anterior apical, and basilar lung fields.  Very small pleural effusion at lung base.  Left Lung Pleural: Irregular pleural surface in apical and basilar lung fields.  Right Lung Fields: A-line predominant in anterior apical, and basilar lung fields.  No pleural effusion.  Right Lung Pleura: Irregular pleural surface in apical and basilar lung fields.    Impression:  Based on Clinical history, Exam, lab evaluation and POCUS the patient likely with a pattern consistent with normal goal directed echo, IVC indeterminate but patient not likely to benefit from fluids.  Lung fields are consistent with her severe COPD.    Plan:  See Bronchoscopy report for plan for treatment of her severe COPD.      Baljinder Wilson MD

## 2025-07-18 NOTE — PROGRESS NOTES
"[facility] Critical Care Medicine       Date:  7/18/2025  Patient:  Stephenie Mccray  YOB: 1980  MRN:  [id number]   Admit Date:  7/18/2025  ========================================================================================================    No chief complaint on file.        History of Present Illness:  Stephenie Mccray is a 45 y.o. year old female patient with Past Medical History of  COPD home O2 2L NC, has bipap for sleep but does not wear it, schizophrenia, HTN, HLD, hypothyroid, DM2, pulmonary embolism on xarelto, current smoker presented to [facility] for planned Ventral hernia repair.  She was recently admitted in June 2025 for community-acquired pneumonia.  She was treated with a 5-day course of cefepime and Zithromax. Patient was initially referred to Dr. Gonzalez for abdominal wall bulging that is painful.  After extensive risks versus benefits were discussed it was decided to proceed with a ventral hernia repair.  Procedure was without complications.  Anesthesia was attempting to extubate the patient and reported bronchospasms and desaturations.  Patient was admitted to ICU postop.    Interval ICU Events:  7/18: Admit to the ICU postoperatively intubated and sedated.  Bronchoscopy performed which showed clear secretions.  Given DuoNebs x 3.    Medical History:  Medical History[1]  Surgical History[2]  Prescriptions Prior to Admission[3]  Eggplant, Fluticasone furoate-vilanterol, Fluticasone-umeclidin-vilanter, Levofloxacin, Sumatriptan, and Vortioxetine  Social History[4]  Family History[5]    Review of Systems:  14 point review of systems was completed and negative except for those specially mention in my HPI    Physical Exam:    Heart Rate:  [86-87]   Temp:  [36.7 °C (98.1 °F)]   Resp:  [17-18]   BP: (151)/(98)   Height:  [157.5 cm (5' 2\")]   Weight:  [74.3 kg (163 lb 12.8 oz)]   SpO2:  [92 %-97 %]     Physical Exam  Constitutional:       General: She is not in acute distress.     " Appearance: She is not ill-appearing.      Interventions: She is sedated and intubated.   HENT:      Mouth/Throat:      Mouth: Mucous membranes are moist.     Cardiovascular:      Rate and Rhythm: Regular rhythm. Tachycardia present.      Pulses: Normal pulses.      Heart sounds: Normal heart sounds.   Pulmonary:      Effort: She is intubated.      Breath sounds: Decreased breath sounds and wheezing present.   Abdominal:      General: There is distension.      Palpations: Abdomen is soft.     Musculoskeletal:         General: Normal range of motion.     Skin:     General: Skin is warm and dry.      Capillary Refill: Capillary refill takes less than 2 seconds.     Neurological:      Comments: JEANNIE       Objective:    I have reviewed all medications, laboratory results, and imaging pertinent for today's encounter    Assessment/Plan:    I am currently managing this critically ill patient for the following problems:    Neuro/Psych/Pain Ctrl/Sedation:  #Encephalopathy-unclear source  #Nicotine dependence   #Hx Schizophrenia  -Hold home buspar & lexapro  -Hold home klonopin & atarax  -Hold trazodone  -Sedation: Propofol and Precedex  -Nicotine patch      Respiratory/ENT:  #Acute on chronic hypoxic hypercapnic respiratory failure  #Hx COPD  #Hx pulmonary embolism on AC  -Hold Xarelto, start heparin gtt tomorrow per surgery  -Solumedrol 40mg BID  -Duonebs Q4 hours  -Wean FiO2 as tolerated to keep SPO2 >90%  -Pulmonary hygiene   -Continue Spiriva  -Encourage smoking cessation      Cardiovascular:  #Hypertension  #Hx HLD  -Continuous cardiac monitoring  -Hold home metoprolol, minipress, and catapres in the setting of hypotension  -Continue lipitor  -EKG PRN     GI:  #Hx GERD  #POD0 Ventral Hernia Repair  -NPO   -Continue home PPI      Renal/Volume Status (Intra & Extravascular):  -No acute issues  -Monitor I&O  -Daily BMP    Endocrine  #T2DM  #Hypothyroidism  -Continue home snythroid  -SSI  -Check TSH      Infectious  Disease:  -No acute issues  -Monitor for SIRS     Heme/Onc:  -No acute issues  -Daily CBC     OBGYN/MSK:  -No acute issues  -Ambulatory at home     Ethics/Code Status:  Full Code      :  DVT Prophylaxis: heparin gtt starting  per surgery  GI Prophylaxis: home PPI  Bowel Regimen: PRN  Diet: NPO  CVC: none  Margoth: none  Tran: yes placed   Restraints: yes  Dispo: ICU    Critical Care Time:  45 minutes spent in preparing to see patient (I.e. review of medical records), evaluation of diagnostics (I.e. labs, imaging, etc.), documentation, discussing plan of care with patient/ family/ caregiver, and/ or coordination of care with multidisciplinary team. Time does not include completion of procedure time.      Renee Ferrell, APRN-CNP          [1]   Past Medical History:  Diagnosis Date    Acute on chronic respiratory failure     Anxiety     Arrhythmia     Arthritis     Chronic headaches     Chronic respiratory failure     WITH HYPOXIA    Clotting disorder (Multi)     COPD (chronic obstructive pulmonary disease) (Multi)     Cough 2025    COVID-19     VACCINATED    Depression     Diabetes mellitus (Multi)     type 2 IDDM    GERD (gastroesophageal reflux disease)     History of transfusion     History of venous thromboembolism     HL (hearing loss)     Hyperlipidemia     Hypertension     Hypothyroidism     Lipoma     Lumbar disc disease     Migraines     Nephrolithiasis     CHARLIE (obstructive sleep apnea)     USES BIPAP    Ovarian teratoma     PE (pulmonary thromboembolism) (Multi)     Peptic ulcer disease     Pneumonia     PTSD (post-traumatic stress disorder)     Schizophrenia     Shortness of breath     Type 2 diabetes mellitus     Urinary tract infection     25- no current issue    VTE (venous thromboembolism)    [2]   Past Surgical History:  Procedure Laterality Date     SECTION, LOW TRANSVERSE      x 1    CYSTOSCOPY      DILATION AND CURETTAGE OF UTERUS      LIPOMA RESECTION       LITHOTRIPSY      STAPEDES SURGERY      TONSILLECTOMY      UMBILICAL HERNIA REPAIR      VENTRAL HERNIA REPAIR     [3]   Medications Prior to Admission   Medication Sig Dispense Refill Last Dose/Taking    albuterol 90 mcg/actuation inhaler Inhale 2 puffs every 6 hours if needed for shortness of breath. 1-2 puffs q6h PRN   7/18/2025 at  8:00 AM    atorvastatin (Lipitor) 20 mg tablet Take 1 tablet (20 mg) by mouth once daily. 30 tablet 2 7/17/2025 Morning    busPIRone (Buspar) 30 mg tablet Take 1 tablet (30 mg) by mouth 2 times a day.   7/17/2025 Evening    clonazePAM (KlonoPIN) 0.5 mg tablet Take 1 tablet (0.5 mg) by mouth every 12 hours.   7/17/2025 Evening    cloNIDine (Catapres) 0.1 mg tablet Take 1 tablet (0.1 mg) by mouth 2 times a day.   7/17/2025 Evening    escitalopram (Lexapro) 20 mg tablet Take 1 tablet (20 mg) by mouth once daily in the morning.   7/17/2025 Morning    ipratropium-albuteroL (Duo-Neb) 0.5-2.5 mg/3 mL nebulizer solution Take 3 mL by nebulization every 6 hours if needed for shortness of breath or wheezing. 180 mL 1 Past Month    Januvia 50 mg tablet Take 1 tablet (50 mg) by mouth early in the morning.. 30 tablet 2 Past Week    levothyroxine (Synthroid, Levoxyl) 175 mcg tablet Take 1 tablet (175 mcg) by mouth once daily. 90 tablet 3 7/17/2025 Morning    metoprolol tartrate (Lopressor) 25 mg tablet Take 1 tablet (25 mg) by mouth 2 times a day. 60 tablet 5 7/18/2025 at  8:00 AM    oxyCODONE-acetaminophen (Percocet) 5-325 mg tablet Take 1 tablet by mouth every 6 hours if needed for severe pain (7 - 10). 20 tablet 0 Past Week    prazosin (Minipress) 2 mg capsule Take 2 capsules (4 mg) by mouth once daily at bedtime. (Patient taking differently: Take 1 capsule (2 mg) by mouth once daily at bedtime. PATIENT STATED TAKING 1 MG)   7/17/2025 Evening    risperiDONE (RisperDAL) 2 mg tablet Take 1 tablet (2 mg) by mouth every 12 hours.   7/17/2025 Evening    traZODone (Desyrel) 50 mg tablet Take 1 tablet  "(50 mg) by mouth once daily at bedtime.   Past Week    Xarelto 10 mg tablet Take 1 tablet (10 mg) by mouth once daily.   Past Week    alcohol swabs (Alcohol Prep Pads) pads, medicated Use 3 times daily prn (Patient not taking: Reported on 7/18/2025) 100 each 3 Not Taking    BD Ultra-Fine Mini Pen Needle 31 gauge x 3/16\" needle USE TO INJECT 4 TIMES DAILY AS DIRECTED (Patient not taking: Reported on 7/18/2025) 400 each 3 Not Taking    nebulizer accessories (Adult Aerosol Mask) misc Use mask as directed with nebulizer (Patient not taking: Reported on 7/18/2025) 1 each 1 Not Taking    tiotropium (Spiriva Respimat) 2.5 mcg/actuation inhaler Inhale 2 puffs once daily. Do not start before March 9, 2024. (Patient not taking: Reported on 7/7/2025) 8 g 11 Not Taking    ubrogepant (Ubrelvy) 100 mg tablet Use for headache (Patient not taking: Reported on 7/18/2025) 3 tablet 0 Not Taking   [4]   Social History  Tobacco Use    Smoking status: Every Day     Current packs/day: 0.50     Types: Cigarettes     Passive exposure: Current    Smokeless tobacco: Never   Vaping Use    Vaping status: Never Used   Substance Use Topics    Alcohol use: Yes     Comment: rarely    Drug use: Yes     Frequency: 7.0 times per week     Types: Marijuana   [5]   Family History  Problem Relation Name Age of Onset    Fibromyalgia Father      Hypertension Brother      Thyroid disease Maternal Grandmother      Thyroid disease Paternal Grandmother      Lung cancer Paternal Grandfather      Coronary artery disease Other Grandmother      "

## 2025-07-19 VITALS
DIASTOLIC BLOOD PRESSURE: 76 MMHG | OXYGEN SATURATION: 90 % | RESPIRATION RATE: 26 BRPM | HEART RATE: 112 BPM | WEIGHT: 167.77 LBS | TEMPERATURE: 96.1 F | SYSTOLIC BLOOD PRESSURE: 133 MMHG | BODY MASS INDEX: 30.87 KG/M2 | HEIGHT: 62 IN

## 2025-07-19 LAB
ALBUMIN SERPL BCP-MCNC: 4.1 G/DL (ref 3.4–5)
ANION GAP BLDV CALCULATED.4IONS-SCNC: 12 MMOL/L (ref 10–25)
ANION GAP SERPL CALC-SCNC: 17 MMOL/L (ref 10–20)
BASE EXCESS BLDV CALC-SCNC: -6.3 MMOL/L (ref -2–3)
BODY TEMPERATURE: ABNORMAL
BUN SERPL-MCNC: 12 MG/DL (ref 6–23)
CA-I BLDV-SCNC: 0.95 MMOL/L (ref 1.1–1.33)
CALCIUM SERPL-MCNC: 9.4 MG/DL (ref 8.6–10.3)
CHLORIDE BLDV-SCNC: 113 MMOL/L (ref 98–107)
CHLORIDE SERPL-SCNC: 103 MMOL/L (ref 98–107)
CO2 SERPL-SCNC: 24 MMOL/L (ref 21–32)
CREAT SERPL-MCNC: 0.75 MG/DL (ref 0.5–1.05)
CRITICAL CALL TIME: 516
CRITICAL CALLED BY: ABNORMAL
CRITICAL CALLED TO: ABNORMAL
CRITICAL READ BACK: ABNORMAL
EGFRCR SERPLBLD CKD-EPI 2021: >90 ML/MIN/1.73M*2
ERYTHROCYTE [DISTWIDTH] IN BLOOD BY AUTOMATED COUNT: 15.1 % (ref 11.5–14.5)
GLUCOSE BLD MANUAL STRIP-MCNC: 171 MG/DL (ref 74–99)
GLUCOSE BLD MANUAL STRIP-MCNC: 196 MG/DL (ref 74–99)
GLUCOSE BLD MANUAL STRIP-MCNC: 234 MG/DL (ref 74–99)
GLUCOSE BLD MANUAL STRIP-MCNC: 81 MG/DL (ref 74–99)
GLUCOSE BLDV-MCNC: 172 MG/DL (ref 74–99)
GLUCOSE SERPL-MCNC: 227 MG/DL (ref 74–99)
HCO3 BLDV-SCNC: 17.3 MMOL/L (ref 22–26)
HCT VFR BLD AUTO: 42.5 % (ref 36–46)
HCT VFR BLD EST: 34 % (ref 36–46)
HGB BLD-MCNC: 13.8 G/DL (ref 12–16)
HGB BLDV-MCNC: 11.3 G/DL (ref 12–16)
INHALED O2 CONCENTRATION: 70 %
LACTATE BLDV-SCNC: 4.4 MMOL/L (ref 0.4–2)
LACTATE SERPL-SCNC: 3 MMOL/L (ref 0.4–2)
LACTATE SERPL-SCNC: 3.3 MMOL/L (ref 0.4–2)
MAGNESIUM SERPL-MCNC: 1.95 MG/DL (ref 1.6–2.4)
MCH RBC QN AUTO: 31.9 PG (ref 26–34)
MCHC RBC AUTO-ENTMCNC: 32.5 G/DL (ref 32–36)
MCV RBC AUTO: 98 FL (ref 80–100)
NRBC BLD-RTO: 0 /100 WBCS (ref 0–0)
OXYHGB MFR BLDV: 86.9 % (ref 45–75)
PCO2 BLDV: 28 MM HG (ref 41–51)
PH BLDV: 7.4 PH (ref 7.33–7.43)
PHOSPHATE SERPL-MCNC: 2 MG/DL (ref 2.5–4.9)
PLATELET # BLD AUTO: 131 X10*3/UL (ref 150–450)
PO2 BLDV: 53 MM HG (ref 35–45)
POTASSIUM BLDV-SCNC: 2.6 MMOL/L (ref 3.5–5.3)
POTASSIUM SERPL-SCNC: 3.9 MMOL/L (ref 3.5–5.3)
RBC # BLD AUTO: 4.33 X10*6/UL (ref 4–5.2)
SAO2 % BLDV: 90 % (ref 45–75)
SODIUM BLDV-SCNC: 140 MMOL/L (ref 136–145)
SODIUM SERPL-SCNC: 140 MMOL/L (ref 136–145)
UFH PPP CHRO-ACNC: 0.3 IU/ML (ref ?–1.1)
WBC # BLD AUTO: 9.8 X10*3/UL (ref 4.4–11.3)

## 2025-07-19 PROCEDURE — 94660 CPAP INITIATION&MGMT: CPT

## 2025-07-19 PROCEDURE — 2500000005 HC RX 250 GENERAL PHARMACY W/O HCPCS

## 2025-07-19 PROCEDURE — 36415 COLL VENOUS BLD VENIPUNCTURE: CPT

## 2025-07-19 PROCEDURE — 94640 AIRWAY INHALATION TREATMENT: CPT

## 2025-07-19 PROCEDURE — 2500000004 HC RX 250 GENERAL PHARMACY W/ HCPCS (ALT 636 FOR OP/ED)

## 2025-07-19 PROCEDURE — 85520 HEPARIN ASSAY: CPT

## 2025-07-19 PROCEDURE — 2500000001 HC RX 250 WO HCPCS SELF ADMINISTERED DRUGS (ALT 637 FOR MEDICARE OP)

## 2025-07-19 PROCEDURE — 99238 HOSP IP/OBS DSCHRG MGMT 30/<: CPT

## 2025-07-19 PROCEDURE — 2500000004 HC RX 250 GENERAL PHARMACY W/ HCPCS (ALT 636 FOR OP/ED): Performed by: HOSPITALIST

## 2025-07-19 PROCEDURE — 82947 ASSAY GLUCOSE BLOOD QUANT: CPT

## 2025-07-19 PROCEDURE — 84132 ASSAY OF SERUM POTASSIUM: CPT

## 2025-07-19 PROCEDURE — 85027 COMPLETE CBC AUTOMATED: CPT

## 2025-07-19 PROCEDURE — 94003 VENT MGMT INPAT SUBQ DAY: CPT

## 2025-07-19 PROCEDURE — 2500000002 HC RX 250 W HCPCS SELF ADMINISTERED DRUGS (ALT 637 FOR MEDICARE OP, ALT 636 FOR OP/ED)

## 2025-07-19 PROCEDURE — 84520 ASSAY OF UREA NITROGEN: CPT

## 2025-07-19 PROCEDURE — 83605 ASSAY OF LACTIC ACID: CPT

## 2025-07-19 PROCEDURE — 5A09357 ASSISTANCE WITH RESPIRATORY VENTILATION, LESS THAN 24 CONSECUTIVE HOURS, CONTINUOUS POSITIVE AIRWAY PRESSURE: ICD-10-PCS | Performed by: SURGERY

## 2025-07-19 PROCEDURE — 83735 ASSAY OF MAGNESIUM: CPT

## 2025-07-19 PROCEDURE — 99291 CRITICAL CARE FIRST HOUR: CPT

## 2025-07-19 RX ORDER — PREDNISONE 20 MG/1
40 TABLET ORAL DAILY
Qty: 6 TABLET | Refills: 0 | Status: SHIPPED | OUTPATIENT
Start: 2025-07-20 | End: 2025-07-23

## 2025-07-19 RX ORDER — HEPARIN SODIUM 10000 [USP'U]/100ML
0-4000 INJECTION, SOLUTION INTRAVENOUS CONTINUOUS
Status: DISCONTINUED | OUTPATIENT
Start: 2025-07-19 | End: 2025-07-19

## 2025-07-19 RX ORDER — HYDROCODONE BITARTRATE AND ACETAMINOPHEN 5; 325 MG/1; MG/1
1 TABLET ORAL EVERY 6 HOURS PRN
Refills: 0 | Status: DISCONTINUED | OUTPATIENT
Start: 2025-07-19 | End: 2025-07-19 | Stop reason: HOSPADM

## 2025-07-19 RX ORDER — PREDNISONE 20 MG/1
40 TABLET ORAL DAILY
Status: DISCONTINUED | OUTPATIENT
Start: 2025-07-19 | End: 2025-07-19 | Stop reason: HOSPADM

## 2025-07-19 RX ORDER — POTASSIUM CHLORIDE 14.9 MG/ML
20 INJECTION INTRAVENOUS
Status: DISCONTINUED | OUTPATIENT
Start: 2025-07-19 | End: 2025-07-19

## 2025-07-19 RX ADMIN — INSULIN LISPRO 2 UNITS: 100 INJECTION, SOLUTION INTRAVENOUS; SUBCUTANEOUS at 07:57

## 2025-07-19 RX ADMIN — DEXMEDETOMIDINE HYDROCHLORIDE 0.5 MCG/KG/HR: 400 INJECTION INTRAVENOUS at 01:31

## 2025-07-19 RX ADMIN — Medication 70 PERCENT: at 07:31

## 2025-07-19 RX ADMIN — PREDNISONE 40 MG: 20 TABLET ORAL at 16:41

## 2025-07-19 RX ADMIN — POTASSIUM CHLORIDE 20 MEQ: 14.9 INJECTION, SOLUTION INTRAVENOUS at 05:57

## 2025-07-19 RX ADMIN — INSULIN LISPRO 2 UNITS: 100 INJECTION, SOLUTION INTRAVENOUS; SUBCUTANEOUS at 11:33

## 2025-07-19 RX ADMIN — PANTOPRAZOLE SODIUM 40 MG: 40 INJECTION, POWDER, FOR SOLUTION INTRAVENOUS at 06:19

## 2025-07-19 RX ADMIN — HYDROCODONE BITARTRATE AND ACETAMINOPHEN 1 TABLET: 5; 325 TABLET ORAL at 18:01

## 2025-07-19 RX ADMIN — CLONIDINE HYDROCHLORIDE 0.1 MG: 0.1 TABLET ORAL at 09:30

## 2025-07-19 RX ADMIN — IPRATROPIUM BROMIDE AND ALBUTEROL SULFATE 3 ML: 2.5; .5 SOLUTION RESPIRATORY (INHALATION) at 08:04

## 2025-07-19 RX ADMIN — IPRATROPIUM BROMIDE AND ALBUTEROL SULFATE 3 ML: 2.5; .5 SOLUTION RESPIRATORY (INHALATION) at 00:45

## 2025-07-19 RX ADMIN — PROPOFOL 45 MCG/KG/MIN: 10 INJECTION, EMULSION INTRAVENOUS at 05:00

## 2025-07-19 RX ADMIN — ACETAMINOPHEN 650 MG: 325 TABLET ORAL at 13:56

## 2025-07-19 RX ADMIN — Medication 50 PERCENT: at 11:15

## 2025-07-19 RX ADMIN — INSULIN LISPRO 4 UNITS: 100 INJECTION, SOLUTION INTRAVENOUS; SUBCUTANEOUS at 03:00

## 2025-07-19 RX ADMIN — IPRATROPIUM BROMIDE AND ALBUTEROL SULFATE 3 ML: 2.5; .5 SOLUTION RESPIRATORY (INHALATION) at 04:14

## 2025-07-19 RX ADMIN — FENTANYL CITRATE 25 MCG: 50 INJECTION INTRAMUSCULAR; INTRAVENOUS at 06:19

## 2025-07-19 RX ADMIN — SODIUM CHLORIDE, SODIUM LACTATE, POTASSIUM CHLORIDE, AND CALCIUM CHLORIDE 500 ML: .6; .31; .03; .02 INJECTION, SOLUTION INTRAVENOUS at 10:18

## 2025-07-19 RX ADMIN — LEVOTHYROXINE SODIUM 175 MCG: 0.05 TABLET ORAL at 08:27

## 2025-07-19 RX ADMIN — IPRATROPIUM BROMIDE AND ALBUTEROL SULFATE 3 ML: 2.5; .5 SOLUTION RESPIRATORY (INHALATION) at 14:59

## 2025-07-19 RX ADMIN — HEPARIN SODIUM 900 UNITS/HR: 10000 INJECTION, SOLUTION INTRAVENOUS at 07:57

## 2025-07-19 RX ADMIN — ESCITALOPRAM OXALATE 20 MG: 10 TABLET, FILM COATED ORAL at 09:30

## 2025-07-19 RX ADMIN — HYDROCODONE BITARTRATE AND ACETAMINOPHEN 1 TABLET: 5; 325 TABLET ORAL at 11:33

## 2025-07-19 RX ADMIN — METHYLPREDNISOLONE SODIUM SUCCINATE 40 MG: 40 INJECTION, POWDER, FOR SOLUTION INTRAMUSCULAR; INTRAVENOUS at 03:00

## 2025-07-19 RX ADMIN — IPRATROPIUM BROMIDE AND ALBUTEROL SULFATE 3 ML: 2.5; .5 SOLUTION RESPIRATORY (INHALATION) at 11:22

## 2025-07-19 RX ADMIN — PROPOFOL 50 MCG/KG/MIN: 10 INJECTION, EMULSION INTRAVENOUS at 08:39

## 2025-07-19 ASSESSMENT — COGNITIVE AND FUNCTIONAL STATUS - GENERAL
DRESSING REGULAR UPPER BODY CLOTHING: TOTAL
STANDING UP FROM CHAIR USING ARMS: TOTAL
EATING MEALS: TOTAL
MOBILITY SCORE: 6
CLIMB 3 TO 5 STEPS WITH RAILING: TOTAL
WALKING IN HOSPITAL ROOM: TOTAL
DAILY ACTIVITIY SCORE: 6
MOVING FROM LYING ON BACK TO SITTING ON SIDE OF FLAT BED WITH BEDRAILS: TOTAL
PERSONAL GROOMING: TOTAL
TURNING FROM BACK TO SIDE WHILE IN FLAT BAD: TOTAL
TOILETING: TOTAL
DRESSING REGULAR LOWER BODY CLOTHING: TOTAL
MOVING TO AND FROM BED TO CHAIR: TOTAL
HELP NEEDED FOR BATHING: TOTAL

## 2025-07-19 ASSESSMENT — PAIN - FUNCTIONAL ASSESSMENT
PAIN_FUNCTIONAL_ASSESSMENT: CPOT (CRITICAL CARE PAIN OBSERVATION TOOL)
PAIN_FUNCTIONAL_ASSESSMENT: 0-10
PAIN_FUNCTIONAL_ASSESSMENT: CPOT (CRITICAL CARE PAIN OBSERVATION TOOL)
PAIN_FUNCTIONAL_ASSESSMENT: 0-10
PAIN_FUNCTIONAL_ASSESSMENT: CPOT (CRITICAL CARE PAIN OBSERVATION TOOL)
PAIN_FUNCTIONAL_ASSESSMENT: 0-10
PAIN_FUNCTIONAL_ASSESSMENT: 0-10

## 2025-07-19 ASSESSMENT — ACTIVITIES OF DAILY LIVING (ADL)
BATHING: UNABLE TO ASSESS
JUDGMENT_ADEQUATE_SAFELY_COMPLETE_DAILY_ACTIVITIES: UNABLE TO ASSESS
PATIENT'S MEMORY ADEQUATE TO SAFELY COMPLETE DAILY ACTIVITIES?: UNABLE TO ASSESS
ADEQUATE_TO_COMPLETE_ADL: UNABLE TO ASSESS
FEEDING YOURSELF: UNABLE TO ASSESS
HEARING - LEFT EAR: UNABLE TO ASSESS
DRESSING YOURSELF: UNABLE TO ASSESS
TOILETING: UNABLE TO ASSESS
GROOMING: UNABLE TO ASSESS
HEARING - RIGHT EAR: UNABLE TO ASSESS
WALKS IN HOME: UNABLE TO ASSESS

## 2025-07-19 ASSESSMENT — PAIN SCALES - GENERAL
PAINLEVEL_OUTOF10: 5 - MODERATE PAIN
PAINLEVEL_OUTOF10: 3
PAINLEVEL_OUTOF10: 5 - MODERATE PAIN
PAINLEVEL_OUTOF10: 4

## 2025-07-19 NOTE — CARE PLAN
The patient's goals for the shift include sleep     The clinical goals for the shift include Patient will remain safe without injury throughout this shift.      Problem: Heart Failure  Goal: Improved gas exchange this shift  Outcome: Progressing  Goal: Improved urinary output this shift  Outcome: Progressing  Goal: Reduction in peripheral edema within 24 hours  Outcome: Progressing  Goal: Report improvement of dyspnea/breathlessness this shift  Outcome: Progressing  Goal: Weight from fluid excess reduced over 2-3 days, then stabilize  Outcome: Progressing  Goal: Increase self care and/or family involvement in 24 hours  Outcome: Progressing     Problem: Pain - Adult  Goal: Verbalizes/displays adequate comfort level or baseline comfort level  Outcome: Progressing     Problem: Safety - Adult  Goal: Free from fall injury  Outcome: Progressing     Problem: Discharge Planning  Goal: Discharge to home or other facility with appropriate resources  Outcome: Progressing     Problem: Chronic Conditions and Co-morbidities  Goal: Patient's chronic conditions and co-morbidity symptoms are monitored and maintained or improved  Outcome: Progressing     Problem: Nutrition  Goal: Nutrient intake appropriate for maintaining nutritional needs  Outcome: Progressing     Problem: Skin  Goal: Decreased wound size/increased tissue granulation at next dressing change  Outcome: Progressing  Flowsheets (Taken 7/18/2025 2325)  Decreased wound size/increased tissue granulation at next dressing change:   Promote sleep for wound healing   Protective dressings over bony prominences   Utilize specialty bed per algorithm  Goal: Participates in plan/prevention/treatment measures  Outcome: Progressing  Flowsheets (Taken 7/18/2025 2325)  Participates in plan/prevention/treatment measures:   Discuss with provider PT/OT consult   Elevate heels   Increase activity/out of bed for meals  Goal: Prevent/manage excess moisture  Outcome: Progressing  Flowsheets  (Taken 7/18/2025 2325)  Prevent/manage excess moisture:   Cleanse incontinence/protect with barrier cream   Follow provider orders for dressing changes   Moisturize dry skin   Monitor for/manage infection if present   Use wicking fabric (obtain order)  Goal: Prevent/minimize sheer/friction injuries  Outcome: Progressing  Flowsheets (Taken 7/18/2025 2325)  Prevent/minimize sheer/friction injuries:   Complete micro-shifts as needed if patient unable. Adjust patient position to relieve pressure points, not a full turn   Increase activity/out of bed for meals   Turn/reposition every 2 hours/use positioning/transfer devices   Use pull sheet   Utilize specialty bed per algorithm  Goal: Promote/optimize nutrition  Outcome: Progressing  Flowsheets (Taken 7/18/2025 2325)  Promote/optimize nutrition: Discuss with provider if NPO > 2 days  Goal: Promote skin healing  Outcome: Progressing  Flowsheets (Taken 7/18/2025 2325)  Promote skin healing:   Assess skin/pad under line(s)/device(s)   Ensure correct size (line/device) and apply per  instructions   Protective dressings over bony prominences   Rotate device position/do not position patient on device   Turn/reposition every 2 hours/use positioning/transfer devices     Problem: Safety - Medical Restraint  Goal: Remains free of injury from restraints (Restraint for Interference with Medical Device)  Outcome: Progressing  Flowsheets (Taken 7/18/2025 2325)  Remains free of injury from restraints (restraint for interference with medical device):   Determine that other, less restrictive measures have been tried or would not be effective before applying the restraint   Evaluate the patient's condition at the time of restraint application   Inform patient/family regarding the reason for restraint   Every 2 hours: Monitor safety, psychosocial status, comfort, nutrition and hydration  Goal: Free from restraint(s) (Restraint for Interference with Medical Device)  Outcome:  Progressing  Flowsheets (Taken 7/18/2025 3954)  Free from restraint(s) (restraint for interference with medical device):   ONCE/SHIFT or MINIMUM Every 12 hours: Assess and document the continuing need for restraints   Every 24 hours: Continued use of restraint requires Licensed Independent Practitioner to perform face to face examination and written order   Identify and implement measures to help patient regain control

## 2025-07-19 NOTE — SIGNIFICANT EVENT
[facility] SIGNIFICANT EVENT NOTE:    Date:  7/19/2025  Patient:  Stephenie Mccray  YOB: 1980  MRN:  [id number]   Admit Date:  7/18/2025  =============================================================================================    UPDATE 1115: patient extubated to BiPAP without complication. Weaned to 2L NC and alert and oriented appropriately. Requesting to be discharged. Tran catheter discontinued, diet order placed pending bedside swallow eval.    Renee Ferrell, APRN-CNP

## 2025-07-19 NOTE — DISCHARGE SUMMARY
Discharge Diagnosis  Diastasis recti    Issues Requiring Follow-Up  Follow-up with Dr. Gonzalez for postop care    Test Results Pending At Discharge  Pending Labs       Order Current Status    Extra Tubes Preliminary result    Lavender Top Preliminary result    Respiratory Culture/Smear Preliminary result    SST TOP Preliminary result            Hospital Course   45-year-old female with past medical history of COPD, hypertension, hyperlipidemia, hypothyroidism, type 2 diabetes, PE on Xarelto, CHAR[place]IE [place] [facility] [place].  Patient was intubated for procedure and procedure was without complications.  Postoperatively patient was unable to be extubated due to bronchospasms and concern for hypoxia.  She was admitted to the ICU postoperatively.  This morning patient was weaned and placed on a spontaneous breathing trial.  Patient was extubated to BiPAP.  She was eventually weaned to 2 L nasal cannula and requesting to be discharged.  Tran catheter was discontinued and patient urinated on her own.  She tolerated a diet.  She will be discharged in stable condition.    Visit Vitals  /76   Pulse 100   Temp 35.6 °C (96.1 °F) (Temporal)   Resp 22     Vitals:    07/19/25 0800   Weight: 76.1 kg (167 lb 12.3 oz)       Immunization History   Administered Date(s) Administered    Flu vaccine (IIV4), preservative free *Check age/dose* 10/12/2016, 08/12/2017, 09/26/2018, 08/22/2019, 09/03/2020, 09/22/2021, 09/21/2022    Flu vaccine, quadrivalent, recombinant, preservative free, adult (FLUBLOK) 09/17/2023    Influenza, Unspecified 09/26/2018    Influenza, injectable, quadrivalent, preservative free, pediatric 11/17/2015    Influenza, seasonal, injectable 09/22/2021, 10/09/2024    Moderna SARS-CoV-2 Vaccination 05/09/2021, 06/09/2021, 12/12/2021    Pneumococcal conjugate vaccine, 13-valent (PREVNAR 13) 10/11/2019    Pneumococcal conjugate vaccine, 20-valent (PREVNAR 20) 10/09/2024    Pneumococcal  "polysaccharide vaccine, 23-valent, age 2 years and older (PNEUMOVAX 23) 11/17/2015    Tdap vaccine, age 7 year and older (BOOSTRIX, ADACEL) 07/05/2023       Results        Pertinent Physical Exam At Time of Discharge  Physical Exam  Constitutional:       General: She is not in acute distress.     Appearance: She is obese. She is not ill-appearing.   HENT:      Head: Normocephalic.      Mouth/Throat:      Mouth: Mucous membranes are dry.     Eyes:      Pupils: Pupils are equal, round, and reactive to light.       Cardiovascular:      Rate and Rhythm: Normal rate and regular rhythm.      Pulses: Normal pulses.      Heart sounds: Normal heart sounds.   Pulmonary:      Effort: Pulmonary effort is normal.      Breath sounds: Decreased breath sounds and wheezing present.     Musculoskeletal:         General: Normal range of motion.     Skin:     General: Skin is warm and dry.      Capillary Refill: Capillary refill takes less than 2 seconds.     Neurological:      Mental Status: She is alert and oriented to person, place, and time.         Home Medications     Medication List      START taking these medications     HYDROcodone-acetaminophen 5-325 mg tablet; Commonly known as: Norco;   Take 1 tablet by mouth every 6 hours if needed for severe pain (7 - 10)   for up to 12 doses.   predniSONE 20 mg tablet; Commonly known as: Deltasone; Take 2 tablets   (40 mg) by mouth once daily for 3 doses.; Start taking on: July 20, 2025     CONTINUE taking these medications     Adult Aerosol Mask misc; Generic drug: nebulizer accessories; Use mask   as directed with nebulizer   albuterol 90 mcg/actuation inhaler   alcohol swabs; Commonly known as: Alcohol Prep Pads; Use 3 times daily   prn   atorvastatin 20 mg tablet; Commonly known as: Lipitor; Take 1 tablet (20   mg) by mouth once daily.   BD Ultra-Fine Mini Pen Needle 31 gauge x 3/16\" needle; Generic drug: pen   needle, diabetic; USE TO INJECT 4 TIMES DAILY AS DIRECTED   busPIRone 30 " mg tablet; Commonly known as: Buspar   clonazePAM 0.5 mg tablet; Commonly known as: KlonoPIN   cloNIDine 0.1 mg tablet; Commonly known as: Catapres   escitalopram 20 mg tablet; Commonly known as: Lexapro   ipratropium-albuteroL 0.5-2.5 mg/3 mL nebulizer solution; Commonly known   as: Duo-Neb; Take 3 mL by nebulization every 6 hours if needed for   shortness of breath or wheezing.   Januvia 50 mg tablet; Generic drug: SITagliptin phosphate; Take 1 tablet   (50 mg) by mouth early in the morning..   levothyroxine 175 mcg tablet; Commonly known as: Synthroid, Levoxyl;   Take 1 tablet (175 mcg) by mouth once daily.   metoprolol tartrate 25 mg tablet; Commonly known as: Lopressor; Take 1   tablet (25 mg) by mouth 2 times a day.   oxyCODONE-acetaminophen 5-325 mg tablet; Commonly known as: Percocet;   Take 1 tablet by mouth every 6 hours if needed for severe pain (7 - 10).   prazosin 2 mg capsule; Commonly known as: Minipress   risperiDONE 2 mg tablet; Commonly known as: RisperDAL   traZODone 50 mg tablet; Commonly known as: Desyrel   Xarelto 10 mg tablet; Generic drug: rivaroxaban     ASK your doctor about these medications     tiotropium 2.5 mcg/actuation inhaler; Commonly known as: Spiriva   Respimat; Inhale 2 puffs once daily. Do not start before March 9, 2024.   ubrogepant 100 mg tablet; Commonly known as: Ubrelvy; Use for headache       Outpatient Follow-Up  Future Appointments   Date Time Provider Department Center   7/25/2025  8:20 AM BARTOLOME Kearney-CNP [id number] [facility]   7/30/2025 12:00 PM ELY RESPTHER1 PFT RM1 [id number] [facility]   7/30/2025 12:30 PM ELY RESPTHER1 PFT RM1 [email or web address] [facility]   9/3/2025  3:00 PM Delta Yuen MD [id number] [facility]   11/12/2025 10:15 AM Sundeep Ahn MD [id number] [facility]   12/18/2025 10:30 AM BARTOLMOE Delvalle-CNP [id number] BARTOLOME Abrams-CNP

## 2025-07-19 NOTE — PROGRESS NOTES
[facility]       Date:  7/19/2025  Patient:  Stephenie Mccray  YOB: 1980  MRN:  [id number]   Admit Date:  7/18/2025  ========================================================================================================    No chief complaint on file.        History of Present Illness:  Stephenie Mccray is a 45 y.o. year old female patient with Past Medical History of  COPD home O2 2L NC, has bipap for sleep but does not wear it, schizophrenia, HTN, HLD, hypothyroid, DM2, pulmonary embolism on xarelto, current smoker presented [facility] [place] for planned Ventral hernia repair.  She was recently admitted in June 2025 for community-acquired pneumonia.  She was treated with a 5-day course of cefepime and Zithromax. Patient was initially referred to Dr. Gonzalez for abdominal wall bulging that is painful.  After extensive risks versus benefits were discussed it was decided to proceed with a ventral hernia repair.  Procedure was without complications.  Anesthesia was attempting to extubate the patient and reported bronchospasms and desaturations.  Patient was admitted to ICU postop.    Interval ICU Events:  7/18: Admit to the ICU postoperatively intubated and sedated.  Bronchoscopy performed which showed clear secretions.  Given DuoNebs x 3.    7/19: No acute events overnight. Weaned to 70% FiO2/PEEP 12. Will continue to wean ventilator settings. Plan for SAT/SBT today.     Medical History:  Medical History[1]  Surgical History[2]  Prescriptions Prior to Admission[3]  Eggplant, Fluticasone furoate-vilanterol, Fluticasone-umeclidin-vilanter, Levofloxacin, Sumatriptan, and Vortioxetine  Social History[4]  Family History[5]    Review of Systems:  14 point review of systems was completed and negative except for those specially mention in my HPI    Physical Exam:    Heart Rate:  []   Temp:  [35.6 °C (96.1 °F)-36.7 °C (98.1 °F)]   Resp:  [16-39]   BP: ()/(36-98)   Height:  [157.5 cm (5'  "2\")]   Weight:  [74.3 kg (163 lb 12.8 oz)-77.4 kg (170 lb 10.2 oz)]   SpO2:  [87 %-97 %]     Physical Exam  Constitutional:       General: She is not in acute distress.     Appearance: She is not ill-appearing.      Interventions: She is sedated and intubated.   HENT:      Mouth/Throat:      Mouth: Mucous membranes are moist.     Cardiovascular:      Rate and Rhythm: Regular rhythm. Tachycardia present.      Pulses: Normal pulses.      Heart sounds: Normal heart sounds.   Pulmonary:      Effort: She is intubated.      Breath sounds: Decreased breath sounds and wheezing present.   Abdominal:      General: There is distension.      Palpations: Abdomen is soft.     Musculoskeletal:         General: Normal range of motion.     Skin:     General: Skin is warm and dry.      Capillary Refill: Capillary refill takes less than 2 seconds.     Neurological:      Comments: JEANNIE       Objective:    I have reviewed all medications, laboratory results, and imaging pertinent for today's encounter    Assessment/Plan:    I am currently managing this critically ill patient for the following problems:    Neuro/Psych/Pain Ctrl/Sedation:  #Encephalopathy-unclear source  #Nicotine dependence   #Hx Schizophrenia  -Hold home buspar & lexapro  -Hold home klonopin & atarax  -Hold trazodone  -Sedation: Propofol and Precedex  -Nicotine patch      Respiratory/ENT:  #Acute on chronic hypoxic hypercapnic respiratory failure  #Hx COPD  #Hx pulmonary embolism on AC  -Hold Xarelto, start heparin gtt tomorrow per surgery  -Solumedrol 40mg BID  -Duonebs Q4 hours  -Wean FiO2 as tolerated to keep SPO2 >90%  -Pulmonary hygiene   -Continue Spiriva  -Encourage smoking cessation      Cardiovascular:  #Hypertension  #Hx HLD  -Continuous cardiac monitoring  -Hold home metoprolol, minipress, and catapres in the setting of hypotension  -Continue lipitor  -EKG PRN     GI:  #Hx GERD  #POD1 Ventral Hernia Repair  -NPO   -Continue home PPI      Renal/Volume Status " (Intra & Extravascular):  -No acute issues  -Monitor I&O  -Daily BMP    Endocrine  #T2DM  #Hypothyroidism  -Continue home snythroid  -SSI  -Check TSH      Infectious Disease:  -No acute issues  -Monitor for SIRS     Heme/Onc:  -No acute issues  -Daily CBC     OBGYN/MSK:  -No acute issues  -Ambulatory at home     Ethics/Code Status:  Full Code      :  DVT Prophylaxis: heparin gtt   GI Prophylaxis: home PPI  Bowel Regimen: PRN  Diet: NPO  CVC: none  Capron: none  Tran: yes placed 7/18  Restraints: yes  Dispo: ICU    Critical Care Time:  45 minutes spent in preparing to see patient (I.e. review of medical records), evaluation of diagnostics (I.e. labs, imaging, etc.), documentation, discussing plan of care with patient/ family/ caregiver, and/ or coordination of care with multidisciplinary team. Time does not include completion of procedure time.      Renee Ferrell, APRN-CNP            [1]   Past Medical History:  Diagnosis Date    Acute on chronic respiratory failure     Anxiety     Arrhythmia     Arthritis     Chronic headaches     Chronic respiratory failure     WITH HYPOXIA    Clotting disorder (Multi)     COPD (chronic obstructive pulmonary disease) (Multi)     Cough 06/05/2025    COVID-19     VACCINATED    Depression     Diabetes mellitus (Multi)     type 2 IDDM    GERD (gastroesophageal reflux disease)     History of transfusion     History of venous thromboembolism     HL (hearing loss)     Hyperlipidemia     Hypertension     Hypothyroidism     Lipoma     Lumbar disc disease     Migraines     Nephrolithiasis     CHARLIE (obstructive sleep apnea)     USES BIPAP    Ovarian teratoma     PE (pulmonary thromboembolism) (Multi)     Peptic ulcer disease     Pneumonia     PTSD (post-traumatic stress disorder)     Schizophrenia     Shortness of breath     Type 2 diabetes mellitus     Urinary tract infection     7/8/25- no current issue    VTE (venous thromboembolism)    [2]   Past Surgical  History:  Procedure Laterality Date     SECTION, LOW TRANSVERSE      x 1    CYSTOSCOPY      DILATION AND CURETTAGE OF UTERUS      LIPOMA RESECTION      LITHOTRIPSY      STAPEDES SURGERY      TONSILLECTOMY      UMBILICAL HERNIA REPAIR      VENTRAL HERNIA REPAIR     [3]   Medications Prior to Admission   Medication Sig Dispense Refill Last Dose/Taking    albuterol 90 mcg/actuation inhaler Inhale 2 puffs every 6 hours if needed for shortness of breath. 1-2 puffs q6h PRN   2025 at  8:00 AM    atorvastatin (Lipitor) 20 mg tablet Take 1 tablet (20 mg) by mouth once daily. 30 tablet 2 2025 Morning    busPIRone (Buspar) 30 mg tablet Take 1 tablet (30 mg) by mouth 2 times a day.   2025 Evening    clonazePAM (KlonoPIN) 0.5 mg tablet Take 1 tablet (0.5 mg) by mouth every 12 hours.   2025 Evening    cloNIDine (Catapres) 0.1 mg tablet Take 1 tablet (0.1 mg) by mouth 2 times a day.   2025 Evening    escitalopram (Lexapro) 20 mg tablet Take 1 tablet (20 mg) by mouth once daily in the morning.   2025 Morning    ipratropium-albuteroL (Duo-Neb) 0.5-2.5 mg/3 mL nebulizer solution Take 3 mL by nebulization every 6 hours if needed for shortness of breath or wheezing. 180 mL 1 Past Month    Januvia 50 mg tablet Take 1 tablet (50 mg) by mouth early in the morning.. 30 tablet 2 Past Week    levothyroxine (Synthroid, Levoxyl) 175 mcg tablet Take 1 tablet (175 mcg) by mouth once daily. 90 tablet 3 2025 Morning    metoprolol tartrate (Lopressor) 25 mg tablet Take 1 tablet (25 mg) by mouth 2 times a day. 60 tablet 5 2025 at  8:00 AM    oxyCODONE-acetaminophen (Percocet) 5-325 mg tablet Take 1 tablet by mouth every 6 hours if needed for severe pain (7 - 10). 20 tablet 0 Past Week    prazosin (Minipress) 2 mg capsule Take 2 capsules (4 mg) by mouth once daily at bedtime. (Patient taking differently: Take 1 capsule (2 mg) by mouth once daily at bedtime. PATIENT STATED TAKING 1 MG)   2025  "Evening    risperiDONE (RisperDAL) 2 mg tablet Take 1 tablet (2 mg) by mouth every 12 hours.   7/17/2025 Evening    traZODone (Desyrel) 50 mg tablet Take 1 tablet (50 mg) by mouth once daily at bedtime.   Past Week    Xarelto 10 mg tablet Take 1 tablet (10 mg) by mouth once daily.   Past Week    alcohol swabs (Alcohol Prep Pads) pads, medicated Use 3 times daily prn (Patient not taking: Reported on 7/18/2025) 100 each 3 Not Taking    BD Ultra-Fine Mini Pen Needle 31 gauge x 3/16\" needle USE TO INJECT 4 TIMES DAILY AS DIRECTED (Patient not taking: Reported on 7/18/2025) 400 each 3 Not Taking    nebulizer accessories (Adult Aerosol Mask) misc Use mask as directed with nebulizer (Patient not taking: Reported on 7/18/2025) 1 each 1 Not Taking    tiotropium (Spiriva Respimat) 2.5 mcg/actuation inhaler Inhale 2 puffs once daily. Do not start before March 9, 2024. (Patient not taking: Reported on 7/7/2025) 8 g 11 Not Taking    ubrogepant (Ubrelvy) 100 mg tablet Use for headache (Patient not taking: Reported on 7/18/2025) 3 tablet 0 Not Taking   [4]   Social History  Tobacco Use    Smoking status: Every Day     Current packs/day: 0.50     Types: Cigarettes     Passive exposure: Current    Smokeless tobacco: Never   Vaping Use    Vaping status: Never Used   Substance Use Topics    Alcohol use: Defer     Comment: rarely    Drug use: Yes     Frequency: 7.0 times per week     Types: Marijuana   [5]   Family History  Problem Relation Name Age of Onset    Fibromyalgia Father      Hypertension Brother      Thyroid disease Maternal Grandmother      Thyroid disease Paternal Grandmother      Lung cancer Paternal Grandfather      Coronary artery disease Other Grandmother      "

## 2025-07-19 NOTE — PROGRESS NOTES
"Stephenie Mccray is a 45 y.o. female on day 1 of admission presenting with Diastasis recti.    Subjective   Still on vent but coming down on FIO2 and PEEP     Objective     Physical Exam  Gen: intubated, following commands  Abd: soft, non-distended, incisions aprpoximated  Last Recorded Vitals  Blood pressure 106/55, pulse 90, temperature 35.6 °C (96.1 °F), temperature source Temporal, resp. rate 18, height 1.575 m (5' 2\"), weight 76.1 kg (167 lb 12.3 oz), SpO2 93%.  Intake/Output last 3 Shifts:  I/O last 3 completed shifts:  In: 2126 (27.9 mL/kg) [I.V.:1796 (23.6 mL/kg); NG/GT:30; IV Piggyback:300]  Out: 1428 (18.8 mL/kg) [Urine:1423 (0.5 mL/kg/hr); Blood:5]  Weight: 76.1 kg     Relevant Results                 This patient has a urinary catheter   Reason for the urinary catheter remaining today? critically ill patient who need accurate urinary output measurements               Assessment & Plan  Diastasis recti    CHF (congestive heart failure)    Anticoagulant long-term use    S/p approximation of rectus diastasis and intubated due to hypoxia; appreciate ICU team help; continue abdominal binder            Jose Alvarez MD    "

## 2025-07-20 LAB
BACTERIA SPEC RESP CULT: NORMAL
GRAM STN SPEC: NORMAL
GRAM STN SPEC: NORMAL
HOLD SPECIMEN: NORMAL
HOLD SPECIMEN: NORMAL

## 2025-07-21 LAB
BACTERIA SPEC RESP CULT: NORMAL
GRAM STN SPEC: NORMAL
GRAM STN SPEC: NORMAL

## 2025-07-22 ENCOUNTER — TELEPHONE (OUTPATIENT)
Dept: SURGERY | Facility: CLINIC | Age: 45
End: 2025-07-22
Payer: COMMERCIAL

## 2025-07-22 ENCOUNTER — PATIENT OUTREACH (OUTPATIENT)
Dept: PRIMARY CARE | Facility: CLINIC | Age: 45
End: 2025-07-22
Payer: COMMERCIAL

## 2025-07-22 NOTE — PROGRESS NOTES
Discharge Facility: [facility]  Discharge Diagnosis: Diastasis recti   Admission Date: 7/18/25  Discharge Date: 7/19/25    PCP Appointment Date: tasked to office; Dr Ahn  Specialist Appointment Date: GI Dr Garcia 7/25/25  Gen Surg Dr Ramirez 8/6/25  Hospital Encounter and Summary Linked: Yes  Admission (Discharged) with Baljinder Wilson MD (07/18/2025)     See discharge assessment below for further details     Wrap Up  Wrap Up Additional Comments: This CM spoke with patient via phone. Successful transition of care outreach complete. Pt reports doing well at home since discharge. The pt reposrts she still has pain.  New meds reviewed. Pt denies any prescription medication questions. Pt reports her breathing is good and she wears her home oxygen therapy at night. Patient denies any further discharge questions/needs at this time. Emphasized that Follow up is needed after discharge to review the hospital recommendations, assess your response to your treatment. The pt states she no longer sees Dr Yuen and her PCP is Dr Ahn now. There were no available follow up appointments with Dr Ahn and the office was tasked.  Pt aware of my availability for non-emergent concerns. Contact info provided to patient. (7/22/2025 11:49 AM)    Medications  Medications reviewed with patient/caregiver?: Yes (7/22/2025 11:49 AM)  Is the patient having any side effects they believe may be caused by any medication additions or changes?: No (7/22/2025 11:49 AM)  Does the patient have all medications ordered at discharge?: Yes (7/22/2025 11:49 AM)  Care Management Interventions: No intervention needed (7/22/2025 11:49 AM)  Prescription Comments: HYDROcodone-acetaminophen (Norco)   predniSONE (Deltasone)   Start taking on: July 20, 2025 (7/22/2025 11:49 AM)  Is the patient taking all medications as directed (includes completed medication regime)?: Yes (7/22/2025 11:49 AM)  Medication Comments: no noted issues obtaining medications (7/22/2025  11:49 AM)    Appointments  Does the patient have a primary care provider?: Yes (7/22/2025 11:49 AM)  Care Management Interventions: Advised patient to make appointment; Educated patient on importance of making appointment (7/22/2025 11:49 AM)  Has the patient kept scheduled appointments due by today?: Yes (7/22/2025 11:49 AM)  Care Management Interventions: Advised patient to keep appointment (7/22/2025 11:49 AM)    Self Management  What is the home health agency?: denies need (7/22/2025 11:49 AM)  What Durable Medical Equipment (DME) was ordered?: denies need (7/22/2025 11:49 AM)    Patient Teaching  Does the patient have access to their discharge instructions?: Yes (7/22/2025 11:49 AM)  Care Management Interventions: Reviewed instructions with patient (7/22/2025 11:49 AM)  What is the patient's perception of their health status since discharge?: Improving (7/22/2025 11:49 AM)  Is the patient/caregiver able to teach back the hierarchy of who to call/visit for symptoms/problems? PCP, Specialist, Home Health nurse, Urgent Care, ED, 911: Yes (7/22/2025 11:49 AM)

## 2025-07-23 DIAGNOSIS — M62.08 DIASTASIS RECTI: Primary | ICD-10-CM

## 2025-07-23 RX ORDER — OXYCODONE AND ACETAMINOPHEN 5; 325 MG/1; MG/1
1 TABLET ORAL EVERY 6 HOURS PRN
Qty: 20 TABLET | Refills: 0 | Status: SHIPPED | OUTPATIENT
Start: 2025-07-23 | End: 2025-07-30

## 2025-07-29 ENCOUNTER — TELEPHONE (OUTPATIENT)
Dept: SURGERY | Facility: CLINIC | Age: 45
End: 2025-07-29
Payer: COMMERCIAL

## 2025-07-30 ENCOUNTER — APPOINTMENT (OUTPATIENT)
Dept: RESPIRATORY THERAPY | Facility: HOSPITAL | Age: 45
End: 2025-07-30
Payer: COMMERCIAL

## 2025-07-31 ENCOUNTER — OFFICE VISIT (OUTPATIENT)
Dept: SURGERY | Facility: CLINIC | Age: 45
End: 2025-07-31
Payer: COMMERCIAL

## 2025-07-31 VITALS — HEIGHT: 62 IN | WEIGHT: 167 LBS | BODY MASS INDEX: 30.73 KG/M2

## 2025-07-31 DIAGNOSIS — M62.08 DIASTASIS RECTI: Primary | ICD-10-CM

## 2025-07-31 PROCEDURE — 3008F BODY MASS INDEX DOCD: CPT | Performed by: SURGERY

## 2025-07-31 PROCEDURE — 99024 POSTOP FOLLOW-UP VISIT: CPT | Performed by: SURGERY

## 2025-07-31 RX ORDER — OXYCODONE AND ACETAMINOPHEN 5; 325 MG/1; MG/1
1 TABLET ORAL EVERY 6 HOURS PRN
Qty: 25 TABLET | Refills: 0 | Status: SHIPPED | OUTPATIENT
Start: 2025-07-31 | End: 2025-08-07

## 2025-07-31 NOTE — PROGRESS NOTES
Status post robotic repair of diastases complaining of pain    Incisions healthy    Diastatic segment without protrusion appears to be intact    Discussed with the patient    Refill oxycodone see back 4 weeks

## 2025-08-06 ENCOUNTER — PATIENT OUTREACH (OUTPATIENT)
Dept: PRIMARY CARE | Facility: CLINIC | Age: 45
End: 2025-08-06

## 2025-08-06 ENCOUNTER — APPOINTMENT (OUTPATIENT)
Dept: SURGERY | Facility: CLINIC | Age: 45
End: 2025-08-06
Payer: COMMERCIAL

## 2025-08-06 NOTE — PROGRESS NOTES
Confirmation of at least 2 patient identifiers.    Completed telephonic follow-up with patient approximately 14 days post discharge, no PCP follow up.   Spoke to patient during outreach call.    Patient reports feeling: No change from previous encounter     Patient has questions or concerns about medications: No    Have all prescribed medications been filled? Yes    Patient has necessary resources to manage their care? Yes    Patient has questions or concerns? No    Next care management follow-up approximately within one month.  Care  information provided to patient.                98.2

## 2025-08-11 DIAGNOSIS — M62.08 DIASTASIS RECTI: Primary | ICD-10-CM

## 2025-08-11 RX ORDER — OXYCODONE AND ACETAMINOPHEN 5; 325 MG/1; MG/1
1 TABLET ORAL EVERY 6 HOURS PRN
Qty: 20 TABLET | Refills: 0 | Status: SHIPPED | OUTPATIENT
Start: 2025-08-11 | End: 2025-08-18

## 2025-08-13 ENCOUNTER — APPOINTMENT (OUTPATIENT)
Dept: PRIMARY CARE | Facility: CLINIC | Age: 45
End: 2025-08-13
Payer: COMMERCIAL

## 2025-08-13 DIAGNOSIS — E78.00 PURE HYPERCHOLESTEROLEMIA: ICD-10-CM

## 2025-08-13 RX ORDER — ATORVASTATIN CALCIUM 20 MG/1
20 TABLET, FILM COATED ORAL DAILY
Qty: 90 TABLET | Refills: 3 | Status: SHIPPED | OUTPATIENT
Start: 2025-08-13 | End: 2026-08-13

## 2025-08-14 ENCOUNTER — TELEMEDICINE (OUTPATIENT)
Dept: PRIMARY CARE | Facility: CLINIC | Age: 45
End: 2025-08-14
Payer: COMMERCIAL

## 2025-08-14 DIAGNOSIS — H10.33 ACUTE CONJUNCTIVITIS OF BOTH EYES, UNSPECIFIED ACUTE CONJUNCTIVITIS TYPE: Primary | ICD-10-CM

## 2025-08-14 PROCEDURE — 99213 OFFICE O/P EST LOW 20 MIN: CPT | Performed by: FAMILY MEDICINE

## 2025-08-14 RX ORDER — LEVOTHYROXINE SODIUM 200 UG/1
200 TABLET ORAL DAILY
COMMUNITY
Start: 2025-08-13

## 2025-08-14 RX ORDER — PANTOPRAZOLE SODIUM 20 MG/1
20 TABLET, DELAYED RELEASE ORAL
COMMUNITY

## 2025-08-14 RX ORDER — ERYTHROMYCIN 5 MG/G
OINTMENT OPHTHALMIC 4 TIMES DAILY
Qty: 3.5 G | Refills: 0 | Status: SHIPPED | OUTPATIENT
Start: 2025-08-14 | End: 2025-08-21

## 2025-08-14 RX ORDER — PRAZOSIN HYDROCHLORIDE 1 MG/1
1 CAPSULE ORAL NIGHTLY
COMMUNITY
Start: 2025-05-27

## 2025-08-27 ENCOUNTER — APPOINTMENT (OUTPATIENT)
Dept: PRIMARY CARE | Facility: CLINIC | Age: 45
End: 2025-08-27
Payer: COMMERCIAL

## 2025-08-27 ENCOUNTER — APPOINTMENT (OUTPATIENT)
Dept: SURGERY | Facility: CLINIC | Age: 45
End: 2025-08-27
Payer: COMMERCIAL

## 2025-08-27 VITALS
DIASTOLIC BLOOD PRESSURE: 63 MMHG | TEMPERATURE: 97.7 F | WEIGHT: 163.1 LBS | OXYGEN SATURATION: 93 % | SYSTOLIC BLOOD PRESSURE: 93 MMHG | HEIGHT: 62 IN | BODY MASS INDEX: 30.01 KG/M2 | HEART RATE: 68 BPM

## 2025-08-27 VITALS
SYSTOLIC BLOOD PRESSURE: 106 MMHG | BODY MASS INDEX: 29.81 KG/M2 | DIASTOLIC BLOOD PRESSURE: 67 MMHG | HEART RATE: 92 BPM | HEIGHT: 62 IN | WEIGHT: 162 LBS

## 2025-08-27 DIAGNOSIS — F12.10 MARIJUANA ABUSE: ICD-10-CM

## 2025-08-27 DIAGNOSIS — J44.1 CHRONIC OBSTRUCTIVE PULMONARY DISEASE WITH ACUTE EXACERBATION (MULTI): ICD-10-CM

## 2025-08-27 DIAGNOSIS — H10.13 ALLERGIC CONJUNCTIVITIS OF BOTH EYES: Primary | ICD-10-CM

## 2025-08-27 DIAGNOSIS — M62.08 DIASTASIS RECTI: Primary | ICD-10-CM

## 2025-08-27 PROCEDURE — 3074F SYST BP LT 130 MM HG: CPT | Performed by: INTERNAL MEDICINE

## 2025-08-27 PROCEDURE — 3008F BODY MASS INDEX DOCD: CPT | Performed by: SURGERY

## 2025-08-27 PROCEDURE — 99214 OFFICE O/P EST MOD 30 MIN: CPT | Performed by: INTERNAL MEDICINE

## 2025-08-27 PROCEDURE — 3074F SYST BP LT 130 MM HG: CPT | Performed by: SURGERY

## 2025-08-27 PROCEDURE — 99024 POSTOP FOLLOW-UP VISIT: CPT | Performed by: SURGERY

## 2025-08-27 PROCEDURE — 3078F DIAST BP <80 MM HG: CPT | Performed by: SURGERY

## 2025-08-27 PROCEDURE — 3008F BODY MASS INDEX DOCD: CPT | Performed by: INTERNAL MEDICINE

## 2025-08-27 PROCEDURE — 3078F DIAST BP <80 MM HG: CPT | Performed by: INTERNAL MEDICINE

## 2025-08-27 RX ORDER — OXYCODONE AND ACETAMINOPHEN 5; 325 MG/1; MG/1
1 TABLET ORAL EVERY 6 HOURS PRN
Qty: 20 TABLET | Refills: 0 | Status: SHIPPED | OUTPATIENT
Start: 2025-08-27 | End: 2025-09-03

## 2025-08-27 RX ORDER — OLOPATADINE HYDROCHLORIDE 1 MG/ML
1 SOLUTION OPHTHALMIC 2 TIMES DAILY
Qty: 5 ML | Refills: 2 | Status: SHIPPED | OUTPATIENT
Start: 2025-08-27 | End: 2026-08-27

## 2025-08-27 RX ORDER — IPRATROPIUM BROMIDE AND ALBUTEROL SULFATE 2.5; .5 MG/3ML; MG/3ML
3 SOLUTION RESPIRATORY (INHALATION) EVERY 6 HOURS PRN
Qty: 180 ML | Refills: 3 | Status: SHIPPED | OUTPATIENT
Start: 2025-08-27 | End: 2026-08-27

## 2025-08-27 ASSESSMENT — ENCOUNTER SYMPTOMS
LOSS OF SENSATION IN FEET: 0
DEPRESSION: 1
OCCASIONAL FEELINGS OF UNSTEADINESS: 0

## 2025-08-27 ASSESSMENT — PATIENT HEALTH QUESTIONNAIRE - PHQ9
2. FEELING DOWN, DEPRESSED OR HOPELESS: SEVERAL DAYS
1. LITTLE INTEREST OR PLEASURE IN DOING THINGS: NOT AT ALL
SUM OF ALL RESPONSES TO PHQ9 QUESTIONS 1 AND 2: 1

## 2025-09-03 ENCOUNTER — APPOINTMENT (OUTPATIENT)
Dept: PRIMARY CARE | Facility: CLINIC | Age: 45
End: 2025-09-03
Payer: COMMERCIAL

## 2025-11-12 ENCOUNTER — APPOINTMENT (OUTPATIENT)
Dept: PRIMARY CARE | Facility: CLINIC | Age: 45
End: 2025-11-12
Payer: COMMERCIAL

## 2026-02-18 ENCOUNTER — APPOINTMENT (OUTPATIENT)
Dept: PRIMARY CARE | Facility: CLINIC | Age: 46
End: 2026-02-18
Payer: COMMERCIAL

## (undated) DEVICE — SUTURE, V-LOC PBT, SIZE 0, GS-22, 9 IN, BLUE, NONABS

## (undated) DEVICE — Device

## (undated) DEVICE — SUTURE, VICRYL, 2-0, 27 IN, X-1, UNDYED

## (undated) DEVICE — GOWN, SURGICAL, ROYAL SILK, XL, STERILE

## (undated) DEVICE — SEAL, UNIVERSAL, 5-12MM

## (undated) DEVICE — GOWN, SURGICAL, ROYAL SILK, LG, STERILE

## (undated) DEVICE — TUBING SET, BIFURCATED, SMOKE EVAC, ACTIVATED CHARCOAL FILTERED, F/AIRSEAL

## (undated) DEVICE — CLEAN KIT, ANTIFOG SCOPE, SEE SHARP 150MM

## (undated) DEVICE — TRAY, DRY PREP, PREMIUM

## (undated) DEVICE — SUTURE, STRATAFIX, SPIRAL PDS PLUS, 2-0, 6IN, 15CM, CT-2, VIOLET

## (undated) DEVICE — DRAPE, SHEET, XL

## (undated) DEVICE — NEEDLE, SAFETY, 25 GA X 1.5 IN

## (undated) DEVICE — DRAPE, INCISE, ANTIMICROBIAL, IOBAN 2, LARGE, 17 X 23 IN, DISPOSABLE, STERILE

## (undated) DEVICE — CUP, MEDICINE, GRADUATED, 2 OZ, PLASTIC, DISP, LF

## (undated) DEVICE — SUTURE, ETHILON, 2-0, FSLX 30, BLACK

## (undated) DEVICE — ADHESIVE, SKIN, DERMABOND ADVANCED, 15CM, PEN-STYLE

## (undated) DEVICE — OBTURATOR, BLADELESS , SU

## (undated) DEVICE — SOLUTION, SCRUB EXIDINE, 4% CHG, 8 OZ

## (undated) DEVICE — TOWEL PACK, STERILE, 16X24, XRAY DETECTABLE, BLUE, 4/PK

## (undated) DEVICE — MARKER, SKIN, W/ RULER, LF, STERILE

## (undated) DEVICE — CAUTERY, PENCIL, PUSH BUTTON, SMOKE EVAC, 70MM

## (undated) DEVICE — GLOVE, SURGICAL, PROTEXIS PI , 7.5, PF, LF

## (undated) DEVICE — SYRINGE, 10 CC, LUER LOCK

## (undated) DEVICE — SUTURE, MONOCRYL, 4-0, 27 IN, PS-2, UNDYED

## (undated) DEVICE — SYRINGE, CONTROL, ANGIOGRAPHIC, FIXED MALE LUER, 10 CC

## (undated) DEVICE — SHIELD, PRE-KLENZ, SOAK, MED 6ML

## (undated) DEVICE — DRAPE, ARM XI

## (undated) DEVICE — COVER, TRANSDUCER, NEO GUARD, 4 X 30CM, LF

## (undated) DEVICE — DRIVER, NEEDLE, MEGA SUTURE CUT, DAVINCI XI

## (undated) DEVICE — CANNULA, AIRSEAL, CAP & OBTURATOR 8MM

## (undated) DEVICE — NEEDLE, INSUFFLATION, 13GAX120MM, DISP

## (undated) DEVICE — GLOVE, SURGICAL, PROTEXIS PI BLUE W/NEUTHERA, 8.0, PF, LF

## (undated) DEVICE — DRESSING, NON-ADHERENT, TELFA, OUCHLESS, 3 X 8 IN, STERILE

## (undated) DEVICE — APPLICATOR, CHLORAPREP, W/ORANGE TINT, 26ML

## (undated) DEVICE — DRAPE, SHEET, ENDOSCOPY, GENERAL, FENESTRATED, ARMBOARD COVER, 98 X 123.5 IN, DISPOSABLE, LF, STERILE

## (undated) DEVICE — HOLSTER, JET SAFETY

## (undated) DEVICE — SUTURE, VICRYL PLUS 3-0, SH, 27IN

## (undated) DEVICE — FORCEPS, BIPOLAR FENESTRATED XI

## (undated) DEVICE — STRAP, VELCRO, BODY, 4 X 60IN, NS

## (undated) DEVICE — STRAP, ARM BOARD, 32 X 1.5

## (undated) DEVICE — ELECTRODE, ELECTROSURGICAL, BLADE, INSULATED, ENT/IMA, STERILE

## (undated) DEVICE — COVER, TIP HOT SHEARS ENDOWRIST

## (undated) DEVICE — NEEDLE, ECLIPSE, 25GA X 1-1/2 IN

## (undated) DEVICE — DRAPE, SHEET, LAPAROTOMY, W/ISO-BAC, W/ARMBOARD COVERS, 98 X 122 IN, DISPOSABLE, LF, STERILE

## (undated) DEVICE — SCISSORS, MONOPOLAR, CURVED, 8MM

## (undated) DEVICE — DRAPE SHEET, UTILITY, 26 X 15, W/ TAPE, STERILE

## (undated) DEVICE — DRAPE, COLUMN, DAVINCI XI

## (undated) DEVICE — MANIFOLD, 4 PORT NEPTUNE STANDARD